# Patient Record
Sex: FEMALE | Race: WHITE | NOT HISPANIC OR LATINO | Employment: OTHER | URBAN - METROPOLITAN AREA
[De-identification: names, ages, dates, MRNs, and addresses within clinical notes are randomized per-mention and may not be internally consistent; named-entity substitution may affect disease eponyms.]

---

## 2017-01-01 ENCOUNTER — APPOINTMENT (EMERGENCY)
Dept: RADIOLOGY | Facility: HOSPITAL | Age: 37
End: 2017-01-01
Payer: MEDICARE

## 2017-01-01 ENCOUNTER — HOSPITAL ENCOUNTER (EMERGENCY)
Facility: HOSPITAL | Age: 37
Discharge: HOME | End: 2017-01-01
Attending: EMERGENCY MEDICINE | Admitting: EMERGENCY MEDICINE
Payer: MEDICARE

## 2017-01-01 ENCOUNTER — APPOINTMENT (EMERGENCY)
Dept: CT IMAGING | Facility: HOSPITAL | Age: 37
End: 2017-01-01
Payer: MEDICARE

## 2017-01-01 VITALS
DIASTOLIC BLOOD PRESSURE: 69 MMHG | TEMPERATURE: 97.6 F | HEIGHT: 55 IN | HEART RATE: 99 BPM | OXYGEN SATURATION: 100 % | BODY MASS INDEX: 38.23 KG/M2 | RESPIRATION RATE: 17 BRPM | WEIGHT: 165.2 LBS | SYSTOLIC BLOOD PRESSURE: 141 MMHG

## 2017-01-01 DIAGNOSIS — S30.0XXA CONTUSION OF LOWER BACK, INITIAL ENCOUNTER: Primary | ICD-10-CM

## 2017-01-01 PROCEDURE — 99284 EMERGENCY DEPT VISIT MOD MDM: CPT

## 2017-01-01 PROCEDURE — 71020 HB CHEST X-RAY 2VW FRONTAL&LATL: CPT

## 2017-01-01 PROCEDURE — 72131 CT LUMBAR SPINE W/O DYE: CPT

## 2017-01-01 RX ORDER — RABEPRAZOLE SODIUM 20 MG/1
TABLET, DELAYED RELEASE ORAL
COMMUNITY
End: 2017-02-01

## 2017-01-01 RX ORDER — CLOTRIMAZOLE 1 %
CREAM (GRAM) TOPICAL
COMMUNITY
End: 2017-01-01 | Stop reason: SDUPTHER

## 2017-01-01 RX ORDER — LORATADINE 10 MG/1
CAPSULE, LIQUID FILLED ORAL
COMMUNITY
End: 2017-01-01

## 2017-01-01 RX ORDER — MEDROXYPROGESTERONE ACETATE 150 MG/ML
INJECTION, SUSPENSION INTRAMUSCULAR
COMMUNITY
End: 2017-01-01 | Stop reason: SDUPTHER

## 2017-01-01 RX ORDER — FAMOTIDINE 20 MG/1
TABLET, FILM COATED ORAL
COMMUNITY
Start: 2016-09-22 | End: 2017-02-01

## 2017-01-01 RX ORDER — OMEGA-3 FATTY ACIDS/FISH OIL 300-1000MG
CAPSULE ORAL
COMMUNITY
End: 2017-01-01 | Stop reason: SDUPTHER

## 2017-01-01 RX ORDER — DIAPER,BRIEF,INFANT-TODD,DISP
EACH MISCELLANEOUS
COMMUNITY
End: 2017-01-01 | Stop reason: SDUPTHER

## 2017-01-01 RX ORDER — CHOLECALCIFEROL (VITAMIN D3) 25 MCG
CAPSULE ORAL
COMMUNITY
End: 2017-01-01 | Stop reason: SDUPTHER

## 2017-01-04 ENCOUNTER — ALLSCRIPTS OFFICE VISIT (OUTPATIENT)
Dept: OTHER | Facility: OTHER | Age: 37
End: 2017-01-04

## 2017-01-08 ENCOUNTER — APPOINTMENT (EMERGENCY)
Dept: RADIOLOGY | Facility: HOSPITAL | Age: 37
End: 2017-01-08
Payer: MEDICARE

## 2017-01-08 ENCOUNTER — HOSPITAL ENCOUNTER (EMERGENCY)
Facility: HOSPITAL | Age: 37
Discharge: HOME | End: 2017-01-08
Attending: EMERGENCY MEDICINE
Payer: MEDICARE

## 2017-01-08 VITALS
TEMPERATURE: 98.3 F | BODY MASS INDEX: 37.05 KG/M2 | SYSTOLIC BLOOD PRESSURE: 116 MMHG | WEIGHT: 159.39 LBS | HEART RATE: 82 BPM | DIASTOLIC BLOOD PRESSURE: 75 MMHG | RESPIRATION RATE: 18 BRPM | OXYGEN SATURATION: 98 %

## 2017-01-08 DIAGNOSIS — S20.219A CHEST WALL CONTUSION: Primary | ICD-10-CM

## 2017-01-08 PROCEDURE — 71020 HB CHEST X-RAY 2VW FRONTAL&LATL: CPT

## 2017-01-08 PROCEDURE — 99283 EMERGENCY DEPT VISIT LOW MDM: CPT

## 2017-01-11 ENCOUNTER — ALLSCRIPTS OFFICE VISIT (OUTPATIENT)
Dept: OTHER | Facility: OTHER | Age: 37
End: 2017-01-11

## 2017-01-13 ENCOUNTER — HOSPITAL ENCOUNTER (EMERGENCY)
Facility: HOSPITAL | Age: 37
Discharge: HOME | End: 2017-01-13
Attending: EMERGENCY MEDICINE | Admitting: EMERGENCY MEDICINE
Payer: MEDICARE

## 2017-01-13 ENCOUNTER — APPOINTMENT (EMERGENCY)
Dept: RADIOLOGY | Facility: HOSPITAL | Age: 37
End: 2017-01-13
Payer: MEDICARE

## 2017-01-13 VITALS
OXYGEN SATURATION: 98 % | SYSTOLIC BLOOD PRESSURE: 117 MMHG | HEIGHT: 60 IN | RESPIRATION RATE: 18 BRPM | HEART RATE: 92 BPM | BODY MASS INDEX: 31.45 KG/M2 | DIASTOLIC BLOOD PRESSURE: 76 MMHG | TEMPERATURE: 97.7 F | WEIGHT: 160.2 LBS

## 2017-01-13 DIAGNOSIS — J20.9 ACUTE BRONCHITIS: Primary | ICD-10-CM

## 2017-01-13 LAB
ATRIAL RATE: 86 BPM
P AXIS: 50 DEGREES
PR INTERVAL: 120 MS
QRS AXIS: 20 DEGREES
QRSD INTERVAL: 78 MS
QT INTERVAL: 364 MS
QTC INTERVAL: 435 MS
T WAVE AXIS: 63 DEGREES
VENTRICULAR RATE: 86 BPM

## 2017-01-13 PROCEDURE — 71020 HB CHEST X-RAY 2VW FRONTAL&LATL: CPT

## 2017-01-13 PROCEDURE — 93005 ELECTROCARDIOGRAM TRACING: CPT | Performed by: PHYSICIAN ASSISTANT

## 2017-01-13 PROCEDURE — 99285 EMERGENCY DEPT VISIT HI MDM: CPT

## 2017-01-13 RX ORDER — ACETAMINOPHEN 325 MG/1
650 TABLET ORAL ONCE
Status: COMPLETED | OUTPATIENT
Start: 2017-01-13 | End: 2017-01-13

## 2017-01-13 RX ORDER — AZITHROMYCIN 250 MG/1
TABLET, FILM COATED ORAL
Qty: 6 TABLET | Refills: 0 | Status: SHIPPED | OUTPATIENT
Start: 2017-01-13 | End: 2017-02-01

## 2017-01-13 RX ORDER — PROMETHAZINE HYDROCHLORIDE AND CODEINE PHOSPHATE 6.25; 1 MG/5ML; MG/5ML
5 SYRUP ORAL EVERY 4 HOURS PRN
Qty: 80 ML | Refills: 0 | Status: SHIPPED | OUTPATIENT
Start: 2017-01-13 | End: 2017-01-23

## 2017-01-13 RX ADMIN — ACETAMINOPHEN 650 MG: 325 TABLET ORAL at 13:43

## 2017-01-18 ENCOUNTER — ALLSCRIPTS OFFICE VISIT (OUTPATIENT)
Dept: OTHER | Facility: OTHER | Age: 37
End: 2017-01-18

## 2017-01-19 ENCOUNTER — ALLSCRIPTS OFFICE VISIT (OUTPATIENT)
Dept: OTHER | Facility: OTHER | Age: 37
End: 2017-01-19

## 2017-01-19 DIAGNOSIS — M54.50 LOW BACK PAIN: ICD-10-CM

## 2017-01-19 DIAGNOSIS — R07.89 OTHER CHEST PAIN: ICD-10-CM

## 2017-01-19 DIAGNOSIS — R13.10 DYSPHAGIA: ICD-10-CM

## 2017-01-19 DIAGNOSIS — G93.9 DISORDER OF BRAIN: ICD-10-CM

## 2017-01-24 ENCOUNTER — OFFICE VISIT (OUTPATIENT)
Dept: URGENT CARE | Age: 37
End: 2017-01-24
Payer: MEDICARE

## 2017-01-24 ENCOUNTER — TRANSCRIBE ORDERS (OUTPATIENT)
Dept: URGENT CARE | Age: 37
End: 2017-01-24

## 2017-01-24 ENCOUNTER — HOSPITAL ENCOUNTER (OUTPATIENT)
Dept: RADIOLOGY | Age: 37
Discharge: HOME | End: 2017-01-24
Attending: FAMILY MEDICINE | Admitting: FAMILY MEDICINE
Payer: MEDICARE

## 2017-01-24 DIAGNOSIS — R07.89 OTHER CHEST PAIN: ICD-10-CM

## 2017-01-24 PROCEDURE — 99203 OFFICE O/P NEW LOW 30 MIN: CPT | Performed by: FAMILY MEDICINE

## 2017-01-24 PROCEDURE — 71101 X-RAY EXAM UNILAT RIBS/CHEST: CPT

## 2017-01-24 PROCEDURE — G0463 HOSPITAL OUTPT CLINIC VISIT: HCPCS | Performed by: FAMILY MEDICINE

## 2017-01-25 ENCOUNTER — OFFICE VISIT (OUTPATIENT)
Dept: URGENT CARE | Age: 37
End: 2017-01-25
Payer: MEDICARE

## 2017-01-25 ENCOUNTER — TRANSCRIBE ORDERS (OUTPATIENT)
Dept: URGENT CARE | Age: 37
End: 2017-01-25

## 2017-01-25 ENCOUNTER — HOSPITAL ENCOUNTER (OUTPATIENT)
Dept: RADIOLOGY | Age: 37
Discharge: HOME | End: 2017-01-25
Admitting: FAMILY MEDICINE
Payer: MEDICARE

## 2017-01-25 DIAGNOSIS — M54.50 LOW BACK PAIN: ICD-10-CM

## 2017-01-25 PROCEDURE — 99213 OFFICE O/P EST LOW 20 MIN: CPT | Performed by: FAMILY MEDICINE

## 2017-01-25 PROCEDURE — 72100 X-RAY EXAM L-S SPINE 2/3 VWS: CPT

## 2017-01-25 PROCEDURE — G0463 HOSPITAL OUTPT CLINIC VISIT: HCPCS | Performed by: FAMILY MEDICINE

## 2017-01-25 PROCEDURE — 72220 X-RAY EXAM SACRUM TAILBONE: CPT

## 2017-01-27 ENCOUNTER — HOSPITAL ENCOUNTER (OUTPATIENT)
Dept: RADIOLOGY | Facility: HOSPITAL | Age: 37
Discharge: HOME | End: 2017-01-27
Payer: MEDICARE

## 2017-01-27 DIAGNOSIS — R13.10 DYSPHAGIA: ICD-10-CM

## 2017-01-27 PROCEDURE — 74220 X-RAY XM ESOPHAGUS 1CNTRST: CPT

## 2017-01-31 ENCOUNTER — GENERIC CONVERSION - ENCOUNTER (OUTPATIENT)
Dept: OTHER | Facility: OTHER | Age: 37
End: 2017-01-31

## 2017-02-01 ENCOUNTER — HOSPITAL ENCOUNTER (EMERGENCY)
Facility: HOSPITAL | Age: 37
Discharge: HOME | End: 2017-02-01
Attending: EMERGENCY MEDICINE | Admitting: EMERGENCY MEDICINE
Payer: MEDICARE

## 2017-02-01 ENCOUNTER — APPOINTMENT (EMERGENCY)
Dept: CT IMAGING | Facility: HOSPITAL | Age: 37
End: 2017-02-01
Payer: MEDICARE

## 2017-02-01 ENCOUNTER — APPOINTMENT (EMERGENCY)
Dept: RADIOLOGY | Facility: HOSPITAL | Age: 37
End: 2017-02-01
Payer: MEDICARE

## 2017-02-01 VITALS
SYSTOLIC BLOOD PRESSURE: 124 MMHG | WEIGHT: 154.76 LBS | OXYGEN SATURATION: 97 % | BODY MASS INDEX: 30.23 KG/M2 | TEMPERATURE: 97.7 F | RESPIRATION RATE: 18 BRPM | HEART RATE: 78 BPM | DIASTOLIC BLOOD PRESSURE: 76 MMHG

## 2017-02-01 DIAGNOSIS — S70.01XA CONTUSION OF RIGHT HIP: ICD-10-CM

## 2017-02-01 DIAGNOSIS — S09.90XA MINOR HEAD INJURY: Primary | ICD-10-CM

## 2017-02-01 PROCEDURE — 73502 X-RAY EXAM HIP UNI 2-3 VIEWS: CPT

## 2017-02-01 PROCEDURE — 70450 CT HEAD/BRAIN W/O DYE: CPT

## 2017-02-01 PROCEDURE — 99284 EMERGENCY DEPT VISIT MOD MDM: CPT

## 2017-02-01 RX ORDER — CLOTRIMAZOLE 1 %
1 CREAM (GRAM) TOPICAL 3 TIMES DAILY PRN
COMMUNITY
End: 2018-05-08 | Stop reason: SDUPTHER

## 2017-02-01 RX ORDER — IBUPROFEN 600 MG/1
600 TABLET ORAL EVERY 6 HOURS PRN
COMMUNITY
End: 2017-02-01 | Stop reason: ALTCHOICE

## 2017-02-01 RX ORDER — MEDROXYPROGESTERONE ACETATE 150 MG/ML
150 INJECTION, SUSPENSION INTRAMUSCULAR
COMMUNITY
End: 2018-01-10 | Stop reason: ALTCHOICE

## 2017-02-01 RX ORDER — NAPROXEN 500 MG/1
500 TABLET ORAL 2 TIMES DAILY PRN
Qty: 15 TABLET | Refills: 0 | Status: SHIPPED | OUTPATIENT
Start: 2017-02-01 | End: 2017-04-02

## 2017-02-01 RX ORDER — METOCLOPRAMIDE 10 MG/1
10 TABLET ORAL EVERY 8 HOURS PRN
COMMUNITY
End: 2018-01-29 | Stop reason: SDUPTHER

## 2017-02-01 RX ORDER — GUAIFENESIN 100 MG/5ML
200 SYRUP ORAL EVERY 4 HOURS PRN
COMMUNITY
End: 2018-01-10 | Stop reason: ALTCHOICE

## 2017-02-01 RX ORDER — LORATADINE 10 MG/1
10 TABLET ORAL DAILY
COMMUNITY
End: 2018-01-10 | Stop reason: ALTCHOICE

## 2017-02-01 RX ORDER — OFLOXACIN 3 MG/ML
5 SOLUTION AURICULAR (OTIC) 2 TIMES DAILY
COMMUNITY
End: 2018-01-10 | Stop reason: ALTCHOICE

## 2017-02-01 RX ORDER — PROMETHAZINE HYDROCHLORIDE AND CODEINE PHOSPHATE 6.25; 1 MG/5ML; MG/5ML
5 SYRUP ORAL EVERY 4 HOURS PRN
COMMUNITY
End: 2017-04-02

## 2017-02-01 RX ORDER — CEFDINIR 300 MG/1
300 CAPSULE ORAL 2 TIMES DAILY
COMMUNITY
End: 2018-01-10 | Stop reason: ALTCHOICE

## 2017-02-01 RX ORDER — FLUTICASONE PROPIONATE 50 MCG
2 SPRAY, SUSPENSION (ML) NASAL 2 TIMES DAILY
COMMUNITY
End: 2017-08-08

## 2017-02-01 RX ORDER — FAMOTIDINE 20 MG/1
20 TABLET, FILM COATED ORAL 2 TIMES DAILY
COMMUNITY
End: 2018-01-30 | Stop reason: SDUPTHER

## 2017-02-01 RX ORDER — RABEPRAZOLE SODIUM 20 MG/1
20 TABLET, DELAYED RELEASE ORAL DAILY
COMMUNITY
End: 2018-02-22 | Stop reason: RX

## 2017-02-01 RX ORDER — ACETAMINOPHEN 500 MG
500 TABLET ORAL EVERY 6 HOURS PRN
COMMUNITY
End: 2018-04-18 | Stop reason: SDUPTHER

## 2017-02-01 RX ORDER — EUCALYP/ME-SALICYLATE/MEN/THYM
MOUTHWASH MUCOUS MEMBRANE 2 TIMES DAILY
COMMUNITY
End: 2018-12-06 | Stop reason: SDUPTHER

## 2017-02-03 ENCOUNTER — TRANSCRIBE ORDERS (OUTPATIENT)
Dept: ADMINISTRATIVE | Facility: HOSPITAL | Age: 37
End: 2017-02-03

## 2017-02-03 ENCOUNTER — ALLSCRIPTS OFFICE VISIT (OUTPATIENT)
Dept: OTHER | Facility: OTHER | Age: 37
End: 2017-02-03

## 2017-02-03 DIAGNOSIS — G93.9 UNSPECIFIED CONDITION OF BRAIN: Primary | ICD-10-CM

## 2017-02-08 ENCOUNTER — HOSPITAL ENCOUNTER (EMERGENCY)
Facility: HOSPITAL | Age: 37
Discharge: HOME | End: 2017-02-08
Attending: EMERGENCY MEDICINE | Admitting: EMERGENCY MEDICINE
Payer: MEDICARE

## 2017-02-08 VITALS
SYSTOLIC BLOOD PRESSURE: 134 MMHG | RESPIRATION RATE: 16 BRPM | OXYGEN SATURATION: 97 % | TEMPERATURE: 98.5 F | DIASTOLIC BLOOD PRESSURE: 86 MMHG | HEART RATE: 69 BPM

## 2017-02-08 DIAGNOSIS — B37.3 VAGINAL YEAST INFECTION: Primary | ICD-10-CM

## 2017-02-08 DIAGNOSIS — N39.0 UTI (URINARY TRACT INFECTION): ICD-10-CM

## 2017-02-08 LAB
BACTERIA UR QL AUTO: ABNORMAL /HPF
BILIRUB UR QL STRIP: NEGATIVE
CLARITY UR: CLEAR
COLOR UR: YELLOW
GLUCOSE UR STRIP-MCNC: NEGATIVE MG/DL
HCG UR QL: NEGATIVE
HGB UR QL STRIP.AUTO: ABNORMAL
KETONES UR STRIP-MCNC: ABNORMAL MG/DL
LEUKOCYTE ESTERASE UR QL STRIP: NEGATIVE
NITRITE UR QL STRIP: NEGATIVE
NON-SQ EPI CELLS URNS QL MICRO: ABNORMAL /HPF
PH UR STRIP.AUTO: 5.5 [PH] (ref 4.5–8)
PROT UR STRIP-MCNC: NEGATIVE MG/DL
RBC #/AREA URNS AUTO: ABNORMAL /HPF
SP GR UR STRIP.AUTO: 1.02 (ref 1–1.03)
UROBILINOGEN UR QL STRIP.AUTO: 0.2 E.U./DL
WBC #/AREA URNS AUTO: ABNORMAL /HPF

## 2017-02-08 PROCEDURE — 81002 URINALYSIS NONAUTO W/O SCOPE: CPT | Performed by: EMERGENCY MEDICINE

## 2017-02-08 PROCEDURE — 99283 EMERGENCY DEPT VISIT LOW MDM: CPT

## 2017-02-08 PROCEDURE — 81001 URINALYSIS AUTO W/SCOPE: CPT

## 2017-02-08 PROCEDURE — 81025 URINE PREGNANCY TEST: CPT | Performed by: EMERGENCY MEDICINE

## 2017-02-08 PROCEDURE — 87086 URINE CULTURE/COLONY COUNT: CPT

## 2017-02-08 RX ORDER — FLUCONAZOLE 100 MG/1
200 TABLET ORAL ONCE
Status: COMPLETED | OUTPATIENT
Start: 2017-02-08 | End: 2017-02-08

## 2017-02-08 RX ORDER — CIPROFLOXACIN 500 MG/1
500 TABLET, FILM COATED ORAL ONCE
Status: COMPLETED | OUTPATIENT
Start: 2017-02-08 | End: 2017-02-08

## 2017-02-08 RX ORDER — CIPROFLOXACIN 500 MG/1
500 TABLET, FILM COATED ORAL 2 TIMES DAILY
Qty: 6 TABLET | Refills: 0 | Status: SHIPPED | OUTPATIENT
Start: 2017-02-08 | End: 2017-02-11

## 2017-02-08 RX ADMIN — FLUCONAZOLE 200 MG: 100 TABLET ORAL at 16:25

## 2017-02-08 RX ADMIN — CIPROFLOXACIN 500 MG: 500 TABLET, FILM COATED ORAL at 16:25

## 2017-02-09 LAB — BACTERIA UR CULT: NORMAL

## 2017-02-10 ENCOUNTER — GENERIC CONVERSION - ENCOUNTER (OUTPATIENT)
Dept: OTHER | Facility: OTHER | Age: 37
End: 2017-02-10

## 2017-02-14 ENCOUNTER — ALLSCRIPTS OFFICE VISIT (OUTPATIENT)
Dept: OTHER | Facility: OTHER | Age: 37
End: 2017-02-14

## 2017-02-16 ENCOUNTER — HOSPITAL ENCOUNTER (OUTPATIENT)
Dept: MRI IMAGING | Facility: HOSPITAL | Age: 37
Discharge: HOME | End: 2017-02-16
Payer: MEDICARE

## 2017-02-16 DIAGNOSIS — G93.9 UNSPECIFIED CONDITION OF BRAIN: ICD-10-CM

## 2017-02-16 PROCEDURE — 70551 MRI BRAIN STEM W/O DYE: CPT

## 2017-02-21 ENCOUNTER — ALLSCRIPTS OFFICE VISIT (OUTPATIENT)
Dept: OTHER | Facility: OTHER | Age: 37
End: 2017-02-21

## 2017-02-21 DIAGNOSIS — Z91.81 HISTORY OF FALLING: ICD-10-CM

## 2017-02-21 DIAGNOSIS — E23.7 DISORDER OF PITUITARY GLAND (HCC): ICD-10-CM

## 2017-02-24 ENCOUNTER — TRANSCRIBE ORDERS (OUTPATIENT)
Dept: LAB | Facility: HOSPITAL | Age: 37
End: 2017-02-24

## 2017-02-24 ENCOUNTER — APPOINTMENT (OUTPATIENT)
Dept: LAB | Facility: HOSPITAL | Age: 37
End: 2017-02-24
Payer: MEDICARE

## 2017-02-24 DIAGNOSIS — E23.7 DISORDER OF PITUITARY GLAND (HCC): ICD-10-CM

## 2017-02-24 DIAGNOSIS — Z91.81 HISTORY OF FALLING: ICD-10-CM

## 2017-02-24 LAB
FSH SERPL-ACNC: 9.3 MIU/ML
LH SERPL-ACNC: 10.3 MIU/ML
PROLACTIN SERPL-MCNC: 8.5 NG/ML
TSH SERPL DL<=0.05 MIU/L-ACNC: 1.79 UIU/ML (ref 0.36–3.74)

## 2017-02-24 PROCEDURE — 83002 ASSAY OF GONADOTROPIN (LH): CPT

## 2017-02-24 PROCEDURE — 84146 ASSAY OF PROLACTIN: CPT

## 2017-02-24 PROCEDURE — 83001 ASSAY OF GONADOTROPIN (FSH): CPT

## 2017-02-24 PROCEDURE — 84443 ASSAY THYROID STIM HORMONE: CPT

## 2017-02-24 PROCEDURE — 36415 COLL VENOUS BLD VENIPUNCTURE: CPT

## 2017-02-24 PROCEDURE — 84305 ASSAY OF SOMATOMEDIN: CPT

## 2017-02-25 LAB — IGF-I SERPL-MCNC: 138 NG/ML (ref 69–227)

## 2017-03-02 ENCOUNTER — ALLSCRIPTS OFFICE VISIT (OUTPATIENT)
Dept: OTHER | Facility: OTHER | Age: 37
End: 2017-03-02

## 2017-03-09 ENCOUNTER — GENERIC CONVERSION - ENCOUNTER (OUTPATIENT)
Dept: OTHER | Facility: OTHER | Age: 37
End: 2017-03-09

## 2017-03-09 ENCOUNTER — OFFICE VISIT (OUTPATIENT)
Dept: PHYSICAL THERAPY | Facility: REHABILITATION | Age: 37
End: 2017-03-09
Payer: MEDICARE

## 2017-03-09 PROCEDURE — 97163 PT EVAL HIGH COMPLEX 45 MIN: CPT

## 2017-03-09 PROCEDURE — G8979 MOBILITY GOAL STATUS: HCPCS

## 2017-03-09 PROCEDURE — G8978 MOBILITY CURRENT STATUS: HCPCS

## 2017-03-13 ENCOUNTER — ALLSCRIPTS OFFICE VISIT (OUTPATIENT)
Dept: OTHER | Facility: OTHER | Age: 37
End: 2017-03-13

## 2017-03-17 ENCOUNTER — TRANSCRIBE ORDERS (OUTPATIENT)
Dept: ADMINISTRATIVE | Facility: HOSPITAL | Age: 37
End: 2017-03-17

## 2017-03-17 DIAGNOSIS — D49.7 NEOPLASM OF ENDOCRINE GLANDS AND NERVOUS SYSTEM: Primary | ICD-10-CM

## 2017-03-17 DIAGNOSIS — G93.9 UNSPECIFIED CONDITION OF BRAIN: ICD-10-CM

## 2017-03-29 ENCOUNTER — ALLSCRIPTS OFFICE VISIT (OUTPATIENT)
Dept: OTHER | Facility: OTHER | Age: 37
End: 2017-03-29

## 2017-03-30 ENCOUNTER — APPOINTMENT (OUTPATIENT)
Dept: PHYSICAL THERAPY | Facility: REHABILITATION | Age: 37
End: 2017-03-30
Payer: MEDICARE

## 2017-03-30 DIAGNOSIS — H91.90 HEARING LOSS: ICD-10-CM
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
## 2017-03-30 PROCEDURE — 97112 NEUROMUSCULAR REEDUCATION: CPT

## 2017-04-02 ENCOUNTER — HOSPITAL ENCOUNTER (EMERGENCY)
Facility: HOSPITAL | Age: 37
Discharge: HOME | End: 2017-04-02
Attending: EMERGENCY MEDICINE | Admitting: EMERGENCY MEDICINE
Payer: MEDICARE

## 2017-04-02 ENCOUNTER — APPOINTMENT (EMERGENCY)
Dept: RADIOLOGY | Facility: HOSPITAL | Age: 37
End: 2017-04-02
Payer: MEDICARE

## 2017-04-02 VITALS
HEART RATE: 80 BPM | RESPIRATION RATE: 16 BRPM | TEMPERATURE: 97.2 F | OXYGEN SATURATION: 96 % | DIASTOLIC BLOOD PRESSURE: 66 MMHG | SYSTOLIC BLOOD PRESSURE: 110 MMHG | BODY MASS INDEX: 29.29 KG/M2 | WEIGHT: 150 LBS

## 2017-04-02 DIAGNOSIS — R07.9 CHEST PAIN, UNSPECIFIED: Primary | ICD-10-CM

## 2017-04-02 LAB
ALBUMIN SERPL BCP-MCNC: 3.7 G/DL (ref 3.5–5)
ALP SERPL-CCNC: 177 U/L (ref 46–116)
ALT SERPL W P-5'-P-CCNC: 25 U/L (ref 12–78)
ANION GAP SERPL CALCULATED.3IONS-SCNC: 8 MMOL/L (ref 4–13)
AST SERPL W P-5'-P-CCNC: 32 U/L (ref 5–45)
ATRIAL RATE: 76 BPM
BASOPHILS # BLD AUTO: 0.02 THOUSANDS/ΜL (ref 0–0.1)
BASOPHILS NFR BLD AUTO: 0 % (ref 0–1)
BILIRUB SERPL-MCNC: 0.45 MG/DL (ref 0.2–1)
BUN SERPL-MCNC: 8 MG/DL (ref 5–25)
CALCIUM SERPL-MCNC: 8.4 MG/DL (ref 8.3–10.1)
CHLORIDE SERPL-SCNC: 108 MMOL/L (ref 100–108)
CO2 SERPL-SCNC: 23 MMOL/L (ref 21–32)
CREAT SERPL-MCNC: 0.78 MG/DL (ref 0.6–1.3)
EOSINOPHIL # BLD AUTO: 0.04 THOUSAND/ΜL (ref 0–0.61)
EOSINOPHIL NFR BLD AUTO: 1 % (ref 0–6)
ERYTHROCYTE [DISTWIDTH] IN BLOOD BY AUTOMATED COUNT: 13.1 % (ref 11.6–15.1)
GFR SERPL CREATININE-BSD FRML MDRD: >60 ML/MIN/1.73SQ M
GLUCOSE SERPL-MCNC: 81 MG/DL (ref 65–140)
HCT VFR BLD AUTO: 40.5 % (ref 34.8–46.1)
HGB BLD-MCNC: 14 G/DL (ref 11.5–15.4)
LIPASE SERPL-CCNC: 149 U/L (ref 73–393)
LYMPHOCYTES # BLD AUTO: 2.73 THOUSANDS/ΜL (ref 0.6–4.47)
LYMPHOCYTES NFR BLD AUTO: 33 % (ref 14–44)
MCH RBC QN AUTO: 31.5 PG (ref 26.8–34.3)
MCHC RBC AUTO-ENTMCNC: 34.6 G/DL (ref 31.4–37.4)
MCV RBC AUTO: 91 FL (ref 82–98)
MONOCYTES # BLD AUTO: 0.52 THOUSAND/ΜL (ref 0.17–1.22)
MONOCYTES NFR BLD AUTO: 6 % (ref 4–12)
NEUTROPHILS # BLD AUTO: 5.02 THOUSANDS/ΜL (ref 1.85–7.62)
NEUTS SEG NFR BLD AUTO: 60 % (ref 43–75)
NRBC BLD AUTO-RTO: 0 /100 WBCS
P AXIS: 51 DEGREES
PLATELET # BLD AUTO: 243 THOUSANDS/UL (ref 149–390)
PMV BLD AUTO: 10.2 FL (ref 8.9–12.7)
POTASSIUM SERPL-SCNC: 4.1 MMOL/L (ref 3.5–5.3)
PR INTERVAL: 118 MS
PROT SERPL-MCNC: 7.6 G/DL (ref 6.4–8.2)
QRS AXIS: 1 DEGREES
QRSD INTERVAL: 78 MS
QT INTERVAL: 374 MS
QTC INTERVAL: 420 MS
RBC # BLD AUTO: 4.44 MILLION/UL (ref 3.81–5.12)
SODIUM SERPL-SCNC: 139 MMOL/L (ref 136–145)
SPECIMEN SOURCE: NORMAL
SPECIMEN SOURCE: NORMAL
T WAVE AXIS: 53 DEGREES
TROPONIN I BLD-MCNC: 0 NG/ML (ref 0–0.08)
TROPONIN I BLD-MCNC: 0 NG/ML (ref 0–0.08)
VENTRICULAR RATE: 76 BPM
WBC # BLD AUTO: 8.34 THOUSAND/UL (ref 4.31–10.16)

## 2017-04-02 PROCEDURE — 93005 ELECTROCARDIOGRAM TRACING: CPT | Performed by: EMERGENCY MEDICINE

## 2017-04-02 PROCEDURE — 80053 COMPREHEN METABOLIC PANEL: CPT | Performed by: EMERGENCY MEDICINE

## 2017-04-02 PROCEDURE — 83690 ASSAY OF LIPASE: CPT | Performed by: EMERGENCY MEDICINE

## 2017-04-02 PROCEDURE — 84484 ASSAY OF TROPONIN QUANT: CPT

## 2017-04-02 PROCEDURE — 71020 HB CHEST X-RAY 2VW FRONTAL&LATL: CPT

## 2017-04-02 PROCEDURE — 85025 COMPLETE CBC W/AUTO DIFF WBC: CPT | Performed by: EMERGENCY MEDICINE

## 2017-04-02 PROCEDURE — 99285 EMERGENCY DEPT VISIT HI MDM: CPT

## 2017-04-02 PROCEDURE — 36415 COLL VENOUS BLD VENIPUNCTURE: CPT | Performed by: EMERGENCY MEDICINE

## 2017-04-02 RX ORDER — MAGNESIUM HYDROXIDE/ALUMINUM HYDROXICE/SIMETHICONE 120; 1200; 1200 MG/30ML; MG/30ML; MG/30ML
30 SUSPENSION ORAL ONCE
Status: COMPLETED | OUTPATIENT
Start: 2017-04-02 | End: 2017-04-02

## 2017-04-02 RX ORDER — IBUPROFEN 600 MG/1
600 TABLET ORAL ONCE
Status: DISCONTINUED | OUTPATIENT
Start: 2017-04-02 | End: 2017-04-02 | Stop reason: HOSPADM

## 2017-04-02 RX ORDER — ESCITALOPRAM OXALATE 10 MG/1
10 TABLET ORAL DAILY
COMMUNITY
End: 2018-07-17 | Stop reason: HOSPADM

## 2017-04-02 RX ADMIN — LIDOCAINE HYDROCHLORIDE 15 ML: 20 SOLUTION ORAL; TOPICAL at 16:29

## 2017-04-02 RX ADMIN — ALUMINUM HYDROXIDE, MAGNESIUM HYDROXIDE, AND SIMETHICONE 30 ML: 200; 200; 20 SUSPENSION ORAL at 16:29

## 2017-04-05 ENCOUNTER — ALLSCRIPTS OFFICE VISIT (OUTPATIENT)
Dept: OTHER | Facility: OTHER | Age: 37
End: 2017-04-05

## 2017-04-06 ENCOUNTER — APPOINTMENT (OUTPATIENT)
Dept: PHYSICAL THERAPY | Facility: REHABILITATION | Age: 37
End: 2017-04-06
Payer: MEDICARE

## 2017-04-13 ENCOUNTER — OFFICE VISIT (OUTPATIENT)
Dept: PHYSICAL THERAPY | Facility: REHABILITATION | Age: 37
End: 2017-04-13
Payer: MEDICARE

## 2017-04-13 PROCEDURE — 97110 THERAPEUTIC EXERCISES: CPT

## 2017-04-13 PROCEDURE — 97112 NEUROMUSCULAR REEDUCATION: CPT

## 2017-04-19 ENCOUNTER — ALLSCRIPTS OFFICE VISIT (OUTPATIENT)
Dept: OTHER | Facility: OTHER | Age: 37
End: 2017-04-19

## 2017-04-24 ENCOUNTER — APPOINTMENT (OUTPATIENT)
Dept: OCCUPATIONAL THERAPY | Facility: REHABILITATION | Age: 37
End: 2017-04-24
Payer: MEDICARE

## 2017-04-24 ENCOUNTER — APPOINTMENT (OUTPATIENT)
Dept: PHYSICAL THERAPY | Facility: REHABILITATION | Age: 37
End: 2017-04-24
Payer: MEDICARE

## 2017-04-24 PROCEDURE — 97167 OT EVAL HIGH COMPLEX 60 MIN: CPT

## 2017-04-24 PROCEDURE — 97150 GROUP THERAPEUTIC PROCEDURES: CPT

## 2017-04-24 PROCEDURE — G8990 OTHER PT/OT CURRENT STATUS: HCPCS

## 2017-04-24 PROCEDURE — G8991 OTHER PT/OT GOAL STATUS: HCPCS

## 2017-04-24 PROCEDURE — 97112 NEUROMUSCULAR REEDUCATION: CPT

## 2017-04-27 ENCOUNTER — HOSPITAL ENCOUNTER (EMERGENCY)
Facility: HOSPITAL | Age: 37
Discharge: HOME | End: 2017-04-27
Attending: EMERGENCY MEDICINE
Payer: MEDICARE

## 2017-04-27 ENCOUNTER — APPOINTMENT (EMERGENCY)
Dept: RADIOLOGY | Facility: HOSPITAL | Age: 37
End: 2017-04-27
Payer: MEDICARE

## 2017-04-27 VITALS
WEIGHT: 145 LBS | SYSTOLIC BLOOD PRESSURE: 120 MMHG | HEART RATE: 78 BPM | OXYGEN SATURATION: 99 % | TEMPERATURE: 98.7 F | RESPIRATION RATE: 16 BRPM | DIASTOLIC BLOOD PRESSURE: 66 MMHG | BODY MASS INDEX: 28.32 KG/M2

## 2017-04-27 DIAGNOSIS — R07.9 CHEST PAIN: Primary | ICD-10-CM

## 2017-04-27 LAB
ANION GAP SERPL CALCULATED.3IONS-SCNC: 8 MMOL/L (ref 4–13)
BASOPHILS # BLD AUTO: 0.01 THOUSANDS/ΜL (ref 0–0.1)
BASOPHILS NFR BLD AUTO: 0 % (ref 0–1)
BUN SERPL-MCNC: 11 MG/DL (ref 5–25)
CALCIUM SERPL-MCNC: 8.6 MG/DL (ref 8.3–10.1)
CHLORIDE SERPL-SCNC: 107 MMOL/L (ref 100–108)
CO2 SERPL-SCNC: 26 MMOL/L (ref 21–32)
CREAT SERPL-MCNC: 0.72 MG/DL (ref 0.6–1.3)
EOSINOPHIL # BLD AUTO: 0.07 THOUSAND/ΜL (ref 0–0.61)
EOSINOPHIL NFR BLD AUTO: 1 % (ref 0–6)
ERYTHROCYTE [DISTWIDTH] IN BLOOD BY AUTOMATED COUNT: 13.4 % (ref 11.6–15.1)
GFR SERPL CREATININE-BSD FRML MDRD: >60 ML/MIN/1.73SQ M
GLUCOSE SERPL-MCNC: 88 MG/DL (ref 65–140)
HCT VFR BLD AUTO: 41.4 % (ref 34.8–46.1)
HGB BLD-MCNC: 14 G/DL (ref 11.5–15.4)
LYMPHOCYTES # BLD AUTO: 2.43 THOUSANDS/ΜL (ref 0.6–4.47)
LYMPHOCYTES NFR BLD AUTO: 29 % (ref 14–44)
MCH RBC QN AUTO: 31.2 PG (ref 26.8–34.3)
MCHC RBC AUTO-ENTMCNC: 33.8 G/DL (ref 31.4–37.4)
MCV RBC AUTO: 92 FL (ref 82–98)
MONOCYTES # BLD AUTO: 0.6 THOUSAND/ΜL (ref 0.17–1.22)
MONOCYTES NFR BLD AUTO: 7 % (ref 4–12)
NEUTROPHILS # BLD AUTO: 5.41 THOUSANDS/ΜL (ref 1.85–7.62)
NEUTS SEG NFR BLD AUTO: 63 % (ref 43–75)
NRBC BLD AUTO-RTO: 0 /100 WBCS
PLATELET # BLD AUTO: 276 THOUSANDS/UL (ref 149–390)
PMV BLD AUTO: 9.4 FL (ref 8.9–12.7)
POTASSIUM SERPL-SCNC: 4.1 MMOL/L (ref 3.5–5.3)
RBC # BLD AUTO: 4.49 MILLION/UL (ref 3.81–5.12)
SODIUM SERPL-SCNC: 141 MMOL/L (ref 136–145)
SPECIMEN SOURCE: NORMAL
TROPONIN I BLD-MCNC: 0 NG/ML (ref 0–0.08)
WBC # BLD AUTO: 8.54 THOUSAND/UL (ref 4.31–10.16)

## 2017-04-27 PROCEDURE — 93005 ELECTROCARDIOGRAM TRACING: CPT

## 2017-04-27 PROCEDURE — 36415 COLL VENOUS BLD VENIPUNCTURE: CPT

## 2017-04-27 PROCEDURE — 99285 EMERGENCY DEPT VISIT HI MDM: CPT

## 2017-04-27 PROCEDURE — 85025 COMPLETE CBC W/AUTO DIFF WBC: CPT

## 2017-04-27 PROCEDURE — 80048 BASIC METABOLIC PNL TOTAL CA: CPT

## 2017-04-27 PROCEDURE — 71020 HB CHEST X-RAY 2VW FRONTAL&LATL: CPT

## 2017-04-27 PROCEDURE — 84484 ASSAY OF TROPONIN QUANT: CPT

## 2017-04-27 PROCEDURE — 96374 THER/PROPH/DIAG INJ IV PUSH: CPT

## 2017-04-27 RX ORDER — KETOROLAC TROMETHAMINE 30 MG/ML
INJECTION, SOLUTION INTRAMUSCULAR; INTRAVENOUS
Status: COMPLETED
Start: 2017-04-27 | End: 2017-04-27

## 2017-04-27 RX ORDER — KETOROLAC TROMETHAMINE 30 MG/ML
15 INJECTION, SOLUTION INTRAMUSCULAR; INTRAVENOUS ONCE
Status: COMPLETED | OUTPATIENT
Start: 2017-04-27 | End: 2017-04-27

## 2017-04-27 RX ORDER — NAPROXEN 375 MG/1
375 TABLET ORAL EVERY 12 HOURS PRN
Qty: 14 TABLET | Refills: 0 | Status: SHIPPED | OUTPATIENT
Start: 2017-04-27 | End: 2018-01-10 | Stop reason: ALTCHOICE

## 2017-04-27 RX ORDER — NAPROXEN 375 MG/1
375 TABLET ORAL 2 TIMES DAILY PRN
Qty: 14 TABLET | Refills: 0 | Status: SHIPPED | OUTPATIENT
Start: 2017-04-27 | End: 2017-04-27

## 2017-04-27 RX ADMIN — KETOROLAC TROMETHAMINE 15 MG: 30 INJECTION, SOLUTION INTRAMUSCULAR; INTRAVENOUS at 21:54

## 2017-04-27 RX ADMIN — KETOROLAC TROMETHAMINE 15 MG: 30 INJECTION, SOLUTION INTRAMUSCULAR at 21:54

## 2017-04-28 LAB
ATRIAL RATE: 91 BPM
P AXIS: 40 DEGREES
PR INTERVAL: 116 MS
QRS AXIS: 10 DEGREES
QRSD INTERVAL: 76 MS
QT INTERVAL: 364 MS
QTC INTERVAL: 447 MS
T WAVE AXIS: 44 DEGREES
VENTRICULAR RATE: 91 BPM

## 2017-05-01 ENCOUNTER — APPOINTMENT (OUTPATIENT)
Dept: PHYSICAL THERAPY | Facility: REHABILITATION | Age: 37
End: 2017-05-01
Payer: MEDICARE

## 2017-05-01 ENCOUNTER — APPOINTMENT (OUTPATIENT)
Dept: OCCUPATIONAL THERAPY | Facility: REHABILITATION | Age: 37
End: 2017-05-01
Payer: MEDICARE

## 2017-05-01 PROCEDURE — 97112 NEUROMUSCULAR REEDUCATION: CPT

## 2017-05-01 PROCEDURE — 97110 THERAPEUTIC EXERCISES: CPT

## 2017-05-04 ENCOUNTER — ALLSCRIPTS OFFICE VISIT (OUTPATIENT)
Dept: OTHER | Facility: OTHER | Age: 37
End: 2017-05-04

## 2017-05-05 ENCOUNTER — HOSPITAL ENCOUNTER (EMERGENCY)
Facility: HOSPITAL | Age: 37
Discharge: HOME | End: 2017-05-05
Attending: EMERGENCY MEDICINE
Payer: MEDICARE

## 2017-05-05 VITALS
TEMPERATURE: 97.8 F | DIASTOLIC BLOOD PRESSURE: 65 MMHG | WEIGHT: 149 LBS | HEART RATE: 78 BPM | OXYGEN SATURATION: 99 % | RESPIRATION RATE: 18 BRPM | SYSTOLIC BLOOD PRESSURE: 131 MMHG | BODY MASS INDEX: 29.1 KG/M2

## 2017-05-05 DIAGNOSIS — K21.9 GERD (GASTROESOPHAGEAL REFLUX DISEASE): ICD-10-CM

## 2017-05-05 DIAGNOSIS — R10.13 EPIGASTRIC PAIN: Primary | ICD-10-CM

## 2017-05-05 PROCEDURE — 99283 EMERGENCY DEPT VISIT LOW MDM: CPT

## 2017-05-05 RX ORDER — AMOXICILLIN 250 MG
1 CAPSULE ORAL DAILY
Qty: 5 TABLET | Refills: 0 | Status: SHIPPED | OUTPATIENT
Start: 2017-05-05 | End: 2017-08-08

## 2017-05-05 RX ORDER — SUCRALFATE ORAL 1 G/10ML
1 SUSPENSION ORAL 4 TIMES DAILY
Qty: 420 ML | Refills: 0 | Status: SHIPPED | OUTPATIENT
Start: 2017-05-05 | End: 2017-09-20 | Stop reason: ALTCHOICE

## 2017-05-05 RX ORDER — MAGNESIUM HYDROXIDE/ALUMINUM HYDROXICE/SIMETHICONE 120; 1200; 1200 MG/30ML; MG/30ML; MG/30ML
30 SUSPENSION ORAL EVERY 4 HOURS PRN
Status: DISCONTINUED | OUTPATIENT
Start: 2017-05-05 | End: 2017-05-05 | Stop reason: HOSPADM

## 2017-05-05 RX ORDER — AMOXICILLIN 250 MG
1 CAPSULE ORAL ONCE
Status: COMPLETED | OUTPATIENT
Start: 2017-05-05 | End: 2017-05-05

## 2017-05-05 RX ADMIN — Medication 1 TABLET: at 04:26

## 2017-05-05 RX ADMIN — LIDOCAINE HYDROCHLORIDE 15 ML: 20 SOLUTION ORAL; TOPICAL at 03:34

## 2017-05-05 RX ADMIN — ALUMINUM HYDROXIDE, MAGNESIUM HYDROXIDE, AND SIMETHICONE 30 ML: 200; 200; 20 SUSPENSION ORAL at 03:34

## 2017-05-08 ENCOUNTER — APPOINTMENT (OUTPATIENT)
Dept: PHYSICAL THERAPY | Facility: REHABILITATION | Age: 37
End: 2017-05-08
Payer: MEDICARE

## 2017-05-08 ENCOUNTER — APPOINTMENT (OUTPATIENT)
Dept: OCCUPATIONAL THERAPY | Facility: REHABILITATION | Age: 37
End: 2017-05-08
Payer: MEDICARE

## 2017-05-08 PROCEDURE — 97535 SELF CARE MNGMENT TRAINING: CPT

## 2017-05-08 PROCEDURE — 97110 THERAPEUTIC EXERCISES: CPT

## 2017-05-08 PROCEDURE — 97112 NEUROMUSCULAR REEDUCATION: CPT

## 2017-05-12 ENCOUNTER — APPOINTMENT (OUTPATIENT)
Dept: OCCUPATIONAL THERAPY | Facility: REHABILITATION | Age: 37
End: 2017-05-12
Payer: MEDICARE

## 2017-05-12 ENCOUNTER — APPOINTMENT (OUTPATIENT)
Dept: PHYSICAL THERAPY | Facility: REHABILITATION | Age: 37
End: 2017-05-12
Payer: MEDICARE

## 2017-05-12 PROCEDURE — 97140 MANUAL THERAPY 1/> REGIONS: CPT

## 2017-05-12 PROCEDURE — 97112 NEUROMUSCULAR REEDUCATION: CPT

## 2017-05-12 PROCEDURE — 97110 THERAPEUTIC EXERCISES: CPT

## 2017-05-15 ENCOUNTER — OFFICE VISIT (OUTPATIENT)
Dept: OCCUPATIONAL THERAPY | Facility: REHABILITATION | Age: 37
End: 2017-05-15
Payer: MEDICARE

## 2017-05-15 ENCOUNTER — APPOINTMENT (OUTPATIENT)
Dept: PHYSICAL THERAPY | Facility: REHABILITATION | Age: 37
End: 2017-05-15
Payer: MEDICARE

## 2017-05-15 PROCEDURE — 97140 MANUAL THERAPY 1/> REGIONS: CPT

## 2017-05-15 PROCEDURE — 97112 NEUROMUSCULAR REEDUCATION: CPT

## 2017-05-19 ENCOUNTER — APPOINTMENT (OUTPATIENT)
Dept: PHYSICAL THERAPY | Facility: REHABILITATION | Age: 37
End: 2017-05-19
Payer: MEDICARE

## 2017-05-19 ENCOUNTER — APPOINTMENT (OUTPATIENT)
Dept: OCCUPATIONAL THERAPY | Facility: REHABILITATION | Age: 37
End: 2017-05-19
Payer: MEDICARE

## 2017-05-19 PROCEDURE — 97112 NEUROMUSCULAR REEDUCATION: CPT

## 2017-05-19 PROCEDURE — 97535 SELF CARE MNGMENT TRAINING: CPT

## 2017-05-22 ENCOUNTER — APPOINTMENT (OUTPATIENT)
Dept: OCCUPATIONAL THERAPY | Facility: REHABILITATION | Age: 37
End: 2017-05-22
Payer: MEDICARE

## 2017-05-22 ENCOUNTER — APPOINTMENT (OUTPATIENT)
Dept: PHYSICAL THERAPY | Facility: REHABILITATION | Age: 37
End: 2017-05-22
Payer: MEDICARE

## 2017-05-22 PROCEDURE — 97112 NEUROMUSCULAR REEDUCATION: CPT

## 2017-05-22 PROCEDURE — 97140 MANUAL THERAPY 1/> REGIONS: CPT

## 2017-05-22 PROCEDURE — 97110 THERAPEUTIC EXERCISES: CPT

## 2017-05-25 ENCOUNTER — APPOINTMENT (EMERGENCY)
Dept: RADIOLOGY | Facility: HOSPITAL | Age: 37
End: 2017-05-25
Payer: MEDICARE

## 2017-05-25 ENCOUNTER — GENERIC CONVERSION - ENCOUNTER (OUTPATIENT)
Dept: OTHER | Facility: OTHER | Age: 37
End: 2017-05-25

## 2017-05-25 ENCOUNTER — HOSPITAL ENCOUNTER (EMERGENCY)
Facility: HOSPITAL | Age: 37
Discharge: HOME | End: 2017-05-26
Attending: EMERGENCY MEDICINE | Admitting: EMERGENCY MEDICINE
Payer: MEDICARE

## 2017-05-25 VITALS
OXYGEN SATURATION: 99 % | RESPIRATION RATE: 20 BRPM | SYSTOLIC BLOOD PRESSURE: 134 MMHG | HEART RATE: 86 BPM | DIASTOLIC BLOOD PRESSURE: 83 MMHG | WEIGHT: 145 LBS | BODY MASS INDEX: 28.32 KG/M2

## 2017-05-25 DIAGNOSIS — R07.9 CHEST PAIN: Primary | ICD-10-CM

## 2017-05-25 DIAGNOSIS — K21.9 GERD (GASTROESOPHAGEAL REFLUX DISEASE): ICD-10-CM

## 2017-05-25 PROCEDURE — 93005 ELECTROCARDIOGRAM TRACING: CPT

## 2017-05-25 PROCEDURE — 71020 HB CHEST X-RAY 2VW FRONTAL&LATL: CPT

## 2017-05-25 RX ORDER — MAGNESIUM HYDROXIDE/ALUMINUM HYDROXICE/SIMETHICONE 120; 1200; 1200 MG/30ML; MG/30ML; MG/30ML
30 SUSPENSION ORAL ONCE
Status: COMPLETED | OUTPATIENT
Start: 2017-05-25 | End: 2017-05-25

## 2017-05-25 RX ORDER — GINSENG 100 MG
1 CAPSULE ORAL 2 TIMES DAILY
COMMUNITY
End: 2018-01-10 | Stop reason: ALTCHOICE

## 2017-05-25 RX ORDER — AMMONIUM LACTATE 12 G/100G
CREAM TOPICAL AS NEEDED
COMMUNITY
End: 2018-04-25

## 2017-05-25 RX ADMIN — ALUMINUM HYDROXIDE, MAGNESIUM HYDROXIDE, AND SIMETHICONE 30 ML: 200; 200; 20 SUSPENSION ORAL at 23:19

## 2017-05-25 RX ADMIN — LIDOCAINE HYDROCHLORIDE 10 ML: 20 SOLUTION ORAL; TOPICAL at 23:19

## 2017-05-26 ENCOUNTER — GENERIC CONVERSION - ENCOUNTER (OUTPATIENT)
Dept: OTHER | Facility: OTHER | Age: 37
End: 2017-05-26

## 2017-05-26 ENCOUNTER — APPOINTMENT (OUTPATIENT)
Dept: PHYSICAL THERAPY | Facility: REHABILITATION | Age: 37
End: 2017-05-26
Payer: MEDICARE

## 2017-05-26 ENCOUNTER — APPOINTMENT (OUTPATIENT)
Dept: OCCUPATIONAL THERAPY | Facility: REHABILITATION | Age: 37
End: 2017-05-26
Payer: MEDICARE

## 2017-05-26 LAB
ATRIAL RATE: 81 BPM
P AXIS: 48 DEGREES
PR INTERVAL: 122 MS
QRS AXIS: 21 DEGREES
QRSD INTERVAL: 72 MS
QT INTERVAL: 358 MS
QTC INTERVAL: 415 MS
T WAVE AXIS: 42 DEGREES
VENTRICULAR RATE: 81 BPM

## 2017-05-26 PROCEDURE — 97112 NEUROMUSCULAR REEDUCATION: CPT

## 2017-05-26 PROCEDURE — 97110 THERAPEUTIC EXERCISES: CPT

## 2017-05-26 PROCEDURE — G8991 OTHER PT/OT GOAL STATUS: HCPCS

## 2017-05-26 PROCEDURE — G8990 OTHER PT/OT CURRENT STATUS: HCPCS

## 2017-05-26 PROCEDURE — 99285 EMERGENCY DEPT VISIT HI MDM: CPT

## 2017-05-26 PROCEDURE — 97150 GROUP THERAPEUTIC PROCEDURES: CPT

## 2017-05-26 PROCEDURE — 97140 MANUAL THERAPY 1/> REGIONS: CPT

## 2017-06-02 ENCOUNTER — APPOINTMENT (OUTPATIENT)
Dept: OCCUPATIONAL THERAPY | Facility: REHABILITATION | Age: 37
End: 2017-06-02
Payer: MEDICARE

## 2017-06-02 ENCOUNTER — GENERIC CONVERSION - ENCOUNTER (OUTPATIENT)
Dept: OTHER | Facility: OTHER | Age: 37
End: 2017-06-02

## 2017-06-02 ENCOUNTER — APPOINTMENT (OUTPATIENT)
Dept: PHYSICAL THERAPY | Facility: REHABILITATION | Age: 37
End: 2017-06-02
Payer: MEDICARE

## 2017-06-05 ENCOUNTER — APPOINTMENT (OUTPATIENT)
Dept: PHYSICAL THERAPY | Facility: REHABILITATION | Age: 37
End: 2017-06-05
Payer: MEDICARE

## 2017-06-05 ENCOUNTER — APPOINTMENT (OUTPATIENT)
Dept: OCCUPATIONAL THERAPY | Facility: REHABILITATION | Age: 37
End: 2017-06-05
Payer: MEDICARE

## 2017-06-05 PROCEDURE — 97112 NEUROMUSCULAR REEDUCATION: CPT

## 2017-06-05 PROCEDURE — 97110 THERAPEUTIC EXERCISES: CPT

## 2017-06-05 PROCEDURE — 97140 MANUAL THERAPY 1/> REGIONS: CPT

## 2017-06-07 ENCOUNTER — ALLSCRIPTS OFFICE VISIT (OUTPATIENT)
Dept: OTHER | Facility: OTHER | Age: 37
End: 2017-06-07

## 2017-06-08 ENCOUNTER — GENERIC CONVERSION - ENCOUNTER (OUTPATIENT)
Dept: OTHER | Facility: OTHER | Age: 37
End: 2017-06-08

## 2017-06-09 ENCOUNTER — APPOINTMENT (OUTPATIENT)
Dept: PHYSICAL THERAPY | Facility: REHABILITATION | Age: 37
End: 2017-06-09
Payer: MEDICARE

## 2017-06-09 ENCOUNTER — APPOINTMENT (OUTPATIENT)
Dept: OCCUPATIONAL THERAPY | Facility: REHABILITATION | Age: 37
End: 2017-06-09
Payer: MEDICARE

## 2017-06-09 PROCEDURE — G8979 MOBILITY GOAL STATUS: HCPCS | Performed by: PHYSICAL THERAPIST

## 2017-06-09 PROCEDURE — G8978 MOBILITY CURRENT STATUS: HCPCS | Performed by: PHYSICAL THERAPIST

## 2017-06-09 PROCEDURE — 97140 MANUAL THERAPY 1/> REGIONS: CPT

## 2017-06-09 PROCEDURE — 97112 NEUROMUSCULAR REEDUCATION: CPT

## 2017-06-09 PROCEDURE — 97163 PT EVAL HIGH COMPLEX 45 MIN: CPT

## 2017-06-12 ENCOUNTER — APPOINTMENT (OUTPATIENT)
Dept: PHYSICAL THERAPY | Facility: REHABILITATION | Age: 37
End: 2017-06-12
Payer: MEDICARE

## 2017-06-12 ENCOUNTER — APPOINTMENT (OUTPATIENT)
Dept: OCCUPATIONAL THERAPY | Facility: REHABILITATION | Age: 37
End: 2017-06-12
Payer: MEDICARE

## 2017-06-12 PROCEDURE — 97140 MANUAL THERAPY 1/> REGIONS: CPT

## 2017-06-12 PROCEDURE — 97112 NEUROMUSCULAR REEDUCATION: CPT

## 2017-06-16 ENCOUNTER — APPOINTMENT (OUTPATIENT)
Dept: PHYSICAL THERAPY | Facility: REHABILITATION | Age: 37
End: 2017-06-16
Payer: MEDICARE

## 2017-06-16 ENCOUNTER — APPOINTMENT (OUTPATIENT)
Dept: OCCUPATIONAL THERAPY | Facility: REHABILITATION | Age: 37
End: 2017-06-16
Payer: MEDICARE

## 2017-06-16 PROCEDURE — 97150 GROUP THERAPEUTIC PROCEDURES: CPT

## 2017-06-16 PROCEDURE — 97112 NEUROMUSCULAR REEDUCATION: CPT

## 2017-06-16 PROCEDURE — 97140 MANUAL THERAPY 1/> REGIONS: CPT

## 2017-06-16 PROCEDURE — 97110 THERAPEUTIC EXERCISES: CPT

## 2017-06-19 ENCOUNTER — APPOINTMENT (OUTPATIENT)
Dept: OCCUPATIONAL THERAPY | Facility: REHABILITATION | Age: 37
End: 2017-06-19
Payer: MEDICARE

## 2017-06-19 ENCOUNTER — APPOINTMENT (OUTPATIENT)
Dept: PHYSICAL THERAPY | Facility: REHABILITATION | Age: 37
End: 2017-06-19
Payer: MEDICARE

## 2017-06-19 PROCEDURE — 97112 NEUROMUSCULAR REEDUCATION: CPT

## 2017-06-19 PROCEDURE — 97140 MANUAL THERAPY 1/> REGIONS: CPT

## 2017-06-19 PROCEDURE — 97110 THERAPEUTIC EXERCISES: CPT

## 2017-06-21 ENCOUNTER — ALLSCRIPTS OFFICE VISIT (OUTPATIENT)
Dept: OTHER | Facility: OTHER | Age: 37
End: 2017-06-21

## 2017-06-23 ENCOUNTER — APPOINTMENT (OUTPATIENT)
Dept: PHYSICAL THERAPY | Facility: REHABILITATION | Age: 37
End: 2017-06-23
Payer: MEDICARE

## 2017-06-23 ENCOUNTER — APPOINTMENT (OUTPATIENT)
Dept: OCCUPATIONAL THERAPY | Facility: REHABILITATION | Age: 37
End: 2017-06-23
Payer: MEDICARE

## 2017-06-23 PROCEDURE — 97112 NEUROMUSCULAR REEDUCATION: CPT

## 2017-06-23 PROCEDURE — 97110 THERAPEUTIC EXERCISES: CPT

## 2017-06-23 PROCEDURE — 97140 MANUAL THERAPY 1/> REGIONS: CPT

## 2017-06-26 ENCOUNTER — APPOINTMENT (OUTPATIENT)
Dept: PHYSICAL THERAPY | Facility: REHABILITATION | Age: 37
End: 2017-06-26
Payer: MEDICARE

## 2017-06-26 ENCOUNTER — APPOINTMENT (OUTPATIENT)
Dept: OCCUPATIONAL THERAPY | Facility: REHABILITATION | Age: 37
End: 2017-06-26
Payer: MEDICARE

## 2017-06-26 PROCEDURE — 97535 SELF CARE MNGMENT TRAINING: CPT

## 2017-06-26 PROCEDURE — 97112 NEUROMUSCULAR REEDUCATION: CPT

## 2017-06-26 PROCEDURE — 97110 THERAPEUTIC EXERCISES: CPT

## 2017-06-30 ENCOUNTER — APPOINTMENT (OUTPATIENT)
Dept: PHYSICAL THERAPY | Facility: REHABILITATION | Age: 37
End: 2017-06-30
Payer: MEDICARE

## 2017-06-30 ENCOUNTER — APPOINTMENT (OUTPATIENT)
Dept: OCCUPATIONAL THERAPY | Facility: REHABILITATION | Age: 37
End: 2017-06-30
Payer: MEDICARE

## 2017-06-30 PROCEDURE — G8978 MOBILITY CURRENT STATUS: HCPCS | Performed by: PHYSICAL THERAPIST

## 2017-06-30 PROCEDURE — G8979 MOBILITY GOAL STATUS: HCPCS | Performed by: PHYSICAL THERAPIST

## 2017-06-30 PROCEDURE — 97535 SELF CARE MNGMENT TRAINING: CPT

## 2017-06-30 PROCEDURE — 97112 NEUROMUSCULAR REEDUCATION: CPT

## 2017-06-30 PROCEDURE — 97150 GROUP THERAPEUTIC PROCEDURES: CPT

## 2017-07-07 ENCOUNTER — ALLSCRIPTS OFFICE VISIT (OUTPATIENT)
Dept: OTHER | Facility: OTHER | Age: 37
End: 2017-07-07

## 2017-07-10 ENCOUNTER — APPOINTMENT (OUTPATIENT)
Dept: PHYSICAL THERAPY | Facility: REHABILITATION | Age: 37
End: 2017-07-10
Payer: MEDICARE

## 2017-07-10 ENCOUNTER — APPOINTMENT (OUTPATIENT)
Dept: OCCUPATIONAL THERAPY | Facility: REHABILITATION | Age: 37
End: 2017-07-10
Payer: MEDICARE

## 2017-07-10 PROCEDURE — G8978 MOBILITY CURRENT STATUS: HCPCS | Performed by: PHYSICAL THERAPIST

## 2017-07-10 PROCEDURE — 97140 MANUAL THERAPY 1/> REGIONS: CPT

## 2017-07-10 PROCEDURE — 97110 THERAPEUTIC EXERCISES: CPT

## 2017-07-10 PROCEDURE — G8979 MOBILITY GOAL STATUS: HCPCS | Performed by: PHYSICAL THERAPIST

## 2017-07-10 PROCEDURE — 97112 NEUROMUSCULAR REEDUCATION: CPT

## 2017-07-13 ENCOUNTER — GENERIC CONVERSION - ENCOUNTER (OUTPATIENT)
Dept: OTHER | Facility: OTHER | Age: 37
End: 2017-07-13

## 2017-07-14 ENCOUNTER — APPOINTMENT (OUTPATIENT)
Dept: PHYSICAL THERAPY | Facility: REHABILITATION | Age: 37
End: 2017-07-14
Payer: MEDICARE

## 2017-07-14 ENCOUNTER — APPOINTMENT (OUTPATIENT)
Dept: OCCUPATIONAL THERAPY | Facility: REHABILITATION | Age: 37
End: 2017-07-14
Payer: MEDICARE

## 2017-07-14 PROCEDURE — 97112 NEUROMUSCULAR REEDUCATION: CPT

## 2017-07-14 PROCEDURE — 97150 GROUP THERAPEUTIC PROCEDURES: CPT

## 2017-07-17 ENCOUNTER — APPOINTMENT (OUTPATIENT)
Dept: PHYSICAL THERAPY | Facility: REHABILITATION | Age: 37
End: 2017-07-17
Payer: MEDICARE

## 2017-07-17 ENCOUNTER — APPOINTMENT (OUTPATIENT)
Dept: OCCUPATIONAL THERAPY | Facility: REHABILITATION | Age: 37
End: 2017-07-17
Payer: MEDICARE

## 2017-07-17 PROCEDURE — 97112 NEUROMUSCULAR REEDUCATION: CPT

## 2017-07-17 PROCEDURE — 97535 SELF CARE MNGMENT TRAINING: CPT

## 2017-07-21 ENCOUNTER — APPOINTMENT (OUTPATIENT)
Dept: PHYSICAL THERAPY | Facility: REHABILITATION | Age: 37
End: 2017-07-21
Payer: MEDICARE

## 2017-07-21 ENCOUNTER — APPOINTMENT (OUTPATIENT)
Dept: OCCUPATIONAL THERAPY | Facility: REHABILITATION | Age: 37
End: 2017-07-21
Payer: MEDICARE

## 2017-07-21 PROCEDURE — 97535 SELF CARE MNGMENT TRAINING: CPT

## 2017-07-21 PROCEDURE — 97110 THERAPEUTIC EXERCISES: CPT

## 2017-07-21 PROCEDURE — 97112 NEUROMUSCULAR REEDUCATION: CPT

## 2017-07-25 ENCOUNTER — ALLSCRIPTS OFFICE VISIT (OUTPATIENT)
Dept: OTHER | Facility: OTHER | Age: 37
End: 2017-07-25

## 2017-07-26 ENCOUNTER — ALLSCRIPTS OFFICE VISIT (OUTPATIENT)
Dept: OTHER | Facility: OTHER | Age: 37
End: 2017-07-26

## 2017-07-26 DIAGNOSIS — M79.89 OTHER DISORDERS OF SOFT TISSUE: ICD-10-CM

## 2017-07-26 DIAGNOSIS — M79.604 PAIN OF RIGHT LEG: ICD-10-CM

## 2017-07-28 ENCOUNTER — HOSPITAL ENCOUNTER (OUTPATIENT)
Dept: ULTRASOUND IMAGING | Facility: HOSPITAL | Age: 37
Discharge: HOME | End: 2017-07-28
Payer: MEDICARE

## 2017-07-28 ENCOUNTER — HOSPITAL ENCOUNTER (EMERGENCY)
Facility: HOSPITAL | Age: 37
Discharge: HOME | End: 2017-07-28
Attending: EMERGENCY MEDICINE
Payer: MEDICARE

## 2017-07-28 VITALS
SYSTOLIC BLOOD PRESSURE: 118 MMHG | DIASTOLIC BLOOD PRESSURE: 60 MMHG | BODY MASS INDEX: 29.29 KG/M2 | RESPIRATION RATE: 18 BRPM | HEART RATE: 68 BPM | WEIGHT: 150 LBS | OXYGEN SATURATION: 100 % | TEMPERATURE: 97.3 F

## 2017-07-28 DIAGNOSIS — G80.9 CEREBRAL PALSY (HCC): ICD-10-CM

## 2017-07-28 DIAGNOSIS — M79.604 RIGHT LEG PAIN: Primary | ICD-10-CM

## 2017-07-28 DIAGNOSIS — M79.604 PAIN OF RIGHT LEG: ICD-10-CM

## 2017-07-28 DIAGNOSIS — R62.50 DEVELOPMENTAL DELAY: ICD-10-CM

## 2017-07-28 LAB
ANION GAP SERPL CALCULATED.3IONS-SCNC: 9 MMOL/L (ref 4–13)
ATRIAL RATE: 76 BPM
BASOPHILS # BLD AUTO: 0.02 THOUSANDS/ΜL (ref 0–0.1)
BASOPHILS NFR BLD AUTO: 0 % (ref 0–1)
BUN SERPL-MCNC: 12 MG/DL (ref 5–25)
CALCIUM SERPL-MCNC: 8 MG/DL (ref 8.3–10.1)
CHLORIDE SERPL-SCNC: 104 MMOL/L (ref 100–108)
CO2 SERPL-SCNC: 26 MMOL/L (ref 21–32)
CREAT SERPL-MCNC: 0.76 MG/DL (ref 0.6–1.3)
DEPRECATED D DIMER PPP: 1138 NG/ML (FEU) (ref 0–424)
EOSINOPHIL # BLD AUTO: 0.08 THOUSAND/ΜL (ref 0–0.61)
EOSINOPHIL NFR BLD AUTO: 1 % (ref 0–6)
ERYTHROCYTE [DISTWIDTH] IN BLOOD BY AUTOMATED COUNT: 12.9 % (ref 11.6–15.1)
GFR SERPL CREATININE-BSD FRML MDRD: 100 ML/MIN/1.73SQ M
GLUCOSE SERPL-MCNC: 95 MG/DL (ref 65–140)
HCT VFR BLD AUTO: 40.2 % (ref 34.8–46.1)
HGB BLD-MCNC: 13.2 G/DL (ref 11.5–15.4)
LYMPHOCYTES # BLD AUTO: 2.64 THOUSANDS/ΜL (ref 0.6–4.47)
LYMPHOCYTES NFR BLD AUTO: 26 % (ref 14–44)
MCH RBC QN AUTO: 31.1 PG (ref 26.8–34.3)
MCHC RBC AUTO-ENTMCNC: 32.8 G/DL (ref 31.4–37.4)
MCV RBC AUTO: 95 FL (ref 82–98)
MONOCYTES # BLD AUTO: 0.76 THOUSAND/ΜL (ref 0.17–1.22)
MONOCYTES NFR BLD AUTO: 8 % (ref 4–12)
NEUTROPHILS # BLD AUTO: 6.47 THOUSANDS/ΜL (ref 1.85–7.62)
NEUTS SEG NFR BLD AUTO: 65 % (ref 43–75)
NRBC BLD AUTO-RTO: 0 /100 WBCS
P AXIS: 56 DEGREES
PLATELET # BLD AUTO: 244 THOUSANDS/UL (ref 149–390)
PMV BLD AUTO: 9.3 FL (ref 8.9–12.7)
POTASSIUM SERPL-SCNC: 4 MMOL/L (ref 3.5–5.3)
PR INTERVAL: 116 MS
QRS AXIS: 35 DEGREES
QRSD INTERVAL: 72 MS
QT INTERVAL: 380 MS
QTC INTERVAL: 427 MS
RBC # BLD AUTO: 4.25 MILLION/UL (ref 3.81–5.12)
SODIUM SERPL-SCNC: 139 MMOL/L (ref 136–145)
SPECIMEN SOURCE: NORMAL
T WAVE AXIS: 58 DEGREES
TROPONIN I BLD-MCNC: 0 NG/ML (ref 0–0.08)
VENTRICULAR RATE: 76 BPM
WBC # BLD AUTO: 9.99 THOUSAND/UL (ref 4.31–10.16)

## 2017-07-28 PROCEDURE — 96372 THER/PROPH/DIAG INJ SC/IM: CPT

## 2017-07-28 PROCEDURE — 99283 EMERGENCY DEPT VISIT LOW MDM: CPT

## 2017-07-28 PROCEDURE — 85025 COMPLETE CBC W/AUTO DIFF WBC: CPT | Performed by: EMERGENCY MEDICINE

## 2017-07-28 PROCEDURE — 84484 ASSAY OF TROPONIN QUANT: CPT

## 2017-07-28 PROCEDURE — 80048 BASIC METABOLIC PNL TOTAL CA: CPT | Performed by: EMERGENCY MEDICINE

## 2017-07-28 PROCEDURE — 93971 EXTREMITY STUDY: CPT

## 2017-07-28 PROCEDURE — 93005 ELECTROCARDIOGRAM TRACING: CPT | Performed by: EMERGENCY MEDICINE

## 2017-07-28 PROCEDURE — 36415 COLL VENOUS BLD VENIPUNCTURE: CPT | Performed by: EMERGENCY MEDICINE

## 2017-07-28 PROCEDURE — 85379 FIBRIN DEGRADATION QUANT: CPT | Performed by: EMERGENCY MEDICINE

## 2017-07-28 RX ADMIN — ENOXAPARIN SODIUM 70 MG: 80 INJECTION SUBCUTANEOUS at 02:13

## 2017-07-29 ENCOUNTER — HOSPITAL ENCOUNTER (EMERGENCY)
Facility: HOSPITAL | Age: 37
Discharge: HOME | End: 2017-07-30
Attending: EMERGENCY MEDICINE | Admitting: EMERGENCY MEDICINE
Payer: MEDICARE

## 2017-07-29 ENCOUNTER — APPOINTMENT (EMERGENCY)
Dept: RADIOLOGY | Facility: HOSPITAL | Age: 37
End: 2017-07-29
Payer: MEDICARE

## 2017-07-29 DIAGNOSIS — R10.9 ABDOMINAL PAIN: Primary | ICD-10-CM

## 2017-07-29 LAB
ALBUMIN SERPL BCP-MCNC: 2.9 G/DL (ref 3.5–5)
ALP SERPL-CCNC: 150 U/L (ref 46–116)
ALT SERPL W P-5'-P-CCNC: 18 U/L (ref 12–78)
ANION GAP SERPL CALCULATED.3IONS-SCNC: 7 MMOL/L (ref 4–13)
AST SERPL W P-5'-P-CCNC: 13 U/L (ref 5–45)
BACTERIA UR QL AUTO: NORMAL /HPF
BASOPHILS # BLD AUTO: 0.01 THOUSANDS/ΜL (ref 0–0.1)
BASOPHILS NFR BLD AUTO: 0 % (ref 0–1)
BILIRUB SERPL-MCNC: 0.13 MG/DL (ref 0.2–1)
BILIRUB UR QL STRIP: NEGATIVE
BUN SERPL-MCNC: 9 MG/DL (ref 5–25)
CALCIUM SERPL-MCNC: 7.6 MG/DL (ref 8.3–10.1)
CHLORIDE SERPL-SCNC: 112 MMOL/L (ref 100–108)
CLARITY UR: ABNORMAL
CO2 SERPL-SCNC: 22 MMOL/L (ref 21–32)
COLOR UR: YELLOW
CREAT SERPL-MCNC: 0.64 MG/DL (ref 0.6–1.3)
EOSINOPHIL # BLD AUTO: 0.09 THOUSAND/ΜL (ref 0–0.61)
EOSINOPHIL NFR BLD AUTO: 1 % (ref 0–6)
ERYTHROCYTE [DISTWIDTH] IN BLOOD BY AUTOMATED COUNT: 12.8 % (ref 11.6–15.1)
GFR SERPL CREATININE-BSD FRML MDRD: 114 ML/MIN/1.73SQ M
GLUCOSE SERPL-MCNC: 88 MG/DL (ref 65–140)
GLUCOSE UR STRIP-MCNC: NEGATIVE MG/DL
HCT VFR BLD AUTO: 37.2 % (ref 34.8–46.1)
HGB BLD-MCNC: 12.5 G/DL (ref 11.5–15.4)
HGB UR QL STRIP.AUTO: ABNORMAL
HOLD SPECIMEN: NORMAL
HYALINE CASTS #/AREA URNS LPF: NORMAL /LPF
KETONES UR STRIP-MCNC: NEGATIVE MG/DL
LEUKOCYTE ESTERASE UR QL STRIP: NEGATIVE
LIPASE SERPL-CCNC: 235 U/L (ref 73–393)
LYMPHOCYTES # BLD AUTO: 2.86 THOUSANDS/ΜL (ref 0.6–4.47)
LYMPHOCYTES NFR BLD AUTO: 28 % (ref 14–44)
MCH RBC QN AUTO: 31 PG (ref 26.8–34.3)
MCHC RBC AUTO-ENTMCNC: 33.6 G/DL (ref 31.4–37.4)
MCV RBC AUTO: 92 FL (ref 82–98)
MONOCYTES # BLD AUTO: 0.79 THOUSAND/ΜL (ref 0.17–1.22)
MONOCYTES NFR BLD AUTO: 8 % (ref 4–12)
NEUTROPHILS # BLD AUTO: 6.54 THOUSANDS/ΜL (ref 1.85–7.62)
NEUTS SEG NFR BLD AUTO: 63 % (ref 43–75)
NITRITE UR QL STRIP: NEGATIVE
NON-SQ EPI CELLS URNS QL MICRO: NORMAL /HPF
NRBC BLD AUTO-RTO: 0 /100 WBCS
PH UR STRIP.AUTO: 5.5 [PH] (ref 4.5–8)
PLATELET # BLD AUTO: 255 THOUSANDS/UL (ref 149–390)
PMV BLD AUTO: 9.7 FL (ref 8.9–12.7)
POTASSIUM SERPL-SCNC: 4 MMOL/L (ref 3.5–5.3)
PROT SERPL-MCNC: 7 G/DL (ref 6.4–8.2)
PROT UR STRIP-MCNC: NEGATIVE MG/DL
RBC # BLD AUTO: 4.03 MILLION/UL (ref 3.81–5.12)
RBC #/AREA URNS AUTO: NORMAL /HPF
SODIUM SERPL-SCNC: 141 MMOL/L (ref 136–145)
SP GR UR STRIP.AUTO: >=1.03 (ref 1–1.03)
UROBILINOGEN UR QL STRIP.AUTO: 0.2 E.U./DL
WBC # BLD AUTO: 10.3 THOUSAND/UL (ref 4.31–10.16)
WBC #/AREA URNS AUTO: NORMAL /HPF

## 2017-07-29 PROCEDURE — 80053 COMPREHEN METABOLIC PANEL: CPT | Performed by: EMERGENCY MEDICINE

## 2017-07-29 PROCEDURE — 36415 COLL VENOUS BLD VENIPUNCTURE: CPT | Performed by: EMERGENCY MEDICINE

## 2017-07-29 PROCEDURE — 81002 URINALYSIS NONAUTO W/O SCOPE: CPT | Performed by: EMERGENCY MEDICINE

## 2017-07-29 PROCEDURE — 83690 ASSAY OF LIPASE: CPT | Performed by: EMERGENCY MEDICINE

## 2017-07-29 PROCEDURE — 74177 CT ABD & PELVIS W/CONTRAST: CPT

## 2017-07-29 PROCEDURE — 81025 URINE PREGNANCY TEST: CPT | Performed by: EMERGENCY MEDICINE

## 2017-07-29 PROCEDURE — 85025 COMPLETE CBC W/AUTO DIFF WBC: CPT | Performed by: EMERGENCY MEDICINE

## 2017-07-29 PROCEDURE — 81001 URINALYSIS AUTO W/SCOPE: CPT

## 2017-07-29 RX ADMIN — IOHEXOL 100 ML: 350 INJECTION, SOLUTION INTRAVENOUS at 23:31

## 2017-07-30 ENCOUNTER — GENERIC CONVERSION - ENCOUNTER (OUTPATIENT)
Dept: OTHER | Facility: OTHER | Age: 37
End: 2017-07-30

## 2017-07-30 VITALS
WEIGHT: 154 LBS | OXYGEN SATURATION: 97 % | HEART RATE: 81 BPM | TEMPERATURE: 98.5 F | SYSTOLIC BLOOD PRESSURE: 127 MMHG | RESPIRATION RATE: 18 BRPM | DIASTOLIC BLOOD PRESSURE: 76 MMHG | BODY MASS INDEX: 30.08 KG/M2

## 2017-07-30 LAB
CLARITY, POC: CLEAR
COLOR, POC: YELLOW
HCG UR QL: NEGATIVE

## 2017-07-30 PROCEDURE — 99284 EMERGENCY DEPT VISIT MOD MDM: CPT

## 2017-07-30 RX ORDER — POLYETHYLENE GLYCOL 3350 17 G/17G
17 POWDER, FOR SOLUTION ORAL DAILY PRN
Qty: 7 EACH | Refills: 0 | Status: SHIPPED | OUTPATIENT
Start: 2017-07-30 | End: 2017-07-30

## 2017-07-30 RX ORDER — POLYETHYLENE GLYCOL 3350 17 G/17G
POWDER, FOR SOLUTION ORAL
Qty: 7 EACH | Refills: 0 | Status: SHIPPED | OUTPATIENT
Start: 2017-07-30 | End: 2018-05-08 | Stop reason: SDUPTHER

## 2017-07-30 RX ORDER — AMOXICILLIN 250 MG
1 CAPSULE ORAL EVERY MORNING
Qty: 7 TABLET | Refills: 0 | Status: SHIPPED | OUTPATIENT
Start: 2017-07-30 | End: 2018-03-28 | Stop reason: SDUPTHER

## 2017-07-31 ENCOUNTER — APPOINTMENT (OUTPATIENT)
Dept: PHYSICAL THERAPY | Facility: REHABILITATION | Age: 37
End: 2017-07-31
Payer: MEDICARE

## 2017-07-31 ENCOUNTER — APPOINTMENT (OUTPATIENT)
Dept: OCCUPATIONAL THERAPY | Facility: REHABILITATION | Age: 37
End: 2017-07-31
Payer: MEDICARE

## 2017-08-01 ENCOUNTER — HOSPITAL ENCOUNTER (EMERGENCY)
Facility: HOSPITAL | Age: 37
Discharge: HOME | End: 2017-08-01
Attending: EMERGENCY MEDICINE | Admitting: EMERGENCY MEDICINE
Payer: MEDICARE

## 2017-08-01 VITALS
DIASTOLIC BLOOD PRESSURE: 69 MMHG | SYSTOLIC BLOOD PRESSURE: 136 MMHG | BODY MASS INDEX: 30.08 KG/M2 | OXYGEN SATURATION: 96 % | TEMPERATURE: 99.5 F | WEIGHT: 154 LBS | RESPIRATION RATE: 20 BRPM | HEART RATE: 84 BPM

## 2017-08-01 DIAGNOSIS — R07.9 CENTRAL CHEST PAIN: Primary | ICD-10-CM

## 2017-08-01 DIAGNOSIS — K21.9 ACID REFLUX: ICD-10-CM

## 2017-08-01 PROCEDURE — 93005 ELECTROCARDIOGRAM TRACING: CPT

## 2017-08-01 PROCEDURE — 99285 EMERGENCY DEPT VISIT HI MDM: CPT

## 2017-08-01 RX ORDER — ACETAMINOPHEN 325 MG/1
650 TABLET ORAL ONCE
Status: COMPLETED | OUTPATIENT
Start: 2017-08-01 | End: 2017-08-01

## 2017-08-01 RX ORDER — MAGNESIUM HYDROXIDE/ALUMINUM HYDROXICE/SIMETHICONE 120; 1200; 1200 MG/30ML; MG/30ML; MG/30ML
30 SUSPENSION ORAL ONCE
Status: COMPLETED | OUTPATIENT
Start: 2017-08-01 | End: 2017-08-01

## 2017-08-01 RX ADMIN — ACETAMINOPHEN 650 MG: 325 TABLET, FILM COATED ORAL at 21:19

## 2017-08-01 RX ADMIN — ALUMINUM HYDROXIDE, MAGNESIUM HYDROXIDE, AND SIMETHICONE 30 ML: 200; 200; 20 SUSPENSION ORAL at 21:00

## 2017-08-01 RX ADMIN — LIDOCAINE HYDROCHLORIDE 15 ML: 20 SOLUTION ORAL; TOPICAL at 21:00

## 2017-08-02 LAB
ATRIAL RATE: 76 BPM
P AXIS: 44 DEGREES
PR INTERVAL: 112 MS
QRS AXIS: 19 DEGREES
QRSD INTERVAL: 68 MS
QT INTERVAL: 358 MS
QTC INTERVAL: 402 MS
T WAVE AXIS: 53 DEGREES
VENTRICULAR RATE: 76 BPM

## 2017-08-04 ENCOUNTER — ALLSCRIPTS OFFICE VISIT (OUTPATIENT)
Dept: OTHER | Facility: OTHER | Age: 37
End: 2017-08-04

## 2017-08-07 ENCOUNTER — GENERIC CONVERSION - ENCOUNTER (OUTPATIENT)
Dept: OTHER | Facility: OTHER | Age: 37
End: 2017-08-07

## 2017-08-08 ENCOUNTER — HOSPITAL ENCOUNTER (EMERGENCY)
Facility: HOSPITAL | Age: 37
Discharge: HOME | End: 2017-08-08
Attending: EMERGENCY MEDICINE | Admitting: EMERGENCY MEDICINE
Payer: MEDICARE

## 2017-08-08 ENCOUNTER — APPOINTMENT (EMERGENCY)
Dept: RADIOLOGY | Facility: HOSPITAL | Age: 37
End: 2017-08-08
Payer: MEDICARE

## 2017-08-08 VITALS
TEMPERATURE: 97.3 F | HEART RATE: 82 BPM | WEIGHT: 154 LBS | RESPIRATION RATE: 20 BRPM | DIASTOLIC BLOOD PRESSURE: 68 MMHG | HEIGHT: 58 IN | BODY MASS INDEX: 32.32 KG/M2 | OXYGEN SATURATION: 97 % | SYSTOLIC BLOOD PRESSURE: 120 MMHG

## 2017-08-08 DIAGNOSIS — R07.9 CHEST PAIN: Primary | ICD-10-CM

## 2017-08-08 LAB
ANION GAP SERPL CALCULATED.3IONS-SCNC: 8 MMOL/L (ref 4–13)
BASOPHILS # BLD AUTO: 0.02 THOUSANDS/ΜL (ref 0–0.1)
BASOPHILS NFR BLD AUTO: 0 % (ref 0–1)
BUN SERPL-MCNC: 14 MG/DL (ref 5–25)
CALCIUM SERPL-MCNC: 7.8 MG/DL (ref 8.3–10.1)
CHLORIDE SERPL-SCNC: 107 MMOL/L (ref 100–108)
CO2 SERPL-SCNC: 23 MMOL/L (ref 21–32)
CREAT SERPL-MCNC: 0.71 MG/DL (ref 0.6–1.3)
EOSINOPHIL # BLD AUTO: 0.07 THOUSAND/ΜL (ref 0–0.61)
EOSINOPHIL NFR BLD AUTO: 1 % (ref 0–6)
ERYTHROCYTE [DISTWIDTH] IN BLOOD BY AUTOMATED COUNT: 12.9 % (ref 11.6–15.1)
GFR SERPL CREATININE-BSD FRML MDRD: 108 ML/MIN/1.73SQ M
GLUCOSE SERPL-MCNC: 92 MG/DL (ref 65–140)
HCT VFR BLD AUTO: 37.6 % (ref 34.8–46.1)
HGB BLD-MCNC: 12.6 G/DL (ref 11.5–15.4)
LYMPHOCYTES # BLD AUTO: 2.31 THOUSANDS/ΜL (ref 0.6–4.47)
LYMPHOCYTES NFR BLD AUTO: 28 % (ref 14–44)
MCH RBC QN AUTO: 31 PG (ref 26.8–34.3)
MCHC RBC AUTO-ENTMCNC: 33.5 G/DL (ref 31.4–37.4)
MCV RBC AUTO: 93 FL (ref 82–98)
MONOCYTES # BLD AUTO: 0.71 THOUSAND/ΜL (ref 0.17–1.22)
MONOCYTES NFR BLD AUTO: 9 % (ref 4–12)
NEUTROPHILS # BLD AUTO: 5.01 THOUSANDS/ΜL (ref 1.85–7.62)
NEUTS SEG NFR BLD AUTO: 62 % (ref 43–75)
NRBC BLD AUTO-RTO: 0 /100 WBCS
PLATELET # BLD AUTO: 284 THOUSANDS/UL (ref 149–390)
PMV BLD AUTO: 9.3 FL (ref 8.9–12.7)
POTASSIUM SERPL-SCNC: 4.2 MMOL/L (ref 3.5–5.3)
RBC # BLD AUTO: 4.06 MILLION/UL (ref 3.81–5.12)
SODIUM SERPL-SCNC: 138 MMOL/L (ref 136–145)
SPECIMEN SOURCE: NORMAL
TROPONIN I BLD-MCNC: 0 NG/ML (ref 0–0.08)
WBC # BLD AUTO: 8.13 THOUSAND/UL (ref 4.31–10.16)

## 2017-08-08 PROCEDURE — 36415 COLL VENOUS BLD VENIPUNCTURE: CPT | Performed by: EMERGENCY MEDICINE

## 2017-08-08 PROCEDURE — 80048 BASIC METABOLIC PNL TOTAL CA: CPT | Performed by: EMERGENCY MEDICINE

## 2017-08-08 PROCEDURE — 71020 HB CHEST X-RAY 2VW FRONTAL&LATL: CPT

## 2017-08-08 PROCEDURE — 99285 EMERGENCY DEPT VISIT HI MDM: CPT

## 2017-08-08 PROCEDURE — 96374 THER/PROPH/DIAG INJ IV PUSH: CPT

## 2017-08-08 PROCEDURE — 85025 COMPLETE CBC W/AUTO DIFF WBC: CPT | Performed by: EMERGENCY MEDICINE

## 2017-08-08 PROCEDURE — 84484 ASSAY OF TROPONIN QUANT: CPT

## 2017-08-08 RX ORDER — KETOROLAC TROMETHAMINE 30 MG/ML
15 INJECTION, SOLUTION INTRAMUSCULAR; INTRAVENOUS ONCE
Status: COMPLETED | OUTPATIENT
Start: 2017-08-08 | End: 2017-08-08

## 2017-08-08 RX ORDER — MAGNESIUM HYDROXIDE/ALUMINUM HYDROXICE/SIMETHICONE 120; 1200; 1200 MG/30ML; MG/30ML; MG/30ML
30 SUSPENSION ORAL ONCE
Status: COMPLETED | OUTPATIENT
Start: 2017-08-08 | End: 2017-08-08

## 2017-08-08 RX ORDER — ACETAMINOPHEN 325 MG/1
975 TABLET ORAL ONCE
Status: COMPLETED | OUTPATIENT
Start: 2017-08-08 | End: 2017-08-08

## 2017-08-08 RX ORDER — IBUPROFEN 200 MG
TABLET ORAL EVERY 6 HOURS PRN
COMMUNITY
End: 2018-01-10 | Stop reason: ALTCHOICE

## 2017-08-08 RX ORDER — KETOROLAC TROMETHAMINE 30 MG/ML
15 INJECTION, SOLUTION INTRAMUSCULAR; INTRAVENOUS ONCE
Status: DISCONTINUED | OUTPATIENT
Start: 2017-08-08 | End: 2017-08-08

## 2017-08-08 RX ORDER — ARIPIPRAZOLE 5 MG/1
5 TABLET ORAL DAILY
COMMUNITY
End: 2018-07-17 | Stop reason: HOSPADM

## 2017-08-08 RX ADMIN — ALUMINUM HYDROXIDE, MAGNESIUM HYDROXIDE, AND SIMETHICONE 30 ML: 200; 200; 20 SUSPENSION ORAL at 03:34

## 2017-08-08 RX ADMIN — LIDOCAINE HYDROCHLORIDE 15 ML: 20 SOLUTION ORAL; TOPICAL at 03:34

## 2017-08-08 RX ADMIN — ACETAMINOPHEN 975 MG: 325 TABLET, FILM COATED ORAL at 04:56

## 2017-08-08 RX ADMIN — KETOROLAC TROMETHAMINE 15 MG: 30 INJECTION, SOLUTION INTRAMUSCULAR at 06:43

## 2017-08-09 ENCOUNTER — APPOINTMENT (OUTPATIENT)
Dept: OCCUPATIONAL THERAPY | Facility: REHABILITATION | Age: 37
End: 2017-08-09
Payer: MEDICARE

## 2017-08-09 ENCOUNTER — APPOINTMENT (OUTPATIENT)
Dept: PHYSICAL THERAPY | Facility: REHABILITATION | Age: 37
End: 2017-08-09
Payer: MEDICARE

## 2017-08-09 PROCEDURE — 97535 SELF CARE MNGMENT TRAINING: CPT

## 2017-08-09 PROCEDURE — 97112 NEUROMUSCULAR REEDUCATION: CPT

## 2017-08-09 PROCEDURE — G8990 OTHER PT/OT CURRENT STATUS: HCPCS

## 2017-08-09 PROCEDURE — 97110 THERAPEUTIC EXERCISES: CPT

## 2017-08-09 PROCEDURE — G8991 OTHER PT/OT GOAL STATUS: HCPCS

## 2017-08-11 ENCOUNTER — ALLSCRIPTS OFFICE VISIT (OUTPATIENT)
Dept: OTHER | Facility: OTHER | Age: 37
End: 2017-08-11

## 2017-08-18 ENCOUNTER — APPOINTMENT (OUTPATIENT)
Dept: PHYSICAL THERAPY | Facility: REHABILITATION | Age: 37
End: 2017-08-18
Payer: MEDICARE

## 2017-08-18 ENCOUNTER — APPOINTMENT (OUTPATIENT)
Dept: OCCUPATIONAL THERAPY | Facility: REHABILITATION | Age: 37
End: 2017-08-18
Payer: MEDICARE

## 2017-08-18 PROCEDURE — 97112 NEUROMUSCULAR REEDUCATION: CPT

## 2017-08-24 ENCOUNTER — APPOINTMENT (OUTPATIENT)
Dept: PHYSICAL THERAPY | Facility: REHABILITATION | Age: 37
End: 2017-08-24
Payer: MEDICARE

## 2017-08-24 PROCEDURE — 97110 THERAPEUTIC EXERCISES: CPT

## 2017-08-24 PROCEDURE — 97112 NEUROMUSCULAR REEDUCATION: CPT

## 2017-09-06 ENCOUNTER — HOSPITAL ENCOUNTER (OUTPATIENT)
Dept: RADIOLOGY | Facility: HOSPITAL | Age: 37
Discharge: HOME | End: 2017-09-06
Attending: NEUROLOGICAL SURGERY
Payer: MEDICARE

## 2017-09-06 DIAGNOSIS — G93.9 DISORDER OF BRAIN: ICD-10-CM

## 2017-09-09 ENCOUNTER — HOSPITAL ENCOUNTER (EMERGENCY)
Facility: HOSPITAL | Age: 37
Discharge: HOME | End: 2017-09-09
Attending: EMERGENCY MEDICINE | Admitting: EMERGENCY MEDICINE
Payer: MEDICARE

## 2017-09-09 ENCOUNTER — APPOINTMENT (EMERGENCY)
Dept: RADIOLOGY | Facility: HOSPITAL | Age: 37
End: 2017-09-09
Payer: MEDICARE

## 2017-09-09 VITALS
SYSTOLIC BLOOD PRESSURE: 142 MMHG | WEIGHT: 154 LBS | DIASTOLIC BLOOD PRESSURE: 85 MMHG | TEMPERATURE: 98.4 F | HEART RATE: 85 BPM | RESPIRATION RATE: 20 BRPM | BODY MASS INDEX: 31.64 KG/M2 | OXYGEN SATURATION: 99 %

## 2017-09-09 DIAGNOSIS — R07.1 CHEST PAIN ON BREATHING: Primary | ICD-10-CM

## 2017-09-09 PROCEDURE — 71020 HB CHEST X-RAY 2VW FRONTAL&LATL: CPT

## 2017-09-09 PROCEDURE — 99285 EMERGENCY DEPT VISIT HI MDM: CPT

## 2017-09-09 PROCEDURE — 93005 ELECTROCARDIOGRAM TRACING: CPT

## 2017-09-09 PROCEDURE — 96372 THER/PROPH/DIAG INJ SC/IM: CPT

## 2017-09-09 RX ORDER — KETOROLAC TROMETHAMINE 30 MG/ML
30 INJECTION, SOLUTION INTRAMUSCULAR; INTRAVENOUS ONCE
Status: COMPLETED | OUTPATIENT
Start: 2017-09-09 | End: 2017-09-09

## 2017-09-09 RX ORDER — MAGNESIUM HYDROXIDE/ALUMINUM HYDROXICE/SIMETHICONE 120; 1200; 1200 MG/30ML; MG/30ML; MG/30ML
30 SUSPENSION ORAL ONCE
Status: COMPLETED | OUTPATIENT
Start: 2017-09-09 | End: 2017-09-09

## 2017-09-09 RX ORDER — FAMOTIDINE 20 MG/1
20 TABLET, FILM COATED ORAL ONCE
Status: COMPLETED | OUTPATIENT
Start: 2017-09-09 | End: 2017-09-09

## 2017-09-09 RX ADMIN — LIDOCAINE HYDROCHLORIDE 10 ML: 20 SOLUTION ORAL; TOPICAL at 17:14

## 2017-09-09 RX ADMIN — FAMOTIDINE 20 MG: 20 TABLET ORAL at 17:13

## 2017-09-09 RX ADMIN — ALUMINUM HYDROXIDE, MAGNESIUM HYDROXIDE, AND SIMETHICONE 30 ML: 200; 200; 20 SUSPENSION ORAL at 17:13

## 2017-09-09 RX ADMIN — KETOROLAC TROMETHAMINE 30 MG: 30 INJECTION, SOLUTION INTRAMUSCULAR at 18:34

## 2017-09-10 LAB
ATRIAL RATE: 88 BPM
P AXIS: 61 DEGREES
PR INTERVAL: 132 MS
QRS AXIS: 25 DEGREES
QRSD INTERVAL: 74 MS
QT INTERVAL: 362 MS
QTC INTERVAL: 438 MS
T WAVE AXIS: 65 DEGREES
VENTRICULAR RATE: 88 BPM

## 2017-09-11 ENCOUNTER — APPOINTMENT (OUTPATIENT)
Dept: LAB | Facility: HOSPITAL | Age: 37
End: 2017-09-11
Payer: MEDICARE

## 2017-09-11 ENCOUNTER — GENERIC CONVERSION - ENCOUNTER (OUTPATIENT)
Dept: OTHER | Facility: OTHER | Age: 37
End: 2017-09-11

## 2017-09-11 ENCOUNTER — TRANSCRIBE ORDERS (OUTPATIENT)
Dept: ADMINISTRATIVE | Facility: HOSPITAL | Age: 37
End: 2017-09-11

## 2017-09-11 DIAGNOSIS — G93.9 DISEASE OF BRAIN: ICD-10-CM

## 2017-09-11 DIAGNOSIS — G93.9 DISEASE OF BRAIN: Primary | ICD-10-CM

## 2017-09-11 LAB
BUN SERPL-MCNC: 10 MG/DL (ref 5–25)
CREAT SERPL-MCNC: 0.74 MG/DL (ref 0.6–1.3)
GFR SERPL CREATININE-BSD FRML MDRD: 103 ML/MIN/1.73SQ M

## 2017-09-11 PROCEDURE — 84520 ASSAY OF UREA NITROGEN: CPT

## 2017-09-11 PROCEDURE — 82565 ASSAY OF CREATININE: CPT

## 2017-09-11 PROCEDURE — 36415 COLL VENOUS BLD VENIPUNCTURE: CPT

## 2017-09-14 ENCOUNTER — TELEPHONE (OUTPATIENT)
Dept: PREADMISSION TESTING | Facility: HOSPITAL | Age: 37
End: 2017-09-14

## 2017-09-15 ENCOUNTER — GENERIC CONVERSION - ENCOUNTER (OUTPATIENT)
Dept: OTHER | Facility: OTHER | Age: 37
End: 2017-09-15

## 2017-09-20 ENCOUNTER — ANESTHESIA EVENT (OUTPATIENT)
Dept: RADIOLOGY | Facility: HOSPITAL | Age: 37
End: 2017-09-20

## 2017-09-20 ENCOUNTER — HOSPITAL ENCOUNTER (OUTPATIENT)
Dept: RADIOLOGY | Facility: HOSPITAL | Age: 37
Discharge: HOME | End: 2017-09-20
Payer: MEDICARE

## 2017-09-20 ENCOUNTER — ANESTHESIA (OUTPATIENT)
Dept: RADIOLOGY | Facility: HOSPITAL | Age: 37
End: 2017-09-20

## 2017-09-20 VITALS
OXYGEN SATURATION: 99 % | BODY MASS INDEX: 30.44 KG/M2 | HEART RATE: 59 BPM | RESPIRATION RATE: 15 BRPM | SYSTOLIC BLOOD PRESSURE: 136 MMHG | HEIGHT: 58 IN | DIASTOLIC BLOOD PRESSURE: 72 MMHG | TEMPERATURE: 100.1 F | WEIGHT: 145 LBS

## 2017-09-20 DIAGNOSIS — G93.9 DISORDER OF BRAIN: ICD-10-CM

## 2017-09-20 LAB — EXT PREGNANCY TEST URINE: NEGATIVE

## 2017-09-20 PROCEDURE — 70553 MRI BRAIN STEM W/O & W/DYE: CPT

## 2017-09-20 PROCEDURE — A9585 GADOBUTROL INJECTION: HCPCS | Performed by: RADIOLOGY

## 2017-09-20 PROCEDURE — 81025 URINE PREGNANCY TEST: CPT | Performed by: ANESTHESIOLOGY

## 2017-09-20 RX ORDER — LIDOCAINE HYDROCHLORIDE 10 MG/ML
INJECTION, SOLUTION INFILTRATION; PERINEURAL AS NEEDED
Status: DISCONTINUED | OUTPATIENT
Start: 2017-09-20 | End: 2017-09-20 | Stop reason: SURG

## 2017-09-20 RX ORDER — PROPOFOL 10 MG/ML
INJECTION, EMULSION INTRAVENOUS AS NEEDED
Status: DISCONTINUED | OUTPATIENT
Start: 2017-09-20 | End: 2017-09-20 | Stop reason: SURG

## 2017-09-20 RX ORDER — ONDANSETRON 2 MG/ML
INJECTION INTRAMUSCULAR; INTRAVENOUS AS NEEDED
Status: DISCONTINUED | OUTPATIENT
Start: 2017-09-20 | End: 2017-09-20 | Stop reason: SURG

## 2017-09-20 RX ORDER — SODIUM CHLORIDE, SODIUM LACTATE, POTASSIUM CHLORIDE, CALCIUM CHLORIDE 600; 310; 30; 20 MG/100ML; MG/100ML; MG/100ML; MG/100ML
75 INJECTION, SOLUTION INTRAVENOUS CONTINUOUS
Status: DISCONTINUED | OUTPATIENT
Start: 2017-09-20 | End: 2017-09-21 | Stop reason: HOSPADM

## 2017-09-20 RX ADMIN — ONDANSETRON 4 MG: 2 INJECTION INTRAMUSCULAR; INTRAVENOUS at 08:42

## 2017-09-20 RX ADMIN — SODIUM CHLORIDE, SODIUM LACTATE, POTASSIUM CHLORIDE, AND CALCIUM CHLORIDE: .6; .31; .03; .02 INJECTION, SOLUTION INTRAVENOUS at 07:55

## 2017-09-20 RX ADMIN — LIDOCAINE HYDROCHLORIDE 40 MG: 10 INJECTION, SOLUTION INFILTRATION; PERINEURAL at 08:03

## 2017-09-20 RX ADMIN — GADOBUTROL 6.58 ML: 604.72 INJECTION INTRAVENOUS at 08:53

## 2017-09-20 RX ADMIN — SODIUM CHLORIDE, SODIUM LACTATE, POTASSIUM CHLORIDE, AND CALCIUM CHLORIDE 75 ML/HR: .6; .31; .03; .02 INJECTION, SOLUTION INTRAVENOUS at 07:46

## 2017-09-20 RX ADMIN — DEXAMETHASONE SODIUM PHOSPHATE 4 MG: 10 INJECTION INTRAMUSCULAR; INTRAVENOUS at 08:42

## 2017-09-20 RX ADMIN — PROPOFOL 100 MG: 10 INJECTION, EMULSION INTRAVENOUS at 08:03

## 2017-10-06 ENCOUNTER — TRANSCRIBE ORDERS (OUTPATIENT)
Dept: ADMINISTRATIVE | Facility: HOSPITAL | Age: 37
End: 2017-10-06

## 2017-10-06 ENCOUNTER — GENERIC CONVERSION - ENCOUNTER (OUTPATIENT)
Dept: OTHER | Facility: OTHER | Age: 37
End: 2017-10-06

## 2017-10-06 DIAGNOSIS — E23.7 DISEASE OF PITUITARY GLAND (HCC): Primary | ICD-10-CM

## 2017-10-12 ENCOUNTER — HOSPITAL ENCOUNTER (EMERGENCY)
Facility: HOSPITAL | Age: 37
Discharge: HOME | End: 2017-10-13
Attending: EMERGENCY MEDICINE | Admitting: EMERGENCY MEDICINE
Payer: MEDICARE

## 2017-10-12 ENCOUNTER — APPOINTMENT (EMERGENCY)
Dept: RADIOLOGY | Facility: HOSPITAL | Age: 37
End: 2017-10-12
Payer: MEDICARE

## 2017-10-12 VITALS
OXYGEN SATURATION: 98 % | HEART RATE: 70 BPM | RESPIRATION RATE: 18 BRPM | DIASTOLIC BLOOD PRESSURE: 74 MMHG | SYSTOLIC BLOOD PRESSURE: 167 MMHG | TEMPERATURE: 98 F

## 2017-10-12 DIAGNOSIS — R07.89 CHEST TIGHTNESS: ICD-10-CM

## 2017-10-12 DIAGNOSIS — R51.9 HEADACHE: Primary | ICD-10-CM

## 2017-10-12 DIAGNOSIS — R09.81 NASAL CONGESTION: ICD-10-CM

## 2017-10-12 DIAGNOSIS — R06.2 WHEEZING: ICD-10-CM

## 2017-10-12 LAB
ALBUMIN SERPL BCP-MCNC: 3.2 G/DL (ref 3.5–5)
ALP SERPL-CCNC: 140 U/L (ref 46–116)
ALT SERPL W P-5'-P-CCNC: 20 U/L (ref 12–78)
ANION GAP SERPL CALCULATED.3IONS-SCNC: 9 MMOL/L (ref 4–13)
AST SERPL W P-5'-P-CCNC: 13 U/L (ref 5–45)
ATRIAL RATE: 69 BPM
BASOPHILS # BLD AUTO: 0.02 THOUSANDS/ΜL (ref 0–0.1)
BASOPHILS NFR BLD AUTO: 0 % (ref 0–1)
BILIRUB SERPL-MCNC: 0.22 MG/DL (ref 0.2–1)
BUN SERPL-MCNC: 9 MG/DL (ref 5–25)
CALCIUM SERPL-MCNC: 8.1 MG/DL (ref 8.3–10.1)
CHLORIDE SERPL-SCNC: 107 MMOL/L (ref 100–108)
CO2 SERPL-SCNC: 23 MMOL/L (ref 21–32)
CREAT SERPL-MCNC: 0.79 MG/DL (ref 0.6–1.3)
EOSINOPHIL # BLD AUTO: 0.05 THOUSAND/ΜL (ref 0–0.61)
EOSINOPHIL NFR BLD AUTO: 1 % (ref 0–6)
ERYTHROCYTE [DISTWIDTH] IN BLOOD BY AUTOMATED COUNT: 13.1 % (ref 11.6–15.1)
GFR SERPL CREATININE-BSD FRML MDRD: 95 ML/MIN/1.73SQ M
GLUCOSE SERPL-MCNC: 105 MG/DL (ref 65–140)
HCT VFR BLD AUTO: 39.6 % (ref 34.8–46.1)
HGB BLD-MCNC: 13.7 G/DL (ref 11.5–15.4)
LIPASE SERPL-CCNC: 167 U/L (ref 73–393)
LYMPHOCYTES # BLD AUTO: 2.74 THOUSANDS/ΜL (ref 0.6–4.47)
LYMPHOCYTES NFR BLD AUTO: 29 % (ref 14–44)
MCH RBC QN AUTO: 31.5 PG (ref 26.8–34.3)
MCHC RBC AUTO-ENTMCNC: 34.6 G/DL (ref 31.4–37.4)
MCV RBC AUTO: 91 FL (ref 82–98)
MONOCYTES # BLD AUTO: 0.72 THOUSAND/ΜL (ref 0.17–1.22)
MONOCYTES NFR BLD AUTO: 8 % (ref 4–12)
NEUTROPHILS # BLD AUTO: 6.06 THOUSANDS/ΜL (ref 1.85–7.62)
NEUTS SEG NFR BLD AUTO: 62 % (ref 43–75)
NRBC BLD AUTO-RTO: 0 /100 WBCS
P AXIS: 44 DEGREES
PLATELET # BLD AUTO: 297 THOUSANDS/UL (ref 149–390)
PMV BLD AUTO: 10.1 FL (ref 8.9–12.7)
POTASSIUM SERPL-SCNC: 3.3 MMOL/L (ref 3.5–5.3)
PR INTERVAL: 112 MS
PROT SERPL-MCNC: 7.5 G/DL (ref 6.4–8.2)
QRS AXIS: 25 DEGREES
QRSD INTERVAL: 78 MS
QT INTERVAL: 388 MS
QTC INTERVAL: 415 MS
RBC # BLD AUTO: 4.35 MILLION/UL (ref 3.81–5.12)
SODIUM SERPL-SCNC: 139 MMOL/L (ref 136–145)
SPECIMEN SOURCE: NORMAL
T WAVE AXIS: 32 DEGREES
TROPONIN I BLD-MCNC: 0 NG/ML (ref 0–0.08)
VENTRICULAR RATE: 69 BPM
WBC # BLD AUTO: 9.61 THOUSAND/UL (ref 4.31–10.16)

## 2017-10-12 PROCEDURE — 96374 THER/PROPH/DIAG INJ IV PUSH: CPT

## 2017-10-12 PROCEDURE — 84484 ASSAY OF TROPONIN QUANT: CPT

## 2017-10-12 PROCEDURE — 71020 HB CHEST X-RAY 2VW FRONTAL&LATL: CPT

## 2017-10-12 PROCEDURE — 74000 HB X-RAY EXAM OF ABDOMEN (SINGLE ANTEROPOSTERIOR VIEW): CPT

## 2017-10-12 PROCEDURE — 85025 COMPLETE CBC W/AUTO DIFF WBC: CPT | Performed by: EMERGENCY MEDICINE

## 2017-10-12 PROCEDURE — 83690 ASSAY OF LIPASE: CPT | Performed by: EMERGENCY MEDICINE

## 2017-10-12 PROCEDURE — 80053 COMPREHEN METABOLIC PANEL: CPT | Performed by: EMERGENCY MEDICINE

## 2017-10-12 PROCEDURE — 93005 ELECTROCARDIOGRAM TRACING: CPT | Performed by: EMERGENCY MEDICINE

## 2017-10-12 PROCEDURE — 36415 COLL VENOUS BLD VENIPUNCTURE: CPT | Performed by: EMERGENCY MEDICINE

## 2017-10-12 PROCEDURE — 70250 X-RAY EXAM OF SKULL: CPT

## 2017-10-12 PROCEDURE — 70450 CT HEAD/BRAIN W/O DYE: CPT

## 2017-10-12 RX ORDER — ONDANSETRON 2 MG/ML
4 INJECTION INTRAMUSCULAR; INTRAVENOUS ONCE
Status: COMPLETED | OUTPATIENT
Start: 2017-10-12 | End: 2017-10-12

## 2017-10-12 RX ORDER — ALBUTEROL SULFATE 90 UG/1
2 AEROSOL, METERED RESPIRATORY (INHALATION) EVERY 6 HOURS PRN
Qty: 1 INHALER | Refills: 0 | Status: SHIPPED | OUTPATIENT
Start: 2017-10-12 | End: 2018-05-22 | Stop reason: SDUPTHER

## 2017-10-12 RX ORDER — FLUTICASONE PROPIONATE 50 MCG
2 SPRAY, SUSPENSION (ML) NASAL DAILY
Qty: 16 G | Refills: 0 | Status: SHIPPED | OUTPATIENT
Start: 2017-10-12 | End: 2018-01-10 | Stop reason: ALTCHOICE

## 2017-10-12 RX ORDER — ALBUTEROL SULFATE 90 UG/1
1-2 AEROSOL, METERED RESPIRATORY (INHALATION) EVERY 6 HOURS PRN
Qty: 1 INHALER | Refills: 0 | Status: SHIPPED | OUTPATIENT
Start: 2017-10-12 | End: 2017-10-12

## 2017-10-12 RX ORDER — IBUPROFEN 400 MG/1
400 TABLET ORAL EVERY 8 HOURS PRN
Qty: 30 TABLET | Refills: 0 | Status: SHIPPED | OUTPATIENT
Start: 2017-10-12 | End: 2018-05-08 | Stop reason: SDUPTHER

## 2017-10-12 RX ORDER — ALBUTEROL SULFATE 2.5 MG/3ML
2.5 SOLUTION RESPIRATORY (INHALATION) ONCE
Status: COMPLETED | OUTPATIENT
Start: 2017-10-12 | End: 2017-10-12

## 2017-10-12 RX ORDER — MECLIZINE HYDROCHLORIDE 25 MG/1
25 TABLET ORAL ONCE
Status: COMPLETED | OUTPATIENT
Start: 2017-10-12 | End: 2017-10-12

## 2017-10-12 RX ADMIN — IPRATROPIUM BROMIDE 0.5 MG: 0.5 SOLUTION RESPIRATORY (INHALATION) at 19:45

## 2017-10-12 RX ADMIN — ONDANSETRON 4 MG: 2 INJECTION INTRAMUSCULAR; INTRAVENOUS at 19:54

## 2017-10-12 RX ADMIN — MECLIZINE HYDROCHLORIDE 25 MG: 25 TABLET ORAL at 19:44

## 2017-10-12 RX ADMIN — ALBUTEROL SULFATE 2.5 MG: 2.5 SOLUTION RESPIRATORY (INHALATION) at 19:45

## 2017-10-12 NOTE — ED NOTES
ECG completed at 1941  Viewed and signed by Dr Kanchan Nguyen, copy on chart       Campos Patel  10/12/17 1958

## 2017-10-13 ENCOUNTER — GENERIC CONVERSION - ENCOUNTER (OUTPATIENT)
Dept: OTHER | Facility: OTHER | Age: 37
End: 2017-10-13

## 2017-10-13 PROCEDURE — 99284 EMERGENCY DEPT VISIT MOD MDM: CPT

## 2017-10-13 NOTE — ED PROVIDER NOTES
History  Chief Complaint   Patient presents with    Nasal Drainage     pt with runny nose X 1 day  pt reports her back is also hurting      45 yo F presents to ED from group home for evaluation of dizziness (described as vertigo), HA, nausea and chest pain that began a couple hours prior to arrival  Pt and caregivers deny any cough/cold symptoms, fever/chills, abdominal pain, vomiting/diarrhea, urinary symptoms, rash or trauma/injury  Pt has PMH of cerebral palsy, pituitary tumor, GERD, constipation, hiatal hernia, depression, anxiety and mood disorder, PSH of  shunt, ear and nose surgery  Pt is a nonsmoker, denies any ETOH or recreational drug use  No interventions prior to arrival, no other complaints at this time  History limited secondary to baseline intellectual disability                  MRI brain pituitary (9/20/17)  IMPRESSION:     Stable dysmorphic brain parenchyma and ventricular system with no acute intracranial pathology  Shunt catheter unchanged      7 mm poorly enhancing nodule immediately above and inseparable from the pituitary gland and pituitary stalk, possibly representing pituitary gland or pituitary stalk adenoma  Slight mass effect upon the optic chiasm which is minimally displaced   superiorly  Continued follow-up recommended            Prior to Admission Medications   Prescriptions Last Dose Informant Patient Reported? Taking?    ARIPiprazole (ABILIFY) 2 mg tablet   Yes No   Sig: Take 2 mg by mouth daily   Cholecalciferol (VITAMIN D-3 PO)   Yes No   Sig: Take 2,000 Units by mouth daily   DIMETHICONE-ZINC OXIDE-VIT A-D EX   Yes No   Sig: Apply topically as needed   Dextromethorphan-Guaifenesin (ROBITUSSIN COLD COUGH+ CHEST PO)   Yes No   Sig: Take 10 mL by mouth every 4 (four) hours as needed   Hypromellose (SYSTANE OVERNIGHT THERAPY) 0 3 % GEL   Yes No   Sig: Apply to eye   Mouthwashes (LISTERINE ANTISEPTIC) LIQD   Yes No   Sig: Apply to the mouth or throat 2 (two) times a day Multiple Vitamins-Minerals (CERTAVITE SENIOR/ANTIOXIDANT PO)   Yes No   Sig: Take by mouth   Norgestimate-Eth Estradiol (SPRINTEC 28 PO)   Yes No   Sig: Take by mouth   RABEprazole (ACIPHEX) 20 MG tablet   Yes No   Sig: Take 20 mg by mouth daily   acetaminophen (TYLENOL) 500 mg tablet   Yes No   Sig: Take 500 mg by mouth every 6 (six) hours as needed for mild pain   ammonium lactate (LAC-HYDRIN) 12 % cream   Yes No   Sig: Apply topically as needed for dry skin   bacitracin topical ointment 500 units/g topical ointment   Yes No   Sig: Apply 1 large application topically 2 (two) times a day   carbamide peroxide (DEBROX) 6 5 % otic solution   Yes No   Sig: Administer 4 drops into both ears see administration instructions   cefdinir (OMNICEF) 300 mg capsule   Yes No   Sig: Take 300 mg by mouth 2 (two) times a day   clotrimazole (LOTRIMIN) 1 % cream   Yes No   Sig: Apply 1 application topically 3 (three) times a day as needed   escitalopram (LEXAPRO) 10 mg tablet   Yes No   Sig: Take 10 mg by mouth daily   famotidine (PEPCID) 20 mg tablet   Yes No   Sig: Take 20 mg by mouth 2 (two) times a day   fluticasone (VERAMYST) 27 5 MCG/SPRAY nasal spray   Yes No   Si sprays into each nostril daily   guaiFENesin (ROBITUSSIN) 100 mg/5 mL syrup   Yes No   Sig: Take 200 mg by mouth every 4 (four) hours as needed for cough   ibuprofen (MOTRIN) 200 mg tablet   Yes No   Sig: Take by mouth every 6 (six) hours as needed for mild pain   loratadine (CLARITIN) 10 mg tablet   Yes No   Sig: Take 10 mg by mouth daily   lubiprostone (AMITIZA) 24 mcg capsule   Yes No   Sig: Take 24 mcg by mouth 2 (two) times a day with meals   magnesium hydroxide (MILK OF MAGNESIA) 400 mg/5 mL oral suspension   Yes No   Sig: Take 30 mL by mouth daily as needed for constipation   medroxyPROGESTERone (DEPO-PROVERA) 150 mg/mL injection   Yes No   Sig: Inject 150 mg into the shoulder, thigh, or buttocks every 3 (three) months   metoclopramide (REGLAN) 10 mg tablet   Yes No   Sig: Take 10 mg by mouth every 8 (eight) hours as needed   naproxen (NAPROSYN) 375 mg tablet   No No   Sig: Take 1 tablet by mouth every 12 (twelve) hours as needed for mild pain or moderate pain for up to 30 days   ofloxacin (FLOXIN) 0 3 % otic solution   Yes No   Sig: Administer 5 drops into both ears 2 (two) times a day   polyethylene glycol (MIRALAX) 17 g packet   No No   Sig: Take 17gm (1 packet) daily for first three days, then daily as needed for no bowel movement more than 24 hrs   polyethylene glycol-propylene glycol (SYSTANE) 0 4-0 3 %   Yes No   Si drop 3 (three) times a day   senna-docusate sodium (SENOKOT S) 8 6-50 mg per tablet   No No   Sig: Take 1 tablet by mouth every morning      Facility-Administered Medications: None       Past Medical History:   Diagnosis Date    Anxiety     Cerebral palsy (HCC)     Depression     Depressive disorder     GERD (gastroesophageal reflux disease)     Mood disorder (HCC)        Past Surgical History:   Procedure Laterality Date    CSF SHUNT      LEG SURGERY      due to CP        History reviewed  No pertinent family history  I have reviewed and agree with the history as documented  Social History   Substance Use Topics    Smoking status: Never Smoker    Smokeless tobacco: Never Used    Alcohol use No        Review of Systems   Constitutional: Negative for chills, fatigue and fever  HENT: Negative for congestion, rhinorrhea and sore throat  Eyes: Negative for pain, redness and visual disturbance  Respiratory: Negative for cough, chest tightness, shortness of breath, wheezing and stridor  Cardiovascular: Positive for chest pain  Negative for palpitations and leg swelling  Gastrointestinal: Positive for nausea  Negative for abdominal pain, blood in stool, constipation, diarrhea and vomiting  Genitourinary: Negative for dysuria, frequency, hematuria and urgency  Musculoskeletal: Negative for back pain and neck pain  Skin: Negative for pallor and rash  Neurological: Positive for dizziness (vertigo) and headaches  Negative for seizures, syncope, weakness and light-headedness  Psychiatric/Behavioral: Negative for agitation and confusion  Physical Exam  ED Triage Vitals [10/12/17 1824]   Temperature Pulse Respirations Blood Pressure SpO2   98 °F (36 7 °C) 70 18 167/74 98 %      Temp Source Heart Rate Source Patient Position - Orthostatic VS BP Location FiO2 (%)   Oral -- -- -- --      Pain Score       --           Physical Exam   Constitutional: She appears well-developed and well-nourished  No distress  HENT:   Head: Atraumatic  Tachy mucous membranes   Eyes: Conjunctivae and EOM are normal  Pupils are equal, round, and reactive to light  Right eye exhibits no discharge  Left eye exhibits no discharge  Neck: Normal range of motion  Neck supple  No JVD present  No tracheal deviation present  Cardiovascular: Normal rate, regular rhythm, normal heart sounds and intact distal pulses  Exam reveals no gallop and no friction rub  No murmur heard  Pulmonary/Chest: Effort normal and breath sounds normal  No stridor  No respiratory distress  She has no wheezes (expiratory wheezing bilaterally )  She has no rales  Abdominal: Soft  Bowel sounds are normal  She exhibits no distension  There is no tenderness (suprapubic abdominal tenderness)  There is no rebound and no guarding  Musculoskeletal: She exhibits deformity (bilateral LE and RUE contractures)  She exhibits no edema or tenderness  Neurological: She is alert  Pt able to move b/l UE L > R with intact sensation, able to answer basic questions related to HPI   Skin: Skin is warm and dry  No rash noted  She is not diaphoretic  Psychiatric: She has a normal mood and affect  Nursing note and vitals reviewed        ED Medications  Medications   ondansetron (ZOFRAN) injection 4 mg (4 mg Intravenous Given 10/12/17 1954)   meclizine (ANTIVERT) tablet 25 mg (25 mg Oral Given 10/12/17 1944)   albuterol inhalation solution 2 5 mg (2 5 mg Nebulization Given 10/12/17 1945)   ipratropium (ATROVENT) 0 02 % inhalation solution 0 5 mg (0 5 mg Nebulization Given 10/12/17 1945)       Diagnostic Studies  Labs Reviewed   COMPREHENSIVE METABOLIC PANEL - Abnormal        Result Value Ref Range Status    Potassium 3 3 (*) 3 5 - 5 3 mmol/L Final    Calcium 8 1 (*) 8 3 - 10 1 mg/dL Final    Alkaline Phosphatase 140 (*) 46 - 116 U/L Final    Albumin 3 2 (*) 3 5 - 5 0 g/dL Final    Sodium 139  136 - 145 mmol/L Final    Chloride 107  100 - 108 mmol/L Final    CO2 23  21 - 32 mmol/L Final    Anion Gap 9  4 - 13 mmol/L Final    BUN 9  5 - 25 mg/dL Final    Creatinine 0 79  0 60 - 1 30 mg/dL Final    Comment: Standardized to IDMS reference method    Glucose 105  65 - 140 mg/dL Final    Comment:   If the patient is fasting, the ADA then defines impaired fasting glucose as > 100 mg/dL and diabetes as > or equal to 123 mg/dL  Specimen collection should occur prior to Sulfasalazine administration due to the potential for falsely depressed results  Specimen collection should occur prior to Sulfapyridine administration due to the potential for falsely elevated results  AST 13  5 - 45 U/L Final    Comment:   Specimen collection should occur prior to Sulfasalazine administration due to the potential for falsely depressed results  ALT 20  12 - 78 U/L Final    Comment:   Specimen collection should occur prior to Sulfasalazine and/or Sulfapyridine administration due to the potential for falsely depressed results  Total Protein 7 5  6 4 - 8 2 g/dL Final    Total Bilirubin 0 22  0 20 - 1 00 mg/dL Final    eGFR 95  ml/min/1 73sq m Final    Narrative:     National Kidney Disease Education Program recommendations are as follows:  GFR calculation is accurate only with a steady state creatinine  Chronic Kidney disease less than 60 ml/min/1 73 sq  meters  Kidney failure less than 15 ml/min/1 73 sq  meters  CBC AND DIFFERENTIAL - Normal    WBC 9 61  4 31 - 10 16 Thousand/uL Final    RBC 4 35  3 81 - 5 12 Million/uL Final    Hemoglobin 13 7  11 5 - 15 4 g/dL Final    Hematocrit 39 6  34 8 - 46 1 % Final    MCV 91  82 - 98 fL Final    MCH 31 5  26 8 - 34 3 pg Final    MCHC 34 6  31 4 - 37 4 g/dL Final    RDW 13 1  11 6 - 15 1 % Final    MPV 10 1  8 9 - 12 7 fL Final    Platelets 634  431 - 390 Thousands/uL Final    nRBC 0  /100 WBCs Final    Neutrophils Relative 62  43 - 75 % Final    Lymphocytes Relative 29  14 - 44 % Final    Monocytes Relative 8  4 - 12 % Final    Eosinophils Relative 1  0 - 6 % Final    Basophils Relative 0  0 - 1 % Final    Neutrophils Absolute 6 06  1 85 - 7 62 Thousands/µL Final    Lymphocytes Absolute 2 74  0 60 - 4 47 Thousands/µL Final    Monocytes Absolute 0 72  0 17 - 1 22 Thousand/µL Final    Eosinophils Absolute 0 05  0 00 - 0 61 Thousand/µL Final    Basophils Absolute 0 02  0 00 - 0 10 Thousands/µL Final   LIPASE - Normal    Lipase 167  73 - 393 u/L Final   POCT TROPONIN - Normal    POC Troponin I 0 00  0 00 - 0 08 ng/ml Final    Specimen Type VENOUS   Final    Narrative:     [employer] i-Stat handheld analyzer 99% cutoff is > 0 08ng/mL in network Emergency Departments    o cTnI 99% cutoff is useful only when applied to patients in the clinical setting of myocardial ischemia  o cTnI 99% cutoff should be interpreted in the context of clinical history, ECG findings and possibly cardiac imaging to establish correct diagnosis  o cTnI 99% cutoff may be suggestive but clearly not indicative of a coronary event without the clinical setting of myocardial ischemia  POCT URINALYSIS DIPSTICK       CT head without contrast   Final Result      No significant interval change from the prior CT and MRIs as described above  No evidence of acute intracranial hemorrhage  Unchanged dysmorphic brain parenchyma and left frontoparietal atrophy           Workstation performed: [id number]         XR chest 2 views    (Results Pending)   XR shunt series    (Results Pending)       Procedures  ECG 12 Lead Documentation  Date/Time: 10/12/2017 7:41 PM  Performed by: Juan Miguel Deleon by: Lee Ann Salazar     ECG reviewed by me, the ED Provider: yes    Patient location:  ED  Previous ECG:     Previous ECG:  Compared to current    Similarity:  No change  Interpretation:     Interpretation: non-specific    Rate:     ECG rate assessment: normal    Rhythm:     Rhythm: sinus rhythm      Rhythm comment:  Sinus rhythm with sinus arrythmia  Ectopy:     Ectopy: none    QRS:     QRS axis:  Normal    QRS intervals:  Normal  ST segments:     ST segments:  Normal  T waves:     T waves: flattening and inverted      Flattening:  III and V3    Inverted:  AVR and V1          Phone Consults  ED Phone Contact    ED Course  ED Course                   Patient reevaluated with improvement in condition noted  Patient updated on results of tests  Discharge instructions given including medications, follow-up and return precautions with understanding verbalized  MDM  CritCare Time    Disposition  Final diagnoses:   Headache   Nasal congestion   Wheezing   Chest tightness     ED Disposition     ED Disposition Condition Comment    Discharge  [facility] discharge to home/self care      Condition at discharge: Stable        Follow-up Information     Follow up With Specialties Details [address]   Follow up with primary care physician as schedule tomorrow for reevaluation and further workup of presenting symptoms [address] [place] [address] [id number]          Discharge Medication List as of 10/12/2017 11:26 PM      START taking these medications    Details   fluticasone (FLONASE) 50 mcg/act nasal spray 2 sprays into each nostril daily, Starting Thu 10/12/2017, Print      !! ibuprofen (MOTRIN) 400 mg tablet Take 1 tablet by mouth every 8 (eight) hours as needed for mild pain, moderate pain or headaches, Starting Thu 10/12/2017, Print      albuterol (PROVENTIL HFA,VENTOLIN HFA) 90 mcg/act inhaler Inhale 1-2 puffs every 6 (six) hours as needed for wheezing or shortness of breath, Starting u 10/12/2017, Print      guaiFENesin (ROBITUSSIN) 100 MG/5ML oral liquid Take 5-10 mL by mouth every 4 (four) hours as needed for cough or congestion, Starting Thu 10/12/2017, Print       !! - Potential duplicate medications found  Please discuss with provider        CONTINUE these medications which have NOT CHANGED    Details   acetaminophen (TYLENOL) 500 mg tablet Take 500 mg by mouth every 6 (six) hours as needed for mild pain, Until Discontinued, Historical Med      ammonium lactate (LAC-HYDRIN) 12 % cream Apply topically as needed for dry skin, Until Discontinued, Historical Med      ARIPiprazole (ABILIFY) 2 mg tablet Take 2 mg by mouth daily, Historical Med      bacitracin topical ointment 500 units/g topical ointment Apply 1 large application topically 2 (two) times a day, Until Discontinued, Historical Med      carbamide peroxide (DEBROX) 6 5 % otic solution Administer 4 drops into both ears see administration instructions, Until Discontinued, Historical Med      cefdinir (OMNICEF) 300 mg capsule Take 300 mg by mouth 2 (two) times a day, Until Discontinued, Historical Med      Cholecalciferol (VITAMIN D-3 PO) Take 2,000 Units by mouth daily, Until Discontinued, Historical Med      clotrimazole (LOTRIMIN) 1 % cream Apply 1 application topically 3 (three) times a day as needed, Until Discontinued, Historical Med      Dextromethorphan-Guaifenesin (ROBITUSSIN COLD COUGH+ CHEST PO) Take 10 mL by mouth every 4 (four) hours as needed, Historical Med      DIMETHICONE-ZINC OXIDE-VIT A-D EX Apply topically as needed, Historical Med      escitalopram (LEXAPRO) 10 mg tablet Take 10 mg by mouth daily, Until Discontinued, Historical Med      famotidine (PEPCID) 20 mg tablet Take 20 mg by mouth 2 (two) times a day, Until Discontinued, Historical Med      fluticasone (VERAMYST) 27 5 MCG/SPRAY nasal spray 2 sprays into each nostril daily, Historical Med      guaiFENesin (ROBITUSSIN) 100 mg/5 mL syrup Take 200 mg by mouth every 4 (four) hours as needed for cough, Until Discontinued, Historical Med      Hypromellose (SYSTANE OVERNIGHT THERAPY) 0 3 % GEL Apply to eye, Until Discontinued, Historical Med      !! ibuprofen (MOTRIN) 200 mg tablet Take by mouth every 6 (six) hours as needed for mild pain, Historical Med      loratadine (CLARITIN) 10 mg tablet Take 10 mg by mouth daily, Until Discontinued, Historical Med      lubiprostone (AMITIZA) 24 mcg capsule Take 24 mcg by mouth 2 (two) times a day with meals, Until Discontinued, Historical Med      magnesium hydroxide (MILK OF MAGNESIA) 400 mg/5 mL oral suspension Take 30 mL by mouth daily as needed for constipation, Historical Med      medroxyPROGESTERone (DEPO-PROVERA) 150 mg/mL injection Inject 150 mg into the shoulder, thigh, or buttocks every 3 (three) months, Until Discontinued, Historical Med      metoclopramide (REGLAN) 10 mg tablet Take 10 mg by mouth every 8 (eight) hours as needed, Until Discontinued, Historical Med      Mouthwashes (LISTERINE ANTISEPTIC) LIQD Apply to the mouth or throat 2 (two) times a day, Until Discontinued, Historical Med      Multiple Vitamins-Minerals (CERTAVITE SENIOR/ANTIOXIDANT PO) Take by mouth, Until Discontinued, Historical Med      naproxen (NAPROSYN) 375 mg tablet Take 1 tablet by mouth every 12 (twelve) hours as needed for mild pain or moderate pain for up to 30 days, Starting Thu 4/27/2017, Until Fri 7/28/2017, Print      Norgestimate-Eth Estradiol (SPRINTEC 28 PO) Take by mouth, Until Discontinued, Historical Med      ofloxacin (FLOXIN) 0 3 % otic solution Administer 5 drops into both ears 2 (two) times a day, Until Discontinued, Historical Med      polyethylene glycol (MIRALAX) 17 g packet Take 17gm (1 packet) daily for first three days, then daily as needed for no bowel movement more than 24 hrs, Print      polyethylene glycol-propylene glycol (SYSTANE) 0 4-0 3 % 1 drop 3 (three) times a day, Until Discontinued, Historical Med      RABEprazole (ACIPHEX) 20 MG tablet Take 20 mg by mouth daily, Until Discontinued, Historical Med      senna-docusate sodium (SENOKOT S) 8 6-50 mg per tablet Take 1 tablet by mouth every morning, Starting Sun 7/30/2017, Print       !! - Potential duplicate medications found  Please discuss with provider  No discharge procedures on file  ED Provider  Attending physically available and evaluated Darcie Brown  LEIF managed the patient along with the ED Attending      Electronically Signed by       Cassia Etienne DO  Resident  10/13/17 [id number]

## 2017-10-13 NOTE — ED NOTES
Unable to obtain pt's blood work, 2 attempts made  Dr Corbin Mike aware       Jordi Martinez, RN  10/12/17 [id number]

## 2017-10-13 NOTE — DISCHARGE INSTRUCTIONS
Take medications as directed  Stay well hydrated  Follow up with primary care physician tomorrow as scheduled for reevaluation and further workup of presenting symptoms  Return to ED if new or worsening symptoms require immediate reevaluation  Cold Symptoms   WHAT YOU NEED TO KNOW:   A cold is an infection caused by a virus  The infection causes your upper respiratory system to become inflamed  Common symptoms of a cold include sneezing, dry throat, a stuffy nose, headache, watery eyes, and a cough  Your cough may be dry, or you may cough up mucus  You may also have muscle aches, joint pain, and tiredness  Rarely, you may have a fever  Most colds go away without treatment  DISCHARGE INSTRUCTIONS:   Return to the emergency department if:   · You have increased tiredness and weakness  · You are unable to eat  · Your heart is beating much faster than usual for you  · You see white spots in the back of your throat and your neck is swollen and sore to the touch  · You see pinpoint or larger reddish-purple dots on your skin  Contact your healthcare provider if:   · You have a fever higher than 102°F (38 9°C)  · You have new or worsening shortness of breath  · You have thick nasal drainage for more than 2 days  · Your symptoms do not improve or get worse within 5 days  · You have questions or concerns about your condition or care  Medicines: The following medicines may be suggested by your healthcare provider to decrease your cold symptoms  These medicines are available without a doctor's order  Ask which medicines to take and when to take them  Follow directions  · NSAIDs or acetaminophen  help to bring down a fever or decrease pain  · Decongestants  help decrease nasal stuffiness  · Antihistamines  help decrease sneezing and a runny nose  · Cough suppressants  help decrease how much you cough  · Expectorants  help loosen mucus so you can cough it up      · Take your medicine as directed  Contact your healthcare provider if you think your medicine is not helping or if you have side effects  Tell him of her if you are allergic to any medicine  Keep a list of the medicines, vitamins, and herbs you take  Include the amounts, and when and why you take them  Bring the list or the pill bottles to follow-up visits  Carry your medicine list with you in case of an emergency  Symptom relief: The following may help relieve cold symptoms, such as a dry throat and congestion:  · Gargle with mouthwash or warm salt water as directed  · Suck on throat lozenges or hard candy  · Use a cold or warm vaporizer or humidifier to ease your breathing  · Rest for at least 2 days and then as needed to decrease tiredness and weakness  · Use petroleum based jelly around your nostrils to decrease irritation from blowing your nose  Drink liquids:  Liquids will help thin and loosen thick mucus so you can cough it up  Liquids will also keep you hydrated  Ask your healthcare provider which liquids are best for you and how much to drink each day  Prevent the spread of germs: You can spread your cold germs to others for at least 3 days after your symptoms start  Wash your hands often  Do not share items, such as eating utensils  Cover your nose and mouth when you cough or sneeze using the crook of your elbow instead of your hands  Throw used tissues in the garbage  Do not smoke:  Smoking may worsen your symptoms and increase the length of time you feel sick  Talk with your healthcare provider if you need help to stop smoking  Follow up with your healthcare provider as directed:  Write down your questions so you remember to ask them during your visits  © 2017 [facility] Information is for End User's use only and may not be sold, redistributed or otherwise used for commercial purposes   All illustrations and images included in CareNotes® are the copyrighted property of [employer]  or [employer]  The above information is an  only  It is not intended as medical advice for individual conditions or treatments  Talk to your doctor, nurse or pharmacist before following any medical regimen to see if it is safe and effective for you  Acute Headache   WHAT YOU NEED TO KNOW:   An acute headache is pain or discomfort that starts suddenly and gets worse quickly  You may have an acute headache only when you feel stress or eat certain foods  Other acute headache pain can happen every day, and sometimes several times a day  DISCHARGE INSTRUCTIONS:   Return to the emergency department if:   · You have severe pain  · You have numbness or weakness on one side of your face or body  · You have a headache that occurs after a blow to the head, a fall, or other trauma  · You have a headache, are forgetful or confused, or have trouble speaking  · You have a headache, stiff neck, and a fever  Contact your healthcare provider if:   · You have a constant headache and are vomiting  · You have a headache each day that does not get better, even after treatment  · You have changes in your headaches, or new symptoms that occur when you have a headache  · You have questions or concerns about your condition or care  Medicines: You may need any of the following:  · Prescription pain medicine  may be given  The medicine your healthcare provider recommends will depend on the kind of headaches you have  You will need to take prescription headache medicines as directed to prevent a problem called rebound headache  These headaches happen with regular use of pain relievers for headache disorders  · NSAIDs , such as ibuprofen, help decrease swelling, pain, and fever  This medicine is available with or without a doctor's order  NSAIDs can cause stomach bleeding or kidney problems in certain people   If you take blood thinner medicine, always ask your healthcare provider if NSAIDs are safe for you  Always read the medicine label and follow directions  · Acetaminophen  decreases pain and fever  It is available without a doctor's order  Ask how much to take and how often to take it  Follow directions  Read the labels of all other medicines you are using to see if they also contain acetaminophen, or ask your doctor or pharmacist  Acetaminophen can cause liver damage if not taken correctly  Do not use more than 3 grams (3,000 milligrams) total of acetaminophen in one day  · Antidepressants  may be given for some kinds of headaches  · Take your medicine as directed  Contact your healthcare provider if you think your medicine is not helping or if you have side effects  Tell him or her if you are allergic to any medicine  Keep a list of the medicines, vitamins, and herbs you take  Include the amounts, and when and why you take them  Bring the list or the pill bottles to follow-up visits  Carry your medicine list with you in case of an emergency  Manage your symptoms:   · Apply heat or ice  on the headache area  Use a heat or ice pack  For an ice pack, you can also put crushed ice in a plastic bag  Cover the pack or bag with a towel before you apply it to your skin  Ice and heat both help decrease pain, and heat also helps decrease muscle spasms  Apply heat for 20 to 30 minutes every 2 hours  Apply ice for 15 to 20 minutes every hour  Apply heat or ice for as long and for as many days as directed  You may alternate heat and ice  · Relax your muscles  Lie down in a comfortable position and close your eyes  Relax your muscles slowly  Start at your toes and work your way up your body  · Keep a record of your headaches  Write down when your headaches start and stop  Include your symptoms and what you were doing when the headache began  Record what you ate or drank for 24 hours before the headache started  Describe the pain and where it hurts   Keep track of what you did to treat your headache and if it worked  Prevent an acute headache:   · Avoid anything that triggers an acute headache  Examples include exposure to chemicals, going to high altitude, or not getting enough sleep  Create a regular sleep routine  Go to sleep at the same time and wake up at the same time each day  Do not use electronic devices before bedtime  These may trigger a headache or prevent you from sleeping well  · Do not smoke  Nicotine and other chemicals in cigarettes and cigars can trigger an acute headache or make it worse  Ask your healthcare provider for information if you currently smoke and need help to quit  E-cigarettes or smokeless tobacco still contain nicotine  Talk to your healthcare provider before you use these products  · Limit alcohol as directed  Alcohol can trigger an acute headache or make it worse  If you have cluster headaches, do not drink alcohol during an episode  For other types of headaches, ask your healthcare provider if it is safe for you to drink alcohol  Ask how much is safe for you to drink, and how often  · Exercise as directed  Exercise can reduce tension and help with headache pain  Aim for 30 minutes of physical activity on most days of the week  Your healthcare provider can help you create an exercise plan  · Eat a variety of healthy foods  Healthy foods include fruits, vegetables, low-fat dairy products, lean meats, fish, whole grains, and cooked beans  Your healthcare provider or dietitian can help you create meals plans if you need to avoid foods that trigger headaches  Follow up with your healthcare provider as directed:  Bring your headache record with you when you see your healthcare provider  Write down your questions so you remember to ask them during your visits  © 2017 [address] Information is for End User's use only and may not be sold, redistributed or otherwise used for commercial purposes   All illustrations and images included in CareNotes® are the copyrighted property of [employer] , Inc  or Franco Epperson  The above information is an  only  It is not intended as medical advice for individual conditions or treatments  Talk to your doctor, nurse or pharmacist before following any medical regimen to see if it is safe and effective for you

## 2017-10-16 ENCOUNTER — GENERIC CONVERSION - ENCOUNTER (OUTPATIENT)
Dept: OTHER | Facility: OTHER | Age: 37
End: 2017-10-16

## 2017-10-17 ENCOUNTER — GENERIC CONVERSION - ENCOUNTER (OUTPATIENT)
Dept: OTHER | Facility: OTHER | Age: 37
End: 2017-10-17

## 2017-10-21 NOTE — ED ATTENDING ATTESTATION
Henny Estrada MD, saw and evaluated the patient  I have discussed the patient with the resident/non-physician practitioner and agree with the resident's/non-physician practitioner's findings, Plan of Care, and MDM as documented in the resident's/non-physician practitioner's note, except where noted  All available labs and Radiology studies were reviewed  At this point I agree with the current assessment done in the Emergency Department  I have conducted an independent evaluation of this patient a history and physical is as follows:    Patient presents with one day of rhinorrhea, back pain, vertigo, headache, nausea, chest pain  Caregivers deny any vomiting, fever, cold symptoms  Patient is a past medical history of cerebral palsy and  shunt  No additional complaints  Exam: awake, alert, NAD, RRR, wheezing, S/NT/ND, bilateral contractures  A/P: headache  Given poor history due to patient's underlying intellectual disability, will check labs, CT head, showed series, and will it symptomatically with Zofran and meclizine       Critical Care Time  CritCare Time

## 2017-10-27 ENCOUNTER — GENERIC CONVERSION - ENCOUNTER (OUTPATIENT)
Dept: OTHER | Facility: OTHER | Age: 37
End: 2017-10-27

## 2017-11-02 ENCOUNTER — ALLSCRIPTS OFFICE VISIT (OUTPATIENT)
Dept: OTHER | Facility: OTHER | Age: 37
End: 2017-11-02

## 2017-11-10 ENCOUNTER — ALLSCRIPTS OFFICE VISIT (OUTPATIENT)
Dept: OTHER | Facility: OTHER | Age: 37
End: 2017-11-10

## 2017-11-10 ENCOUNTER — HOSPITAL ENCOUNTER (OUTPATIENT)
Dept: RADIOLOGY | Facility: HOSPITAL | Age: 37
Discharge: HOME | End: 2017-11-10
Payer: MEDICARE

## 2017-11-10 DIAGNOSIS — N28.1 ACQUIRED CYST OF KIDNEY: ICD-10-CM

## 2017-11-10 PROCEDURE — 74178 CT ABD&PLV WO CNTR FLWD CNTR: CPT

## 2017-11-10 RX ADMIN — IOHEXOL 120 ML: 350 INJECTION, SOLUTION INTRAVENOUS at 15:01

## 2017-11-13 DIAGNOSIS — N28.1 ACQUIRED CYST OF KIDNEY: ICD-10-CM

## 2017-12-01 ENCOUNTER — GENERIC CONVERSION - ENCOUNTER (OUTPATIENT)
Dept: OTHER | Facility: OTHER | Age: 37
End: 2017-12-01

## 2017-12-18 ENCOUNTER — GENERIC CONVERSION - ENCOUNTER (OUTPATIENT)
Dept: OTHER | Facility: OTHER | Age: 37
End: 2017-12-18

## 2017-12-26 ENCOUNTER — GENERIC CONVERSION - ENCOUNTER (OUTPATIENT)
Dept: OTHER | Facility: OTHER | Age: 37
End: 2017-12-26

## 2018-01-10 ENCOUNTER — HOSPITAL ENCOUNTER (EMERGENCY)
Facility: HOSPITAL | Age: 38
Discharge: HOME | End: 2018-01-10
Attending: EMERGENCY MEDICINE
Payer: MEDICARE

## 2018-01-10 VITALS
WEIGHT: 168.8 LBS | HEART RATE: 110 BPM | TEMPERATURE: 99.5 F | SYSTOLIC BLOOD PRESSURE: 125 MMHG | DIASTOLIC BLOOD PRESSURE: 59 MMHG | BODY MASS INDEX: 35.28 KG/M2 | RESPIRATION RATE: 16 BRPM | OXYGEN SATURATION: 97 %

## 2018-01-10 DIAGNOSIS — F43.21 SITUATIONAL DEPRESSION: Primary | ICD-10-CM

## 2018-01-10 DIAGNOSIS — Z63.4 RECENT BEREAVEMENT: ICD-10-CM

## 2018-01-10 PROCEDURE — 99284 EMERGENCY DEPT VISIT MOD MDM: CPT

## 2018-01-10 RX ORDER — CALCIUM CARBONATE 200(500)MG
1 TABLET,CHEWABLE ORAL 3 TIMES DAILY PRN
COMMUNITY
End: 2019-10-14 | Stop reason: SDUPTHER

## 2018-01-10 RX ORDER — SUCRALFATE ORAL 1 G/10ML
1 SUSPENSION ORAL 4 TIMES DAILY PRN
COMMUNITY
End: 2018-09-21 | Stop reason: SDUPTHER

## 2018-01-10 SDOH — SOCIAL STABILITY - SOCIAL INSECURITY: DISSAPEARANCE AND DEATH OF FAMILY MEMBER: Z63.4

## 2018-01-10 NOTE — ED NOTES
Assumed care of patient at this time  Patient awake and alert, aide at bedside  Will continue to monitor        Keyana Thompson RN  01/10/18 [id number]

## 2018-01-10 NOTE — DISCHARGE INSTRUCTIONS
Follow up as discussed with Crisis Liaison and continue your medications as prescribed  Please return if you are feeling overwhelmed or if you feel unsafe toward yourself or others

## 2018-01-10 NOTE — ED PROVIDER NOTES
History  Chief Complaint   Patient presents with    Psychiatric Evaluation     pt from [facility] [place], visits [facility] every wednesday where she mentioned she wanted to hurt self  pt SI with no plan and AH telling her "to kill herself"   pt states she is really angry her grandfather   denies VH/HI     45 yo female with developmental and intellectual disability, lives in [address] [place] [address] where she is under staff care 24 hours daily and who has DPOA, coming to the ED from [facility] where she goes for regular counseling for evaluation  She is asking for help with auditory hallucination that she says is giving her two distressing thoughts, first that she has thoughts of suicide, and second thoughts of hurting herself  She denies any specific plan for either of these, and says this is distressing to her and seems to be directly related to ongoing sadness, and "something I can't get over", citing specifically the recent death of her grandfather  She says she wants help so that she keep feeling "sad and down" all the time, and "to get back where I'm supposed to be "  She was under the impression that by coming to the hospital she could talk with people and make medication adjustments, and was not aware she would have to be transferred to a separate facility or that her staff would not be able to stay with her  History provided by:  Patient and caregiver  History limited by:  Psychiatric disorder      Prior to Admission Medications   Prescriptions Last Dose Informant Patient Reported? Taking?    ARIPiprazole (ABILIFY) 2 mg tablet   Yes Yes   Sig: Take 5 mg by mouth daily     Acetaminophen-Caff-Pyrilamine (MIDOL COMPLETE PO)   Yes Yes   Sig: Take 1 tablet by mouth as needed   Cholecalciferol (VITAMIN D-3 PO)   Yes Yes   Sig: Take 2,000 Units by mouth daily   Mouthwashes (LISTERINE ANTISEPTIC) LIQD   Yes Yes   Sig: Apply to the mouth or throat 2 (two) times a day   Multiple Vitamins-Minerals (CERTAVITE SENIOR/ANTIOXIDANT PO)   Yes Yes   Sig: Take by mouth   Norgestimate-Eth Estradiol (SPRINTEC 28 PO)   Yes Yes   Sig: Take by mouth   RABEprazole (ACIPHEX) 20 MG tablet   Yes Yes   Sig: Take 20 mg by mouth daily   acetaminophen (TYLENOL) 500 mg tablet   Yes Yes   Sig: Take 500 mg by mouth every 6 (six) hours as needed for mild pain   albuterol (PROVENTIL HFA,VENTOLIN HFA) 90 mcg/act inhaler   No Yes   Sig: Inhale 2 puffs every 6 (six) hours as needed for wheezing or shortness of breath   ammonium lactate (LAC-HYDRIN) 12 % cream   Yes Yes   Sig: Apply topically as needed for dry skin   calcium carbonate (TUMS) 500 mg chewable tablet   Yes Yes   Sig: Chew 1 tablet daily   clotrimazole (LOTRIMIN) 1 % cream   Yes Yes   Sig: Apply 1 application topically 3 (three) times a day as needed   escitalopram (LEXAPRO) 10 mg tablet   Yes Yes   Sig: Take 10 mg by mouth daily   famotidine (PEPCID) 20 mg tablet   Yes Yes   Sig: Take 20 mg by mouth 2 (two) times a day   ibuprofen (MOTRIN) 400 mg tablet   No Yes   Sig: Take 1 tablet by mouth every 8 (eight) hours as needed for mild pain, moderate pain or headaches   liver oil-zinc oxide (DESITIN) 40 % ointment   Yes Yes   Sig: Apply 1 application topically as needed for irritation   lubiprostone (AMITIZA) 24 mcg capsule   Yes Yes   Sig: Take 24 mcg by mouth 2 (two) times a day with meals   magnesium hydroxide (MILK OF MAGNESIA) 400 mg/5 mL oral suspension   Yes Yes   Sig: Take 30 mL by mouth daily as needed for constipation   metoclopramide (REGLAN) 10 mg tablet   Yes Yes   Sig: Take 10 mg by mouth every 8 (eight) hours as needed   polyethylene glycol (MIRALAX) 17 g packet   No Yes   Sig: Take 17gm (1 packet) daily for first three days, then daily as needed for no bowel movement more than 24 hrs   senna-docusate sodium (SENOKOT S) 8 6-50 mg per tablet   No Yes   Sig: Take 1 tablet by mouth every morning   sucralfate (CARAFATE) 1 g/10 mL suspension   Yes Yes   Sig: Take 1 g by mouth 4 (four) times a day      Facility-Administered Medications: None       Past Medical History:   Diagnosis Date    Anxiety     Cerebral palsy ([address] )     Depression     Depressive disorder     GERD (gastroesophageal reflux disease)     Mood disorder (HCC)        Past Surgical History:   Procedure Laterality Date    CSF SHUNT      LEG SURGERY      due to CP        History reviewed  No pertinent family history  I have reviewed and agree with the history as documented  Social History   Substance Use Topics    Smoking status: Never Smoker    Smokeless tobacco: Never Used    Alcohol use No        Review of Systems   Constitutional: Negative for appetite change, chills and fever  HENT: Negative for sore throat  Respiratory: Negative for cough, shortness of breath and wheezing  Cardiovascular: Negative for chest pain and palpitations  Gastrointestinal: Negative for abdominal pain, diarrhea, nausea and vomiting  Genitourinary: Negative for dysuria and hematuria  Musculoskeletal: Negative for neck pain  Skin: Negative for rash  Neurological: Negative for dizziness, weakness and headaches  Psychiatric/Behavioral: Positive for suicidal ideas  The patient is nervous/anxious  All other systems reviewed and are negative  Physical Exam  ED Triage Vitals [01/10/18 1423]   Temperature Pulse Respirations Blood Pressure SpO2   99 5 °F (37 5 °C) (!) 110 16 125/59 97 %      Temp Source Heart Rate Source Patient Position - Orthostatic VS BP Location FiO2 (%)   Oral Monitor Sitting Left arm --      Pain Score       No Pain           Orthostatic Vital Signs  Vitals:    01/10/18 1423   BP: 125/59   Pulse: (!) 110   Patient Position - Orthostatic VS: Sitting       Physical Exam   Constitutional: She is oriented to person, place, and time  She appears well-developed and well-nourished  No distress  HENT:   Head: Normocephalic and atraumatic     Right Ear: External ear normal    Left Ear: External ear normal    Nose: Nose normal    Eyes: Conjunctivae and EOM are normal  Pupils are equal, round, and reactive to light  Neck: Normal range of motion  Neck supple  Cardiovascular: Normal rate and regular rhythm  Pulmonary/Chest: Effort normal    Abdominal: Soft  Musculoskeletal: Normal range of motion  Neurological: She is alert and oriented to person, place, and time  She has normal strength  Gait normal    Skin: Skin is warm and dry  No rash noted  She is not diaphoretic  Psychiatric: Thought content normal  Her affect is blunt  Her speech is delayed  She is slowed  Thought content is not paranoid  Cognition and memory are normal  She expresses no suicidal ideation  She expresses no suicidal plans  Nursing note and vitals reviewed  ED Medications  Medications - No data to display    Diagnostic Studies  Results Reviewed     None                 No orders to display              Procedures  Procedures       Phone Contacts  ED Phone Contact    ED Course  ED Course                                MDM  CritCare Time    Disposition  Final diagnoses:   Situational depression   Recent bereavement     Time reflects when diagnosis was documented in both MDM as applicable and the Disposition within this note     Time User Action Codes Description Comment    1/10/2018  4:41 PM Cisco Corona [W13 [id number]] Situational depression     1/10/2018  4:42 PM Cisco Corona [Z63 4] Recent bereavement       ED Disposition     ED Disposition Condition Comment    Discharge  Ace Aland discharge to home/self care  Condition at discharge: Good        Follow-up Information     Follow up With Specialties Details Why Contact Info    Cristóbal Presley MD Family Medicine, Obstetrics and Gynecology Go to For followup [place] [phone]          Patient's Medications   Discharge Prescriptions    No medications on file     No discharge procedures on file      ED Provider  Electronically Signed by           Reanna Alicia MD  01/10/18 5457

## 2018-01-10 NOTE — MISCELLANEOUS
To whom it may concern:    Please begin Debrox on 6/9/17 since it was not able to be started on 8/8/17        Thank you        [place]      Electronically signed by:Lilo Majano   Jun 8 2017  6:33PM EST

## 2018-01-10 NOTE — ED NOTES
Pt reports she has been hearing voices that started today at 130  She states she lost her grandfather on 12/5 and has been sad  Per staff pt has been doing very welll however she has been somewhat sad since the passing however she is easily redirected  Staff feel she is safe in the home since she has continuous staffing    Pt denies suicidal and homicidal ideation and is agreeable to return home and follow with her psychiatrist

## 2018-01-10 NOTE — MISCELLANEOUS
Provider Comments  Provider Comments:   pt was a no show today      Signatures   Electronically signed by : Tu García, ; Jul 25 2017  3:58PM EST                       (Author)

## 2018-01-11 NOTE — PROGRESS NOTES
Assessment    1  Medicare annual wellness visit, subsequent (V70 0) (Z00 00)    Discussion/Summary    AWV: need records of her current care team  Taks Triage to call and obtain current list of Elizabeth's providers  [facility]  seen today for vaginal itching  last pap unknown rec f/u with gyn  Impression: Subsequent Annual Wellness Visit  Cardiovascular screening and counseling: the risks and benefits of screening were discussed and counseling was given on maintaining a healthy diet  Diabetes screening and counseling: counseling was given on maintaining a healthy diet  Breast cancer screening and counseling: screening not indicated and sees GYN  Cervical cancer screening and counseling: sees gyn  Advance Directive Planning: paperwork and instructions were given to the patient  Patient Discussion: plan discussed with the patient, follow-up visit needed in one year, and care provider  History of Present Illness  The patient is being seen for the subsequent annual wellness visit  Medicare Screening and Risk Factors   Hospitalizations: no previous hospitalizations  Once per lifetime medicare screening tests: ECG has not been done  Medicare Screening Tests Risk Questions   Abdominal aortic aneurysm risk assessment: none indicated  Osteoporosis risk assessment: none indicated  HIV risk assessment: none indicated  Drug and Alcohol Use: The patient has never smoked cigarettes  The patient reports never drinking alcohol  She has never used illicit drugs  Diet and Physical Activity: Current diet includes well balanced meals  She exercises infrequently  Exercise: walking, with a walker  Mood Disorder and Cognitive Impairment Screening:   Cognitive impairment screening: difficulty learning/retaining new information, difficulty handling complex tasks, difficulty with reasoning, difficulty with language and difficulty with behavior     Functional Ability/Level of Safety: Hearing is seen by audiology, routinely has hearing aid  and a hearing aid is used  She reports hearing difficulties  The patient is currently able to do activities of daily living with limitations  Activities of daily living details: needs help using the phone, transportation help needed, needs help shopping, meal preparation help needed, needs help doing housework, needs help doing laundry, needs help managing medications and needs help managing money  Fall risk factors:  mobility impairment, antidepressant use, deconditioning, visual impairment and previous fall, but The patient fell several times in the past 12 months  Home safety risk factors:  lives in group home with care provider on site 24/7  House is handicap accessible  Further Evaluation: She had cerebral palsy, mood disorder , cognitive impairment and is in a group home  with one to one care  Advance Directives: Advance directives: no living will, no durable power of  for health care directives and no advance directives  Co-Managers and Medical Equipment/Suppliers: See Patient Care Team      Patient Care Team    Care Team Member Role Specialty Office Number   [address] [phone]   Meseret Machado   [phone]   [facility]  Ophthalmology [phone]   [facility] ENT  Otolaryngology    [facility]   [phone]   [facility]   [phone]     [address] [place] [address],[address] [place]  Psychiatry [phone]     Active Problems     1  Alkaline phosphatase elevation (790 5) (R74 8)   2  Brain lesion (348 89) (G93 9)   3  Cerebral palsy (343 9) (G80 9)   4  Chronic knee pain (912 80,584 24) (M25 569,G89 29)   5  Constipation (564 00) (K59 00)   6  Depression (311) (F32 9)   7  Dysmenorrhea (625 3) (N94 6)   8  Dysphagia (787 20) (R13 10)   9  Flu vaccine need (V04 81) (Z23)   10  GERD without esophagitis (530 81) (K21 9)   11  Head injury, closed (959 01) (S09 90XA)   12   Hearing impairment (389 9) (H91 90)   13  Impacted cerumen of both ears (380 4) (H61 23)   14  Left renal mass (593 9) (N28 89)   15  Low back pain (724 2) (M54 5)   16  Seasonal allergies (477 9) (J30 2)    At moderate risk for fall (V15 88) (Z91 81)          Past Medical History    1  History of Chronically dry eyes, left (375 15) (H04 129)   2  History of cerebral palsy (V12 49) (Z86 69)   3  History of constipation (V12 79) (Z87 19)   4  History of depression (V11 8) (Z86 59)   5  History of esophageal reflux (V12 79) (Z87 19)    The active problems and past medical history were reviewed and updated today  Surgical History    1  History of Creation Of Ventriculo-Peritoneal CSF Shunt   2  History of Ear Surgery   3  History of Nose Surgery    The surgical history was reviewed and updated today  Family History  Mother    1  Family history of diabetes mellitus (V18 0) (Z83 3)  Father    2  No pertinent family history    The family history was reviewed and updated today  Social History    · Always uses seat belt   · Lives in group home (V60 6) (Z59 3)   · Never a smoker   · No alcohol use   · No drug use  The social history was reviewed and updated today  Current Meds   1  Amitiza 24 MCG Oral Capsule; TAKE ONE TABLET AT 8 AM AND 8 PM  Requested for:   66EER1512; Last Rx:16Jan2017 Ordered   2  Centrum Oral Tablet; take 1 tablet by mouth AT 8 AM; Last Rx:54Cub1841 Ordered   3  Clotrimazole 1 % External Cream; APPLY TO STOMA WHEN INFECTED 3 TIMES DAILY   AS NEEDED; Last Rx:57Tak1255 Ordered   4  Depo-Provera 150 MG/ML Intramuscular Suspension; INJECT EVERY 12 WEEKS AS   DIRECTED; Therapy: (Recorded:31Bvw6056) to Recorded   5  Escitalopram Oxalate 10 MG Oral Tablet; TAKE ONE TABLET AT 8AM;   Therapy: (Recorded:08Fij8400) to Recorded   6  Famotidine 20 MG Oral Tablet; TAKE ONE TAB PO TWICE DAILY AT 7AM AND 4PM;   Therapy: 17ZQZ0250 to (Evaluate:27Apr2017)  Requested for: 37Mzt4366; Last   Rx:50Dzm7911 Ordered   7  Fluticasone Propionate 50 MCG/ACT Nasal Suspension; USE 2 SPRAYS IN EACH   NOSTRIL TWICE DAILY; Therapy: 53TAD8923 to (Evaluate:08Bnm9412)  Requested for: 27FCX3893; Last   Rx:80Hsl3411 Ordered   8  Listerine Mouth/Throat Liquid; use mouth wash 2x day as needed at 8 am and 8 pm;   Therapy: 19GHA7001 to (Last Rx:03Nov2016)  Requested for: 40PEK8260 Ordered   9  Metoclopramide HCl - 10 MG Oral Tablet; TAKE 1 TABLET Every 8 hours TAKE AS   NEEDED FOR ABDOMINAL PAIN  Requested for: 11PKB9690; Last Rx:12Clr2030   Ordered   10  Midol 200 MG Oral Capsule; TAKE 1 CAPSULE EVERY 4 TO 6 HOURS AS NEEDED; Last    Rx:92Nck0260 Ordered   11  Milk of Magnesia 1200 MG/15ML Oral Suspension; TAKE 2 TBSP DAILY AS NEEDED IF    NO BM IN 3 DAYS; Last Rx:65Khe9700 Ordered   12  RABEprazole Sodium 20 MG Oral Tablet Delayed Release; TAKE 1 20 mg tablet at 7 am;    Therapy: (Recorded:03Oct2016) to Recorded   13  Systane Ultra SOLN; INSTILL ONE DROP INTO THE LEFT EYE AS NEEDED; Therapy: (Recorded:69Ghj4715) to Recorded   14  Vitamin D3 1000 UNIT Oral Capsule; TAKE 2 TABLETS 1000MG DAILY AT 8 AM; Last    Rx:53Dwr4990 Ordered  Her records from the group [place] show some prn meds that are not in our EHR, The list was copied and given to triage for entering into EHR  The medication list was reviewed and updated today  Allergies    1  No Known Drug Allergies    2   No Known Environmental Allergies    Immunizations  Influenza --- Sushma Regan: 04-Oct-2016   Tdap --- Sushma Regan: Temporarily Deferred: Pt requests deferral, medicare does not cover     Vitals  Signs   Recorded: 45QGY2213 03:09PM   Temperature: 99 3 F  Heart Rate: 78  Respiration: 16  Systolic: 006  Diastolic: 66  Height: 4 ft 10 in  Weight: 153 lb 6 oz  BMI Calculated: 32 06  BSA Calculated: 1 63  Pain Scale: 0    Future Appointments    Date/Time Provider Specialty Site   03/02/2017 01:40 PM Kimberly Kauffman DO Family Medicine [address]   05/09/2017 12:50 PM Blanca Lund Marichuy Gee, [address]  FAMILY PRACTICE     Signatures   Electronically signed by : Carl Joseph; Feb 15 2017  7:11PM EST                       (Author)    Electronically signed by : Sean Treviño, [address]; Feb 17 2017  4:00PM EST                       (Author)    Electronically signed by : WILLIAM Daniels ; Feb 21 2017  3:22PM EST                       (Review)

## 2018-01-12 VITALS
SYSTOLIC BLOOD PRESSURE: 118 MMHG | HEART RATE: 80 BPM | BODY MASS INDEX: 30.26 KG/M2 | TEMPERATURE: 99.2 F | WEIGHT: 144.13 LBS | HEIGHT: 58 IN | RESPIRATION RATE: 16 BRPM | DIASTOLIC BLOOD PRESSURE: 72 MMHG

## 2018-01-12 VITALS
BODY MASS INDEX: 32.22 KG/M2 | DIASTOLIC BLOOD PRESSURE: 74 MMHG | TEMPERATURE: 97.6 F | HEART RATE: 84 BPM | HEIGHT: 58 IN | WEIGHT: 153.5 LBS | RESPIRATION RATE: 16 BRPM | SYSTOLIC BLOOD PRESSURE: 110 MMHG

## 2018-01-12 NOTE — RESULT NOTES
Verified Results  [facility] [id number] 01:27PM Jeremi Burgos    Order Number: [id number]     Test Name Result Flag Reference   FL BARIUM SWALLOW VIDEO W SPEECH (Report)     2 9 minutes of fluoroscopy time were provided for the Department of Speech Pathology in performing a video barium swallow  Please refer to their report for the official interpretation  A copy of the video tape is on file  Signed by:  Yohan Doll MD   [8/9/2016 8:51:13 AM - LIDA Orozco]   355

## 2018-01-13 VITALS
HEIGHT: 58 IN | RESPIRATION RATE: 16 BRPM | DIASTOLIC BLOOD PRESSURE: 82 MMHG | TEMPERATURE: 97.7 F | BODY MASS INDEX: 31.72 KG/M2 | HEART RATE: 76 BPM | SYSTOLIC BLOOD PRESSURE: 134 MMHG | WEIGHT: 151.13 LBS

## 2018-01-13 VITALS
WEIGHT: 146.38 LBS | DIASTOLIC BLOOD PRESSURE: 78 MMHG | HEIGHT: 58 IN | TEMPERATURE: 97.8 F | SYSTOLIC BLOOD PRESSURE: 130 MMHG | BODY MASS INDEX: 30.73 KG/M2 | RESPIRATION RATE: 16 BRPM | HEART RATE: 72 BPM

## 2018-01-13 VITALS
DIASTOLIC BLOOD PRESSURE: 88 MMHG | HEART RATE: 108 BPM | BODY MASS INDEX: 32.67 KG/M2 | WEIGHT: 155.65 LBS | HEIGHT: 58 IN | TEMPERATURE: 97.2 F | SYSTOLIC BLOOD PRESSURE: 130 MMHG

## 2018-01-13 VITALS
WEIGHT: 146 LBS | RESPIRATION RATE: 16 BRPM | TEMPERATURE: 98 F | HEIGHT: 58 IN | HEART RATE: 80 BPM | BODY MASS INDEX: 30.64 KG/M2 | DIASTOLIC BLOOD PRESSURE: 74 MMHG | SYSTOLIC BLOOD PRESSURE: 110 MMHG

## 2018-01-13 VITALS
HEART RATE: 88 BPM | HEIGHT: 58 IN | DIASTOLIC BLOOD PRESSURE: 70 MMHG | BODY MASS INDEX: 31.51 KG/M2 | WEIGHT: 150.13 LBS | RESPIRATION RATE: 16 BRPM | SYSTOLIC BLOOD PRESSURE: 138 MMHG | TEMPERATURE: 99.1 F

## 2018-01-13 VITALS
HEIGHT: 58 IN | BODY MASS INDEX: 30.64 KG/M2 | SYSTOLIC BLOOD PRESSURE: 132 MMHG | HEART RATE: 84 BPM | RESPIRATION RATE: 16 BRPM | TEMPERATURE: 98.7 F | DIASTOLIC BLOOD PRESSURE: 76 MMHG | WEIGHT: 146 LBS

## 2018-01-13 VITALS
TEMPERATURE: 98.4 F | SYSTOLIC BLOOD PRESSURE: 120 MMHG | BODY MASS INDEX: 31.82 KG/M2 | HEART RATE: 84 BPM | WEIGHT: 152.25 LBS | RESPIRATION RATE: 16 BRPM | DIASTOLIC BLOOD PRESSURE: 74 MMHG

## 2018-01-13 VITALS
TEMPERATURE: 98.7 F | HEART RATE: 78 BPM | DIASTOLIC BLOOD PRESSURE: 80 MMHG | RESPIRATION RATE: 16 BRPM | HEIGHT: 58 IN | WEIGHT: 154.13 LBS | BODY MASS INDEX: 32.35 KG/M2 | SYSTOLIC BLOOD PRESSURE: 130 MMHG

## 2018-01-13 VITALS
SYSTOLIC BLOOD PRESSURE: 124 MMHG | RESPIRATION RATE: 14 BRPM | HEIGHT: 58 IN | WEIGHT: 144.38 LBS | DIASTOLIC BLOOD PRESSURE: 80 MMHG | HEART RATE: 76 BPM | TEMPERATURE: 98.1 F | BODY MASS INDEX: 30.31 KG/M2

## 2018-01-13 NOTE — RESULT NOTES
Verified Results  * CT ABDOMEN PELVIS WO CONTRAST [id number] 04:51PM Allison Golden Order Number: [id number]    - Patient Instructions: To schedule this appointment, please contact Central Scheduling at [phone]  Test Name Result Flag Reference   CT ABDOMEN PELVIS WO CONTRAST (Report)     CT ABDOMEN AND PELVIS WITHOUT IV CONTRAST     INDICATION: Possible renal cyst  Patient refused intravenous contrast      COMPARISON: Ultrasound abdomen 9/20/2016     TECHNIQUE: CT examination of the abdomen and pelvis was performed without intravenous contrast  This examination, like all CT scans performed in the [facility], was performed utilizing techniques to minimize radiation dose exposure,    including the use of iterative reconstruction and automated exposure control  Axial, sagittal, and coronal reformatted images were submitted for interpretation  Intravenous contrast was not administered as part of this examination  Enteric contrast    was administered  FINDINGS:     ABDOMEN     LOWER CHEST: No significant abnormalities identified in the lower chest      LIVER/BILIARY TREE: Unremarkable  GALLBLADDER: No calcified gallstones  No pericholecystic inflammatory change  SPLEEN: Unremarkable  PANCREAS: Unremarkable  ADRENAL GLANDS: Unremarkable  KIDNEYS/URETERS: The right kidney is atrophic  There is a prominent rounded hypodense lesion arising from the superior pole the left kidney measuring 5 9 x 5 6 cm measuring fluid attenuation  There are a few thin septations  No evident solid    component on this noncontrast evaluation  STOMACH AND BOWEL: Moderate size hiatal hernia  No evidence of bowel obstruction  APPENDIX: No findings to suggest appendicitis  ABDOMINOPELVIC CAVITY: No ascites or free intraperitoneal air  No lymphadenopathy  VESSELS: Unremarkable for patient's age  PELVIS     REPRODUCTIVE ORGANS: Unremarkable for patient's age  URINARY BLADDER: Unremarkable  ABDOMINAL WALL/INGUINAL REGIONS: Unremarkable  OSSEOUS STRUCTURES: No acute fracture or destructive osseous lesion  IMPRESSION:   1  5 9 x 5 6 cm hypodense lesion arising from the superior pole left kidney with a few internal septations compatible with a complicated cyst    2  Atrophic right kidney  3  Moderate size hiatal hernia         Workstation performed: [id number]     Signed by:   Rosio Diaz MD   11/11/16

## 2018-01-13 NOTE — PROCEDURES
Chief Complaint  Pt here to ear wax removal      Current Meds   1  Acetaminophen 500 MG Oral Capsule; 500 MG tablet take 1 tab every 6 hrs as needed; Last Rx:95Bbh3886 Ordered   2  Amitiza 24 MCG Oral Capsule; TAKE ONE TABLET AT 8 AM AND 8 PM;   Therapy: (Recorded:21Fzt5532) to Recorded   3  Bacitracin Zinc 500 UNIT/GM External Ointment; 5000/GM ointment apply topically to   affected area of nose twice daily as needed; Last Rx:71Ymk3361 Ordered   4  Centrum Oral Tablet; take 1 tablet by mouth AT 8 AM; Last Rx:63Hzk5534 Ordered   5  Claritin 10 MG Oral Capsule; take one 10 mg tablet at 10 am as needed  Requested for:   44Lbl6174; Last Rx:11Lko9283 Ordered   6  Clotrimazole 1 % External Cream; APPLY TO STOMA WHEN INFECTED 3 TIMES DAILY   AS NEEDED; Last Rx:97Dmz7111 Ordered   7  Debrox 6 5 % Otic Solution; 4 drops 1x day for 5 days both ears, then 4 drops to both   ears 2x weeks; Therapy: 48PBP0010 to (Last JI:75VMO7380)  Requested for: 57NXI6350 Ordered   8  Depo-Provera 150 MG/ML Intramuscular Suspension; INJECT EVERY 12 WEEKS AS   DIRECTED; Therapy: (Recorded:76Feh0400) to Recorded   9  Famotidine 20 MG Oral Tablet; TAKE ONE TAB PO TWICE DAILY AT 7AM AND 4PM;   Therapy: 97UWG1789 to (Evaluate:27Apr2017)  Requested for: 29Pcz9644; Last   Rx:60Yoa3837 Ordered   10  Fluticasone Propionate 50 MCG/ACT Nasal Suspension; USE 2 SPRAYS IN EACH    NOSTRIL TWICE DAILY; Therapy: 32FEI1394 to (Evaluate:06Jan2017)  Requested for: 21DNF6649; Last    Rx:04Jan2017 Ordered   11  Listerine Mouth/Throat Liquid; use mouth wash 2x day as needed at 8 am and 8 pm;    Therapy: 44BFH4270 to (Last Rx:03Nov2016)  Requested for: 66JHJ4451 Ordered   12  Lubricant Eye Drops 0 4-0 3 % Ophthalmic Solution; INSTILL 1 DROP INTO Each eye at    8 am, 4pm and 8 pm  Requested for: 22AMN5590; Last Rx:14Nov2016 Ordered   13  Midol 200 MG Oral Capsule; TAKE 1 CAPSULE EVERY 4 TO 6 HOURS AS NEEDED; Last    Rx:87Zba8401 Ordered   14   Milk of Magnesia 1200 MG/15ML Oral Suspension; TAKE 2 TBSP DAILY AS NEEDED IF    NO BM IN 3 DAYS; Last Rx:02Toy2560 Ordered   15  RABEprazole Sodium 20 MG Oral Tablet Delayed Release; TAKE 1 20 mg tablet at 7 am;    Therapy: (Recorded:42Jfx6140) to Recorded   16  Sertraline HCl - 50 MG Oral Tablet; take one 50 MG tablet LTC at 8 am; Last    Rx:21Ncw2528 Ordered   17  Vitamin D3 1000 UNIT Oral Capsule; TAKE 2 TABLETS 1000MG DAILY AT 8 AM; Last    Rx:48Aks7066 Ordered    Allergies    1  No Known Drug Allergies    2  No Known Environmental Allergies    Vitals  Signs    Temperature: 97 2 F  Heart Rate: 88  Respiration: 16  Systolic: 562  Diastolic: 70  Height: 4 ft 10 in  Weight: 150 lb 4 oz  BMI Calculated: 31 4  BSA Calculated: 1 61  Pain Scale: 0    Procedure    Procedure: cerumen removal    Indication: tympanic membrane(s) could not be visualized in both ears  Prep: hydrogen peroxide was placed in the canal prior to the procedure  Procedure Note: The procedure was performed by the Provider  A otoscope was placed in the ear canal(s) to visualize the ear canal debris  The ear was cleaned by using warm water irrigation  The procedure was successful  Post-Procedure:   Patient Status: the patient tolerated the procedure well  Complications: there were no complications  Patient instructions: dry ear precautions and avoid using q-tips  Follow-up as needed  Plan  Impacted cerumen of both ears    · Debrox 6 5 % Otic Solution; 4 drops 1x day for 5 days both ears, then 4 drops to  both ears 2x weeks    Discussion/Summary    Ear wax removed  Debrox 2 x week for profilaxis  Possible side effects of new medications were reviewed with the patient/guardian today           Self Referrals: No      Future Appointments    Date/Time Provider Specialty Site   01/18/2017 02:50 PM Melissa Chavarria, [address]   01/19/2017 02:45 PM Specialty Clinic, Gastroenterology  [facility] SPECIALTY CLINI   02/03/2017 01:00 PM WILLIAM [address]   05/10/2017 01:00 PM WILLIAM [address]     Attending Note  Cerumen removal: procedure done independently by resident;  Patient tolerated well       Signatures   Electronically signed by : WILLIAM Hercules ; Jan 11 2017  3:03PM EST                       (Author)    Electronically signed by : WILLIAM Horn ; Jan 16 2017  3:35PM EST                       (Author)

## 2018-01-14 VITALS
HEIGHT: 58 IN | WEIGHT: 153.38 LBS | SYSTOLIC BLOOD PRESSURE: 120 MMHG | DIASTOLIC BLOOD PRESSURE: 66 MMHG | TEMPERATURE: 99.3 F | BODY MASS INDEX: 32.19 KG/M2 | HEART RATE: 78 BPM | RESPIRATION RATE: 16 BRPM

## 2018-01-14 VITALS
WEIGHT: 158.38 LBS | DIASTOLIC BLOOD PRESSURE: 64 MMHG | SYSTOLIC BLOOD PRESSURE: 122 MMHG | HEART RATE: 72 BPM | HEIGHT: 58 IN | TEMPERATURE: 97.4 F | RESPIRATION RATE: 16 BRPM | BODY MASS INDEX: 33.25 KG/M2

## 2018-01-14 VITALS
TEMPERATURE: 98.4 F | DIASTOLIC BLOOD PRESSURE: 76 MMHG | HEIGHT: 58 IN | BODY MASS INDEX: 32.32 KG/M2 | HEART RATE: 72 BPM | WEIGHT: 154 LBS | SYSTOLIC BLOOD PRESSURE: 118 MMHG | RESPIRATION RATE: 16 BRPM

## 2018-01-14 VITALS
HEART RATE: 88 BPM | WEIGHT: 156.5 LBS | BODY MASS INDEX: 32.85 KG/M2 | SYSTOLIC BLOOD PRESSURE: 116 MMHG | DIASTOLIC BLOOD PRESSURE: 70 MMHG | TEMPERATURE: 97.9 F | RESPIRATION RATE: 16 BRPM | HEIGHT: 58 IN

## 2018-01-14 NOTE — RESULT NOTES
Message    Patient is at group home  Barium swallow showed small hiatal hernia with acid reflux  No evidence of any esophageal lesions  Patient to continue AcipHex and Pepcid as she is on    Left message on caregivers voicemail to call back  CS    Spoke with caregiver Judy Morgan, discussed results  She requested that we fax results to her attention at [phone]  CS             Verified Results  [facility] BARIUM SWALLOW [id number] 10:20AM Mack De Anda Order Number: [id number]    - Patient Instructions: To schedule this appointment, please contact Central Scheduling at [phone]  Test Name Result Flag Reference   FL ESOPHAGRAM COMPLETE (Report)     BARIUM SWALLOW-ESOPHAGRAM     INDICATION: Complaints of difficulty swallowing  COMPARISON: None     IMAGES: 43     FLUOROSCOPY TIME:  2 35mft       TECHNIQUE:   The patient was given effervescent granules and barium by mouth and images of the esophagus were obtained  FINDINGS:     The esophagus is normal in caliber  Esophageal motility is normal and emptying of contrast from the esophagus is prompt  No mucosal lesion, ulceration or evidence of fold thickening is seen  Gastroesophageal reflux was multiple episodes of spontaneous reflux to the upper 3rd of the esophagus in the supine position  Small persistent hiatal hernia throughout  IMPRESSION:     Small hiatal hernia  Gastroesophageal reflux while in the supine position         Workstation performed: [id number]     Signed by:   Ramy De Leon MD   1/27/17

## 2018-01-15 VITALS
HEART RATE: 74 BPM | TEMPERATURE: 98.3 F | HEIGHT: 58 IN | RESPIRATION RATE: 18 BRPM | BODY MASS INDEX: 31.7 KG/M2 | SYSTOLIC BLOOD PRESSURE: 128 MMHG | DIASTOLIC BLOOD PRESSURE: 70 MMHG | WEIGHT: 151 LBS

## 2018-01-15 NOTE — RESULT NOTES
Verified Results  VAS LOWER LIMB VENOUS DUPLEX STUDY, UNILATERAL/LIMITED [id number] 02:51PM Antoinette Khan    Order Number: [id number]   Performing Comments: Acute onset right leg pain, elevated D-dimer, r/o lower ext DVT   - Patient Instructions: To schedule this appointment, please contact Central Scheduling at [phone]  Test Name Result Flag Reference   VAS LOWER LIMB VENOUS DUPLEX STUDY, UNILATERAL/LIMITED (Report)     [facility] REPORT   CLINICAL:   Indications: Right Limb Pain [M79 609]  Patient states the right leg is swollen   and painful for several days  She has an elevated D-dimer  Risk Factors: The patient has no history of DVT or limb trauma  Clinical:Right Lower Limb   There is complaint of pain and edema  FINDINGS:      Segment Right      Left          Impression    Impression       CFV   Normal (Patent) Normal (Patent)             CONCLUSION:      Impression:   RIGHT LOWER LIMB: NORMAL   No evidence of acute or chronic deep vein thrombosis   No evidence of superficial thrombophlebitis noted  Doppler evaluation shows a normal response to augmentation maneuvers  Popliteal, posterior tibial and anterior tibial arterial Doppler waveforms are   triphasic  LEFT LOWER LIMB LIMITED: NORMAL   Evaluation shows no evidence of thrombus in the common femoral vein  Doppler evaluation shows a normal response to augmentation maneuvers        SIGNATURE:   Electronically Signed by: Khadar Marcano on 2017-07-28 07:46:12 PM

## 2018-01-15 NOTE — MISCELLANEOUS
Message  Pt reports she gets dizzy whether she is standing, sitting, or lying down  Reviewed symptom with Bennie Wells who just saw the pt with studies on 10/6  Suggested the pt contact her PCP to review this symptom, and manage  she stated an understanding        Signatures   Electronically signed by : Jessica Polo, ; Oct 17 2017  2:06PM EST                       (Author)

## 2018-01-15 NOTE — PROGRESS NOTES
Assessment    1  Encounter for preventive health examination (V70 0) (Z00 00)   2  Impacted cerumen of both ears (380 4) (H61 23)   3  Screening for cervical cancer (V76 2) (Z12 4)    Plan   Impacted cerumen of both ears, Screening for cervical cancer    · Obstetrics/Gynecology Follow Up Evaluation and Treatment  Follow-up  Status: Hold For -  Scheduling  Requested for: 64YQF8726  Viral upper respiratory infection    · Fluticasone Propionate 50 MCG/ACT Nasal Suspension; USE 2 SPRAYS IN  EACH NOSTRIL TWICE DAILY    Debrox 6 5 % Otic Solution; 4 drops 1x day for 5 days both ears, then 4 drops to both ears 2x weeks; Therapy: 45GLA0734 to (Last EW:02DHB5496)  Requested for: 37FMZ1968; Status: ACTIVE Ordered  Rx By: Jeffry Franco; Dispense: 0 Days ; #:1 X 15 ML Bottle; Refill: 0;   For: Impacted cerumen of both ears; DONATO = N; [facility]; Msg to Pharmacy: at 8 am; Last Updated By: [email or web address]; 1/11/2017 3:00:25 PM     Discussion/Summary  health maintenance visit pap smear done in may, managed by ob/gyn     40 yo F w/congenital MCI w/gait dysfunction came to [place] [address] office for follow up of CT scan: 1  Pt has cerebral palsy  2  Ear wax : debrox ear drops, f/u in 3 weeks  3  Pt at moderate risk of falling: using walker  Possible side effects of new medications were reviewed with the patient/guardian today  The patient, patient's caretaker was counseled regarding instructions for management, risk factor reductions  Self Referrals: No      Chief Complaint  Pt here for annual visit      History of Present Illness  HM, Adult Female: The patient is being seen for a health maintenance evaluation  General Health: Immunizations status: up to date  Lifestyle:  She does not exercise regularly  She does not use tobacco  She denies alcohol use  She denies drug use     Screening:   HPI: 40 yo F w/congenital MCI w/gait dysfunction came to [place] [address] office for annual health maintaince  Using walker  No complains  Review of Systems    Constitutional: no fever, not feeling poorly and no chills  Cardiovascular: the heart rate was not slow and no palpitations  Respiratory: no shortness of breath, no cough and no shortness of breath during exertion  Gastrointestinal: no abdominal pain  Musculoskeletal: no arthralgias  Integumentary: no rashes  ROS reviewed  Active Problems    1  Alkaline phosphatase elevation (790 5) (R74 8)   2  At moderate risk for fall (V15 88) (Z91 81)   3  Behavior disturbance (312 9)   4  Cerebral palsy (343 9) (G80 9)   5  Chronic knee pain (254 57,794 47) (M25 569,G89 29)   6  Constipation (564 00) (K59 00)   7  Contusion of hand, left (923 20) (S60 222A)   8  Contusion of lower extremity, unspecified laterality, initial encounter (924 5) (S80 10XA)   9  Depression (311) (F32 9)   10  Dysmenorrhea (625 3) (N94 6)   11  Dysphagia (787 20) (R13 10)   12  Flu vaccine need (V04 81) (Z23)   13  GERD without esophagitis (530 81) (K21 9)   14  Head injury, closed (959 01) (S09 90XA)   15  Hearing impairment (389 9) (H91 90)   16  Hospital discharge follow-up (V67 59) (Z09)   17  Left renal mass (593 9) (N28 89)   18  Muscular chest pain (786 59) (R07 89)   19  Need for diphtheria-tetanus-pertussis (Tdap) vaccine, adult/adolescent (V06 1) (Z23)   20  Need for immunization against influenza (V04 81) (Z23)   21  Postprandial RUQ pain (789 01) (R10 11)   22  Seasonal allergies (477 9) (J30 2)   23  Skin irritation (709 9) (R23 8)   24  Viral upper respiratory infection (465 9) (J06 9,B97 89)    Past Medical History    · History of Chronically dry eyes, left (375 15) (H04 129)   · History of cerebral palsy (V12 49) (Z86 69)   · History of constipation (V12 79) (Z87 19)   · History of depression (V11 8) (Z86 59)   · History of esophageal reflux (V12 79) (Z87 19)    The active problems and past medical history were reviewed and updated today  Surgical History    · History of Creation Of Ventriculo-Peritoneal CSF Shunt   · History of Ear Surgery   · History of Nose Surgery    The surgical history was reviewed and updated today  Family History  Mother    · Family history of diabetes mellitus (V18 0) (Z83 3)  Father    · No pertinent family history    The family history was reviewed and updated today  Social History    · Always uses seat belt   · Never a smoker   · No alcohol use   · No drug use  The social history was reviewed and updated today  Current Meds   1  Acetaminophen 500 MG Oral Capsule; 500 MG tablet take 1 tab every 6 hrs as needed; Last Rx:37Ike2718 Ordered   2  Amitiza 24 MCG Oral Capsule; TAKE ONE TABLET AT 8 AM AND 8 PM;   Therapy: (Recorded:95Ugs1683) to Recorded   3  Bacitracin Zinc 500 UNIT/GM External Ointment; 5000/GM ointment apply topically to   affected area of nose twice daily as needed; Last Rx:44Krp3856 Ordered   4  Centrum Oral Tablet; take 1 tablet by mouth AT 8 AM; Last Rx:70Ocf7248 Ordered   5  Claritin 10 MG Oral Capsule; take one 10 mg tablet at 10 am as needed  Requested for:   14Agj8582; Last Rx:36Kui6163 Ordered   6  Clotrimazole 1 % External Cream; APPLY TO STOMA WHEN INFECTED 3 TIMES DAILY   AS NEEDED; Last Rx:67Shi7927 Ordered   7  Depo-Provera 150 MG/ML Intramuscular Suspension; INJECT EVERY 12 WEEKS AS   DIRECTED; Therapy: (Recorded:42Ssg4410) to Recorded   8  Famotidine 20 MG Oral Tablet; TAKE ONE TAB PO TWICE DAILY AT 7AM AND 4PM;   Therapy: 95UON0632 to (Evaluate:27Apr2017)  Requested for: 81Kdc3467; Last   Rx:16Hdx9183 Ordered   9  Fluticasone Propionate 50 MCG/ACT Nasal Suspension; USE 2 SPRAYS IN EACH   NOSTRIL ONCE DAILY; Therapy: 91JSP9097 to (Last Rx:04Rjs9554)  Requested for: 37KJC7592 Ordered   10  Listerine Mouth/Throat Liquid; use mouth wash 2x day as needed at 8 am and 8 pm;    Therapy: 72IAU8226 to (Last Rx:03Nov2016)  Requested for: 34HJV5789 Ordered   11   Lubricant Eye Drops 0 4-0 3 % Ophthalmic Solution; INSTILL 1 DROP INTO Each eye at    8 am, 4pm and 8 pm  Requested for: 93DTU7383; Last Rx:12Oxv0215 Ordered   12  Midol 200 MG Oral Capsule; TAKE 1 CAPSULE EVERY 4 TO 6 HOURS AS NEEDED;    Last Rx:21Tif9489 Ordered   13  Milk of Magnesia 1200 MG/15ML Oral Suspension; TAKE 2 TBSP DAILY AS NEEDED IF    NO BM IN 3 DAYS; Last Rx:78Xfs4478 Ordered   14  RABEprazole Sodium 20 MG Oral Tablet Delayed Release; TAKE 1 20 mg tablet at 7    am;    Therapy: (Recorded:03Oct2016) to Recorded   15  Sertraline HCl - 50 MG Oral Tablet; take one 50 MG tablet LTC at 8 am; Last    Rx:43Lpl9507 Ordered   16  Vitamin D3 1000 UNIT Oral Capsule; TAKE 2 TABLETS 1000MG DAILY AT 8 AM; Last    Rx:71Srt1648 Ordered    Allergies    1  No Known Drug Allergies    2  No Known Environmental Allergies    Vitals   Recorded: 23AOL7055 04:24PM   Temperature 98 5 F   Heart Rate 78   Respiration 16   Systolic 619   Diastolic 76   Height 4 ft 10 in   Weight 157 lb    BMI Calculated 32 81   BSA Calculated 1 64   Pain Scale 0     Physical Exam    Constitutional   General appearance: Abnormal     Eyes   Conjunctiva and lids: No swelling, erythema or discharge  Pulmonary   Respiratory effort: No increased work of breathing or signs of respiratory distress  Cardiovascular   Auscultation of heart: Normal rate and rhythm, normal S1 and S2, no murmurs  Abdomen   Abdomen: Non-tender, no masses  Skin   Skin and subcutaneous tissue: Normal without rashes or lesions  Ears, Nose, Mouth, and Throat   External inspection of ears and nose: Normal     Oropharynx: Normal with no erythema, edema, exudate or lesions  Pulmonary   Respiratory effort: No increased work of breathing or signs of respiratory distress  Abdomen   Abdomen: Non-tender, no masses  Musculoskeletal   Gait and station: Abnormal   using walker  Digits and nails: Normal without clubbing or cyanosis      Inspection/palpation of joints, bones, and muscles: Normal     Skin   Skin and subcutaneous tissue: Normal without rashes or lesions  Psychiatric   Mood and affect: Normal          Attending Note  Attending Note: Attending Note: I discussed the case with the Resident and reviewed the Resident's note, I supervised the Resident and I agree with the Resident management plan as it was presented to me  Level of Participation: I was present in clinic, but did not examine the patient  I agree with the Resident's note  Future Appointments    Date/Time Provider Specialty Site   01/19/2017 02:45 PM Specialty Clinic, Gastroenterology  [facility] SPECIALTY CLINI   02/03/2017 01:00 PM WILLIAM Short  [address]   05/10/2017 01:00 PM WILLIAM Short   [address]     Signatures   Electronically signed by : WILLIAM Frye ; Jan 4 2017  4:52PM EST                       (Author)    Electronically signed by : Bradley Sommer DO; Jan 12 2017  3:04PM EST                       (Author)

## 2018-01-17 NOTE — CONSULTS
Chief Complaint  This is a 43 yo F for pre-op clearance for endoscopy  History of Present Illness  Pre-Op Visit (Brief): The patient is being seen for a preoperative visit  Surgical Risk Assessment:   Prior Anesthesia: She had prior anesthesia and no prior adverse reaction to general anesthesia  Pertinent Past Medical History: no pertinent past medical history and Behavior issues  Exercise Capacity: able to walk four blocks without symptoms and able to walk two flights of stairs without symptoms  Lifestyle Factors: denies alcohol use and denies tobacco use  Symptoms: no symptoms, no easy bleeding, no easy bruising, no frequent nosebleeds, no chest pain, no cough, no dyspnea, no edema, no palpitations and no wheezing  Pertinent Family History: no pertinent family history  Living Situation: group home resident  HPI: This is a 43 yo F for pre-op clearance for endoscopy  The endoscopy is scheduled for 11/17  She previously had anesthesia this year without issue  Review of Systems    Constitutional: No fever, no chills, feels well, no tiredness, no recent weight gain or weight loss  Eyes: No complaints of eye pain, no red eyes, no eyesight problems, no discharge, no dry eyes, no itching of eyes  ENT: no complaints of earache, no loss of hearing, no nose bleeds, no nasal discharge, no sore throat, no hoarseness  Cardiovascular: No complaints of slow heart rate, no fast heart rate, no chest pain, no palpitations, no leg claudication, no lower extremity edema  Respiratory: No complaints of shortness of breath, no wheezing, no cough, no SOB on exertion, no orthopnea, no PND  Gastrointestinal: No complaints of abdominal pain, no constipation, no nausea or vomiting, no diarrhea, no bloody stools  Genitourinary: No complaints of dysuria, no incontinence, no pelvic pain, no dysmenorrhea, no vaginal discharge or bleeding     Musculoskeletal: No complaints of arthralgias, no myalgias, no joint swelling or stiffness, no limb pain or swelling  Integumentary: No complaints of skin rash or lesions, no itching, no skin wounds, no breast pain or lump  Neurological: No complaints of headache, no confusion, no convulsions, no numbness, no dizziness or fainting, no tingling, no limb weakness, no difficulty walking  Active Problems    1  Alkaline phosphatase elevation (790 5) (R74 8)   2  Anxiety (300 00) (F41 9)   3  At moderate risk for fall (V15 88) (Z91 81)   4  Brain lesion (348 89) (G93 9)   5  Cerebral palsy (343 9) (G80 9)   6  Chest pain (786 50) (R07 9)   7  Chronic knee pain (967 78,099 10) (M25 569,G89 29)   8  Constipation (564 00) (K59 00)   9  Costochondritis (733 6) (M94 0)   10  Depression (311) (F32 9)   11  Dizziness (780 4) (R42)   12  Dry eyes (375 15) (H04 123)   13  Dysmenorrhea (625 3) (N94 6)   14  Dysphagia (787 20) (R13 10)   15  Esophageal reflux (530 81) (K21 9)   16  Fall (E888 9) (W19 XXXA)   17  Flu vaccine need (V04 81) (Z23)   18  GERD without esophagitis (530 81) (K21 9)   19  Head injury, closed (959 01) (S09 90XA)   20  Headache (784 0) (R51)   21  Hearing impairment (389 9) (H91 90)   22  Hiatal hernia (553 3) (K44 9)   23  Impacted cerumen of both ears (380 4) (H61 23)   24  Influenza vaccine needed (V04 81) (Z23)   25  Left renal mass (593 9) (N28 89)   26  Low back pain (724 2) (M54 5)   27  Medicare annual wellness visit, subsequent (V70 0) (Z00 00)   28  Moderate intellectual disability (318 0) (F71)   29  Muscle ache (729 1) (M79 1)   30  Need for influenza vaccination (V04 81) (Z23)   31  Nose dryness (478 19) (J34 89)   32  Oppositional defiant disorder (313 81) (F91 3)   33  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253 9) (E23 7)   34  Pituitary neoplasm (239 7) (D49 7)   35  Preprocedural examination (V72 84) (Z01 818)   36  Renal cyst (753 10) (N28 1)   37  Right leg pain (729 5) (M79 604)   38  Right leg swelling (729 81) (M79 89)   39  Seasonal allergies (477 9) (J30 2)   40  Vaginal yeast infection (112 1) (B37 3)   41  Varicose veins with pain (454 8) (I83 819)    Past Medical History    · History of Acute left otitis media (382 9) (H66 92)   · History of Chronically dry eyes, left (375 15) (H04 122)   · History of Contusion of hand, left (923 20) (M67 717W)   · History of Contusion of lower extremity, unspecified laterality, initial encounter (924 5)  (S80 10XA)   · History of acute bronchitis (V12 69) (Z87 09)   · History of cerebral palsy (V12 49) (Z86 69)   · History of constipation (V12 79) (Z87 19)   · History of depression (V11 8) (Z86 59)   · History of esophageal reflux (V12 79) (Z87 19)   · History of Hospital discharge follow-up (V67 59) (Z09)   · History of Muscular chest pain (786 59) (R07 89)   · History of Need for diphtheria-tetanus-pertussis (Tdap) vaccine, adult/adolescent (V06 1)  (Z23)   · History of Need for immunization against influenza (V04 81) (Z23)   · History of Postprandial RUQ pain (789 01) (R10 11)   · History of Right-sided chest wall pain (786 52) (R07 89)   · History of Screening for cervical cancer (V76 2) (Z12 4)   · History of Skin irritation (709 9) (R23 8)   · History of Viral upper respiratory infection (465 9) (J06 9,B97 89)    The active problems and past medical history were reviewed and updated today  Surgical History    · History of Creation Of Ventriculo-Peritoneal CSF Shunt   · History of Ear Surgery   · History of Nose Surgery    The surgical history was reviewed and updated today  Family History    · Family history of diabetes mellitus (V18 0) (Z83 3)    · No pertinent family history    The family history was reviewed and updated today  Social History    · Always uses seat belt   · Lives in group home (V60 6) (Z59 3)   · Never a smoker   · No alcohol use   · No drug use  The social history was reviewed and is unchanged  Current Meds   1  Abilify 5 MG Oral Tablet; TAKE 1 TABLET DAILY;    Therapy: (Recorded:06Oct2017) to Recorded   2  Aciphex 20 MG Oral Tablet Delayed Release; TAKE 1 TABLET DAILY; Therapy: (Recorded:17Mar2017) to Recorded   3  Amitiza 24 MCG Oral Capsule; TAKE 1 CAPSULE BY MOUTH TWICE DAILY AT 8A-8P   (CONSTIPATION) *PADRON;   Therapy: 38GNZ8087 to (Last Rx:17Uug3647)  Requested for: 98OAU5121 Ordered   4  Bacitracin 500 UNIT/GM Ophthalmic Ointment; apply to affected area of nose prn; Therapy: (Recorded:82Kby9372) to Recorded   5  Carafate 1 GM/10ML Oral Suspension; TAKE 10 ML EVERY 6 HOURS AS NEEDED FOR   GASTRIC DISCOMFORT  Requested for: 54WGS1597; Last Rx:02Nov2017 Ordered   6  Centrum Oral Tablet; take 1 tablet by mouth AT 8 AM  Requested for: 23Pak3773; Last   Rx:07Apr2017 Ordered   7  Claritin 10 MG Oral Capsule; 10 mg @ 8AM;   Therapy: (Recorded:05Bbx7260) to Recorded   8  Clotrimazole 1 % External Cream; APPLY TO STOMA WHEN INFECTED 3 TIMES DAILY   AS NEEDED; Last Rx:71Fgs8099 Ordered   9  Escitalopram Oxalate 10 MG Oral Tablet; TAKE ONE TABLET AT 8AM;   Therapy: (Recorded:48Dww2756) to Recorded   10  Famotidine 20 MG Oral Tablet; TAKE ONE TAB PO TWICE DAILY AT 7AM AND 4PM;    Therapy: 03JZX7376 to (AKDFHQQZ:79HQL6911)  Requested for: 36FKI5166; Last    VH:02MAW2712 Ordered   11  Flulaval Quadrivalent 0 5 ML Intramuscular Suspension Prefilled Syringe; inject 0 5 mL    into deltiod muscle; Therapy: 83DCY0364 to (Last Rx:27Oct2017)  Requested for: 27Oct2017 Ordered   12  Fluticasone Propionate 50 MCG/ACT Nasal Suspension; Use 2 sprays in each nostril    once daily in the morning; Therapy: 55LHD3466 to (Last Rx:27Oct2017)  Requested for: 27Oct2017 Ordered   13  Listerine Mouth/Throat Liquid; use mouth wash 2x day as needed at 8 am and 8 pm;    Therapy: 12QKP9531 to (Last Rx:03Nov2016)  Requested for: 96BFF1571 Ordered   14  Maalox Multi Symptom Max St 400-400-40 MG/5ML Oral Suspension; TAKE 15 ML     BETWEEN MEALS AND AT BEDTIME;     Therapy: 59AXI9497 to (Last Rx:02Nov2017)  Requested for: 72ZYQ5410 Ordered   15  Metoclopramide HCl - 10 MG Oral Tablet; TAKE 1 TABLET Every 8 hours TAKE AS    NEEDED FOR ABDOMINAL PAIN  Requested for: 54TRJ0341; Last Rx:07Jun2017    Ordered   16  Midol 200 MG CAPS; TAKE 1 CAPSULE EVERY 4 TO 6 HOURS AS NEEDED; Last    Rx:08Lox5624 Ordered   17  Milk of Magnesia 1200 MG/15ML Oral Suspension; TAKE 2 TBSP DAILY AS NEEDED IF    NO BM IN 3 DAYS; Last Rx:81Rtd1233 Ordered   18  MiraLax Oral Packet; TAKE 30 PACKET Daily PRN please take i packet whrn constipated; Therapy: 20CSZ3246 to (Evaluate:65Tsk0528)  Requested for: 21Jun2017; Last    HH:91DXS9365 Ordered   19  QC Antacid Extra Strength 750 MG Oral Tablet Chewable; three times daily PRN for    stomach pain; Therapy: 21KXT5387 to (Last Rx:02Nov2017)  Requested for: 97TGA2228 Ordered   20  RABEprazole Sodium 20 MG Oral Tablet Delayed Release; TAKE 1 TABLET BY MOUTH    DAILY AT 7AM (GERD); Therapy: 03MEN2055 to (Last Rx:26Oct2017)  Requested for: 26Oct2017 Ordered   21  Senna S 8 6-50 MG Oral Tablet; TAKE 1 TABLET BY MOUTH ONCE DAILY AT 8AM;    Therapy: 23Rzf0832 to (Last Rx:04Oct2017)  Requested for: 04Oct2017 Ordered   22  Sport Sunscreen SPF50 External Lotion; apple 15 min before sun exposure and reapply    every 2 hours; Therapy: (Recorded:94Amb4713) to Recorded   23  Sprintec 28 TABS; TAKE 1 TABLET DAILY; Therapy: (Recorded:05Vls3955) to Recorded   24  Tylenol Extra Strength 500 MG Oral Tablet; TAKE 1 TABLET Every 6 hours PRN headache    backache muscle ache; Therapy: (Recorded:29Syw8144) to Recorded   25  Vitamin D3 1000 UNIT Oral Capsule; TAKE 2 TABLETS 1000MG DAILY AT 8 AM     Requested for: 80Efw7801; Last Rx:19Iiq7723 Ordered    The medication list was reviewed and updated today  Allergies    1  No Known Drug Allergies    2   Seasonal    Vitals  Signs    Temperature: 97 3 F  Heart Rate: 72  Respiration: 16  Systolic: 793  Diastolic: 64  Height: 4 ft 10 in  Weight: 164 lb 6 oz  BMI Calculated: 34 35  BSA Calculated: 1 68  Pain Scale: 0    Physical Exam    Constitutional   General appearance: No acute distress, well appearing and well nourished  Head and Face   Head and face: Normal     Eyes   Conjunctiva and lids: No swelling, erythema or discharge  Pupils and irises: Equal, round, reactive to light  inward turning of right eye  Ears, Nose, Mouth, and Throat   External inspection of ears and nose: Normal     Nasal mucosa, septum, and turbinates: Normal without edema or erythema  Oropharynx: Normal with no erythema, edema, exudate or lesions  Neck   Neck: Supple, symmetric, trachea midline, no masses  Thyroid: Normal, no thyromegaly  Pulmonary   Respiratory effort: No increased work of breathing or signs of respiratory distress  Auscultation of lungs: Clear to auscultation  Cardiovascular   Auscultation of heart: Normal rate and rhythm, normal S1 and S2, no murmurs  Peripheral vascular exam: Normal     Abdomen   Abdomen: Non-tender, no masses  Liver and spleen: No hepatomegaly or splenomegaly  Musculoskeletal   Gait and station: Normal     Muscle strength/tone: Normal     Skin   Skin and subcutaneous tissue: Normal without rashes or lesions  Psychiatric   Mood and affect: Normal        Assessment    1  GERD without esophagitis (530 81) (K21 9)   2  Preoperative examination (V72 84) (F34 379)    Discussion/Summary  Surgical Clearance: She is at a LOW risk from a cardiovascular standpoint at this time without any additional cardiac testing  Reevaluation needed, if she should present with symptoms prior to surgery/procedure  Comments:  Risk for temporary behavior issues post-anethesia, at risk for delerium post procedure  1  GERD  - Patient has GERD that is managed by GI who has recommended an EGD be preformed on 11/17   2   Pre-op exam   - patient is low risk for low risk procedure, can proceed with EGD without further testing   - patient MACE score is 0, no changes to medications pre-op  - follow up in 2 months  End of Encounter Meds    1  MiraLax Oral Packet (Polyethylene Glycol 3350); TAKE 30 PACKET Daily PRN please take   i packet whrn constipated; Therapy: 70GHN7078 to (Evaluate:10Stj6568)  Requested for: 21Jun2017; Last   AZ:00ODS5112 Ordered    2  Amitiza 24 MCG Oral Capsule; TAKE 1 CAPSULE BY MOUTH TWICE DAILY AT 8A-8P   (CONSTIPATION) *PADRON;   Therapy: 24OTF6287 to (Last Rx:26Sep2017)  Requested for: 55BOO6148 Ordered   3  Milk of Magnesia 1200 MG/15ML Oral Suspension; TAKE 2 TBSP DAILY AS NEEDED IF   NO BM IN 3 DAYS; Last Rx:74Wni8176 Ordered   4  Senna S 8 6-50 MG Oral Tablet; TAKE 1 TABLET BY MOUTH ONCE DAILY AT 8AM;   Therapy: 55Evz5907 to (Last Rx:04Oct2017)  Requested for: 65BLA3695 Ordered    5  Midol 200 MG CAPS; TAKE 1 CAPSULE EVERY 4 TO 6 HOURS AS NEEDED; Last   Rx:83Vyy4451 Ordered    6  Aciphex 20 MG Oral Tablet Delayed Release (RABEprazole Sodium); TAKE 1 TABLET   DAILY; Therapy: (Recorded:17Mar2017) to Recorded   7  Carafate 1 GM/10ML Oral Suspension; TAKE 10 ML EVERY 6 HOURS AS NEEDED FOR   GASTRIC DISCOMFORT  Requested for: 42LYP7607; Last Rx:02Nov2017 Ordered   8  Famotidine 20 MG Oral Tablet; TAKE ONE TAB PO TWICE DAILY AT 7AM AND 4PM;   Therapy: 21SWR5900 to (OORVNLRF:27FRL0176)  Requested for: 36KSK1880; Last   CQ:54LLN6353 Ordered   9  Maalox Multi Symptom Max St 400-400-40 MG/5ML Oral Suspension; TAKE 15 ML    BETWEEN MEALS AND AT BEDTIME; Therapy: 86DTE8622 to (Last Rx:02Nov2017)  Requested for: 00UYJ2641 Ordered   10  Metoclopramide HCl - 10 MG Oral Tablet; TAKE 1 TABLET Every 8 hours TAKE AS    NEEDED FOR ABDOMINAL PAIN  Requested for: 12VXZ1943; Last Rx:07Jun2017    Ordered   11  QC Antacid Extra Strength 750 MG Oral Tablet Chewable; three times daily PRN for    stomach pain; Therapy: 56JCF2540 to (Last Rx:02Nov2017)  Requested for: 29KPM4568 Ordered   12   RABEprazole Sodium 20 MG Oral Tablet Delayed Release; TAKE 1 TABLET BY MOUTH DAILY AT 7AM (GERD); Therapy: 79NVD6769 to (Last Rx:26Oct2017)  Requested for: 26Oct2017 Ordered    13  Tylenol Extra Strength 500 MG Oral Tablet; TAKE 1 TABLET Every 6 hours PRN headache    backache muscle ache; Therapy: (Recorded:76Nip7142) to Recorded    14  Centrum Oral Tablet; take 1 tablet by mouth AT 8 AM  Requested for: 14Lxd2496; Last    Rx:41Zra8963 Ordered   15  Listerine Mouth/Throat Liquid; use mouth wash 2x day as needed at 8 am and 8 pm;    Therapy: 00MGJ0449 to (Last Rx:94Fpv7481)  Requested for: 18XSE5132 Ordered   16  Vitamin D3 1000 UNIT Oral Capsule; TAKE 2 TABLETS 1000MG DAILY AT 8 AM     Requested for: 19Fkd2663; Last Rx:23Yww0416 Ordered    17  Flulaval Quadrivalent 0 5 ML Intramuscular Suspension Prefilled Syringe (Flulaval    Quadrivalent 0 5 ML Intramuscular Suspension Prefilled Syringe); inject 0 5 mL into    deltiod muscle; Therapy: 28BNG4563 to (Last Rx:27Oct2017)  Requested for: 27Oct2017 Ordered    18  Bacitracin 500 UNIT/GM Ophthalmic Ointment; apply to affected area of nose prn; Therapy: (Recorded:21Jfz9766) to Recorded    19  Clotrimazole 1 % External Cream; APPLY TO STOMA WHEN INFECTED 3 TIMES DAILY    AS NEEDED; Last Rx:06Jay2214 Ordered    20  Fluticasone Propionate 50 MCG/ACT Nasal Suspension; Use 2 sprays in each nostril    once daily in the morning; Therapy: 02SXN2050 to (Last Rx:27Oct2017)  Requested for: 27Oct2017 Ordered    21  Abilify 5 MG Oral Tablet (ARIPiprazole); TAKE 1 TABLET DAILY; Therapy: (Recorded:70Eso7212) to Recorded   22  Claritin 10 MG Oral Capsule; 10 mg @ 8AM;    Therapy: (Recorded:70Hgf0119) to Recorded   23  Escitalopram Oxalate 10 MG Oral Tablet; TAKE ONE TABLET AT 8AM;    Therapy: (Recorded:21Vvq5347) to Recorded   24  Sport Sunscreen SPF50 External Lotion; apple 15 min before sun exposure and reapply    every 2 hours; Therapy: (Recorded:37Exe4717) to Recorded   25  Sprintec 28 TABS; TAKE 1 TABLET DAILY;     Therapy: (Recorded:07Wcc1279) to Recorded    Signatures   Electronically signed by : Magen Cronin DO; Nov 10 2017 10:33AM EST                       (Author)    Electronically signed by : WILLIAM Ratliff ; Nov 10 2017 10:58AM EST                       (Author)

## 2018-01-17 NOTE — CONSULTS
Assessment    1  Brain lesion (348 89) (G93 9)   2  Pituitary neoplasm (239 7) (D49 7)    Plan   Brain lesion, Pituitary neoplasm    · * MRI BRAIN PITUITARY WO AND W CONTRAST; Status:Need Information - Financial  Authorization; Requested for:13Mar2017;    Perform:[facility]; Order Comments:Include Bravo sequence to assess for vascular abnormality; Due:13Mar2018; Ordered; For:Brain lesion, Pituitary neoplasm; Ordered By:Samir Starkey; Follow-up visit in 6 months Evaluation and Treatment  Follow-up, after MRI pituitary protocol, include Bravo sequence, With Jaky Penaloza  Status: Hold For - Scheduling  Requested for: [id number]  Ordered; For: Pituitary neoplasm;  Ordered By: Jonna Hill  Performed:   Due: 68HRS6846     Discussion/Summary    39-year-old female, presents with MRI brain 2/16/17, this study was carefully reviewed in detail by Dr Sandra Jones, a lesion overlying the pituitary gland is identified, it's unclear whether this is a vascular lesion or a solid lesion, in addition the optic chiasm is not clearly identified on this study, a posterior  shunt is also noted to be in place  Clinically the patient does not appear to be symptomatic of this lesion at this time, she has no complaint of vision changes or field changes and on examination her fields were grossly intact  MRI with pituitary protocol as well as bravo sequence is requested to further delineate this pituitary mass  This study is requested with a six-month interval to assess for any change in size this lesion as well as more clearly delineate the lesion  The patient and her attendant understand should there be any significant change in neurologic status in particularly change in her visual fields or change in her vision and return sooner for reassessment  The patient has the current Goals: Assessment pituitary lesion  Patient is able to Self-Care        Chief Complaint  Initial consultation, abnormal MRI, pituitary lesion  History of Present Illness  65-year-old female, accompanied by her caregiver, asa as noted above  She has history of fall and as a consequence underwent CAT scan and MRI which revealed incidental finding of lesion overlying the pituitary gland  She has history of CP, in addition she has significant weakness to the right side, and has history of  shunt with the valve on the left  She has has recent visit with her optometrist reports she received new glasses and reports her vision is very good with them  She denies any change in vision recently or in the past      Brain Evaluation:   Clinical Information Consult Request For Brain Evaluation   Referring Physician: NELSON VEGA MD   This is not a second opinion  This is not a workers compensation injury  The patient is currently working  The patient is not a previous patient of [facility]  She did not have prior surgery at [address]  Patient has complaints of: no headache, dizziness, no seizure was observed, nausea/ vomiting, change in hearing (uses hearing aids, she does not have then in today), no increased falls while walking, no pain, no weakness, no blurred vision, no double vision, no speech difficulties and no incontinence  Medications  Patient had the following Studies performed MRI was performed  When 02/16/2017  Where [facility]  Film/Report is available  [facility] Patients Only  Review of Systems    Constitutional: feeling tired  Eyes: No complaints of eye pain, no red eyes, no eyesight problems, no discharge, no dry eyes, no itching of eyes  ENT: no complaints of earache, no loss of hearing, no nose bleeds, no nasal discharge, no sore throat, no hoarseness  Cardiovascular: No complaints of slow heart rate, no fast heart rate, no chest pain, no palpitations, no leg claudication, no lower extremity edema  Respiratory: shortness of breath during exertion  Gastrointestinal: constipation  Genitourinary: No complaints of dysuria, no incontinence, no pelvic pain, no dysmenorrhea, no vaginal discharge or bleeding  Musculoskeletal: RIGHT FOOT DROP AND WEAKNESS ON THE RIGHT ARM  Integumentary: No complaints of skin rash or lesions, no itching, no skin wounds, no breast pain or lump  Neurological: confusion and PATIENT HAS CEREBRAL PALSY  Psychiatric: depression and PATIENT HAS CEREBRAL PALSY  Endocrine: PATIENT HAS CEREBRAL PALSY  Hematologic/Lymphatic: No complaints of swollen glands, no swollen glands in the neck, does not bleed easily, does not bruise easily  ROS reviewed  Active Problems    1  Alkaline phosphatase elevation (790 5) (R74 8)   2  Anxiety (300 00) (F41 9)   3  At moderate risk for fall (V15 88) (Z91 81)   4  Brain lesion (348 89) (G93 9)   5  Cerebral palsy (343 9) (G80 9)   6  Chronic knee pain (187 82,442 14) (M25 569,G89 29)   7  Constipation (564 00) (K59 00)   8  Depression (311) (F32 9)   9  Dry eyes (375 15) (H04 123)   10  Dysmenorrhea (625 3) (N94 6)   11  Dysphagia (787 20) (R13 10)   12  Fall (E888 9) (W19 XXXA)   13  Flu vaccine need (V04 81) (Z23)   14  GERD without esophagitis (530 81) (K21 9)   15  Head injury, closed (959 01) (S09 90XA)   16  Headache (784 0) (R51)   17  Hearing impairment (389 9) (H91 90)   18  Hiatal hernia (553 3) (K44 9)   19  Impacted cerumen of both ears (380 4) (H61 23)   20  Left renal mass (593 9) (N28 89)   21  Low back pain (724 2) (M54 5)   22  Medicare annual wellness visit, subsequent (V70 0) (Z00 00)   23  Moderate intellectual disability (318 0) (F71)   24  Muscle ache (729 1) (M79 1)   25  Nose dryness (478 19) (J34 89)   26  Oppositional defiant disorder (313 81) (F91 3)   27  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253 9) (E23 7)   28  Seasonal allergies (477 9) (J30 2)    Past Medical History    1  History of Acute left otitis media (382 9) (G14 39)   2   History of Chronically dry eyes, left (375 15) (H04 129)   3  History of Contusion of hand, left (923 20) (S60 222A)   4  History of Contusion of lower extremity, unspecified laterality, initial encounter (924 5)   (S80 10XA)   5  History of acute bronchitis (V12 69) (Z87 09)   6  History of cerebral palsy (V12 49) (Z86 69)   7  History of constipation (V12 79) (Z87 19)   8  History of depression (V11 8) (Z86 59)   9  History of esophageal reflux (V12 79) (Z87 19)   10  History of Hospital discharge follow-up (V67 59) (Z09)   11  History of Muscular chest pain (786 59) (R07 89)   12  History of Need for diphtheria-tetanus-pertussis (Tdap) vaccine, adult/adolescent (V06 1)    (Z23)   13  History of Need for immunization against influenza (V04 81) (Z23)   14  History of Postprandial RUQ pain (789 01) (R10 11)   15  History of Right-sided chest wall pain (786 52) (R07 89)   16  History of Screening for cervical cancer (V76 2) (Z12 4)   17  History of Skin irritation (709 9) (R23 8)   18  History of Viral upper respiratory infection (465 9) (J06 9,B97 89)    The active problems and past medical history were reviewed and updated today  Surgical History    1  History of Creation Of Ventriculo-Peritoneal CSF Shunt   2  History of Ear Surgery   3  History of Nose Surgery    The surgical history was reviewed and updated today  Family History  Mother    1  Family history of diabetes mellitus (V18 0) (Z83 3)  Father    2  No pertinent family history    The family history was reviewed and updated today  Social History    · Always uses seat belt   · Lives in group home (V60 6) (Z59 3)   · Never a smoker   · No alcohol use   · No drug use  The social history was reviewed and updated today  Current Meds   1  Amitiza 24 MCG Oral Capsule; TAKE ONE TABLET AT 8 AM AND 8 PM  Requested for:   38PCH6772; Last Rx:16Jan2017 Ordered   2  Bacitracin 500 UNIT/GM Ophthalmic Ointment; apply to affected area of nose prn;    Therapy: (Recorded:09Beh6391) to Recorded   3  Centrum Oral Tablet; take 1 tablet by mouth AT 8 AM; Last Rx:56Bdd5695 Ordered   4  Clotrimazole 1 % External Cream; APPLY TO STOMA WHEN INFECTED 3 TIMES DAILY   AS NEEDED; Last Rx:36Ayg1097 Ordered   5  Depo-Provera 150 MG/ML Intramuscular Suspension; INJECT EVERY 12 WEEKS AS   DIRECTED; Therapy: (Recorded:08Jku7238) to Recorded   6  Escitalopram Oxalate 10 MG Oral Tablet; TAKE ONE TABLET AT 8AM;   Therapy: (Recorded:63Djq5981) to Recorded   7  Famotidine 20 MG Oral Tablet; TAKE ONE TAB PO TWICE DAILY AT 7AM AND 4PM;   Therapy: 45XLK7078 to (Evaluate:27Apr2017)  Requested for: 74Bre4520; Last   Rx:28Zlg9594 Ordered   8  Fluticasone Propionate 50 MCG/ACT Nasal Suspension; USE 2 SPRAYS IN EACH   NOSTRIL TWICE DAILY; Therapy: 97KBM1043 to (Evaluate:52Vxq5732)  Requested for: 51SAG9234; Last   Rx:85Osy3337 Ordered   9  Listerine Mouth/Throat Liquid; use mouth wash 2x day as needed at 8 am and 8 pm;   Therapy: 95PNY6819 to (Last Rx:03Nov2016)  Requested for: 75GPR6551 Ordered   10  Metoclopramide HCl - 10 MG Oral Tablet; TAKE 1 TABLET Every 8 hours TAKE AS    NEEDED FOR ABDOMINAL PAIN  Requested for: 58KKL7315; Last Rx:71Kev4796    Ordered   11  Midol 200 MG Oral Capsule; TAKE 1 CAPSULE EVERY 4 TO 6 HOURS AS NEEDED; Last    Rx:97Mlk1859 Ordered   12  Milk of Magnesia 1200 MG/15ML Oral Suspension; TAKE 2 TBSP DAILY AS NEEDED IF    NO BM IN 3 DAYS; Last Rx:15Mwg8376 Ordered   13  Promethazine-Codeine 6 25-10 MG/5ML Oral Syrup; q 4 hours as needed for cough up to    10 days; Therapy: (Recorded:45Mfl4010) to Recorded   14  RABEprazole Sodium 20 MG Oral Tablet Delayed Release; TAKE 1 20 mg tablet at 7 am;    Therapy: (Recorded:03Oct2016) to Recorded   15  Sport Sunscreen SPF50 External Lotion; apple 15 min before sun exposure and reapply    every 2 hours; Therapy: (Recorded:81Pwd0712) to Recorded   16   Systane 0 4-0 3 % Ophthalmic Solution; 1 drop both eyes 8am 4pm 8pm;    Therapy: (Recorded:80Pth7886) to Recorded   17  Systane Ultra SOLN; INSTILL ONE DROP INTO THE LEFT EYE AS NEEDED; Therapy: (Recorded:19Zzf5820) to Recorded   18  Tylenol Extra Strength 500 MG Oral Tablet; TAKE 1 TABLET Every 6 hours PRN headache    backache muscle ache; Therapy: (Recorded:13Jwi0156) to Recorded   19  Vitamin D3 1000 UNIT Oral Capsule; TAKE 2 TABLETS 1000MG DAILY AT 8 AM     Requested for: 21AMK8679; Last Rx:09Mar2017 Ordered    The medication list was reviewed and updated today  Allergies    1  No Known Drug Allergies    2  No Known Environmental Allergies    Vitals  Vital Signs    Recorded: 67HJI2004 02:12PM   Temperature 98 7 F   Heart Rate 72   Respiration 16   Systolic 590   Diastolic 69   Height 4 ft 10 in   Weight 147 lb    BMI Calculated 30 72   BSA Calculated 1 6   Weight Unobtainable Yes     Physical Exam     Constitutional Patient appears healthy and well developed  No signs of acute distress present  comfortable, within normal limits of ideal weight and appearance reflects stated age  Respiratory Respiratory effort: Normal   Auscultation of lungs: Clear to auscultation bilaterally  Cardiovascular Auscultation of heart: Rate is regular  Rhythm is regular  No heart murmur appreciated  Neurologic - Mental Status: Alert and Oriented x3  Mood and affect: Affect is normal   Grossly nonfocal     Cranial Nerve Exam:  2nd cranial nerve: Visual field was full to confrontation  visual acuity and visual fields were normal  Visual acuity corrected: both eyes 20/  Visual Fields: peripheral vision was full to confrontation bilaterally  3rd, 4th, and 6th cranial nerve: Abnormal   (Disconjugate gaze, secondary to congenital amblyopia)   5th cranial nerve: Sensation was intact in all three divisions to light touch and temperature  Motor function was intact  7th cranial nerve: Face symmetrical at grimace and at rest  8th cranial nerve: Grossly intact to finger rub bilaterally    9th and 10th cranial nerves: Uvula is midline  11th cranial nerve: Shoulder shrug equal bilaterally  12th cranial nerve: Tongue mideline, no atrophy present  Motor System - Upper Extremities:   Muscle strength: Abnormal     Motor Strength:  the patient is left hand dominant  no pronator drift on the right  no pronator drift on the left  Strength examination: wrist strength was normal on the left side  Wrist flexion was 2/5 on the right side  Wrist extension was 2/5 on the right side  forearm strength was normal on the left side  Forearm pronation was 2/5 on the right side  Forearm supination was 2/5 on the right side  shoulder strength was normal on the left side  Shoulder flexion was 2/5 on the right side  Shoulder extension was 2/5 on the right side  Gait and Station:   Gait and station: Abnormal   Gait evaluation demonstrated spasticity on the right and limping on the right  Attending Note  Collaborating Note: I interviewed and examined the patient, I supervised the Advanced Practitioner and I agree with the Advanced Practitioner note  I discussed the case with the Advanced Practitioner and reviewed the AP note I agree with the Advanced Practitioner note except Imaging personally reviewed  Plan developed by me  FVF would not be feasible as patient could not reliably participate  Will order MRI with pituitary protocol to be done in 6 months, and will see the patient back at that time  Future Appointments    Date/Time Provider Specialty Site   09/15/2017 10:00 AM Dagmar Martinez, [facility]   09/15/2017 10:15 AM Jennifer Mathis MD PhD Neurosurgery [facility]   05/10/2017 01:00 PM WILLIAM Kidd   [address] [place] [address]     Signatures   Electronically signed by : Jennifer Miller, [facility]; Mar 13 2017  4:14PM EST                       (Author)    Electronically signed by : Lars Isaacs MD PhD; Mar 13 2017  4:20PM EST (Co-participant)

## 2018-01-17 NOTE — PROCEDURES
study  Summary:  Oral and pharyngeal stages were wnl this study  Mild reflux noted in upright position    Recommendations:  Diet:Regular  Liquids:thin  Meds:whole in applesauce, or as tolerated  Full Supervision  Results reviewed with: pt, caregiver                                    Received for:Provider  EPIC   Jul 21 2016  4:10PM [facility] Standard Time

## 2018-01-19 ENCOUNTER — ALLSCRIPTS OFFICE VISIT (OUTPATIENT)
Dept: OTHER | Facility: OTHER | Age: 38
End: 2018-01-19

## 2018-01-22 VITALS
TEMPERATURE: 98.6 F | RESPIRATION RATE: 12 BRPM | DIASTOLIC BLOOD PRESSURE: 62 MMHG | BODY MASS INDEX: 33.4 KG/M2 | SYSTOLIC BLOOD PRESSURE: 122 MMHG | HEART RATE: 68 BPM | WEIGHT: 159.13 LBS | HEIGHT: 58 IN

## 2018-01-22 VITALS
SYSTOLIC BLOOD PRESSURE: 120 MMHG | HEART RATE: 78 BPM | RESPIRATION RATE: 16 BRPM | DIASTOLIC BLOOD PRESSURE: 70 MMHG | WEIGHT: 157.5 LBS | BODY MASS INDEX: 33.06 KG/M2 | HEIGHT: 58 IN | TEMPERATURE: 97.7 F

## 2018-01-22 VITALS
TEMPERATURE: 97.3 F | WEIGHT: 164.38 LBS | RESPIRATION RATE: 16 BRPM | BODY MASS INDEX: 34.5 KG/M2 | HEIGHT: 58 IN | HEART RATE: 72 BPM | DIASTOLIC BLOOD PRESSURE: 64 MMHG | SYSTOLIC BLOOD PRESSURE: 112 MMHG

## 2018-01-22 VITALS
HEART RATE: 86 BPM | BODY MASS INDEX: 32.64 KG/M2 | RESPIRATION RATE: 16 BRPM | DIASTOLIC BLOOD PRESSURE: 57 MMHG | SYSTOLIC BLOOD PRESSURE: 103 MMHG | WEIGHT: 155.5 LBS | TEMPERATURE: 98.5 F | HEIGHT: 58 IN

## 2018-01-22 VITALS
RESPIRATION RATE: 16 BRPM | SYSTOLIC BLOOD PRESSURE: 140 MMHG | HEIGHT: 58 IN | BODY MASS INDEX: 33.58 KG/M2 | DIASTOLIC BLOOD PRESSURE: 80 MMHG | TEMPERATURE: 98.1 F | HEART RATE: 72 BPM | WEIGHT: 160 LBS

## 2018-01-22 VITALS
WEIGHT: 147 LBS | SYSTOLIC BLOOD PRESSURE: 119 MMHG | RESPIRATION RATE: 16 BRPM | BODY MASS INDEX: 30.86 KG/M2 | HEART RATE: 72 BPM | HEIGHT: 58 IN | TEMPERATURE: 98.7 F | DIASTOLIC BLOOD PRESSURE: 69 MMHG

## 2018-01-22 VITALS
HEIGHT: 58 IN | TEMPERATURE: 99 F | WEIGHT: 161.31 LBS | RESPIRATION RATE: 16 BRPM | HEART RATE: 88 BPM | SYSTOLIC BLOOD PRESSURE: 116 MMHG | DIASTOLIC BLOOD PRESSURE: 78 MMHG | BODY MASS INDEX: 33.86 KG/M2

## 2018-01-22 VITALS
HEIGHT: 58 IN | SYSTOLIC BLOOD PRESSURE: 136 MMHG | TEMPERATURE: 97.9 F | RESPIRATION RATE: 16 BRPM | HEART RATE: 80 BPM | WEIGHT: 151.13 LBS | DIASTOLIC BLOOD PRESSURE: 76 MMHG | BODY MASS INDEX: 31.72 KG/M2

## 2018-01-22 VITALS
BODY MASS INDEX: 31.54 KG/M2 | SYSTOLIC BLOOD PRESSURE: 112 MMHG | RESPIRATION RATE: 16 BRPM | TEMPERATURE: 97.2 F | HEART RATE: 88 BPM | WEIGHT: 150.25 LBS | DIASTOLIC BLOOD PRESSURE: 70 MMHG | HEIGHT: 58 IN

## 2018-01-22 VITALS
RESPIRATION RATE: 16 BRPM | HEART RATE: 76 BPM | HEIGHT: 58 IN | WEIGHT: 159 LBS | TEMPERATURE: 99.6 F | SYSTOLIC BLOOD PRESSURE: 120 MMHG | BODY MASS INDEX: 33.37 KG/M2 | DIASTOLIC BLOOD PRESSURE: 70 MMHG

## 2018-01-22 VITALS
TEMPERATURE: 98.5 F | DIASTOLIC BLOOD PRESSURE: 76 MMHG | SYSTOLIC BLOOD PRESSURE: 122 MMHG | RESPIRATION RATE: 16 BRPM | HEART RATE: 78 BPM | WEIGHT: 157 LBS | BODY MASS INDEX: 32.95 KG/M2 | HEIGHT: 58 IN

## 2018-01-23 NOTE — PROGRESS NOTES
Assessment   1  Pink eye disease of right eye (372 03) (H10 021)    Discussion/Summary      Pinkeye: resolved, Continue tobramycin for total of 10 days  no Follow-up needed  The patient, patient's caretaker was counseled regarding instructions for management,-- impressions,-- risks and benefits of treatment options  Self Referrals: No      Chief Complaint   Urgent care follow-up for pain,    Patient is here today for follow up of chronic conditions described in HPI  History of Present Illness   Pt is a 44year old with intellectual disability, CP here for ER follow-up for pinkeye  patient went to urgent care on the 14th of January with because of pink right eye with a drainage  patient was prescribed tobramycin  Her symptoms resolved  Review of Systems        Constitutional: no fever,-- not feeling poorly-- and-- no chills  Eyes: no eyesight problems,-- eyes not red,-- no purulent discharge from the eyes-- and-- no itching of the eyes  ROS reviewed  Active Problems   1  Alkaline phosphatase elevation (790 5) (R74 8)   2  Anxiety (300 00) (F41 9)   3  At moderate risk for fall (V15 88) (Z91 81)   4  Brain lesion (348 89) (G93 9)   5  Cerebral palsy (343 9) (G80 9)   6  Chest pain (786 50) (R07 9)   7  Chronic knee pain (225 47,920 43) (M25 569,G89 29)   8  Constipation (564 00) (K59 00)   9  Costochondritis (733 6) (M94 0)   10  Depression (311) (F32 9)   11  Dizziness (780 4) (R42)   12  Dry eyes (375 15) (H04 123)   13  Dysmenorrhea (625 3) (N94 6)   14  Dysphagia (787 20) (R13 10)   15  Esophageal reflux (530 81) (K21 9)   16  Fall (E888 9) (W19 XXXA)   17  Flu vaccine need (V04 81) (Z23)   18  GERD without esophagitis (530 81) (K21 9)   19  Head injury, closed (959 01) (S09 90XA)   20  Headache (784 0) (R51)   21  Hearing impairment (389 9) (H91 90)   22  Hiatal hernia (553 3) (K44 9)   23  Impacted cerumen of both ears (380 4) (M76 23)   24   Influenza vaccine needed (V04 81) (Z23)   25  Left renal mass (593 9) (N28 89)   26  Low back pain (724 2) (M54 5)   27  Medicare annual wellness visit, subsequent (V70 0) (Z00 00)   28  Moderate intellectual disability (318 0) (F71)   29  Muscle ache (729 1) (M79 1)   30  Need for influenza vaccination (V04 81) (Z23)   31  Nose dryness (478 19) (J34 89)   32  Onychomycosis (110 1) (B35 1)   33  Oppositional defiant disorder (313 81) (F91 3)   34  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253 9) (E23 7)   35  Pituitary neoplasm (239 7) (D49 7)   36  Preoperative examination (V72 84) (Z01 818)   37  Preprocedural examination (V72 84) (Z01 818)   38  Renal cyst (753 10) (N28 1)   39  Right leg pain (729 5) (M79 604)   40  Right leg swelling (729 81) (M79 89)   41  Seasonal allergies (477 9) (J30 2)   42  Skin lesion of left leg (709 9) (L98 9)   43  Vaginal yeast infection (112 1) (B37 3)   44  Varicose veins with pain (454 8) (I83 819)    Past Medical History   1  History of Acute left otitis media (382 9) (H66 92)   2  History of Chronically dry eyes, left (375 15) (H04 122)   3  History of Contusion of hand, left (923 20) (S60 222A)   4  History of Contusion of lower extremity, unspecified laterality, initial encounter (924 5)     (S80 10XA)   5  History of acute bronchitis (V12 69) (Z87 09)   6  History of cerebral palsy (V12 49) (Z86 69)   7  History of constipation (V12 79) (Z87 19)   8  History of depression (V11 8) (Z86 59)   9  History of esophageal reflux (V12 79) (Z87 19)   10  History of Hospital discharge follow-up (V67 59) (Z09)   11  History of Muscular chest pain (786 59) (R07 89)   12  History of Need for diphtheria-tetanus-pertussis (Tdap) vaccine, adult/adolescent (V06 1)      (Z23)   13  History of Need for immunization against influenza (V04 81) (Z23)   14  History of Postprandial RUQ pain (789 01) (R10 11)   15  History of Right-sided chest wall pain (786 52) (R07 89)   16  History of Screening for cervical cancer (V76 2) (Z12 4)   17  History of Skin irritation (709 9) (R23 8)   18  History of Viral upper respiratory infection (465 9) (J06 9,B97 89)     The active problems and past medical history were reviewed and updated today  Surgical History   1  History of Creation Of Ventriculo-Peritoneal CSF Shunt   2  History of Ear Surgery   3  History of Nose Surgery     The surgical history was reviewed and updated today  Family History   Mother    1  Family history of diabetes mellitus (V18 0) (Z83 3)  Father    2  No pertinent family history     The family history was reviewed and updated today  Social History    · Always uses seat belt   · Lives in group home (V60 6) (Z59 3)   · Never a smoker   · No alcohol use   · No drug use    Current Meds    1  Abilify 5 MG Oral Tablet; TAKE 1 TABLET DAILY; Therapy: (Recorded:12Qgu4721) to Recorded   2  Aciphex 20 MG Oral Tablet Delayed Release; TAKE 1 TABLET DAILY; Therapy: (Recorded:17Mar2017) to Recorded   3  Amitiza 24 MCG Oral Capsule; TAKE 1 CAPSULE BY MOUTH TWICE DAILY AT 8A-8P     (CONSTIPATION) *PADRON;     Therapy: 68VUW6281 to (Last Rx:34Goe9575)  Requested for: 20BMF4079 Ordered   4  Bacitracin 500 UNIT/GM Ophthalmic Ointment; apply to affected area of nose and leg prn      Requested for: 12Jej6033; Last Rx:58Ivb2149 Ordered   5  Carafate 1 GM/10ML Oral Suspension; TAKE 10 ML EVERY 6 HOURS AS NEEDED FOR     GASTRIC DISCOMFORT  Requested for: 64GOY5785; Last Rx:85Bwq2042 Ordered   6  Centrum Oral Tablet; take 1 tablet by mouth AT 8 AM  Requested for: 26Ptv1120; Last     Rx:29Ish2180 Ordered   7  Claritin 10 MG Oral Capsule; 10 mg @ 8AM;     Therapy: (Recorded:18Bqq8753) to Recorded   8  Clotrimazole 1 % External Cream; APPLY TO STOMA WHEN INFECTED 3 TIMES DAILY     AS NEEDED; Last Rx:14Qbb3394 Ordered   9  Escitalopram Oxalate 10 MG Oral Tablet; TAKE ONE TABLET AT 8AM;     Therapy: (Recorded:68Wfd5155) to Recorded   10   Famotidine 20 MG Oral Tablet; TAKE ONE TAB PO TWICE DAILY AT [facility] 32;      Therapy: 62DUR8826 to (FGAKWPEB:14PRP6456)  Requested for: 13TQF0794; Last      Rx:01Rgg6014 Ordered   11  Flulaval Quadrivalent 0 5 ML Intramuscular Suspension Prefilled Syringe; inject 0 5 mL      into deltiod muscle; Therapy: 47ZGQ4578 to (Last Rx:27Oct2017)  Requested for: 27Oct2017 Ordered   12  Fluticasone Propionate 50 MCG/ACT Nasal Suspension; Use one spray in each nostril      every morning; Therapy: 11EAG6767 to (Evaluate:92Jif3991)  Requested for: 96JKG2832; Last      Rx:79Nqo1726 Ordered   13  Listerine Mouth/Throat Liquid; use mouth wash 2x day as needed at 8 am and 8 pm;      Therapy: 73DVC2670 to (Last Rx:03Nov2016)  Requested for: 59JDL6299 Ordered   14  Maalox Multi Symptom Max St 400-400-40 MG/5ML Oral Suspension; TAKE 15 ML       BETWEEN MEALS AND AT BEDTIME; Therapy: 89MQT8763 to (Last Rx:02Nov2017)  Requested for: 33GNI4425 Ordered   15  Metoclopramide HCl - 10 MG Oral Tablet; TAKE 1 TABLET Every 8 hours TAKE AS      NEEDED FOR ABDOMINAL PAIN  Requested for: 33SNX8280; Last Rx:07Jun2017      Ordered   16  Midol 200 MG CAPS; TAKE 1 CAPSULE EVERY 4 TO 6 HOURS AS NEEDED; Last      Rx:81Aza6620 Ordered   17  Milk of Magnesia 1200 MG/15ML Oral Suspension; TAKE 2 TBSP DAILY AS NEEDED IF      NO BM IN 3 DAYS; Last Rx:77Dmj4413 Ordered   18  MiraLax Oral Packet; TAKE 30 PACKET Daily PRN please take i packet whrn constipated; Therapy: 69MWS6829 to (Evaluate:94Knt6129)  Requested for: 21Jun2017; Last      HA:18JPX7502 Ordered   19  QC Antacid Extra Strength 750 MG Oral Tablet Chewable; three times daily PRN for      stomach pain; Therapy: 76UFN7527 to (Last Rx:02Nov2017)  Requested for: 15GKU4418 Ordered   20  RABEprazole Sodium 20 MG Oral Tablet Delayed Release; TAKE 1 TABLET BY MOUTH      DAILY AT 7AM (GERD); Therapy: 29EKL7607 to (Last Rx:26Oct2017)  Requested for: 26Oct2017 Ordered   21   Senna S 8 6-50 MG Oral Tablet; TAKE 1 TABLET BY MOUTH ONCE DAILY AT 8AM;      Therapy: 23Gjh2721 to (Last Rx:04Oct2017)  Requested for: 04Oct2017 Ordered   22  Sport Sunscreen SPF50 External Lotion; apple 15 min before sun exposure and reapply      every 2 hours; Therapy: (Recorded:47Zfe2459) to Recorded   23  Sprintec 28 TABS; TAKE 1 TABLET DAILY; Therapy: (Recorded:89Xre7715) to Recorded   24  Tylenol Extra Strength 500 MG Oral Tablet; TAKE 1 TABLET Every 6 hours PRN headache      backache muscle ache  Requested for: 10Zat2073; Last Rx:30Fdz9067 Ordered   25  Vitamin D3 1000 UNIT Oral Capsule; TAKE 2 TABLETS 1000MG DAILY AT 8 AM       Requested for: 76Rqm8515; Last Rx:34Wcc9634 Ordered    Allergies   1  No Known Drug Allergies  2  Seasonal    Vitals   Vital Signs    Recorded: 07SWN6087 09:23AM   Temperature 97 F   Heart Rate 72   Respiration 16   Systolic 198   Diastolic 72   Height 4 ft 10 in   Weight 168 lb 4 oz   BMI Calculated 35 16   BSA Calculated 1 69   Pain Scale 0     Physical Exam        Constitutional      General appearance: No acute distress, well appearing and well nourished  Eyes      Conjunctiva and lids: No swelling, erythema or discharge  Pupils and irises: Equal, round and reactive to light  Ears, Nose, Mouth, and Throat      External inspection of ears and nose: Normal        Nasal mucosa, septum, and turbinates: Normal without edema or erythema  Oropharynx: Normal with no erythema, edema, exudate or lesions  Pulmonary      Respiratory effort: No increased work of breathing or signs of respiratory distress  -- repudicible chest pain with palpation  Auscultation of lungs: Clear to auscultation  Cardiovascular      Auscultation of heart: Normal rate and rhythm, normal S1 and S2, without murmurs  Abdomen      Abdomen: Non-tender, no masses  Musculoskeletal      Digits and nails: Normal without clubbing or cyanosis         Inspection/palpation of joints, bones, and muscles: Normal        Skin Skin and subcutaneous tissue: Normal without rashes or lesions  Attending Note   Attending Note: I discussed the case with the Resident and reviewed the Resident's note,-- I supervised the Resident-- and-- I agree with the Resident management plan as it was presented to me  Level of Participation: I was present in clinic, but did not examine the patient  I agree with the Resident's note        Future Appointments      Date/Time Provider Specialty Site   03/27/2018 10:00 AM Artie Ferris, [facility]   04/06/2018 10:00 AM Joaquim Starkey, [facility]   04/06/2018 10:15 AM Deanne Montoya MD PhD [facility] [place]   02/12/2018 01:00 PM Bull Griffin DO [address]     Signatures    Electronically signed by : WILLIAM Andersen ; Jan 19 2018  9:40AM EST                       (Author)     Electronically signed by : Fe Solano DO; Jan 22 2018  2:28PM EST                       (Author)

## 2018-01-24 ENCOUNTER — TELEPHONE (OUTPATIENT)
Dept: FAMILY MEDICINE CLINIC | Facility: CLINIC | Age: 38
End: 2018-01-24

## 2018-01-24 VITALS
SYSTOLIC BLOOD PRESSURE: 128 MMHG | WEIGHT: 166 LBS | BODY MASS INDEX: 34.85 KG/M2 | DIASTOLIC BLOOD PRESSURE: 80 MMHG | RESPIRATION RATE: 14 BRPM | HEIGHT: 58 IN | HEART RATE: 72 BPM | TEMPERATURE: 99.1 F

## 2018-01-24 VITALS
BODY MASS INDEX: 34.03 KG/M2 | DIASTOLIC BLOOD PRESSURE: 82 MMHG | HEIGHT: 58 IN | WEIGHT: 162.13 LBS | SYSTOLIC BLOOD PRESSURE: 124 MMHG | HEART RATE: 68 BPM | TEMPERATURE: 98.9 F | RESPIRATION RATE: 16 BRPM

## 2018-01-24 VITALS
RESPIRATION RATE: 16 BRPM | SYSTOLIC BLOOD PRESSURE: 122 MMHG | BODY MASS INDEX: 35.32 KG/M2 | TEMPERATURE: 97 F | HEIGHT: 58 IN | HEART RATE: 72 BPM | WEIGHT: 168.25 LBS | DIASTOLIC BLOOD PRESSURE: 72 MMHG

## 2018-01-24 VITALS
BODY MASS INDEX: 34.72 KG/M2 | SYSTOLIC BLOOD PRESSURE: 138 MMHG | WEIGHT: 165.38 LBS | DIASTOLIC BLOOD PRESSURE: 70 MMHG | RESPIRATION RATE: 16 BRPM | HEIGHT: 58 IN | HEART RATE: 68 BPM | TEMPERATURE: 97.9 F

## 2018-01-29 DIAGNOSIS — K59.00 CONSTIPATION, UNSPECIFIED CONSTIPATION TYPE: Primary | ICD-10-CM

## 2018-01-29 RX ORDER — METOCLOPRAMIDE 10 MG/1
10 TABLET ORAL EVERY 8 HOURS PRN
Qty: 30 TABLET | Refills: 0 | Status: SHIPPED | OUTPATIENT
Start: 2018-01-29 | End: 2018-04-03 | Stop reason: SDUPTHER

## 2018-01-30 ENCOUNTER — OFFICE VISIT (OUTPATIENT)
Dept: AUDIOLOGY | Age: 38
End: 2018-01-30

## 2018-01-30 ENCOUNTER — HOSPITAL ENCOUNTER (EMERGENCY)
Facility: HOSPITAL | Age: 38
Discharge: HOME | End: 2018-01-30
Attending: EMERGENCY MEDICINE | Admitting: EMERGENCY MEDICINE
Payer: MEDICARE

## 2018-01-30 ENCOUNTER — APPOINTMENT (EMERGENCY)
Dept: NON INVASIVE DIAGNOSTICS | Facility: HOSPITAL | Age: 38
End: 2018-01-30
Payer: MEDICARE

## 2018-01-30 VITALS
OXYGEN SATURATION: 97 % | HEART RATE: 86 BPM | SYSTOLIC BLOOD PRESSURE: 116 MMHG | TEMPERATURE: 97.7 F | BODY MASS INDEX: 32.4 KG/M2 | WEIGHT: 155 LBS | DIASTOLIC BLOOD PRESSURE: 62 MMHG | RESPIRATION RATE: 18 BRPM

## 2018-01-30 DIAGNOSIS — K21.9 GASTROESOPHAGEAL REFLUX DISEASE, ESOPHAGITIS PRESENCE NOT SPECIFIED: Primary | ICD-10-CM

## 2018-01-30 DIAGNOSIS — M79.605 LEFT LEG PAIN: ICD-10-CM

## 2018-01-30 DIAGNOSIS — M79.89 LEFT LEG SWELLING: Primary | ICD-10-CM

## 2018-01-30 PROCEDURE — 93971 EXTREMITY STUDY: CPT

## 2018-01-30 PROCEDURE — 99284 EMERGENCY DEPT VISIT MOD MDM: CPT

## 2018-01-30 RX ORDER — FAMOTIDINE 20 MG/1
TABLET, FILM COATED ORAL
Qty: 56 TABLET | Refills: 0 | Status: SHIPPED | OUTPATIENT
Start: 2018-01-30 | End: 2018-03-23 | Stop reason: SDUPTHER

## 2018-01-30 RX ORDER — IBUPROFEN 600 MG/1
600 TABLET ORAL ONCE
Status: COMPLETED | OUTPATIENT
Start: 2018-01-30 | End: 2018-01-30

## 2018-01-30 RX ADMIN — IBUPROFEN 600 MG: 600 TABLET ORAL at 22:15

## 2018-01-31 PROCEDURE — 93971 EXTREMITY STUDY: CPT | Performed by: SURGERY

## 2018-01-31 NOTE — DISCHARGE INSTRUCTIONS
Leg Pain   WHAT YOU NEED TO KNOW:   Leg pain may be caused by a variety of health conditions  Your tests did not show any broken bones or blood clots  DISCHARGE INSTRUCTIONS:   Return to the emergency department if:   · You have a fever  · Your leg starts to swell  · Your leg pain gets worse  · You have numbness or tingling in your leg or toes  · You cannot put any weight on or move your leg  Contact your healthcare provider if:   · Your pain does not decrease, even after treatment  · You have questions or concerns about your condition or care  Medicines:   · NSAIDs , such as ibuprofen, help decrease swelling, pain, and fever  This medicine is available with or without a doctor's order  NSAIDs can cause stomach bleeding or kidney problems in certain people  If you take blood thinner medicine, always ask your healthcare provider if NSAIDs are safe for you  Always read the medicine label and follow directions  · Take your medicine as directed  Contact your healthcare provider if you think your medicine is not helping or if you have side effects  Tell him of her if you are allergic to any medicine  Keep a list of the medicines, vitamins, and herbs you take  Include the amounts, and when and why you take them  Bring the list or the pill bottles to follow-up visits  Carry your medicine list with you in case of an emergency  Follow up with your healthcare provider as directed: You may need more tests to find the cause of your leg pain  You may need to see an orthopedic specialist or a physical therapist  Write down your questions so you remember to ask them during your visits  Manage your leg pain:   · Rest  your injured leg so that it can heal  You may need an immobilizer, brace, or splint to limit the movement of your leg  You may need to avoid putting any weight on your leg for at least 48 hours  Return to normal activities as directed      · Ice  the injury for 20 minutes every 4 hours for up to 24 hours, or as directed  Use an ice pack, or put crushed ice in a plastic bag  Cover it with a towel to protect your skin  Ice helps prevent tissue damage and decreases swelling and pain  · Elevate  your injured leg above the level of your heart as often as you can  This will help decrease swelling and pain  If possible, prop your leg on pillows or blankets to keep the area elevated comfortably  · Use assistive devices as directed  You may need to use a cane or crutches  Assistive devices help decrease pain and pressure on your leg when you walk  Ask your healthcare provider for more information about assistive devices and how to use them correctly  · Maintain a healthy weight  Extra body weight can cause pressure and pain in your hip, knee, and ankle joints  Ask your healthcare provider how much you should weigh  Ask him to help you create a weight loss plan if you are overweight  © 2017 [address] [employer] Information is for End User's use only and may not be sold, redistributed or otherwise used for commercial purposes  All illustrations and images included in CareNotes® are the copyrighted property of [employer]  or [employer]  The above information is an  only  It is not intended as medical advice for individual conditions or treatments  Talk to your doctor, nurse or pharmacist before following any medical regimen to see if it is safe and effective for you  Leg Edema   WHAT YOU NEED TO KNOW:   Leg edema is swelling caused by fluid buildup  Your legs may swell if you sit or stand for long periods of time, are pregnant, or are injured  Swelling may also occur if you have heart failure or circulation problems  This means that your heart does not pump blood through your body as it should  DISCHARGE INSTRUCTIONS:   Self-care:   · Elevate your legs:  Raise your legs above the level of your heart as often as you can   This will help decrease swelling and pain  Prop your legs on pillows or blankets to keep them elevated comfortably  · Wear pressure stockings: These tight stockings put pressure on your legs to promote blood flow and prevent blood clots  Wear the stockings during the day  Do not wear them while you sleep  · Apply heat:  Heat helps decrease pain and swelling  Apply heat on the area for 20 to 30 minutes every 2 hours for as many days as directed  · Stay active:  Do not stand or sit for long periods of time  Ask your healthcare provider about the best exercise plan for you  · Eat healthy foods:  Healthy foods include fruits, vegetables, whole-grain breads, low-fat dairy products, beans, lean meats, and fish  Ask if you need to be on a special diet  Limit salt  Salt will make your body hold even more fluid  Follow up with your healthcare provider as directed:  Write down your questions so you remember to ask them during your visits  Contact your healthcare provider if:   · You have a fever or feel more tired than usual     · The veins in your legs look larger than usual  They may look full or bulging  · Your legs itch or feel heavy  · You have red or white areas or sores on your legs  The skin may also appear dimpled or have indentations  · You are gaining weight  · You have trouble moving your ankles  · The swelling does not go away, or other parts of your body swell  · You have questions or concerns about your condition or care  Return to the emergency department if:   · You cannot walk  · You feel faint or confused  · Your skin turns blue or gray  · Your leg feels warm, tender, and painful  It may be swollen and red  · You have chest pain or trouble breathing that is worse when you lie down  · You suddenly feel lightheaded and have trouble breathing  · You have new and sudden chest pain  You may have more pain when you take deep breaths or cough  You may also cough up blood    © 2017 [facility] is for End User's use only and may not be sold, redistributed or otherwise used for commercial purposes  All illustrations and images included in CareNotes® are the copyrighted property of A [employer] M [employer]  or [employer]  The above information is an  only  It is not intended as medical advice for individual conditions or treatments  Talk to your doctor, nurse or pharmacist before following any medical regimen to see if it is safe and effective for you

## 2018-01-31 NOTE — ED PROVIDER NOTES
History  Chief Complaint   Patient presents with    Leg Pain     pt reports to EMS that the L leg hurts from her foot to her knee, pt reports to ED staff that her L knee and thigh are hurting  Patient is a 78-year-old female with a history of cerebral palsy and mental delay who presents for left leg swelling  Patient's aide said that patient noted that her left thigh was swollen  Aide says that she too thought the left thigh looked swollen so she brought her to the ED  Patient is complaining of pain in her thigh as well as medial to her left knee  According to the aide patient has been up and walking around without discomfort  The aide says that patient walked up and down the block the last few days without issue  Patient and aide deny history of blood clots  Patient denies any lightheadedness, dizziness, shortness of breath, chest pain, abdominal pain, nausea, vomiting, fever, chills, diarrhea  Prior to Admission Medications   Prescriptions Last Dose Informant Patient Reported? Taking?    ARIPiprazole (ABILIFY) 2 mg tablet   Yes Yes   Sig: Take 5 mg by mouth daily     Acetaminophen-Caff-Pyrilamine (MIDOL COMPLETE PO)   Yes Yes   Sig: Take 1 tablet by mouth as needed   Cholecalciferol (VITAMIN D-3 PO)   Yes Yes   Sig: Take 2,000 Units by mouth daily   Mouthwashes (LISTERINE ANTISEPTIC) LIQD   Yes Yes   Sig: Apply to the mouth or throat 2 (two) times a day   Multiple Vitamins-Minerals (CERTAVITE SENIOR/ANTIOXIDANT PO)   Yes Yes   Sig: Take by mouth   Norgestimate-Eth Estradiol (SPRINTEC 28 PO)   Yes Yes   Sig: Take by mouth   RABEprazole (ACIPHEX) 20 MG tablet   Yes Yes   Sig: Take 20 mg by mouth daily   acetaminophen (TYLENOL) 500 mg tablet   Yes Yes   Sig: Take 500 mg by mouth every 6 (six) hours as needed for mild pain   albuterol (PROVENTIL HFA,VENTOLIN HFA) 90 mcg/act inhaler   No Yes   Sig: Inhale 2 puffs every 6 (six) hours as needed for wheezing or shortness of breath   ammonium lactate (LAC-HYDRIN) 12 % cream   Yes Yes   Sig: Apply topically as needed for dry skin   calcium carbonate (TUMS) 500 mg chewable tablet   Yes Yes   Sig: Chew 1 tablet daily   clotrimazole (LOTRIMIN) 1 % cream   Yes Yes   Sig: Apply 1 application topically 3 (three) times a day as needed   escitalopram (LEXAPRO) 10 mg tablet   Yes Yes   Sig: Take 10 mg by mouth daily   famotidine (PEPCID) 20 mg tablet   No Yes   Sig: TAKE 1 TABLET BY MOUTH TWICE DAILY @ 7AM-4PM (GERD)   ibuprofen (MOTRIN) 400 mg tablet   No Yes   Sig: Take 1 tablet by mouth every 8 (eight) hours as needed for mild pain, moderate pain or headaches   liver oil-zinc oxide (DESITIN) 40 % ointment   Yes Yes   Sig: Apply 1 application topically as needed for irritation   lubiprostone (AMITIZA) 24 mcg capsule   Yes Yes   Sig: Take 24 mcg by mouth 2 (two) times a day with meals   magnesium hydroxide (MILK OF MAGNESIA) 400 mg/5 mL oral suspension   Yes Yes   Sig: Take 30 mL by mouth daily as needed for constipation   metoclopramide (REGLAN) 10 mg tablet   No Yes   Sig: Take 1 tablet (10 mg total) by mouth every 8 (eight) hours as needed (nausea)   polyethylene glycol (MIRALAX) 17 g packet   No Yes   Sig: Take 17gm (1 packet) daily for first three days, then daily as needed for no bowel movement more than 24 hrs   senna-docusate sodium (SENOKOT S) 8 6-50 mg per tablet   No Yes   Sig: Take 1 tablet by mouth every morning   sucralfate (CARAFATE) 1 g/10 mL suspension   Yes Yes   Sig: Take 1 g by mouth 4 (four) times a day      Facility-Administered Medications: None       Past Medical History:   Diagnosis Date    Anxiety     Cerebral palsy (HCC)     Depression     Depressive disorder     GERD (gastroesophageal reflux disease)     Mood disorder (HCC)        Past Surgical History:   Procedure Laterality Date    CSF SHUNT      LEG SURGERY      due to CP        History reviewed  No pertinent family history    I have reviewed and agree with the history as documented  Social History   Substance Use Topics    Smoking status: Never Smoker    Smokeless tobacco: Never Used    Alcohol use No        Review of Systems   Constitutional: Negative for chills, diaphoresis and fever  HENT: Negative for congestion, sinus pressure, sore throat and trouble swallowing  Eyes: Negative for pain, discharge and itching  Respiratory: Negative for cough, chest tightness, shortness of breath and wheezing  Cardiovascular: Negative for chest pain, palpitations and leg swelling  Gastrointestinal: Negative for abdominal distention, abdominal pain, blood in stool, diarrhea, nausea and vomiting  Endocrine: Negative for polyphagia and polyuria  Genitourinary: Negative for difficulty urinating, dysuria, flank pain, hematuria, pelvic pain and vaginal bleeding  Musculoskeletal: Negative for arthralgias and back pain  Left thigh swelling, medial leg pain   Skin: Negative for color change and rash  Neurological: Negative for dizziness, syncope, weakness, light-headedness and headaches  Physical Exam  ED Triage Vitals [01/30/18 2034]   Temperature Pulse Respirations Blood Pressure SpO2   97 7 °F (36 5 °C) 91 18 130/68 97 %      Temp Source Heart Rate Source Patient Position - Orthostatic VS BP Location FiO2 (%)   Oral Monitor Lying Right arm --      Pain Score       9           Orthostatic Vital Signs  Vitals:    01/30/18 2034 01/30/18 2115 01/30/18 2145   BP: 130/68 122/81 116/62   Pulse: 91 90 86   Patient Position - Orthostatic VS: Lying Lying Lying       Physical Exam   Constitutional: She is oriented to person, place, and time  She appears well-developed and well-nourished  No distress  HENT:   Head: Normocephalic and atraumatic  Eyes: Conjunctivae are normal  Pupils are equal, round, and reactive to light  Neck: Neck supple  Cardiovascular: Normal rate, regular rhythm and normal heart sounds  Exam reveals no gallop and no friction rub      No murmur heard   Pulmonary/Chest: Effort normal and breath sounds normal  No respiratory distress  Abdominal: Soft  She exhibits no distension  There is no tenderness  There is no guarding  Musculoskeletal: Normal range of motion  She exhibits no edema or deformity  No appreciable swelling noted in left thigh  Redness medial to left knee however looks as though it is irritated from patient rubbing skin   Lymphadenopathy:     She has no cervical adenopathy  Neurological: She is alert and oriented to person, place, and time  Skin: Skin is warm and dry  Psychiatric: She has a normal mood and affect  Nursing note and vitals reviewed  ED Medications  Medications   ibuprofen (MOTRIN) tablet 600 mg (600 mg Oral Given 1/30/18 2215)       Diagnostic Studies  Results Reviewed     None                 VAS lower limb venous duplex study, unilateral/limited    (Results Pending)         Procedures  Procedures      Phone Consults  ED Phone Contact    ED Course  ED Course as of Jan 30 2251   Tue Jan 30, 2018 2222 Duplex study of L leg negative for DVT                                MDM  Number of Diagnoses or Management Options  Left leg pain:   Left leg swelling:   Diagnosis management comments: Patient is a 26-year-old female with history cerebral palsy who presents for left thigh swelling and pain  The patient's aide thought that patient's leg looks swollen as well  Patient did some walking the last few days  She denies history of DVT  She denies chest pain or shortness of breath  Patient admits to pain in her medial leg  No significant swelling appreciated on exam   Plan:  Motrin for pain, duplex of left leg to rule out DVT  Duplex negative for DVT, patient told to take Motrin/Tylenol for pain  Patient to follow up with her PCP as needed      CritCare Time    Disposition  Final diagnoses:   Left leg swelling   Left leg pain     Time reflects when diagnosis was documented in both MDM as applicable and the Disposition within this note     Time User Action Codes Description Comment    1/30/2018 10:23 PM Italia Cisneros Add [L98 64] Left leg swelling     1/30/2018 10:23 PM Italia Cisneros Add [M79 605] Left leg pain       ED Disposition     ED Disposition Condition Comment    Discharge  Cher Dominguez discharge to home/self care      Condition at discharge: Stable        Follow-up Information     Follow up With Specialties Details Why Contact Info Additional Information    Claude Lindsay MD Family Medicine, Obstetrics and Gynecology  As needed [address] [facility] [address] [id number]       [address] [place] [address] Emergency Department Emergency Medicine  If symptoms worsen [address]  [phone]  ED, [address] [place] [address], [phone]        Discharge Medication List as of 1/30/2018 10:24 PM      CONTINUE these medications which have NOT CHANGED    Details   acetaminophen (TYLENOL) 500 mg tablet Take 500 mg by mouth every 6 (six) hours as needed for mild pain, Until Discontinued, Historical Med      Acetaminophen-Caff-Pyrilamine (MIDOL COMPLETE PO) Take 1 tablet by mouth as needed, Historical Med      albuterol (PROVENTIL HFA,VENTOLIN HFA) 90 mcg/act inhaler Inhale 2 puffs every 6 (six) hours as needed for wheezing or shortness of breath, Starting Thu 10/12/2017, Print      ammonium lactate (LAC-HYDRIN) 12 % cream Apply topically as needed for dry skin, Until Discontinued, Historical Med      ARIPiprazole (ABILIFY) 2 mg tablet Take 5 mg by mouth daily  , Historical Med      calcium carbonate (TUMS) 500 mg chewable tablet Chew 1 tablet daily, Historical Med      Cholecalciferol (VITAMIN D-3 PO) Take 2,000 Units by mouth daily, Until Discontinued, Historical Med      clotrimazole (LOTRIMIN) 1 % cream Apply 1 application topically 3 (three) times a day as needed, Until Discontinued, Historical Med      escitalopram (LEXAPRO) 10 mg tablet Take 10 mg by mouth daily, Until Discontinued, Historical Med      famotidine (PEPCID) 20 mg tablet TAKE 1 TABLET BY MOUTH TWICE DAILY @ 7AM-4PM (GERD), Normal      ibuprofen (MOTRIN) 400 mg tablet Take 1 tablet by mouth every 8 (eight) hours as needed for mild pain, moderate pain or headaches, Starting Thu 10/12/2017, Print      liver oil-zinc oxide (DESITIN) 40 % ointment Apply 1 application topically as needed for irritation, Historical Med      lubiprostone (AMITIZA) 24 mcg capsule Take 24 mcg by mouth 2 (two) times a day with meals, Until Discontinued, Historical Med      magnesium hydroxide (MILK OF MAGNESIA) 400 mg/5 mL oral suspension Take 30 mL by mouth daily as needed for constipation, Historical Med      metoclopramide (REGLAN) 10 mg tablet Take 1 tablet (10 mg total) by mouth every 8 (eight) hours as needed (nausea), Starting Mon 1/29/2018, Normal      Mouthwashes (LISTERINE ANTISEPTIC) LIQD Apply to the mouth or throat 2 (two) times a day, Until Discontinued, Historical Med      Multiple Vitamins-Minerals (CERTAVITE SENIOR/ANTIOXIDANT PO) Take by mouth, Until Discontinued, Historical Med      Norgestimate-Eth Estradiol (SPRINTEC 28 PO) Take by mouth, Until Discontinued, Historical Med      polyethylene glycol (MIRALAX) 17 g packet Take 17gm (1 packet) daily for first three days, then daily as needed for no bowel movement more than 24 hrs, Print      RABEprazole (ACIPHEX) 20 MG tablet Take 20 mg by mouth daily, Until Discontinued, Historical Med      senna-docusate sodium (SENOKOT S) 8 6-50 mg per tablet Take 1 tablet by mouth every morning, Starting Sun 7/30/2017, Print      sucralfate (CARAFATE) 1 g/10 mL suspension Take 1 g by mouth 4 (four) times a day, Historical Med           No discharge procedures on file  ED Provider  Attending physically available and evaluated Christoph Abad I managed the patient along with the ED Attending      Electronically Signed by         Colette Muniz DO  01/30/18 [id number]

## 2018-01-31 NOTE — ED ATTENDING ATTESTATION
I, Raffy Chester DO, saw and evaluated the patient  I have discussed the patient with the resident/non-physician practitioner and agree with the resident's/non-physician practitioner's findings, Plan of Care, and MDM as documented in the resident's/non-physician practitioner's note, except where noted  All available labs and Radiology studies were reviewed  At this point I agree with the current assessment done in the Emergency Department  I have conducted an independent evaluation of this patient a history and physical is as follows:      Critical Care Time  CritCare Time    Procedures     43 yr old fem to the ED with left leg pain since this am wo hx of injury or prior hx of the same  Neg clot hx  Exm: no swelling  Bilateral medial thigh redness wo cord palp  Tender at femoral triangle  Pln: eval for DVT and if neg tx as Mssk

## 2018-02-02 ENCOUNTER — OFFICE VISIT (OUTPATIENT)
Dept: FAMILY MEDICINE CLINIC | Facility: CLINIC | Age: 38
End: 2018-02-02
Payer: MEDICARE

## 2018-02-02 VITALS
TEMPERATURE: 98.4 F | DIASTOLIC BLOOD PRESSURE: 64 MMHG | SYSTOLIC BLOOD PRESSURE: 110 MMHG | BODY MASS INDEX: 36.31 KG/M2 | HEART RATE: 80 BPM | HEIGHT: 58 IN | RESPIRATION RATE: 18 BRPM | WEIGHT: 173 LBS

## 2018-02-02 DIAGNOSIS — M79.89 LEG SWELLING: Primary | ICD-10-CM

## 2018-02-02 PROCEDURE — 99213 OFFICE O/P EST LOW 20 MIN: CPT | Performed by: FAMILY MEDICINE

## 2018-02-02 RX ORDER — ALUMINA, MAGNESIA, AND SIMETHICONE 2400; 2400; 240 MG/30ML; MG/30ML; MG/30ML
15 SUSPENSION ORAL 4 TIMES DAILY
COMMUNITY
Start: 2017-11-02 | End: 2018-05-30

## 2018-02-02 RX ORDER — BACITRACIN 500 [USP'U]/G
OINTMENT OPHTHALMIC
COMMUNITY
End: 2018-05-30

## 2018-02-02 RX ORDER — CHOLECALCIFEROL (VITAMIN D3) 125 MCG
1 CAPSULE ORAL DAILY
Status: ON HOLD | COMMUNITY
End: 2018-07-10 | Stop reason: ALTCHOICE

## 2018-02-02 RX ORDER — FLUTICASONE PROPIONATE 50 MCG
SPRAY, SUSPENSION (ML) NASAL
COMMUNITY
Start: 2016-12-08 | End: 2018-02-21 | Stop reason: ALTCHOICE

## 2018-02-02 NOTE — ASSESSMENT & PLAN NOTE
Left Leg swelling ER follow-up  No swelling on physical examination  Doppler negative  Reassurance    Advised to watch for symptoms of swelling or pain

## 2018-02-02 NOTE — PROGRESS NOTES
Assessment/Plan:    Leg swelling    Left Leg swelling ER follow-up  No swelling on physical examination  Doppler negative  Reassurance  Advised to watch for symptoms of swelling or pain         Problem List Items Addressed This Visit        Other    Leg swelling - Primary (Chronic)       Left Leg swelling ER follow-up  No swelling on physical examination  Doppler negative  Reassurance  Advised to watch for symptoms of swelling or pain                 Subjective:      Patient ID: Rosa Paz is a 43 y o  female  79-year-old female with cerebral palsy here for ER follow-up  Patient presented to ER 2 days ago with left lower extremity swelling  In ER on physical examination dose no visible swelling  Doppler was negative  Patient says she has mild discomfort in bilateral legs  On physical exam there is no swelling  There is mild erythema bilateral and medial thigh,  Likely related to patient's scratching  No evidence of infection        The following portions of the patient's history were reviewed and updated as appropriate: allergies, current medications, past family history, past medical history, past social history, past surgical history and problem list     Review of Systems   Constitutional: Negative for activity change  Respiratory: Negative for shortness of breath  Cardiovascular: Negative for chest pain  Gastrointestinal: Negative for abdominal distention  Musculoskeletal: Negative for arthralgias, joint swelling and myalgias  Objective:     Physical Exam   Constitutional: She is oriented to person, place, and time  Vital signs are normal  She appears well-developed and well-nourished  No distress  Using walker   HENT:   Head: Normocephalic  Right Ear: External ear normal    Left Ear: External ear normal    Mouth/Throat: Oropharynx is clear and moist  No oropharyngeal exudate  Eyes: Conjunctivae are normal  Pupils are equal, round, and reactive to light     Neck: Normal range of motion  Cardiovascular: Normal rate, regular rhythm, normal heart sounds and intact distal pulses  Exam reveals no gallop and no friction rub  No murmur heard  Pulmonary/Chest: Effort normal and breath sounds normal  No respiratory distress  She has no wheezes  She has no rales  She exhibits no tenderness  Abdominal: Soft  She exhibits no distension and no mass  There is no tenderness  There is no rebound and no guarding  Musculoskeletal: Normal range of motion  She exhibits no edema, tenderness or deformity  Mild erythema bilaterally on the medial side of the thighs,  No signs of infection  Lymphadenopathy:     She has no cervical adenopathy  Neurological: She is alert and oriented to person, place, and time  Skin: Skin is warm and dry  No rash noted  She is not diaphoretic  No pallor  Psychiatric: She has a normal mood and affect  Vitals reviewed

## 2018-02-06 ENCOUNTER — OFFICE VISIT (OUTPATIENT)
Dept: MULTI SPECIALTY CLINIC | Facility: CLINIC | Age: 38
End: 2018-02-06
Payer: MEDICARE

## 2018-02-06 VITALS
HEART RATE: 84 BPM | HEIGHT: 58 IN | TEMPERATURE: 97.3 F | BODY MASS INDEX: 36.56 KG/M2 | SYSTOLIC BLOOD PRESSURE: 110 MMHG | WEIGHT: 174.16 LBS | DIASTOLIC BLOOD PRESSURE: 70 MMHG

## 2018-02-06 DIAGNOSIS — N28.1 RENAL CYST, ACQUIRED, LEFT: Primary | ICD-10-CM

## 2018-02-06 PROCEDURE — 99202 OFFICE O/P NEW SF 15 MIN: CPT | Performed by: PHYSICIAN ASSISTANT

## 2018-02-06 NOTE — PROGRESS NOTES
Assessment/Plan:         Diagnoses and all orders for this visit:    Renal cyst, acquired, left      PLAN:  Recommend ultrasound of the left kidney in 1 year to compare with the CT scan of the abdomen pelvis  If left kidney cyst is stable in 1 year urology clinic and signed off      Subjective:      Patient ID: Mauro Knox is a 43 y o  female  HPI   Asiya Mohamud is a 79-year-old female with a history of moderate intellectual disability who was found to have a 6 9 by 6 0 by 7 6 cm left kidney upper pole cystic lesion which was stable in size consistent with a Bosniak 2 cysts  At this time she denies all urologic complaints including frequency, nocturia, urgency, hematuria, dysuria, frequent UTIs and history of kidney stones  Patient resides in a group home and is here with her   The following portions of the patient's history were reviewed and updated as appropriate: allergies, current medications, past family history, past medical history, past social history, past surgical history and problem list     Review of Systems   Constitutional: Negative  Respiratory: Negative  Cardiovascular: Negative  Gastrointestinal: Negative  Genitourinary: Negative  Objective:     Physical Exam      Well developed well nourished 79-year-old female with intellectual disabilities presents in no acute distress  Chest:  Clear to auscultation throughout  Heart:  No murmurs noted  Regular rate and rhythm  Abdomen:  Soft nontender, no masses or organomegaly noted  Left upper quadrant scar noted      CT ABDOMEN AND PELVIS WITH AND WITHOUT IV CONTRAST  IMPRESSION:     The 6 9 x 6 0 x 7 6 cm left kidney upper pole cystic lesion is stable in size, and is only minimally complex, consistent with a Bosniak  2 cyst   This does not need further follow-up or workup      Again seen is severely atrophic right kidney      There is evidence of severe gastroesophageal reflux      There is an incidental 3 6 x 2 6 cm right ovarian benign-appearing cyst       According to current guidelines (J Am Karen Radiol 2013;10:671-681) in this premenopausal woman, this is benign and requires no followup  If this lesion is symptomatic, consider   further evaluation with pelvic ultrasound now         This text is generated with voice recognition software  There may be translation, syntax,  or grammatical errors   If you have any questions, please contact the dictating provider    Kathrene Prader, PA-C

## 2018-02-12 ENCOUNTER — OFFICE VISIT (OUTPATIENT)
Dept: FAMILY MEDICINE CLINIC | Facility: CLINIC | Age: 38
End: 2018-02-12
Payer: MEDICARE

## 2018-02-12 VITALS
HEIGHT: 57 IN | TEMPERATURE: 97.3 F | DIASTOLIC BLOOD PRESSURE: 70 MMHG | HEART RATE: 84 BPM | BODY MASS INDEX: 36.29 KG/M2 | RESPIRATION RATE: 16 BRPM | WEIGHT: 168.2 LBS | SYSTOLIC BLOOD PRESSURE: 126 MMHG

## 2018-02-12 DIAGNOSIS — Z00.00 MEDICARE ANNUAL WELLNESS VISIT, SUBSEQUENT: Primary | ICD-10-CM

## 2018-02-12 PROBLEM — D49.7 PITUITARY NEOPLASM: Status: ACTIVE | Noted: 2017-03-13

## 2018-02-12 PROBLEM — I83.819 VARICOSE VEINS WITH PAIN: Status: ACTIVE | Noted: 2017-10-27

## 2018-02-12 PROBLEM — F41.9 ANXIETY: Status: ACTIVE | Noted: 2017-02-14

## 2018-02-12 PROBLEM — F71 MODERATE INTELLECTUAL DISABILITY: Status: ACTIVE | Noted: 2017-02-14

## 2018-02-12 PROBLEM — K21.9 ESOPHAGEAL REFLUX: Status: ACTIVE | Noted: 2017-08-11

## 2018-02-12 PROBLEM — G93.9 BRAIN LESION: Status: ACTIVE | Noted: 2017-02-03

## 2018-02-12 PROBLEM — M54.50 LOW BACK PAIN: Status: ACTIVE | Noted: 2017-01-25

## 2018-02-12 PROBLEM — R42 DIZZINESS: Status: ACTIVE | Noted: 2017-10-16

## 2018-02-12 PROCEDURE — G0439 PPPS, SUBSEQ VISIT: HCPCS | Performed by: FAMILY MEDICINE

## 2018-02-12 NOTE — ASSESSMENT & PLAN NOTE
Medicare wellness questionaire reviewed, all screenings are current, patient is up-to-date on immunizations  - patient advised to return for follow up on urinary concerns

## 2018-02-12 NOTE — PROGRESS NOTES
HPI:  Hector Sosa is a 43 y o  female here for her Subsequent Wellness Visit  Patient Active Problem List   Diagnosis    Leg swelling    Anxiety    Brain lesion    Cerebral palsy (HCC)    Chronic knee pain    Constipation    Depression    Dizziness    Dysphagia    Esophageal reflux    Hearing impairment    Left renal mass    Low back pain    Moderate intellectual disability    Pituitary neoplasm    Seasonal allergies    Varicose veins with pain    Medicare annual wellness visit, subsequent     Past Medical History:   Diagnosis Date    Anxiety     Cerebral palsy ([address] )     Depression     Depressive disorder     GERD (gastroesophageal reflux disease)     Mood disorder (HCC)      Past Surgical History:   Procedure Laterality Date    CSF SHUNT      LEG SURGERY      due to CP      History reviewed  No pertinent family history    History   Smoking Status    Never Smoker   Smokeless Tobacco    Never Used     History   Alcohol Use No      History   Drug Use No     /70   Pulse 84   Temp (!) 97 3 °F (36 3 °C)   Resp 16   Ht 4' 9 1" (1 45 m)   Wt 76 3 kg (168 lb 3 2 oz)   BMI 36 27 kg/m²       Current Outpatient Prescriptions   Medication Sig Dispense Refill    acetaminophen (TYLENOL) 500 mg tablet Take 500 mg by mouth every 6 (six) hours as needed for mild pain      albuterol (PROVENTIL HFA,VENTOLIN HFA) 90 mcg/act inhaler Inhale 2 puffs every 6 (six) hours as needed for wheezing or shortness of breath 1 Inhaler 0    aluminum-magnesium hydroxide-simethicone (MAALOX MULTI SYMPTOM MAX ST) 400-400-40 MG/5ML suspension Take 15 mL by mouth 4 times daily      ammonium lactate (LAC-HYDRIN) 12 % cream Apply topically as needed for dry skin      ARIPiprazole (ABILIFY) 5 mg tablet Take 5 mg by mouth daily        bacitracin ophthalmic ointment Apply to eye      calcium carbonate (TUMS) 500 mg chewable tablet Chew 1 tablet daily      Cholecalciferol (VITAMIN D-3 PO) Take 2,000 Units by mouth daily      clotrimazole (LOTRIMIN) 1 % cream Apply 1 application topically 3 (three) times a day as needed      escitalopram (LEXAPRO) 10 mg tablet Take 10 mg by mouth daily      famotidine (PEPCID) 20 mg tablet TAKE 1 TABLET BY MOUTH TWICE DAILY @ 7AM-4PM (GERD) 56 tablet 0    fluticasone (FLONASE) 50 mcg/act nasal spray into each nostril      ibuprofen (MOTRIN) 400 mg tablet Take 1 tablet by mouth every 8 (eight) hours as needed for mild pain, moderate pain or headaches 30 tablet 0    lubiprostone (AMITIZA) 24 mcg capsule Take 24 mcg by mouth 2 (two) times a day with meals      magnesium hydroxide (MILK OF MAGNESIA) 400 mg/5 mL oral suspension Take 30 mL by mouth daily as needed for constipation      Melatonin 5 MG TABS Take 1 tablet by mouth daily 9pm      metoclopramide (REGLAN) 10 mg tablet Take 1 tablet (10 mg total) by mouth every 8 (eight) hours as needed (nausea) 30 tablet 0    Mouthwashes (LISTERINE ANTISEPTIC) LIQD Apply to the mouth or throat 2 (two) times a day      Multiple Vitamins-Minerals (CERTAVITE SENIOR/ANTIOXIDANT PO) Take by mouth      Norgestimate-Eth Estradiol (SPRINTEC 28 PO) Take by mouth      polyethylene glycol (MIRALAX) 17 g packet Take 17gm (1 packet) daily for first three days, then daily as needed for no bowel movement more than 24 hrs 7 each 0    RABEprazole (ACIPHEX) 20 MG tablet Take 20 mg by mouth daily      senna-docusate sodium (SENOKOT S) 8 6-50 mg per tablet Take 1 tablet by mouth every morning 7 tablet 0    sucralfate (CARAFATE) 1 g/10 mL suspension Take 1 g by mouth 4 (four) times a day      Sunscreens (AVEENO SUNBLOCK SPF50 EX) Apply topically apply 15 minutes before sun exsposure      zinc oxide (DESITIN) 13 % cream Apply 1 application topically as needed for irritation      Acetaminophen-Caff-Pyrilamine (MIDOL COMPLETE PO) Take 1 tablet by mouth as needed       No current facility-administered medications for this visit        Allergies Allergen Reactions    Seasonal Ic  [Cholestatin]      Immunization History   Administered Date(s) Administered     Influenza (IM) Preservative Free 10/04/2016       Patient Care Team:  Benny Garibay MD as PCP - General  DO Omari Hernandez MD Merilee Colas, PA-C    Medicare Screening Tests and Risk Assessments:  AWV Clinical     ISAR:   Previous hospitalizations?:  No       Once in a Lifetime Medicare Screening:   EKG performed?:  No    AAA screening performed? (if performed, please add date to Health Maintenance):  No       Medicare Screening Tests and Risk Assessment:   AAA Risk Assessment    Osteoporosis Risk Assessment    HIV Risk Assessment        Drug and Alcohol Use:   Tobacco use    Cigarettes:  never smoker    Tobacco use duration    Tobacco Cessation Readiness    Alcohol use    Alcohol use:  never    Alcohol Treatment Readiness   Illicit Drug Use    Drug use:  never        Diet & Exercise:   Diet   What is your diet?:  Frequent junk food   How many servings a day of the following:   Exercise    Do you currently exercise?:  currently not exercising       Cognitive Impairment Screening:   Depression screening preformed:  No    Cognitive Impairment Screening    Do you have difficulty learning or retaining new information?:  Yes    Do you have difficulty with reasoning?:  Yes    Do you have difficulty with language?:  Yes        Functional Ability/Level of Safety:   Hearing    Hearing difficulties:  Yes    Hearing aid:  Yes    Hearing Impairment Assessment    Current Activities    Help needed with the folllowing:    Using the phone:  No Transportation:  Yes   Shopping:  No Preparing Meals:  No   Doing Housework:  No Doing Laundry:  Yes   Managing Medications:  Yes Managing Money:  Yes   ADL    Fall Risk   Have you fallen in the last 12 months?:  No    Injury History       Home Safety:   Home Safety Risk Factors    Uneven floors:  No   Stairs or handrail saftey risk:  Yes Loose rugs:  No Household clutter:  No Poor household lighting:  No   No grab bars in bathroom:  Yes        Advanced Directives:   Advanced Directives    Patient's End of Life Decisions        Urinary Incontinence:       Glaucoma:            Provider Screening     Preventative Screening/Counseling:   Cardiovascular Screening/Counseling:   (Labs Q5 years, EKG optional one-time)   General:  Risks and Benefits Discussed, Screening Current           Diabetes Screening/Counseling:   (2 tests/year if Pre-Diabetes or 1 test/year if no Diabetes)   General:  Risks and Benefits Discussed, Screening Current           Colorectal Cancer Screening/Counseling:   (FOBT Q1 yr; Flex Sig Q4 yrs or Q10 yrs after Screening Colonoscopy; Screening Colonoscpy Q2 yrs High Risk or Q10 yrs Low Risk; Barium Enema Q2 yrs High Risk or Q4 yrs Low Risk)   General:  Screening Not Indicated           Prostate Cancer Screening/Counseling:   (Annual)          Breast Cancer Screening/Counseling:   (Baseline Age 28 - 43; Annual Age 36+)   General:  Risks and Benefits Discussed, Screening Not Indicated          Cervical Cancer Screening/Counseling:   (Annual for High Risk or Childbearing Age with Abnormal Pap in Last 3 yrs; Every 2 all others)   General:  Patient Declines           Osteoporosis Screening/Counseling:   (Every 2 Yrs if at risk or more if medically necessary)   General:  Screening Not Indicated           AAA Screening/Counseling:   (Once per Lifetime with risk factors)    General:  Screening Not Indicated           Glaucoma Screening/Counseling:   (Annual)   General:  Screening Not Indicated          HIV Screening/Counseling:   (Voluntary; Once annually for high risk OR 3 times for Pregnancy at diagnosis of IUP; 3rd trimester; and at Labor   General:  Screening Not Indicated           Hepatitis C Screening:             Immunizations:   Influenza (annual):   Influenza UTD This Year   Pneumococcal (Once in a Lifetime):  Patient Declines   Hepatitis B Series (low risk patients):  Series Not Indicated   Zostavax (Medicare D Coverage, Pt >72 yo):  Risks & Benefits Discussed   TD (Non-Medicare Wellness  Visit required):  Risks & Benefits Discussed   Tdap (Non-Medicare Wellness Visit required):  Risks & Benefits Discussed, Tdap Vaccine UTD       Other Preventative Couseling (Non-Medicare Wellness Visit Required):       Referrals (Non-Medicare Wellness Visit Required):       Medical Equipment/Suppliers:           No exam data present    Physical Exam :  Physical Exam   Constitutional: She is oriented to person, place, and time  She appears well-developed and well-nourished  No distress  HENT:   Head: Normocephalic and atraumatic  Nose: Nose normal    Mouth/Throat: Oropharynx is clear and moist    Eyes: Conjunctivae are normal    Neck: Normal range of motion  Neck supple  Cardiovascular: Normal rate, regular rhythm, normal heart sounds and intact distal pulses  Exam reveals no gallop and no friction rub  No murmur heard  Pulmonary/Chest: Effort normal and breath sounds normal  No respiratory distress  She has no wheezes  She has no rales  Abdominal: Soft  Bowel sounds are normal  She exhibits no distension  There is no tenderness  Musculoskeletal: Normal range of motion  She exhibits no edema  Steady gait with walker    Neurological: She is alert and oriented to person, place, and time  Skin: Skin is warm and dry  No rash noted  Psychiatric: She has a normal mood and affect  Reviewed Updated [facility] Prior Wellness Visits:   Last Medicare wellness visit information was reviewed, patient interviewed , no change since last AWV: no  Last Medicare wellness visit information was reviewed, patient interviewed and updates made to the record today:  yes    Assessment and Plan:  1   Medicare annual wellness visit, subsequent         Health Maintenance Due   Topic Date Due    HIV SCREENING  1980    PAP SMEAR  01/18/1997    INFLUENZA VACCINE 09/01/2017

## 2018-02-14 DIAGNOSIS — K59.00 CONSTIPATION, UNSPECIFIED CONSTIPATION TYPE: Primary | ICD-10-CM

## 2018-02-14 RX ORDER — MAGNESIUM HYDROXIDE 1200 MG/15ML
SUSPENSION ORAL
Qty: 355 ML | Refills: 0 | Status: SHIPPED | OUTPATIENT
Start: 2018-02-14 | End: 2018-12-06 | Stop reason: SDUPTHER

## 2018-02-21 ENCOUNTER — OFFICE VISIT (OUTPATIENT)
Dept: FAMILY MEDICINE CLINIC | Facility: CLINIC | Age: 38
End: 2018-02-21
Payer: MEDICARE

## 2018-02-21 ENCOUNTER — TELEPHONE (OUTPATIENT)
Dept: FAMILY MEDICINE CLINIC | Facility: CLINIC | Age: 38
End: 2018-02-21

## 2018-02-21 VITALS
WEIGHT: 168.36 LBS | BODY MASS INDEX: 36.32 KG/M2 | RESPIRATION RATE: 16 BRPM | SYSTOLIC BLOOD PRESSURE: 130 MMHG | TEMPERATURE: 98.1 F | HEIGHT: 57 IN | HEART RATE: 92 BPM | DIASTOLIC BLOOD PRESSURE: 78 MMHG

## 2018-02-21 DIAGNOSIS — R31.9 HEMATURIA, UNSPECIFIED TYPE: Primary | ICD-10-CM

## 2018-02-21 LAB
SL AMB  POCT GLUCOSE, UA: NEGATIVE
SL AMB LEUKOCYTE ESTERASE,UA: NEGATIVE
SL AMB POCT BILIRUBIN,UA: NEGATIVE
SL AMB POCT BLOOD,UA: ABNORMAL
SL AMB POCT CLARITY,UA: CLEAR
SL AMB POCT COLOR,UA: YELLOW
SL AMB POCT KETONES,UA: NEGATIVE
SL AMB POCT NITRITE,UA: NEGATIVE
SL AMB POCT PH,UA: 6
SL AMB POCT SPECIFIC GRAVITY,UA: 1.02
SL AMB POCT URINE PROTEIN: NEGATIVE
SL AMB POCT UROBILINOGEN: 0.2

## 2018-02-21 PROCEDURE — 99213 OFFICE O/P EST LOW 20 MIN: CPT | Performed by: FAMILY MEDICINE

## 2018-02-21 PROCEDURE — 81003 URINALYSIS AUTO W/O SCOPE: CPT | Performed by: FAMILY MEDICINE

## 2018-02-21 NOTE — ASSESSMENT & PLAN NOTE
- urine dip in office still shows moderate blood, likely secondary to urinary tract infection, however, given patient was imperially treated and we have no urine culture results will retest urine for blood in 4 weeks, less likely renal cyst contributing since it is not near the collecting system   - follow up in four weeks will check urine dip at that time and if still positive for blood will send for urinalysis with microscopic, advised patient to not return when actively on menses

## 2018-02-21 NOTE — PROGRESS NOTES
Assessment/Plan:    Hematuria  - urine dip in office still shows moderate blood, likely secondary to urinary tract infection, however, given patient was imperially treated and we have no urine culture results will retest urine for blood in 4 weeks, less likely renal cyst contributing since it is not near the collecting system   - follow up in four weeks will check urine dip at that time and if still positive for blood will send for urinalysis with microscopic, advised patient to not return when actively on menses  Diagnoses and all orders for this visit:    Hematuria, unspecified type  -     POCT urine dip auto non-scope       Subjective:      Patient ID: Maxi Farooq is a 43 y o  female  Jeanette Willett comes today for follow up for urgent care visit for UTI  She reports she had blood in her urine  She was placed on bactrim for 7 days started last week  She reports blood in her urine, however, it is uncertain whether she had her menses  She reports that it burned when she wiped  Her symptoms have improved since starting the antibiotic  The following portions of the patient's history were reviewed and updated as appropriate: allergies, current medications, past family history, past medical history, past social history, past surgical history and problem list     Review of Systems   Constitutional: Negative for activity change and fatigue  HENT: Negative for congestion and rhinorrhea  Respiratory: Negative for cough and shortness of breath  Cardiovascular: Negative for chest pain  Gastrointestinal: Negative for abdominal pain, constipation, diarrhea, nausea and vomiting  Genitourinary: Negative for hematuria  As noted in HPI  Musculoskeletal: Negative for back pain  Neurological: Negative for dizziness and headaches           Objective:      /78   Pulse 92   Temp 98 1 °F (36 7 °C)   Resp 16   Ht 4' 9" (1 448 m)   Wt 76 4 kg (168 lb 5 8 oz)   BMI 36 43 kg/m² Physical Exam   Constitutional: She appears well-developed and well-nourished  No distress  HENT:   Head: Normocephalic and atraumatic  Mouth/Throat: Oropharynx is clear and moist    Eyes: Conjunctivae are normal  Right eye exhibits no discharge  Left eye exhibits no discharge  Neck: Normal range of motion  Neck supple  Cardiovascular: Normal rate and regular rhythm  Pulmonary/Chest: Effort normal and breath sounds normal    Abdominal: Soft  Bowel sounds are normal  There is no tenderness  Skin: Skin is warm and dry

## 2018-02-21 NOTE — PATIENT INSTRUCTIONS
Hematuria   AMBULATORY CARE:   Hematuria  is blood in your urine  Your urine may be bright red to dark brown  Common symptoms you might have with hematuria include the following:   · Fever     · Nausea and vomiting     · Pain or bruising on your lower back or sides     · Pain when you urinate     · More urination than usual, or the need to urinate right away  Seek care immediately if:   · You have blood in your urine after a new injury, such as a fall  · You are urinating very small amounts or not at all  · You feel like you cannot empty your bladder  · You have severe back or side pain that does not go away with treatment  Contact your healthcare provider if:   · You have a fever that gets worse or does not go away with treatment  · You cannot keep liquids or medicines down  · Your urine gets darker, even after you drink extra liquids  · You have questions or concerns about your condition, treatment, or care  Treatment for hematuria  depends on the cause of your hematuria  Treatment may not be needed  You may need medicines to treat an infection  Ask your healthcare provider for more information about the treatment you may need  Drink liquids as directed: You may need to drink extra liquids to help flush the blood from your body through your urine  Water is the best liquid to drink  Ask how much liquid to drink each day and which liquids are best for you  Follow up with your healthcare provider as directed:  Write down your questions so you remember to ask them during your visits  © 2017 [address] [employer] Information is for End User's use only and may not be sold, redistributed or otherwise used for commercial purposes  All illustrations and images included in CareNotes® are the copyrighted property of [employer] M [employer]  or [employer]  The above information is an  only  It is not intended as medical advice for individual conditions or treatments   Talk to your doctor, nurse or pharmacist before following any medical regimen to see if it is safe and effective for you

## 2018-02-21 NOTE — TELEPHONE ENCOUNTER
CALLING FROM [facility] TO REPORT THAT THE MEDICATION  ACIPHEX DR 20MG TAB IS NO LONGER COVERED UNDER THE PATIENTS INSURANCE

## 2018-02-22 DIAGNOSIS — K21.9 GASTROESOPHAGEAL REFLUX DISEASE, ESOPHAGITIS PRESENCE NOT SPECIFIED: Primary | ICD-10-CM

## 2018-02-22 RX ORDER — PANTOPRAZOLE SODIUM 40 MG/1
40 TABLET, DELAYED RELEASE ORAL DAILY
Qty: 30 TABLET | Refills: 2 | Status: SHIPPED | OUTPATIENT
Start: 2018-02-22 | End: 2018-05-07 | Stop reason: SDUPTHER

## 2018-02-28 ENCOUNTER — DOCUMENTATION (OUTPATIENT)
Dept: AUDIOLOGY | Age: 38
End: 2018-02-28

## 2018-02-28 NOTE — PROGRESS NOTES
RFF- Two Brian half shell earmolds  Sn: [id number]        [id number]  Warranty: 5/7/2018  Invoice: [id number]    Earmolds are in the box with Elizabeth's aids    -schedule HA /fit

## 2018-03-16 ENCOUNTER — TRANSCRIBE ORDERS (OUTPATIENT)
Dept: ADMINISTRATIVE | Age: 38
End: 2018-03-16

## 2018-03-16 ENCOUNTER — APPOINTMENT (OUTPATIENT)
Dept: LAB | Age: 38
End: 2018-03-16
Payer: MEDICARE

## 2018-03-16 ENCOUNTER — OFFICE VISIT (OUTPATIENT)
Dept: AUDIOLOGY | Age: 38
End: 2018-03-16
Payer: MEDICARE

## 2018-03-16 DIAGNOSIS — H90.3 SENSORINEURAL HEARING LOSS, BILATERAL: Primary | ICD-10-CM

## 2018-03-16 DIAGNOSIS — D49.7 HYPERCORTISOLISM DUE TO ADRENAL NEOPLASM (HCC): ICD-10-CM

## 2018-03-16 DIAGNOSIS — E24.8 HYPERCORTISOLISM DUE TO ADRENAL NEOPLASM (HCC): ICD-10-CM

## 2018-03-16 DIAGNOSIS — E23.7 DISEASE OF PITUITARY GLAND (HCC): ICD-10-CM

## 2018-03-16 DIAGNOSIS — E23.7 DISEASE OF PITUITARY GLAND (HCC): Primary | ICD-10-CM

## 2018-03-16 LAB
BUN SERPL-MCNC: 11 MG/DL (ref 5–25)
CREAT SERPL-MCNC: 0.66 MG/DL (ref 0.6–1.3)
GFR SERPL CREATININE-BSD FRML MDRD: 112 ML/MIN/1.73SQ M

## 2018-03-16 PROCEDURE — 82565 ASSAY OF CREATININE: CPT

## 2018-03-16 PROCEDURE — 36415 COLL VENOUS BLD VENIPUNCTURE: CPT

## 2018-03-16 PROCEDURE — 84520 ASSAY OF UREA NITROGEN: CPT

## 2018-03-16 NOTE — PROGRESS NOTES
Hearing aid Fitting    Name:  Nancy Sherman  :  1980  Age:  43 y o  Date of Evaluation: 18       Nancy Sherman is being see today to be fit with new hearing aids  Patient is being fit with [facility] Z Series i110 BTE  Right serial number [id number] / left serial number [id number]  Warranty date 1/3/21 - No L/D  The hearing aid(s) were adjusted based on the patient's most recent audiogram and patient comfort  Patient reported good sound quality  Care and cleaning of the hearing aids was reviewed  Earmolds, filters, and batteries were reviewed with the patient  The patient's battery size is 312  Insertion and removal of the hearing aids was done  The patient practiced insertion and removal of the devices in the office, they demonstrated good ability to manipulate the hearing aids  Telephone use was reviewed with the patient  The patient expressed satisfaction with the hearing aids  The right hearing was intermittent during fitting, sending in right hearing aid  Recommendation:   Follow up when right hearing aid RFF         Indigo Davies , CCC-A  Clinical Audiologist

## 2018-03-23 DIAGNOSIS — K21.9 GASTROESOPHAGEAL REFLUX DISEASE, ESOPHAGITIS PRESENCE NOT SPECIFIED: ICD-10-CM

## 2018-03-23 RX ORDER — FAMOTIDINE 20 MG/1
20 TABLET, FILM COATED ORAL 2 TIMES DAILY
Qty: 60 TABLET | Refills: 0 | Status: SHIPPED | OUTPATIENT
Start: 2018-03-23 | End: 2018-04-09 | Stop reason: SDUPTHER

## 2018-03-27 ENCOUNTER — ANESTHESIA EVENT (OUTPATIENT)
Dept: RADIOLOGY | Facility: HOSPITAL | Age: 38
End: 2018-03-27

## 2018-03-27 ENCOUNTER — OFFICE VISIT (OUTPATIENT)
Dept: FAMILY MEDICINE CLINIC | Facility: CLINIC | Age: 38
End: 2018-03-27
Payer: MEDICARE

## 2018-03-27 ENCOUNTER — OFFICE VISIT (OUTPATIENT)
Dept: NEUROSURGERY | Facility: CLINIC | Age: 38
End: 2018-03-27

## 2018-03-27 ENCOUNTER — TELEPHONE (OUTPATIENT)
Dept: PREADMISSION TESTING | Facility: HOSPITAL | Age: 38
End: 2018-03-27

## 2018-03-27 VITALS
WEIGHT: 168.4 LBS | HEART RATE: 72 BPM | TEMPERATURE: 98 F | HEIGHT: 57 IN | BODY MASS INDEX: 36.33 KG/M2 | SYSTOLIC BLOOD PRESSURE: 126 MMHG | DIASTOLIC BLOOD PRESSURE: 70 MMHG | RESPIRATION RATE: 16 BRPM

## 2018-03-27 VITALS
TEMPERATURE: 98.2 F | BODY MASS INDEX: 36.35 KG/M2 | RESPIRATION RATE: 16 BRPM | DIASTOLIC BLOOD PRESSURE: 128 MMHG | HEART RATE: 72 BPM | SYSTOLIC BLOOD PRESSURE: 146 MMHG | WEIGHT: 168 LBS

## 2018-03-27 DIAGNOSIS — E23.7 PITUITARY ABNORMALITY (HCC): ICD-10-CM

## 2018-03-27 DIAGNOSIS — R31.9 HEMATURIA, UNSPECIFIED TYPE: Primary | ICD-10-CM

## 2018-03-27 DIAGNOSIS — Z01.818 PRE-PROCEDURAL EXAMINATION: Primary | ICD-10-CM

## 2018-03-27 LAB
BACTERIA UR QL AUTO: ABNORMAL /HPF
BILIRUB UR QL STRIP: NEGATIVE
CLARITY UR: ABNORMAL
COLOR UR: YELLOW
GLUCOSE UR STRIP-MCNC: NEGATIVE MG/DL
HGB UR QL STRIP.AUTO: ABNORMAL
KETONES UR STRIP-MCNC: NEGATIVE MG/DL
LEUKOCYTE ESTERASE UR QL STRIP: ABNORMAL
NITRITE UR QL STRIP: NEGATIVE
NON-SQ EPI CELLS URNS QL MICRO: ABNORMAL /HPF
PH UR STRIP.AUTO: 5.5 [PH] (ref 4.5–8)
PROT UR STRIP-MCNC: NEGATIVE MG/DL
RBC #/AREA URNS AUTO: ABNORMAL /HPF
SL AMB  POCT GLUCOSE, UA: NEGATIVE
SL AMB LEUKOCYTE ESTERASE,UA: NEGATIVE
SL AMB POCT BILIRUBIN,UA: NEGATIVE
SL AMB POCT BLOOD,UA: ABNORMAL
SL AMB POCT CLARITY,UA: ABNORMAL
SL AMB POCT COLOR,UA: YELLOW
SL AMB POCT KETONES,UA: NEGATIVE
SL AMB POCT NITRITE,UA: NEGATIVE
SL AMB POCT PH,UA: 5
SL AMB POCT SPECIFIC GRAVITY,UA: 1.02
SL AMB POCT URINE PROTEIN: NEGATIVE
SL AMB POCT UROBILINOGEN: 0.2
SP GR UR STRIP.AUTO: 1.02 (ref 1–1.03)
URATE CRY URNS QL MICRO: ABNORMAL /HPF
UROBILINOGEN UR QL STRIP.AUTO: 0.2 E.U./DL
WBC #/AREA URNS AUTO: ABNORMAL /HPF

## 2018-03-27 PROCEDURE — 81001 URINALYSIS AUTO W/SCOPE: CPT | Performed by: FAMILY MEDICINE

## 2018-03-27 PROCEDURE — 81003 URINALYSIS AUTO W/O SCOPE: CPT | Performed by: FAMILY MEDICINE

## 2018-03-27 PROCEDURE — 99213 OFFICE O/P EST LOW 20 MIN: CPT | Performed by: FAMILY MEDICINE

## 2018-03-27 PROCEDURE — 87086 URINE CULTURE/COLONY COUNT: CPT | Performed by: FAMILY MEDICINE

## 2018-03-27 PROCEDURE — PREOP: Performed by: PHYSICIAN ASSISTANT

## 2018-03-27 NOTE — LETTER
March 27, 2018     Yanira Dodge MD  [place] [id number]    Patient: Nii Angel   YOB: 1980   Date of Visit: 3/27/2018       Dear Dr Nanci Zavala:    Thank you for referring Roxy Berkowitz to me for evaluation  Below are my notes for this consultation  If you have questions, please do not hesitate to call me  I look forward to following your patient along with you  Sincerely,        Nerissa Ventura PA-C        CC: No Recipients  Nerissa Ventura PA-C  3/27/2018 10:53 AM  Sign at close encounter  Assessment/Plan:    Very pleasant 59-year-old female, with mild-to-moderate MR, presents for preprocedure evaluation, has planned MRI of the brain 3/28/18, under anesthesia to evaluate pituitary abnormality  She requires anesthesia for her imaging as a consequence of her MR   She is a nonsmoker  She has a prior history of procedures under general anesthesia which was tolerated without complication  She denies SOB or CP with activity  She denies bleeding disorder or history of same  There is no history of chronic pulmonary conditions  She is under the care of a primary care provider and is followed regularly for GERD, allergies, and anxiety  There are no known medication allergies reported  She had some mild blood pressure elevation at the onset of her visit however at discharge her blood pressure is 110/70  In addition she understands on the day of procedure should she have medication she takes and when she may take those with small sips of water  She does understand that remain NPO from midnight the night before  Diagnoses and all orders for this visit:    Pre-procedural examination    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HCC)          Subjective:      Patient ID: Nii Angel is a 43 y o  female      Very pleasant 59-year-old female, accompanied by a attendant from her residence, with mild to moderate mental retardation, presents for preprocedure H&P, she has planned "MRI brain under anesthesia"  As a consequence of her MR, imaging under anesthesia as necessary  She has history of a fall in the spring of 2017, this resulted in a CT scan to assess for injury, incidental note was made of pituitary abnormality at that time  She has history of CP, in addition she has significant weakness to the right side, and has history of  shunt with the valve on the left  She is a nonsmoker  She has a prior history of procedures under general anesthesia which was tolerated without complication  She denies SOB or CP with activity  She denies bleeding disorder or history of same  There is no history of chronic pulmonary conditions  She is under the care of a primary care provider and is followed regularly for GERD, allergies, and anxiety  There are no known medication allergies reported  The following portions of the patient's history were reviewed and updated as appropriate: allergies, current medications, past family history, past medical history, past social history and past surgical history  Review of Systems   Constitutional: Negative  HENT: Negative  Eyes: Negative  Respiratory: Negative  Cardiovascular: Negative  Gastrointestinal: Negative  Endocrine: Negative  Genitourinary: Negative  Musculoskeletal: Positive for gait problem  Negative for arthralgias, back pain, joint swelling, myalgias, neck pain and neck stiffness  Skin: Negative  Allergic/Immunologic: Negative  Neurological: Negative for dizziness, tremors, seizures, syncope, facial asymmetry, speech difficulty, weakness, light-headedness, numbness and headaches  Hematological: Negative  Psychiatric/Behavioral: Negative  Objective:    Physical Exam   Constitutional: She is oriented to person, place, and time  She appears well-nourished  HENT:   Head: Normocephalic     Cardiovascular: Normal rate, regular rhythm and normal heart sounds  Pulmonary/Chest: Effort normal and breath sounds normal    Abdominal: Soft  Bowel sounds are normal    Neurological: She is oriented to person, place, and time  Skin: Skin is warm and dry  Psychiatric: She has a normal mood and affect  Neurologic Exam     Mental Status   Oriented to person, place, and time     Level of consciousness: alert    Gait, Coordination, and Reflexes     Gait  Gait: wide-based (Utilizes a 4 point walker to assist with gait)

## 2018-03-27 NOTE — PATIENT INSTRUCTIONS
Continue all usual activities  As discussed NPO after midnight  May take any usual medications of small simple water in the a santa  Lanicki Cassette

## 2018-03-27 NOTE — PROGRESS NOTES
Assessment/Plan:    Very pleasant 50-year-old female, with mild-to-moderate MR, presents for preprocedure evaluation, has planned MRI of the brain 3/28/18, under anesthesia to evaluate pituitary abnormality  She requires anesthesia for her imaging as a consequence of her MR   She is a nonsmoker  She has a prior history of procedures under general anesthesia which was tolerated without complication  She denies SOB or CP with activity  She denies bleeding disorder or history of same  There is no history of chronic pulmonary conditions  She is under the care of a primary care provider and is followed regularly for GERD, allergies, and anxiety  There are no known medication allergies reported  She had some mild blood pressure elevation at the onset of her visit however at discharge her blood pressure is 110/70  In addition she understands on the day of procedure should she have medication she takes and when she may take those with small sips of water  She does understand that remain NPO from midnight the night before  Diagnoses and all orders for this visit:    Pre-procedural examination    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HCC)          Subjective:      Patient ID: Isela Mayfield is a 43 y o  female  Very pleasant 21-year-old female, accompanied by a attendant from her residence, with mild to moderate mental retardation, presents for preprocedure H&P, she has planned "MRI brain under anesthesia"  As a consequence of her MR, imaging under anesthesia as necessary  She has history of a fall in the spring of 2017, this resulted in a CT scan to assess for injury, incidental note was made of pituitary abnormality at that time  She has history of CP, in addition she has significant weakness to the right side, and has history of  shunt with the valve on the left  She is a nonsmoker      She has a prior history of procedures under general anesthesia which was tolerated without complication  She denies SOB or CP with activity  She denies bleeding disorder or history of same  There is no history of chronic pulmonary conditions  She is under the care of a primary care provider and is followed regularly for GERD, allergies, and anxiety  There are no known medication allergies reported  The following portions of the patient's history were reviewed and updated as appropriate: allergies, current medications, past family history, past medical history, past social history and past surgical history  Review of Systems   Constitutional: Negative  HENT: Negative  Eyes: Negative  Respiratory: Negative  Cardiovascular: Negative  Gastrointestinal: Negative  Endocrine: Negative  Genitourinary: Negative  Musculoskeletal: Positive for gait problem  Negative for arthralgias, back pain, joint swelling, myalgias, neck pain and neck stiffness  Skin: Negative  Allergic/Immunologic: Negative  Neurological: Negative for dizziness, tremors, seizures, syncope, facial asymmetry, speech difficulty, weakness, light-headedness, numbness and headaches  Hematological: Negative  Psychiatric/Behavioral: Negative  Objective:    Physical Exam   Constitutional: She is oriented to person, place, and time  She appears well-nourished  HENT:   Head: Normocephalic  Cardiovascular: Normal rate, regular rhythm and normal heart sounds  Pulmonary/Chest: Effort normal and breath sounds normal    Abdominal: Soft  Bowel sounds are normal    Neurological: She is oriented to person, place, and time  Skin: Skin is warm and dry  Psychiatric: She has a normal mood and affect  Neurologic Exam     Mental Status   Oriented to person, place, and time     Level of consciousness: alert    Gait, Coordination, and Reflexes     Gait  Gait: wide-based (Utilizes a 4 point walker to assist with gait)

## 2018-03-27 NOTE — PROGRESS NOTES
Assessment/Plan:    No problem-specific Assessment & Plan notes found for this encounter  Diagnoses and all orders for this visit:    Hematuria, unspecified type  -     UA (URINE) with reflex to Microscopic  -     POCT urine dip auto non-scope  -     Urine culture; Future    Subjective:      Patient ID: Nancy Sherman is a 43 y o  female  CC: Follow up for hematuria  This is a 44 yo F who presents for follow up for hematuria  She denies any urinary complaints including gross hematuria, dysuria, and frequency  She does reports sometimes she does not make it to the bathroom in time  She reports she urinates four times per day  LMP was 3/14/18 which ended  The following portions of the patient's history were reviewed and updated as appropriate: allergies, current medications, past family history, past medical history, past social history, past surgical history and problem list     Review of Systems   Constitutional: Negative for fever  HENT: Negative for congestion and sneezing  Eyes: Negative for visual disturbance  Respiratory: Negative for cough and shortness of breath  Cardiovascular: Negative for chest pain  Gastrointestinal: Negative for abdominal distention, constipation, diarrhea, nausea and vomiting  Genitourinary: Negative for difficulty urinating, hematuria and urgency  Musculoskeletal: Negative for back pain  Neurological: Negative for headaches  Objective:  /70   Pulse 72   Temp 98 °F (36 7 °C)   Resp 16   Ht 4' 9" (1 448 m)   Wt 76 4 kg (168 lb 6 4 oz)   BMI 36 44 kg/m²      Physical Exam   Constitutional: She appears well-developed and well-nourished  HENT:   Head: Normocephalic and atraumatic  Mouth/Throat: Oropharynx is clear and moist    Eyes: Conjunctivae are normal    Neck: Neck supple  Cardiovascular: Normal rate, regular rhythm and normal heart sounds      Pulmonary/Chest: Effort normal and breath sounds normal  No respiratory distress  She has no wheezes  She has no rales  Abdominal: Soft  Bowel sounds are normal  She exhibits no distension  There is no tenderness  Skin: Skin is warm and dry  No rash noted  Psychiatric: She has a normal mood and affect

## 2018-03-27 NOTE — ASSESSMENT & PLAN NOTE
- patient was empirically treated for UTI 4 weeks ago, no culture taken  - will send urine to evaluate for resolution of microscopic hematuria     - WIll obtain microscopic urinalysis and urine culture if negative for infection and positive for blood > 3 rbc per HPF will then send to urology for further evaluation

## 2018-03-28 ENCOUNTER — HOSPITAL ENCOUNTER (OUTPATIENT)
Dept: RADIOLOGY | Facility: HOSPITAL | Age: 38
Discharge: HOME | End: 2018-03-28
Payer: MEDICARE

## 2018-03-28 ENCOUNTER — ANESTHESIA (OUTPATIENT)
Dept: RADIOLOGY | Facility: HOSPITAL | Age: 38
End: 2018-03-28

## 2018-03-28 VITALS
RESPIRATION RATE: 16 BRPM | HEART RATE: 88 BPM | OXYGEN SATURATION: 99 % | HEIGHT: 58 IN | SYSTOLIC BLOOD PRESSURE: 118 MMHG | TEMPERATURE: 98.1 F | BODY MASS INDEX: 35.26 KG/M2 | DIASTOLIC BLOOD PRESSURE: 56 MMHG | WEIGHT: 168 LBS

## 2018-03-28 DIAGNOSIS — E23.7 DISEASE OF PITUITARY GLAND (HCC): ICD-10-CM

## 2018-03-28 DIAGNOSIS — K59.00 CONSTIPATION, UNSPECIFIED CONSTIPATION TYPE: Primary | ICD-10-CM

## 2018-03-28 LAB — EXT PREGNANCY TEST URINE: NEGATIVE

## 2018-03-28 PROCEDURE — 70553 MRI BRAIN STEM W/O & W/DYE: CPT

## 2018-03-28 PROCEDURE — A9585 GADOBUTROL INJECTION: HCPCS | Performed by: PHYSICIAN ASSISTANT

## 2018-03-28 PROCEDURE — 81025 URINE PREGNANCY TEST: CPT | Performed by: STUDENT IN AN ORGANIZED HEALTH CARE EDUCATION/TRAINING PROGRAM

## 2018-03-28 RX ORDER — SODIUM CHLORIDE, SODIUM LACTATE, POTASSIUM CHLORIDE, CALCIUM CHLORIDE 600; 310; 30; 20 MG/100ML; MG/100ML; MG/100ML; MG/100ML
20 INJECTION, SOLUTION INTRAVENOUS CONTINUOUS
Status: DISCONTINUED | OUTPATIENT
Start: 2018-03-28 | End: 2018-03-29 | Stop reason: HOSPADM

## 2018-03-28 RX ORDER — PROPOFOL 10 MG/ML
INJECTION, EMULSION INTRAVENOUS AS NEEDED
Status: DISCONTINUED | OUTPATIENT
Start: 2018-03-28 | End: 2018-03-28 | Stop reason: SURG

## 2018-03-28 RX ORDER — DEXAMETHASONE SODIUM PHOSPHATE 4 MG/ML
INJECTION, SOLUTION INTRA-ARTICULAR; INTRALESIONAL; INTRAMUSCULAR; INTRAVENOUS; SOFT TISSUE AS NEEDED
Status: DISCONTINUED | OUTPATIENT
Start: 2018-03-28 | End: 2018-03-28 | Stop reason: SURG

## 2018-03-28 RX ORDER — LIDOCAINE HYDROCHLORIDE 10 MG/ML
INJECTION, SOLUTION INFILTRATION; PERINEURAL AS NEEDED
Status: DISCONTINUED | OUTPATIENT
Start: 2018-03-28 | End: 2018-03-28 | Stop reason: SURG

## 2018-03-28 RX ORDER — ONDANSETRON 2 MG/ML
INJECTION INTRAMUSCULAR; INTRAVENOUS AS NEEDED
Status: DISCONTINUED | OUTPATIENT
Start: 2018-03-28 | End: 2018-03-28 | Stop reason: SURG

## 2018-03-28 RX ADMIN — LIDOCAINE HYDROCHLORIDE 50 MG: 10 INJECTION, SOLUTION INFILTRATION; PERINEURAL at 08:25

## 2018-03-28 RX ADMIN — PROPOFOL 100 MG: 10 INJECTION, EMULSION INTRAVENOUS at 08:30

## 2018-03-28 RX ADMIN — ONDANSETRON 4 MG: 2 INJECTION INTRAMUSCULAR; INTRAVENOUS at 08:26

## 2018-03-28 RX ADMIN — DEXAMETHASONE SODIUM PHOSPHATE 5 MG: 4 INJECTION, SOLUTION INTRAMUSCULAR; INTRAVENOUS at 08:26

## 2018-03-28 RX ADMIN — GADOBUTROL 7 ML: 604.72 INJECTION INTRAVENOUS at 09:18

## 2018-03-28 RX ADMIN — PROPOFOL 100 MG: 10 INJECTION, EMULSION INTRAVENOUS at 08:25

## 2018-03-28 RX ADMIN — SODIUM CHLORIDE, SODIUM LACTATE, POTASSIUM CHLORIDE, AND CALCIUM CHLORIDE: .6; .31; .03; .02 INJECTION, SOLUTION INTRAVENOUS at 08:00

## 2018-03-28 NOTE — PROGRESS NOTES
Pt awake and alert to self and place  Pt requested caregiver (Cedric Reed) to bedside  Pt and caregiver notified of transfer to [facility], pt and caregiver have no further questions at this time    Called [facility] spoke with Kassidy-PC was told "a nurse will call you back when available "  RN called back and spoke with Stefan Mortimer

## 2018-03-28 NOTE — ANESTHESIA POSTPROCEDURE EVALUATION
Post-Op Assessment Note      CV Status:  Stable    Mental Status:  Alert and awake    Hydration Status:  Euvolemic    PONV Controlled:  Controlled    Airway Patency:  Patent    Post Op Vitals Reviewed: Yes          Staff: Anesthesiologist           BP   119/70   Temp      Pulse 101   Resp   18   SpO2 98 RA

## 2018-03-28 NOTE — DISCHARGE INSTRUCTIONS
Magnetic Resonance Imaging   WHAT YOU NEED TO KNOW:   Magnetic resonance imaging (MRI) is a test that uses magnetic fields and radio waves to take pictures inside of your body  An MRI is used to see blood vessels, tissue, muscles, and bones  It can also show organs, such as your heart, lungs, or liver  An MRI can help your healthcare provider diagnose or treat a medical condition  It does not use radiation  DISCHARGE INSTRUCTIONS:   Drink liquids as directed:  Liquids will help flush the contrast dye out of your body  Ask how much liquid to drink after your MRI, and which liquids to drink  Follow up with your healthcare provider as directed:  Write down your questions so you remember to ask them during your visits  Contact your healthcare provider if:   · You have questions or concerns about your condition or care  Seek care immediately or call 911 if:  You have any signs of an allergic reaction to the contrast dye, such as:  · Trouble breathing    · Dizziness or fainting    · Swelling of your mouth or face    · Nausea or vomiting    · Sudden decrease in urination    · A rash, itching, or swollen skin  © 2017 [facility] Information is for End User's use only and may not be sold, redistributed or otherwise used for commercial purposes  All illustrations and images included in CareNotes® are the copyrighted property of A D A M [employer] Inc  or [employer]pp[employer]  The above information is an  only  It is not intended as medical advice for individual conditions or treatments  Talk to your doctor, nurse or pharmacist before following any medical regimen to see if it is safe and effective for you

## 2018-03-28 NOTE — ANESTHESIA PREPROCEDURE EVALUATION
Review of Systems/Medical History  Patient summary reviewed  Chart reviewed  No history of anesthetic complications     Cardiovascular  EKG reviewed, Negative cardio ROS    Pulmonary  Negative pulmonary ROS        GI/Hepatic    GERD well controlled,        Negative  ROS        Endo/Other    Obesity (BMI 39)    GYN  Not currently pregnant ,          Hematology  Negative hematology ROS      Musculoskeletal  Negative musculoskeletal ROS        Neurology    Neuromuscular disease: cerebral palsy with right sided weakness  , CNS neoplasm (pituitary adenoma) ,   Comment: Left  shunt Psychology   Anxiety, Depression , being treated for depression,     Comment: MR       Physical Exam    Airway    Mallampati score: III    Neck ROM: full     Dental   No notable dental hx     Cardiovascular  Comment: Negative ROS,     Pulmonary      Other Findings      Lab Results   Component Value Date    WBC 9 61 10/12/2017    HGB 13 7 10/12/2017     10/12/2017     Lab Results   Component Value Date     10/12/2017    K 3 3 (L) 10/12/2017    BUN 11 03/16/2018    CREATININE 0 66 03/16/2018    GLUCOSE 105 10/12/2017     Anesthesia Plan  ASA Score- 3     Anesthesia Type- general with ASA Monitors  Additional Monitors:   Airway Plan:         Plan Factors-    Induction- intravenous  Postoperative Plan-     Informed Consent- Anesthetic plan and risks discussed with patient

## 2018-03-29 LAB — BACTERIA UR CULT: NORMAL

## 2018-04-02 ENCOUNTER — TELEPHONE (OUTPATIENT)
Dept: FAMILY MEDICINE CLINIC | Facility: CLINIC | Age: 38
End: 2018-04-02

## 2018-04-02 RX ORDER — DOCUSATE SODIUM -SENNOSIDES 50; 8.6 MG/1; MG/1
TABLET, COATED ORAL
Qty: 30 TABLET | Refills: 0 | Status: SHIPPED | OUTPATIENT
Start: 2018-04-02 | End: 2018-05-04 | Stop reason: SDUPTHER

## 2018-04-03 DIAGNOSIS — K59.00 CONSTIPATION, UNSPECIFIED CONSTIPATION TYPE: ICD-10-CM

## 2018-04-03 DIAGNOSIS — K59.00 CONSTIPATION, UNSPECIFIED CONSTIPATION TYPE: Primary | ICD-10-CM

## 2018-04-03 RX ORDER — METOCLOPRAMIDE 10 MG/1
TABLET ORAL
Qty: 30 TABLET | Refills: 0 | Status: SHIPPED | OUTPATIENT
Start: 2018-04-03 | End: 2018-12-06 | Stop reason: SDUPTHER

## 2018-04-03 RX ORDER — LUBIPROSTONE 24 UG/1
CAPSULE, GELATIN COATED ORAL
Qty: 60 CAPSULE | Refills: 0 | Status: SHIPPED | OUTPATIENT
Start: 2018-04-03 | End: 2018-05-07 | Stop reason: SDUPTHER

## 2018-04-06 ENCOUNTER — OFFICE VISIT (OUTPATIENT)
Dept: NEUROSURGERY | Facility: CLINIC | Age: 38
End: 2018-04-06
Payer: MEDICARE

## 2018-04-06 VITALS
HEIGHT: 58 IN | TEMPERATURE: 98.4 F | BODY MASS INDEX: 35.26 KG/M2 | SYSTOLIC BLOOD PRESSURE: 134 MMHG | WEIGHT: 168 LBS | HEART RATE: 88 BPM | RESPIRATION RATE: 16 BRPM | DIASTOLIC BLOOD PRESSURE: 75 MMHG

## 2018-04-06 DIAGNOSIS — E23.6 PITUITARY CYST (HCC): ICD-10-CM

## 2018-04-06 DIAGNOSIS — D49.7 PITUITARY NEOPLASM: Primary | ICD-10-CM

## 2018-04-06 DIAGNOSIS — D35.2 PITUITARY MICROADENOMA (HCC): ICD-10-CM

## 2018-04-06 PROCEDURE — 99214 OFFICE O/P EST MOD 30 MIN: CPT | Performed by: PHYSICIAN ASSISTANT

## 2018-04-06 NOTE — PROGRESS NOTES
Assessment/Plan:  Very pleasant 76-year-old female, accompanied by the tendon from her group home, returns to review MRI of her brain (3/28/18), the study was carefully reviewed in detail by Dr Funmilayo Carpenter, and compared with prior study of 9/20/17, the 7 mm hypo enhancing suprasellar mass is again identified, and appears to be unchanged  There is some mild mass effect upon the optic chiasm, which also appears to be unchanged  In addition she does have a prior  shunt which appears to be appropriately placed and unchanged from prior study  Clinically the patient is asymptomatic of this lesion, without focal neurologic deficit on examination  She has been seen by her optometrist who reports normal vision, no direct visual field examination is available for review at this time, she does have fairly significant dysconjugate gaze which might make this study very difficult  Consultation has been made with ophthalmology Dr Emani Arroyo to further assess for visual fields  She also has had pituitary laboratory panel performed 2/24/17 all which demonstrated normal function  Further follow-up is planned in approximately 1 year with repeat MRI of the brain with pituitary protocol and clinical visit  These findings, impressions and recommendations are reviewed in great detail with the patient, she expressed understanding and agreement, her questions were answered completely and to her satisfaction  Follow-up has been scheduled, MRI has been requested  As well as ophthalmology consultation  Diagnoses and all orders for this visit:    Pituitary neoplasm  -     MRI brain pituitary wo and w contrast; Future  -     Ambulatory referral to Ophthalmology; Future    Pituitary microadenoma ([facility] [address] )  -     MRI brain pituitary wo and w contrast; Future  -     Ambulatory referral to Ophthalmology;  Future    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[facility] [address] )  -     MRI brain pituitary wo and w contrast; Future  -     Ambulatory referral to Ophthalmology; Future        Subjective:      Patient ID: Juan Pablo Camilo is a 43 y o  female  Very pleasant 39-year-old female, history of mild MR and CP, accompanied by her caregiver from her group home, returns for 1 year follow-up, pituitary microadenoma/pituitary cyst     She has had her updated MRI of the brain 3/28/18, under anesthesia  She reports no new symptoms or changes, she denies gait or balance disturbance other than her usual, motor sensory difficulties in the upper lower extremities, other than her usual, difficulty with memory or mentation, weight gain or weight loss, heat intolerance, visual changes or visual field difficulties  She has history of fall and as a consequence underwent CAT scan and MRI which revealed incidental finding of lesion overlying the pituitary gland  She has history of CP, in addition she has significant weakness to the right side, and has history of  shunt with the valve on the left  She has seen optometry in the past for spectacles, it is unclear as to whether she has had visual field examination is, which would be challenging as a result of her dysconjugate gaze  She has had laboratory pituitary studies performed through [facility] ordered by Dr Danii Ames, Sports Medicine  The studies revealed normal pituitary function relative to the 5 axis  She otherwise reports no interval changes in her medical or surgical history  The following portions of the patient's history were reviewed and updated as appropriate: allergies, current medications, past family history, past medical history, past social history and past surgical history  Review of Systems   Constitutional: Negative  HENT: Negative  Eyes: Negative  Respiratory: Positive for shortness of breath  Negative for apnea, cough, choking, chest tightness, wheezing and stridor  Cardiovascular: Negative  Gastrointestinal: Negative  Endocrine: Negative  Genitourinary: Negative  Musculoskeletal: Positive for gait problem  Negative for arthralgias, back pain, joint swelling, myalgias, neck pain and neck stiffness  Skin: Negative  Allergic/Immunologic: Negative  Neurological: Positive for dizziness  Negative for tremors, seizures, syncope, facial asymmetry, speech difficulty, weakness, light-headedness, numbness and headaches  Hematological: Negative  Psychiatric/Behavioral: Negative  Objective:    Physical Exam   Constitutional: She is oriented to person, place, and time  Cardiovascular: Normal rate, regular rhythm and normal heart sounds  Pulmonary/Chest: Effort normal and breath sounds normal    Neurological: She is alert and oriented to person, place, and time  Gait normal    Skin: Skin is warm and dry  Psychiatric: She has a normal mood and affect  Neurologic Exam     Mental Status   Oriented to person, place, and time  Level of consciousness: alert    Cranial Nerves     CN II   Visual fields full to confrontation  Visual acuity: (She has considerable dysconjugate gaze, as consequence visual field examination is grossly intact but incomplete)    CN V   Facial sensation intact  Gait, Coordination, and Reflexes     Gait  Gait: normal     MRI BRAIN AND SELLA  WITH AND WITHOUT CONTRAST  3/28/18     INDICATION: Pituitary abnormality, history of cerebral palsy     COMPARISON: MR 9/20/2017, CT 10/12/2017     TECHNIQUE:  Brain: Axial diffusion-weighted imaging  Axial FLAIR and axial T2  Axial gradient  Axial T1 postcontrast Axial bravo postcontrast with coronal reconstructions  Sella: Sagittal and coronal T1  Coronal T2  Sagittal and coronal T1 postcontrast with fat suppression    Coronal dynamic enhancement      Targeted images of the sella were performed requiring additional time at acquisition and interpretation of approximately 25%     IV Contrast:  7 mL of gadobutrol injection (MULTI-DOSE)      IMAGE QUALITY: Diagnostic, however programmable shunt valve in the left parietal region does locally distorted image integrity      FINDINGS:     BRAIN PARENCHYMA:  No acute disease      Left hemisphere is markedly dysmorphic, resulting in marked irregularity of the contour of the left lateral ventricle  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  Close Lip   schizencephaly is not excluded  Significant left hemispheric volume loss  Postcontrast imaging is normal      VENTRICLES:  Left lateral ventricle is markedly dysmorphic, right lateral ventricle is slitlike in appearance  4th ventricle is attenuated, the aqueduct is stenotic  3rd ventricle attenuated in deviated superiorly toward the dysmorphic left hemisphere  Position of shunt catheter is stable  It extends to the posterior aspect of the dysmorphic body of the lateral ventricle axes into the ambient cistern and extends along the right paramedian midbrain            SELLA AND PITUITARY GLAND:  Appearance of the pituitary gland is stable  Volume gland is normal for gender and age  There is a hypoenhancing 7 mm structure in the suprasellar cistern, indistinguishable from the infundibulum and elevating the optic   chiasm  This extends to the upper surface of the pituitary gland/diaphragma sella  This could represent a small adenoma or cyst      ORBITS:  Normal      PARANASAL SINUSES:  Retention cyst or polyp left maxillary sinus      VASCULATURE:  Evaluation of the major intracranial vasculature demonstrates appropriate flow voids      CALVARIUM AND SKULL BASE:  Scaphocephaly     EXTRACRANIAL SOFT TISSUES:  Normal      IMPRESSION:     Stable MR appearance of the brain, no acute disease  7 mm hypoenhancing suprasellar mass      Markedly dysmorphic left hemisphere with significant volume loss

## 2018-04-06 NOTE — PATIENT INSTRUCTIONS
Continue with all usual activities without restriction  Further follow-up with Neurosurgery is planned in approximately 1 year with MRI brain prior to visit  Consultation with ophthalmology for visual field examination is also requested

## 2018-04-06 NOTE — LETTER
April 6, 2018     Tonny Hadley MD  [place] [address]    Patient: Juan Pablo Camilo   YOB: 1980   Date of Visit: 4/6/2018       Dear Dr Alejandra Arango:    Thank you for referring Marilyn Rockwell to me for evaluation  Below are my notes for this consultation  If you have questions, please do not hesitate to call me  I look forward to following your patient along with you  Sincerely,        Loreta Marcano MD        CC: MD Sharif James PA-C  4/6/2018  2:47 PM  Sign at close encounter  Assessment/Plan:  Very pleasant 13-year-old female, accompanied by the tendon from her group home, returns to review MRI of her brain (3/28/18), the study was carefully reviewed in detail by Dr Sarthak Birmingham, and compared with prior study of 9/20/17, the 7 mm hypo enhancing suprasellar mass is again identified, and appears to be unchanged  There is some mild mass effect upon the optic chiasm, which also appears to be unchanged  In addition she does have a prior  shunt which appears to be appropriately placed and unchanged from prior study  Clinically the patient is asymptomatic of this lesion, without focal neurologic deficit on examination  She has been seen by her optometrist who reports normal vision, no direct visual field examination is available for review at this time, she does have fairly significant dysconjugate gaze which might make this study very difficult  Consultation has been made with ophthalmology Dr Brina Parekh to further assess for visual fields  She also has had pituitary laboratory panel performed 2/24/17 all which demonstrated normal function  Further follow-up is planned in approximately 1 year with repeat MRI of the brain with pituitary protocol and clinical visit      These findings, impressions and recommendations are reviewed in great detail with the patient, she expressed understanding and agreement, her questions were answered completely and to her satisfaction  Follow-up has been scheduled, MRI has been requested  As well as ophthalmology consultation  Diagnoses and all orders for this visit:    Pituitary neoplasm  -     MRI brain pituitary wo and w contrast; Future  -     Ambulatory referral to Ophthalmology; Future    Pituitary microadenoma ([facility] [address] )  -     MRI brain pituitary wo and w contrast; Future  -     Ambulatory referral to Ophthalmology; Future    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[facility] [address] )  -     MRI brain pituitary wo and w contrast; Future  -     Ambulatory referral to Ophthalmology; Future        Subjective:      Patient ID: Yumiko Fagan is a 43 y o  female  Very pleasant 62-year-old female, history of mild MR and CP, accompanied by her caregiver from her group home, returns for 1 year follow-up, pituitary microadenoma/pituitary cyst     She has had her updated MRI of the brain 3/28/18, under anesthesia  She reports no new symptoms or changes, she denies gait or balance disturbance other than her usual, motor sensory difficulties in the upper lower extremities, other than her usual, difficulty with memory or mentation, weight gain or weight loss, heat intolerance, visual changes or visual field difficulties  She has history of fall and as a consequence underwent CAT scan and MRI which revealed incidental finding of lesion overlying the pituitary gland  She has history of CP, in addition she has significant weakness to the right side, and has history of  shunt with the valve on the left  She has seen optometry in the past for spectacles, it is unclear as to whether she has had visual field examination is, which would be challenging as a result of her dysconjugate gaze  She has had laboratory pituitary studies performed through [facility] ordered by Dr Nery Boo, Sports Medicine  The studies revealed normal pituitary function relative to the 5 axis      She otherwise reports no interval changes in her medical or surgical history  The following portions of the patient's history were reviewed and updated as appropriate: allergies, current medications, past family history, past medical history, past social history and past surgical history  Review of Systems   Constitutional: Negative  HENT: Negative  Eyes: Negative  Respiratory: Positive for shortness of breath  Negative for apnea, cough, choking, chest tightness, wheezing and stridor  Cardiovascular: Negative  Gastrointestinal: Negative  Endocrine: Negative  Genitourinary: Negative  Musculoskeletal: Positive for gait problem  Negative for arthralgias, back pain, joint swelling, myalgias, neck pain and neck stiffness  Skin: Negative  Allergic/Immunologic: Negative  Neurological: Positive for dizziness  Negative for tremors, seizures, syncope, facial asymmetry, speech difficulty, weakness, light-headedness, numbness and headaches  Hematological: Negative  Psychiatric/Behavioral: Negative  Objective:    Physical Exam   Constitutional: She is oriented to person, place, and time  Cardiovascular: Normal rate, regular rhythm and normal heart sounds  Pulmonary/Chest: Effort normal and breath sounds normal    Neurological: She is alert and oriented to person, place, and time  Gait normal    Skin: Skin is warm and dry  Psychiatric: She has a normal mood and affect  Neurologic Exam     Mental Status   Oriented to person, place, and time  Level of consciousness: alert    Cranial Nerves     CN II   Visual fields full to confrontation  Visual acuity: (She has considerable dysconjugate gaze, as consequence visual field examination is grossly intact but incomplete)    CN V   Facial sensation intact       Gait, Coordination, and Reflexes     Gait  Gait: normal     MRI BRAIN AND SELLA  WITH AND WITHOUT CONTRAST  3/28/18     INDICATION: Pituitary abnormality, history of cerebral palsy     COMPARISON: MR 9/20/2017, CT 10/12/2017     TECHNIQUE:  Brain: Axial diffusion-weighted imaging  Axial FLAIR and axial T2  Axial gradient  Axial T1 postcontrast Axial bravo postcontrast with coronal reconstructions  Sella: Sagittal and coronal T1  Coronal T2  Sagittal and coronal T1 postcontrast with fat suppression  Coronal dynamic enhancement      Targeted images of the sella were performed requiring additional time at acquisition and interpretation of approximately 25%     IV Contrast:  7 mL of gadobutrol injection (MULTI-DOSE)      IMAGE QUALITY:  Diagnostic, however programmable shunt valve in the left parietal region does locally distorted image integrity      FINDINGS:     BRAIN PARENCHYMA:  No acute disease      Left hemisphere is markedly dysmorphic, resulting in marked irregularity of the contour of the left lateral ventricle  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  Close Lip   schizencephaly is not excluded  Significant left hemispheric volume loss  Postcontrast imaging is normal      VENTRICLES:  Left lateral ventricle is markedly dysmorphic, right lateral ventricle is slitlike in appearance  4th ventricle is attenuated, the aqueduct is stenotic  3rd ventricle attenuated in deviated superiorly toward the dysmorphic left hemisphere  Position of shunt catheter is stable  It extends to the posterior aspect of the dysmorphic body of the lateral ventricle axes into the ambient cistern and extends along the right paramedian midbrain            SELLA AND PITUITARY GLAND:  Appearance of the pituitary gland is stable  Volume gland is normal for gender and age  There is a hypoenhancing 7 mm structure in the suprasellar cistern, indistinguishable from the infundibulum and elevating the optic   chiasm  This extends to the upper surface of the pituitary gland/diaphragma sella    This could represent a small adenoma or cyst      ORBITS: Normal      PARANASAL SINUSES:  Retention cyst or polyp left maxillary sinus      VASCULATURE:  Evaluation of the major intracranial vasculature demonstrates appropriate flow voids      CALVARIUM AND SKULL BASE:  Scaphocephaly     EXTRACRANIAL SOFT TISSUES:  Normal      IMPRESSION:     Stable MR appearance of the brain, no acute disease  7 mm hypoenhancing suprasellar mass      Markedly dysmorphic left hemisphere with significant volume loss

## 2018-04-09 DIAGNOSIS — K21.9 GASTROESOPHAGEAL REFLUX DISEASE, ESOPHAGITIS PRESENCE NOT SPECIFIED: ICD-10-CM

## 2018-04-09 RX ORDER — FAMOTIDINE 20 MG/1
20 TABLET, FILM COATED ORAL 2 TIMES DAILY
Qty: 60 TABLET | Refills: 4 | Status: SHIPPED | OUTPATIENT
Start: 2018-04-09 | End: 2018-04-10 | Stop reason: SDUPTHER

## 2018-04-10 ENCOUNTER — CLINICAL SUPPORT (OUTPATIENT)
Dept: FAMILY MEDICINE CLINIC | Facility: CLINIC | Age: 38
End: 2018-04-10
Payer: MEDICARE

## 2018-04-10 ENCOUNTER — TELEPHONE (OUTPATIENT)
Dept: NEUROSURGERY | Facility: CLINIC | Age: 38
End: 2018-04-10

## 2018-04-10 DIAGNOSIS — Z11.1 TUBERCULOSIS SCREENING: Primary | ICD-10-CM

## 2018-04-10 DIAGNOSIS — K21.9 GASTROESOPHAGEAL REFLUX DISEASE, ESOPHAGITIS PRESENCE NOT SPECIFIED: ICD-10-CM

## 2018-04-10 PROCEDURE — 86580 TB INTRADERMAL TEST: CPT | Performed by: FAMILY MEDICINE

## 2018-04-10 NOTE — TELEPHONE ENCOUNTER
Person Directed Support was asking for UA results from 3/27  There is a slight change in the the blood from the last test  Can you please review and give direction to the caretakers

## 2018-04-10 NOTE — TELEPHONE ENCOUNTER
Received call previously from someone in regards to an opthalmology appt  Attempted calling various number on the patients account however none of them are listed as those authorized to discuss medical information  Contacted the patients emergency contact [address] who said she would look in to the matter and call the office back when she found something out

## 2018-04-11 DIAGNOSIS — D35.2 PITUITARY ADENOMA (HCC): Primary | ICD-10-CM

## 2018-04-12 ENCOUNTER — CLINICAL SUPPORT (OUTPATIENT)
Dept: FAMILY MEDICINE CLINIC | Facility: CLINIC | Age: 38
End: 2018-04-12

## 2018-04-12 DIAGNOSIS — Z11.1 TUBERCULOSIS SCREENING: Primary | ICD-10-CM

## 2018-04-12 LAB
INDURATION: 0 MM
TB SKIN TEST: NEGATIVE

## 2018-04-13 ENCOUNTER — TELEPHONE (OUTPATIENT)
Dept: FAMILY MEDICINE CLINIC | Facility: CLINIC | Age: 38
End: 2018-04-13

## 2018-04-13 RX ORDER — FAMOTIDINE 20 MG/1
20 TABLET, FILM COATED ORAL 2 TIMES DAILY
Qty: 60 TABLET | Refills: 5 | Status: SHIPPED | OUTPATIENT
Start: 2018-04-13 | End: 2018-05-17 | Stop reason: SDUPTHER

## 2018-04-13 NOTE — TELEPHONE ENCOUNTER
Please call caregivers to let them know that the blood in her urine completely resolved, on her UA showed bacteria but culture showed mixed contaminants due to non clean catch specimen, no further testing required or treatment, thank you!

## 2018-04-16 ENCOUNTER — OFFICE VISIT (OUTPATIENT)
Dept: FAMILY MEDICINE CLINIC | Facility: CLINIC | Age: 38
End: 2018-04-16
Payer: MEDICARE

## 2018-04-16 VITALS
BODY MASS INDEX: 35.68 KG/M2 | TEMPERATURE: 96.8 F | HEART RATE: 84 BPM | HEIGHT: 58 IN | DIASTOLIC BLOOD PRESSURE: 74 MMHG | RESPIRATION RATE: 16 BRPM | WEIGHT: 170 LBS | SYSTOLIC BLOOD PRESSURE: 124 MMHG

## 2018-04-16 DIAGNOSIS — R26.2 AMBULATORY DYSFUNCTION: ICD-10-CM

## 2018-04-16 DIAGNOSIS — R31.9 HEMATURIA, UNSPECIFIED TYPE: Primary | ICD-10-CM

## 2018-04-16 PROCEDURE — 99213 OFFICE O/P EST LOW 20 MIN: CPT | Performed by: FAMILY MEDICINE

## 2018-04-16 NOTE — PROGRESS NOTES
Assessment/Plan     Hematuria  - resolved, urinalysis showed no further hematuria, culture showed mixed contaminants from non-clean catch sample   - likely secondary to UTI that has since resolved, no further work up required  Ambulatory dysfunction  - script given for new walker and for handicap placecard form filled out today  Diagnoses and all orders for this visit:    Hematuria, unspecified type    Ambulatory dysfunction  -     Walker         Subjective     Chief Complaint: follow up for urine results for hematuria     HPI:   This is a 44 yo F who presents for follow up for lab results for microscopic hematuria  She denies further dysuria and frequency  She does reports some constipation in which caregiver states she is giving PRN constipation orders  She reports that her walker is broken  The following portions of the patient's history were reviewed and updated as appropriate: allergies, current medications, past family history, past medical history, past social history, past surgical history and problem list     Review of Systems  Review of Systems   Constitutional: Negative for fatigue and fever  HENT: Negative for congestion, postnasal drip and rhinorrhea  Respiratory: Negative for cough and shortness of breath  Cardiovascular: Negative for chest pain and palpitations  Gastrointestinal: Positive for constipation  Negative for abdominal pain, diarrhea, nausea and vomiting  Genitourinary: Negative for decreased urine volume, difficulty urinating, dysuria, frequency and urgency  Musculoskeletal: Negative for back pain  Neurological: Negative for dizziness, light-headedness and headaches  Objective   Vitals:    04/16/18 1535   BP: 124/74   Pulse: 84   Resp: 16   Temp: (!) 96 8 °F (36 °C)       Physical Exam   Constitutional: She appears well-developed and well-nourished  HENT:   Head: Normocephalic and atraumatic     Mouth/Throat: Oropharynx is clear and moist    Eyes: Conjunctivae are normal    Neck: Normal range of motion  Neck supple  Cardiovascular: Normal rate, regular rhythm and normal heart sounds  No murmur heard  Pulmonary/Chest: Effort normal and breath sounds normal  No respiratory distress  She has no wheezes  She has no rales  Abdominal: Soft  Bowel sounds are normal  She exhibits no distension  There is no tenderness  Musculoskeletal:   Abnormal gait    Neurological: She is alert  Skin: Skin is warm and dry  Lab Results: I have reviewed all the lab results

## 2018-04-16 NOTE — ASSESSMENT & PLAN NOTE
- resolved, urinalysis showed no further hematuria, culture showed mixed contaminants from non-clean catch sample   - likely secondary to UTI that has since resolved, no further work up required

## 2018-04-17 ENCOUNTER — OFFICE VISIT (OUTPATIENT)
Dept: AUDIOLOGY | Age: 38
End: 2018-04-17

## 2018-04-17 DIAGNOSIS — H90.3 SENSORINEURAL HEARING LOSS, BILATERAL: Primary | ICD-10-CM

## 2018-04-17 NOTE — PROGRESS NOTES
Hearing aid Fitting    Name:  Zoë Bauer  :  1980  Age:  43 y o  Date of Evaluation: 18       Zoë Bauer is being see today to be fit with new hearing aids  Patient is being fit with Reba Alicia Z Series i110 BTE  Right serial number [id number]  Warranty date 21  The hearing aid(s) was adjusted based on the patient's most recent audiogram and patient comfort  Patient reported good sound quality  Care and cleaning of the hearing aids was reviewed  Earmolds, filters, and batteries were reviewed with the patient  The patient's battery size is 312  Insertion and removal of the hearing aids was done  The patient practiced insertion and removal of the devices in the office, they demonstrated excellent ability to manipulate the hearing aids  Telephone use was reviewed with the patient  The patient expressed satisfaction with the hearing aids  Patient is fit with half shell earmold(s)  Care and cleaning was reviewed with the patient  Insertion and removal of the earmolds was reviewed  Patient/parent practiced getting the earmolds in their ears  Patient demonstrated excellent ability at manipulating the earmolds  Patient requested a clip to hold her hearing aids  Will order otoclip from [facility] in [place], quoted $20 00 will pay at   Recommendation:   Follow up in two weeks         Indigo Salas , [facility]  Clinical Audiologist

## 2018-04-18 DIAGNOSIS — M54.9 BACK PAIN, UNSPECIFIED BACK LOCATION, UNSPECIFIED BACK PAIN LATERALITY, UNSPECIFIED CHRONICITY: Primary | ICD-10-CM

## 2018-04-20 DIAGNOSIS — M54.9 BACK PAIN, UNSPECIFIED BACK LOCATION, UNSPECIFIED BACK PAIN LATERALITY, UNSPECIFIED CHRONICITY: ICD-10-CM

## 2018-04-20 RX ORDER — ACETAMINOPHEN 500 MG
TABLET ORAL
Qty: 30 TABLET | Refills: 4 | OUTPATIENT
Start: 2018-04-20

## 2018-04-20 RX ORDER — ACETAMINOPHEN 500 MG
TABLET ORAL
Qty: 30 TABLET | Refills: 4 | Status: ON HOLD | OUTPATIENT
Start: 2018-04-20 | End: 2018-07-10 | Stop reason: ALTCHOICE

## 2018-04-25 ENCOUNTER — HOSPITAL ENCOUNTER (EMERGENCY)
Facility: HOSPITAL | Age: 38
Discharge: HOME | End: 2018-04-25
Attending: EMERGENCY MEDICINE
Payer: MEDICARE

## 2018-04-25 VITALS
HEART RATE: 89 BPM | WEIGHT: 160 LBS | TEMPERATURE: 97.6 F | DIASTOLIC BLOOD PRESSURE: 85 MMHG | RESPIRATION RATE: 18 BRPM | SYSTOLIC BLOOD PRESSURE: 148 MMHG | BODY MASS INDEX: 33.44 KG/M2 | OXYGEN SATURATION: 97 %

## 2018-04-25 DIAGNOSIS — R45.851 SUICIDAL IDEATION: Primary | ICD-10-CM

## 2018-04-25 DIAGNOSIS — F32.A DEPRESSION: ICD-10-CM

## 2018-04-25 LAB
AMPHETAMINES SERPL QL SCN: NEGATIVE
BARBITURATES UR QL: NEGATIVE
BENZODIAZ UR QL: NEGATIVE
COCAINE UR QL: NEGATIVE
METHADONE UR QL: NEGATIVE
OPIATES UR QL SCN: NEGATIVE
PCP UR QL: NEGATIVE
THC UR QL: NEGATIVE

## 2018-04-25 PROCEDURE — 80307 DRUG TEST PRSMV CHEM ANLYZR: CPT | Performed by: EMERGENCY MEDICINE

## 2018-04-25 PROCEDURE — 99284 EMERGENCY DEPT VISIT MOD MDM: CPT

## 2018-04-25 NOTE — ED ATTENDING ATTESTATION
Ivone Dinero DO, saw and evaluated the patient  I have discussed the patient with the resident/non-physician practitioner and agree with the resident's/non-physician practitioner's findings, Plan of Care, and MDM as documented in the resident's/non-physician practitioner's note, except where noted  All available labs and Radiology studies were reviewed  At this point I agree with the current assessment done in the Emergency Department  I have conducted an independent evaluation of this patient a history and physical is as follows:        42 yo female from [facility] home presents for SI without specific plan, has been hospitalized previously for same  Hx of CP, MR  Karlie HI, AH/VH  No other c/o at this time  Imp: depression, SI without plan   Plan: BAT, UDS, crisis eval      Critical Care Time  CritCare Time    Procedures

## 2018-04-25 NOTE — ED PROVIDER NOTES
History  Chief Complaint   Patient presents with    Psychiatric Evaluation     pt has been having SI since this morning, pt reports hitting herself in the head  pt states "I just need to clear my mind"     This is a 67-year-old female with a history of cerebral palsy, mental retardation who presents with suicidal ideation  The patient states that she has been experiencing suicidal thoughts for a long time  She has no specific plan  Patient currently resides in a residential facility  The patient states that she has been self harming herself by hitting herself in the head  The patient states that she has been hospitalized in the past for suicidal thoughts  No homicidal ideation  No access to firearms  Denies drugs or alcohol use  Denies any other complaints today  Denies fever/chills, nausea/vomiting, lightheadedness/dizziness, numbness/weakness, headache, change in vision, URI symptoms, neck pain, chest pain, palpitations, shortness of breath, cough, back pain, flank pain, abdominal pain, diarrhea, hematochezia, melena, dysuria, hematuria, abnormal vaginal discharge/bleeding  Prior to Admission Medications   Prescriptions Last Dose Informant Patient Reported? Taking?    AMITIZA 24 MCG capsule 4/25/2018 at 0800  No Yes   Sig: TAKE 1 CAPSULE BY MOUTH TWICE DAILY AT 8A-8P (CONSTIPATION) *PADRON   ARIPiprazole (ABILIFY) 5 mg tablet 4/24/2018 at [address] [place] Yes Yes   Sig: Take 5 mg by mouth daily     Cholecalciferol (VITAMIN D-3 PO) 4/25/2018 at Unknown time Care Giver Yes Yes   Sig: Take 2,000 Units by mouth daily   GNP MILK OF MAGNESIA 1200 MG/15ML oral suspension Unknown at Unknown time Care Giver No No   Sig: TAKE 2 TBSP (30ML) BY MOUTH DAILY AS NEEDED IF NO BM IN 3 DAYS (CONSTIPATION)   MAPAP 500 MG tablet 4/25/2018 at 1400  No Yes   Sig: TAKE 1 TAB BY MOUTH EVERY 6 HOURS AS NEEDED FOR PAIN/HA/BACKACHE/MUSCLE ACHE   Melatonin 5 MG TABS 4/24/2018 at [address] Yes Yes   Sig: Take 1 tablet by mouth daily 9pm   Mouthwashes (LISTERINE ANTISEPTIC) LIQD 4/25/2018 at [facility] Yes Yes   Sig: Apply to the mouth or throat 2 (two) times a day   Multiple Vitamins-Minerals (CERTAVITE SENIOR/ANTIOXIDANT PO) 4/25/2018 at [facility] Yes Yes   Sig: Take by mouth   Norgestimate-Eth Estradiol (SPRINTEC 28 PO) 4/25/2018 at [facility] Yes Yes   Sig: Take by mouth   SENEXON-S 8 6-50 MG per tablet 4/25/2018 at 0800  No Yes   Sig: TAKE 1 TABLET BY MOUTH ONCE DAILY AT 8AM (CONSIPATION)   Sunscreens (AVEENO SUNBLOCK SPF50 EX) Unknown at Unknown time Care Giver Yes No   Sig: Apply topically apply 15 minutes before sun exsposure   albuterol (PROVENTIL HFA,VENTOLIN HFA) 90 mcg/act inhaler  Care Giver No No   Sig: Inhale 2 puffs every 6 (six) hours as needed for wheezing or shortness of breath   aluminum-magnesium hydroxide-simethicone (MAALOX MULTI SYMPTOM MAX ST) 400-400-40 MG/5ML suspension  Care Giver Yes No   Sig: Take 15 mL by mouth 4 times daily   bacitracin ophthalmic ointment Unknown at Unknown time Care Giver Yes No   Sig: Apply to eye   calcium carbonate (TUMS) 500 mg chewable tablet Unknown at Unknown time Care Giver Yes No   Sig: Chew 1 tablet daily   clotrimazole (LOTRIMIN) 1 % cream  Care Giver Yes No   Sig: Apply 1 application topically 3 (three) times a day as needed   escitalopram (LEXAPRO) 10 mg tablet 4/25/2018 at [facility] Yes Yes   Sig: Take 10 mg by mouth daily   famotidine (PEPCID) 20 mg tablet 4/25/2018 at 0800  No Yes   Sig: Take 1 tablet (20 mg total) by mouth 2 (two) times a day At 7AM and 4PM (GERD)   ibuprofen (MOTRIN) 400 mg tablet Unknown at Unknown time Care Giver No No   Sig: Take 1 tablet by mouth every 8 (eight) hours as needed for mild pain, moderate pain or headaches   metoclopramide (REGLAN) 10 mg tablet Unknown at Unknown time  No No   Sig: TAKE 1 TABLET BY MOUTH EVERY 8 HOURS AS NEEDED FOR NAUSEA *VIRI   pantoprazole (PROTONIX) 40 mg tablet 4/25/2018 at 0700 Care Giver No Yes   Sig: Take 1 tablet (40 mg total) by mouth daily   polyethylene glycol (MIRALAX) 17 g packet Unknown at Unknown time Care Giver No No   Sig: Take 17gm (1 packet) daily for first three days, then daily as needed for no bowel movement more than 24 hrs   sucralfate (CARAFATE) 1 g/10 mL suspension Unknown at Unknown time Care Giver Yes No   Sig: Take 1 g by mouth 4 (four) times a day   zinc oxide (DESITIN) 13 % cream Unknown at Unknown time Care Giver Yes No   Sig: Apply 1 application topically as needed for irritation      Facility-Administered Medications: None       Past Medical History:   Diagnosis Date    Anxiety     Cerebral palsy (HCC)     Cerebral palsy (HCC)     Last Assessed:7/6/2016    Chronically dry eyes, left     Last Assessed:7/6/2016    Depression     Last Assessed:7/6/2016    Depressive disorder     GERD (gastroesophageal reflux disease)     Mood disorder (HCC)        Past Surgical History:   Procedure Laterality Date    CSF SHUNT      Creation of Ventriculo-Peritoneal CSF shunt ; Last Assessed:7/6/2016    EAR SURGERY      Last Assessed:7/6/2016    LEG SURGERY      due to CP     NOSE SURGERY      Last Assessed:7/6/2016       Family History   Problem Relation Age of Onset    Diabetes Mother     No Known Problems Father      I have reviewed and agree with the history as documented      Social History   Substance Use Topics    Smoking status: Never Smoker    Smokeless tobacco: Never Used    Alcohol use No        Review of Systems    Physical Exam  ED Triage Vitals   Temperature Pulse Respirations Blood Pressure SpO2   04/25/18 1825 04/25/18 1821 04/25/18 1821 04/25/18 1821 04/25/18 1821   97 6 °F (36 4 °C) 89 18 148/85 97 %      Temp Source Heart Rate Source Patient Position - Orthostatic VS BP Location FiO2 (%)   04/25/18 1825 04/25/18 1821 04/25/18 1821 04/25/18 1821 --   Oral Monitor Lying Right arm       Pain Score       04/25/18 1821       No Pain Orthostatic Vital Signs  Vitals:    04/25/18 1821   BP: 148/85   Pulse: 89   Patient Position - Orthostatic VS: Lying       Physical Exam   Constitutional: Vital signs are normal  She appears well-developed  She is cooperative  No distress  HENT:   Mouth/Throat: Uvula is midline, oropharynx is clear and moist and mucous membranes are normal    Eyes: Conjunctivae and EOM are normal  Pupils are equal, round, and reactive to light  Neck: Trachea normal  No thyroid mass and no thyromegaly present  Cardiovascular: Normal rate, regular rhythm, normal heart sounds, intact distal pulses and normal pulses  No murmur heard  Pulmonary/Chest: Effort normal and breath sounds normal    Abdominal: Soft  Normal appearance and bowel sounds are normal  There is no tenderness  There is no rebound, no guarding and no CVA tenderness  Neurological: She is alert  Skin: Skin is warm, dry and intact  Psychiatric: She has a normal mood and affect  Her speech is normal and behavior is normal  She expresses suicidal ideation  She expresses no homicidal ideation  She expresses no suicidal plans and no homicidal plans  ED Medications  Medications - No data to display    Diagnostic Studies  Results Reviewed     Procedure Component Value Units Date/Time    Rapid drug screen, urine [[id number]]  (Normal) Collected:  04/25/18 1942    Lab Status:  Final result Specimen:  Urine from Urine, Other Updated:  04/25/18 2010     Amph/Meth UR Negative     Barbiturate Ur Negative     Benzodiazepine Urine Negative     Cocaine Urine Negative     Methadone Urine Negative     Opiate Urine Negative     PCP Ur Negative     THC Urine Negative    Narrative:         FOR MEDICAL PURPOSES ONLY  IF CONFIRMATION NEEDED PLEASE CONTACT THE LAB WITHIN 5 DAYS      Drug Screen Cutoff Levels:  AMPHETAMINE/METHAMPHETAMINES  1000 ng/mL  BARBITURATES     200 ng/mL  BENZODIAZEPINES     200 ng/mL  COCAINE      300 ng/mL  METHADONE      300 ng/mL  OPIATES      300 ng/mL  PHENCYCLIDINE     25 ng/mL  THC       50 ng/mL    POCT alcohol breath test [[id number]]     Lab Status:  No result                  No orders to display         Procedures  Procedures      Phone Consults  ED Phone Contact    ED Course  ED Course                                MDM  Number of Diagnoses or Management Options  Diagnosis management comments: Urine drug screen, Breathalyzer  Crisis consult  CritCare Time    Disposition  Final diagnoses:   Suicidal ideation   Depression     Time reflects when diagnosis was documented in both MDM as applicable and the Disposition within this note     Time User Action Codes Description Comment    4/25/2018  9:01 PM Ezequiel Robbins Add [R13 471] Suicidal ideation     4/25/2018  9:01 PM Ezequiel Robbins Add [F32 9] Depression       ED Disposition     ED Disposition Condition Comment    Discharge  Hannah Linette discharge to home/self care  Condition at discharge: Stable        Follow-up Information     Follow up With Specialties Details Why Contact Info Additional Information    Bakari Linares MD Family Medicine, Obstetrics and Gynecology Call in 1 day As needed [address] [facility] [address] [phone]       [facility] [address] Emergency Department Emergency Medicine Go to If symptoms worsen [address] [facility], [address] [place], [place], [address]        Patient's Medications   Discharge Prescriptions    No medications on file     No discharge procedures on file  ED Provider  Attending physically available and evaluated Hannah Linette  I managed the patient along with the ED Attending      Electronically Signed by         Denise Jefferson MD  04/25/18 1283

## 2018-04-26 NOTE — DISCHARGE INSTRUCTIONS
Depression   WHAT YOU NEED TO KNOW:   Depression is a medical condition that causes feelings of sadness or hopelessness that do not go away  Depression may cause you to lose interest in things you used to enjoy  These feelings may interfere with your daily life  DISCHARGE INSTRUCTIONS:   Call 911 for any of the following:   · You think about harming yourself or someone else  Contact your healthcare provider if:   · Your symptoms do not improve  · You cannot make it to your next appointment  · You have new symptoms  · You have questions or concerns about your condition or care  Medicines:   · Antidepressants  may be given to improve or balance your mood  You may need to take this medicine for several weeks before you begin to feel better  · Take your medicine as directed  Contact your healthcare provider if you think your medicine is not helping or if you have side effects  Tell him of her if you are allergic to any medicine  Keep a list of the medicines, vitamins, and herbs you take  Include the amounts, and when and why you take them  Bring the list or the pill bottles to follow-up visits  Carry your medicine list with you in case of an emergency  Therapy  may be used to treat your depression  A therapist will help you learn to cope with your thoughts and feelings  This can be done alone or in a group  It may also be done with family members or a significant other  Self-care:   · Get regular physical activity  Try to exercise for 30 minutes, 3 to 5 days a week  Work with your healthcare provider to develop an exercise plan that you enjoy  Physical activity may improve your symptoms  · Get enough sleep  Create a routine to help you relax before bed  You can listen to music, read, or do yoga  Try to go to bed and wake up at the same time every day  Sleep is important for emotional health  · Eat a variety of healthy foods from all of the food groups    A healthy meal plan is low in fat, salt, and added sugar  Ask your healthcare provider for more information about a meal plan that is right for you  · Do not drink alcohol or use drugs  Alcohol and drugs can make your symptoms worse  Follow up with your healthcare provider as directed: Your healthcare provider will monitor your progress at follow-up visits  He or she will also monitor your medicine if you take antidepressants  Your healthcare provider will ask if the medicine is helping  Tell him or her about any side effects or problems you may have with your medicine  The type or amount of medicine may need to be changed  Write down your questions so you remember to ask them during your visits  © 2017 [employer] Information is for End User's use only and may not be sold, redistributed or otherwise used for commercial purposes  All illustrations and images included in CareNotes® are the copyrighted property of [employer]  or Franco Epperson  The above information is an  only  It is not intended as medical advice for individual conditions or treatments  Talk to your doctor, nurse or pharmacist before following any medical regimen to see if it is safe and effective for you

## 2018-04-26 NOTE — ED NOTES
Pt came to the ED with her group home staff  The pt is from the group home [facility]  The pt has moderate ID  The pt states that she is having AH telling her to hurt herself and SI without a plan  The pt did slap herself today  The pt requested to come to the ED  The pt staff Josefina Solares stated that this is the pt baseline behaviors  The pt has [facility] staffing  They always monitor the pt safety and security  The staff stated she felt that the pt is safe to go home and their are willing to take her back  CW spoke to the pt  The pt stated that she is willing to go back to the group  The pt stated that she wouldn't hurt herself or others if DC  The pt will be DC with OP MH and resource list  The pt group home staff is in agreement to bring the pt back to the ED if they feel the pt is a danger to herself or others  Dr Miguelangel Dominguez in agreement with DC and DC plan

## 2018-05-01 ENCOUNTER — DOCUMENTATION (OUTPATIENT)
Dept: AUDIOLOGY | Age: 38
End: 2018-05-01

## 2018-05-01 ENCOUNTER — OFFICE VISIT (OUTPATIENT)
Dept: FAMILY MEDICINE CLINIC | Facility: CLINIC | Age: 38
End: 2018-05-01
Payer: MEDICARE

## 2018-05-01 VITALS
BODY MASS INDEX: 36.06 KG/M2 | HEIGHT: 58 IN | SYSTOLIC BLOOD PRESSURE: 118 MMHG | TEMPERATURE: 98.1 F | RESPIRATION RATE: 16 BRPM | HEART RATE: 92 BPM | WEIGHT: 171.8 LBS | DIASTOLIC BLOOD PRESSURE: 74 MMHG

## 2018-05-01 DIAGNOSIS — R26.2 AMBULATORY DYSFUNCTION: ICD-10-CM

## 2018-05-01 DIAGNOSIS — G89.29 CHRONIC PAIN OF BOTH KNEES: ICD-10-CM

## 2018-05-01 DIAGNOSIS — Z00.00 HEALTH CARE MAINTENANCE: Primary | ICD-10-CM

## 2018-05-01 DIAGNOSIS — M25.561 CHRONIC PAIN OF BOTH KNEES: ICD-10-CM
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
## 2018-05-01 DIAGNOSIS — G80.4 ATAXIC CEREBRAL PALSY (HCC): Primary | ICD-10-CM

## 2018-05-01 DIAGNOSIS — D35.2 PITUITARY MICROADENOMA (HCC): ICD-10-CM

## 2018-05-01 DIAGNOSIS — M25.562 CHRONIC PAIN OF BOTH KNEES: ICD-10-CM

## 2018-05-01 PROCEDURE — 99213 OFFICE O/P EST LOW 20 MIN: CPT | Performed by: FAMILY MEDICINE

## 2018-05-01 RX ORDER — FOLIC ACID/MV,IRON,MIN/LUTEIN 0.4-18-25
TABLET ORAL
Qty: 31 TABLET | Refills: 0 | Status: SHIPPED | OUTPATIENT
Start: 2018-05-01 | End: 2018-07-03 | Stop reason: SDUPTHER

## 2018-05-01 NOTE — PROGRESS NOTES
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, complicated by LE swelling, knee pain  Review of Systems   Constitutional: Negative for fever and unexpected weight change  Respiratory: Negative for apnea, cough and shortness of breath  Cardiovascular: Positive for leg swelling  Negative for palpitations  Genitourinary: Negative for difficulty urinating  Musculoskeletal: Positive for gait problem  Neurological: Positive for speech difficulty and weakness  Negative for dizziness, seizures, syncope and facial asymmetry  Historical Information   The patient history was reviewed as follows:  Past Medical History:   Diagnosis Date    Anxiety     [facility] ([facility] )     Cerebral palsy (HCC)     Last Assessed:7/6/2016    Chronically dry eyes, left     Last Assessed:7/6/2016    Depression     Last Assessed:7/6/2016    Depressive disorder     GERD (gastroesophageal reflux disease)     Mood disorder ([facility] )      Past Surgical History:   Procedure Laterality Date    CSF SHUNT      Creation of Ventriculo-Peritoneal CSF shunt ;  Last Assessed:7/6/2016    EAR SURGERY      Last Assessed:7/6/2016    LEG SURGERY      due to CP     NOSE SURGERY      Last Assessed:7/6/2016     Family History   Problem Relation Age of Onset    Diabetes Mother     No Known Problems Father       Social History   History   Alcohol Use No     History   Drug Use No     History   Smoking Status    Never Smoker   Smokeless Tobacco    Never Used       Medications:   Meds/Allergies   Current Outpatient Prescriptions   Medication Sig Dispense Refill    albuterol (PROVENTIL HFA,VENTOLIN HFA) 90 mcg/act inhaler Inhale 2 puffs every 6 (six) hours as needed for wheezing or shortness of breath 1 Inhaler 0    aluminum-magnesium hydroxide-simethicone (MAALOX MULTI SYMPTOM MAX ST) 400-400-40 MG/5ML suspension Take 15 mL by mouth 4 times daily      AMITIZA 24 MCG capsule TAKE 1 CAPSULE BY MOUTH TWICE DAILY AT 8A-8P (CONSTIPATION) *PADRON 60 capsule 0    ARIPiprazole (ABILIFY) 5 mg tablet Take 5 mg by mouth daily        bacitracin ophthalmic ointment Apply to eye      calcium carbonate (TUMS) 500 mg chewable tablet Chew 1 tablet daily      Cholecalciferol (VITAMIN D-3 PO) Take 2,000 Units by mouth daily      clotrimazole (LOTRIMIN) 1 % cream Apply 1 application topically 3 (three) times a day as needed      escitalopram (LEXAPRO) 10 mg tablet Take 10 mg by mouth daily      famotidine (PEPCID) 20 mg tablet Take 1 tablet (20 mg total) by mouth 2 (two) times a day At 7AM and 4PM (GERD) 60 tablet 5    GNP MILK OF MAGNESIA 1200 MG/15ML oral suspension TAKE 2 TBSP (30ML) BY MOUTH DAILY AS NEEDED IF NO BM IN 3 DAYS (CONSTIPATION) 355 mL 0    ibuprofen (MOTRIN) 400 mg tablet Take 1 tablet by mouth every 8 (eight) hours as needed for mild pain, moderate pain or headaches 30 tablet 0    MAPAP 500 MG tablet TAKE 1 TAB BY MOUTH EVERY 6 HOURS AS NEEDED FOR PAIN/HA/BACKACHE/MUSCLE ACHE 30 tablet 4    Melatonin 5 MG TABS Take 1 tablet by mouth daily 9pm      metoclopramide (REGLAN) 10 mg tablet TAKE 1 TABLET BY MOUTH EVERY 8 HOURS AS NEEDED FOR NAUSEA *ZEMBRZUSKI 30 tablet 0    Mouthwashes (LISTERINE ANTISEPTIC) LIQD Apply to the mouth or throat 2 (two) times a day      Multiple Vitamins-Minerals (CERTAVITE SENIOR/ANTIOXIDANT PO) Take by mouth      Multiple Vitamins-Minerals (CERTAVITE/ANTIOXIDANTS) TABS TAKE 1 TABLET BY MOUTH DAILY AT 8AM (SUPPLEMENT) ZEMBRUZUSKI 31 tablet 0    Norgestimate-Eth Estradiol (SPRINTEC 28 PO) Take by mouth      pantoprazole (PROTONIX) 40 mg tablet Take 1 tablet (40 mg total) by mouth daily 30 tablet 2    polyethylene glycol (MIRALAX) 17 g packet Take 17gm (1 packet) daily for first three days, then daily as needed for no bowel movement more than 24 hrs 7 each 0    SENEXON-S 8 6-50 MG per tablet TAKE 1 TABLET BY MOUTH ONCE DAILY AT 8AM (CONSIPATION) 30 tablet 0    sucralfate (CARAFATE) 1 g/10 mL suspension Take 1 g by mouth 4 (four) times a day      Sunscreens (AVEENO SUNBLOCK SPF50 EX) Apply topically apply 15 minutes before sun exsposure      zinc oxide (DESITIN) 13 % cream Apply 1 application topically as needed for irritation       No current facility-administered medications for this visit  Allergies   Allergen Reactions    Seasonal Ic  [Cholestatin]        OBJECTIVE  Vitals:   Vitals:    05/01/18 1525   BP: 118/74   Pulse: 92   Resp: 16   Temp: 98 1 °F (36 7 °C)   Weight: 77 9 kg (171 lb 12 8 oz)   Height: 4' 10" (1 473 m)       Invasive Devices          No matching active lines, drains, or airways          Physical Exam   Constitutional: She appears well-developed and well-nourished  No distress  Obese   Cardiovascular: Normal rate, regular rhythm and normal heart sounds  No murmur heard  Pulmonary/Chest: Effort normal and breath sounds normal  No respiratory distress  She has no wheezes  She has no rales  Musculoskeletal: She exhibits edema and deformity  b/l non-pitting edema  UE hand contractures b/l   Neurological: She is alert  Coordination abnormal    Using roller walker, mild intellectual disability, responds appropriately, caregiver at bedside  Skin: She is not diaphoretic  No pallor  Vitals reviewed  Lab:  I have personally reviewed all prior pertinent results     Admission on 04/25/2018, Discharged on 04/25/2018   Component Date Value Ref Range Status    Amph/Meth UR 04/25/2018 Negative  Negative Final    Barbiturate Ur 04/25/2018 Negative  Negative Final    Benzodiazepine Urine 04/25/2018 Negative  Negative Final    Cocaine Urine 04/25/2018 Negative  Negative Final    Methadone Urine 04/25/2018 Negative  Negative Final    Opiate Urine 04/25/2018 Negative  Negative Final    PCP Ur 04/25/2018 Negative  Negative Final    THC Urine 04/25/2018 Negative  Negative Final   Clinical Support on 04/10/2018   Component Date Value Ref Range Status    TB Skin Test 04/12/2018 Negative   Final    Induration 04/12/2018 0  mm Final   Hospital Outpatient Visit on 03/28/2018   Component Date Value Ref Range Status    EXT Preg Test, Ur 03/28/2018 Negative  Negative Final   Office Visit on 03/27/2018   Component Date Value Ref Range Status    Color, UA 03/27/2018 Yellow   Final    Clarity, UA 03/27/2018 Cloudy   Final    Specific Gravity, UA 03/27/2018 1 019  1 003 - 1 030 Final    pH, UA 03/27/2018 5 5  4 5 - 8 0 Final    Leukocytes, UA 03/27/2018 Small* Negative Final    Nitrite, UA 03/27/2018 Negative  Negative Final    Protein, UA 03/27/2018 Negative  Negative mg/dl Final    Glucose, UA 03/27/2018 Negative  Negative mg/dl Final    Ketones, UA 03/27/2018 Negative  Negative mg/dl Final    Urobilinogen, UA 03/27/2018 0 2  0 2, 1 0 E U /dl E U /dl Final    Bilirubin, UA 03/27/2018 Negative  Negative Final    Blood, UA 03/27/2018 Moderate* Negative Final     COLOR,UA 03/27/2018 YELLOW   Final     CLARITY,UA 03/27/2018 HAZY   Final     SPECIFIC GRAVITY,UA 03/27/2018 1 020   Final     PH,UA 03/27/2018 5 0   Final    LEUKOCYTE ESTERASE,UA 03/27/2018 NEGATIVE   Final     NITRITE,UA 03/27/2018 NEGATIVE   Final    GLUCOSE, UA 03/27/2018 NEGATIVE   Final     KETONES,UA 03/27/2018 NEGATIVE   Final     BILIRUBIN,UA 03/27/2018 NEGATIVE   Final     BLOOD,UA 03/27/2018 LARGE   Final    SL AMB POCT URINE PROTEIN 03/27/2018 NEGATIVE   Final    SL AMB POCT UROBILINOGEN 03/27/2018 0 2   Final    Urine Culture 03/27/2018 20,000-29,000 cfu/ml    Final    Mixed Contaminants X3    RBC, UA 03/27/2018 None Seen  None Seen, 0-5 /hpf Final    WBC, UA 03/27/2018 2-4* None Seen, 0-5, 5-55, 5-65 /hpf Final    Epithelial Cells 03/27/2018 Innumerable* None Seen, Occasional /hpf Final    Bacteria, UA 03/27/2018 Innumerable* None Seen, Occasional /hpf Final    Uric Acid Jayne, UA 03/27/2018 Occasional  /hpf Final   Appointment on 03/16/2018   Component Date Value Ref Range Status    BUN 03/16/2018 11  5 - 25 mg/dL Final    Creatinine 03/16/2018 0 66  0 60 - 1 30 mg/dL Final    Standardized to IDMS reference method    eGFR 03/16/2018 112  ml/min/1 73sq m Final   Office Visit on 02/21/2018   Component Date Value Ref Range Status     COLOR,UA 02/21/2018 yellow   Final     CLARITY,UA 02/21/2018 clear   Final     SPECIFIC GRAVITY,UA 02/21/2018 1 020   Final     PH,UA 02/21/2018 6   Final    LEUKOCYTE ESTERASE,UA 02/21/2018 negative   Final     NITRITE,UA 02/21/2018 negative   Final    GLUCOSE, UA 02/21/2018 negative   Final     KETONES,UA 02/21/2018 negative   Final     BILIRUBIN,UA 02/21/2018 negative   Final     BLOOD,UA 02/21/2018 moderate   Final    SL AMB POCT URINE PROTEIN 02/21/2018 negative   Final    SL AMB POCT UROBILINOGEN 02/21/2018 0 2   Final   ]    Assessment/Plan      Problem List Items Addressed This Visit     Cerebral palsy ([facility] ) - Primary    Relevant Orders    Walker    Chronic knee pain    Pituitary microadenoma ([facility] )    Ambulatory dysfunction      Other Visit Diagnoses     Fall, initial encounter          Multifactorial gait dysfunction, advised walker with seat to decrease fatigue and fall risk  Body mass index is 35 91 kg/m²   -> F/u staff counseling on diet, weight loss, aerobic and weight-bearing exercise  PMH, stable, chronic  -> Continuing all home medications or alternatives as reviewed and reconciled      Disposition: Anticipate  PCP: [facility]  Follow-up recommendations: annual     Future Appointments  Date Time Provider Mariana Reese   4/5/2019 10:00 AM Jazmín Gordon MD [facility] _____________________________________________________________________   The attending physician, Dr Ashley Herman, agreed with the plan      Iesha Reynoso DO, PGY-3  [facility]   5/1/2018

## 2018-05-01 NOTE — PROGRESS NOTES
[facility]  Dispense at two week followup   Pt   To be charged $20

## 2018-05-03 DIAGNOSIS — K59.00 CONSTIPATION, UNSPECIFIED CONSTIPATION TYPE: ICD-10-CM

## 2018-05-03 RX ORDER — DOCUSATE SODIUM -SENNOSIDES 50; 8.6 MG/1; MG/1
TABLET, COATED ORAL
Qty: 30 TABLET | Refills: 4 | Status: CANCELLED | OUTPATIENT
Start: 2018-05-03

## 2018-05-04 DIAGNOSIS — K59.00 CONSTIPATION, UNSPECIFIED CONSTIPATION TYPE: ICD-10-CM

## 2018-05-04 RX ORDER — AMOXICILLIN 250 MG
1 CAPSULE ORAL DAILY
Qty: 30 TABLET | Refills: 5 | Status: SHIPPED | OUTPATIENT
Start: 2018-05-04 | End: 2018-09-10 | Stop reason: SDUPTHER

## 2018-05-07 DIAGNOSIS — K21.9 GASTROESOPHAGEAL REFLUX DISEASE, ESOPHAGITIS PRESENCE NOT SPECIFIED: ICD-10-CM

## 2018-05-07 DIAGNOSIS — K59.00 CONSTIPATION, UNSPECIFIED CONSTIPATION TYPE: ICD-10-CM

## 2018-05-08 DIAGNOSIS — R52 PAIN: Primary | ICD-10-CM

## 2018-05-08 DIAGNOSIS — B49 FUNGAL INFECTION: ICD-10-CM

## 2018-05-08 DIAGNOSIS — K59.00 CONSTIPATION, UNSPECIFIED CONSTIPATION TYPE: ICD-10-CM

## 2018-05-08 DIAGNOSIS — L29.0 PERIANAL IRRITATION: ICD-10-CM

## 2018-05-08 RX ORDER — PANTOPRAZOLE SODIUM 40 MG/1
40 TABLET, DELAYED RELEASE ORAL DAILY
Qty: 30 TABLET | Refills: 5 | Status: SHIPPED | OUTPATIENT
Start: 2018-05-08 | End: 2018-05-17 | Stop reason: SDUPTHER

## 2018-05-09 RX ORDER — CLOTRIMAZOLE 1 %
1 CREAM (GRAM) TOPICAL 3 TIMES DAILY PRN
Qty: 30 G | Refills: 5 | Status: SHIPPED | OUTPATIENT
Start: 2018-05-09 | End: 2020-01-24 | Stop reason: SDUPTHER

## 2018-05-09 RX ORDER — IBUPROFEN 400 MG/1
400 TABLET ORAL EVERY 8 HOURS PRN
Qty: 30 TABLET | Refills: 5 | Status: SHIPPED | OUTPATIENT
Start: 2018-05-09 | End: 2018-05-17 | Stop reason: SDUPTHER

## 2018-05-09 RX ORDER — POLYETHYLENE GLYCOL 3350 17 G/17G
POWDER, FOR SOLUTION ORAL
Qty: 7 EACH | Refills: 5 | Status: SHIPPED | OUTPATIENT
Start: 2018-05-09 | End: 2018-05-17 | Stop reason: SDUPTHER

## 2018-05-14 ENCOUNTER — OFFICE VISIT (OUTPATIENT)
Dept: FAMILY MEDICINE CLINIC | Facility: CLINIC | Age: 38
End: 2018-05-14
Payer: MEDICARE

## 2018-05-14 VITALS
RESPIRATION RATE: 18 BRPM | BODY MASS INDEX: 35.82 KG/M2 | TEMPERATURE: 99.7 F | WEIGHT: 171.4 LBS | HEART RATE: 113 BPM | SYSTOLIC BLOOD PRESSURE: 118 MMHG | DIASTOLIC BLOOD PRESSURE: 78 MMHG

## 2018-05-14 DIAGNOSIS — T14.8XXA BRUISE: Primary | ICD-10-CM

## 2018-05-14 PROCEDURE — 99213 OFFICE O/P EST LOW 20 MIN: CPT | Performed by: FAMILY MEDICINE

## 2018-05-14 NOTE — PROGRESS NOTES
Yarely Alfaro 1980 female MRN: [id number]    Family Medicine Acute Visit    ASSESSMENT/PLAN   Problem List Items Addressed This Visit     Bruise - Primary      Two days hx of  Left lower extremity bruise:  2 cm above the left knee:  Medially ( 1x1mm):  Secondary to mild trauma (  Unknown trauma): No pain,   No laceration:  stable    1  Watchful waiting:   No imaging, labs necessary   2  Patient uses walker to stabilize gait,  Continue to be more aware of surroundings when walking around  3    Forms were completed for and scanned in                   Future Appointments  Date Time Provider Mariana Reese   5/18/2018 3:15 PM Candice   4/5/2019 10:00 AM Almaz Cho MD NEURO [facility]          SUBJECTIVE  CC: Bruise on her L leg (pt  stated she thinks she bumped into something small bruise)      HPI:  Yarely Alfaro is a 43 y o  female who presents for  Acute visit  1    Bruise on left lower extremity:  No pain, no laceration  Found yesterday  Patient lives at a group home, and noted a small bruise on the left  Lower extremity, proximal to the knee  No pain no bleeding  Patient does not remember when she hit her leg  pain: 0/10   for is color:  Purple   bruise size: around 1cm :  Linear   no other bruises noted  Review of Systems   Constitutional: Negative  HENT: Negative  Eyes: Negative  Respiratory: Negative  Cardiovascular: Negative  Gastrointestinal: Negative  Endocrine: Negative  Genitourinary: Negative  Musculoskeletal: Negative  Chronic gait instability uses walker   Skin:        Bruise    Neurological: Negative for tremors, seizures, syncope and weakness  Hematological: Does not bruise/bleed easily  Psychiatric/Behavioral: Negative for confusion, self-injury, sleep disturbance and suicidal ideas  The patient is not nervous/anxious          Historical Information   The patient history was reviewed as follows:  Past Medical History:   Diagnosis Date    Anxiety     Cerebral palsy (HCC)     Cerebral palsy (HCC)     Last Assessed:7/6/2016    Chronically dry eyes, left     Last Assessed:7/6/2016    Depression     Last Assessed:7/6/2016    Depressive disorder     GERD (gastroesophageal reflux disease)     Mood disorder (HCC)          Past Surgical History:   Procedure Laterality Date    CSF SHUNT      Creation of Ventriculo-Peritoneal CSF shunt ;  Last Assessed:7/6/2016    EAR SURGERY      Last Assessed:7/6/2016    LEG SURGERY      due to CP     NOSE SURGERY      Last Assessed:7/6/2016     Family History   Problem Relation Age of Onset    Diabetes Mother     No Known Problems Father       Social History   History   Alcohol Use No     History   Drug Use No     History   Smoking Status    Never Smoker   Smokeless Tobacco    Never Used       Medications:     Current Outpatient Prescriptions:     albuterol (PROVENTIL HFA,VENTOLIN HFA) 90 mcg/act inhaler, Inhale 2 puffs every 6 (six) hours as needed for wheezing or shortness of breath, Disp: 1 Inhaler, Rfl: 0    aluminum-magnesium hydroxide-simethicone (MAALOX MULTI SYMPTOM MAX ST) 400-400-40 MG/5ML suspension, Take 15 mL by mouth 4 times daily, Disp: , Rfl:     ARIPiprazole (ABILIFY) 5 mg tablet, Take 5 mg by mouth daily  , Disp: , Rfl:     bacitracin ophthalmic ointment, Apply to eye, Disp: , Rfl:     calcium carbonate (TUMS) 500 mg chewable tablet, Chew 1 tablet daily, Disp: , Rfl:     Cholecalciferol (VITAMIN D-3 PO), Take 2,000 Units by mouth daily, Disp: , Rfl:     clotrimazole (LOTRIMIN) 1 % cream, Apply 1 g (1 application total) topically 3 (three) times a day as needed (fungal irritation), Disp: 30 g, Rfl: 5    escitalopram (LEXAPRO) 10 mg tablet, Take 10 mg by mouth daily, Disp: , Rfl:     famotidine (PEPCID) 20 mg tablet, Take 1 tablet (20 mg total) by mouth 2 (two) times a day At 7AM and 4PM (GERD), Disp: 60 tablet, Rfl: 5    GNP MILK OF MAGNESIA 1200 MG/15ML oral suspension, TAKE 2 TBSP (30ML) BY MOUTH DAILY AS NEEDED IF NO BM IN 3 DAYS (CONSTIPATION), Disp: 355 mL, Rfl: 0    ibuprofen (MOTRIN) 400 mg tablet, Take 1 tablet (400 mg total) by mouth every 8 (eight) hours as needed for mild pain, moderate pain or headaches, Disp: 30 tablet, Rfl: 5    lubiprostone (AMITIZA) 24 mcg capsule, Take 1 capsule (24 mcg total) by mouth 2 (two) times a day with meals, Disp: 60 capsule, Rfl: 0    MAPAP 500 MG tablet, TAKE 1 TAB BY MOUTH EVERY 6 HOURS AS NEEDED FOR PAIN/HA/BACKACHE/MUSCLE ACHE, Disp: 30 tablet, Rfl: 4    Melatonin 5 MG TABS, Take 1 tablet by mouth daily 9pm, Disp: , Rfl:     metoclopramide (REGLAN) 10 mg tablet, TAKE 1 TABLET BY MOUTH EVERY 8 HOURS AS NEEDED FOR NAUSEA *VIRI, Disp: 30 tablet, Rfl: 0    Mouthwashes (LISTERINE ANTISEPTIC) LIQD, Apply to the mouth or throat 2 (two) times a day, Disp: , Rfl:     Multiple Vitamins-Minerals (CERTAVITE SENIOR/ANTIOXIDANT PO), Take by mouth, Disp: , Rfl:     Multiple Vitamins-Minerals (CERTAVITE/ANTIOXIDANTS) TABS, TAKE 1 TABLET BY MOUTH DAILY AT 8AM (SUPPLEMENT) JERRI, Disp: 31 tablet, Rfl: 0    Norgestimate-Eth Estradiol (SPRINTEC 28 PO), Take by mouth, Disp: , Rfl:     pantoprazole (PROTONIX) 40 mg tablet, Take 1 tablet (40 mg total) by mouth daily, Disp: 30 tablet, Rfl: 5    polyethylene glycol (MIRALAX) 17 g packet, Take 17gm (1 packet) daily for first three days, then daily as needed for no bowel movement more than 24 hrs, Disp: 7 each, Rfl: 5    senna-docusate sodium (SENEXON-S) 8 6-50 mg per tablet, Take 1 tablet by mouth daily, Disp: 30 tablet, Rfl: 5    sucralfate (CARAFATE) 1 g/10 mL suspension, Take 1 g by mouth 4 (four) times a day, Disp: , Rfl:     Sunscreens (AVEENO SUNBLOCK SPF50 EX), Apply topically apply 15 minutes before sun exsposure, Disp: , Rfl:     zinc oxide (DESITIN) 13 % cream, Apply 1 application topically as needed (for perianal irritation), Disp: 1 Tube, Rfl: 5    Allergies   Allergen Reactions    Seasonal Ic  [Cholestatin]        OBJECTIVE  Vitals:   Vitals:    05/14/18 1612   BP: 118/78   BP Location: Left arm   Patient Position: Sitting   Cuff Size: Large   Pulse: (!) 113   Resp: 18   Temp: 99 7 °F (37 6 °C)   TempSrc: Probe   Weight: 77 7 kg (171 lb 6 4 oz)         Physical Exam   Constitutional: She is oriented to person, place, and time  She appears well-developed and well-nourished  No distress  HENT:   Head: Normocephalic  Eyes: Pupils are equal, round, and reactive to light  Neck: Normal range of motion  Cardiovascular: Normal rate  Pulmonary/Chest: Effort normal    Abdominal: Soft  Bowel sounds are normal  She exhibits no distension and no mass  There is no tenderness  There is no rebound and no guarding  Neurological: She is alert and oriented to person, place, and time  Skin: Skin is warm  No rash noted  She is not diaphoretic  No erythema  No pallor  Psychiatric: She has a normal mood and affect   Her behavior is normal  Judgment normal               Jim Mon MD, PGY-2  [facility]   5/15/2018

## 2018-05-15 ENCOUNTER — HOSPITAL ENCOUNTER (EMERGENCY)
Facility: HOSPITAL | Age: 38
Discharge: HOME | End: 2018-05-15
Attending: EMERGENCY MEDICINE
Payer: MEDICARE

## 2018-05-15 VITALS
DIASTOLIC BLOOD PRESSURE: 65 MMHG | TEMPERATURE: 98 F | SYSTOLIC BLOOD PRESSURE: 136 MMHG | WEIGHT: 171.3 LBS | RESPIRATION RATE: 18 BRPM | BODY MASS INDEX: 35.8 KG/M2 | HEART RATE: 102 BPM

## 2018-05-15 DIAGNOSIS — R45.851 PASSIVE SUICIDAL IDEATIONS: Primary | ICD-10-CM

## 2018-05-15 LAB
BACTERIA UR QL AUTO: ABNORMAL /HPF
BILIRUB UR QL STRIP: NEGATIVE
CLARITY UR: CLEAR
COLOR UR: YELLOW
GLUCOSE UR STRIP-MCNC: NEGATIVE MG/DL
HGB UR QL STRIP.AUTO: ABNORMAL
HYALINE CASTS #/AREA URNS LPF: ABNORMAL /LPF
KETONES UR STRIP-MCNC: NEGATIVE MG/DL
LEUKOCYTE ESTERASE UR QL STRIP: NEGATIVE
NITRITE UR QL STRIP: NEGATIVE
NON-SQ EPI CELLS URNS QL MICRO: ABNORMAL /HPF
PH UR STRIP.AUTO: 5.5 [PH] (ref 4.5–8)
PROT UR STRIP-MCNC: NEGATIVE MG/DL
RBC #/AREA URNS AUTO: ABNORMAL /HPF
SP GR UR STRIP.AUTO: 1.02 (ref 1–1.03)
UROBILINOGEN UR QL STRIP.AUTO: 0.2 E.U./DL
WBC #/AREA URNS AUTO: ABNORMAL /HPF

## 2018-05-15 PROCEDURE — 99284 EMERGENCY DEPT VISIT MOD MDM: CPT

## 2018-05-15 PROCEDURE — 82075 ASSAY OF BREATH ETHANOL: CPT | Performed by: STUDENT IN AN ORGANIZED HEALTH CARE EDUCATION/TRAINING PROGRAM

## 2018-05-15 PROCEDURE — 81001 URINALYSIS AUTO W/SCOPE: CPT

## 2018-05-15 RX ORDER — AMMONIUM LACTATE 12 G/100G
LOTION TOPICAL 2 TIMES DAILY
COMMUNITY
End: 2018-05-30

## 2018-05-15 NOTE — DISCHARGE INSTRUCTIONS
Patient safe to return to her facility with her standard staff 1:1 supervision  Tomas Duran MD         [id number]     17:40     Suicide Prevention for Adults   WHAT YOU NEED TO KNOW:   A person may see suicide as the only way to escape emotional or physical pain and suffering  You can help provide emotional support for him or her and get the help he or she needs  Learn to recognize warning signs that the person may be considering suicide  Find resources to help prevent him or her from attempting to take his or her life  DISCHARGE INSTRUCTIONS:   Call 911 if:   · The person has done something on purpose to hurt himself or herself  · The person tries to commit suicide  Return to the emergency department if:   · The person tells you he or she made a plan to commit suicide  · The person acts out in anger, is reckless, or is abusing alcohol or drugs  · The person has serious thoughts of suicide, even with treatment  Contact the person's healthcare provider or therapist if:   · You begin to see warning signs that the person may be considering suicide  · The person has intense feelings of sadness, anger, revenge, or despair, or he cannot make decisions easily  · The person tells you he or she has more thoughts of suicide when they are alone  · The person withdraws from others  · The person stops eating, or begins to smoke or drink heavily  · The person feels he or she is a burden because of a disability or disease  · The person has trouble dealing with stress, such as a breakup or a job loss  · You have questions or concerns about the person's condition or care  What to do if the person is having thoughts of suicide:  Call 911 if you feel the person is at immediate risk of suicide, or if he or she talks about an active suicide plan  Assume that the person intends to carry out his or her plan  Resources are available to help you and the person    The following are some things you can do:  · Call the [facility] at 4-294-604-TALK ([phone])   · Call the Suicide Hotline at 5-770-ELPUMXB (4-164.574.4836)   · Contact the person's therapist  His or her healthcare provider can give you a list of therapists if he or she does not have one  · Keep medicines, weapons, and alcohol out of the person's reach  Do not leave the person alone if he or she says they want to commit suicide  Do not leave the person alone if you think he or she may try it  Make sure you do not put yourself at risk if the person has a weapon  · Do not be afraid to ask if the person is thinking of ending his or her life  Ask if the person has a plan for hurting or killing himself or herself  Warning signs to watch for:   · Talking about a plan for committing suicide, or suddenly deciding to make a will    · Cutting himself or herself, burning the skin with cigarettes, or driving recklessly    · Drug or alcohol use, not taking prescribed medicine, or taking too much prescribed medicine    · Sudden anger, lashing out at others, or seeming hopeless, anxious, or angry and then suddenly becoming happy or peaceful    · Not wanting to spend time with others or doing things he or she usually enjoys    · Trouble at work, or not showing up for work    · A change in the way he or she eats, sleeps, or dresses    · Weight gain or loss or having less energy than usual    · Trouble sleeping or spending a lot of time sleeping    · Giving away or throwing away his or her belongings  Follow up with the person's healthcare provider and therapist as directed:  Write down your questions so you remember to ask them during your visits  Treatments the person may need:   · Medicines  may be given to prevent mood swings, or to decrease anxiety or depression  The person will need to take all medicines as directed  A sudden stop can be harmful   It may take 4 to 6 weeks for the medicine to help him or her feel better  · A therapist  can help the person identify and change negative feelings or beliefs about himself or herself  This may also help change the way the he or she feels and acts  A therapist can also help the person find ways to cope with things that cannot be changed  What you can do to help the person:   · Encourage the person to seek help for drug or alcohol abuse  Drugs and alcohol can increase suicidal thoughts and make the person more likely to act on them  · Help the person connect with others  Encourage him or her to become involved in the community  Some examples include tutoring a young student, volunteering at a [employer], or joining a group exercise program  The person may need help setting up a computer or creating an e-mail account to help him or her remain connected to others  · Exercise with the person  Exercise can lift his or her mood, increase energy, and make it easier to sleep at night  · Encourage the person to try new things  Adults who are open to new experiences handle stress and change better than those who are not  · Call, visit, or send postcards to the person often  Check on him or her after the loss of a pet, longtime friend, or child  Holidays, birthdays, and anniversaries can be difficult for a person after a loss  The loss of a spouse can be especially painful and lonely  · Help the person schedule a visit with his or her Bahai or spiritual leader  A Bahai or spiritual leader may be able to offer additional support and resources to the person  · Help the person get equipment that will increase his or her comfort and mobility  Examples are hearing aids, glasses, large print books, and walkers  These can help him or her enjoy activities and feel more independent  · Encourage the person to continue taking medicine and going to therapy  Medicine and therapy can help improve his or her mental health    For support and more information:   · [facility] , [address] [place] [address]  Phone: 6- 604 - 872-DAJE (01 33 43 04 02)  Web Address: [employer]  · Suicide Awareness [employer]  [address] [place] [address] Joao Taylor [address] , [address]  Phone: 1- 725 - 772-0469  Web Address: [employer]  org  © 2017 [employer] [employer] Information is for End User's use only and may not be sold, redistributed or otherwise used for commercial purposes  All illustrations and images included in CareNotes® are the copyrighted property of [employer] , [employer]  or Franco Epperson  The above information is an  only  It is not intended as medical advice for individual conditions or treatments  Talk to your doctor, nurse or pharmacist before following any medical regimen to see if it is safe and effective for you
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
## 2018-05-15 NOTE — ED NOTES
Unable to BAT pt, she was unable to blow properly into device        [facility]  05/15/18 2298
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
## 2018-05-15 NOTE — ASSESSMENT & PLAN NOTE
Two days hx of  Left lower extremity bruise:  2 cm above the left knee:  Medially ( 1x1mm):  Secondary to mild trauma (  Unknown trauma): No pain,   No laceration:  stable    1  Watchful waiting:   No imaging, labs necessary   2  Patient uses walker to stabilize gait,  Continue to be more aware of surroundings when walking around    3    Forms were completed for and scanned in

## 2018-05-16 ENCOUNTER — TELEPHONE (OUTPATIENT)
Dept: FAMILY MEDICINE CLINIC | Facility: CLINIC | Age: 38
End: 2018-05-16

## 2018-05-16 NOTE — TELEPHONE ENCOUNTER
Group Home left voice message requesting refill of:  1  Famotidine 20mg  2  Protonix 40mg  3  Desitin  4  Motrin 400  5   Miralax

## 2018-05-17 DIAGNOSIS — R52 PAIN: ICD-10-CM

## 2018-05-17 DIAGNOSIS — L29.0 PERIANAL IRRITATION: ICD-10-CM

## 2018-05-17 DIAGNOSIS — K21.9 GASTROESOPHAGEAL REFLUX DISEASE, ESOPHAGITIS PRESENCE NOT SPECIFIED: ICD-10-CM

## 2018-05-17 DIAGNOSIS — K59.00 CONSTIPATION, UNSPECIFIED CONSTIPATION TYPE: ICD-10-CM

## 2018-05-17 RX ORDER — POLYETHYLENE GLYCOL 3350 17 G/17G
POWDER, FOR SOLUTION ORAL
Qty: 30 EACH | Refills: 0 | Status: SHIPPED | OUTPATIENT
Start: 2018-05-17 | End: 2019-03-20 | Stop reason: SDUPTHER

## 2018-05-17 RX ORDER — PANTOPRAZOLE SODIUM 40 MG/1
40 TABLET, DELAYED RELEASE ORAL DAILY
Qty: 90 TABLET | Refills: 0 | Status: SHIPPED | OUTPATIENT
Start: 2018-05-17 | End: 2018-09-10

## 2018-05-17 RX ORDER — IBUPROFEN 400 MG/1
400 TABLET ORAL EVERY 8 HOURS PRN
Qty: 90 TABLET | Refills: 0 | Status: SHIPPED | OUTPATIENT
Start: 2018-05-17 | End: 2018-09-10 | Stop reason: SDUPTHER

## 2018-05-17 RX ORDER — FAMOTIDINE 20 MG/1
20 TABLET, FILM COATED ORAL 2 TIMES DAILY
Qty: 60 TABLET | Refills: 0 | Status: SHIPPED | OUTPATIENT
Start: 2018-05-17 | End: 2018-09-10 | Stop reason: SDUPTHER

## 2018-05-17 NOTE — TELEPHONE ENCOUNTER
I only saw patient for acute visit  I am willing to refill meds if she doesn't have an established PCP  Please review

## 2018-05-18 ENCOUNTER — OFFICE VISIT (OUTPATIENT)
Dept: AUDIOLOGY | Age: 38
End: 2018-05-18

## 2018-05-18 DIAGNOSIS — Z87.2 H/O SUNBURN: Primary | ICD-10-CM

## 2018-05-18 DIAGNOSIS — H90.3 SENSORINEURAL HEARING LOSS, BILATERAL: Primary | ICD-10-CM

## 2018-05-18 RX ORDER — ALBUTEROL SULFATE 90 UG/1
2 AEROSOL, METERED RESPIRATORY (INHALATION) EVERY 6 HOURS PRN
Qty: 1 INHALER | Refills: 0 | Status: CANCELLED | OUTPATIENT
Start: 2018-05-18

## 2018-05-18 NOTE — PROGRESS NOTES
Hearing aid visit:    Name:  Serge Simmonds  :  1980  Age:  43 y o  Date of Evaluation: 18     Serge Simmonds is being seen for a hearing aid visit  Serge Simmonds   is fit with Chyrel Spice Z Series i110 BTE  Right serial number [id number]  Warranty date 21  Patient reports Left hearing aid sounds weak  Action:  Cleaned and checked hearing aids  Left hearing aid earmold and tubing clogged with wax  Counseled patient on cleaning  Re-tubed earmolds  Patient did not have $20 to pay for the otoclip  Told her to stop by whenever she gets the $20, and she can pick it up at the   Recommendations: Follow up in month          Indigo Davis , CCC-A  Clinical Audiologist

## 2018-05-18 NOTE — TELEPHONE ENCOUNTER
I called [facility] @ [phone]  Patient was prescribed ProAir in ER on 10/17/17 as a PRN order   Do you want this continued, please review

## 2018-05-21 ENCOUNTER — OFFICE VISIT (OUTPATIENT)
Dept: FAMILY MEDICINE CLINIC | Facility: CLINIC | Age: 38
End: 2018-05-21
Payer: MEDICARE

## 2018-05-21 ENCOUNTER — DOCUMENTATION (OUTPATIENT)
Dept: FAMILY MEDICINE CLINIC | Facility: CLINIC | Age: 38
End: 2018-05-21

## 2018-05-21 VITALS
TEMPERATURE: 99.2 F | DIASTOLIC BLOOD PRESSURE: 80 MMHG | RESPIRATION RATE: 16 BRPM | HEART RATE: 100 BPM | HEIGHT: 58 IN | WEIGHT: 171.2 LBS | SYSTOLIC BLOOD PRESSURE: 112 MMHG | BODY MASS INDEX: 35.93 KG/M2

## 2018-05-21 DIAGNOSIS — M26.609 TMJ (TEMPOROMANDIBULAR JOINT DISORDER): ICD-10-CM

## 2018-05-21 DIAGNOSIS — H61.23 BILATERAL IMPACTED CERUMEN: Primary | ICD-10-CM

## 2018-05-21 PROCEDURE — 99213 OFFICE O/P EST LOW 20 MIN: CPT | Performed by: FAMILY MEDICINE

## 2018-05-21 NOTE — TELEPHONE ENCOUNTER
I created a letter for you to sign to be forwarded to [address] [place] [address] and [employer]  Please check your bin

## 2018-05-21 NOTE — PATIENT INSTRUCTIONS
Temporomandibular Disorder   WHAT YOU NEED TO KNOW:   What is temporomandibular disorder? Temporomandibular disorder is a condition that causes pain in your jaw  The disorder affects the joint between your temporal bone and your mandible (jawbone)  The muscles and nerves around the joint are also affected  What causes temporomandibular disorder? · Dislocation of the cartilage disc in the joint    · Deformities of the jaw    · Inflammation, infection, arthritis, muscle problems, or tumors in the jaw area     · Injury to or fracture of the jawbone    · Muscle strain from chewing or teeth clenching or grinding  What are the signs and symptoms of temporomandibular disorder? · Popping or grating sound when you open or close your mouth    · Headache or pain in your jaw, ear, neck, or face    · Pain or swelling of the jaw muscles    · Tingling or numbness in the jaw or face    · Trouble opening or closing your mouth, or your jaw locks  How is temporomandibular disorder treated? · Medicines:      ¨ NSAIDs:  These medicines decrease swelling and pain  You can buy NSAIDs without a doctor's order  Ask your healthcare provider which medicine is right for you, and how much to take  Take as directed  NSAIDs can cause stomach bleeding or kidney problems if not taken correctly  ¨ Botulinum toxin:  This may be injected into the muscles of your jaw to decrease pain  Khoa Matt Steroid medicine: These may be injected into the joint to decrease pain and swelling  ¨ Muscle relaxers  help decrease pain and muscle spasms  · Surgery: You may need surgery to fix your teeth, jawbone, or the joint  What are the risks of temporomandibular disorder? You may bleed or get an infection if you have surgery  If left untreated, your condition may get worse  You may have trouble breathing, eating, drinking, talking, or opening your mouth   If not treated early, temporomandibular disorder may lead to permanent injury, such as nerve damage, deformity, or paralysis  How can I manage my symptoms? · Eat soft foods: Your healthcare provider may suggest that you eat only soft foods for several days  A dietitian may work with you to find foods that are easier to bite, chew, or swallow  Examples are soup, applesauce, cottage cheese, pudding, yogurt, and soft fruits  · Use jaw supporting devices:  Splints may be used to support your jaw or keep it from moving  You may need to wear a mouth guard to keep you from clenching or grinding your teeth while you are sleeping  · Use ice and heat:  Ice helps decrease swelling and pain  Ice may also help prevent tissue damage  Use an ice pack, or put crushed ice in a plastic bag  Cover it with a towel and place it on your jaw for 15 to 20 minutes every hour or as directed  After the first 24 to 48 hours, use heat to decrease pain, swelling, and muscle spasms  Apply heat on the area for 20 to 30 minutes every 2 hours for as many days as directed  · Go to physical therapy:  A physical therapist teaches you exercises to help improve movement and strength, and to decrease pain in your jaw  A speech therapist may help you with swallowing and speech exercises  When should I contact my healthcare provider? · You have a fever  · Your splint or mouth guard is loose  · You have questions or concerns about your condition or care  When should I seek immediate care or call 911? · You have nausea, are vomiting, or cannot keep liquids down  · You have pain that does not go away even after you take your pain medicine  · You have problems breathing, talking, drinking, eating, or swallowing  · Your splint or mouth guard gets damaged or broken  CARE AGREEMENT:   You have the right to help plan your care  Learn about your health condition and how it may be treated  Discuss treatment options with your caregivers to decide what care you want to receive   You always have the right to refuse treatment  The above information is an  only  It is not intended as medical advice for individual conditions or treatments  Talk to your doctor, nurse or pharmacist before following any medical regimen to see if it is safe and effective for you  © 2017 [employer] Information is for End User's use only and may not be sold, redistributed or otherwise used for commercial purposes  All illustrations and images included in CareNotes® are the copyrighted property of A D A M , Inc  or Franco Epperson

## 2018-05-21 NOTE — PROGRESS NOTES
Assessment/Plan     Bilateral impacted cerumen  - will prescribe debrox for 3 days every month    TMJ (temporomandibular joint disorder)  - advised to use NSAIDs, ice and can talk with dentist about mouth guard  Diagnoses and all orders for this visit:    Bilateral impacted cerumen  -     carbamide peroxide (DEBROX) 6 5 % otic solution; Apply 5 drops to each ear daily at 8 am for three days and then repeat each month    TMJ (temporomandibular joint disorder)         Subjective     Chief Complaint: Cerumen impaction    HPI:   This is a 44 yo F who presents for cerumen impaction removal of left ear  She also reports left cheek pain with eating cold and hot food  The following portions of the patient's history were reviewed and updated as appropriate: allergies, current medications, past family history, past medical history, past social history, past surgical history and problem list     Review of Systems  Review of Systems   Constitutional: Negative for fatigue and fever  HENT: Negative for congestion  As noted in HPI    Respiratory: Negative for cough and shortness of breath  Cardiovascular: Negative for chest pain and palpitations  Gastrointestinal: Negative for abdominal pain, constipation, diarrhea and nausea  Objective   Vitals:    05/21/18 1540   BP: 112/80   Pulse: 100   Resp: 16   Temp: 99 2 °F (37 3 °C)       Physical Exam   Constitutional: She appears well-developed and well-nourished  HENT:   Head: Normocephalic and atraumatic  Right Ear: External ear normal    Left Ear: External ear normal    Mouth/Throat: Oropharynx is clear and moist    Bilateral popping with mouth opening over TMJ, mild cerumen of bilateral ears without impaction    Cardiovascular: Normal rate, regular rhythm and normal heart sounds  No murmur heard  Pulmonary/Chest: Effort normal and breath sounds normal    Abdominal: Soft  Bowel sounds are normal  She exhibits no distension   There is no tenderness  Lab Results: I have reviewed all the lab results

## 2018-05-22 DIAGNOSIS — J30.2 SEASONAL ALLERGIC RHINITIS, UNSPECIFIED TRIGGER: Primary | ICD-10-CM

## 2018-05-23 ENCOUNTER — TELEPHONE (OUTPATIENT)
Dept: FAMILY MEDICINE CLINIC | Facility: CLINIC | Age: 38
End: 2018-05-23

## 2018-05-23 DIAGNOSIS — H61.23 BILATERAL IMPACTED CERUMEN: ICD-10-CM

## 2018-05-23 RX ORDER — ALBUTEROL SULFATE 90 UG/1
2 AEROSOL, METERED RESPIRATORY (INHALATION) EVERY 6 HOURS PRN
Qty: 1 INHALER | Refills: 2 | Status: SHIPPED | OUTPATIENT
Start: 2018-05-23 | End: 2018-05-30

## 2018-05-23 NOTE — TELEPHONE ENCOUNTER
CAREGIVER CALLED TO REQUEST ORDER FOR DEBROX DROPS BE MORE SPECIFIC BY GIVING DATES DROPS NEED TO BE ADMINISTERED  FAX [phone]

## 2018-05-23 NOTE — TELEPHONE ENCOUNTER
[facility],  Requesting a call back from Dr Cali Suarez about a form that was placed in her folder at the nurses station

## 2018-05-26 ENCOUNTER — OFFICE VISIT (OUTPATIENT)
Dept: URGENT CARE | Facility: MEDICAL CENTER | Age: 38
End: 2018-05-26
Payer: MEDICARE

## 2018-05-26 VITALS
SYSTOLIC BLOOD PRESSURE: 123 MMHG | OXYGEN SATURATION: 97 % | BODY MASS INDEX: 35.95 KG/M2 | WEIGHT: 172 LBS | DIASTOLIC BLOOD PRESSURE: 84 MMHG | RESPIRATION RATE: 18 BRPM | TEMPERATURE: 99.4 F | HEART RATE: 78 BPM

## 2018-05-26 DIAGNOSIS — L03.031 CELLULITIS AND ABSCESS OF TOE OF RIGHT FOOT: Primary | ICD-10-CM

## 2018-05-26 DIAGNOSIS — L02.611 CELLULITIS AND ABSCESS OF TOE OF RIGHT FOOT: Primary | ICD-10-CM

## 2018-05-26 PROCEDURE — 99213 OFFICE O/P EST LOW 20 MIN: CPT | Performed by: FAMILY MEDICINE

## 2018-05-26 PROCEDURE — G0463 HOSPITAL OUTPT CLINIC VISIT: HCPCS | Performed by: FAMILY MEDICINE

## 2018-05-26 RX ORDER — GINSENG 100 MG
1 CAPSULE ORAL 2 TIMES DAILY
COMMUNITY
End: 2018-09-21 | Stop reason: SDUPTHER

## 2018-05-26 RX ORDER — IBUPROFEN 600 MG/1
TABLET ORAL
Refills: 0 | COMMUNITY
Start: 2018-04-24 | End: 2018-05-30

## 2018-05-26 RX ORDER — AMOXICILLIN 875 MG/1
875 TABLET, COATED ORAL 2 TIMES DAILY
Qty: 20 TABLET | Refills: 0 | Status: SHIPPED | OUTPATIENT
Start: 2018-05-26 | End: 2018-06-05

## 2018-05-26 NOTE — PATIENT INSTRUCTIONS
I prescribed amoxicillin 875 mg twice a day for 10 days  Patient is to perform warm soaks for right foot for 10-15 minutes 2 to 3 times a day for at least a week  If redness or swelling persists beyond 2-3 days, patient is to follow-up per primary care provider  Cellulitis   WHAT YOU NEED TO KNOW:   Cellulitis is a skin infection caused by bacteria  Cellulitis may go away on its own or you may need treatment  Your healthcare provider may draw a [place] around the outside edges of your cellulitis  If your cellulitis spreads, your healthcare provider will see it outside of the [place]  DISCHARGE INSTRUCTIONS:   Call 911 if:   · You have sudden trouble breathing or chest pain  Return to the emergency department if:   · Your wound gets larger and more painful  · You feel a crackling under your skin when you touch it  · You have purple dots or bumps on your skin, or you see bleeding under your skin  · You have new swelling and pain in your legs  · The red, warm, swollen area gets larger  · You see red streaks coming from the infected area  Contact your healthcare provider if:   · You have a fever  · Your fever or pain does not go away or gets worse  · The area does not get smaller after 2 days of antibiotics  · Your skin is flaking or peeling off  · You have questions or concerns about your condition or care  Medicines:   · Antibiotics  help treat the bacterial infection  · NSAIDs , such as ibuprofen, help decrease swelling, pain, and fever  NSAIDs can cause stomach bleeding or kidney problems in certain people  If you take blood thinner medicine, always ask if NSAIDs are safe for you  Always read the medicine label and follow directions  Do not give these medicines to children under 10months of age without direction from your child's healthcare provider  · Acetaminophen  decreases pain and fever  It is available without a doctor's order   Ask how much to take and how often to take it  Follow directions  Read the labels of all other medicines you are using to see if they also contain acetaminophen, or ask your doctor or pharmacist  Acetaminophen can cause liver damage if not taken correctly  Do not use more than 4 grams (4,000 milligrams) total of acetaminophen in one day  · Take your medicine as directed  Contact your healthcare provider if you think your medicine is not helping or if you have side effects  Tell him or her if you are allergic to any medicine  Keep a list of the medicines, vitamins, and herbs you take  Include the amounts, and when and why you take them  Bring the list or the pill bottles to follow-up visits  Carry your medicine list with you in case of an emergency  Self-care:   · Elevate the area above the level of your heart  as often as you can  This will help decrease swelling and pain  Prop the area on pillows or blankets to keep it elevated comfortably  · Clean the area daily until the wound scabs over  Gently wash the area with soap and water  Pat dry  Use dressings as directed  · Place cool or warm, wet cloths on the area as directed  Use clean cloths and clean water  Leave it on the area until the cloth is room temperature  Pat the area dry with a clean, dry cloth  The cloths may help decrease pain  Prevent cellulitis:   · Do not scratch bug bites or areas of injury  You increase your risk for cellulitis by scratching these areas  · Do not share personal items, such as towels, clothing, and razors  · Clean exercise equipment  with germ-killing  before and after you use it  · Wash your hands often  Use soap and water  Wash your hands after you use the bathroom, change a child's diapers, or sneeze  Wash your hands before you prepare or eat food  Use lotion to prevent dry, cracked skin  · Wear pressure stockings as directed  You may be told to wear the stockings if you have peripheral edema   The stockings improve blood flow and decrease swelling  · Treat athlete's foot  This can help prevent the spread of a bacterial skin infection  Follow up with your healthcare provider within 3 days, or as directed: Your healthcare provider will check if your cellulitis is getting better  You may need different medicine  Write down your questions so you remember to ask them during your visits  © 2017 [employer]  Information is for End User's use only and may not be sold, redistributed or otherwise used for commercial purposes  All illustrations and images included in CareNotes® are the copyrighted property of [employer]  or [employer]  The above information is an  only  It is not intended as medical advice for individual conditions or treatments  Talk to your doctor, nurse or pharmacist before following any medical regimen to see if it is safe and effective for you

## 2018-05-26 NOTE — LETTER
May 26, 2018     Ez Wright MD  [place] [address]    Patient: Paulla Lundborg   YOB: 1980   Date of Visit: 5/26/2018        Dear Ez Wright MD:    Your patient, Corbin Erickson was seen in our urgent care department on 5/26/2018  Enclosed is a full report of that visit  She is to follow up with primary care provider on May 29th if redness and swelling of right toe is worse  If you have any questions or concerns, please don't hesitate to call             Sincerely,        Olivia Sahu MD      CC: [unfilled]    Enclosure

## 2018-05-26 NOTE — PROGRESS NOTES
[facility]        NAME: Blanca Ortiz is a 43 y o  female  : 1980    MRN: [id number]  DATE: May 26, 2018  TIME: 3:59 PM    Assessment and Plan   Cellulitis and abscess of toe of right foot [L03 [id number], L02 [id number]]  1  Cellulitis and abscess of toe of right foot  amoxicillin (AMOXIL) 875 mg tablet         Patient Instructions       Follow up with PCP in 3-5 days  Proceed to  ER if symptoms worsen  Chief Complaint     Chief Complaint   Patient presents with    Toe Pain     Pt states her right pinky toe hurts (at the tip) as of today  No injury  History of Present Illness       Patient complaining of will right little toe pain and swelling since today  Denies any trauma  Describes noticing some redness and swelling of the tip of her right little toe  Denies any purulent drainage from the toenail  Review of Systems   Review of Systems   Constitutional: Negative  Musculoskeletal: Negative  Skin: Positive for wound           Current Medications       Current Outpatient Prescriptions:     aluminum-magnesium hydroxide 200-200 MG/5ML suspension, Take by mouth every 6 (six) hours as needed for heartburn, Disp: , Rfl:     bacitracin topical ointment 500 units/g topical ointment, Apply 1 large application topically 2 (two) times a day, Disp: , Rfl:     albuterol (PROVENTIL HFA,VENTOLIN HFA) 90 mcg/act inhaler, Inhale 2 puffs every 6 (six) hours as needed for wheezing or shortness of breath, Disp: 1 Inhaler, Rfl: 2    aluminum-magnesium hydroxide-simethicone (MAALOX MULTI SYMPTOM MAX ST) 400-400-40 MG/5ML suspension, Take 15 mL by mouth 4 times daily, Disp: , Rfl:     ammonium lactate (LAC-HYDRIN) 12 % lotion, Apply topically 2 (two) times a day, Disp: , Rfl:     amoxicillin (AMOXIL) 875 mg tablet, Take 1 tablet (875 mg total) by mouth 2 (two) times a day for 10 days, Disp: 20 tablet, Rfl: 0    ARIPiprazole (ABILIFY) 5 mg tablet, Take 5 mg by mouth daily  , Disp: , Rfl:    bacitracin ophthalmic ointment, Apply to eye, Disp: , Rfl:     calcium carbonate (TUMS) 500 mg chewable tablet, Chew 1 tablet daily, Disp: , Rfl:     carbamide peroxide (DEBROX) 6 5 % otic solution, Administer 5 drops into both ears 2 (two) times a day For 3 days and then 5 drops in each ear once a month , Disp: 15 mL, Rfl: 0    Cholecalciferol (VITAMIN D-3 PO), Take 2,000 Units by mouth daily, Disp: , Rfl:     clotrimazole (LOTRIMIN) 1 % cream, Apply 1 g (1 application total) topically 3 (three) times a day as needed (fungal irritation), Disp: 30 g, Rfl: 5    escitalopram (LEXAPRO) 10 mg tablet, Take 10 mg by mouth daily, Disp: , Rfl:     famotidine (PEPCID) 20 mg tablet, Take 1 tablet (20 mg total) by mouth 2 (two) times a day for 30 days At 7AM and 4PM (GERD), Disp: 60 tablet, Rfl: 0    GNP MILK OF MAGNESIA 1200 MG/15ML oral suspension, TAKE 2 TBSP (30ML) BY MOUTH DAILY AS NEEDED IF NO BM IN 3 DAYS (CONSTIPATION), Disp: 355 mL, Rfl: 0    ibuprofen (MOTRIN) 400 mg tablet, Take 1 tablet (400 mg total) by mouth every 8 (eight) hours as needed for mild pain, moderate pain or headaches for up to 30 days, Disp: 90 tablet, Rfl: 0    ibuprofen (MOTRIN) 600 mg tablet, TAKE 1 TAB AT 8AM, 2PM, AND 8PM  START 4/24/18, END 5/1, Disp: , Rfl: 0    lubiprostone (AMITIZA) 24 mcg capsule, Take 1 capsule (24 mcg total) by mouth 2 (two) times a day with meals, Disp: 60 capsule, Rfl: 0    MAPAP 500 MG tablet, TAKE 1 TAB BY MOUTH EVERY 6 HOURS AS NEEDED FOR PAIN/HA/BACKACHE/MUSCLE ACHE, Disp: 30 tablet, Rfl: 4    Melatonin 5 MG TABS, Take 1 tablet by mouth daily 9pm, Disp: , Rfl:     metoclopramide (REGLAN) 10 mg tablet, TAKE 1 TABLET BY MOUTH EVERY 8 HOURS AS NEEDED FOR NAUSEA *VIRI, Disp: 30 tablet, Rfl: 0    Mouthwashes (LISTERINE ANTISEPTIC) LIQD, Apply to the mouth or throat 2 (two) times a day, Disp: , Rfl:     Multiple Vitamins-Minerals (CERTAVITE SENIOR/ANTIOXIDANT PO), Take by mouth, Disp: , Rfl:    Multiple Vitamins-Minerals (CERTAVITE/ANTIOXIDANTS) TABS, TAKE 1 TABLET BY MOUTH DAILY AT 8AM (SUPPLEMENT) KAITLYNMBNATALIE, Disp: 31 tablet, Rfl: 0    Norgestimate-Eth Estradiol (SPRINTEC 28 PO), Take by mouth, Disp: , Rfl:     pantoprazole (PROTONIX) 40 mg tablet, Take 1 tablet (40 mg total) by mouth daily for 90 days, Disp: 90 tablet, Rfl: 0    polyethylene glycol (MIRALAX) 17 g packet, Take 17gm (1 packet) daily for first three days, then daily as needed for no bowel movement more than 24 hrs, Disp: 30 each, Rfl: 0    RA SUNSCREEN SPF50 LOTN, Apply 15 minutes before sun exposure and every 2 hours, Disp: 1 Bottle, Rfl: 5    senna-docusate sodium (SENEXON-S) 8 6-50 mg per tablet, Take 1 tablet by mouth daily, Disp: 30 tablet, Rfl: 5    sucralfate (CARAFATE) 1 g/10 mL suspension, Take 1 g by mouth 4 (four) times a day, Disp: , Rfl:     zinc oxide (DESITIN) 13 % cream, Apply 1 application topically as needed (for perianal irritation), Disp: 1 Tube, Rfl: 0    Current Allergies     Allergies as of 05/26/2018    (No Known Allergies)            The following portions of the patient's history were reviewed and updated as appropriate: allergies, current medications, past family history, past medical history, past social history, past surgical history and problem list      Past Medical History:   Diagnosis Date    Anxiety     Cerebral palsy (HCC)     Cerebral palsy (HCC)     Last Assessed:7/6/2016    Chronically dry eyes, left     Last Assessed:7/6/2016    Depression     Last Assessed:7/6/2016    Depressive disorder     GERD (gastroesophageal reflux disease)     Mood disorder (HCC)        Past Surgical History:   Procedure Laterality Date    CSF SHUNT      Creation of Ventriculo-Peritoneal CSF shunt ;  Last Assessed:7/6/2016    EAR SURGERY      Last Assessed:7/6/2016    LEG SURGERY      due to CP     NOSE SURGERY      Last Assessed:7/6/2016       Family History   Problem Relation Age of Onset    Diabetes Mother     No Known Problems Father          Medications have been verified  Objective   /84   Pulse 78   Temp 99 4 °F (37 4 °C)   Resp 18   Wt 78 kg (172 lb)   LMP 05/07/2018 (Approximate)   SpO2 97%   BMI 35 95 kg/m²        Physical Exam     Physical Exam   Musculoskeletal: Normal range of motion  Skin:   Right lower extremity-reveals erythema and induration at the base of the right little toe tender to touch  Findings consistent with cellulitis  Nursing note and vitals reviewed

## 2018-05-29 NOTE — TELEPHONE ENCOUNTER
[facility],  Calling again to see when paperwork will be adressed  She stated,  if Dr Rachelle Stafford is not able to return call by today a call from nurse will suffice

## 2018-05-29 NOTE — TELEPHONE ENCOUNTER
I will not be in the office until Friday, not sure if someone can give a call to get a summary of what they will need from me     Thank you, Dr Radha Ayala

## 2018-05-30 ENCOUNTER — HOSPITAL ENCOUNTER (EMERGENCY)
Facility: HOSPITAL | Age: 38
Discharge: HOME | End: 2018-05-30
Attending: EMERGENCY MEDICINE
Payer: MEDICARE

## 2018-05-30 VITALS
OXYGEN SATURATION: 98 % | DIASTOLIC BLOOD PRESSURE: 88 MMHG | RESPIRATION RATE: 18 BRPM | TEMPERATURE: 98 F | HEART RATE: 86 BPM | SYSTOLIC BLOOD PRESSURE: 137 MMHG

## 2018-05-30 DIAGNOSIS — F32.A DEPRESSION: Primary | ICD-10-CM

## 2018-05-30 LAB
AMPHETAMINES SERPL QL SCN: NEGATIVE
BACTERIA UR QL AUTO: ABNORMAL /HPF
BARBITURATES UR QL: NEGATIVE
BENZODIAZ UR QL: NEGATIVE
BILIRUB UR QL STRIP: NEGATIVE
CLARITY UR: CLEAR
COCAINE UR QL: NEGATIVE
COLOR UR: YELLOW
COLOR, POC: NORMAL
ETHANOL EXG-MCNC: 0 MG/DL
EXT PREG TEST URINE: NORMAL
GLUCOSE UR STRIP-MCNC: NEGATIVE MG/DL
HGB UR QL STRIP.AUTO: ABNORMAL
HYALINE CASTS #/AREA URNS LPF: ABNORMAL /LPF
KETONES UR STRIP-MCNC: NEGATIVE MG/DL
LEUKOCYTE ESTERASE UR QL STRIP: NEGATIVE
METHADONE UR QL: NEGATIVE
NITRITE UR QL STRIP: NEGATIVE
NON-SQ EPI CELLS URNS QL MICRO: ABNORMAL /HPF
OPIATES UR QL SCN: NEGATIVE
PCP UR QL: NEGATIVE
PH UR STRIP.AUTO: 6.5 [PH] (ref 4.5–8)
PROT UR STRIP-MCNC: NEGATIVE MG/DL
RBC #/AREA URNS AUTO: ABNORMAL /HPF
SP GR UR STRIP.AUTO: 1.01 (ref 1–1.03)
THC UR QL: NEGATIVE
UROBILINOGEN UR QL STRIP.AUTO: 0.2 E.U./DL
WBC #/AREA URNS AUTO: ABNORMAL /HPF

## 2018-05-30 PROCEDURE — 82075 ASSAY OF BREATH ETHANOL: CPT | Performed by: EMERGENCY MEDICINE

## 2018-05-30 PROCEDURE — 99283 EMERGENCY DEPT VISIT LOW MDM: CPT

## 2018-05-30 PROCEDURE — 81025 URINE PREGNANCY TEST: CPT | Performed by: EMERGENCY MEDICINE

## 2018-05-30 PROCEDURE — 80307 DRUG TEST PRSMV CHEM ANLYZR: CPT | Performed by: EMERGENCY MEDICINE

## 2018-05-30 PROCEDURE — 81002 URINALYSIS NONAUTO W/O SCOPE: CPT | Performed by: EMERGENCY MEDICINE

## 2018-05-30 PROCEDURE — 81001 URINALYSIS AUTO W/SCOPE: CPT

## 2018-05-30 RX ORDER — FAMOTIDINE 20 MG/1
20 TABLET, FILM COATED ORAL ONCE
Status: COMPLETED | OUTPATIENT
Start: 2018-05-30 | End: 2018-05-30

## 2018-05-30 RX ADMIN — FAMOTIDINE 20 MG: 20 TABLET ORAL at 17:30

## 2018-05-30 NOTE — ED PROVIDER NOTES
History  Chief Complaint   Patient presents with    Psychiatric Evaluation     Per EMS pt is coming from a group home  Pt's aid is on their way at this time  Pt reportedly was speaking to her mother regarding her living situation on the phone and pt was upset regarding the conversation and states "I want to kill myself" Pt intentionally scratched the R side of her nose near her eye  When asked if pt has a plan  pt states "I want to hit myself really bad "      42 yo F presents to ED for suicidal ideation  Pt lives at group home with 24 hr supervision  She was on the phone with her mom today and says her mom said some "harsh things" to her that made her angry  She says as a result of that and not wanting to be at the group home, she started to say that she wants to kill herself  She says she will just hit herself  Pt has outpatient therapist and psychiatry  They have not been notified of this  Prior to Admission Medications   Prescriptions Last Dose Informant Patient Reported? Taking?    ARIPiprazole (ABILIFY) 5 mg tablet 5/29/2018 at Unknown time Care Giver Yes Yes   Sig: Take 5 mg by mouth daily     Cholecalciferol (VITAMIN D-3 PO) 5/30/2018 at Unknown time Care Giver Yes Yes   Sig: Take 2,000 Units by mouth daily   GNP MILK OF MAGNESIA 1200 MG/15ML oral suspension  Care Giver No Yes   Sig: TAKE 2 TBSP (30ML) BY MOUTH DAILY AS NEEDED IF NO BM IN 3 DAYS (CONSTIPATION)   MAPAP 500 MG tablet   No Yes   Sig: TAKE 1 TAB BY MOUTH EVERY 6 HOURS AS NEEDED FOR PAIN/HA/BACKACHE/MUSCLE ACHE   Melatonin 5 MG TABS 5/29/2018 at Unknown time Care Giver Yes Yes   Sig: Take 1 tablet by mouth daily 9pm   Mouthwashes (LISTERINE ANTISEPTIC) LIQD 5/29/2018 at Unknown time Care Giver Yes Yes   Sig: Apply to the mouth or throat 2 (two) times a day   Multiple Vitamins-Minerals (CERTAVITE/ANTIOXIDANTS) TABS 5/30/2018 at Unknown time  No Yes   Sig: TAKE 1 TABLET BY MOUTH DAILY AT 8AM (SUPPLEMENT) JERRI Hallgestimate-Eth Estradiol (SPRINTEC 28 PO) 5/30/2018 at Unknown time Care Giver Yes Yes   Sig: Take by mouth   RA SUNSCREEN SPF50 LOTN   No Yes   Sig: Apply 15 minutes before sun exposure and every 2 hours   aluminum-magnesium hydroxide 200-200 MG/5ML suspension   Yes Yes   Sig: Take by mouth every 6 (six) hours as needed for heartburn   amoxicillin (AMOXIL) 875 mg tablet 5/30/2018 at Unknown time  No Yes   Sig: Take 1 tablet (875 mg total) by mouth 2 (two) times a day for 10 days   bacitracin topical ointment 500 units/g topical ointment   Yes Yes   Sig: Apply 1 large application topically 2 (two) times a day   calcium carbonate (TUMS) 500 mg chewable tablet  Care Giver Yes Yes   Sig: Chew 1 tablet daily   clotrimazole (LOTRIMIN) 1 % cream   No Yes   Sig: Apply 1 g (1 application total) topically 3 (three) times a day as needed (fungal irritation)   escitalopram (LEXAPRO) 10 mg tablet 5/30/2018 at Unknown time Care Giver Yes Yes   Sig: Take 10 mg by mouth daily   famotidine (PEPCID) 20 mg tablet 5/30/2018 at Unknown time  No Yes   Sig: Take 1 tablet (20 mg total) by mouth 2 (two) times a day for 30 days At 7AM and 4PM (GERD)   ibuprofen (MOTRIN) 400 mg tablet Past Week at Unknown time  No Yes   Sig: Take 1 tablet (400 mg total) by mouth every 8 (eight) hours as needed for mild pain, moderate pain or headaches for up to 30 days   lubiprostone (AMITIZA) 24 mcg capsule 5/30/2018 at Unknown time  No Yes   Sig: Take 1 capsule (24 mcg total) by mouth 2 (two) times a day with meals   metoclopramide (REGLAN) 10 mg tablet   No Yes   Sig: TAKE 1 TABLET BY MOUTH EVERY 8 HOURS AS NEEDED FOR NAUSEA *VIRI   pantoprazole (PROTONIX) 40 mg tablet 5/30/2018 at Unknown time  No Yes   Sig: Take 1 tablet (40 mg total) by mouth daily for 90 days   polyethylene glycol (MIRALAX) 17 g packet   No Yes   Sig: Take 17gm (1 packet) daily for first three days, then daily as needed for no bowel movement more than 24 hrs   senna-docusate sodium (SENEXON-S) 8 6-50 mg per tablet 5/30/2018 at Unknown time  No Yes   Sig: Take 1 tablet by mouth daily   sucralfate (CARAFATE) 1 g/10 mL suspension  Care Giver Yes Yes   Sig: Take 1 g by mouth 4 (four) times a day as needed     zinc oxide (DESITIN) 13 % cream   No Yes   Sig: Apply 1 application topically as needed (for perianal irritation)      Facility-Administered Medications: None       Past Medical History:   Diagnosis Date    Anxiety     Cerebral palsy (HCC)     Cerebral palsy (HCC)     Last Assessed:7/6/2016    Chronically dry eyes, left     Last Assessed:7/6/2016    Depression     Last Assessed:7/6/2016    Depressive disorder     GERD (gastroesophageal reflux disease)     Mood disorder (HCC)        Past Surgical History:   Procedure Laterality Date    CSF SHUNT      Creation of Ventriculo-Peritoneal CSF shunt ; Last Assessed:7/6/2016    EAR SURGERY      Last Assessed:7/6/2016    LEG SURGERY      due to CP     NOSE SURGERY      Last Assessed:7/6/2016       Family History   Problem Relation Age of Onset    Diabetes Mother     No Known Problems Father      I have reviewed and agree with the history as documented  Social History   Substance Use Topics    Smoking status: Never Smoker    Smokeless tobacco: Never Used    Alcohol use No        Review of Systems   Unable to perform ROS: Psychiatric disorder       Physical Exam  ED Triage Vitals [05/30/18 1424]   Temperature Pulse Respirations Blood Pressure SpO2   98 °F (36 7 °C) 86 18 137/88 98 %      Temp Source Heart Rate Source Patient Position - Orthostatic VS BP Location FiO2 (%)   Oral Right Sitting Right arm --      Pain Score       No Pain           Orthostatic Vital Signs  Vitals:    05/30/18 1424   BP: 137/88   Pulse: 86   Patient Position - Orthostatic VS: Sitting       Physical Exam   Constitutional: She appears well-developed and well-nourished  No distress  HENT:   Head: Atraumatic     Nose: Nose normal    Mouth/Throat: Oropharynx is clear and moist    Eyes: Conjunctivae are normal  Right eye exhibits no discharge  Left eye exhibits no discharge  Neck: Normal range of motion  Neck supple  Cardiovascular: Normal rate, regular rhythm and intact distal pulses  Pulmonary/Chest: Effort normal  No stridor  No respiratory distress  She exhibits no tenderness  Abdominal: Soft  There is no tenderness  There is no rebound and no guarding  Musculoskeletal: She exhibits no edema, tenderness or deformity  Neurological: She is alert  No sensory deficit  She exhibits normal muscle tone  Skin: Skin is warm and dry  Capillary refill takes less than 2 seconds  Psychiatric: She is not aggressive and not combative  Cognition and memory are impaired  She expresses impulsivity and inappropriate judgment  Nursing note and vitals reviewed  ED Medications  Medications   famotidine (PEPCID) tablet 20 mg (20 mg Oral Given 5/30/18 1730)       Diagnostic Studies  Results Reviewed     Procedure Component Value Units Date/Time    Rapid drug screen, urine [[id number]]  (Normal) Collected:  05/30/18 1508    Lab Status:  Final result Specimen:  Urine from Urine, Clean Catch Updated:  05/30/18 1646     Amph/Meth UR Negative     Barbiturate Ur Negative     Benzodiazepine Urine Negative     Cocaine Urine Negative     Methadone Urine Negative     Opiate Urine Negative     PCP Ur Negative     THC Urine Negative    Narrative:         FOR MEDICAL PURPOSES ONLY  IF CONFIRMATION NEEDED PLEASE CONTACT THE LAB WITHIN 5 DAYS      Drug Screen Cutoff Levels:  AMPHETAMINE/METHAMPHETAMINES  1000 ng/mL  BARBITURATES     200 ng/mL  BENZODIAZEPINES     200 ng/mL  COCAINE      300 ng/mL  METHADONE      300 ng/mL  OPIATES      300 ng/mL  PHENCYCLIDINE     25 ng/mL  THC       50 ng/mL    Urine Microscopic [[id number]]  (Abnormal) Collected:  05/30/18 1625    Lab Status:  Final result Specimen:  Urine from Urine, Clean Catch Updated:  05/30/18 1629     RBC, UA 4-10 (A) /hpf      WBC, UA None Seen /hpf      Epithelial Cells None Seen /hpf      Bacteria, UA None Seen /hpf      Hyaline Casts, UA None Seen /lpf     POCT urinalysis dipstick [[id number]]  (Normal) Resulted:  05/30/18 1619    Lab Status:  Final result Updated:  05/30/18 1619     Color, UA see chart    ED Urine Macroscopic [[id number]]  (Abnormal) Collected:  05/30/18 1625    Lab Status:  Final result Specimen:  Urine Updated:  05/30/18 1619     Color, UA Yellow     Clarity, UA Clear     pH, UA 6 5     Leukocytes, UA Negative     Nitrite, UA Negative     Protein, UA Negative mg/dl      Glucose, UA Negative mg/dl      Ketones, UA Negative mg/dl      Urobilinogen, UA 0 2 E U /dl      Bilirubin, UA Negative     Blood, UA Small (A)     Specific Brodnax, UA 1 015    Narrative:       CLINITEK RESULT    POCT alcohol breath test [[id number]]  (Normal) Resulted:  05/30/18 1512    Lab Status:  Final result Updated:  05/30/18 1513     EXTBreath Alcohol 0 000    POCT pregnancy, urine [[id number]]  (Normal) Resulted:  05/30/18 1508    Lab Status:  Final result Specimen:  Urine Updated:  05/30/18 1508     EXT PREG TEST UR (Ref: Negative) NEG                 No orders to display         Procedures  Procedures      Phone Consults  ED Phone Contact    ED Course                               MDM  Number of Diagnoses or Management Options  Depression:   Diagnosis management comments: Pt only saying she is suicidal because she doesn't want to go back to the group home and is mad at her mom  Pt is already under 24 hr supervision at group home as well  She has an outpatient psychiatrist and therapist   Pt evaluated by me, my attending, and ED crisis worker Kaylynn Carvalho who are all in agreement that pt does not need to be admitted  Pt discharged, but then group home staff states that as long as pt is saying that she wants to harm herself, they refuse to take her back, regardless of our assessment  Agreed to observe pt in ED for an hour and a half and then re-evaluate    On re-evaluation, pt says she no longer is suicidal  She also states she is not homicidal  She says she wants to go to the [employer]  Will discharge pt back to group home  Instructed to call pt's outpatient psych/therapist tomorrow  CritCare Time    Disposition  Final diagnoses:   Depression     Time reflects when diagnosis was documented in both MDM as applicable and the Disposition within this note     Time User Action Codes Description Comment    5/30/2018  4:26 PM Moshe Warner, [address] [F32 9] Depression       ED Disposition     ED Disposition Condition Comment    Discharge  [facility] discharge to home/self care      Condition at discharge: Stable        Follow-up Information     Follow up With Specialties Details Why [facility] Emergency [facility]  If symptoms worsen, As needed [address] [facility], [address], [place], [address]    your psychiatrist and therapist  Call in 1 day follow up depression            Discharge Medication List as of 5/30/2018  6:36 PM      CONTINUE these medications which have NOT CHANGED    Details   aluminum-magnesium hydroxide 200-200 MG/5ML suspension Take by mouth every 6 (six) hours as needed for heartburn, Historical Med      amoxicillin (AMOXIL) 875 mg tablet Take 1 tablet (875 mg total) by mouth 2 (two) times a day for 10 days, Starting Sat 5/26/2018, Until Tue 6/5/2018, Print      ARIPiprazole (ABILIFY) 5 mg tablet Take 5 mg by mouth daily  , Historical Med      bacitracin topical ointment 500 units/g topical ointment Apply 1 large application topically 2 (two) times a day, Historical Med      calcium carbonate (TUMS) 500 mg chewable tablet Chew 1 tablet daily, Historical Med      Cholecalciferol (VITAMIN D-3 PO) Take 2,000 Units by mouth daily, Historical Med      clotrimazole (LOTRIMIN) 1 % cream Apply 1 g (1 application total) topically 3 (three) times a day as needed (fungal irritation), Starting Wed 5/9/2018, Normal      escitalopram (LEXAPRO) 10 mg tablet Take 10 mg by mouth daily, Historical Med      famotidine (PEPCID) 20 mg tablet Take 1 tablet (20 mg total) by mouth 2 (two) times a day for 30 days At 7AM and 4PM (GERD), Starting Thu 5/17/2018, Until Sat 6/16/2018, Normal      GNP MILK OF MAGNESIA 1200 MG/15ML oral suspension TAKE 2 TBSP (30ML) BY MOUTH DAILY AS NEEDED IF NO BM IN 3 DAYS (CONSTIPATION), Normal      ibuprofen (MOTRIN) 400 mg tablet Take 1 tablet (400 mg total) by mouth every 8 (eight) hours as needed for mild pain, moderate pain or headaches for up to 30 days, Starting Thu 5/17/2018, Until Sat 6/16/2018, Normal      lubiprostone (AMITIZA) 24 mcg capsule Take 1 capsule (24 mcg total) by mouth 2 (two) times a day with meals, Starting Tue 5/8/2018, Normal      MAPAP 500 MG tablet TAKE 1 TAB BY MOUTH EVERY 6 HOURS AS NEEDED FOR PAIN/HA/BACKACHE/MUSCLE ACHE, Normal      Melatonin 5 MG TABS Take 1 tablet by mouth daily 9pm, Historical Med      metoclopramide (REGLAN) 10 mg tablet TAKE 1 TABLET BY MOUTH EVERY 8 HOURS AS NEEDED FOR NAUSEA *VIRI, Normal      Mouthwashes (LISTERINE ANTISEPTIC) LIQD Apply to the mouth or throat 2 (two) times a day, Historical Med      Multiple Vitamins-Minerals (CERTAVITE/ANTIOXIDANTS) TABS TAKE 1 TABLET BY MOUTH DAILY AT 8AM (SUPPLEMENT) JERRI, Normal      Norgestimate-Eth Estradiol (SPRINTEC 28 PO) Take by mouth, Historical Med      pantoprazole (PROTONIX) 40 mg tablet Take 1 tablet (40 mg total) by mouth daily for 90 days, Starting Thu 5/17/2018, Until Wed 8/15/2018, Normal      polyethylene glycol (MIRALAX) 17 g packet Take 17gm (1 packet) daily for first three days, then daily as needed for no bowel movement more than 24 hrs, Normal      RA SUNSCREEN SPF50 LOTN Apply 15 minutes before sun exposure and every 2 hours, Normal      senna-docusate sodium (SENEXON-S) 8 6-50 mg per tablet Take 1 tablet by mouth daily, Starting Fri 5/4/2018, Normal      sucralfate (CARAFATE) 1 g/10 mL suspension Take 1 g by mouth 4 (four) times a day as needed  , Historical Med      zinc oxide (DESITIN) 13 % cream Apply 1 application topically as needed (for perianal irritation), Starting Thu 5/17/2018, Normal           No discharge procedures on file  ED Provider  Attending physically available and evaluated Blanca Ortiz I managed the patient along with the ED Attending      Electronically Signed by         Juli Dasilva MD  05/30/18 2005

## 2018-05-30 NOTE — ED NOTES
Pt agitated/tearful, hitting herself in the head stating she "had a bad conversation" with her mom  Pt did not provide details on the conversation  Pt comforted and redirected  Lunch provided  Pt now calm and cooperative       Zee Joyce RN  05/30/18 2021

## 2018-05-30 NOTE — ED ATTENDING ATTESTATION
Audrey Muir MD, saw and evaluated the patient  I have discussed the patient with the resident/non-physician practitioner and agree with the resident's/non-physician practitioner's findings, Plan of Care, and MDM as documented in the resident's/non-physician practitioner's note, except where noted  All available labs and Radiology studies were reviewed  At this point I agree with the current assessment done in the Emergency Department  I have conducted an independent evaluation of this patient a history and physical is as follows:      Critical Care Time  CritCare Time    Procedures     44 yo female with mr at a group home, had argument with mother today on phone and after states she is suicidal with plan to hit herself  No hi, no hallucinations  Compliant with meds, no alcohol, no drug use  vss, afebrile, lungs cta, rrr, abdomen soft nontender, no neuro deficits  Crisis
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
## 2018-05-30 NOTE — DISCHARGE INSTRUCTIONS
OK to return to group home with 24 hr supervision  At time of discharge, she states that she is not suicidal or homicidal   Recommend calling outpatient therapist/ outpatient psychiatrist tomorrow for follow up  Depression   WHAT YOU NEED TO KNOW:   Depression is a medical condition that causes feelings of sadness or hopelessness that do not go away  Depression may cause you to lose interest in things you used to enjoy  These feelings may interfere with your daily life  DISCHARGE INSTRUCTIONS:   Call 911 for any of the following:   · You think about harming yourself or someone else  Contact your healthcare provider if:   · Your symptoms do not improve  · You cannot make it to your next appointment  · You have new symptoms  · You have questions or concerns about your condition or care  Medicines:   · Antidepressants  may be given to improve or balance your mood  You may need to take this medicine for several weeks before you begin to feel better  · Take your medicine as directed  Contact your healthcare provider if you think your medicine is not helping or if you have side effects  Tell him of her if you are allergic to any medicine  Keep a list of the medicines, vitamins, and herbs you take  Include the amounts, and when and why you take them  Bring the list or the pill bottles to follow-up visits  Carry your medicine list with you in case of an emergency  Therapy  may be used to treat your depression  A therapist will help you learn to cope with your thoughts and feelings  This can be done alone or in a group  It may also be done with family members or a significant other  Self-care:   · Get regular physical activity  Try to exercise for 30 minutes, 3 to 5 days a week  Work with your healthcare provider to develop an exercise plan that you enjoy  Physical activity may improve your symptoms  · Get enough sleep  Create a routine to help you relax before bed   You can listen to music, read, or do yoga  Try to go to bed and wake up at the same time every day  Sleep is important for emotional health  · Eat a variety of healthy foods from all of the food groups  A healthy meal plan is low in fat, salt, and added sugar  Ask your healthcare provider for more information about a meal plan that is right for you  · Do not drink alcohol or use drugs  Alcohol and drugs can make your symptoms worse  Follow up with your healthcare provider as directed: Your healthcare provider will monitor your progress at follow-up visits  He or she will also monitor your medicine if you take antidepressants  Your healthcare provider will ask if the medicine is helping  Tell him or her about any side effects or problems you may have with your medicine  The type or amount of medicine may need to be changed  Write down your questions so you remember to ask them during your visits  © 2017 [employer] Information is for End User's use only and may not be sold, redistributed or otherwise used for commercial purposes  All illustrations and images included in CareNotes® are the copyrighted property of [employer]  or [employer]  The above information is an  only  It is not intended as medical advice for individual conditions or treatments  Talk to your doctor, nurse or pharmacist before following any medical regimen to see if it is safe and effective for you

## 2018-05-31 ENCOUNTER — HOSPITAL ENCOUNTER (EMERGENCY)
Facility: HOSPITAL | Age: 38
Discharge: HOME | End: 2018-06-01
Attending: EMERGENCY MEDICINE | Admitting: EMERGENCY MEDICINE
Payer: MEDICARE

## 2018-05-31 DIAGNOSIS — S16.1XXA CERVICAL STRAIN, ACUTE, INITIAL ENCOUNTER: Primary | ICD-10-CM

## 2018-06-01 ENCOUNTER — APPOINTMENT (EMERGENCY)
Dept: RADIOLOGY | Facility: HOSPITAL | Age: 38
End: 2018-06-01
Payer: MEDICARE

## 2018-06-01 VITALS
RESPIRATION RATE: 16 BRPM | HEART RATE: 92 BPM | OXYGEN SATURATION: 98 % | DIASTOLIC BLOOD PRESSURE: 77 MMHG | SYSTOLIC BLOOD PRESSURE: 143 MMHG

## 2018-06-01 PROCEDURE — 72040 X-RAY EXAM NECK SPINE 2-3 VW: CPT

## 2018-06-01 PROCEDURE — 99283 EMERGENCY DEPT VISIT LOW MDM: CPT

## 2018-06-01 NOTE — ED ATTENDING ATTESTATION
Piter Lux DO, saw and evaluated the patient  I have discussed the patient with the resident/non-physician practitioner and agree with the resident's/non-physician practitioner's findings, Plan of Care, and MDM as documented in the resident's/non-physician practitioner's note, except where noted  All available labs and Radiology studies were reviewed  At this point I agree with the current assessment done in the Emergency Department  I have conducted an independent evaluation of this patient including a focused history and a physical exam     ED Note - Yumiko Fagan 43 y o  female MRN: [id number]  Unit/Bed#: ED 14 Encounter: [id number]    History of Present Illness   HPI  Yumiko Fagan is a 43 y o  female who presents for evaluation of minor head injury  Pt  Was in bed when she turned her head and struck a bedside dresser  No LOC  No obvious injury  REVIEW OF SYSTEMS  See HPI for further details  12 systems reviewed and otherwise negative except as noted     Historical Information     PAST MEDICAL HISTORY  Past Medical History:   Diagnosis Date    Anxiety     Cerebral palsy (HCC)     Cerebral palsy (HCC)     Last Assessed:7/6/2016    Chronically dry eyes, left     Last Assessed:7/6/2016    Depression     Last Assessed:7/6/2016    Depressive disorder     GERD (gastroesophageal reflux disease)     Mood disorder ([facility] [address] )        FAMILY HISTORY  Family History   Problem Relation Age of Onset    Diabetes Mother     No Known Problems Father        SOCIAL HISTORY  Social History     Social History    Marital status: Single     Spouse name: N/A    Number of children: N/A    Years of education: N/A     Social History Main Topics    Smoking status: Never Smoker    Smokeless tobacco: Never Used    Alcohol use No    Drug use: No    Sexual activity: No     Other Topics Concern    None     Social History Narrative    Always uses seat belt    Lives in group home       SURGICAL HISTORY  Past Surgical History:   Procedure Laterality Date    CSF SHUNT      Creation of Ventriculo-Peritoneal CSF shunt ; Last Assessed:7/6/2016    EAR SURGERY      Last Assessed:7/6/2016    LEG SURGERY      due to CP     NOSE SURGERY      Last Assessed:7/6/2016     Meds/Allergies     CURRENT MEDICATIONS  No current facility-administered medications for this encounter       Current Outpatient Prescriptions:     ARIPiprazole (ABILIFY) 5 mg tablet, Take 5 mg by mouth daily  , Disp: , Rfl:     calcium carbonate (TUMS) 500 mg chewable tablet, Chew 1 tablet daily, Disp: , Rfl:     Cholecalciferol (VITAMIN D-3 PO), Take 2,000 Units by mouth daily, Disp: , Rfl:     escitalopram (LEXAPRO) 10 mg tablet, Take 10 mg by mouth daily, Disp: , Rfl:     GNP MILK OF MAGNESIA 1200 MG/15ML oral suspension, TAKE 2 TBSP (30ML) BY MOUTH DAILY AS NEEDED IF NO BM IN 3 DAYS (CONSTIPATION), Disp: 355 mL, Rfl: 0    lubiprostone (AMITIZA) 24 mcg capsule, Take 1 capsule (24 mcg total) by mouth 2 (two) times a day with meals, Disp: 60 capsule, Rfl: 0    Melatonin 5 MG TABS, Take 1 tablet by mouth daily 9pm, Disp: , Rfl:     Mouthwashes (LISTERINE ANTISEPTIC) LIQD, Apply to the mouth or throat 2 (two) times a day, Disp: , Rfl:     Multiple Vitamins-Minerals (CERTAVITE/ANTIOXIDANTS) TABS, TAKE 1 TABLET BY MOUTH DAILY AT 8AM (SUPPLEMENT) JERRI, Disp: 31 tablet, Rfl: 0    pantoprazole (PROTONIX) 40 mg tablet, Take 1 tablet (40 mg total) by mouth daily for 90 days, Disp: 90 tablet, Rfl: 0    senna-docusate sodium (SENEXON-S) 8 6-50 mg per tablet, Take 1 tablet by mouth daily, Disp: 30 tablet, Rfl: 5    aluminum-magnesium hydroxide 200-200 MG/5ML suspension, Take by mouth every 6 (six) hours as needed for heartburn, Disp: , Rfl:     amoxicillin (AMOXIL) 875 mg tablet, Take 1 tablet (875 mg total) by mouth 2 (two) times a day for 10 days, Disp: 20 tablet, Rfl: 0    bacitracin topical ointment 500 units/g topical ointment, Apply 1 large application topically 2 (two) times a day, Disp: , Rfl:     clotrimazole (LOTRIMIN) 1 % cream, Apply 1 g (1 application total) topically 3 (three) times a day as needed (fungal irritation), Disp: 30 g, Rfl: 5    famotidine (PEPCID) 20 mg tablet, Take 1 tablet (20 mg total) by mouth 2 (two) times a day for 30 days At 7AM and 4PM (GERD), Disp: 60 tablet, Rfl: 0    ibuprofen (MOTRIN) 400 mg tablet, Take 1 tablet (400 mg total) by mouth every 8 (eight) hours as needed for mild pain, moderate pain or headaches for up to 30 days, Disp: 90 tablet, Rfl: 0    MAPAP 500 MG tablet, TAKE 1 TAB BY MOUTH EVERY 6 HOURS AS NEEDED FOR PAIN/HA/BACKACHE/MUSCLE ACHE, Disp: 30 tablet, Rfl: 4    metoclopramide (REGLAN) 10 mg tablet, TAKE 1 TABLET BY MOUTH EVERY 8 HOURS AS NEEDED FOR NAUSEA *VIRI, Disp: 30 tablet, Rfl: 0    Norgestimate-Eth Estradiol (SPRINTEC 28 PO), Take by mouth, Disp: , Rfl:     polyethylene glycol (MIRALAX) 17 g packet, Take 17gm (1 packet) daily for first three days, then daily as needed for no bowel movement more than 24 hrs, Disp: 30 each, Rfl: 0    RA SUNSCREEN SPF50 LOTN, Apply 15 minutes before sun exposure and every 2 hours, Disp: 1 Bottle, Rfl: 5    sucralfate (CARAFATE) 1 g/10 mL suspension, Take 1 g by mouth 4 (four) times a day as needed  , Disp: , Rfl:     zinc oxide (DESITIN) 13 % cream, Apply 1 application topically as needed (for perianal irritation), Disp: 1 Tube, Rfl: 0    (Not in a hospital admission)    ALLERGIES  No Known Allergies  Objective     PHYSICAL EXAM    VITAL SIGNS: Blood pressure 143/77, pulse 92, resp  rate 16, last menstrual period 05/07/2018, SpO2 98 %      Constitutional:  Appears well developed and well nourished, no acute distress, non-toxic appearance   Eyes:  PERRL, EOMI, conjunctivae pink, sclerae non-icteric  HENT:  Normocephalic/Atraumatic, no rhinorrhea, mucous membranes moist   Neck: normal range of motion, no tenderness, supple   Respiratory:  No respiratory distress, normal breath sounds   Cardiovascular:  Normal rate, normal rhythm   GI:  Soft, non-tender, non-distended   :  No CVAT, no flank ecchymosis  Musculoskeletal:  No swelling or edema, no tenderness, no deformities  Integument:  Pink, warm, dry, Well hydrated, no rash, no erythema, no bullae   Lymphatic:  No cervical/ tonsillar/ submandibular lymphadenopathy noted   Neurologic:  Awake, Alert & oriented x 3, CN 2-12 intact, no focal neurological deficits  Psychiatric:  Speech and behavior appropriate       ED COURSE and MDM:    Assessment/Plan   Assessment:  Zoë Bauer is a 43 y o  female presents for evaluation of minor head injury  Plan:  XR c-spine, symptom management  CRITICAL CARE TIME: 0 minutes      Portions of the record may have been created with voice recognition software  Occasional wrong word or "sound a like" substitutions may have occurred due to the inherent limitations of voice recognition software       ED Provider  Electronically Signed by

## 2018-06-01 NOTE — ED PROVIDER NOTES
History  Chief Complaint   Patient presents with    Headache     was laying in bed playing candy crush and bumped her head on her night stand  No fall, No LOC, No Thinners  c/o pain to the left side, in EMS the side of pain changed from left to right and left to right again with c/o neck pain  HPI  42 yo female presenting to ER for evaluation of head injury  Patient with history of cerebral palsy  In his poor historian  Caretakers are present to help with history  Patient was lying in bed about to go to bed, she then states that she turned to the left and her head on the dresser on the left side of bed  With the caretakers rain inside, she found the patient lying in bed playing Candy crush  Because of group home policy, she was brought to the ER for evaluation  Patient says she  Has pain on the left side of her head  She denies neck pain, no back pain, chest pain or abdominal pain  She denies any numbness or tingling  The caretakers deny any recent illnesses, no coughing, no diarrhea, no vomiting, no fevers  They state that she is acting her normal self at this time  Additional history of depression, mood disorder   patient does not take any blood thinners    Prior to Admission Medications   Prescriptions Last Dose Informant Patient Reported? Taking?    ARIPiprazole (ABILIFY) 5 mg tablet 5/31/2018 at Unknown time Care Giver Yes Yes   Sig: Take 5 mg by mouth daily     Cholecalciferol (VITAMIN D-3 PO) 5/31/2018 at Unknown time Care Giver Yes Yes   Sig: Take 2,000 Units by mouth daily   GNP MILK OF MAGNESIA 1200 MG/15ML oral suspension 5/31/2018 at Unknown time Care Giver No Yes   Sig: TAKE 2 TBSP (30ML) BY MOUTH DAILY AS NEEDED IF NO BM IN 3 DAYS (CONSTIPATION)   MAPAP 500 MG tablet   No No   Sig: TAKE 1 TAB BY MOUTH EVERY 6 HOURS AS NEEDED FOR PAIN/HA/BACKACHE/MUSCLE ACHE   Melatonin 5 MG TABS 5/31/2018 at Unknown time Care Giver Yes Yes   Sig: Take 1 tablet by mouth daily 9pm Mouthwashes (LISTERINE ANTISEPTIC) LIQD 5/31/2018 at Unknown time Care Giver Yes Yes   Sig: Apply to the mouth or throat 2 (two) times a day   Multiple Vitamins-Minerals (CERTAVITE/ANTIOXIDANTS) TABS 5/31/2018 at Unknown time  No Yes   Sig: TAKE 1 TABLET BY MOUTH DAILY AT 8AM (SUPPLEMENT) JERRI   Norgestimate-Eth Estradiol (SPRINTEC 28 PO)  Care Giver Yes No   Sig: Take by mouth   RA SUNSCREEN SPF50 LOTN   No No   Sig: Apply 15 minutes before sun exposure and every 2 hours   aluminum-magnesium hydroxide 200-200 MG/5ML suspension   Yes No   Sig: Take by mouth every 6 (six) hours as needed for heartburn   amoxicillin (AMOXIL) 875 mg tablet   No No   Sig: Take 1 tablet (875 mg total) by mouth 2 (two) times a day for 10 days   bacitracin topical ointment 500 units/g topical ointment   Yes No   Sig: Apply 1 large application topically 2 (two) times a day   calcium carbonate (TUMS) 500 mg chewable tablet 5/31/2018 at Unknown time Care Giver Yes Yes   Sig: Chew 1 tablet daily   clotrimazole (LOTRIMIN) 1 % cream   No No   Sig: Apply 1 g (1 application total) topically 3 (three) times a day as needed (fungal irritation)   escitalopram (LEXAPRO) 10 mg tablet 5/31/2018 at Unknown time Care Giver Yes Yes   Sig: Take 10 mg by mouth daily   famotidine (PEPCID) 20 mg tablet   No No   Sig: Take 1 tablet (20 mg total) by mouth 2 (two) times a day for 30 days At 7AM and 4PM (GERD)   ibuprofen (MOTRIN) 400 mg tablet   No No   Sig: Take 1 tablet (400 mg total) by mouth every 8 (eight) hours as needed for mild pain, moderate pain or headaches for up to 30 days   lubiprostone (AMITIZA) 24 mcg capsule 5/31/2018 at Unknown time  No Yes   Sig: Take 1 capsule (24 mcg total) by mouth 2 (two) times a day with meals   metoclopramide (REGLAN) 10 mg tablet   No No   Sig: TAKE 1 TABLET BY MOUTH EVERY 8 HOURS AS NEEDED FOR NAUSEA *VIRI   pantoprazole (PROTONIX) 40 mg tablet 5/31/2018 at Unknown time  No Yes   Sig: Take 1 tablet (40 mg total) by mouth daily for 90 days   polyethylene glycol (MIRALAX) 17 g packet   No No   Sig: Take 17gm (1 packet) daily for first three days, then daily as needed for no bowel movement more than 24 hrs   senna-docusate sodium (SENEXON-S) 8 6-50 mg per tablet 5/31/2018 at Unknown time  No Yes   Sig: Take 1 tablet by mouth daily   sucralfate (CARAFATE) 1 g/10 mL suspension  Care Giver Yes No   Sig: Take 1 g by mouth 4 (four) times a day as needed     zinc oxide (DESITIN) 13 % cream   No No   Sig: Apply 1 application topically as needed (for perianal irritation)      Facility-Administered Medications: None       Past Medical History:   Diagnosis Date    Anxiety     Cerebral palsy (HCC)     Cerebral palsy (HCC)     Last Assessed:7/6/2016    Chronically dry eyes, left     Last Assessed:7/6/2016    Depression     Last Assessed:7/6/2016    Depressive disorder     GERD (gastroesophageal reflux disease)     Mood disorder (HCC)        Past Surgical History:   Procedure Laterality Date    CSF SHUNT      Creation of Ventriculo-Peritoneal CSF shunt ; Last Assessed:7/6/2016    EAR SURGERY      Last Assessed:7/6/2016    LEG SURGERY      due to CP     NOSE SURGERY      Last Assessed:7/6/2016       Family History   Problem Relation Age of Onset    Diabetes Mother     No Known Problems Father      I have reviewed and agree with the history as documented  Social History   Substance Use Topics    Smoking status: Never Smoker    Smokeless tobacco: Never Used    Alcohol use No        Review of Systems    Constitutional: Negative for appetite change, chills and fever  HENT: Negative for congestion, rhinorrhea and sore throat  Eyes: Negative for photophobia, pain and visual disturbance  Respiratory: Negative for cough, chest tightness and shortness of breath  Cardiovascular: Negative for chest pain, palpitations and leg swelling  Gastrointestinal: Negative for abdominal pain, diarrhea, nausea and vomiting  Genitourinary: Negative for dysuria, flank pain and hematuria  Musculoskeletal: Negative for back pain, neck pain and neck stiffness  Skin: Negative for color change, rash and wound  Neurological: Negative for dizziness, numbness  Positive for headaches  All other systems reviewed and are negative      Physical Exam  ED Triage Vitals [06/01/18 0006]   Temp Pulse Respirations Blood Pressure SpO2   -- 92 16 143/77 98 %      Temp src Heart Rate Source Patient Position - Orthostatic VS BP Location FiO2 (%)   -- Monitor Lying Right arm --      Pain Score       3           Orthostatic Vital Signs  Vitals:    06/01/18 0006 06/01/18 0015   BP: 143/77 143/77   Pulse: 92 92   Patient Position - Orthostatic VS: Lying        Physical Exam  /77   Pulse 92   Resp 16   LMP 05/07/2018 (Approximate)   SpO2 98%     General Appearance:  Alert, cooperative, no distress   Head:  Normocephalic, without obvious abnormality, atraumatic   Eyes:  PERRL, conjunctiva/corneas clear, EOM's intact       Nose: Nares normal, septum midline,mucosa normal, no drainage or sinus tenderness   Throat: Lips, mucosa, and tongue normal; teeth and gums normal   Neck: Supple, symmetrical, trachea midline, no adenopathy,  Mild mid C-spine tenderness,  C-collar is in place   Back:   Symmetric, no curvature, ROM normal, no CVA tenderness, no signs of trauma, no midline tenderness   Lungs:   Clear to auscultation bilaterally, respirations unlabored   Heart:  Regular rate and rhythm, S1 and S2 normal, no murmur, rub, or gallop   Abdomen:   Soft, non-tender, bowel sounds active all four quadrants   Pelvic: Deferred   Extremities: Extremities normal, atraumatic, no cyanosis or edema   Pulses: 2+ and symmetric   Skin: Skin color, texture, turgor normal, no rashes or lesions   Neurologic:   Moves all extremities, sensation and strength in tact in all extremities         ED Medications  Medications - No data to display    Diagnostic Studies  Results Reviewed     None                 XR cervical spine 2 or 3 views   ED Interpretation by Geoffrey Malcolm MD (06/01 5887)   No obvious acute osseous abnormalities      Final Result by China Enrique MD (06/01 1580)      No radiographic evidence of cervical spine fracture or traumatic malalignment  Workstation performed: [id number]               Procedures  Procedures      Phone Consults  ED Phone Contact    ED Course       MDM    patient with mild head trauma, very low mechanism  She has no signs of trauma to the head  Will not scan her head at this time  Patient is acting normally  Given that she does have some tenderness of the C-spine, will get C-spine films  If negative, discharge patient home  Reina Shock Time    Disposition  Final diagnoses:   Cervical strain, acute, initial encounter     Time reflects when diagnosis was documented in both MDM as applicable and the Disposition within this note     Time User Action Codes Description Comment    6/1/2018 12:46 AM Curtis Hernández Add [S16  1XXA] Cervical strain, acute, initial encounter       ED Disposition     ED Disposition Condition Comment    Discharge  Zoë Sleigh discharge to home/self care      Condition at discharge: Stable        Follow-up Information     Follow up With Specialties Details Why Contact Info    Leonardo Ceja MD Family Medicine, Obstetrics and Gynecology, Obstetrics, Gynecology   [place] [address] [place] [address]  [phone]      Leonardo Ceja MD Family Medicine, Obstetrics and Gynecology, Obstetrics, Gynecology Go in 3 days  [facility] [address] [address]  [phone]            Discharge Medication List as of 6/1/2018 12:46 AM      CONTINUE these medications which have NOT CHANGED    Details   aluminum-magnesium hydroxide 200-200 MG/5ML suspension Take by mouth every 6 (six) hours as needed for heartburn, Historical Med      amoxicillin (AMOXIL) 875 mg tablet Take 1 tablet (875 mg total) by mouth 2 (two) times a day for 10 days, Starting Sat 5/26/2018, Until Tue 6/5/2018, Print      ARIPiprazole (ABILIFY) 5 mg tablet Take 5 mg by mouth daily  , Historical Med      bacitracin topical ointment 500 units/g topical ointment Apply 1 large application topically 2 (two) times a day, Historical Med      calcium carbonate (TUMS) 500 mg chewable tablet Chew 1 tablet daily, Historical Med      Cholecalciferol (VITAMIN D-3 PO) Take 2,000 Units by mouth daily, Historical Med      clotrimazole (LOTRIMIN) 1 % cream Apply 1 g (1 application total) topically 3 (three) times a day as needed (fungal irritation), Starting Wed 5/9/2018, Normal      escitalopram (LEXAPRO) 10 mg tablet Take 10 mg by mouth daily, Historical Med      famotidine (PEPCID) 20 mg tablet Take 1 tablet (20 mg total) by mouth 2 (two) times a day for 30 days At 7AM and 4PM (GERD), Starting Thu 5/17/2018, Until Sat 6/16/2018, Normal      GNP MILK OF MAGNESIA 1200 MG/15ML oral suspension TAKE 2 TBSP (30ML) BY MOUTH DAILY AS NEEDED IF NO BM IN 3 DAYS (CONSTIPATION), Normal      ibuprofen (MOTRIN) 400 mg tablet Take 1 tablet (400 mg total) by mouth every 8 (eight) hours as needed for mild pain, moderate pain or headaches for up to 30 days, Starting Thu 5/17/2018, Until Sat 6/16/2018, Normal      lubiprostone (AMITIZA) 24 mcg capsule Take 1 capsule (24 mcg total) by mouth 2 (two) times a day with meals, Starting Tue 5/8/2018, Normal      MAPAP 500 MG tablet TAKE 1 TAB BY MOUTH EVERY 6 HOURS AS NEEDED FOR PAIN/HA/BACKACHE/MUSCLE ACHE, Normal      Melatonin 5 MG TABS Take 1 tablet by mouth daily 9pm, Historical Med      metoclopramide (REGLAN) 10 mg tablet TAKE 1 TABLET BY MOUTH EVERY 8 HOURS AS NEEDED FOR NAUSEA *[id number], Normal      Mouthwashes (LISTERINE ANTISEPTIC) LIQD Apply to the mouth or throat 2 (two) times a day, Historical Med      Multiple Vitamins-Minerals (CERTAVITE/ANTIOXIDANTS) TABS TAKE 1 TABLET BY MOUTH DAILY AT 8AM (SUPPLEMENT) ZEDYANAI, Normal Norgestimate-Eth Estradiol (SPRINTEC 28 PO) Take by mouth, Historical Med      pantoprazole (PROTONIX) 40 mg tablet Take 1 tablet (40 mg total) by mouth daily for 90 days, Starting Thu 5/17/2018, Until Wed 8/15/2018, Normal      polyethylene glycol (MIRALAX) 17 g packet Take 17gm (1 packet) daily for first three days, then daily as needed for no bowel movement more than 24 hrs, Normal      RA SUNSCREEN SPF50 LOTN Apply 15 minutes before sun exposure and every 2 hours, Normal      senna-docusate sodium (SENEXON-S) 8 6-50 mg per tablet Take 1 tablet by mouth daily, Starting Fri 5/4/2018, Normal      sucralfate (CARAFATE) 1 g/10 mL suspension Take 1 g by mouth 4 (four) times a day as needed  , Historical Med      zinc oxide (DESITIN) 13 % cream Apply 1 application topically as needed (for perianal irritation), Starting Thu 5/17/2018, Normal           No discharge procedures on file  ED Provider  Attending physically available and evaluated Radha Olson I managed the patient along with the ED Attending      Electronically Signed by         Gaviota Curran MD  06/01/18 5902

## 2018-06-01 NOTE — DISCHARGE INSTRUCTIONS
Cervical Strain   WHAT YOU NEED TO KNOW:   A cervical strain is a stretched or torn muscle or tendon in your neck  Tendons are strong tissues that connect muscles to bones  Common causes of cervical strains include a car accident, a fall, or a sports injury  DISCHARGE INSTRUCTIONS:   Seek care immediately if:   · You have pain or numbness from your shoulder down to your hand  · You have problems with your vision, hearing, or balance  · You feel confused or cannot concentrate  · You have problems with movement and strength  Contact your healthcare provider if:   · You have increased swelling or pain in your neck  · You have questions or concerns about your condition or care  Medicines: You may need any of the following:  · Acetaminophen  decreases pain and fever  It is available without a doctor's order  Ask how much to take and how often to take it  Follow directions  Read the labels of all other medicines you are using to see if they also contain acetaminophen, or ask your doctor or pharmacist  Acetaminophen can cause liver damage if not taken correctly  Do not use more than 4 grams (4,000 milligrams) total of acetaminophen in one day  · NSAIDs , such as ibuprofen, help decrease swelling, pain, and fever  This medicine is available with or without a doctor's order  NSAIDs can cause stomach bleeding or kidney problems in certain people  If you take blood thinner medicine, always ask your healthcare provider if NSAIDs are safe for you  Always read the medicine label and follow directions  · Muscle relaxers  help decrease pain and muscle spasms  · Prescription pain medicine  may be given  Ask your healthcare provider how to take this medicine safely  Some prescription pain medicines contain acetaminophen  Do not take other medicines that contain acetaminophen without talking to your healthcare provider  Too much acetaminophen may cause liver damage   Prescription pain medicine may cause constipation  Ask your healthcare provider how to prevent or treat constipation  · Take your medicine as directed  Contact your healthcare provider if you think your medicine is not helping or if you have side effects  Tell him or her if you are allergic to any medicine  Keep a list of the medicines, vitamins, and herbs you take  Include the amounts, and when and why you take them  Bring the list or the pill bottles to follow-up visits  Carry your medicine list with you in case of an emergency  Manage your symptoms:   · Apply heat  on your neck for 15 to 20 minutes, 4 to 6 times a day or as directed  Heat helps decrease pain, stiffness, and muscle spasms  · Begin gentle neck exercises  as soon as you can move your neck without pain  Exercises will help decrease stiffness and improve the strength and movement of your neck  Ask your healthcare provider what kind of exercises you should do  · Gradually return to your usual activities as directed  Stop if you have pain  Avoid activities that can cause more damage to your neck, such as heavy lifting or strenuous exercise  · Sleep without a pillow  to help decrease pain  Instead, roll a small towel tightly and place it under your neck  · Go to physical therapy as directed  A physical therapist teaches you exercises to help improve movement and strength, and to decrease pain  Prevent neck injury:   · Drive safely  Make sure everyone in your car wears a seatbelt  A seatbelt can save your life if you are in an accident  Do not use your cell phone when you are driving  This could distract you and cause an accident  Pull over if you need to make a call or send a text message  · Wear helmets, lifejackets, and protective gear  Always wear a helmet when you ride a bike or motorcycle, go skiing, or play sports that could cause a head injury  Wear protective equipment when you play sports   Wear a lifejacket when you are on a boat or doing water sports  Follow up with your healthcare provider as directed: You may be referred to an orthopedist or physical therapies  Write down your questions so you remember to ask them during your visits  © 2017 [address] [employer] Information is for End User's use only and may not be sold, redistributed or otherwise used for commercial purposes  All illustrations and images included in CareNotes® are the copyrighted property of [employer]  or [facility]  The above information is an  only  It is not intended as medical advice for individual conditions or treatments  Talk to your doctor, nurse or pharmacist before following any medical regimen to see if it is safe and effective for you

## 2018-06-04 ENCOUNTER — OFFICE VISIT (OUTPATIENT)
Dept: FAMILY MEDICINE CLINIC | Facility: CLINIC | Age: 38
End: 2018-06-04
Payer: MEDICARE

## 2018-06-04 VITALS
HEART RATE: 88 BPM | WEIGHT: 175.6 LBS | DIASTOLIC BLOOD PRESSURE: 60 MMHG | SYSTOLIC BLOOD PRESSURE: 112 MMHG | HEIGHT: 58 IN | TEMPERATURE: 98.4 F | RESPIRATION RATE: 14 BRPM | BODY MASS INDEX: 36.86 KG/M2

## 2018-06-04 DIAGNOSIS — H61.23 BILATERAL IMPACTED CERUMEN: ICD-10-CM

## 2018-06-04 DIAGNOSIS — L03.115 CELLULITIS OF FOOT, RIGHT: Primary | ICD-10-CM

## 2018-06-04 PROCEDURE — 99213 OFFICE O/P EST LOW 20 MIN: CPT | Performed by: FAMILY MEDICINE

## 2018-06-04 RX ORDER — TRAZODONE HYDROCHLORIDE 50 MG/1
50 TABLET ORAL
COMMUNITY
End: 2018-09-21 | Stop reason: SDUPTHER

## 2018-06-04 NOTE — PROGRESS NOTES
Assessment/Plan     Cellulitis of foot, right  - improving, finish antibiotic course of amoxicillin   - follow up if symptoms worsen  Diagnoses and all orders for this visit:    Cellulitis of foot, right    Bilateral impacted cerumen  -     carbamide peroxide (DEBROX) 6 5 % otic solution; Use 5 drops in each ear at 8 am on the 11, 12, 13th of every month    Other orders  -     traZODone (DESYREL) 50 mg tablet; Take 50 mg by mouth daily at bedtime         Subjective     Chief Complaint: Urgent Care follow up for cellulitis     HPI:   This is a 42 yo F who presents for ER and urgent follow up  She was seen in urgent care on 5/26 for cellulitis of right foot which she was treated with amoxicillin  She reports her 5th digit right foot pain  She was seen on ER on 5/30 after getting upset with conversation with mother and then stated she "wanted to hurt herself" as well as 5/31 for headache after bumping the right side of her head  ER records reviewed  She reports her headache has resolved  She reports she feels sad about the argument she got in with her mom  She denies thoughts of hurting herself  She has an appointment with therapist on 6/12  She was started on Trazadone 50 mg by psychiatry  She reports this medication have greatly improved her quality of sleep  The following portions of the patient's history were reviewed and updated as appropriate: allergies, current medications, past family history, past medical history, past social history, past surgical history and problem list     Review of Systems  Review of Systems   Constitutional: Negative for fatigue and fever  HENT: Negative for congestion and rhinorrhea  Respiratory: Negative for cough and shortness of breath  Cardiovascular: Negative for chest pain and palpitations  Genitourinary: Negative for difficulty urinating  Neurological: Negative for dizziness and headaches         Objective   Vitals:    06/04/18 1726   BP: 112/60 Pulse: 88   Resp: 14   Temp: 98 4 °F (36 9 °C)       Physical Exam   Constitutional: She appears well-developed and well-nourished  HENT:   Mouth/Throat: Oropharynx is clear and moist    Eyes: Conjunctivae are normal    Neck: Neck supple  Cardiovascular: Normal rate, regular rhythm and normal heart sounds  Abdominal: Soft  Bowel sounds are normal  She exhibits no distension  There is no tenderness  Skin: Skin is warm and dry  gerealized redness of soles of feet bilateral, no edema or warmth, no signs of cellulitis of right foot, fungal infection of all toe nails    Psychiatric: She has a normal mood and affect  Denies SI        Lab Results: I have reviewed all the lab results

## 2018-06-13 ENCOUNTER — TELEPHONE (OUTPATIENT)
Dept: FAMILY MEDICINE CLINIC | Facility: CLINIC | Age: 38
End: 2018-06-13

## 2018-06-13 DIAGNOSIS — H91.90 HEARING LOSS, UNSPECIFIED HEARING LOSS TYPE, UNSPECIFIED LATERALITY: Primary | ICD-10-CM

## 2018-06-14 ENCOUNTER — TELEPHONE (OUTPATIENT)
Dept: FAMILY MEDICINE CLINIC | Facility: CLINIC | Age: 38
End: 2018-06-14

## 2018-06-14 DIAGNOSIS — Z01.110 ENCOUNTER FOR HEARING EXAMINATION FOLLOWING FAILED HEARING SCREENING: Primary | ICD-10-CM

## 2018-06-14 NOTE — TELEPHONE ENCOUNTER
I know you signed for an audiogram but they ususally need to have the comprehensive hearing eval complete yearing for the group home pt  If you will sign off on this   Thank you

## 2018-06-22 ENCOUNTER — OFFICE VISIT (OUTPATIENT)
Dept: AUDIOLOGY | Age: 38
End: 2018-06-22

## 2018-06-22 DIAGNOSIS — H90.3 SENSORINEURAL HEARING LOSS, BILATERAL: Primary | ICD-10-CM

## 2018-06-22 NOTE — PROGRESS NOTES
Hearing aid visit:    Name:  Rahat Narayan  :  1980  Age:  43 y o  Date of Evaluation: 18     Rahat Narayan is being seen for a hearing aid visit  Rahat Narayan   is fit binaurally with Vladimir Reason Z Series i110 BTE hearing aid(s) serial number Brian Z Series i110 BTE  Right serial number [id number]  Left serial number Warranty 21  Patient reports that her hearing aids are in good working order  Tiara Thompson did not bring $20 to buy otoclip that was ordered for her previously  She stated today that she does not want to use it  Action:  Cleaned and checked hearing aids  Replaced tubing, bilaterally  Patient not longer wants Dereje Clip  Recommendations: Follow up PRN  Return otoclip to         Indigo Reyse , CCC-A  Clinical Audiologist

## 2018-06-26 DIAGNOSIS — K59.00 CONSTIPATION, UNSPECIFIED CONSTIPATION TYPE: ICD-10-CM

## 2018-07-03 DIAGNOSIS — Z00.00 HEALTH CARE MAINTENANCE: ICD-10-CM

## 2018-07-03 RX ORDER — FOLIC ACID/MV,IRON,MIN/LUTEIN 0.4-18-25
TABLET ORAL
Qty: 31 TABLET | Refills: 0 | Status: SHIPPED | OUTPATIENT
Start: 2018-07-03 | End: 2018-09-04 | Stop reason: SDUPTHER

## 2018-07-04 ENCOUNTER — HOSPITAL ENCOUNTER (EMERGENCY)
Facility: HOSPITAL | Age: 38
Discharge: HOME | End: 2018-07-04
Attending: EMERGENCY MEDICINE
Payer: MEDICARE

## 2018-07-04 VITALS
TEMPERATURE: 97.8 F | SYSTOLIC BLOOD PRESSURE: 141 MMHG | RESPIRATION RATE: 20 BRPM | OXYGEN SATURATION: 87 % | DIASTOLIC BLOOD PRESSURE: 75 MMHG | HEART RATE: 92 BPM

## 2018-07-04 DIAGNOSIS — M79.89 PAIN AND SWELLING OF RIGHT LOWER EXTREMITY: ICD-10-CM

## 2018-07-04 DIAGNOSIS — M79.604 PAIN AND SWELLING OF RIGHT LOWER EXTREMITY: ICD-10-CM

## 2018-07-04 PROCEDURE — 96372 THER/PROPH/DIAG INJ SC/IM: CPT

## 2018-07-04 PROCEDURE — 99283 EMERGENCY DEPT VISIT LOW MDM: CPT

## 2018-07-04 RX ADMIN — ENOXAPARIN SODIUM 80 MG: 80 INJECTION SUBCUTANEOUS at 22:03

## 2018-07-05 ENCOUNTER — HOSPITAL ENCOUNTER (OUTPATIENT)
Dept: NON INVASIVE DIAGNOSTICS | Facility: CLINIC | Age: 38
Discharge: HOME | End: 2018-07-05
Payer: MEDICARE

## 2018-07-05 DIAGNOSIS — M79.604 PAIN AND SWELLING OF RIGHT LOWER EXTREMITY: ICD-10-CM

## 2018-07-05 DIAGNOSIS — M79.89 PAIN AND SWELLING OF RIGHT LOWER EXTREMITY: ICD-10-CM

## 2018-07-05 PROCEDURE — 93971 EXTREMITY STUDY: CPT | Performed by: SURGERY

## 2018-07-05 PROCEDURE — 93971 EXTREMITY STUDY: CPT

## 2018-07-05 NOTE — ED PROVIDER NOTES
History  Chief Complaint   Patient presents with    Leg Swelling     pt with swelling in RLE since last night  pt was instructed to come here for vascular studies  HPI  43 y o  Female with presents to the ED with right leg pain, swelling, and redness  Caretaker reports that she noticed swelling last night, but there was no redness and pt was not complaining of pain at that time  This evening caretaker became more concerned and brought pt to urgent care, who then sent pt to the ED to rule out DVT  Pt has hx cellulitis of the right toe one month ago, treated with antibiotics  She denies fever, chills, or rigors  Pt also denies chest pain, though she notes some shortness of breath  No hx DVT or PE  Pt is not on blood thinners  Prior to Admission Medications   Prescriptions Last Dose Informant Patient Reported? Taking?    ARIPiprazole (ABILIFY) 5 mg tablet 7/4/2018 at Unknown time Care Giver Yes Yes   Sig: Take 5 mg by mouth daily     Cholecalciferol (VITAMIN D-3 PO) 7/4/2018 at Unknown time Care Giver Yes Yes   Sig: Take 2,000 Units by mouth daily   GNP MILK OF MAGNESIA 1200 MG/15ML oral suspension 7/4/2018 at Unknown time Care Giver No Yes   Sig: TAKE 2 TBSP (30ML) BY MOUTH DAILY AS NEEDED IF NO BM IN 3 DAYS (CONSTIPATION)   MAPAP 500 MG tablet 7/4/2018 at Unknown time Care Giver No Yes   Sig: TAKE 1 TAB BY MOUTH EVERY 6 HOURS AS NEEDED FOR PAIN/HA/BACKACHE/MUSCLE ACHE   Melatonin 5 MG TABS 7/4/2018 at Unknown time Care Giver Yes Yes   Sig: Take 1 tablet by mouth daily 9pm   Mouthwashes (LISTERINE ANTISEPTIC) LIQD 7/4/2018 at Unknown time Care Giver Yes Yes   Sig: Apply to the mouth or throat 2 (two) times a day   Multiple Vitamins-Minerals (CERTAVITE/ANTIOXIDANTS) TABS 7/4/2018 at Unknown time  No Yes   Sig: TAKE 1 TABLET BY MOUTH DAILY AT 8AM (SUPPLEMENT) ZEMBRUZUSKI   Norgestimate-Eth Estradiol (SPRINTEC 28 PO) 7/4/2018 at Unknown time Care Giver Yes Yes   Sig: Take by mouth   RA SUNSCREEN SPF50 LOTN 7/4/2018 at Unknown time Care Giver No Yes   Sig: Apply 15 minutes before sun exposure and every 2 hours   aluminum-magnesium hydroxide 200-200 MG/5ML suspension 7/4/2018 at Unknown time Care Giver Yes Yes   Sig: Take by mouth every 6 (six) hours as needed for heartburn   bacitracin topical ointment 500 units/g topical ointment 7/4/2018 at Unknown time Care Giver Yes Yes   Sig: Apply 1 large application topically 2 (two) times a day   calcium carbonate (TUMS) 500 mg chewable tablet 7/4/2018 at Unknown time Care Giver Yes Yes   Sig: Chew 1 tablet daily   carbamide peroxide (DEBROX) 6 5 % otic solution 7/4/2018 at Unknown time  No Yes   Sig: Use 5 drops in each ear at 8 am on the 11, 12, 13th of every month   clotrimazole (LOTRIMIN) 1 % cream 7/4/2018 at Unknown time Care Giver No Yes   Sig: Apply 1 g (1 application total) topically 3 (three) times a day as needed (fungal irritation)   escitalopram (LEXAPRO) 10 mg tablet 7/4/2018 at Unknown time Care Giver Yes Yes   Sig: Take 10 mg by mouth daily   famotidine (PEPCID) 20 mg tablet  Care Giver No No   Sig: Take 1 tablet (20 mg total) by mouth 2 (two) times a day for 30 days At 7AM and 4PM (GERD)   ibuprofen (MOTRIN) 400 mg tablet  Care Giver No No   Sig: Take 1 tablet (400 mg total) by mouth every 8 (eight) hours as needed for mild pain, moderate pain or headaches for up to 30 days   lubiprostone (AMITIZA) 24 mcg capsule 7/4/2018 at Unknown time  No Yes   Sig: Take 1 capsule (24 mcg total) by mouth 2 (two) times a day with meals   metoclopramide (REGLAN) 10 mg tablet 7/4/2018 at Unknown time Care Giver No Yes   Sig: TAKE 1 TABLET BY MOUTH EVERY 8 HOURS AS NEEDED FOR NAUSEA *ZEMBRKRISTY   pantoprazole (PROTONIX) 40 mg tablet 7/4/2018 at Unknown time Care Giver No Yes   Sig: Take 1 tablet (40 mg total) by mouth daily for 90 days   polyethylene glycol (MIRALAX) 17 g packet 7/4/2018 at Unknown time Care Giver No Yes   Sig: Take 17gm (1 packet) daily for first three days, then daily as needed for no bowel movement more than 24 hrs   senna-docusate sodium (SENEXON-S) 8 6-50 mg per tablet 7/4/2018 at Unknown time Care Giver No Yes   Sig: Take 1 tablet by mouth daily   sucralfate (CARAFATE) 1 g/10 mL suspension 7/4/2018 at Unknown time Care Giver Yes Yes   Sig: Take 1 g by mouth 4 (four) times a day as needed     traZODone (DESYREL) 50 mg tablet 7/4/2018 at Unknown time Care Giver Yes Yes   Sig: Take 50 mg by mouth daily at bedtime   zinc oxide (DESITIN) 13 % cream 7/4/2018 at Unknown time Care Giver No Yes   Sig: Apply 1 application topically as needed (for perianal irritation)      Facility-Administered Medications: None       Past Medical History:   Diagnosis Date    Anxiety     Cerebral palsy (HCC)     Cerebral palsy (HCC)     Last Assessed:7/6/2016    Chronically dry eyes, left     Last Assessed:7/6/2016    Depression     Last Assessed:7/6/2016    Depressive disorder     GERD (gastroesophageal reflux disease)     [facility]        Past Surgical History:   Procedure Laterality Date    CSF SHUNT      Creation of Ventriculo-Peritoneal CSF shunt ; Last Assessed:7/6/2016    EAR SURGERY      Last Assessed:7/6/2016    LEG SURGERY      due to CP     NOSE SURGERY      Last Assessed:7/6/2016       Family History   Problem Relation Age of Onset    Diabetes Mother     No Known Problems Father      I have reviewed and agree with the history as documented  Social History   Substance Use Topics    Smoking status: Never Smoker    Smokeless tobacco: Never Used    Alcohol use No        Review of Systems   Constitutional: Negative for fever  Respiratory: Positive for shortness of breath  Negative for cough, chest tightness and wheezing  Cardiovascular: Negative for chest pain  Gastrointestinal: Negative for abdominal pain, nausea and vomiting  Musculoskeletal: Positive for myalgias  Negative for arthralgias  Skin: Positive for color change   Negative for rash and wound  Neurological: Negative for dizziness and light-headedness  Hematological: Does not bruise/bleed easily  All other systems reviewed and are negative  Physical Exam  ED Triage Vitals [07/04/18 2025]   Temperature Pulse Respirations Blood Pressure SpO2   97 8 °F (36 6 °C) 89 18 140/81 96 %      Temp Source Heart Rate Source Patient Position - Orthostatic VS BP Location FiO2 (%)   Tympanic Monitor Lying Right arm --      Pain Score       2           Orthostatic Vital Signs  Vitals:    07/04/18 2025 07/04/18 2129   BP: 140/81 141/75   Pulse: 89 92   Patient Position - Orthostatic VS: Lying Sitting       Physical Exam   Constitutional: She is oriented to person, place, and time  She appears well-developed and well-nourished  HENT:   Head: Normocephalic and atraumatic  Eyes: Conjunctivae are normal    Cardiovascular: Normal rate and regular rhythm  Exam reveals no gallop and no friction rub  No murmur heard  Pulmonary/Chest: Effort normal and breath sounds normal  No respiratory distress  She has no wheezes  She has no rales  Abdominal: Soft  She exhibits no distension  There is no tenderness  There is no rebound and no guarding  Musculoskeletal: She exhibits edema and tenderness  Legs:  RLE tenderness to palpation and pitting edema   Neurological: She is alert and oriented to person, place, and time  Skin: Skin is warm and dry  There is erythema (diffuse redness RLE)  ED Medications  Medications   enoxaparin (LOVENOX) subcutaneous injection 80 mg (80 mg Subcutaneous Given 7/4/18 2203)       Diagnostic Studies  Results Reviewed     None                 VAS lower limb venous duplex study, unilateral/limited    (Results Pending)         Procedures  Procedures      Phone Consults  ED Phone Contact    ED Course                               MDM  Number of Diagnoses or Management Options  Pain and swelling of right lower extremity:   Diagnosis management comments: 51 y o  Female presenting with diffuse redness, pitting edema, and redness of the right lower extremity since yesterday evening  Low concern for cellulitis or infection at this time  There is concern for possible DVT, will treat preemptively with lovenox and send for US doppler study tomorrow  CritCare Time    Disposition  Final diagnoses:   Pain and swelling of right lower extremity     Time reflects when diagnosis was documented in both MDM as applicable and the Disposition within this note     Time User Action Codes Description Comment    7/4/2018  9:39 PM [place] Add [Z24 [id number],  M79 [id number]] Pain and swelling of right lower extremity       ED Disposition     ED Disposition Condition Comment    Discharge  Hellen Ariza discharge to home/self care  Condition at discharge: Stable        Follow-up Information     Follow up With Specialties Details Why Contact Info    Imaging at [facility]  Call in 1 day Call at 1 PM tomorrow if they do not contact you [place] [address], [address]   [phone]           Patient's Medications   Discharge Prescriptions    No medications on file       Outpatient Discharge Orders  VAS lower limb venous duplex study, unilateral/limited   Standing Status: Future  Standing Exp  Date: 07/04/22         ED Provider  Attending physically available and evaluated Hellen Ariza I managed the patient along with the ED Attending      Electronically Signed by         Viktoria Cr MD  07/04/18 3753       Viktoria Cr MD  07/04/18 2632

## 2018-07-05 NOTE — ED ATTENDING ATTESTATION
I, Denise Chester DO, saw and evaluated the patient  I have discussed the patient with the resident/non-physician practitioner and agree with the resident's/non-physician practitioner's findings, Plan of Care, and MDM as documented in the resident's/non-physician practitioner's note, except where noted  All available labs and Radiology studies were reviewed  At this point I agree with the current assessment done in the Emergency Department  I have conducted an independent evaluation of this patient a history and physical is as follows:      Critical Care Time  CritCare Time    Procedures     43 yr old fem to the ED with right leg swelling since last night and redness today  Neg clot hx,no injury, no fever  Pt with CP and mental retard  No chest pain or sob  Exm; ox good  Resp regular  Right leg: ? Swelling with generalized posterior med tenderness with med prox calf tender and pop tender wo cord formation  Pln: lovenox and get doppler set up for tomorrow through answering machine

## 2018-07-05 NOTE — DISCHARGE INSTRUCTIONS
You were evaluated in the emergency department today for your right leg pain, redness, and swelling  Please follow up at vascular imaging center tomorrow to have Ultrasound DVT (deep vein thrombosis) study  If results are positive, please return to the emergency room  Deep Venous Thrombosis   WHAT YOU NEED TO KNOW:   Deep venous thrombosis (DVT) is a blood clot that forms in a deep vein of the body  The deep veins in the legs, thighs, and hips are the most common sites for DVT  DVT can also occur in a deep vein within your arms  The clot prevents the normal flow of blood in the vein  The blood backs up and causes pain and swelling  The DVT can break into smaller pieces and travel to your lungs and cause a blockage called a pulmonary embolism  A pulmonary embolism can become life-threatening  DISCHARGE INSTRUCTIONS:   Call 911 for any of the following:   · You feel lightheaded, short of breath, and have chest pain  · You cough up blood  Return to the emergency department if:   · Your symptoms get worse  · You develop new DVT symptoms in another leg or arm  Contact your healthcare provider if:   · Your gums or nose bleed  · You see blood in your urine or bowel movements  · Your bowel movements are black or darker than normal     · You have questions or concerns about your conditions or care  Medicines:   · Blood thinners  help prevent the DVT from getting bigger and prevent new clots from forming  Examples of blood thinners include heparin, rivaroxaban, apixiban, and warfarin  The following are general safety guidelines to follow while you are taking a blood thinner:     ¨ Watch for bleeding and bruising  Watch for bleeding from your gums or nose  Watch for blood in your urine and bowel movements  Use a soft washcloth on your skin, and a soft toothbrush to brush your teeth  This can keep your skin and gums from bleeding  If you shave, use an electric shaver  Do not play contact sports  ¨ Tell your dentist and other healthcare providers that you take a blood thinner  Wear a bracelet or necklace that says you take this medicine  ¨ Do not start or stop any medicines unless your healthcare provider tells you to  Many medicines cannot be used with blood thinners  ¨ Tell your healthcare provider right away if you forget to take the blood thinner , or if you take too much  ¨ Warfarin  is a blood thinner that you may need to take  The following are additional things you should be aware of if you take warfarin:    § Foods and medicines can affect the amount of warfarin in your blood  Do not make major changes to your diet  Warfarin works best when you eat about the same amount of vitamin K every day  Vitamin K is found in green leafy vegetables and certain other foods  Ask for more information about what to eat or not to eat  § You will need to see your healthcare provider for follow-up visits  You will need regular blood tests to decide how much warfarin you need  · Take your medicine as directed  Contact your healthcare provider if you think your medicine is not helping or if you have side effects  Tell him or her if you are allergic to any medicine  Keep a list of the medicines, vitamins, and herbs you take  Include the amounts, and when and why you take them  Bring the list or the pill bottles to follow-up visits  Carry your medicine list with you in case of an emergency  Manage your DVT:   · Wear pressure stockings  The stockings are tight and put pressure on your legs  This improves blood flow and helps prevent clots  Wear the stockings during the day  Do not wear them when you sleep  · Elevate your legs  above the level of your heart as often as you can  This will help decrease swelling and pain  Prop your legs on pillows or blankets to keep them elevated comfortably  · Exercise regularly  to help increase your blood flow  Walking is a good low-impact exercise  Talk to your healthcare provider about the best exercise plan for you  · Change body positions often  If you travel by car or work at a desk, move and stretch in your seat several times each hour  In an airplane, get up and walk every hour  If you are bedridden, ask for help to change your position every 1 to 2 hours  · Maintain a healthy weight  Ask your healthcare provider how much you should weigh  Ask him to help you create a weight loss plan if you are overweight  · Do not smoke  Nicotine and other chemicals in cigarettes and cigars can damage blood vessels and make it more difficult to manage your DVT  Ask your healthcare provider for information if you currently smoke and need help to quit  E-cigarettes or smokeless tobacco still contain nicotine  Talk to your healthcare provider before you use these products  Follow up with your healthcare provider as directed:  Write down your questions so you remember to ask them during your visits  © 2017 [facility] Information is for End User's use only and may not be sold, redistributed or otherwise used for commercial purposes  All illustrations and images included in CareNotes® are the copyrighted property of [employer]  or [employer]  The above information is an  only  It is not intended as medical advice for individual conditions or treatments  Talk to your doctor, nurse or pharmacist before following any medical regimen to see if it is safe and effective for you

## 2018-07-06 ENCOUNTER — HOSPITAL ENCOUNTER (INPATIENT)
Facility: HOSPITAL | Age: 38
LOS: 8 days | Discharge: HOME | DRG: 885 | End: 2018-07-17
Attending: EMERGENCY MEDICINE | Admitting: PSYCHIATRY & NEUROLOGY
Payer: MEDICARE

## 2018-07-06 DIAGNOSIS — D49.7 PITUITARY NEOPLASM: ICD-10-CM

## 2018-07-06 DIAGNOSIS — L03.115 CELLULITIS OF FOOT, RIGHT: ICD-10-CM

## 2018-07-06 DIAGNOSIS — E23.6 PITUITARY CYST (HCC): ICD-10-CM

## 2018-07-06 DIAGNOSIS — G93.9 BRAIN LESION: ICD-10-CM

## 2018-07-06 DIAGNOSIS — M79.674 PAIN AROUND TOENAIL, RIGHT FOOT: ICD-10-CM

## 2018-07-06 DIAGNOSIS — Z86.59 HISTORY OF COMMAND HALLUCINATIONS: ICD-10-CM

## 2018-07-06 DIAGNOSIS — F33.3 SEVERE EPISODE OF RECURRENT MAJOR DEPRESSIVE DISORDER, WITH PSYCHOTIC FEATURES (HCC): Primary | ICD-10-CM

## 2018-07-06 DIAGNOSIS — F33.3 MDD (MAJOR DEPRESSIVE DISORDER), RECURRENT, SEVERE, WITH PSYCHOSIS (HCC): ICD-10-CM

## 2018-07-06 LAB
AMPHETAMINES SERPL QL SCN: NEGATIVE
BARBITURATES UR QL: NEGATIVE
BENZODIAZ UR QL: NEGATIVE
COCAINE UR QL: NEGATIVE
EXT PREG TEST URINE: NORMAL
METHADONE UR QL: NEGATIVE
OPIATES UR QL SCN: NEGATIVE
PCP UR QL: NEGATIVE
THC UR QL: NEGATIVE

## 2018-07-06 PROCEDURE — 82075 ASSAY OF BREATH ETHANOL: CPT | Performed by: EMERGENCY MEDICINE

## 2018-07-06 PROCEDURE — 81025 URINE PREGNANCY TEST: CPT | Performed by: EMERGENCY MEDICINE

## 2018-07-06 PROCEDURE — 80307 DRUG TEST PRSMV CHEM ANLYZR: CPT | Performed by: EMERGENCY MEDICINE

## 2018-07-06 RX ORDER — ARIPIPRAZOLE 5 MG/1
5 TABLET ORAL DAILY
Status: DISCONTINUED | OUTPATIENT
Start: 2018-07-07 | End: 2018-07-08

## 2018-07-06 RX ORDER — TRAZODONE HYDROCHLORIDE 50 MG/1
50 TABLET ORAL
Status: DISCONTINUED | OUTPATIENT
Start: 2018-07-06 | End: 2018-07-17 | Stop reason: HOSPADM

## 2018-07-06 RX ORDER — FAMOTIDINE 20 MG/1
20 TABLET, FILM COATED ORAL 2 TIMES DAILY
Status: ON HOLD | COMMUNITY
End: 2018-07-10 | Stop reason: ALTCHOICE

## 2018-07-06 RX ADMIN — TRAZODONE HYDROCHLORIDE 50 MG: 50 TABLET ORAL at 21:32

## 2018-07-06 RX ADMIN — ARIPIPRAZOLE 5 MG: 5 TABLET ORAL at 21:32

## 2018-07-06 NOTE — ED NOTES
Crisis speaking with the pt; pt is willing to sign a 201 and does not want to leave the area        Edward Cabrera RN  07/06/18 8239

## 2018-07-06 NOTE — ED NOTES
Care givers at the bedside are wanting to medicate the pt with their own medications  Cleared with resident; a note will be placed        Rossana Rey RN  07/06/18 7309

## 2018-07-06 NOTE — ED NOTES
Pt belongings were inventoried and locked up at zone 5 closet [facility]  07/06/18 1318

## 2018-07-06 NOTE — ED NOTES
CW called pt secondary insurance [facility] [phone] and spoke with Rosendo Still (customer serv rep) who informed me that a care manager will call back to complete the Pre-Cert      [address] [place] [address]

## 2018-07-06 NOTE — ED NOTES
Pt is a 43 y o  female who was brought to the ED with   Chief Complaint   Patient presents with   Andrei Cunningham Psychiatric Evaluation     Pt is from a group home and sent in for evaluation due to the pt "hearing voices telling her to kill herself"  Pt poor historian    Pt brought to the ED by her group home staff with complaints of increased depression, Pt reports S/I with no stated plan for the past 4 days, Pt reports that she is taking her meds as prescribed, but she doesnt think they are working  Pt reports A/H command type "the voices are telling me to kill myself", Pt reports that she doesnt know why she keeps hitting herself  Pt reports feeling helpless and hopeless,  Pt admits to S/I,A/H, Pt denies H/I,V/H  Intake Assessment completed, Safety risk Assessment completed  CW me with pt and discussed the process of a BHU admission, pt has agreed and signed a 201  CW discussed this case and pt plan with ED Physician who is in agreement with this plan   CW will start bed search, and complete Pre-Cert      Hi Shanks Crisis Worker

## 2018-07-06 NOTE — ED NOTES
CW started bed search, CW called  CW who informed that pt clinical can be presented at [facility] for possible admission, 201 faxed         [address] [facility]

## 2018-07-06 NOTE — ED NOTES
Insurance Authorization:   Phone call placed to [facility]   Phone number: [phone] to Lamar Oliveros (care manager)  COB Completed  Level of care: IP  Authorization # [facility] [place] 172 Worker

## 2018-07-06 NOTE — ED ATTENDING ATTESTATION
Alejandra Dinero DO, saw and evaluated the patient  I have discussed the patient with the resident/non-physician practitioner and agree with the resident's/non-physician practitioner's findings, Plan of Care, and MDM as documented in the resident's/non-physician practitioner's note, except where noted  All available labs and Radiology studies were reviewed  At this point I agree with the current assessment done in the Emergency Department  I have conducted an independent evaluation of this patient a history and physical is as follows:    42 yo female presents from retirement for evaluation of command hallucinations telling her to kill herself  Progressively worse over last few days  Denies HI, VH  Symptoms constant, no a/e factors, generalized in nature  Has been having swelling in her legs which was evaluated 2 days ago  Pt states that she doesn't feel safe at group home  Pt was here in April 2018 for same  At that time pt had 24hr 1:1 staff that monitor for safety and security, pt was discharged back home  Imp: AH, SI without plan  Plan: BAT, UDS, crisis  If group home still has 24hr 1:1 staffing, will probably d/c to group home        Critical Care Time  CritCare Time    Procedures

## 2018-07-07 RX ORDER — FAMOTIDINE 20 MG/1
20 TABLET, FILM COATED ORAL ONCE
Status: COMPLETED | OUTPATIENT
Start: 2018-07-07 | End: 2018-07-07

## 2018-07-07 RX ADMIN — FAMOTIDINE 20 MG: 20 TABLET, FILM COATED ORAL at 17:07

## 2018-07-07 RX ADMIN — TRAZODONE HYDROCHLORIDE 50 MG: 50 TABLET ORAL at 21:25

## 2018-07-07 RX ADMIN — ARIPIPRAZOLE 5 MG: 5 TABLET ORAL at 21:25

## 2018-07-07 NOTE — ED PROVIDER NOTES
History  Chief Complaint   Patient presents with    Psychiatric Evaluation     Pt is from a group home and sent in for evaluation due to the pt "hearing voices telling her to kill herself"  Pt poor historian      69-year-old female with history of cerebral palsy and behavioral disorder presenting to the emergency department for E ED psychiatric evaluation  She is presenting from her group home for evaluation of auditory hallucinations  She states that over the past several days she has had increasingly intrusive auditory hallucinations which are command hallucinations telling her to hurt herself and possibly kill herself  She has specific physically states that she is feeling compelled a hit herself in the head against the wall until she dies  She has had problems with this in the past of and has required inpatient Behavioral Health treatment before  She often has hallucinations but states that she particularly feels unsafe at home because they are very compelling to her today  She denies any alcohol or drug use  She denies any other plans of suicide  She denies any homicidal ideations  She has no physical complaints at this time  Prior to Admission Medications   Prescriptions Last Dose Informant Patient Reported? Taking?    ARIPiprazole (ABILIFY) 5 mg tablet 7/5/2018 at Unknown time Care Giver Yes Yes   Sig: Take 5 mg by mouth daily     Cholecalciferol (VITAMIN D-3 PO) 7/6/2018 at Unknown time Care Giver Yes Yes   Sig: Take 2,000 Units by mouth daily   GNP MILK OF MAGNESIA 1200 MG/15ML oral suspension Unknown at Unknown time Care Giver No No   Sig: TAKE 2 TBSP (30ML) BY MOUTH DAILY AS NEEDED IF NO BM IN 3 DAYS (CONSTIPATION)   MAPAP 500 MG tablet  Care Giver No No   Sig: TAKE 1 TAB BY MOUTH EVERY 6 HOURS AS NEEDED FOR PAIN/HA/BACKACHE/MUSCLE ACHE   Melatonin 5 MG TABS  Care Giver Yes No   Sig: Take 1 tablet by mouth daily 9pm   Mouthwashes (LISTERINE ANTISEPTIC) LIQD 7/5/2018 at Unknown time Care Giver Yes Yes   Sig: Apply to the mouth or throat 2 (two) times a day   Multiple Vitamins-Minerals (CERTAVITE/ANTIOXIDANTS) TABS 7/6/2018 at Unknown time  No Yes   Sig: TAKE 1 TABLET BY MOUTH DAILY AT 8AM (SUPPLEMENT) JERRI   Norgestimate-Eth Estradiol (SPRINTEC 28 PO) 7/6/2018 at Unknown time Care Giver Yes Yes   Sig: Take by mouth   RA SUNSCREEN SPF50 LOTN Unknown at Unknown time Care Giver No No   Sig: Apply 15 minutes before sun exposure and every 2 hours   aluminum-magnesium hydroxide 200-200 MG/5ML suspension  Care Giver Yes No   Sig: Take by mouth every 6 (six) hours as needed for heartburn   bacitracin topical ointment 500 units/g topical ointment Unknown at Unknown time Care Giver Yes No   Sig: Apply 1 large application topically 2 (two) times a day   calcium carbonate (TUMS) 500 mg chewable tablet  Care Giver Yes No   Sig: Chew 1 tablet daily   carbamide peroxide (DEBROX) 6 5 % otic solution   No No   Sig: Use 5 drops in each ear at 8 am on the 11, 12, 13th of every month   clotrimazole (LOTRIMIN) 1 % cream Unknown at Unknown time Care Giver No No   Sig: Apply 1 g (1 application total) topically 3 (three) times a day as needed (fungal irritation)   escitalopram (LEXAPRO) 10 mg tablet 7/6/2018 at Unknown time Care Giver Yes Yes   Sig: Take 10 mg by mouth daily   famotidine (PEPCID) 20 mg tablet  Care Giver No No   Sig: Take 1 tablet (20 mg total) by mouth 2 (two) times a day for 30 days At 7AM and 4PM (GERD)   famotidine (PEPCID) 20 mg tablet 7/6/2018 at Unknown time  Yes Yes   Sig: Take 20 mg by mouth 2 (two) times a day   ibuprofen (MOTRIN) 400 mg tablet  Care Giver No No   Sig: Take 1 tablet (400 mg total) by mouth every 8 (eight) hours as needed for mild pain, moderate pain or headaches for up to 30 days   lubiprostone (AMITIZA) 24 mcg capsule 7/6/2018 at Unknown time  No Yes   Sig: Take 1 capsule (24 mcg total) by mouth 2 (two) times a day with meals   metoclopramide (REGLAN) 10 mg tablet Care Giver No No   Sig: TAKE 1 TABLET BY MOUTH EVERY 8 HOURS AS NEEDED FOR NAUSEA *VIRI   pantoprazole (PROTONIX) 40 mg tablet 7/6/2018 at Unknown time Care Giver No Yes   Sig: Take 1 tablet (40 mg total) by mouth daily for 90 days   polyethylene glycol (MIRALAX) 17 g packet  Care Giver No No   Sig: Take 17gm (1 packet) daily for first three days, then daily as needed for no bowel movement more than 24 hrs   senna-docusate sodium (SENEXON-S) 8 6-50 mg per tablet 7/6/2018 at Unknown time Care Giver No Yes   Sig: Take 1 tablet by mouth daily   sucralfate (CARAFATE) 1 g/10 mL suspension Unknown at Unknown time Care Giver Yes No   Sig: Take 1 g by mouth 4 (four) times a day as needed     traZODone (DESYREL) 50 mg tablet 7/5/2018 at Unknown time Care Giver Yes Yes   Sig: Take 50 mg by mouth daily at bedtime   zinc oxide (DESITIN) 13 % cream Unknown at Unknown time Care Giver No No   Sig: Apply 1 application topically as needed (for perianal irritation)      Facility-Administered Medications: None       Past Medical History:   Diagnosis Date    Anxiety     Brain lesion     Bronchitis     Cerebral palsy (HCC)     Cerebral palsy (HCC)     Last Assessed:7/6/2016    Chronically dry eyes, left     Last Assessed:7/6/2016    Depression     Last Assessed:7/6/2016    Depressive disorder     Fall     GERD (gastroesophageal reflux disease)     Mood disorder (HCC)        Past Surgical History:   Procedure Laterality Date    CSF SHUNT      Creation of Ventriculo-Peritoneal CSF shunt ; Last Assessed:7/6/2016    EAR SURGERY      Last Assessed:7/6/2016    LEG SURGERY      due to CP     NOSE SURGERY      Last Assessed:7/6/2016       Family History   Problem Relation Age of Onset    Diabetes Mother     No Known Problems Father      I have reviewed and agree with the history as documented      Social History   Substance Use Topics    Smoking status: Never Smoker    Smokeless tobacco: Never Used    Alcohol use No Review of Systems   Constitutional: Negative for appetite change, chills, fatigue and fever  HENT: Negative for sneezing and sore throat  Eyes: Negative for visual disturbance  Respiratory: Negative for cough, choking, chest tightness, shortness of breath and wheezing  Cardiovascular: Negative for chest pain and palpitations  Gastrointestinal: Negative for abdominal pain, constipation, diarrhea, nausea and vomiting  Genitourinary: Negative for difficulty urinating and dysuria  Neurological: Negative for dizziness, weakness, light-headedness, numbness and headaches  Psychiatric/Behavioral: Positive for hallucinations and self-injury  The patient is nervous/anxious  All other systems reviewed and are negative  Physical Exam  ED Triage Vitals   Temperature Pulse Respirations Blood Pressure SpO2   07/06/18 1429 07/06/18 1429 07/06/18 1429 07/06/18 1721 07/06/18 1721   97 7 °F (36 5 °C) 88 22 132/73 96 %      Temp Source Heart Rate Source Patient Position - Orthostatic VS BP Location FiO2 (%)   07/06/18 1429 07/06/18 1429 07/06/18 1721 07/06/18 1721 --   Oral Monitor Lying Right arm       Pain Score       07/06/18 1721       No Pain           Orthostatic Vital Signs  Vitals:    07/07/18 1044 07/07/18 1614 07/07/18 1943 07/08/18 0640   BP: 143/65 144/63 133/57 119/73   Pulse: 84 87 (!) 107 91   Patient Position - Orthostatic VS: Sitting Sitting Lying Sitting       Physical Exam   Constitutional: She is oriented to person, place, and time  She appears well-developed and well-nourished  No distress  HENT:   Head: Normocephalic and atraumatic  Mouth/Throat: Oropharynx is clear and moist    Eyes: EOM are normal  Pupils are equal, round, and reactive to light  Neck: No JVD present  No tracheal deviation present  Cardiovascular: Normal rate, regular rhythm, normal heart sounds and intact distal pulses  Exam reveals no gallop and no friction rub  No murmur heard    Pulmonary/Chest: Effort normal and breath sounds normal  No respiratory distress  She has no wheezes  She has no rales  Abdominal: Soft  Bowel sounds are normal  She exhibits no distension  There is no tenderness  There is no rebound and no guarding  Neurological: She is alert and oriented to person, place, and time  No cranial nerve deficit  She exhibits normal muscle tone  Skin: Skin is warm and dry  She is not diaphoretic  No pallor  Psychiatric: Her behavior is normal  Her mood appears anxious  She expresses impulsivity  She exhibits a depressed mood  Nursing note and vitals reviewed  ED Medications  Medications   traZODone (DESYREL) tablet 50 mg (50 mg Oral Given 7/7/18 2125)   ARIPiprazole (ABILIFY) tablet 5 mg (5 mg Oral Not Given 7/8/18 0904)   cholecalciferol (VITAMIN D3) tablet 1,000 Units (1,000 Units Oral Not Given 7/8/18 0912)   escitalopram (LEXAPRO) tablet 10 mg (10 mg Oral Not Given 7/8/18 0913)   famotidine (PEPCID) tablet 20 mg (20 mg Oral Given 7/7/18 1707)   famotidine (PEPCID) tablet 20 mg (20 mg Oral Given 7/8/18 0653)   pantoprazole (PROTONIX) EC tablet 40 mg (40 mg Oral Given 7/8/18 0653)   senna (SENOKOT) tablet 8 6 mg (8 6 mg Oral Given 7/8/18 0653)       Diagnostic Studies  Results Reviewed     Procedure Component Value Units Date/Time    Rapid drug screen, urine [[id number]]  (Normal) Collected:  07/06/18 1529    Lab Status:  Final result Specimen:  Urine from Urine, Clean Catch Updated:  07/06/18 1559     Amph/Meth UR Negative     Barbiturate Ur Negative     Benzodiazepine Urine Negative     Cocaine Urine Negative     Methadone Urine Negative     Opiate Urine Negative     PCP Ur Negative     THC Urine Negative    Narrative:         FOR MEDICAL PURPOSES ONLY  IF CONFIRMATION NEEDED PLEASE CONTACT THE LAB WITHIN 5 DAYS      Drug Screen Cutoff Levels:  AMPHETAMINE/METHAMPHETAMINES  1000 ng/mL  BARBITURATES     200 ng/mL  BENZODIAZEPINES     200 ng/mL  COCAINE      300 ng/mL  METHADONE      300 ng/mL  OPIATES      300 ng/mL  PHENCYCLIDINE     25 ng/mL  THC       50 ng/mL    POCT pregnancy, urine [[id number]]  (Normal) Resulted:  07/06/18 1529    Lab Status:  Final result Updated:  07/06/18 1529     EXT PREG TEST UR (Ref: Negative) negative (-)    POCT alcohol breath test [[id number]]  (Normal) Resulted:  07/06/18 1450    Lab Status:  Final result Updated:  07/06/18 1450     EXTBreath Alcohol --                 No orders to display         Procedures  Procedures      Phone Consults  ED Phone Contact    ED Course  ED Course as of Jul 08 1614   Fri Jul 06, 2018   1650 201 signed pending placement                                 MDM  Number of Diagnoses or Management Options  Diagnosis management comments: 57-year-old female with increasingly intrusive command hallucinations to harm herself  The patient has had this problem in the past requiring inpatient Behavioral Health  Will obtain bat UDS crisis consult  Would recommend inpatientBehavioral Health this time  Patient is amenable and agreeable to the plan    CritCare Time    Disposition  Final diagnoses:   None     ED Disposition     None      MD Documentation      Most Recent Value   Sending MD DR Denise Hassan      Follow-up Information    None         Patient's Medications   Discharge Prescriptions    No medications on file     No discharge procedures on file  ED Provider  Attending physically available and evaluated Anjali Mike I managed the patient along with the ED Attending      Electronically Signed by         Huong Carroll MD  07/08/18 0782

## 2018-07-07 NOTE — ED NOTES
Phone call was received from Marion Csineros at [facility]  At this time they do not have any beds to meet pt's needs

## 2018-07-07 NOTE — ED NOTES
Pt was able to shower and change her clothes, her caregiver Adriana Shannon was at her side while she did so        Jed Alcocer RN  07/07/18 4670

## 2018-07-07 NOTE — ED NOTES
Pt now stating she would be willing to go to [address] [place] [address]   Call was placed to [address] [place] [address]   At this time they have no beds to meet pt's needs

## 2018-07-07 NOTE — ED NOTES
Phone call received from Chela Godoy at [facility] at this time they have no female beds  [facility] and [facility] have no beds

## 2018-07-07 NOTE — ED NOTES
PC placed to [facility] and [facility], both stated they have a bed and are willing to review clinical  Info was faxed to both facilities

## 2018-07-07 NOTE — ED NOTES
Pharmacy indicated that they are working on the medication now        Sameer Dinero RN  07/06/18 5437

## 2018-07-07 NOTE — ED NOTES
CW received a call from [address] [place] [address] who informed me that due to 3P U acuity, and staffing issues, pt clinical will need to be represented in the AM for admission      [address] [place] [address]

## 2018-07-07 NOTE — ED NOTES
Phone call received from Loren at [facility]  At this time due to the acuity of the unit they do not have a bed to meet pt's needs at this time  Placed call to [facility] at this time they have no beds  Paper work faxed to [facility] for review

## 2018-07-07 NOTE — ED NOTES
Pt informed that they will be staying in the ER tonight  Care giver at the bedside aware as well  Care giver will provide the "Listerin" to cleanse the pt's mouth with        Shabana Joyce, TIANA  07/06/18 3212

## 2018-07-08 RX ORDER — MELATONIN
1000 DAILY
Status: DISCONTINUED | OUTPATIENT
Start: 2018-07-08 | End: 2018-07-09 | Stop reason: SDUPTHER

## 2018-07-08 RX ORDER — SENNOSIDES 8.6 MG
1 TABLET ORAL ONCE
Status: COMPLETED | OUTPATIENT
Start: 2018-07-08 | End: 2018-07-08

## 2018-07-08 RX ORDER — ARIPIPRAZOLE 5 MG/1
5 TABLET ORAL
Status: DISCONTINUED | OUTPATIENT
Start: 2018-07-08 | End: 2018-07-10

## 2018-07-08 RX ORDER — ESCITALOPRAM OXALATE 10 MG/1
10 TABLET ORAL DAILY
Status: DISCONTINUED | OUTPATIENT
Start: 2018-07-08 | End: 2018-07-09

## 2018-07-08 RX ORDER — DOCUSATE SODIUM 100 MG/1
100 CAPSULE, LIQUID FILLED ORAL 2 TIMES DAILY
Status: DISCONTINUED | OUTPATIENT
Start: 2018-07-08 | End: 2018-07-17 | Stop reason: HOSPADM

## 2018-07-08 RX ORDER — FAMOTIDINE 20 MG/1
20 TABLET, FILM COATED ORAL ONCE
Status: COMPLETED | OUTPATIENT
Start: 2018-07-08 | End: 2018-07-08

## 2018-07-08 RX ORDER — PANTOPRAZOLE SODIUM 40 MG/1
40 TABLET, DELAYED RELEASE ORAL ONCE
Status: COMPLETED | OUTPATIENT
Start: 2018-07-08 | End: 2018-07-08

## 2018-07-08 RX ADMIN — DOCUSATE SODIUM 100 MG: 100 CAPSULE, LIQUID FILLED ORAL at 20:56

## 2018-07-08 RX ADMIN — ARIPIPRAZOLE 5 MG: 5 TABLET ORAL at 20:56

## 2018-07-08 RX ADMIN — SENNOSIDES 8.6 MG: 8.6 TABLET ORAL at 06:53

## 2018-07-08 RX ADMIN — FAMOTIDINE 20 MG: 20 TABLET ORAL at 16:57

## 2018-07-08 RX ADMIN — FAMOTIDINE 20 MG: 20 TABLET ORAL at 06:53

## 2018-07-08 RX ADMIN — TRAZODONE HYDROCHLORIDE 50 MG: 50 TABLET ORAL at 20:56

## 2018-07-08 RX ADMIN — PANTOPRAZOLE SODIUM 40 MG: 40 TABLET, DELAYED RELEASE ORAL at 06:53

## 2018-07-08 NOTE — ED NOTES
Patient approached nurse's station stating "Did they tell you I feel like I want to hurt myself?" Asked patient if she felt like hurting herself right now, patient said "Yes, it's not good " Walked patient back to her room and asked her to draw pictures while she waited for her morning medications      Chiki Palomo  07/08/18 2784

## 2018-07-08 NOTE — ED NOTES
Patient requesting to shower at this time  Provided with paper scrubs and toiletries  Patient's care taker to help with shower  Patient's bed linens changed as well       Abbie Gibson RN  07/08/18 4701

## 2018-07-08 NOTE — ED NOTES
at bedside requesting patients morning medications  Dr Con Arzola made aware of medications that patient takes in the morning        Anitra Power RN  07/08/18 8932

## 2018-07-08 NOTE — ED NOTES
Patient stated that she had "pooped herself" assisted patient to the bathroom and provided clean pants and underwear  Patient only wanted to wear the pants and not the underwear  Changed the patient's sheets and cleaned room up  Removed garbage and dirty sheets from floor        [facility]  07/08/18 6109

## 2018-07-09 PROCEDURE — 99285 EMERGENCY DEPT VISIT HI MDM: CPT

## 2018-07-09 PROCEDURE — 99283 EMERGENCY DEPT VISIT LOW MDM: CPT | Performed by: PSYCHIATRY & NEUROLOGY

## 2018-07-09 RX ORDER — LORAZEPAM 2 MG/ML
1 INJECTION INTRAMUSCULAR EVERY 6 HOURS PRN
Status: DISCONTINUED | OUTPATIENT
Start: 2018-07-09 | End: 2018-07-17 | Stop reason: HOSPADM

## 2018-07-09 RX ORDER — RISPERIDONE 1 MG/1
1 TABLET, ORALLY DISINTEGRATING ORAL
Status: DISCONTINUED | OUTPATIENT
Start: 2018-07-09 | End: 2018-07-17 | Stop reason: HOSPADM

## 2018-07-09 RX ORDER — IBUPROFEN 600 MG/1
600 TABLET ORAL EVERY 8 HOURS PRN
Status: DISCONTINUED | OUTPATIENT
Start: 2018-07-09 | End: 2018-07-17 | Stop reason: HOSPADM

## 2018-07-09 RX ORDER — HALOPERIDOL 5 MG/ML
5 INJECTION INTRAMUSCULAR EVERY 6 HOURS PRN
Status: DISCONTINUED | OUTPATIENT
Start: 2018-07-09 | End: 2018-07-17 | Stop reason: HOSPADM

## 2018-07-09 RX ORDER — OMEGA-3S/DHA/EPA/FISH OIL/D3 300MG-1000
400 CAPSULE ORAL DAILY
Status: DISCONTINUED | OUTPATIENT
Start: 2018-07-09 | End: 2018-07-09

## 2018-07-09 RX ORDER — BENZTROPINE MESYLATE 1 MG/ML
1 INJECTION INTRAMUSCULAR; INTRAVENOUS EVERY 6 HOURS PRN
Status: DISCONTINUED | OUTPATIENT
Start: 2018-07-09 | End: 2018-07-17 | Stop reason: HOSPADM

## 2018-07-09 RX ORDER — IBUPROFEN 400 MG/1
800 TABLET ORAL EVERY 8 HOURS PRN
Status: DISCONTINUED | OUTPATIENT
Start: 2018-07-09 | End: 2018-07-17 | Stop reason: HOSPADM

## 2018-07-09 RX ORDER — OLANZAPINE 10 MG/1
10 INJECTION, POWDER, LYOPHILIZED, FOR SOLUTION INTRAMUSCULAR
Status: DISCONTINUED | OUTPATIENT
Start: 2018-07-09 | End: 2018-07-17 | Stop reason: HOSPADM

## 2018-07-09 RX ORDER — ACETAMINOPHEN 325 MG/1
650 TABLET ORAL EVERY 6 HOURS PRN
Status: DISCONTINUED | OUTPATIENT
Start: 2018-07-09 | End: 2018-07-17 | Stop reason: HOSPADM

## 2018-07-09 RX ORDER — TRAZODONE HYDROCHLORIDE 50 MG/1
50 TABLET ORAL
Status: DISCONTINUED | OUTPATIENT
Start: 2018-07-09 | End: 2018-07-17 | Stop reason: HOSPADM

## 2018-07-09 RX ORDER — PANTOPRAZOLE SODIUM 40 MG/1
40 TABLET, DELAYED RELEASE ORAL
Status: DISCONTINUED | OUTPATIENT
Start: 2018-07-09 | End: 2018-07-10

## 2018-07-09 RX ORDER — FAMOTIDINE 20 MG/1
20 TABLET, FILM COATED ORAL ONCE
Status: COMPLETED | OUTPATIENT
Start: 2018-07-09 | End: 2018-07-09

## 2018-07-09 RX ORDER — HALOPERIDOL 5 MG
5 TABLET ORAL EVERY 8 HOURS PRN
Status: DISCONTINUED | OUTPATIENT
Start: 2018-07-09 | End: 2018-07-17 | Stop reason: HOSPADM

## 2018-07-09 RX ORDER — ESCITALOPRAM OXALATE 10 MG/1
10 TABLET ORAL DAILY
Status: DISCONTINUED | OUTPATIENT
Start: 2018-07-09 | End: 2018-07-11

## 2018-07-09 RX ORDER — HYDROXYZINE HYDROCHLORIDE 25 MG/1
25 TABLET, FILM COATED ORAL EVERY 6 HOURS PRN
Status: DISCONTINUED | OUTPATIENT
Start: 2018-07-09 | End: 2018-07-17 | Stop reason: HOSPADM

## 2018-07-09 RX ORDER — MAGNESIUM HYDROXIDE/ALUMINUM HYDROXICE/SIMETHICONE 120; 1200; 1200 MG/30ML; MG/30ML; MG/30ML
20 SUSPENSION ORAL EVERY 4 HOURS PRN
Status: DISCONTINUED | OUTPATIENT
Start: 2018-07-09 | End: 2018-07-17 | Stop reason: HOSPADM

## 2018-07-09 RX ORDER — LORAZEPAM 1 MG/1
1 TABLET ORAL EVERY 8 HOURS PRN
Status: DISCONTINUED | OUTPATIENT
Start: 2018-07-09 | End: 2018-07-17 | Stop reason: HOSPADM

## 2018-07-09 RX ORDER — OMEGA-3S/DHA/EPA/FISH OIL/D3 300MG-1000
400 CAPSULE ORAL DAILY
Status: DISCONTINUED | OUTPATIENT
Start: 2018-07-09 | End: 2018-07-10

## 2018-07-09 RX ORDER — OLANZAPINE 10 MG/1
10 TABLET ORAL
Status: DISCONTINUED | OUTPATIENT
Start: 2018-07-09 | End: 2018-07-17 | Stop reason: HOSPADM

## 2018-07-09 RX ORDER — NORGESTIMATE AND ETHINYL ESTRADIOL 0.25-0.035
1 KIT ORAL
Status: DISCONTINUED | OUTPATIENT
Start: 2018-07-10 | End: 2018-07-17 | Stop reason: HOSPADM

## 2018-07-09 RX ORDER — BENZTROPINE MESYLATE 1 MG/1
1 TABLET ORAL EVERY 6 HOURS PRN
Status: DISCONTINUED | OUTPATIENT
Start: 2018-07-09 | End: 2018-07-17 | Stop reason: HOSPADM

## 2018-07-09 RX ADMIN — DOCUSATE SODIUM 100 MG: 100 CAPSULE, LIQUID FILLED ORAL at 08:38

## 2018-07-09 RX ADMIN — FAMOTIDINE 20 MG: 20 TABLET, FILM COATED ORAL at 08:38

## 2018-07-09 RX ADMIN — PANTOPRAZOLE SODIUM 40 MG: 40 TABLET, DELAYED RELEASE ORAL at 08:38

## 2018-07-09 RX ADMIN — ESCITALOPRAM OXALATE 10 MG: 10 TABLET ORAL at 08:38

## 2018-07-09 RX ADMIN — HALOPERIDOL 5 MG: 5 TABLET ORAL at 15:17

## 2018-07-09 RX ADMIN — DOCUSATE SODIUM 100 MG: 100 CAPSULE, LIQUID FILLED ORAL at 18:06

## 2018-07-09 RX ADMIN — LORAZEPAM 1 MG: 1 TABLET ORAL at 16:41

## 2018-07-09 RX ADMIN — CHOLECALCIFEROL TAB 10 MCG (400 UNIT) 400 UNITS: 10 TAB at 08:38

## 2018-07-09 NOTE — PROGRESS NOTES
Patient wants to listen to [id number] [facility] and staff assisted her to find a channel and for a short period of time she was occupied with that pastime   Then Patient tapping(lightly) self in forehead and saying she wanted "someone to talk to " She wants "someone to sit with me "

## 2018-07-09 NOTE — PROGRESS NOTES
44 y/o female  [id number] admitted from [facility] ED for hearing voices telling her to kill herself,Pt states she has been hearing the voices since last Friday and she randomly hits her head because she is hearing the voices  Pt also stated " That this is her first time ever hearing the voices and she believes if she keeps hearing them that she will kill herself  Pt has a PCP  And was brought to the ED from group home with staff  Staff reported that Pt has mood swings and in the past has been very attention seeking  and the Pts behavior has increased since last week r/t hearing voices  Pt currently reports hearing voices to kill herself during interview  Meds were confirmed  Pt has medical Hx of MR  NKA AND HOHPt was educated on how to contract for safety and verbalized understanding

## 2018-07-09 NOTE — PROGRESS NOTES
Group home staff came to deliver batteries for her hearing aide and at the same time asked to have her toolbox and phone  As per day nurse, who was called on the phone to clarify the whereabouts of these items, another member of staff at her group home picked these things up earlier today
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
## 2018-07-09 NOTE — ED NOTES
No [facility] in-network beds available       Bed search to following facilities: [facility], [facility], [address], or      Bed Search to continue in the AM

## 2018-07-10 PROBLEM — F41.1 GENERALIZED ANXIETY DISORDER: Status: ACTIVE | Noted: 2017-02-14

## 2018-07-10 PROCEDURE — 99222 1ST HOSP IP/OBS MODERATE 55: CPT | Performed by: NURSE PRACTITIONER

## 2018-07-10 RX ORDER — MELATONIN
2000 DAILY
Status: DISCONTINUED | OUTPATIENT
Start: 2018-07-11 | End: 2018-07-17 | Stop reason: HOSPADM

## 2018-07-10 RX ORDER — AMOXICILLIN 250 MG
1 CAPSULE ORAL DAILY
Status: DISCONTINUED | OUTPATIENT
Start: 2018-07-11 | End: 2018-07-17 | Stop reason: HOSPADM

## 2018-07-10 RX ORDER — NORGESTIMATE AND ETHINYL ESTRADIOL 7DAYSX3 LO
1 KIT ORAL DAILY
COMMUNITY
End: 2018-09-21 | Stop reason: SDUPTHER

## 2018-07-10 RX ORDER — ARIPIPRAZOLE 10 MG/1
10 TABLET ORAL
Status: DISCONTINUED | OUTPATIENT
Start: 2018-07-11 | End: 2018-07-11

## 2018-07-10 RX ORDER — PANTOPRAZOLE SODIUM 40 MG/1
40 TABLET, DELAYED RELEASE ORAL
Status: DISCONTINUED | OUTPATIENT
Start: 2018-07-11 | End: 2018-07-17 | Stop reason: HOSPADM

## 2018-07-10 RX ORDER — CLOTRIMAZOLE 1 %
1 CREAM (GRAM) TOPICAL 2 TIMES DAILY
Status: DISCONTINUED | OUTPATIENT
Start: 2018-07-11 | End: 2018-07-17 | Stop reason: HOSPADM

## 2018-07-10 RX ORDER — SUCRALFATE ORAL 1 G/10ML
1000 SUSPENSION ORAL 4 TIMES DAILY PRN
Status: DISCONTINUED | OUTPATIENT
Start: 2018-07-10 | End: 2018-07-17 | Stop reason: HOSPADM

## 2018-07-10 RX ADMIN — IBUPROFEN 800 MG: 400 TABLET ORAL at 10:32

## 2018-07-10 RX ADMIN — TRAZODONE HYDROCHLORIDE 50 MG: 50 TABLET ORAL at 20:43

## 2018-07-10 RX ADMIN — CHOLECALCIFEROL TAB 10 MCG (400 UNIT) 400 UNITS: 10 TAB at 08:47

## 2018-07-10 RX ADMIN — OLANZAPINE 10 MG: 10 TABLET, FILM COATED ORAL at 08:51

## 2018-07-10 RX ADMIN — DOCUSATE SODIUM 100 MG: 100 CAPSULE, LIQUID FILLED ORAL at 08:47

## 2018-07-10 RX ADMIN — ARIPIPRAZOLE 5 MG: 5 TABLET ORAL at 20:43

## 2018-07-10 RX ADMIN — ESCITALOPRAM OXALATE 10 MG: 10 TABLET ORAL at 08:48

## 2018-07-10 RX ADMIN — DOCUSATE SODIUM 100 MG: 100 CAPSULE, LIQUID FILLED ORAL at 18:30

## 2018-07-10 RX ADMIN — BENZTROPINE MESYLATE 1 MG: 1 TABLET ORAL at 10:33

## 2018-07-10 RX ADMIN — HALOPERIDOL 5 MG: 5 TABLET ORAL at 10:33

## 2018-07-10 RX ADMIN — NORGESTIMATE AND ETHINYL ESTRADIOL 1 TABLET: KIT ORAL at 08:48

## 2018-07-10 RX ADMIN — OLANZAPINE 10 MG: 10 TABLET, FILM COATED ORAL at 16:37

## 2018-07-10 RX ADMIN — LORAZEPAM 1 MG: 1 TABLET ORAL at 20:43

## 2018-07-10 RX ADMIN — PANTOPRAZOLE SODIUM 40 MG: 40 TABLET, DELAYED RELEASE ORAL at 05:40

## 2018-07-10 NOTE — PROGRESS NOTES
Patient reports that the voices are "still there " Given 5 mg Haldol po, 1 mg Cogentin po, and 800 mg Motrin po for chest pain that she describes as "normal for me "

## 2018-07-10 NOTE — PROGRESS NOTES
Patient c/o hallucinations - Zyprexa 10mgs given at 1637  Patient if I could call 911 because she was hearing voices  Oriented her to present situation - reminded her she was on behavioral health to help her with hearing voices  She said she wanted "to go back downstairs" - to E R  Reiterated she needs to be on this floor to help with the voices  She was redirected to go talk to peers or read her book/watch T V  and to help get her mind off the voices
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
## 2018-07-10 NOTE — PROGRESS NOTES
Patient received Ativan prn earlier this evening when she was tapping on her forehead  No further behavior of this kind but she was in the milieu and in her room and at times seemed to be internally stimulated  She did fall asleep before bedtime and was not able to awaken for HS meds

## 2018-07-10 NOTE — PROGRESS NOTES
Patient was cooperative with medications this morning  Was loud in conversation but has hearing loss  Said the voices are "really bad" and that they are command in nature  Said her head is "so bad" that she cannot think or communicate with anyone else  Was moved to a less stimulating environment and given 10 mg Zyprexa po  When asked what medications worked for her in the past she said, "Ani " Patient verbally contracted for safety on the unit

## 2018-07-10 NOTE — CONSULTS
Chief Complaint: uncontrollable behavior    HPI: Meryle Gouge is a 43 y o  female who presents with hearing voices to harm herself and uncontrollable behavior in group home  Pt admitted for psychiatric evaluation and treatment  Review of Systems: Unable to obtain    Past Medical History:  Past Medical History:   Diagnosis Date    Anxiety     Brain lesion     Bronchitis     Cerebral palsy ([facility] [address] )     Cerebral palsy (HCC)     Last Assessed:7/6/2016    Chronically dry eyes, left     Last Assessed:7/6/2016    Depression     Last Assessed:7/6/2016    Depressive disorder     Fall     GERD (gastroesophageal reflux disease)     Mood disorder ([facility] [address] )        Past Surgical History:  Past Surgical History:   Procedure Laterality Date    CSF SHUNT      Creation of Ventriculo-Peritoneal CSF shunt ;  Last Assessed:7/6/2016    EAR SURGERY      Last Assessed:7/6/2016    LEG SURGERY      due to CP     NOSE SURGERY      Last Assessed:7/6/2016       Social History:  History   Alcohol Use No     History   Drug Use No     History   Smoking Status    Never Smoker   Smokeless Tobacco    Never Used       Family History:  Family History   Problem Relation Age of Onset    Diabetes Mother     No Known Problems Father        Allergies:  No Known Allergies    Medications:  current meds:   Current Facility-Administered Medications   Medication Dose Route Frequency    acetaminophen (TYLENOL) tablet 650 mg  650 mg Oral Q6H PRN    aluminum-magnesium hydroxide-simethicone (MYLANTA) 200-200-20 mg/5 mL oral suspension 20 mL  20 mL Oral Q4H PRN    ARIPiprazole (ABILIFY) tablet 5 mg  5 mg Oral HS    benztropine (COGENTIN) injection 1 mg  1 mg Intramuscular Q6H PRN    benztropine (COGENTIN) tablet 1 mg  1 mg Oral Q6H PRN    cholecalciferol (VITAMIN D3) tablet 400 Units  400 Units Oral Daily    docusate sodium (COLACE) capsule 100 mg  100 mg Oral BID    escitalopram (LEXAPRO) tablet 10 mg  10 mg Oral Daily    haloperidol (HALDOL) tablet 5 mg  5 mg Oral Q8H PRN    haloperidol lactate (HALDOL) injection 5 mg  5 mg Intramuscular Q6H PRN    hydrOXYzine HCL (ATARAX) tablet 25 mg  25 mg Oral Q6H PRN    ibuprofen (MOTRIN) tablet 600 mg  600 mg Oral Q8H PRN    ibuprofen (MOTRIN) tablet 800 mg  800 mg Oral Q8H PRN    LORazepam (ATIVAN) 2 mg/mL injection 1 mg  1 mg Intramuscular Q6H PRN    LORazepam (ATIVAN) tablet 1 mg  1 mg Oral Q8H PRN    magnesium hydroxide (MILK OF MAGNESIA) 400 mg/5 mL oral suspension 30 mL  30 mL Oral Daily PRN    [START ON 7/10/2018] norgestimate-ethinyl estradiol (ORTHO-CYCLEN) 0 25-35 MG-MCG per tablet 1 tablet  1 tablet Oral After Breakfast    OLANZapine (ZyPREXA) IM injection 10 mg  10 mg Intramuscular Q3H PRN    OLANZapine (ZyPREXA) tablet 10 mg  10 mg Oral Q3H PRN    pantoprazole (PROTONIX) EC tablet 40 mg  40 mg Oral Early Morning    risperiDONE (RisperDAL M-TABS) dispersible tablet 1 mg  1 mg Oral Q3H PRN    traZODone (DESYREL) tablet 50 mg  50 mg Oral HS    traZODone (DESYREL) tablet 50 mg  50 mg Oral HS PRN       Vitals:  /78 (BP Location: Left arm)   Pulse 84   Temp 97 5 °F (36 4 °C) (Oral)   Resp 18   Ht 4' 10" (1 473 m)   Wt 79 7 kg (175 lb 11 3 oz)   SpO2 100%   BMI 36 72 kg/m²     Lab Results and Cultures:   CBC with diff:   Lab Results   Component Value Date    WBC 9 61 10/12/2017    HGB 13 7 10/12/2017    HCT 39 6 10/12/2017    MCV 91 10/12/2017     10/12/2017    MCH 31 5 10/12/2017    MCHC 34 6 10/12/2017    RDW 13 1 10/12/2017    MPV 10 1 10/12/2017    NRBC 0 10/12/2017   ,   BMP/CMP:  Lab Results   Component Value Date     10/12/2017    K 3 3 (L) 10/12/2017     10/12/2017    CO2 23 10/12/2017    ANIONGAP 9 10/12/2017    BUN 11 03/16/2018    CREATININE 0 66 03/16/2018    GLUCOSE 105 10/12/2017    CALCIUM 8 1 (L) 10/12/2017    AST 13 10/12/2017    ALT 20 10/12/2017    ALKPHOS 140 (H) 10/12/2017    PROT 7 5 10/12/2017    BILITOT 0 22 10/12/2017    EGFR 112 03/16/2018   ,   Lipid Panel: No results found for: CHOL,   Coags: No results found for: PT, PTT, INR,     Blood Culture: No results found for: BLOODCX,   Urinalysis:   Lab Results   Component Value Date    COLORU Yellow 05/30/2018    COLORU Yellow 09/02/2016    CLARITYU Clear 05/30/2018    CLARITYU Transparent 09/02/2016    SPECGRAV 1 015 05/30/2018    SPECGRAV 1 015 09/02/2016    PHUR 6 5 05/30/2018    PHUR 5 0 09/02/2016    LEUKOCYTESUR Negative 05/30/2018    LEUKOCYTESUR MODERTAE 09/02/2016    NITRITE Negative 05/30/2018    NITRITE NEGATIVE 09/02/2016    PROTEINUA Negative 05/30/2018    PROTEINUA NEGATIVE 09/02/2016    GLUCOSEU Negative 05/30/2018    KETONESU Negative 05/30/2018    KETONESU NEGATIVE 09/02/2016    BILIRUBINUR Negative 05/30/2018    BILIRUBINUR NEGATIVE 09/02/2016    BLOODU Small (A) 05/30/2018    BLOODU NEGATIVE 09/02/2016   ,   Urine Culture:   Lab Results   Component Value Date    URINECX 20,000-29,000 cfu/ml  03/27/2018   ,   Wound Culure: No results found for: WOUNDCULT      Physical Exam: Limited, pt very sedated    General appearance: slowed mentation  Eyes: conjunctivae/corneas clear  PERRL, EOM's intact  Fundi benign  Throat: lips, mucosa, and tongue normal; teeth and gums normal  Neck: supple, symmetrical, trachea midline  Lungs: clear to auscultation bilaterally  Heart: regular rate and rhythm, S1, S2 normal, no murmur, click, rub or gallop  Abdomen: soft, non-tender; bowel sounds normal; no masses,  no organomegaly  Genitalia: not applicable  Rectal: not applicable  Extremities: extremities normal, warm and well-perfused; no cyanosis, clubbing, or edema  Skin: Skin color, texture, turgor normal  No rashes or lesions  Neurologic: Unable to obtain    Assessment:  42 yo female with uncontrollable behavior in group home    Plan:  Admitted for psychiatric evaluation and treatment

## 2018-07-10 NOTE — CASE MANAGEMENT
Pt admitted on 201 after hearing voices telling her to kill herself  Have gotten worse over past couple of days  Increase in depression  Pt did not feel safe at the group home  Pt presented to ER in April, 2018, for same complaint, but was D/C back to the group home  Pt is on 1:1 staffing at [facility]  Uses walker to ambulate  Needy  Demanding at times

## 2018-07-10 NOTE — PLAN OF CARE
BEHAVIOR     Pt/Family maintain appropriate behavior and adhere to behavioral management agreement, if implemented Progressing        DEPRESSION     Will be euthymic at discharge Progressing        Potential for Falls     Patient will remain free of falls Progressing        SELF HARM/SUICIDALITY     Will have no self-injury during hospital stay Progressing

## 2018-07-11 LAB
ALBUMIN SERPL BCP-MCNC: 3.1 G/DL (ref 3.5–5)
ALP SERPL-CCNC: 149 U/L (ref 46–116)
ALT SERPL W P-5'-P-CCNC: 24 U/L (ref 12–78)
ANION GAP SERPL CALCULATED.3IONS-SCNC: 8 MMOL/L (ref 4–13)
AST SERPL W P-5'-P-CCNC: 16 U/L (ref 5–45)
BASOPHILS # BLD AUTO: 0.03 THOUSANDS/ΜL (ref 0–0.1)
BASOPHILS NFR BLD AUTO: 0 % (ref 0–1)
BILIRUB SERPL-MCNC: 0.45 MG/DL (ref 0.2–1)
BUN SERPL-MCNC: 9 MG/DL (ref 5–25)
CALCIUM SERPL-MCNC: 8 MG/DL (ref 8.3–10.1)
CHLORIDE SERPL-SCNC: 106 MMOL/L (ref 100–108)
CHOLEST SERPL-MCNC: 152 MG/DL (ref 50–200)
CO2 SERPL-SCNC: 25 MMOL/L (ref 21–32)
CREAT SERPL-MCNC: 0.72 MG/DL (ref 0.6–1.3)
EOSINOPHIL # BLD AUTO: 0.11 THOUSAND/ΜL (ref 0–0.61)
EOSINOPHIL NFR BLD AUTO: 2 % (ref 0–6)
ERYTHROCYTE [DISTWIDTH] IN BLOOD BY AUTOMATED COUNT: 12.9 % (ref 11.6–15.1)
GFR SERPL CREATININE-BSD FRML MDRD: 106 ML/MIN/1.73SQ M
GLUCOSE P FAST SERPL-MCNC: 76 MG/DL (ref 65–99)
GLUCOSE SERPL-MCNC: 76 MG/DL (ref 65–140)
HCT VFR BLD AUTO: 43 % (ref 34.8–46.1)
HDLC SERPL-MCNC: 68 MG/DL (ref 40–60)
HGB BLD-MCNC: 14.2 G/DL (ref 11.5–15.4)
IMM GRANULOCYTES # BLD AUTO: 0.02 THOUSAND/UL (ref 0–0.2)
IMM GRANULOCYTES NFR BLD AUTO: 0 % (ref 0–2)
LDLC SERPL CALC-MCNC: 64 MG/DL (ref 0–100)
LYMPHOCYTES # BLD AUTO: 2.45 THOUSANDS/ΜL (ref 0.6–4.47)
LYMPHOCYTES NFR BLD AUTO: 33 % (ref 14–44)
MAGNESIUM SERPL-MCNC: 2.2 MG/DL (ref 1.6–2.6)
MCH RBC QN AUTO: 31.1 PG (ref 26.8–34.3)
MCHC RBC AUTO-ENTMCNC: 33 G/DL (ref 31.4–37.4)
MCV RBC AUTO: 94 FL (ref 82–98)
MONOCYTES # BLD AUTO: 0.66 THOUSAND/ΜL (ref 0.17–1.22)
MONOCYTES NFR BLD AUTO: 9 % (ref 4–12)
NEUTROPHILS # BLD AUTO: 4.12 THOUSANDS/ΜL (ref 1.85–7.62)
NEUTS SEG NFR BLD AUTO: 56 % (ref 43–75)
NONHDLC SERPL-MCNC: 84 MG/DL
NRBC BLD AUTO-RTO: 0 /100 WBCS
PLATELET # BLD AUTO: 285 THOUSANDS/UL (ref 149–390)
PMV BLD AUTO: 9.5 FL (ref 8.9–12.7)
POTASSIUM SERPL-SCNC: 3.6 MMOL/L (ref 3.5–5.3)
PROT SERPL-MCNC: 7 G/DL (ref 6.4–8.2)
RBC # BLD AUTO: 4.56 MILLION/UL (ref 3.81–5.12)
RPR SER QL: NORMAL
SODIUM SERPL-SCNC: 139 MMOL/L (ref 136–145)
TRIGL SERPL-MCNC: 100 MG/DL
TSH SERPL DL<=0.05 MIU/L-ACNC: 3.97 UIU/ML (ref 0.36–3.74)
WBC # BLD AUTO: 7.39 THOUSAND/UL (ref 4.31–10.16)

## 2018-07-11 PROCEDURE — 83735 ASSAY OF MAGNESIUM: CPT | Performed by: PSYCHIATRY & NEUROLOGY

## 2018-07-11 PROCEDURE — 84443 ASSAY THYROID STIM HORMONE: CPT | Performed by: PSYCHIATRY & NEUROLOGY

## 2018-07-11 PROCEDURE — 84481 FREE ASSAY (FT-3): CPT | Performed by: PSYCHIATRY & NEUROLOGY

## 2018-07-11 PROCEDURE — 80053 COMPREHEN METABOLIC PANEL: CPT | Performed by: PSYCHIATRY & NEUROLOGY

## 2018-07-11 PROCEDURE — 86592 SYPHILIS TEST NON-TREP QUAL: CPT | Performed by: PSYCHIATRY & NEUROLOGY

## 2018-07-11 PROCEDURE — 99232 SBSQ HOSP IP/OBS MODERATE 35: CPT | Performed by: NURSE PRACTITIONER

## 2018-07-11 PROCEDURE — 85025 COMPLETE CBC W/AUTO DIFF WBC: CPT | Performed by: PSYCHIATRY & NEUROLOGY

## 2018-07-11 PROCEDURE — 80061 LIPID PANEL: CPT | Performed by: PSYCHIATRY & NEUROLOGY

## 2018-07-11 PROCEDURE — 84439 ASSAY OF FREE THYROXINE: CPT | Performed by: PSYCHIATRY & NEUROLOGY

## 2018-07-11 RX ORDER — ARIPIPRAZOLE 10 MG/1
10 TABLET ORAL
Status: DISCONTINUED | OUTPATIENT
Start: 2018-07-11 | End: 2018-07-13

## 2018-07-11 RX ORDER — ARIPIPRAZOLE 15 MG/1
15 TABLET ORAL
Status: DISCONTINUED | OUTPATIENT
Start: 2018-07-11 | End: 2018-07-11

## 2018-07-11 RX ADMIN — ESCITALOPRAM OXALATE 10 MG: 10 TABLET ORAL at 08:37

## 2018-07-11 RX ADMIN — TRAZODONE HYDROCHLORIDE 50 MG: 50 TABLET ORAL at 21:52

## 2018-07-11 RX ADMIN — HYDROXYZINE HYDROCHLORIDE 25 MG: 25 TABLET, FILM COATED ORAL at 10:49

## 2018-07-11 RX ADMIN — CLOTRIMAZOLE 1 APPLICATION: 10 CREAM TOPICAL at 09:00

## 2018-07-11 RX ADMIN — SENNOSIDES AND DOCUSATE SODIUM 1 TABLET: 8.6; 5 TABLET ORAL at 08:37

## 2018-07-11 RX ADMIN — LORAZEPAM 1 MG: 1 TABLET ORAL at 18:47

## 2018-07-11 RX ADMIN — PANTOPRAZOLE SODIUM 40 MG: 40 TABLET, DELAYED RELEASE ORAL at 05:51

## 2018-07-11 RX ADMIN — ARIPIPRAZOLE 10 MG: 10 TABLET ORAL at 21:52

## 2018-07-11 RX ADMIN — LUBIPROSTONE 24 MCG: 24 CAPSULE, GELATIN COATED ORAL at 08:38

## 2018-07-11 RX ADMIN — VITAMIN D, TAB 1000IU (100/BT) 2000 UNITS: 25 TAB at 08:36

## 2018-07-11 RX ADMIN — Medication 1 TABLET: at 08:37

## 2018-07-11 RX ADMIN — DOCUSATE SODIUM 100 MG: 100 CAPSULE, LIQUID FILLED ORAL at 08:37

## 2018-07-11 RX ADMIN — HALOPERIDOL 5 MG: 5 TABLET ORAL at 08:40

## 2018-07-11 RX ADMIN — ALUMINUM HYDROXIDE, MAGNESIUM HYDROXIDE, AND SIMETHICONE 20 ML: 200; 200; 20 SUSPENSION ORAL at 15:37

## 2018-07-11 RX ADMIN — DOCUSATE SODIUM 100 MG: 100 CAPSULE, LIQUID FILLED ORAL at 17:56

## 2018-07-11 RX ADMIN — LUBIPROSTONE 24 MCG: 24 CAPSULE, GELATIN COATED ORAL at 16:38

## 2018-07-11 RX ADMIN — NORGESTIMATE AND ETHINYL ESTRADIOL 1 TABLET: KIT ORAL at 08:40

## 2018-07-11 RX ADMIN — IBUPROFEN 800 MG: 400 TABLET ORAL at 16:37

## 2018-07-11 NOTE — PROGRESS NOTES
Patient spoke with her father on the phone and the conversation upset the patient  Patient stated "My dad wanted to know why Im here and I told him I have problems "  Patient asked for medication to help calm her    Gave PRN ativan 1 mg PO

## 2018-07-11 NOTE — PROGRESS NOTES
Pt pleasant cooperative c/o hearing voices to kill herself  Prn Haldol given  C/o anxiety and PRN Atarax given  Pt requested to speak with a chaplan and wants to be changed to another hospital  Pt educated on Haldol and what it is  Prescribed for Pt states haldol not effective  Pt was educated on the process of her stay and all [facility] hospitals are the same  Pt verbalized understanding  Will monitor

## 2018-07-11 NOTE — PROGRESS NOTES
Progress Note - [email or web address] ISSAC Doss 43 y o  female MRN: [id number]   Unit/Bed#: NW7 758-01 Encounter: [id number]    Behavior over the last 24 hours: unchanged  Mando Kulkarni remains anxious, depressed and sad, has auditory hallucinations with commands to harm self and with commands to stab self though she states she will not do this (patient became tearful asking "why am I hearing this all of a sudden"), denies homicidal ideation, suicidal ideation and visual hallucinations today  Mando Kulkarni continues to have "double vision" and complains of a temporal headache which she states she has been having off and on for over one week  She also became tearful stating she is having significant pain in her R great toe, upon inspection the toenail is lifted and the toe is red and inflamed (will consult Podiatry)  Patient given Motrin for headache and toe pain  Has been taking medications  Attends some groups  Remains mostly in room  TSH abnormal will get free T3 and T4        Sleep: normal  Appetite: normal  Medication side effects: No   ROS: reports foot pain, headache and double vision, R great toenail lifted, denies any shortness of breath, chest pain, abdominal pain, back pain, diarrhea, dizziness or muscle twitching    Mental Status Evaluation:    Appearance:  casually dressed   Behavior:  cooperative, easily agitated, restless and fidgety   Speech:  slow, circumstantial   Mood:  depressed, anxious   Affect:  constricted   Thought Process:  concrete   Associations: concrete associations   Thought Content:  persecutory delusions   Perceptual Disturbances: auditory hallucinations with commands to stab self   Risk Potential: Suicidal ideation - None at present  Homicidal ideation - None  Potential for aggression - Not at present   Sensorium:  oriented to person and place   Memory:  recent memory intact, remote memory intact   Consciousness:  alert and awake   Attention: decreased concentration and decreased attention span   Insight:  limited   Judgment: limited   Gait/Station: uses walker   Motor Activity: no abnormal movements     Vital signs in last 24 hours:    Temp:  [97 2 °F (36 2 °C)-97 7 °F (36 5 °C)] 97 2 °F (36 2 °C)  HR:  [86-92] 86  Resp:  [16] 16  BP: (112-153)/(59-73) 112/59    Laboratory results:    I have personally reviewed all pertinent laboratory/tests results  Most Recent Labs:   Lab Results   Component Value Date    WBC 7 39 07/11/2018    RBC 4 56 07/11/2018    HGB 14 2 07/11/2018    HCT 43 0 07/11/2018     07/11/2018    RDW 12 9 07/11/2018    NEUTROABS 4 12 07/11/2018     07/11/2018    K 3 6 07/11/2018     07/11/2018    CO2 25 07/11/2018    BUN 9 07/11/2018    CREATININE 0 72 07/11/2018    GLUCOSE 76 07/11/2018    CALCIUM 8 0 (L) 07/11/2018    AST 16 07/11/2018    ALT 24 07/11/2018    ALKPHOS 149 (H) 07/11/2018    PROT 7 0 07/11/2018    BILITOT 0 45 07/11/2018    CHOL 152 07/11/2018    HDL 68 (H) 07/11/2018    TRIG 100 07/11/2018    LDLCALC 64 07/11/2018    PDO9UQNUCMEV 3 970 (H) 07/11/2018    PREGTESTUR negative 07/30/2017    RPR Non-Reactive 07/11/2018       Progress Toward Goals: no significant improvement , patient continues to have auditory hallucinations to stab self  She remains depressed and sad, will increase Lexapro by 5 mg and Abilify was just increased to 10 mg po q hs  Will monitor to see how patient does with these changes  Assessment/Plan   Principal Problem:    Severe episode of recurrent major depressive disorder, with psychotic features (HCC)  Active Problems:    Pituitary microadenoma (HCC)    Moderate intellectual disability    Leg swelling    Generalized anxiety disorder    Brain lesion    Cerebral palsy (HCC)    Hearing impairment    Recommended Treatment:     Planned medication and treatment changes: All current active medications have been reviewed    Encourage group therapy, milieu therapy and occupational therapy  Behavioral Health checks every 5 minutes  Neurosurgery will be evaluating patient on 7/12/18  Consult Podiatry for R great toe inflammation and lifted toenail   Free T3 and T4 as TSH is abnormal may need to consult Internal medicine pending results of this lab    Increase Lexapro to 15 mg po daily    Current Facility-Administered Medications:  acetaminophen 650 mg Oral Q6H PRN Sarina Carrillo MD   aluminum-magnesium hydroxide-simethicone 20 mL Oral Q4H PRN Sarina Carrillo MD   ARIPiprazole 10 mg Oral HS TA Ordonez   benztropine 1 mg Intramuscular Q6H PRN Sarina Carrillo MD   benztropine 1 mg Oral Q6H PRN Sarina Carrillo MD   cholecalciferol 2,000 Units Oral Daily Sarina Carrillo MD   clotrimazole 1 application Topical BID Sarina Carrillo MD   docusate sodium 100 mg Oral BID Christiane Greenfield DO   [START ON 7/12/2018] escitalopram 15 mg Oral Daily TA Ordonez   haloperidol 5 mg Oral Q8H PRN Sarina Carrillo MD   haloperidol lactate 5 mg Intramuscular Q6H PRN Sarina Carrillo MD   hydrOXYzine HCL 25 mg Oral Q6H PRN Sarina Carrillo MD   ibuprofen 600 mg Oral Q8H PRELVA Carrillo MD   ibuprofen 800 mg Oral Q8H PRN Sarina Carrillo MD   LORazepam 1 mg Intramuscular Q6H PRELVA Carrillo MD   LORazepam 1 mg Oral Q8H PRN Sarina Carrillo MD   lubiprostone 24 mcg Oral BID With Meals Sarina Carrillo MD   magnesium hydroxide 30 mL Oral Daily PRN Sarina Carrillo MD   multivitamin-minerals 1 tablet Oral Daily Sarina Carrillo MD   norgestimate-ethinyl estradiol 1 tablet Oral After Breakfast Avelino Smith MD   OLANZapine 10 mg Intramuscular Q3H PRN Sarina Carrillo MD   OLANZapine 10 mg Oral Q3H PRN Sarina Carrillo MD   pantoprazole 40 mg Oral Early Morning Sarina Carrillo MD   risperiDONE 1 mg Oral Q3H PRN Sarina Carrillo MD   senna-docusate sodium 1 tablet Oral Daily Sarina Carrillo MD   sucralfate 1,000 mg Oral 4x Daily PRN Sarina Carrillo MD   traZODone 50 mg Oral HS Chandrika Dixie WILLIAM Dinero DO   traZODone 50 mg Oral HS PRN Agnieszka Valiente MD       Risks / Benefits of Treatment:    Risks, benefits, and possible side effects of medications explained to patient and patient verbalizes understanding and agreement for treatment  Counseling / Coordination of Care:    Patient's progress discussed with staff in treatment team meeting  Medications, treatment progress and treatment plan reviewed with patient  Supportive counseling provided to the patient

## 2018-07-11 NOTE — PROGRESS NOTES
PRN Haldol given at 6772 c/o hearing voices to kill herself was not effective per Pt Prn Atarax given for anxiety at 1055 per Pt request Will monitor

## 2018-07-11 NOTE — PROGRESS NOTES
07/11/18 1500   Plan of Care   Comments Introduced self and began to establish a caring relationship through which to help pt  Provided a safe space for pt to express her emotions and feelings  Assessment Completed by: Other (Comment)  (dept   referral)

## 2018-07-11 NOTE — TREATMENT PLAN
TREATMENT PLAN REVIEW - [facility] Miguel A ISSAC Doss 43 y o  1980 female MRN: [id number]    [address] [place] [address] Room / Bed: [place] [id number] Encounter: [id number]          Admit Date/Time:  7/6/2018  2:16 PM    Treatment Team: Attending Provider: Aimee Aguilar MD; Consulting Physician: Everardo Calvillo DO; Patient Care Technician: Julián Phelps;  : Stephen Justin; Patient Care Assistant: Lolis Rose; Patient Care Technician: Silvia Arroyo; Patient Care Technician: Jimmie Meza; Registered Nurse: Kalie Sierra RN; Patient Care Technician: Phu Brooks; Registered Nurse: Cedric Dawn RN; Patient Care Technician: Argelia Barkley    Diagnosis: Principal Problem:    Severe episode of recurrent major depressive disorder, with psychotic features [facility])  Active Problems:    Leg swelling    Generalized anxiety disorder    Brain lesion    Cerebral palsy (HCC)    Hearing impairment    Moderate intellectual disability    Pituitary microadenoma [facility])    Patient Strengths: negotiates basic needs, patient is on a voluntary commitment     Patient Barriers: difficulty adapting, impaired cognition    Short Term Goals: decrease in psychotic symptoms, decrease in suicidal thoughts    Long Term Goals: stabilization of mood, free of suicidal thoughts, improved reality testing, improved reasoning ability    Progress Towards Goals: starting psychitric medications as prescribed    Recommended Treatment: medication management, patient medication education, group therapy, milieu therapy, continued Behavioral Health psychiatric evaluation/assessment process     Treatment Frequency: daily medication monitoring, group and milieu therapy daily, monitoring through interdisciplinary rounds, monitoring through weekly patient care conferences    Expected Discharge Date:  6-8 days    Discharge Plan: referral for outpatient medication management with a psychiatrist, referral for outpatient psychotherapy, return to previous living arrangement    Treatment Plan Created/Updated By: Qi Sandhu MD
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
## 2018-07-12 LAB
T3FREE SERPL-MCNC: 3 PG/ML (ref 2.3–4.2)
T4 FREE SERPL-MCNC: 1.13 NG/DL (ref 0.76–1.46)

## 2018-07-12 PROCEDURE — 99222 1ST HOSP IP/OBS MODERATE 55: CPT | Performed by: PHYSICIAN ASSISTANT

## 2018-07-12 PROCEDURE — 0HDRXZZ EXTRACTION OF TOE NAIL, EXTERNAL APPROACH: ICD-10-PCS | Performed by: PODIATRIST

## 2018-07-12 PROCEDURE — 99232 SBSQ HOSP IP/OBS MODERATE 35: CPT | Performed by: NURSE PRACTITIONER

## 2018-07-12 RX ADMIN — DOCUSATE SODIUM 100 MG: 100 CAPSULE, LIQUID FILLED ORAL at 08:29

## 2018-07-12 RX ADMIN — Medication 1 TABLET: at 08:27

## 2018-07-12 RX ADMIN — VITAMIN D, TAB 1000IU (100/BT) 2000 UNITS: 25 TAB at 08:26

## 2018-07-12 RX ADMIN — PANTOPRAZOLE SODIUM 40 MG: 40 TABLET, DELAYED RELEASE ORAL at 06:07

## 2018-07-12 RX ADMIN — SENNOSIDES AND DOCUSATE SODIUM 1 TABLET: 8.6; 5 TABLET ORAL at 08:27

## 2018-07-12 RX ADMIN — TRAZODONE HYDROCHLORIDE 50 MG: 50 TABLET ORAL at 21:21

## 2018-07-12 RX ADMIN — ESCITALOPRAM OXALATE 15 MG: 10 TABLET ORAL at 08:28

## 2018-07-12 RX ADMIN — ARIPIPRAZOLE 10 MG: 10 TABLET ORAL at 21:22

## 2018-07-12 RX ADMIN — LUBIPROSTONE 24 MCG: 24 CAPSULE, GELATIN COATED ORAL at 16:39

## 2018-07-12 RX ADMIN — DOCUSATE SODIUM 100 MG: 100 CAPSULE, LIQUID FILLED ORAL at 17:23

## 2018-07-12 RX ADMIN — CLOTRIMAZOLE 1 APPLICATION: 10 CREAM TOPICAL at 17:24

## 2018-07-12 RX ADMIN — HALOPERIDOL 5 MG: 5 TABLET ORAL at 17:59

## 2018-07-12 RX ADMIN — LUBIPROSTONE 24 MCG: 24 CAPSULE, GELATIN COATED ORAL at 08:28

## 2018-07-12 RX ADMIN — OLANZAPINE 10 MG: 10 TABLET, FILM COATED ORAL at 15:19

## 2018-07-12 RX ADMIN — NORGESTIMATE AND ETHINYL ESTRADIOL 1 TABLET: KIT ORAL at 08:29

## 2018-07-12 NOTE — PROGRESS NOTES
Patient has been intrusive and demanding of staff this morning and limits are being set  When told she had to wait until after group to get something out of her locker, patient began yelling and hitting herself in the head with her hand but soon stopped  She has been more redirectable since  Apologizes frequently  Patient reports continued [facility] but contracted for safety

## 2018-07-12 NOTE — PROGRESS NOTES
Consult - Peggy Doss 43 y o  female MRN: [id number]  Unit/Bed#: ZO1 758-01 Encounter: [id number]    Assessment/Plan     Assessment:  1  Avulsed right hallux toenail   3  Onychomycosis   4  Tinea pedis b/l feet   5  B/l lower extremity edema  6  Cerebral Palsy     Plan:  - Right hallux nail plate was removed with nail nippers without any complication   - Patient was offered to debride remaining toenails but patient refused  - Continue use of clotrimazole cream to b/l feet  - Patients podiatric needs have been met and podiatry is to sign off at this time  Please call with questions  History of Present Illness     HPI:  Zoë Bauer is a 43 y o  female who presents with painful right big toe  Limited history of present illness was obtained due patient having mental disability  Patient has history of hearing loss  She states tip of her right big toe hurts  Patient uses walker  She denies any other complaints of N/V/F/SOB and chest pain today  Inpatient consult to Podiatry     Date/Time 7/12/2018 11:33 AM     Performed by  Nikki Guerrero by Michael Mchugh       Consent: Verbal consent obtained  Review of Systems   Constitutional: Negative  HENT: Negative  Eyes: Negative  Respiratory: Negative  Cardiovascular: Negative  Gastrointestinal: Negative  Musculoskeletal: Gait abnormality    Skin: Negative  Neurological: Negative  Psych: negative         Historical Information   Past Medical History:   Diagnosis Date    Anxiety     Brain lesion     Bronchitis     Cerebral palsy (HCC)     Cerebral palsy (HCC)     Last Assessed:7/6/2016    Chronically dry eyes, left     Last Assessed:7/6/2016    Depression     Last Assessed:7/6/2016    Depressive disorder     Fall     GERD (gastroesophageal reflux disease)     [facility] )      Past Surgical History:   Procedure Laterality Date    CSF SHUNT      Creation of Ventriculo-Peritoneal CSF shunt ;  Last Assessed:7/6/2016    EAR SURGERY      Last Assessed:7/6/2016    LEG SURGERY      due to CP     NOSE SURGERY      Last Assessed:7/6/2016     Social History   History   Alcohol Use No     History   Drug Use No     History   Smoking Status    Never Smoker   Smokeless Tobacco    Never Used     Family History:   Family History   Problem Relation Age of Onset    Diabetes Mother     No Known Problems Father        Meds/Allergies   Prescriptions Prior to Admission   Medication    ARIPiprazole (ABILIFY) 5 mg tablet    Cholecalciferol (VITAMIN D-3 PO)    escitalopram (LEXAPRO) 10 mg tablet    lubiprostone (AMITIZA) 24 mcg capsule    Mouthwashes (LISTERINE ANTISEPTIC) LIQD    Multiple Vitamins-Minerals (CERTAVITE/ANTIOXIDANTS) TABS    norgestimate-ethinyl estradiol (ORTHO TRI-CYCLEN LO) 0 18/0 215/0 25 MG-25 MCG per tablet    pantoprazole (PROTONIX) 40 mg tablet    senna-docusate sodium (SENEXON-S) 8 6-50 mg per tablet    traZODone (DESYREL) 50 mg tablet    bacitracin topical ointment 500 units/g topical ointment    calcium carbonate (TUMS) 500 mg chewable tablet    clotrimazole (LOTRIMIN) 1 % cream    famotidine (PEPCID) 20 mg tablet    GNP MILK OF MAGNESIA 1200 MG/15ML oral suspension    ibuprofen (MOTRIN) 400 mg tablet    metoclopramide (REGLAN) 10 mg tablet    polyethylene glycol (MIRALAX) 17 g packet    RA SUNSCREEN SPF50 LOTN    sucralfate (CARAFATE) 1 g/10 mL suspension    zinc oxide (DESITIN) 13 % cream     No Known Allergies    Objective   First Vitals:   Blood Pressure: 132/73 (07/06/18 1721)  Pulse: 88 (07/06/18 1429)  Temperature: 97 7 °F (36 5 °C) (07/06/18 1429)  Temp Source: Oral (07/06/18 1429)  Respirations: 22 (07/06/18 1429)  Height: 4' 10" (147 3 cm) (07/06/18 1429)  Weight - Scale: 79 7 kg (175 lb 11 3 oz) (07/06/18 1429)  SpO2: 96 % (07/06/18 1721)    Current Vitals:   Blood Pressure: 104/51 (07/12/18 0740)  Pulse: 61 (07/12/18 0740)  Temperature: 97 6 °F (36 4 °C) (07/12/18 0740)  Temp Source: Oral (07/12/18 0740)  Respirations: 16 (07/12/18 0740)  Height: 4' 10" (147 3 cm) (07/09/18 1425)  Weight - Scale: 79 7 kg (175 lb 11 3 oz) (07/09/18 1425)  SpO2: 100 % (07/09/18 1425)        /51 (BP Location: Right arm)   Pulse 61   Temp 97 6 °F (36 4 °C) (Oral)   Resp 16   Ht 4' 10" (1 473 m)   Wt 79 7 kg (175 lb 11 3 oz)   SpO2 100%   BMI 36 72 kg/m²      General Appearance:    Alert, cooperative, no distress   Head:    Normocephalic, without obvious abnormality, atraumatic   Eyes:    PERRL, conjunctiva/corneas clear, EOM's intact        Nose:   Moist mucous membranes   Neck:   Supple, symmetrical, trachea midline   Back:     Symmetric   Lungs:     Respirations unlabored   Heart:    Regular rate and rhythm, S1 and S2 normal, no murmur, rub   or gallop   Abdomen:     Soft, non-tender   Extremities:   B/l lower extremity edema    Pulses:   B/l DP 2/4, b/l PT nonpalpable, Cap refill <3 seconds     Skin:  Right hallux was erythematous distally due to irritation from the nail  Right hallux nail plate was already deattched from the nail bed laterally and at the lunula was attached medially  No open wounds were noted on the right foot  B/l calluses on submetatarsal head one noted  Tinea pedis present b/l feet plantarly  Neurologic:   Protective sensation is intact           Lab Results:   Admission on 07/06/2018   Component Date Value    EXTBreath Alcohol 07/06/2018      Amph/Meth UR 07/06/2018 Negative     Barbiturate Ur 07/06/2018 Negative     Benzodiazepine Urine 07/06/2018 Negative     Cocaine Urine 07/06/2018 Negative     Methadone Urine 07/06/2018 Negative     Opiate Urine 07/06/2018 Negative     PCP Ur 07/06/2018 Negative     THC Urine 07/06/2018 Negative     EXT PREG TEST UR (Ref: N* 07/06/2018 negative (-)     WBC 07/11/2018 7 39     RBC 07/11/2018 4 56     Hemoglobin 07/11/2018 14 2     Hematocrit 07/11/2018 43 0     MCV 07/11/2018 94     MCH 07/11/2018 31 1     MCHC 07/11/2018 33 0     RDW 07/11/2018 12 9     MPV 07/11/2018 9 5     Platelets 41/08/2063 285     nRBC 07/11/2018 0     Neutrophils Relative 07/11/2018 56     Immat GRANS % 07/11/2018 0     Lymphocytes Relative 07/11/2018 33     Monocytes Relative 07/11/2018 9     Eosinophils Relative 07/11/2018 2     Basophils Relative 07/11/2018 0     Neutrophils Absolute 07/11/2018 4 12     Immature Grans Absolute 07/11/2018 0 02     Lymphocytes Absolute 07/11/2018 2 45     Monocytes Absolute 07/11/2018 0 66     Eosinophils Absolute 07/11/2018 0 11     Basophils Absolute 07/11/2018 0 03     Sodium 07/11/2018 139     Potassium 07/11/2018 3 6     Chloride 07/11/2018 106     CO2 07/11/2018 25     Anion Gap 07/11/2018 8     BUN 07/11/2018 9     Creatinine 07/11/2018 0 72     Glucose 07/11/2018 76     Glucose, Fasting 07/11/2018 76     Calcium 07/11/2018 8 0*    AST 07/11/2018 16     ALT 07/11/2018 24     Alkaline Phosphatase 07/11/2018 149*    Total Protein 07/11/2018 7 0     Albumin 07/11/2018 3 1*    Total Bilirubin 07/11/2018 0 45     eGFR 07/11/2018 106     Cholesterol 07/11/2018 152     Triglycerides 07/11/2018 100     HDL, Direct 07/11/2018 68*    LDL Calculated 07/11/2018 64     Non-HDL-Chol (CHOL-HDL) 07/11/2018 84     Magnesium 07/11/2018 2 2     RPR 07/11/2018 Non-Reactive     TSH 3RD GENERATON 07/11/2018 3 970*    T3, Free 07/11/2018 3 00     Free T4 07/11/2018 1 13                    Invalid input(s): LABAEARO            Imaging: I have personally reviewed pertinent films in PACS  EKG, Pathology, and Other Studies: I have personally reviewed pertinent reports        Code Status: Level 1 - Full Code

## 2018-07-12 NOTE — PROGRESS NOTES
Patient states "Im hearing voices "  Patient requested medication and a ginger ale  Patient was given Zyprexa and diet ginger ale

## 2018-07-12 NOTE — PROGRESS NOTES
Progress Note - [address] [employer] [facility] Doss 43 y o  female MRN: [id number]   Unit/Bed#: NW7 758-01 Encounter: [id number]    Behavior over the last 24 hours: unchanged  Brian Orantes less agitated, anxious and depressed, appears calm and cooperative, does not have auditory hallucinations with commands to harm self though does not seem to be responding to internal stimuli today  Brian Orantes denies visual hallucinations, SI/HI  Patient denies headaches today, denies ringing in ears, dizziness  Patient states the voices are very upsetting as she has never had them before  Has been taking medications  Attends some groups  Sleep: normal  Appetite: normal  Medication side effects: No   ROS: denies any headache, chest pain, abdominal pain, diarrhea, dizziness, muscle twitching, tremor or vomiting  R great toe pain, redness and lifted toenail      Mental Status Evaluation:    Appearance:  casually dressed   Behavior:  cooperative, calm   Speech:  normal volume, normal pitch, slow   Mood:  less anxious, less depressed   Affect:  blunted   Thought Process:  slightly logical   Associations: concrete associations   Thought Content:  negative thinking, preoccupied with discharge and hallucinations   Perceptual Disturbances: auditory hallucinations with commands to harm self, denies visual hallucinations when asked   Risk Potential: Suicidal ideation - None  Homicidal ideation - None  Potential for aggression - No   Sensorium:  oriented to person and place   Memory:  recent memory intact, remote memory intact   Consciousness:  alert and awake   Attention: attention span and concentration appear shorter than expected for age   Insight:  limited   Judgment: limited   Gait/Station: uses walker   Motor Activity: no abnormal movements     Vital signs in last 24 hours:    Temp:  [97 2 °F (36 2 °C)-97 6 °F (36 4 °C)] 97 6 °F (36 4 °C)  HR:  [61-86] 61  Resp:  [16] 16  BP: (104-112)/(51-59) 104/51    Laboratory results:    I have personally reviewed all pertinent laboratory/tests results  Most Recent Labs:   Lab Results   Component Value Date    WBC 7 39 07/11/2018    RBC 4 56 07/11/2018    HGB 14 2 07/11/2018    HCT 43 0 07/11/2018     07/11/2018    RDW 12 9 07/11/2018    NEUTROABS 4 12 07/11/2018     07/11/2018    K 3 6 07/11/2018     07/11/2018    CO2 25 07/11/2018    BUN 9 07/11/2018    CREATININE 0 72 07/11/2018    GLUCOSE 76 07/11/2018    CALCIUM 8 0 (L) 07/11/2018    AST 16 07/11/2018    ALT 24 07/11/2018    ALKPHOS 149 (H) 07/11/2018    PROT 7 0 07/11/2018    BILITOT 0 45 07/11/2018    CHOL 152 07/11/2018    HDL 68 (H) 07/11/2018    TRIG 100 07/11/2018    LDLCALC 64 07/11/2018    IEG3EIYTFJGV 3 970 (H) 07/11/2018    FREET4 1 13 07/11/2018    T3FREE 3 00 07/11/2018    PREGTESTUR negative 07/30/2017    RPR Non-Reactive 07/11/2018       Progress Toward Goals: limited improvement  Patient continues to have auditory hallucinations to harm self, does not have headaches today, ringing in ears or dizziness  Attending some groups  Patient continues to have pain in R great toe  Assessment/Plan   Principal Problem:    Severe episode of recurrent major depressive disorder, with psychotic features (HCC)  Active Problems:    Pituitary microadenoma (HCC)    Moderate intellectual disability    Leg swelling    Generalized anxiety disorder    Brain lesion    Cerebral palsy (HCC)    Hearing impairment    Recommended Treatment:     Planned medication and treatment changes: All current active medications have been reviewed    Encourage group therapy, milieu therapy and occupational therapy  Neurosurgery consult pending  Pediatry consult pending  Behavioral Health checks every 5 minutes  Continue current medications:    Current Facility-Administered Medications:  acetaminophen 650 mg Oral Q6H PRN Madhavi Bruce MD   aluminum-magnesium hydroxide-simethicone 20 mL Oral Q4H PRN Mdahavi Bruce MD   ARIPiprazole 10 mg Oral HS TA Kwan   benztropine 1 mg Intramuscular Q6H PRN Shannon Nelson MD   benztropine 1 mg Oral Q6H PRN Shannon Nelson MD   cholecalciferol 2,000 Units Oral Daily Shannon Nelson MD   clotrimazole 1 application Topical BID Shannon Nelson MD   docusate sodium 100 mg Oral BID Christiane Greenfield DO   escitalopram 15 mg Oral Daily TA Kwan   haloperidol 5 mg Oral Q8H PRN Shannon Nelson MD   haloperidol lactate 5 mg Intramuscular Q6H PRN Shannon Nelson MD   hydrOXYzine HCL 25 mg Oral Q6H PRN Shannon Nelson MD   ibuprofen 600 mg Oral Q8H PRN Shannon Nelson MD   ibuprofen 800 mg Oral Q8H PRN Shannon Nelson MD   LORazepam 1 mg Intramuscular Q6H PRN Shannon Nelson MD   LORazepam 1 mg Oral Q8H PRN Shannon Nelson MD   lubiprostone 24 mcg Oral BID With Meals Shannon Nelson MD   magnesium hydroxide 30 mL Oral Daily PRN Shannon Nelson MD   multivitamin-minerals 1 tablet Oral Daily Shannon Nelson MD   norgestimate-ethinyl estradiol 1 tablet Oral After Breakfast Lilli Rodrigues MD   OLANZapine 10 mg Intramuscular Q3H PRN Shannon Nelson MD   OLANZapine 10 mg Oral Q3H PRN Shannon Nelson MD   pantoprazole 40 mg Oral Early Morning Shannon Nelson MD   risperiDONE 1 mg Oral Q3H PRN Shannon Nelson MD   senna-docusate sodium 1 tablet Oral Daily Shannon Nelson MD   sucralfate 1,000 mg Oral 4x Daily PRN Shannon Nelson MD   traZODone 50 mg Oral HS Hiram Dinero DO   traZODone 50 mg Oral HS PRN Shannon Nelson MD       Risks / Benefits of Treatment:    Risks, benefits, and possible side effects of medications explained to patient  Patient has limited understanding of risks and benefits of treatment at this time, but agrees to take medications as prescribed  Risks of medications in pregnancy explained if female patient  Patient verbalizes understanding and agrees to notify her doctor if she becomes pregnant      Counseling / Coordination of Care:    Patient's progress discussed with staff in treatment team meeting  Medications, treatment progress and treatment plan reviewed with patient  Supportive counseling provided to the patient

## 2018-07-12 NOTE — CONSULTS
Consultation - Neurosurgery   Qamar Alcocer 43 y o  female MRN: [id number]  Unit/Bed#: BH9 758-01 Encounter: [id number]  Consult completed on 7/12/18       Consults    Assessment/Plan     Assessment:  1  Auditory hallucinations  2  Severe major depressive disorder with psychotic features  3  Pituitary macroadenoma  4  Moderate intellectual disability  5  Cerebral palsy    Plan:  · Exam: GCS 13-24 (-1 eyes, -1 confusion) baseline right esotropia  Decreased ROM of left eye looking laterally  Visual fields intact bilaterally  No PD, landry or clonus  Increased tone in BLE and RUE  Right hand contracted  No landry, +few beat clonus in right ankle  · Imaging: personally reviewed  · 3/28/18 - MRI brain pituitary w/wo: 1) stable MR appearance of the brain, no acute disease  7mm hypoenhancing suprasella mass  Markedly dysmorphic left hemisphere with significant volume loss  · Auditory hallucinations would be unlikely to be caused from pituitary macroadenoma  Consider r/o temporal lobe seizures  · Outpatient ophthalmology appointment with Dr Jessica Gudino   · Inpatient behavior health for medical management  · No neurosurgical intervention, signing off  Patient will follow up outpatient as scheduled  History of Present Illness   HPI: Qamar Alcocer is a 43y o  year old female with PMH including cerebral palsy s/p  shunt, depression, pituitary macroadenoma who presents with complaint of auditory hallucinations started on Friday  She states the voices are telling her to kill and cut herself  She also admits to possible intermittent visual hallucinations  Neurosurgery was consulted due to known pituitary macroadenoma  She follows with TAL Parker in outpatient neurosurgery setting  She had a recently MRI in 3/2018 which was observed to be stable compared to prior  She has scheduled follow up in 4/2019  Review of Systems   Constitutional: Negative for appetite change     HENT: Negative for trouble swallowing  Eyes: Negative for photophobia and visual disturbance  Respiratory: Negative for shortness of breath  Cardiovascular: Negative for chest pain  Gastrointestinal: Negative for nausea and vomiting  Neurological: Negative for dizziness and headaches  Psychiatric/Behavioral: Positive for hallucinations  Historical Information   Past Medical History:   Diagnosis Date    Anxiety     Brain lesion     Bronchitis     Cerebral palsy (HCC)     Cerebral palsy (HCC)     Last Assessed:7/6/2016    Chronically dry eyes, left     Last Assessed:7/6/2016    Depression     Last Assessed:7/6/2016    Depressive disorder     Fall     GERD (gastroesophageal reflux disease)     Mood disorder ([facility] [address] )      Past Surgical History:   Procedure Laterality Date    CSF SHUNT      Creation of Ventriculo-Peritoneal CSF shunt ;  Last Assessed:7/6/2016    EAR SURGERY      Last Assessed:7/6/2016    LEG SURGERY      due to CP     NOSE SURGERY      Last Assessed:7/6/2016     History   Alcohol Use No     History   Drug Use No     History   Smoking Status    Never Smoker   Smokeless Tobacco    Never Used     Family History   Problem Relation Age of Onset    Diabetes Mother     No Known Problems Father        Meds/Allergies   all current active meds have been reviewed and current meds:   Current Facility-Administered Medications   Medication Dose Route Frequency    acetaminophen (TYLENOL) tablet 650 mg  650 mg Oral Q6H PRN    aluminum-magnesium hydroxide-simethicone (MYLANTA) 200-200-20 mg/5 mL oral suspension 20 mL  20 mL Oral Q4H PRN    ARIPiprazole (ABILIFY) tablet 10 mg  10 mg Oral HS    benztropine (COGENTIN) injection 1 mg  1 mg Intramuscular Q6H PRN    benztropine (COGENTIN) tablet 1 mg  1 mg Oral Q6H PRN    cholecalciferol (VITAMIN D3) tablet 2,000 Units  2,000 Units Oral Daily    clotrimazole (LOTRIMIN) 1 % cream 1 application  1 application Topical BID    docusate sodium (COLACE) capsule 100 mg 100 mg Oral BID    escitalopram (LEXAPRO) tablet 15 mg  15 mg Oral Daily    haloperidol (HALDOL) tablet 5 mg  5 mg Oral Q8H PRN    haloperidol lactate (HALDOL) injection 5 mg  5 mg Intramuscular Q6H PRN    hydrOXYzine HCL (ATARAX) tablet 25 mg  25 mg Oral Q6H PRN    ibuprofen (MOTRIN) tablet 600 mg  600 mg Oral Q8H PRN    ibuprofen (MOTRIN) tablet 800 mg  800 mg Oral Q8H PRN    LORazepam (ATIVAN) 2 mg/mL injection 1 mg  1 mg Intramuscular Q6H PRN    LORazepam (ATIVAN) tablet 1 mg  1 mg Oral Q8H PRN    lubiprostone (AMITIZA) capsule 24 mcg  24 mcg Oral BID With Meals    magnesium hydroxide (MILK OF MAGNESIA) 400 mg/5 mL oral suspension 30 mL  30 mL Oral Daily PRN    multivitamin-minerals (CENTRUM) tablet 1 tablet  1 tablet Oral Daily    norgestimate-ethinyl estradiol (ORTHO-CYCLEN) 0 25-35 MG-MCG per tablet 1 tablet  1 tablet Oral After Breakfast    OLANZapine (ZyPREXA) IM injection 10 mg  10 mg Intramuscular Q3H PRN    OLANZapine (ZyPREXA) tablet 10 mg  10 mg Oral Q3H PRN    pantoprazole (PROTONIX) EC tablet 40 mg  40 mg Oral Early Morning    risperiDONE (RisperDAL M-TABS) dispersible tablet 1 mg  1 mg Oral Q3H PRN    senna-docusate sodium (SENOKOT S) 8 6-50 mg per tablet 1 tablet  1 tablet Oral Daily    sucralfate (CARAFATE) oral suspension 1,000 mg  1,000 mg Oral 4x Daily PRN    traZODone (DESYREL) tablet 50 mg  50 mg Oral HS    traZODone (DESYREL) tablet 50 mg  50 mg Oral HS PRN     No Known Allergies    Objective   I/O       07/10 0701 - 07/11 0700 07/11 0701 - 07/12 0700 07/12 0701 - 07/13 0700           Unmeasured Stool Occurrence 0 x 1 x           Physical Exam   Constitutional: She appears well-developed and well-nourished  She is sleeping  HENT:   Head: Normocephalic and atraumatic  Eyes: Pupils are equal, round, and reactive to light  Pulmonary/Chest: Effort normal  No respiratory distress  Abdominal: Soft  There is no tenderness     Neurological: She has a normal Finger-Nose-Finger Test    Reflex Scores:       Bicep reflexes are 1+ on the right side and 1+ on the left side  Brachioradialis reflexes are 1+ on the right side and 1+ on the left side  Patellar reflexes are 1+ on the right side and 1+ on the left side  Achilles reflexes are 1+ on the right side and 1+ on the left side  Skin: Skin is warm  Psychiatric: Her speech is normal      Neurologic Exam     Mental Status   Oriented to person  Disoriented to place  Disoriented to year, month and date  Registration: recalls 3 of 3 objects  Follows 1 step commands  Attention: decreased  Concentration: decreased  Speech: speech is normal   Level of consciousness: arousable by verbal stimuli  Knowledge: poor  Abnormal comprehension  Cranial Nerves     CN II   Visual fields full to confrontation  CN III, IV, VI   Pupils are equal, round, and reactive to light  Right pupil: Size: 3 mm  Shape: regular  Reactivity: brisk  Left pupil: Size: 3 mm  Shape: regular  Reactivity: brisk  CN III: no CN III palsy  CN VI: no CN VI palsy  Nystagmus: none   Diplopia: none  Ophthalmoparesis: possible left abduction  Upgaze: normal  Downgaze: normal  Conjugate gaze: absent (baseline right esotropia)    CN V   Facial sensation intact  CN VII   Facial expression full, symmetric       CN VIII   Hearing: impaired    CN XII   Tongue deviation: none    Motor Exam   Muscle bulk: normal  Overall muscle tone: increased  Right arm pronator drift: absent  Left arm pronator drift: absent  Baseline contractions of right hand  Right  3/5  Left  4+-5-/5  KF/KE 4/5  Right DF: 2/5  Right PF: 4/5   Left DF/PF: 4/5     Sensory Exam   Light touch normal    Proprioception normal      Gait, Coordination, and Reflexes     Coordination   Finger to nose coordination: normal    Reflexes   Right brachioradialis: 1+  Left brachioradialis: 1+  Right biceps: 1+  Left biceps: 1+  Right patellar: 1+  Left patellar: 1+  Right achilles: 1+  Left achilles: 1+  Right Conway: absent  Left Conway: absent  Right ankle clonus: present (few beat clonus)  Left ankle clonus: absent      Vitals:Blood pressure 104/51, pulse 61, temperature 97 6 °F (36 4 °C), temperature source Oral, resp  rate 16, height 4' 10" (1 473 m), weight 79 7 kg (175 lb 11 3 oz), SpO2 100 %  ,Body mass index is 36 72 kg/m²  Lab Results:     Results from last 7 days  Lab Units 07/11/18  0626   WBC Thousand/uL 7 39   HEMOGLOBIN g/dL 14 2   HEMATOCRIT % 43 0   PLATELETS Thousands/uL 285   NEUTROS PCT % 56   MONOS PCT % 9       Results from last 7 days  Lab Units 07/11/18  0626   SODIUM mmol/L 139   POTASSIUM mmol/L 3 6   CHLORIDE mmol/L 106   CO2 mmol/L 25   BUN mg/dL 9   CREATININE mg/dL 0 72   CALCIUM mg/dL 8 0*   TOTAL PROTEIN g/dL 7 0   BILIRUBIN TOTAL mg/dL 0 45   ALK PHOS U/L 149*   ALT U/L 24   AST U/L 16   GLUCOSE RANDOM mg/dL 76       Results from last 7 days  Lab Units 07/11/18  0626   MAGNESIUM mg/dL 2 2             No results found for: TROPONINT  ABG:No results found for: PHART, NIY2TWI, PO2ART, ZNB0ZXU, U4NIYCJH, BEART, SOURCE    Imaging Studies: I have personally reviewed pertinent reports  and I have personally reviewed pertinent films in PACS    EKG, Pathology, and Other Studies: I have personally reviewed pertinent reports     and I have personally reviewed pertinent films in PACS    VTE Prophylaxis: Sequential compression device Nina Adams     Code Status: Level 1 - Full Code  Advance Directive and Living Will:      Power of :    POLST:

## 2018-07-12 NOTE — PROGRESS NOTES
Patient states she still is hearing voices and its worse  Patient states voices saying "I want to hurt myself "  Patient states she will come to staff if she feels the need to act out and "hurt" herself

## 2018-07-13 PROCEDURE — 99223 1ST HOSP IP/OBS HIGH 75: CPT | Performed by: PHYSICIAN ASSISTANT

## 2018-07-13 PROCEDURE — 99232 SBSQ HOSP IP/OBS MODERATE 35: CPT | Performed by: PSYCHIATRY & NEUROLOGY

## 2018-07-13 RX ORDER — ARIPIPRAZOLE 15 MG/1
15 TABLET ORAL
Status: DISCONTINUED | OUTPATIENT
Start: 2018-07-13 | End: 2018-07-14

## 2018-07-13 RX ADMIN — LUBIPROSTONE 24 MCG: 24 CAPSULE, GELATIN COATED ORAL at 08:28

## 2018-07-13 RX ADMIN — VITAMIN D, TAB 1000IU (100/BT) 2000 UNITS: 25 TAB at 08:27

## 2018-07-13 RX ADMIN — NORGESTIMATE AND ETHINYL ESTRADIOL 1 TABLET: KIT ORAL at 08:30

## 2018-07-13 RX ADMIN — DOCUSATE SODIUM 100 MG: 100 CAPSULE, LIQUID FILLED ORAL at 08:27

## 2018-07-13 RX ADMIN — ARIPIPRAZOLE 15 MG: 15 TABLET ORAL at 20:29

## 2018-07-13 RX ADMIN — TRAZODONE HYDROCHLORIDE 50 MG: 50 TABLET ORAL at 20:29

## 2018-07-13 RX ADMIN — IBUPROFEN 600 MG: 600 TABLET ORAL at 15:29

## 2018-07-13 RX ADMIN — CLOTRIMAZOLE 1 APPLICATION: 10 CREAM TOPICAL at 17:06

## 2018-07-13 RX ADMIN — ESCITALOPRAM OXALATE 15 MG: 10 TABLET ORAL at 08:28

## 2018-07-13 RX ADMIN — SENNOSIDES AND DOCUSATE SODIUM 1 TABLET: 8.6; 5 TABLET ORAL at 08:27

## 2018-07-13 RX ADMIN — PANTOPRAZOLE SODIUM 40 MG: 40 TABLET, DELAYED RELEASE ORAL at 05:56

## 2018-07-13 RX ADMIN — LUBIPROSTONE 24 MCG: 24 CAPSULE, GELATIN COATED ORAL at 17:05

## 2018-07-13 RX ADMIN — DOCUSATE SODIUM 100 MG: 100 CAPSULE, LIQUID FILLED ORAL at 17:05

## 2018-07-13 RX ADMIN — Medication 1 TABLET: at 08:27

## 2018-07-13 NOTE — PROGRESS NOTES
Pt c/o headache and back pain 8/10 and was given prn Motrin at 1531  Pt seen by Neurology to r/o temporal seizures  Will continue to monitor

## 2018-07-13 NOTE — PROGRESS NOTES
Pt calm and pleasant on approach; compliant with medications and denies SI/HI and reports hearing muffled voices  Pt heard shouting in the hallway that she has diarrhea - but when nurse approach her soon after- pt denies diarrhea  Pt is requesting a room change, but has no valid reason for this request   Will continue to monitor

## 2018-07-13 NOTE — CONSULTS
Consultation - Neurology   Sade Askew 43 y o  female MRN: [id number]  Unit/Bed#: NW7 758-01 Encounter: [id number]      Assessment/Plan   1  Auditory hallucination  -Patient reports hearing both male and female voices telling her to hurt herself and occasionally gospel music  Etiology likely secondary to primary psychiatric illness with possible component of attention seeking behavior  Unlikely temporal lobe/complex partial seizures as hallucinations are very well formed and are of both voices and music  Agree with Neurosurgery that pituitary macroadenoma is also not contributing   -No need for neuroimaging or EEG  -No further inpatient recommendations    History of Present Illness     Reason for Consult / Principal Problem: Auditory hallicinations    HPI: Sade Askew is a 43 y o  female with past medical history of cerebral palsy s/  shunt, pituitary macroadenoma, depression who presented on 7/6 with reports of new auditory hallucinations stating "the voices are telling me to kill myself"  She follows with Neurosurgery as an outpatient for pituitary macroadenoma with recent MRI in 3/2018, which was observed to be stable compared to prior  Neurosurgery was consulted to evaluate possibility of pituitary macroadenoma causing new auditory hallucinations, which they stated would be unlikely; no neurosurgical intervention needed  Neurology consulted for consideration of temporal lobe seizures  Patient reports hearing both unknown male and female voices telling her to harm herself; she states this scares her  She also reports occasionally hearing gospel music  She denies this every happening before  Denies history of seizures  Denies other hallucinations  Patient is examined on psych unit exam room  Patient is cooperative throughout exam, but becomes visibly upset at the end of the exam for no clear reason        Inpatient consult to Neurology  Consult performed by: Fortunato Fees  Consult ordered by: Yonas Pass          Review of Systems  12 point ROS performed, as stated above, all others negative  Historical Information   Past Medical History:   Diagnosis Date    Anxiety     Brain lesion     Bronchitis     Cerebral palsy (HCC)     Cerebral palsy (HCC)     Last Assessed:7/6/2016    Chronically dry eyes, left     Last Assessed:7/6/2016    Depression     Last Assessed:7/6/2016    Depressive disorder     Fall     GERD (gastroesophageal reflux disease)     Mood disorder ([address] )      Past Surgical History:   Procedure Laterality Date    CSF SHUNT      Creation of Ventriculo-Peritoneal CSF shunt ; Last Assessed:7/6/2016    EAR SURGERY      Last Assessed:7/6/2016    LEG SURGERY      due to CP     NOSE SURGERY      Last Assessed:7/6/2016     Social History   History   Alcohol Use No     History   Drug Use No     History   Smoking Status    Never Smoker   Smokeless Tobacco    Never Used     Family History:   Family History   Problem Relation Age of Onset    Diabetes Mother     No Known Problems Father        Review of previous medical records was completed      Meds/Allergies   all current active meds have been reviewed and current meds:   Current Facility-Administered Medications   Medication Dose Route Frequency    acetaminophen (TYLENOL) tablet 650 mg  650 mg Oral Q6H PRN    aluminum-magnesium hydroxide-simethicone (MYLANTA) 200-200-20 mg/5 mL oral suspension 20 mL  20 mL Oral Q4H PRN    ARIPiprazole (ABILIFY) tablet 15 mg  15 mg Oral HS    benztropine (COGENTIN) injection 1 mg  1 mg Intramuscular Q6H PRN    benztropine (COGENTIN) tablet 1 mg  1 mg Oral Q6H PRN    cholecalciferol (VITAMIN D3) tablet 2,000 Units  2,000 Units Oral Daily    clotrimazole (LOTRIMIN) 1 % cream 1 application  1 application Topical BID    docusate sodium (COLACE) capsule 100 mg  100 mg Oral BID    escitalopram (LEXAPRO) tablet 15 mg  15 mg Oral Daily    haloperidol (HALDOL) tablet 5 mg  5 mg Oral Q8H PRN  haloperidol lactate (HALDOL) injection 5 mg  5 mg Intramuscular Q6H PRN    hydrOXYzine HCL (ATARAX) tablet 25 mg  25 mg Oral Q6H PRN    ibuprofen (MOTRIN) tablet 600 mg  600 mg Oral Q8H PRN    ibuprofen (MOTRIN) tablet 800 mg  800 mg Oral Q8H PRN    LORazepam (ATIVAN) 2 mg/mL injection 1 mg  1 mg Intramuscular Q6H PRN    LORazepam (ATIVAN) tablet 1 mg  1 mg Oral Q8H PRN    lubiprostone (AMITIZA) capsule 24 mcg  24 mcg Oral BID With Meals    magnesium hydroxide (MILK OF MAGNESIA) 400 mg/5 mL oral suspension 30 mL  30 mL Oral Daily PRN    multivitamin-minerals (CENTRUM) tablet 1 tablet  1 tablet Oral Daily    norgestimate-ethinyl estradiol (ORTHO-CYCLEN) 0 25-35 MG-MCG per tablet 1 tablet  1 tablet Oral After Breakfast    OLANZapine (ZyPREXA) IM injection 10 mg  10 mg Intramuscular Q3H PRN    OLANZapine (ZyPREXA) tablet 10 mg  10 mg Oral Q3H PRN    pantoprazole (PROTONIX) EC tablet 40 mg  40 mg Oral Early Morning    risperiDONE (RisperDAL M-TABS) dispersible tablet 1 mg  1 mg Oral Q3H PRN    senna-docusate sodium (SENOKOT S) 8 6-50 mg per tablet 1 tablet  1 tablet Oral Daily    sucralfate (CARAFATE) oral suspension 1,000 mg  1,000 mg Oral 4x Daily PRN    traZODone (DESYREL) tablet 50 mg  50 mg Oral HS    traZODone (DESYREL) tablet 50 mg  50 mg Oral HS PRN       No Known Allergies    Objective   Vitals:Blood pressure 111/59, pulse 82, temperature 98 2 °F (36 8 °C), temperature source Oral, resp  rate 18, height 4' 10" (1 473 m), weight 79 7 kg (175 lb 11 3 oz), SpO2 100 %  ,Body mass index is 36 72 kg/m²  No intake or output data in the 24 hours ending 07/13/18 1523    Invasive Devices: Invasive Devices          No matching active lines, drains, or airways          Physical Exam   Constitutional: No distress  HENT:   Head: Normocephalic and atraumatic  Neck: Normal range of motion  Neck supple  Cardiovascular: Normal rate and regular rhythm      Pulmonary/Chest: Effort normal and breath sounds normal    Abdominal: Soft  Bowel sounds are normal    Skin: Skin is warm and dry  She is not diaphoretic  Psychiatric: Her behavior is normal  Thought content normal      Neurologic Exam     Mental Status   Patient alert to self only  Unable to answer further orienting questions  When asked why she is in the hospital she states for my head  She is cooperative throughout exam and becomes visibly upset at the end of the exam for no apparent reason  Cranial Nerves   Baseline dysconjugate gaze with limited lateral movement right eye  Otherwise cranial nerves grossly intact       Motor Exam   Moving bilateral upper extremities against gravity, left more than right secondary to right upper extremity contracture  Moving bilateral lower extremities symmetrically against gravity, limited range of motion plantar/dorsiflexion     Sensory Exam   Light touch normal        Lab Results: I have personally reviewed pertinent reports  No results found for this or any previous visit (from the past 24 hour(s))  Imaging Studies: I have personally reviewed pertinent reports and I have personally reviewed pertinent films in PACS  EKG, Pathology, and Other Studies: I have personally reviewed pertinent reports      VTE Prophylaxis: Ambulate

## 2018-07-13 NOTE — CASE MANAGEMENT
JESSICA called [facility], [address] [facility], [phone]  Left message @ 12:45 PM, asking to schedule mtg for Mon, 7/16, as pt was doing better, Said pt would possibly be D/C on that day  JESSICA met with pt  Signed tx plan & ROIs for PDS; [facility], & PCP  Pt calm  Said she still heard voices  Then pt asked when she'd be D/C  JESSICA said that Nelsy from group home wanted to have a mtg before she returned home  Pt agreed  Pt pleasant  When pt got up to stand, she C/O being dizzy  JESSICA told pt to sit down, which pt did  Said she needed help  [facility]HT came to see pt  Pt got up slowly  Then began to yell that she was mad  Began stomping her feet tht she wanted to make a phone call to her dad  MHT assisted pt with getting a phone

## 2018-07-14 PROCEDURE — 99231 SBSQ HOSP IP/OBS SF/LOW 25: CPT | Performed by: PSYCHIATRY & NEUROLOGY

## 2018-07-14 RX ORDER — ESCITALOPRAM OXALATE 20 MG/1
20 TABLET ORAL DAILY
Status: DISCONTINUED | OUTPATIENT
Start: 2018-07-14 | End: 2018-07-17 | Stop reason: HOSPADM

## 2018-07-14 RX ORDER — ARIPIPRAZOLE 10 MG/1
20 TABLET ORAL
Status: DISCONTINUED | OUTPATIENT
Start: 2018-07-14 | End: 2018-07-17 | Stop reason: HOSPADM

## 2018-07-14 RX ADMIN — Medication 1 TABLET: at 08:53

## 2018-07-14 RX ADMIN — ARIPIPRAZOLE 20 MG: 10 TABLET ORAL at 21:50

## 2018-07-14 RX ADMIN — DOCUSATE SODIUM 100 MG: 100 CAPSULE, LIQUID FILLED ORAL at 08:52

## 2018-07-14 RX ADMIN — NORGESTIMATE AND ETHINYL ESTRADIOL 1 TABLET: KIT ORAL at 08:54

## 2018-07-14 RX ADMIN — LUBIPROSTONE 24 MCG: 24 CAPSULE, GELATIN COATED ORAL at 16:39

## 2018-07-14 RX ADMIN — SENNOSIDES AND DOCUSATE SODIUM 1 TABLET: 8.6; 5 TABLET ORAL at 08:54

## 2018-07-14 RX ADMIN — CLOTRIMAZOLE 1 APPLICATION: 10 CREAM TOPICAL at 17:47

## 2018-07-14 RX ADMIN — HYDROXYZINE HYDROCHLORIDE 25 MG: 25 TABLET, FILM COATED ORAL at 11:27

## 2018-07-14 RX ADMIN — LUBIPROSTONE 24 MCG: 24 CAPSULE, GELATIN COATED ORAL at 08:52

## 2018-07-14 RX ADMIN — TRAZODONE HYDROCHLORIDE 50 MG: 50 TABLET ORAL at 21:50

## 2018-07-14 RX ADMIN — ESCITALOPRAM OXALATE 20 MG: 20 TABLET ORAL at 08:53

## 2018-07-14 RX ADMIN — PANTOPRAZOLE SODIUM 40 MG: 40 TABLET, DELAYED RELEASE ORAL at 06:18

## 2018-07-14 RX ADMIN — ALUMINUM HYDROXIDE, MAGNESIUM HYDROXIDE, AND SIMETHICONE 20 ML: 200; 200; 20 SUSPENSION ORAL at 18:31

## 2018-07-14 RX ADMIN — VITAMIN D, TAB 1000IU (100/BT) 2000 UNITS: 25 TAB at 08:53

## 2018-07-14 RX ADMIN — RISPERIDONE 1 MG: 1 TABLET, ORALLY DISINTEGRATING ORAL at 11:24

## 2018-07-14 RX ADMIN — DOCUSATE SODIUM 100 MG: 100 CAPSULE, LIQUID FILLED ORAL at 17:47

## 2018-07-14 NOTE — PLAN OF CARE
Problem: DEPRESSION  Goal: Will be euthymic at discharge  INTERVENTIONS:  - Administer medication as ordered  - Provide emotional support via 1:1 interaction with staff  - Encourage involvement in milieu/groups/activities  - Monitor for social isolation   Outcome: Adequate for Discharge

## 2018-07-14 NOTE — PROGRESS NOTES
C/O" I want to go home, "    Report from staff regarding this patient received and record reviewed  prior to seeing this patient   Behavior over the last 24 hours:  , reports she wants to go home, hear voices but  [id number] [address]  Sleep:Ok  Appetite:ok   side effects: none  ROS:  Mental Status Evaluation:  Appearance:  Dressed appropraitely   Behavior:  cooperative   Speech:  loud   Mood:  euthymic   Affect:  blunted   Thought Process:  paranoid   Thought Content:  normal   Perceptual Disturbances: AV hallucination plus 1   Risk Potential: denied   Sensorium:  normal   Cognition:  intact   Consciousness:  Alert, OX3   Attention: Fair   Insight:  fair   Judgment: fair   Gait/Station: Walk with wlaker    Motor Activity: With in normal range     Progress Toward Goals: working on current treatment goals, no changes  Made in treatment plan   Recommended Treatment: Continue with group therapy, milieu therapy and occupational therapy  Risks, benefits and possible side effects of Medications:   Risks, benefits, and possible side effects of medications explained to patient and patient verbalizes understanding        Medications:   current meds:   Current Facility-Administered Medications   Medication Dose Route Frequency    acetaminophen (TYLENOL) tablet 650 mg  650 mg Oral Q6H PRN    aluminum-magnesium hydroxide-simethicone (MYLANTA) 200-200-20 mg/5 mL oral suspension 20 mL  20 mL Oral Q4H PRN    ARIPiprazole (ABILIFY) tablet 20 mg  20 mg Oral HS    benztropine (COGENTIN) injection 1 mg  1 mg Intramuscular Q6H PRN    benztropine (COGENTIN) tablet 1 mg  1 mg Oral Q6H PRN    cholecalciferol (VITAMIN D3) tablet 2,000 Units  2,000 Units Oral Daily    clotrimazole (LOTRIMIN) 1 % cream 1 application  1 application Topical BID    docusate sodium (COLACE) capsule 100 mg  100 mg Oral BID    escitalopram (LEXAPRO) tablet 20 mg  20 mg Oral Daily    haloperidol (HALDOL) tablet 5 mg  5 mg Oral Q8H PRN    haloperidol lactate (HALDOL) injection 5 mg  5 mg Intramuscular Q6H PRN    hydrOXYzine HCL (ATARAX) tablet 25 mg  25 mg Oral Q6H PRN    ibuprofen (MOTRIN) tablet 600 mg  600 mg Oral Q8H PRN    ibuprofen (MOTRIN) tablet 800 mg  800 mg Oral Q8H PRN    LORazepam (ATIVAN) 2 mg/mL injection 1 mg  1 mg Intramuscular Q6H PRN    LORazepam (ATIVAN) tablet 1 mg  1 mg Oral Q8H PRN    lubiprostone (AMITIZA) capsule 24 mcg  24 mcg Oral BID With Meals    magnesium hydroxide (MILK OF MAGNESIA) 400 mg/5 mL oral suspension 30 mL  30 mL Oral Daily PRN    multivitamin-minerals (CENTRUM) tablet 1 tablet  1 tablet Oral Daily    norgestimate-ethinyl estradiol (ORTHO-CYCLEN) 0 25-35 MG-MCG per tablet 1 tablet  1 tablet Oral After Breakfast    OLANZapine (ZyPREXA) IM injection 10 mg  10 mg Intramuscular Q3H PRN    OLANZapine (ZyPREXA) tablet 10 mg  10 mg Oral Q3H PRN    pantoprazole (PROTONIX) EC tablet 40 mg  40 mg Oral Early Morning    risperiDONE (RisperDAL M-TABS) dispersible tablet 1 mg  1 mg Oral Q3H PRN    senna-docusate sodium (SENOKOT S) 8 6-50 mg per tablet 1 tablet  1 tablet Oral Daily    sucralfate (CARAFATE) oral suspension 1,000 mg  1,000 mg Oral 4x Daily PRN    traZODone (DESYREL) tablet 50 mg  50 mg Oral HS    traZODone (DESYREL) tablet 50 mg  50 mg Oral HS PRN     Labs: NA    Assessment, Diagnosis  and Plan: continue with current meds and goals, F/U tomorrow    Counseling / Coordination of Care  Total floor / unit time spent today20 minutes  minutes  Greater than 50% of total time was spent with the patient and / or family counseling and / or coordination of care  A description of the counseling / coordination of care:      Georges Michaels MD

## 2018-07-14 NOTE — PROGRESS NOTES
Pt appears appropriate in her concrete behaviors  Denies any AH at this particular time but states they "come sometime" and are quiet  Denies any ideations of self harm  Appears to want to avoid her group home and states she likes being in the hospital instead  Needy at times, does better in general when she is kept busy

## 2018-07-14 NOTE — PLAN OF CARE
Problem: Potential for Falls  Goal: Patient will remain free of falls  INTERVENTIONS:  - Assess patient frequently for physical needs  -  Identify cognitive and physical deficits and behaviors that affect risk of falls    -  [place] fall precautions as indicated by assessment   - Educate patient/family on patient safety including physical limitations  - Instruct patient to call for assistance with activity based on assessment  - Modify environment to reduce risk of injury  - Consider OT/PT consult to assist with strengthening/mobility   Outcome: Progressing

## 2018-07-14 NOTE — PROGRESS NOTES
Pt denies all s/s except for several request of snacks  She is compliant with meds and tx  Uses the walker, calm, pleasant  Denies issues or concerns

## 2018-07-14 NOTE — PLAN OF CARE
Problem: SELF HARM/SUICIDALITY  Goal: Will have no self-injury during hospital stay  INTERVENTIONS:  - Q 15 MINUTES: Routine safety checks  - Q WAKING SHIFT & PRN: Assess risk to determine if routine checks are adequate to maintain patient safety  - Encourage patient to participate actively in care by formulating a plan to combat response to suicidal ideation, identify supports and resources   Outcome: Adequate for Discharge

## 2018-07-14 NOTE — PLAN OF CARE
Problem: BEHAVIOR  Goal: Pt/Family maintain appropriate behavior and adhere to behavioral management agreement, if implemented  INTERVENTIONS:  - Assess the family dynamic   - Encourage verbalization of thoughts and concerns in a socially appropriate manner  - Assess patient/family's coping skills and non-compliant behavior (including use of illegal substances)  - Utilize positive, consistent limit setting strategies supporting safety of patient, staff and others  - Initiate consult with Case Management, Spiritual Care or other ancillary services as appropriate  - If a patient's/visitor's behavior jeopardizes the safety of the patient, staff, or others, refer to organization procedure     - Notify Security of behavior or suspected illegal substances which indicate the need for search of the patient and/or belongings  - Encourage participation in the decision making process about a behavioral management agreement; implement if patient meets criteria   Outcome: Adequate for Discharge

## 2018-07-14 NOTE — PROGRESS NOTES
Progress Note - [address] 43 y o  female MRN: @MRN   Unit/Bed#: HY9 503-21 Encounter: [id number]        The patient was seen for continuing care and reviewed with staff  Report from staff regarding this patient received and discussed, and records reviewed prior to seeing this patient   The patient is mobile and verbal despite the the spasticity related to her neurological condition  Appreciate Neurology consult  The rule out the the patient auditory hallucinations she started to experience recently is related to seizure and they do not recommend imaging or EC G  Will continue to treat the patient depression with psychotic features was increase of Lexapro to 20 mg and Abilify 20 mg this weekend  The patient still complains of voices and telling that they are leading to discomfort  She denied suicidal ideations  Sleep is fragmented  Appetite normal    Patient remains anxious, bizarre, distracted and distressed today       Medication side effects:no complaints  ROS: Yes - Muscle spasticity     Mental Status Evaluation:    Appearance:  improved grooming   Behavior:  cooperative, agitated   Mood:  depressed, dysphoric, anxious   Affect: constricted    Speech:  decreased rate, slow, increased volume   Language: appropriate   Thought Process:  concrete   Associations: concrete associations   Thought Content:  intrusive thoughts, negative thinking, poverty of thought   Perceptual Disturbances: auditory hallucinations with commands to harm self   Risk Potential: Suicidal ideation - None, contracts for safety on the unit  Homicidal ideation - None  Potential for aggression - No   Sensorium:  oriented to person, place and time   Memory:  long term memory grossly intact   Consciousness:  alert and awake   Attention: decreased concentration   Intellect: not examined   Fund of Knowledge: past history: limited   Insight:  impaired   Judgment: impaired   Muscle Tone: normal   Gait/Station: normal gait/station and normal balance   Motor Activity: no abnormal movements         Laboratory results:  I have personally reviewed all pertinent laboratory results  Recent Labs      07/11/18   0626   NA  139   K  3 6   CL  106   CO2  25   GLUCOSE  76   CREATININE  0 72   BUN  9   MG  2 2     Recent Labs      07/11/18 0626   WBC  7 39   RBC  4 56   HGB  14 2   HCT  43 0   MCV  94   MCH  31 1   RDW  12 9   PLT  285     Recent Labs      07/11/18 0626   CREATININE  0 72   BUN  9   NA  139   K  3 6   CL  106   CO2  25   GLUCOSE  76   PROT  7 0   ALT  24   AST  16     Recent Labs      07/11/18   0626   ALKPHOS  149*   AST  16   ALT  24   BILITOT  0 45     No results for input(s): TROPONINI, CKMB, CKTOTAL in the last 72 hours  Invalid input(s): PBNP  Recent Labs      07/11/18 0626   CHOL  152   HDL  68*   TRIG  100     No results for input(s): CRP, SEDRATE, LIDA, HAV, HEPAIGM, HEPBIGM, HEPBCAB, HEPCAB in the last 72 hours  Invalid input(s): HEAG    CBC:   Recent Labs      07/11/18 0626   WBC  7 39   RBC  4 56   HGB  14 2   HCT  43 0   PLT  285     BMP:   Recent Labs      07/11/18   0626   NA  139   K  3 6   CL  106   CO2  25   BUN  9           Progress Toward Goals: no significant improvement    Assessment/Plan   Principal Problem:    Severe episode of recurrent major depressive disorder, with psychotic features (HCC)  Active Problems:    Leg swelling    Generalized anxiety disorder    Brain lesion    Cerebral palsy (HCC)    Hearing impairment    Moderate intellectual disability    Pituitary microadenoma (HCC)      Recommended Treatment:  Will increase the Lexapro and Abilify tomorrow to further treat patient with history of depressive disorder now with psychotic features  [address] Neurology and Neurosurgery consult  Brief supportive therapy was provided  Planned medication and treatment changes: All current active medications have been reviewed    Continue treatment with group therapy, milieu therapy, occupational therapy and medication management  Risks / Benefits of Treatment:    Risks, benefits, and possible side effects of medications explained to patient  Patient has limited understanding of risks and benefits of treatment at this time, but agrees to take medications as prescribed  Counseling / Coordination of Care:    Patient's progress discussed with staff in treatment team meeting  ** Please Note: This note has been constructed using a voice recognition system   **

## 2018-07-15 PROCEDURE — 99232 SBSQ HOSP IP/OBS MODERATE 35: CPT | Performed by: PSYCHIATRY & NEUROLOGY

## 2018-07-15 RX ADMIN — ARIPIPRAZOLE 20 MG: 10 TABLET ORAL at 21:42

## 2018-07-15 RX ADMIN — DOCUSATE SODIUM 100 MG: 100 CAPSULE, LIQUID FILLED ORAL at 17:12

## 2018-07-15 RX ADMIN — LUBIPROSTONE 24 MCG: 24 CAPSULE, GELATIN COATED ORAL at 08:20

## 2018-07-15 RX ADMIN — NORGESTIMATE AND ETHINYL ESTRADIOL 1 TABLET: KIT ORAL at 12:29

## 2018-07-15 RX ADMIN — ESCITALOPRAM OXALATE 20 MG: 20 TABLET ORAL at 08:19

## 2018-07-15 RX ADMIN — CLOTRIMAZOLE 1 APPLICATION: 10 CREAM TOPICAL at 08:20

## 2018-07-15 RX ADMIN — SENNOSIDES AND DOCUSATE SODIUM 1 TABLET: 8.6; 5 TABLET ORAL at 08:19

## 2018-07-15 RX ADMIN — LORAZEPAM 1 MG: 1 TABLET ORAL at 16:55

## 2018-07-15 RX ADMIN — TRAZODONE HYDROCHLORIDE 50 MG: 50 TABLET ORAL at 21:42

## 2018-07-15 RX ADMIN — PANTOPRAZOLE SODIUM 40 MG: 40 TABLET, DELAYED RELEASE ORAL at 06:45

## 2018-07-15 RX ADMIN — VITAMIN D, TAB 1000IU (100/BT) 2000 UNITS: 25 TAB at 08:20

## 2018-07-15 RX ADMIN — IBUPROFEN 800 MG: 400 TABLET ORAL at 19:11

## 2018-07-15 RX ADMIN — LUBIPROSTONE 24 MCG: 24 CAPSULE, GELATIN COATED ORAL at 16:56

## 2018-07-15 RX ADMIN — DOCUSATE SODIUM 100 MG: 100 CAPSULE, LIQUID FILLED ORAL at 08:19

## 2018-07-15 RX ADMIN — Medication 1 TABLET: at 08:20

## 2018-07-15 NOTE — PROGRESS NOTES
Pt denies SI, HI and denies hearing any voices presently  Pt is still asking when she will be going back to her home  Pt has been visible and social in the milieu

## 2018-07-15 NOTE — PLAN OF CARE
Problem: Potential for Falls  Goal: Patient will remain free of falls  INTERVENTIONS:  - Assess patient frequently for physical needs  -  Identify cognitive and physical deficits and behaviors that affect risk of falls  -  [place] fall precautions as indicated by assessment   - Educate patient/family on patient safety including physical limitations  - Instruct patient to call for assistance with activity based on assessment  - Modify environment to reduce risk of injury  - Consider OT/PT consult to assist with strengthening/mobility   Outcome: Adequate for Discharge      Problem: SELF HARM/SUICIDALITY  Goal: Will have no self-injury during hospital stay  INTERVENTIONS:  - Q 15 MINUTES: Routine safety checks  - Q WAKING SHIFT & PRN: Assess risk to determine if routine checks are adequate to maintain patient safety  - Encourage patient to participate actively in care by formulating a plan to combat response to suicidal ideation, identify supports and resources   Outcome: Adequate for Discharge      Problem: DEPRESSION  Goal: Will be euthymic at discharge  INTERVENTIONS:  - Administer medication as ordered  - Provide emotional support via 1:1 interaction with staff  - Encourage involvement in milieu/groups/activities  - Monitor for social isolation   Outcome: Adequate for Discharge      Problem: BEHAVIOR  Goal: Pt/Family maintain appropriate behavior and adhere to behavioral management agreement, if implemented  INTERVENTIONS:  - Assess the family dynamic   - Encourage verbalization of thoughts and concerns in a socially appropriate manner  - Assess patient/family's coping skills and non-compliant behavior (including use of illegal substances)    - Utilize positive, consistent limit setting strategies supporting safety of patient, staff and others  - Initiate consult with Case Management, Spiritual Care or other ancillary services as appropriate  - If a patient's/visitor's behavior jeopardizes the safety of the patient, staff, or others, refer to organization procedure     - Notify Security of behavior or suspected illegal substances which indicate the need for search of the patient and/or belongings  - Encourage participation in the decision making process about a behavioral management agreement; implement if patient meets criteria   Outcome: Adequate for Discharge      Problem: DISCHARGE PLANNING  Goal: Discharge to home or other facility with appropriate resources  INTERVENTIONS:  - Identify barriers to discharge w/patient and caregiver  - Arrange for needed discharge resources and transportation as appropriate  - Identify discharge learning needs (meds, wound care, etc )  - Refer to Case Management Department for coordinating discharge planning if the patient needs post-hospital services based on physician/advanced practitioner order or complex needs related to functional status, cognitive ability, or social support system    Outcome: Progressing

## 2018-07-15 NOTE — PROGRESS NOTES
Pt has been visible and pleasant in milieu  Pt wants d/c because she stated she misses her room mate  Pt denies hearing any voices presently

## 2018-07-15 NOTE — PROGRESS NOTES
C/O" I want to go hiome, have meeting at home withmy house room mate "    Report from staff regarding this patient received and record reviewed  prior to seeing this patient   Behavior over the last 24 hours:  , wants to go home, took meds,   Sleep: Ok  Appetite:ok  Medication side effects:denied  ROS:improving   Mental Status Evaluation:  Appearance:  Dressed appropraitely   Behavior:  cooperative   Speech:  normal   Mood:  euthymic   Affect:  appropriate     Thought Process:  Goal directed   Thought Content:  normal   Perceptual Disturbances: Denied AV hallucination   Risk Potential: NO EMMA    Sensorium:  normal   Cognition:  intact   Consciousness:  Alert, OX3   Attention: Fair   Insight:  limited   Judgment: limited   Gait/Station: Walks with walker    Motor Activity: With in normal range     Progress Toward Goals: working on current treatment goals, no changes  Made in treatment plan   Recommended Treatment: Continue with group therapy, milieu therapy and occupational therapy  Risks, benefits and possible side effects of Medications:   Risks, benefits, and possible side effects of medications explained to patient and patient verbalizes understanding        Medications:   current meds:   Current Facility-Administered Medications   Medication Dose Route Frequency    acetaminophen (TYLENOL) tablet 650 mg  650 mg Oral Q6H PRN    aluminum-magnesium hydroxide-simethicone (MYLANTA) 200-200-20 mg/5 mL oral suspension 20 mL  20 mL Oral Q4H PRN    ARIPiprazole (ABILIFY) tablet 20 mg  20 mg Oral HS    benztropine (COGENTIN) injection 1 mg  1 mg Intramuscular Q6H PRN    benztropine (COGENTIN) tablet 1 mg  1 mg Oral Q6H PRN    cholecalciferol (VITAMIN D3) tablet 2,000 Units  2,000 Units Oral Daily    clotrimazole (LOTRIMIN) 1 % cream 1 application  1 application Topical BID    docusate sodium (COLACE) capsule 100 mg  100 mg Oral BID    escitalopram (LEXAPRO) tablet 20 mg  20 mg Oral Daily    haloperidol (HALDOL) tablet 5 mg  5 mg Oral Q8H PRN    haloperidol lactate (HALDOL) injection 5 mg  5 mg Intramuscular Q6H PRN    hydrOXYzine HCL (ATARAX) tablet 25 mg  25 mg Oral Q6H PRN    ibuprofen (MOTRIN) tablet 600 mg  600 mg Oral Q8H PRN    ibuprofen (MOTRIN) tablet 800 mg  800 mg Oral Q8H PRN    LORazepam (ATIVAN) 2 mg/mL injection 1 mg  1 mg Intramuscular Q6H PRN    LORazepam (ATIVAN) tablet 1 mg  1 mg Oral Q8H PRN    lubiprostone (AMITIZA) capsule 24 mcg  24 mcg Oral BID With Meals    magnesium hydroxide (MILK OF MAGNESIA) 400 mg/5 mL oral suspension 30 mL  30 mL Oral Daily PRN    multivitamin-minerals (CENTRUM) tablet 1 tablet  1 tablet Oral Daily    norgestimate-ethinyl estradiol (ORTHO-CYCLEN) 0 25-35 MG-MCG per tablet 1 tablet  1 tablet Oral After Breakfast    OLANZapine (ZyPREXA) IM injection 10 mg  10 mg Intramuscular Q3H PRN    OLANZapine (ZyPREXA) tablet 10 mg  10 mg Oral Q3H PRN    pantoprazole (PROTONIX) EC tablet 40 mg  40 mg Oral Early Morning    risperiDONE (RisperDAL M-TABS) dispersible tablet 1 mg  1 mg Oral Q3H PRN    senna-docusate sodium (SENOKOT S) 8 6-50 mg per tablet 1 tablet  1 tablet Oral Daily    sucralfate (CARAFATE) oral suspension 1,000 mg  1,000 mg Oral 4x Daily PRN    traZODone (DESYREL) tablet 50 mg  50 mg Oral HS    traZODone (DESYREL) tablet 50 mg  50 mg Oral HS PRN     Labs: NA    Assessment, Diagnosis  and Plan: continue with current meds and goals, F/U tomorrow    Counseling / Coordination of Care  Total floor / unit time spent today20 minutes  minutes  Greater than 50% of total time was spent with the patient and / or family counseling and / or coordination of care   A description of the counseling / coordination of care: , f/u with treatment team on Monday     Heriberto Farrar MD

## 2018-07-16 PROCEDURE — 99231 SBSQ HOSP IP/OBS SF/LOW 25: CPT | Performed by: PSYCHIATRY & NEUROLOGY

## 2018-07-16 RX ADMIN — VITAMIN D, TAB 1000IU (100/BT) 2000 UNITS: 25 TAB at 08:20

## 2018-07-16 RX ADMIN — ESCITALOPRAM OXALATE 20 MG: 20 TABLET ORAL at 08:21

## 2018-07-16 RX ADMIN — CLOTRIMAZOLE 1 APPLICATION: 10 CREAM TOPICAL at 10:46

## 2018-07-16 RX ADMIN — SENNOSIDES AND DOCUSATE SODIUM 1 TABLET: 8.6; 5 TABLET ORAL at 08:19

## 2018-07-16 RX ADMIN — ARIPIPRAZOLE 20 MG: 10 TABLET ORAL at 21:11

## 2018-07-16 RX ADMIN — DOCUSATE SODIUM 100 MG: 100 CAPSULE, LIQUID FILLED ORAL at 18:09

## 2018-07-16 RX ADMIN — NORGESTIMATE AND ETHINYL ESTRADIOL 1 TABLET: KIT ORAL at 08:23

## 2018-07-16 RX ADMIN — LUBIPROSTONE 24 MCG: 24 CAPSULE, GELATIN COATED ORAL at 18:09

## 2018-07-16 RX ADMIN — LORAZEPAM 1 MG: 1 TABLET ORAL at 11:54

## 2018-07-16 RX ADMIN — CLOTRIMAZOLE 1 APPLICATION: 10 CREAM TOPICAL at 21:11

## 2018-07-16 RX ADMIN — LUBIPROSTONE 24 MCG: 24 CAPSULE, GELATIN COATED ORAL at 08:22

## 2018-07-16 RX ADMIN — DOCUSATE SODIUM 100 MG: 100 CAPSULE, LIQUID FILLED ORAL at 08:20

## 2018-07-16 RX ADMIN — PANTOPRAZOLE SODIUM 40 MG: 40 TABLET, DELAYED RELEASE ORAL at 06:04

## 2018-07-16 RX ADMIN — Medication 1 TABLET: at 08:20

## 2018-07-16 RX ADMIN — TRAZODONE HYDROCHLORIDE 50 MG: 50 TABLET ORAL at 21:11

## 2018-07-16 NOTE — CMS CERTIFICATION NOTE
Recertification: Based upon physical, mental and social evaluations, I certify that inpatient psychiatric services continue to be medically necessary for this patient for a duration of 20 midnights for the treatment of Bipolar Disorder and Persistent Neurological condition  Available alternative community resources still do not meet the patient's mental health care needs  I further attest that an established written individualized plan of care has been updated and is outlined in the patient's medical records

## 2018-07-16 NOTE — PROGRESS NOTES
Patient was cooperative with medications this morning  Had been yelling in the hallway and hitting herself in the head with her hand saying she wanted to go home but was redirectable  Denied symptoms and needs; said her AH are "completely gone " Patient is preoccupied with going home

## 2018-07-16 NOTE — CASE MANAGEMENT
JESSICA called Hectormiguel Tee, [address] [facility] supervisor, [phone], & said SW called her on Fri, 07/13, re: having a mtg today, so pt could be D/C today  Crystal said she did not receive SW's message  Said she was in [place] today  She asked if mtg could be tomorrow morning  Said staff could return in afternoon to  pt  Mtg scheduled for 11:30 AM tomorrow  He told JESSICA that pt's scripts had to be faxed to [facility]  SW called [facility]  Spoke to Jerrica, sec'y  JESSICA asked if pt had an appt scheduled with Dr Jesu Wade  She said pt had an appt on 8/20  SW asked for a sooner appt  Appt scheduled on 7/23 @ 9:45 AM  She said Dr Jesu Wade would decide at that time if he wanted to keep 8/20 appt

## 2018-07-16 NOTE — PROGRESS NOTES
Patient reportedly became agitated during group, hitting herself in the head and yelling, and was asked to leave  Said she "felt anxiety coming on" after she found out she has to stay here another day  Patient given 1mg Ativan PO and is now quietly walking around the unit with nursing students

## 2018-07-16 NOTE — PROGRESS NOTES
Progress Note - [address] 43 y o  female MRN: @MRN   Unit/Bed#: JP8 886-00 Encounter: [id number]        The patient was seen for continuing care and reviewed with staff  Report from staff regarding this patient received and discussed, and records reviewed prior to seeing this patient   Patient was more polite, and more energetic at the same time she denied any suicidal thoughts  She denied auditory hallucinations  Sleep is normal  Appetite improved    Patient remains anxious, appropriate and better today  Medication side effects:no complaints  ROS: No     Mental Status Evaluation:    Appearance:  dressed appropriately, casually dressed   Behavior:  demanding   Mood:  improved, anxious   Affect: reactive, brighter    Speech:  normal rate and volume, normal pitch   Language: appropriate   Thought Process:  concrete   Associations: concrete associations   Thought Content:  normal   Perceptual Disturbances: no auditory hallucinations, no visual hallucinations, denies auditory hallucinations when asked, does not appear responding to internal stimuli   Risk Potential: Suicidal ideation - None, contracts for safety on the unit  Homicidal ideation - None  Potential for aggression - No   Sensorium:  oriented to person and place only   Memory:  long term memory mildly impaired   Consciousness:  alert and awake   Attention: attention span and concentration are normal   Intellect: normal   Fund of Knowledge: awareness of current events appropriate   Insight:  limited   Judgment: improving and limited   Muscle Tone: normal   Gait/Station: normal gait/station and normal balance   Motor Activity: no abnormal movements         Laboratory results:  I have personally reviewed all pertinent laboratory results      Progress Toward Goals: improving gradually    Assessment/Plan   Principal Problem:    Severe episode of recurrent major depressive disorder, with psychotic features ([facility] [address] )  Active Problems:    Leg swelling    Generalized anxiety disorder    Brain lesion    Cerebral palsy (HCC)    Hearing impairment    Moderate intellectual disability    Pituitary microadenoma (HCC)      Recommended Treatment:  The current combination of medications lead to significant improvement of the patient mental state over weekend  Will consider to discharge her back to group home to more if such improvement is stable  Planned medication and treatment changes: All current active medications have been reviewed  Continue treatment with group therapy, milieu therapy, occupational therapy and medication management  Risks / Benefits of Treatment:    Risks, benefits, and possible side effects of medications explained to patient and patient verbalizes understanding and agreement for treatment  Counseling / Coordination of Care:    Patient's progress discussed with staff in treatment team meeting  Medication changes reviewed with staff in treatment team meeting  ** Please Note: This note has been constructed using a voice recognition system   **

## 2018-07-17 VITALS
SYSTOLIC BLOOD PRESSURE: 130 MMHG | BODY MASS INDEX: 36.88 KG/M2 | RESPIRATION RATE: 16 BRPM | OXYGEN SATURATION: 100 % | DIASTOLIC BLOOD PRESSURE: 79 MMHG | HEART RATE: 97 BPM | WEIGHT: 175.71 LBS | TEMPERATURE: 98 F | HEIGHT: 58 IN

## 2018-07-17 PROCEDURE — 99239 HOSP IP/OBS DSCHRG MGMT >30: CPT | Performed by: NURSE PRACTITIONER

## 2018-07-17 RX ORDER — ARIPIPRAZOLE 20 MG/1
20 TABLET ORAL
Qty: 30 TABLET | Refills: 1 | Status: SHIPPED | OUTPATIENT
Start: 2018-07-17 | End: 2018-09-21 | Stop reason: SDUPTHER

## 2018-07-17 RX ORDER — ESCITALOPRAM OXALATE 20 MG/1
20 TABLET ORAL DAILY
Qty: 30 TABLET | Refills: 1 | Status: SHIPPED | OUTPATIENT
Start: 2018-07-18 | End: 2018-09-21 | Stop reason: SDUPTHER

## 2018-07-17 RX ADMIN — LUBIPROSTONE 24 MCG: 24 CAPSULE, GELATIN COATED ORAL at 08:40

## 2018-07-17 RX ADMIN — SENNOSIDES AND DOCUSATE SODIUM 1 TABLET: 8.6; 5 TABLET ORAL at 08:37

## 2018-07-17 RX ADMIN — CLOTRIMAZOLE 1 APPLICATION: 10 CREAM TOPICAL at 10:35

## 2018-07-17 RX ADMIN — ESCITALOPRAM OXALATE 20 MG: 20 TABLET ORAL at 08:38

## 2018-07-17 RX ADMIN — DOCUSATE SODIUM 100 MG: 100 CAPSULE, LIQUID FILLED ORAL at 08:38

## 2018-07-17 RX ADMIN — PANTOPRAZOLE SODIUM 40 MG: 40 TABLET, DELAYED RELEASE ORAL at 05:59

## 2018-07-17 RX ADMIN — NORGESTIMATE AND ETHINYL ESTRADIOL 1 TABLET: KIT ORAL at 08:39

## 2018-07-17 RX ADMIN — Medication 1 TABLET: at 08:37

## 2018-07-17 RX ADMIN — VITAMIN D, TAB 1000IU (100/BT) 2000 UNITS: 25 TAB at 08:37
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
## 2018-07-17 NOTE — NURSING NOTE
Patient discharged accompanied by group home staff  AVS, follow up, prescriptions discussed with group home staff and patient earlier today

## 2018-07-17 NOTE — DISCHARGE INSTRUCTIONS
Comments: Resume all previous daily medications   The only two medications that were changed are as follows:  Discontinue Abilify 5 mg po and day to Abilify 20 mg PO Q HS AND discontinue lexapro 10 mg po daily and change to Lexapro 20 MG PO DAILY   ALL OTHER MEDICATIONS WILL REMAIN THE SAME       Follow up labs will be with outpatient psychiatrist    Expected Discharge Date: 07/17/18    Discharge Disposition: Home/Self Care

## 2018-07-17 NOTE — PROGRESS NOTES
Patient was cooperative with medications and pleasant in conversation this morning  Denied voices or other symptoms  Looking forward to going home; plans to take a shower before her meeting with her group home
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
## 2018-07-17 NOTE — DISCHARGE INSTR - LAB
Contact Information: If you have any questions, concerns, pended studies, tests and/or procedures, or emergencies regarding your inpatient behavioral health visit  Please contact [facility] [phone] and ask to speak to a , nurse or physician  A contact is available 24 hours/ 7 days a week at this number  Summary of Procedures Performed During your Stay:  Below is a list of major procedures performed during your hospital stay and a summary of results:  - No major procedures performed  Pending Studies     Start     Ordered    07/09/18 1444  Urinalysis with reflex to microscopic  Once     Question:  Specimen Source(s)  Answer:  Urine, Straight Cath    07/09/18 1443        If studies are pending at discharge, follow up with your PCP and/or referring provider

## 2018-07-17 NOTE — PLAN OF CARE
BEHAVIOR     Pt/Family maintain appropriate behavior and adhere to behavioral management agreement, if implemented Completed        DEPRESSION     Will be euthymic at discharge Completed        [address] Discharge to home or other facility with appropriate resources Completed        Potential for Falls     Patient will remain free of falls Completed        SELF HARM/SUICIDALITY     Will have no self-injury during hospital stay Completed

## 2018-07-23 ENCOUNTER — OFFICE VISIT (OUTPATIENT)
Dept: AUDIOLOGY | Age: 38
End: 2018-07-23

## 2018-07-23 DIAGNOSIS — H90.3 SENSORINEURAL HEARING LOSS, BILATERAL: Primary | ICD-10-CM

## 2018-07-23 NOTE — PROGRESS NOTES
Hearing Aid Visit:    Name:  Mahesh Zapata  :  1980  Age:  43 y o  Date of Evaluation: 18     Mahesh Zapata is being seen for a hearing aid visit  Mahesh Zapata   is fit binaurally with Paulreyna Enrique BTE hearing aid(s)  Patient reports the left hearing aid is not working  Action:  Cleaned and checked hearing ais  Left tubbing was clogged  Re-tubbed both earmolds  Patient quoted $25 for otoclip  Will  at next appointment  Recommendations:    Follow up in two weeks for hearing test         Indigo Spear , CCC-A  Clinical Audiologist

## 2018-07-30 ENCOUNTER — TELEPHONE (OUTPATIENT)
Dept: OBGYN CLINIC | Facility: CLINIC | Age: 38
End: 2018-07-30

## 2018-07-31 ENCOUNTER — ANNUAL EXAM (OUTPATIENT)
Dept: OBGYN CLINIC | Facility: CLINIC | Age: 38
End: 2018-07-31
Payer: MEDICARE

## 2018-07-31 VITALS
BODY MASS INDEX: 36.31 KG/M2 | DIASTOLIC BLOOD PRESSURE: 80 MMHG | WEIGHT: 173 LBS | SYSTOLIC BLOOD PRESSURE: 120 MMHG | HEIGHT: 58 IN

## 2018-07-31 DIAGNOSIS — Z01.419 ENCOUNTER FOR GYNECOLOGICAL EXAMINATION (GENERAL) (ROUTINE) WITHOUT ABNORMAL FINDINGS: Primary | ICD-10-CM

## 2018-07-31 PROCEDURE — 99212 OFFICE O/P EST SF 10 MIN: CPT | Performed by: OBSTETRICS & GYNECOLOGY

## 2018-07-31 NOTE — PROGRESS NOTES
Assessment/Plan:           Diagnoses and all orders for this visit:    Encounter for gynecological examination (general) (routine) without abnormal findings              Subjective:      Patient ID: Gomez Jeffries is a 43 y o  female who presents for pelvic exam, routine, which was unable to be performed at her annual visit  Pt is from a group home, unable to provide history  HPI    The following portions of the patient's history were reviewed and updated as appropriate: allergies, current medications, past family history, past medical history, past social history, past surgical history and problem list     Review of Systems      Objective:      /80   Ht 4' 10 3" (1 481 m)   Wt 78 5 kg (173 lb)   BMI 35 79 kg/m²          Physical Exam   Constitutional: She appears well-developed and well-nourished  No distress  Pulmonary/Chest: Effort normal    Genitourinary: Vagina normal and uterus normal  Rectal exam shows no external hemorrhoid  There is no rash, tenderness or lesion on the right labia  There is no rash, tenderness or lesion on the left labia  Cervix exhibits no motion tenderness and no discharge  Right adnexum displays no mass and no tenderness  Left adnexum displays no mass and no tenderness  No erythema in the vagina  No foreign body in the vagina  No signs of injury around the vagina  No vaginal discharge found  Nursing note and vitals reviewed

## 2018-08-01 ENCOUNTER — OFFICE VISIT (OUTPATIENT)
Dept: FAMILY MEDICINE CLINIC | Facility: CLINIC | Age: 38
End: 2018-08-01
Payer: MEDICARE

## 2018-08-01 ENCOUNTER — TELEPHONE (OUTPATIENT)
Dept: FAMILY MEDICINE CLINIC | Facility: CLINIC | Age: 38
End: 2018-08-01

## 2018-08-01 VITALS
SYSTOLIC BLOOD PRESSURE: 122 MMHG | WEIGHT: 179.8 LBS | RESPIRATION RATE: 16 BRPM | HEIGHT: 58 IN | TEMPERATURE: 100 F | HEART RATE: 100 BPM | BODY MASS INDEX: 37.74 KG/M2 | DIASTOLIC BLOOD PRESSURE: 80 MMHG

## 2018-08-01 DIAGNOSIS — M54.6 BILATERAL THORACIC BACK PAIN, UNSPECIFIED CHRONICITY: ICD-10-CM

## 2018-08-01 DIAGNOSIS — R26.9 GAIT DISTURBANCE: Primary | ICD-10-CM

## 2018-08-01 PROCEDURE — 99214 OFFICE O/P EST MOD 30 MIN: CPT | Performed by: NURSE PRACTITIONER

## 2018-08-01 NOTE — PROGRESS NOTES
Assessment/Plan:    No problem-specific Assessment & Plan notes found for this encounter  Problem List Items Addressed This Visit     Gait disturbance - Primary    Relevant Orders    Walker    Ambulatory referral to Physical Therapy    Bilateral thoracic back pain     Muscle spasms noted in upper dorsal spine; will refer to PT                 Subjective:      Patient ID: Radha Olson is a 43 y o  female  43year old female, presents with her  for hospital follow up, medication management and update  Requesting a new walker to ambulate  C/O upper back spasm that began today  She denies any recent falls but has suffered falls in the past year, but was not injured  She also wants me to check her ears for cerumen, wears B/L hearing aids        The following portions of the patient's history were reviewed and updated as appropriate: allergies, current medications, past family history, past medical history, past social history, past surgical history and problem list     Review of Systems   Constitutional: Negative  HENT: Positive for ear discharge  Respiratory: Negative  Cardiovascular: Negative  Musculoskeletal: Positive for myalgias  Neurological: Negative  Psychiatric/Behavioral: Negative  Objective:      /80   Pulse 100   Temp 100 °F (37 8 °C)   Resp 16   Ht 4' 10 3" (1 481 m)   Wt 81 6 kg (179 lb 12 8 oz)   BMI 37 19 kg/m²          Physical Exam   Constitutional: She is oriented to person, place, and time  She appears well-developed and well-nourished  HENT:   Head: Normocephalic and atraumatic  Right Ear: External ear normal    Mouth/Throat: Oropharynx is clear and moist    Hearing aids in both ears; removed to assess; right negative for cerum; left had moderate amount of cerumen in external canal and was able to remove using otoscope with specula  Cardiovascular: Normal rate, regular rhythm and normal heart sounds      Pulmonary/Chest: Effort normal and breath sounds normal    Musculoskeletal: She exhibits tenderness  Generalized muscle spasms upper back, mild scoliosis noted, bends over to use walker   Neurological: She is alert and oriented to person, place, and time  Skin: Skin is warm and dry  Psychiatric: She has a normal mood and affect   Her behavior is normal

## 2018-08-01 NOTE — PATIENT INSTRUCTIONS
Continue on present medications  I gave you referrals for a new walker and for PT, call to make the appointments  F/U with psychiatry next week

## 2018-08-01 NOTE — TELEPHONE ENCOUNTER
Care giver at checkout states they would like order to d/c eardrops, will call back about other stomach meds they also need discontinued

## 2018-08-07 ENCOUNTER — HOSPITAL ENCOUNTER (EMERGENCY)
Facility: HOSPITAL | Age: 38
Discharge: HOME | End: 2018-08-07
Attending: EMERGENCY MEDICINE | Admitting: EMERGENCY MEDICINE
Payer: MEDICARE

## 2018-08-07 VITALS
BODY MASS INDEX: 37.44 KG/M2 | SYSTOLIC BLOOD PRESSURE: 127 MMHG | TEMPERATURE: 98.1 F | RESPIRATION RATE: 16 BRPM | OXYGEN SATURATION: 96 % | HEART RATE: 96 BPM | DIASTOLIC BLOOD PRESSURE: 64 MMHG | WEIGHT: 181 LBS

## 2018-08-07 DIAGNOSIS — R41.89 BEHAVIOR RELATED TO COGNITIVE IMPAIRMENT: ICD-10-CM

## 2018-08-07 DIAGNOSIS — S09.90XA HEAD INJURY: Primary | ICD-10-CM

## 2018-08-07 PROCEDURE — 99285 EMERGENCY DEPT VISIT HI MDM: CPT

## 2018-08-07 RX ORDER — FAMOTIDINE 20 MG/1
20 TABLET, FILM COATED ORAL ONCE
Status: COMPLETED | OUTPATIENT
Start: 2018-08-07 | End: 2018-08-07

## 2018-08-07 RX ORDER — IBUPROFEN 600 MG/1
600 TABLET ORAL ONCE
Status: COMPLETED | OUTPATIENT
Start: 2018-08-07 | End: 2018-08-07

## 2018-08-07 RX ADMIN — FAMOTIDINE 20 MG: 20 TABLET ORAL at 16:44

## 2018-08-07 RX ADMIN — IBUPROFEN 600 MG: 600 TABLET ORAL at 16:44

## 2018-08-07 NOTE — ED ATTENDING ATTESTATION
Jono Oliveros MD, saw and evaluated the patient  I have discussed the patient with the resident/non-physician practitioner and agree with the resident's/non-physician practitioner's findings, Plan of Care, and MDM as documented in the resident's/non-physician practitioner's note, except where noted  All available labs and Radiology studies were reviewed  At this point I agree with the current assessment done in the Emergency Department  I have conducted an independent evaluation of this patient a history and physical is as follows:   Pt was with aid and was talking to  and got upset pt started to hit herself in head with hand  No loc Pt was acting normal after event And co headache  P christina resolution of symptoms after event ended  PE: alert nad head atraumatic neuro A and O X3 r facial assymetry unchanged moving all ext abd soft nontender heart reg lungs clear   MDM: will give ibuprofen     Critical Care Time  CritCare Time    Procedures

## 2018-08-07 NOTE — DISCHARGE INSTRUCTIONS

## 2018-08-07 NOTE — ED PROVIDER NOTES
History  Chief Complaint   Patient presents with    Behavior Problem     pt had a disagreement with her personal aid  Aide stated she was going to leave for a few minutes, the patient was not happy about this so she began hitting herself in the head with her hand  The aide states their is a new policy where they have to send all patients to the ER for concussion eval after they hit themselves in the head  Patient reports slight pain at ths pot she hit herself in the head  Patient has a long standing history of hitting herself when she is upset  Aide reports this is baseline  HPI    59-year-old female with a past medical history of cerebral palsy, anxiety, depression, suicidal ideation presents for evaluation of head injury  Patient is unable to provide a history  Per aide, patient became upset earlier at [facility] and began hitting herself in the head with her hand and per policy there any patient who hits themselves in the head with her hand and must be evaluated by the ER for concussion  Patient hit herself about 10x on the left temporal side of her head  No LOC  Patient has been her normal self  Patient says she has pain on the left side of her head  Denies fever, chills, nausea, vomiting, chest pain, SOB, abdominal pain, diarrhea, or dysuria  Prior to Admission Medications   Prescriptions Last Dose Informant Patient Reported? Taking?    ARIPiprazole (ABILIFY) 20 MG tablet   No No   Sig: Take 1 tablet (20 mg total) by mouth daily at bedtime for 30 days   Cholecalciferol (VITAMIN D-3 PO)  Outside Facility (Specify) Yes No   Sig: Take 2,000 Units by mouth daily   GNP MILK OF MAGNESIA 1200 MG/15ML oral suspension  Outside Facility (Specify) No No   Sig: TAKE 2 TBSP (30ML) BY MOUTH DAILY AS NEEDED IF NO BM IN 3 DAYS (CONSTIPATION)   Mouthwashes (LISTERINE ANTISEPTIC) LIQD  Care Giver Yes No   Sig: Apply to the mouth or throat 2 (two) times a day   Multiple Vitamins-Minerals (CERTAVITE/ANTIOXIDANTS) TABS No No   Sig: TAKE 1 TABLET BY MOUTH DAILY AT 8AM (SUPPLEMENT) JERRI DODD SUNSCREEN SPF50 LOTN  Care Giver No No   Sig: Apply 15 minutes before sun exposure and every 2 hours   bacitracin topical ointment 500 units/g topical ointment  Care Giver Yes No   Sig: Apply 1 large application topically 2 (two) times a day   calcium carbonate (TUMS) 500 mg chewable tablet  Outside Facility (Specify) Yes No   Sig: Chew 1 tablet 3 (three) times a day as needed     carbamide peroxide (DEBROX) 6 5 % otic solution   Yes Yes   Si drops 2 (two) times a day 8am on the ,12, and  of the month   clotrimazole (LOTRIMIN) 1 % cream  Outside Facility (Specify) No No   Sig: Apply 1 g (1 application total) topically 3 (three) times a day as needed (fungal irritation)   escitalopram (LEXAPRO) 20 mg tablet   No No   Sig: Take 1 tablet (20 mg total) by mouth daily for 30 days   famotidine (PEPCID) 20 mg tablet  Outside Facility (Specify) No No   Sig: Take 1 tablet (20 mg total) by mouth 2 (two) times a day for 30 days At 7AM and 4PM (GERD)   ibuprofen (MOTRIN) 400 mg tablet  Care Giver No No   Sig: Take 1 tablet (400 mg total) by mouth every 8 (eight) hours as needed for mild pain, moderate pain or headaches for up to 30 days   lubiprostone (AMITIZA) 24 mcg capsule   No No   Sig: Take 1 capsule (24 mcg total) by mouth 2 (two) times a day with meals   metoclopramide (REGLAN) 10 mg tablet  Care Giver No No   Sig: TAKE 1 TABLET BY MOUTH EVERY 8 HOURS AS NEEDED FOR NAUSEA *VIRI   norgestimate-ethinyl estradiol (ORTHO TRI-CYCLEN LO) 0 18/0 215/0 25 MG-25 MCG per tablet   Yes No   Sig: Take 1 tablet by mouth daily   pantoprazole (PROTONIX) 40 mg tablet  Care Giver No No   Sig: Take 1 tablet (40 mg total) by mouth daily for 90 days   polyethylene glycol (MIRALAX) 17 g packet  Care Giver No No   Sig: Take 17gm (1 packet) daily for first three days, then daily as needed for no bowel movement more than 24 hrs   Patient taking differently: Take 1 g by mouth daily as needed Take 17gm (1 packet) daily for first three days, then daily as needed for no bowel movement more than 24 hrs    senna-docusate sodium (SENEXON-S) 8 6-50 mg per tablet  Outside Facility (Specify) No No   Sig: Take 1 tablet by mouth daily   sucralfate (CARAFATE) 1 g/10 mL suspension  Care Giver Yes No   Sig: Take 1 g by mouth 4 (four) times a day as needed     traZODone (DESYREL) 50 mg tablet  Care Giver Yes No   Sig: Take 50 mg by mouth daily at bedtime   zinc oxide (DESITIN) 13 % cream  Care Giver No No   Sig: Apply 1 application topically as needed (for perianal irritation)      Facility-Administered Medications: None       Past Medical History:   Diagnosis Date    Anxiety     Brain lesion     Bronchitis     Cerebral palsy (HCC)     Cerebral palsy (HCC)     Last Assessed:7/6/2016    Chronically dry eyes, left     Last Assessed:7/6/2016    Depression     Last Assessed:7/6/2016    Depressive disorder     Fall     GERD (gastroesophageal reflux disease)     Mood disorder (HCC)        Past Surgical History:   Procedure Laterality Date    CSF SHUNT      Creation of Ventriculo-Peritoneal CSF shunt ; Last Assessed:7/6/2016    EAR SURGERY      Last Assessed:7/6/2016    LEG SURGERY      due to CP     NOSE SURGERY      Last Assessed:7/6/2016       Family History   Problem Relation Age of Onset    Diabetes Mother     No Known Problems Father      I have reviewed and agree with the history as documented  Social History   Substance Use Topics    Smoking status: Never Smoker    Smokeless tobacco: Never Used    Alcohol use No        Review of Systems   Constitutional: Negative for chills, diaphoresis, fatigue and fever  HENT: Negative for congestion, rhinorrhea and sore throat  Eyes: Negative for photophobia and visual disturbance  Respiratory: Negative for cough, chest tightness and shortness of breath      Cardiovascular: Negative for chest pain and palpitations  Gastrointestinal: Negative for abdominal pain, blood in stool, constipation, diarrhea, nausea and vomiting  Genitourinary: Negative for dysuria, frequency and hematuria  Musculoskeletal: Negative for back pain, gait problem, myalgias, neck pain and neck stiffness  Skin: Negative for pallor and rash  Neurological: Positive for headaches  Negative for dizziness, tremors, seizures, syncope, facial asymmetry, speech difficulty, weakness, light-headedness and numbness  Hematological: Negative for adenopathy  Does not bruise/bleed easily  All other systems reviewed and are negative  Physical Exam  ED Triage Vitals [08/07/18 1604]   Temperature Pulse Respirations Blood Pressure SpO2   98 1 °F (36 7 °C) 102 16 111/54 97 %      Temp Source Heart Rate Source Patient Position - Orthostatic VS BP Location FiO2 (%)   Oral -- -- -- --      Pain Score       Worst Possible Pain           Orthostatic Vital Signs  Vitals:    08/07/18 1604 08/07/18 1632 08/07/18 1634   BP: 111/54  127/64   Pulse: 102 96        Physical Exam   Constitutional: She is oriented to person, place, and time  No distress  Patient is alert and oriented, appears well and nontoxic, in no acute distress   HENT:   Head: Normocephalic and atraumatic  Mouth/Throat: Oropharynx is clear and moist  No oropharyngeal exudate  No hematomas or edema noted on head  Mild tenderness to palpation on left parietotemporal region    Eyes: Conjunctivae and EOM are normal  Pupils are equal, round, and reactive to light  Neck: Normal range of motion  Neck supple  Cardiovascular: Normal rate, regular rhythm, normal heart sounds and intact distal pulses  Pulmonary/Chest: Effort normal and breath sounds normal  No respiratory distress  Abdominal: Soft  Bowel sounds are normal  She exhibits no distension  There is no tenderness  There is no guarding  Musculoskeletal: Normal range of motion  She exhibits no edema     Lymphadenopathy: She has no cervical adenopathy  Neurological: She is alert and oriented to person, place, and time  Facial asymmetry noted- right sided facial muscles weaker which is patient's baseline, CN 2-12 intact, full ROM of LEFT upper and lower extremities, limited on right which is patient's baseline, muscle strength 5/5 throughout, DTRs normal, sensation intact throughout   Skin: Skin is warm and dry  Capillary refill takes less than 2 seconds  No rash noted  She is not diaphoretic  No erythema  No pallor  Psychiatric: She has a normal mood and affect  Her behavior is normal  Judgment and thought content normal    Nursing note and vitals reviewed  ED Medications  Medications   ibuprofen (MOTRIN) tablet 600 mg (600 mg Oral Given 8/7/18 1644)   famotidine (PEPCID) tablet 20 mg (20 mg Oral Given 8/7/18 1644)       Diagnostic Studies  Results Reviewed     None                 No orders to display         Procedures  Procedures      Phone Consults  ED Phone Contact    ED Course                               MDM  Number of Diagnoses or Management Options  Behavior related to cognitive impairment:   Head injury:   Diagnosis management comments: Impression: 43yo female presents for evaluation of head injury  Ddx: closed head injury  Plan: ibuprofen, pepcid, reassurance, pcp follow up     CritCare Time    Disposition  Final diagnoses:   Head injury   Behavior related to cognitive impairment     Time reflects when diagnosis was documented in both MDM as applicable and the Disposition within this note     Time User Action Codes Description Comment    8/7/2018  4:32 PM Marc Carreon Add [S09 [id number]] Head injury     8/7/2018  4:33 PM Marc Corona [R41 [id number]] Behavior related to cognitive impairment       ED Disposition     ED Disposition Condition Comment    Discharge  Quang Rm discharge to home/self care      Condition at discharge: Good        Follow-up Information     Follow up With Specialties Details [facility] [facility] Go in 2 days As needed, If symptoms worsen [address] [facility] [id number] [phone]          Patient's Medications   Discharge Prescriptions    No medications on file     No discharge procedures on file  ED Provider  Attending physically available and evaluated Thanh Shell I managed the patient along with the ED Attending      Electronically Signed by         Tiera Schreiber MD  08/07/18 8927

## 2018-08-08 ENCOUNTER — EVALUATION (OUTPATIENT)
Dept: PHYSICAL THERAPY | Facility: REHABILITATION | Age: 38
End: 2018-08-08
Payer: MEDICARE

## 2018-08-08 DIAGNOSIS — K59.00 CONSTIPATION, UNSPECIFIED CONSTIPATION TYPE: ICD-10-CM

## 2018-08-08 DIAGNOSIS — M54.50 ACUTE BILATERAL LOW BACK PAIN WITHOUT SCIATICA: Primary | ICD-10-CM

## 2018-08-08 DIAGNOSIS — R26.2 AMBULATORY DYSFUNCTION: ICD-10-CM

## 2018-08-08 PROCEDURE — G8978 MOBILITY CURRENT STATUS: HCPCS | Performed by: PHYSICAL THERAPIST

## 2018-08-08 PROCEDURE — 97110 THERAPEUTIC EXERCISES: CPT | Performed by: PHYSICAL THERAPIST

## 2018-08-08 PROCEDURE — G8979 MOBILITY GOAL STATUS: HCPCS | Performed by: PHYSICAL THERAPIST

## 2018-08-08 PROCEDURE — 97163 PT EVAL HIGH COMPLEX 45 MIN: CPT | Performed by: PHYSICAL THERAPIST

## 2018-08-08 NOTE — LETTER
August 14, 2018    Jailene Justin [address]    Patient: Juan Pablo Camilo   YOB: 1980   Date of Visit: 8/8/2018     Encounter Diagnosis     ICD-10-CM    1  Acute bilateral low back pain without sciatica M54 5    2  Ambulatory dysfunction R26 2        Dear Dr Danii Ames:    Please review the attached Plan of Care from [facility] recent visit  Please verify that you agree therapy should continue by signing the attached document and sending it back to our office  If you have any questions or concerns, please don't hesitate to call  Sincerely,    Dottie Bueno, PT      Referring Provider:      I certify that I have read the below Plan of Care and certify the need for these services furnished under this plan of treatment while under my care  Jailene Justin [address]  VIA Facsimile: [phone]          SUBJECTIVE:  HPI: Juan Pablo Camilo is a 43 y o  female referred to outpatient physical therapy for the following diagnosis   Encounter Diagnoses   Name Primary?  Acute bilateral low back pain without sciatica Yes    Ambulatory dysfunction        Alrapreeti Hernández, DO     Patient notes back pain and back muscle spasms  She doesn't know what to do about it  Unsure about onset, likely 8/01/18  Patient denies fall or injury to her back  Patient reports she went to her doctor and they reported her back was tight and that she was having muscle spasms  Back pain occurs with sitting down, when standing, when laying down  Pain occurs in the upper thoracic region to the lumbar region  10/10 pain  Denies lower extremity paresthesias or pain        Falls Hx: none  Home Environment: lives in group home  Patient goal: decrease back pain    Past Medical History:   Diagnosis Date    Anxiety     Brain lesion     Bronchitis     Cerebral palsy (HCC)     Cerebral palsy ([facility] [address] )     Last Assessed:7/6/2016    Chronically dry eyes, left     Last Assessed:7/6/2016    Depression     Last Assessed:7/6/2016    Depressive disorder     Fall     GERD (gastroesophageal reflux disease)     Mood disorder ([facility])        Current Outpatient Prescriptions:     AMITIZA 24 MCG capsule, TAKE 1 CAPSULE BY MOUTH TWICE DAILY AT 8A-8P (CONSTIPATION) *VIRI, Disp: 60 capsule, Rfl: 0    ARIPiprazole (ABILIFY) 20 MG tablet, Take 1 tablet (20 mg total) by mouth daily at bedtime for 30 days, Disp: 30 tablet, Rfl: 1    bacitracin topical ointment 500 units/g topical ointment, Apply 1 large application topically 2 (two) times a day, Disp: , Rfl:     calcium carbonate (TUMS) 500 mg chewable tablet, Chew 1 tablet 3 (three) times a day as needed  , Disp: , Rfl:     carbamide peroxide (DEBROX) 6 5 % otic solution, 5 drops 2 (two) times a day 8am on the 11,12, and 13th of the month, Disp: , Rfl:     Cholecalciferol (VITAMIN D-3 PO), Take 2,000 Units by mouth daily, Disp: , Rfl:     clotrimazole (LOTRIMIN) 1 % cream, Apply 1 g (1 application total) topically 3 (three) times a day as needed (fungal irritation), Disp: 30 g, Rfl: 5    escitalopram (LEXAPRO) 20 mg tablet, Take 1 tablet (20 mg total) by mouth daily for 30 days, Disp: 30 tablet, Rfl: 1    famotidine (PEPCID) 20 mg tablet, Take 1 tablet (20 mg total) by mouth 2 (two) times a day for 30 days At 7AM and 4PM (GERD), Disp: 60 tablet, Rfl: 0    GNP MILK OF MAGNESIA 1200 MG/15ML oral suspension, TAKE 2 TBSP (30ML) BY MOUTH DAILY AS NEEDED IF NO BM IN 3 DAYS (CONSTIPATION), Disp: 355 mL, Rfl: 0    ibuprofen (MOTRIN) 400 mg tablet, Take 1 tablet (400 mg total) by mouth every 8 (eight) hours as needed for mild pain, moderate pain or headaches for up to 30 days, Disp: 90 tablet, Rfl: 0    metoclopramide (REGLAN) 10 mg tablet, TAKE 1 TABLET BY MOUTH EVERY 8 HOURS AS NEEDED FOR NAUSEA *VIRI, Disp: 30 tablet, Rfl: 0    Mouthwashes (LISTERINE ANTISEPTIC) LIQD, Apply to the mouth or throat 2 (two) times a day, Disp: , Rfl:     Multiple Vitamins-Minerals (CERTAVITE/ANTIOXIDANTS) TABS, TAKE 1 TABLET BY MOUTH DAILY AT 8AM (SUPPLEMENT) JERRI, Disp: 31 tablet, Rfl: 0    norgestimate-ethinyl estradiol (ORTHO TRI-CYCLEN LO) 0 18/0 215/0 25 MG-25 MCG per tablet, Take 1 tablet by mouth daily, Disp: , Rfl:     pantoprazole (PROTONIX) 40 mg tablet, Take 1 tablet (40 mg total) by mouth daily for 90 days, Disp: 90 tablet, Rfl: 0    polyethylene glycol (MIRALAX) 17 g packet, Take 17gm (1 packet) daily for first three days, then daily as needed for no bowel movement more than 24 hrs (Patient taking differently: Take 1 g by mouth daily as needed Take 17gm (1 packet) daily for first three days, then daily as needed for no bowel movement more than 24 hrs ), Disp: 30 each, Rfl: 0    RA SUNSCREEN SPF50 LOTN, Apply 15 minutes before sun exposure and every 2 hours, Disp: 1 Bottle, Rfl: 5    senna-docusate sodium (SENEXON-S) 8 6-50 mg per tablet, Take 1 tablet by mouth daily, Disp: 30 tablet, Rfl: 5    sucralfate (CARAFATE) 1 g/10 mL suspension, Take 1 g by mouth 4 (four) times a day as needed  , Disp: , Rfl:     traZODone (DESYREL) 50 mg tablet, Take 50 mg by mouth daily at bedtime, Disp: , Rfl:     zinc oxide (DESITIN) 13 % cream, Apply 1 application topically as needed (for perianal irritation), Disp: 1 Tube, Rfl: 0    OBJECTIVE:  Neck range of motion within functional limits, limited in end-range neck rotation  5-/5 hip flexion B/L  4/5 knee extension B/L  No active R dorsiflexion, 4/5 L  Unsure of MMT knee flexion, variable resistance    Repeated trunk flexion, seated, x 10, decreased back pain  Reports decreased back pain with repeated seated trunk extension  Denies pain Well Leg Raise B/L  Denies pain with bridging    Moderate tightness hip external rotators    Tender to palpation about the sacrum, denies pain with palpation about lumbar and thoracic paraspinals and erector spinae  ASSESSMENT:  Patient with acute onset low back pain  No radicular pain is present  Patient unable to relate back pain to any specific incident and denies recent history of falling, but continue to monitor  We will assess mobility as back pain resolves and patient tolerates more mobility  Further referral needed No      SHORT-TERM GOALS:  1  Patient reports at worst 5/10 resting back pain in 2 weeks  2  Patient denies activity restrictions related to low back pain in 1 month  LONG-TERM GOALS:  1  Patient evaluated and improves in dynamic balance within 2 months  2  Patient reports at worst 3/10 back pain in 2 months        PLAN OF CARE:  Patient will benefit from physical therapy 2 times per week for 2 months  Therapeutic exercise to include umbar stabilization in multiple positions, standing and walking tolerance  Neuromuscular re-education: evaluation in static and dynamic balance    Precautions - none    Specialty Daily Treatment Diary     Exercise Diary  8/08/18     Lumbar stabilization SLR, 10 each       Lumbar and hip stretching Hip ER stretch, 30 sec x 2 each  Trunk flexion stretch 10 sec x 5     Standing and walking endurance                                                                  Wojciech Lucia, PT  8/9/2018

## 2018-08-08 NOTE — PROGRESS NOTES
SUBJECTIVE:  HPI: Rmeigio Cox is a 43 y o  female referred to outpatient physical therapy for the following diagnosis   Encounter Diagnoses   Name Primary?  Acute bilateral low back pain without sciatica Yes    Ambulatory dysfunction        Tammi Licona,      Patient notes back pain and back muscle spasms  She doesn't know what to do about it  Unsure about onset, likely 8/01/18  Patient denies fall or injury to her back  Patient reports she went to her doctor and they reported her back was tight and that she was having muscle spasms  Back pain occurs with sitting down, when standing, when laying down  Pain occurs in the upper thoracic region to the lumbar region  10/10 pain  Denies lower extremity paresthesias or pain        Falls Hx: none  Home Environment: lives in group home  Patient goal: decrease back pain    Past Medical History:   Diagnosis Date    Anxiety     Brain lesion     Bronchitis     Cerebral palsy (HCC)     Cerebral palsy (HCC)     Last Assessed:7/6/2016    Chronically dry eyes, left     Last Assessed:7/6/2016    Depression     Last Assessed:7/6/2016    Depressive disorder     Fall     GERD (gastroesophageal reflux disease)     Mood disorder (HCC)        Current Outpatient Prescriptions:     AMITIZA 24 MCG capsule, TAKE 1 CAPSULE BY MOUTH TWICE DAILY AT 8A-8P (CONSTIPATION) *VIRI, Disp: 60 capsule, Rfl: 0    ARIPiprazole (ABILIFY) 20 MG tablet, Take 1 tablet (20 mg total) by mouth daily at bedtime for 30 days, Disp: 30 tablet, Rfl: 1    bacitracin topical ointment 500 units/g topical ointment, Apply 1 large application topically 2 (two) times a day, Disp: , Rfl:     calcium carbonate (TUMS) 500 mg chewable tablet, Chew 1 tablet 3 (three) times a day as needed  , Disp: , Rfl:     carbamide peroxide (DEBROX) 6 5 % otic solution, 5 drops 2 (two) times a day 8am on the 11,12, and 13th of the month, Disp: , Rfl:     Cholecalciferol (VITAMIN D-3 PO), Take 2,000 Units by mouth daily, Disp: , Rfl:     clotrimazole (LOTRIMIN) 1 % cream, Apply 1 g (1 application total) topically 3 (three) times a day as needed (fungal irritation), Disp: 30 g, Rfl: 5    escitalopram (LEXAPRO) 20 mg tablet, Take 1 tablet (20 mg total) by mouth daily for 30 days, Disp: 30 tablet, Rfl: 1    famotidine (PEPCID) 20 mg tablet, Take 1 tablet (20 mg total) by mouth 2 (two) times a day for 30 days At 7AM and 4PM (GERD), Disp: 60 tablet, Rfl: 0    GNP MILK OF MAGNESIA 1200 MG/15ML oral suspension, TAKE 2 TBSP (30ML) BY MOUTH DAILY AS NEEDED IF NO BM IN 3 DAYS (CONSTIPATION), Disp: 355 mL, Rfl: 0    ibuprofen (MOTRIN) 400 mg tablet, Take 1 tablet (400 mg total) by mouth every 8 (eight) hours as needed for mild pain, moderate pain or headaches for up to 30 days, Disp: 90 tablet, Rfl: 0    metoclopramide (REGLAN) 10 mg tablet, TAKE 1 TABLET BY MOUTH EVERY 8 HOURS AS NEEDED FOR NAUSEA *VIRI, Disp: 30 tablet, Rfl: 0    Mouthwashes (LISTERINE ANTISEPTIC) LIQD, Apply to the mouth or throat 2 (two) times a day, Disp: , Rfl:     Multiple Vitamins-Minerals (CERTAVITE/ANTIOXIDANTS) TABS, TAKE 1 TABLET BY MOUTH DAILY AT 8AM (SUPPLEMENT) JERRI, Disp: 31 tablet, Rfl: 0    norgestimate-ethinyl estradiol (ORTHO TRI-CYCLEN LO) 0 18/0 215/0 25 MG-25 MCG per tablet, Take 1 tablet by mouth daily, Disp: , Rfl:     pantoprazole (PROTONIX) 40 mg tablet, Take 1 tablet (40 mg total) by mouth daily for 90 days, Disp: 90 tablet, Rfl: 0    polyethylene glycol (MIRALAX) 17 g packet, Take 17gm (1 packet) daily for first three days, then daily as needed for no bowel movement more than 24 hrs (Patient taking differently: Take 1 g by mouth daily as needed Take 17gm (1 packet) daily for first three days, then daily as needed for no bowel movement more than 24 hrs ), Disp: 30 each, Rfl: 0    RA SUNSCREEN SPF50 LOTN, Apply 15 minutes before sun exposure and every 2 hours, Disp: 1 Bottle, Rfl: 5    senna-docusate sodium (SENEXON-S) 8 6-50 mg per tablet, Take 1 tablet by mouth daily, Disp: 30 tablet, Rfl: 5    sucralfate (CARAFATE) 1 g/10 mL suspension, Take 1 g by mouth 4 (four) times a day as needed  , Disp: , Rfl:     traZODone (DESYREL) 50 mg tablet, Take 50 mg by mouth daily at bedtime, Disp: , Rfl:     zinc oxide (DESITIN) 13 % cream, Apply 1 application topically as needed (for perianal irritation), Disp: 1 Tube, Rfl: 0    OBJECTIVE:  Neck range of motion within functional limits, limited in end-range neck rotation  5-/5 hip flexion B/L  4/5 knee extension B/L  No active R dorsiflexion, 4/5 L  Unsure of MMT knee flexion, variable resistance    Repeated trunk flexion, seated, x 10, decreased back pain  Reports decreased back pain with repeated seated trunk extension  Denies pain Well Leg Raise B/L  Denies pain with bridging    Moderate tightness hip external rotators    Tender to palpation about the sacrum, denies pain with palpation about lumbar and thoracic paraspinals and erector spinae  ASSESSMENT:  Patient with acute onset low back pain  No radicular pain is present  Patient unable to relate back pain to any specific incident and denies recent history of falling, but continue to monitor  We will assess mobility as back pain resolves and patient tolerates more mobility  Further referral needed No      SHORT-TERM GOALS:  1  Patient reports at worst 5/10 resting back pain in 2 weeks  2  Patient denies activity restrictions related to low back pain in 1 month  LONG-TERM GOALS:  1  Patient evaluated and improves in dynamic balance within 2 months  2  Patient reports at worst 3/10 back pain in 2 months        PLAN OF CARE:  Patient will benefit from physical therapy 2 times per week for 2 months  Therapeutic exercise to include umbar stabilization in multiple positions, standing and walking tolerance  Neuromuscular re-education: evaluation in static and dynamic balance    Precautions - none    Specialty Daily Treatment Diary     Exercise Diary  8/08/18     Lumbar stabilization SLR, 10 each       Lumbar and hip stretching Hip ER stretch, 30 sec x 2 each  Trunk flexion stretch 10 sec x 5     Standing and walking endurance                                                                  Kwadwo Pace, PT  8/9/2018

## 2018-08-09 ENCOUNTER — OFFICE VISIT (OUTPATIENT)
Dept: FAMILY MEDICINE CLINIC | Facility: CLINIC | Age: 38
End: 2018-08-09
Payer: MEDICARE

## 2018-08-09 VITALS
HEART RATE: 96 BPM | BODY MASS INDEX: 37.16 KG/M2 | HEIGHT: 58 IN | SYSTOLIC BLOOD PRESSURE: 118 MMHG | TEMPERATURE: 97.7 F | WEIGHT: 177 LBS | DIASTOLIC BLOOD PRESSURE: 76 MMHG | RESPIRATION RATE: 16 BRPM

## 2018-08-09 DIAGNOSIS — H61.23 BILATERAL IMPACTED CERUMEN: Primary | ICD-10-CM

## 2018-08-09 DIAGNOSIS — H61.22 IMPACTED CERUMEN OF LEFT EAR: ICD-10-CM

## 2018-08-09 PROBLEM — H61.20 CERUMEN IMPACTION: Status: ACTIVE | Noted: 2018-08-09

## 2018-08-09 PROCEDURE — 99213 OFFICE O/P EST LOW 20 MIN: CPT | Performed by: FAMILY MEDICINE

## 2018-08-09 RX ORDER — LUBIPROSTONE 24 UG/1
CAPSULE, GELATIN COATED ORAL
Qty: 60 CAPSULE | Refills: 0 | Status: SHIPPED | OUTPATIENT
Start: 2018-08-09 | End: 2018-09-10 | Stop reason: SDUPTHER

## 2018-08-09 NOTE — PROGRESS NOTES
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 here for cerumen removal   No other complaints today  The following portions of the patient's history were reviewed and updated as appropriate: allergies, current medications, past family history, past medical history, past social history, past surgical history and problem list     Review of Systems   Constitutional: Negative for fever  HENT: Negative for sinus pain, trouble swallowing and voice change  Cardiovascular: Negative for chest pain  Gastrointestinal: Negative for abdominal pain  Musculoskeletal: Negative for arthralgias  Neurological: Negative for dizziness  Hematological: Negative for adenopathy  Psychiatric/Behavioral: Negative for agitation  Objective:    Vitals:    08/09/18 1251   BP: 118/76   Pulse: 96   Resp: 16   Temp: 97 7 °F (36 5 °C)        Physical Exam   Constitutional: She appears well-developed and well-nourished  No distress  HENT:   Head: Normocephalic  Right Ear: Hearing, tympanic membrane, external ear and ear canal normal    Left Ear: External ear normal    Mouth/Throat: Oropharynx is clear and moist  No oropharyngeal exudate  Left EAR: ear canal occluded by wax   Eyes: Conjunctivae are normal    Cardiovascular: Normal rate, regular rhythm, normal heart sounds and intact distal pulses  Exam reveals no gallop and no friction rub  No murmur heard  Pulmonary/Chest: Effort normal and breath sounds normal  No respiratory distress  She has no wheezes  Abdominal: Soft  She exhibits no distension  There is no tenderness  Neurological: She is alert  Skin: Skin is warm and dry  No rash noted  She is not diaphoretic  No pallor  Psychiatric: She has a normal mood and affect  Vitals reviewed

## 2018-08-10 ENCOUNTER — OFFICE VISIT (OUTPATIENT)
Dept: AUDIOLOGY | Age: 38
End: 2018-08-10
Payer: MEDICARE

## 2018-08-10 DIAGNOSIS — H90.3 SENSORINEURAL HEARING LOSS, BILATERAL: Primary | ICD-10-CM

## 2018-08-10 DIAGNOSIS — E55.9 VITAMIN D DEFICIENCY: Primary | ICD-10-CM

## 2018-08-10 PROCEDURE — 92567 TYMPANOMETRY: CPT | Performed by: AUDIOLOGIST

## 2018-08-10 PROCEDURE — 92557 COMPREHENSIVE HEARING TEST: CPT | Performed by: AUDIOLOGIST

## 2018-08-10 NOTE — PROGRESS NOTES
HEARING EVALUATION    Name:  Thanh Shell  :  1980  Age:  43 y o  Date of Evaluation: 08/10/18     History: Annual  Reason for visit: Thanh Shell is being seen today at the request of Dr Bri Hazel for an evaluation of hearing  Patient reports no issues or concerns   EVALUATION:    Otoscopic Evaluation:   Right Ear: Clear and healthy ear canal and tympanic membrane   Left Ear: Clear and healthy ear canal and tympanic membrane    Tympanometry:   Right: Type A - normal middle ear pressure and compliance   Left: Type B - middle ear disorder    Audiogram Results:  Pure tone testing revealed a mild flat to moderate sensorineural hearing loss bilaterally  SRT and PTA are in agreement indicating good test reliability  Word recognition scores were good bilaterally  *see attached audiogram      RECOMMENDATIONS:  Annual hearing eval, Return to [facility]  for F/U and Copy to Patient/Caregiver    PATIENT EDUCATION:   Discussed results and recommendations with patient  Questions were addressed and the patient was encouraged to contact our department should concerns arise        Indigo Bonner , CCC-A  Clinical Audiologist

## 2018-08-13 RX ORDER — CHOLECALCIFEROL (VITAMIN D3) 25 MCG
2000 CAPSULE ORAL DAILY
Qty: 180 CAPSULE | Refills: 0 | Status: SHIPPED | OUTPATIENT
Start: 2018-08-13 | End: 2018-09-10 | Stop reason: SDUPTHER

## 2018-08-15 ENCOUNTER — APPOINTMENT (OUTPATIENT)
Dept: PHYSICAL THERAPY | Facility: REHABILITATION | Age: 38
End: 2018-08-15
Payer: MEDICARE

## 2018-08-22 ENCOUNTER — APPOINTMENT (OUTPATIENT)
Dept: PHYSICAL THERAPY | Facility: REHABILITATION | Age: 38
End: 2018-08-22
Payer: MEDICARE

## 2018-08-24 ENCOUNTER — APPOINTMENT (OUTPATIENT)
Dept: PHYSICAL THERAPY | Facility: REHABILITATION | Age: 38
End: 2018-08-24
Payer: MEDICARE

## 2018-08-27 ENCOUNTER — OFFICE VISIT (OUTPATIENT)
Dept: PHYSICAL THERAPY | Facility: REHABILITATION | Age: 38
End: 2018-08-27
Payer: MEDICARE

## 2018-08-27 DIAGNOSIS — R26.2 AMBULATORY DYSFUNCTION: ICD-10-CM

## 2018-08-27 DIAGNOSIS — M54.50 ACUTE BILATERAL LOW BACK PAIN WITHOUT SCIATICA: Primary | ICD-10-CM

## 2018-08-27 PROCEDURE — 97110 THERAPEUTIC EXERCISES: CPT | Performed by: PHYSICAL THERAPIST

## 2018-08-27 NOTE — PROGRESS NOTES
SUBJECTIVE:  HPI: Paulla Lundborg is a 43 y o  female referred to outpatient physical therapy for the following diagnosis   Encounter Diagnoses   Name Primary?  Acute bilateral low back pain without sciatica Yes    Ambulatory dysfunction        Obed Chavis, DO      Patient reports tightness about the left sacral/buttock area  OBJECTIVE:    Precautions - none    Specialty Daily Treatment Diary     Exercise Diary  8/08/18 8/27/18    Lumbar stabilization SLR, 10 each   hooklying unilateral bent knee raise, 15 each    hooklying SLR, 10 x 2 sets each    hooklying bridge, 15 x 2 sets        Lumbar and hip stretching Hip ER stretch, 30 sec x 2 each  Trunk flexion stretch 10 sec x 5 Seated trunk flexion stretch 20 sec x 3    hooklying hip flexor stretch 30 sec x 3 each x 2 sets          Standing and walking endurance      LE strengthening  Step ups 6" step 15 x 2 sets each  Standing hip abduction, light theraband, 10 each            Seated hot pack to lumbar spin, 10 min                                          ASSESSMENT:  Patient present for first follow-up, reviewed and progressed written home exercise program, assess for return demonstration next visit  Further referral needed No      SHORT-TERM GOALS:  1  Patient reports at worst 5/10 resting back pain in 2 weeks  2  Patient denies activity restrictions related to low back pain in 1 month  LONG-TERM GOALS:  1  Patient evaluated and improves in dynamic balance within 2 months  2  Patient reports at worst 3/10 back pain in 2 months        PLAN OF CARE:  Patient will benefit from physical therapy 2 times per week for 2 months  Therapeutic exercise to include umbar stabilization in multiple positions, standing and walking tolerance  Neuromuscular re-education: evaluation in static and dynamic balance    Leny Monterroso, PT  8/27/2018

## 2018-08-29 ENCOUNTER — OFFICE VISIT (OUTPATIENT)
Dept: PHYSICAL THERAPY | Facility: REHABILITATION | Age: 38
End: 2018-08-29
Payer: MEDICARE

## 2018-08-29 ENCOUNTER — APPOINTMENT (OUTPATIENT)
Dept: PHYSICAL THERAPY | Facility: REHABILITATION | Age: 38
End: 2018-08-29
Payer: MEDICARE

## 2018-08-29 DIAGNOSIS — R26.2 AMBULATORY DYSFUNCTION: ICD-10-CM

## 2018-08-29 DIAGNOSIS — M54.50 ACUTE BILATERAL LOW BACK PAIN WITHOUT SCIATICA: Primary | ICD-10-CM

## 2018-08-29 PROCEDURE — 97110 THERAPEUTIC EXERCISES: CPT

## 2018-08-29 NOTE — PROGRESS NOTES
Daily Note     Today's date: 2018  Patient name: Bogdan Aguiar  : 1980  MRN: [id number]  Referring provider: Jamilah Young DO  Dx:   Encounter Diagnosis     ICD-10-CM    1  Acute bilateral low back pain without sciatica M54 5    2  Ambulatory dysfunction R26 2        Start Time: 1500  Stop Time: 1538  Total time in clinic (min): 38 minutes    Subjective: Pt reports she forgot her HEP and wishes to review exercises      Objective: See treatment diary below  Provided pt w/ printout of HEP    Exercise Diary  18   Lumbar stabilization SLR, 10 each   hooklying unilateral bent knee raise, 15 each    hooklying SLR, 10 x 2 sets each    hooklying bridge, 15 x 2 sets     hooklying unilateral bent knee raise, 15x2 each    hooklying SLR, 15 x 2 sets each    hooklying bridge, 15 x 2 sets   Lumbar and hip stretching Hip ER stretch, 30 sec x 2 each  Trunk flexion stretch 10 sec x 5 Seated trunk flexion stretch 20 sec x 3    hooklying hip flexor stretch 30 sec x 3 each x 2 sets       Seated trunk flexion stretch 20 sec x 3    hooklying hip flexor stretch 30 sec x 3sets     Standing and walking endurance      LE strengthening  Step ups 6" step 15 x 2 sets each  Standing hip abduction, light theraband, 10 each Step ups 6" step 15 x 2 sets each  STS w/ arms crossed x10           Seated hot pack to lumbar spin, 10 min        Assessment: Tolerated treatment well  Pt stays on task but requires continuous verbal cues to complete exercises with correct form  Patient would benefit from continued PT      Plan: Continue per plan of care  Progress lumbar stabilization exercises to pt tolerance

## 2018-08-31 ENCOUNTER — HOSPITAL ENCOUNTER (EMERGENCY)
Facility: HOSPITAL | Age: 38
Discharge: HOME | End: 2018-08-31
Attending: EMERGENCY MEDICINE | Admitting: EMERGENCY MEDICINE
Payer: MEDICARE

## 2018-08-31 VITALS
RESPIRATION RATE: 18 BRPM | DIASTOLIC BLOOD PRESSURE: 65 MMHG | OXYGEN SATURATION: 97 % | HEIGHT: 64 IN | HEART RATE: 96 BPM | WEIGHT: 160 LBS | SYSTOLIC BLOOD PRESSURE: 130 MMHG | BODY MASS INDEX: 27.31 KG/M2 | TEMPERATURE: 97.7 F

## 2018-08-31 DIAGNOSIS — R45.4 OUTBURSTS OF ANGER: ICD-10-CM

## 2018-08-31 DIAGNOSIS — R45.4 ANGER REACTION: Primary | ICD-10-CM

## 2018-08-31 PROCEDURE — 99284 EMERGENCY DEPT VISIT MOD MDM: CPT

## 2018-08-31 NOTE — ED ATTENDING ATTESTATION
Pinky Garibay DO, saw and evaluated the patient  I have discussed the patient with the resident/non-physician practitioner and agree with the resident's/non-physician practitioner's findings, Plan of Care, and MDM as documented in the resident's/non-physician practitioner's note, except where noted  All available labs and Radiology studies were reviewed  At this point I agree with the current assessment done in the Emergency Department  I have conducted an independent evaluation of this patient including a focused history and a physical exam     ED Note - Esperanza Falcon 43 y o  female MRN: [id number]  Unit/Bed#: ED 07 Encounter: [id number]    History of Present Illness   HPI  Esperanza Falcon is a 43 y o  female who presents for evaluation After striking herself in the head when she became frustrated  Patient does have developmental delay and does live in a group home  Patient did not strike any hard objects  No complaints at this time  No other concerns  Patient is well-appearing  REVIEW OF SYSTEMS  See HPI for further details  12 systems reviewed and otherwise negative except as noted     Historical Information     PAST MEDICAL HISTORY  Past Medical History:   Diagnosis Date    Anxiety     Brain lesion     Bronchitis     Cerebral palsy (HCC)     Cerebral palsy (HCC)     Last Assessed:7/6/2016    Chronically dry eyes, left     Last Assessed:7/6/2016    Depression     Last Assessed:7/6/2016    Depressive disorder     Fall     GERD (gastroesophageal reflux disease)     Mood disorder ([facility] [address] )        FAMILY HISTORY  Family History   Problem Relation Age of Onset    Diabetes Mother     No Known Problems Father        SOCIAL HISTORY  Social History     Social History    Marital status: Single     Spouse name: N/A    Number of children: N/A    Years of education: N/A     Social History Main Topics    Smoking status: Never Smoker    Smokeless tobacco: Never Used    Alcohol use No    Drug use: No    Sexual activity: No     Other Topics Concern    None     Social History Narrative    Always uses seat belt    Lives in group home       SURGICAL HISTORY  Past Surgical History:   Procedure Laterality Date    CSF SHUNT      Creation of Ventriculo-Peritoneal CSF shunt ; Last Assessed:7/6/2016    EAR SURGERY      Last Assessed:7/6/2016    LEG SURGERY      due to CP     NOSE SURGERY      Last Assessed:7/6/2016     Meds/Allergies     CURRENT MEDICATIONS  No current facility-administered medications for this encounter       Current Outpatient Prescriptions:     AMITIZA 24 MCG capsule, TAKE 1 CAPSULE BY MOUTH TWICE DAILY AT 8A-8P (CONSTIPATION) *VIRI, Disp: 60 capsule, Rfl: 0    ARIPiprazole (ABILIFY) 20 MG tablet, Take 1 tablet (20 mg total) by mouth daily at bedtime for 30 days, Disp: 30 tablet, Rfl: 1    Cholecalciferol (VITAMIN D-3) 1000 units CAPS, Take 2 capsules (2,000 Units total) by mouth daily for 90 days, Disp: 180 capsule, Rfl: 0    escitalopram (LEXAPRO) 20 mg tablet, Take 1 tablet (20 mg total) by mouth daily for 30 days, Disp: 30 tablet, Rfl: 1    famotidine (PEPCID) 20 mg tablet, Take 1 tablet (20 mg total) by mouth 2 (two) times a day for 30 days At 7AM and 4PM (GERD), Disp: 60 tablet, Rfl: 0    metoclopramide (REGLAN) 10 mg tablet, TAKE 1 TABLET BY MOUTH EVERY 8 HOURS AS NEEDED FOR NAUSEA *VIRI Disp: 30 tablet, Rfl: 0    Multiple Vitamins-Minerals (CERTAVITE/ANTIOXIDANTS) TABS, TAKE 1 TABLET BY MOUTH DAILY AT 8AM (SUPPLEMENT) JERRI, Disp: 31 tablet, Rfl: 0    norgestimate-ethinyl estradiol (ORTHO TRI-CYCLEN LO) 0 18/0 215/0 25 MG-25 MCG per tablet, Take 1 tablet by mouth daily, Disp: , Rfl:     pantoprazole (PROTONIX) 40 mg tablet, Take 1 tablet (40 mg total) by mouth daily for 90 days, Disp: 90 tablet, Rfl: 0    senna-docusate sodium (SENEXON-S) 8 6-50 mg per tablet, Take 1 tablet by mouth daily, Disp: 30 tablet, Rfl: 5    traZODone (DESYREL) 50 mg tablet, Take 50 mg by mouth daily at bedtime, Disp: , Rfl:     bacitracin topical ointment 500 units/g topical ointment, Apply 1 large application topically 2 (two) times a day, Disp: , Rfl:     calcium carbonate (TUMS) 500 mg chewable tablet, Chew 1 tablet 3 (three) times a day as needed  , Disp: , Rfl:     carbamide peroxide (DEBROX) 6 5 % otic solution, 5 drops 2 (two) times a day 8am on the 11,12, and 13th of the month, Disp: , Rfl:     clotrimazole (LOTRIMIN) 1 % cream, Apply 1 g (1 application total) topically 3 (three) times a day as needed (fungal irritation), Disp: 30 g, Rfl: 5    GNP MILK OF MAGNESIA 1200 MG/15ML oral suspension, TAKE 2 TBSP (30ML) BY MOUTH DAILY AS NEEDED IF NO BM IN 3 DAYS (CONSTIPATION), Disp: 355 mL, Rfl: 0    ibuprofen (MOTRIN) 400 mg tablet, Take 1 tablet (400 mg total) by mouth every 8 (eight) hours as needed for mild pain, moderate pain or headaches for up to 30 days, Disp: 90 tablet, Rfl: 0    Mouthwashes (LISTERINE ANTISEPTIC) LIQD, Apply to the mouth or throat 2 (two) times a day, Disp: , Rfl:     polyethylene glycol (MIRALAX) 17 g packet, Take 17gm (1 packet) daily for first three days, then daily as needed for no bowel movement more than 24 hrs (Patient taking differently: Take 1 g by mouth daily as needed Take 17gm (1 packet) daily for first three days, then daily as needed for no bowel movement more than 24 hrs ), Disp: 30 each, Rfl: 0    RA SUNSCREEN SPF50 LOTN, Apply 15 minutes before sun exposure and every 2 hours, Disp: 1 Bottle, Rfl: 5    sucralfate (CARAFATE) 1 g/10 mL suspension, Take 1 g by mouth 4 (four) times a day as needed  , Disp: , Rfl:     zinc oxide (DESITIN) 13 % cream, Apply 1 application topically as needed (for perianal irritation), Disp: 1 Tube, Rfl: 0    (Not in a hospital admission)    ALLERGIES  No Known Allergies  Objective     PHYSICAL EXAM    VITAL SIGNS: Blood pressure 130/65, pulse 96, temperature 97 7 °F (36 5 °C), temperature source Oral, resp  rate 18, height 5' 4" (1 626 m), weight 72 6 kg (160 lb), SpO2 97 %  Constitutional:  Appears well developed and well nourished, no acute distress, non-toxic appearance   Eyes:  PERRL, EOMI, conjunctivae pink, sclerae non-icteric, no nystagmus    HENT:  Normocephalic/Atraumatic, no rhinorrhea, mucous membranes moist  Neck: normal range of motion, no tenderness, supple   Respiratory:  No respiratory distress, normal breath sounds   Cardiovascular:  Normal rate, normal rhythm   GI:  Soft, non-tender, non-distended   :  No CVAT, no flank ecchymosis  Musculoskeletal:  No swelling or edema, no tenderness, no deformities  Integument:  Pink, warm, dry, Well hydrated, no rash, no erythema, no bullae   Lymphatic:  No cervical/ tonsillar/ submandibular lymphadenopathy noted   Neurologic:  Awake, Alert & oriented x 2, CN 2-12 intact, no focal neurological deficits  Psychiatric:  Speech and behavior appropriate       ED COURSE and MDM:    Assessment/Plan   Assessment:  Hannah Sue is a 43 y o  female presents for evaluation after striking herself when becoming frustrated  Plan:  Symptom management      CRITICAL CARE TIME: 0 minutes      Portions of the record may have been created with voice recognition software  Occasional wrong word or "sound a like" substitutions may have occurred due to the inherent limitations of voice recognition software       ED Provider  Electronically Signed by

## 2018-08-31 NOTE — ED PROVIDER NOTES
History  Chief Complaint   Patient presents with    Agitation     Pt reported hitting herself in the head several times due to being frustrated     58-year-old female with past medical history of cerebral palsy and intellectual disability who is presenting from her group home with an angry outburst   History is provided by both the patient and her caretaker, Smita Mccarthy reports that the patient resides in a group home with 2 other patients who are on 24 hour one-to-one observation  At shift change in the morning, the night time caretaker was discussing the patient's behavior  The patient overheard the discussion and became frustrated  She hit herself in the head multiple times out of frustration  Patient admits to being frustrated but denies any suicidal ideation  Patient has no known history of suicide attempts or depression  Patient states that when she gets frustrated she "hears voices" that tell her to hit herself  However, she denies any consistent command hallucinations  No visual hallucinations  Patient reports feeling mildly anxious  Review of systems is otherwise negative  Assessment and plan:  Behavioral outburst in a 58-year-old female with known history of intellectual disability  Patient admitted to feeling frustrated but denied any suicidal ideation, homicidal ideation, or any other acute psychiatric issues  Patient may be discharged home  Prior to Admission Medications   Prescriptions Last Dose Informant Patient Reported? Taking?    AMITIZA 24 MCG capsule 8/31/2018 at Unknown time  No Yes   Sig: TAKE 1 CAPSULE BY MOUTH TWICE DAILY AT 8A-8P (CONSTIPATION) *VIRI   ARIPiprazole (ABILIFY) 20 MG tablet 8/30/2018 at Unknown time  No Yes   Sig: Take 1 tablet (20 mg total) by mouth daily at bedtime for 30 days   Cholecalciferol (VITAMIN D-3) 1000 units CAPS 8/31/2018 at Unknown time  No Yes   Sig: Take 2 capsules (2,000 Units total) by mouth daily for 90 days   GNP MILK OF MAGNESIA 1200 MG/15ML oral suspension Unknown at Unknown time Outside Facility (Specify) No No   Sig: TAKE 2 TBSP (30ML) BY MOUTH DAILY AS NEEDED IF NO BM IN 3 DAYS (CONSTIPATION)   Mouthwashes (LISTERINE ANTISEPTIC) LIQD Unknown at Unknown time Care Giver Yes No   Sig: Apply to the mouth or throat 2 (two) times a day   Multiple Vitamins-Minerals (CERTAVITE/ANTIOXIDANTS) TABS 2018 at Unknown time  No Yes   Sig: TAKE 1 TABLET BY MOUTH DAILY AT 8AM (SUPPLEMENT) JERRI   RA SUNSCREEN SPF50 LOTN Unknown at Unknown time Care Giver No No   Sig: Apply 15 minutes before sun exposure and every 2 hours   bacitracin topical ointment 500 units/g topical ointment Unknown at Unknown time Care Giver Yes No   Sig: Apply 1 large application topically 2 (two) times a day   calcium carbonate (TUMS) 500 mg chewable tablet Unknown at Unknown time Outside Facility (Specify) Yes No   Sig: Chew 1 tablet 3 (three) times a day as needed     carbamide peroxide (DEBROX) 6 5 % otic solution Unknown at Unknown time  Yes No   Si drops 2 (two) times a day 8am on the ,12, and 13 of the month   clotrimazole (LOTRIMIN) 1 % cream Unknown at Unknown time Outside Facility (Specify) No No   Sig: Apply 1 g (1 application total) topically 3 (three) times a day as needed (fungal irritation)   escitalopram (LEXAPRO) 20 mg tablet 2018 at Unknown time  No Yes   Sig: Take 1 tablet (20 mg total) by mouth daily for 30 days   famotidine (PEPCID) 20 mg tablet 2018 at Unknown time Outside Facility (Specify) No Yes   Sig: Take 1 tablet (20 mg total) by mouth 2 (two) times a day for 30 days At 7AM and 4PM (GERD)   ibuprofen (MOTRIN) 400 mg tablet  Care Giver No No   Sig: Take 1 tablet (400 mg total) by mouth every 8 (eight) hours as needed for mild pain, moderate pain or headaches for up to 30 days   metoclopramide (REGLAN) 10 mg tablet 2018 at Unknown time Care Giver No Yes   Sig: TAKE 1 TABLET BY MOUTH EVERY 8 HOURS AS NEEDED FOR NAUSEA *VIRI   norgestimate-ethinyl estradiol (ORTHO TRI-CYCLEN LO) 0 18/0 215/0 25 MG-25 MCG per tablet 8/31/2018 at Unknown time  Yes Yes   Sig: Take 1 tablet by mouth daily   pantoprazole (PROTONIX) 40 mg tablet 8/30/2018 at Unknown time Care Giver No Yes   Sig: Take 1 tablet (40 mg total) by mouth daily for 90 days   polyethylene glycol (MIRALAX) 17 g packet Unknown at Unknown time Care Giver No No   Sig: Take 17gm (1 packet) daily for first three days, then daily as needed for no bowel movement more than 24 hrs   Patient taking differently: Take 1 g by mouth daily as needed Take 17gm (1 packet) daily for first three days, then daily as needed for no bowel movement more than 24 hrs    senna-docusate sodium (SENEXON-S) 8 6-50 mg per tablet 8/31/2018 at Unknown time Outside Facility (Specify) No Yes   Sig: Take 1 tablet by mouth daily   sucralfate (CARAFATE) 1 g/10 mL suspension Unknown at Unknown time Care Giver Yes No   Sig: Take 1 g by mouth 4 (four) times a day as needed     traZODone (DESYREL) 50 mg tablet 8/30/2018 at Unknown time Care Giver Yes Yes   Sig: Take 50 mg by mouth daily at bedtime   zinc oxide (DESITIN) 13 % cream Unknown at Unknown time Care Giver No No   Sig: Apply 1 application topically as needed (for perianal irritation)      Facility-Administered Medications: None       Past Medical History:   Diagnosis Date    Anxiety     Brain lesion     Bronchitis     Cerebral palsy (HCC)     Cerebral palsy (HCC)     Last Assessed:7/6/2016    Chronically dry eyes, left     Last Assessed:7/6/2016    Depression     Last Assessed:7/6/2016    Depressive disorder     Fall     GERD (gastroesophageal reflux disease)     Mood disorder (HCC)        Past Surgical History:   Procedure Laterality Date    CSF SHUNT      Creation of Ventriculo-Peritoneal CSF shunt ;  Last Assessed:7/6/2016    EAR SURGERY      Last Assessed:7/6/2016    LEG SURGERY      due to CP     NOSE SURGERY      Last Assessed:7/6/2016       Family History   Problem Relation Age of Onset    Diabetes Mother     No Known Problems Father      I have reviewed and agree with the history as documented  Social History   Substance Use Topics    Smoking status: Never Smoker    Smokeless tobacco: Never Used    Alcohol use No        Review of Systems   Constitutional: Negative for diaphoresis, fever and unexpected weight change  HENT: Negative for congestion, rhinorrhea and sore throat  Eyes: Negative for pain, discharge and visual disturbance  Respiratory: Negative for cough, shortness of breath and wheezing  Cardiovascular: Negative for chest pain, palpitations and leg swelling  Gastrointestinal: Negative for abdominal pain, blood in stool, constipation, diarrhea, nausea and vomiting  Genitourinary: Negative for dysuria, flank pain and hematuria  Musculoskeletal: Negative for arthralgias and joint swelling  Skin: Negative for rash and wound  Allergic/Immunologic: Negative for environmental allergies and food allergies  Neurological: Negative for dizziness, seizures, weakness and numbness  Hematological: Negative for adenopathy  Psychiatric/Behavioral: Positive for agitation and behavioral problems  Negative for confusion, hallucinations, self-injury and suicidal ideas  Physical Exam  ED Triage Vitals [08/31/18 0839]   Temperature Pulse Respirations Blood Pressure SpO2   97 7 °F (36 5 °C) 96 18 130/65 97 %      Temp Source Heart Rate Source Patient Position - Orthostatic VS BP Location FiO2 (%)   Oral Monitor Lying Left arm --      Pain Score       2           Orthostatic Vital Signs  Vitals:    08/31/18 0839   BP: 130/65   Pulse: 96   Patient Position - Orthostatic VS: Lying       Physical Exam   Constitutional: She is oriented to person, place, and time  She appears well-developed and well-nourished  No distress  HENT:   Head: Normocephalic and atraumatic     Right Ear: External ear normal    Left Ear: External ear normal    Small abrasion to the right nasal bridge without any palpable bony crepitus  Eyes: Conjunctivae and EOM are normal  Pupils are equal, round, and reactive to light  Neck: Normal range of motion  Neck supple  Cardiovascular: Normal rate, regular rhythm and normal heart sounds  No murmur heard  Pulmonary/Chest: Effort normal and breath sounds normal  No respiratory distress  She has no wheezes  She has no rales  Abdominal: Soft  Bowel sounds are normal  She exhibits no distension  There is no tenderness  There is no guarding  Musculoskeletal: Normal range of motion  She exhibits no deformity  Neurological: She is alert and oriented to person, place, and time  Skin: Skin is warm and dry  Capillary refill takes less than 2 seconds  Psychiatric: She has a normal mood and affect  Her behavior is normal  Thought content normal    Patient answers questions appropriately  She is calm and cooperative  Denies any suicidal ideation or suicide attempts  Nursing note and vitals reviewed  ED Medications  Medications - No data to display    Diagnostic Studies  Results Reviewed     None                 No orders to display         Procedures  Procedures      Phone Consults  ED Phone Contact    ED Course  ED Course as of Aug 31 1929   Fri Aug 31, 2018   0920 Blood Pressure: 130/65   0920 Temperature: 97 7 °F (36 5 °C)   0920 Pulse: 96   0920 Respirations: 18   0920 SpO2: 97 %                               MDM  Number of Diagnoses or Management Options  Anger reaction: established and worsening  Outbursts of anger: established and worsening  Diagnosis management comments:     Patient presented from her group home with an angry outburst   Patient hit herself in the head multiple times out of frustration  History was provided by the patient's caretaker as well as the patient  Patient denied any suicidal ideation  Patient has no history of suicide attempt    Overall, this episode likely represents an episode of frustration and anger which the patient was not able to control  Patient does not represent an imminent danger to herself or to others  We will discharge the patient  Amount and/or Complexity of Data Reviewed  Decide to obtain previous medical records or to obtain history from someone other than the patient: yes  Obtain history from someone other than the patient: yes  Review and summarize past medical records: yes    Risk of Complications, Morbidity, and/or Mortality  Presenting problems: minimal  Diagnostic procedures: minimal  Management options: minimal    Patient Progress  Patient progress: improved    CritCare Time    Disposition  Final diagnoses:   Anger reaction   Outbursts of anger     Time reflects when diagnosis was documented in both MDM as applicable and the Disposition within this note     Time User Action Codes Description Comment    8/31/2018 10:49 AM Tamara Feeling Add [R45 4] Anger reaction     8/31/2018 10:49 AM Tamara Feeling Add [R45 4] Outbursts of anger       ED Disposition     ED Disposition Condition Comment    Discharge  Sanya Figueroa discharge to home/self care  Condition at discharge: Good        Follow-up Information     Follow up With Specialties Details Why Contact Info    [facility]  Call As needed   [phone]            Discharge Medication List as of 8/31/2018 10:50 AM      CONTINUE these medications which have NOT CHANGED    Details   AMITIZA 24 MCG capsule TAKE 1 CAPSULE BY MOUTH TWICE DAILY AT 8A-8P (CONSTIPATION) *VIRI, Normal      ARIPiprazole (ABILIFY) 20 MG tablet Take 1 tablet (20 mg total) by mouth daily at bedtime for 30 days, Starting Tue 7/17/2018, Until Fri 8/31/2018, Print      Cholecalciferol (VITAMIN D-3) 1000 units CAPS Take 2 capsules (2,000 Units total) by mouth daily for 90 days, Starting Mon 8/13/2018, Until Sun 11/11/2018, Normal      escitalopram (LEXAPRO) 20 mg tablet Take 1 tablet (20 mg total) by mouth daily for 30 days, Starting Wed 7/18/2018, Until Fri 8/31/2018, Print      famotidine (PEPCID) 20 mg tablet Take 1 tablet (20 mg total) by mouth 2 (two) times a day for 30 days At 7AM and 4PM (GERD), Starting Thu 5/17/2018, Until Fri 8/31/2018, Normal      metoclopramide (REGLAN) 10 mg tablet TAKE 1 TABLET BY MOUTH EVERY 8 HOURS AS NEEDED FOR NAUSEA *ZETATIANAZUSKI, Normal      Multiple Vitamins-Minerals (CERTAVITE/ANTIOXIDANTS) TABS TAKE 1 TABLET BY MOUTH DAILY AT 8AM (SUPPLEMENT) ZEMBRUZUSKI, Normal      norgestimate-ethinyl estradiol (ORTHO TRI-CYCLEN LO) 0 18/0 215/0 25 MG-25 MCG per tablet Take 1 tablet by mouth daily, Historical Med      pantoprazole (PROTONIX) 40 mg tablet Take 1 tablet (40 mg total) by mouth daily for 90 days, Starting Thu 5/17/2018, Until Fri 8/31/2018, Normal      senna-docusate sodium (SENEXON-S) 8 6-50 mg per tablet Take 1 tablet by mouth daily, Starting Fri 5/4/2018, Normal      traZODone (DESYREL) 50 mg tablet Take 50 mg by mouth daily at bedtime, Historical Med      bacitracin topical ointment 500 units/g topical ointment Apply 1 large application topically 2 (two) times a day, Historical Med      calcium carbonate (TUMS) 500 mg chewable tablet Chew 1 tablet 3 (three) times a day as needed  , Historical Med      carbamide peroxide (DEBROX) 6 5 % otic solution 5 drops 2 (two) times a day 8am on the 11,12, and 13th of the month, Historical Med      clotrimazole (LOTRIMIN) 1 % cream Apply 1 g (1 application total) topically 3 (three) times a day as needed (fungal irritation), Starting Wed 5/9/2018, Normal      GNP MILK OF MAGNESIA 1200 MG/15ML oral suspension TAKE 2 TBSP (30ML) BY MOUTH DAILY AS NEEDED IF NO BM IN 3 DAYS (CONSTIPATION), Normal      ibuprofen (MOTRIN) 400 mg tablet Take 1 tablet (400 mg total) by mouth every 8 (eight) hours as needed for mild pain, moderate pain or headaches for up to 30 days, Starting Thu 5/17/2018, Until Sat 6/16/2018, Normal      Mouthwashes (LISTERINE ANTISEPTIC) LIQD Apply to the mouth or throat 2 (two) times a day, Historical Med      polyethylene glycol (MIRALAX) 17 g packet Take 17gm (1 packet) daily for first three days, then daily as needed for no bowel movement more than 24 hrs, Normal      RA SUNSCREEN SPF50 LOTN Apply 15 minutes before sun exposure and every 2 hours, Normal      sucralfate (CARAFATE) 1 g/10 mL suspension Take 1 g by mouth 4 (four) times a day as needed  , Historical Med      zinc oxide (DESITIN) 13 % cream Apply 1 application topically as needed (for perianal irritation), Starting Thu 5/17/2018, Normal           No discharge procedures on file  ED Provider  Attending physically available and evaluated Rahat Narayan I managed the patient along with the ED Attending      Electronically Signed by         Sheree Swartz MD  08/31/18 4683

## 2018-08-31 NOTE — DISCHARGE INSTRUCTIONS
Colletta Pipes was evaluated for an angry outburst today  Please return to the ED if she experiences any suicidal thoughts or actions

## 2018-09-04 ENCOUNTER — OFFICE VISIT (OUTPATIENT)
Dept: URGENT CARE | Facility: MEDICAL CENTER | Age: 38
End: 2018-09-04
Payer: MEDICARE

## 2018-09-04 VITALS
RESPIRATION RATE: 18 BRPM | HEART RATE: 114 BPM | DIASTOLIC BLOOD PRESSURE: 66 MMHG | SYSTOLIC BLOOD PRESSURE: 128 MMHG | WEIGHT: 176 LBS | TEMPERATURE: 98 F | HEIGHT: 59 IN | OXYGEN SATURATION: 95 % | BODY MASS INDEX: 35.48 KG/M2

## 2018-09-04 DIAGNOSIS — Z00.00 HEALTH CARE MAINTENANCE: ICD-10-CM

## 2018-09-04 DIAGNOSIS — A08.4 VIRAL GASTROENTERITIS: Primary | ICD-10-CM

## 2018-09-04 PROCEDURE — 99213 OFFICE O/P EST LOW 20 MIN: CPT | Performed by: FAMILY MEDICINE

## 2018-09-04 PROCEDURE — G0463 HOSPITAL OUTPT CLINIC VISIT: HCPCS | Performed by: FAMILY MEDICINE

## 2018-09-04 RX ORDER — FOLIC ACID/MV,IRON,MIN/LUTEIN 0.4-18-25
TABLET ORAL
Qty: 30 TABLET | Refills: 0 | Status: SHIPPED | OUTPATIENT
Start: 2018-09-04 | End: 2018-09-10 | Stop reason: SDUPTHER

## 2018-09-04 NOTE — LETTER
September 4, 2018     Patient: Mady Villalobos   YOB: 1980   Date of Visit: 9/4/2018       To Whom it May Concern:    Cher Brady was seen in my clinic on 9/4/2018  Please practice good hygiene   May alternate Tylenol and Ibuprofen as needed for discomfort  Encourage fluids and rest    Maintain a healthy diet with fruits and vegetables   Avoid fast food and fried food   Advance diet as tolerated  Gatorade or Pedialyte as supplementation  Avoid dairy products until symptoms resolve  F/U with PCP if symptoms persist/worsen or go to nearest emergency department if any signs of dehydration             Sincerely,          Fabi Eugene MD        CC: No Recipients

## 2018-09-05 ENCOUNTER — OFFICE VISIT (OUTPATIENT)
Dept: PHYSICAL THERAPY | Facility: REHABILITATION | Age: 38
End: 2018-09-05
Payer: MEDICARE

## 2018-09-05 DIAGNOSIS — M54.50 ACUTE BILATERAL LOW BACK PAIN WITHOUT SCIATICA: Primary | ICD-10-CM

## 2018-09-05 DIAGNOSIS — R26.2 AMBULATORY DYSFUNCTION: ICD-10-CM

## 2018-09-05 PROCEDURE — 97110 THERAPEUTIC EXERCISES: CPT | Performed by: PHYSICAL THERAPIST

## 2018-09-05 NOTE — PATIENT INSTRUCTIONS
Acute Nausea and Vomiting   AMBULATORY CARE:     Please do not give her constipation medication for the next 24 hours, Senokot  Can resume medication on Thursday   If symptoms persist and do not resolve by tomorrow, please follow up with PCP    Acute nausea and vomiting  starts suddenly, gets worse quickly, and lasts a short time  Common causes include pregnancy, alcohol, infection, and medicines  A head injury, heart attack, or inner ear imbalance can also cause acute nausea and vomiting  Seek care immediately if:   · You see blood in your vomit or your bowel movements  · You have sudden, severe pain in your chest and upper abdomen after hard vomiting or retching  · You have swelling in your neck and chest      · You are dizzy, cold, and thirsty and your eyes and mouth are dry  · You are urinating very little or not at all  · You have muscle weakness, leg cramps, and trouble breathing  · Your heart is beating much faster than normal      · You continue to vomit for more than 48 hours  Contact your healthcare provider if:   · You have frequent dry heaves (vomiting but nothing comes out)  · Your nausea and vomiting does not get better or go away after you use medicine  · You have questions or concerns about your condition or treatment  Treatment for acute nausea and vomiting  may include medicines to calm your stomach and stop the vomiting  You may need IV fluids if you are dehydrated  Prevent or manage acute nausea and vomiting:   · Do not drink alcohol  Alcohol may upset or irritate your stomach  Too much alcohol can also cause acute nausea and vomiting  · Control stress  Headaches due to stress may cause nausea and vomiting  Find ways to relax and manage your stress  Get more rest and sleep  · Drink more liquids as directed  Vomiting can lead to dehydration  It is important to drink more liquids to help replace lost body fluids   Ask your healthcare provider how much liquid to drink each day and which liquids are best for you  Your provider may recommend that you drink an oral rehydration solution (ORS)  ORS contains water, salts, and sugar that are needed to replace the lost body fluids  Ask what kind of ORS to use, how much to drink, and where to get it  · Eat smaller meals, more often  Eat small amounts of food every 2 to 3 hours, even if you are not hungry  Food in your stomach may decrease your nausea  · Talk to your healthcare provider before you take over-the-counter (OTC) medicines  These medicines can cause serious problems if you use certain other medicines, or you have a medical condition  You may have problems if you use too much or use them for longer than the label says  Follow directions on the label carefully  Follow up with your healthcare provider as directed:  Write down your questions so you remember to ask them during your visits  © 2017 [employer] Information is for End User's use only and may not be sold, redistributed or otherwise used for commercial purposes  All illustrations and images included in CareNotes® are the copyrighted property of [employer]  or [employer]  The above information is an  only  It is not intended as medical advice for individual conditions or treatments  Talk to your doctor, nurse or pharmacist before following any medical regimen to see if it is safe and effective for you

## 2018-09-05 NOTE — PROGRESS NOTES
[facility]        NAME: Gomez Jeffries is a 43 y o  female  : 1980    MRN: [id number]  DATE: 2018  TIME: 9:47 PM    Assessment and Plan   Viral gastroenteritis [A08 4]  1  Viral gastroenteritis         Chief Complaint     Chief Complaint   Patient presents with    Diarrhea     x 1 episode @ 8241 today; some mild abdominal cramping present     Patient with acute diarrhea and vomiting secondary to viral gastroenteritis  Patient currently taking Senokot for constipation  Caregiver was apprised to hold off Senokot for next 24 hours  May resume medication after tomorrow  If symptoms resolve  May alternate Tylenol and Ibuprofen as needed for discomfort  Encourage fluids and rest    Maintain BRAT diet  Advance diet as tolerated  Gatorade or Pedialyte as supplementation  Avoid dairy products until symptoms resolve  F/U with PCP if symptoms persist/worsen or go to nearest emergency department if any signs of dehydration  History of Present Illness       Patient is accompanied by caregiver  She is from a group home  She had 1 episode of nausea and vomiting yesterday which was nonbilious and nonbloody  She had 1 episode of loose stool today which was nonbloody and without any mucus  She does complain of mild stomach discomfort  Denies sore throat, chest pain, SOB, cough, fevers, chills and other constitutional symptoms         Diarrhea    Pertinent negatives include no vomiting  Review of Systems   Review of Systems   Constitutional: Negative for fatigue  HENT: Negative for facial swelling and voice change  Eyes: Negative for visual disturbance  Respiratory: Negative for shortness of breath  Cardiovascular: Negative for chest pain and palpitations  Gastrointestinal: Positive for diarrhea and nausea  Negative for abdominal distention, blood in stool and vomiting  Musculoskeletal: Negative for gait problem     Skin: Negative for color change and rash    Neurological: Negative for facial asymmetry and speech difficulty  Psychiatric/Behavioral: Negative for confusion and dysphoric mood  The patient is not nervous/anxious            Current Medications       Current Outpatient Prescriptions:     AMITIZA 24 MCG capsule, TAKE 1 CAPSULE BY MOUTH TWICE DAILY AT 8A-8P (CONSTIPATION) *VIRI, Disp: 60 capsule, Rfl: 0    ARIPiprazole (ABILIFY) 20 MG tablet, Take 1 tablet (20 mg total) by mouth daily at bedtime for 30 days, Disp: 30 tablet, Rfl: 1    bacitracin topical ointment 500 units/g topical ointment, Apply 1 large application topically 2 (two) times a day, Disp: , Rfl:     calcium carbonate (TUMS) 500 mg chewable tablet, Chew 1 tablet 3 (three) times a day as needed  , Disp: , Rfl:     carbamide peroxide (DEBROX) 6 5 % otic solution, 5 drops 2 (two) times a day 8am on the 11,12, and 13th of the month, Disp: , Rfl:     Cholecalciferol (VITAMIN D-3) 1000 units CAPS, Take 2 capsules (2,000 Units total) by mouth daily for 90 days, Disp: 180 capsule, Rfl: 0    clotrimazole (LOTRIMIN) 1 % cream, Apply 1 g (1 application total) topically 3 (three) times a day as needed (fungal irritation), Disp: 30 g, Rfl: 5    escitalopram (LEXAPRO) 20 mg tablet, Take 1 tablet (20 mg total) by mouth daily for 30 days, Disp: 30 tablet, Rfl: 1    famotidine (PEPCID) 20 mg tablet, Take 1 tablet (20 mg total) by mouth 2 (two) times a day for 30 days At 7AM and 4PM (GERD), Disp: 60 tablet, Rfl: 0    GNP MILK OF MAGNESIA 1200 MG/15ML oral suspension, TAKE 2 TBSP (30ML) BY MOUTH DAILY AS NEEDED IF NO BM IN 3 DAYS (CONSTIPATION), Disp: 355 mL, Rfl: 0    ibuprofen (MOTRIN) 400 mg tablet, Take 1 tablet (400 mg total) by mouth every 8 (eight) hours as needed for mild pain, moderate pain or headaches for up to 30 days, Disp: 90 tablet, Rfl: 0    metoclopramide (REGLAN) 10 mg tablet, TAKE 1 TABLET BY MOUTH EVERY 8 HOURS AS NEEDED FOR NAUSEA *VIRI, Disp: 30 tablet, Rfl: 0   Mouthwashes (LISTERINE ANTISEPTIC) LIQD, Apply to the mouth or throat 2 (two) times a day, Disp: , Rfl:     Multiple Vitamins-Minerals (CERTAVITE/ANTIOXIDANTS) TABS, TAKE 1 TABLET BY MOUTH DAILY AT 8AM (SUPPLEMENT) JERRI, Disp: 30 tablet, Rfl: 0    norgestimate-ethinyl estradiol (ORTHO TRI-CYCLEN LO) 0 18/0 215/0 25 MG-25 MCG per tablet, Take 1 tablet by mouth daily, Disp: , Rfl:     pantoprazole (PROTONIX) 40 mg tablet, Take 1 tablet (40 mg total) by mouth daily for 90 days, Disp: 90 tablet, Rfl: 0    polyethylene glycol (MIRALAX) 17 g packet, Take 17gm (1 packet) daily for first three days, then daily as needed for no bowel movement more than 24 hrs (Patient taking differently: Take 1 g by mouth daily as needed Take 17gm (1 packet) daily for first three days, then daily as needed for no bowel movement more than 24 hrs ), Disp: 30 each, Rfl: 0    RA SUNSCREEN SPF50 LOTN, Apply 15 minutes before sun exposure and every 2 hours, Disp: 1 Bottle, Rfl: 5    senna-docusate sodium (SENEXON-S) 8 6-50 mg per tablet, Take 1 tablet by mouth daily, Disp: 30 tablet, Rfl: 5    sucralfate (CARAFATE) 1 g/10 mL suspension, Take 1 g by mouth 4 (four) times a day as needed  , Disp: , Rfl:     traZODone (DESYREL) 50 mg tablet, Take 50 mg by mouth daily at bedtime, Disp: , Rfl:     zinc oxide (DESITIN) 13 % cream, Apply 1 application topically as needed (for perianal irritation), Disp: 1 Tube, Rfl: 0    Current Allergies     Allergies as of 09/04/2018    (No Known Allergies)            The following portions of the patient's history were reviewed and updated as appropriate: allergies, current medications, past family history, past medical history, past social history, past surgical history and problem list      Past Medical History:   Diagnosis Date    Anxiety     Brain lesion     Bronchitis     Cerebral palsy (HCC)     Cerebral palsy (HCC)     Last Assessed:7/6/2016    Chronically dry eyes, left     Last Assessed:7/6/2016    Depression     Last Assessed:7/6/2016    Depressive disorder     Fall     GERD (gastroesophageal reflux disease)     Mood disorder [facility])        Past Surgical History:   Procedure Laterality Date    CSF SHUNT      Creation of Ventriculo-Peritoneal CSF shunt ; Last Assessed:7/6/2016    EAR SURGERY      Last Assessed:7/6/2016    LEG SURGERY      due to CP     NOSE SURGERY      Last Assessed:7/6/2016       Family History   Problem Relation Age of Onset    Diabetes Mother     No Known Problems Father          Medications have been verified  Objective   /66   Pulse (!) 114   Temp 98 °F (36 7 °C)   Resp 18   Ht 4' 10 5" (1 486 m)   Wt 79 8 kg (176 lb)   SpO2 95%   BMI 36 16 kg/m²        Physical Exam     Physical Exam   Constitutional: She is oriented to person, place, and time  She appears well-developed and well-nourished  HENT:   Head: Normocephalic and atraumatic  Eyes: Conjunctivae are normal    Neck: Neck supple  Cardiovascular: Normal rate and regular rhythm  Pulmonary/Chest: Effort normal and breath sounds normal  No respiratory distress  Abdominal: Soft  Bowel sounds are normal  She exhibits no distension and no mass  There is tenderness  There is no rebound and no guarding  Musculoskeletal: Normal range of motion  Neurological: She is alert and oriented to person, place, and time  Skin: Skin is warm and dry  Psychiatric: She has a normal mood and affect  Her behavior is normal    Nursing note and vitals reviewed

## 2018-09-05 NOTE — PROGRESS NOTES
Daily Note     Today's date: 2018  Patient name: Willis Patton  : 1980  MRN: [id number]  Referring provider: Alcides Goins DO  Dx:   Encounter Diagnosis     ICD-10-CM    1  Acute bilateral low back pain without sciatica M54 5    2  Ambulatory dysfunction R26 2        Subjective: Patient reports her back is getting a bit better  Objective: See treatment diary below  Provided pt w/ printout of HEP    Exercise Diary  18   Lumbar stabilization hooklying unilateral bent knee raise, 15 each    hooklying SLR, 10 x 2 sets each    hooklying bridge, 15 x 2 sets     hooklying unilateral bent knee raise, 15x2 each    hooklying SLR, 15 x 2 sets each    hooklying bridge, 15 x 2 sets hooklying SLR, 20 x 2 sets each    hooklying bridge, 20 x 2 sets    hooklying bilateral bent knee raise, 15 x 2 sets   Lumbar and hip stretching Seated trunk flexion stretch 20 sec x 3    hooklying hip flexor stretch 30 sec x 3 each x 2 sets       Seated trunk flexion stretch 20 sec x 3    hooklying hip flexor stretch 30 sec x 3sets   hooklying hip ER stretch, 30 sec x  3 each x 2 sets   Standing and walking endurance   Walking with rolling walker, 3 5 min   LE strengthening Step ups 6" step 15 x 2 sets each  Standing hip abduction, light theraband, 10 each Step ups 6" step 15 x 2 sets each  STS w/ arms crossed x10 Sit to stand, 17", 10 x 2 sets    Step ups 6" step 15 x 2 sets          Seated hot pack to lumbar spin, 10 min  Seated hot pack to lumbar spine, 10 min       Assessment: Slightly progressed lumbar stabilization exercises  Continue to progress as tolerated  Plan: Continue per plan of care  Progress lumbar stabilization exercises to pt tolerance

## 2018-09-06 ENCOUNTER — HOSPITAL ENCOUNTER (EMERGENCY)
Facility: HOSPITAL | Age: 38
Discharge: HOME | End: 2018-09-06
Attending: EMERGENCY MEDICINE
Payer: MEDICARE

## 2018-09-06 VITALS
HEIGHT: 64 IN | HEART RATE: 79 BPM | RESPIRATION RATE: 20 BRPM | OXYGEN SATURATION: 96 % | BODY MASS INDEX: 30.94 KG/M2 | TEMPERATURE: 97.8 F | WEIGHT: 181.22 LBS | DIASTOLIC BLOOD PRESSURE: 66 MMHG | SYSTOLIC BLOOD PRESSURE: 127 MMHG

## 2018-09-06 DIAGNOSIS — IMO0002 SELF-INFLICTED INJURY: Primary | ICD-10-CM

## 2018-09-06 PROCEDURE — 99283 EMERGENCY DEPT VISIT LOW MDM: CPT

## 2018-09-07 NOTE — DISCHARGE INSTRUCTIONS
Jame Eason did not have a concussion but below are the warning signs  She can take her medications as prescribed  Concussion   WHAT YOU NEED TO KNOW:   A concussion is a mild brain injury  It is usually caused by a bump or blow to the head from a fall, a motor vehicle crash, or a sports injury  Sometimes being shaken forcefully may cause a concussion  DISCHARGE INSTRUCTIONS:   Have someone else call 911 for the following:   · Someone tries to wake you and cannot do so  · You have a seizure, increasing confusion, or a change in personality  · Your speech becomes slurred, or you have new vision problems  Return to the emergency department if:   · You have a severe headache that does not go away  · You have arm or leg weakness, numbness, or new problems with coordination  · You have blood or clear fluid coming out of the ears or nose  Contact your healthcare provider if:   · You have nausea or are vomiting  · You feel more sleepy than usual     · Your symptoms get worse  · Your symptoms last longer than 6 weeks after the injury  · You have questions or concerns about your condition or care  Medicines:   · Acetaminophen  helps to decrease pain  It is available without a doctor's order  Ask how much to take and how often to take it  Follow directions  Acetaminophen can cause liver damage if not taken correctly  · NSAIDs , such as ibuprofen, help decrease swelling and pain  NSAIDs can cause stomach bleeding or kidney problems in certain people  If you take blood thinner medicine, always ask your healthcare provider if NSAIDs are safe for you  Always read the medicine label and follow directions  · Take your medicine as directed  Contact your healthcare provider if you think your medicine is not helping or if you have side effects  Tell him or her if you are allergic to any medicine  Keep a list of the medicines, vitamins, and herbs you take   Include the amounts, and when and why you take them  Bring the list or the pill bottles to follow-up visits  Carry your medicine list with you in case of an emergency  Follow up with your healthcare provider as directed:  Write down your questions so you remember to ask them during your visits  Self-care:   · Rest  from physical and mental activities as directed  Mental activities are those that require thinking, concentration, and attention  You will need to rest until your symptoms are gone  Rest will allow you to recover from your concussion  Ask your healthcare provider when you can return to work and other daily activities  · Have someone stay with you for the first 24 hours after your injury  Your healthcare provider should be contacted if your symptoms get worse, or you develop new symptoms  · Do not participate in sports and physical activities until your healthcare provider says it is okay  They could make your symptoms worse or lead to another concussion  Your healthcare provider will tell you when it is okay for you to return to sports or physical activities  Prevent another concussion:   · Wear protective sports equipment that fit properly  Helmets help decrease your risk of a serious brain injury  Talk to your healthcare provider about ways you can decrease your risk for a concussion if you play sports  · Wear your seat belt  every time you travel  This helps to decrease your risk of a head injury if you are in a car accident  © 2017 [facility] Information is for End User's use only and may not be sold, redistributed or otherwise used for commercial purposes  All illustrations and images included in CareNotes® are the copyrighted property of [employer] , [employer]  or [employer]pp[employer]  The above information is an  only  It is not intended as medical advice for individual conditions or treatments   Talk to your doctor, nurse or pharmacist before following any medical regimen to see if it is safe and effective for you

## 2018-09-07 NOTE — ED PROVIDER NOTES
History  Chief Complaint   Patient presents with    Head Injury     pt was having a behavior and hit herself in the head, sent to check for concussion  60-year-old female with history of cerebral palsy and  shunt presents to the emergency department after striking her head repeatedly while at her group home  Per , patient saw the calories in her potatoes and began striking her head with the palm of her hand on her right forehead  She has been acting appropriately since then has been eating and drinking normally has not had any illnesses recently  Patient has no complaints and facility stated that she needed evaluation whenever an incident like this occurs with concern for concussion  Discussed with staff that this was not a concussion and that the patient can be discharged home safely  History provided by:  Nursing home   used: No        Prior to Admission Medications   Prescriptions Last Dose Informant Patient Reported? Taking?    AMITIZA 24 MCG capsule 9/6/2018 at Unknown time  No Yes   Sig: TAKE 1 CAPSULE BY MOUTH TWICE DAILY AT 8A-8P (CONSTIPATION) *VIRI   ARIPiprazole (ABILIFY) 20 MG tablet   No No   Sig: Take 1 tablet (20 mg total) by mouth daily at bedtime for 30 days   Cholecalciferol (VITAMIN D-3) 1000 units CAPS 9/6/2018 at Unknown time  No Yes   Sig: Take 2 capsules (2,000 Units total) by mouth daily for 90 days   GNP MILK OF MAGNESIA 1200 MG/15ML oral suspension 9/6/2018 at Unknown time Outside Facility (Specify) No Yes   Sig: TAKE 2 TBSP (30ML) BY MOUTH DAILY AS NEEDED IF NO BM IN 3 DAYS (CONSTIPATION)   Mouthwashes (LISTERINE ANTISEPTIC) LIQD 9/6/2018 at Unknown time Care Giver Yes Yes   Sig: Apply to the mouth or throat 2 (two) times a day   Multiple Vitamins-Minerals (CERTAVITE/ANTIOXIDANTS) TABS 9/6/2018 at Unknown time  No Yes   Sig: TAKE 1 TABLET BY MOUTH DAILY AT 8AM (SUPPLEMENT) JERRI DODD SUNSCREEN SPF50 LOTN 9/6/2018 at Unknown time Care Giver No Yes   Sig: Apply 15 minutes before sun exposure and every 2 hours   bacitracin topical ointment 500 units/g topical ointment 2018 at Unknown time Care Giver Yes Yes   Sig: Apply 1 large application topically 2 (two) times a day   calcium carbonate (TUMS) 500 mg chewable tablet 2018 at Unknown time Outside Facility ([address] [place] [address]) Yes Yes   Sig: Chew 1 tablet 3 (three) times a day as needed     carbamide peroxide (DEBROX) 6 5 % otic solution 2018 at Unknown time  Yes Yes   Si drops 2 (two) times a day 8am on the ,12, and 13 of the month   clotrimazole (LOTRIMIN) 1 % cream 2018 at Unknown time Outside Facility (Specify) No Yes   Sig: Apply 1 g (1 application total) topically 3 (three) times a day as needed (fungal irritation)   escitalopram (LEXAPRO) 20 mg tablet   No No   Sig: Take 1 tablet (20 mg total) by mouth daily for 30 days   famotidine (PEPCID) 20 mg tablet  Outside Facility (Specify) No No   Sig: Take 1 tablet (20 mg total) by mouth 2 (two) times a day for 30 days At 7AM and 4PM (GERD)   ibuprofen (MOTRIN) 400 mg tablet  Care Giver No No   Sig: Take 1 tablet (400 mg total) by mouth every 8 (eight) hours as needed for mild pain, moderate pain or headaches for up to 30 days   metoclopramide (REGLAN) 10 mg tablet 2018 at Unknown time Care Giver No Yes   Sig: TAKE 1 TABLET BY MOUTH EVERY 8 HOURS AS NEEDED FOR NAUSEA *VIRI   norgestimate-ethinyl estradiol (ORTHO TRI-CYCLEN LO) 0 18/0 215/0 25 MG-25 MCG per tablet 2018 at Unknown time  Yes Yes   Sig: Take 1 tablet by mouth daily   pantoprazole (PROTONIX) 40 mg tablet  Care Giver No No   Sig: Take 1 tablet (40 mg total) by mouth daily for 90 days   polyethylene glycol (MIRALAX) 17 g packet 2018 at Unknown time Care Giver No Yes   Sig: Take 17gm (1 packet) daily for first three days, then daily as needed for no bowel movement more than 24 hrs   Patient taking differently: Take 1 g by mouth daily as needed Take 17gm (1 packet) daily for first three days, then daily as needed for no bowel movement more than 24 hrs    senna-docusate sodium (SENEXON-S) 8 6-50 mg per tablet 9/6/2018 at Unknown time Outside Facility (Specify) No Yes   Sig: Take 1 tablet by mouth daily   sucralfate (CARAFATE) 1 g/10 mL suspension 9/6/2018 at Unknown time Care Giver Yes Yes   Sig: Take 1 g by mouth 4 (four) times a day as needed     traZODone (DESYREL) 50 mg tablet 9/6/2018 at Unknown time Care Giver Yes Yes   Sig: Take 50 mg by mouth daily at bedtime   zinc oxide (DESITIN) 13 % cream 9/6/2018 at Unknown time Care Giver No Yes   Sig: Apply 1 application topically as needed (for perianal irritation)      Facility-Administered Medications: None       Past Medical History:   Diagnosis Date    Anxiety     Brain lesion     Bronchitis     Cerebral palsy (HCC)     Cerebral palsy (HCC)     Last Assessed:7/6/2016    Chronically dry eyes, left     Last Assessed:7/6/2016    Depression     Last Assessed:7/6/2016    Depressive disorder     Fall     GERD (gastroesophageal reflux disease)     Mood disorder ([facility] [address] )        Past Surgical History:   Procedure Laterality Date    CSF SHUNT      Creation of Ventriculo-Peritoneal CSF shunt ; Last Assessed:7/6/2016    EAR SURGERY      Last Assessed:7/6/2016    LEG SURGERY      due to CP     NOSE SURGERY      Last Assessed:7/6/2016       Family History   Problem Relation Age of Onset    Diabetes Mother     No Known Problems Father      I have reviewed and agree with the history as documented  Social History   Substance Use Topics    Smoking status: Never Smoker    Smokeless tobacco: Never Used    Alcohol use No        Review of Systems   Constitutional: Negative for chills and fever  HENT: Negative for sore throat  Eyes: Negative for visual disturbance  Respiratory: Negative for chest tightness, shortness of breath and wheezing  Cardiovascular: Negative for chest pain  Gastrointestinal: Negative for abdominal pain, constipation, diarrhea, nausea and vomiting  Genitourinary: Negative for dysuria and hematuria  Musculoskeletal: Negative for arthralgias and myalgias  Skin: Negative for color change  Neurological: Negative for light-headedness  Hematological: Negative for adenopathy  Psychiatric/Behavioral: Negative for agitation and behavioral problems  All other systems reviewed and are negative  Physical Exam  ED Triage Vitals [09/06/18 2024]   Temperature Pulse Respirations Blood Pressure SpO2   97 8 °F (36 6 °C) 84 20 124/66 98 %      Temp Source Heart Rate Source Patient Position - Orthostatic VS BP Location FiO2 (%)   Tympanic Monitor Sitting Left arm --      Pain Score       No Pain           Orthostatic Vital Signs  Vitals:    09/06/18 2024 09/06/18 2030   BP: 124/66 127/66   Pulse: 84 79   Patient Position - Orthostatic VS: Sitting Lying       Physical Exam   Constitutional: She appears well-developed and well-nourished  No distress  HENT:   Head: Normocephalic and atraumatic  Eyes: Conjunctivae and EOM are normal  No scleral icterus  Neck: Normal range of motion  Neck supple  Cardiovascular: Normal rate, regular rhythm and normal heart sounds  No murmur heard  Pulmonary/Chest: Effort normal and breath sounds normal  No respiratory distress  Abdominal: Soft  Bowel sounds are normal  There is no tenderness  Musculoskeletal: Normal range of motion  Neurological: She is alert  Skin: Skin is warm and dry  Psychiatric: She has a normal mood and affect  Her behavior is normal    Nursing note and vitals reviewed        ED Medications  Medications - No data to display    Diagnostic Studies  Results Reviewed     None                 No orders to display         Procedures  Procedures      Phone Consults  ED Phone Contact    ED Course                               MDM  Number of Diagnoses or Management Options  Self-inflicted injury: new and does not require workup  Diagnosis management comments: 78-year-old female presenting after striking her head multiple times with her palm, no workup necessary at this time will discharge back to group home  Amount and/or Complexity of Data Reviewed  Decide to obtain previous medical records or to obtain history from someone other than the patient: yes  Obtain history from someone other than the patient: yes  Review and summarize past medical records: yes      CritCare Time    Disposition  Final diagnoses:   Self-inflicted injury     Time reflects when diagnosis was documented in both MDM as applicable and the Disposition within this note     Time User Action Codes Description Comment    9/6/2018 11:24 PM Caryn Eros Add [L00 53] Self-inflicted injury       ED Disposition     ED Disposition Condition Comment    Discharge  Mady Villalobos discharge to home/self care      Condition at discharge: Stable        Follow-up Information     Follow up With Specialties Details Why Contact Info Additional Information    [employer]  Call  [phone]       [facility] Emergency Department Emergency Medicine Go to If symptoms worsen [address] [place] [address], [address] [place] [address], [id number]          Discharge Medication List as of 9/6/2018 11:26 PM      CONTINUE these medications which have NOT CHANGED    Details   AMITIZA 24 MCG capsule TAKE 1 CAPSULE BY MOUTH TWICE DAILY AT 8A-8P (CONSTIPATION) *ZEMBRZUSKI, Normal      bacitracin topical ointment 500 units/g topical ointment Apply 1 large application topically 2 (two) times a day, Historical Med      calcium carbonate (TUMS) 500 mg chewable tablet Chew 1 tablet 3 (three) times a day as needed  , Historical Med      carbamide peroxide (DEBROX) 6 5 % otic solution 5 drops 2 (two) times a day 8am on the 11,12, and 13th of the month, Historical Med      Cholecalciferol (VITAMIN D-3) 1000 units CAPS Take 2 capsules (2,000 Units total) by mouth daily for 90 days, Starting Mon 8/13/2018, Until Sun 11/11/2018, Normal      clotrimazole (LOTRIMIN) 1 % cream Apply 1 g (1 application total) topically 3 (three) times a day as needed (fungal irritation), Starting Wed 5/9/2018, Normal      GNP MILK OF MAGNESIA 1200 MG/15ML oral suspension TAKE 2 TBSP (30ML) BY MOUTH DAILY AS NEEDED IF NO BM IN 3 DAYS (CONSTIPATION), Normal      metoclopramide (REGLAN) 10 mg tablet TAKE 1 TABLET BY MOUTH EVERY 8 HOURS AS NEEDED FOR NAUSEA *VIRI, Normal      Mouthwashes (LISTERINE ANTISEPTIC) LIQD Apply to the mouth or throat 2 (two) times a day, Historical Med      Multiple Vitamins-Minerals (CERTAVITE/ANTIOXIDANTS) TABS TAKE 1 TABLET BY MOUTH DAILY AT 8AM (SUPPLEMENT) JERRI, Normal      norgestimate-ethinyl estradiol (ORTHO TRI-CYCLEN LO) 0 18/0 215/0 25 MG-25 MCG per tablet Take 1 tablet by mouth daily, Historical Med      polyethylene glycol (MIRALAX) 17 g packet Take 17gm (1 packet) daily for first three days, then daily as needed for no bowel movement more than 24 hrs, Normal      RA SUNSCREEN SPF50 LOTN Apply 15 minutes before sun exposure and every 2 hours, Normal      senna-docusate sodium (SENEXON-S) 8 6-50 mg per tablet Take 1 tablet by mouth daily, Starting Fri 5/4/2018, Normal      sucralfate (CARAFATE) 1 g/10 mL suspension Take 1 g by mouth 4 (four) times a day as needed  , Historical Med      traZODone (DESYREL) 50 mg tablet Take 50 mg by mouth daily at bedtime, Historical Med      zinc oxide (DESITIN) 13 % cream Apply 1 application topically as needed (for perianal irritation), Starting Thu 5/17/2018, Normal      ARIPiprazole (ABILIFY) 20 MG tablet Take 1 tablet (20 mg total) by mouth daily at bedtime for 30 days, Starting Tue 7/17/2018, Until Fri 8/31/2018, Print      escitalopram (LEXAPRO) 20 mg tablet Take 1 tablet (20 mg total) by mouth daily for 30 days, Starting Wed 7/18/2018, Until Fri 8/31/2018, Print famotidine (PEPCID) 20 mg tablet Take 1 tablet (20 mg total) by mouth 2 (two) times a day for 30 days At 7AM and 4PM (GERD), Starting u 5/17/2018, Until Fri 8/31/2018, Normal      ibuprofen (MOTRIN) 400 mg tablet Take 1 tablet (400 mg total) by mouth every 8 (eight) hours as needed for mild pain, moderate pain or headaches for up to 30 days, Starting Thu 5/17/2018, Until Sat 6/16/2018, Normal      pantoprazole (PROTONIX) 40 mg tablet Take 1 tablet (40 mg total) by mouth daily for 90 days, Starting u 5/17/2018, Until Fri 8/31/2018, Normal           No discharge procedures on file  ED Provider  Attending physically available and evaluated Estelita Chen I managed the patient along with the ED Attending      Electronically Signed by         Jonnie Gordon MD  09/07/18 [id number]

## 2018-09-07 NOTE — ED ATTENDING ATTESTATION
Geovani Dill MD, saw and evaluated the patient  I have discussed the patient with the resident/non-physician practitioner and agree with the resident's/non-physician practitioner's findings, Plan of Care, and MDM as documented in the resident's/non-physician practitioner's note, except where noted  All available labs and Radiology studies were reviewed  At this point I agree with the current assessment done in the Emergency Department  I have conducted an independent evaluation of this patient a history and physical is as follows:    OA: 42 y/o f with h/o CP with  shunt who hit herself in her head with her own hand after being upset when she learned of the number of calories in mashed potatoes  No LOC  No n/v  No complaints of visual changes  Per caretaker at bedside, pt routinely hits herself in the head, no associated injuries and at baseline  Tolerating PO  Protocol to bring to ED for evaluation  PE, well appearing f in NAD, VSs, Nc/AT, MMM, L sided  shunt, pupils reactive, neck supple with visible shunt, RR, lungs CTAB, abd soft, NT/ND, +Bs, moving extremities at baseline with RUE contracture, intact distal pulses and capillary refill < 2 sec  A/p pt in NAD, at baseline, no acute interventions, f/u and return precautions           Critical Care Time  CritCare Time    Procedures

## 2018-09-09 NOTE — PROGRESS NOTES
Estelita Chen 1980 female MRN: [id number]    Family Medicine Follow-up Visit    SUBJECTIVE    CC: Follow-up (care now )    HPI:  Estelita Chen is a 43 y o  female with Hx CP &  shunt who presented for a follow-up of HPI Urgent Care Visit  Patient was initially scheduled to f/u after at [address] on 9/4 for viral gastroenteritis, but prior to the visit was also seen in the ED on 9/6 for head banging behavior  She became upset and struck herself in the head several times with the palm of her hand  Was discharged from the ED following appropriate physical exam findings (no labs/imaging)  At the visit today, patient has no symptoms  She feels well and has no complaints  Review of Systems   Constitutional: Negative for activity change, chills and fever  HENT: Negative for congestion, rhinorrhea and sore throat  Eyes: Negative for visual disturbance  Respiratory: Negative for cough, shortness of breath and wheezing  Cardiovascular: Negative for chest pain and palpitations  Gastrointestinal: Negative for abdominal pain, blood in stool, constipation, diarrhea, nausea and vomiting  Genitourinary: Negative for dysuria  Musculoskeletal: Negative for arthralgias and myalgias  Skin: Negative for rash  Neurological: Negative for dizziness, weakness and headaches  All other systems reviewed and are negative  Historical Information     The patient history was reviewed as follows:    Past Medical History:   Diagnosis Date    Anxiety     Brain lesion     Bronchitis     Cerebral palsy (HCC)     Cerebral palsy (HCC)     Last Assessed:7/6/2016    Chronically dry eyes, left     Last Assessed:7/6/2016    Depression     Last Assessed:7/6/2016    Depressive disorder     Fall     GERD (gastroesophageal reflux disease)     [facility]      Past Surgical History:   Procedure Laterality Date    CSF SHUNT      Creation of Ventriculo-Peritoneal CSF shunt ;  Last Assessed:7/6/2016    EAR SURGERY      Last Assessed:7/6/2016    LEG SURGERY      due to CP     NOSE SURGERY      Last Assessed:7/6/2016     Family History   Problem Relation Age of Onset    Diabetes Mother     No Known Problems Father       Social History   History   Alcohol Use No     History   Drug Use No     History   Smoking Status    Never Smoker   Smokeless Tobacco    Never Used       Medications:   Meds/Allergies     Current Outpatient Prescriptions:     ARIPiprazole (ABILIFY) 20 MG tablet, Take 1 tablet (20 mg total) by mouth daily at bedtime for 30 days, Disp: 30 tablet, Rfl: 1    bacitracin topical ointment 500 units/g topical ointment, Apply 1 large application topically 2 (two) times a day, Disp: , Rfl:     calcium carbonate (TUMS) 500 mg chewable tablet, Chew 1 tablet 3 (three) times a day as needed  , Disp: , Rfl:     carbamide peroxide (DEBROX) 6 5 % otic solution, 5 drops 2 (two) times a day 8am on the 11,12, and 13th of the month, Disp: , Rfl:     Cholecalciferol (VITAMIN D-3) 1000 units CAPS, Take 2 capsules (2,000 Units total) by mouth daily, Disp: 180 capsule, Rfl: 3    clotrimazole (LOTRIMIN) 1 % cream, Apply 1 g (1 application total) topically 3 (three) times a day as needed (fungal irritation), Disp: 30 g, Rfl: 5    escitalopram (LEXAPRO) 20 mg tablet, Take 1 tablet (20 mg total) by mouth daily for 30 days, Disp: 30 tablet, Rfl: 1    famotidine (PEPCID) 20 mg tablet, Take 1 tablet (20 mg total) by mouth 2 (two) times a day for 30 days At 7AM and 4PM (GERD), Disp: 60 tablet, Rfl: 0    GNP MILK OF MAGNESIA 1200 MG/15ML oral suspension, TAKE 2 TBSP (30ML) BY MOUTH DAILY AS NEEDED IF NO BM IN 3 DAYS (CONSTIPATION), Disp: 355 mL, Rfl: 0    ibuprofen (MOTRIN) 400 mg tablet, Take 1 tablet (400 mg total) by mouth every 8 (eight) hours as needed for mild pain, moderate pain or headaches for up to 30 days, Disp: 90 tablet, Rfl: 0    lubiprostone (AMITIZA) 24 mcg capsule, Take 1 capsule (24 mcg total) by mouth 2 (two) times a day with meals, Disp: 180 capsule, Rfl: 3    metoclopramide (REGLAN) 10 mg tablet, TAKE 1 TABLET BY MOUTH EVERY 8 HOURS AS NEEDED FOR NAUSEA *VIRI, Disp: 30 tablet, Rfl: 0    Mouthwashes (LISTERINE ANTISEPTIC) LIQD, Apply to the mouth or throat 2 (two) times a day, Disp: , Rfl:     Multiple Vitamins-Minerals (CERTAVITE/ANTIOXIDANTS) TABS, Take 1 tablet by mouth daily, Disp: 90 tablet, Rfl: 3    norgestimate-ethinyl estradiol (ORTHO TRI-CYCLEN LO) 0 18/0 215/0 25 MG-25 MCG per tablet, Take 1 tablet by mouth daily, Disp: , Rfl:     polyethylene glycol (MIRALAX) 17 g packet, Take 17gm (1 packet) daily for first three days, then daily as needed for no bowel movement more than 24 hrs (Patient taking differently: Take 1 g by mouth daily as needed Take 17gm (1 packet) daily for first three days, then daily as needed for no bowel movement more than 24 hrs ), Disp: 30 each, Rfl: 0    RA SUNSCREEN SPF50 LOTN, Apply 15 minutes before sun exposure and every 2 hours, Disp: 1 Bottle, Rfl: 5    senna-docusate sodium (SENEXON-S) 8 6-50 mg per tablet, Take 1 tablet by mouth daily, Disp: 90 tablet, Rfl: 3    sucralfate (CARAFATE) 1 g/10 mL suspension, Take 1 g by mouth 4 (four) times a day as needed  , Disp: , Rfl:     traZODone (DESYREL) 50 mg tablet, Take 50 mg by mouth daily at bedtime, Disp: , Rfl:     zinc oxide (DESITIN) 13 % cream, Apply 1 application topically as needed (for perianal irritation), Disp: 1 Tube, Rfl: 0  No Known Allergies    OBJECTIVE    Vitals:   Vitals:    09/10/18 1316   BP: 110/70   Pulse: 86   Resp: 20   Temp: 98 2 °F (36 8 °C)   Weight: 81 7 kg (180 lb 3 2 oz)   Height: 5' 4" (1 626 m)     Wt Readings from Last 3 Encounters:   09/10/18 81 7 kg (180 lb 3 2 oz)   09/06/18 82 2 kg (181 lb 3 5 oz)   09/04/18 79 8 kg (176 lb)     Body mass index is 30 93 kg/m²    Temp Readings from Last 3 Encounters:   09/10/18 98 2 °F (36 8 °C)   09/06/18 97 8 °F (36 6 °C) (Tympanic)   09/04/18 98 °F (36 7 °C)     BP Readings from Last 3 Encounters:   09/10/18 110/70   09/06/18 127/66   09/04/18 128/66     Pulse Readings from Last 3 Encounters:   09/10/18 86   09/06/18 79   09/04/18 (!) 114     No LMP recorded  Patient is not currently having periods (Reason: Birth Control)  Physical Exam:    Physical Exam   Constitutional: Vital signs are normal  She appears well-developed and well-nourished  She does not appear ill  No distress  HENT:   Head: Normocephalic and atraumatic  Right Ear: External ear normal    Left Ear: External ear normal    Nose: Nose normal    Eyes: Conjunctivae, EOM and lids are normal    Neck: No JVD present  No tracheal deviation present  Cardiovascular: Intact distal pulses  Pulmonary/Chest: No accessory muscle usage  No respiratory distress  Abdominal: Normal appearance  Neurological: She is alert  Skin: No rash noted  She is not diaphoretic  Psychiatric: She has a normal mood and affect  Her speech is normal and behavior is normal  She expresses no suicidal ideation  Nursing note and vitals reviewed  Labs: I have personally reviewed all pertinent results  Imaging:  I have personally reviewed all pertinent results  Assessment/Plan   Esophageal reflux  Did discontinue Protonix as patient is currently without symptoms and has been on this therapy for a long time  Continue Pepcid 20mg BID  RTC if symptoms recur  Joon Adolfo was seen today for follow-up  Diagnoses and all orders for this visit:    Health care maintenance  -     Multiple Vitamins-Minerals (CERTAVITE/ANTIOXIDANTS) TABS; Take 1 tablet by mouth daily    Gastroesophageal reflux disease, esophagitis presence not specified  -     famotidine (PEPCID) 20 mg tablet;  Take 1 tablet (20 mg total) by mouth 2 (two) times a day for 30 days At 7AM and 4PM (GERD)    Constipation, unspecified constipation type  -     senna-docusate sodium (SENEXON-S) 8 6-50 mg per tablet; Take 1 tablet by mouth daily  -     lubiprostone (AMITIZA) 24 mcg capsule; Take 1 capsule (24 mcg total) by mouth 2 (two) times a day with meals    Vitamin D deficiency  -     Cholecalciferol (VITAMIN D-3) 1000 units CAPS; Take 2 capsules (2,000 Units total) by mouth daily    Pain  -     ibuprofen (MOTRIN) 400 mg tablet; Take 1 tablet (400 mg total) by mouth every 8 (eight) hours as needed for mild pain, moderate pain or headaches for up to 30 days    Renewed requested medications  Completed paperwork as requested by  for today's visit  - PCP: Cassandra Tellez DO  - Follow-up appointments:     Future Appointments  Date Time Provider Mariana Reese   9/12/2018 10:00 AM Remington Bazan, PT [place] [facility] [place] [facility] PT/OT W T   9/19/2018 11:30 AM Remington Bazan, PT [place] [facility] [place] [facility] PT/OT W T   9/26/2018 10:00 AM Remington Bazan, PT [place] [facility] [place] PT/OT W T   3/13/2019 1:00 PM Steve Johnson PA-C [facility]   4/5/2019 10:00 AM Ana Yates MD [facility]   7/31/2019 1:00 PM Torito Kilpatrick MD [facility] OB[facility]      _____________________________________________________________________   The attending physician, Dr Lotus Ferguson, agreed with the plan      Annalee Pham MD, PGY-3  [facility] Medicine   9/10/2018

## 2018-09-10 ENCOUNTER — OFFICE VISIT (OUTPATIENT)
Dept: FAMILY MEDICINE CLINIC | Facility: CLINIC | Age: 38
End: 2018-09-10
Payer: MEDICARE

## 2018-09-10 VITALS
SYSTOLIC BLOOD PRESSURE: 110 MMHG | DIASTOLIC BLOOD PRESSURE: 70 MMHG | WEIGHT: 180.2 LBS | RESPIRATION RATE: 20 BRPM | TEMPERATURE: 98.2 F | HEIGHT: 64 IN | BODY MASS INDEX: 30.77 KG/M2 | HEART RATE: 86 BPM

## 2018-09-10 DIAGNOSIS — E55.9 VITAMIN D DEFICIENCY: ICD-10-CM

## 2018-09-10 DIAGNOSIS — R52 PAIN: ICD-10-CM

## 2018-09-10 DIAGNOSIS — K59.00 CONSTIPATION, UNSPECIFIED CONSTIPATION TYPE: ICD-10-CM

## 2018-09-10 DIAGNOSIS — K21.9 GASTROESOPHAGEAL REFLUX DISEASE, ESOPHAGITIS PRESENCE NOT SPECIFIED: ICD-10-CM

## 2018-09-10 DIAGNOSIS — Z00.00 HEALTH CARE MAINTENANCE: ICD-10-CM

## 2018-09-10 PROBLEM — F42.4 SKIN PICKING HABIT: Status: ACTIVE | Noted: 2018-09-10

## 2018-09-10 PROCEDURE — 99213 OFFICE O/P EST LOW 20 MIN: CPT | Performed by: FAMILY MEDICINE

## 2018-09-10 RX ORDER — CHOLECALCIFEROL (VITAMIN D3) 25 MCG
2000 CAPSULE ORAL DAILY
Qty: 180 CAPSULE | Refills: 3 | Status: SHIPPED | OUTPATIENT
Start: 2018-09-10 | End: 2018-09-21 | Stop reason: SDUPTHER

## 2018-09-10 RX ORDER — AMOXICILLIN 250 MG
1 CAPSULE ORAL DAILY
Qty: 90 TABLET | Refills: 3 | Status: SHIPPED | OUTPATIENT
Start: 2018-09-10 | End: 2018-09-21 | Stop reason: SDUPTHER

## 2018-09-10 RX ORDER — IBUPROFEN 400 MG/1
400 TABLET ORAL EVERY 8 HOURS PRN
Qty: 90 TABLET | Refills: 0 | Status: SHIPPED | OUTPATIENT
Start: 2018-09-10 | End: 2018-12-06 | Stop reason: SDUPTHER

## 2018-09-10 RX ORDER — FAMOTIDINE 20 MG/1
20 TABLET, FILM COATED ORAL 2 TIMES DAILY
Qty: 60 TABLET | Refills: 0 | Status: SHIPPED | OUTPATIENT
Start: 2018-09-10 | End: 2018-10-08 | Stop reason: SDUPTHER

## 2018-09-10 RX ORDER — FOLIC ACID/MV,IRON,MIN/LUTEIN 0.4-18-25
1 TABLET ORAL DAILY
Qty: 90 TABLET | Refills: 3 | Status: SHIPPED | OUTPATIENT
Start: 2018-09-10 | End: 2019-04-10 | Stop reason: SDUPTHER

## 2018-09-10 NOTE — PATIENT INSTRUCTIONS

## 2018-09-10 NOTE — ASSESSMENT & PLAN NOTE
Did discontinue Protonix as patient is currently without symptoms and has been on this therapy for a long time  Continue Pepcid 20mg BID  RTC if symptoms recur

## 2018-09-11 ENCOUNTER — OFFICE VISIT (OUTPATIENT)
Dept: URGENT CARE | Facility: MEDICAL CENTER | Age: 38
End: 2018-09-11
Payer: MEDICARE

## 2018-09-11 VITALS
HEART RATE: 81 BPM | DIASTOLIC BLOOD PRESSURE: 72 MMHG | OXYGEN SATURATION: 98 % | HEIGHT: 59 IN | TEMPERATURE: 97.9 F | BODY MASS INDEX: 35.48 KG/M2 | SYSTOLIC BLOOD PRESSURE: 128 MMHG | RESPIRATION RATE: 18 BRPM | WEIGHT: 176 LBS

## 2018-09-11 DIAGNOSIS — R19.7 DIARRHEA, UNSPECIFIED TYPE: Primary | ICD-10-CM

## 2018-09-11 PROCEDURE — 99213 OFFICE O/P EST LOW 20 MIN: CPT | Performed by: FAMILY MEDICINE

## 2018-09-11 PROCEDURE — G0463 HOSPITAL OUTPT CLINIC VISIT: HCPCS | Performed by: FAMILY MEDICINE

## 2018-09-11 NOTE — LETTER
September 11, 2018     Patient: Bogdan Aguiar   YOB: 1980   Date of Visit: 9/11/2018       To Whom It May Concern: It is my medical opinion that Ana Hong may return to work on 9/12/2018  Sebas Brown should maintain a brat diet for 24 hours before advancing to normal diet  Brat diet should consist bread, rice, apples sauce, tea  It is my clinical opinion that she does not have viral gastroenteritis  If you have any questions or concerns, please don't hesitate to call           Sincerely,        Renee Mcmillan MD    CC: No Recipients

## 2018-09-11 NOTE — LETTER
September 11, 2018       Patient: Qamar Alcocer   YOB: 1980   Date of Visit: 9/11/2018       To Whom It May Concern: It is my medical opinion that Tony Nair may return to work on 9/12/2018  Jame Eason should maintain a brat diet for 24 hours before advancing to normal diet  If diarrhea persists beyond 24, should consult primary care physician  It is my clinical opinion that she does not have viral gastroenteritis  If you have any questions or concerns, please don't hesitate to call           Sincerely,        Yariel Conteh MD    CC: No Recipients

## 2018-09-11 NOTE — PATIENT INSTRUCTIONS
Patient asymptomatic at the present time  I doubt very much she has gastroenteritis  However patient placed on brat he diet for 24 hours and advance diet if resolved  If still diarrhea persist beyond 24 hours, follow-up per primary care provider  Acute Diarrhea   WHAT YOU NEED TO KNOW:   Acute diarrhea starts quickly and lasts a short time, usually 1 to 3 days  It can last up to 2 weeks  You may not be able to control your diarrhea  Acute diarrhea usually stops on its own  DISCHARGE INSTRUCTIONS:   Return to the emergency department if:   · You feel confused  · Your heartbeat is faster than normal      · Your eyes look deeply sunken, or you have no tears when you cry  · You urinate less than usual, or your urine is dark yellow  · You have blood or mucus in your stools  · You have severe abdominal pain  · You are unable to drink any liquids  Contact your healthcare provider if:   · Your symptoms do not get better with treatment  · You have a fever higher than 101 3°F (38 5°C)  · You have trouble eating and drinking because you are vomiting  · You are thirsty or have a dry mouth  · Your diarrhea does not get better in 7 days  · You have questions or concerns about your condition or care  Follow up with your healthcare provider as directed:  Write down your questions so you remember to ask them during your visits  Medicines:  · Diarrhea medicine  is an over-the-counter medicine that helps slow or stop your diarrhea  If you take other medicines, talk to your healthcare provider before you take diarrhea medicine  · Antibiotics  may be given to help treat an infection caused by bacteria  · Antiparasitics  may be given to treat an infection caused by parasites  · Take your medicine as directed  Contact your healthcare provider if you think your medicine is not helping or if you have side effects  Tell him of her if you are allergic to any medicine   Keep a list of the medicines, vitamins, and herbs you take  Include the amounts, and when and why you take them  Bring the list or the pill bottles to follow-up visits  Carry your medicine list with you in case of an emergency  Self-care:   · Drink liquids as directed  Liquids will help prevent dehydration caused by diarrhea  Ask your healthcare provider how much liquid to drink each day and which liquids are best for you  You may need to drink an oral rehydration solution (ORS)  An ORS has the right amounts of water, salts, and sugar you need to replace body fluids  You can buy an ORS at most grocery stores and pharmacies  · Eat foods that are easy to digest   Examples include rice, lentils, cereal, bananas, potatoes, and bread  It also includes some fruits (bananas, melon), well-cooked vegetables, and lean meats  Avoid foods high in fiber, fat, and sugar  Also avoid caffeine, alcohol, dairy, and red meat until your diarrhea is gone  Prevent acute diarrhea:   · Wash your hands often  Use soap and water  Wash your hands before you eat or prepare food  Also wash your hands after you use the bathroom  Use an alcohol-based hand gel when soap and water are not available  · Keep bathroom surfaces clean  This helps prevent the spread of germs that cause acute diarrhea  · Wash fruits and vegetables well before you eat them  This can help remove germs that cause diarrhea  If possible, remove the skin from fruits and vegetables, or cook them well before you eat them  · Cook meat as directed  ¨ Cook ground meat  to 160°F      ¨ Cook ground poultry, whole poultry, or cuts of poultry  to at least 165°F  Remove the meat from heat  Let it stand for 3 minutes before you eat it  ¨ Cook whole cuts of meat other than poultry  to at least 145°F  Remove the meat from heat  Let it stand for 3 minutes before you eat it  · Wash dishes that have touched raw meat with hot water and soap    This includes cutting boards, utensils, dishes, and serving containers  · Place raw or cooked meat in the refrigerator as soon as possible  Bacteria can grow in meat that is left at room temperature too long  · Do not eat raw or undercooked oysters, clams, or mussels  These foods may be contaminated and cause infection  · Drink filtered or treated water only when you travel  Do not put ice in your drinks  Drink bottled water whenever possible  © 2017 [facility] Information is for End User's use only and may not be sold, redistributed or otherwise used for commercial purposes  All illustrations and images included in CareNotes® are the copyrighted property of [employer]  or Franco [employer]pp[employer]  The above information is an  only  It is not intended as medical advice for individual conditions or treatments  Talk to your doctor, nurse or pharmacist before following any medical regimen to see if it is safe and effective for you

## 2018-09-12 ENCOUNTER — OFFICE VISIT (OUTPATIENT)
Dept: PHYSICAL THERAPY | Facility: REHABILITATION | Age: 38
End: 2018-09-12
Payer: MEDICARE

## 2018-09-12 DIAGNOSIS — M54.50 ACUTE BILATERAL LOW BACK PAIN WITHOUT SCIATICA: Primary | ICD-10-CM

## 2018-09-12 DIAGNOSIS — R26.2 AMBULATORY DYSFUNCTION: ICD-10-CM

## 2018-09-12 PROCEDURE — 97110 THERAPEUTIC EXERCISES: CPT

## 2018-09-12 NOTE — PROGRESS NOTES
Daily Note     Today's date: 2018  Patient name: Rahat Narayan  : 1980  MRN: [id number]  Referring provider: Alma Alfred DO  Dx:   Encounter Diagnosis     ICD-10-CM    1  Acute bilateral low back pain without sciatica M54 5    2  Ambulatory dysfunction R26 2      Subjective: Patient reports she is doing well today  Objective: See treatment diary below      Exercise Diary  18   Lumbar stabilization hooklying unilateral bent knee raise, 15x2 each    hooklying SLR, 15 x 2 sets each    hooklying bridge, 15 x 2 sets hooklying SLR, 20 x 2 sets each    hooklying bridge, 20 x 2 sets    hooklying bilateral bent knee raise, 15 x 2 sets hooklying SLR, 20 x 2 sets each    hooklying bridge, 20 x 2 sets    hooklying bilateral bent knee raise, 15 x 2 sets    Supine single limb hip flexion w/ ball under calf x20 each   Lumbar and hip stretching Seated trunk flexion stretch 20 sec x 3    hooklying hip flexor stretch 30 sec x 3sets   hooklying hip ER stretch, 30 sec x  3 each  hooklying hip ER stretch, 30 sec x  3 each    Standing and walking endurance  Walking with rolling walker, 3 5 min Walking with rolling walker, 4 min   LE strengthening Step ups 6" step 15 x 2 sets each  STS w/ arms crossed x10 Sit to stand, 17", 10 x 2 sets    Step ups 6" step 15 x 2 sets Sit to stand, 17", 10 x 2 sets    Step ups 6" step 15 x 2 sets fwd and lateral           Seated hot pack to lumbar spine, 10 min        Assessment: Pt tolerated session well, continuously provided small goals throughout session, which better facilitated pt participation  Pt reported increased LBP with hip ER stretch  Plan: Continue per plan of care  Progress lumbar stabilization exercises to pt tolerance

## 2018-09-12 NOTE — PROGRESS NOTES
[facility]        NAME: Nadia Uriarte is a 43 y o  female  : 1980    MRN: [id number]  DATE: 2018  TIME: 10:34 PM    Assessment and Plan   Diarrhea, unspecified type [R19 7]  1  Diarrhea, unspecified type           Patient Instructions       Follow up with PCP in 3-5 days  Proceed to  ER if symptoms worsen  Chief Complaint     Chief Complaint   Patient presents with    Diarrhea     1:30 pm    Abdominal Pain     4:00 pm         History of Present Illness       26-year-old female with cerebral palsy accompanied by caretaker  here today with acute onset of diarrhea which began around 1:30 p m  [place] Prior to diarrhea, she had mass potato and salad  She is not complaining of mild abdominal pain  No nausea or vomiting  Her caretaker informs me that she was treated for viral gastroenteritis approximately a week and a half ago  Caretaker informs me that patient lives in the group home but no recent exposure to other residents  Review of Systems   Review of Systems   Constitutional: Negative  Gastrointestinal: Positive for abdominal pain and diarrhea           Current Medications       Current Outpatient Prescriptions:     bacitracin topical ointment 500 units/g topical ointment, Apply 1 large application topically 2 (two) times a day, Disp: , Rfl:     ARIPiprazole (ABILIFY) 20 MG tablet, Take 1 tablet (20 mg total) by mouth daily at bedtime for 30 days, Disp: 30 tablet, Rfl: 1    calcium carbonate (TUMS) 500 mg chewable tablet, Chew 1 tablet 3 (three) times a day as needed  , Disp: , Rfl:     carbamide peroxide (DEBROX) 6 5 % otic solution, 5 drops 2 (two) times a day 8am on the ,, and  of the month, Disp: , Rfl:     Cholecalciferol (VITAMIN D-3) 1000 units CAPS, Take 2 capsules (2,000 Units total) by mouth daily, Disp: 180 capsule, Rfl: 3    clotrimazole (LOTRIMIN) 1 % cream, Apply 1 g (1 application total) topically 3 (three) times a day as needed (fungal irritation), Disp: 30 g, Rfl: 5    escitalopram (LEXAPRO) 20 mg tablet, Take 1 tablet (20 mg total) by mouth daily for 30 days, Disp: 30 tablet, Rfl: 1    famotidine (PEPCID) 20 mg tablet, Take 1 tablet (20 mg total) by mouth 2 (two) times a day for 30 days At 7AM and 4PM (GERD), Disp: 60 tablet, Rfl: 0    GNP MILK OF MAGNESIA 1200 MG/15ML oral suspension, TAKE 2 TBSP (30ML) BY MOUTH DAILY AS NEEDED IF NO BM IN 3 DAYS (CONSTIPATION), Disp: 355 mL, Rfl: 0    ibuprofen (MOTRIN) 400 mg tablet, Take 1 tablet (400 mg total) by mouth every 8 (eight) hours as needed for mild pain, moderate pain or headaches for up to 30 days, Disp: 90 tablet, Rfl: 0    lubiprostone (AMITIZA) 24 mcg capsule, Take 1 capsule (24 mcg total) by mouth 2 (two) times a day with meals, Disp: 180 capsule, Rfl: 3    metoclopramide (REGLAN) 10 mg tablet, TAKE 1 TABLET BY MOUTH EVERY 8 HOURS AS NEEDED FOR NAUSEA *VIRI, Disp: 30 tablet, Rfl: 0    Mouthwashes (LISTERINE ANTISEPTIC) LIQD, Apply to the mouth or throat 2 (two) times a day, Disp: , Rfl:     Multiple Vitamins-Minerals (CERTAVITE/ANTIOXIDANTS) TABS, Take 1 tablet by mouth daily, Disp: 90 tablet, Rfl: 3    norgestimate-ethinyl estradiol (ORTHO TRI-CYCLEN LO) 0 18/0 215/0 25 MG-25 MCG per tablet, Take 1 tablet by mouth daily, Disp: , Rfl:     polyethylene glycol (MIRALAX) 17 g packet, Take 17gm (1 packet) daily for first three days, then daily as needed for no bowel movement more than 24 hrs (Patient taking differently: Take 1 g by mouth daily as needed Take 17gm (1 packet) daily for first three days, then daily as needed for no bowel movement more than 24 hrs ), Disp: 30 each, Rfl: 0    RA SUNSCREEN SPF50 LOTN, Apply 15 minutes before sun exposure and every 2 hours, Disp: 1 Bottle, Rfl: 5    senna-docusate sodium (SENEXON-S) 8 6-50 mg per tablet, Take 1 tablet by mouth daily, Disp: 90 tablet, Rfl: 3    sucralfate (CARAFATE) 1 g/10 mL suspension, Take 1 g by mouth 4 (four) times a day as needed  , Disp: , Rfl:     traZODone (DESYREL) 50 mg tablet, Take 50 mg by mouth daily at bedtime, Disp: , Rfl:     zinc oxide (DESITIN) 13 % cream, Apply 1 application topically as needed (for perianal irritation), Disp: 1 Tube, Rfl: 0    Current Allergies     Allergies as of 09/11/2018    (No Known Allergies)            The following portions of the patient's history were reviewed and updated as appropriate: allergies, current medications, past family history, past medical history, past social history, past surgical history and problem list      Past Medical History:   Diagnosis Date    Anxiety     Brain lesion     Bronchitis     [facility] [address]     C[facility]y [address] [address]     Last Assessed:7/6/2016    Chronically dry eyes, left     Last Assessed:7/6/2016    Depression     Last Assessed:7/6/2016    Depressive disorder     Fall     GERD (gastroesophageal reflux disease)     Mood disorder ([facility] [address] [facility]        Past Surgical History:   Procedure Laterality Date    CSF SHUNT      Creation of Ventriculo-Peritoneal CSF shunt ; Last Assessed:7/6/2016    EAR SURGERY      Last Assessed:7/6/2016    LEG SURGERY      due to CP     NOSE SURGERY      Last Assessed:7/6/2016       Family History   Problem Relation Age of Onset    Diabetes Mother     No Known Problems Father          Medications have been verified  Objective   /72   Pulse 81   Temp 97 9 °F (36 6 °C) (Tympanic)   Resp 18   Ht 4' 10 5" (1 486 m)   Wt 79 8 kg (176 lb)   SpO2 98%   BMI 36 16 kg/m²        Physical Exam     Physical Exam   Pulmonary/Chest: Effort normal and breath sounds normal    Abdominal: Soft  Bowel sounds are normal    Nursing note and vitals reviewed

## 2018-09-19 ENCOUNTER — OFFICE VISIT (OUTPATIENT)
Dept: PHYSICAL THERAPY | Facility: REHABILITATION | Age: 38
End: 2018-09-19
Payer: MEDICARE

## 2018-09-19 DIAGNOSIS — R26.2 AMBULATORY DYSFUNCTION: ICD-10-CM

## 2018-09-19 DIAGNOSIS — M54.50 ACUTE BILATERAL LOW BACK PAIN WITHOUT SCIATICA: Primary | ICD-10-CM

## 2018-09-19 PROCEDURE — 97110 THERAPEUTIC EXERCISES: CPT

## 2018-09-19 PROCEDURE — 97010 HOT OR COLD PACKS THERAPY: CPT

## 2018-09-19 NOTE — PROGRESS NOTES
Daily Note     Today's date: 2018  Patient name: Talisha Tolentino  : 1980  MRN: [id number]  Referring provider: Haylee Martell DO  Dx:   Encounter Diagnosis     ICD-10-CM    1  Acute bilateral low back pain without sciatica M54 5    2  Ambulatory dysfunction R26 2           Subjective: Patient reports her legs feel tired today and she would prefer to not use the steps today  Additionally she reports that she has a new RW which she enjoys using  Objective: See treatment diary below    CP applied to bilateral hip abductors x10 min    Exercise Diary  18   Lumbar stabilization hooklying SLR, 20 x 2 sets each    hooklying bridge, 20 x 2 sets    hooklying bilateral bent knee raise, 15 x 2 sets hooklying SLR, 20 x 2 sets each    Supine hip flexion w/ LEs on therapy ball x20    hooklying bridge, 20 x 2 sets    hooklying bilateral bent knee raise, 15 x 2 sets    LTR w/ LEs on therapy ball x20 Supine lumbar stabilization exercises including:  SLRs  Bridges  And w/ LEs on therapy ball   Lumbar and hip stretching hooklying hip ER stretch, 30 sec x  3 each      Standing and walking endurance Walking with rolling walker, 3 5 min Walking with rolling walker, 5 min Walking w/ RW   LE strengthening Sit to stand, 17", 10 x 2 sets    Step ups 6" step 15 x 2 sets Sit to stand, 17", 10 x 2 sets    Toe taps 6" step 10 x 2 sets fwd and lateral STS and toe taps          Seated hot pack to lumbar spine, 10 min         Addendum 18: Added exercises that were completed with pt during session  Assessment: Pt tolerated session well, w/ LTR she reports pain in B hip abductors, CP resolves reported pain  With frequent verbal cues and setting small defined goals, pt is able to stay on task  Pt requested new lumbar stabilization exercises, continued w/ lumbar stabilization utilizing therapy ball in order to offer diverse set of exercises and to encourage pt participation        Plan: Continue per plan of care  Per pt request to try new exercises, provide alternative lumbar stabilization exercises for pt as needed

## 2018-09-21 DIAGNOSIS — R21 RASH: ICD-10-CM

## 2018-09-21 DIAGNOSIS — F33.3 SEVERE EPISODE OF RECURRENT MAJOR DEPRESSIVE DISORDER, WITH PSYCHOTIC FEATURES (HCC): ICD-10-CM

## 2018-09-21 DIAGNOSIS — Z30.9 ENCOUNTER FOR CONTRACEPTIVE MANAGEMENT, UNSPECIFIED TYPE: Primary | ICD-10-CM

## 2018-09-21 DIAGNOSIS — E55.9 VITAMIN D DEFICIENCY: ICD-10-CM

## 2018-09-21 DIAGNOSIS — K21.9 GASTROESOPHAGEAL REFLUX DISEASE WITHOUT ESOPHAGITIS: ICD-10-CM

## 2018-09-21 DIAGNOSIS — K59.00 CONSTIPATION, UNSPECIFIED CONSTIPATION TYPE: ICD-10-CM

## 2018-09-25 RX ORDER — GINSENG 100 MG
CAPSULE ORAL
Qty: 15 G | Refills: 0
Start: 2018-09-25 | End: 2018-12-29 | Stop reason: ALTCHOICE

## 2018-09-25 RX ORDER — SUCRALFATE ORAL 1 G/10ML
SUSPENSION ORAL
Qty: 420 ML | Refills: 0
Start: 2018-09-25 | End: 2018-10-04 | Stop reason: CLARIF

## 2018-09-25 RX ORDER — TRAZODONE HYDROCHLORIDE 50 MG/1
50 TABLET ORAL
Refills: 0
Start: 2018-09-25 | End: 2020-10-27 | Stop reason: ALTCHOICE

## 2018-09-25 RX ORDER — ESCITALOPRAM OXALATE 20 MG/1
20 TABLET ORAL DAILY
Qty: 30 TABLET | Refills: 0
Start: 2018-09-25 | End: 2018-12-06 | Stop reason: SDUPTHER

## 2018-09-25 RX ORDER — CHOLECALCIFEROL (VITAMIN D3) 25 MCG
2000 CAPSULE ORAL DAILY
Qty: 180 CAPSULE | Refills: 0
Start: 2018-09-25 | End: 2019-09-09 | Stop reason: SDUPTHER

## 2018-09-25 RX ORDER — ARIPIPRAZOLE 20 MG/1
20 TABLET ORAL
Qty: 30 TABLET | Refills: 0
Start: 2018-09-25 | End: 2020-05-20

## 2018-09-25 RX ORDER — NORGESTIMATE AND ETHINYL ESTRADIOL 7DAYSX3 LO
1 KIT ORAL DAILY
Qty: 28 TABLET | Refills: 0
Start: 2018-09-25 | End: 2019-03-13 | Stop reason: ALTCHOICE

## 2018-09-25 RX ORDER — AMOXICILLIN 250 MG
1 CAPSULE ORAL DAILY
Qty: 90 TABLET | Refills: 0
Start: 2018-09-25 | End: 2019-09-06 | Stop reason: SDUPTHER

## 2018-09-26 ENCOUNTER — EVALUATION (OUTPATIENT)
Dept: PHYSICAL THERAPY | Facility: REHABILITATION | Age: 38
End: 2018-09-26
Payer: MEDICARE

## 2018-09-26 DIAGNOSIS — R26.2 AMBULATORY DYSFUNCTION: ICD-10-CM

## 2018-09-26 DIAGNOSIS — M54.50 ACUTE BILATERAL LOW BACK PAIN WITHOUT SCIATICA: Primary | ICD-10-CM

## 2018-09-26 PROCEDURE — 97110 THERAPEUTIC EXERCISES: CPT | Performed by: PHYSICAL THERAPIST

## 2018-09-26 PROCEDURE — G8978 MOBILITY CURRENT STATUS: HCPCS | Performed by: PHYSICAL THERAPIST

## 2018-09-26 PROCEDURE — 97112 NEUROMUSCULAR REEDUCATION: CPT | Performed by: PHYSICAL THERAPIST

## 2018-09-26 PROCEDURE — G8979 MOBILITY GOAL STATUS: HCPCS | Performed by: PHYSICAL THERAPIST

## 2018-09-26 NOTE — LETTER
2018    Jailene Cardenas [address]    Patient: Talisha Tolentino   YOB: 1980   Date of Visit: 2018     Encounter Diagnosis     ICD-10-CM    1  Acute bilateral low back pain without sciatica M54 5    2  Ambulatory dysfunction R26 2        Dear Dr Jacobs Buford:    Please review the attached Plan of Care from [facility] recent visit  Please verify that you agree therapy should continue by signing the attached document and sending it back to our office  If you have any questions or concerns, please don't hesitate to call  Sincerely,    Stanislaw Huang, PT      Referring Provider:      I certify that I have read the below Plan of Care and certify the need for these services furnished under this plan of treatment while under my care  Curtis Auguste DO  [address] [place] [address]  VIA Facsimile: [phone]          RE-EVALUATION / Daily Note     Today's date: 2018  Patient name: Talisha Tolentino  : 1980  MRN: [id number]  Referring provider: Haylee Martell DO  Dx:   Encounter Diagnosis     ICD-10-CM    1  Acute bilateral low back pain without sciatica M54 5       Subjective: Patient reports decreased back pain, reports moderate back pain  She denies pain into the legs right now  Patient walked a block today, about 15 minutes        Objective: See treatment diary below    CP applied to bilateral hip abductors x10 min    Exercise Diary  18   Lumbar stabilization hooklying SLR, 20 x 2 sets each    hooklying bridge, 20 x 2 sets    hooklying bilateral bent knee raise, 15 x 2 sets hooklying SLR, 20 x 2 sets each    Supine hip flexion w/ LEs on therapy ball x20    hooklying bridge, 20 x 2 sets    hooklying bilateral bent knee raise, 15 x 2 sets    LTR w/ LEs on therapy ball x20 Standing arms 90 degrees, 2 lbs, 5, then 0 lbs, 5, 5 sec holds, x 2 sets      SLR, 15 each    Towards single leg bridge, 10   hooklying B/L bent knee raise, 3 sec hold, 10 x 2 sets    Bridge, 15    Lumbar and hip stretching hooklying hip ER stretch, 30 sec x  3 each   Seated trunk flexion stretch 2 min with theraball   Standing and walking endurance Walking with rolling walker, 3 5 min Walking with rolling walker, 5 min Walking with rolling walker, 1 min   LE strengthening Sit to stand, 17", 10 x 2 sets    Step ups 6" step 15 x 2 sets Sit to stand, 17", 10 x 2 sets    Toe taps 6" step 10 x 2 sets fwd and lateral Sit to stand, 18", 12 x 2 sets       Static and dynamic balance   Toe taps to targets, HHA, 10 each    Seated hot pack to lumbar spine, 10 min     Denies pain with straight leg raise  Denies pain into the legs  16 0 seconds 5 Times Sit to Stand (17 inch chair, arms out)   24 5 seconds 3 Meter Timed Up and Go, 17 2 sec 2nd trial (rollator walker)  8 5 seconds 5 Meter Walk Test (uses rollator walker)      ASSESSMENT:  Patient with continued but decreased lumbar pain  No radicular pain is present  She is less limited in her back pain by her mobility, today reporting knee pain with walking that limited walking duration  Continue lumbar stabilization and incorporate more standing mobility exercises  Further referral needed No    SHORT-TERM GOALS:  1  Patient reports at worst 5/10 resting back pain in 2 weeks  Partially met  2  Patient denies activity restrictions related to low back pain in 1 month  Met      3  Patient walks 10 minutes consecutively with at worst 2/10 increase in back pain in 1 month  LONG-TERM GOALS:  1  Patient evaluated and improves in dynamic balance within 2 months  2  Patient reports at worst 3/10 back pain in 2 months        PLAN OF CARE:  Patient will benefit from physical therapy 2 times per week for 1 month  Therapeutic exercise to include umbar stabilization in multiple positions, standing and walking tolerance, LE strengthening  Neuromuscular re-education: static and dynamic balance

## 2018-09-26 NOTE — PROGRESS NOTES
RE-EVALUATION / Daily Note     Today's date: 2018  Patient name: Remigio Cox  : 1980  MRN: [id number]  Referring provider: Tammi Licona DO  Dx:   Encounter Diagnosis     ICD-10-CM    1  Acute bilateral low back pain without sciatica M54 5       Subjective: Patient reports decreased back pain, reports moderate back pain  She denies pain into the legs right now  Patient walked a block today, about 15 minutes  Objective: See treatment diary below    CP applied to bilateral hip abductors x10 min    Exercise Diary  18   Lumbar stabilization hooklying SLR, 20 x 2 sets each    hooklying bridge, 20 x 2 sets    hooklying bilateral bent knee raise, 15 x 2 sets hooklying SLR, 20 x 2 sets each    Supine hip flexion w/ LEs on therapy ball x20    hooklying bridge, 20 x 2 sets    hooklying bilateral bent knee raise, 15 x 2 sets    LTR w/ LEs on therapy ball x20 Standing arms 90 degrees, 2 lbs, 5, then 0 lbs, 5, 5 sec holds, x 2 sets      SLR, 15 each    Towards single leg bridge, 10   hooklying B/L bent knee raise, 3 sec hold, 10 x 2 sets    Bridge, 15    Lumbar and hip stretching hooklying hip ER stretch, 30 sec x  3 each   Seated trunk flexion stretch 2 min with theraball   Standing and walking endurance Walking with rolling walker, 3 5 min Walking with rolling walker, 5 min Walking with rolling walker, 1 min   LE strengthening Sit to stand, 17", 10 x 2 sets    Step ups 6" step 15 x 2 sets Sit to stand, 17", 10 x 2 sets    Toe taps 6" step 10 x 2 sets fwd and lateral Sit to stand, 18", 12 x 2 sets       Static and dynamic balance   Toe taps to targets, HHA, 10 each    Seated hot pack to lumbar spine, 10 min     Denies pain with straight leg raise  Denies pain into the legs        16 0 seconds 5 Times Sit to Stand (17 inch chair, arms out)   24 5 seconds 3 Meter Timed Up and Go, 17 2 sec 2nd trial (rollator walker)  8 5 seconds 5 Meter Walk Test (uses rollator walker)      ASSESSMENT:  Patient with continued but decreased lumbar pain  No radicular pain is present  She is less limited in her back pain by her mobility, today reporting knee pain with walking that limited walking duration  Continue lumbar stabilization and incorporate more standing mobility exercises  Further referral needed No    SHORT-TERM GOALS:  1  Patient reports at worst 5/10 resting back pain in 2 weeks  Partially met  2  Patient denies activity restrictions related to low back pain in 1 month  Met      3  Patient walks 10 minutes consecutively with at worst 2/10 increase in back pain in 1 month  LONG-TERM GOALS:  1  Patient evaluated and improves in dynamic balance within 2 months  2  Patient reports at worst 3/10 back pain in 2 months        PLAN OF CARE:  Patient will benefit from physical therapy 2 times per week for 1 month  Therapeutic exercise to include umbar stabilization in multiple positions, standing and walking tolerance, LE strengthening  Neuromuscular re-education: static and dynamic balance

## 2018-10-03 ENCOUNTER — OFFICE VISIT (OUTPATIENT)
Dept: PHYSICAL THERAPY | Facility: REHABILITATION | Age: 38
End: 2018-10-03
Payer: MEDICARE

## 2018-10-03 DIAGNOSIS — M54.50 ACUTE BILATERAL LOW BACK PAIN WITHOUT SCIATICA: Primary | ICD-10-CM

## 2018-10-03 DIAGNOSIS — R26.2 AMBULATORY DYSFUNCTION: ICD-10-CM

## 2018-10-03 PROCEDURE — 97110 THERAPEUTIC EXERCISES: CPT

## 2018-10-03 NOTE — PROGRESS NOTES
Daily Note     Today's date: 10/3/2018  Patient name: Storm Lamas  : 1980  MRN: [id number]  Referring provider: Jacques Bains DO  Dx:   Encounter Diagnosis     ICD-10-CM    1  Acute bilateral low back pain without sciatica M54 5    2  Ambulatory dysfunction R26 2        Start Time: 1148  Stop Time: 1226  Total time in clinic (min): 38 minutes    Subjective: Pt requested to avoid walking exercises today due to leg pain  Objective: See treatment diary below  Exercise Diary  9/12/18 9/26/18 10/3/18   Lumbar stabilization hooklying SLR, 20 x 2 sets each    Supine hip flexion w/ LEs on therapy ball x20    hooklying bridge, 20 x 2 sets    hooklying bilateral bent knee raise, 15 x 2 sets    LTR w/ LEs on therapy ball x20 Standing arms 90 degrees, 2 lbs, 5, then 0 lbs, 5, 5 sec holds, x 2 sets      SLR, 15 each    Towards single leg bridge, 10   hooklying B/L bent knee raise, 3 sec hold, 10 x 2 sets    Bridge, 15  hooklying SLR, 20 x 2 sets each    Supine hip flexion w/ LEs on therapy ball x20    hooklying bridge, 20 x 2 sets    hooklying bilateral bent knee raise, 15 x 2 sets    LTR w/ LEs on therapy ball x20    Seated on blue foam, LEs on 2" block:    Basketball toss x4 min  Seated [id number]  Soccer kicks 10x3 BLEs   Lumbar and hip stretching  Seated trunk flexion stretch 2 min with theraball    Standing and walking endurance Walking with rolling walker, 5 min Walking with rolling walker, 1 min Walking w/ RW x2 min   LE strengthening Sit to stand, 17", 10 x 2 sets    Toe taps 6" step 10 x 2 sets fwd and lateral Sit to stand, 18", 12 x 2 sets     Sit to stand, 18", 12 x 2 sets  Step ups x20 leading w/ LLE   Static and dynamic balance  Toe taps to targets, HHA, 10 each                Assessment: Tolerated treatment well  Pt will request to end exercises early, but with encouragement to continue and setting small goals, pt participates throughout session   Focused primarily on core stabilization as pt did not tolerate standing exercises for extended periods of time  At end of session pt agreed to complete a short walk in the clinic  Patient would benefit from continued PT      Plan: Continue per plan of care

## 2018-10-04 ENCOUNTER — TELEPHONE (OUTPATIENT)
Dept: FAMILY MEDICINE CLINIC | Facility: CLINIC | Age: 38
End: 2018-10-04

## 2018-10-04 ENCOUNTER — IMMUNIZATION (OUTPATIENT)
Dept: FAMILY MEDICINE CLINIC | Facility: CLINIC | Age: 38
End: 2018-10-04
Payer: MEDICARE

## 2018-10-04 DIAGNOSIS — Z23 NEED FOR INFLUENZA VACCINATION: Primary | ICD-10-CM

## 2018-10-04 DIAGNOSIS — K30 INDIGESTION: Primary | ICD-10-CM

## 2018-10-04 PROCEDURE — 90686 IIV4 VACC NO PRSV 0.5 ML IM: CPT

## 2018-10-04 PROCEDURE — G0008 ADMIN INFLUENZA VIRUS VAC: HCPCS

## 2018-10-04 NOTE — TELEPHONE ENCOUNTER
Patient's insurance: [employer] Id: [id number] ([phone])   Patient's insurance does not cover brand Carafate, they cover Sucralfarate  When I called pharmacy, was told the Sucralfarate is only available in tablet, does not come in liquid  Is there something else that can be considered?

## 2018-10-04 NOTE — TELEPHONE ENCOUNTER
Pharmacy requesting refill for Carafate for pt, they state it is no longer covered by her insurance and wondering if we can send something similar

## 2018-10-04 NOTE — TELEPHONE ENCOUNTER
Pt needs refill on Famotidine(pepcid), PT is in for a flu shot and asked for a refill on medications

## 2018-10-08 DIAGNOSIS — K21.9 GASTROESOPHAGEAL REFLUX DISEASE, ESOPHAGITIS PRESENCE NOT SPECIFIED: ICD-10-CM

## 2018-10-08 RX ORDER — FAMOTIDINE 20 MG/1
20 TABLET, FILM COATED ORAL 2 TIMES DAILY
Qty: 60 TABLET | Refills: 0 | Status: SHIPPED | OUTPATIENT
Start: 2018-10-08 | End: 2018-12-06 | Stop reason: SDUPTHER

## 2018-10-09 ENCOUNTER — TELEPHONE (OUTPATIENT)
Dept: FAMILY MEDICINE CLINIC | Facility: CLINIC | Age: 38
End: 2018-10-09

## 2018-10-09 NOTE — TELEPHONE ENCOUNTER
Person directed supports this pts community services has a form in your bin to verifiy diagnisis allergies and to write specific diet Thank you

## 2018-10-10 ENCOUNTER — OFFICE VISIT (OUTPATIENT)
Dept: PHYSICAL THERAPY | Facility: REHABILITATION | Age: 38
End: 2018-10-10
Payer: MEDICARE

## 2018-10-10 ENCOUNTER — OFFICE VISIT (OUTPATIENT)
Dept: URGENT CARE | Facility: MEDICAL CENTER | Age: 38
End: 2018-10-10
Payer: MEDICARE

## 2018-10-10 VITALS
DIASTOLIC BLOOD PRESSURE: 58 MMHG | TEMPERATURE: 97.8 F | SYSTOLIC BLOOD PRESSURE: 118 MMHG | WEIGHT: 179 LBS | OXYGEN SATURATION: 97 % | BODY MASS INDEX: 36.77 KG/M2 | HEART RATE: 90 BPM

## 2018-10-10 DIAGNOSIS — R26.2 AMBULATORY DYSFUNCTION: ICD-10-CM

## 2018-10-10 DIAGNOSIS — R11.2 NAUSEA AND VOMITING, INTRACTABILITY OF VOMITING NOT SPECIFIED, UNSPECIFIED VOMITING TYPE: ICD-10-CM

## 2018-10-10 DIAGNOSIS — R11.0 NAUSEA: Primary | ICD-10-CM

## 2018-10-10 DIAGNOSIS — M54.50 ACUTE BILATERAL LOW BACK PAIN WITHOUT SCIATICA: Primary | ICD-10-CM

## 2018-10-10 PROCEDURE — 99213 OFFICE O/P EST LOW 20 MIN: CPT | Performed by: PHYSICIAN ASSISTANT

## 2018-10-10 PROCEDURE — G0463 HOSPITAL OUTPT CLINIC VISIT: HCPCS | Performed by: PHYSICIAN ASSISTANT

## 2018-10-10 PROCEDURE — 97110 THERAPEUTIC EXERCISES: CPT

## 2018-10-10 NOTE — PROGRESS NOTES
[facility]      NAME: Jennifer Jasmine is a 43 y o  female  : 1980    MRN: [id number]  DATE: October 10, 2018  TIME: 1:55 PM    Assessment and Plan   Nausea [R11 0]  1  Nausea     2  Nausea and vomiting, intractability of vomiting not specified, unspecified vomiting type         Patient Instructions     Follow up with PCP in 3-5 days  Proceed to  ER if symptoms worsen  Patient Instructions     Patient can return to a normal diet without restrictions  She may use Maalox as needed for stomach pain and gas  Gastritis   AMBULATORY CARE:   Gastritis  is inflammation or irritation of the lining of your stomach  Common symptoms include the following:   · Stomach pain, burning, or tenderness when you press on it    · Stomach fullness or tightness    · Nausea or vomiting    · Loss of appetite, or feeling full quickly when you eat    · Bad breath    · Fatigue or feeling more tired than usual    · Heartburn  Call 911 for any of the following:   · You develop chest pain or shortness of breath  Seek care immediately if:   · You vomit blood  · You have black or bloody bowel movements  · You have severe stomach or back pain  Contact your healthcare provider if:   · You have a fever  · You have new or worsening symptoms, even after treatment  · You have questions or concerns about your condition or care  Treatment for gastritis:  Your symptoms may go away without treatment  Treatment will depend on what is causing your gastritis  Your healthcare provider may recommend changes to the medicines you take  Medicines may be given to help treat a bacterial infection or decrease stomach acid  Manage or prevent gastritis:   · Do not smoke  Nicotine and other chemicals in cigarettes and cigars can make your symptoms worse and cause lung damage  Ask your healthcare provider for information if you currently smoke and need help to quit   E-cigarettes or smokeless tobacco still contain nicotine  Talk to your healthcare provider before you use these products  · Do not drink alcohol  Alcohol can prevent healing and make your gastritis worse  Talk to your healthcare provider if you need help to stop drinking  · Do not take NSAIDs or aspirin unless directed  These and similar medicines can cause irritation  If your healthcare provider says it is okay to take NSAIDs, take them with food  · Do not eat foods that cause irritation  Foods such as oranges and salsa can cause burning or pain  Eat a variety of healthy foods  Examples include fruits (not citrus), vegetables, low-fat dairy products, beans, whole-grain breads, and lean meats and fish  Try to eat small meals, and drink water with your meals  Do not eat for at least 3 hours before you go to bed  · Find ways to relax and decrease stress  Stress can increase stomach acid and make gastritis worse  Activities such as yoga, meditation, or listening to music can help you relax  Spend time with friends, or do things you enjoy  Follow up with your healthcare provider as directed: You may need ongoing tests or treatment, or referral to a gastroenterologist  Write down your questions so you remember to ask them during your visits  © 2017 [facility] Information is for End User's use only and may not be sold, redistributed or otherwise used for commercial purposes  All illustrations and images included in CareNotes® are the copyrighted property of [employer]  or [employer]  The above information is an  only  It is not intended as medical advice for individual conditions or treatments  Talk to your doctor, nurse or pharmacist before following any medical regimen to see if it is safe and effective for you  Chief Complaint     Chief Complaint   Patient presents with    Vomiting     Pt had one episode of nausea and vomiting at PT this morning       Nausea         History of Present Illness   Isabel Coronado presents to the clinic c/o    25-year-old female, with history of cerebral palsy, presents for evaluation of 1 episode of vomiting associated with nausea, as well as dizziness that happened today while at physical therapy  She states the dizziness lasted about 10-15 minutes  She states the nausea has resolved, the vomiting was nonbloody nonbilious  She denies any diarrhea, diaphoretic episodes, chest pain, shortness of breath, difficulty breathing, headache  She denies any facial numbness or paresthesias  She does live at a group home  She does have a history of irritable bowel, and does have p r n  Medication for upset stomach, nausea, irritbale bowels        Review of Systems   Review of Systems   Constitutional: Negative for fever  Respiratory: Negative for cough  Cardiovascular: Negative for chest pain  Gastrointestinal: Positive for abdominal pain, nausea and vomiting  Negative for diarrhea  Current Medications     Long-Term Prescriptions   Medication Sig Dispense Refill    ARIPiprazole (ABILIFY) 20 MG tablet Take 1 tablet (20 mg total) by mouth daily at bedtime for 30 days at 9pm  30 tablet 0    bacitracin topical ointment 500 units/g topical ointment Apply topically to affected area of nose and legs twice daily as needed for redness   15 g 0    Cholecalciferol (VITAMIN D-3) 1000 units CAPS Take 2 capsules (2,000 Units total) by mouth daily at 8am  180 capsule 0    clotrimazole (LOTRIMIN) 1 % cream Apply 1 g (1 application total) topically 3 (three) times a day as needed (fungal irritation) 30 g 5    escitalopram (LEXAPRO) 20 mg tablet Take 1 tablet (20 mg total) by mouth daily for 30 days at 8am  30 tablet 0    famotidine (PEPCID) 20 mg tablet Take 1 tablet (20 mg total) by mouth 2 (two) times a day for 30 days At 7AM and 4PM (GERD) 60 tablet 0    GNP MILK OF MAGNESIA 1200 MG/15ML oral suspension TAKE 2 TBSP (30ML) BY MOUTH DAILY AS NEEDED IF NO BM IN 3 DAYS (CONSTIPATION) 355 mL 0    ibuprofen (MOTRIN) 400 mg tablet Take 1 tablet (400 mg total) by mouth every 8 (eight) hours as needed for mild pain, moderate pain or headaches for up to 30 days 90 tablet 0    lubiprostone (AMITIZA) 24 mcg capsule Take 1 capsule (24 mcg total) by mouth 2 (two) times a day with meals 180 capsule 3    Multiple Vitamins-Minerals (CERTAVITE/ANTIOXIDANTS) TABS Take 1 tablet by mouth daily 90 tablet 3    norgestimate-ethinyl estradiol (ORTHO TRI-CYCLEN LO) 0 18/0 215/0 25 MG-25 MCG per tablet Take 1 tablet by mouth daily at 8am  28 tablet 0    polyethylene glycol (MIRALAX) 17 g packet Take 17gm (1 packet) daily for first three days, then daily as needed for no bowel movement more than 24 hrs (Patient taking differently: Take 1 g by mouth daily as needed Take 17gm (1 packet) daily for first three days, then daily as needed for no bowel movement more than 24 hrs ) 30 each 0    RA SUNSCREEN SPF50 LOTN Apply 15 minutes before sun exposure and every 2 hours 1 Bottle 5    senna-docusate sodium (SENEXON-S) 8 6-50 mg per tablet Take 1 tablet by mouth daily at 8am  90 tablet 0    traZODone (DESYREL) 50 mg tablet Take 1 tablet (50 mg total) by mouth daily at bedtime at 9pm  0    zinc oxide (DESITIN) 13 % cream Apply 1 application topically as needed (for perianal irritation) 1 Tube 0       Current Allergies     Allergies as of 10/10/2018    (No Known Allergies)            The following portions of the patient's history were reviewed and updated as appropriate: allergies, current medications, past family history, past medical history, past social history, past surgical history and problem list     HISTORICAL INFO:  Past Medical History:   Diagnosis Date    Anxiety     Brain lesion     Bronchitis     Cerebral palsy (HCC)     Cerebral palsy (HCC)     Last Assessed:7/6/2016    Chronically dry eyes, left     Last Assessed:7/6/2016    Depression     Last Assessed:7/6/2016    Depressive disorder     Fall     GERD (gastroesophageal reflux disease)     Mood disorder [facility])      Past Surgical History:   Procedure Laterality Date    CSF SHUNT      Creation of Ventriculo-Peritoneal CSF shunt ; Last Assessed:7/6/2016    EAR SURGERY      Last Assessed:7/6/2016    LEG SURGERY      due to CP     NOSE SURGERY      Last Assessed:7/6/2016       Objective   /58   Pulse 90   Temp 97 8 °F (36 6 °C)   Wt 81 2 kg (179 lb)   SpO2 97%   BMI 36 77 kg/m²        Physical Exam     Physical Exam   Constitutional: She is oriented to person, place, and time  She appears well-developed and well-nourished  No distress  HENT:   Head: Normocephalic and atraumatic  Mouth/Throat: No oropharyngeal exudate  Cardiovascular: Normal rate, regular rhythm and normal heart sounds  Pulmonary/Chest: Effort normal and breath sounds normal  No respiratory distress  She has no wheezes  She has no rales  Abdominal: Soft  Bowel sounds are normal  There is no tenderness  There is no rebound  Neurological: She is alert and oriented to person, place, and time  Skin: Skin is warm and dry  She is not diaphoretic  Nursing note and vitals reviewed  M*Modal software was used to dictate this note  It may contain errors with dictating incorrect words/spelling  Please contact provider directly for any questions

## 2018-10-10 NOTE — PROGRESS NOTES
Daily Note     Today's date: 10/10/2018  Patient name: Yancy Borges  : 1980  MRN: [id number]  Referring provider: Ivory Guzman DO  Dx:   Encounter Diagnosis     ICD-10-CM    1  Acute bilateral low back pain without sciatica M54 5    2  Ambulatory dysfunction R26 2        Start Time: 1146  Stop Time: 1215  Total time in clinic (min): 29 minutes    Subjective: Start of session pt reports she has a HA  Objective: See treatment diary below    Vitals at end of session: 141/90 BP; 97bpm  Exercise Diary  9/26/18 10/3/18 10/10/18   Lumbar stabilization Standing arms 90 degrees, 2 lbs, 5, then 0 lbs, 5, 5 sec holds, x 2 sets      SLR, 15 each    Towards single leg bridge, 10   hooklying B/L bent knee raise, 3 sec hold, 10 x 2 sets    Bridge, 15  hooklying SLR, 20 x 2 sets each    Supine hip flexion w/ LEs on therapy ball x20    hooklying bridge, 20 x 2 sets    hooklying bilateral bent knee raise, 15 x 2 sets    LTR w/ LEs on therapy ball x20    Seated on blue foam, LEs on 2" block:    Basketball toss x4 min  Seated [id number]  Soccer kicks 10x3 BLEs Seated on blue foam, LEs on 2" block and foam:  Ball toss x5 min   Lumbar and hip stretching Seated trunk flexion stretch 2 min with theraball     Standing and walking endurance Walking with rolling walker, 1 min Walking w/ RW x2 min Walking w/ RW x5 min   LE strengthening Sit to stand, 18", 12 x 2 sets     Sit to stand, 18", 12 x 2 sets  Step ups x20 leading w/ LLE Sit to stand, 18", 12 x 2 sets   Static and dynamic balance Toe taps to targets, HHA, 10 each                 Assessment: Tolerated treatment well  At the start of the session pt reported a HA but denied further complaints  No increase of symptoms during walking activity but during seated ball toss pt reported that she began to feel nausea  Pt dry coughed and gagged into trashcan  BP and HR monitored and noted above, pt provided seated rest and accompanying aide informed of pt status   PTA ended session early due to pt nausea, accompanying aide reported she would contact nursing at pt residence to determine further steps for pt care today  Patient would benefit from continued PT      Plan: Continue per plan of care  Pt has 1 more visit scheduled, evaluating PT to determine next visit if pt to continue after NV or if pt to be discharged

## 2018-10-10 NOTE — PATIENT INSTRUCTIONS
Patient can return to a normal diet without restrictions  She may use Maalox as needed for stomach pain and gas  Gastritis   AMBULATORY CARE:   Gastritis  is inflammation or irritation of the lining of your stomach  Common symptoms include the following:   · Stomach pain, burning, or tenderness when you press on it    · Stomach fullness or tightness    · Nausea or vomiting    · Loss of appetite, or feeling full quickly when you eat    · Bad breath    · Fatigue or feeling more tired than usual    · Heartburn  Call 911 for any of the following:   · You develop chest pain or shortness of breath  Seek care immediately if:   · You vomit blood  · You have black or bloody bowel movements  · You have severe stomach or back pain  Contact your healthcare provider if:   · You have a fever  · You have new or worsening symptoms, even after treatment  · You have questions or concerns about your condition or care  Treatment for gastritis:  Your symptoms may go away without treatment  Treatment will depend on what is causing your gastritis  Your healthcare provider may recommend changes to the medicines you take  Medicines may be given to help treat a bacterial infection or decrease stomach acid  Manage or prevent gastritis:   · Do not smoke  Nicotine and other chemicals in cigarettes and cigars can make your symptoms worse and cause lung damage  Ask your healthcare provider for information if you currently smoke and need help to quit  E-cigarettes or smokeless tobacco still contain nicotine  Talk to your healthcare provider before you use these products  · Do not drink alcohol  Alcohol can prevent healing and make your gastritis worse  Talk to your healthcare provider if you need help to stop drinking  · Do not take NSAIDs or aspirin unless directed  These and similar medicines can cause irritation  If your healthcare provider says it is okay to take NSAIDs, take them with food  · Do not eat foods that cause irritation  Foods such as oranges and salsa can cause burning or pain  Eat a variety of healthy foods  Examples include fruits (not citrus), vegetables, low-fat dairy products, beans, whole-grain breads, and lean meats and fish  Try to eat small meals, and drink water with your meals  Do not eat for at least 3 hours before you go to bed  · Find ways to relax and decrease stress  Stress can increase stomach acid and make gastritis worse  Activities such as yoga, meditation, or listening to music can help you relax  Spend time with friends, or do things you enjoy  Follow up with your healthcare provider as directed: You may need ongoing tests or treatment, or referral to a gastroenterologist  Write down your questions so you remember to ask them during your visits  © 2017 [employer] [employer] Information is for End User's use only and may not be sold, redistributed or otherwise used for commercial purposes  All illustrations and images included in CareNotes® are the copyrighted property of [employer]  or [employer]  The above information is an  only  It is not intended as medical advice for individual conditions or treatments  Talk to your doctor, nurse or pharmacist before following any medical regimen to see if it is safe and effective for you

## 2018-10-12 ENCOUNTER — TELEPHONE (OUTPATIENT)
Dept: FAMILY MEDICINE CLINIC | Facility: CLINIC | Age: 38
End: 2018-10-12

## 2018-10-12 NOTE — TELEPHONE ENCOUNTER
Would you agree this is to replace carafate  And pt is to also take pepcid   Please review Thank you

## 2018-10-12 NOTE — TELEPHONE ENCOUNTER
Kane Tong called from the pharmacy asking if the pepto bismo is the permanent replacement for Carafate that is no longer covered under [employer] [phone]   I did tell them yes, but also informed the pharmacist that if any change we will contact

## 2018-10-12 NOTE — TELEPHONE ENCOUNTER
Elva Ladd from [address] called to and would like to know how often should the patient use her ear dropped per pharmacy script was sent by Sravani Elizabeth

## 2018-10-17 ENCOUNTER — OFFICE VISIT (OUTPATIENT)
Dept: PHYSICAL THERAPY | Facility: REHABILITATION | Age: 38
End: 2018-10-17
Payer: MEDICARE

## 2018-10-17 ENCOUNTER — OFFICE VISIT (OUTPATIENT)
Dept: URGENT CARE | Facility: MEDICAL CENTER | Age: 38
End: 2018-10-17
Payer: MEDICARE

## 2018-10-17 VITALS
HEIGHT: 58 IN | SYSTOLIC BLOOD PRESSURE: 120 MMHG | RESPIRATION RATE: 16 BRPM | OXYGEN SATURATION: 97 % | HEART RATE: 97 BPM | DIASTOLIC BLOOD PRESSURE: 68 MMHG | TEMPERATURE: 98.5 F | WEIGHT: 181 LBS | BODY MASS INDEX: 37.99 KG/M2

## 2018-10-17 DIAGNOSIS — M54.50 ACUTE BILATERAL LOW BACK PAIN WITHOUT SCIATICA: Primary | ICD-10-CM

## 2018-10-17 DIAGNOSIS — R26.2 AMBULATORY DYSFUNCTION: ICD-10-CM

## 2018-10-17 DIAGNOSIS — B00.1 COLD SORE: Primary | ICD-10-CM

## 2018-10-17 PROCEDURE — G0463 HOSPITAL OUTPT CLINIC VISIT: HCPCS | Performed by: PHYSICIAN ASSISTANT

## 2018-10-17 PROCEDURE — 99212 OFFICE O/P EST SF 10 MIN: CPT | Performed by: PHYSICIAN ASSISTANT

## 2018-10-17 PROCEDURE — 97110 THERAPEUTIC EXERCISES: CPT

## 2018-10-17 NOTE — PROGRESS NOTES
[facility]        NAME: Tara Oconnor is a 43 y o  female  : 1980    MRN: [id number]  DATE: 2018  TIME: 2:03 PM    Assessment and Plan   Cold sore [B00 1]  1  Cold sore           Patient Instructions     Follow up with PCP in 3-5 days  Proceed to  ER if symptoms worsen  Chief Complaint     Chief Complaint   Patient presents with    Mouth Lesions      (healing) cold blister to right lower lip         History of Present Illness       65-year-old female with a past medical history of cerebral palsy who resides at a nursing home presents with a caregiver  She is complaining of a sore to her right lower lip  They stated it started out as a red area that then became blister  Blister has since went away  However, patient continues to pick at it and it is red and sore  Review of Systems   Review of Systems   Constitutional: Negative  HENT: Negative  Eyes: Negative  Respiratory: Negative  Gastrointestinal: Negative  Current Medications       Current Outpatient Prescriptions:     ARIPiprazole (ABILIFY) 20 MG tablet, Take 1 tablet (20 mg total) by mouth daily at bedtime for 30 days at 9pm , Disp: 30 tablet, Rfl: 0    bacitracin topical ointment 500 units/g topical ointment, Apply topically to affected area of nose and legs twice daily as needed for redness  , Disp: 15 g, Rfl: 0    bismuth subsalicylate (PEPTO BISMOL) 524 mg/30 mL oral suspension, Take 15 mL (262 mg total) by mouth every 6 (six) hours as needed for indigestion, Disp: 360 mL, Rfl: 0    calcium carbonate (TUMS) 500 mg chewable tablet, Chew 1 tablet 3 (three) times a day as needed  , Disp: , Rfl:     carbamide peroxide (DEBROX) 6 5 % otic solution, 5 drops 2 (two) times a day 8am on the ,, and th of the month, Disp: , Rfl:     Cholecalciferol (VITAMIN D-3) 1000 units CAPS, Take 2 capsules (2,000 Units total) by mouth daily at 8am , Disp: 180 capsule, Rfl: 0    clotrimazole (LOTRIMIN) 1 % cream, Apply 1 g (1 application total) topically 3 (three) times a day as needed (fungal irritation), Disp: 30 g, Rfl: 5    escitalopram (LEXAPRO) 20 mg tablet, Take 1 tablet (20 mg total) by mouth daily for 30 days at 8am , Disp: 30 tablet, Rfl: 0    famotidine (PEPCID) 20 mg tablet, Take 1 tablet (20 mg total) by mouth 2 (two) times a day for 30 days At 7AM and 4PM (GERD), Disp: 60 tablet, Rfl: 0    GNP MILK OF MAGNESIA 1200 MG/15ML oral suspension, TAKE 2 TBSP (30ML) BY MOUTH DAILY AS NEEDED IF NO BM IN 3 DAYS (CONSTIPATION), Disp: 355 mL, Rfl: 0    ibuprofen (MOTRIN) 400 mg tablet, Take 1 tablet (400 mg total) by mouth every 8 (eight) hours as needed for mild pain, moderate pain or headaches for up to 30 days, Disp: 90 tablet, Rfl: 0    lubiprostone (AMITIZA) 24 mcg capsule, Take 1 capsule (24 mcg total) by mouth 2 (two) times a day with meals, Disp: 180 capsule, Rfl: 3    metoclopramide (REGLAN) 10 mg tablet, TAKE 1 TABLET BY MOUTH EVERY 8 HOURS AS NEEDED FOR NAUSEA *VIRI, Disp: 30 tablet, Rfl: 0    Mouthwashes (LISTERINE ANTISEPTIC) LIQD, Apply to the mouth or throat 2 (two) times a day, Disp: , Rfl:     Multiple Vitamins-Minerals (CERTAVITE/ANTIOXIDANTS) TABS, Take 1 tablet by mouth daily, Disp: 90 tablet, Rfl: 3    norgestimate-ethinyl estradiol (ORTHO TRI-CYCLEN LO) 0 18/0 215/0 25 MG-25 MCG per tablet, Take 1 tablet by mouth daily at 8am , Disp: 28 tablet, Rfl: 0    polyethylene glycol (MIRALAX) 17 g packet, Take 17gm (1 packet) daily for first three days, then daily as needed for no bowel movement more than 24 hrs (Patient taking differently: Take 1 g by mouth daily as needed Take 17gm (1 packet) daily for first three days, then daily as needed for no bowel movement more than 24 hrs ), Disp: 30 each, Rfl: 0    RA SUNSCREEN SPF50 LOTN, Apply 15 minutes before sun exposure and every 2 hours, Disp: 1 Bottle, Rfl: 5    senna-docusate sodium (SENEXON-S) 8 6-50 mg per tablet, Take 1 tablet by mouth daily at 8am , Disp: 90 tablet, Rfl: 0    traZODone (DESYREL) 50 mg tablet, Take 1 tablet (50 mg total) by mouth daily at bedtime at 9pm, Disp: , Rfl: 0    zinc oxide (DESITIN) 13 % cream, Apply 1 application topically as needed (for perianal irritation), Disp: 1 Tube, Rfl: 0    Current Allergies     Allergies as of 10/17/2018    (No Known Allergies)            The following portions of the patient's history were reviewed and updated as appropriate: allergies, current medications, past family history, past medical history, past social history, past surgical history and problem list     Objective   /68   Pulse 97   Temp 98 5 °F (36 9 °C)   Resp 16   Ht 4' 10" (1 473 m)   Wt 82 1 kg (181 lb)   SpO2 97%   BMI 37 83 kg/m²        Physical Exam     Physical Exam   Constitutional: She appears well-developed and well-nourished  No distress  HENT:   Mouth/Throat: Oropharynx is clear and moist        Nursing note and vitals reviewed

## 2018-10-17 NOTE — PROGRESS NOTES
Daily Note     Today's date: 10/17/2018  Patient name: Jo Ann Huffman  : 1980  MRN: [id number]  Referring provider: Guadalupe Gama DO  Dx:   Encounter Diagnosis     ICD-10-CM    1  Acute bilateral low back pain without sciatica M54 5    2  Ambulatory dysfunction R26 2        Start Time: 1147  Stop Time: 1226  Total time in clinic (min): 39 minutes    Subjective: Pt reports she does not want to do walking today as she attributes this to making her feel sick at the last visit  Objective: See treatment diary below      Exercise Diary  10/3/18 10/10/18 10/17/18   Lumbar stabilization hooklying SLR, 20 x 2 sets each    Supine hip flexion w/ LEs on therapy ball x20    hooklying bridge, 20 x 2 sets    hooklying bilateral bent knee raise, 15 x 2 sets    LTR w/ LEs on therapy ball x20    Seated on blue foam, LEs on 2" block:    Basketball toss x4 min  Seated [id number]  Soccer kicks 10x3 BLEs Seated on blue foam, LEs on 2" block and foam:  Ball toss x5 min Seated on chair w/ LEs on 6" block:  Underhand toss w/ 2# ball x20  Overhead toss x20  Seated perturbations 30 sec x3  Seated upper truck rotations holding 2# ball 30 sec x3   Lumbar and hip stretching      Standing and walking endurance Walking w/ RW x2 min Walking w/ RW x5 min    LE strengthening Sit to stand, 18", 12 x 2 sets  Step ups x20 leading w/ LLE Sit to stand, 18", 12 x 2 sets Sit to stand 19" 15x2  Step ups x20 leading w/ LLE    Seated trunk perturbations 30 sec x3  [id number] BLEs 2 5# cuff weights  Seated Marches 2 5# cuff weights 10x3  Seated hip abd w/ orange band 10x3  Seated hip add w/ ball x15     Static and dynamic balance                  Assessment: Tolerated treatment well  Due to pt request to avoid walking today, completed lumbar stabilization and LE strengthening seated  No reports of nausea this visit  Pt would benefit from increased challenge of being seated on theraband air cushion or other unstable surface   Patient would benefit from continued PT      Plan: Pt will schedule 1 more visit and PT will determine next visit if pt to be discharged

## 2018-10-17 NOTE — PATIENT INSTRUCTIONS
Cold sore of the right lower lip:  At this point, the cold sore as resolved  Patient has skin irritation from picking at the cold sore  Advised her to stop taking it and apply the Vaseline 3 times daily for 7 days  Any signs of infection such as redness, warmth, drainage, fever go to the ER

## 2018-10-18 ENCOUNTER — TELEPHONE (OUTPATIENT)
Dept: FAMILY MEDICINE CLINIC | Facility: CLINIC | Age: 38
End: 2018-10-18

## 2018-10-18 DIAGNOSIS — R31.9 HEMATURIA, UNSPECIFIED TYPE: ICD-10-CM

## 2018-10-18 DIAGNOSIS — N28.1 KIDNEY CYST, ACQUIRED: Primary | ICD-10-CM

## 2018-10-18 NOTE — TELEPHONE ENCOUNTER
CAN PROVIDER PLACE ORDER? PATIENT PREVIOUSLY IS SEEN BY [facility] AND IN ORDER FOR THEM TO SCHEDULE A 1 YR FOLLOW UP FOR THIS PATIENT, THEY NEED A NEW ORDER  IF ORDER CAN BE PLACED, PLEASE CALL RENATA TO INFORM PATIENT CAN THEN SCHEDULE  PLEASE AND THANK YOU!

## 2018-10-19 DIAGNOSIS — R31.9 HEMATURIA, UNSPECIFIED TYPE: Primary | ICD-10-CM

## 2018-10-24 ENCOUNTER — EVALUATION (OUTPATIENT)
Dept: PHYSICAL THERAPY | Facility: REHABILITATION | Age: 38
End: 2018-10-24
Payer: MEDICARE

## 2018-10-24 ENCOUNTER — HOSPITAL ENCOUNTER (EMERGENCY)
Facility: HOSPITAL | Age: 38
Discharge: HOME | End: 2018-10-24
Attending: EMERGENCY MEDICINE
Payer: MEDICARE

## 2018-10-24 VITALS
BODY MASS INDEX: 37.99 KG/M2 | HEART RATE: 68 BPM | OXYGEN SATURATION: 100 % | TEMPERATURE: 98.6 F | SYSTOLIC BLOOD PRESSURE: 132 MMHG | HEIGHT: 58 IN | RESPIRATION RATE: 16 BRPM | WEIGHT: 181 LBS | DIASTOLIC BLOOD PRESSURE: 78 MMHG

## 2018-10-24 DIAGNOSIS — R46.89 AGGRESSIVE BEHAVIOR: Primary | ICD-10-CM

## 2018-10-24 DIAGNOSIS — R26.2 AMBULATORY DYSFUNCTION: Primary | ICD-10-CM

## 2018-10-24 DIAGNOSIS — M54.50 ACUTE BILATERAL LOW BACK PAIN WITHOUT SCIATICA: ICD-10-CM

## 2018-10-24 PROCEDURE — 97110 THERAPEUTIC EXERCISES: CPT | Performed by: PHYSICAL THERAPIST

## 2018-10-24 PROCEDURE — G8978 MOBILITY CURRENT STATUS: HCPCS | Performed by: PHYSICAL THERAPIST

## 2018-10-24 PROCEDURE — 97010 HOT OR COLD PACKS THERAPY: CPT | Performed by: PHYSICAL THERAPIST

## 2018-10-24 PROCEDURE — 99284 EMERGENCY DEPT VISIT MOD MDM: CPT

## 2018-10-24 PROCEDURE — G8979 MOBILITY GOAL STATUS: HCPCS | Performed by: PHYSICAL THERAPIST

## 2018-10-24 RX ORDER — FAMOTIDINE 20 MG/1
20 TABLET, FILM COATED ORAL ONCE
Status: COMPLETED | OUTPATIENT
Start: 2018-10-24 | End: 2018-10-24

## 2018-10-24 RX ADMIN — FAMOTIDINE 20 MG: 20 TABLET, FILM COATED ORAL at 18:03

## 2018-10-24 NOTE — ED PROVIDER NOTES
History  Chief Complaint   Patient presents with    Psychiatric Evaluation     pt from a group home banging head was sent in for eval      65-year-old woman presents for psychiatric evaluation  She has a history of intellectual disability and lives in a group home  She receives a phone call from a support group 5 times per week which was recently decreased to 4 times per week  The patient found out about this today and was angry  She slapped her head with an open hand twice  No LOC or blood thinner use  The patient was brought in for evaluation per protocol from the group home  On arrival, she has no external signs of trauma and no acute findings on exam   She denies suicidal ideation  No indication for further workup or management  Will discharge back to group home  Prior to Admission Medications   Prescriptions Last Dose Informant Patient Reported? Taking?    ARIPiprazole (ABILIFY) 20 MG tablet 10/23/2018 at Unknown time  No Yes   Sig: Take 1 tablet (20 mg total) by mouth daily at bedtime for 30 days at 9pm    Cholecalciferol (VITAMIN D-3) 1000 units CAPS 10/24/2018 at Unknown time  No Yes   Sig: Take 2 capsules (2,000 Units total) by mouth daily at 8am    GNP MILK OF MAGNESIA 1200 MG/15ML oral suspension Past Month at Unknown time Outside Facility (Specify) No Yes   Sig: TAKE 2 TBSP (30ML) BY MOUTH DAILY AS NEEDED IF NO BM IN 3 DAYS (CONSTIPATION)   Mouthwashes (LISTERINE ANTISEPTIC) LIQD 10/24/2018 at Unknown time Care Giver Yes Yes   Sig: Apply to the mouth or throat 2 (two) times a day   Multiple Vitamins-Minerals (CERTAVITE/ANTIOXIDANTS) TABS 10/24/2018 at Unknown time  No Yes   Sig: Take 1 tablet by mouth daily   RA SUNSCREEN SPF50 LOTN More than a month at Unknown time Care Giver No No   Sig: Apply 15 minutes before sun exposure and every 2 hours   bacitracin topical ointment 500 units/g topical ointment   No No   Sig: Apply topically to affected area of nose and legs twice daily as needed for redness     bismuth subsalicylate (PEPTO BISMOL) 524 mg/30 mL oral suspension   No No   Sig: Take 15 mL (262 mg total) by mouth every 6 (six) hours as needed for indigestion   calcium carbonate (TUMS) 500 mg chewable tablet  Outside Facility (Specify) Yes No   Sig: Chew 1 tablet 3 (three) times a day as needed     carbamide peroxide (DEBROX) 6 5 % otic solution   Yes No   Si drops 2 (two) times a day 8am on the ,12, and  of the month   clotrimazole (LOTRIMIN) 1 % cream 10/24/2018 at Unknown time Outside Facility (Specify) No Yes   Sig: Apply 1 g (1 application total) topically 3 (three) times a day as needed (fungal irritation)   escitalopram (LEXAPRO) 20 mg tablet 10/24/2018 at Unknown time  No Yes   Sig: Take 1 tablet (20 mg total) by mouth daily for 30 days at 8am    famotidine (PEPCID) 20 mg tablet 10/24/2018 at Unknown time  No Yes   Sig: Take 1 tablet (20 mg total) by mouth 2 (two) times a day for 30 days At 7AM and 4PM (GERD)   ibuprofen (MOTRIN) 400 mg tablet Past Month at Unknown time  No Yes   Sig: Take 1 tablet (400 mg total) by mouth every 8 (eight) hours as needed for mild pain, moderate pain or headaches for up to 30 days   lubiprostone (AMITIZA) 24 mcg capsule 10/24/2018 at Unknown time  No Yes   Sig: Take 1 capsule (24 mcg total) by mouth 2 (two) times a day with meals   metoclopramide (REGLAN) 10 mg tablet Past Month at Unknown time Care Giver No Yes   Sig: TAKE 1 TABLET BY MOUTH EVERY 8 HOURS AS NEEDED FOR NAUSEA *VIRI   norgestimate-ethinyl estradiol (ORTHO TRI-CYCLEN LO) 0 18/0 215/0 25 MG-25 MCG per tablet 10/24/2018 at Unknown time  No Yes   Sig: Take 1 tablet by mouth daily at 8am    polyethylene glycol (MIRALAX) 17 g packet Past Month at Unknown time Care Giver No Yes   Sig: Take 17gm (1 packet) daily for first three days, then daily as needed for no bowel movement more than 24 hrs   Patient taking differently: Take 1 g by mouth daily as needed Take 17gm (1 packet) daily for first three days, then daily as needed for no bowel movement more than 24 hrs    senna-docusate sodium (SENEXON-S) 8 6-50 mg per tablet 10/24/2018 at Unknown time  No Yes   Sig: Take 1 tablet by mouth daily at 8am    traZODone (DESYREL) 50 mg tablet 10/23/2018 at Unknown time  No Yes   Sig: Take 1 tablet (50 mg total) by mouth daily at bedtime at 9pm   zinc oxide (DESITIN) 13 % cream Past Month at Unknown time Care Giver No Yes   Sig: Apply 1 application topically as needed (for perianal irritation)      Facility-Administered Medications: None       Past Medical History:   Diagnosis Date    Anxiety     Brain lesion     Bronchitis     Cerebral palsy (HCC)     Cerebral palsy (HCC)     Last Assessed:7/6/2016    Chronically dry eyes, left     Last Assessed:7/6/2016    Depression     Last Assessed:7/6/2016    Depressive disorder     Fall     GERD (gastroesophageal reflux disease)     Mood disorder ([facility] [address] )        Past Surgical History:   Procedure Laterality Date    CSF SHUNT      Creation of Ventriculo-Peritoneal CSF shunt ; Last Assessed:7/6/2016    EAR SURGERY      Last Assessed:7/6/2016    LEG SURGERY      due to CP     NOSE SURGERY      Last Assessed:7/6/2016       Family History   Problem Relation Age of Onset    Diabetes Mother     No Known Problems Father      I have reviewed and agree with the history as documented  Social History   Substance Use Topics    Smoking status: Never Smoker    Smokeless tobacco: Never Used    Alcohol use No        Review of Systems   Reason unable to perform ROS: Baseline intellectual disability         Physical Exam  ED Triage Vitals [10/24/18 1633]   Temperature Pulse Respirations Blood Pressure SpO2   98 6 °F (37 °C) 68 16 132/78 100 %      Temp Source Heart Rate Source Patient Position - Orthostatic VS BP Location FiO2 (%)   Tympanic -- -- -- --      Pain Score       No Pain           Orthostatic Vital Signs  Vitals:    10/24/18 1633   BP: 132/78 Pulse: 68       Physical Exam   Constitutional: She appears well-developed and well-nourished  No distress  HENT:   Head: Normocephalic and atraumatic  No signs of basilar skull fracture   Eyes: Pupils are equal, round, and reactive to light  Conjunctivae and EOM are normal  No scleral icterus  Neck: Neck supple  No JVD present  No midline tenderness   Cardiovascular: Normal rate, regular rhythm and normal heart sounds  Exam reveals no gallop and no friction rub  No murmur heard  Pulmonary/Chest: Effort normal and breath sounds normal  No respiratory distress  She has no wheezes  She has no rales  Abdominal: Soft  She exhibits no distension  There is no tenderness  There is no rebound and no guarding  Musculoskeletal: She exhibits no edema or tenderness  Neurological: She is alert  No cranial nerve deficit  No gross motor or sensory deficits, ambulating at baseline   Skin: Skin is warm and dry  She is not diaphoretic  Vitals reviewed  ED Medications  Medications   famotidine (PEPCID) tablet 20 mg (20 mg Oral Given 10/24/18 1803)       Diagnostic Studies  Results Reviewed     None                 No orders to display         Procedures  Procedures      Phone Consults  ED Phone Contact    ED Course                               MDM  Number of Diagnoses or Management Options  Aggressive behavior:   Diagnosis management comments: Patient without SI, HI, or AVH while in the ED  No evidence of head trauma and benign mechanism  Patient was discharged back to her group home with return precautions for development of SI      CritCare Time    Disposition  Final diagnoses:   Aggressive behavior     Time reflects when diagnosis was documented in both MDM as applicable and the Disposition within this note     Time User Action Codes Description Comment    10/24/2018  5:20 PM Chela Contreras Add [R48 89] Aggressive behavior       ED Disposition     ED Disposition Condition Comment    Discharge Aixa Stanley discharge to home/self care      Condition at discharge: Good        Follow-up Information     Follow up With Specialties Details Why Contact Info Additional Information    [address]   As needed [address] [facility] [id number] [address] [facility] Emergency Department Emergency Medicine  As needed [address] [facility], [address] [place] [address], [address]          Discharge Medication List as of 10/24/2018  5:23 PM      CONTINUE these medications which have NOT CHANGED    Details   ARIPiprazole (ABILIFY) 20 MG tablet Take 1 tablet (20 mg total) by mouth daily at bedtime for 30 days at 9pm , Starting Tue 9/25/2018, Until Thu 10/25/2018, No Print      Cholecalciferol (VITAMIN D-3) 1000 units CAPS Take 2 capsules (2,000 Units total) by mouth daily at 8am , Starting Tue 9/25/2018, No Print      clotrimazole (LOTRIMIN) 1 % cream Apply 1 g (1 application total) topically 3 (three) times a day as needed (fungal irritation), Starting Wed 5/9/2018, Normal      escitalopram (LEXAPRO) 20 mg tablet Take 1 tablet (20 mg total) by mouth daily for 30 days at 8am , Starting Tue 9/25/2018, Until Thu 10/25/2018, No Print      famotidine (PEPCID) 20 mg tablet Take 1 tablet (20 mg total) by mouth 2 (two) times a day for 30 days At 7AM and 4PM (GERD), Starting Mon 10/8/2018, Until Wed 11/7/2018, Normal      GNP MILK OF MAGNESIA 1200 MG/15ML oral suspension TAKE 2 TBSP (30ML) BY MOUTH DAILY AS NEEDED IF NO BM IN 3 DAYS (CONSTIPATION), Normal      ibuprofen (MOTRIN) 400 mg tablet Take 1 tablet (400 mg total) by mouth every 8 (eight) hours as needed for mild pain, moderate pain or headaches for up to 30 days, Starting Mon 9/10/2018, Until Wed 10/24/2018, Normal      lubiprostone (AMITIZA) 24 mcg capsule Take 1 capsule (24 mcg total) by mouth 2 (two) times a day with meals, Starting Mon 9/10/2018, Normal      metoclopramide (REGLAN) 10 mg tablet TAKE 1 TABLET BY MOUTH EVERY 8 HOURS AS NEEDED FOR NAUSEA *ZEJHONNY, Normal      Mouthwashes (LISTERINE ANTISEPTIC) LIQD Apply to the mouth or throat 2 (two) times a day, Historical Med      Multiple Vitamins-Minerals (CERTAVITE/ANTIOXIDANTS) TABS Take 1 tablet by mouth daily, Starting Mon 9/10/2018, Normal      norgestimate-ethinyl estradiol (ORTHO TRI-CYCLEN LO) 0 18/0 215/0 25 MG-25 MCG per tablet Take 1 tablet by mouth daily at 8am , Starting Tue 9/25/2018, No Print      polyethylene glycol (MIRALAX) 17 g packet Take 17gm (1 packet) daily for first three days, then daily as needed for no bowel movement more than 24 hrs, Normal      senna-docusate sodium (SENEXON-S) 8 6-50 mg per tablet Take 1 tablet by mouth daily at 8am , Starting Tue 9/25/2018, No Print      traZODone (DESYREL) 50 mg tablet Take 1 tablet (50 mg total) by mouth daily at bedtime at 9pm, Starting Tue 9/25/2018, No Print      zinc oxide (DESITIN) 13 % cream Apply 1 application topically as needed (for perianal irritation), Starting Thu 5/17/2018, Normal      bacitracin topical ointment 500 units/g topical ointment Apply topically to affected area of nose and legs twice daily as needed for redness , No Print      bismuth subsalicylate (PEPTO BISMOL) 524 mg/30 mL oral suspension Take 15 mL (262 mg total) by mouth every 6 (six) hours as needed for indigestion, Starting Thu 10/4/2018, Normal      calcium carbonate (TUMS) 500 mg chewable tablet Chew 1 tablet 3 (three) times a day as needed  , Historical Med      carbamide peroxide (DEBROX) 6 5 % otic solution 5 drops 2 (two) times a day 8am on the 11,12, and 13th of the month, Historical Med      RA SUNSCREEN SPF50 LOTN Apply 15 minutes before sun exposure and every 2 hours, Normal           No discharge procedures on file  ED Provider  Attending physically available and evaluated Esvin Brendan YADAV managed the patient along with the ED Attending      Electronically Signed by         Ravi Sung MD  10/26/18 5247

## 2018-10-24 NOTE — LETTER
2018    Jailene Alonzo [address]    Patient: Agustín Alonso   YOB: 1980   Date of Visit: 10/24/2018     Encounter Diagnosis     ICD-10-CM    1  Ambulatory dysfunction R26 2    2  Acute bilateral low back pain without sciatica M54 5        Dear Dr Toya Kowalski:    Please review the attached Plan of Care from [facility] recent visit  Please verify that you agree therapy should continue by signing the attached document and sending it back to our office  If you have any questions or concerns, please don't hesitate to call  Sincerely,    Jay Alarcon PT      Referring Provider:      I certify that I have read the below Plan of Care and certify the need for these services furnished under this plan of treatment while under my care  Toñito HannahDO  [address] [address]  VIA In Basket          RE-EVALUATION / Daily Note     Today's date: 10/24/2018  Patient name: Agustín Alonso  : 1980  MRN: [id number]  Referring provider: Martha Gill DO  Dx:   Encounter Diagnosis     ICD-10-CM    1  Ambulatory dysfunction R26 2    2  Acute bilateral low back pain without sciatica M54 5      Subjective: Patient reports back pain today, denies pain in her legs  Patient reports 9/10 back pain currently  Patient walks outside at home, walks up and down her block, denies pain with this        Objective: See treatment diary below    Exercise Diary  10/10/18 10/17/18 10/24/18   Lumbar stabilization Seated on blue foam, LEs on 2" block and foam:  Ball toss x5 min Seated on chair w/ LEs on 6" block:  Underhand toss w/ 2# ball x20  Overhead toss x20  Seated perturbations 30 sec x3  Seated upper truck rotations holding 2# ball 30 sec x3    Lumbar and hip stretching      Standing and walking endurance Walking w/ RW x5 min     LE strengthening Sit to stand, 18", 12 x 2 sets Sit to stand 19" 15x2  Step ups x20 leading w/ LLE    Seated trunk perturbations 30 sec x3  [id number] BLEs 2 5# cuff weights  Seated Marches 2 5# cuff weights 10x3  Seated hip abd w/ orange band 10x3  Seated hip add w/ ball x15   Sit to stand 17" 10  Sit to stand 19" 10    Seated shoulder flexion static hold 2 lbs 10    hooklying bridge, 15  hooklying B/L bent knee raise, 10    Static and dynamic balance              Discontinuous sit to stand, stands from 17" chair without hands  Discontinuous 3 meter timed up and go  The above was discontinuous due to patient asking questions, intermittently stopping, requiring therapist redirection  19 seconds 10 Meter Walk Test (uses rollator walker), paced by therapist    Walks 10 minutes with rolling walker  Denies any pain at 2 minutes and 5 minutes into walking  At 10 minutes reports 9/10 back pain upon questioning  ASSESSMENT:  Patient again able to walk 10 minutes consecutively with the rollator walker  Back pain is not limiting mobility  No radicular pain is present, back pain is occurring intermittently, but not consistently  Discharge to home exercise program   Encourage regular walking for exercise with staff  Further referral needed No    SHORT-TERM GOALS:  1  Patient reports at worst 5/10 resting back pain in 2 weeks  Met intermittently  2  Patient denies activity restrictions related to low back pain in 1 month  Met      3  Patient walks 10 minutes consecutively with at worst 2/10 increase in back pain in 1 month  Progressed, not met  LONG-TERM GOALS:  1  Patient evaluated and improves in dynamic balance within 2 months  Not met  2  Patient reports at worst 3/10 back pain in 2 months  Not met         PLAN OF CARE:  Discharge to home exercise program

## 2018-10-24 NOTE — ED ATTENDING ATTESTATION
Danica Mosher MD, saw and evaluated the patient  All available labs and X-rays were ordered by me or the resident and have been reviewed by myself  I discussed the patient with the resident / non-physician and agree with the resident's / non-physician practitioner's findings and plan as documented in the resident's / non-physician practicitioner's note, except where noted  At this point, I agree with the current assessment done in the ED  Chief Complaint   Patient presents with    Psychiatric Evaluation     pt from a group home banging head was sent in for eval      This is a 25-year-old female coming from a group home for evaluation of head injury  The patient was very angry and use her hand to hit her head  It was witnessed by the staff member who is at bedside  It occurred once or twice  Patient has no other complaints  No fevers chills nausea vomiting  She is due for her Pepcid which is supposed be given at 4:00 p m  Because of the protocol at the facility that if she has any head injury whether self-injurious or due to other causes, she is to immediately be brought into the emergency room for evaluation, she has been brought in for evaluation  Patient offers no complaints at this time  PE:  Vitals:    10/24/18 1633   BP: 132/78   Pulse: 68   Resp: 16   Temp: 98 6 °F (37 °C)   TempSrc: Tympanic   SpO2: 100%   Weight: 82 1 kg (181 lb)   Height: 4' 10" (1 473 m)   General: VSS, NAD, awake, alert  Well-nourished, well-developed  Appears stated age  Speaking normally in full sentences  Head: Normocephalic, atraumatic, nontender  Eyes: EOM-I  There is deviation of both eyes medially  No diplopia  No hyphema  No subconjunctival hemorrhages  Symmetrical lids  ENT: Atraumatic external nose and ears  MMM  No malocclusion  No stridor  Normal phonation  No drooling  Normal swallowing  Neck: Symmetric, trachea midline  No JVD    CV: RRR  +S1/S2  No murmurs or gallops  Peripheral pulses +2 throughout  No chest wall tenderness  Lungs:   Unlabored No retractions  CTAB, lungs sounds equal bilateral    No tachypnea  Abd: +BS, soft, NT/ND    MSK:   FROM   Back:   No rashes  Skin: Dry, intact  Neuro: AAOx3, GCS 15, CN II-XII grossly intact  Motor grossly intact  Psychiatric/Behavioral: Appropriate mood and affect   Exam: deferred  A:  - Well check adult  P:  - DC   - no indication for a CTH  - will give pepcid as requested by staff member  - 13 point ROS was performed and all are normal unless stated in the history above  - Nursing note reviewed  Vitals reviewed  - Orders placed by myself and/or advanced practitioner / resident     - Previous chart was reviewed  - No language barrier    - History obtained from staff member patient  - There are no limitations to the history obtained  - Critical care time: Not applicable for this patient  Final Diagnosis:  1  Aggressive behavior         Medications   famotidine (PEPCID) tablet 20 mg (not administered)     No orders to display     No orders of the defined types were placed in this encounter  Labs Reviewed - No data to display  Time reflects when diagnosis was documented in both MDM as applicable and the Disposition within this note     Time User Action Codes Description Comment    10/24/2018  5:20 PM Daniel Contreras Add [R46 89] Aggressive behavior       ED Disposition     ED Disposition Condition Comment    Discharge  [facility] discharge to home/self care      Condition at discharge: Good        Follow-up Information     Follow up With Specialties Details Why Contact Info Additional Information    [address] [facility]   As needed [address] [facility] [address] [facility] [phone] [address] [facility] Emergency Department Emergency Medicine  As needed [address] [place] [address]  [phone]  ED, [address], [address]        Patient's Medications   Discharge Prescriptions    No medications on file     No discharge procedures on file  Prior to Admission Medications   Prescriptions Last Dose Informant Patient Reported? Taking? ARIPiprazole (ABILIFY) 20 MG tablet 10/23/2018 at Unknown time  No Yes   Sig: Take 1 tablet (20 mg total) by mouth daily at bedtime for 30 days at 9pm    Cholecalciferol (VITAMIN D-3) 1000 units CAPS 10/24/2018 at Unknown time  No Yes   Sig: Take 2 capsules (2,000 Units total) by mouth daily at 8am    GNP MILK OF MAGNESIA 1200 MG/15ML oral suspension Past Month at Unknown time Outside Facility (Specify) No Yes   Sig: TAKE 2 TBSP (30ML) BY MOUTH DAILY AS NEEDED IF NO BM IN 3 DAYS (CONSTIPATION)   Mouthwashes (LISTERINE ANTISEPTIC) LIQD 10/24/2018 at Unknown time Care Giver Yes Yes   Sig: Apply to the mouth or throat 2 (two) times a day   Multiple Vitamins-Minerals (CERTAVITE/ANTIOXIDANTS) TABS 10/24/2018 at Unknown time  No Yes   Sig: Take 1 tablet by mouth daily   RA SUNSCREEN SPF50 LOTN More than a month at Unknown time Care Giver No No   Sig: Apply 15 minutes before sun exposure and every 2 hours   bacitracin topical ointment 500 units/g topical ointment   No No   Sig: Apply topically to affected area of nose and legs twice daily as needed for redness     bismuth subsalicylate (PEPTO BISMOL) 524 mg/30 mL oral suspension   No No   Sig: Take 15 mL (262 mg total) by mouth every 6 (six) hours as needed for indigestion   calcium carbonate (TUMS) 500 mg chewable tablet  Outside Facility (Specify) Yes No   Sig: Chew 1 tablet 3 (three) times a day as needed     carbamide peroxide (DEBROX) 6 5 % otic solution   Yes No   Si drops 2 (two) times a day 8am on the ,12, and 13th of the month   clotrimazole (LOTRIMIN) 1 % cream 10/24/2018 at Unknown time Outside Facility (Specify) No Yes   Sig: Apply 1 g (1 application total) topically 3 (three) times a day as needed (fungal irritation)   escitalopram (LEXAPRO) 20 mg tablet 10/24/2018 at Unknown time  No Yes   Sig: Take 1 tablet (20 mg total) by mouth daily for 30 days at 8am    famotidine (PEPCID) 20 mg tablet 10/24/2018 at Unknown time  No Yes   Sig: Take 1 tablet (20 mg total) by mouth 2 (two) times a day for 30 days At 7AM and 4PM (GERD)   ibuprofen (MOTRIN) 400 mg tablet Past Month at Unknown time  No Yes   Sig: Take 1 tablet (400 mg total) by mouth every 8 (eight) hours as needed for mild pain, moderate pain or headaches for up to 30 days   lubiprostone (AMITIZA) 24 mcg capsule 10/24/2018 at Unknown time  No Yes   Sig: Take 1 capsule (24 mcg total) by mouth 2 (two) times a day with meals   metoclopramide (REGLAN) 10 mg tablet Past Month at Unknown time Care Giver No Yes   Sig: TAKE 1 TABLET BY MOUTH EVERY 8 HOURS AS NEEDED FOR NAUSEA *VIRI   norgestimate-ethinyl estradiol (ORTHO TRI-CYCLEN LO) 0 18/0 215/0 25 MG-25 MCG per tablet 10/24/2018 at Unknown time  No Yes   Sig: Take 1 tablet by mouth daily at 8am    polyethylene glycol (MIRALAX) 17 g packet Past Month at Unknown time Care Giver No Yes   Sig: Take 17gm (1 packet) daily for first three days, then daily as needed for no bowel movement more than 24 hrs   Patient taking differently: Take 1 g by mouth daily as needed Take 17gm (1 packet) daily for first three days, then daily as needed for no bowel movement more than 24 hrs    senna-docusate sodium (SENEXON-S) 8 6-50 mg per tablet 10/24/2018 at Unknown time  No Yes   Sig: Take 1 tablet by mouth daily at 8am    traZODone (DESYREL) 50 mg tablet 10/23/2018 at Unknown time  No Yes   Sig: Take 1 tablet (50 mg total) by mouth daily at bedtime at 9pm   zinc oxide (DESITIN) 13 % cream Past Month at Unknown time Care Giver No Yes   Sig: Apply 1 application topically as needed (for perianal irritation)      Facility-Administered Medications: None       Portions of the record may have been created with voice recognition software   Occasional wrong word or "sound a like" substitutions may have occurred due to the inherent limitations of voice recognition software  Read the chart carefully and recognize, using context, where substitutions have occurred      Electronically signed by:  Brayan Shankar

## 2018-10-24 NOTE — DISCHARGE INSTRUCTIONS
Suicide Prevention for Older Adults   WHAT YOU NEED TO KNOW:   An older person may see suicide as the only way to escape emotional or physical pain and suffering  You can help provide emotional support for him or her and get the help he or she needs  Learn to recognize warning signs that the person may be considering suicide  Find resources to help prevent the person from attempting to take his or her life  DISCHARGE INSTRUCTIONS:   Call 911 if:   · The person has done something on purpose to hurt himself or tries to commit suicide  Return to the emergency department if:   · The person tells you he made a plan to commit suicide  · The person acts out in anger, is reckless, or is abusing alcohol or drugs  · The person has serious thoughts of suicide, even with treatment  Contact the person's healthcare provider or therapist if:   · You begin to see warning signs that the person may be considering suicide  · The person has intense feelings of sadness, anger, revenge, or despair, or he cannot make decisions easily  · The person tells you he has more thoughts of suicide when he is alone  · The person withdraws from others  · The person stops eating, or begins to smoke or drink heavily  · The person feels he is a burden because of a disability or disease  · The person has trouble dealing with stress, such as a breakup or a job loss  · You have questions or concerns about the person's condition or care  What to do if you think the person is considering suicide:  Call 911 if you feel the person is at immediate risk of suicide, or if he or she talks about an active suicide plan  Assume that the person intends to carry out his or her plan  Resources are available to help you and the person  The following are some things you can do:  · Call the [facility] at 2-588-280-TALK ([phone])   · Call the Suicide Hotline at 0-606-PLHSCDY (2-544.337.6215)       · Contact the person's therapist  His or her healthcare provider can give you a list of therapists if he or she does not have one  · Keep medicines, weapons, and alcohol out of the person's reach  Do not leave the person alone if he or she says they want to commit suicide  Do not leave the person alone if you think he or she may try it  Make sure you do not put yourself at risk if the person has a weapon  · Do not be afraid to ask if the person is thinking of ending his or her life  Ask if the person has a plan for hurting or killing himself or herself  Warning signs to watch for: It is common for an older person to talk about death and dying, especially if he or she has a worsening medical condition  This makes it difficult to recognize when an older person is planning suicide   The following are warning signs to watch for:  · Talking about his or her plan to commit suicide, or suddenly deciding to make a will    · A change in how he or she talks about death, such as suddenly talking about it when he or she never did before, or suddenly not talking about it    · Saying he or she sees no reason to live     · Saying he or she should end their life to prevent pain and suffering    · A belief that his or her family will have an easier time when they no longer have to care for him or her     · Cutting himself or herself, burning the skin with cigarettes, or driving recklessly    · Drug or alcohol use, not taking prescribed medicine, or taking too much    · Sudden anger, lashing out at others, or seeming hopeless, anxious, or angry and then suddenly becoming happy or peaceful    · Not wanting to spend time with others or doing things he or she usually enjoys    · A change in the way he or she eats, sleeps, or dresses    · Weight gain or loss, or less energy than usual    · Trouble sleeping or spending a lot of time sleeping    · Giving away or throwing away his or her belongings    · The person has been taking medicine for a mental illness and suddenly stops taking it without talking to a healthcare provider    · The person has been going to therapy and suddenly stops going  Treatments the person may need:   · Medicines  may be given to prevent mood swings, or to decrease anxiety or depression  The person will need to take all medicines as directed  A sudden stop can be harmful  It may take 4 to 6 weeks for the medicine to help him or her feel better  · A therapist  can help the person identify and change negative feelings or beliefs about himself or herself  This may also help change the way the he or she feels and acts  A therapist can also help the person find ways to cope with things that cannot be changed  What you can do to help the person:   · Encourage the person to seek help for drug or alcohol abuse  Drugs and alcohol can increase suicidal thoughts and make the person more likely to act on them  · Help the person connect with others  Encourage him or her to become involved in the community  Some examples include tutoring a young student, volunteering at a [employer], or joining a group exercise program  The person may need help setting up a computer or creating an e-mail account to help him or her remain connected to others  · Exercise with the person  Exercise can lift his or her mood, increase energy, and make it easier to sleep at night  · Encourage the person to try new things  Older adults who are open to new experiences handle stress and change better than those who are not  · Call, visit, or send postcards to the person often  Check on him or her after the loss of a pet, longtime friend, or child  Holidays, birthdays, and anniversaries can be difficult for a person after a loss  The loss of a spouse can be especially painful and lonely  · Help the person schedule a visit with his or her [employer] or spiritual leader    A [employer] or spiritual leader may be able to offer additional support and resources to the person  · Help the person get equipment that will increase his or her comfort and mobility  Examples are hearing aids, glasses, large print books, and walkers  These can help him or her enjoy activities and feel more independent  · Encourage the person to continue taking medicine and going to therapy  Medicine and therapy can help improve his or her mental health  Follow up with the person's healthcare provider as directed:  Write down your questions so you remember to ask them during your visits  For support and more information:   · [facility] [facility] , [address] [place] [address]  Phone: 9- 608 [phone] 651-UYQU (01 [phone])  Web Address: [employer]  · [employer]  [address] [place] [address] [address] , [address]  Phone: 6- 211 - 663-5321  Web Address: [employer]  org  © 2017 [employer] Information is for End User's use only and may not be sold, redistributed or otherwise used for commercial purposes  All illustrations and images included in CareNotes® are the copyrighted property of [employer] D A M [employer]  or Franco Epperson  The above information is an  only  It is not intended as medical advice for individual conditions or treatments  Talk to your doctor, nurse or pharmacist before following any medical regimen to see if it is safe and effective for you

## 2018-10-24 NOTE — PROGRESS NOTES
RE-EVALUATION / Daily Note     Today's date: 10/24/2018  Patient name: Vega Barber  : 1980  MRN: [id number]  Referring provider: Albin Thompson DO  Dx:   Encounter Diagnosis     ICD-10-CM    1  Ambulatory dysfunction R26 2    2  Acute bilateral low back pain without sciatica M54 5      Subjective: Patient reports back pain today, denies pain in her legs  Patient reports 9/10 back pain currently  Patient walks outside at home, walks up and down her block, denies pain with this  Objective: See treatment diary below    Exercise Diary  10/10/18 10/17/18 10/24/18   Lumbar stabilization Seated on blue foam, LEs on 2" block and foam:  Ball toss x5 min Seated on chair w/ LEs on 6" block:  Underhand toss w/ 2# ball x20  Overhead toss x20  Seated perturbations 30 sec x3  Seated upper truck rotations holding 2# ball 30 sec x3    Lumbar and hip stretching      Standing and walking endurance Walking w/ RW x5 min     LE strengthening Sit to stand, 18", 12 x 2 sets Sit to stand 19" 15x2  Step ups x20 leading w/ LLE    Seated trunk perturbations 30 sec x3  [id number] BLEs 2 5# cuff weights  Seated Marches 2 5# cuff weights 10x3  Seated hip abd w/ orange band 10x3  Seated hip add w/ ball x15   Sit to stand 17" 10  Sit to stand 19" 10    Seated shoulder flexion static hold 2 lbs 10    hooklying bridge, 15  hooklying B/L bent knee raise, 10    Static and dynamic balance              Discontinuous sit to stand, stands from 17" chair without hands  Discontinuous 3 meter timed up and go  The above was discontinuous due to patient asking questions, intermittently stopping, requiring therapist redirection  19 seconds 10 Meter Walk Test (uses rollator walker), paced by therapist    Walks 10 minutes with rolling walker  Denies any pain at 2 minutes and 5 minutes into walking  At 10 minutes reports 9/10 back pain upon questioning        ASSESSMENT:  Patient again able to walk 10 minutes consecutively with the rollator walker  Back pain is not limiting mobility  No radicular pain is present, back pain is occurring intermittently, but not consistently  Discharge to home exercise program   Encourage regular walking for exercise with staff  Further referral needed No    SHORT-TERM GOALS:  1  Patient reports at worst 5/10 resting back pain in 2 weeks  Met intermittently  2  Patient denies activity restrictions related to low back pain in 1 month  Met      3  Patient walks 10 minutes consecutively with at worst 2/10 increase in back pain in 1 month  Progressed, not met  LONG-TERM GOALS:  1  Patient evaluated and improves in dynamic balance within 2 months  Not met  2  Patient reports at worst 3/10 back pain in 2 months  Not met         PLAN OF CARE:  Discharge to home exercise program

## 2018-10-26 ENCOUNTER — APPOINTMENT (OUTPATIENT)
Dept: AUDIOLOGY | Age: 38
End: 2018-10-26
Payer: COMMERCIAL

## 2018-10-26 PROCEDURE — V5274 ALD UNSPECIFIED: HCPCS | Performed by: AUDIOLOGIST

## 2018-10-27 ENCOUNTER — APPOINTMENT (EMERGENCY)
Dept: RADIOLOGY | Facility: HOSPITAL | Age: 38
End: 2018-10-27
Payer: MEDICARE

## 2018-10-27 ENCOUNTER — HOSPITAL ENCOUNTER (EMERGENCY)
Facility: HOSPITAL | Age: 38
Discharge: HOME | End: 2018-10-28
Attending: EMERGENCY MEDICINE
Payer: MEDICARE

## 2018-10-27 DIAGNOSIS — R06.00 DYSPNEA: Primary | ICD-10-CM

## 2018-10-27 DIAGNOSIS — R07.9 CHEST PAIN: ICD-10-CM

## 2018-10-27 DIAGNOSIS — K44.9 HIATAL HERNIA: ICD-10-CM

## 2018-10-27 LAB
ALBUMIN SERPL BCP-MCNC: 3.5 G/DL (ref 3.5–5)
ALP SERPL-CCNC: 177 U/L (ref 46–116)
ALT SERPL W P-5'-P-CCNC: 27 U/L (ref 12–78)
ANION GAP SERPL CALCULATED.3IONS-SCNC: 10 MMOL/L (ref 4–13)
AST SERPL W P-5'-P-CCNC: 19 U/L (ref 5–45)
BASOPHILS # BLD AUTO: 0.03 THOUSANDS/ΜL (ref 0–0.1)
BASOPHILS NFR BLD AUTO: 0 % (ref 0–1)
BILIRUB SERPL-MCNC: 0.18 MG/DL (ref 0.2–1)
BUN SERPL-MCNC: 10 MG/DL (ref 5–25)
CALCIUM SERPL-MCNC: 9 MG/DL (ref 8.3–10.1)
CHLORIDE SERPL-SCNC: 106 MMOL/L (ref 100–108)
CO2 SERPL-SCNC: 22 MMOL/L (ref 21–32)
CREAT SERPL-MCNC: 0.83 MG/DL (ref 0.6–1.3)
DEPRECATED D DIMER PPP: 830 NG/ML (FEU) (ref 0–424)
EOSINOPHIL # BLD AUTO: 0.03 THOUSAND/ΜL (ref 0–0.61)
EOSINOPHIL NFR BLD AUTO: 0 % (ref 0–6)
ERYTHROCYTE [DISTWIDTH] IN BLOOD BY AUTOMATED COUNT: 13.4 % (ref 11.6–15.1)
EXT PREG TEST URINE: NEGATIVE
GFR SERPL CREATININE-BSD FRML MDRD: 89 ML/MIN/1.73SQ M
GLUCOSE SERPL-MCNC: 92 MG/DL (ref 65–140)
HCT VFR BLD AUTO: 43.1 % (ref 34.8–46.1)
HGB BLD-MCNC: 14.3 G/DL (ref 11.5–15.4)
IMM GRANULOCYTES # BLD AUTO: 0.03 THOUSAND/UL (ref 0–0.2)
IMM GRANULOCYTES NFR BLD AUTO: 0 % (ref 0–2)
LYMPHOCYTES # BLD AUTO: 3 THOUSANDS/ΜL (ref 0.6–4.47)
LYMPHOCYTES NFR BLD AUTO: 26 % (ref 14–44)
MCH RBC QN AUTO: 31.2 PG (ref 26.8–34.3)
MCHC RBC AUTO-ENTMCNC: 33.2 G/DL (ref 31.4–37.4)
MCV RBC AUTO: 94 FL (ref 82–98)
MONOCYTES # BLD AUTO: 0.84 THOUSAND/ΜL (ref 0.17–1.22)
MONOCYTES NFR BLD AUTO: 7 % (ref 4–12)
NEUTROPHILS # BLD AUTO: 7.58 THOUSANDS/ΜL (ref 1.85–7.62)
NEUTS SEG NFR BLD AUTO: 67 % (ref 43–75)
NRBC BLD AUTO-RTO: 0 /100 WBCS
PLATELET # BLD AUTO: 302 THOUSANDS/UL (ref 149–390)
PMV BLD AUTO: 9.5 FL (ref 8.9–12.7)
POTASSIUM SERPL-SCNC: 3.7 MMOL/L (ref 3.5–5.3)
PROT SERPL-MCNC: 7.5 G/DL (ref 6.4–8.2)
RBC # BLD AUTO: 4.59 MILLION/UL (ref 3.81–5.12)
SODIUM SERPL-SCNC: 138 MMOL/L (ref 136–145)
TROPONIN I SERPL-MCNC: <0.02 NG/ML
WBC # BLD AUTO: 11.51 THOUSAND/UL (ref 4.31–10.16)

## 2018-10-27 PROCEDURE — 85025 COMPLETE CBC W/AUTO DIFF WBC: CPT | Performed by: EMERGENCY MEDICINE

## 2018-10-27 PROCEDURE — 96374 THER/PROPH/DIAG INJ IV PUSH: CPT

## 2018-10-27 PROCEDURE — 80053 COMPREHEN METABOLIC PANEL: CPT | Performed by: EMERGENCY MEDICINE

## 2018-10-27 PROCEDURE — 71046 X-RAY EXAM CHEST 2 VIEWS: CPT

## 2018-10-27 PROCEDURE — 85379 FIBRIN DEGRADATION QUANT: CPT | Performed by: EMERGENCY MEDICINE

## 2018-10-27 PROCEDURE — 93005 ELECTROCARDIOGRAM TRACING: CPT

## 2018-10-27 PROCEDURE — 36415 COLL VENOUS BLD VENIPUNCTURE: CPT

## 2018-10-27 PROCEDURE — 81025 URINE PREGNANCY TEST: CPT | Performed by: EMERGENCY MEDICINE

## 2018-10-27 PROCEDURE — 84484 ASSAY OF TROPONIN QUANT: CPT | Performed by: EMERGENCY MEDICINE

## 2018-10-27 PROCEDURE — 71275 CT ANGIOGRAPHY CHEST: CPT

## 2018-10-27 PROCEDURE — 99285 EMERGENCY DEPT VISIT HI MDM: CPT

## 2018-10-27 PROCEDURE — 96361 HYDRATE IV INFUSION ADD-ON: CPT

## 2018-10-27 PROCEDURE — 96375 TX/PRO/DX INJ NEW DRUG ADDON: CPT

## 2018-10-27 RX ORDER — KETOROLAC TROMETHAMINE 30 MG/ML
15 INJECTION, SOLUTION INTRAMUSCULAR; INTRAVENOUS ONCE
Status: COMPLETED | OUTPATIENT
Start: 2018-10-27 | End: 2018-10-27

## 2018-10-27 RX ORDER — MORPHINE SULFATE 4 MG/ML
4 INJECTION, SOLUTION INTRAMUSCULAR; INTRAVENOUS ONCE
Status: COMPLETED | OUTPATIENT
Start: 2018-10-27 | End: 2018-10-27

## 2018-10-27 RX ADMIN — IOHEXOL 70 ML: 350 INJECTION, SOLUTION INTRAVENOUS at 23:19

## 2018-10-27 RX ADMIN — KETOROLAC TROMETHAMINE 15 MG: 30 INJECTION, SOLUTION INTRAMUSCULAR at 20:52

## 2018-10-27 RX ADMIN — SODIUM CHLORIDE 1000 ML: 0.9 INJECTION, SOLUTION INTRAVENOUS at 21:18

## 2018-10-27 RX ADMIN — MORPHINE SULFATE 4 MG: 4 INJECTION INTRAVENOUS at 23:13

## 2018-10-28 VITALS
BODY MASS INDEX: 37.62 KG/M2 | TEMPERATURE: 97.4 F | OXYGEN SATURATION: 96 % | RESPIRATION RATE: 16 BRPM | HEART RATE: 96 BPM | WEIGHT: 180 LBS | DIASTOLIC BLOOD PRESSURE: 65 MMHG | SYSTOLIC BLOOD PRESSURE: 126 MMHG

## 2018-10-28 NOTE — DISCHARGE INSTRUCTIONS
Chest Pain   WHAT YOU NEED TO KNOW:   Chest pain can be caused by a range of conditions, from not serious to life-threatening  Chest pain can be a symptom of a digestive problem, such as acid reflux or a stomach ulcer  An anxiety attack or a strong emotion, such as anger, can also cause chest pain  Infection, inflammation, or a fracture in the bones or cartilage in your chest can cause pain or discomfort  Sometimes chest pain or pressure is caused by poor blood flow to your heart (angina)  Chest pain may also be caused by life-threatening conditions such as a heart attack or blood clot in your lungs  DISCHARGE INSTRUCTIONS:   Call 911 if:   · You have any of the following signs of a heart attack:      ¨ Squeezing, pressure, or pain in your chest that lasts longer than 5 minutes or returns    ¨ Discomfort or pain in your back, neck, jaw, stomach, or arm     ¨ Trouble breathing    ¨ Nausea or vomiting    ¨ Lightheadedness or a sudden cold sweat, especially with chest pain or trouble breathing    Return to the emergency department if:   · You have chest discomfort that gets worse, even with medicine  · You cough or vomit blood  · Your bowel movements are black or bloody  · You cannot stop vomiting, or it hurts to swallow  Contact your healthcare provider if:   · You have questions or concerns about your condition or care  Medicines:   · Medicines  may be given to treat the cause of your chest pain  Examples include pain medicine, anxiety medicine, or medicines to increase blood flow to your heart  · Do not take certain medicines without asking your healthcare provider first   These include NSAIDs, herbal or vitamin supplements, or hormones (estrogen or progestin)  · Take your medicine as directed  Contact your healthcare provider if you think your medicine is not helping or if you have side effects  Tell him or her if you are allergic to any medicine   Keep a list of the medicines, vitamins, and herbs you take  Include the amounts, and when and why you take them  Bring the list or the pill bottles to follow-up visits  Carry your medicine list with you in case of an emergency  Follow up with your healthcare provider within 72 hours, or as directed: You may need to return for more tests to find the cause of your chest pain  You may be referred to a specialist, such as a cardiologist or gastroenterologist  Write down your questions so you remember to ask them during your visits  Healthy living tips: The following are general healthy guidelines  If your chest pain is caused by a heart problem, your healthcare provider will give you specific guidelines to follow  · Do not smoke  Nicotine and other chemicals in cigarettes and cigars can cause lung and heart damage  Ask your healthcare provider for information if you currently smoke and need help to quit  E-cigarettes or smokeless tobacco still contain nicotine  Talk to your healthcare provider before you use these products  · Eat a variety of healthy, low-fat foods  Healthy foods include fruits, vegetables, whole-grain breads, low-fat dairy products, beans, lean meats, and fish  Ask for more information about a heart healthy diet  · Ask about activity  Your healthcare provider will tell you which activities to limit or avoid  Ask when you can drive, return to work, and have sex  Ask about the best exercise plan for you  · Maintain a healthy weight  Ask your healthcare provider how much you should weigh  Ask him or her to help you create a weight loss plan if you are overweight  · Get the flu and pneumonia vaccines  All adults should get the influenza (flu) vaccine  Get it every year as soon as it becomes available  The pneumococcal vaccine is given to adults aged 72 years or older  The vaccine is given every 5 years to prevent pneumococcal disease, such as pneumonia    © 2017 [employer] Information is for End User's use only and may not be sold, redistributed or otherwise used for commercial purposes  All illustrations and images included in CareNotes® are the copyrighted property of [employer] M [employer] Inc  or [employer]pperson  The above information is an  only  It is not intended as medical advice for individual conditions or treatments  Talk to your doctor, nurse or pharmacist before following any medical regimen to see if it is safe and effective for you

## 2018-10-28 NOTE — ED PROVIDER NOTES
History  Chief Complaint   Patient presents with    Shortness of Breath     as per ems, pt reports right sided chest pain and shortness of breath around 7pm tonight  pt is from group home, hx of cerebral palsy     69-year-old female presents for evaluation of shortness of breath and chest pain  Patient has history of cerebral palsy and lives at a group home  At approximately 7:30 a m  Tonight she was sitting on her bed where she had a sudden onset of superior, sternal pain and associated difficulty breathing  At that time she then notified the house staff that she felt she could not breathe and 911 was called, she was brought in for evaluation  Denies any radiations, describes the pain as constant and located directly in this superior, center of her chest   She describes no relieving or exacerbating factors  Denies fever/chills, headaches, cough, congestion, abdominal pain, dysuria, nausea/vomiting, diaphoresis, or change in bowel habits  Additional history provided by the group home member escorting the patient  She states that the patient often comes into the emergency room for similar type symptoms  Today she was talking to her father on the phone            Prior to Admission Medications   Prescriptions Last Dose Informant Patient Reported? Taking?    ARIPiprazole (ABILIFY) 20 MG tablet   No No   Sig: Take 1 tablet (20 mg total) by mouth daily at bedtime for 30 days at 9pm    Cholecalciferol (VITAMIN D-3) 1000 units CAPS   No No   Sig: Take 2 capsules (2,000 Units total) by mouth daily at 8am    GNP MILK OF MAGNESIA 1200 MG/15ML oral suspension  Outside Facility (Specify) No No   Sig: TAKE 2 TBSP (30ML) BY MOUTH DAILY AS NEEDED IF NO BM IN 3 DAYS (CONSTIPATION)   Mouthwashes (LISTERINE ANTISEPTIC) LIQD  Care Giver Yes No   Sig: Apply to the mouth or throat 2 (two) times a day   Multiple Vitamins-Minerals (CERTAVITE/ANTIOXIDANTS) TABS   No No   Sig: Take 1 tablet by mouth daily   RA SUNSCREEN SPF50 LOTN  Care Giver No No   Sig: Apply 15 minutes before sun exposure and every 2 hours   bacitracin topical ointment 500 units/g topical ointment   No No   Sig: Apply topically to affected area of nose and legs twice daily as needed for redness     bismuth subsalicylate (PEPTO BISMOL) 524 mg/30 mL oral suspension   No No   Sig: Take 15 mL (262 mg total) by mouth every 6 (six) hours as needed for indigestion   calcium carbonate (TUMS) 500 mg chewable tablet  Outside Facility (Specify) Yes No   Sig: Chew 1 tablet 3 (three) times a day as needed     carbamide peroxide (DEBROX) 6 5 % otic solution   Yes No   Si drops 2 (two) times a day 8am on the ,, and  of the month   clotrimazole (LOTRIMIN) 1 % cream  Outside Facility (Specify) No No   Sig: Apply 1 g (1 application total) topically 3 (three) times a day as needed (fungal irritation)   escitalopram (LEXAPRO) 20 mg tablet   No No   Sig: Take 1 tablet (20 mg total) by mouth daily for 30 days at 8am    famotidine (PEPCID) 20 mg tablet   No No   Sig: Take 1 tablet (20 mg total) by mouth 2 (two) times a day for 30 days At 7AM and 4PM (GERD)   ibuprofen (MOTRIN) 400 mg tablet   No No   Sig: Take 1 tablet (400 mg total) by mouth every 8 (eight) hours as needed for mild pain, moderate pain or headaches for up to 30 days   lubiprostone (AMITIZA) 24 mcg capsule   No No   Sig: Take 1 capsule (24 mcg total) by mouth 2 (two) times a day with meals   metoclopramide (REGLAN) 10 mg tablet  Care Giver No No   Sig: TAKE 1 TABLET BY MOUTH EVERY 8 HOURS AS NEEDED FOR NAUSEA *ZEMBRKRISTY   norgestimate-ethinyl estradiol (ORTHO TRI-CYCLEN LO) 0 18/0 215/0 25 MG-25 MCG per tablet   No No   Sig: Take 1 tablet by mouth daily at 8am    polyethylene glycol (MIRALAX) 17 g packet  Care Giver No No   Sig: Take 17gm (1 packet) daily for first three days, then daily as needed for no bowel movement more than 24 hrs   Patient taking differently: Take 1 g by mouth daily as needed Take 17gm (1 packet) daily for first three days, then daily as needed for no bowel movement more than 24 hrs    senna-docusate sodium (SENEXON-S) 8 6-50 mg per tablet   No No   Sig: Take 1 tablet by mouth daily at 8am    traZODone (DESYREL) 50 mg tablet   No No   Sig: Take 1 tablet (50 mg total) by mouth daily at bedtime at 9pm   zinc oxide (DESITIN) 13 % cream  Care Giver No No   Sig: Apply 1 application topically as needed (for perianal irritation)      Facility-Administered Medications: None       Past Medical History:   Diagnosis Date    Anxiety     Brain lesion     Bronchitis     Cerebral palsy (HCC)     Cerebral palsy (HCC)     Last Assessed:7/6/2016    Chronically dry eyes, left     Last Assessed:7/6/2016    Depression     Last Assessed:7/6/2016    Depressive disorder     Fall     GERD (gastroesophageal reflux disease)     Mood disorder (HCC)        Past Surgical History:   Procedure Laterality Date    CSF SHUNT      Creation of Ventriculo-Peritoneal CSF shunt ; Last Assessed:7/6/2016    EAR SURGERY      Last Assessed:7/6/2016    LEG SURGERY      due to CP     NOSE SURGERY      Last Assessed:7/6/2016       Family History   Problem Relation Age of Onset    Diabetes Mother     No Known Problems Father      I have reviewed and agree with the history as documented      Social History   Substance Use Topics    Smoking status: Never Smoker    Smokeless tobacco: Never Used    Alcohol use No        Review of Systems    Physical Exam  ED Triage Vitals   Temperature Pulse Respirations Blood Pressure SpO2   10/27/18 2010 10/27/18 2010 10/27/18 2010 10/27/18 2010 10/27/18 2010   (!) 97 4 °F (36 3 °C) 94 18 147/85 96 %      Temp Source Heart Rate Source Patient Position - Orthostatic VS BP Location FiO2 (%)   10/27/18 2010 10/27/18 2010 10/27/18 2113 10/27/18 2010 --   Oral Monitor Sitting Right arm       Pain Score       10/27/18 2010       Worst Possible Pain           Orthostatic Vital Signs  Vitals:    10/27/18 2010 10/27/18 2113 10/27/18 2230 10/27/18 2330   BP: 147/85 125/81 141/74 121/79   Pulse: 94 (!) 117 94 104   Patient Position - Orthostatic VS:  Sitting         Physical Exam    ED Medications  Medications   ketorolac (TORADOL) injection 15 mg (15 mg Intravenous Given 10/27/18 2052)   sodium chloride 0 9 % bolus 1,000 mL (1,000 mL Intravenous New Bag 10/27/18 2118)   morphine (PF) 4 mg/mL injection 4 mg (4 mg Intravenous Given 10/27/18 2313)   iohexol (OMNIPAQUE) 350 MG/ML injection (MULTI-DOSE) 85 mL (70 mL Intravenous Given 10/27/18 2319)       Diagnostic Studies  Results Reviewed     Procedure Component Value Units Date/Time    POCT pregnancy, urine [[id number]]  (Normal) Resulted:  10/27/18 2306    Lab Status:  Final result Updated:  10/27/18 2306     EXT PREG TEST UR (Ref: Negative) negative    D-dimer, quantitative [[id number]]  (Abnormal) Collected:  10/27/18 2149    Lab Status:  Final result Specimen:  Blood from Arm, Left Updated:  10/27/18 2232     D-Dimer, Quant 830 (H) ng/ml (FEU)     Troponin I [[id number]]  (Normal) Collected:  10/27/18 2023    Lab Status:  Final result Specimen:  Blood from Arm, Left Updated:  10/27/18 2054     Troponin I <0 02 ng/mL     Comprehensive metabolic panel [[id number]]  (Abnormal) Collected:  10/27/18 2023    Lab Status:  Final result Specimen:  Blood from Arm, Left Updated:  10/27/18 2053     Sodium 138 mmol/L      Potassium 3 7 mmol/L      Chloride 106 mmol/L      CO2 22 mmol/L      ANION GAP 10 mmol/L      BUN 10 mg/dL      Creatinine 0 83 mg/dL      Glucose 92 mg/dL      Calcium 9 0 mg/dL      AST 19 U/L      ALT 27 U/L      Alkaline Phosphatase 177 (H) U/L      Total Protein 7 5 g/dL      Albumin 3 5 g/dL      Total Bilirubin 0 18 (L) mg/dL      eGFR 89 ml/min/1 73sq m     Narrative:         National Kidney Disease Education Program recommendations are as follows:  GFR calculation is accurate only with a steady state creatinine  Chronic Kidney disease less than 60 ml/min/1 73 sq  meters  Kidney failure less than 15 ml/min/1 73 sq  meters  CBC and differential [[id number]]  (Abnormal) Collected:  10/27/18 2023    Lab Status:  Final result Specimen:  Blood from Arm, Left Updated:  10/27/18 2031     WBC 11 51 (H) Thousand/uL      RBC 4 59 Million/uL      Hemoglobin 14 3 g/dL      Hematocrit 43 1 %      MCV 94 fL      MCH 31 2 pg      MCHC 33 2 g/dL      RDW 13 4 %      MPV 9 5 fL      Platelets 358 Thousands/uL      nRBC 0 /100 WBCs      Neutrophils Relative 67 %      Immat GRANS % 0 %      Lymphocytes Relative 26 %      Monocytes Relative 7 %      Eosinophils Relative 0 %      Basophils Relative 0 %      Neutrophils Absolute 7 58 Thousands/µL      Immature Grans Absolute 0 03 Thousand/uL      Lymphocytes Absolute 3 00 Thousands/µL      Monocytes Absolute 0 84 Thousand/µL      Eosinophils Absolute 0 03 Thousand/µL      Basophils Absolute 0 03 Thousands/µL                  CTA ED chest PE study   Final Result by Gerardo Kaye MD (10/27 2342)      No evidence of pulmonary embolus  Large hiatal hernia  Workstation performed: [id number]         X-ray chest 2 views    (Results Pending)         Procedures  Procedures      Phone Consults  ED Phone Contact    ED Course                               MDM  Number of Diagnoses or Management Options  Chest pain:   Dyspnea:   Hiatal hernia:   Diagnosis management comments: The cardiac pathology evaluated with troponin, CBC, EKG and chest x-ray  Given patient's relatively immobile status and he hormone use a D-dimer was performed which was elevated at 831  As a result a CT PE study was performed and showed no pulmonary embolus present, revealed a large hiatal hernia  Patient was instructed that given her cardiac and pulmonary workup that there was no critical pathology a lying to her shortness of breath    Believes that part of it may have been attributed to a family member suggesting that it seemed that she was having difficulty breathing  Throughout her time in the emergency department she has been non tachypneic and saturating well  Instructed to return if she started to have severe chest pain, radiations to jaw or arm, and nausea/vomiting, her diaphoretic during these episodes  Also instructed to follow up with primary care provider if she had persistent feelings of discomfort as investigation of hiatal hernia could be warranted in the future  CritCare Time    Disposition  Final diagnoses:   Dyspnea   Hiatal hernia   Chest pain     Time reflects when diagnosis was documented in both MDM as applicable and the Disposition within this note     Time User Action Codes Description Comment    10/27/2018 11:47 PM Juan C Rodarte Add [R06 00] Dyspnea     10/27/2018 11:47 PM Juan C Rodarte Add [K44 9] Hiatal hernia     10/27/2018 11:47 PM Donnie Siu Add [R07 9] Chest pain       ED Disposition     ED Disposition Condition Comment    Discharge  Jason Silva discharge to home/self care  Condition at discharge: Good        Follow-up Information     Follow up With Specialties Details Why Contact Info Additional [address] [facility] Emergency Medicine  If symptoms worsen [address]  [phone]  ED, [address] [place] [address], [facility]          Patient's Medications   Discharge Prescriptions    No medications on file     No discharge procedures on file  ED Provider  Attending physically available and evaluated Jason Silva I managed the patient along with the ED Attending      Electronically Signed by         Jae South MD  10/27/18 1770

## 2018-10-28 NOTE — ED ATTENDING ATTESTATION
Otf Grant MD, saw and evaluated the patient  I have discussed the patient with the resident/non-physician practitioner and agree with the resident's/non-physician practitioner's findings, Plan of Care, and MDM as documented in the resident's/non-physician practitioner's note, except where noted  All available labs and Radiology studies were reviewed  At this point I agree with the current assessment done in the Emergency Department    I have conducted an independent evaluation of this patient a history and physical is as follows:  Lives at group home  Has cerebral palsy    States had sob and cp    Upper area of chest  No radiation    No cough congestion no fever   vss afebrile   Neck no jvd  Lungs good air movement  Clear   Heart rrr  No m g r  Tender cw   abd soft nt nd  Pos bs   Ext no edema         Critical Care Time  CritCare Time    Procedures

## 2018-10-29 LAB
ATRIAL RATE: 89 BPM
P AXIS: 54 DEGREES
PR INTERVAL: 122 MS
QRS AXIS: 0 DEGREES
QRSD INTERVAL: 68 MS
QT INTERVAL: 334 MS
QTC INTERVAL: 406 MS
T WAVE AXIS: 39 DEGREES
VENTRICULAR RATE: 89 BPM

## 2018-10-29 PROCEDURE — 93010 ELECTROCARDIOGRAM REPORT: CPT | Performed by: INTERNAL MEDICINE

## 2018-10-30 ENCOUNTER — OFFICE VISIT (OUTPATIENT)
Dept: FAMILY MEDICINE CLINIC | Facility: CLINIC | Age: 38
End: 2018-10-30
Payer: MEDICARE

## 2018-10-30 VITALS
HEART RATE: 78 BPM | TEMPERATURE: 98.3 F | WEIGHT: 177.2 LBS | HEIGHT: 58 IN | DIASTOLIC BLOOD PRESSURE: 78 MMHG | SYSTOLIC BLOOD PRESSURE: 114 MMHG | BODY MASS INDEX: 37.2 KG/M2 | RESPIRATION RATE: 12 BRPM

## 2018-10-30 DIAGNOSIS — K44.9 HIATAL HERNIA: Primary | ICD-10-CM

## 2018-10-30 PROCEDURE — 99213 OFFICE O/P EST LOW 20 MIN: CPT | Performed by: FAMILY MEDICINE

## 2018-10-30 NOTE — ASSESSMENT & PLAN NOTE
- discussed dietary changes including small frequent meals and decreasing fast food consumption, acidic foods, fatty foods, caffeine   - follow up as needed in 3 months

## 2018-10-30 NOTE — PROGRESS NOTES
Assessment/Plan     Hiatal hernia  - discussed dietary changes including small frequent meals and decreasing fast food consumption, acidic foods, fatty foods, caffeine   - follow up as needed in 3 months      Diagnoses and all orders for this visit:    Hiatal hernia         Subjective     Chief Complaint: ER follow up    HPI:   This is a 44 yo F who presents for ER follow up for chest pain and dyspnea  She was found to have negative cardiac workup  She had a D-dimer that was she had a D-dimer that was elevated and got a CTA which elevated and got a CTA found a large hiatal hernia  Her caregiver reports her caregiver reports that that she eats at [employer] 4 to 5 times a week 4 to 5 times a week and get the spicy chicken  [place], a large ring and large soda  When counseled on eating less burger farheen she struck her head with her hand, her caregiver called her supervisor to file an incident report and to take the the ER, deferred ER visit as I examined the patient's head  The following portions of the patient's history were reviewed and updated as appropriate: allergies, current medications, past family history, past medical history, past social history, past surgical history and problem list     Review of Systems  Review of Systems   Constitutional: Negative for fatigue and fever  HENT: Negative for congestion  Respiratory: Negative for cough and shortness of breath  Cardiovascular: Negative for chest pain and palpitations  Gastrointestinal: Positive for abdominal pain  Negative for constipation, diarrhea, nausea and vomiting  Musculoskeletal: Negative for back pain  Neurological: Negative for dizziness and headaches  Objective   Vitals:    10/30/18 1358   BP: 114/78   Pulse: 78   Resp: 12   Temp: 98 3 °F (36 8 °C)       Physical Exam   Constitutional: She is oriented to person, place, and time  She appears well-developed and well-nourished     HENT:   Head: Normocephalic and atraumatic  Mouth/Throat: Oropharynx is clear and moist    Eyes: Conjunctivae are normal    Neck: Neck supple  Cardiovascular: Normal rate, regular rhythm and normal heart sounds  Exam reveals no friction rub  No murmur heard  Pulmonary/Chest: Effort normal and breath sounds normal  No respiratory distress  She has no wheezes  She has no rales  Abdominal: Soft  Bowel sounds are normal  She exhibits no distension  There is no tenderness  Neurological: She is alert and oriented to person, place, and time  Skin: Skin is warm and dry  Psychiatric: She has a normal mood and affect  Lab Results: I have reviewed all the lab results

## 2018-11-26 ENCOUNTER — HOSPITAL ENCOUNTER (EMERGENCY)
Facility: HOSPITAL | Age: 38
Discharge: HOME | End: 2018-11-26
Attending: EMERGENCY MEDICINE | Admitting: EMERGENCY MEDICINE
Payer: MEDICARE

## 2018-11-26 ENCOUNTER — APPOINTMENT (EMERGENCY)
Dept: RADIOLOGY | Facility: HOSPITAL | Age: 38
End: 2018-11-26
Payer: MEDICARE

## 2018-11-26 VITALS
OXYGEN SATURATION: 96 % | HEART RATE: 86 BPM | DIASTOLIC BLOOD PRESSURE: 66 MMHG | TEMPERATURE: 98.1 F | SYSTOLIC BLOOD PRESSURE: 125 MMHG | RESPIRATION RATE: 18 BRPM

## 2018-11-26 DIAGNOSIS — R07.9 NONSPECIFIC CHEST PAIN: Primary | ICD-10-CM

## 2018-11-26 LAB
ANION GAP SERPL CALCULATED.3IONS-SCNC: 3 MMOL/L (ref 4–13)
ATRIAL RATE: 92 BPM
BASOPHILS # BLD AUTO: 0.05 THOUSANDS/ΜL (ref 0–0.1)
BASOPHILS NFR BLD AUTO: 0 % (ref 0–1)
BUN SERPL-MCNC: 11 MG/DL (ref 5–25)
CALCIUM SERPL-MCNC: 8.9 MG/DL (ref 8.3–10.1)
CHLORIDE SERPL-SCNC: 104 MMOL/L (ref 100–108)
CO2 SERPL-SCNC: 24 MMOL/L (ref 21–32)
CREAT SERPL-MCNC: 0.72 MG/DL (ref 0.6–1.3)
DEPRECATED D DIMER PPP: 527 NG/ML (FEU)
EOSINOPHIL # BLD AUTO: 0.1 THOUSAND/ΜL (ref 0–0.61)
EOSINOPHIL NFR BLD AUTO: 1 % (ref 0–6)
ERYTHROCYTE [DISTWIDTH] IN BLOOD BY AUTOMATED COUNT: 13 % (ref 11.6–15.1)
EXT PREG TEST URINE: NEGATIVE
GFR SERPL CREATININE-BSD FRML MDRD: 106 ML/MIN/1.73SQ M
GLUCOSE SERPL-MCNC: 86 MG/DL (ref 65–140)
HCT VFR BLD AUTO: 39.7 % (ref 34.8–46.1)
HGB BLD-MCNC: 13.2 G/DL (ref 11.5–15.4)
IMM GRANULOCYTES # BLD AUTO: 0.05 THOUSAND/UL (ref 0–0.2)
IMM GRANULOCYTES NFR BLD AUTO: 0 % (ref 0–2)
LYMPHOCYTES # BLD AUTO: 2.5 THOUSANDS/ΜL (ref 0.6–4.47)
LYMPHOCYTES NFR BLD AUTO: 23 % (ref 14–44)
MCH RBC QN AUTO: 31.8 PG (ref 26.8–34.3)
MCHC RBC AUTO-ENTMCNC: 33.2 G/DL (ref 31.4–37.4)
MCV RBC AUTO: 96 FL (ref 82–98)
MONOCYTES # BLD AUTO: 0.98 THOUSAND/ΜL (ref 0.17–1.22)
MONOCYTES NFR BLD AUTO: 9 % (ref 4–12)
NEUTROPHILS # BLD AUTO: 7.45 THOUSANDS/ΜL (ref 1.85–7.62)
NEUTS SEG NFR BLD AUTO: 67 % (ref 43–75)
NRBC BLD AUTO-RTO: 0 /100 WBCS
P AXIS: 44 DEGREES
PLATELET # BLD AUTO: 291 THOUSANDS/UL (ref 149–390)
PMV BLD AUTO: 9.1 FL (ref 8.9–12.7)
POTASSIUM SERPL-SCNC: 5.2 MMOL/L (ref 3.5–5.3)
PR INTERVAL: 128 MS
QRS AXIS: 5 DEGREES
QRSD INTERVAL: 72 MS
QT INTERVAL: 324 MS
QTC INTERVAL: 400 MS
RBC # BLD AUTO: 4.15 MILLION/UL (ref 3.81–5.12)
SODIUM SERPL-SCNC: 131 MMOL/L (ref 136–145)
T WAVE AXIS: 37 DEGREES
TROPONIN I SERPL-MCNC: <0.02 NG/ML
VENTRICULAR RATE: 92 BPM
WBC # BLD AUTO: 11.13 THOUSAND/UL (ref 4.31–10.16)

## 2018-11-26 PROCEDURE — 93010 ELECTROCARDIOGRAM REPORT: CPT | Performed by: INTERNAL MEDICINE

## 2018-11-26 PROCEDURE — 93005 ELECTROCARDIOGRAM TRACING: CPT

## 2018-11-26 PROCEDURE — 85379 FIBRIN DEGRADATION QUANT: CPT | Performed by: EMERGENCY MEDICINE

## 2018-11-26 PROCEDURE — 99285 EMERGENCY DEPT VISIT HI MDM: CPT

## 2018-11-26 PROCEDURE — 96374 THER/PROPH/DIAG INJ IV PUSH: CPT

## 2018-11-26 PROCEDURE — 71046 X-RAY EXAM CHEST 2 VIEWS: CPT

## 2018-11-26 PROCEDURE — 85025 COMPLETE CBC W/AUTO DIFF WBC: CPT | Performed by: EMERGENCY MEDICINE

## 2018-11-26 PROCEDURE — 81025 URINE PREGNANCY TEST: CPT | Performed by: EMERGENCY MEDICINE

## 2018-11-26 PROCEDURE — 80048 BASIC METABOLIC PNL TOTAL CA: CPT | Performed by: EMERGENCY MEDICINE

## 2018-11-26 PROCEDURE — 84484 ASSAY OF TROPONIN QUANT: CPT | Performed by: EMERGENCY MEDICINE

## 2018-11-26 PROCEDURE — 71275 CT ANGIOGRAPHY CHEST: CPT

## 2018-11-26 PROCEDURE — 36415 COLL VENOUS BLD VENIPUNCTURE: CPT | Performed by: EMERGENCY MEDICINE

## 2018-11-26 PROCEDURE — 96361 HYDRATE IV INFUSION ADD-ON: CPT

## 2018-11-26 RX ORDER — KETOROLAC TROMETHAMINE 30 MG/ML
15 INJECTION, SOLUTION INTRAMUSCULAR; INTRAVENOUS ONCE
Status: COMPLETED | OUTPATIENT
Start: 2018-11-26 | End: 2018-11-26

## 2018-11-26 RX ORDER — MAGNESIUM HYDROXIDE/ALUMINUM HYDROXICE/SIMETHICONE 120; 1200; 1200 MG/30ML; MG/30ML; MG/30ML
15 SUSPENSION ORAL ONCE
Status: COMPLETED | OUTPATIENT
Start: 2018-11-26 | End: 2018-11-26

## 2018-11-26 RX ADMIN — KETOROLAC TROMETHAMINE 15 MG: 30 INJECTION, SOLUTION INTRAMUSCULAR at 20:05

## 2018-11-26 RX ADMIN — SODIUM CHLORIDE 1000 ML: 0.9 INJECTION, SOLUTION INTRAVENOUS at 20:03

## 2018-11-26 RX ADMIN — IOHEXOL 85 ML: 350 INJECTION, SOLUTION INTRAVENOUS at 21:29

## 2018-11-26 RX ADMIN — ALUMINUM HYDROXIDE, MAGNESIUM HYDROXIDE, AND SIMETHICONE 15 ML: 200; 200; 20 SUSPENSION ORAL at 17:38

## 2018-11-26 RX ADMIN — LIDOCAINE HYDROCHLORIDE 15 ML: 20 SOLUTION ORAL; TOPICAL at 17:38

## 2018-11-26 NOTE — ED PROVIDER NOTES
History  Chief Complaint   Patient presents with    Chest Pain     pt from group home, staff called 9/11 after pt  complained of chest pain  Currently c/o 2/10 sub-sternal chest pressure  EMS states pt  has had previous episodes of chest pain that were related to GERD  78-year-old female with underlying cerebral palsy comes from a group home for chest discomfort  The symptoms began 2 hours prior to arrival   After dinner the patient was complaining of chest discomfort 2/10 in severity nonradiating aching and burning in sensation  History of similar episodes in the past   Patient has a history of GERD and group home reports that today's symptoms are quite similar however they sent to the ED for further evaluation  She denies any shortness of breath  No recent fevers chills nausea vomiting decrease p o  Intake reported by the group home  No recent about XR steroids  No history of DVT or PE upon chart review  On exam patient well appearing no acute distress however mildly anxious  No murmur lungs clear abdomen soft nontender, no calf swelling or tenderness  Prior to Admission Medications   Prescriptions Last Dose Informant Patient Reported? Taking?    ARIPiprazole (ABILIFY) 20 MG tablet   No No   Sig: Take 1 tablet (20 mg total) by mouth daily at bedtime for 30 days at 9pm    Cholecalciferol (VITAMIN D-3) 1000 units CAPS  Care Giver No No   Sig: Take 2 capsules (2,000 Units total) by mouth daily at 8am    GNP MILK OF MAGNESIA 1200 MG/15ML oral suspension  Care Giver No No   Sig: TAKE 2 TBSP (30ML) BY MOUTH DAILY AS NEEDED IF NO BM IN 3 DAYS (CONSTIPATION)   Mouthwashes (LISTERINE ANTISEPTIC) LIQD  Care Giver Yes No   Sig: Apply to the mouth or throat 2 (two) times a day   Multiple Vitamins-Minerals (CERTAVITE/ANTIOXIDANTS) TABS  Care Giver No No   Sig: Take 1 tablet by mouth daily   RA SUNSCREEN SPF50 LOTN  Care Giver No No   Sig: Apply 15 minutes before sun exposure and every 2 hours bacitracin topical ointment 500 units/g topical ointment  Care Giver No No   Sig: Apply topically to affected area of nose and legs twice daily as needed for redness     bismuth subsalicylate (PEPTO BISMOL) 524 mg/30 mL oral suspension  Care Giver No No   Sig: Take 15 mL (262 mg total) by mouth every 6 (six) hours as needed for indigestion   calcium carbonate (TUMS) 500 mg chewable tablet  Care Giver Yes No   Sig: Chew 1 tablet 3 (three) times a day as needed     carbamide peroxide (DEBROX) 6 5 % otic solution  Care Giver Yes No   Si drops 2 (two) times a day 8am on the ,, and  of the month   clotrimazole (LOTRIMIN) 1 % cream  Care Giver No No   Sig: Apply 1 g (1 application total) topically 3 (three) times a day as needed (fungal irritation)   escitalopram (LEXAPRO) 20 mg tablet   No No   Sig: Take 1 tablet (20 mg total) by mouth daily for 30 days at 8am    famotidine (PEPCID) 20 mg tablet  Care Giver No No   Sig: Take 1 tablet (20 mg total) by mouth 2 (two) times a day for 30 days At 7AM and 4PM (GERD)   ibuprofen (MOTRIN) 400 mg tablet   No No   Sig: Take 1 tablet (400 mg total) by mouth every 8 (eight) hours as needed for mild pain, moderate pain or headaches for up to 30 days   lubiprostone (AMITIZA) 24 mcg capsule  Care Giver No No   Sig: Take 1 capsule (24 mcg total) by mouth 2 (two) times a day with meals   metoclopramide (REGLAN) 10 mg tablet  Care Giver No No   Sig: TAKE 1 TABLET BY MOUTH EVERY 8 HOURS AS NEEDED FOR NAUSEA *ZELAURAI   norgestimate-ethinyl estradiol (ORTHO TRI-CYCLEN LO) 0 18/0 215/0 25 MG-25 MCG per tablet  Care Giver No No   Sig: Take 1 tablet by mouth daily at 8am    polyethylene glycol (MIRALAX) 17 g packet  Care Giver No No   Sig: Take 17gm (1 packet) daily for first three days, then daily as needed for no bowel movement more than 24 hrs   Patient taking differently: Take 1 g by mouth daily as needed Take 17gm (1 packet) daily for first three days, then daily as needed for no bowel movement more than 24 hrs    senna-docusate sodium (SENEXON-S) 8 6-50 mg per tablet  Care Giver No No   Sig: Take 1 tablet by mouth daily at 8am    traZODone (DESYREL) 50 mg tablet  Care Giver No No   Sig: Take 1 tablet (50 mg total) by mouth daily at bedtime at 9pm   zinc oxide (DESITIN) 13 % cream  Care Giver No No   Sig: Apply 1 application topically as needed (for perianal irritation)      Facility-Administered Medications: None       Past Medical History:   Diagnosis Date    Anxiety     Brain lesion     Bronchitis     Cerebral palsy (HCC)     Cerebral palsy (HCC)     Last Assessed:7/6/2016    Chronically dry eyes, left     Last Assessed:7/6/2016    Depression     Last Assessed:7/6/2016    Depressive disorder     Fall     GERD (gastroesophageal reflux disease)     Mood disorder (HCC)        Past Surgical History:   Procedure Laterality Date    CSF SHUNT      Creation of Ventriculo-Peritoneal CSF shunt ; Last Assessed:7/6/2016    EAR SURGERY      Last Assessed:7/6/2016    LEG SURGERY      due to CP     NOSE SURGERY      Last Assessed:7/6/2016       Family History   Problem Relation Age of Onset    Diabetes Mother     No Known Problems Father      I have reviewed and agree with the history as documented  Social History   Substance Use Topics    Smoking status: Never Smoker    Smokeless tobacco: Never Used    Alcohol use No        Review of Systems   Constitutional: Negative for chills, fatigue and fever  HENT: Negative for congestion and sinus pressure  Eyes: Negative for visual disturbance  Respiratory: Negative for cough, chest tightness and shortness of breath  Cardiovascular: Positive for chest pain  Negative for palpitations and leg swelling  Gastrointestinal: Negative for abdominal pain, diarrhea, nausea and vomiting  Genitourinary: Negative for dysuria and frequency  Musculoskeletal: Negative for back pain and neck pain  Skin: Negative for rash  Allergic/Immunologic: Negative for immunocompromised state  Neurological: Negative for dizziness, weakness, light-headedness, numbness and headaches  Hematological: Negative for adenopathy  Physical Exam  ED Triage Vitals   Temperature Pulse Respirations Blood Pressure SpO2   11/26/18 1716 11/26/18 1716 11/26/18 1716 11/26/18 1716 11/26/18 1716   98 1 °F (36 7 °C) 90 18 152/95 96 %      Temp Source Heart Rate Source Patient Position - Orthostatic VS BP Location FiO2 (%)   11/26/18 1716 11/26/18 1716 11/26/18 1716 11/26/18 1716 --   Oral Monitor Lying Right arm       Pain Score       11/26/18 1729       2           Orthostatic Vital Signs  Vitals:    11/26/18 1930 11/26/18 2035 11/26/18 2114 11/26/18 2226   BP: 136/71 136/70 131/68 125/66   Pulse: (!) 110 97 96 86   Patient Position - Orthostatic VS: Lying Lying Lying Lying       Physical Exam   Constitutional: She is oriented to person, place, and time  Vital signs are normal  She appears well-developed and well-nourished  She does not appear ill  No distress  HENT:   Head: Normocephalic and atraumatic  Head is without abrasion and without contusion  Right Ear: Tympanic membrane normal    Left Ear: Tympanic membrane normal    Nose: Nose normal    Mouth/Throat: Uvula is midline, oropharynx is clear and moist and mucous membranes are normal    Eyes: Pupils are equal, round, and reactive to light  Conjunctivae and EOM are normal    Neck: Trachea normal, normal range of motion, full passive range of motion without pain and phonation normal  Neck supple  No JVD present  No spinous process tenderness and no muscular tenderness present  Carotid bruit is not present  Normal range of motion present  No thyromegaly present  Cardiovascular: Normal rate, regular rhythm and intact distal pulses  Exam reveals no friction rub  No murmur heard  Pulmonary/Chest: Effort normal and breath sounds normal  No accessory muscle usage or stridor  No tachypnea   No respiratory distress  She has no decreased breath sounds  She has no wheezes  She has no rhonchi  She has no rales  She exhibits no tenderness, no crepitus, no edema and no retraction  Abdominal: Soft  Normal appearance and bowel sounds are normal  She exhibits no distension  There is no tenderness  There is no rigidity, no rebound, no guarding and no CVA tenderness  Musculoskeletal: Normal range of motion  No calf swelling or tenderness  Lymphadenopathy:     She has no cervical adenopathy  Neurological: She is alert and oriented to person, place, and time  She has normal strength  No sensory deficit  GCS eye subscore is 4  GCS verbal subscore is 5  GCS motor subscore is 6  Skin: Skin is warm, dry and intact  No rash noted  She is not diaphoretic  Psychiatric: She has a normal mood and affect  Nursing note and vitals reviewed        ED Medications  Medications   aluminum-magnesium hydroxide-simethicone (MYLANTA) 200-200-20 mg/5 mL oral suspension 15 mL (15 mL Oral Given 11/26/18 1738)   lidocaine viscous (XYLOCAINE) 2 % mucosal solution 15 mL (15 mL Swish & Spit Given 11/26/18 1738)   ketorolac (TORADOL) injection 15 mg (15 mg Intravenous Given 11/26/18 2005)   sodium chloride 0 9 % bolus 1,000 mL (0 mL Intravenous Stopped 11/26/18 2227)   iohexol (OMNIPAQUE) 350 MG/ML injection (MULTI-DOSE) 85 mL (85 mL Intravenous Given 11/26/18 2129)       Diagnostic Studies  Results Reviewed     Procedure Component Value Units Date/Time    POCT pregnancy, urine [[id number]]  (Normal) Resulted:  11/26/18 2114    Lab Status:  Final result Updated:  11/26/18 2114     EXT PREG TEST UR (Ref: Negative) NEGATIVE    Troponin I [[id number]]  (Normal) Collected:  11/26/18 2003    Lab Status:  Final result Specimen:  Blood from Arm, Right Updated:  11/26/18 2105     Troponin I <0 02 ng/mL     Basic metabolic panel [[id number]]  (Abnormal) Collected:  11/26/18 1806    Lab Status:  Final result Specimen:  Blood from Arm, Right Updated:  11/26/18 1844     Sodium 131 (L) mmol/L      Potassium 5 2 mmol/L      Chloride 104 mmol/L      CO2 24 mmol/L      ANION GAP 3 (L) mmol/L      BUN 11 mg/dL      Creatinine 0 72 mg/dL      Glucose 86 mg/dL      Calcium 8 9 mg/dL      eGFR 106 ml/min/1 73sq m     Narrative:         National Kidney Disease Education Program recommendations are as follows:  GFR calculation is accurate only with a steady state creatinine  Chronic Kidney disease less than 60 ml/min/1 73 sq  meters  Kidney failure less than 15 ml/min/1 73 sq  meters  D-dimer, quantitative [[id number]]  (Abnormal) Collected:  11/26/18 1806    Lab Status:  Final result Specimen:  Blood from Arm, Right Updated:  11/26/18 1830     D-Dimer, Quant 527 (H) ng/ml (FEU)     CBC and differential [[id number]]  (Abnormal) Collected:  11/26/18 1806    Lab Status:  Final result Specimen:  Blood from Arm, Right Updated:  11/26/18 1818     WBC 11 13 (H) Thousand/uL      RBC 4 15 Million/uL      Hemoglobin 13 2 g/dL      Hematocrit 39 7 %      MCV 96 fL      MCH 31 8 pg      MCHC 33 2 g/dL      RDW 13 0 %      MPV 9 1 fL      Platelets 683 Thousands/uL      nRBC 0 /100 WBCs      Neutrophils Relative 67 %      Immat GRANS % 0 %      Lymphocytes Relative 23 %      Monocytes Relative 9 %      Eosinophils Relative 1 %      Basophils Relative 0 %      Neutrophils Absolute 7 45 Thousands/µL      Immature Grans Absolute 0 05 Thousand/uL      Lymphocytes Absolute 2 50 Thousands/µL      Monocytes Absolute 0 98 Thousand/µL      Eosinophils Absolute 0 10 Thousand/µL      Basophils Absolute 0 05 Thousands/µL                  CTA ED chest PE study   Final Result by Lauren Mendez MD (11/26 2151)      No evidence of pulmonary embolism  Small hiatal hernia  Distal esophageal wall thickening could relate to esophagitis                 Workstation performed: [id number]         XR chest 2 views    (Results Pending)         Procedures  Procedures      Phone Consults  ED Phone Contact    ED Course  ED Course as of Nov 27 0158   Mon Nov 26, 2018   1742 EKG:  Sinus rhythm rate 92  No ST changes  Nonspecific T-wave changes V3  Normal intervals   as interpreted by me        1836 D-DIMER QUANTITATIVE: (!) 527               PERC Rule for PE      Most Recent Value   PERC Rule for PE   Age >=50  0 Filed at: 11/26/2018 1741   HR >=100  1 Filed at: 11/26/2018 1741   O2 Sat on room air < 95%  1 Filed at: 11/26/2018 1741   History of PE or DVT  0 Filed at: 11/26/2018 1741   Recent trauma or surgery  0 Filed at: 11/26/2018 1741   Hemoptysis  0 Filed at: 11/26/2018 1741   Exogenous estrogen  0 Filed at: 11/26/2018 1741   Unilateral leg swelling  0 Filed at: 11/26/2018 1741   PERC Rule for PE Results  2 Filed at: 11/26/2018 1741                Wells' Criteria for PE      Most Recent Value   Wells' Criteria for PE   Clinical signs and symptoms of DVT  0 Filed at: 11/26/2018 1740   PE is primary diagnosis or equally likely  0 Filed at: 11/26/2018 1740   HR >100  1 5 Filed at: 11/26/2018 1740   Immobilization at least 3 days or Surgery in the previous 4 weeks  0 Filed at: 11/26/2018 1740   Previous, objectively diagnosed PE or DVT  0 Filed at: 11/26/2018 1740   Hemoptysis  0 Filed at: 11/26/2018 1740   Malignancy with treatment within 6 months or palliative  0 Filed at: 11/26/2018 1740   Wells' Criteria Total  1 5 Filed at: 11/26/2018 1740            MDM  CritCare Time    Disposition  Final diagnoses:   Nonspecific chest pain     Time reflects when diagnosis was documented in both MDM as applicable and the Disposition within this note     Time User Action Codes Description Comment    11/26/2018 10:00 PM Shane Garcia Add [R07 9] Chest pain     11/26/2018 10:00 PM Shane Garcia Remove [R07 9] Chest pain     11/26/2018 10:00 PM Lisa Velasquez Add [R07 9] Nonspecific chest pain       ED Disposition     ED Disposition Condition Comment    Discharge  Janet Crater discharge to home/self care     Condition at discharge: Good        Follow-up Information     Follow up With Specialties Details Why Contact Info Additional [address] Emergency Department Emergency Medicine Go to If symptoms worsen Brett 10 [id number]  [facility], [address] [place] [address], [phone]    [employer]  Schedule an appointment as soon as possible for a visit in 2 days  [phone]             Discharge Medication List as of 11/26/2018 10:04 PM      CONTINUE these medications which have NOT CHANGED    Details   ARIPiprazole (ABILIFY) 20 MG tablet Take 1 tablet (20 mg total) by mouth daily at bedtime for 30 days at 9pm , Starting Tue 9/25/2018, Until Thu 10/25/2018, No Print      bacitracin topical ointment 500 units/g topical ointment Apply topically to affected area of nose and legs twice daily as needed for redness , No Print      bismuth subsalicylate (PEPTO BISMOL) 524 mg/30 mL oral suspension Take 15 mL (262 mg total) by mouth every 6 (six) hours as needed for indigestion, Starting Thu 10/4/2018, Normal      calcium carbonate (TUMS) 500 mg chewable tablet Chew 1 tablet 3 (three) times a day as needed  , Historical Med      carbamide peroxide (DEBROX) 6 5 % otic solution 5 drops 2 (two) times a day 8am on the 11,12, and 13th of the month, Historical Med      Cholecalciferol (VITAMIN D-3) 1000 units CAPS Take 2 capsules (2,000 Units total) by mouth daily at 8am , Starting Tue 9/25/2018, No Print      clotrimazole (LOTRIMIN) 1 % cream Apply 1 g (1 application total) topically 3 (three) times a day as needed (fungal irritation), Starting Wed 5/9/2018, Normal      escitalopram (LEXAPRO) 20 mg tablet Take 1 tablet (20 mg total) by mouth daily for 30 days at 8am , Starting Tue 9/25/2018, Until Thu 10/25/2018, No Print      famotidine (PEPCID) 20 mg tablet Take 1 tablet (20 mg total) by mouth 2 (two) times a day for 30 days At 7AM and 4PM (GERD), Starting Mon 10/8/2018, Until Wed 11/7/2018, Normal      GNP MILK OF MAGNESIA 1200 MG/15ML oral suspension TAKE 2 TBSP (30ML) BY MOUTH DAILY AS NEEDED IF NO BM IN 3 DAYS (CONSTIPATION), Normal      ibuprofen (MOTRIN) 400 mg tablet Take 1 tablet (400 mg total) by mouth every 8 (eight) hours as needed for mild pain, moderate pain or headaches for up to 30 days, Starting Mon 9/10/2018, Until Wed 10/24/2018, Normal      lubiprostone (AMITIZA) 24 mcg capsule Take 1 capsule (24 mcg total) by mouth 2 (two) times a day with meals, Starting Mon 9/10/2018, Normal      metoclopramide (REGLAN) 10 mg tablet TAKE 1 TABLET BY MOUTH EVERY 8 HOURS AS NEEDED FOR NAUSEA *ZEJHONNY, Normal      Mouthwashes (LISTERINE ANTISEPTIC) LIQD Apply to the mouth or throat 2 (two) times a day, Historical Med      Multiple Vitamins-Minerals (CERTAVITE/ANTIOXIDANTS) TABS Take 1 tablet by mouth daily, Starting Mon 9/10/2018, Normal      norgestimate-ethinyl estradiol (ORTHO TRI-CYCLEN LO) 0 18/0 215/0 25 MG-25 MCG per tablet Take 1 tablet by mouth daily at 8am , Starting Tue 9/25/2018, No Print      polyethylene glycol (MIRALAX) 17 g packet Take 17gm (1 packet) daily for first three days, then daily as needed for no bowel movement more than 24 hrs, Normal      RA SUNSCREEN SPF50 LOTN Apply 15 minutes before sun exposure and every 2 hours, Normal      senna-docusate sodium (SENEXON-S) 8 6-50 mg per tablet Take 1 tablet by mouth daily at 8am , Starting Tue 9/25/2018, No Print      traZODone (DESYREL) 50 mg tablet Take 1 tablet (50 mg total) by mouth daily at bedtime at 9pm, Starting Tue 9/25/2018, No Print      zinc oxide (DESITIN) 13 % cream Apply 1 application topically as needed (for perianal irritation), Starting Thu 5/17/2018, Normal           No discharge procedures on file  ED Provider  Attending physically available and evaluated Isabel Coronado I managed the patient along with the ED Attending      Electronically Signed by         Mana Ovalle DO  11/27/18 0159

## 2018-11-26 NOTE — ED ATTENDING ATTESTATION
Seth Kevin MD, saw and evaluated the patient  I have discussed the patient with the resident/non-physician practitioner and agree with the resident's/non-physician practitioner's findings, Plan of Care, and MDM as documented in the resident's/non-physician practitioner's note, except where noted  All available labs and Radiology studies were reviewed  At this point I agree with the current assessment done in the Emergency Department  I have conducted an independent evaluation of this patient a history and physical is as follows:    23-year-old woman with history of cerebral palsy presents complaining of chest pain  Started this afternoon after eating several hamburgers [employer]  Central chest pain, nonradiating  No shortness of breath  No other complaints  On exam awake and alert, no acute distress  Heart rate borderline tachycardic, regular, no murmurs rubs or gallops  Lungs clear to auscultation bilaterally  Abdomen soft, nontender, nondistended  No lower extremity edema, swelling, tenderness  Plan EKG, labs, imaging        Critical Care Time  CritCare Time    Procedures

## 2018-11-27 NOTE — DISCHARGE INSTRUCTIONS
It is okay to take nighttime medications between 10 30 and 11 30  Chest Pain, Ambulatory Care   GENERAL INFORMATION:   Chest pain  can be caused by a range of conditions, from not serious to life-threatening  It may be caused by a heart attack or a blood clot in your lungs  Sometimes chest pain or pressure is caused by poor blood flow to your heart (angina)  Infection, inflammation, or a fracture in the bones or cartilage in your chest can cause pain or discomfort  Chest pain can also be a symptom of a digestive problem, such as acid reflux or a stomach ulcer  Common symptoms include the following:   · Fever or sweating     · Nausea or vomiting     · Shortness of breath     · Discomfort or pressure that spreads from your chest to your back, jaw, or arm     · A racing or slow heartbeat     · Feeling weak, tired, or faint  Seek immediate care for the following symptoms:   · Any of the following signs of a heart attack:      ¨ Squeezing, pressure, or pain in your chest that lasts longer than 5 minutes or returns    ¨ Discomfort or pain in your back, neck, jaw, stomach, or arm     ¨ Trouble breathing     ¨ Nausea or vomiting    ¨ Lightheadedness or a sudden cold sweat, especially with trouble breathing         · Chest discomfort that gets worse, even with medicine    · Coughing or vomiting blood    · Black or bloody bowel movements     · Vomiting that does not stop, or pain when you swallow  Treatment for chest pain  may include medicine to treat your symptoms while he determines the cause of your chest pain  You may also need any of the following:  · Antiplatelets , such as aspirin, help prevent blood clots  Take your antiplatelet medicine exactly as directed  These medicines make it more likely for you to bleed or bruise  If you are told to take aspirin, do not take acetaminophen or ibuprofen instead  · Prescription pain medicine  may be given  Ask how to take this medicine safely  Do not smoke:   If you smoke, it is never too late to quit  Smoking increases your risk for a heart attack and other heart and lung conditions  Ask your healthcare provider for information about how to stop smoking if you need help  Follow up with your healthcare provider as directed: You may need more tests  You may be referred to a specialist, such as a cardiologist or gastroenterologist  Write down your questions so you remember to ask them during your visits  CARE AGREEMENT:   You have the right to help plan your care  Learn about your health condition and how it may be treated  Discuss treatment options with your caregivers to decide what care you want to receive  You always have the right to refuse treatment  The above information is an  only  It is not intended as medical advice for individual conditions or treatments  Talk to your doctor, nurse or pharmacist before following any medical regimen to see if it is safe and effective for you  © 2014 [address] is for End User's use only and may not be sold, redistributed or otherwise used for commercial purposes  All illustrations and images included in CareNotes® are the copyrighted property of A D A M , Inc  or Franco Epperson

## 2018-12-06 ENCOUNTER — OFFICE VISIT (OUTPATIENT)
Dept: FAMILY MEDICINE CLINIC | Facility: CLINIC | Age: 38
End: 2018-12-06
Payer: MEDICARE

## 2018-12-06 VITALS
TEMPERATURE: 99.1 F | BODY MASS INDEX: 37.99 KG/M2 | DIASTOLIC BLOOD PRESSURE: 60 MMHG | SYSTOLIC BLOOD PRESSURE: 114 MMHG | WEIGHT: 181 LBS | HEIGHT: 58 IN | RESPIRATION RATE: 14 BRPM | HEART RATE: 80 BPM

## 2018-12-06 DIAGNOSIS — R52 PAIN: ICD-10-CM

## 2018-12-06 DIAGNOSIS — K21.9 GASTROESOPHAGEAL REFLUX DISEASE WITHOUT ESOPHAGITIS: Primary | ICD-10-CM

## 2018-12-06 DIAGNOSIS — K08.9 TOOTH DISORDER: Primary | ICD-10-CM

## 2018-12-06 DIAGNOSIS — K59.00 CONSTIPATION, UNSPECIFIED CONSTIPATION TYPE: ICD-10-CM

## 2018-12-06 DIAGNOSIS — F33.3 SEVERE EPISODE OF RECURRENT MAJOR DEPRESSIVE DISORDER, WITH PSYCHOTIC FEATURES (HCC): ICD-10-CM

## 2018-12-06 DIAGNOSIS — K21.9 GASTROESOPHAGEAL REFLUX DISEASE, ESOPHAGITIS PRESENCE NOT SPECIFIED: ICD-10-CM

## 2018-12-06 PROCEDURE — 99213 OFFICE O/P EST LOW 20 MIN: CPT | Performed by: FAMILY MEDICINE

## 2018-12-06 RX ORDER — METOCLOPRAMIDE 10 MG/1
TABLET ORAL
Qty: 30 TABLET | Refills: 0 | Status: SHIPPED | OUTPATIENT
Start: 2018-12-06 | End: 2019-03-20 | Stop reason: SDUPTHER

## 2018-12-06 RX ORDER — EUCALYP/ME-SALICYLATE/MEN/THYM
1 MOUTHWASH MUCOUS MEMBRANE 2 TIMES DAILY
Qty: 500 ML | Refills: 3 | Status: SHIPPED | OUTPATIENT
Start: 2018-12-06 | End: 2019-04-23 | Stop reason: SDUPTHER

## 2018-12-06 RX ORDER — ESCITALOPRAM OXALATE 20 MG/1
20 TABLET ORAL DAILY
Qty: 90 TABLET | Refills: 0
Start: 2018-12-06 | End: 2020-05-20

## 2018-12-06 RX ORDER — FAMOTIDINE 20 MG/1
20 TABLET, FILM COATED ORAL 2 TIMES DAILY
Qty: 180 TABLET | Refills: 0 | Status: SHIPPED | OUTPATIENT
Start: 2018-12-06 | End: 2019-02-01 | Stop reason: ALTCHOICE

## 2018-12-06 RX ORDER — IBUPROFEN 400 MG/1
400 TABLET ORAL EVERY 8 HOURS PRN
Qty: 90 TABLET | Refills: 0 | Status: SHIPPED | OUTPATIENT
Start: 2018-12-06 | End: 2018-12-29 | Stop reason: ALTCHOICE

## 2018-12-06 NOTE — PROGRESS NOTES
Assessment/Plan     Esophageal reflux  - likely secondary to hiatal hernia, continue pepcid BID   - follow up as needed        Subjective     Chief Complaint: ED follow up     HPI:   This is a 44 yo F who presents for ED follow up for chest pain  She reports burning pain that started after she ate dinner, the caregivers reported at that time this was similar to other episodes of heartburn she has had in the past and she was diagnosed with GERD  She reports she is feeling well since her ED visit  Per chart review she had negative troponin, normal EKG and negative CTA PE study as part of her work up  The following portions of the patient's history were reviewed and updated as appropriate: allergies, current medications, past family history, past medical history, past social history, past surgical history and problem list     Review of Systems  Review of Systems   Constitutional: Negative for fatigue and fever  HENT: Negative for congestion  Respiratory: Negative for cough and shortness of breath  Cardiovascular: Negative for chest pain and palpitations  Gastrointestinal: Negative for abdominal pain, constipation, diarrhea and vomiting  Genitourinary: Negative for difficulty urinating  Musculoskeletal: Negative for back pain  Skin: Negative for rash  Neurological: Negative for headaches  Objective   Vitals:    12/06/18 1416   BP: 114/60   Pulse: 80   Resp: 14   Temp: 99 1 °F (37 3 °C)       Physical Exam   Constitutional: She appears well-developed and well-nourished  HENT:   Mouth/Throat: Oropharynx is clear and moist    Eyes: Conjunctivae are normal    Neck: Neck supple  Cardiovascular: Normal rate, regular rhythm and normal heart sounds  No murmur heard  Pulmonary/Chest: Effort normal and breath sounds normal  She has no wheezes  Abdominal: Soft  Bowel sounds are normal  There is no tenderness  Musculoskeletal: She exhibits no edema  Neurological: She is alert     Skin: Skin is warm  Psychiatric: She has a normal mood and affect  Lab Results: I have reviewed all the lab results

## 2018-12-12 ENCOUNTER — OFFICE VISIT (OUTPATIENT)
Dept: URGENT CARE | Age: 38
End: 2018-12-12
Payer: MEDICARE

## 2018-12-12 VITALS
HEART RATE: 96 BPM | OXYGEN SATURATION: 97 % | TEMPERATURE: 99.5 F | BODY MASS INDEX: 36.69 KG/M2 | RESPIRATION RATE: 16 BRPM | HEIGHT: 59 IN | DIASTOLIC BLOOD PRESSURE: 78 MMHG | WEIGHT: 182 LBS | SYSTOLIC BLOOD PRESSURE: 127 MMHG

## 2018-12-12 DIAGNOSIS — R35.0 URINARY FREQUENCY: Primary | ICD-10-CM

## 2018-12-12 LAB
SL AMB  POCT GLUCOSE, UA: NEGATIVE
SL AMB LEUKOCYTE ESTERASE,UA: NEGATIVE
SL AMB POCT BILIRUBIN,UA: NEGATIVE
SL AMB POCT BLOOD,UA: ABNORMAL
SL AMB POCT CLARITY,UA: ABNORMAL
SL AMB POCT COLOR,UA: YELLOW
SL AMB POCT KETONES,UA: NEGATIVE
SL AMB POCT NITRITE,UA: NEGATIVE
SL AMB POCT PH,UA: 6
SL AMB POCT SPECIFIC GRAVITY,UA: 1.01
SL AMB POCT URINE PROTEIN: ABNORMAL
SL AMB POCT UROBILINOGEN: 0.2

## 2018-12-12 PROCEDURE — 87086 URINE CULTURE/COLONY COUNT: CPT | Performed by: NURSE PRACTITIONER

## 2018-12-12 PROCEDURE — 81002 URINALYSIS NONAUTO W/O SCOPE: CPT | Performed by: NURSE PRACTITIONER

## 2018-12-12 PROCEDURE — G0463 HOSPITAL OUTPT CLINIC VISIT: HCPCS | Performed by: NURSE PRACTITIONER

## 2018-12-12 PROCEDURE — 99213 OFFICE O/P EST LOW 20 MIN: CPT | Performed by: NURSE PRACTITIONER

## 2018-12-13 NOTE — PATIENT INSTRUCTIONS
Will send urine out for culture and proceed with further recommendations once results received  Increase fluids  Continue to monitor  Urinary Tract Infection in Women   AMBULATORY CARE:   A urinary tract infection (UTI)  is caused by bacteria that get inside your urinary tract  Most bacteria that enter your urinary tract come out when you urinate  If the bacteria stay in your urinary tract, you may get an infection  Your urinary tract includes your kidneys, ureters, bladder, and urethra  Urine is made in your kidneys, and it flows from the ureters to the bladder  Urine leaves the bladder through the urethra  A UTI is more common in your lower urinary tract, which includes your bladder and urethra  Common symptoms include the following:   · Urinating more often or waking from sleep to urinate    · Pain or burning when you urinate    · Pain or pressure in your lower abdomen     · Urine that smells bad    · Blood in your urine    · Leaking urine  Seek care immediately if:   · You are urinating very little or not at all  · You have a high fever with shaking chills  · You have side or back pain that gets worse  Contact your healthcare provider if:   · You have a fever  · You do not feel better after 2 days of taking antibiotics  · You are vomiting  · You have questions or concerns about your condition or care  Treatment for a UTI  may include medicines to treat a bacterial infection  You may also need medicines to decrease pain and burning, or decrease the urge to urinate often  Prevent a UTI:   · Empty your bladder often  Urinate and empty your bladder as soon as you feel the need  Do not hold your urine for long periods of time  · Wipe from front to back after you urinate or have a bowel movement  This will help prevent germs from getting into your urinary tract through your urethra  · Drink liquids as directed    Ask how much liquid to drink each day and which liquids are best for you  You may need to drink more liquids than usual to help flush out the bacteria  Do not drink alcohol, caffeine, or citrus juices  These can irritate your bladder and increase your symptoms  Your healthcare provider may recommend cranberry juice to help prevent a UTI  · Urinate after you have sex  This can help flush out bacteria passed during sex  · Do not douche or use feminine deodorants  These can change the chemical balance in your vagina  · Change sanitary pads or tampons often  This will help prevent germs from getting into your urinary tract  · Do pelvic muscle exercises often  Pelvic muscle exercises may help you start and stop urinating  Strong pelvic muscles may help you empty your bladder easier  Squeeze these muscles tightly for 5 seconds like you are trying to hold back urine  Then relax for 5 seconds  Gradually work up to squeezing for 10 seconds  Do 3 sets of 15 repetitions a day, or as directed  Follow up with your healthcare provider as directed:  Write down your questions so you remember to ask them during your visits  © 2017 [facility] Information is for End User's use only and may not be sold, redistributed or otherwise used for commercial purposes  All illustrations and images included in CareNotes® are the copyrighted property of A D A M [employer] Inc  or [employer]pperson  The above information is an  only  It is not intended as medical advice for individual conditions or treatments  Talk to your doctor, nurse or pharmacist before following any medical regimen to see if it is safe and effective for you

## 2018-12-13 NOTE — PROGRESS NOTES
[facility]        NAME: Janet Felix is a 43 y o  female  : 1980    MRN: [id number]  DATE: 2018  TIME: 8:42 PM    Assessment and Plan   Urinary frequency [R35 0]  1  Urinary frequency  POCT urine dip    Urine culture         Patient Instructions     Will send urine out for culture and proceed with further recommendations once results received  Increase fluids  Continue to monitor  Follow up with PCP in 3-5 days  Proceed to  ER if symptoms worsen  Chief Complaint     Chief Complaint   Patient presents with    Possible UTI     Pt c/o urinary frequency and urgency since this morning  History of Present Illness       78-year-old female presenting today with caretaker with reports of increased urinary frequency starting a couple hours ago  Patient lives in group home and caretaker was advised by previous shift that she had the increased urination and one bout of diarrhea  No nausea or vomiting  Patient denies flank pain, suprapubic pain, dysuria, or increased urinary urgency  No f/c  No other complaints  Review of Systems   Review of Systems   Constitutional: Negative  Respiratory: Negative  Cardiovascular: Negative  Genitourinary: Positive for frequency  Negative for dysuria, flank pain and urgency  Current Medications       Current Outpatient Prescriptions:     ARIPiprazole (ABILIFY) 20 MG tablet, Take 1 tablet (20 mg total) by mouth daily at bedtime for 30 days at 9pm , Disp: 30 tablet, Rfl: 0    bacitracin topical ointment 500 units/g topical ointment, Apply topically to affected area of nose and legs twice daily as needed for redness  , Disp: 15 g, Rfl: 0    bismuth subsalicylate (PEPTO BISMOL) 524 mg/30 mL oral suspension, Take 15 mL (262 mg total) by mouth every 6 (six) hours as needed for indigestion, Disp: 360 mL, Rfl: 0    calcium carbonate (TUMS) 500 mg chewable tablet, Chew 1 tablet 3 (three) times a day as needed  , Disp: , Rfl:   carbamide peroxide (DEBROX) 6 5 % otic solution, 5 drops 2 (two) times a day 8am on the 11,12, and 13th of the month, Disp: , Rfl:     Cholecalciferol (VITAMIN D-3) 1000 units CAPS, Take 2 capsules (2,000 Units total) by mouth daily at 8am , Disp: 180 capsule, Rfl: 0    clotrimazole (LOTRIMIN) 1 % cream, Apply 1 g (1 application total) topically 3 (three) times a day as needed (fungal irritation), Disp: 30 g, Rfl: 5    escitalopram (LEXAPRO) 20 mg tablet, Take 1 tablet (20 mg total) by mouth daily for 30 days at 8am , Disp: 90 tablet, Rfl: 0    famotidine (PEPCID) 20 mg tablet, Take 1 tablet (20 mg total) by mouth 2 (two) times a day for 90 days At 7AM and 4PM (GERD), Disp: 180 tablet, Rfl: 0    ibuprofen (MOTRIN) 400 mg tablet, Take 1 tablet (400 mg total) by mouth every 8 (eight) hours as needed for mild pain, moderate pain or headaches for up to 30 days, Disp: 90 tablet, Rfl: 0    lubiprostone (AMITIZA) 24 mcg capsule, Take 1 capsule (24 mcg total) by mouth 2 (two) times a day with meals, Disp: 180 capsule, Rfl: 3    magnesium hydroxide (GNP MILK OF MAGNESIA) 400 mg/5 mL oral suspension, Take 30 mL by mouth daily as needed for constipation If no BM in 3 days, Disp: 355 mL, Rfl: 0    metoclopramide (REGLAN) 10 mg tablet, Take one tablet by mouth every 8 hours as needed for nausea, Disp: 30 tablet, Rfl: 0    Mouthwashes (LISTERINE ANTISEPTIC) LIQD, Apply 1 application to the mouth or throat 2 (two) times a day for 30 days, Disp: 500 mL, Rfl: 3    Multiple Vitamins-Minerals (CERTAVITE/ANTIOXIDANTS) TABS, Take 1 tablet by mouth daily, Disp: 90 tablet, Rfl: 3    norgestimate-ethinyl estradiol (ORTHO TRI-CYCLEN LO) 0 18/0 215/0 25 MG-25 MCG per tablet, Take 1 tablet by mouth daily at 8am , Disp: 28 tablet, Rfl: 0    polyethylene glycol (MIRALAX) 17 g packet, Take 17gm (1 packet) daily for first three days, then daily as needed for no bowel movement more than 24 hrs (Patient taking differently: Take 1 g by mouth daily as needed Take 17gm (1 packet) daily for first three days, then daily as needed for no bowel movement more than 24 hrs ), Disp: 30 each, Rfl: 0    RA SUNSCREEN SPF50 LOTN, Apply 15 minutes before sun exposure and every 2 hours, Disp: 1 Bottle, Rfl: 5    senna-docusate sodium (SENEXON-S) 8 6-50 mg per tablet, Take 1 tablet by mouth daily at 8am , Disp: 90 tablet, Rfl: 0    traZODone (DESYREL) 50 mg tablet, Take 1 tablet (50 mg total) by mouth daily at bedtime at 9pm, Disp: , Rfl: 0    zinc oxide (DESITIN) 13 % cream, Apply 1 application topically as needed (for perianal irritation), Disp: 1 Tube, Rfl: 0    Current Allergies     Allergies as of 12/12/2018    (No Known Allergies)            The following portions of the patient's history were reviewed and updated as appropriate: allergies, current medications, past family history, past medical history, past social history, past surgical history and problem list      Past Medical History:   Diagnosis Date    Anxiety     Brain lesion     Bronchitis     [facility] [address]     C[facility]bral [facility]y [address] [address] [facility]     Last Assessed:7/6/2016    Chronically dry eyes, left     Last Assessed:7/6/2016    Depression     Last Assessed:7/6/2016    Depressive disorder     Fall     GERD (gastroesophageal reflux disease)     Mood disorder ([facility] [address] [facility]        Past Surgical History:   Procedure Laterality Date    CSF SHUNT      Creation of Ventriculo-Peritoneal CSF shunt ; Last Assessed:7/6/2016    EAR SURGERY      Last Assessed:7/6/2016    LEG SURGERY      due to CP     NOSE SURGERY      Last Assessed:7/6/2016       Family History   Problem Relation Age of Onset    Diabetes Mother     No Known Problems Father          Medications have been verified          Objective   /78   Pulse 96   Temp 99 5 °F (37 5 °C) (Tympanic)   Resp 16   Ht 4' 11" (1 499 m)   Wt 82 6 kg (182 lb)   SpO2 97%   BMI 36 76 kg/m²        Physical Exam     Physical Exam   Constitutional: She is oriented to person, place, and time  She appears well-developed and well-nourished  Cardiovascular: Normal rate, regular rhythm and normal heart sounds  Pulmonary/Chest: Effort normal and breath sounds normal    Abdominal: Soft  Bowel sounds are normal  There is no tenderness (no suprapubic ttp, no cva tenderness)  Neurological: She is alert and oriented to person, place, and time  Skin: Skin is warm and dry  Nursing note and vitals reviewed

## 2018-12-14 LAB — BACTERIA UR CULT: NORMAL

## 2018-12-28 ENCOUNTER — OFFICE VISIT (OUTPATIENT)
Dept: FAMILY MEDICINE CLINIC | Facility: CLINIC | Age: 38
End: 2018-12-28
Payer: MEDICARE

## 2018-12-28 VITALS
WEIGHT: 183.4 LBS | RESPIRATION RATE: 16 BRPM | HEIGHT: 59 IN | DIASTOLIC BLOOD PRESSURE: 60 MMHG | SYSTOLIC BLOOD PRESSURE: 120 MMHG | TEMPERATURE: 101.3 F | BODY MASS INDEX: 36.97 KG/M2 | HEART RATE: 78 BPM

## 2018-12-28 DIAGNOSIS — J11.1 FLU: Primary | ICD-10-CM

## 2018-12-28 PROCEDURE — 99213 OFFICE O/P EST LOW 20 MIN: CPT | Performed by: EMERGENCY MEDICINE

## 2018-12-28 RX ORDER — OSELTAMIVIR PHOSPHATE 75 MG/1
75 CAPSULE ORAL 2 TIMES DAILY
Qty: 10 CAPSULE | Refills: 0 | Status: SHIPPED | OUTPATIENT
Start: 2018-12-28 | End: 2019-01-02

## 2018-12-28 RX ORDER — OSELTAMIVIR PHOSPHATE 75 MG/1
75 CAPSULE ORAL 2 TIMES DAILY
Qty: 10 CAPSULE | Refills: 0 | Status: SHIPPED | OUTPATIENT
Start: 2018-12-28 | End: 2018-12-28 | Stop reason: SDUPTHER

## 2018-12-28 NOTE — LETTER
December 28, 2018     [address] [id number]    Patient: Nii Angel   YOB: 1980   Date of Visit: 12/28/2018       Dear Dr Estrella Laureano: Thank you for referring Roxy Berkowizt to me for evaluation  Below are the relevant portions of my assessment and plan of care  Diagnosed with the Flu, patient will be given Tamiflu as she had symptoms which started yesterday and therefore is within the treatment window  She will be given masks and should wear them at the home she is living at  If you have questions, please do not hesitate to call me  I look forward to following Abdi along with you           Sincerely,        Resident [facility]        CC: Nii Angel

## 2018-12-28 NOTE — ASSESSMENT & PLAN NOTE
Presenting with symptoms which started yesterday, will prescribe tamiflu and discussed with Aides that patient should be wearing a mask at the home that she is living in

## 2018-12-28 NOTE — PATIENT INSTRUCTIONS
Please make sure to pick-up tamiflu and take it as directed twice per day for 5 days starting today, as she is still within the treatment window  You can also take tylenol or motrin as directed for fever and pain control  If your symptoms worsen please call to be re-evaluated

## 2018-12-28 NOTE — PROGRESS NOTES
Assessment/Plan:    Flu  Presenting with symptoms which started yesterday, will prescribe tamiflu and discussed with Aides that patient should be wearing a mask at the home that she is living in  Diagnoses and all orders for this visit:    Flu  -     Discontinue: oseltamivir (TAMIFLU) 75 mg capsule; Take 1 capsule (75 mg total) by mouth 2 (two) times a day for 5 days  -     oseltamivir (TAMIFLU) 75 mg capsule; Take 1 capsule (75 mg total) by mouth 2 (two) times a day for 5 days          Subjective:      Patient ID: Isabel Coronado is a 43 y o  female  Patient lives in a group home and the aides state that multiple staff members have been out sick with illness similar to what she has  She started feeling ill yesterday morning with whole body aches, fevers, headache, rhinorrhea, congestion, sore throat, cough  She was given tylenol last night at around 6pm for her fever and has not received any of this today  She denies dizziness, light headedness, difficulty swallowing, chest pain, nausea, vomiting, diarrhea, dysuria  States she is still hungry and is eating and drinking well  Cough   This is a new problem  The current episode started yesterday  The problem has been unchanged  The problem occurs every few minutes  Associated symptoms include chills, a fever, headaches, myalgias, nasal congestion, postnasal drip, rhinorrhea and a sore throat  Pertinent negatives include no chest pain, ear pain, hemoptysis, rash, shortness of breath or wheezing  Generalized Body Aches   The current episode started yesterday  The problem occurs constantly  The problem is unchanged  The pain is moderate  Associated symptoms include congestion, headaches, rhinorrhea, a sore throat, fatigue, a fever, coughing and muscle aches  Pertinent negatives include no ear discharge, ear pain, stridor, chest pain, shortness of breath, wheezing, diarrhea, nausea, vomiting, neck pain or rash   The fever has been present for 1 to 2 days  The maximum temperature noted was 101 0 to 102 1 F  The temperature was taken using an oral thermometer  The cough has no precipitants  The cough is non-productive  There is chest and nasal congestion  The rhinorrhea has been occurring continuously  She has been experiencing a moderate sore throat  The following portions of the patient's history were reviewed and updated as appropriate: allergies, current medications, past family history, past medical history, past social history, past surgical history and problem list     Review of Systems   Constitutional: Positive for chills, fatigue and fever  HENT: Positive for congestion, postnasal drip, rhinorrhea, sinus pressure and sore throat  Negative for ear discharge, ear pain, sinus pain, sneezing and trouble swallowing  Respiratory: Positive for cough and chest tightness  Negative for apnea, hemoptysis, choking, shortness of breath, wheezing and stridor  Cardiovascular: Negative for chest pain, palpitations and leg swelling  Gastrointestinal: Negative for abdominal distention, diarrhea, nausea and vomiting  Genitourinary: Negative for difficulty urinating, dysuria, frequency and urgency  Musculoskeletal: Positive for myalgias  Negative for arthralgias, gait problem, neck pain and neck stiffness  Skin: Negative for color change, pallor, rash and wound  Neurological: Positive for headaches  Negative for dizziness, facial asymmetry, weakness, light-headedness and numbness  Psychiatric/Behavioral: Negative for confusion and decreased concentration  Objective:      /60   Pulse 78   Temp (!) 101 3 °F (38 5 °C)   Resp 16   Ht 4' 11" (1 499 m)   Wt 83 2 kg (183 lb 6 4 oz)   BMI 37 04 kg/m²          Physical Exam   Constitutional: She is oriented to person, place, and time  She appears well-developed and well-nourished  She is cooperative  Non-toxic appearance  No distress  HENT:   Head: Normocephalic and atraumatic  Right Ear: External ear normal    Left Ear: External ear normal    Nose: Mucosal edema and rhinorrhea present  No nose lacerations, sinus tenderness or nasal deformity  Right sinus exhibits no maxillary sinus tenderness and no frontal sinus tenderness  Left sinus exhibits no maxillary sinus tenderness and no frontal sinus tenderness  Mouth/Throat: Oropharynx is clear and moist  No oropharyngeal exudate  Eyes: Conjunctivae are normal  Right eye exhibits no discharge  Left eye exhibits no discharge  No scleral icterus  Neck: Normal range of motion  Neck supple  No JVD present  No tracheal deviation present  Cardiovascular: Normal rate, regular rhythm and normal heart sounds  Exam reveals no gallop and no friction rub  No murmur heard  Pulmonary/Chest: Effort normal and breath sounds normal  No stridor  No respiratory distress  She has no wheezes  She has no rales  Abdominal: Soft  Bowel sounds are normal  She exhibits no distension  Musculoskeletal: Normal range of motion  She exhibits no edema, tenderness or deformity  Neurological: She is alert and oriented to person, place, and time  No cranial nerve deficit  Skin: Skin is warm and dry  No rash noted  She is not diaphoretic  No erythema  No pallor  Psychiatric: She has a normal mood and affect  Nursing note and vitals reviewed

## 2018-12-29 ENCOUNTER — HOSPITAL ENCOUNTER (EMERGENCY)
Facility: HOSPITAL | Age: 38
Discharge: HOME | End: 2018-12-29
Attending: EMERGENCY MEDICINE | Admitting: EMERGENCY MEDICINE
Payer: MEDICARE

## 2018-12-29 VITALS
SYSTOLIC BLOOD PRESSURE: 135 MMHG | BODY MASS INDEX: 34.06 KG/M2 | TEMPERATURE: 98.3 F | WEIGHT: 168.65 LBS | DIASTOLIC BLOOD PRESSURE: 65 MMHG | RESPIRATION RATE: 18 BRPM | HEART RATE: 89 BPM | OXYGEN SATURATION: 95 %

## 2018-12-29 DIAGNOSIS — S09.90XA HEAD INJURY: ICD-10-CM
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
## 2018-12-29 PROCEDURE — 99283 EMERGENCY DEPT VISIT LOW MDM: CPT

## 2018-12-29 NOTE — DISCHARGE INSTRUCTIONS

## 2019-01-04 ENCOUNTER — TRANSCRIBE ORDERS (OUTPATIENT)
Dept: INTERNAL MEDICINE CLINIC | Facility: CLINIC | Age: 39
End: 2019-01-04

## 2019-01-06 NOTE — ED PROVIDER NOTES
History  Chief Complaint   Patient presents with    Fall     Pt was in the bathroom and her group home and fell and hit her head  Per pt there was no LOC  Pt is positive for the flu  HPI     A 41-year-old female with history of cerebral palsy presents for fall  The patient his at a group home she presents with her case workers  Reportedly the patient states that she fell in room although this case workers and staff at the bedside state that they do not think she actually fell  She is to stay in her room because she has recently been diagnosed with the flu and they are trying to keep her away from other members of the group home  She has no complaints he has no external signs trauma she is not on blood thinners  She has no other modifying factors or associated symptoms  On exam no external signs of trauma she has and nonlateralizing neurologic exam   She is stable in terms of vital signs  Assessment plan:  Fall  No external signs of trauma  Reassurance discharge    Prior to Admission Medications   Prescriptions Last Dose Informant Patient Reported? Taking?    ARIPiprazole (ABILIFY) 20 MG tablet   No Yes   Sig: Take 1 tablet (20 mg total) by mouth daily at bedtime for 30 days at 9pm    Cholecalciferol (VITAMIN D-3) 1000 units CAPS  Care Giver No Yes   Sig: Take 2 capsules (2,000 Units total) by mouth daily at 8am    Mouthwashes (LISTERINE ANTISEPTIC) LIQD   No Yes   Sig: Apply 1 application to the mouth or throat 2 (two) times a day for 30 days   Multiple Vitamins-Minerals (CERTAVITE/ANTIOXIDANTS) TABS  Care Giver No No   Sig: Take 1 tablet by mouth daily   RA SUNSCREEN SPF50 LOTN  Care Giver No Yes   Sig: Apply 15 minutes before sun exposure and every 2 hours   calcium carbonate (TUMS) 500 mg chewable tablet  Care Giver Yes Yes   Sig: Chew 1 tablet 3 (three) times a day as needed     clotrimazole (LOTRIMIN) 1 % cream  Care Giver No Yes   Sig: Apply 1 g (1 application total) topically 3 (three) times a day as needed (fungal irritation)   escitalopram (LEXAPRO) 20 mg tablet   No Yes   Sig: Take 1 tablet (20 mg total) by mouth daily for 30 days at 8am    famotidine (PEPCID) 20 mg tablet   No Yes   Sig: Take 1 tablet (20 mg total) by mouth 2 (two) times a day for 90 days At 7AM and 4PM (GERD)   magnesium hydroxide (GNP MILK OF MAGNESIA) 400 mg/5 mL oral suspension   No Yes   Sig: Take 30 mL by mouth daily as needed for constipation If no BM in 3 days   metoclopramide (REGLAN) 10 mg tablet   No Yes   Sig: Take one tablet by mouth every 8 hours as needed for nausea   norgestimate-ethinyl estradiol (ORTHO TRI-CYCLEN LO) 0 18/0 215/0 25 MG-25 MCG per tablet  Care Giver No No   Sig: Take 1 tablet by mouth daily at 8am    oseltamivir (TAMIFLU) 75 mg capsule   No No   Sig: Take 1 capsule (75 mg total) by mouth 2 (two) times a day for 5 days   polyethylene glycol (MIRALAX) 17 g packet  Care Giver No Yes   Sig: Take 17gm (1 packet) daily for first three days, then daily as needed for no bowel movement more than 24 hrs   Patient taking differently: Take 1 g by mouth daily as needed Take 17gm (1 packet) daily for first three days, then daily as needed for no bowel movement more than 24 hrs    senna-docusate sodium (SENEXON-S) 8 6-50 mg per tablet  Care Giver No Yes   Sig: Take 1 tablet by mouth daily at 8am    traZODone (DESYREL) 50 mg tablet  Care Giver No Yes   Sig: Take 1 tablet (50 mg total) by mouth daily at bedtime at 9pm   zinc oxide (DESITIN) 13 % cream  Care Giver No Yes   Sig: Apply 1 application topically as needed (for perianal irritation)      Facility-Administered Medications: None       Past Medical History:   Diagnosis Date    Anxiety     Brain lesion     Bronchitis     Cerebral palsy (HCC)     Cerebral palsy (HCC)     Last Assessed:7/6/2016    Chronically dry eyes, left     Last Assessed:7/6/2016    Depression     Last Assessed:7/6/2016    Depressive disorder     Fall     GERD (gastroesophageal reflux disease)     Mood disorder [facility])        Past Surgical History:   Procedure Laterality Date    CSF SHUNT      Creation of Ventriculo-Peritoneal CSF shunt ; Last Assessed:7/6/2016    EAR SURGERY      Last Assessed:7/6/2016    LEG SURGERY      due to CP     NOSE SURGERY      Last Assessed:7/6/2016       Family History   Problem Relation Age of Onset    Diabetes Mother     No Known Problems Father      I have reviewed and agree with the history as documented  Social History   Substance Use Topics    Smoking status: Never Smoker    Smokeless tobacco: Never Used    Alcohol use No        Review of Systems   Constitutional: Negative for chills, fatigue and fever  Eyes: Negative for photophobia and visual disturbance  Respiratory: Negative for cough and shortness of breath  Cardiovascular: Negative for chest pain, palpitations and leg swelling  Gastrointestinal: Negative for diarrhea, nausea and vomiting  Endocrine: Negative for polydipsia and polyuria  Genitourinary: Negative for decreased urine volume, difficulty urinating, dysuria and frequency  Musculoskeletal: Negative for back pain, neck pain and neck stiffness  Skin: Negative for color change and rash  Allergic/Immunologic: Negative for environmental allergies and immunocompromised state  Neurological: Negative for dizziness and headaches  Hematological: Negative for adenopathy  Does not bruise/bleed easily  Psychiatric/Behavioral: Negative for dysphoric mood  The patient is not nervous/anxious  Physical Exam  Physical Exam   Constitutional: She is oriented to person, place, and time  She appears well-developed and well-nourished  No distress  HENT:   Head: Normocephalic and atraumatic  Nose: Nose normal    Eyes: Pupils are equal, round, and reactive to light  Conjunctivae and EOM are normal  No scleral icterus  Neck: Normal range of motion  Neck supple  No JVD present   No tracheal deviation present  No thyromegaly present  Cardiovascular: Normal rate, regular rhythm, normal heart sounds and intact distal pulses  Exam reveals no gallop and no friction rub  Pulmonary/Chest: Effort normal and breath sounds normal  No respiratory distress  She has no wheezes  She has no rales  She exhibits no tenderness  Abdominal: Soft  Bowel sounds are normal  She exhibits no distension and no mass  There is no tenderness  There is no rebound and no guarding  No hernia  Musculoskeletal: Normal range of motion  She exhibits no edema, tenderness or deformity  Neurological: She is alert and oriented to person, place, and time  She has normal reflexes  No cranial nerve deficit  Coordination normal    Skin: Skin is warm and dry  She is not diaphoretic  No erythema  Psychiatric: She has a normal mood and affect  Her behavior is normal    Nursing note and vitals reviewed        Vital Signs  ED Triage Vitals   Temperature Pulse Respirations Blood Pressure SpO2   12/29/18 1639 12/29/18 1639 12/29/18 1639 12/29/18 1639 12/29/18 1639   98 3 °F (36 8 °C) (!) 107 19 144/77 97 %      Temp Source Heart Rate Source Patient Position - Orthostatic VS BP Location FiO2 (%)   12/29/18 1639 12/29/18 1639 12/29/18 1639 12/29/18 1639 --   Oral Monitor Lying Right arm       Pain Score       12/29/18 1650       8           Vitals:    12/29/18 1639 12/29/18 1745   BP: 144/77 135/65   Pulse: (!) 107 89   Patient Position - Orthostatic VS: Lying Lying       Visual Acuity      ED Medications  Medications - No data to display    Diagnostic Studies  Results Reviewed     None                 No orders to display              Procedures  Procedures       Phone Contacts  ED Phone Contact    ED Course                               MDM  Number of Diagnoses or Management Options  Fall: new and requires workup  Head injury: new and requires workup    CritCare Time    Disposition  Final diagnoses:   Fall   Head injury     Time reflects when diagnosis was documented in both MDM as applicable and the Disposition within this note     Time User Action Codes Description Comment    12/29/2018  6:09 PM Storm Newcastle Add [N05  XXXA] Fall     12/29/2018  6:09 PM Storm Newcastle Add [S09 90XA] Head injury       ED Disposition     ED Disposition Condition Comment    Discharge  Karen Pfeiffer discharge to home/self care      Condition at discharge: Good        Follow-up Information     Follow up With Specialties Details Why [address] [facility] Emergency Department Emergency Medicine  If symptoms worsen [address] [place] [address] ED, [address] [place], [place], [address]          Discharge Medication List as of 12/29/2018  6:09 PM      CONTINUE these medications which have NOT CHANGED    Details   ARIPiprazole (ABILIFY) 20 MG tablet Take 1 tablet (20 mg total) by mouth daily at bedtime for 30 days at 9pm , Starting Tue 9/25/2018, Until Sat 12/29/2018, No Print      calcium carbonate (TUMS) 500 mg chewable tablet Chew 1 tablet 3 (three) times a day as needed  , Historical Med      Cholecalciferol (VITAMIN D-3) 1000 units CAPS Take 2 capsules (2,000 Units total) by mouth daily at 8am , Starting Tue 9/25/2018, No Print      clotrimazole (LOTRIMIN) 1 % cream Apply 1 g (1 application total) topically 3 (three) times a day as needed (fungal irritation), Starting Wed 5/9/2018, Normal      escitalopram (LEXAPRO) 20 mg tablet Take 1 tablet (20 mg total) by mouth daily for 30 days at 8am , Starting Thu 12/6/2018, Until Sat 1/5/2019, No Print      famotidine (PEPCID) 20 mg tablet Take 1 tablet (20 mg total) by mouth 2 (two) times a day for 90 days At 7AM and 4PM (GERD), Starting Thu 12/6/2018, Until Wed 3/6/2019, Normal      magnesium hydroxide (GNP MILK OF MAGNESIA) 400 mg/5 mL oral suspension Take 30 mL by mouth daily as needed for constipation If no BM in 3 days, Starting Thu 12/6/2018, Normal      metoclopramide (REGLAN) 10 mg tablet Take one tablet by mouth every 8 hours as needed for nausea, Normal      Mouthwashes (LISTERINE ANTISEPTIC) LIQD Apply 1 application to the mouth or throat 2 (two) times a day for 30 days, Starting Thu 12/6/2018, Until Sat 1/5/2019, Normal      polyethylene glycol (MIRALAX) 17 g packet Take 17gm (1 packet) daily for first three days, then daily as needed for no bowel movement more than 24 hrs, Normal      RA SUNSCREEN SPF50 LOTN Apply 15 minutes before sun exposure and every 2 hours, Normal      senna-docusate sodium (SENEXON-S) 8 6-50 mg per tablet Take 1 tablet by mouth daily at 8am , Starting Tue 9/25/2018, No Print      traZODone (DESYREL) 50 mg tablet Take 1 tablet (50 mg total) by mouth daily at bedtime at 9pm, Starting Tue 9/25/2018, No Print      zinc oxide (DESITIN) 13 % cream Apply 1 application topically as needed (for perianal irritation), Starting Thu 5/17/2018, Normal      Multiple Vitamins-Minerals (CERTAVITE/ANTIOXIDANTS) TABS Take 1 tablet by mouth daily, Starting Mon 9/10/2018, Normal      norgestimate-ethinyl estradiol (ORTHO TRI-CYCLEN LO) 0 18/0 215/0 25 MG-25 MCG per tablet Take 1 tablet by mouth daily at 8am , Starting Tue 9/25/2018, No Print      oseltamivir (TAMIFLU) 75 mg capsule Take 1 capsule (75 mg total) by mouth 2 (two) times a day for 5 days, Starting Fri 12/28/2018, Until Wed 1/2/2019, Print           No discharge procedures on file      ED Provider  Electronically Signed by           Sangeetha Olson,   01/06/19 5665

## 2019-01-08 ENCOUNTER — OFFICE VISIT (OUTPATIENT)
Dept: MULTI SPECIALTY CLINIC | Facility: CLINIC | Age: 39
End: 2019-01-08

## 2019-01-08 VITALS
DIASTOLIC BLOOD PRESSURE: 72 MMHG | HEART RATE: 80 BPM | TEMPERATURE: 98.1 F | BODY MASS INDEX: 36.53 KG/M2 | SYSTOLIC BLOOD PRESSURE: 112 MMHG | HEIGHT: 59 IN | WEIGHT: 181.22 LBS

## 2019-01-08 DIAGNOSIS — R31.9 HEMATURIA, UNSPECIFIED TYPE: ICD-10-CM

## 2019-01-08 DIAGNOSIS — N28.1 ACQUIRED RENAL CYST OF LEFT KIDNEY: Primary | ICD-10-CM

## 2019-01-08 PROCEDURE — 99213 OFFICE O/P EST LOW 20 MIN: CPT | Performed by: UROLOGY

## 2019-01-08 NOTE — ASSESSMENT & PLAN NOTE
No gross hematuria or current UTI symptoms  -negative renal scan 11/17  -recommend follow up if symptoms recur  -no further follow up from urology needed at this time

## 2019-01-08 NOTE — ASSESSMENT & PLAN NOTE
Left renal cyst stable as of renal protocol CT scan from 11/17  No further follow up or work up needed  Graded as a Bosniak 2 with negligible malignacy risk

## 2019-01-08 NOTE — PATIENT INSTRUCTIONS
Kidney Cyst   AMBULATORY CARE:   A kidney cyst  is a fluid-filled sac that grows in your kidney  A simple cyst usually does not contain cancer  A complex cyst contains calcium deposits and needs to be checked over time for cancer  You may have one or more cysts  Both kidneys may form cysts at the same time  Common signs and symptoms include the following:  Kidney cysts usually do not cause symptoms  A cyst that grows large may cause pain or discomfort in your side or abdomen  You may have blood in your urine or dark urine, or develop high blood pressure  Tenderness along with pain and a fever may be a sign of an infected cyst   Seek care immediately if:   · You have blood in your urine or your urine is dark  · You have trouble urinating, or you are urinating more often than usual     · You have a fever along with pain or tenderness below your ribcage and above your hip bones  Contact your healthcare provider if:   · You have questions or concerns about your condition or care  Treatment:  Your cyst may need no treatment  Your healthcare provider may want you to have tests to check the size of the cyst over time  You may also need tests if you have hard deposits in the cyst  The deposits will be checked for cancer or other material that can cause health problems  Fluid may need to be drained from a cyst that becomes infected, bursts, or blocks blood or urine flow in the kidney  Surgery may be needed if the cyst is large  Manage kidney cysts:   · Drink liquids as directed  Liquids help your kidneys work correctly  They can also help prevent a urinary tract infection  Ask your healthcare provider how much liquid to have each day and which liquids are best for you  Ask if you need to limit or not drink alcohol  Alcohol may damage your kidneys  · Manage health conditions  Over time, conditions such as diabetes and high blood pressure that are not controlled may damage your kidneys  · Do not smoke  Smoking may narrow blood vessels in your kidneys and raise your blood pressure  Smoking can also damage your kidneys  Ask your healthcare provider for information if you currently smoke and need help quitting  E-cigarettes or smokeless tobacco still contain nicotine  Talk to your healthcare provider before you use these products  Follow up with your healthcare provider as directed: You may need to have ultrasounds to check the size, shape, and contents of the cyst over time  Write down your questions so you remember to ask them during your visits  © 2017 [employer]  Information is for End User's use only and may not be sold, redistributed or otherwise used for commercial purposes  All illustrations and images included in CareNotes® are the copyrighted property of A [employer] M [employer] Inc  or [employer]  The above information is an  only  It is not intended as medical advice for individual conditions or treatments  Talk to your doctor, nurse or pharmacist before following any medical regimen to see if it is safe and effective for you

## 2019-01-08 NOTE — PROGRESS NOTES
Assessment/Plan:    Acquired renal cyst of left kidney  Left renal cyst stable as of renal protocol CT scan from 11/17  No further follow up or work up needed  Graded as a Bosniak 2 with negligible malignacy risk  Hematuria  No gross hematuria or current UTI symptoms  -negative renal scan 11/17  -recommend follow up if symptoms recur  -no further follow up from urology needed at this time           Subjective:      Patient ID: George Ashley is a 43 y o  female  44 y/o female resident of a group home presents with history of a left sided renal cyst  Patient had a CT renal protocol done in November of 2017 which showed grade 2 bosniak cyst and no further follow up or work up recommended  Patient was seen in early December for UTI symptoms  UA at that time showed blood in her urine and culture results showed mixed contaminants  She currently denies fever, chills, dysuria, and gross hematuria  The following portions of the patient's history were reviewed and updated as appropriate: allergies, current medications, past family history, past medical history, past social history, past surgical history and problem list     Review of Systems   Constitutional: Negative for activity change, appetite change, chills and fever  HENT: Negative for congestion, ear pain, hearing loss, postnasal drip, sore throat, trouble swallowing and voice change  Eyes: Negative for photophobia, pain, redness and visual disturbance  Respiratory: Negative for apnea, cough, chest tightness, shortness of breath and wheezing  Cardiovascular: Negative for chest pain, palpitations and leg swelling  Gastrointestinal: Positive for diarrhea  Negative for abdominal distention, abdominal pain, constipation, nausea and vomiting  Endocrine: Negative for cold intolerance and heat intolerance  Genitourinary: Negative for dysuria, hematuria and urgency     Musculoskeletal: Negative for arthralgias, back pain, joint swelling, myalgias and neck pain  Skin: Negative for rash and wound  Allergic/Immunologic: Negative for immunocompromised state  Neurological: Negative for dizziness, facial asymmetry, weakness, light-headedness, numbness and headaches  Hematological: Negative for adenopathy  Psychiatric/Behavioral: Negative for behavioral problems and dysphoric mood  The patient is not nervous/anxious  Objective:      /72 (BP Location: Left arm, Patient Position: Sitting, Cuff Size: Adult)   Pulse 80   Temp 98 1 °F (36 7 °C) (Oral)   Ht 4' 11" (1 499 m)   Wt 82 2 kg (181 lb 3 5 oz)   BMI 36 60 kg/m²          Physical Exam   Constitutional: She appears well-developed and well-nourished  Abdominal: Soft  Bowel sounds are normal  She exhibits no distension  There is no tenderness     Psychiatric:   cooperative

## 2019-01-30 ENCOUNTER — HOSPITAL ENCOUNTER (EMERGENCY)
Facility: HOSPITAL | Age: 39
Discharge: HOME | End: 2019-01-30
Attending: EMERGENCY MEDICINE | Admitting: EMERGENCY MEDICINE
Payer: MEDICARE

## 2019-01-30 ENCOUNTER — APPOINTMENT (EMERGENCY)
Dept: RADIOLOGY | Facility: HOSPITAL | Age: 39
End: 2019-01-30
Payer: MEDICARE

## 2019-01-30 VITALS
WEIGHT: 175 LBS | TEMPERATURE: 97.9 F | HEART RATE: 95 BPM | BODY MASS INDEX: 35.28 KG/M2 | HEIGHT: 59 IN | SYSTOLIC BLOOD PRESSURE: 132 MMHG | DIASTOLIC BLOOD PRESSURE: 79 MMHG | OXYGEN SATURATION: 98 % | RESPIRATION RATE: 19 BRPM

## 2019-01-30 DIAGNOSIS — R07.9 CHEST PAIN: Primary | ICD-10-CM

## 2019-01-30 DIAGNOSIS — J06.9 URI (UPPER RESPIRATORY INFECTION): ICD-10-CM

## 2019-01-30 LAB
ATRIAL RATE: 94 BPM
P AXIS: 50 DEGREES
PR INTERVAL: 128 MS
QRS AXIS: 6 DEGREES
QRSD INTERVAL: 68 MS
QT INTERVAL: 334 MS
QTC INTERVAL: 417 MS
T WAVE AXIS: 56 DEGREES
TROPONIN I SERPL-MCNC: <0.02 NG/ML
VENTRICULAR RATE: 94 BPM

## 2019-01-30 PROCEDURE — 84484 ASSAY OF TROPONIN QUANT: CPT | Performed by: EMERGENCY MEDICINE

## 2019-01-30 PROCEDURE — 93005 ELECTROCARDIOGRAM TRACING: CPT

## 2019-01-30 PROCEDURE — 99285 EMERGENCY DEPT VISIT HI MDM: CPT

## 2019-01-30 PROCEDURE — 71046 X-RAY EXAM CHEST 2 VIEWS: CPT

## 2019-01-30 PROCEDURE — 93010 ELECTROCARDIOGRAM REPORT: CPT | Performed by: INTERNAL MEDICINE

## 2019-01-30 PROCEDURE — 36415 COLL VENOUS BLD VENIPUNCTURE: CPT | Performed by: EMERGENCY MEDICINE

## 2019-01-30 RX ORDER — FAMOTIDINE 20 MG/1
20 TABLET, FILM COATED ORAL ONCE
Status: COMPLETED | OUTPATIENT
Start: 2019-01-30 | End: 2019-01-30

## 2019-01-30 RX ORDER — MAGNESIUM HYDROXIDE/ALUMINUM HYDROXICE/SIMETHICONE 120; 1200; 1200 MG/30ML; MG/30ML; MG/30ML
30 SUSPENSION ORAL ONCE
Status: COMPLETED | OUTPATIENT
Start: 2019-01-30 | End: 2019-01-30

## 2019-01-30 RX ORDER — IBUPROFEN 600 MG/1
600 TABLET ORAL ONCE
Status: COMPLETED | OUTPATIENT
Start: 2019-01-30 | End: 2019-01-30

## 2019-01-30 RX ADMIN — ALUMINUM HYDROXIDE, MAGNESIUM HYDROXIDE, AND SIMETHICONE 30 ML: 200; 200; 20 SUSPENSION ORAL at 18:03

## 2019-01-30 RX ADMIN — IBUPROFEN 600 MG: 600 TABLET ORAL at 17:53

## 2019-01-30 RX ADMIN — FAMOTIDINE 20 MG: 20 TABLET ORAL at 17:00

## 2019-01-30 NOTE — ED NOTES
IV attempts unsuccessful  Straight stick for blood work attempted       Tracy Smith RN  01/30/19 9699

## 2019-01-30 NOTE — ED ATTENDING ATTESTATION
Jerilyn Chawla MD, saw and evaluated the patient  All available labs and X-rays were ordered by me or the resident and have been reviewed by myself  I discussed the patient with the resident / non-physician and agree with the resident's / non-physician practitioner's findings and plan as documented in the resident's / non-physician practicitioner's note, except where noted  At this point, I agree with the current assessment done in the ED  Chief Complaint   Patient presents with    Chest Pain     pt complains of chest pain 10/10, starting at 1500  Pt coming from group home  This is a 36 y/o F from group home with moderate cerebral palsy presenting for CP + L arm pain x1 hour with cough congestion rhinorrhea  Per aid from group home, she was feeling fine until this started  No reported fevers, chills, nausea, vomiting  Hx limited 2/2 cerebral palsy  PMH:  - Cerebral palsy  - GERD  - Anxiety  - Depression  - Brain lesion  PSH:  - leg surger  -  shunt  - nose surgery  No smoking drinking drugs  PE:  Vitals:    01/30/19 1547 01/30/19 1609 01/30/19 1611 01/30/19 1716   BP:  148/81  132/79   BP Location:  Left arm  Right arm   Pulse:  (!) 110  95   Resp:  15  19   Temp:  97 9 °F (36 6 °C)     TempSrc:  Oral     SpO2: 98% 98%  98%   Weight:   79 4 kg (175 lb)    Height:   4' 11" (1 499 m)    General: VSS, NAD, awake, alert  Well-nourished, well-developed  +coughing frequently  Appears stated age  Head: Normocephalic, atraumatic, nontender  Eyes: PERRL, EOM-I  No diplopia  No hyphema  No subconjunctival hemorrhages  Symmetrical lids  ENT: Atraumatic external nose and ears  Mildly dry MM  No malocclusion  No stridor  Normal phonation  No drooling  Normal swallowing  Neck: Symmetric, trachea midline  No JVD  CV: RRR  +S1/S2  No murmurs or gallops  Peripheral pulses +2 throughout  No chest wall tenderness     Lungs:   Unlabored No retractions  CTAB, lungs sounds equal bilateral    No tachypnea  Abd: +BS, soft, NT/ND    MSK:   FROM   Back:   No rashes  Skin: Dry, intact  Neuro: Awake alert   CN II-XII grossly intact  Motor grossly intact  Psychiatric/Behavioral: Appropriate mood and affect   Exam: deferred  A:  - Viral syndrome  P:  - CXR for pna  - cardiac workup given intellectual disability  - liekly DC home after delta  - This EKG was interpreted by me  The EKG demonstrates Normal sinus rhythm, normal intervals and axis, normal QRS, non specific acute ST-T changes  Appears same as Nov 26 2017    - 13 point ROS was performed and all are normal unless stated in the history above  - Nursing note reviewed  Vitals reviewed  - Orders placed by myself and/or advanced practitioner / resident     - Previous chart was reviewed  - No language barrier    - History obtained from patient  - There are no limitations to the history obtained  - Critical care time: Not applicable for this patient  Final Diagnosis:  1  Chest pain    2  URI (upper respiratory infection)        ED Course as of Jan 30 1821   Wed Jan 30, 2019   1625 Pulse: (!) 110     Medications   famotidine (PEPCID) tablet 20 mg (20 mg Oral Given 1/30/19 1700)   ibuprofen (MOTRIN) tablet 600 mg (600 mg Oral Given 1/30/19 1753)   aluminum-magnesium hydroxide-simethicone (MYLANTA) 200-200-20 mg/5 mL oral suspension 30 mL (30 mL Oral Given 1/30/19 1803)     XR chest 2 views   ED Interpretation   No pna      Final Result      No acute cardiopulmonary disease                 Workstation performed: [id number]           Orders Placed This Encounter   Procedures    ED ECG Documentation Only    XR chest 2 views    CBC and differential    Comprehensive metabolic panel    Troponin I    ECG 12 lead    ECG 12 lead     Labs Reviewed   TROPONIN I - Normal       Result Value Ref Range Status    Troponin I <0 02  <=0 04 ng/mL Final    Comment:   [employer] Chemistry analyzer 99% cutoff is > 0 04 ng/mL in network labs     o cTnI 99% cutoff is useful only when applied to patients in the clinical setting of myocardial ischemia   o cTnI 99% cutoff should be interpreted in the context of clinical history, ECG findings and possibly cardiac imaging to establish correct diagnosis  o cTnI 99% cutoff may be suggestive but clearly not indicative of a coronary event without the clinical setting of myocardial ischemia  CBC AND DIFFERENTIAL   COMPREHENSIVE METABOLIC PANEL     Time reflects when diagnosis was documented in both MDM as applicable and the Disposition within this note     Time User Action Codes Description Comment    1/30/2019  5:47 PM Leena Connell Add [R07 9] Chest pain     1/30/2019  5:47 PM Leena Connell Add [J06 9] URI (upper respiratory infection)       ED Disposition     ED Disposition Condition Date/Time Comment    Discharge  Wed Jan 30, 2019  6:07 PM Jo Ann Huffman discharge to home/self care  Condition at discharge: Stable        Follow-up Information     Follow up With Specialties Details Why Contact Info Additional [id number] [address] Family Medicine Schedule an appointment as soon as possible for a visit Seen in emergency department for chest pain, has had similar symptoms in past [address] [phone]  [address] [place] [address], [phone]        Patient's Medications   Discharge Prescriptions    No medications on file     No discharge procedures on file  Prior to Admission Medications   Prescriptions Last Dose Informant Patient Reported? Taking?    ARIPiprazole (ABILIFY) 20 MG tablet   No No   Sig: Take 1 tablet (20 mg total) by mouth daily at bedtime for 30 days at 9pm    Cholecalciferol (VITAMIN D-3) 1000 units CAPS  Care Giver No No   Sig: Take 2 capsules (2,000 Units total) by mouth daily at 8am    Mouthwashes (LISTERINE ANTISEPTIC) LIQD   No No   Sig: Apply 1 application to the mouth or throat 2 (two) times a day for 30 days   Multiple Vitamins-Minerals (CERTAVITE/ANTIOXIDANTS) TABS  Care Giver No No   Sig: Take 1 tablet by mouth daily   RA SUNSCREEN SPF50 LOTN  Care Giver No No   Sig: Apply 15 minutes before sun exposure and every 2 hours   calcium carbonate (TUMS) 500 mg chewable tablet  Care Giver Yes No   Sig: Chew 1 tablet 3 (three) times a day as needed     clotrimazole (LOTRIMIN) 1 % cream  Care Giver No No   Sig: Apply 1 g (1 application total) topically 3 (three) times a day as needed (fungal irritation)   escitalopram (LEXAPRO) 20 mg tablet   No No   Sig: Take 1 tablet (20 mg total) by mouth daily for 30 days at 8am    famotidine (PEPCID) 20 mg tablet   No No   Sig: Take 1 tablet (20 mg total) by mouth 2 (two) times a day for 90 days At 7AM and 4PM (GERD)   magnesium hydroxide (GNP MILK OF MAGNESIA) 400 mg/5 mL oral suspension   No No   Sig: Take 30 mL by mouth daily as needed for constipation If no BM in 3 days   metoclopramide (REGLAN) 10 mg tablet   No No   Sig: Take one tablet by mouth every 8 hours as needed for nausea   norgestimate-ethinyl estradiol (ORTHO TRI-CYCLEN LO) 0 18/0 215/0 25 MG-25 MCG per tablet  Care Giver No No   Sig: Take 1 tablet by mouth daily at 8am    polyethylene glycol (MIRALAX) 17 g packet  Care Giver No No   Sig: Take 17gm (1 packet) daily for first three days, then daily as needed for no bowel movement more than 24 hrs   Patient taking differently: Take 1 g by mouth daily as needed Take 17gm (1 packet) daily for first three days, then daily as needed for no bowel movement more than 24 hrs    senna-docusate sodium (SENEXON-S) 8 6-50 mg per tablet  Care Giver No No   Sig: Take 1 tablet by mouth daily at 8am    traZODone (DESYREL) 50 mg tablet  Care Giver No No   Sig: Take 1 tablet (50 mg total) by mouth daily at bedtime at 9pm   zinc oxide (DESITIN) 13 % cream  Care Giver No No   Sig: Apply 1 application topically as needed (for perianal irritation) Facility-Administered Medications: None       Portions of the record may have been created with voice recognition software  Occasional wrong word or "sound a like" substitutions may have occurred due to the inherent limitations of voice recognition software  Read the chart carefully and recognize, using context, where substitutions have occurred      Electronically signed by:  Solo Sierra

## 2019-01-30 NOTE — ED PROVIDER NOTES
History  Chief Complaint   Patient presents with    Chest Pain     pt complains of chest pain 10/10, starting at 1500  Pt coming from group home  36 yo female with pmhx of cerebral palsy, intellectual delay, presents from group home with CC of chest pain  Per caregiver, complaining of chest pain since 3pm, substernal, sharp/burning  Also complaining of some mild L arm pain but she is unsure if this is the same pain  No falls or trauma, no specific trigger but pain started very soon after pt ate lunch  Also has cough, congestion, feels achy  No nausea, vomiting  Had flu like illness approx 2 weeks ago  Also has history of GERD and has been seen in ED in past for similar symptoms  Prior to Admission Medications   Prescriptions Last Dose Informant Patient Reported? Taking?    ARIPiprazole (ABILIFY) 20 MG tablet   No No   Sig: Take 1 tablet (20 mg total) by mouth daily at bedtime for 30 days at 9pm    Cholecalciferol (VITAMIN D-3) 1000 units CAPS  Care Giver No No   Sig: Take 2 capsules (2,000 Units total) by mouth daily at 8am    Mouthwashes (LISTERINE ANTISEPTIC) LIQD   No No   Sig: Apply 1 application to the mouth or throat 2 (two) times a day for 30 days   Multiple Vitamins-Minerals (CERTAVITE/ANTIOXIDANTS) TABS  Care Giver No No   Sig: Take 1 tablet by mouth daily   RA SUNSCREEN SPF50 LOTN  Care Giver No No   Sig: Apply 15 minutes before sun exposure and every 2 hours   calcium carbonate (TUMS) 500 mg chewable tablet  Care Giver Yes No   Sig: Chew 1 tablet 3 (three) times a day as needed     clotrimazole (LOTRIMIN) 1 % cream  Care Giver No No   Sig: Apply 1 g (1 application total) topically 3 (three) times a day as needed (fungal irritation)   escitalopram (LEXAPRO) 20 mg tablet   No No   Sig: Take 1 tablet (20 mg total) by mouth daily for 30 days at 8am    famotidine (PEPCID) 20 mg tablet   No No   Sig: Take 1 tablet (20 mg total) by mouth 2 (two) times a day for 90 days At 7AM and 4PM (GERD) magnesium hydroxide (GNP MILK OF MAGNESIA) 400 mg/5 mL oral suspension   No No   Sig: Take 30 mL by mouth daily as needed for constipation If no BM in 3 days   metoclopramide (REGLAN) 10 mg tablet   No No   Sig: Take one tablet by mouth every 8 hours as needed for nausea   norgestimate-ethinyl estradiol (ORTHO TRI-CYCLEN LO) 0 18/0 215/0 25 MG-25 MCG per tablet  Care Giver No No   Sig: Take 1 tablet by mouth daily at 8am    polyethylene glycol (MIRALAX) 17 g packet  Care Giver No No   Sig: Take 17gm (1 packet) daily for first three days, then daily as needed for no bowel movement more than 24 hrs   Patient taking differently: Take 1 g by mouth daily as needed Take 17gm (1 packet) daily for first three days, then daily as needed for no bowel movement more than 24 hrs    senna-docusate sodium (SENEXON-S) 8 6-50 mg per tablet  Care Giver No No   Sig: Take 1 tablet by mouth daily at 8am    traZODone (DESYREL) 50 mg tablet  Care Giver No No   Sig: Take 1 tablet (50 mg total) by mouth daily at bedtime at 9pm   zinc oxide (DESITIN) 13 % cream  Care Giver No No   Sig: Apply 1 application topically as needed (for perianal irritation)      Facility-Administered Medications: None       Past Medical History:   Diagnosis Date    Anxiety     Brain lesion     Bronchitis     Cerebral palsy (HCC)     Cerebral palsy (HCC)     Last Assessed:7/6/2016    Chronically dry eyes, left     Last Assessed:7/6/2016    Depression     Last Assessed:7/6/2016    Depressive disorder     Fall     GERD (gastroesophageal reflux disease)     Mood disorder (HCC)        Past Surgical History:   Procedure Laterality Date    CSF SHUNT      Creation of Ventriculo-Peritoneal CSF shunt ;  Last Assessed:7/6/2016    EAR SURGERY      Last Assessed:7/6/2016    LEG SURGERY      due to CP     NOSE SURGERY      Last Assessed:7/6/2016       Family History   Problem Relation Age of Onset    Diabetes Mother     No Known Problems Father      I have reviewed and agree with the history as documented  Social History   Substance Use Topics    Smoking status: Never Smoker    Smokeless tobacco: Never Used    Alcohol use No        Review of Systems   Constitutional: Negative for chills, diaphoresis and fever  HENT: Positive for congestion  Negative for sore throat  Respiratory: Positive for cough  Negative for shortness of breath  Cardiovascular: Positive for chest pain  Negative for leg swelling  Gastrointestinal: Negative for abdominal pain, blood in stool, diarrhea, nausea and vomiting  Genitourinary: Negative for dysuria and hematuria  Musculoskeletal: Negative for neck pain and neck stiffness  Skin: Negative for color change and rash  Neurological: Negative for weakness and numbness  Psychiatric/Behavioral: Positive for behavioral problems  All other systems reviewed and are negative  Physical Exam  ED Triage Vitals   Temperature Pulse Respirations Blood Pressure SpO2   01/30/19 1609 01/30/19 1609 01/30/19 1609 01/30/19 1609 01/30/19 1547   97 9 °F (36 6 °C) (!) 110 15 148/81 98 %      Temp Source Heart Rate Source Patient Position - Orthostatic VS BP Location FiO2 (%)   01/30/19 1609 01/30/19 1716 01/30/19 1609 01/30/19 1609 --   Oral Monitor Sitting Left arm       Pain Score       01/30/19 1554       Worst Possible Pain           Orthostatic Vital Signs  Vitals:    01/30/19 1609 01/30/19 1716   BP: 148/81 132/79   Pulse: (!) 110 95   Patient Position - Orthostatic VS: Sitting Sitting       Physical Exam   Constitutional: She appears well-nourished  No distress  HENT:   Head: Normocephalic and atraumatic  Right Ear: External ear normal    Left Ear: External ear normal    Nose: Nose normal    Eyes: Pupils are equal, round, and reactive to light  Conjunctivae and EOM are normal    Cardiovascular: Normal rate, normal heart sounds and intact distal pulses  Pulmonary/Chest: Effort normal and breath sounds normal  No stridor  No respiratory distress  She has no wheezes  She has no rales  She exhibits no tenderness  Abdominal: Soft  She exhibits no distension  There is no tenderness  There is no rebound and no guarding  Musculoskeletal: She exhibits no tenderness  Neurological: She is alert  No cranial nerve deficit or sensory deficit  She exhibits normal muscle tone  GCS eye subscore is 4  GCS verbal subscore is 5  GCS motor subscore is 6  Skin: Skin is warm and dry  Capillary refill takes less than 2 seconds  She is not diaphoretic  Psychiatric: Her behavior is normal    Nursing note and vitals reviewed  ED Medications  Medications   famotidine (PEPCID) tablet 20 mg (20 mg Oral Given 1/30/19 1700)   ibuprofen (MOTRIN) tablet 600 mg (600 mg Oral Given 1/30/19 1753)   aluminum-magnesium hydroxide-simethicone (MYLANTA) 200-200-20 mg/5 mL oral suspension 30 mL (30 mL Oral Given 1/30/19 1803)       Diagnostic Studies  Results Reviewed     Procedure Component Value Units Date/Time    Troponin I [[id number]]  (Normal) Collected:  01/30/19 1634    Lab Status:  Final result Specimen:  Blood from Arm, Left Updated:  01/30/19 1703     Troponin I <0 02 ng/mL     CBC and differential [[id number]] Updated:  01/30/19 1649    Lab Status:  No result Specimen:  Blood from Arm, Left     Comprehensive metabolic panel [[id number]]     Lab Status:  No result Specimen:  Blood                  XR chest 2 views   ED Interpretation by Xochitl Short MD (01/30 1756)   No pna      Final Result by Leonard Infante MD (01/30 1700)      No acute cardiopulmonary disease                 Workstation performed: [id number]               Procedures  ECG 12 Lead Documentation  Date/Time: 1/30/2019 4:52 PM  Performed by: Emmy Lyons  Authorized by: Emmy Lyons     Indications / Diagnosis:  Chest pain  ECG reviewed by me, the ED Provider: yes    Patient location:  ED  Previous ECG:     Previous ECG:  Compared to current    Similarity:  No change  Interpretation:     Interpretation: abnormal    Rate:     ECG rate assessment: normal    Rhythm:     Rhythm: sinus rhythm    Ectopy:     Ectopy: none    QRS:     QRS axis:  Normal    QRS intervals:  Normal  Conduction:     Conduction: normal    ST segments:     ST segments:  Non-specific    Depression:  V4  T waves:     T waves: inverted      Inverted:  V1  Comments:      Nonspecific ST and T wave changes noted on old EKG            Phone Consults  ED Phone Contact    ED Course  ED Course as of Jan 30 1842 Wed Jan 30, 2019   1645 Pt difficult stick  Butterfly obtained for troponin    1741 Troponin I: <0 02   1741 Pulse: 95   1815 Pt in no acute distress  No new ischemic changes appreciated on EKG, negative troponin  Will discharge pt to home with pcp follow up  HEART Risk Score      Most Recent Value   History  1 Filed at: 01/30/2019 1759   ECG     Age     Risk Factors     Troponin     Heart Score Risk Calculator   History  1 Filed at: 01/30/2019 1759   ECG     Age     Risk Factors     Troponin     HEART Score     HEART Score                              MDM  Number of Diagnoses or Management Options  Chest pain:   URI (upper respiratory infection):   Diagnosis management comments: 34 yo female with cerebral palsy/intellectual delay who presents with chest with assoc cough, congestion, generalized aches  Suspect infectious etiology vs GERD, low suspicion for ACS  No new ischemic changes noted on EKG  Will send troponin and check CXR        Disposition  Final diagnoses:   Chest pain   URI (upper respiratory infection)     Time reflects when diagnosis was documented in both MDM as applicable and the Disposition within this note     Time User Action Codes Description Comment    1/30/2019  5:47 PM Paul Connell Add [R07 9] Chest pain     1/30/2019  5:47 PM Paul Connell Add [J06 9] URI (upper respiratory infection)       ED Disposition     ED Disposition Condition Date/Time Comment Discharge  Wed Jan 30, 2019  6:07 PM Jabari Castañeda discharge to home/self care      Condition at discharge: Stable        Follow-up Information     Follow up With Specialties Details Why Contact Info Additional [address] Family Medicine Schedule an appointment as soon as possible for a visit Seen in emergency department for chest pain, has had similar symptoms in past [address] [address] [facility] [address]  [address] [place] [address], [address] [facility], [place], [address] [facility], [phone]          Discharge Medication List as of 1/30/2019  6:09 PM      CONTINUE these medications which have NOT CHANGED    Details   ARIPiprazole (ABILIFY) 20 MG tablet Take 1 tablet (20 mg total) by mouth daily at bedtime for 30 days at 9pm , Starting Tue 9/25/2018, Until Tue 1/8/2019, No Print      calcium carbonate (TUMS) 500 mg chewable tablet Chew 1 tablet 3 (three) times a day as needed  , Historical Med      Cholecalciferol (VITAMIN D-3) 1000 units CAPS Take 2 capsules (2,000 Units total) by mouth daily at 8am , Starting Tue 9/25/2018, No Print      clotrimazole (LOTRIMIN) 1 % cream Apply 1 g (1 application total) topically 3 (three) times a day as needed (fungal irritation), Starting Wed 5/9/2018, Normal      escitalopram (LEXAPRO) 20 mg tablet Take 1 tablet (20 mg total) by mouth daily for 30 days at 8am , Starting Thu 12/6/2018, Until Tue 1/8/2019, No Print      famotidine (PEPCID) 20 mg tablet Take 1 tablet (20 mg total) by mouth 2 (two) times a day for 90 days At 7AM and 4PM (GERD), Starting Thu 12/6/2018, Until Wed 3/6/2019, Normal      magnesium hydroxide (GNP MILK OF MAGNESIA) 400 mg/5 mL oral suspension Take 30 mL by mouth daily as needed for constipation If no BM in 3 days, Starting Thu 12/6/2018, Normal      metoclopramide (REGLAN) 10 mg tablet Take one tablet by mouth every 8 hours as needed for nausea, Normal      Mouthwashes (LISTERINE ANTISEPTIC) LIQD Apply 1 application to the mouth or throat 2 (two) times a day for 30 days, Starting Thu 12/6/2018, Until Sat 1/5/2019, Normal      Multiple Vitamins-Minerals (CERTAVITE/ANTIOXIDANTS) TABS Take 1 tablet by mouth daily, Starting Mon 9/10/2018, Normal      norgestimate-ethinyl estradiol (ORTHO TRI-CYCLEN LO) 0 18/0 215/0 25 MG-25 MCG per tablet Take 1 tablet by mouth daily at 8am , Starting Tue 9/25/2018, No Print      polyethylene glycol (MIRALAX) 17 g packet Take 17gm (1 packet) daily for first three days, then daily as needed for no bowel movement more than 24 hrs, Normal      RA SUNSCREEN SPF50 LOTN Apply 15 minutes before sun exposure and every 2 hours, Normal      senna-docusate sodium (SENEXON-S) 8 6-50 mg per tablet Take 1 tablet by mouth daily at 8am , Starting Tue 9/25/2018, No Print      traZODone (DESYREL) 50 mg tablet Take 1 tablet (50 mg total) by mouth daily at bedtime at 9pm, Starting Tue 9/25/2018, No Print      zinc oxide (DESITIN) 13 % cream Apply 1 application topically as needed (for perianal irritation), Starting Thu 5/17/2018, Normal           No discharge procedures on file  ED Provider  Attending physically available and evaluated Christoph Abad I managed the patient along with the ED Attending      Electronically Signed by         Libby Sherman MD  01/30/19 7877

## 2019-02-01 ENCOUNTER — OFFICE VISIT (OUTPATIENT)
Dept: FAMILY MEDICINE CLINIC | Facility: CLINIC | Age: 39
End: 2019-02-01

## 2019-02-01 VITALS
DIASTOLIC BLOOD PRESSURE: 90 MMHG | RESPIRATION RATE: 16 BRPM | WEIGHT: 183.6 LBS | HEIGHT: 59 IN | BODY MASS INDEX: 37.01 KG/M2 | TEMPERATURE: 97 F | HEART RATE: 96 BPM | SYSTOLIC BLOOD PRESSURE: 152 MMHG

## 2019-02-01 DIAGNOSIS — K21.9 GASTROESOPHAGEAL REFLUX DISEASE WITHOUT ESOPHAGITIS: Primary | ICD-10-CM

## 2019-02-01 PROCEDURE — 99213 OFFICE O/P EST LOW 20 MIN: CPT | Performed by: FAMILY MEDICINE

## 2019-02-01 RX ORDER — OMEPRAZOLE 20 MG/1
20 CAPSULE, DELAYED RELEASE ORAL DAILY
Qty: 28 CAPSULE | Refills: 0 | Status: SHIPPED | OUTPATIENT
Start: 2019-02-01 | End: 2019-02-14 | Stop reason: SDUPTHER

## 2019-02-01 NOTE — PATIENT INSTRUCTIONS
-Discussed Anti-reflux measures:    -Raise the head of the bed 4 to 6 inches     -Avoid: smoking, excess coffee, tea or other caffeinated beverages, spicy or acidic foods, eating before bed, garments that fit tightly through the abdomen      Gastroesophageal Reflux Disease   AMBULATORY CARE:   Gastroesophageal reflux  reflux occurs when acid and food in the stomach back up into the esophagus  Gastroesophageal reflux disease (GERD) is reflux that occurs more than twice a week for a few weeks  It usually causes heartburn and other symptoms  GERD can cause other health problems over time if it is not treated  Common symptoms include:  Heartburn is the most common symptom of GERD  You may feel burning pain in your chest or below the breast bone  This usually occurs after meals and spreads to your neck, jaw, or shoulder  The pain gets better when you change positions  You may also have any of the following:  · Bitter or acid taste in your mouth    · Dry cough    · Trouble swallowing or pain with swallowing    · Hoarseness or sore throat    · Frequent burping or hiccups    · Feeling of fullness soon after you start eating  Seek care immediately if:  · You feel full and cannot burp or vomit  · You have severe chest pain and sudden trouble breathing  · Your bowel movements are black, bloody, or tarry-looking  · Your vomit looks like coffee grounds or has blood in it  Contact your healthcare provider if:   · You vomit large amounts, or you vomit often  · You have trouble breathing after you vomit  · You have trouble swallowing, or pain with swallowing  · You are losing weight without trying  · Your symptoms get worse or do not improve with treatment  · You have questions or concerns about your condition or care  Treatment for GERD:  Your healthcare provider may prescribe medicine to decrease stomach acid   He may also prescribe medicine that help your esophagus and stomach move food and liquid to your intestines  Surgery may be done if other treatments do not work  You may need surgery to wrap the upper part of the stomach around the esophageal sphincter  This will strengthen the sphincter and prevent reflux  Manage GERD:   · Do not have foods or drinks that may increase heartburn  These include chocolate, peppermint, fried or fatty foods, drinks that contain caffeine, or carbonated drinks (soda)  Other foods include spicy foods, onions, tomatoes, and tomato-based foods  Do not have foods or drinks that can irritate your esophagus, such as citrus fruits, juices, and alcohol  · Do not eat large meals  When you eat a lot of food at one time, your stomach needs more acid to digest it  Eat 6 small meals each day instead of 3 large ones, and eat slowly  Do not eat meals 2 to 3 hours before bedtime  · Elevate the head of your bed  Place 6-inch blocks under the head of your bed frame  You may also use more than one pillow under your head and shoulders while you sleep  · Maintain a healthy weight  If you are overweight, weight loss may help relieve symptoms of GERD  · Do not smoke  Smoking weakens the lower esophageal sphincter and increases the risk of GERD  Ask your healthcare provider for information if you currently smoke and need help to quit  E-cigarettes or smokeless tobacco still contain nicotine  Talk to your healthcare provider before you use these products  · Do not wear clothing that is tight around your waist   Tight clothing can put pressure on your stomach and cause or worsen GERD symptoms  Follow up with your healthcare provider as directed:  Write down your questions so you remember to ask them during your visits  © 2017 [facility] Information is for End User's use only and may not be sold, redistributed or otherwise used for commercial purposes   All illustrations and images included in CareNotes® are the copyrighted property of [employer]  or [facility] [employer]  The above information is an  only  It is not intended as medical advice for individual conditions or treatments  Talk to your doctor, nurse or pharmacist before following any medical regimen to see if it is safe and effective for you

## 2019-02-01 NOTE — PROGRESS NOTES
Vega Barber 1980 female MRN: [id number]    Family Medicine Acute Visit    ASSESSMENT/PLAN  Problem List Items Addressed This Visit     Esophageal reflux - Primary     -Substernal chest pain likely due esophageal reflux  -Patient has a history of NERY, takes TUMS and pepcid  -Increased symptoms likely due to intake of acidic foods recently  -However, adherence to low-acid diet appears to be difficult per group home aide, as patient eats acidic foods anyway despite knowing she is not supposed to eat them  -Will trial 28 day course of prilosec 20 mg QD at this time, continue TUMS PRN  -Discontinue pepcid unless needed for breakthrough symptoms  -Discussed Anti-reflux measures:    -Raise the head of the bed 4 to 6 inches   -Avoid excess coffee, tea or other caffeinated beverages   -Avoid spicy or acidic foods   -Avoid eating before bed   -Avoid garments that fit tightly through the abdomen  -If no improvement in 2-4 weeks, consider referral to GI for EGD  -Consider barium swallow/esophageal manometry for further workup of dysphagia if symptoms worsen         Relevant Medications    omeprazole (PriLOSEC) 20 mg delayed release capsule          Plan of care paperwork completed today and given to group home aide  Follow up with PCP at scheduled follow up    Future Appointments  Date Time Provider Mariana Reese   2/14/2019 1:20 PM DO JESSICA Bishop  [facility]   3/13/2019 1:00 PM Ganesh Byers PA-C [facility]   4/5/2019 10:00 AM Leonardo Romeo MD NEUR[facility]   7/31/2019 1:00 PM Elvin Franks MD  [address] [facility]  CC: Follow-up (er visit, still has chest pain)      HPI:  Vega Barber is a 35 y o  female, accompanied by her group home aide, who presents for:    · Chest pain: Patient went to ED on 1/30/19 for substernal, sharp/burning chest pain   Troponin was negative, CXR negative, and EKG showed no new ischemic changes from previous EKG 11/26/18  EKGs personally reviewed by me at today's visit  Hx of GERD, taking TUMS and pepcid currently  Admits to eating a lot of tomato-based & citrus foods recently despite the group home's efforts to limit her intake of acidic foods  Reports chest pain is still on and off, substernal burning pain with some burning in her throat  Has some dysphagia at baseline per records but today patient and aide say it has not been worse  Pain is non-positional, not worse when laying down  Does have some bad taste in her mouth in the mornings  No fevers  Reports frequent nausea, which is not new for her; points to epigastrum as location  Had 3 episodes of "vomiting" last night, low-volume, which occurred while she was eating and "had to rush to bathroom", reports she had relief following the vomiting  Denies feeling unwell otherwise  No diarrhea, bms are stable with constipation regimen  Review of Systems   Constitutional: Negative for activity change, appetite change, chills, fever and unexpected weight change  Eyes: Negative for visual disturbance  Respiratory: Negative for chest tightness and shortness of breath  Cardiovascular: Positive for chest pain  Negative for palpitations and leg swelling  Gastrointestinal: Positive for nausea and vomiting  Negative for abdominal pain and diarrhea  Genitourinary: Negative for dysuria  Neurological: Negative for syncope, weakness and light-headedness  Psychiatric/Behavioral: Negative for agitation  All other systems reviewed and are negative        Historical Information   The patient history was reviewed as follows:    Past Medical History:   Diagnosis Date    Anxiety     Brain lesion     Bronchitis     Cerebral palsy (HCC)     Cerebral palsy (HCC)     Last Assessed:7/6/2016    Chronically dry eyes, left     Last Assessed:7/6/2016    Depression     Last Assessed:7/6/2016    Depressive disorder     Fall     GERD (gastroesophageal reflux disease)     Mood disorder [facility])      Past Surgical History:   Procedure Laterality Date    CSF SHUNT      Creation of Ventriculo-Peritoneal CSF shunt ;  Last Assessed:7/6/2016    EAR SURGERY      Last Assessed:7/6/2016    LEG SURGERY      due to CP     NOSE SURGERY      Last Assessed:7/6/2016     Family History   Problem Relation Age of Onset    Diabetes Mother     No Known Problems Father       Social History   History   Alcohol Use No     History   Drug Use No     History   Smoking Status    Never Smoker   Smokeless Tobacco    Never Used       Medications:     Current Outpatient Prescriptions:     ARIPiprazole (ABILIFY) 20 MG tablet, Take 1 tablet (20 mg total) by mouth daily at bedtime for 30 days at 9pm , Disp: 30 tablet, Rfl: 0    calcium carbonate (TUMS) 500 mg chewable tablet, Chew 1 tablet 3 (three) times a day as needed  , Disp: , Rfl:     Cholecalciferol (VITAMIN D-3) 1000 units CAPS, Take 2 capsules (2,000 Units total) by mouth daily at 8am , Disp: 180 capsule, Rfl: 0    clotrimazole (LOTRIMIN) 1 % cream, Apply 1 g (1 application total) topically 3 (three) times a day as needed (fungal irritation), Disp: 30 g, Rfl: 5    escitalopram (LEXAPRO) 20 mg tablet, Take 1 tablet (20 mg total) by mouth daily for 30 days at 8am , Disp: 90 tablet, Rfl: 0    famotidine (PEPCID) 20 mg tablet, Take 1 tablet (20 mg total) by mouth 2 (two) times a day for 90 days At 7AM and 4PM (GERD), Disp: 180 tablet, Rfl: 0    magnesium hydroxide (GNP MILK OF MAGNESIA) 400 mg/5 mL oral suspension, Take 30 mL by mouth daily as needed for constipation If no BM in 3 days, Disp: 355 mL, Rfl: 0    metoclopramide (REGLAN) 10 mg tablet, Take one tablet by mouth every 8 hours as needed for nausea, Disp: 30 tablet, Rfl: 0    Mouthwashes (LISTERINE ANTISEPTIC) LIQD, Apply 1 application to the mouth or throat 2 (two) times a day for 30 days, Disp: 500 mL, Rfl: 3    Multiple Vitamins-Minerals (CERTAVITE/ANTIOXIDANTS) TABS, Take 1 tablet by mouth daily, Disp: 90 tablet, Rfl: 3    norgestimate-ethinyl estradiol (ORTHO TRI-CYCLEN LO) 0 18/0 215/0 25 MG-25 MCG per tablet, Take 1 tablet by mouth daily at 8am , Disp: 28 tablet, Rfl: 0    polyethylene glycol (MIRALAX) 17 g packet, Take 17gm (1 packet) daily for first three days, then daily as needed for no bowel movement more than 24 hrs (Patient taking differently: Take 1 g by mouth daily as needed Take 17gm (1 packet) daily for first three days, then daily as needed for no bowel movement more than 24 hrs ), Disp: 30 each, Rfl: 0    RA SUNSCREEN SPF50 LOTN, Apply 15 minutes before sun exposure and every 2 hours, Disp: 1 Bottle, Rfl: 5    senna-docusate sodium (SENEXON-S) 8 6-50 mg per tablet, Take 1 tablet by mouth daily at 8am , Disp: 90 tablet, Rfl: 0    traZODone (DESYREL) 50 mg tablet, Take 1 tablet (50 mg total) by mouth daily at bedtime at 9pm, Disp: , Rfl: 0    zinc oxide (DESITIN) 13 % cream, Apply 1 application topically as needed (for perianal irritation), Disp: 1 Tube, Rfl: 0  No Known Allergies    OBJECTIVE    Vitals:   Vitals:    02/01/19 0858   BP: 152/90   BP Location: Left arm   Patient Position: Sitting   Cuff Size: Standard   Pulse: 96   Resp: 16   Temp: (!) 97 °F (36 1 °C)   TempSrc: Tympanic   Weight: 83 3 kg (183 lb 9 6 oz)   Height: 4' 11" (1 499 m)       Physical Exam   Constitutional: No distress  HENT:   Head: Normocephalic and atraumatic  Eyes: Conjunctivae are normal  Right eye exhibits no discharge  Left eye exhibits no discharge  Neck: Normal range of motion  Cardiovascular: Normal rate, regular rhythm and intact distal pulses  Pulmonary/Chest: Effort normal and breath sounds normal    Abdominal: Soft  She exhibits no distension  There is no rebound and no guarding  Non-tender over entire abdomen   Musculoskeletal: She exhibits no edema  Skin: Skin is warm and dry  Psychiatric: She has a normal mood and affect  Her behavior is normal    Vitals reviewed       [facility] Tej Rodriguez MD, PGY-2  [facility]   2/1/2019

## 2019-02-01 NOTE — ASSESSMENT & PLAN NOTE
-Substernal chest pain likely due esophageal reflux  -Patient has a history of NERY, takes TUMS and pepcid  -Increased symptoms likely due to intake of acidic foods recently  -However, adherence to low-acid diet appears to be difficult per group home aide, as patient eats acidic foods anyway despite knowing she is not supposed to eat them  -Will trial 28 day course of prilosec 20 mg QD at this time, continue TUMS PRN  -Discontinue pepcid unless needed for breakthrough symptoms  -Discussed Anti-reflux measures:    -Raise the head of the bed 4 to 6 inches   -Avoid excess coffee, tea or other caffeinated beverages   -Avoid spicy or acidic foods   -Avoid eating before bed   -Avoid garments that fit tightly through the abdomen  -If no improvement in 2-4 weeks, consider referral to GI for EGD  -Consider barium swallow/esophageal manometry for further workup of dysphagia if symptoms worsen

## 2019-02-04 ENCOUNTER — HOSPITAL ENCOUNTER (EMERGENCY)
Facility: HOSPITAL | Age: 39
Discharge: HOME | End: 2019-02-04
Attending: EMERGENCY MEDICINE
Payer: MEDICARE

## 2019-02-04 VITALS
OXYGEN SATURATION: 97 % | BODY MASS INDEX: 37.02 KG/M2 | SYSTOLIC BLOOD PRESSURE: 146 MMHG | HEIGHT: 59 IN | DIASTOLIC BLOOD PRESSURE: 77 MMHG | WEIGHT: 183.64 LBS | RESPIRATION RATE: 18 BRPM | HEART RATE: 94 BPM

## 2019-02-04 DIAGNOSIS — S09.90XA CLOSED HEAD INJURY: Primary | ICD-10-CM

## 2019-02-04 PROCEDURE — 99283 EMERGENCY DEPT VISIT LOW MDM: CPT

## 2019-02-05 NOTE — ED PROVIDER NOTES
History  Chief Complaint   Patient presents with    Head Injury     Pt from group home, apparently as at the pharmacy today when she got a flat tire  Became upset and began hitting head with an open hand  Per group home need eval to rule out concussion     HPI  Patient comes in for evaluation after closed head injury  The patient has history of intellectual disability, lives at a group home  She got frustrated today and she hit her head with an open hand  This was witnessed by staff, it lasted less than a minute UA  She is here for evaluation per their protocol to rule out head injury  Patient has no headache neck pain  She is acting appropriately per the the staff that are with her the  Patient has not had any fevers chills sweats  Patient denies any pain in her body at this time  Prior to Admission Medications   Prescriptions Last Dose Informant Patient Reported? Taking?    ARIPiprazole (ABILIFY) 20 MG tablet   No Yes   Sig: Take 1 tablet (20 mg total) by mouth daily at bedtime for 30 days at 9pm    Cholecalciferol (VITAMIN D-3) 1000 units CAPS  Care Giver No Yes   Sig: Take 2 capsules (2,000 Units total) by mouth daily at 8am    Mouthwashes (LISTERINE ANTISEPTIC) LIQD   No Yes   Sig: Apply 1 application to the mouth or throat 2 (two) times a day for 30 days   Multiple Vitamins-Minerals (CERTAVITE/ANTIOXIDANTS) TABS  Care Giver No Yes   Sig: Take 1 tablet by mouth daily   RA SUNSCREEN SPF50 LOTN  Care Giver No Yes   Sig: Apply 15 minutes before sun exposure and every 2 hours   calcium carbonate (TUMS) 500 mg chewable tablet  Care Giver Yes Yes   Sig: Chew 1 tablet 3 (three) times a day as needed     clotrimazole (LOTRIMIN) 1 % cream  Care Giver No Yes   Sig: Apply 1 g (1 application total) topically 3 (three) times a day as needed (fungal irritation)   escitalopram (LEXAPRO) 20 mg tablet   No Yes   Sig: Take 1 tablet (20 mg total) by mouth daily for 30 days at 8am    magnesium hydroxide (GNP MILK OF MAGNESIA) 400 mg/5 mL oral suspension   No Yes   Sig: Take 30 mL by mouth daily as needed for constipation If no BM in 3 days   metoclopramide (REGLAN) 10 mg tablet   No Yes   Sig: Take one tablet by mouth every 8 hours as needed for nausea   norgestimate-ethinyl estradiol (ORTHO TRI-CYCLEN LO) 0 18/0 215/0 25 MG-25 MCG per tablet  Care Giver No Yes   Sig: Take 1 tablet by mouth daily at 8am    omeprazole (PriLOSEC) 20 mg delayed release capsule   No Yes   Sig: Take 1 capsule (20 mg total) by mouth daily for 28 days   polyethylene glycol (MIRALAX) 17 g packet  Care Giver No Yes   Sig: Take 17gm (1 packet) daily for first three days, then daily as needed for no bowel movement more than 24 hrs   Patient taking differently: Take 1 g by mouth daily as needed Take 17gm (1 packet) daily for first three days, then daily as needed for no bowel movement more than 24 hrs    senna-docusate sodium (SENEXON-S) 8 6-50 mg per tablet  Care Giver No Yes   Sig: Take 1 tablet by mouth daily at 8am    traZODone (DESYREL) 50 mg tablet  Care Giver No Yes   Sig: Take 1 tablet (50 mg total) by mouth daily at bedtime at 9pm   zinc oxide (DESITIN) 13 % cream  Care Giver No Yes   Sig: Apply 1 application topically as needed (for perianal irritation)      Facility-Administered Medications: None       Past Medical History:   Diagnosis Date    Anxiety     Brain lesion     Bronchitis     Cerebral palsy (HCC)     Cerebral palsy (HCC)     Last Assessed:7/6/2016    Chronically dry eyes, left     Last Assessed:7/6/2016    Depression     Last Assessed:7/6/2016    Depressive disorder     Fall     GERD (gastroesophageal reflux disease)     Mood disorder (HCC)        Past Surgical History:   Procedure Laterality Date    CSF SHUNT      Creation of Ventriculo-Peritoneal CSF shunt ;  Last Assessed:7/6/2016    EAR SURGERY      Last Assessed:7/6/2016    LEG SURGERY      due to CP     NOSE SURGERY      Last Assessed:7/6/2016       Family History Problem Relation Age of Onset    Diabetes Mother     No Known Problems Father      I have reviewed and agree with the history as documented  Social History   Substance Use Topics    Smoking status: Never Smoker    Smokeless tobacco: Never Used    Alcohol use No        Review of Systems   Constitutional: Negative  Negative for chills and fever  HENT: Negative for congestion and sore throat  Head injury   Eyes: Negative  Negative for discharge and redness  Respiratory: Negative  Negative for chest tightness and shortness of breath  Cardiovascular: Negative  Negative for chest pain and palpitations  Gastrointestinal: Negative  Negative for abdominal pain, nausea and vomiting  Endocrine: Negative  Negative for cold intolerance and polyphagia  Genitourinary: Negative  Negative for difficulty urinating and dysuria  Musculoskeletal: Negative  Negative for arthralgias and back pain  Skin: Negative  Negative for color change and wound  Allergic/Immunologic: Negative  Negative for environmental allergies  Neurological: Negative  Negative for dizziness, weakness and headaches  Hematological: Negative  Psychiatric/Behavioral: Negative  Negative for behavioral problems  The patient is not nervous/anxious  All other systems reviewed and are negative  Physical Exam  ED Triage Vitals [02/04/19 2106]   Temp Pulse Respirations Blood Pressure SpO2   -- 94 18 146/77 97 %      Temp src Heart Rate Source Patient Position - Orthostatic VS BP Location FiO2 (%)   -- Monitor Lying Right arm --      Pain Score       2           Orthostatic Vital Signs  Vitals:    02/04/19 2106   BP: 146/77   Pulse: 94   Patient Position - Orthostatic VS: Lying       Physical Exam   Constitutional: She appears well-developed and well-nourished  No distress  HENT:   Head: Normocephalic and atraumatic     Right Ear: External ear normal    Left Ear: External ear normal    Mouth/Throat: Oropharynx is clear and moist    Eyes: Conjunctivae are normal  Right eye exhibits no discharge  Left eye exhibits no discharge  No scleral icterus  Neck: Normal range of motion  Neck supple  No tracheal deviation present  No thyromegaly present  Cardiovascular: Normal rate, regular rhythm and intact distal pulses  Exam reveals no gallop and no friction rub  No murmur heard  Pulmonary/Chest: Effort normal and breath sounds normal  No stridor  No respiratory distress  She has no wheezes  She has no rales  Abdominal: Soft  Bowel sounds are normal  She exhibits no distension  There is no tenderness  There is no rebound and no guarding  Musculoskeletal: She exhibits no edema or deformity  Neurological: She is alert  No cranial nerve deficit  Skin: Skin is warm and dry  No rash noted  She is not diaphoretic  No erythema  Psychiatric: She has a normal mood and affect  Her behavior is normal  Thought content normal    Nursing note and vitals reviewed  ED Medications  Medications - No data to display    Diagnostic Studies  Results Reviewed     None                 No orders to display         Procedures  Procedures      Phone Consults  ED Phone Contact    ED Course  ED Course as of Feb 05 0446   Mon Feb 04, 2019 2139 Patient comes in for evaluation after closed head injury  The patient has history of intellectual disability, lives at a group home  She got frustrated today and she hit her head with an open hand  This was witnessed by staff, it lasted less than a minute UA  She is here for evaluation per their protocol to rule out head injury  Patient has no headache neck pain  She is acting appropriately per the the staff that are with her the  Patient has not had any fevers chills sweats  Patient denies any pain in her body at this time                                  MDM  Number of Diagnoses or Management Options  Closed head injury:   Diagnosis management comments: 2year-old woman comes in after hitting her head  Patient demonstrated how she hit her head  It was with an open hand multiple times   at bedside states it was only less than a minute  Low mechanism, no loss consciousness, no object, no head strike on floor  Patient is well-appearing benign exam   Patient be discharged  Disposition  Final diagnoses:   Closed head injury - from hand     Time reflects when diagnosis was documented in both MDM as applicable and the Disposition within this note     Time User Action Codes Description Comment    2/4/2019  9:36 PM Temo Rodriguez Add [S09 90XA] Closed head injury     2/4/2019  9:36 PM Temo Rodriguez Modify [S09 90XA] Closed head injury from hand      ED Disposition     ED Disposition Condition Date/Time Comment    Discharge  Mon Feb 4, 2019  9:36 PM [facility] discharge to home/self care      Condition at discharge: Stable        Follow-up Information     Follow up With Specialties Details Why [address] [facility] Emergency Department Emergency Medicine Go to As needed, If symptoms worsen [facility] [facility] [address] [facility] ED, [address] [place] [address]  Call To get established primary care [phone]             Discharge Medication List as of 2/4/2019  9:39 PM      CONTINUE these medications which have NOT CHANGED    Details   ARIPiprazole (ABILIFY) 20 MG tablet Take 1 tablet (20 mg total) by mouth daily at bedtime for 30 days at 9pm , Starting Tue 9/25/2018, Until Mon 2/4/2019, No Print      calcium carbonate (TUMS) 500 mg chewable tablet Chew 1 tablet 3 (three) times a day as needed  , Historical Med      Cholecalciferol (VITAMIN D-3) 1000 units CAPS Take 2 capsules (2,000 Units total) by mouth daily at 8am , Starting Tue 9/25/2018, No Print      clotrimazole (LOTRIMIN) 1 % cream Apply 1 g (1 application total) topically 3 (three) times a day as needed (fungal irritation), Starting Wed 5/9/2018, Normal      escitalopram (LEXAPRO) 20 mg tablet Take 1 tablet (20 mg total) by mouth daily for 30 days at 8am , Starting Thu 12/6/2018, Until Mon 2/4/2019, No Print      magnesium hydroxide (GNP MILK OF MAGNESIA) 400 mg/5 mL oral suspension Take 30 mL by mouth daily as needed for constipation If no BM in 3 days, Starting Thu 12/6/2018, Normal      metoclopramide (REGLAN) 10 mg tablet Take one tablet by mouth every 8 hours as needed for nausea, Normal      Mouthwashes (LISTERINE ANTISEPTIC) LIQD Apply 1 application to the mouth or throat 2 (two) times a day for 30 days, Starting Thu 12/6/2018, Until Mon 2/4/2019, Normal      Multiple Vitamins-Minerals (CERTAVITE/ANTIOXIDANTS) TABS Take 1 tablet by mouth daily, Starting Mon 9/10/2018, Normal      norgestimate-ethinyl estradiol (ORTHO TRI-CYCLEN LO) 0 18/0 215/0 25 MG-25 MCG per tablet Take 1 tablet by mouth daily at 8am , Starting Tue 9/25/2018, No Print      omeprazole (PriLOSEC) 20 mg delayed release capsule Take 1 capsule (20 mg total) by mouth daily for 28 days, Starting Fri 2/1/2019, Until Fri 3/1/2019, Print      polyethylene glycol (MIRALAX) 17 g packet Take 17gm (1 packet) daily for first three days, then daily as needed for no bowel movement more than 24 hrs, Normal      RA SUNSCREEN SPF50 LOTN Apply 15 minutes before sun exposure and every 2 hours, Normal      senna-docusate sodium (SENEXON-S) 8 6-50 mg per tablet Take 1 tablet by mouth daily at 8am , Starting Tue 9/25/2018, No Print      traZODone (DESYREL) 50 mg tablet Take 1 tablet (50 mg total) by mouth daily at bedtime at 9pm, Starting Tue 9/25/2018, No Print      zinc oxide (DESITIN) 13 % cream Apply 1 application topically as needed (for perianal irritation), Starting Thu 5/17/2018, Normal           No discharge procedures on file  ED Provider  Attending physically available and evaluated Riajeffery Jessica YADAV managed the patient along with the ED Attending      Electronically Signed by         Erasto Contreras MD  02/05/19 3956

## 2019-02-05 NOTE — DISCHARGE INSTRUCTIONS
After evaluation, you arr cleared for discharge  You can take your night time medications tonight  If anything changes or worsens, please come back to the emergency department  Head Injury   WHAT YOU NEED TO KNOW:   A head injury is most often caused by a blow to the head  This may occur from a fall, bicycle injury, sports injury, being struck in the head, or a motor vehicle accident  DISCHARGE INSTRUCTIONS:   Call 911 or have someone else call for any of the following:   · You cannot be woken  · You have a seizure  · You stop responding to others or you faint  · You have blurry or double vision  · Your speech becomes slurred or confused  · You have arm or leg weakness, loss of feeling, or new problems with coordination  · Your pupils are larger than usual or one pupil is a different size than the other  · You have blood or clear fluid coming out of your ears or nose  Return to the emergency department if:   · You have repeated or forceful vomiting  · You feel confused  · Your headache gets worse or becomes severe  · You or someone caring for you notices that you are harder to wake than usual   Contact your healthcare provider if:   · Your symptoms last longer than 6 weeks after the injury  · You have questions or concerns about your condition or care  Medicines:   · Acetaminophen  decreases pain  Acetaminophen is available without a doctor's order  Ask how much to take and how often to take it  Follow directions  Acetaminophen can cause liver damage if not taken correctly  · Take your medicine as directed  Contact your healthcare provider if you think your medicine is not helping or if you have side effects  Tell him or her if you are allergic to any medicine  Keep a list of the medicines, vitamins, and herbs you take  Include the amounts, and when and why you take them  Bring the list or the pill bottles to follow-up visits   Carry your medicine list with you in case of an emergency  Self-care:   · Rest  or do quiet activities for 24 to 48 hours  Limit your time watching TV, using the computer, or doing tasks that require a lot of thinking  Slowly return to your normal activities as directed  Do not play sports or do activities that may cause you to get hit in the head  Ask your healthcare provider when you can return to sports  · Apply ice  on your head for 15 to 20 minutes every hour or as directed  Use an ice pack, or put crushed ice in a plastic bag  Cover it with a towel before you apply it to your skin  Ice helps prevent tissue damage and decreases swelling and pain  · Have someone stay with you for 24 hours  or as directed  This person can monitor you for complications and call [phone]  When you are awake the person should ask you a few questions to see if you are thinking clearly  An example would be to ask your name or your address  Prevent another head injury:   · Wear a helmet that fits properly  Do this when you play sports, or ride a bike, scooter, or skateboard  Helmets help decrease your risk of a serious head injury  Talk to your healthcare provider about other ways you can protect yourself if you play sports  · Wear your seat belt every time you are in a car  This helps to decrease your risk for a head injury if you are in a car accident  Follow up with your healthcare provider as directed:  Write down your questions so you remember to ask them during your visits  © 2017 [employer] Information is for End User's use only and may not be sold, redistributed or otherwise used for commercial purposes  All illustrations and images included in CareNotes® are the copyrighted property of [employer]  or [employer]  The above information is an  only  It is not intended as medical advice for individual conditions or treatments   Talk to your doctor, nurse or pharmacist before following any medical regimen to see if it is safe and effective for you

## 2019-02-05 NOTE — ED ATTENDING ATTESTATION
Bela Lockwood DO,evaluated the patient  I have discussed the patient with the resident/non-physician practitioner and agree with the resident's/non-physician practitioner's findings, Plan of Care, and MDM as documented in the resident's/non-physician practitioner's note, except where noted  All available labs and Radiology studies were reviewed  At this point I agree with the current assessment done in the Emergency Department  I have conducted an independent evaluation of this patient a history and physical is as follows:      Critical Care Time  Procedures     35 yr old in group home brought to the ED for eval for concussion  Pt hit her head with open hand several times and less than a min  No sx or complaints  Did not hit a hard surface  Exm: no evidence of injury    Pln: dc to home

## 2019-02-08 ENCOUNTER — HOSPITAL ENCOUNTER (EMERGENCY)
Facility: HOSPITAL | Age: 39
Discharge: HOME | End: 2019-02-08
Attending: EMERGENCY MEDICINE | Admitting: EMERGENCY MEDICINE
Payer: MEDICARE

## 2019-02-08 VITALS
BODY MASS INDEX: 38.12 KG/M2 | WEIGHT: 188.71 LBS | TEMPERATURE: 97.7 F | SYSTOLIC BLOOD PRESSURE: 139 MMHG | RESPIRATION RATE: 18 BRPM | HEART RATE: 97 BPM | DIASTOLIC BLOOD PRESSURE: 72 MMHG | OXYGEN SATURATION: 96 %

## 2019-02-08 DIAGNOSIS — F69 BEHAVIOR PROBLEM, ADULT: Primary | ICD-10-CM

## 2019-02-08 PROCEDURE — 99284 EMERGENCY DEPT VISIT MOD MDM: CPT

## 2019-02-08 NOTE — ED NOTES
Patient reports she is mad because she is getting a medical bed and does not want one  Pt was at her group home today saying she wanted to hut herself by punching herself in the face,  Per history this is patients baseline  Pt now denies suicidal and homicidal ideations and is requesting to go home

## 2019-02-08 NOTE — ED NOTES
Group home staff member at pt's bedside  Pt is in [place] with close proximity of nurses station and ED staff  Will continue to observe patient        Daniel Hernandez RN  02/08/19 [id number]

## 2019-02-08 NOTE — ED PROVIDER NOTES
History  Chief Complaint   Patient presents with    Psychiatric Evaluation     pt comes in via EMS  per report of group home staff pt got upset because they are switching her bed to a medical bed  pt started hitting her head  per staff member if pt hits her head or falls and hit her head she needs to be evaluated  pt during triage started to hit herself with her open palm on her head  pt admitted to [facility] and a plan to continue to hit her head for self harm and requested crisis  denies HI/AH/VH  A 41-year-old female with past medical history of anxiety, cerebral palsy, depression and GERD; presents from her group home for evaluation of suicidal ideations  Patient became mad earlier this morning when she was told her bed would be changed to a medical bed  Patient stated she did not want a change in her bed, and then began to hit herself in the head stating she was suicidal   Group home caregivers then contacted 911  Currently patient denies SI and HI  Patient states she is no longer angry, and does not want to hit herself  Patient states she is ready to go home  Group home caregiver are at bedside, stating the patient has continuous supervision at the home  They are willing to take the patient home today  Patient has otherwise been in her usual state of health; denying fever, chills, chest pain, SOB, abd pain, N/V/D, peripheral edema and rashes  A/P:  Behavior problem, patient initially angry secondary to a change in her bed at the group home  Patient now states she is no longer angry; denies SI and HI  Crisis has already evaluated the patient, and agrees patient is safe for discharge  Will proceed with discharge back to group home  History provided by:  Patient and caregiver  Psychiatric Evaluation       Prior to Admission Medications   Prescriptions Last Dose Informant Patient Reported? Taking?    ARIPiprazole (ABILIFY) 20 MG tablet   No Yes   Sig: Take 1 tablet (20 mg total) by mouth daily at bedtime for 30 days at 9pm    Cholecalciferol (VITAMIN D-3) 1000 units CAPS  Care Giver No Yes   Sig: Take 2 capsules (2,000 Units total) by mouth daily at 8am    Mouthwashes (LISTERINE ANTISEPTIC) LIQD   No Yes   Sig: Apply 1 application to the mouth or throat 2 (two) times a day for 30 days   Multiple Vitamins-Minerals (CERTAVITE/ANTIOXIDANTS) TABS  Care Giver No Yes   Sig: Take 1 tablet by mouth daily   RA SUNSCREEN SPF50 LOTN  Care Giver No Yes   Sig: Apply 15 minutes before sun exposure and every 2 hours   calcium carbonate (TUMS) 500 mg chewable tablet  Care Giver Yes Yes   Sig: Chew 1 tablet 3 (three) times a day as needed     clotrimazole (LOTRIMIN) 1 % cream  Care Giver No Yes   Sig: Apply 1 g (1 application total) topically 3 (three) times a day as needed (fungal irritation)   escitalopram (LEXAPRO) 20 mg tablet   No Yes   Sig: Take 1 tablet (20 mg total) by mouth daily for 30 days at 8am    lubiprostone (AMITIZA) 24 mcg capsule   Yes Yes   Sig: Take 24 mcg by mouth 2 (two) times a day with meals   magnesium hydroxide (GNP MILK OF MAGNESIA) 400 mg/5 mL oral suspension   No Yes   Sig: Take 30 mL by mouth daily as needed for constipation If no BM in 3 days   metoclopramide (REGLAN) 10 mg tablet   No Yes   Sig: Take one tablet by mouth every 8 hours as needed for nausea   norgestimate-ethinyl estradiol (ORTHO TRI-CYCLEN LO) 0 18/0 215/0 25 MG-25 MCG per tablet  Care Giver No Yes   Sig: Take 1 tablet by mouth daily at 8am    omeprazole (PriLOSEC) 20 mg delayed release capsule   No Yes   Sig: Take 1 capsule (20 mg total) by mouth daily for 28 days   polyethylene glycol (MIRALAX) 17 g packet  Care Giver No Yes   Sig: Take 17gm (1 packet) daily for first three days, then daily as needed for no bowel movement more than 24 hrs   Patient taking differently: Take 1 g by mouth daily as needed Take 17gm (1 packet) daily for first three days, then daily as needed for no bowel movement more than 24 hrs    senna-docusate sodium (SENEXON-S) 8 6-50 mg per tablet  Care Giver No Yes   Sig: Take 1 tablet by mouth daily at 8am    traZODone (DESYREL) 50 mg tablet  Care Giver No Yes   Sig: Take 1 tablet (50 mg total) by mouth daily at bedtime at 9pm   zinc oxide (DESITIN) 13 % cream  Care Giver No Yes   Sig: Apply 1 application topically as needed (for perianal irritation)      Facility-Administered Medications: None       Past Medical History:   Diagnosis Date    Anxiety     Brain lesion     Bronchitis     Cerebral palsy (HCC)     Cerebral palsy (HCC)     Last Assessed:7/6/2016    Chronically dry eyes, left     Last Assessed:7/6/2016    Depression     Last Assessed:7/6/2016    Depressive disorder     Fall     GERD (gastroesophageal reflux disease)     Mood disorder (HCC)        Past Surgical History:   Procedure Laterality Date    CSF SHUNT      Creation of Ventriculo-Peritoneal CSF shunt ; Last Assessed:7/6/2016    EAR SURGERY      Last Assessed:7/6/2016    LEG SURGERY      due to CP     NOSE SURGERY      Last Assessed:7/6/2016       Family History   Problem Relation Age of Onset    Diabetes Mother     No Known Problems Father      I have reviewed and agree with the history as documented  Social History   Substance Use Topics    Smoking status: Never Smoker    Smokeless tobacco: Never Used    Alcohol use No        Review of Systems   Psychiatric/Behavioral: Positive for behavioral problems  All other systems reviewed and are negative        Physical Exam  Physical Exam   General Appearance: alert and oriented, nad, non toxic appearing  Skin:  Warm, dry, intact  HEENT: atraumatic, normocephalic  Neck: Supple, trachea midline  Cardiac: RRR; no murmurs, rub, gallops  Pulmonary: lungs CTAB; no wheezes, rales, rhonchi  Gastrointestinal: abdomen soft, nontender, nondistended; no guarding or rebound tenderness; good bowel sounds, no mass or bruits  Extremities:  no pedal edema, 2+ pulses; no calf tenderness, no clubbing, no cyanosis  Neuro:  no focal motor or sensory deficits, CN 2-12 grossly intact  Psych:  Normal mood and affect, normal judgement and insight      Vital Signs  ED Triage Vitals [02/08/19 1204]   Temperature Pulse Respirations Blood Pressure SpO2   97 7 °F (36 5 °C) 97 18 139/72 96 %      Temp Source Heart Rate Source Patient Position - Orthostatic VS BP Location FiO2 (%)   Oral Monitor Sitting Right arm --      Pain Score       No Pain           Vitals:    02/08/19 1204   BP: 139/72   Pulse: 97   Patient Position - Orthostatic VS: Sitting       Visual Acuity      ED Medications  Medications - No data to display    Diagnostic Studies  Results Reviewed     None                 No orders to display              Procedures  Procedures       Phone Contacts  ED Phone Contact    ED Course                               MDM    Disposition  Final diagnoses:   Behavior problem, adult     Time reflects when diagnosis was documented in both MDM as applicable and the Disposition within this note     Time User Action Codes Description Comment    2/8/2019 12:41 PM Ledy Corona [F69] Behavior problem, adult       ED Disposition     ED Disposition Condition Date/Time Comment    Discharge  Fri Feb 8, 2019 12:41 PM Maria Elena Keen discharge to home/self care      Condition at discharge: Good        MD Documentation      Most Recent Value   Sending MD Dr Josefina Truong up With Specialties Details Why [address] [facility] Emergency Department Emergency Medicine Go to If symptoms worsen [facility] [id number] [phone] [place] ED, [address] [facility] [address]  , [facility] [address] [facility], [address]          Discharge Medication List as of 2/8/2019 12:41 PM      CONTINUE these medications which have NOT CHANGED    Details   ARIPiprazole (ABILIFY) 20 MG tablet Take 1 tablet (20 mg total) by mouth daily at bedtime for 30 days at 9pm , Starting Tue 9/25/2018, Until Fri 2/8/2019, No Print      calcium carbonate (TUMS) 500 mg chewable tablet Chew 1 tablet 3 (three) times a day as needed  , Historical Med      Cholecalciferol (VITAMIN D-3) 1000 units CAPS Take 2 capsules (2,000 Units total) by mouth daily at 8am , Starting Tue 9/25/2018, No Print      clotrimazole (LOTRIMIN) 1 % cream Apply 1 g (1 application total) topically 3 (three) times a day as needed (fungal irritation), Starting Wed 5/9/2018, Normal      escitalopram (LEXAPRO) 20 mg tablet Take 1 tablet (20 mg total) by mouth daily for 30 days at 8am , Starting Thu 12/6/2018, Until Fri 2/8/2019, No Print      lubiprostone (AMITIZA) 24 mcg capsule Take 24 mcg by mouth 2 (two) times a day with meals, Historical Med      magnesium hydroxide (GNP MILK OF MAGNESIA) 400 mg/5 mL oral suspension Take 30 mL by mouth daily as needed for constipation If no BM in 3 days, Starting Thu 12/6/2018, Normal      metoclopramide (REGLAN) 10 mg tablet Take one tablet by mouth every 8 hours as needed for nausea, Normal      Mouthwashes (LISTERINE ANTISEPTIC) LIQD Apply 1 application to the mouth or throat 2 (two) times a day for 30 days, Starting Thu 12/6/2018, Until Fri 2/8/2019, Normal      Multiple Vitamins-Minerals (CERTAVITE/ANTIOXIDANTS) TABS Take 1 tablet by mouth daily, Starting Mon 9/10/2018, Normal      norgestimate-ethinyl estradiol (ORTHO TRI-CYCLEN LO) 0 18/0 215/0 25 MG-25 MCG per tablet Take 1 tablet by mouth daily at 8am , Starting Tue 9/25/2018, No Print      omeprazole (PriLOSEC) 20 mg delayed release capsule Take 1 capsule (20 mg total) by mouth daily for 28 days, Starting Fri 2/1/2019, Until Fri 3/1/2019, Print      polyethylene glycol (MIRALAX) 17 g packet Take 17gm (1 packet) daily for first three days, then daily as needed for no bowel movement more than 24 hrs, Normal      RA SUNSCREEN SPF50 LOTN Apply 15 minutes before sun exposure and every 2 hours, Normal      senna-docusate sodium (SENEXON-S) 8 650 mg per tablet Take 1 tablet by mouth daily at 8am , Starting Tue 9/25/2018, No Print      traZODone (DESYREL) 50 mg tablet Take 1 tablet (50 mg total) by mouth daily at bedtime at 9pm, Starting Tue 9/25/2018, No Print      zinc oxide (DESITIN) 13 % cream Apply 1 application topically as needed (for perianal irritation), Starting Thu 5/17/2018, Normal           No discharge procedures on file      ED Provider  Electronically Signed by           [id number] Billy DO  02/08/19 3462

## 2019-02-13 ENCOUNTER — HOSPITAL ENCOUNTER (EMERGENCY)
Facility: HOSPITAL | Age: 39
Discharge: HOME | End: 2019-02-13
Attending: EMERGENCY MEDICINE
Payer: MEDICARE

## 2019-02-13 VITALS
TEMPERATURE: 97.6 F | HEART RATE: 98 BPM | WEIGHT: 188.71 LBS | OXYGEN SATURATION: 100 % | RESPIRATION RATE: 18 BRPM | BODY MASS INDEX: 38.12 KG/M2 | DIASTOLIC BLOOD PRESSURE: 68 MMHG | SYSTOLIC BLOOD PRESSURE: 140 MMHG

## 2019-02-13 DIAGNOSIS — S09.90XA MINOR HEAD INJURY, INITIAL ENCOUNTER: Primary | ICD-10-CM

## 2019-02-13 PROCEDURE — 99283 EMERGENCY DEPT VISIT LOW MDM: CPT

## 2019-02-13 NOTE — ED PROVIDER NOTES
History  Chief Complaint   Patient presents with    Head Injury     Pt  brought in via EMS  Pt  reports hitting her head with her hand  Pt  reports headache and denies LOC  36 yo female brought by ambulance from the group home where she resides for permanent intellectual disability brought by ambulance because she "slapped" herself in the head which she is prone to do as part of her baseline behaviors  I asked for confirmation--if there was an injury, or hematoma, or LOC, or change in mental status--and there were none of these concerns, rather "we have a policy that if she is hit in the head she has to be brought to the hospital because she has a shunt; we've tried to change it but the answer is no "  The patient is at her baseline mental status--she is able to provide her own history, affirms the behavior, shows me her shunt site which is soft, nontender  She has no injury  No concern for occult ICH or elevated intracranial pressure requiring additional imaging  In the event that perhaps her facility administrators can feel more comfortable avoiding frequent ED visits that must strain their staffing and resources, I gave written clarification that even the most conservative head injury policy needs to have some level of discretion to not include the force applied by one's own strength, particularly in the absence of any perceivable injury  History provided by:  Caregiver and patient  History limited by:  Psychiatric disorder      Prior to Admission Medications   Prescriptions Last Dose Informant Patient Reported? Taking?    ARIPiprazole (ABILIFY) 20 MG tablet   No Yes   Sig: Take 1 tablet (20 mg total) by mouth daily at bedtime for 30 days at 9pm    Cholecalciferol (VITAMIN D-3) 1000 units CAPS  Care Giver No Yes   Sig: Take 2 capsules (2,000 Units total) by mouth daily at 8am    Mouthwashes (LISTERINE ANTISEPTIC) LIQD   No No   Sig: Apply 1 application to the mouth or throat 2 (two) times a day for 30 days   Multiple Vitamins-Minerals (CERTAVITE/ANTIOXIDANTS) TABS  Care Giver No No   Sig: Take 1 tablet by mouth daily   RA SUNSCREEN SPF50 LOTN  Care Giver No No   Sig: Apply 15 minutes before sun exposure and every 2 hours   calcium carbonate (TUMS) 500 mg chewable tablet  Care Giver Yes No   Sig: Chew 1 tablet 3 (three) times a day as needed     clotrimazole (LOTRIMIN) 1 % cream  Care Giver No No   Sig: Apply 1 g (1 application total) topically 3 (three) times a day as needed (fungal irritation)   escitalopram (LEXAPRO) 20 mg tablet   No Yes   Sig: Take 1 tablet (20 mg total) by mouth daily for 30 days at 8am    lubiprostone (AMITIZA) 24 mcg capsule   Yes Yes   Sig: Take 24 mcg by mouth 2 (two) times a day with meals   magnesium hydroxide (GNP MILK OF MAGNESIA) 400 mg/5 mL oral suspension   No No   Sig: Take 30 mL by mouth daily as needed for constipation If no BM in 3 days   metoclopramide (REGLAN) 10 mg tablet   No No   Sig: Take one tablet by mouth every 8 hours as needed for nausea   norgestimate-ethinyl estradiol (ORTHO TRI-CYCLEN LO) 0 18/0 215/0 25 MG-25 MCG per tablet  Care Giver No No   Sig: Take 1 tablet by mouth daily at 8am    omeprazole (PriLOSEC) 20 mg delayed release capsule   No No   Sig: Take 1 capsule (20 mg total) by mouth daily for 28 days   polyethylene glycol (MIRALAX) 17 g packet  Care Giver No No   Sig: Take 17gm (1 packet) daily for first three days, then daily as needed for no bowel movement more than 24 hrs   Patient taking differently: Take 1 g by mouth daily as needed Take 17gm (1 packet) daily for first three days, then daily as needed for no bowel movement more than 24 hrs    senna-docusate sodium (SENEXON-S) 8 6-50 mg per tablet  Care Giver No Yes   Sig: Take 1 tablet by mouth daily at 8am    traZODone (DESYREL) 50 mg tablet  Care Giver No Yes   Sig: Take 1 tablet (50 mg total) by mouth daily at bedtime at 9pm   zinc oxide (DESITIN) 13 % cream  Care Giver No No   Sig: Apply 1 application topically as needed (for perianal irritation)      Facility-Administered Medications: None       Past Medical History:   Diagnosis Date    Anxiety     Brain lesion     Bronchitis     Cerebral palsy (HCC)     Cerebral palsy (HCC)     Last Assessed:7/6/2016    Chronically dry eyes, left     Last Assessed:7/6/2016    Depression     Last Assessed:7/6/2016    Depressive disorder     Fall     GERD (gastroesophageal reflux disease)     Mood disorder ([address] )        Past Surgical History:   Procedure Laterality Date    CSF SHUNT      Creation of Ventriculo-Peritoneal CSF shunt ; Last Assessed:7/6/2016    EAR SURGERY      Last Assessed:7/6/2016    LEG SURGERY      due to CP     NOSE SURGERY      Last Assessed:7/6/2016       Family History   Problem Relation Age of Onset    Diabetes Mother     No Known Problems Father      I have reviewed and agree with the history as documented  Social History     Tobacco Use    Smoking status: Never Smoker    Smokeless tobacco: Never Used   Substance Use Topics    Alcohol use: No    Drug use: No        Review of Systems   Constitutional: Negative for appetite change, chills and fever  HENT: Negative for sore throat  Respiratory: Negative for cough, shortness of breath and wheezing  Cardiovascular: Negative for chest pain and palpitations  Gastrointestinal: Negative for abdominal pain, diarrhea, nausea and vomiting  Genitourinary: Negative for dysuria and hematuria  Musculoskeletal: Negative for neck pain  Skin: Negative for rash  Neurological: Negative for dizziness, weakness and headaches  Psychiatric/Behavioral: Negative for suicidal ideas  All other systems reviewed and are negative  Physical Exam  Physical Exam   Constitutional: She is oriented to person, place, and time  Vital signs are normal  She appears well-developed and well-nourished  Non-toxic appearance  HENT:   Head: Normocephalic and atraumatic     Right Ear: Tympanic membrane and external ear normal    Left Ear: Tympanic membrane and external ear normal    Nose: Nose normal    Mouth/Throat: Oropharynx is clear and moist    Eyes: Pupils are equal, round, and reactive to light  Conjunctivae and EOM are normal    Neck: Normal range of motion and full passive range of motion without pain  Neck supple  No Brudzinski's sign and no Kernig's sign noted  Cardiovascular: Normal rate, regular rhythm, normal heart sounds, intact distal pulses and normal pulses  No murmur heard  Pulmonary/Chest: Effort normal and breath sounds normal  No tachypnea  No respiratory distress  She has no wheezes  Abdominal: Soft  Bowel sounds are normal  She exhibits no distension  There is no tenderness  There is no rigidity, no rebound and no guarding  Musculoskeletal: Normal range of motion  Right lower leg: She exhibits no swelling  Left lower leg: She exhibits no swelling  Lymphadenopathy:     She has no cervical adenopathy  Neurological: She is alert and oriented to person, place, and time  She has normal strength and normal reflexes  No cranial nerve deficit or sensory deficit  Coordination and gait normal  GCS eye subscore is 4  GCS verbal subscore is 5  GCS motor subscore is 6  Skin: Skin is warm and dry  No rash noted  She is not diaphoretic  No pallor  Psychiatric: She has a normal mood and affect  Her speech is normal and behavior is normal  Judgment and thought content normal  Cognition and memory are normal    Nursing note and vitals reviewed        Vital Signs  ED Triage Vitals [02/13/19 1108]   Temperature Pulse Respirations Blood Pressure SpO2   97 6 °F (36 4 °C) 98 18 140/68 100 %      Temp Source Heart Rate Source Patient Position - Orthostatic VS BP Location FiO2 (%)   Oral Monitor Lying Left arm --      Pain Score       2           Vitals:    02/13/19 1108   BP: 140/68   Pulse: 98   Patient Position - Orthostatic VS: Lying       Visual Acuity      ED Medications  Medications - No data to display    Diagnostic Studies  Results Reviewed     None                 No orders to display              Procedures  Procedures       Phone Contacts  ED Phone Contact    ED Course                               MDM    Disposition  Final diagnoses:   Minor head injury, initial encounter     Time reflects when diagnosis was documented in both MDM as applicable and the Disposition within this note     Time User Action Codes Description Comment    2/13/2019 11:34 AM Amelie Brasher Add [F91 8] Sexually acting out behavior of childhood     2/13/2019 11:34 AM Amelie Brasher Remove [F91 8] Sexually acting out behavior of childhood     2/13/2019 11:36 AM Amelie Brasher Add [S09 90XA] Minor head injury, initial encounter       ED Disposition     ED Disposition Condition Date/Time Comment    Discharge Stable Wed Feb 13, 2019 11:34 AM Ciaran Russell discharge to home/self care              Follow-up Information    None         Discharge Medication List as of 2/13/2019 11:38 AM      CONTINUE these medications which have NOT CHANGED    Details   ARIPiprazole (ABILIFY) 20 MG tablet Take 1 tablet (20 mg total) by mouth daily at bedtime for 30 days at 9pm , Starting Tue 9/25/2018, Until Wed 2/13/2019, No Print      Cholecalciferol (VITAMIN D-3) 1000 units CAPS Take 2 capsules (2,000 Units total) by mouth daily at 8am , Starting Tue 9/25/2018, No Print      escitalopram (LEXAPRO) 20 mg tablet Take 1 tablet (20 mg total) by mouth daily for 30 days at 8am , Starting Thu 12/6/2018, Until Wed 2/13/2019, No Print      lubiprostone (AMITIZA) 24 mcg capsule Take 24 mcg by mouth 2 (two) times a day with meals, Historical Med      senna-docusate sodium (SENEXON-S) 8 6-50 mg per tablet Take 1 tablet by mouth daily at 8am , Starting Tue 9/25/2018, No Print      traZODone (DESYREL) 50 mg tablet Take 1 tablet (50 mg total) by mouth daily at bedtime at 9pm, Starting Tue 9/25/2018, No Print calcium carbonate (TUMS) 500 mg chewable tablet Chew 1 tablet 3 (three) times a day as needed  , Historical Med      clotrimazole (LOTRIMIN) 1 % cream Apply 1 g (1 application total) topically 3 (three) times a day as needed (fungal irritation), Starting Wed 5/9/2018, Normal      magnesium hydroxide (GNP MILK OF MAGNESIA) 400 mg/5 mL oral suspension Take 30 mL by mouth daily as needed for constipation If no BM in 3 days, Starting Thu 12/6/2018, Normal      metoclopramide (REGLAN) 10 mg tablet Take one tablet by mouth every 8 hours as needed for nausea, Normal      Mouthwashes (LISTERINE ANTISEPTIC) LIQD Apply 1 application to the mouth or throat 2 (two) times a day for 30 days, Starting Thu 12/6/2018, Until Fri 2/8/2019, Normal      Multiple Vitamins-Minerals (CERTAVITE/ANTIOXIDANTS) TABS Take 1 tablet by mouth daily, Starting Mon 9/10/2018, Normal      norgestimate-ethinyl estradiol (ORTHO TRI-CYCLEN LO) 0 18/0 215/0 25 MG-25 MCG per tablet Take 1 tablet by mouth daily at 8am , Starting Tue 9/25/2018, No Print      omeprazole (PriLOSEC) 20 mg delayed release capsule Take 1 capsule (20 mg total) by mouth daily for 28 days, Starting Fri 2/1/2019, Until Fri 3/1/2019, Print      polyethylene glycol (MIRALAX) 17 g packet Take 17gm (1 packet) daily for first three days, then daily as needed for no bowel movement more than 24 hrs, Normal      RA SUNSCREEN SPF50 LOTN Apply 15 minutes before sun exposure and every 2 hours, Normal      zinc oxide (DESITIN) 13 % cream Apply 1 application topically as needed (for perianal irritation), Starting Thu 5/17/2018, Normal           No discharge procedures on file      ED Provider  Electronically Signed by           Felicia Rocha MD  02/13/19 5246

## 2019-02-13 NOTE — DISCHARGE INSTRUCTIONS
Incidental or intentional strikes to the head by the patient's own hand as part of her baseline behaviors that do not result in any visible injury or loss of consciousness do not require any additional evaluation  ED evaluation is warranted for head injuries that result in lacerations, or swelling, or loss of consciousness

## 2019-02-14 ENCOUNTER — OFFICE VISIT (OUTPATIENT)
Dept: FAMILY MEDICINE CLINIC | Facility: CLINIC | Age: 39
End: 2019-02-14

## 2019-02-14 VITALS
RESPIRATION RATE: 18 BRPM | HEART RATE: 84 BPM | WEIGHT: 183.6 LBS | SYSTOLIC BLOOD PRESSURE: 112 MMHG | BODY MASS INDEX: 37.01 KG/M2 | TEMPERATURE: 98.7 F | DIASTOLIC BLOOD PRESSURE: 86 MMHG | HEIGHT: 59 IN

## 2019-02-14 DIAGNOSIS — K21.9 GASTROESOPHAGEAL REFLUX DISEASE WITHOUT ESOPHAGITIS: ICD-10-CM

## 2019-02-14 DIAGNOSIS — D72.829 LEUKOCYTOSIS, UNSPECIFIED TYPE: ICD-10-CM

## 2019-02-14 DIAGNOSIS — Z12.39 SCREENING FOR BREAST CANCER: ICD-10-CM

## 2019-02-14 DIAGNOSIS — K21.9 GASTROESOPHAGEAL REFLUX DISEASE WITHOUT ESOPHAGITIS: Primary | ICD-10-CM

## 2019-02-14 DIAGNOSIS — Z00.00 MEDICARE ANNUAL WELLNESS VISIT, SUBSEQUENT: Primary | ICD-10-CM

## 2019-02-14 PROCEDURE — G0439 PPPS, SUBSEQ VISIT: HCPCS | Performed by: FAMILY MEDICINE

## 2019-02-14 RX ORDER — OMEPRAZOLE 20 MG/1
20 CAPSULE, DELAYED RELEASE ORAL DAILY
Qty: 30 CAPSULE | Refills: 0 | Status: SHIPPED | OUTPATIENT
Start: 2019-02-14 | End: 2019-02-26

## 2019-02-18 ENCOUNTER — TELEPHONE (OUTPATIENT)
Dept: FAMILY MEDICINE CLINIC | Facility: CLINIC | Age: 39
End: 2019-02-18

## 2019-02-18 NOTE — TELEPHONE ENCOUNTER
PATIENT WAS SEEN BY DR ISAAC ON 2/14/2019 WITH PAPERWORK FROM PERSONAL DIRECTED SUPPORT TO BE FILLED OUT BUT DR ISAAC NEEDS TO CORRECT AND ADD MORE INFORMATION   PAPERWORK IS IN DR ISAAC BIN  PLEASE CONTACT [facility] -[phone] WHEN COMPLETED TO BE PICKED UP

## 2019-02-21 ENCOUNTER — APPOINTMENT (EMERGENCY)
Dept: RADIOLOGY | Facility: HOSPITAL | Age: 39
End: 2019-02-21
Payer: MEDICARE

## 2019-02-21 ENCOUNTER — HOSPITAL ENCOUNTER (EMERGENCY)
Facility: HOSPITAL | Age: 39
Discharge: HOME | End: 2019-02-21
Attending: EMERGENCY MEDICINE | Admitting: EMERGENCY MEDICINE
Payer: MEDICARE

## 2019-02-21 VITALS
TEMPERATURE: 98.2 F | BODY MASS INDEX: 36.36 KG/M2 | RESPIRATION RATE: 18 BRPM | WEIGHT: 180 LBS | SYSTOLIC BLOOD PRESSURE: 162 MMHG | DIASTOLIC BLOOD PRESSURE: 72 MMHG | OXYGEN SATURATION: 97 % | HEART RATE: 100 BPM

## 2019-02-21 DIAGNOSIS — R07.9 CHEST PAIN: Primary | ICD-10-CM

## 2019-02-21 LAB
ALBUMIN SERPL BCP-MCNC: 3.4 G/DL (ref 3.5–5)
ALP SERPL-CCNC: 144 U/L (ref 46–116)
ALT SERPL W P-5'-P-CCNC: 20 U/L (ref 12–78)
ANION GAP SERPL CALCULATED.3IONS-SCNC: 11 MMOL/L (ref 4–13)
AST SERPL W P-5'-P-CCNC: 15 U/L (ref 5–45)
ATRIAL RATE: 105 BPM
ATRIAL RATE: 107 BPM
BASOPHILS # BLD AUTO: 0.03 THOUSANDS/ΜL (ref 0–0.1)
BASOPHILS NFR BLD AUTO: 0 % (ref 0–1)
BILIRUB SERPL-MCNC: 0.21 MG/DL (ref 0.2–1)
BUN SERPL-MCNC: 10 MG/DL (ref 5–25)
CALCIUM SERPL-MCNC: 8.2 MG/DL (ref 8.3–10.1)
CHLORIDE SERPL-SCNC: 105 MMOL/L (ref 100–108)
CO2 SERPL-SCNC: 22 MMOL/L (ref 21–32)
CREAT SERPL-MCNC: 0.81 MG/DL (ref 0.6–1.3)
EOSINOPHIL # BLD AUTO: 0.05 THOUSAND/ΜL (ref 0–0.61)
EOSINOPHIL NFR BLD AUTO: 1 % (ref 0–6)
ERYTHROCYTE [DISTWIDTH] IN BLOOD BY AUTOMATED COUNT: 13.3 % (ref 11.6–15.1)
GFR SERPL CREATININE-BSD FRML MDRD: 91 ML/MIN/1.73SQ M
GLUCOSE SERPL-MCNC: 134 MG/DL (ref 65–140)
HCT VFR BLD AUTO: 41 % (ref 34.8–46.1)
HGB BLD-MCNC: 13.1 G/DL (ref 11.5–15.4)
IMM GRANULOCYTES # BLD AUTO: 0.03 THOUSAND/UL (ref 0–0.2)
IMM GRANULOCYTES NFR BLD AUTO: 0 % (ref 0–2)
LYMPHOCYTES # BLD AUTO: 2.56 THOUSANDS/ΜL (ref 0.6–4.47)
LYMPHOCYTES NFR BLD AUTO: 29 % (ref 14–44)
MCH RBC QN AUTO: 30.7 PG (ref 26.8–34.3)
MCHC RBC AUTO-ENTMCNC: 32 G/DL (ref 31.4–37.4)
MCV RBC AUTO: 96 FL (ref 82–98)
MONOCYTES # BLD AUTO: 0.74 THOUSAND/ΜL (ref 0.17–1.22)
MONOCYTES NFR BLD AUTO: 8 % (ref 4–12)
NEUTROPHILS # BLD AUTO: 5.35 THOUSANDS/ΜL (ref 1.85–7.62)
NEUTS SEG NFR BLD AUTO: 62 % (ref 43–75)
NRBC BLD AUTO-RTO: 0 /100 WBCS
P AXIS: 57 DEGREES
P AXIS: 60 DEGREES
PLATELET # BLD AUTO: 295 THOUSANDS/UL (ref 149–390)
PMV BLD AUTO: 9.3 FL (ref 8.9–12.7)
POTASSIUM SERPL-SCNC: 3.6 MMOL/L (ref 3.5–5.3)
PR INTERVAL: 126 MS
PR INTERVAL: 128 MS
PROT SERPL-MCNC: 7.3 G/DL (ref 6.4–8.2)
QRS AXIS: 27 DEGREES
QRS AXIS: 33 DEGREES
QRSD INTERVAL: 68 MS
QRSD INTERVAL: 70 MS
QT INTERVAL: 312 MS
QT INTERVAL: 314 MS
QTC INTERVAL: 412 MS
QTC INTERVAL: 419 MS
RBC # BLD AUTO: 4.27 MILLION/UL (ref 3.81–5.12)
SODIUM SERPL-SCNC: 138 MMOL/L (ref 136–145)
T WAVE AXIS: 68 DEGREES
T WAVE AXIS: 70 DEGREES
TROPONIN I SERPL-MCNC: <0.02 NG/ML
TROPONIN I SERPL-MCNC: <0.02 NG/ML
VENTRICULAR RATE: 105 BPM
VENTRICULAR RATE: 107 BPM
WBC # BLD AUTO: 8.76 THOUSAND/UL (ref 4.31–10.16)

## 2019-02-21 PROCEDURE — 84484 ASSAY OF TROPONIN QUANT: CPT | Performed by: EMERGENCY MEDICINE

## 2019-02-21 PROCEDURE — 36415 COLL VENOUS BLD VENIPUNCTURE: CPT | Performed by: EMERGENCY MEDICINE

## 2019-02-21 PROCEDURE — 71046 X-RAY EXAM CHEST 2 VIEWS: CPT

## 2019-02-21 PROCEDURE — 85025 COMPLETE CBC W/AUTO DIFF WBC: CPT | Performed by: EMERGENCY MEDICINE

## 2019-02-21 PROCEDURE — 93005 ELECTROCARDIOGRAM TRACING: CPT

## 2019-02-21 PROCEDURE — 80053 COMPREHEN METABOLIC PANEL: CPT | Performed by: EMERGENCY MEDICINE

## 2019-02-21 PROCEDURE — 96361 HYDRATE IV INFUSION ADD-ON: CPT

## 2019-02-21 PROCEDURE — 93010 ELECTROCARDIOGRAM REPORT: CPT | Performed by: INTERNAL MEDICINE

## 2019-02-21 PROCEDURE — 99285 EMERGENCY DEPT VISIT HI MDM: CPT

## 2019-02-21 PROCEDURE — 96374 THER/PROPH/DIAG INJ IV PUSH: CPT

## 2019-02-21 RX ORDER — ONDANSETRON 2 MG/ML
4 INJECTION INTRAMUSCULAR; INTRAVENOUS ONCE
Status: COMPLETED | OUTPATIENT
Start: 2019-02-21 | End: 2019-02-21

## 2019-02-21 RX ADMIN — SODIUM CHLORIDE 1000 ML: 0.9 INJECTION, SOLUTION INTRAVENOUS at 16:28

## 2019-02-21 RX ADMIN — ONDANSETRON 4 MG: 2 INJECTION INTRAMUSCULAR; INTRAVENOUS at 16:27

## 2019-02-21 NOTE — ED PROVIDER NOTES
History  Chief Complaint   Patient presents with    Shortness of Breath     pt notes she is SOB and feels like "she cannot breathe at all"    Anxiety     pt notes her father has been ill as of lately and it has her upset    Chest Pain     HPI  A 50-year-old female past medical history of DM, cerebral palsy, intellectual delay presents to the ED from her group home with sudden onset chest pain and shortness of breath about 1 hour prior to arrival (approximately 1430)  Patient describes chest pain as midsternal, burning, intermittent, and nonradiating  Patient's  notes that she did complain of left arm pain earlier today, but patient denies this currently  States chest pain is worse with standing up out of bed, and with taking a deep breath  Denies nausea, vomiting, dizziness, lightheadedness  Denies change in chronic leg pain or swelling  States this feels like a panic attack she has had previously  She has hx GERD, but states sxs today are different  No hx DVT or PE, no recent travel or surgery  Prior to Admission Medications   Prescriptions Last Dose Informant Patient Reported? Taking?    ARIPiprazole (ABILIFY) 20 MG tablet   No No   Sig: Take 1 tablet (20 mg total) by mouth daily at bedtime for 30 days at 9pm    Cholecalciferol (VITAMIN D-3) 1000 units CAPS  Care Giver No No   Sig: Take 2 capsules (2,000 Units total) by mouth daily at 8am    Mouthwashes (LISTERINE ANTISEPTIC) LIQD   No No   Sig: Apply 1 application to the mouth or throat 2 (two) times a day for 30 days   Multiple Vitamins-Minerals (CERTAVITE/ANTIOXIDANTS) TABS  Care Giver No No   Sig: Take 1 tablet by mouth daily   RA SUNSCREEN SPF50 LOTN  Care Giver No No   Sig: Apply 15 minutes before sun exposure and every 2 hours   calcium carbonate (TUMS) 500 mg chewable tablet  Care Giver Yes No   Sig: Chew 1 tablet 3 (three) times a day as needed     clotrimazole (LOTRIMIN) 1 % cream  Care Giver No No   Sig: Apply 1 g (1 application total) topically 3 (three) times a day as needed (fungal irritation)   escitalopram (LEXAPRO) 20 mg tablet   No No   Sig: Take 1 tablet (20 mg total) by mouth daily for 30 days at 8am    lubiprostone (AMITIZA) 24 mcg capsule   Yes No   Sig: Take 24 mcg by mouth 2 (two) times a day with meals   magnesium hydroxide (GNP MILK OF MAGNESIA) 400 mg/5 mL oral suspension   No No   Sig: Take 30 mL by mouth daily as needed for constipation If no BM in 3 days   metoclopramide (REGLAN) 10 mg tablet   No No   Sig: Take one tablet by mouth every 8 hours as needed for nausea   norgestimate-ethinyl estradiol (ORTHO TRI-CYCLEN LO) 0 18/0 215/0 25 MG-25 MCG per tablet  Care Giver No No   Sig: Take 1 tablet by mouth daily at 8am    omeprazole (PriLOSEC) 20 mg delayed release capsule   No No   Sig: Take 1 capsule (20 mg total) by mouth daily for 30 days   polyethylene glycol (MIRALAX) 17 g packet  Care Giver No No   Sig: Take 17gm (1 packet) daily for first three days, then daily as needed for no bowel movement more than 24 hrs   Patient taking differently: Take 1 g by mouth daily as needed Take 17gm (1 packet) daily for first three days, then daily as needed for no bowel movement more than 24 hrs    senna-docusate sodium (SENEXON-S) 8 6-50 mg per tablet  Care Giver No No   Sig: Take 1 tablet by mouth daily at 8am    traZODone (DESYREL) 50 mg tablet  Care Giver No No   Sig: Take 1 tablet (50 mg total) by mouth daily at bedtime at 9pm   zinc oxide (DESITIN) 13 % cream  Care Giver No No   Sig: Apply 1 application topically as needed (for perianal irritation)      Facility-Administered Medications: None       Past Medical History:   Diagnosis Date    Acid reflux     Adjustment disorder     Anxiety     Astigmatism     Brain lesion     Brain lesion     Bronchitis     Calcium deficiency     Cerebral palsy (HCC)     Cerebral palsy (HCC)     Last Assessed:7/6/2016    Chronic otitis media     Chronically dry eyes, left     Last Assessed:7/6/2016    Constipation     Depression     Last Assessed:7/6/2016    Depressive disorder     Dry eyes     Dysphagia     Esophagitis     Esotropia     Fall     GERD (gastroesophageal reflux disease)     GERD (gastroesophageal reflux disease)     Hiatal hernia     Hydrocephalus     Hyperopia with astigmatism     Impaired fasting glucose     Mood disorder (HCC)     Myopia     Oppositional defiant disorder     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HCC)     Psychiatric disorder     Seizures (HCC)     Sensorineural hearing loss     Status post ventriculoatrial shunt placement     Visual impairment        Past Surgical History:   Procedure Laterality Date    CSF SHUNT      Creation of Ventriculo-Peritoneal CSF shunt ; Last Assessed:7/6/2016    EAR SURGERY      Last Assessed:7/6/2016    LEG SURGERY      due to CP     NOSE SURGERY      Last Assessed:7/6/2016       Family History   Problem Relation Age of Onset    Diabetes Mother     No Known Problems Father      I have reviewed and agree with the history as documented  Social History     Tobacco Use    Smoking status: Never Smoker    Smokeless tobacco: Never Used   Substance Use Topics    Alcohol use: No    Drug use: No        Review of Systems   Constitutional: Negative for chills and fever  HENT: Negative for congestion, rhinorrhea and sore throat  Respiratory: Positive for shortness of breath  Negative for chest tightness  Cardiovascular: Positive for chest pain  Gastrointestinal: Negative for abdominal pain, diarrhea, nausea and vomiting  Genitourinary: Negative for dysuria and hematuria  Musculoskeletal: Negative for arthralgias and myalgias  Skin: Negative for rash  Neurological: Negative for dizziness, syncope, weakness, light-headedness, numbness and headaches  Psychiatric/Behavioral: The patient is nervous/anxious  All other systems reviewed and are negative        Physical Exam  ED Triage Vitals   Temperature Pulse Respirations Blood Pressure SpO2   02/21/19 1512 02/21/19 1512 02/21/19 1512 02/21/19 1512 02/21/19 1512   98 2 °F (36 8 °C) 105 18 136/74 97 %      Temp Source Heart Rate Source Patient Position - Orthostatic VS BP Location FiO2 (%)   02/21/19 1512 02/21/19 1512 02/21/19 1512 02/21/19 1512 --   Oral Monitor Sitting Left arm       Pain Score       02/21/19 1516       Worst Possible Pain           Orthostatic Vital Signs  Vitals:    02/21/19 1512 02/21/19 1631 02/21/19 1819   BP: 136/74 142/78 162/72   Pulse: 105 (!) 115 100   Patient Position - Orthostatic VS: Sitting Sitting Sitting       Physical Exam   Constitutional: She is oriented to person, place, and time  She appears well-developed and well-nourished  No distress  HENT:   Head: Normocephalic and atraumatic  Right Ear: External ear normal    Left Ear: External ear normal    Nose: Nose normal    Eyes: Pupils are equal, round, and reactive to light  Conjunctivae and EOM are normal    Neck: Normal range of motion  Neck supple  Cardiovascular: Normal rate, regular rhythm, normal heart sounds and intact distal pulses  Exam reveals no gallop and no friction rub  No murmur heard  Pulmonary/Chest: Effort normal and breath sounds normal  No respiratory distress  She has no wheezes  She has no rhonchi  She has no rales  Abdominal: Soft  Bowel sounds are normal  She exhibits no distension and no mass  There is no tenderness  There is no rebound and no guarding  Musculoskeletal: Normal range of motion  Right lower leg: She exhibits edema (baseline)  Left lower leg: She exhibits edema (baseline)  Lymphadenopathy:     She has no cervical adenopathy  Neurological: She is alert and oriented to person, place, and time  She has normal strength  No cranial nerve deficit or sensory deficit  Skin: Skin is warm and dry  She is not diaphoretic  Psychiatric: She has a normal mood and affect  Nursing note and vitals reviewed        ED Medications  Medications   ondansetron (ZOFRAN) injection 4 mg (4 mg Intravenous Given 2/21/19 1627)   sodium chloride 0 9 % bolus 1,000 mL (0 mL Intravenous Stopped 2/21/19 1923)       Diagnostic Studies  Results Reviewed     Procedure Component Value Units Date/Time    Troponin I [[id number]]  (Normal) Collected:  02/21/19 1923    Lab Status:  Final result Specimen:  Blood from Arm, Right Updated:  02/21/19 1950     Troponin I <0 02 ng/mL     Troponin I [[id number]]  (Normal) Collected:  02/21/19 1625    Lab Status:  Final result Specimen:  Blood from Arm, Right Updated:  02/21/19 1657     Troponin I <0 02 ng/mL     Comprehensive metabolic panel [[id number]]  (Abnormal) Collected:  02/21/19 1625    Lab Status:  Final result Specimen:  Blood from Arm, Right Updated:  02/21/19 1656     Sodium 138 mmol/L      Potassium 3 6 mmol/L      Chloride 105 mmol/L      CO2 22 mmol/L      ANION GAP 11 mmol/L      BUN 10 mg/dL      Creatinine 0 81 mg/dL      Glucose 134 mg/dL      Calcium 8 2 mg/dL      AST 15 U/L      ALT 20 U/L      Alkaline Phosphatase 144 U/L      Total Protein 7 3 g/dL      Albumin 3 4 g/dL      Total Bilirubin 0 21 mg/dL      eGFR 91 ml/min/1 73sq m     Narrative:       National Kidney Disease Education Program recommendations are as follows:  GFR calculation is accurate only with a steady state creatinine  Chronic Kidney disease less than 60 ml/min/1 73 sq  meters  Kidney failure less than 15 ml/min/1 73 sq  meters      CBC and differential [[id number]] Collected:  02/21/19 1625    Lab Status:  Final result Specimen:  Blood from Arm, Right Updated:  02/21/19 1634     WBC 8 76 Thousand/uL      RBC 4 27 Million/uL      Hemoglobin 13 1 g/dL      Hematocrit 41 0 %      MCV 96 fL      MCH 30 7 pg      MCHC 32 0 g/dL      RDW 13 3 %      MPV 9 3 fL      Platelets 972 Thousands/uL      nRBC 0 /100 WBCs      Neutrophils Relative 62 %      Immat GRANS % 0 %      Lymphocytes Relative 29 %      Monocytes Relative 8 % Eosinophils Relative 1 %      Basophils Relative 0 %      Neutrophils Absolute 5 35 Thousands/µL      Immature Grans Absolute 0 03 Thousand/uL      Lymphocytes Absolute 2 56 Thousands/µL      Monocytes Absolute 0 74 Thousand/µL      Eosinophils Absolute 0 05 Thousand/µL      Basophils Absolute 0 03 Thousands/µL                  XR chest 2 views   ED Interpretation by Kiki Mayfield MD (02/21 1819)   No consolidation, effusion, or ptx      Final Result by Alon Carey MD (02/21 1950)      No acute cardiopulmonary disease  Workstation performed: [id number]               Procedures  ECG 12 Lead Documentation  Date/Time: 2/21/2019 7:59 PM  Performed by: Kiki Mayfield MD  Authorized by: Kiki Mayfield MD     ECG reviewed by me, the ED Provider: yes    Patient location:  ED  Previous ECG:     Previous ECG:  Compared to current    Similarity:  No change  Interpretation:     Interpretation: non-specific    Rate:     ECG rate:  107    ECG rate assessment: tachycardic    Rhythm:     Rhythm: sinus rhythm    Ectopy:     Ectopy: none    QRS:     QRS axis:  Normal    QRS intervals:  Normal  Conduction:     Conduction: normal    ST segments:     ST segments:  Normal  T waves:     T waves: inverted      Inverted:  V1 and V2          Phone Consults  ED Phone Contact    ED Course  ED Course as of Feb 21 2000   Thu Feb 21, 2019   1620 Ultrasound guided IV obtained without complication      7602 Notified that pt had an episode of vomiting in the ED, will order zofran      1709 Will check delta troponin at 1900, if negative will discharge   Troponin I: <0 02   Ådalen 30 trop negative   Will discharge   Troponin I: <0 02         HEART Risk Score      Most Recent Value   History  1 Filed at: 02/21/2019 1730   ECG  0 Filed at: 02/21/2019 1730   Age  0 Filed at: 02/21/2019 1730   Risk Factors  1 Filed at: 02/21/2019 1730   Troponin  0 Filed at: 02/21/2019 1730   Heart Score Risk Calculator   History  1 Filed at: 02/21/2019 1730   ECG  0 Filed at: 02/21/2019 1730   Age  0 Filed at: 02/21/2019 1730   Risk Factors  1 Filed at: 02/21/2019 1730   Troponin  0 Filed at: 02/21/2019 1730   HEART Score  2 Filed at: 02/21/2019 1730   HEART Score  2 Filed at: 02/21/2019 1730                            [facility]  Number of Diagnoses or Management Options  Diagnosis management comments:  41-year-old female past medical history of DM, cerebral palsy, intellectual delay presenting with CP and shortness of breath  Low concern for PE at this time  Pt has a heart score of 2  Will get CBC, CMP, Troponin, EKG, CXR  If negative, with get delta troponin and pt can be discharged with PCP follow up  Disposition  Final diagnoses:   Chest pain     Time reflects when diagnosis was documented in both MDM as applicable and the Disposition within this note     Time User Action Codes Description Comment    2/21/2019  7:53 PM Boyd Morgan Add [R07 9] Chest pain       ED Disposition     ED Disposition Condition Date/Time Comment    Discharge Stable u Feb 21, 2019  7:53 PM Ciaran Russell discharge to home/self care  Follow-up Information     Follow up With Specialties Details Why Contact Info    Mayela Saeed MD Family Medicine Schedule an appointment as soon as possible for a visit in 3 days  [facility] 3  [phone]            Patient's Medications   Discharge Prescriptions    No medications on file     No discharge procedures on file  ED Provider  Attending physically available and evaluated Ciaran Russell I managed the patient along with the ED Attending      Electronically Signed by         Freddie Benson MD  02/21/19 2000

## 2019-02-21 NOTE — ED NOTES
Chika Prajapati made aware of unsuccessful IV attempt   Pt is a hard stick and has had US guided IV insertion previously      Sharee Gomez, TIANA  02/21/19 0951

## 2019-02-21 NOTE — ED ATTENDING ATTESTATION
Matt Rodriguez DO, saw and evaluated the patient  I have discussed the patient with the resident/non-physician practitioner and agree with the resident's/non-physician practitioner's findings, Plan of Care, and MDM as documented in the resident's/non-physician practitioner's note, except where noted  All available labs and Radiology studies were reviewed  At this point I agree with the current assessment done in the Emergency Department  I have conducted an independent evaluation of this patient a history and physical is as follows:    Patient is a 69-year-old female presenting with shortness of breath and anxiety  Patient does live in a group home secondary to developmental delay  Patient states she began to experience chest discomfort or shortness of breath began approximately 0 5 hours ago  No inciting event noted  Patient has no risk factors for pulmonary embolism or DVT  No history of DVT or PE  Slightly tachycardic 105 beats per minute  Afebrile  Denied any recent change in medications, trauma, illness or injury  Otherwise negative review of systems  Denies any nausea vomiting, diaphoresis, leg swelling, rash, headaches, otherwise negative review of systems  Physical exam is unremarkable  Afebrile, heart is slightly tachycardic, lungs are clear, abdomen soft nontender nondistended, there is no lower extremity edema or calf tenderness bilaterally  There is no rash  No meningismus  Will continue the cardiac evaluation including troponin, EKG,  - will perform delta troponin  Delta troponin remains negative  D/c back to group home  F/u PCP, return if worsens  Previous ED records reviewed         Critical Care Time  Procedures

## 2019-02-21 NOTE — ED NOTES
Caregivers noted she has not been following her diet and when she eats "inappropriate" foods, she gets sick           Manuel TIANA Ochoa  02/21/19 4032

## 2019-02-22 NOTE — DISCHARGE INSTRUCTIONS
You were evaluated in the emergency department today for your chest pain  Your blood work, ECG, and Chest X-ray did not show any evidence of acute cardiac abnormality at this time

## 2019-02-25 ENCOUNTER — OFFICE VISIT (OUTPATIENT)
Dept: FAMILY MEDICINE CLINIC | Facility: CLINIC | Age: 39
End: 2019-02-25

## 2019-02-25 VITALS
BODY MASS INDEX: 37.5 KG/M2 | HEIGHT: 59 IN | TEMPERATURE: 97.8 F | SYSTOLIC BLOOD PRESSURE: 120 MMHG | DIASTOLIC BLOOD PRESSURE: 80 MMHG | WEIGHT: 186 LBS | RESPIRATION RATE: 18 BRPM | HEART RATE: 86 BPM

## 2019-02-25 DIAGNOSIS — R13.10 DYSPHAGIA, UNSPECIFIED TYPE: ICD-10-CM

## 2019-02-25 DIAGNOSIS — R06.02 SOB (SHORTNESS OF BREATH): Primary | ICD-10-CM

## 2019-02-25 PROCEDURE — 99213 OFFICE O/P EST LOW 20 MIN: CPT | Performed by: FAMILY MEDICINE

## 2019-02-25 RX ORDER — ALUMINUM HYDROXIDE, MAGNESIUM HYDROXIDE, SIMETHICONE 400; 400; 40 MG/10ML; MG/10ML; MG/10ML
15 SUSPENSION ORAL
Qty: 355 ML | Refills: 1 | Status: SHIPPED | OUTPATIENT
Start: 2019-02-25 | End: 2019-03-29 | Stop reason: SDUPTHER

## 2019-02-25 NOTE — ASSESSMENT & PLAN NOTE
Seen in ED for dysphagia and SOB: resolved    Patient was seen in the ED February 21st for shortness of breath  Alk-phos 144, calcium 8 2, albumin 3 4  Chest x-ray:  No acute cardiopulmonary disease  EKG:  No abnormalities  Troponin:  Negative x2  Heart score of two    CT on 11/26/2018:  Resulted in a small hiatal hernia, distal esophageal wall thickening; likely esophagitis  And left upper pole renal cyst      1  Encourage medication compliance:  Omeprazole every morning before meals  - May use Maalox 15 mL p r n  And/or tums p r n   2  Encourage small portions decrease spicy foods, decrease caffeinated, chocolate  3  Referral to Gastroenterology:  Given persistent reflux and a seven hiatal hernia  4   Consider ultrasound of the gallbladder if symptoms worsen, or clinically warranted:  Given elevated alk-phos

## 2019-02-25 NOTE — PROGRESS NOTES
Yancy Borges 1980 female MRN: [id number]    Family Medicine Acute Visit    ASSESSMENT/PLAN   Problem List Items Addressed This Visit        Digestive    Dysphagia     Seen in ED for dysphagia and SOB: resolved    Patient was seen in the ED February 21st for shortness of breath  Alk-phos 144, calcium 8 2, albumin 3 4  Chest x-ray:  No acute cardiopulmonary disease  EKG:  No abnormalities  Troponin:  Negative x2  Heart score of two    CT on 11/26/2018:  Resulted in a small hiatal hernia, distal esophageal wall thickening; likely esophagitis  And left upper pole renal cyst      1  Encourage medication compliance:  Omeprazole every morning before meals  - May use Maalox 15 mL p r n  And/or tums p r n   2  Encourage small portions decrease spicy foods, decrease caffeinated, chocolate  3  Referral to Gastroenterology:  Given persistent reflux and a seven hiatal hernia  4   Consider ultrasound of the gallbladder if symptoms worsen, or clinically warranted:  Given elevated alk-phos         Relevant Medications    aluminum-magnesium hydroxide-simethicone (MAALOX) 618-983-85 MG/5ML SUSP    Other Relevant Orders    Ambulatory referral to Gastroenterology       Other    SOB (shortness of breath) - Primary    Relevant Medications    aluminum-magnesium hydroxide-simethicone (MAALOX) 350-971-09 MG/5ML SUSP    Other Relevant Orders    Ambulatory referral to Gastroenterology              Future Appointments   Date Time Provider Mariana Reese   2/26/2019 10:30 AM [facility] [address] [facility]   3/13/2019  1:00 PM Cristino Joyce PA-C [facility]   4/5/2019 10:00 AM Deonna Ibrahim MD [facility]   6/17/2019 10:30 AM Raffaele Nolen MD [facility]          SUBJECTIVE  CC: Follow-up (ER SOB)      HPI:  Yancy Borges is a 35 y o  female who presents for acute visit: follow up from recent ED visit for SOB:    Past medical history of cerebral palsy, DM, intellectual delay,    1  Patient was seen in the ED February 21st for shortness of breath  Alk-phos 144, calcium 8 2, albumin 3 4  Chest x-ray:  No acute cardiopulmonary disease  EKG:  No abnormalities  Troponin:  Negative x2  Heart score of two    CT on 11/26/2018:  Resulted in a small hiatal hernia, distal esophageal wall thickening; likely esophagitis  And left upper pole renal cyst    Patient reports resolution of symptoms  Denies shortness of breath, chest pain, nausea, vomiting, diarrhea, abdominal pain  Reports slight reflux after eating spicy food    -care taker reports:  Patient needs too many spicy foods, the amount she is has increased  Review of Systems   Constitutional: Negative for activity change, appetite change, fatigue and fever  HENT: Negative for congestion  Respiratory: Negative for apnea, cough, choking, chest tightness, shortness of breath, wheezing and stridor  Cardiovascular: Negative for chest pain, palpitations and leg swelling  Gastrointestinal: Negative for abdominal distention, abdominal pain, anal bleeding, blood in stool, constipation, diarrhea, nausea, rectal pain and vomiting  Endocrine: Negative for polyuria  Genitourinary: Negative for difficulty urinating  Musculoskeletal: Negative for back pain  Skin: Negative for rash  Neurological: Negative for headaches         Historical Information   The patient history was reviewed and updated as follows:  Past Medical History:   Diagnosis Date    Acid reflux     Adjustment disorder     Anxiety     Astigmatism     Brain lesion     Brain lesion     Bronchitis     Calcium deficiency     Cerebral palsy (HCC)     Cerebral palsy (HCC)     Last Assessed:7/6/2016    Chronic otitis media     Chronically dry eyes, left     Last Assessed:7/6/2016    Constipation     Depression     Last Assessed:7/6/2016    Depressive disorder     Dry eyes     Dysphagia     Esophagitis     Esotropia     Fall     GERD (gastroesophageal reflux disease)     GERD (gastroesophageal reflux disease)     Hiatal hernia     Hydrocephalus     Hyperopia with astigmatism     Impaired fasting glucose     Mood disorder (HCC)     Myopia     Oppositional defiant disorder     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HCC)     Psychiatric disorder     Seizures (HCC)     Sensorineural hearing loss     Status post ventriculoatrial shunt placement     Visual impairment          Past Surgical History:   Procedure Laterality Date    CSF SHUNT      Creation of Ventriculo-Peritoneal CSF shunt ;  Last Assessed:7/6/2016    EAR SURGERY      Last Assessed:7/6/2016    LEG SURGERY      due to CP     NOSE SURGERY      Last Assessed:7/6/2016     Family History   Problem Relation Age of Onset    Diabetes Mother     No Known Problems Father       Social History   Social History     Substance and Sexual Activity   Alcohol Use No     Social History     Substance and Sexual Activity   Drug Use No     Social History     Tobacco Use   Smoking Status Never Smoker   Smokeless Tobacco Never Used       Medications:     Current Outpatient Medications:     ARIPiprazole (ABILIFY) 20 MG tablet, Take 1 tablet (20 mg total) by mouth daily at bedtime for 30 days at 9pm , Disp: 30 tablet, Rfl: 0    calcium carbonate (TUMS) 500 mg chewable tablet, Chew 1 tablet 3 (three) times a day as needed  , Disp: , Rfl:     Cholecalciferol (VITAMIN D-3) 1000 units CAPS, Take 2 capsules (2,000 Units total) by mouth daily at 8am , Disp: 180 capsule, Rfl: 0    clotrimazole (LOTRIMIN) 1 % cream, Apply 1 g (1 application total) topically 3 (three) times a day as needed (fungal irritation), Disp: 30 g, Rfl: 5    escitalopram (LEXAPRO) 20 mg tablet, Take 1 tablet (20 mg total) by mouth daily for 30 days at 8am , Disp: 90 tablet, Rfl: 0    lubiprostone (AMITIZA) 24 mcg capsule, Take 24 mcg by mouth 2 (two) times a day with meals, Disp: , Rfl:     magnesium hydroxide (GNP MILK OF MAGNESIA) 400 mg/5 mL oral suspension, Take 30 mL by mouth daily as needed for constipation If no BM in 3 days, Disp: 355 mL, Rfl: 0    metoclopramide (REGLAN) 10 mg tablet, Take one tablet by mouth every 8 hours as needed for nausea, Disp: 30 tablet, Rfl: 0    Mouthwashes (LISTERINE ANTISEPTIC) LIQD, Apply 1 application to the mouth or throat 2 (two) times a day for 30 days, Disp: 500 mL, Rfl: 3    Multiple Vitamins-Minerals (CERTAVITE/ANTIOXIDANTS) TABS, Take 1 tablet by mouth daily, Disp: 90 tablet, Rfl: 3    norgestimate-ethinyl estradiol (ORTHO TRI-CYCLEN LO) 0 18/0 215/0 25 MG-25 MCG per tablet, Take 1 tablet by mouth daily at 8am , Disp: 28 tablet, Rfl: 0    omeprazole (PriLOSEC) 20 mg delayed release capsule, Take 1 capsule (20 mg total) by mouth daily for 30 days, Disp: 30 capsule, Rfl: 0    polyethylene glycol (MIRALAX) 17 g packet, Take 17gm (1 packet) daily for first three days, then daily as needed for no bowel movement more than 24 hrs (Patient taking differently: Take 1 g by mouth daily as needed Take 17gm (1 packet) daily for first three days, then daily as needed for no bowel movement more than 24 hrs ), Disp: 30 each, Rfl: 0    RA SUNSCREEN SPF50 LOTN, Apply 15 minutes before sun exposure and every 2 hours, Disp: 1 Bottle, Rfl: 5    senna-docusate sodium (SENEXON-S) 8 6-50 mg per tablet, Take 1 tablet by mouth daily at 8am , Disp: 90 tablet, Rfl: 0    traZODone (DESYREL) 50 mg tablet, Take 1 tablet (50 mg total) by mouth daily at bedtime at 9pm, Disp: , Rfl: 0    zinc oxide (DESITIN) 13 % cream, Apply 1 application topically as needed (for perianal irritation), Disp: 1 Tube, Rfl: 0    aluminum-magnesium hydroxide-simethicone (MAALOX) 200-200-20 MG/5ML SUSP, Take 15 mL by mouth 4 (four) times a day (before meals and at bedtime), Disp: 355 mL, Rfl: 1    No Known Allergies    OBJECTIVE  Vitals:   Vitals:    02/25/19 1416   BP: 120/80   Pulse: 86   Resp: 18   Temp: 97 8 °F (36 6 °C)   Weight: 84 4 kg (186 lb)   Height: 4' 11" (1 499 m)         Physical Exam   Constitutional: She appears well-developed and well-nourished  No distress  HENT:   Head: Normocephalic  Eyes: Pupils are equal, round, and reactive to light  Neck: Normal range of motion  Cardiovascular: Normal rate and regular rhythm  Pulmonary/Chest: Effort normal and breath sounds normal  No respiratory distress  She has no wheezes  She has no rales  She exhibits no tenderness  Abdominal: Soft  Bowel sounds are normal  She exhibits no distension and no mass  There is no tenderness  There is no rebound and no guarding  No hernia  Musculoskeletal:   Uses walker   Neurological: She is alert  Skin: Skin is dry  Capillary refill takes less than 2 seconds  She is not diaphoretic  Psychiatric: She has a normal mood and affect   Her behavior is normal  Judgment and thought content normal             Yesica Oliveira MD, PGY-3  [facility]   2/25/2019

## 2019-02-25 NOTE — PATIENT INSTRUCTIONS
Gastroesophageal Reflux Disease   AMBULATORY CARE:   Gastroesophageal reflux  reflux occurs when acid and food in the stomach back up into the esophagus  Gastroesophageal reflux disease (GERD) is reflux that occurs more than twice a week for a few weeks  It usually causes heartburn and other symptoms  GERD can cause other health problems over time if it is not treated  Common symptoms include:  Heartburn is the most common symptom of GERD  You may feel burning pain in your chest or below the breast bone  This usually occurs after meals and spreads to your neck, jaw, or shoulder  The pain gets better when you change positions  You may also have any of the following:  · Bitter or acid taste in your mouth    · Dry cough    · Trouble swallowing or pain with swallowing    · Hoarseness or sore throat    · Frequent burping or hiccups    · Feeling of fullness soon after you start eating  Seek care immediately if:  · You feel full and cannot burp or vomit  · You have severe chest pain and sudden trouble breathing  · Your bowel movements are black, bloody, or tarry-looking  · Your vomit looks like coffee grounds or has blood in it  Contact your healthcare provider if:   · You vomit large amounts, or you vomit often  · You have trouble breathing after you vomit  · You have trouble swallowing, or pain with swallowing  · You are losing weight without trying  · Your symptoms get worse or do not improve with treatment  · You have questions or concerns about your condition or care  Treatment for GERD:  Your healthcare provider may prescribe medicine to decrease stomach acid  He may also prescribe medicine that help your esophagus and stomach move food and liquid to your intestines  Surgery may be done if other treatments do not work  You may need surgery to wrap the upper part of the stomach around the esophageal sphincter  This will strengthen the sphincter and prevent reflux     Manage GERD: · Do not have foods or drinks that may increase heartburn  These include chocolate, peppermint, fried or fatty foods, drinks that contain caffeine, or carbonated drinks (soda)  Other foods include spicy foods, onions, tomatoes, and tomato-based foods  Do not have foods or drinks that can irritate your esophagus, such as citrus fruits, juices, and alcohol  · Do not eat large meals  When you eat a lot of food at one time, your stomach needs more acid to digest it  Eat 6 small meals each day instead of 3 large ones, and eat slowly  Do not eat meals 2 to 3 hours before bedtime  · Elevate the head of your bed  Place 6-inch blocks under the head of your bed frame  You may also use more than one pillow under your head and shoulders while you sleep  · Maintain a healthy weight  If you are overweight, weight loss may help relieve symptoms of GERD  · Do not smoke  Smoking weakens the lower esophageal sphincter and increases the risk of GERD  Ask your healthcare provider for information if you currently smoke and need help to quit  E-cigarettes or smokeless tobacco still contain nicotine  Talk to your healthcare provider before you use these products  · Do not wear clothing that is tight around your waist   Tight clothing can put pressure on your stomach and cause or worsen GERD symptoms  Follow up with your healthcare provider as directed:  Write down your questions so you remember to ask them during your visits  © 2017 [employer] Information is for End User's use only and may not be sold, redistributed or otherwise used for commercial purposes  All illustrations and images included in CareNotes® are the copyrighted property of [employer]  or [employer]  The above information is an  only  It is not intended as medical advice for individual conditions or treatments   Talk to your doctor, nurse or pharmacist before following any medical regimen to see if it is safe and effective for you

## 2019-02-26 ENCOUNTER — HOSPITAL ENCOUNTER (EMERGENCY)
Facility: HOSPITAL | Age: 39
Discharge: HOME | End: 2019-02-26
Attending: EMERGENCY MEDICINE
Payer: MEDICARE

## 2019-02-26 VITALS
DIASTOLIC BLOOD PRESSURE: 73 MMHG | HEART RATE: 104 BPM | RESPIRATION RATE: 20 BRPM | TEMPERATURE: 97.5 F | SYSTOLIC BLOOD PRESSURE: 145 MMHG | OXYGEN SATURATION: 98 %

## 2019-02-26 DIAGNOSIS — Z76.89 ENCOUNTER FOR PSYCHIATRIC ASSESSMENT: Primary | ICD-10-CM

## 2019-02-26 LAB
AMPHETAMINES SERPL QL SCN: NEGATIVE
BARBITURATES UR QL: NEGATIVE
BENZODIAZ UR QL: NEGATIVE
COCAINE UR QL: NEGATIVE
ETHANOL EXG-MCNC: 0 MG/DL
EXT PREG TEST URINE: NEGATIVE
METHADONE UR QL: NEGATIVE
OPIATES UR QL SCN: NEGATIVE
PCP UR QL: NEGATIVE
THC UR QL: NEGATIVE

## 2019-02-26 PROCEDURE — 99283 EMERGENCY DEPT VISIT LOW MDM: CPT

## 2019-02-26 PROCEDURE — 81025 URINE PREGNANCY TEST: CPT | Performed by: EMERGENCY MEDICINE

## 2019-02-26 PROCEDURE — 80307 DRUG TEST PRSMV CHEM ANLYZR: CPT | Performed by: EMERGENCY MEDICINE

## 2019-02-26 PROCEDURE — 82075 ASSAY OF BREATH ETHANOL: CPT | Performed by: EMERGENCY MEDICINE

## 2019-02-26 RX ORDER — FAMOTIDINE 20 MG/1
20 TABLET, FILM COATED ORAL 2 TIMES DAILY
COMMUNITY
End: 2019-03-20 | Stop reason: ALTCHOICE

## 2019-02-26 RX ORDER — IBUPROFEN 400 MG/1
TABLET ORAL EVERY 8 HOURS PRN
COMMUNITY
End: 2019-06-06 | Stop reason: SDUPTHER

## 2019-02-26 RX ORDER — LORAZEPAM 0.5 MG/1
0.5 TABLET ORAL ONCE
Status: DISCONTINUED | OUTPATIENT
Start: 2019-02-26 | End: 2019-02-26

## 2019-02-26 RX ORDER — ACETAMINOPHEN 500 MG
500 TABLET ORAL EVERY 6 HOURS PRN
COMMUNITY
End: 2020-04-15 | Stop reason: SDUPTHER

## 2019-02-26 NOTE — ED ATTENDING ATTESTATION
Stevo Muhammad DO, saw and evaluated the patient  I have discussed the patient with the resident/non-physician practitioner and agree with the resident's/non-physician practitioner's findings, Plan of Care, and MDM as documented in the resident's/non-physician practitioner's note, except where noted  All available labs and Radiology studies were reviewed  I was present for key portions of any procedure(s) performed by the resident/non-physician practitioner and I was immediately available to provide assistance  At this point I agree with the current assessment done in the Emergency Department  I have conducted an independent evaluation of this patient a history and physical is as follows:    35 yof with SI, wants to stab herself  She does not have access to knives  Her group home feels she needs to be evaluated  H/o cognitive impairment    /73 (BP Location: Left arm)   Pulse 104   Temp 97 5 °F (36 4 °C) (Oral)   Resp 20   SpO2 98%   Anxious, no resp distress, RRR on exam, NROM  I discussed the patient's care with her  who states that she has 24/7 supervision, no access to any sharps or other weaponry, and is welcome back to the group home facility  In addition, the patient was upset because she missed her counseling appointment as her psychologist was sick today  Supervisor stated that is their protocol to have the patient evaluated if they make self harm threats but that they do not feel she needs to be hospitalized  I agree with this and will discharge her back to the group home  Counseled patient regarding methods of asking for help when she is frustrated instead of making threats  Diagnosis  Behavioral change, acute, improved               Critical Care Time  Procedures

## 2019-02-26 NOTE — ED PROVIDER NOTES
History  Chief Complaint   Patient presents with    Psychiatric Evaluation     As per EMS, patient's parents are going through a divoice  Patient lives in group home and upset about her parents  States that she is SI with a plan  HPI     48-year-old female, long history of cognitive delay, multiple psychiatric diagnosis presenting with chief complaint of suicidal ideation  The patient has a history of this  Patient stated to group home she wants to stab herself in the chest   Patient is upset over the divorce of her parents  Patient missed counseling appointment today  Patient does not endorse suicidal ideation at this time with plan  Patient does not have access firearms or sharps  Patient has constant supervision  Patient denies physical symptoms at this time  Patient is a poor historian  Patient denies physical symptoms at this time  Patient denies alcohol or recreational drug use  Patient denies auditory visual hallucinations  Patient denies physical symptoms at this time  Prior to Admission Medications   Prescriptions Last Dose Informant Patient Reported? Taking?    ARIPiprazole (ABILIFY) 20 MG tablet   No Yes   Sig: Take 1 tablet (20 mg total) by mouth daily at bedtime for 30 days at 9pm    Cholecalciferol (VITAMIN D-3) 1000 units CAPS  Care Giver No Yes   Sig: Take 2 capsules (2,000 Units total) by mouth daily at 8am    Mouthwashes (LISTERINE ANTISEPTIC) LIQD   No Yes   Sig: Apply 1 application to the mouth or throat 2 (two) times a day for 30 days   Multiple Vitamins-Minerals (CERTAVITE/ANTIOXIDANTS) TABS  Care Giver No Yes   Sig: Take 1 tablet by mouth daily   RA SUNSCREEN SPF50 LOTN  Care Giver No Yes   Sig: Apply 15 minutes before sun exposure and every 2 hours   acetaminophen (TYLENOL) 500 mg tablet   Yes Yes   Sig: Take 500 mg by mouth every 6 (six) hours as needed for mild pain   aluminum-magnesium hydroxide-simethicone (MAALOX) 200-200-20 MG/5ML SUSP   No Yes   Sig: Take 15 mL by mouth 4 (four) times a day (before meals and at bedtime)   calcium carbonate (TUMS) 500 mg chewable tablet  Care Giver Yes Yes   Sig: Chew 1 tablet 3 (three) times a day as needed     clotrimazole (LOTRIMIN) 1 % cream  Care Giver No Yes   Sig: Apply 1 g (1 application total) topically 3 (three) times a day as needed (fungal irritation)   escitalopram (LEXAPRO) 20 mg tablet   No Yes   Sig: Take 1 tablet (20 mg total) by mouth daily for 30 days at 8am    famotidine (PEPCID) 20 mg tablet   Yes Yes   Sig: Take 20 mg by mouth 2 (two) times a day   ibuprofen (MOTRIN) 400 mg tablet   Yes Yes   Sig: Take by mouth every 8 (eight) hours as needed for mild pain   lubiprostone (AMITIZA) 24 mcg capsule   Yes Yes   Sig: Take 24 mcg by mouth 2 (two) times a day with meals   magnesium hydroxide (GNP MILK OF MAGNESIA) 400 mg/5 mL oral suspension   No Yes   Sig: Take 30 mL by mouth daily as needed for constipation If no BM in 3 days   metoclopramide (REGLAN) 10 mg tablet   No Yes   Sig: Take one tablet by mouth every 8 hours as needed for nausea   norgestimate-ethinyl estradiol (ORTHO TRI-CYCLEN LO) 0 18/0 215/0 25 MG-25 MCG per tablet  Care Giver No Yes   Sig: Take 1 tablet by mouth daily at 8am    polyethylene glycol (MIRALAX) 17 g packet  Care Giver No Yes   Sig: Take 17gm (1 packet) daily for first three days, then daily as needed for no bowel movement more than 24 hrs   Patient taking differently: Take 1 g by mouth daily as needed Take 17gm (1 packet) daily for first three days, then daily as needed for no bowel movement more than 24 hrs    senna-docusate sodium (SENEXON-S) 8 6-50 mg per tablet  Care Giver No Yes   Sig: Take 1 tablet by mouth daily at 8am    traZODone (DESYREL) 50 mg tablet  Care Giver No Yes   Sig: Take 1 tablet (50 mg total) by mouth daily at bedtime at 9pm   zinc oxide (DESITIN) 13 % cream  Care Giver No Yes   Sig: Apply 1 application topically as needed (for perianal irritation)      Facility-Administered Medications: None       Past Medical History:   Diagnosis Date    Acid reflux     Adjustment disorder     Anxiety     Astigmatism     Brain lesion     Brain lesion     Bronchitis     Calcium deficiency     Cerebral palsy (HCC)     Cerebral palsy (HCC)     Last Assessed:7/6/2016    Chronic otitis media     Chronically dry eyes, left     Last Assessed:7/6/2016    Constipation     Depression     Last Assessed:7/6/2016    Depressive disorder     Dry eyes     Dysphagia     Esophagitis     Esotropia     Fall     GERD (gastroesophageal reflux disease)     GERD (gastroesophageal reflux disease)     Hiatal hernia     Hydrocephalus     Hyperopia with astigmatism     Impaired fasting glucose     Mood [facility] (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)     Psychiatric disorder     Seizures ([facility])     Sensorineural hearing loss     Status post ventriculoatrial shunt placement     Visual impairment        Past Surgical History:   Procedure Laterality Date    CSF SHUNT      Creation of Ventriculo-Peritoneal CSF shunt ; Last Assessed:7/6/2016    EAR SURGERY      Last Assessed:7/6/2016    LEG SURGERY      due to CP     NOSE SURGERY      Last Assessed:7/6/2016       Family History   Problem Relation Age of Onset    Diabetes Mother     No Known Problems Father      I have reviewed and agree with the history as documented  Social History     Tobacco Use    Smoking status: Never Smoker    Smokeless tobacco: Never Used   Substance Use Topics    Alcohol use: No    Drug use: No        Review of Systems   Psychiatric/Behavioral: Negative for agitation, behavioral problems, confusion, decreased concentration, dysphoric mood, hallucinations, self-injury, sleep disturbance and suicidal ideas  The patient is nervous/anxious  The patient is not hyperactive  All other systems reviewed and are negative        Physical Exam  ED Triage Vitals [02/26/19 59049 75 83 35 Temperature Pulse Respirations Blood Pressure SpO2   97 5 °F (36 4 °C) 104 20 145/73 98 %      Temp Source Heart Rate Source Patient Position - Orthostatic VS BP Location FiO2 (%)   Oral Monitor Lying Left arm --      Pain Score       No Pain           Orthostatic Vital Signs  Vitals:    02/26/19 1738   BP: 145/73   Pulse: 104   Patient Position - Orthostatic VS: Lying       Physical Exam   Constitutional: She is oriented to person, place, and time  She appears well-developed and well-nourished  No distress  HENT:   Head: Normocephalic and atraumatic  Right Ear: External ear normal    Left Ear: External ear normal    Nose: Nose normal    Mouth/Throat: Oropharynx is clear and moist  No oropharyngeal exudate  Eyes: Pupils are equal, round, and reactive to light  Conjunctivae and EOM are normal  Right eye exhibits no discharge  Left eye exhibits no discharge  No scleral icterus  Neck: Normal range of motion  Neck supple  No JVD present  No tracheal deviation present  No thyromegaly present  Cardiovascular: Normal rate, regular rhythm, normal heart sounds and intact distal pulses  No murmur heard  Pulmonary/Chest: Effort normal and breath sounds normal  No stridor  No respiratory distress  She has no wheezes  Abdominal: Soft  Bowel sounds are normal  She exhibits no distension and no mass  There is no tenderness  There is no rebound and no guarding  No hernia  Musculoskeletal: Normal range of motion  She exhibits no edema, tenderness or deformity  Lymphadenopathy:     She has no cervical adenopathy  Neurological: She is alert and oriented to person, place, and time  She displays normal reflexes  No cranial nerve deficit  She exhibits normal muscle tone  Skin: Skin is warm and dry  No rash noted  She is not diaphoretic  No erythema  Psychiatric: Her behavior is normal  Judgment and thought content normal  Her mood appears anxious   Her affect is not angry, not blunt, not labile and not inappropriate  Her speech is tangential  Her speech is not rapid and/or pressured, not delayed and not slurred  She is not agitated, not aggressive, not hyperactive, not slowed, not withdrawn, not actively hallucinating and not combative  Thought content is not paranoid and not delusional  Cognition and memory are not impaired  She does not express impulsivity or inappropriate judgment  She exhibits a depressed mood  She expresses no homicidal and no suicidal ideation  She expresses no suicidal plans and no homicidal plans  She is communicative  She exhibits normal recent memory and normal remote memory  She is attentive  Nursing note and vitals reviewed  ED Medications  Medications - No data to display    Diagnostic Studies  Results Reviewed     Procedure Component Value Units Date/Time    Rapid drug screen, urine [[id number]]  (Normal) Collected:  02/26/19 1752    Lab Status:  Final result Specimen:  Urine, Clean Catch Updated:  02/26/19 1834     Amph/Meth UR Negative     Barbiturate Ur Negative     Benzodiazepine Urine Negative     Cocaine Urine Negative     Methadone Urine Negative     Opiate Urine Negative     PCP Ur Negative     THC Urine Negative    Narrative:       FOR MEDICAL PURPOSES ONLY  IF CONFIRMATION NEEDED PLEASE CONTACT THE LAB WITHIN 5 DAYS    Drug Screen Cutoff Levels:  AMPHETAMINE/METHAMPHETAMINES  1000 ng/mL  BARBITURATES     200 ng/mL  BENZODIAZEPINES     200 ng/mL  COCAINE      300 ng/mL  METHADONE      300 ng/mL  OPIATES      300 ng/mL  PHENCYCLIDINE     25 ng/mL  THC       50 ng/mL    POCT pregnancy, urine [[id number]]  (Normal) Resulted:  02/26/19 1754    Lab Status:  Final result Updated:  02/26/19 1754     EXT PREG TEST UR (Ref: Negative) negative    POCT alcohol breath test [[id number]]  (Normal) Resulted:  02/26/19 1752    Lab Status:  Final result Updated:  02/26/19 1752     EXTBreath Alcohol 0 000                 No orders to display         Procedures  Procedures      Phone Consults  ED Phone Contact    ED Course                               MDM  Number of Diagnoses or Management Options  Encounter for psychiatric assessment:   Diagnosis management comments: 48-year-old female presenting with suicidal ideation  On exam vital signs are adequate  Patient has a long history of this  Discussed case with group home  Patient has no access to sharp  No access to other weapons  Patient is under constant supervision  Discussed care with   Agree to follow up with Psychiatry Psychology  Patient does not appear to be acutely decompensated  Patient is in agreement to this care plan  Patient denied SI with plan, has tidal ideation auditory visual hallucinations  Patient will follow up with outpatient resources and liver group home with constant supervision  Verified group home no access to sharps or firearms  Patient is will continue home medication regimen  ED return precautions discussed  Patient and group home staff demonstrate understanding  Disposition  Final diagnoses:   Encounter for psychiatric assessment     Time reflects when diagnosis was documented in both MDM as applicable and the Disposition within this note     Time User Action Codes Description Comment    2/26/2019  6:10 PM Bella Herbert Add [[id number]] Encounter for psychiatric assessment       ED Disposition     ED Disposition Condition Date/Time Comment    Discharge Stable Tue Feb 26, 2019  6:09 PM [place] discharge to home/self care  Return precautions were discussed with patient  Patient understands when to return to  Emergency department  Patient agrees to discharge plan and follow up care                 Follow-up Information     Follow up With Specialties Details Why Contact Info Additional [address] in 2 days  [address] [phone]  [address] [facility] Emergency Medicine Go to  As needed, If symptoms worsen [address]  [phone] [facility] ED, [address] [facility], [place], [place], [address]          Discharge Medication List as of 2/26/2019  6:25 PM      CONTINUE these medications which have NOT CHANGED    Details   acetaminophen (TYLENOL) 500 mg tablet Take 500 mg by mouth every 6 (six) hours as needed for mild pain, Historical Med      aluminum-magnesium hydroxide-simethicone (MAALOX) 200-200-20 MG/5ML SUSP Take 15 mL by mouth 4 (four) times a day (before meals and at bedtime), Starting Mon 2/25/2019, Normal      ARIPiprazole (ABILIFY) 20 MG tablet Take 1 tablet (20 mg total) by mouth daily at bedtime for 30 days at 9pm , Starting Tue 9/25/2018, Until Tue 2/26/2019, No Print      calcium carbonate (TUMS) 500 mg chewable tablet Chew 1 tablet 3 (three) times a day as needed  , Historical Med      Cholecalciferol (VITAMIN D-3) 1000 units CAPS Take 2 capsules (2,000 Units total) by mouth daily at 8am , Starting Tue 9/25/2018, No Print      clotrimazole (LOTRIMIN) 1 % cream Apply 1 g (1 application total) topically 3 (three) times a day as needed (fungal irritation), Starting Wed 5/9/2018, Normal      escitalopram (LEXAPRO) 20 mg tablet Take 1 tablet (20 mg total) by mouth daily for 30 days at 8am , Starting Thu 12/6/2018, Until Tue 2/26/2019, No Print      famotidine (PEPCID) 20 mg tablet Take 20 mg by mouth 2 (two) times a day, Historical Med      ibuprofen (MOTRIN) 400 mg tablet Take by mouth every 8 (eight) hours as needed for mild pain, Historical Med      lubiprostone (AMITIZA) 24 mcg capsule Take 24 mcg by mouth 2 (two) times a day with meals, Historical Med      magnesium hydroxide (GNP MILK OF MAGNESIA) 400 mg/5 mL oral suspension Take 30 mL by mouth daily as needed for constipation If no BM in 3 days, Starting Thu 12/6/2018, Normal      metoclopramide (REGLAN) 10 mg tablet Take one tablet by mouth every 8 hours as needed for nausea, Normal      Mouthwashes (LISTERINE ANTISEPTIC) LIQD Apply 1 application to the mouth or throat 2 (two) times a day for 30 days, Starting Thu 12/6/2018, Until Tue 2/26/2019, Normal      Multiple Vitamins-Minerals (CERTAVITE/ANTIOXIDANTS) TABS Take 1 tablet by mouth daily, Starting Mon 9/10/2018, Normal      norgestimate-ethinyl estradiol (ORTHO TRI-CYCLEN LO) 0 18/0 215/0 25 MG-25 MCG per tablet Take 1 tablet by mouth daily at 8am , Starting Tue 9/25/2018, No Print      polyethylene glycol (MIRALAX) 17 g packet Take 17gm (1 packet) daily for first three days, then daily as needed for no bowel movement more than 24 hrs, Normal      RA SUNSCREEN SPF50 LOTN Apply 15 minutes before sun exposure and every 2 hours, Normal      senna-docusate sodium (SENEXON-S) 8 6-50 mg per tablet Take 1 tablet by mouth daily at 8am , Starting Tue 9/25/2018, No Print      traZODone (DESYREL) 50 mg tablet Take 1 tablet (50 mg total) by mouth daily at bedtime at 9pm, Starting Tue 9/25/2018, No Print      zinc oxide (DESITIN) 13 % cream Apply 1 application topically as needed (for perianal irritation), Starting Thu 5/17/2018, Normal           No discharge procedures on file  ED Provider  Attending physically available and evaluated Rosa Paz I managed the patient along with the ED Attending      Electronically Signed by         Shan Ferris [address] [place], [place] [address]DO  02/26/19 2006

## 2019-02-26 NOTE — DISCHARGE INSTRUCTIONS
Currently, the patient does not appear to be a threat to herself or others  Please continue current care described which includes 24/7 supervision and limits access to sharps  Return precautions were discussed with patient  Patient understands when to return to  Emergency department  Patient agrees to discharge plan and follow up care  Wellness Visit for Adults   AMBULATORY CARE:   A wellness visit  is when you see your healthcare provider to get screened for health problems  You can also get advice on how to stay healthy  Write down your questions so you remember to ask them  Ask your healthcare provider how often you should have a wellness visit  What happens at a wellness visit:  Your healthcare provider will ask about your health, and your family history of health problems  This includes high blood pressure, heart disease, and cancer  He or she will ask if you have symptoms that concern you, if you smoke, and about your mood  You may also be asked about your intake of medicines, supplements, food, and alcohol  Any of the following may be done: Your weight  will be checked  Your height may also be checked so your body mass index (BMI) can be calculated  Your BMI shows if you are at a healthy weight  Your blood pressure  and heart rate will be checked  Your temperature may also be checked  Blood and urine tests  may be done  Blood tests may be done to check your cholesterol levels  Abnormal cholesterol levels increase your risk for heart disease and stroke  You may also need a blood or urine test to check for diabetes if you are at increased risk  Urine tests may be done to look for signs of an infection or kidney disease  A physical exam  includes checking your heartbeat and lungs with a stethoscope  Your healthcare provider may also check your skin to look for sun damage  Screening tests  may be recommended  A screening test is done to check for diseases that may not cause symptoms  The screening tests you may need depend on your age, gender, family history, and lifestyle habits  For example, colorectal screening may be recommended if you are 48years old or older  Screening tests you need if you are a woman:   A Pap smear  is used to screen for cervical cancer  Pap smears are usually done every 3 to 5 years depending on your age  You may need them more often if you have had abnormal Pap smear test results in the past  Ask your healthcare provider how often you should have a Pap smear  A mammogram  is an x-ray of your breasts to screen for breast cancer  Experts recommend mammograms every 2 years starting at age 48 years  You may need a mammogram at age 52 years or younger if you have an increased risk for breast cancer  Talk to your healthcare provider about when you should start having mammograms and how often you need them  Vaccines you may need:   Get an influenza vaccine  every year  The influenza vaccine protects you from the flu  Several types of viruses cause the flu  The viruses change over time, so new vaccines are made each year  Get a tetanus-diphtheria (Td) booster vaccine  every 10 years  This vaccine protects you against tetanus and diphtheria  Tetanus is a severe infection that may cause painful muscle spasms and lockjaw  Diphtheria is a severe bacterial infection that causes a thick covering in the back of your mouth and throat  Get a human papillomavirus (HPV) vaccine  if you are female and aged 23 to 32 or male 23 to 24 and never received it  This vaccine protects you from HPV infection  HPV is the most common infection spread by sexual contact  HPV may also cause vaginal, penile, and anal cancers  Get a pneumococcal vaccine  if you are aged 72 years or older  The pneumococcal vaccine is an injection given to protect you from pneumococcal disease  Pneumococcal disease is an infection caused by pneumococcal bacteria   The infection may cause pneumonia, meningitis, or an ear infection  Get a shingles vaccine  if you are aged 61 or older, even if you have had shingles before  The shingles vaccine is an injection to protect you from the varicella-zoster virus  This is the same virus that causes chickenpox  Shingles is a painful rash that develops in people who had chickenpox or have been exposed to the virus  How to eat healthy:  My Plate is a model for planning healthy meals  It shows the types and amounts of foods that should go on your plate  Fruits and vegetables make up about half of your plate, and grains and protein make up the other half  A serving of dairy is included on the side of your plate  The amount of calories and serving sizes you need depends on your age, gender, weight, and height  Examples of healthy foods are listed below:  Eat a variety of vegetables  such as dark green, red, and orange vegetables  You can also include canned vegetables low in sodium (salt) and frozen vegetables without added butter or sauces  Eat a variety of fresh fruits , canned fruit in 100% juice, frozen fruit, and dried fruit  Include whole grains  At least half of the grains you eat should be whole grains  Examples include whole-wheat bread, wheat pasta, brown rice, and whole-grain cereals such as oatmeal     Eat a variety of protein foods such as seafood (fish and shellfish), lean meat, and poultry without skin (turkey and chicken)  Examples of lean meats include pork leg, shoulder, or tenderloin, and beef round, sirloin, tenderloin, and extra lean ground beef  Other protein foods include eggs and egg substitutes, beans, peas, soy products, nuts, and seeds  Choose low-fat dairy products such as skim or 1% milk or low-fat yogurt, cheese, and cottage cheese  Limit unhealthy fats  such as butter, hard margarine, and shortening  Exercise:  Exercise at least 30 minutes per day on most days of the week   Some examples of exercise include walking, biking, dancing, and swimming  You can also fit in more physical activity by taking the stairs instead of the elevator or parking farther away from stores  Include muscle strengthening activities 2 days each week  Regular exercise provides many health benefits  It helps you manage your weight, and decreases your risk for type 2 diabetes, heart disease, stroke, and high blood pressure  Exercise can also help improve your mood  Ask your healthcare provider about the best exercise plan for you  General health and safety guidelines:   Do not smoke  Nicotine and other chemicals in cigarettes and cigars can cause lung damage  Ask your healthcare provider for information if you currently smoke and need help to quit  E-cigarettes or smokeless tobacco still contain nicotine  Talk to your healthcare provider before you use these products  Limit alcohol  A drink of alcohol is 12 ounces of beer, 5 ounces of wine, or 1½ ounces of liquor  Lose weight, if needed  Being overweight increases your risk of certain health conditions  These include heart disease, high blood pressure, type 2 diabetes, and certain types of cancer  Protect your skin  Do not sunbathe or use tanning beds  Use sunscreen with a SPF 15 or higher  Apply sunscreen at least 15 minutes before you go outside  Reapply sunscreen every 2 hours  Wear protective clothing, hats, and sunglasses when you are outside  Drive safely  Always wear your seatbelt  Make sure everyone in your car wears a seatbelt  A seatbelt can save your life if you are in an accident  Do not use your cell phone when you are driving  This could distract you and cause an accident  Pull over if you need to make a call or send a text message  Practice safe sex  Use latex condoms if are sexually active and have more than one partner  Your healthcare provider may recommend screening tests for sexually transmitted infections (STIs)      Wear helmets, lifejackets, and protective gear   Always wear a helmet when you ride a bike or motorcycle, go skiing, or play sports that could cause a head injury  Wear protective equipment when you play sports  Wear a lifejacket when you are on a boat or doing water sports  © 2017 [employer] Information is for End User's use only and may not be sold, redistributed or otherwise used for commercial purposes  All illustrations and images included in CareNotes® are the copyrighted property of A D [employer] M [employer] Inc  or [employer]  The above information is an  only  It is not intended as medical advice for individual conditions or treatments  Talk to your doctor, nurse or pharmacist before following any medical regimen to see if it is safe and effective for you

## 2019-03-04 ENCOUNTER — TELEPHONE (OUTPATIENT)
Dept: FAMILY MEDICINE CLINIC | Facility: CLINIC | Age: 39
End: 2019-03-04

## 2019-03-04 ENCOUNTER — OFFICE VISIT (OUTPATIENT)
Dept: FAMILY MEDICINE CLINIC | Facility: CLINIC | Age: 39
End: 2019-03-04

## 2019-03-04 VITALS
DIASTOLIC BLOOD PRESSURE: 80 MMHG | BODY MASS INDEX: 37.94 KG/M2 | RESPIRATION RATE: 16 BRPM | TEMPERATURE: 98.5 F | HEIGHT: 59 IN | WEIGHT: 188.2 LBS | SYSTOLIC BLOOD PRESSURE: 120 MMHG

## 2019-03-04 DIAGNOSIS — K21.9 GASTROESOPHAGEAL REFLUX DISEASE, ESOPHAGITIS PRESENCE NOT SPECIFIED: ICD-10-CM

## 2019-03-04 DIAGNOSIS — R30.0 DYSURIA: Primary | ICD-10-CM

## 2019-03-04 DIAGNOSIS — K21.9 GASTROESOPHAGEAL REFLUX DISEASE WITHOUT ESOPHAGITIS: Primary | ICD-10-CM

## 2019-03-04 DIAGNOSIS — N20.0 LEFT NEPHROLITHIASIS: ICD-10-CM

## 2019-03-04 PROBLEM — L03.115 CELLULITIS OF FOOT, RIGHT: Status: RESOLVED | Noted: 2018-06-04 | Resolved: 2019-03-04

## 2019-03-04 PROBLEM — J11.1 FLU: Status: RESOLVED | Noted: 2018-12-28 | Resolved: 2019-03-04

## 2019-03-04 LAB
BACTERIA UR QL AUTO: ABNORMAL /HPF
BILIRUB UR QL STRIP: NEGATIVE
CLARITY UR: CLEAR
COLOR UR: YELLOW
GLUCOSE UR STRIP-MCNC: NEGATIVE MG/DL
HGB UR QL STRIP.AUTO: ABNORMAL
HYALINE CASTS #/AREA URNS LPF: ABNORMAL /LPF
KETONES UR STRIP-MCNC: NEGATIVE MG/DL
LEUKOCYTE ESTERASE UR QL STRIP: ABNORMAL
NITRITE UR QL STRIP: NEGATIVE
NON-SQ EPI CELLS URNS QL MICRO: ABNORMAL /HPF
PH UR STRIP.AUTO: 6.5 [PH] (ref 4.5–8)
PROT UR STRIP-MCNC: NEGATIVE MG/DL
RBC #/AREA URNS AUTO: ABNORMAL /HPF
SL AMB  POCT GLUCOSE, UA: NEGATIVE
SL AMB LEUKOCYTE ESTERASE,UA: NEGATIVE
SL AMB POCT BILIRUBIN,UA: NEGATIVE
SL AMB POCT BLOOD,UA: ABNORMAL
SL AMB POCT CLARITY,UA: CLEAR
SL AMB POCT COLOR,UA: YELLOW
SL AMB POCT KETONES,UA: NEGATIVE
SL AMB POCT NITRITE,UA: NEGATIVE
SL AMB POCT PH,UA: 6
SL AMB POCT SPECIFIC GRAVITY,UA: 1.02
SL AMB POCT URINE PROTEIN: NEGATIVE
SL AMB POCT UROBILINOGEN: NEGATIVE
SP GR UR STRIP.AUTO: 1.02 (ref 1–1.03)
UROBILINOGEN UR QL STRIP.AUTO: 0.2 E.U./DL
WBC #/AREA URNS AUTO: ABNORMAL /HPF

## 2019-03-04 PROCEDURE — 99213 OFFICE O/P EST LOW 20 MIN: CPT | Performed by: FAMILY MEDICINE

## 2019-03-04 PROCEDURE — 81001 URINALYSIS AUTO W/SCOPE: CPT | Performed by: FAMILY MEDICINE

## 2019-03-04 PROCEDURE — 81002 URINALYSIS NONAUTO W/O SCOPE: CPT | Performed by: FAMILY MEDICINE

## 2019-03-04 RX ORDER — OMEPRAZOLE 20 MG/1
20 CAPSULE, DELAYED RELEASE ORAL DAILY
COMMUNITY
End: 2019-03-04 | Stop reason: SDUPTHER

## 2019-03-04 RX ORDER — OMEPRAZOLE 20 MG/1
20 CAPSULE, DELAYED RELEASE ORAL DAILY
Qty: 30 CAPSULE | Refills: 1 | Status: SHIPPED | OUTPATIENT
Start: 2019-03-04 | End: 2019-04-10 | Stop reason: SDUPTHER

## 2019-03-04 RX ORDER — OMEPRAZOLE 20 MG/1
20 CAPSULE, DELAYED RELEASE ORAL DAILY
Qty: 30 CAPSULE | Refills: 5 | Status: SHIPPED | OUTPATIENT
Start: 2019-03-04 | End: 2019-03-17

## 2019-03-04 NOTE — TELEPHONE ENCOUNTER
Caregiver called to report refill on Prilosec 20mg once daily was supposed to be sent to [facility] at 2/14 appt

## 2019-03-04 NOTE — PROGRESS NOTES
Lucina Paul 1980 female MRN: [id number]    Acute Visit    SUBJECTIVE    CC: Vaginal Pain and Abdominal Pain    HPI:  Lucina Paul is a 35 y o  female who presented for an acute visit complaining of HPI     Starting this morning:   She complaints of burning with urination, denies frequency, urgency  Doesn't hydrate much    She has abdominal pain in bilateral lower quadrants, does radiate to left flank  Denies fevers, chills, nausea, vomiting, diarrhea/constipation  Last BM today  She hasn't taken any medicine for the pain  Nothing makes the pain better  Pain exacerbated when she eats and laying down  Review of Systems   Constitutional: Negative for activity change, chills and fever  HENT: Negative for congestion, rhinorrhea and sore throat  Eyes: Negative for visual disturbance  Respiratory: Negative for cough, shortness of breath and wheezing  Cardiovascular: Negative for chest pain and palpitations  Gastrointestinal: Positive for abdominal pain  Negative for blood in stool, constipation, diarrhea, nausea and vomiting  Genitourinary: Positive for dysuria and flank pain  Negative for decreased urine volume, frequency and urgency  Musculoskeletal: Negative for arthralgias and myalgias  Skin: Negative for rash  Neurological: Negative for dizziness, weakness and headaches  All other systems reviewed and are negative        Historical Information   The patient history was reviewed as follows:  Past Medical History:   Diagnosis Date    Acid reflux     Adjustment disorder     Anxiety     Astigmatism     Brain lesion     Brain lesion     Bronchitis     Calcium deficiency     Cellulitis of foot, right 6/4/2018    Cerebral palsy (HCC)     Cerebral palsy (HCC)     Last Assessed:7/6/2016    Chronic otitis media     Chronically dry eyes, left     Last Assessed:7/6/2016    Constipation     Depression     Last Assessed:7/6/2016    Depressive disorder     Dry eyes     Dysphagia     Esophagitis     Esotropia     Fall     GERD (gastroesophageal reflux disease)     GERD (gastroesophageal reflux disease)     Hiatal hernia     Hydrocephalus     Hyperopia with astigmatism     Impaired fasting glucose     Left nephrolithiasis 3/4/2019    Mood disorder (HCC)     Myopia     Oppositional defiant disorder     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HCC)     Psychiatric disorder     Seizures (HCC)     Sensorineural hearing loss     Status post ventriculoatrial shunt placement     Visual impairment      Past Surgical History:   Procedure Laterality Date    CSF SHUNT      Creation of Ventriculo-Peritoneal CSF shunt ;  Last Assessed:7/6/2016    EAR SURGERY      Last Assessed:7/6/2016    LEG SURGERY      due to CP     NOSE SURGERY      Last Assessed:7/6/2016     Family History   Problem Relation Age of Onset    Diabetes Mother     No Known Problems Father       Social History   Social History     Substance and Sexual Activity   Alcohol Use No     Social History     Substance and Sexual Activity   Drug Use No     Social History     Tobacco Use   Smoking Status Never Smoker   Smokeless Tobacco Never Used       Medications:   Meds/Allergies   Current Outpatient Medications   Medication Sig Dispense Refill    acetaminophen (TYLENOL) 500 mg tablet Take 500 mg by mouth every 6 (six) hours as needed for mild pain      aluminum-magnesium hydroxide-simethicone (MAALOX) 200-200-20 MG/5ML SUSP Take 15 mL by mouth 4 (four) times a day (before meals and at bedtime) 355 mL 1    ARIPiprazole (ABILIFY) 20 MG tablet Take 1 tablet (20 mg total) by mouth daily at bedtime for 30 days at 9pm  30 tablet 0    calcium carbonate (TUMS) 500 mg chewable tablet Chew 1 tablet 3 (three) times a day as needed        Cholecalciferol (VITAMIN D-3) 1000 units CAPS Take 2 capsules (2,000 Units total) by mouth daily at 8am  180 capsule 0    clotrimazole (LOTRIMIN) 1 % cream Apply 1 g (1 application total) topically 3 (three) times a day as needed (fungal irritation) 30 g 5    ibuprofen (MOTRIN) 400 mg tablet Take by mouth every 8 (eight) hours as needed for mild pain      lubiprostone (AMITIZA) 24 mcg capsule Take 24 mcg by mouth 2 (two) times a day with meals      magnesium hydroxide (GNP MILK OF MAGNESIA) 400 mg/5 mL oral suspension Take 30 mL by mouth daily as needed for constipation If no BM in 3 days 355 mL 0    metoclopramide (REGLAN) 10 mg tablet Take one tablet by mouth every 8 hours as needed for nausea 30 tablet 0    Multiple Vitamins-Minerals (CERTAVITE/ANTIOXIDANTS) TABS Take 1 tablet by mouth daily 90 tablet 3    norgestimate-ethinyl estradiol (ORTHO TRI-CYCLEN LO) 0 18/0 215/0 25 MG-25 MCG per tablet Take 1 tablet by mouth daily at 8am  28 tablet 0    omeprazole (PriLOSEC) 20 mg delayed release capsule Take 1 capsule (20 mg total) by mouth daily 30 capsule 5    polyethylene glycol (MIRALAX) 17 g packet Take 17gm (1 packet) daily for first three days, then daily as needed for no bowel movement more than 24 hrs (Patient taking differently: Take 1 g by mouth daily as needed Take 17gm (1 packet) daily for first three days, then daily as needed for no bowel movement more than 24 hrs ) 30 each 0    RA SUNSCREEN SPF50 LOTN Apply 15 minutes before sun exposure and every 2 hours 1 Bottle 5    senna-docusate sodium (SENEXON-S) 8 6-50 mg per tablet Take 1 tablet by mouth daily at 8am  90 tablet 0    traZODone (DESYREL) 50 mg tablet Take 1 tablet (50 mg total) by mouth daily at bedtime at 9pm  0    zinc oxide (DESITIN) 13 % cream Apply 1 application topically as needed (for perianal irritation) 1 Tube 0    escitalopram (LEXAPRO) 20 mg tablet Take 1 tablet (20 mg total) by mouth daily for 30 days at 8am  90 tablet 0    famotidine (PEPCID) 20 mg tablet Take 20 mg by mouth 2 (two) times a day      Mouthwashes (LISTERINE ANTISEPTIC) LIQD Apply 1 application to the mouth or throat 2 (two) times a day for 30 days 500 mL 3    omeprazole (PriLOSEC) 20 mg delayed release capsule Take 1 capsule (20 mg total) by mouth daily 30 capsule 1     No current facility-administered medications for this visit  No Known Allergies    OBJECTIVE    Vitals:   Vitals:    03/04/19 1723   BP: 120/80   BP Location: Right arm   Patient Position: Sitting   Cuff Size: Standard   Resp: 16   Temp: 98 5 °F (36 9 °C)   TempSrc: Tympanic   Weight: 85 4 kg (188 lb 3 2 oz)   Height: 4' 11" (1 499 m)       Physical Exam   Constitutional: She appears well-developed and well-nourished  HENT:   Head: Normocephalic and atraumatic  Eyes: Conjunctivae and EOM are normal    Cardiovascular: Normal rate, regular rhythm, normal heart sounds and intact distal pulses  No murmur heard  Pulmonary/Chest: Effort normal and breath sounds normal  No respiratory distress  She has no wheezes  Abdominal: Soft  Bowel sounds are normal  She exhibits no distension  There is no hepatosplenomegaly  There is generalized tenderness  There is CVA tenderness (left)  There is no rigidity, no rebound, no guarding, no tenderness at McBurney's point and negative Montesinos's sign  Neurological: She is alert  Skin: Skin is warm and dry  No abd rash   Nursing note and vitals reviewed  Lab:  I have personally reviewed all pertinent results  Imaging:  I have personally reviewed all pertinent results  EKG, Pathology, and Other Studies:   I have personally reviewed all pertinent results  Assessment/Plan   Left nephrolithiasis  Suspicious based on exam  UA + blood only  Supportive care, expectant to pass  If pain maintains current level without improvement, schedule CT renal stone study to determine location and size  RTC VLADISLAV Patton was seen today for vaginal pain and abdominal pain      Diagnoses and all orders for this visit:    Dysuria  -     POCT urine dip    Left nephrolithiasis  -     POCT urine dip  -     CT renal stone study abdomen pelvis wo contrast; Future  -     UA w Reflex to Microscopic w Reflex to Culture    Gastroesophageal reflux disease, esophagitis presence not specified  -     omeprazole (PriLOSEC) 20 mg delayed release capsule; Take 1 capsule (20 mg total) by mouth daily      - PCP: Osmany Cough, DO  - Follow-up appointments: [facility]    Future Appointments   Date Time Provider aMriana Reese   3/6/2019  1:30 PM BE SHELIA [facility] [address]   3/13/2019  1:00 PM Annie Soto PA-C [facility]   4/5/2019 10:00 AM Tori Chapa MD [facility]O [facility]   6/17/2019 10:30 AM Terri Mccartney MD [place] [facility]      _____________________________________________________________________   The attending physician, Dr Milan Staff, agreed with the plan  Jarred Aranda MD, PGY-3  [facility]   3/4/2019      Please be aware that this note contains text that was dictated and there may be errors pertaining to "sound-alike "words during the dictation process

## 2019-03-04 NOTE — ASSESSMENT & PLAN NOTE
Suspicious based on exam  UA + blood only  Supportive care, expectant to pass  If pain maintains current level without improvement, schedule CT renal stone study to determine location and size  RTC PRN

## 2019-03-05 ENCOUNTER — HOSPITAL ENCOUNTER (EMERGENCY)
Facility: HOSPITAL | Age: 39
Discharge: HOME | End: 2019-03-05
Attending: EMERGENCY MEDICINE | Admitting: EMERGENCY MEDICINE
Payer: MEDICARE

## 2019-03-05 VITALS
OXYGEN SATURATION: 99 % | DIASTOLIC BLOOD PRESSURE: 77 MMHG | TEMPERATURE: 99 F | RESPIRATION RATE: 16 BRPM | BODY MASS INDEX: 37.49 KG/M2 | HEART RATE: 74 BPM | SYSTOLIC BLOOD PRESSURE: 137 MMHG | WEIGHT: 185.6 LBS

## 2019-03-05 DIAGNOSIS — Z76.89 ENCOUNTER FOR PSYCHIATRIC ASSESSMENT: Primary | ICD-10-CM

## 2019-03-05 LAB — ETHANOL EXG-MCNC: 0 MG/DL

## 2019-03-05 PROCEDURE — 99285 EMERGENCY DEPT VISIT HI MDM: CPT

## 2019-03-05 PROCEDURE — 82075 ASSAY OF BREATH ETHANOL: CPT

## 2019-03-05 NOTE — ED PROVIDER NOTES
History  Chief Complaint   Patient presents with    Suicidal     Pt  reports feeling SI starting today, reports trigger is her parents splitting up   of group home present with staff states the trigger is new group home staff  Denies HI/AH/VH  A 43-year-old female with past medical history of adjustment disorder, anxiety, cerebral palsy, depression, GERD, hiatal hernia, hydrocephalus s/p shunt placement, mood disorder, oppositional defiant disorder and seizures; presents from her group home for evaluation of depression and suicidal ideations  Patient states she became upset today regarding the divorce of her parents, developing suicidal ideations and a plan to stab herself  Patient has had similar thoughts in the past   Patient otherwise denies HI  Patient denies auditory/visual/command hallucinations  Group home  is currently at bedside, reporting the patient has similar behaviors especially when new staff members are caring for the patient  Group home  reports the patient has continuous supervision at the home  Patient also reports to feeling safe at her home  Patient does follow with a psychiatrist and a therapist, last seen by the psychiatrist yesterday and the therapist today  Assessment & plan:  Depression, patient currently reports suicidal ideations however is safe at home with continual supervision  Pt does follow with psychiatry and a therapist already  Patient will likely benefit from continued outpatient treatment, will have patient evaluated by crisis  History provided by:  Patient, medical records and caregiver  Suicidal   Presenting symptoms: suicidal thoughts        Prior to Admission Medications   Prescriptions Last Dose Informant Patient Reported? Taking?    ARIPiprazole (ABILIFY) 20 MG tablet   No No   Sig: Take 1 tablet (20 mg total) by mouth daily at bedtime for 30 days at 9pm    Cholecalciferol (VITAMIN D-3) 1000 units CAPS  Care Giver No No   Sig: Take 2 capsules (2,000 Units total) by mouth daily at 8am    Mouthwashes (LISTERINE ANTISEPTIC) LIQD   No No   Sig: Apply 1 application to the mouth or throat 2 (two) times a day for 30 days   Multiple Vitamins-Minerals (CERTAVITE/ANTIOXIDANTS) TABS  Care Giver No No   Sig: Take 1 tablet by mouth daily   RA SUNSCREEN SPF50 LOTN  Care Giver No No   Sig: Apply 15 minutes before sun exposure and every 2 hours   acetaminophen (TYLENOL) 500 mg tablet   Yes No   Sig: Take 500 mg by mouth every 6 (six) hours as needed for mild pain   aluminum-magnesium hydroxide-simethicone (MAALOX) 200-200-20 MG/5ML SUSP   No No   Sig: Take 15 mL by mouth 4 (four) times a day (before meals and at bedtime)   calcium carbonate (TUMS) 500 mg chewable tablet  Care Giver Yes No   Sig: Chew 1 tablet 3 (three) times a day as needed     clotrimazole (LOTRIMIN) 1 % cream  Care Giver No No   Sig: Apply 1 g (1 application total) topically 3 (three) times a day as needed (fungal irritation)   escitalopram (LEXAPRO) 20 mg tablet   No No   Sig: Take 1 tablet (20 mg total) by mouth daily for 30 days at 8am    famotidine (PEPCID) 20 mg tablet   Yes No   Sig: Take 20 mg by mouth 2 (two) times a day   ibuprofen (MOTRIN) 400 mg tablet   Yes No   Sig: Take by mouth every 8 (eight) hours as needed for mild pain   lubiprostone (AMITIZA) 24 mcg capsule   Yes No   Sig: Take 24 mcg by mouth 2 (two) times a day with meals   magnesium hydroxide (GNP MILK OF MAGNESIA) 400 mg/5 mL oral suspension   No No   Sig: Take 30 mL by mouth daily as needed for constipation If no BM in 3 days   metoclopramide (REGLAN) 10 mg tablet   No No   Sig: Take one tablet by mouth every 8 hours as needed for nausea   norgestimate-ethinyl estradiol (ORTHO TRI-CYCLEN LO) 0 18/0 215/0 25 MG-25 MCG per tablet  Care Giver No No   Sig: Take 1 tablet by mouth daily at 8am    omeprazole (PriLOSEC) 20 mg delayed release capsule   No No   Sig: Take 1 capsule (20 mg total) by mouth daily   omeprazole (PriLOSEC) 20 mg delayed release capsule   No No   Sig: Take 1 capsule (20 mg total) by mouth daily   polyethylene glycol (MIRALAX) 17 g packet  Care Giver No No   Sig: Take 17gm (1 packet) daily for first three days, then daily as needed for no bowel movement more than 24 hrs   Patient taking differently: Take 1 g by mouth daily as needed Take 17gm (1 packet) daily for first three days, then daily as needed for no bowel movement more than 24 hrs    senna-docusate sodium (SENEXON-S) 8 6-50 mg per tablet  Care Giver No No   Sig: Take 1 tablet by mouth daily at 8am    traZODone (DESYREL) 50 mg tablet  Care Giver No No   Sig: Take 1 tablet (50 mg total) by mouth daily at bedtime at 9pm   zinc oxide (DESITIN) 13 % cream  Care Giver No No   Sig: Apply 1 application topically as needed (for perianal irritation)      Facility-Administered Medications: None       Past Medical History:   Diagnosis Date    Acid reflux     Adjustment disorder     Anxiety     Astigmatism     Brain lesion     Brain lesion     Bronchitis     Calcium deficiency     Cellulitis of foot, right 6/4/2018    Cerebral palsy (HCC)     Cerebral palsy (HCC)     Last Assessed:7/6/2016    Chronic otitis media     Chronically dry eyes, left     Last Assessed:7/6/2016    Constipation     Depression     Last Assessed:7/6/2016    Depressive disorder     Dry eyes     Dysphagia     Esophagitis     Esotropia     Fall     GERD (gastroesophageal reflux disease)     GERD (gastroesophageal reflux disease)     Hiatal hernia     Hydrocephalus     Hyperopia with astigmatism     Impaired fasting glucose     Left nephrolithiasis 3/4/2019    Mood disorder ([facility])     Myopia     Oppositional defiant disorder     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[facility])     Psychiatric disorder     Seizures ([facility])     Sensorineural hearing loss     Status post ventriculoatrial shunt placement     Visual impairment         Past Surgical History:   Procedure Laterality Date    CSF SHUNT      Creation of Ventriculo-Peritoneal CSF shunt ; Last Assessed:7/6/2016    EAR SURGERY      Last Assessed:7/6/2016    LEG SURGERY      due to CP     NOSE SURGERY      Last Assessed:7/6/2016       Family History   Problem Relation Age of Onset    Diabetes Mother     No Known Problems Father      I have reviewed and agree with the history as documented  Social History     Tobacco Use    Smoking status: Never Smoker    Smokeless tobacco: Never Used   Substance Use Topics    Alcohol use: No    Drug use: No        Review of Systems   Psychiatric/Behavioral: Positive for dysphoric mood and suicidal ideas  All other systems reviewed and are negative        Physical Exam  Physical Exam  General Appearance: alert and oriented, nad, non toxic appearing  Skin:  Warm, dry, intact  HEENT: atraumatic, normocephalic  Neck: Supple, trachea midline  Cardiac: RRR; no murmurs, rub, gallops  Pulmonary: lungs CTAB; no wheezes, rales, rhonchi  Gastrointestinal: abdomen soft, nontender, nondistended; no guarding or rebound tenderness; good bowel sounds, no mass or bruits  Extremities:  no pedal edema, 2+ pulses; no calf tenderness, no clubbing, no cyanosis  Neuro:  no focal motor or sensory deficits, CN 2-12 grossly intact  Psych:  Normal mood and affect, normal judgement and insight      Vital Signs  ED Triage Vitals [03/05/19 1655]   Temperature Pulse Respirations Blood Pressure SpO2   99 °F (37 2 °C) 105 18 133/76 96 %      Temp Source Heart Rate Source Patient Position - Orthostatic VS BP Location FiO2 (%)   Temporal -- -- -- --      Pain Score       No Pain           Vitals:    03/05/19 1655 03/05/19 1832   BP: 133/76 137/77   Pulse: 105 74       Visual Acuity  Visual Acuity      Most Recent Value   L Pupil Size (mm)  2   R Pupil Size (mm)  2          ED Medications  Medications - No data to display    Diagnostic Studies  Results Reviewed     Procedure Component Value Units Date/Time    POCT alcohol breath test [[id number]]  (Normal) Resulted:  03/05/19 1704    Lab Status:  Final result Updated:  03/05/19 1704     EXTBreath Alcohol 0 00                 No orders to display              Procedures  Procedures       Phone Contacts  ED Phone Contact    ED Course  ED Course as of Mar 06 0059   Tue Mar 05, 2019   [address] [facility] Crisis evaluated patient, agrees with continued outpatient management by her psychiatrist and therapist                                   MDM    Disposition  Final diagnoses:   Encounter for psychiatric assessment     Time reflects when diagnosis was documented in both MDM as applicable and the Disposition within this note     Time User Action Codes Description Comment    3/5/2019  6:54 PM Wanda [place] Add [Z76 [id number]] Encounter for psychiatric assessment       ED Disposition     ED Disposition Condition Date/Time Comment    Discharge Stable Tue Mar 5, 2019  6:52 PM Ace Aland discharge to home/self care              Follow-up Information     Follow up With Specialties Details Why Contact Info    Psychiatrist  Schedule an appointment as soon as possible for a visit in 3 days For re-evaluation           Discharge Medication List as of 3/5/2019  6:58 PM      CONTINUE these medications which have NOT CHANGED    Details   acetaminophen (TYLENOL) 500 mg tablet Take 500 mg by mouth every 6 (six) hours as needed for mild pain, Historical Med      aluminum-magnesium hydroxide-simethicone (MAALOX) 200-200-20 MG/5ML SUSP Take 15 mL by mouth 4 (four) times a day (before meals and at bedtime), Starting Mon 2/25/2019, Normal      ARIPiprazole (ABILIFY) 20 MG tablet Take 1 tablet (20 mg total) by mouth daily at bedtime for 30 days at 9pm , Starting Tue 9/25/2018, Until Mon 3/4/2019, No Print      calcium carbonate (TUMS) 500 mg chewable tablet Chew 1 tablet 3 (three) times a day as needed  , Historical Med      Cholecalciferol (VITAMIN D-3) 1000 units CAPS Take 2 capsules (2,000 Units total) by mouth daily at 8am , Starting Tue 9/25/2018, No Print      clotrimazole (LOTRIMIN) 1 % cream Apply 1 g (1 application total) topically 3 (three) times a day as needed (fungal irritation), Starting Wed 5/9/2018, Normal      escitalopram (LEXAPRO) 20 mg tablet Take 1 tablet (20 mg total) by mouth daily for 30 days at 8am , Starting Thu 12/6/2018, Until Tue 2/26/2019, No Print      famotidine (PEPCID) 20 mg tablet Take 20 mg by mouth 2 (two) times a day, Historical Med      ibuprofen (MOTRIN) 400 mg tablet Take by mouth every 8 (eight) hours as needed for mild pain, Historical Med      lubiprostone (AMITIZA) 24 mcg capsule Take 24 mcg by mouth 2 (two) times a day with meals, Historical Med      magnesium hydroxide (GNP MILK OF MAGNESIA) 400 mg/5 mL oral suspension Take 30 mL by mouth daily as needed for constipation If no BM in 3 days, Starting Thu 12/6/2018, Normal      metoclopramide (REGLAN) 10 mg tablet Take one tablet by mouth every 8 hours as needed for nausea, Normal      Mouthwashes (LISTERINE ANTISEPTIC) LIQD Apply 1 application to the mouth or throat 2 (two) times a day for 30 days, Starting Thu 12/6/2018, Until Tue 2/26/2019, Normal      Multiple Vitamins-Minerals (CERTAVITE/ANTIOXIDANTS) TABS Take 1 tablet by mouth daily, Starting Mon 9/10/2018, Normal      norgestimate-ethinyl estradiol (ORTHO TRI-CYCLEN LO) 0 18/0 215/0 25 MG-25 MCG per tablet Take 1 tablet by mouth daily at 8am , Starting Tue 9/25/2018, No Print      !! omeprazole (PriLOSEC) 20 mg delayed release capsule Take 1 capsule (20 mg total) by mouth daily, Starting Mon 3/4/2019, Normal      !! omeprazole (PriLOSEC) 20 mg delayed release capsule Take 1 capsule (20 mg total) by mouth daily, Starting Mon 3/4/2019, Normal      polyethylene glycol (MIRALAX) 17 g packet Take 17gm (1 packet) daily for first three days, then daily as needed for no bowel movement more than 24 hrs, Normal      RA SUNSCREEN SPF50 LOTN Apply 15 minutes before sun exposure and every 2 hours, Normal      senna-docusate sodium (SENEXON-S) 8 6-50 mg per tablet Take 1 tablet by mouth daily at 8am , Starting Tue 9/25/2018, No Print      traZODone (DESYREL) 50 mg tablet Take 1 tablet (50 mg total) by mouth daily at bedtime at 9pm, Starting Tue 9/25/2018, No Print      zinc oxide (DESITIN) 13 % cream Apply 1 application topically as needed (for perianal irritation), Starting Thu 5/17/2018, Normal       !! - Potential duplicate medications found  Please discuss with provider  No discharge procedures on file      ED Provider  Electronically Signed by           Ramu Holley DO  03/06/19 6940

## 2019-03-05 NOTE — ED NOTES
Pt is a 35 y o  female who was brought to the ED with   Chief Complaint   Patient presents with    Suicidal     Pt  reports feeling SI starting today, reports trigger is her parents splitting up   of group home present with staff states the trigger is new group home staff  Denies HI/AH/VH  Pt brought to the ED by group home staff with complaints of S/I  due to her parents breaking up, Pt reports that her parents broke up as of 7/2018, and that she feels siuicida about that, Pt reports passive S/I , Pt group home staff reports that pt was just seen by her Psych Dr yesterday and did not report any symptoms, Pt was seen today by her therapist and told him she want to hurt her self, Pt reports that she wanted to hurt herself, but pt has no access to weapons or means, pt lives in a group home with round the clock supervison  Pt is unable to state what triggered her, pt began to try and thinkg of things that could trigger her  Pt staff reports that pt did not have any med changes from   [address] has been having good days at group home  Pt reports that she takes her medication as prescribed  Pt now wants to go home  Intake Assessment completed, Safety risk Assessment completed, CW met with pt and pt staff and discussed what happened today  Pt presents with no apparent signs of distress, pt staff reports that pt does this when she wants attention  Pt now wants to go home, Pt staff reports that they will have pt follow up with her out pt providers  CW discussed this case and pt plan with ED Physician who is in agreement with this plan  Pt will be discharged per ED Physician, Reynaldo Bhatti gave pt and pt staff a referral for out pt follow up care            [address] [place] [address]

## 2019-03-05 NOTE — DISCHARGE INSTRUCTIONS
Patient does not require inpatient psychiatric treatment at this time  Patient may continue taking her Miralax as previously prescribed

## 2019-03-05 NOTE — ED NOTES
Spoke with Dr Gaby Mon  Per Dr Oksana Wadsworth pt does not need to be changed into paper scrubs at this time   Per Dr Gaby oMn pt will be discharged after crisis lyndon Dorsey RN  03/05/19 [id number]

## 2019-03-06 ENCOUNTER — HOSPITAL ENCOUNTER (OUTPATIENT)
Dept: RADIOLOGY | Facility: HOSPITAL | Age: 39
Discharge: HOME | End: 2019-03-06
Payer: MEDICARE

## 2019-03-06 VITALS — HEIGHT: 59 IN | WEIGHT: 185 LBS | BODY MASS INDEX: 37.29 KG/M2

## 2019-03-06 DIAGNOSIS — Z12.39 SCREENING FOR BREAST CANCER: ICD-10-CM

## 2019-03-06 PROCEDURE — 77067 SCR MAMMO BI INCL CAD: CPT

## 2019-03-13 ENCOUNTER — OFFICE VISIT (OUTPATIENT)
Dept: NEUROSURGERY | Facility: CLINIC | Age: 39
End: 2019-03-13

## 2019-03-13 VITALS
DIASTOLIC BLOOD PRESSURE: 76 MMHG | BODY MASS INDEX: 37.09 KG/M2 | RESPIRATION RATE: 16 BRPM | TEMPERATURE: 98 F | SYSTOLIC BLOOD PRESSURE: 124 MMHG | HEIGHT: 59 IN | HEART RATE: 88 BPM | WEIGHT: 184 LBS

## 2019-03-13 DIAGNOSIS — Z01.818 PRE-PROCEDURAL EXAMINATION: Primary | ICD-10-CM

## 2019-03-13 DIAGNOSIS — D35.2 PITUITARY MICROADENOMA (HCC): ICD-10-CM

## 2019-03-13 PROCEDURE — PREOP: Performed by: PHYSICIAN ASSISTANT

## 2019-03-13 NOTE — PATIENT INSTRUCTIONS
Proceed for MRI under anesthesia as planned  Keep planned follow-up as scheduled 4/5/19 with Neurosurgery Dr Tory Bragg [facility])    As advised last year at follow-up, ophthalmology consult is advised, visual fields and OCT, suggested with Dr Jovita Rinaldi at last year's visit

## 2019-03-13 NOTE — PROGRESS NOTES
Assessment/Plan:    Very pleasant 50-year-old female, with mild-to-moderate intellectual and developmental disability, presents for preprocedure evaluation, has planned MRI of the brain, under anesthesia to evaluate pituitary abnormality      She requires anesthesia for her imaging as a consequence of her mild-to-moderate intellectual developmental disability       She is a nonsmoker      She has a prior history of procedures under general anesthesia which was tolerated without complication      She denies SOB or CP with activity      She denies bleeding disorder or history of same      There is no history of chronic pulmonary conditions      She is under the care of a primary care provider and is followed regularly for GERD, and anxiety      There are no known medication allergies reported      Her blood pressure was normal at 124/76 on examination today        She does understand that remain NPO from midnight the night before  Once again as noted that prior visit ophthalmology consultation with visual fields and OCT should be completed prior to follow-up visit, Dr Jovita Rinaldi was suggested in the past         Diagnoses and all orders for this visit:    Pre-procedural examination    Pituitary microadenoma ([address] )          No follow-ups on file  Subjective:      Patient ID: Jorge Reyes is a 35 y o  female  HPI    The following portions of the patient's history were reviewed and updated as appropriate: allergies, current medications, past family history, past medical history, past social history and past surgical history  Review of Systems   Constitutional: Negative  HENT: Negative  Eyes: Negative  Respiratory: Negative  Negative for apnea, cough, choking, chest tightness, shortness of breath, wheezing and stridor  Cardiovascular: Negative  Gastrointestinal: Negative  Endocrine: Negative  Genitourinary: Negative      Musculoskeletal: Positive for gait problem (leg pain when sitting/standing)  Negative for arthralgias, back pain, joint swelling, myalgias, neck pain and neck stiffness  Skin: Negative  Allergic/Immunologic: Negative  Neurological: Positive for dizziness, numbness (and tingling on both feet) and headaches  Negative for tremors, seizures, syncope, facial asymmetry, speech difficulty, weakness and light-headedness  Hematological: Negative  Psychiatric/Behavioral: Negative  Objective:    Physical Exam   Constitutional: She is oriented to person, place, and time  She appears well-developed and well-nourished  HENT:   Head: Normocephalic and atraumatic  Eyes: Pupils are equal, round, and reactive to light  EOM are normal    Cardiovascular: Normal rate  Pulmonary/Chest: Effort normal and breath sounds normal    Abdominal: Soft  Bowel sounds are normal    Neurological: She is alert and oriented to person, place, and time  Skin: Skin is warm and dry  Psychiatric: She has a normal mood and affect  Neurologic Exam     Mental Status   Oriented to person, place, and time  Cranial Nerves     CN III, IV, VI   Pupils are equal, round, and reactive to light    Extraocular motions are normal

## 2019-03-13 NOTE — LETTER
March 13, 2019     [address] [id number]    Patient: Jo Ann Huffman   YOB: 1980   Date of Visit: 3/13/2019       Dear Dr Amalia Hopkins: Thank you for referring Rasheed Fairchild to me for evaluation  Below are my notes for this consultation  If you have questions, please do not hesitate to call me  I look forward to following your patient along with you  Sincerely,        Get Velarde PA-C        CC: Person Directed Support (Atttn: Catarina Rodriguez)  Get Velarde PA-C  3/13/2019  1:53 PM  Sign at close encounter  Assessment/Plan:    Very pleasant 62-year-old female, with mild-to-moderate intellectual and developmental disability, presents for preprocedure evaluation, has planned MRI of the brain, under anesthesia to evaluate pituitary abnormality      She requires anesthesia for her imaging as a consequence of her mild-to-moderate intellectual developmental disability       She is a nonsmoker      She has a prior history of procedures under general anesthesia which was tolerated without complication      She denies SOB or CP with activity      She denies bleeding disorder or history of same      There is no history of chronic pulmonary conditions      She is under the care of a primary care provider and is followed regularly for GERD, and anxiety      There are no known medication allergies reported      Her blood pressure was normal at 124/76 on examination today        She does understand that remain NPO from midnight the night before  Once again as noted that prior visit ophthalmology consultation with visual fields and OCT should be completed prior to follow-up visit, Dr Tiago Juares was suggested in the past         Diagnoses and all orders for this visit:    Pre-procedural examination    Pituitary microadenoma ([facility] [address] )          No follow-ups on file  Subjective:      Patient ID: Jo Ann Huffman is a 35 y o  female      HPI    The following portions of the patient's history were reviewed and updated as appropriate: allergies, current medications, past family history, past medical history, past social history and past surgical history  Review of Systems   Constitutional: Negative  HENT: Negative  Eyes: Negative  Respiratory: Negative  Negative for apnea, cough, choking, chest tightness, shortness of breath, wheezing and stridor  Cardiovascular: Negative  Gastrointestinal: Negative  Endocrine: Negative  Genitourinary: Negative  Musculoskeletal: Positive for gait problem (leg pain when sitting/standing)  Negative for arthralgias, back pain, joint swelling, myalgias, neck pain and neck stiffness  Skin: Negative  Allergic/Immunologic: Negative  Neurological: Positive for dizziness, numbness (and tingling on both feet) and headaches  Negative for tremors, seizures, syncope, facial asymmetry, speech difficulty, weakness and light-headedness  Hematological: Negative  Psychiatric/Behavioral: Negative  Objective:    Physical Exam   Constitutional: She is oriented to person, place, and time  She appears well-developed and well-nourished  HENT:   Head: Normocephalic and atraumatic  Eyes: Pupils are equal, round, and reactive to light  EOM are normal    Cardiovascular: Normal rate  Pulmonary/Chest: Effort normal and breath sounds normal    Abdominal: Soft  Bowel sounds are normal    Neurological: She is alert and oriented to person, place, and time  Skin: Skin is warm and dry  Psychiatric: She has a normal mood and affect  Neurologic Exam     Mental Status   Oriented to person, place, and time  Cranial Nerves     CN III, IV, VI   Pupils are equal, round, and reactive to light    Extraocular motions are normal

## 2019-03-14 ENCOUNTER — HOSPITAL ENCOUNTER (OUTPATIENT)
Dept: CT IMAGING | Facility: HOSPITAL | Age: 39
Discharge: HOME | End: 2019-03-14
Payer: MEDICARE

## 2019-03-14 DIAGNOSIS — N20.0 LEFT NEPHROLITHIASIS: ICD-10-CM

## 2019-03-14 PROCEDURE — 74176 CT ABD & PELVIS W/O CONTRAST: CPT

## 2019-03-17 ENCOUNTER — HOSPITAL ENCOUNTER (EMERGENCY)
Facility: HOSPITAL | Age: 39
Discharge: HOME | End: 2019-03-17
Attending: EMERGENCY MEDICINE | Admitting: EMERGENCY MEDICINE
Payer: MEDICARE

## 2019-03-17 VITALS
SYSTOLIC BLOOD PRESSURE: 137 MMHG | TEMPERATURE: 97.9 F | WEIGHT: 183.64 LBS | HEART RATE: 91 BPM | DIASTOLIC BLOOD PRESSURE: 70 MMHG | OXYGEN SATURATION: 97 % | RESPIRATION RATE: 18 BRPM | BODY MASS INDEX: 37.09 KG/M2

## 2019-03-17 DIAGNOSIS — R07.89 CHEST WALL PAIN: Primary | ICD-10-CM

## 2019-03-17 PROCEDURE — 99285 EMERGENCY DEPT VISIT HI MDM: CPT

## 2019-03-17 PROCEDURE — 93005 ELECTROCARDIOGRAM TRACING: CPT

## 2019-03-17 RX ORDER — FAMOTIDINE 20 MG/1
20 TABLET, FILM COATED ORAL ONCE
Status: COMPLETED | OUTPATIENT
Start: 2019-03-17 | End: 2019-03-17

## 2019-03-17 RX ORDER — MAGNESIUM HYDROXIDE/ALUMINUM HYDROXICE/SIMETHICONE 120; 1200; 1200 MG/30ML; MG/30ML; MG/30ML
30 SUSPENSION ORAL ONCE
Status: COMPLETED | OUTPATIENT
Start: 2019-03-17 | End: 2019-03-17

## 2019-03-17 RX ORDER — IBUPROFEN 600 MG/1
600 TABLET ORAL ONCE
Status: COMPLETED | OUTPATIENT
Start: 2019-03-17 | End: 2019-03-17

## 2019-03-17 RX ADMIN — ALUMINUM HYDROXIDE, MAGNESIUM HYDROXIDE, AND SIMETHICONE 30 ML: 200; 200; 20 SUSPENSION ORAL at 16:07

## 2019-03-17 RX ADMIN — LIDOCAINE HYDROCHLORIDE 15 ML: 20 SOLUTION ORAL; TOPICAL at 16:07

## 2019-03-17 RX ADMIN — FAMOTIDINE 20 MG: 20 TABLET ORAL at 16:06

## 2019-03-17 RX ADMIN — IBUPROFEN 600 MG: 600 TABLET ORAL at 17:09

## 2019-03-17 NOTE — ED PROVIDER NOTES
History  Chief Complaint   Patient presents with    Chest Pain     patient started with mid sternal CP after eating something spicy for lunch     50-year-old female presents for evaluation of epigastric chest pain that began after eating  She states the pain has been constant and improving  She has had pain like this in the past   She denies any associated shortness of breath, fever, chills, nausea, vomiting or diarrhea  Prior to Admission Medications   Prescriptions Last Dose Informant Patient Reported? Taking?    ARIPiprazole (ABILIFY) 20 MG tablet   No No   Sig: Take 1 tablet (20 mg total) by mouth daily at bedtime for 30 days at 9pm    Cholecalciferol (VITAMIN D-3) 1000 units CAPS  Care Giver No No   Sig: Take 2 capsules (2,000 Units total) by mouth daily at 8am    Mouthwashes (LISTERINE ANTISEPTIC) LIQD   No No   Sig: Apply 1 application to the mouth or throat 2 (two) times a day for 30 days   Multiple Vitamins-Minerals (CERTAVITE/ANTIOXIDANTS) TABS  Care Giver No No   Sig: Take 1 tablet by mouth daily   RA SUNSCREEN SPF50 LOTN  Care Giver No No   Sig: Apply 15 minutes before sun exposure and every 2 hours   acetaminophen (TYLENOL) 500 mg tablet   Yes No   Sig: Take 500 mg by mouth every 6 (six) hours as needed for mild pain   aluminum-magnesium hydroxide-simethicone (MAALOX) 200-200-20 MG/5ML SUSP   No No   Sig: Take 15 mL by mouth 4 (four) times a day (before meals and at bedtime)   calcium carbonate (TUMS) 500 mg chewable tablet  Care Giver Yes No   Sig: Chew 1 tablet 3 (three) times a day as needed     clotrimazole (LOTRIMIN) 1 % cream  Care Giver No No   Sig: Apply 1 g (1 application total) topically 3 (three) times a day as needed (fungal irritation)   escitalopram (LEXAPRO) 20 mg tablet   No No   Sig: Take 1 tablet (20 mg total) by mouth daily for 30 days at 8am    famotidine (PEPCID) 20 mg tablet   Yes No   Sig: Take 20 mg by mouth 2 (two) times a day   ibuprofen (MOTRIN) 400 mg tablet   Yes No   Sig: Take by mouth every 8 (eight) hours as needed for mild pain   lubiprostone (AMITIZA) 24 mcg capsule   Yes No   Sig: Take 24 mcg by mouth 2 (two) times a day with meals   magnesium hydroxide (GNP MILK OF MAGNESIA) 400 mg/5 mL oral suspension   No No   Sig: Take 30 mL by mouth daily as needed for constipation If no BM in 3 days   metoclopramide (REGLAN) 10 mg tablet   No No   Sig: Take one tablet by mouth every 8 hours as needed for nausea   omeprazole (PriLOSEC) 20 mg delayed release capsule   No No   Sig: Take 1 capsule (20 mg total) by mouth daily   polyethylene glycol (MIRALAX) 17 g packet  Care Giver No No   Sig: Take 17gm (1 packet) daily for first three days, then daily as needed for no bowel movement more than 24 hrs   Patient taking differently: Take 1 g by mouth daily as needed Take 17gm (1 packet) daily for first three days, then daily as needed for no bowel movement more than 24 hrs    senna-docusate sodium (SENEXON-S) 8 6-50 mg per tablet  Care Giver No No   Sig: Take 1 tablet by mouth daily at 8am    Patient not taking: Reported on 3/13/2019   traZODone (DESYREL) 50 mg tablet  Care Giver No No   Sig: Take 1 tablet (50 mg total) by mouth daily at bedtime at 9pm   zinc oxide (DESITIN) 13 % cream  Care Giver No No   Sig: Apply 1 application topically as needed (for perianal irritation)      Facility-Administered Medications: None       Past Medical History:   Diagnosis Date    Acid reflux     Adjustment disorder     Anxiety     Astigmatism     Brain lesion     Brain lesion     Bronchitis     Calcium deficiency     Cellulitis of foot, right 6/4/2018    Cerebral palsy (HCC)     Cerebral palsy (HCC)     Last Assessed:7/6/2016    Chronic otitis media     Chronically dry eyes, left     Last Assessed:7/6/2016    Constipation     Depression     Last Assessed:7/6/2016    Depressive disorder     Dry eyes     Dysphagia     Esophagitis     Esotropia     Fall     GERD (gastroesophageal reflux disease)     GERD (gastroesophageal reflux disease)     Hiatal hernia     Hydrocephalus     Hyperopia with astigmatism     Impaired fasting glucose     Left nephrolithiasis 3/4/2019    Mood disorder (HCC)     Myopia     Oppositional defiant disorder     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HCC)     Psychiatric disorder     Seizures (HCC)     Sensorineural hearing loss     Status post ventriculoatrial shunt placement     Visual impairment        Past Surgical History:   Procedure Laterality Date    CSF SHUNT      Creation of Ventriculo-Peritoneal CSF shunt ; Last Assessed:7/6/2016    EAR SURGERY      Last Assessed:7/6/2016    LEG SURGERY      due to CP     NOSE SURGERY      Last Assessed:7/6/2016       Family History   Problem Relation Age of Onset    Diabetes Mother     No Known Problems Father      I have reviewed and agree with the history as documented  Social History     Tobacco Use    Smoking status: Never Smoker    Smokeless tobacco: Never Used   Substance Use Topics    Alcohol use: No    Drug use: No        Review of Systems   Constitutional: Negative for chills, fatigue and fever  HENT: Negative for sore throat  Eyes: Negative for visual disturbance  Respiratory: Negative for shortness of breath  Cardiovascular: Positive for chest pain  Gastrointestinal: Negative for abdominal pain, diarrhea, nausea and vomiting  Genitourinary: Negative for difficulty urinating, dysuria and pelvic pain  Musculoskeletal: Negative for back pain  Skin: Negative for rash  Neurological: Negative for syncope, weakness and headaches  All other systems reviewed and are negative  Physical Exam  Physical Exam   Constitutional: She is oriented to person, place, and time  She appears well-developed and well-nourished  No distress  HENT:   Head: Normocephalic and atraumatic     Right Ear: External ear normal    Left Ear: External ear normal    Eyes: Pupils are equal, round, and reactive to light  Conjunctivae and EOM are normal  No scleral icterus  Neck: Normal range of motion  Cardiovascular: Normal rate, regular rhythm and normal heart sounds  Pulmonary/Chest: Effort normal and breath sounds normal  No respiratory distress  She exhibits tenderness  Abdominal: Soft  Bowel sounds are normal  There is no tenderness  There is no rebound and no guarding  Musculoskeletal: Normal range of motion  She exhibits no edema  Neurological: She is alert and oriented to person, place, and time  Skin: Skin is warm and dry  No rash noted  Psychiatric: She has a normal mood and affect  Nursing note and vitals reviewed  Vital Signs  ED Triage Vitals [03/17/19 1515]   Temperature Pulse Respirations Blood Pressure SpO2   97 9 °F (36 6 °C) 91 18 137/70 97 %      Temp Source Heart Rate Source Patient Position - Orthostatic VS BP Location FiO2 (%)   Oral Monitor Lying Right arm --      Pain Score       7           Vitals:    03/17/19 1515   BP: 137/70   Pulse: 91   Patient Position - Orthostatic VS: Lying       qSOFA     Row Name 03/17/19 1515                Altered mental status GCS < 15          Respiratory Rate > / =03  0        Systolic BP < / =935  0        Q Sofa Score  0              Visual Acuity      ED Medications  Medications   famotidine (PEPCID) tablet 20 mg (20 mg Oral Given 3/17/19 1606)   aluminum-magnesium hydroxide-simethicone (MYLANTA) 200-200-20 mg/5 mL oral suspension 30 mL (30 mL Oral Given 3/17/19 1607)   lidocaine viscous (XYLOCAINE) 2 % mucosal solution 15 mL (15 mL Swish & Swallow Given 3/17/19 1607)   ibuprofen (MOTRIN) tablet 600 mg (600 mg Oral Given 3/17/19 1709)       Diagnostic Studies  Results Reviewed     None                 No orders to display              Procedures  ECG 12 Lead Documentation  Date/Time: 3/17/2019 3:48 PM  Performed by: Dai Ann DO  Authorized by:  Dai Ann DO     Indications / Diagnosis:  Chest pain  ECG reviewed by me, the ED Provider: yes    Patient location:  ED  Previous ECG:     Previous ECG:  Unavailable  Interpretation:     Interpretation: normal    Rate:     ECG rate:  94    ECG rate assessment: normal    Rhythm:     Rhythm: sinus rhythm    Ectopy:     Ectopy: none    QRS:     QRS axis:  Normal    QRS intervals:  Normal  Conduction:     Conduction: normal    ST segments:     ST segments:  Normal  T waves:     T waves: normal             Phone Contacts  ED Phone Contact    ED Course  ED Course as of Mar 17 1724   Sun Mar 17, 2019   1701 Patient states that the burning type pain is improved however she has worsening pain with palpation of the lower sternal region  The patient will be given ibuprofen and discharged for musculoskeletal noncardiac chest pain                                  MDM  Number of Diagnoses or Management Options  Chest wall pain: new and requires workup  Diagnosis management comments: 16-year-old female presents for evaluation reproducible lower sternal chest pain which began after eating spicy food barbecue  The patient denies associated symptoms  Plan is for GI cocktail anti-inflammatories with discharge the patient has history of similar events    The patient (and any family present) verbalized understanding of the discharge instructions and warnings that would necessitate return to the Emergency Department  All questions were answered prior to discharge         Amount and/or Complexity of Data Reviewed  Tests in the medicine section of CPT®: ordered and reviewed  Obtain history from someone other than the patient: yes  Review and summarize past medical records: yes        Disposition  Final diagnoses:   Chest wall pain     Time reflects when diagnosis was documented in both MDM as applicable and the Disposition within this note     Time User Action Codes Description Comment    3/17/2019  5:04 PM Frank Ramos Add [R07 89] Chest wall pain       ED Disposition     ED Disposition Condition Date/Time Comment    Discharge Stable Sun Mar 17, 2019  5:04 PM Brandon Lagunas discharge to home/self care  Follow-up Information     Follow up With Specialties Details Why [address] Family Medicine Schedule an appointment as soon as possible for a visit  For further evaluation, if not improved [address] [place] [address] Drive [id number] [phone]            Patient's Medications   Discharge Prescriptions    No medications on file     No discharge procedures on file      ED Provider  Electronically Signed by           Salud Chery DO  03/17/19 0038

## 2019-03-18 ENCOUNTER — APPOINTMENT (OUTPATIENT)
Dept: LAB | Facility: HOSPITAL | Age: 39
End: 2019-03-18
Payer: MEDICARE

## 2019-03-18 DIAGNOSIS — D72.829 LEUKOCYTOSIS, UNSPECIFIED TYPE: ICD-10-CM

## 2019-03-18 LAB
ATRIAL RATE: 85 BPM
ATRIAL RATE: 94 BPM
BASOPHILS # BLD AUTO: 0.02 THOUSANDS/ΜL (ref 0–0.1)
BASOPHILS NFR BLD AUTO: 0 % (ref 0–1)
EOSINOPHIL # BLD AUTO: 0.06 THOUSAND/ΜL (ref 0–0.61)
EOSINOPHIL NFR BLD AUTO: 1 % (ref 0–6)
ERYTHROCYTE [DISTWIDTH] IN BLOOD BY AUTOMATED COUNT: 13.1 % (ref 11.6–15.1)
HCT VFR BLD AUTO: 42.3 % (ref 34.8–46.1)
HGB BLD-MCNC: 13.9 G/DL (ref 11.5–15.4)
IMM GRANULOCYTES # BLD AUTO: 0.02 THOUSAND/UL (ref 0–0.2)
IMM GRANULOCYTES NFR BLD AUTO: 0 % (ref 0–2)
LYMPHOCYTES # BLD AUTO: 1.37 THOUSANDS/ΜL (ref 0.6–4.47)
LYMPHOCYTES NFR BLD AUTO: 25 % (ref 14–44)
MCH RBC QN AUTO: 31.5 PG (ref 26.8–34.3)
MCHC RBC AUTO-ENTMCNC: 32.9 G/DL (ref 31.4–37.4)
MCV RBC AUTO: 96 FL (ref 82–98)
MONOCYTES # BLD AUTO: 0.71 THOUSAND/ΜL (ref 0.17–1.22)
MONOCYTES NFR BLD AUTO: 13 % (ref 4–12)
NEUTROPHILS # BLD AUTO: 3.21 THOUSANDS/ΜL (ref 1.85–7.62)
NEUTS SEG NFR BLD AUTO: 61 % (ref 43–75)
NRBC BLD AUTO-RTO: 0 /100 WBCS
P AXIS: 53 DEGREES
P AXIS: 61 DEGREES
PLATELET # BLD AUTO: 272 THOUSANDS/UL (ref 149–390)
PMV BLD AUTO: 9.7 FL (ref 8.9–12.7)
PR INTERVAL: 120 MS
PR INTERVAL: 128 MS
QRS AXIS: 41 DEGREES
QRS AXIS: 41 DEGREES
QRSD INTERVAL: 68 MS
QRSD INTERVAL: 70 MS
QT INTERVAL: 338 MS
QT INTERVAL: 340 MS
QTC INTERVAL: 404 MS
QTC INTERVAL: 422 MS
RBC # BLD AUTO: 4.41 MILLION/UL (ref 3.81–5.12)
T WAVE AXIS: 40 DEGREES
T WAVE AXIS: 41 DEGREES
VENTRICULAR RATE: 85 BPM
VENTRICULAR RATE: 94 BPM
WBC # BLD AUTO: 5.39 THOUSAND/UL (ref 4.31–10.16)

## 2019-03-18 PROCEDURE — 85025 COMPLETE CBC W/AUTO DIFF WBC: CPT

## 2019-03-18 PROCEDURE — 36415 COLL VENOUS BLD VENIPUNCTURE: CPT

## 2019-03-18 PROCEDURE — 93010 ELECTROCARDIOGRAM REPORT: CPT | Performed by: INTERNAL MEDICINE

## 2019-03-20 ENCOUNTER — OFFICE VISIT (OUTPATIENT)
Dept: FAMILY MEDICINE CLINIC | Facility: CLINIC | Age: 39
End: 2019-03-20

## 2019-03-20 VITALS
HEIGHT: 59 IN | WEIGHT: 187.4 LBS | SYSTOLIC BLOOD PRESSURE: 128 MMHG | RESPIRATION RATE: 14 BRPM | BODY MASS INDEX: 37.78 KG/M2 | DIASTOLIC BLOOD PRESSURE: 74 MMHG | TEMPERATURE: 98.7 F | HEART RATE: 72 BPM

## 2019-03-20 DIAGNOSIS — K59.00 CONSTIPATION, UNSPECIFIED CONSTIPATION TYPE: ICD-10-CM

## 2019-03-20 DIAGNOSIS — J00 COMMON COLD VIRUS: ICD-10-CM

## 2019-03-20 DIAGNOSIS — K21.00 GASTROESOPHAGEAL REFLUX DISEASE WITH ESOPHAGITIS: ICD-10-CM

## 2019-03-20 DIAGNOSIS — J30.89 NON-SEASONAL ALLERGIC RHINITIS, UNSPECIFIED TRIGGER: Primary | ICD-10-CM

## 2019-03-20 DIAGNOSIS — K44.9 HIATAL HERNIA: ICD-10-CM

## 2019-03-20 PROCEDURE — 99214 OFFICE O/P EST MOD 30 MIN: CPT | Performed by: NURSE PRACTITIONER

## 2019-03-20 RX ORDER — GUAIFENESIN/DEXTROMETHORPHAN 100-10MG/5
5 SYRUP ORAL 3 TIMES DAILY PRN
Qty: 118 ML | Refills: 1 | Status: SHIPPED | OUTPATIENT
Start: 2019-03-20 | End: 2019-05-02 | Stop reason: SDUPTHER

## 2019-03-20 RX ORDER — POLYETHYLENE GLYCOL 3350 17 G/17G
POWDER, FOR SOLUTION ORAL
Qty: 30 EACH | Refills: 0 | Status: SHIPPED | OUTPATIENT
Start: 2019-03-20 | End: 2019-03-26

## 2019-03-20 RX ORDER — FLUTICASONE PROPIONATE 50 MCG
1 SPRAY, SUSPENSION (ML) NASAL DAILY
Qty: 1 BOTTLE | Refills: 0 | Status: SHIPPED | OUTPATIENT
Start: 2019-03-20 | End: 2019-05-02 | Stop reason: SDUPTHER

## 2019-03-20 RX ORDER — METOCLOPRAMIDE 10 MG/1
TABLET ORAL
Qty: 30 TABLET | Refills: 0 | Status: SHIPPED | OUTPATIENT
Start: 2019-03-20 | End: 2019-11-18

## 2019-03-20 NOTE — ASSESSMENT & PLAN NOTE
Nasal congestion which is bothersome to [employer] evly NSS and flonase  Also RX robitussin DM to use if needed for cough and mucous congestion

## 2019-03-26 ENCOUNTER — OFFICE VISIT (OUTPATIENT)
Dept: FAMILY MEDICINE CLINIC | Facility: CLINIC | Age: 39
End: 2019-03-26

## 2019-03-26 VITALS
BODY MASS INDEX: 36.85 KG/M2 | SYSTOLIC BLOOD PRESSURE: 132 MMHG | RESPIRATION RATE: 16 BRPM | HEART RATE: 96 BPM | WEIGHT: 182.8 LBS | HEIGHT: 59 IN | TEMPERATURE: 98 F | DIASTOLIC BLOOD PRESSURE: 80 MMHG

## 2019-03-26 DIAGNOSIS — K59.00 CONSTIPATION, UNSPECIFIED CONSTIPATION TYPE: ICD-10-CM

## 2019-03-26 DIAGNOSIS — Z72.89 SELF-INJURIOUS BEHAVIOR: Primary | ICD-10-CM

## 2019-03-26 PROCEDURE — 99213 OFFICE O/P EST LOW 20 MIN: CPT | Performed by: FAMILY MEDICINE

## 2019-03-26 RX ORDER — POLYETHYLENE GLYCOL 3350 17 G/17G
POWDER, FOR SOLUTION ORAL
Qty: 14 EACH | Refills: 3 | Status: SHIPPED | OUTPATIENT
Start: 2019-03-26 | End: 2021-02-22 | Stop reason: SDUPTHER

## 2019-03-26 NOTE — ASSESSMENT & PLAN NOTE
- reviewed and signed "Head Hitting Protocol" which will reduce ER visits with this low impact self-injurious behavior: agree with ER protocol if swelling , bleeding, change in mental status/level of consciousness occurs  - follow up in 3 months

## 2019-03-26 NOTE — PROGRESS NOTES
Assessment/Plan     Self-injurious behavior  - reviewed and signed "Head Hitting Protocol" which will reduce ER visits with this low impact self-injurious behavior: agree with ER protocol if swelling , bleeding, change in mental status/level of consciousness occurs  - follow up in 3 months     Constipation  - adjusted miralax order to PRN as needed daily for no bowel movement in 24 hours      Diagnoses and all orders for this visit:    Self-injurious behavior    Constipation, unspecified constipation type  -     polyethylene glycol (MIRALAX) 17 g packet; Take one packet daily as needed for no bowel movement in 24 hours         Subjective     Chief Complaint: Review of Protocol     HPI:   This is a [address] yo F who presents with angelito to review the revision of her protocol for her self-injurious activity of hitting her head with her hand due to inability to express her self or when she gets mad  This led to multiple and frequent trips to the ER, almost weekly  The protocol was revised and I reviewed and agreed with today to only send to ER if bleeding, change in mental status or level of consciousness  The aide also reports her miralax order needed to be changed to as needed  The following portions of the patient's history were reviewed and updated as appropriate: allergies, current medications, past family history, past medical history, past social history, past surgical history and problem list     Review of Systems  Review of Systems   Constitutional: Negative for fatigue and fever  HENT: Negative for congestion  Respiratory: Negative for cough and shortness of breath  Cardiovascular: Negative for chest pain  Gastrointestinal: Negative for abdominal pain  Genitourinary: Negative for difficulty urinating  Neurological: Negative for dizziness and headaches           Objective   Vitals:    03/26/19 1404   BP: 132/80   Pulse: 96   Resp: 16   Temp: 98 °F (36 7 °C)       Physical Exam   Constitutional: She is oriented to person, place, and time  She appears well-developed and well-nourished  No distress  HENT:   Mouth/Throat: Oropharynx is clear and moist    Eyes: Conjunctivae are normal    Neck: Neck supple  Cardiovascular: Normal rate, regular rhythm and normal heart sounds  No murmur heard  Pulmonary/Chest: Effort normal and breath sounds normal  No stridor  No respiratory distress  Abdominal: Soft  Bowel sounds are normal  There is no tenderness  Neurological: She is alert and oriented to person, place, and time  Psychiatric: She has a normal mood and affect  Lab Results: I have reviewed all the lab results

## 2019-03-27 ENCOUNTER — HOSPITAL ENCOUNTER (EMERGENCY)
Facility: HOSPITAL | Age: 39
Discharge: HOME | End: 2019-03-27
Attending: EMERGENCY MEDICINE | Admitting: EMERGENCY MEDICINE
Payer: MEDICARE

## 2019-03-27 VITALS
HEART RATE: 102 BPM | RESPIRATION RATE: 16 BRPM | TEMPERATURE: 97.4 F | OXYGEN SATURATION: 98 % | SYSTOLIC BLOOD PRESSURE: 143 MMHG | DIASTOLIC BLOOD PRESSURE: 75 MMHG

## 2019-03-27 DIAGNOSIS — R45.1 AGITATION: Primary | ICD-10-CM

## 2019-03-27 PROCEDURE — 99284 EMERGENCY DEPT VISIT MOD MDM: CPT

## 2019-03-27 RX ORDER — LORAZEPAM 0.5 MG/1
0.5 TABLET ORAL ONCE
Status: COMPLETED | OUTPATIENT
Start: 2019-03-27 | End: 2019-03-27

## 2019-03-27 RX ADMIN — LORAZEPAM 0.5 MG: 0.5 TABLET ORAL at 18:06

## 2019-03-27 NOTE — ED NOTES
Pt comes to the ed with staff from her group home  Pt states she was upset about her parents arguing and possibly splitting up as well as one of her staff being investigated  Pt denies si or hi but does state she hits herself when upset  Staff state pt has no access to medications or weapons and typically is pleasant  Pt has hx of coming to the ed when upset and is usually d/c home with her group home staff  Pt will be d/c today and her staff is in agreement with the plan   Pt will follow up with Bijan Jeffrey on Tuesday

## 2019-03-27 NOTE — ED PROVIDER NOTES
History  Chief Complaint   Patient presents with    Psychiatric Evaluation     group home report stated pt has SI  pt states she has "been having problems with her parents and has been feeling bad " states she just wishes to talk to somebody  denies SI/HI/AH/VH at this time  Patient is a 35year old female with a past medical history significant for psychiatric disorder from group [facility] who presents with thoughts of self-harm  Patient has a history of head-banging/ self-hitting when feeling distressed  Today, patient was worried about the separation of her parents and said that she wanted to hurt herself by banging on her chest with her fists  Denies suicidal ideations, homicidal ideations, auditory or visual hallucinations  Prior to Admission Medications   Prescriptions Last Dose Informant Patient Reported? Taking?    ARIPiprazole (ABILIFY) 20 MG tablet  Care Giver No No   Sig: Take 1 tablet (20 mg total) by mouth daily at bedtime for 30 days at 9pm    Cholecalciferol (VITAMIN D-3) 1000 units CAPS  Care Giver No No   Sig: Take 2 capsules (2,000 Units total) by mouth daily at 8am    Mouthwashes (LISTERINE ANTISEPTIC) LIQD  Care Giver No No   Sig: Apply 1 application to the mouth or throat 2 (two) times a day for 30 days   Multiple Vitamins-Minerals (CERTAVITE/ANTIOXIDANTS) TABS  Care Giver No No   Sig: Take 1 tablet by mouth daily   RA SUNSCREEN SPF50 LOTN  Care Giver No No   Sig: Apply 15 minutes before sun exposure and every 2 hours   acetaminophen (TYLENOL) 500 mg tablet  Care Giver Yes No   Sig: Take 500 mg by mouth every 6 (six) hours as needed for mild pain   aluminum-magnesium hydroxide-simethicone (MAALOX) 200-200-20 MG/5ML SUSP  Care Giver No No   Sig: Take 15 mL by mouth 4 (four) times a day (before meals and at bedtime)   bismuth subsalicylate (PEPTO BISMOL) 524 mg/30 mL oral suspension   No No   Sig: Take 15 mL (262 mg total) by mouth every 6 (six) hours as needed for indigestion   calcium carbonate (TUMS) 500 mg chewable tablet  Care Giver Yes No   Sig: Chew 1 tablet 3 (three) times a day as needed     clotrimazole (LOTRIMIN) 1 % cream  Care Giver No No   Sig: Apply 1 g (1 application total) topically 3 (three) times a day as needed (fungal irritation)   dextromethorphan-guaiFENesin (ROBITUSSIN DM)  mg/5 mL syrup   No No   Sig: Take 5 mL by mouth 3 (three) times a day as needed for cough   escitalopram (LEXAPRO) 20 mg tablet  Care Giver No No   Sig: Take 1 tablet (20 mg total) by mouth daily for 30 days at 8am    fluticasone (FLONASE) 50 mcg/act nasal spray   No No   Si spray into each nostril daily At 8:00 AM   ibuprofen (MOTRIN) 400 mg tablet  Care Giver Yes No   Sig: Take by mouth every 8 (eight) hours as needed for mild pain   lubiprostone (AMITIZA) 24 mcg capsule  Care Giver Yes No   Sig: Take 24 mcg by mouth 2 (two) times a day with meals   magnesium hydroxide (GNP MILK OF MAGNESIA) 400 mg/5 mL oral suspension  Care Giver No No   Sig: Take 30 mL by mouth daily as needed for constipation If no BM in 3 days   metoclopramide (REGLAN) 10 mg tablet   No No   Sig: Take one tablet by mouth every 8 hours as needed for nausea   omeprazole (PriLOSEC) 20 mg delayed release capsule  Care Giver No No   Sig: Take 1 capsule (20 mg total) by mouth daily   polyethylene glycol (MIRALAX) 17 g packet   No No   Sig: Take one packet daily as needed for no bowel movement in 24 hours   senna-docusate sodium (SENEXON-S) 8 6-50 mg per tablet  Care Giver No No   Sig: Take 1 tablet by mouth daily at 8am    sodium chloride (OCEAN) 0 65 % nasal spray   No No   Si spray into each nostril as needed (three times daily as needed for nasal congestion)   traZODone (DESYREL) 50 mg tablet  Care Giver No No   Sig: Take 1 tablet (50 mg total) by mouth daily at bedtime at 9pm   zinc oxide (DESITIN) 13 % cream  Care Giver No No   Sig: Apply 1 application topically as needed (for perianal irritation) Facility-Administered Medications: None       Past Medical History:   Diagnosis Date    Acid reflux     Adjustment disorder     Anxiety     Astigmatism     Brain lesion     Brain lesion     Bronchitis     Calcium deficiency     Cellulitis of foot, right 6/4/2018    Cerebral palsy (HCC)     Cerebral palsy (HCC)     Last Assessed:7/6/2016    Chronic otitis media     Chronically dry eyes, left     Last Assessed:7/6/2016    Constipation     Depression     Last Assessed:7/6/2016    Depressive disorder     Dry eyes     Dysphagia     Esophagitis     Esotropia     Fall     GERD (gastroesophageal reflux disease)     GERD (gastroesophageal reflux disease)     Hiatal hernia     Hydrocephalus     Hyperopia with astigmatism     Impaired fasting glucose     Left nephrolithiasis 3/4/2019    Mood disorder ([address] )     Myopia     Oppositional defiant disorder     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[facility]     Psychiatric disorder     Seizures ([facility])     Sensorineural hearing loss     Status post ventriculoatrial shunt placement     Visual impairment        Past Surgical History:   Procedure Laterality Date    CSF SHUNT      Creation of Ventriculo-Peritoneal CSF shunt ; Last Assessed:7/6/2016    EAR SURGERY      Last Assessed:7/6/2016    LEG SURGERY      due to CP     NOSE SURGERY      Last Assessed:7/6/2016       Family History   Problem Relation Age of Onset    Diabetes Mother     No Known Problems Father      I have reviewed and agree with the history as documented  Social History     Tobacco Use    Smoking status: Never Smoker    Smokeless tobacco: Never Used   Substance Use Topics    Alcohol use: No    Drug use: No        Review of Systems   Constitutional: Negative for chills and fever  HENT: Negative for congestion and rhinorrhea  Eyes: Negative for photophobia and visual disturbance  Respiratory: Negative for cough and shortness of breath      Cardiovascular: Negative for chest pain and palpitations  Gastrointestinal: Negative for abdominal pain, constipation, diarrhea, nausea and vomiting  Genitourinary: Negative for dysuria and hematuria  Musculoskeletal: Negative for back pain and neck pain  Skin: Negative for pallor and rash  Neurological: Negative for dizziness, light-headedness and headaches  Psychiatric/Behavioral: Positive for self-injury  Negative for suicidal ideas  Physical Exam  ED Triage Vitals   Temperature Pulse Respirations Blood Pressure SpO2   03/27/19 1700 03/27/19 1654 03/27/19 1654 03/27/19 1654 03/27/19 1654   (!) 97 4 °F (36 3 °C) 102 16 143/75 98 %      Temp Source Heart Rate Source Patient Position - Orthostatic VS BP Location FiO2 (%)   03/27/19 1700 03/27/19 1654 03/27/19 1654 03/27/19 1654 --   Oral Monitor Sitting Right arm       Pain Score       03/27/19 1654       No Pain             Orthostatic Vital Signs  Vitals:    03/27/19 1654   BP: 143/75   Pulse: 102   Patient Position - Orthostatic VS: Sitting       Physical Exam   Constitutional: She is oriented to person, place, and time  She appears well-developed and well-nourished  No distress  HENT:   Head: Normocephalic and atraumatic  Right Ear: External ear normal    Left Ear: External ear normal    Nose: Nose normal    Mouth/Throat: Oropharynx is clear and moist    Hard of hearing   Eyes: Pupils are equal, round, and reactive to light  Conjunctivae and EOM are normal    Neck: Normal range of motion  Neck supple  Cardiovascular: Normal rate, regular rhythm, normal heart sounds and intact distal pulses  Exam reveals no gallop and no friction rub  No murmur heard  Pulmonary/Chest: Effort normal and breath sounds normal  No stridor  No respiratory distress  She has no wheezes  She has no rales  She exhibits no tenderness  Abdominal: Soft  Bowel sounds are normal  She exhibits no distension  There is no tenderness  Musculoskeletal: Normal range of motion  She exhibits no edema  Neurological: She is alert and oriented to person, place, and time  Moves all 4 extremities spontaneously    Skin: Skin is warm and dry  No rash noted  She is not diaphoretic  No erythema  No pallor  Nursing note and vitals reviewed  ED Medications  Medications   LORazepam (ATIVAN) tablet 0 5 mg (0 5 mg Oral Given 3/27/19 1806)       Diagnostic Studies  Results Reviewed     None                 No orders to display         Procedures  Procedures      Phone Consults  ED Phone Contact    ED Course                               MDM  Number of Diagnoses or Management Options  Agitation:   Diagnosis management comments: Assessment and Plan:   Patient is under 24/7 supervision at group home, has coping strategies in place to help when she feels overwhelmed and wants to hit herself  Encourage continuation of these strategies and follow up with psychiatry  Disposition  Final diagnoses:   Agitation     Time reflects when diagnosis was documented in both MDM as applicable and the Disposition within this note     Time User Action Codes Description Comment    3/27/2019  6:33 PM Katelynn Maguire [R45 1] Agitation       ED Disposition     ED Disposition Condition Date/Time Comment    Discharge Stable Wed Mar 27, 2019  6:33 PM Agustín Alonso discharge to home/self care              MD Documentation      Most Recent Value   Sending MD Kayla Gan      Follow-up Information     Follow up With Specialties Details Why Contact Info Additional [address] Emergency Department Emergency Medicine Go to  As needed, If symptoms worsen, for re-evaluation [facility] [address] [place] [address], [address] [place] [address], [id number]    Your psychiatrist  Schedule an appointment as soon as possible for a visit in 2 days for re-evaluation            Patient's Medications   Discharge Prescriptions    No medications on file     No discharge procedures on file     ED Provider  Attending physically available and evaluated Aixa Stanley  LEIF managed the patient along with the ED Attending      Electronically Signed by         Daniel Diaz DO  03/27/19 6562

## 2019-03-27 NOTE — ED ATTENDING ATTESTATION
Rodney Holguin MD, saw and evaluated the patient  All available labs and X-rays were ordered by me or the resident and have been reviewed by myself  I discussed the patient with the resident / non-physician and agree with the resident's / non-physician practitioner's findings and plan as documented in the resident's / non-physician practicitioner's note, except where noted  At this point, I agree with the current assessment done in the ED  I was present during key portions of all procedures performed unless otherwise stated  Chief Complaint   Patient presents with    Psychiatric Evaluation     group home report stated pt has SI  pt states she has "been having problems with her parents and has been feeling bad " states she just wishes to talk to somebody  denies SI/HI/AH/VH at this time  This is a 79-year-old female presenting for evaluation of self-harm  The patient states that she is under lot of stress because her parents are spitting up and that the trigger as well as his staff member currently under investigation  She normally hit herself in the head however today she was hitting her chest so was sent in  She is under 24 hour staff observation as well as prevention of self-harm, prevention of sharp objects  Hx limited 2/2 cerebral palsy  PMH:  - Cerebral palsy  - GERD  - Anxiety  - Depression  - Brain lesion  PSH:  - leg surger  -  shunt  - nose surgery  No smoking drinking drugs  PE:  Vitals:    03/27/19 1654 03/27/19 1700   BP: 143/75    BP Location: Right arm    Pulse: 102    Resp: 16    Temp:  (!) 97 4 °F (36 3 °C)   TempSrc:  Oral   SpO2: 98%    General: VSS, NAD, awake, alert  Well-nourished, well-developed  Appears stated age  Head: Normocephalic, atraumatic, nontender  Eyes: PERRL, EOM-I  No diplopia  No hyphema  No subconjunctival hemorrhages  Symmetrical lids  ENT: Atraumatic external nose and ears  Mildly dry MM  No malocclusion  No stridor  Normal phonation  No drooling  Normal swallowing  Neck: Symmetric, trachea midline  No JVD  CV: RRR  +S1/S2  No murmurs or gallops  Peripheral pulses +2 throughout  No chest wall tenderness  Lungs:   Unlabored No retractions  CTAB, lungs sounds equal bilateral    No tachypnea  Abd: +BS, soft, NT/ND    MSK:   FROM   Back:   No rashes  Skin: Dry, intact  Neuro: Awake alert   CN II-XII grossly intact  Motor grossly intact  Psychiatric/Behavioral: Appropriate mood and affect   Exam: deferred  A:  - Self harm? P:  - Doesn't meet inpatient criteria  Has provisions in place to prevent self harm  - Rec close f/u by outpatient psychiatrist  - 13 point ROS was performed and all are normal unless stated in the history above  - Nursing note reviewed  Vitals reviewed  - Orders placed by myself and/or advanced practitioner / resident     - Previous chart was reviewed  - No language barrier    - History obtained from patient  - There are limitations to the history obtained  Reasons ROS could not be obtained: MR  - Critical care time: Not applicable for this patient  Final Diagnosis:  1  Agitation         Medications   LORazepam (ATIVAN) tablet 0 5 mg (0 5 mg Oral Given 3/27/19 1806)     No orders to display     No orders of the defined types were placed in this encounter  Labs Reviewed - No data to display  Time reflects when diagnosis was documented in both MDM as applicable and the Disposition within this note     Time User Action Codes Description Comment    3/27/2019  6:33 PM Freddy Taylor [R45 1] Agitation       ED Disposition     ED Disposition Condition Date/Time Comment    Discharge Stable Wed Mar 27, 2019  6:33 PM Storm Lamas discharge to home/self care              MD Documentation      Most Recent Value   Sending MD Kimber Aguirre      Follow-up Information     Follow up With Specialties Details Why Contact Info Additional [address] Emergency Department Emergency Medicine Go to  As needed, If symptoms worsen, for re-evaluation [address] [place]  [phone] [address] [facility], [address] [place] [address], [phone]    Your psychiatrist  Schedule an appointment as soon as possible for a visit in 2 days for re-evaluation          Patient's Medications   Discharge Prescriptions    No medications on file     No discharge procedures on file  Prior to Admission Medications   Prescriptions Last Dose Informant Patient Reported? Taking?    ARIPiprazole (ABILIFY) 20 MG tablet  Care Giver No No   Sig: Take 1 tablet (20 mg total) by mouth daily at bedtime for 30 days at 9pm    Cholecalciferol (VITAMIN D-3) 1000 units CAPS  Care Giver No No   Sig: Take 2 capsules (2,000 Units total) by mouth daily at 8am    Mouthwashes (LISTERINE ANTISEPTIC) LIQD  Care Giver No No   Sig: Apply 1 application to the mouth or throat 2 (two) times a day for 30 days   Multiple Vitamins-Minerals (CERTAVITE/ANTIOXIDANTS) TABS  Care Giver No No   Sig: Take 1 tablet by mouth daily   RA SUNSCREEN SPF50 LOTN  Care Giver No No   Sig: Apply 15 minutes before sun exposure and every 2 hours   acetaminophen (TYLENOL) 500 mg tablet  Care Giver Yes No   Sig: Take 500 mg by mouth every 6 (six) hours as needed for mild pain   aluminum-magnesium hydroxide-simethicone (MAALOX) 200-200-20 MG/5ML SUSP  Care Giver No No   Sig: Take 15 mL by mouth 4 (four) times a day (before meals and at bedtime)   bismuth subsalicylate (PEPTO BISMOL) 524 mg/30 mL oral suspension   No No   Sig: Take 15 mL (262 mg total) by mouth every 6 (six) hours as needed for indigestion   calcium carbonate (TUMS) 500 mg chewable tablet  Care Giver Yes No   Sig: Chew 1 tablet 3 (three) times a day as needed     clotrimazole (LOTRIMIN) 1 % cream  Care Giver No No   Sig: Apply 1 g (1 application total) topically 3 (three) times a day as needed (fungal irritation)   dextromethorphan-guaiFENesin (ROBITUSSIN DM)  mg/5 mL syrup   No No Sig: Take 5 mL by mouth 3 (three) times a day as needed for cough   escitalopram (LEXAPRO) 20 mg tablet  Care Giver No No   Sig: Take 1 tablet (20 mg total) by mouth daily for 30 days at 8am    fluticasone (FLONASE) 50 mcg/act nasal spray   No No   Si spray into each nostril daily At 8:00 AM   ibuprofen (MOTRIN) 400 mg tablet  Care Giver Yes No   Sig: Take by mouth every 8 (eight) hours as needed for mild pain   lubiprostone (AMITIZA) 24 mcg capsule  Care Giver Yes No   Sig: Take 24 mcg by mouth 2 (two) times a day with meals   magnesium hydroxide (GNP MILK OF MAGNESIA) 400 mg/5 mL oral suspension  Care Giver No No   Sig: Take 30 mL by mouth daily as needed for constipation If no BM in 3 days   metoclopramide (REGLAN) 10 mg tablet   No No   Sig: Take one tablet by mouth every 8 hours as needed for nausea   omeprazole (PriLOSEC) 20 mg delayed release capsule  Care Giver No No   Sig: Take 1 capsule (20 mg total) by mouth daily   polyethylene glycol (MIRALAX) 17 g packet   No No   Sig: Take one packet daily as needed for no bowel movement in 24 hours   senna-docusate sodium (SENEXON-S) 8 6-50 mg per tablet  Care Giver No No   Sig: Take 1 tablet by mouth daily at 8am    sodium chloride (OCEAN) 0 65 % nasal spray   No No   Si spray into each nostril as needed (three times daily as needed for nasal congestion)   traZODone (DESYREL) 50 mg tablet  Care Giver No No   Sig: Take 1 tablet (50 mg total) by mouth daily at bedtime at 9pm   zinc oxide (DESITIN) 13 % cream  Care Giver No No   Sig: Apply 1 application topically as needed (for perianal irritation)      Facility-Administered Medications: None       Portions of the record may have been created with voice recognition software  Occasional wrong word or "sound a like" substitutions may have occurred due to the inherent limitations of voice recognition software   Read the chart carefully and recognize, using context, where substitutions have occurred      Electronically signed by:  Andre Lara

## 2019-03-27 NOTE — DISCHARGE INSTRUCTIONS
At this time the patient has not demonstrated that she is a danger to herself or to others and currently is safe and stable to return to the group home  Encourage stress relieving strategies   Follow up with psychiatrist

## 2019-03-28 ENCOUNTER — TELEPHONE (OUTPATIENT)
Dept: FAMILY MEDICINE CLINIC | Facility: CLINIC | Age: 39
End: 2019-03-28

## 2019-03-28 DIAGNOSIS — R13.10 DYSPHAGIA, UNSPECIFIED TYPE: ICD-10-CM

## 2019-03-28 DIAGNOSIS — R06.02 SOB (SHORTNESS OF BREATH): ICD-10-CM

## 2019-03-28 NOTE — TELEPHONE ENCOUNTER
[facility] pharmacy requesting refill    Antacid liquid LDR Take 1 tablespoon (15ml) by mouth 4x daily @ 8a-n-4p before meals and 8pm    Please advise  Thank you

## 2019-03-29 ENCOUNTER — HOSPITAL ENCOUNTER (EMERGENCY)
Facility: HOSPITAL | Age: 39
Discharge: HOME | End: 2019-03-30
Attending: EMERGENCY MEDICINE
Payer: MEDICARE

## 2019-03-29 VITALS
SYSTOLIC BLOOD PRESSURE: 112 MMHG | OXYGEN SATURATION: 98 % | DIASTOLIC BLOOD PRESSURE: 59 MMHG | TEMPERATURE: 98.6 F | RESPIRATION RATE: 18 BRPM | HEART RATE: 85 BPM

## 2019-03-29 DIAGNOSIS — F32.A DEPRESSION: ICD-10-CM

## 2019-03-29 DIAGNOSIS — S09.90XA CHI (CLOSED HEAD INJURY): Primary | ICD-10-CM

## 2019-03-29 PROCEDURE — 99284 EMERGENCY DEPT VISIT MOD MDM: CPT

## 2019-03-29 RX ORDER — NORGESTIMATE AND ETHINYL ESTRADIOL 0.25-0.035
1 KIT ORAL DAILY
COMMUNITY
End: 2019-06-06 | Stop reason: SDUPTHER

## 2019-03-29 RX ORDER — ALUMINUM HYDROXIDE, MAGNESIUM HYDROXIDE, SIMETHICONE 400; 400; 40 MG/10ML; MG/10ML; MG/10ML
15 SUSPENSION ORAL
Qty: 710 ML | Refills: 5 | Status: SHIPPED | OUTPATIENT
Start: 2019-03-29 | End: 2019-05-16 | Stop reason: SDUPTHER

## 2019-03-29 NOTE — TELEPHONE ENCOUNTER
Patients caregiver Lilliam Cue called stating patient has zero medication left and would also like to know if we can dispense 4 to 5 bottle so she doesn't have to call every week for refill  I have also made jeff aware when requesting medication refill we need at least 24 to 48 notice       Jeff's call back #  [phone]

## 2019-03-29 NOTE — TELEPHONE ENCOUNTER
Please call pharmacy to clarify which medication as the medication listed in this note does not match her med list  Her med list has Maalox 15ml 4x/day and Pepto bismol 15ml 4x prn  Thank you  Sent to preceptor bin with response

## 2019-04-02 RX ORDER — DIAPER,BRIEF,INFANT-TODD,DISP
1 EACH MISCELLANEOUS 2 TIMES DAILY
COMMUNITY
End: 2019-11-18

## 2019-04-02 RX ORDER — DIPHENOXYLATE HYDROCHLORIDE AND ATROPINE SULFATE 2.5; .025 MG/1; MG/1
1 TABLET ORAL DAILY
COMMUNITY
End: 2019-04-10 | Stop reason: SDUPTHER

## 2019-04-03 ENCOUNTER — HOSPITAL ENCOUNTER (OUTPATIENT)
Dept: RADIOLOGY | Facility: HOSPITAL | Age: 39
Discharge: HOME | End: 2019-04-03
Payer: MEDICARE

## 2019-04-03 ENCOUNTER — ANESTHESIA EVENT (OUTPATIENT)
Dept: RADIOLOGY | Facility: HOSPITAL | Age: 39
End: 2019-04-03

## 2019-04-03 ENCOUNTER — ANESTHESIA (OUTPATIENT)
Dept: RADIOLOGY | Facility: HOSPITAL | Age: 39
End: 2019-04-03

## 2019-04-03 VITALS
HEIGHT: 57 IN | BODY MASS INDEX: 36.24 KG/M2 | TEMPERATURE: 99 F | DIASTOLIC BLOOD PRESSURE: 75 MMHG | OXYGEN SATURATION: 100 % | HEART RATE: 78 BPM | RESPIRATION RATE: 20 BRPM | WEIGHT: 168 LBS | SYSTOLIC BLOOD PRESSURE: 130 MMHG

## 2019-04-03 DIAGNOSIS — D49.7 PITUITARY NEOPLASM: ICD-10-CM

## 2019-04-03 DIAGNOSIS — E23.6 PITUITARY CYST (HCC): ICD-10-CM

## 2019-04-03 DIAGNOSIS — D35.2 PITUITARY MICROADENOMA (HCC): ICD-10-CM

## 2019-04-03 LAB — EXT PREGNANCY TEST URINE: NEGATIVE

## 2019-04-03 PROCEDURE — A9585 GADOBUTROL INJECTION: HCPCS | Performed by: OBSTETRICS & GYNECOLOGY

## 2019-04-03 PROCEDURE — 81025 URINE PREGNANCY TEST: CPT | Performed by: ANESTHESIOLOGY

## 2019-04-03 PROCEDURE — 70553 MRI BRAIN STEM W/O & W/DYE: CPT

## 2019-04-03 RX ORDER — SODIUM CHLORIDE, SODIUM LACTATE, POTASSIUM CHLORIDE, CALCIUM CHLORIDE 600; 310; 30; 20 MG/100ML; MG/100ML; MG/100ML; MG/100ML
125 INJECTION, SOLUTION INTRAVENOUS CONTINUOUS
Status: DISCONTINUED | OUTPATIENT
Start: 2019-04-03 | End: 2019-04-04 | Stop reason: HOSPADM

## 2019-04-03 RX ORDER — LIDOCAINE HYDROCHLORIDE 10 MG/ML
INJECTION, SOLUTION INFILTRATION; PERINEURAL AS NEEDED
Status: DISCONTINUED | OUTPATIENT
Start: 2019-04-03 | End: 2019-04-03 | Stop reason: SURG

## 2019-04-03 RX ORDER — ONDANSETRON 2 MG/ML
INJECTION INTRAMUSCULAR; INTRAVENOUS AS NEEDED
Status: DISCONTINUED | OUTPATIENT
Start: 2019-04-03 | End: 2019-04-03 | Stop reason: SURG

## 2019-04-03 RX ORDER — LIDOCAINE HYDROCHLORIDE 10 MG/ML
0.5 INJECTION, SOLUTION INFILTRATION; PERINEURAL ONCE AS NEEDED
Status: COMPLETED | OUTPATIENT
Start: 2019-04-03 | End: 2019-04-03

## 2019-04-03 RX ORDER — PROPOFOL 10 MG/ML
INJECTION, EMULSION INTRAVENOUS AS NEEDED
Status: DISCONTINUED | OUTPATIENT
Start: 2019-04-03 | End: 2019-04-03 | Stop reason: SURG

## 2019-04-03 RX ORDER — DEXAMETHASONE SODIUM PHOSPHATE 10 MG/ML
INJECTION, SOLUTION INTRAMUSCULAR; INTRAVENOUS AS NEEDED
Status: DISCONTINUED | OUTPATIENT
Start: 2019-04-03 | End: 2019-04-03 | Stop reason: SURG

## 2019-04-03 RX ORDER — METOCLOPRAMIDE HYDROCHLORIDE 5 MG/ML
10 INJECTION INTRAMUSCULAR; INTRAVENOUS ONCE
Status: COMPLETED | OUTPATIENT
Start: 2019-04-03 | End: 2019-04-03

## 2019-04-03 RX ADMIN — LIDOCAINE HYDROCHLORIDE 40 MG: 10 INJECTION, SOLUTION INFILTRATION; PERINEURAL at 11:00

## 2019-04-03 RX ADMIN — LIDOCAINE HYDROCHLORIDE 0.5 ML: 10 INJECTION, SOLUTION INFILTRATION; PERINEURAL at 10:04

## 2019-04-03 RX ADMIN — METOCLOPRAMIDE 10 MG: 5 INJECTION, SOLUTION INTRAMUSCULAR; INTRAVENOUS at 10:10

## 2019-04-03 RX ADMIN — GADOBUTROL 8 ML: 604.72 INJECTION INTRAVENOUS at 12:10

## 2019-04-03 RX ADMIN — ONDANSETRON 4 MG: 2 INJECTION INTRAMUSCULAR; INTRAVENOUS at 11:46

## 2019-04-03 RX ADMIN — SODIUM CHLORIDE, SODIUM LACTATE, POTASSIUM CHLORIDE, AND CALCIUM CHLORIDE: .6; .31; .03; .02 INJECTION, SOLUTION INTRAVENOUS at 10:55

## 2019-04-03 RX ADMIN — PROPOFOL 200 MG: 10 INJECTION, EMULSION INTRAVENOUS at 11:00

## 2019-04-03 RX ADMIN — DEXAMETHASONE SODIUM PHOSPHATE 10 MG: 10 INJECTION, SOLUTION INTRAMUSCULAR; INTRAVENOUS at 11:46

## 2019-04-03 RX ADMIN — SODIUM CHLORIDE, SODIUM LACTATE, POTASSIUM CHLORIDE, AND CALCIUM CHLORIDE 125 ML/HR: .6; .31; .03; .02 INJECTION, SOLUTION INTRAVENOUS at 10:06

## 2019-04-05 ENCOUNTER — OFFICE VISIT (OUTPATIENT)
Dept: NEUROSURGERY | Facility: CLINIC | Age: 39
End: 2019-04-05
Payer: MEDICARE

## 2019-04-05 VITALS
RESPIRATION RATE: 16 BRPM | SYSTOLIC BLOOD PRESSURE: 104 MMHG | WEIGHT: 168 LBS | TEMPERATURE: 98.6 F | HEART RATE: 80 BPM | BODY MASS INDEX: 36.24 KG/M2 | HEIGHT: 57 IN | DIASTOLIC BLOOD PRESSURE: 70 MMHG

## 2019-04-05 DIAGNOSIS — D35.2 PITUITARY MICROADENOMA (HCC): Primary | ICD-10-CM

## 2019-04-05 DIAGNOSIS — D35.2: ICD-10-CM

## 2019-04-05 PROCEDURE — 99213 OFFICE O/P EST LOW 20 MIN: CPT | Performed by: NEUROLOGICAL SURGERY

## 2019-04-05 RX ORDER — CLOTRIMAZOLE 1 %
CREAM (GRAM) TOPICAL
COMMUNITY
End: 2020-02-07 | Stop reason: SDUPTHER

## 2019-04-05 RX ORDER — NORGESTIMATE AND ETHINYL ESTRADIOL 0.25-0.035
KIT ORAL
COMMUNITY
End: 2019-04-10 | Stop reason: SDUPTHER

## 2019-04-05 RX ORDER — ARIPIPRAZOLE 20 MG/1
TABLET ORAL
COMMUNITY
End: 2019-11-18

## 2019-04-05 RX ORDER — METOCLOPRAMIDE 10 MG/1
TABLET ORAL
COMMUNITY
End: 2020-01-20

## 2019-04-10 ENCOUNTER — OFFICE VISIT (OUTPATIENT)
Dept: GASTROENTEROLOGY | Facility: MEDICAL CENTER | Age: 39
End: 2019-04-10
Payer: MEDICARE

## 2019-04-10 VITALS
TEMPERATURE: 97.7 F | DIASTOLIC BLOOD PRESSURE: 70 MMHG | HEIGHT: 57 IN | BODY MASS INDEX: 35.6 KG/M2 | SYSTOLIC BLOOD PRESSURE: 112 MMHG | HEART RATE: 102 BPM | WEIGHT: 165 LBS

## 2019-04-10 DIAGNOSIS — R13.10 DYSPHAGIA, UNSPECIFIED TYPE: ICD-10-CM

## 2019-04-10 DIAGNOSIS — K59.04 CHRONIC IDIOPATHIC CONSTIPATION: Primary | ICD-10-CM

## 2019-04-10 DIAGNOSIS — K21.9 GASTROESOPHAGEAL REFLUX DISEASE WITHOUT ESOPHAGITIS: ICD-10-CM

## 2019-04-10 PROCEDURE — 99214 OFFICE O/P EST MOD 30 MIN: CPT | Performed by: INTERNAL MEDICINE

## 2019-04-10 RX ORDER — OMEPRAZOLE 20 MG/1
40 CAPSULE, DELAYED RELEASE ORAL DAILY
Qty: 60 CAPSULE | Refills: 1 | Status: SHIPPED | OUTPATIENT
Start: 2019-04-10 | End: 2019-05-02 | Stop reason: SDUPTHER

## 2019-04-10 RX ORDER — OMEPRAZOLE 20 MG/1
40 CAPSULE, DELAYED RELEASE ORAL DAILY
Qty: 60 CAPSULE | Refills: 1 | Status: SHIPPED | OUTPATIENT
Start: 2019-04-10 | End: 2019-04-10 | Stop reason: SDUPTHER

## 2019-04-15 ENCOUNTER — HOSPITAL ENCOUNTER (OUTPATIENT)
Dept: RADIOLOGY | Facility: HOSPITAL | Age: 39
Discharge: HOME | End: 2019-04-15
Attending: INTERNAL MEDICINE
Payer: MEDICARE

## 2019-04-15 DIAGNOSIS — R13.10 DYSPHAGIA, UNSPECIFIED TYPE: ICD-10-CM

## 2019-04-15 PROCEDURE — 74220 X-RAY XM ESOPHAGUS 1CNTRST: CPT

## 2019-04-18 ENCOUNTER — HOSPITAL ENCOUNTER (OUTPATIENT)
Dept: RADIOLOGY | Facility: HOSPITAL | Age: 39
Discharge: HOME | End: 2019-04-18
Attending: INTERNAL MEDICINE
Payer: MEDICARE

## 2019-04-18 DIAGNOSIS — R13.10 DYSPHAGIA, UNSPECIFIED TYPE: ICD-10-CM

## 2019-04-18 PROCEDURE — 74230 X-RAY XM SWLNG FUNCJ C+: CPT

## 2019-04-18 PROCEDURE — G8998 SWALLOW D/C STATUS: HCPCS

## 2019-04-18 PROCEDURE — G8997 SWALLOW GOAL STATUS: HCPCS

## 2019-04-18 PROCEDURE — 92611 MOTION FLUOROSCOPY/SWALLOW: CPT

## 2019-04-18 PROCEDURE — G8996 SWALLOW CURRENT STATUS: HCPCS

## 2019-04-19 ENCOUNTER — TELEPHONE (OUTPATIENT)
Dept: FAMILY MEDICINE CLINIC | Facility: CLINIC | Age: 39
End: 2019-04-19

## 2019-04-23 DIAGNOSIS — K08.9 TOOTH DISORDER: ICD-10-CM

## 2019-04-23 RX ORDER — EUCALYP/ME-SALICYLATE/MEN/THYM
1 MOUTHWASH MUCOUS MEMBRANE 2 TIMES DAILY
Qty: 500 ML | Refills: 3 | Status: SHIPPED | OUTPATIENT
Start: 2019-04-23 | End: 2019-10-01 | Stop reason: SDUPTHER

## 2019-04-24 ENCOUNTER — TELEPHONE (OUTPATIENT)
Dept: FAMILY MEDICINE CLINIC | Facility: CLINIC | Age: 39
End: 2019-04-24

## 2019-05-02 DIAGNOSIS — J30.89 NON-SEASONAL ALLERGIC RHINITIS, UNSPECIFIED TRIGGER: ICD-10-CM

## 2019-05-02 DIAGNOSIS — J00 COMMON COLD VIRUS: ICD-10-CM

## 2019-05-02 DIAGNOSIS — K21.9 GASTROESOPHAGEAL REFLUX DISEASE WITHOUT ESOPHAGITIS: ICD-10-CM

## 2019-05-02 DIAGNOSIS — L29.0 PERIANAL IRRITATION: ICD-10-CM

## 2019-05-02 DIAGNOSIS — K59.00 CONSTIPATION, UNSPECIFIED CONSTIPATION TYPE: ICD-10-CM

## 2019-05-02 RX ORDER — OMEPRAZOLE 20 MG/1
40 CAPSULE, DELAYED RELEASE ORAL DAILY
Qty: 60 CAPSULE | Refills: 5 | Status: SHIPPED | OUTPATIENT
Start: 2019-05-02 | End: 2019-05-29

## 2019-05-02 RX ORDER — GUAIFENESIN/DEXTROMETHORPHAN 100-10MG/5
5 SYRUP ORAL 3 TIMES DAILY PRN
Qty: 118 ML | Refills: 1 | Status: SHIPPED | OUTPATIENT
Start: 2019-05-02 | End: 2019-06-06 | Stop reason: SDUPTHER

## 2019-05-02 RX ORDER — FLUTICASONE PROPIONATE 50 MCG
1 SPRAY, SUSPENSION (ML) NASAL DAILY
Qty: 1 BOTTLE | Refills: 5 | Status: SHIPPED | OUTPATIENT
Start: 2019-05-02 | End: 2019-05-02 | Stop reason: SDUPTHER

## 2019-05-03 ENCOUNTER — TELEPHONE (OUTPATIENT)
Dept: FAMILY MEDICINE CLINIC | Facility: CLINIC | Age: 39
End: 2019-05-03

## 2019-05-03 RX ORDER — FLUTICASONE PROPIONATE 50 MCG
1 SPRAY, SUSPENSION (ML) NASAL DAILY
Qty: 1 BOTTLE | Refills: 5 | Status: SHIPPED | OUTPATIENT
Start: 2019-05-03 | End: 2019-06-06 | Stop reason: SDUPTHER

## 2019-05-08 ENCOUNTER — TELEPHONE (OUTPATIENT)
Dept: FAMILY MEDICINE CLINIC | Facility: CLINIC | Age: 39
End: 2019-05-08

## 2019-05-08 ENCOUNTER — ANESTHESIA EVENT (OUTPATIENT)
Dept: GASTROENTEROLOGY | Facility: HOSPITAL | Age: 39
End: 2019-05-08
Payer: MEDICARE

## 2019-05-09 ENCOUNTER — HOSPITAL ENCOUNTER (OUTPATIENT)
Facility: HOSPITAL | Age: 39
Setting detail: OUTPATIENT SURGERY
Discharge: HOME | End: 2019-05-09
Attending: INTERNAL MEDICINE | Admitting: INTERNAL MEDICINE
Payer: MEDICARE

## 2019-05-09 ENCOUNTER — ANESTHESIA (OUTPATIENT)
Dept: GASTROENTEROLOGY | Facility: HOSPITAL | Age: 39
End: 2019-05-09
Payer: MEDICARE

## 2019-05-09 VITALS
HEART RATE: 71 BPM | DIASTOLIC BLOOD PRESSURE: 69 MMHG | RESPIRATION RATE: 18 BRPM | SYSTOLIC BLOOD PRESSURE: 122 MMHG | OXYGEN SATURATION: 99 % | BODY MASS INDEX: 35.6 KG/M2 | WEIGHT: 165 LBS | TEMPERATURE: 98.6 F | HEIGHT: 57 IN

## 2019-05-09 DIAGNOSIS — K21.9 GASTROESOPHAGEAL REFLUX DISEASE WITHOUT ESOPHAGITIS: ICD-10-CM

## 2019-05-09 DIAGNOSIS — R13.10 DYSPHAGIA, UNSPECIFIED TYPE: ICD-10-CM

## 2019-05-09 PROCEDURE — 43239 EGD BIOPSY SINGLE/MULTIPLE: CPT | Performed by: INTERNAL MEDICINE

## 2019-05-09 PROCEDURE — 88305 TISSUE EXAM BY PATHOLOGIST: CPT | Performed by: PATHOLOGY

## 2019-05-09 RX ORDER — SODIUM CHLORIDE 9 MG/ML
125 INJECTION, SOLUTION INTRAVENOUS CONTINUOUS
Status: DISCONTINUED | OUTPATIENT
Start: 2019-05-09 | End: 2019-05-10 | Stop reason: HOSPADM

## 2019-05-09 RX ORDER — LIDOCAINE HYDROCHLORIDE 20 MG/ML
INJECTION, SOLUTION EPIDURAL; INFILTRATION; INTRACAUDAL; PERINEURAL AS NEEDED
Status: DISCONTINUED | OUTPATIENT
Start: 2019-05-09 | End: 2019-05-09 | Stop reason: SURG

## 2019-05-09 RX ORDER — MIDAZOLAM HYDROCHLORIDE 1 MG/ML
INJECTION INTRAMUSCULAR; INTRAVENOUS AS NEEDED
Status: DISCONTINUED | OUTPATIENT
Start: 2019-05-09 | End: 2019-05-09 | Stop reason: SURG

## 2019-05-09 RX ORDER — PROPOFOL 10 MG/ML
INJECTION, EMULSION INTRAVENOUS AS NEEDED
Status: DISCONTINUED | OUTPATIENT
Start: 2019-05-09 | End: 2019-05-09 | Stop reason: SURG

## 2019-05-09 RX ORDER — KETAMINE HYDROCHLORIDE 50 MG/ML
INJECTION, SOLUTION, CONCENTRATE INTRAMUSCULAR; INTRAVENOUS AS NEEDED
Status: DISCONTINUED | OUTPATIENT
Start: 2019-05-09 | End: 2019-05-09 | Stop reason: SURG

## 2019-05-09 RX ADMIN — KETAMINE HYDROCHLORIDE 20 MG: 50 INJECTION INTRAMUSCULAR; INTRAVENOUS at 11:39

## 2019-05-09 RX ADMIN — MIDAZOLAM HYDROCHLORIDE 2 MG: 1 INJECTION, SOLUTION INTRAMUSCULAR; INTRAVENOUS at 11:39

## 2019-05-09 RX ADMIN — SODIUM CHLORIDE 125 ML/HR: 0.9 INJECTION, SOLUTION INTRAVENOUS at 11:25

## 2019-05-09 RX ADMIN — PROPOFOL 100 MG: 10 INJECTION, EMULSION INTRAVENOUS at 11:39

## 2019-05-09 RX ADMIN — LIDOCAINE HYDROCHLORIDE 5 ML: 20 INJECTION, SOLUTION EPIDURAL; INFILTRATION; INTRACAUDAL; PERINEURAL at 11:39

## 2019-05-09 RX ADMIN — PROPOFOL 20 MG: 10 INJECTION, EMULSION INTRAVENOUS at 11:42

## 2019-05-14 ENCOUNTER — TELEPHONE (OUTPATIENT)
Dept: FAMILY MEDICINE CLINIC | Facility: CLINIC | Age: 39
End: 2019-05-14

## 2019-05-15 ENCOUNTER — HOSPITAL ENCOUNTER (EMERGENCY)
Facility: HOSPITAL | Age: 39
Discharge: HOME | End: 2019-05-15
Attending: EMERGENCY MEDICINE | Admitting: EMERGENCY MEDICINE
Payer: MEDICARE

## 2019-05-15 ENCOUNTER — APPOINTMENT (EMERGENCY)
Dept: RADIOLOGY | Facility: HOSPITAL | Age: 39
End: 2019-05-15
Payer: MEDICARE

## 2019-05-15 VITALS
SYSTOLIC BLOOD PRESSURE: 122 MMHG | TEMPERATURE: 98.1 F | OXYGEN SATURATION: 100 % | DIASTOLIC BLOOD PRESSURE: 69 MMHG | RESPIRATION RATE: 18 BRPM | HEART RATE: 77 BPM

## 2019-05-15 DIAGNOSIS — R07.9 CHEST PAIN, UNSPECIFIED: Primary | ICD-10-CM

## 2019-05-15 LAB
ANION GAP SERPL CALCULATED.3IONS-SCNC: 8 MMOL/L (ref 4–13)
ATRIAL RATE: 98 BPM
BASOPHILS # BLD AUTO: 0.03 THOUSANDS/ΜL (ref 0–0.1)
BASOPHILS NFR BLD AUTO: 0 % (ref 0–1)
BUN SERPL-MCNC: 13 MG/DL (ref 5–25)
CALCIUM SERPL-MCNC: 7.5 MG/DL (ref 8.3–10.1)
CHLORIDE SERPL-SCNC: 111 MMOL/L (ref 100–108)
CO2 SERPL-SCNC: 21 MMOL/L (ref 21–32)
CREAT SERPL-MCNC: 0.72 MG/DL (ref 0.6–1.3)
EOSINOPHIL # BLD AUTO: 0.07 THOUSAND/ΜL (ref 0–0.61)
EOSINOPHIL NFR BLD AUTO: 1 % (ref 0–6)
ERYTHROCYTE [DISTWIDTH] IN BLOOD BY AUTOMATED COUNT: 13.5 % (ref 11.6–15.1)
EXT PREG TEST URINE: NEGATIVE
GFR SERPL CREATININE-BSD FRML MDRD: 105 ML/MIN/1.73SQ M
GLUCOSE SERPL-MCNC: 100 MG/DL (ref 65–140)
HCT VFR BLD AUTO: 38.9 % (ref 34.8–46.1)
HGB BLD-MCNC: 12.5 G/DL (ref 11.5–15.4)
IMM GRANULOCYTES # BLD AUTO: 0.02 THOUSAND/UL (ref 0–0.2)
IMM GRANULOCYTES NFR BLD AUTO: 0 % (ref 0–2)
LYMPHOCYTES # BLD AUTO: 2.63 THOUSANDS/ΜL (ref 0.6–4.47)
LYMPHOCYTES NFR BLD AUTO: 32 % (ref 14–44)
MCH RBC QN AUTO: 30.7 PG (ref 26.8–34.3)
MCHC RBC AUTO-ENTMCNC: 32.1 G/DL (ref 31.4–37.4)
MCV RBC AUTO: 96 FL (ref 82–98)
MONOCYTES # BLD AUTO: 0.8 THOUSAND/ΜL (ref 0.17–1.22)
MONOCYTES NFR BLD AUTO: 10 % (ref 4–12)
NEUTROPHILS # BLD AUTO: 4.56 THOUSANDS/ΜL (ref 1.85–7.62)
NEUTS SEG NFR BLD AUTO: 57 % (ref 43–75)
NRBC BLD AUTO-RTO: 0 /100 WBCS
P AXIS: 44 DEGREES
PLATELET # BLD AUTO: 270 THOUSANDS/UL (ref 149–390)
PMV BLD AUTO: 9.5 FL (ref 8.9–12.7)
POTASSIUM SERPL-SCNC: 4.1 MMOL/L (ref 3.5–5.3)
PR INTERVAL: 124 MS
QRS AXIS: 16 DEGREES
QRSD INTERVAL: 76 MS
QT INTERVAL: 364 MS
QTC INTERVAL: 464 MS
RBC # BLD AUTO: 4.07 MILLION/UL (ref 3.81–5.12)
SODIUM SERPL-SCNC: 140 MMOL/L (ref 136–145)
T WAVE AXIS: 49 DEGREES
TROPONIN I SERPL-MCNC: <0.02 NG/ML
VENTRICULAR RATE: 98 BPM
WBC # BLD AUTO: 8.11 THOUSAND/UL (ref 4.31–10.16)

## 2019-05-15 PROCEDURE — 80048 BASIC METABOLIC PNL TOTAL CA: CPT | Performed by: EMERGENCY MEDICINE

## 2019-05-15 PROCEDURE — 93010 ELECTROCARDIOGRAM REPORT: CPT | Performed by: INTERNAL MEDICINE

## 2019-05-15 PROCEDURE — 71046 X-RAY EXAM CHEST 2 VIEWS: CPT

## 2019-05-15 PROCEDURE — 93005 ELECTROCARDIOGRAM TRACING: CPT

## 2019-05-15 PROCEDURE — 81025 URINE PREGNANCY TEST: CPT | Performed by: EMERGENCY MEDICINE

## 2019-05-15 PROCEDURE — 85025 COMPLETE CBC W/AUTO DIFF WBC: CPT | Performed by: EMERGENCY MEDICINE

## 2019-05-15 PROCEDURE — 84484 ASSAY OF TROPONIN QUANT: CPT | Performed by: EMERGENCY MEDICINE

## 2019-05-15 PROCEDURE — 99285 EMERGENCY DEPT VISIT HI MDM: CPT

## 2019-05-15 PROCEDURE — 99284 EMERGENCY DEPT VISIT MOD MDM: CPT | Performed by: EMERGENCY MEDICINE

## 2019-05-15 PROCEDURE — 36415 COLL VENOUS BLD VENIPUNCTURE: CPT | Performed by: EMERGENCY MEDICINE

## 2019-05-15 RX ORDER — DIAZEPAM 5 MG/1
5 TABLET ORAL ONCE
Status: COMPLETED | OUTPATIENT
Start: 2019-05-15 | End: 2019-05-15

## 2019-05-15 RX ORDER — MAGNESIUM HYDROXIDE/ALUMINUM HYDROXICE/SIMETHICONE 120; 1200; 1200 MG/30ML; MG/30ML; MG/30ML
15 SUSPENSION ORAL ONCE
Status: COMPLETED | OUTPATIENT
Start: 2019-05-15 | End: 2019-05-15

## 2019-05-15 RX ORDER — IBUPROFEN 600 MG/1
600 TABLET ORAL ONCE
Status: COMPLETED | OUTPATIENT
Start: 2019-05-15 | End: 2019-05-15

## 2019-05-15 RX ORDER — ACETAMINOPHEN 325 MG/1
975 TABLET ORAL ONCE
Status: COMPLETED | OUTPATIENT
Start: 2019-05-15 | End: 2019-05-15

## 2019-05-15 RX ADMIN — DIAZEPAM 5 MG: 5 TABLET ORAL at 15:51

## 2019-05-15 RX ADMIN — IBUPROFEN 600 MG: 600 TABLET ORAL at 14:38

## 2019-05-15 RX ADMIN — ACETAMINOPHEN 975 MG: 325 TABLET ORAL at 14:38

## 2019-05-15 RX ADMIN — ALUMINUM HYDROXIDE, MAGNESIUM HYDROXIDE, AND SIMETHICONE 15 ML: 200; 200; 20 SUSPENSION ORAL at 14:39

## 2019-05-15 RX ADMIN — LIDOCAINE HYDROCHLORIDE 15 ML: 20 SOLUTION ORAL; TOPICAL at 14:40

## 2019-05-16 ENCOUNTER — OFFICE VISIT (OUTPATIENT)
Dept: FAMILY MEDICINE CLINIC | Facility: CLINIC | Age: 39
End: 2019-05-16

## 2019-05-16 VITALS
SYSTOLIC BLOOD PRESSURE: 130 MMHG | HEIGHT: 57 IN | HEART RATE: 80 BPM | WEIGHT: 188.8 LBS | RESPIRATION RATE: 20 BRPM | TEMPERATURE: 98.7 F | DIASTOLIC BLOOD PRESSURE: 80 MMHG | BODY MASS INDEX: 40.73 KG/M2

## 2019-05-16 DIAGNOSIS — R06.02 SOB (SHORTNESS OF BREATH): ICD-10-CM

## 2019-05-16 DIAGNOSIS — R13.10 DYSPHAGIA, UNSPECIFIED TYPE: ICD-10-CM

## 2019-05-16 DIAGNOSIS — R07.9 CHEST PAIN, UNSPECIFIED TYPE: Primary | ICD-10-CM

## 2019-05-16 DIAGNOSIS — Z09 HOSPITAL DISCHARGE FOLLOW-UP: ICD-10-CM

## 2019-05-16 PROCEDURE — 99213 OFFICE O/P EST LOW 20 MIN: CPT | Performed by: FAMILY MEDICINE

## 2019-05-20 ENCOUNTER — TELEPHONE (OUTPATIENT)
Dept: FAMILY MEDICINE CLINIC | Facility: CLINIC | Age: 39
End: 2019-05-20

## 2019-05-20 RX ORDER — ALUMINUM HYDROXIDE, MAGNESIUM HYDROXIDE, SIMETHICONE 400; 400; 40 MG/10ML; MG/10ML; MG/10ML
SUSPENSION ORAL
Qty: 355 ML | Refills: 0 | Status: SHIPPED | OUTPATIENT
Start: 2019-05-20 | End: 2019-07-01 | Stop reason: SDUPTHER

## 2019-05-23 ENCOUNTER — OFFICE VISIT (OUTPATIENT)
Dept: URGENT CARE | Facility: MEDICAL CENTER | Age: 39
End: 2019-05-23
Payer: MEDICARE

## 2019-05-23 VITALS
OXYGEN SATURATION: 99 % | BODY MASS INDEX: 40.56 KG/M2 | HEIGHT: 57 IN | SYSTOLIC BLOOD PRESSURE: 128 MMHG | HEART RATE: 80 BPM | RESPIRATION RATE: 16 BRPM | TEMPERATURE: 98.1 F | DIASTOLIC BLOOD PRESSURE: 73 MMHG | WEIGHT: 188 LBS

## 2019-05-23 DIAGNOSIS — B37.2 CANDIDIASIS, INTERTRIGO: Primary | ICD-10-CM

## 2019-05-23 PROCEDURE — 99213 OFFICE O/P EST LOW 20 MIN: CPT | Performed by: FAMILY MEDICINE

## 2019-05-23 PROCEDURE — G0463 HOSPITAL OUTPT CLINIC VISIT: HCPCS | Performed by: FAMILY MEDICINE

## 2019-05-23 RX ORDER — KETOCONAZOLE 20 MG/G
CREAM TOPICAL 2 TIMES DAILY
Qty: 15 G | Refills: 0 | Status: SHIPPED | OUTPATIENT
Start: 2019-05-23 | End: 2019-05-23 | Stop reason: SDUPTHER

## 2019-05-23 RX ORDER — KETOCONAZOLE 20 MG/G
CREAM TOPICAL
Qty: 15 G | Refills: 0 | Status: SHIPPED | OUTPATIENT
Start: 2019-05-23 | End: 2019-06-06 | Stop reason: SDUPTHER

## 2019-05-29 ENCOUNTER — OFFICE VISIT (OUTPATIENT)
Dept: GASTROENTEROLOGY | Facility: MEDICAL CENTER | Age: 39
End: 2019-05-29
Payer: MEDICARE

## 2019-05-29 VITALS
TEMPERATURE: 98.4 F | WEIGHT: 188 LBS | HEART RATE: 86 BPM | DIASTOLIC BLOOD PRESSURE: 74 MMHG | SYSTOLIC BLOOD PRESSURE: 122 MMHG | HEIGHT: 57 IN | BODY MASS INDEX: 40.56 KG/M2

## 2019-05-29 DIAGNOSIS — K59.04 CHRONIC IDIOPATHIC CONSTIPATION: ICD-10-CM

## 2019-05-29 DIAGNOSIS — F45.8 FUNCTIONAL DYSPHAGIA: Primary | ICD-10-CM

## 2019-05-29 PROBLEM — K21.9 ESOPHAGEAL REFLUX: Status: RESOLVED | Noted: 2017-08-11 | Resolved: 2019-05-29

## 2019-05-29 PROCEDURE — 99214 OFFICE O/P EST MOD 30 MIN: CPT | Performed by: INTERNAL MEDICINE

## 2019-05-30 ENCOUNTER — TELEPHONE (OUTPATIENT)
Dept: GASTROENTEROLOGY | Facility: AMBULARY SURGERY CENTER | Age: 39
End: 2019-05-30

## 2019-06-04 ENCOUNTER — TELEPHONE (OUTPATIENT)
Dept: FAMILY MEDICINE CLINIC | Facility: CLINIC | Age: 39
End: 2019-06-04

## 2019-06-06 ENCOUNTER — TELEPHONE (OUTPATIENT)
Dept: OBGYN CLINIC | Facility: CLINIC | Age: 39
End: 2019-06-06

## 2019-06-06 ENCOUNTER — OFFICE VISIT (OUTPATIENT)
Dept: FAMILY MEDICINE CLINIC | Facility: CLINIC | Age: 39
End: 2019-06-06

## 2019-06-06 VITALS
HEIGHT: 57 IN | SYSTOLIC BLOOD PRESSURE: 140 MMHG | TEMPERATURE: 99.2 F | RESPIRATION RATE: 16 BRPM | BODY MASS INDEX: 39.48 KG/M2 | WEIGHT: 183 LBS | HEART RATE: 78 BPM | DIASTOLIC BLOOD PRESSURE: 80 MMHG

## 2019-06-06 DIAGNOSIS — J30.89 NON-SEASONAL ALLERGIC RHINITIS, UNSPECIFIED TRIGGER: ICD-10-CM

## 2019-06-06 DIAGNOSIS — K21.00 GASTROESOPHAGEAL REFLUX DISEASE WITH ESOPHAGITIS: ICD-10-CM

## 2019-06-06 DIAGNOSIS — L29.0 PERIANAL IRRITATION: ICD-10-CM

## 2019-06-06 DIAGNOSIS — R52 PAIN: Primary | ICD-10-CM

## 2019-06-06 DIAGNOSIS — B37.2 CANDIDIASIS, INTERTRIGO: ICD-10-CM

## 2019-06-06 DIAGNOSIS — K59.00 CONSTIPATION, UNSPECIFIED CONSTIPATION TYPE: ICD-10-CM

## 2019-06-06 DIAGNOSIS — Z30.09 UNWANTED FERTILITY: Primary | ICD-10-CM

## 2019-06-06 DIAGNOSIS — L30.4 INTERTRIGO: Primary | ICD-10-CM

## 2019-06-06 DIAGNOSIS — J00 COMMON COLD VIRUS: ICD-10-CM

## 2019-06-06 PROCEDURE — 99213 OFFICE O/P EST LOW 20 MIN: CPT | Performed by: FAMILY MEDICINE

## 2019-06-06 RX ORDER — FLUTICASONE PROPIONATE 50 MCG
1 SPRAY, SUSPENSION (ML) NASAL DAILY PRN
Qty: 1 BOTTLE | Refills: 5 | Status: SHIPPED | OUTPATIENT
Start: 2019-06-06 | End: 2020-06-12 | Stop reason: SDUPTHER

## 2019-06-06 RX ORDER — NORGESTIMATE AND ETHINYL ESTRADIOL 0.25-0.035
1 KIT ORAL DAILY
Qty: 28 TABLET | Refills: 1 | Status: SHIPPED | OUTPATIENT
Start: 2019-06-06 | End: 2019-06-17 | Stop reason: SDUPTHER

## 2019-06-06 RX ORDER — KETOCONAZOLE 20 MG/G
CREAM TOPICAL
Qty: 15 G | Refills: 0 | Status: SHIPPED | OUTPATIENT
Start: 2019-06-06 | End: 2019-06-19 | Stop reason: SDUPTHER

## 2019-06-06 RX ORDER — IBUPROFEN 400 MG/1
400 TABLET ORAL EVERY 8 HOURS PRN
Qty: 30 TABLET | Refills: 1 | Status: SHIPPED | OUTPATIENT
Start: 2019-06-06 | End: 2020-06-12 | Stop reason: SDUPTHER

## 2019-06-06 RX ORDER — GUAIFENESIN/DEXTROMETHORPHAN 100-10MG/5
5 SYRUP ORAL 3 TIMES DAILY PRN
Qty: 118 ML | Refills: 1 | Status: SHIPPED | OUTPATIENT
Start: 2019-06-06 | End: 2019-10-01 | Stop reason: SDUPTHER

## 2019-06-17 ENCOUNTER — ANNUAL EXAM (OUTPATIENT)
Dept: OBGYN CLINIC | Facility: CLINIC | Age: 39
End: 2019-06-17

## 2019-06-17 VITALS — DIASTOLIC BLOOD PRESSURE: 70 MMHG | SYSTOLIC BLOOD PRESSURE: 130 MMHG | BODY MASS INDEX: 39.6 KG/M2 | HEIGHT: 57 IN

## 2019-06-17 DIAGNOSIS — N94.6 DYSMENORRHEA: ICD-10-CM

## 2019-06-17 DIAGNOSIS — Z01.419 ENCOUNTER FOR GYNECOLOGICAL EXAMINATION WITHOUT ABNORMAL FINDING: Primary | ICD-10-CM

## 2019-06-17 DIAGNOSIS — Z12.39 SCREENING FOR BREAST CANCER: ICD-10-CM

## 2019-06-17 DIAGNOSIS — Z30.09 UNWANTED FERTILITY: ICD-10-CM

## 2019-06-17 DIAGNOSIS — Z12.4 SCREENING FOR CERVICAL CANCER: ICD-10-CM

## 2019-06-17 PROCEDURE — G0101 CA SCREEN;PELVIC/BREAST EXAM: HCPCS | Performed by: NURSE PRACTITIONER

## 2019-06-17 PROCEDURE — 87624 HPV HI-RISK TYP POOLED RSLT: CPT | Performed by: NURSE PRACTITIONER

## 2019-06-17 PROCEDURE — G0145 SCR C/V CYTO,THINLAYER,RESCR: HCPCS | Performed by: NURSE PRACTITIONER

## 2019-06-17 RX ORDER — NORGESTIMATE AND ETHINYL ESTRADIOL 0.25-0.035
1 KIT ORAL DAILY
Qty: 28 TABLET | Refills: 12 | Status: SHIPPED | OUTPATIENT
Start: 2019-06-17 | End: 2019-11-18

## 2019-06-18 ENCOUNTER — TELEPHONE (OUTPATIENT)
Dept: FAMILY MEDICINE CLINIC | Facility: CLINIC | Age: 39
End: 2019-06-18

## 2019-06-18 NOTE — TELEPHONE ENCOUNTER
Person Directed [employer]orts sent form for review and to be signed        Placed in folder in triage    Thank you

## 2019-06-19 ENCOUNTER — OFFICE VISIT (OUTPATIENT)
Dept: FAMILY MEDICINE CLINIC | Facility: CLINIC | Age: 39
End: 2019-06-19

## 2019-06-19 VITALS
BODY MASS INDEX: 39.31 KG/M2 | SYSTOLIC BLOOD PRESSURE: 110 MMHG | HEIGHT: 57 IN | DIASTOLIC BLOOD PRESSURE: 70 MMHG | HEART RATE: 80 BPM | RESPIRATION RATE: 18 BRPM | WEIGHT: 182.2 LBS | TEMPERATURE: 99.8 F

## 2019-06-19 DIAGNOSIS — R11.11 VOMITING WITHOUT NAUSEA, INTRACTABILITY OF VOMITING NOT SPECIFIED, UNSPECIFIED VOMITING TYPE: ICD-10-CM

## 2019-06-19 DIAGNOSIS — K21.9 GASTROESOPHAGEAL REFLUX DISEASE WITHOUT ESOPHAGITIS: Primary | ICD-10-CM

## 2019-06-19 DIAGNOSIS — B37.2 CANDIDIASIS, INTERTRIGO: ICD-10-CM

## 2019-06-19 PROBLEM — R11.10 EMESIS: Status: ACTIVE | Noted: 2019-06-19

## 2019-06-19 PROCEDURE — 99213 OFFICE O/P EST LOW 20 MIN: CPT | Performed by: FAMILY MEDICINE

## 2019-06-19 RX ORDER — KETOCONAZOLE 20 MG/G
CREAM TOPICAL
Qty: 15 G | Refills: 0 | Status: SHIPPED | OUTPATIENT
Start: 2019-06-19 | End: 2019-11-18

## 2019-06-20 LAB
HPV HR 12 DNA CVX QL NAA+PROBE: NEGATIVE
HPV16 DNA CVX QL NAA+PROBE: NEGATIVE
HPV18 DNA CVX QL NAA+PROBE: NEGATIVE

## 2019-06-21 LAB
LAB AP GYN PRIMARY INTERPRETATION: NORMAL
Lab: NORMAL
PATH INTERP SPEC-IMP: NORMAL

## 2019-07-01 ENCOUNTER — TELEPHONE (OUTPATIENT)
Dept: FAMILY MEDICINE CLINIC | Facility: CLINIC | Age: 39
End: 2019-07-01

## 2019-07-01 DIAGNOSIS — R06.02 SOB (SHORTNESS OF BREATH): ICD-10-CM

## 2019-07-01 DIAGNOSIS — R13.10 DYSPHAGIA, UNSPECIFIED TYPE: ICD-10-CM

## 2019-07-01 RX ORDER — MAGNESIUM HYDROXIDE/ALUMINUM HYDROXICE/SIMETHICONE 120; 1200; 1200 MG/30ML; MG/30ML; MG/30ML
15 SUSPENSION ORAL EVERY 4 HOURS PRN
Qty: 355 ML | Refills: 0 | Status: SHIPPED | OUTPATIENT
Start: 2019-07-01 | End: 2019-07-17 | Stop reason: SDUPTHER

## 2019-07-08 DIAGNOSIS — R06.02 SOB (SHORTNESS OF BREATH): ICD-10-CM

## 2019-07-08 DIAGNOSIS — R13.10 DYSPHAGIA, UNSPECIFIED TYPE: ICD-10-CM

## 2019-07-08 RX ORDER — MAGNESIUM HYDROXIDE/ALUMINUM HYDROXICE/SIMETHICONE 120; 1200; 1200 MG/30ML; MG/30ML; MG/30ML
15 SUSPENSION ORAL EVERY 4 HOURS PRN
Qty: 355 ML | Refills: 2 | OUTPATIENT
Start: 2019-07-08

## 2019-07-09 NOTE — TELEPHONE ENCOUNTER
Please clarify refill request as 2 refills available on this Rx and so medication is accessible to patient without a physician creating a new order  Please have Ms Eugena Claude come in for appointment to clarify how she is doing from her acute on chronic concerns of 6/2019 appointment and plan created on that date    Thanks,

## 2019-07-17 DIAGNOSIS — R13.10 DYSPHAGIA, UNSPECIFIED TYPE: ICD-10-CM

## 2019-07-17 DIAGNOSIS — R06.02 SOB (SHORTNESS OF BREATH): ICD-10-CM

## 2019-07-17 RX ORDER — MAGNESIUM HYDROXIDE/ALUMINUM HYDROXICE/SIMETHICONE 120; 1200; 1200 MG/30ML; MG/30ML; MG/30ML
15 SUSPENSION ORAL EVERY 4 HOURS PRN
Qty: 355 ML | Refills: 5 | Status: SHIPPED | OUTPATIENT
Start: 2019-07-17 | End: 2020-02-07 | Stop reason: SDUPTHER

## 2019-07-31 ENCOUNTER — HOSPITAL ENCOUNTER (EMERGENCY)
Facility: HOSPITAL | Age: 39
Discharge: HOME | End: 2019-07-31
Attending: EMERGENCY MEDICINE | Admitting: EMERGENCY MEDICINE
Payer: MEDICARE

## 2019-07-31 ENCOUNTER — TELEPHONE (OUTPATIENT)
Dept: FAMILY MEDICINE CLINIC | Facility: CLINIC | Age: 39
End: 2019-07-31

## 2019-07-31 VITALS
TEMPERATURE: 97.4 F | RESPIRATION RATE: 16 BRPM | OXYGEN SATURATION: 98 % | HEART RATE: 95 BPM | WEIGHT: 168 LBS | DIASTOLIC BLOOD PRESSURE: 82 MMHG | BODY MASS INDEX: 36.24 KG/M2 | SYSTOLIC BLOOD PRESSURE: 153 MMHG | HEIGHT: 57 IN

## 2019-07-31 DIAGNOSIS — G80.1 SPASTIC DIPLEGIC CEREBRAL PALSY (HCC): ICD-10-CM

## 2019-07-31 DIAGNOSIS — F32.A DEPRESSION: Primary | ICD-10-CM

## 2019-07-31 DIAGNOSIS — H91.8X1 OTHER SPECIFIED HEARING LOSS OF RIGHT EAR, UNSPECIFIED HEARING STATUS ON CONTRALATERAL SIDE: Primary | Chronic | ICD-10-CM

## 2019-07-31 PROCEDURE — 99283 EMERGENCY DEPT VISIT LOW MDM: CPT | Performed by: EMERGENCY MEDICINE

## 2019-07-31 PROCEDURE — 99283 EMERGENCY DEPT VISIT LOW MDM: CPT

## 2019-07-31 NOTE — ED PROVIDER NOTES
History  Chief Complaint   Patient presents with    Psychiatric Evaluation     Pt was talking on the phone with her mother this morning and her mother told her that she had stopped taking her medications, this made pt sad and pt started to hit her head and her leg because she was angry  Pt lives at facility with caregivers  Denies SI/HI  36 yo F with PMH of intellectual disability presenting to the ED with staff from nursing home (Person directed Support) stating that she spoke to her mother today who she found it was living with her grandmother and was stating that she was going to stop taking her medicines which made the patient angry  Patient stating she would like to hurt herself but does not have a plan, does not want to hurt anybody else, does states that she is hearing voices describes whispers which are mean to her but she will not say what they are saying  Denies visual hallucinations alcohol or drug use  Staff member states that she has with 3 staff members at all times  That her phone calls with her mother's are supposed to be monitored as her mother frequently will do this and get patient angry and upset  For some reason the phone call was not monitor today and that upset the patient  Staff member states that she will be safe there and that she is never alone  Prior to Admission Medications   Prescriptions Last Dose Informant Patient Reported? Taking?    ARIPiprazole (ABILIFY) 20 MG tablet  Care Giver No No   Sig: Take 1 tablet (20 mg total) by mouth daily at bedtime for 30 days at 9pm    ARIPiprazole (ABILIFY) 20 MG tablet  Care Giver Yes No   Sig: Abilify   Cholecalciferol (VITAMIN D-3) 1000 units CAPS  Care Giver No No   Sig: Take 2 capsules (2,000 Units total) by mouth daily at 8am    Mouthwashes (LISTERINE ANTISEPTIC) LIQD  Care Giver No No   Sig: Apply 1 application to the mouth or throat 2 (two) times a day for 30 days   Multiple Vitamins-Minerals (CERTAVITE SENIOR/ANTIOXIDANT PO)  Care Giver Yes No   Sig: CertaVite Senior-Antioxidant   RA SUNSCREEN SPF50 LOTN  Care Giver No No   Sig: Apply 15 minutes before sun exposure and every 2 hours   acetaminophen (TYLENOL) 500 mg tablet  Care Giver Yes No   Sig: Take 500 mg by mouth every 6 (six) hours as needed for mild pain   aluminum-magnesium hydroxide-simethicone (ANTACID) 200-200-20 mg/5 mL suspension   No No   Sig: Take 15 mL by mouth every 4 (four) hours as needed for indigestion or heartburn   bacitracin ointment  Care Giver Yes No   Sig: Apply 1 large application topically 2 (two) times a day   bismuth subsalicylate (PEPTO BISMOL) 524 mg/30 mL oral suspension   No No   Sig: Take 15 mL (262 mg total) by mouth every 6 (six) hours as needed for indigestion   calcium carbonate (TUMS) 500 mg chewable tablet  Care Giver Yes No   Sig: Chew 1 tablet 3 (three) times a day as needed     clotrimazole (LOTRIMIN AF) 1 % cream  Care Giver Yes No   Sig: Lotrimin AF   clotrimazole (LOTRIMIN) 1 % cream  Care Giver No No   Sig: Apply 1 g (1 application total) topically 3 (three) times a day as needed (fungal irritation)   dextromethorphan-guaiFENesin (ROBITUSSIN DM)  mg/5 mL syrup   No No   Sig: Take 5 mL by mouth 3 (three) times a day as needed for cough   escitalopram (LEXAPRO) 20 mg tablet  Care Giver No No   Sig: Take 1 tablet (20 mg total) by mouth daily for 30 days at 8am    fluticasone (FLONASE) 50 mcg/act nasal spray   No No   Si spray into each nostril daily as needed for rhinitis (nasal congestion) At 8:00 AM   ibuprofen (MOTRIN) 400 mg tablet   No No   Sig: Take 1 tablet (400 mg total) by mouth every 8 (eight) hours as needed for mild pain   ketoconazole (NIZORAL) 2 % cream   No No   Sig: To be applied at 8:00 a m  And 8:00 p m   Until rash resolves plus additional two days   lubiprostone (AMITIZA) 24 mcg capsule  Care Giver Yes No   Sig: Take 24 mcg by mouth 2 (two) times a day with meals   magnesium hydroxide (GNP MILK OF MAGNESIA) 400 mg/5 mL oral suspension   No No   Sig: Take 30 mL by mouth daily as needed for constipation If no BM in 3 days   metoclopramide (REGLAN) 10 mg tablet  Care Giver No No   Sig: Take one tablet by mouth every 8 hours as needed for nausea   metoclopramide (REGLAN) 10 mg tablet  Care Giver Yes No   Sig: Reglan   norgestimate-ethinyl estradiol (ORTHO-CYCLEN) 0 25-35 MG-MCG per tablet   No No   Sig: Take 1 tablet by mouth daily   polyethylene glycol (MIRALAX) 17 g packet  Care Giver No No   Sig: Take one packet daily as needed for no bowel movement in 24 hours   psyllium (METAMUCIL) 58 6 % packet  Care Giver No No   Sig: Take 1 packet by mouth daily   senna-docusate sodium (SENEXON-S) 8 6-50 mg per tablet  Care Giver No No   Sig: Take 1 tablet by mouth daily at 8am    sodium chloride (OCEAN) 0 65 % nasal spray  Care Giver No No   Si spray into each nostril as needed (three times daily as needed for nasal congestion)   traZODone (DESYREL) 50 mg tablet  Care Giver No No   Sig: Take 1 tablet (50 mg total) by mouth daily at bedtime at 9pm   zinc oxide (DESITIN) 13 % cream   No No   Sig: Apply 1 application topically as needed (for perianal irritation)      Facility-Administered Medications: None       Past Medical History:   Diagnosis Date    Acid reflux     ADD (attention deficit disorder)     Adjustment disorder     Anxiety     Astigmatism     Brain lesion     Brain lesion     Bronchitis     Calcium deficiency     Cellulitis of foot, right 2018    Cerebral palsy (HCC)     Cerebral palsy (HCC)     Last Assessed:2016    Chronic otitis media     Chronically dry eyes, left     Last Assessed:2016    Constipation     Depression     Last Assessed:2016    Depressive disorder     Dry eyes     Dysphagia     Esophagitis     Esotropia     Fall     GERD (gastroesophageal reflux disease)     GERD (gastroesophageal reflux disease)     Hiatal hernia     Hydrocephalus     Hyperopia with astigmatism     Impaired fasting glucose     Left nephrolithiasis 3/4/2019    Mood disorder (HCC)     Myopia     Oppositional defiant disorder     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HCC)     Psychiatric disorder     Seizures (HCC)     Sensorineural hearing loss     Status post ventriculoatrial shunt placement     Visual impairment        Past Surgical History:   Procedure Laterality Date    CSF SHUNT      Creation of Ventriculo-Peritoneal CSF shunt ; Last Assessed:7/6/2016    EAR SURGERY      Last Assessed:7/6/2016    LEG SURGERY      due to CP     NOSE SURGERY      Last Assessed:7/6/2016    ND ESOPHAGOGASTRODUODENOSCOPY TRANSORAL DIAGNOSTIC N/A 5/9/2019    Procedure: ESOPHAGOGASTRODUODENOSCOPY (EGD) with biopsy;  Surgeon: Gary Alvarado MD;  Location: [facility]; Service: Gastroenterology       Family History   Problem Relation Age of Onset    Diabetes Mother     No Known Problems Father      I have reviewed and agree with the history as documented  Social History     Tobacco Use    Smoking status: Never Smoker    Smokeless tobacco: Never Used   Substance Use Topics    Alcohol use: No    Drug use: No        Review of Systems   Constitutional: Negative for activity change, appetite change, chills, diaphoresis, fatigue and fever  HENT: Negative for congestion, nosebleeds, postnasal drip, rhinorrhea, sinus pressure, sinus pain, sneezing and sore throat  Eyes: Negative for redness and visual disturbance  Respiratory: Negative for apnea, cough, chest tightness, shortness of breath, wheezing and stridor  Cardiovascular: Negative for chest pain, palpitations and leg swelling  Gastrointestinal: Negative for abdominal distention, abdominal pain, blood in stool, constipation, diarrhea, nausea and vomiting  Genitourinary: Negative for difficulty urinating, dysuria, flank pain, frequency, hematuria and urgency     Musculoskeletal: Negative for arthralgias, back pain, gait problem, joint swelling, myalgias, neck pain and neck stiffness  Skin: Negative for color change, pallor, rash and wound  Neurological: Negative for dizziness, syncope, facial asymmetry, weakness, light-headedness, numbness and headaches  Psychiatric/Behavioral: Positive for agitation and dysphoric mood  Negative for confusion, decreased concentration, self-injury and suicidal ideas  The patient is nervous/anxious  Physical Exam  ED Triage Vitals [07/31/19 1525]   Temperature Pulse Respirations Blood Pressure SpO2   (!) 97 4 °F (36 3 °C) 95 16 153/82 98 %      Temp Source Heart Rate Source Patient Position - Orthostatic VS BP Location FiO2 (%)   Oral Monitor Sitting Left arm --      Pain Score       No Pain             Orthostatic Vital Signs  Vitals:    07/31/19 1525   BP: 153/82   Pulse: 95   Patient Position - Orthostatic VS: Sitting       Physical Exam   Constitutional: She appears well-developed and well-nourished  No distress  HENT:   Head: Normocephalic and atraumatic  Right Ear: External ear normal    Left Ear: External ear normal    Nose: Nose normal    Eyes: Conjunctivae are normal  Right eye exhibits no discharge  Left eye exhibits no discharge  No scleral icterus  Neck: Normal range of motion  Neck supple  No JVD present  No tracheal deviation present  Cardiovascular: Normal rate, regular rhythm, normal heart sounds and intact distal pulses  Exam reveals no gallop and no friction rub  No murmur heard  Pulmonary/Chest: Effort normal and breath sounds normal  No stridor  No respiratory distress  She has no wheezes  She has no rales  Abdominal: Soft  Bowel sounds are normal  She exhibits no distension and no mass  There is no tenderness  There is no guarding  Musculoskeletal: She exhibits no edema or tenderness  Neurological: She is alert  Skin: Skin is warm and dry  No rash noted  She is not diaphoretic  No erythema  No pallor     Psychiatric: Her speech is normal and behavior is normal  Judgment and thought content normal  Her mood appears anxious  Her affect is not angry, not blunt, not labile and not inappropriate  She exhibits a depressed mood  She expresses no homicidal and no suicidal ideation  She expresses no suicidal plans and no homicidal plans  Nursing note and vitals reviewed  ED Medications  Medications - No data to display    Diagnostic Studies  Results Reviewed     Procedure Component Value Units Date/Time    POCT alcohol breath test [[id number]]     Lab Status:  No result     Rapid drug screen, urine [[id number]]     Lab Status:  No result Specimen:  Urine     POCT pregnancy, urine [[id number]]     Lab Status:  No result     POCT urinalysis dipstick [[id number]]     Lab Status:  No result                  No orders to display         Procedures  Procedures        ED Course                               MDM    Disposition  Final diagnoses:   Depression     Time reflects when diagnosis was documented in both MDM as applicable and the Disposition within this note     Time User Action Codes Description Comment    7/31/2019  4:27 PM Brett Byrne Add [F32 9] Depression       ED Disposition     ED Disposition Condition Date/Time Comment    Discharge Stable Wed Jul 31, 2019  4:27 PM Ayanna Minor discharge to home/self care  Follow-up Information     Follow up With Specialties Details Why Contact Info Additional [address] Emergency Department Emergency Medicine  As needed, If symptoms worsen [address] [facility] ED, [address] [place] [address], [id number]          Patient's Medications   Discharge Prescriptions    No medications on file     No discharge procedures on file  ED Provider  Attending physically available and evaluated Ayanna Rodrigez  I managed the patient along with the ED Attending      Electronically Signed by         Rasta Kelley DO  07/31/19 0178

## 2019-07-31 NOTE — ED ATTENDING ATTESTATION
Dina Atwood DO, saw and evaluated the patient  I have discussed the patient with the resident/non-physician practitioner and agree with the resident's/non-physician practitioner's findings, Plan of Care, and MDM as documented in the resident's/non-physician practitioner's note, except where noted  All available labs and Radiology studies were reviewed  I was present for key portions of any procedure(s) performed by the resident/non-physician practitioner and I was immediately available to provide assistance  At this point I agree with the current assessment done in the Emergency Department  I have conducted an independent evaluation of this patient a history and physical is as follows:    41-year-old female presents for psychiatric evaluation  Patient is mentally disabled  Reportedly had passing threats of self-harm  Denies these to me  No acute distress    Will discharge      Critical Care Time  Procedures

## 2019-08-12 DIAGNOSIS — K59.04 CHRONIC IDIOPATHIC CONSTIPATION: ICD-10-CM

## 2019-08-12 NOTE — TELEPHONE ENCOUNTER
You had seen patient in past, entered order for sign off if you approve as Dr Amol Smalls has not seen patient yet

## 2019-08-12 NOTE — TELEPHONE ENCOUNTER
Homero Ramires patient care taker stated she put a medication request on the prescription line last week Thursday patient is now out of psyllium (METAMUCIL) 58 6 % packet are we able to refill today ? [facility] # [phone]      Thank you in advance

## 2019-08-13 ENCOUNTER — OFFICE VISIT (OUTPATIENT)
Dept: AUDIOLOGY | Age: 39
End: 2019-08-13
Payer: MEDICARE

## 2019-08-13 DIAGNOSIS — H90.3 SENSORY HEARING LOSS, BILATERAL: Primary | ICD-10-CM

## 2019-08-13 PROCEDURE — 92567 TYMPANOMETRY: CPT | Performed by: AUDIOLOGIST

## 2019-08-13 PROCEDURE — 92557 COMPREHENSIVE HEARING TEST: CPT | Performed by: AUDIOLOGIST

## 2019-08-13 NOTE — PROGRESS NOTES
HEARING EVALUATION/HEARING AID VISIT    Name:  Rahat Narayan  :  1980  Age:  35 y o  Date of Evaluation: 19     History: Known Hearing Loss binaurally  Reason for visit: Rahat Narayan is being seen today at the request of Dr Cleve Garcia for an evaluation of hearing  Caregiver reports no issues or concerns regarding hearing sensitivity  Otoscopic Evaluation:   Right Ear: Occluded, Could not visualize tympanic membrane   Left Ear: Moderate cerumen, Could visualize tympanic membrane    Tympanometry:   Right: Type A - normal middle ear pressure and compliance   Left: Type A - normal middle ear pressure and compliance    Audiogram Results:  Pure tone testing revealed a mild sloping to moderate sensorineural hearing loss bilaterally  SRT and PTA are in agreement indicating good test reliability  Word recognition scores were good bilaterally  *see attached audiogram      Rahat Narayan is fit with [employer] Series i110 hearing aid(s)  Right serial number [id number]  Left serial number (Lost)  Warranty date: 21 (Loss/Damage and repair)  Caregiver reports patient does not wear the hearing aids consistently  Caregiver brought in old set of [address]   Left serial number [id number]  Right serial number [id number]  Warranty  2014  Action:  Cleaned and checked three hearing aids  Old set were completely blocked with wax  The left  wire is distorted and needs to be replaced, quoted $100 00  The right hearing aid is functioning normally  The current right BTE hearing aid was completely clogged with wax  Tubing was hard and was replaced  The battery door is broken on the right BTE hearing aid  Sending out for repair under warranty  Wavied the $30 00 office visit charged and reviewed with caregiver that in the future for the old set of hearing aids to be looked at it will be a $30 00  Caregiver gave the okay for the $100 00 new  wire  RECOMMENDATIONS:  Annual hearing eval, Return to [facility]  for F/U and Copy to Patient/Caregiver      Indigo Souza , CCC-A  Clinical Audiologist

## 2019-08-21 NOTE — PROGRESS NOTES
Progress Note    Name:  Hellen Ariza  :  1980  Age:  35 y o  Date of Evaluation: 19     RFF  Lazarus Dutta: [id number]  Replacement device issued     Hearing aid SN: [id number]      Indigo Dixon   Clinical Audiologist

## 2019-08-22 ENCOUNTER — APPOINTMENT (OUTPATIENT)
Dept: AUDIOLOGY | Age: 39
End: 2019-08-22
Payer: COMMERCIAL

## 2019-08-28 ENCOUNTER — DOCUMENTATION (OUTPATIENT)
Dept: AUDIOLOGY | Age: 39
End: 2019-08-28

## 2019-08-28 NOTE — PROGRESS NOTES
Progress Note    Name:  Quang Rm  :  1980  Age:  35 y o  Date of Evaluation: 19     Scanned documents         Indigo Myers   Clinical Audiologist

## 2019-09-05 DIAGNOSIS — K59.00 CONSTIPATION, UNSPECIFIED CONSTIPATION TYPE: Primary | ICD-10-CM

## 2019-09-06 DIAGNOSIS — K59.00 CONSTIPATION, UNSPECIFIED CONSTIPATION TYPE: ICD-10-CM

## 2019-09-06 DIAGNOSIS — Z00.00 ROUTINE HEALTH MAINTENANCE: Primary | ICD-10-CM

## 2019-09-06 RX ORDER — AMOXICILLIN 250 MG
1 CAPSULE ORAL DAILY
Qty: 90 TABLET | Refills: 3 | Status: SHIPPED | OUTPATIENT
Start: 2019-09-06 | End: 2019-10-01 | Stop reason: SDUPTHER

## 2019-09-06 RX ORDER — MULTIVIT-MIN/FA/LYCOPEN/LUTEIN .4-300-25
1 TABLET ORAL DAILY
Qty: 90 TABLET | Refills: 2 | Status: SHIPPED | OUTPATIENT
Start: 2019-09-06 | End: 2019-10-01 | Stop reason: SDUPTHER

## 2019-09-06 NOTE — TELEPHONE ENCOUNTER
Will refill patient medications but please schedule an appointment with me in the office since I'm listed as PCP but never actually seen her

## 2019-09-09 ENCOUNTER — OFFICE VISIT (OUTPATIENT)
Dept: GASTROENTEROLOGY | Facility: MEDICAL CENTER | Age: 39
End: 2019-09-09
Payer: MEDICARE

## 2019-09-09 VITALS
HEART RATE: 97 BPM | WEIGHT: 184 LBS | BODY MASS INDEX: 39.7 KG/M2 | TEMPERATURE: 98.1 F | DIASTOLIC BLOOD PRESSURE: 70 MMHG | HEIGHT: 57 IN | SYSTOLIC BLOOD PRESSURE: 140 MMHG

## 2019-09-09 DIAGNOSIS — E55.9 VITAMIN D DEFICIENCY: ICD-10-CM

## 2019-09-09 DIAGNOSIS — R13.10 DYSPHAGIA, UNSPECIFIED TYPE: ICD-10-CM

## 2019-09-09 DIAGNOSIS — R10.9 ABDOMINAL PAIN, UNSPECIFIED ABDOMINAL LOCATION: Primary | ICD-10-CM

## 2019-09-09 DIAGNOSIS — K59.04 CHRONIC IDIOPATHIC CONSTIPATION: ICD-10-CM

## 2019-09-09 PROCEDURE — 99214 OFFICE O/P EST MOD 30 MIN: CPT | Performed by: PHYSICIAN ASSISTANT

## 2019-09-09 RX ORDER — PANTOPRAZOLE SODIUM 20 MG/1
TABLET, DELAYED RELEASE ORAL
Qty: 30 TABLET | Refills: 3 | Status: SHIPPED | OUTPATIENT
Start: 2019-09-09 | End: 2019-09-09 | Stop reason: SDUPTHER

## 2019-09-09 RX ORDER — PANTOPRAZOLE SODIUM 20 MG/1
TABLET, DELAYED RELEASE ORAL
Qty: 30 TABLET | Refills: 3 | Status: SHIPPED | OUTPATIENT
Start: 2019-09-09 | End: 2019-12-02 | Stop reason: SDUPTHER

## 2019-09-09 RX ORDER — CHOLECALCIFEROL (VITAMIN D3) 25 MCG
2000 CAPSULE ORAL DAILY
Qty: 180 CAPSULE | Refills: 0
Start: 2019-09-09 | End: 2019-09-27 | Stop reason: SDUPTHER

## 2019-09-09 RX ORDER — PANTOPRAZOLE SODIUM 20 MG/1
20 TABLET, DELAYED RELEASE ORAL DAILY
Qty: 30 TABLET | Refills: 3 | Status: SHIPPED | OUTPATIENT
Start: 2019-09-09 | End: 2019-09-09 | Stop reason: SDUPTHER

## 2019-09-09 NOTE — TELEPHONE ENCOUNTER
[address] [place] [address] requesting refill    Vitamin D-3 1000 unit tablet    Thank you no

## 2019-09-09 NOTE — PROGRESS NOTES
[facility] - Outpatient Follow-up Note  Antoni Lovelace 35 y o  female MRN: [id number]  Encounter: [id number]          ASSESSMENT AND PLAN:      1  Abdominal pain, unspecified abdominal location:   Epigastric abdominal pain status post meals, she describes this as a burning pain  She does have liquid antacid as a p r n  medication however, she does not ask for this frequently  We will try low-dose PPI therapy once daily  She underwent an EGD 05/2019 that was normal except for hiatal hernia  - pantoprazole (PROTONIX) 20 mg tablet; Take 1 tablet 30 minutes before breakfast daily  Dispense: 30 tablet; Refill: 3    2  Dysphagia:  She has undergone speech and swallow evaluation, barium esophagram and an EGD  All of these were without oral pharyngeal, esophageal dysfunction or luminal abnormalities  This is thought to be secondary to functional dysphagia  Could consider manometry study however, do not think she would tolerate this study well and TCA has been recommended despite not having procedure  Starting of tricyclic antidepressant was deferred to psychiatrist given multiple antipsychotic medications  -discussed with psychiatrist regarding starting tricyclic antidepressant  -continue cutting up patient's food  -remain upright during in 30 minutes after meals    3 Constipation:   Patient is on 24 mcg of hematochezia twice daily  She is also on Metamucil daily  And MiraLax p r n  as needed  This regimen is working well for her   -continue medications as described above    ______________________________________________________________________    SUBJECTIVE:  48-year-old female here for follow-up of dysphagia, epigastric abdominal pain, constipation  She has a mental disability and presents with a caretaker from a group home  She has undergone speech and swallow eval and esophagram with no or pharyngeal or esophageal dysfunction    She also wanted EGD with no luminal irregularities in May of 2019  The group home caretaker states that they cut her food up into small pieces and she does well with this  She was recommended to discuss specific granted presents with her psychiatrist given her multiple other antipsychotic medications  She complains of epigastric abdominal pain after meals today and describes the pain as burning as well as reflux  She is not on any antacid medication daily, she is on liquid into reflux medication as a p r n     Caretaker states that she does not frequently ask for this medication  She has constipation but is on amitiza 24, Metamucil and MiraLax as needed, which works well for her  REVIEW OF SYSTEMS IS OTHERWISE NEGATIVE  Historical Information   Past Medical History:   Diagnosis Date    Acid reflux     ADD (attention deficit disorder)     Adjustment disorder     Anxiety     Astigmatism     Brain lesion     Brain lesion     Bronchitis     Calcium deficiency     Cellulitis of foot, right 6/4/2018    Cerebral palsy (HCC)     Cerebral palsy (HCC)     Last Assessed:7/6/2016    Chronic otitis media     Chronically dry eyes, left     Last Assessed:7/6/2016    Constipation     Depression     Last Assessed:7/6/2016    Depressive disorder     Dry eyes     Dysphagia     Esophagitis     Esotropia     Fall     GERD (gastroesophageal reflux disease)     GERD (gastroesophageal reflux disease)     Hiatal hernia     Hydrocephalus     Hyperopia with astigmatism     Impaired fasting glucose     Left nephrolithiasis 3/4/2019    Mood disorder ([address] )     Myopia     Oppositional defiant disorder     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HCC)     Psychiatric disorder     Seizures ([facility])     Sensorineural hearing loss     Status post ventriculoatrial shunt placement     Visual impairment      Past Surgical History:   Procedure Laterality Date    CSF SHUNT      Creation of Ventriculo-Peritoneal CSF shunt ;  Last Assessed:7/6/2016    EAR SURGERY      Last Assessed:7/6/2016    LEG SURGERY      due to CP     NOSE SURGERY      Last Assessed:7/6/2016    MS ESOPHAGOGASTRODUODENOSCOPY TRANSORAL DIAGNOSTIC N/A 5/9/2019    Procedure: ESOPHAGOGASTRODUODENOSCOPY (EGD) with biopsy;  Surgeon: Mendel Pillow, MD;  Location: [facility];   Service: Gastroenterology     Social History   Social History     Substance and Sexual Activity   Alcohol Use No     Social History     Substance and Sexual Activity   Drug Use No     Social History     Tobacco Use   Smoking Status Never Smoker   Smokeless Tobacco Never Used     Family History   Problem Relation Age of Onset    Diabetes Mother     No Known Problems Father        Meds/Allergies       Current Outpatient Medications:     acetaminophen (TYLENOL) 500 mg tablet    aluminum-magnesium hydroxide-simethicone (ANTACID) 200-200-20 mg/5 mL suspension    ARIPiprazole (ABILIFY) 20 MG tablet    bacitracin ointment    bismuth subsalicylate (PEPTO BISMOL) 524 mg/30 mL oral suspension    calcium carbonate (TUMS) 500 mg chewable tablet    Cholecalciferol (VITAMIN D-3) 1000 units CAPS    clotrimazole (LOTRIMIN AF) 1 % cream    clotrimazole (LOTRIMIN) 1 % cream    dextromethorphan-guaiFENesin (ROBITUSSIN DM)  mg/5 mL syrup    fluticasone (FLONASE) 50 mcg/act nasal spray    ibuprofen (MOTRIN) 400 mg tablet    ketoconazole (NIZORAL) 2 % cream    lubiprostone (AMITIZA) 24 mcg capsule    magnesium hydroxide (GNP MILK OF MAGNESIA) 400 mg/5 mL oral suspension    metoclopramide (REGLAN) 10 mg tablet    metoclopramide (REGLAN) 10 mg tablet    Multiple Vitamins-Minerals (CERTAVITE SENIOR/ANTIOXIDANT) TABS    norgestimate-ethinyl estradiol (ORTHO-CYCLEN) 0 25-35 MG-MCG per tablet    polyethylene glycol (MIRALAX) 17 g packet    psyllium (METAMUCIL) 58 6 % packet    RA SUNSCREEN SPF50 LOTN    senna-docusate sodium (SENEXON-S) 8 6-50 mg per tablet    sodium chloride (OCEAN) 0 65 % nasal spray    traZODone (DESYREL) 50 mg tablet   zinc oxide (DESITIN) 13 % cream    ARIPiprazole (ABILIFY) 20 MG tablet    escitalopram (LEXAPRO) 20 mg tablet    Mouthwashes (LISTERINE ANTISEPTIC) LIQD    pantoprazole (PROTONIX) 20 mg tablet    No Known Allergies        Objective     Blood pressure 140/70, pulse 97, temperature 98 1 °F (36 7 °C), temperature source Tympanic, height 4' 9" (1 448 m), weight 83 5 kg (184 lb)  Body mass index is 39 82 kg/m²  PHYSICAL EXAM:      General Appearance:   Alert, cooperative, no distress, obese, uses walker   HEENT:   Normocephalic, atraumatic, anicteric      Neck:  Supple, symmetrical, trachea midline   Lungs:   Clear to auscultation bilaterally; no rales, rhonchi or wheezing; respirations unlabored    Heart[de-identified]   Regular rate and rhythm; no murmur, rub, or gallop  Abdomen:   Soft, non-tender, non-distended; normal bowel sounds; no masses, no organomegaly    Genitalia:   Deferred    Rectal:   Deferred    Extremities:  No cyanosis, clubbing or edema        Skin:  No jaundice, rashes, or lesions          Lab Results:   No visits with results within 1 Day(s) from this visit     Latest known visit with results is:   Annual Exam on 06/17/2019   Component Date Value    Case Report 06/17/2019                      Value:Gynecologic Cytology Report                       Case: [id number]                                  Authorizing Provider:  TA Vital        Collected:           06/17/2019 1432              Ordering Location:     [facility]       Received:            06/17/2019 [address] [place] [address] Bobbi                                                                 First Screen:          GLENN Alba                                                       Specimen:    LIQUID-BASED PAP, SCREENING, Cervix                                                        Primary Interpretation 06/17/2019 Negative for intraepithelial lesion or malignancy     Interpretation 06/17/2019 Fungal organisms morphologically consistent with Candida spp     Specimen Adequacy 06/17/2019 Satisfactory for evaluation  Endocervical/transformation zone component present   Additional Information 06/17/2019                      Value: This result contains rich text formatting which cannot be displayed here   HPV Other HR 06/17/2019 Negative     HPV16 06/17/2019 Negative     HPV18 06/17/2019 Negative          Radiology Results:   No results found

## 2019-09-23 ENCOUNTER — OFFICE VISIT (OUTPATIENT)
Dept: FAMILY MEDICINE CLINIC | Facility: CLINIC | Age: 39
End: 2019-09-23

## 2019-09-23 VITALS
DIASTOLIC BLOOD PRESSURE: 80 MMHG | BODY MASS INDEX: 38.58 KG/M2 | SYSTOLIC BLOOD PRESSURE: 124 MMHG | HEART RATE: 78 BPM | HEIGHT: 58 IN | TEMPERATURE: 98.9 F | WEIGHT: 183.8 LBS | RESPIRATION RATE: 16 BRPM

## 2019-09-23 DIAGNOSIS — S00.81XA EXCORIATION OF FACE, INITIAL ENCOUNTER: Primary | ICD-10-CM

## 2019-09-23 PROCEDURE — 99213 OFFICE O/P EST LOW 20 MIN: CPT | Performed by: FAMILY MEDICINE

## 2019-09-23 RX ORDER — GINSENG 100 MG
1 CAPSULE ORAL 3 TIMES DAILY
Qty: 15 G | Refills: 0 | Status: SHIPPED | OUTPATIENT
Start: 2019-09-23 | End: 2020-03-25 | Stop reason: ALTCHOICE

## 2019-09-23 NOTE — PROGRESS NOTES
Assessment/Plan:    Excoriation of face  New onset of pruritic excoriations on face  · No oral lesions  · No honey-crusting  · Possibly due to contact dermatitis vs  Bug bite vs  Allergic dermatitis  · Counseled on avoidance of new lotions, creams, or face wash  · Start bacitracin cream TID for 7 days  · Follow up in 1 week is no improvement of worsening lesions  Problem List Items Addressed This Visit        Other    Excoriation of face - Primary     New onset of pruritic excoriations on face  · No oral lesions  · No honey-crusting  · Possibly due to contact dermatitis vs  Bug bite vs  Allergic dermatitis  · Counseled on avoidance of new lotions, creams, or face wash  · Start bacitracin cream TID for 7 days  · Follow up in 1 week is no improvement of worsening lesions  Relevant Medications    bacitracin topical ointment 500 units/g topical ointment            Subjective:      Patient ID: Mady Villalobos is a 35 y o  female  35year old female presents today for new onset oral sores  Started yesterday  Patient has difficulty providing adequate history due to intellectual disability  As per caregivers, facial excoriations were noticed yesterday  Denies new lotion, chapstick, facial wash, detergent, or allergen exposures  Denies exposure to outdoor plants/insects  Lives in group home with 1 housemate who is female  As per caregivers, her roommate does not have similar rash  She is not sexually active  Rash is no other location  The following portions of the patient's history were reviewed and updated as appropriate: allergies, current medications, past family history, past medical history, past social history, past surgical history and problem list     Review of Systems   Constitutional: Negative for activity change, chills and fever  HENT: Negative for sinus pressure and sore throat  Respiratory: Negative for cough, chest tightness, shortness of breath and wheezing  Cardiovascular: Negative for chest pain, palpitations and leg swelling  Gastrointestinal: Negative for abdominal pain, blood in stool, constipation, diarrhea, nausea and vomiting  Genitourinary: Negative for dysuria, frequency and hematuria  Skin: Positive for rash  Neurological: Negative for dizziness, syncope, weakness, light-headedness, numbness and headaches  Objective:      /80   Pulse 78   Temp 98 9 °F (37 2 °C)   Resp 16   Ht 4' 10" (1 473 m)   Wt 83 4 kg (183 lb 12 8 oz)   BMI 38 41 kg/m²          Physical Exam   Constitutional: She appears well-developed  No distress  HENT:   Head: Normocephalic and atraumatic  Nose: Nose normal    Mouth/Throat: Oropharynx is clear and moist and mucous membranes are normal  No oral lesions  Abnormal dentition  No dental abscesses or lacerations  No oropharyngeal exudate, posterior oropharyngeal edema, posterior oropharyngeal erythema or tonsillar abscesses  Eyes: Pupils are equal, round, and reactive to light  EOM are normal  Right eye exhibits no discharge  Left eye exhibits no discharge  No scleral icterus  Neck: Normal range of motion  Neck supple  No JVD present  No tracheal deviation present  No thyromegaly present  Cardiovascular: Normal rate, regular rhythm, normal heart sounds and intact distal pulses  Exam reveals no gallop and no friction rub  No murmur heard  Pulmonary/Chest: Effort normal and breath sounds normal  No stridor  No respiratory distress  She has no wheezes  She has no rales  She exhibits no tenderness  Abdominal: Soft  Bowel sounds are normal  She exhibits no distension and no mass  There is no tenderness  There is no rebound and no guarding  No hernia  Musculoskeletal: She exhibits no edema, tenderness or deformity  Neurological: She is alert  Skin: Skin is warm and dry  Capillary refill takes less than 2 seconds  She is not diaphoretic     Three excoriations on right submental, axillary, and left buccal   No honey crusting or drainage  No vesicles   Psychiatric: Thought content is delusional  Thought content is not paranoid  Cognition and memory are impaired  She expresses no homicidal and no suicidal ideation  intellecual disability She is inattentive  Vitals reviewed

## 2019-09-23 NOTE — ASSESSMENT & PLAN NOTE
New onset of pruritic excoriations on face  · No oral lesions  · No honey-crusting  · Possibly due to contact dermatitis vs  Bug bite vs  Allergic dermatitis  · Counseled on avoidance of new lotions, creams, or face wash  · Start bacitracin cream TID for 7 days  · Follow up in 1 week is no improvement of worsening lesions

## 2019-09-27 ENCOUNTER — TELEPHONE (OUTPATIENT)
Dept: FAMILY MEDICINE CLINIC | Facility: CLINIC | Age: 39
End: 2019-09-27

## 2019-09-27 DIAGNOSIS — E55.9 VITAMIN D DEFICIENCY: ICD-10-CM

## 2019-09-27 RX ORDER — CHOLECALCIFEROL (VITAMIN D3) 25 MCG
2000 CAPSULE ORAL DAILY
Qty: 180 CAPSULE | Refills: 1
Start: 2019-09-27 | End: 2019-10-01 | Stop reason: SDUPTHER

## 2019-10-01 ENCOUNTER — OFFICE VISIT (OUTPATIENT)
Dept: FAMILY MEDICINE CLINIC | Facility: CLINIC | Age: 39
End: 2019-10-01

## 2019-10-01 VITALS
DIASTOLIC BLOOD PRESSURE: 82 MMHG | WEIGHT: 185.4 LBS | SYSTOLIC BLOOD PRESSURE: 122 MMHG | TEMPERATURE: 80 F | RESPIRATION RATE: 16 BRPM | HEART RATE: 92 BPM | BODY MASS INDEX: 38.75 KG/M2

## 2019-10-01 DIAGNOSIS — E55.9 VITAMIN D DEFICIENCY: ICD-10-CM

## 2019-10-01 DIAGNOSIS — Z00.00 ROUTINE HEALTH MAINTENANCE: ICD-10-CM

## 2019-10-01 DIAGNOSIS — K08.9 TOOTH DISORDER: ICD-10-CM

## 2019-10-01 DIAGNOSIS — J00 COMMON COLD VIRUS: ICD-10-CM

## 2019-10-01 DIAGNOSIS — K59.00 CONSTIPATION, UNSPECIFIED CONSTIPATION TYPE: ICD-10-CM

## 2019-10-01 DIAGNOSIS — J02.9 SORE THROAT: Primary | ICD-10-CM

## 2019-10-01 PROCEDURE — 99213 OFFICE O/P EST LOW 20 MIN: CPT | Performed by: FAMILY MEDICINE

## 2019-10-01 RX ORDER — CHOLECALCIFEROL (VITAMIN D3) 25 MCG
2000 CAPSULE ORAL DAILY
Qty: 180 CAPSULE | Refills: 1 | Status: SHIPPED | OUTPATIENT
Start: 2019-10-01 | End: 2020-04-03 | Stop reason: SDUPTHER

## 2019-10-01 RX ORDER — GUAIFENESIN/DEXTROMETHORPHAN 100-10MG/5
5 SYRUP ORAL 3 TIMES DAILY PRN
Qty: 118 ML | Refills: 1 | Status: SHIPPED | OUTPATIENT
Start: 2019-10-01 | End: 2020-03-25 | Stop reason: ALTCHOICE

## 2019-10-01 RX ORDER — CHOLECALCIFEROL (VITAMIN D3) 25 MCG
2000 CAPSULE ORAL DAILY
Qty: 180 CAPSULE | Refills: 1
Start: 2019-10-01 | End: 2019-10-01 | Stop reason: SDUPTHER

## 2019-10-01 RX ORDER — EUCALYP/ME-SALICYLATE/MEN/THYM
1 MOUTHWASH MUCOUS MEMBRANE 2 TIMES DAILY
Qty: 500 ML | Refills: 3 | Status: SHIPPED | OUTPATIENT
Start: 2019-10-01 | End: 2020-04-07 | Stop reason: SDUPTHER

## 2019-10-01 RX ORDER — AMOXICILLIN 250 MG
1 CAPSULE ORAL DAILY
Qty: 90 TABLET | Refills: 3 | Status: SHIPPED | OUTPATIENT
Start: 2019-10-01 | End: 2020-01-02 | Stop reason: SDUPTHER

## 2019-10-01 RX ORDER — MULTIVIT-MIN/FA/LYCOPEN/LUTEIN .4-300-25
1 TABLET ORAL DAILY
Qty: 90 TABLET | Refills: 2 | Status: SHIPPED | OUTPATIENT
Start: 2019-10-01 | End: 2020-04-03 | Stop reason: SDUPTHER

## 2019-10-01 NOTE — PROGRESS NOTES
Assessment/Plan:       Problem List Items Addressed This Visit        Respiratory    Common cold virus    Relevant Medications    dextromethorphan-guaiFENesin (ROBITUSSIN DM)  mg/5 mL syrup       Other    Constipation    Relevant Medications    senna-docusate sodium (SENEXON-S) 8 6-50 mg per tablet    lubiprostone (AMITIZA) 24 mcg capsule    Sore throat - Primary     - Resolved, likely irritation from swallowed food   - No concerns per patient or caregiver about choking or swallowing ability   - Advised patient can consume warm tea with honey for throat pain relief  - Advised to call if symptoms return/worsen  - Of note, SCM on left hypertonic and tender, advised stretching techniques and to call if not improving            Other Visit Diagnoses     Tooth disorder        Relevant Medications    Mouthwashes (LISTERINE ANTISEPTIC) LIQD    Vitamin D deficiency        Relevant Medications    Cholecalciferol (VITAMIN D-3) 1000 units CAPS    Routine health maintenance        Relevant Medications    Multiple Vitamins-Minerals (CERTAVITE SENIOR/ANTIOXIDANT) TABS            Subjective:      Patient ID: Vivian Huffman is a 35 y o  female presenting for evaluation of throat pain  HPI  The patient presents due to recent scratching throat pain which occurred yesterday  The patient states she was eating when she felt scratching in her throat but does not recall what she was eating at that time  The patient has not taken any medications/PRNs for the symptoms  Per staff member, afterwards the patient did not complain about the pain any more  She ate dinner last night and breakfast this morning without difficulty  The patient denies any recent fevers, rhinorrhea, congestion, coughs, chest pains, or shortness of breath  She is asymptomatic now  She and caregiver have no concerns about swallowing ability or choking while eating  The group home caretaker is also requesting medication refills as above       The following portions of the patient's history were reviewed and updated as appropriate: allergies, current medications, past family history, past medical history, past social history, past surgical history and problem list     Review of Systems   Constitutional: Negative for fever  HENT: Positive for sore throat  Respiratory: Negative for shortness of breath  Cardiovascular: Negative for chest pain  Gastrointestinal: Negative for abdominal pain, constipation, diarrhea, nausea and vomiting  Genitourinary: Negative for difficulty urinating  Neurological: Negative for headaches  Objective:    /82 (BP Location: Right arm, Patient Position: Sitting, Cuff Size: Large)   Pulse 92   Temp (!) 80 °F (26 7 °C)   Resp 16   Wt 84 1 kg (185 lb 6 4 oz)   BMI 38 75 kg/m²        Physical Exam   Constitutional: She appears well-developed and well-nourished  No distress  HENT:   Head: Normocephalic and atraumatic  Mouth/Throat: Oropharynx is clear and moist  No oropharyngeal exudate  Eyes: Conjunctivae are normal    Neck: Normal range of motion  Neck supple  Left SCM hypertonic and tender   Cardiovascular: Normal rate, regular rhythm, normal heart sounds and intact distal pulses  Pulmonary/Chest: Effort normal and breath sounds normal    Abdominal: Soft  Bowel sounds are normal    Lymphadenopathy:     She has no cervical adenopathy  Neurological: She is alert  Skin: She is not diaphoretic  Vitals reviewed        Margie Manning DO PGY-3  [address]

## 2019-10-02 PROBLEM — J02.9 SORE THROAT: Status: ACTIVE | Noted: 2019-10-02

## 2019-10-02 NOTE — ASSESSMENT & PLAN NOTE
- Resolved, likely irritation from swallowed food   - No concerns per patient or caregiver about choking or swallowing ability   - Advised patient can consume warm tea with honey for throat pain relief  - Advised to call if symptoms return/worsen  - Of note, SCM on left hypertonic and tender, advised stretching techniques and to call if not improving

## 2019-10-09 ENCOUNTER — TELEPHONE (OUTPATIENT)
Dept: FAMILY MEDICINE CLINIC | Facility: CLINIC | Age: 39
End: 2019-10-09

## 2019-10-09 DIAGNOSIS — R30.0 DYSURIA: Primary | ICD-10-CM

## 2019-10-09 DIAGNOSIS — R31.9 HEMATURIA, UNSPECIFIED TYPE: ICD-10-CM

## 2019-10-14 ENCOUNTER — TELEPHONE (OUTPATIENT)
Dept: FAMILY MEDICINE CLINIC | Facility: CLINIC | Age: 39
End: 2019-10-14

## 2019-10-14 DIAGNOSIS — K21.9 GASTROESOPHAGEAL REFLUX DISEASE WITHOUT ESOPHAGITIS: Primary | ICD-10-CM

## 2019-10-14 RX ORDER — CALCIUM CARBONATE 200(500)MG
1 TABLET,CHEWABLE ORAL 3 TIMES DAILY PRN
Qty: 30 TABLET | Refills: 1 | Status: SHIPPED | OUTPATIENT
Start: 2019-10-14 | End: 2020-09-16 | Stop reason: SDUPTHER

## 2019-10-14 NOTE — TELEPHONE ENCOUNTER
I will refill medication  I'm the PCP for this patient but never actually seen her  Could you please schedule and annual visit with me please?

## 2019-10-15 NOTE — TELEPHONE ENCOUNTER
Pt not due for annual wellness visit until after 2/14/20 she already had one for this year, scheduled with Dr Catrachito Nava 2/17/20

## 2019-10-21 ENCOUNTER — IMMUNIZATIONS (OUTPATIENT)
Dept: FAMILY MEDICINE CLINIC | Facility: CLINIC | Age: 39
End: 2019-10-21

## 2019-10-21 DIAGNOSIS — Z23 ENCOUNTER FOR IMMUNIZATION: ICD-10-CM

## 2019-10-21 PROCEDURE — G0008 ADMIN INFLUENZA VIRUS VAC: HCPCS

## 2019-10-21 PROCEDURE — 90686 IIV4 VACC NO PRSV 0.5 ML IM: CPT

## 2019-11-06 ENCOUNTER — OFFICE VISIT (OUTPATIENT)
Dept: FAMILY MEDICINE CLINIC | Facility: CLINIC | Age: 39
End: 2019-11-06

## 2019-11-06 VITALS
HEART RATE: 88 BPM | SYSTOLIC BLOOD PRESSURE: 140 MMHG | TEMPERATURE: 98.6 F | DIASTOLIC BLOOD PRESSURE: 82 MMHG | HEIGHT: 58 IN | WEIGHT: 182.4 LBS | BODY MASS INDEX: 38.29 KG/M2 | RESPIRATION RATE: 22 BRPM

## 2019-11-06 DIAGNOSIS — J02.9 SORETHROAT: Primary | ICD-10-CM

## 2019-11-06 DIAGNOSIS — J06.9 VIRAL URI: Primary | ICD-10-CM

## 2019-11-06 PROCEDURE — 99213 OFFICE O/P EST LOW 20 MIN: CPT | Performed by: FAMILY MEDICINE

## 2019-11-06 NOTE — ASSESSMENT & PLAN NOTE
- Symptoms for three days, no f/c, no cough  - Saline nasal spray script given and [place] pot recommended  - Also given script for Chloraseptic throat spray for sore throat  - Advised pt to follow up if worsening symptoms or associated fevers or chills

## 2019-11-06 NOTE — PROGRESS NOTES
Assessment/Plan:    Viral URI  - Symptoms for three days, no f/c, no cough  - Saline nasal spray script given and [place] pot recommended  - Also given script for Chloraseptic throat spray for sore throat  - Advised pt to follow up if worsening symptoms or associated fevers or chills       Diagnoses and all orders for this visit:    Viral URI  -     sodium chloride (OCEAN) 0 65 % nasal spray; 2 sprays into each nostril as needed for congestion or rhinitis  -     phenol (CHLORASEPTIC) 1 4 % mucosal liquid; Apply 1 spray to the mouth or throat every 2 (two) hours as needed (for sore throat as needed)          Subjective:      Patient ID: Genevieve Winters is a 35 y o  female  41yo female who lives in a group home p/w 3 days of sore throat, nasal congestion, and rhinorrhea  She says she has been spending more time inside because it has gotten cold  She doesn't report that anyone else in the home has been sick but that everyone has allergies  She denies any fever, chills, cough, or sinus tenderness  The following portions of the patient's history were reviewed and updated as appropriate: allergies, current medications, past family history, past medical history, past social history, past surgical history and problem list     Review of Systems   Constitutional: Negative for activity change, appetite change, chills and fever  HENT: Positive for congestion, postnasal drip, rhinorrhea, sneezing and sore throat  Negative for ear discharge, ear pain, nosebleeds, sinus pressure, sinus pain and trouble swallowing  Eyes: Negative for pain, discharge, redness and itching  Respiratory: Negative for cough, shortness of breath and wheezing  Cardiovascular: Negative for chest pain, palpitations and leg swelling  Gastrointestinal: Negative for abdominal pain, diarrhea, nausea and vomiting  Genitourinary: Negative for difficulty urinating, dysuria and flank pain     Musculoskeletal: Negative for arthralgias, back pain, neck pain and neck stiffness  Skin: Negative for pallor, rash and wound  Allergic/Immunologic: Positive for environmental allergies  Negative for food allergies  Neurological: Negative for dizziness, seizures, syncope, light-headedness and headaches  Psychiatric/Behavioral: Negative for confusion and decreased concentration  The patient is not nervous/anxious  Objective:      /82 (BP Location: Left arm, Patient Position: Sitting, Cuff Size: Standard)   Pulse 88   Temp 98 6 °F (37 °C) (Tympanic)   Resp 22   Ht 4' 10" (1 473 m)   Wt 82 7 kg (182 lb 6 4 oz)   BMI 38 12 kg/m²          Physical Exam   Constitutional: She is oriented to person, place, and time  She appears well-developed and well-nourished  No distress  HENT:   Head: Normocephalic and atraumatic  Right Ear: External ear normal    Left Ear: External ear normal    Soft palate redness, no swollen or erythematous tonsils or tonsillar exudates; nose showed swollen turbinates bilaterally, no discharge   Eyes: Pupils are equal, round, and reactive to light  Conjunctivae and EOM are normal  Right eye exhibits no discharge  Left eye exhibits no discharge  Cardiovascular: Normal rate, regular rhythm, normal heart sounds and intact distal pulses  No murmur heard  Pulmonary/Chest: Effort normal and breath sounds normal  No respiratory distress  She has no wheezes  She exhibits no tenderness  Abdominal: Soft  Bowel sounds are normal  She exhibits no distension and no mass  There is no tenderness  Musculoskeletal: Normal range of motion  She exhibits no edema  Neurological: She is alert and oriented to person, place, and time  Skin: Skin is warm and dry  Capillary refill takes less than 2 seconds  She is not diaphoretic  Psychiatric: She has a normal mood and affect   Her behavior is normal  Judgment and thought content normal

## 2019-11-14 ENCOUNTER — OFFICE VISIT (OUTPATIENT)
Dept: URGENT CARE | Age: 39
End: 2019-11-14
Payer: MEDICARE

## 2019-11-14 VITALS
RESPIRATION RATE: 17 BRPM | WEIGHT: 180 LBS | OXYGEN SATURATION: 97 % | BODY MASS INDEX: 38.83 KG/M2 | TEMPERATURE: 98 F | HEART RATE: 87 BPM | HEIGHT: 57 IN | DIASTOLIC BLOOD PRESSURE: 62 MMHG | SYSTOLIC BLOOD PRESSURE: 140 MMHG

## 2019-11-14 DIAGNOSIS — L01.00 IMPETIGO: Primary | ICD-10-CM

## 2019-11-14 PROCEDURE — 99214 OFFICE O/P EST MOD 30 MIN: CPT | Performed by: PHYSICIAN ASSISTANT

## 2019-11-14 PROCEDURE — G0463 HOSPITAL OUTPT CLINIC VISIT: HCPCS | Performed by: PHYSICIAN ASSISTANT

## 2019-11-14 RX ORDER — NORGESTIMATE AND ETHINYL ESTRADIOL 0.25-0.035
KIT ORAL
COMMUNITY
End: 2020-10-27 | Stop reason: SDUPTHER

## 2019-11-15 NOTE — PATIENT INSTRUCTIONS
Patient should begin medication when medication is filled  It is not urgent to begin this medication  She may begin this medication tomorrow

## 2019-11-15 NOTE — PROGRESS NOTES
[facility]        NAME: King Elvia is a 35 y o  female  : 1980    MRN: [id number]  DATE: 2019  TIME: 7:36 PM    Assessment and Plan   Impetigo [L01 00]  1  Impetigo  mupirocin (BACTROBAN) 2 % ointment     Patient Instructions     Take antibiotic ointment as prescribed  Follow up with PCP in 3-5 days  Proceed to  ER if symptoms worsen  Chief Complaint     Chief Complaint   Patient presents with    Mouth Lesions     History of Present Illness       Rash   This is a new problem  The current episode started yesterday  The problem has been gradually worsening since onset  Location: lateral to right sided oral commisure  The rash is characterized by redness  She was exposed to nothing  Pertinent negatives include no anorexia, congestion, cough, diarrhea, eye pain, facial edema, fatigue, fever, joint pain, nail changes, rhinorrhea, shortness of breath, sore throat or vomiting  Past treatments include nothing  The treatment provided no relief  There is no history of allergies, asthma, eczema or varicella  Review of Systems   Review of Systems   Constitutional: Negative for activity change, appetite change, chills, diaphoresis, fatigue, fever and unexpected weight change  HENT: Negative for congestion, rhinorrhea and sore throat  Eyes: Negative for pain  Respiratory: Negative for apnea, cough, choking, chest tightness, shortness of breath, wheezing and stridor  Cardiovascular: Negative for chest pain, palpitations and leg swelling  Gastrointestinal: Negative for anorexia, diarrhea and vomiting  Musculoskeletal: Negative for joint pain  Skin: Positive for rash  Negative for color change, nail changes, pallor and wound           Current Medications       Current Outpatient Medications:     acetaminophen (TYLENOL) 500 mg tablet, Take 500 mg by mouth every 6 (six) hours as needed for mild pain, Disp: , Rfl:     aluminum-magnesium hydroxide-simethicone (ANTACID) 200-200-20 mg/5 mL suspension, Take 15 mL by mouth every 4 (four) hours as needed for indigestion or heartburn, Disp: 355 mL, Rfl: 5    ARIPiprazole (ABILIFY) 20 MG tablet, Abilify, Disp: , Rfl:     bismuth subsalicylate (PEPTO BISMOL) 524 mg/30 mL oral suspension, Take 15 mL (262 mg total) by mouth every 6 (six) hours as needed for indigestion, Disp: 360 mL, Rfl: 3    calcium carbonate (TUMS) 500 mg chewable tablet, Chew 1 tablet (500 mg total) 3 (three) times a day as needed for heartburn, Disp: 30 tablet, Rfl: 1    Cholecalciferol (VITAMIN D-3) 1000 units CAPS, Take 2 capsules (2,000 Units total) by mouth daily at 8am , Disp: 180 capsule, Rfl: 1    clotrimazole (LOTRIMIN AF) 1 % cream, Lotrimin AF, Disp: , Rfl:     clotrimazole (LOTRIMIN) 1 % cream, Apply 1 g (1 application total) topically 3 (three) times a day as needed (fungal irritation), Disp: 30 g, Rfl: 5    dextromethorphan-guaiFENesin (ROBITUSSIN DM)  mg/5 mL syrup, Take 5 mL by mouth 3 (three) times a day as needed for cough, Disp: 118 mL, Rfl: 1    fluticasone (FLONASE) 50 mcg/act nasal spray, 1 spray into each nostril daily as needed for rhinitis (nasal congestion) At 8:00 AM, Disp: 1 Bottle, Rfl: 5    ibuprofen (MOTRIN) 400 mg tablet, Take 1 tablet (400 mg total) by mouth every 8 (eight) hours as needed for mild pain, Disp: 30 tablet, Rfl: 1    lubiprostone (AMITIZA) 24 mcg capsule, Take 1 capsule (24 mcg total) by mouth 2 (two) times a day with meals, Disp: 60 capsule, Rfl: 5    magnesium hydroxide (GNP MILK OF MAGNESIA) 400 mg/5 mL oral suspension, Take 30 mL by mouth daily as needed for constipation If no BM in 3 days, Disp: 355 mL, Rfl: 5    Multiple Vitamins-Minerals (CERTAVITE SENIOR/ANTIOXIDANT) TABS, Take 1 tablet by mouth daily, Disp: 90 tablet, Rfl: 2    pantoprazole (PROTONIX) 20 mg tablet, Take 1 tablet 30 minutes before breakfast daily, Disp: 30 tablet, Rfl: 3    phenol (CHLORASEPTIC) 1 4 % mucosal liquid, Apply 1 spray to the mouth or throat every 2 (two) hours as needed (for sore throat as needed), Disp: 236 mL, Rfl: 1    polyethylene glycol (MIRALAX) 17 g packet, Take one packet daily as needed for no bowel movement in 24 hours, Disp: 14 each, Rfl: 3    psyllium (METAMUCIL) 58 6 % packet, Take 1 packet by mouth daily, Disp: 30 packet, Rfl: 5    RA SUNSCREEN SPF50 LOTN, Apply 15 minutes before sun exposure and every 2 hours, Disp: 1 Bottle, Rfl: 5    senna-docusate sodium (SENEXON-S) 8 6-50 mg per tablet, Take 1 tablet by mouth daily at 8am , Disp: 90 tablet, Rfl: 3    sodium chloride (OCEAN) 0 65 % nasal spray, 1 spray into each nostril as needed (three times daily as needed for nasal congestion), Disp: 60 mL, Rfl: 1    sodium chloride (OCEAN) 0 65 % nasal spray, 2 sprays into each nostril as needed for congestion or rhinitis, Disp: 50 mL, Rfl: 1    traZODone (DESYREL) 50 mg tablet, Take 1 tablet (50 mg total) by mouth daily at bedtime at 9pm, Disp: , Rfl: 0    zinc oxide (DESITIN) 13 % cream, Apply 1 application topically as needed (for perianal irritation), Disp: 1 Tube, Rfl: 5    ARIPiprazole (ABILIFY) 20 MG tablet, Take 1 tablet (20 mg total) by mouth daily at bedtime for 30 days at 9pm , Disp: 30 tablet, Rfl: 0    bacitracin ointment, Apply 1 large application topically 2 (two) times a day, Disp: , Rfl:     bacitracin topical ointment 500 units/g topical ointment, Apply 1 large application topically 3 (three) times a day for 7 days, Disp: 15 g, Rfl: 0    escitalopram (LEXAPRO) 20 mg tablet, Take 1 tablet (20 mg total) by mouth daily for 30 days at 8am , Disp: 90 tablet, Rfl: 0    ketoconazole (NIZORAL) 2 % cream, To be applied at 8:00 a m  And 8:00 p m   Until rash resolves plus additional two days (Patient not taking: Reported on 11/14/2019), Disp: 15 g, Rfl: 0    metoclopramide (REGLAN) 10 mg tablet, Take one tablet by mouth every 8 hours as needed for nausea (Patient not taking: Reported on 11/14/2019), Disp: 30 tablet, Rfl: 0    metoclopramide (REGLAN) 10 mg tablet, Reglan, Disp: , Rfl:     Mouthwashes (LISTERINE ANTISEPTIC) LIQD, Apply 1 application to the mouth or throat 2 (two) times a day, Disp: 500 mL, Rfl: 3    mupirocin (BACTROBAN) 2 % ointment, Apply topically 2 (two) times a day, Disp: 22 g, Rfl: 0    norgestimate-ethinyl estradiol (ESTARYLLA) 0 25-35 MG-MCG per tablet, Estarylla 0 25 mg-35 mcg tablet, Disp: , Rfl:     norgestimate-ethinyl estradiol (ORTHO-CYCLEN) 0 25-35 MG-MCG per tablet, Take 1 tablet by mouth daily (Patient not taking: Reported on 11/14/2019), Disp: 28 tablet, Rfl: 12    Current Allergies     Allergies as of 11/14/2019    (No Known Allergies)            The following portions of the patient's history were reviewed and updated as appropriate: allergies, current medications, past family history, past medical history, past social history, past surgical history and problem list      Past Medical History:   Diagnosis Date    Acid reflux     ADD (attention deficit disorder)     Adjustment disorder     Anxiety     Astigmatism     Brain lesion     Brain lesion     Bronchitis     Calcium deficiency     Cellulitis of foot, right 6/4/2018    Cerebral palsy (HCC)     Cerebral palsy (HCC)     Last Assessed:7/6/2016    Chronic otitis media     Chronically dry eyes, left     Last Assessed:7/6/2016    Constipation     Depression     Last Assessed:7/6/2016    Depressive disorder     Dry eyes     Dysphagia     Esophagitis     Esotropia     Fall     GERD (gastroesophageal reflux disease)     GERD (gastroesophageal reflux disease)     Hiatal hernia     Hydrocephalus ([address]     Hyperopia with astigmatism     Impaired fasting glucose     Left nephrolithiasis 3/4/2019    Mood disorder ([address]     Myopia     Oppositional defiant disorder     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[address] )     Psychiatric disorder     Seizures ([address] )     Sensorineural hearing loss     Status post ventriculoatrial shunt placement     Visual impairment        Past Surgical History:   Procedure Laterality Date    CSF SHUNT      Creation of Ventriculo-Peritoneal CSF shunt ; Last Assessed:7/6/2016    EAR SURGERY      Last Assessed:7/6/2016    LEG SURGERY      due to CP     NOSE SURGERY      Last Assessed:7/6/2016    AK ESOPHAGOGASTRODUODENOSCOPY TRANSORAL DIAGNOSTIC N/A 5/9/2019    Procedure: ESOPHAGOGASTRODUODENOSCOPY (EGD) with biopsy;  Surgeon: Kashif Olivares MD;  Location: [facility]; Service: Gastroenterology       Family History   Problem Relation Age of Onset    Diabetes Mother     No Known Problems Father          Medications have been verified  Objective   /62   Pulse 87   Temp 98 °F (36 7 °C)   Resp 17   Ht 4' 9" (1 448 m)   Wt 81 6 kg (180 lb)   SpO2 97%   BMI 38 95 kg/m²        Physical Exam     Physical Exam   Constitutional: She appears well-developed and well-nourished  Cardiovascular: Normal rate, regular rhythm and normal heart sounds  Exam reveals no gallop and no friction rub  No murmur heard  Pulmonary/Chest: Effort normal and breath sounds normal  No stridor  No respiratory distress  She has no wheezes  She has no rales  Abdominal: Soft  Bowel sounds are normal  She exhibits no distension and no mass  There is no tenderness  There is no guarding     Skin:

## 2019-11-18 ENCOUNTER — OFFICE VISIT (OUTPATIENT)
Dept: FAMILY MEDICINE CLINIC | Facility: CLINIC | Age: 39
End: 2019-11-18

## 2019-11-18 VITALS
SYSTOLIC BLOOD PRESSURE: 130 MMHG | DIASTOLIC BLOOD PRESSURE: 70 MMHG | BODY MASS INDEX: 41.12 KG/M2 | RESPIRATION RATE: 18 BRPM | HEART RATE: 82 BPM | HEIGHT: 57 IN | WEIGHT: 190.6 LBS | TEMPERATURE: 98.1 F

## 2019-11-18 DIAGNOSIS — L01.00 IMPETIGO: ICD-10-CM

## 2019-11-18 PROCEDURE — 99213 OFFICE O/P EST LOW 20 MIN: CPT | Performed by: FAMILY MEDICINE

## 2019-11-18 NOTE — PROGRESS NOTES
Assessment/Plan:    Impetigo  - continue Bactroban TID x 5 days  - RTC precautions discussed    Paperwork completed     Diagnoses and all orders for this visit:    Impetigo  -     Discontinue: mupirocin (BACTROBAN) 2 % ointment; Apply topically 3 (three) times a day for 5 days  -     mupirocin (BACTROBAN) 2 % ointment; Apply topically 3 (three) times a day for 5 days          Subjective:      Patient ID: Ramirez Dawson is a 35 y o  female  HPI  Patient presents for follow-up regarding rash  Seen in urgent care on 11/14/2019  Here with ,  reports that rash initially was improving however patient continues to pick at it  Patient was in facility 2/2 intellectual disability  Caretaker denies any fevers chills nausea vomiting diarrhea constipation  The following portions of the patient's history were reviewed and updated as appropriate: allergies and current medications  Review of Systems   Constitutional: Negative for appetite change, chills and fever  HENT: Negative for congestion and rhinorrhea  Eyes: Negative for discharge  Respiratory: Negative for cough and shortness of breath  Cardiovascular: Negative for chest pain and palpitations  Gastrointestinal: Negative for abdominal pain, nausea and vomiting  Skin: Positive for rash  Negative for wound  Allergic/Immunologic: Negative for environmental allergies and food allergies  Neurological: Negative for dizziness, light-headedness and headaches  Psychiatric/Behavioral: Negative for behavioral problems  Objective:      /70 (BP Location: Left arm, Patient Position: Sitting, Cuff Size: Large)   Pulse 82   Temp 98 1 °F (36 7 °C) (Tympanic)   Resp 18   Ht 4' 9" (1 448 m)   Wt 86 5 kg (190 lb 9 6 oz)   BMI 41 25 kg/m²          Physical Exam   Constitutional: She appears well-developed and well-nourished  No distress  HENT:   Head: Normocephalic and atraumatic     Mouth/Throat: Oropharynx is clear and moist  No oropharyngeal exudate  Eyes: Conjunctivae are normal  Right eye exhibits no discharge  Left eye exhibits no discharge  Neck: Normal range of motion  Cardiovascular: Normal rate, regular rhythm and normal heart sounds  Pulmonary/Chest: Effort normal and breath sounds normal  No respiratory distress  She has no wheezes  Abdominal: Soft  There is no tenderness  Neurological: She is alert  Skin: Skin is warm and dry  Rash noted  She is not diaphoretic  Rash over chin; honey crusted lesion   Psychiatric: She has a normal mood and affect  Vitals reviewed

## 2019-12-02 DIAGNOSIS — R10.9 ABDOMINAL PAIN, UNSPECIFIED ABDOMINAL LOCATION: ICD-10-CM

## 2019-12-03 RX ORDER — PANTOPRAZOLE SODIUM 20 MG/1
TABLET, DELAYED RELEASE ORAL
Qty: 31 TABLET | Refills: 5 | Status: SHIPPED | OUTPATIENT
Start: 2019-12-03 | End: 2020-06-09 | Stop reason: SDUPTHER

## 2019-12-05 ENCOUNTER — TELEPHONE (OUTPATIENT)
Dept: FAMILY MEDICINE CLINIC | Facility: CLINIC | Age: 39
End: 2019-12-05

## 2019-12-09 ENCOUNTER — APPOINTMENT (EMERGENCY)
Dept: RADIOLOGY | Facility: HOSPITAL | Age: 39
End: 2019-12-09
Payer: MEDICARE

## 2019-12-09 ENCOUNTER — HOSPITAL ENCOUNTER (EMERGENCY)
Facility: HOSPITAL | Age: 39
Discharge: HOME | End: 2019-12-09
Attending: EMERGENCY MEDICINE | Admitting: EMERGENCY MEDICINE
Payer: MEDICARE

## 2019-12-09 VITALS
OXYGEN SATURATION: 96 % | DIASTOLIC BLOOD PRESSURE: 58 MMHG | TEMPERATURE: 97.4 F | RESPIRATION RATE: 18 BRPM | SYSTOLIC BLOOD PRESSURE: 125 MMHG | HEART RATE: 93 BPM

## 2019-12-09 DIAGNOSIS — R07.9 CHEST PAIN: ICD-10-CM

## 2019-12-09 DIAGNOSIS — K21.9 GERD (GASTROESOPHAGEAL REFLUX DISEASE): ICD-10-CM

## 2019-12-09 DIAGNOSIS — R10.13 DYSPEPSIA: Primary | ICD-10-CM

## 2019-12-09 LAB
ALBUMIN SERPL BCP-MCNC: 3 G/DL (ref 3.5–5)
ALP SERPL-CCNC: 150 U/L (ref 46–116)
ALT SERPL W P-5'-P-CCNC: 24 U/L (ref 12–78)
ANION GAP SERPL CALCULATED.3IONS-SCNC: 7 MMOL/L (ref 4–13)
ANION GAP SERPL CALCULATED.3IONS-SCNC: 8 MMOL/L (ref 4–13)
AST SERPL W P-5'-P-CCNC: 47 U/L (ref 5–45)
BILIRUB SERPL-MCNC: 0.26 MG/DL (ref 0.2–1)
BUN SERPL-MCNC: 11 MG/DL (ref 5–25)
BUN SERPL-MCNC: 11 MG/DL (ref 5–25)
CALCIUM SERPL-MCNC: 8 MG/DL (ref 8.3–10.1)
CALCIUM SERPL-MCNC: 8.2 MG/DL (ref 8.3–10.1)
CHLORIDE SERPL-SCNC: 109 MMOL/L (ref 100–108)
CHLORIDE SERPL-SCNC: 109 MMOL/L (ref 100–108)
CO2 SERPL-SCNC: 21 MMOL/L (ref 21–32)
CO2 SERPL-SCNC: 24 MMOL/L (ref 21–32)
CREAT SERPL-MCNC: 0.8 MG/DL (ref 0.6–1.3)
CREAT SERPL-MCNC: 0.8 MG/DL (ref 0.6–1.3)
GFR SERPL CREATININE-BSD FRML MDRD: 93 ML/MIN/1.73SQ M
GFR SERPL CREATININE-BSD FRML MDRD: 93 ML/MIN/1.73SQ M
GLUCOSE SERPL-MCNC: 105 MG/DL (ref 65–140)
GLUCOSE SERPL-MCNC: 91 MG/DL (ref 65–140)
LIPASE SERPL-CCNC: 118 U/L (ref 73–393)
POTASSIUM SERPL-SCNC: 4.4 MMOL/L (ref 3.5–5.3)
POTASSIUM SERPL-SCNC: 5.6 MMOL/L (ref 3.5–5.3)
PROT SERPL-MCNC: 7.5 G/DL (ref 6.4–8.2)
SODIUM SERPL-SCNC: 138 MMOL/L (ref 136–145)
SODIUM SERPL-SCNC: 140 MMOL/L (ref 136–145)
TROPONIN I SERPL-MCNC: <0.02 NG/ML
TROPONIN I SERPL-MCNC: <0.02 NG/ML

## 2019-12-09 PROCEDURE — 36415 COLL VENOUS BLD VENIPUNCTURE: CPT | Performed by: EMERGENCY MEDICINE

## 2019-12-09 PROCEDURE — 80048 BASIC METABOLIC PNL TOTAL CA: CPT | Performed by: EMERGENCY MEDICINE

## 2019-12-09 PROCEDURE — 80053 COMPREHEN METABOLIC PANEL: CPT | Performed by: EMERGENCY MEDICINE

## 2019-12-09 PROCEDURE — 99284 EMERGENCY DEPT VISIT MOD MDM: CPT

## 2019-12-09 PROCEDURE — 93005 ELECTROCARDIOGRAM TRACING: CPT

## 2019-12-09 PROCEDURE — 83690 ASSAY OF LIPASE: CPT | Performed by: EMERGENCY MEDICINE

## 2019-12-09 PROCEDURE — 84484 ASSAY OF TROPONIN QUANT: CPT | Performed by: EMERGENCY MEDICINE

## 2019-12-09 PROCEDURE — 71046 X-RAY EXAM CHEST 2 VIEWS: CPT

## 2019-12-09 PROCEDURE — 99284 EMERGENCY DEPT VISIT MOD MDM: CPT | Performed by: EMERGENCY MEDICINE

## 2019-12-09 RX ORDER — SUCRALFATE ORAL 1 G/10ML
1000 SUSPENSION ORAL ONCE
Status: COMPLETED | OUTPATIENT
Start: 2019-12-09 | End: 2019-12-09

## 2019-12-09 RX ORDER — MAGNESIUM HYDROXIDE/ALUMINUM HYDROXICE/SIMETHICONE 120; 1200; 1200 MG/30ML; MG/30ML; MG/30ML
30 SUSPENSION ORAL ONCE
Status: COMPLETED | OUTPATIENT
Start: 2019-12-09 | End: 2019-12-09

## 2019-12-09 RX ORDER — FAMOTIDINE 20 MG/1
20 TABLET, FILM COATED ORAL ONCE
Status: COMPLETED | OUTPATIENT
Start: 2019-12-09 | End: 2019-12-09

## 2019-12-09 RX ADMIN — ALUMINUM HYDROXIDE, MAGNESIUM HYDROXIDE, AND SIMETHICONE 30 ML: 200; 200; 20 SUSPENSION ORAL at 19:28

## 2019-12-09 RX ADMIN — SUCRALFATE 1000 MG: 1 SUSPENSION ORAL at 19:28

## 2019-12-09 RX ADMIN — FAMOTIDINE 20 MG: 20 TABLET ORAL at 19:28

## 2019-12-09 NOTE — ED ATTENDING ATTESTATION
12/9/2019  I, Nicolasa Castro MD, saw and evaluated the patient  I have discussed the patient with the resident/non-physician practitioner and agree with the resident's/non-physician practitioner's findings, Plan of Care, and MDM as documented in the resident's/non-physician practitioner's note, except where noted  All available labs and Radiology studies were reviewed  I was present for key portions of any procedure(s) performed by the resident/non-physician practitioner and I was immediately available to provide assistance  At this point I agree with the current assessment done in the Emergency Department  I have conducted an independent evaluation of this patient a history and physical is as follows:    OA: 34 y/o f with h/o multiple medical problems including cerebral palsy, s/p  shunt and GERD who presents with a burning CP that began after eating a spicy bag of chips  Associated SOB and nausea without vomiting  No abd pain  No lightheadedness/dizziness  When asked, pt states this feels different from her typical heart burn  Staff at bedside that the pt has a protocol to be asked if she has any pain/discomfort within 30 minutes of eating food and if she does she is to receive maalox  No known early cardiac disease  Pt states she has had similar pain previously but not for a long time  Staff at bedside agree  PE, well developed f in NAD, VSS, NC/AT, MMM, neck supple/FROM, RR, lungs CTAB, -w/r, abd soft, +BS, + ttp over epigastric region as well as RUQ, no r/g, - mass, - CVA ttp, - edema, - calf ttp, + 2 distal pulses and capillary refill < 2 sec, AAO/at baseline  A/p CP/epigastric pain  Will do cardiac eval with delta troponin as pt's sxms began approximately 2 hours ago as well as abdominal labs, CXR, EKG, RUQ u/s, treat and dispo accordingly    ED Course     Pt with blood work, CXR and bedside u/s pending at end of shift  No complaints       Critical Care Time  Procedures

## 2019-12-09 NOTE — ED PROVIDER NOTES
History  Chief Complaint   Patient presents with    Heartburn     pt from group home was taken to eat [place] for dinner tonight, pt had tacos, c/o heartburn immediately after eating  pt c/o burning in her upper chest since eating dinner  pt has hx of same  25-year-old female with past medical history of cerebral palsy, intellectual disability, psychiatric disorder, and GERD who is presenting with chest discomfort  Patient states that her symptoms began about 1 hour prior to presentation  She had eaten spicy chips an hour prior to the symptom onset  Patient does have a history of GERD with dyspepsia  She states that her symptoms today are somewhat different  She did not receive any treatment with PRN Maalox  Patient reports that she feels that her breathing is heavy    Patient otherwise denies any diaphoresis, nausea, or vomiting  No abdominal pain  No history of abdominal surgeries  Patient follows with GI  She has a history of dysphagia which has been worked up with EGD, barium esophagram, and swallow evaluation  Patient last saw a GI on September 9th  She was started on Protonix 20 mg daily due to persistent postprandial burning pain  Patient does not have any risk factors for CAD such as diabetes mellitus, hypertension, hyperlipidemia, CKD, history of tobacco abuse, history of cocaine abuse, history of SLE or other autoimmune condition, or family history of early CAD  Prior to Admission Medications   Prescriptions Last Dose Informant Patient Reported? Taking?    ARIPiprazole (ABILIFY) 20 MG tablet  Care Giver No No   Sig: Take 1 tablet (20 mg total) by mouth daily at bedtime for 30 days at 9pm    Cholecalciferol (VITAMIN D-3) 1000 units CAPS   No No   Sig: Take 2 capsules (2,000 Units total) by mouth daily at 8am    Mouthwashes (LISTERINE ANTISEPTIC) LIQD   No No   Sig: Apply 1 application to the mouth or throat 2 (two) times a day   Multiple Vitamins-Minerals (CERTAVITE SENIOR/ANTIOXIDANT) TABS   No No   Sig: Take 1 tablet by mouth daily   RA SUNSCREEN SPF50 LOTN  Self No No   Sig: Apply 15 minutes before sun exposure and every 2 hours   acetaminophen (TYLENOL) 500 mg tablet  Self Yes No   Sig: Take 500 mg by mouth every 6 (six) hours as needed for mild pain   aluminum-magnesium hydroxide-simethicone (ANTACID) 200-200-20 mg/5 mL suspension  Self No No   Sig: Take 15 mL by mouth every 4 (four) hours as needed for indigestion or heartburn   bacitracin topical ointment 500 units/g topical ointment   No No   Sig: Apply 1 large application topically 3 (three) times a day for 7 days   bismuth subsalicylate (PEPTO BISMOL) 524 mg/30 mL oral suspension  Self No No   Sig: Take 15 mL (262 mg total) by mouth every 6 (six) hours as needed for indigestion   calcium carbonate (TUMS) 500 mg chewable tablet   No No   Sig: Chew 1 tablet (500 mg total) 3 (three) times a day as needed for heartburn   clotrimazole (LOTRIMIN AF) 1 % cream  Self Yes No   Sig: Lotrimin AF   clotrimazole (LOTRIMIN) 1 % cream  Self No No   Sig: Apply 1 g (1 application total) topically 3 (three) times a day as needed (fungal irritation)   dextromethorphan-guaiFENesin (ROBITUSSIN DM)  mg/5 mL syrup   No No   Sig: Take 5 mL by mouth 3 (three) times a day as needed for cough   escitalopram (LEXAPRO) 20 mg tablet  Care Giver No No   Sig: Take 1 tablet (20 mg total) by mouth daily for 30 days at 8am    fluticasone (FLONASE) 50 mcg/act nasal spray  Self No No   Si spray into each nostril daily as needed for rhinitis (nasal congestion) At 8:00 AM   ibuprofen (MOTRIN) 400 mg tablet  Self No No   Sig: Take 1 tablet (400 mg total) by mouth every 8 (eight) hours as needed for mild pain   lubiprostone (AMITIZA) 24 mcg capsule   No No   Sig: Take 1 capsule (24 mcg total) by mouth 2 (two) times a day with meals   magnesium hydroxide (GNP MILK OF MAGNESIA) 400 mg/5 mL oral suspension  Self No No   Sig: Take 30 mL by mouth daily as needed for constipation If no BM in 3 days   metoclopramide (REGLAN) 10 mg tablet  Self Yes No   Sig: Reglan   mupirocin (BACTROBAN) 2 % ointment   No No   Sig: Apply topically 3 (three) times a day for 5 days   norgestimate-ethinyl estradiol (ESTARYLLA) 0 25-35 MG-MCG per tablet   Yes No   Sig: Estarylla 0 25 mg-35 mcg tablet   pantoprazole (PROTONIX) 20 mg tablet   No No   Sig: Take 1 tablet 30 minutes before breakfast daily   phenol (CHLORASEPTIC) 1 4 % mucosal liquid   No No   Sig: Apply 1 spray to the mouth or throat every 2 (two) hours as needed (for sore throat as needed)   polyethylene glycol (MIRALAX) 17 g packet  Self No No   Sig: Take one packet daily as needed for no bowel movement in 24 hours   psyllium (METAMUCIL) 58 6 % packet  Self No No   Sig: Take 1 packet by mouth daily   senna-docusate sodium (SENEXON-S) 8 6-50 mg per tablet   No No   Sig: Take 1 tablet by mouth daily at 8am    sodium chloride (OCEAN) 0 65 % nasal spray  Self No No   Si spray into each nostril as needed (three times daily as needed for nasal congestion)   sodium chloride (OCEAN) 0 65 % nasal spray   No No   Si sprays into each nostril as needed for congestion or rhinitis   traZODone (DESYREL) 50 mg tablet  Self No No   Sig: Take 1 tablet (50 mg total) by mouth daily at bedtime at 9pm   zinc oxide (DESITIN) 13 % cream  Self No No   Sig: Apply 1 application topically as needed (for perianal irritation)      Facility-Administered Medications: None       Past Medical History:   Diagnosis Date    Acid reflux     ADD (attention deficit disorder)     Adjustment disorder     Anxiety     Astigmatism     Brain lesion     Brain lesion     Bronchitis     Calcium deficiency     Cellulitis of foot, right 2018    Cerebral palsy (HCC)     Cerebral palsy (HCC)     Last Assessed:2016    Chronic otitis media     Chronically dry eyes, left     Last Assessed:2016    Constipation     Depression     Last Assessed:7/6/2016    Depressive disorder     Dry eyes     Dysphagia     Esophagitis     Esotropia     Fall     GERD (gastroesophageal reflux disease)     GERD (gastroesophageal reflux disease)     Hiatal hernia     Hydrocephalus (HCC)     Hyperopia with astigmatism     Impaired fasting glucose     Left nephrolithiasis 3/4/2019    Mood disorder ([address] )     Myopia     Oppositional defiant disorder     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HCC)     Psychiatric disorder     Seizures (HCC)     Sensorineural hearing loss     Status post ventriculoatrial shunt placement     Visual impairment        Past Surgical History:   Procedure Laterality Date    CSF SHUNT      Creation of Ventriculo-Peritoneal CSF shunt ; Last Assessed:7/6/2016    EAR SURGERY      Last Assessed:7/6/2016    LEG SURGERY      due to CP     NOSE SURGERY      Last Assessed:7/6/2016    VT ESOPHAGOGASTRODUODENOSCOPY TRANSORAL DIAGNOSTIC N/A 5/9/2019    Procedure: ESOPHAGOGASTRODUODENOSCOPY (EGD) with biopsy;  Surgeon: Danitza Meraz MD;  Location: [facility]; Service: Gastroenterology       Family History   Problem Relation Age of Onset    Diabetes Mother     No Known Problems Father      I have reviewed and agree with the history as documented  Social History     Tobacco Use    Smoking status: Never Smoker    Smokeless tobacco: Never Used   Substance Use Topics    Alcohol use: No    Drug use: No        Review of Systems   Constitutional: Negative for diaphoresis, fever and unexpected weight change  HENT: Negative for congestion, rhinorrhea and sore throat  Eyes: Negative for pain, discharge and visual disturbance  Respiratory: Negative for cough, shortness of breath and wheezing  Cardiovascular: Positive for chest pain (burning)  Negative for palpitations and leg swelling  Gastrointestinal: Negative for abdominal pain, blood in stool, constipation, diarrhea, nausea and vomiting     Genitourinary: Negative for dysuria, flank pain and hematuria  Musculoskeletal: Negative for arthralgias and joint swelling  Skin: Negative for rash and wound  Allergic/Immunologic: Negative for environmental allergies and food allergies  Neurological: Negative for dizziness, seizures, weakness and numbness  Hematological: Negative for adenopathy  Psychiatric/Behavioral: Negative for confusion and hallucinations  Physical Exam  ED Triage Vitals [12/09/19 1825]   Temperature Pulse Respirations Blood Pressure SpO2   (!) 97 4 °F (36 3 °C) 67 18 146/69 97 %      Temp Source Heart Rate Source Patient Position - Orthostatic VS BP Location FiO2 (%)   Oral Monitor Sitting Right arm --      Pain Score       --             Orthostatic Vital Signs  Vitals:    12/09/19 1825 12/09/19 2134   BP: 146/69 125/58   Pulse: 67 93   Patient Position - Orthostatic VS: Sitting Sitting       Physical Exam   Constitutional: She is oriented to person, place, and time  She appears well-developed and well-nourished  No distress  HENT:   Head: Normocephalic and atraumatic  Right Ear: External ear normal    Left Ear: External ear normal    Eyes: Pupils are equal, round, and reactive to light  Conjunctivae and EOM are normal    Neck: Normal range of motion  Neck supple  Cardiovascular: Normal rate, regular rhythm and normal heart sounds  No murmur heard  Pulmonary/Chest: Effort normal and breath sounds normal  No respiratory distress  She has no wheezes  She has no rales  Abdominal: Soft  Bowel sounds are normal  She exhibits no distension  There is tenderness  There is no guarding  Patient with mild epigastric tenderness  No guarding or peritoneal signs  Musculoskeletal: Normal range of motion  She exhibits no deformity  Neurological: She is alert and oriented to person, place, and time  No gross motor deficits noted  Cranial nerves II-XII are intact  Speech is fluent without dysarthria or aphasia  Skin: Skin is warm and dry     Psychiatric: She has a normal mood and affect  Her behavior is normal    Nursing note and vitals reviewed        ED Medications  Medications   aluminum-magnesium hydroxide-simethicone (MYLANTA) 200-200-20 mg/5 mL oral suspension 30 mL (30 mL Oral Given 12/9/19 1928)   famotidine (PEPCID) tablet 20 mg (20 mg Oral Given 12/9/19 1928)   sucralfate (CARAFATE) oral suspension 1,000 mg (1,000 mg Oral Given 12/9/19 1928)       Diagnostic Studies  Results Reviewed     Procedure Component Value Units Date/Time    Troponin I [[id number]]  (Normal) Collected:  12/09/19 2226    Lab Status:  Final result Specimen:  Blood from Arm, Left Updated:  12/09/19 2256     Troponin I <0 02 ng/mL     Basic metabolic panel [[id number]]  (Abnormal) Collected:  12/09/19 2226    Lab Status:  Final result Specimen:  Blood from Arm, Left Updated:  12/09/19 2249     Sodium 140 mmol/L      Potassium 4 4 mmol/L      Chloride 109 mmol/L      CO2 24 mmol/L      ANION GAP 7 mmol/L      BUN 11 mg/dL      Creatinine 0 80 mg/dL      Glucose 91 mg/dL      Calcium 8 0 mg/dL      eGFR 93 ml/min/1 73sq m     Narrative:       [place] guidelines for Chronic Kidney Disease (CKD):     Stage 1 with normal or high GFR (GFR > 90 mL/min/1 73 square meters)    Stage 2 Mild CKD (GFR = 60-89 mL/min/1 73 square meters)    Stage 3A Moderate CKD (GFR = 45-59 mL/min/1 73 square meters)    Stage 3B Moderate CKD (GFR = 30-44 mL/min/1 73 square meters)    Stage 4 Severe CKD (GFR = 15-29 mL/min/1 73 square meters)    Stage 5 End Stage CKD (GFR <15 mL/min/1 73 square meters)  Note: GFR calculation is accurate only with a steady state creatinine    Troponin I [[id number]]  (Normal) Collected:  12/09/19 1928    Lab Status:  Final result Specimen:  Blood from Arm, Left Updated:  12/09/19 2002     Troponin I <0 02 ng/mL     Comprehensive metabolic panel [[id number]]  (Abnormal) Collected:  12/09/19 1928    Lab Status:  Final result Specimen:  Blood from Arm, Left Updated: 12/09/19 2001     Sodium 138 mmol/L      Potassium 5 6 mmol/L      Chloride 109 mmol/L      CO2 21 mmol/L      ANION GAP 8 mmol/L      BUN 11 mg/dL      Creatinine 0 80 mg/dL      Glucose 105 mg/dL      Calcium 8 2 mg/dL      AST 47 U/L      ALT 24 U/L      Alkaline Phosphatase 150 U/L      Total Protein 7 5 g/dL      Albumin 3 0 g/dL      Total Bilirubin 0 26 mg/dL      eGFR 93 ml/min/1 73sq m     Narrative:       [place] guidelines for Chronic Kidney Disease (CKD):     Stage 1 with normal or high GFR (GFR > 90 mL/min/1 73 square meters)    Stage 2 Mild CKD (GFR = 60-89 mL/min/1 73 square meters)    Stage 3A Moderate CKD (GFR = 45-59 mL/min/1 73 square meters)    Stage 3B Moderate CKD (GFR = 30-44 mL/min/1 73 square meters)    Stage 4 Severe CKD (GFR = 15-29 mL/min/1 73 square meters)    Stage 5 End Stage CKD (GFR <15 mL/min/1 73 square meters)  Note: GFR calculation is accurate only with a steady state creatinine    Lipase [[id number]]  (Normal) Collected:  12/09/19 1928    Lab Status:  Final result Specimen:  Blood from Arm, Left Updated:  12/09/19 2001     Lipase 118 u/L                  XR chest 2 views   ED Interpretation by Savita Steele MD (12/09 2205)   Poor technique x-ray with a semi-erect film and poor inspiratory effort  No acute infiltrates appreciated  Procedures  ECG 12 Lead Documentation Only  Date/Time: 12/9/2019 7:51 PM  Performed by: Parveen Dillon MD  Authorized by: Parveen Dillon MD     ECG reviewed by me, the ED Provider: yes    Patient location:  ED  Previous ECG:     Previous ECG:  Compared to current    Comparison ECG info:   May 15, 2019    Similarity:  No change    Comparison to cardiac monitor: Yes    Interpretation:     Interpretation: non-specific    Rate:     ECG rate:  102    ECG rate assessment: tachycardic    Rhythm:     Rhythm: sinus tachycardia    Ectopy:     Ectopy: none    QRS:     QRS axis:  Normal    QRS intervals: Normal  Conduction:     Conduction: normal    ST segments:     ST segments:  Normal  T waves:     T waves: normal            ED Course  ED Course as of Dec 10 0127   Mon Dec 09, 2019   2003 Hemolyzed  Potassium(!): 5 6   2004 Alkaline Phosphatase(!): 150   2004 Lipase: 118   2004 Troponin I: <0 02   2037 Patient states the pain is worse but she is asking to eat  2250 Potassium: 4 4   2305 Troponin I: <0 02   2317 Repeat 3 hour delta EKG reviewed  Demonstrated sinus tachycardia at 103 beats per minute  No ST segment elevation or depression  No T-wave inversions  HEART Risk Score      Most Recent Value   History  1 Filed at: 12/10/2019 0127   ECG  0 Filed at: 12/10/2019 0127   Age  0 Filed at: 12/10/2019 0127   Risk Factors  0 Filed at: 12/10/2019 0127   Troponin  0 Filed at: 12/10/2019 0127   Heart Score Risk Calculator   History  1 Filed at: 12/10/2019 0127   ECG  0 Filed at: 12/10/2019 0127   Age  0 Filed at: 12/10/2019 0127   Risk Factors  0 Filed at: 12/10/2019 0127   Troponin  0 Filed at: 12/10/2019 0127   HEART Score  1 Filed at: 12/10/2019 0127   HEART Score  1 Filed at: 12/10/2019 0127                            MDM  Number of Diagnoses or Management Options  Chest pain: new and requires workup  Dyspepsia: established and worsening  GERD (gastroesophageal reflux disease): established and worsening  Diagnosis management comments:     As above, patient presented with burning central chest discomfort similar to previous episodes of dyspepsia and GERD  Patient had been following with GI for this issue  She was on a PPI  She had eaten spicy food shortly prior to onset of symptoms but had not been given her PRN Maalox as it was not available  The patient was brought to the ER for evaluation  On arrival, patient reported persistent chest discomfort as described above  She also stated her breathing was "heavy "  She was unable to further characterize this    The patient had no other complaints  She had no cardiac risk factors  On physical examination, the patient had normal vital signs  She had minimal epigastric tenderness to palpation without guarding or peritoneal signs  Remainder of physical examination was unremarkable  Differential diagnosis included but was not limited to GERD, gastritis, peptic ulcer disease, ACS, pancreatitis, biliary colic, and cholecystitis  Labs were ordered as above  Initial CMP demonstrated slight elevation in alkaline phosphatase  Potassium was elevated but this was likely due to hemolysis  EKG did not demonstrate any ischemic changes  Initial troponin was within normal limits  3 hour delta EKG and delta troponin remained unchanged  Repeat BMP demonstrated that the hyperkalemia was due to hemolysis and not a true value  Patient improved significantly with symptomatic treatment in the ER  She received Maalox, Carafate, and Pepcid  Although the patient stated that her symptoms were not improved, she started asking for food and was able to tolerate a sandwich and other food without difficulty  The patient was discharged back to her group home in good condition  Return precautions discussed in discharge paperwork and with the patient  Disposition  Final diagnoses:   Dyspepsia   GERD (gastroesophageal reflux disease)   Chest pain     Time reflects when diagnosis was documented in both MDM as applicable and the Disposition within this note     Time User Action Codes Description Comment    12/9/2019 11:19 PM [place] Frieze Add [R10 13] Dyspepsia     12/9/2019 11:19 PM Stephen Frieze Add [K21 9] GERD (gastroesophageal reflux disease)     12/9/2019 11:19 PM [place] Frieze Add [R07 9] Chest pain       ED Disposition     ED Disposition Condition Date/Time Comment    Discharge Good Mon Dec 9, 2019 11:19 PM Ramirez Dawson discharge to home/self care              Follow-up Information     Follow up With Specialties Details Why Contact Info Additional Information    Asif Stone MD Family Medicine, Obstetrics and Gynecology, Obstetrics, Gynecology Call in 1 day Please follow up with your PCP within 1 week  [facility] [address] [place] [address]       [facility] Call in 1 day Please follow up with GI within 1 week  [address] [facility] [address] [facility] [id number]  Raúl Huff [address] [facility] [place], [address] [facility] [place] , [place], [place], [address]    [address] [address] [facility] Emergency Department Emergency Medicine Go in 1 day If symptoms worsen   [address]  [phone] [facility] ED, [address] [place], [place], [address]   [phone]          Discharge Medication List as of 12/9/2019 11:21 PM      CONTINUE these medications which have NOT CHANGED    Details   acetaminophen (TYLENOL) 500 mg tablet Take 500 mg by mouth every 6 (six) hours as needed for mild pain, Historical Med      aluminum-magnesium hydroxide-simethicone (ANTACID) 200-200-20 mg/5 mL suspension Take 15 mL by mouth every 4 (four) hours as needed for indigestion or heartburn, Starting Wed 7/17/2019, Normal      ARIPiprazole (ABILIFY) 20 MG tablet Take 1 tablet (20 mg total) by mouth daily at bedtime for 30 days at 9pm , Starting Tue 9/25/2018, Until Mon 11/18/2019, No Print      bacitracin topical ointment 500 units/g topical ointment Apply 1 large application topically 3 (three) times a day for 7 days, Starting Mon 9/23/2019, Until Mon 11/18/2019, Normal      bismuth subsalicylate (PEPTO BISMOL) 524 mg/30 mL oral suspension Take 15 mL (262 mg total) by mouth every 6 (six) hours as needed for indigestion, Starting Thu 6/6/2019, Normal      calcium carbonate (TUMS) 500 mg chewable tablet Chew 1 tablet (500 mg total) 3 (three) times a day as needed for heartburn, Starting Mon 10/14/2019, Normal      Cholecalciferol (VITAMIN D-3) 1000 units CAPS Take 2 capsules (2,000 Units total) by mouth daily at 8am , Starting Tue 10/1/2019, Normal      !! clotrimazole (LOTRIMIN AF) 1 % cream Lotrimin AF, Historical Med      !! clotrimazole (LOTRIMIN) 1 % cream Apply 1 g (1 application total) topically 3 (three) times a day as needed (fungal irritation), Starting Wed 5/9/2018, Normal      dextromethorphan-guaiFENesin (ROBITUSSIN DM)  mg/5 mL syrup Take 5 mL by mouth 3 (three) times a day as needed for cough, Starting Tue 10/1/2019, Normal      escitalopram (LEXAPRO) 20 mg tablet Take 1 tablet (20 mg total) by mouth daily for 30 days at 8am , Starting Thu 12/6/2018, Until Thu 6/6/2019, No Print      fluticasone (FLONASE) 50 mcg/act nasal spray 1 spray into each nostril daily as needed for rhinitis (nasal congestion) At 8:00 AM, Starting Thu 6/6/2019, Normal      ibuprofen (MOTRIN) 400 mg tablet Take 1 tablet (400 mg total) by mouth every 8 (eight) hours as needed for mild pain, Starting Thu 6/6/2019, Normal      lubiprostone (AMITIZA) 24 mcg capsule Take 1 capsule (24 mcg total) by mouth 2 (two) times a day with meals, Starting Tue 10/1/2019, Normal      magnesium hydroxide (GNP MILK OF MAGNESIA) 400 mg/5 mL oral suspension Take 30 mL by mouth daily as needed for constipation If no BM in 3 days, Starting Thu 6/6/2019, Normal      metoclopramide (REGLAN) 10 mg tablet Reglan, Historical Med      Mouthwashes (LISTERINE ANTISEPTIC) LIQD Apply 1 application to the mouth or throat 2 (two) times a day, Starting Tue 10/1/2019, Until Mon 11/18/2019, Normal      Multiple Vitamins-Minerals (CERTAVITE SENIOR/ANTIOXIDANT) TABS Take 1 tablet by mouth daily, Starting Tue 10/1/2019, Normal      mupirocin (BACTROBAN) 2 % ointment Apply topically 3 (three) times a day for 5 days, Starting Mon 11/18/2019, Until Sat 11/23/2019, Print      norgestimate-ethinyl estradiol (ESTARYLLA) 0 25-35 MG-MCG per tablet Estarylla 0 25 mg-35 mcg tablet, Historical Med pantoprazole (PROTONIX) 20 mg tablet Take 1 tablet 30 minutes before breakfast daily, Normal      phenol (CHLORASEPTIC) 1 4 % mucosal liquid Apply 1 spray to the mouth or throat every 2 (two) hours as needed (for sore throat as needed), Starting Wed 11/6/2019, Print      polyethylene glycol (MIRALAX) 17 g packet Take one packet daily as needed for no bowel movement in 24 hours, Normal      psyllium (METAMUCIL) 58 6 % packet Take 1 packet by mouth daily, Starting Mon 8/12/2019, Normal      RA SUNSCREEN SPF50 LOTN Apply 15 minutes before sun exposure and every 2 hours, Normal      senna-docusate sodium (SENEXON-S) 8 6-50 mg per tablet Take 1 tablet by mouth daily at 8am , Starting Tue 10/1/2019, Normal      !! sodium chloride (OCEAN) 0 65 % nasal spray 1 spray into each nostril as needed (three times daily as needed for nasal congestion), Starting Wed 3/20/2019, Normal      !! sodium chloride (OCEAN) 0 65 % nasal spray 2 sprays into each nostril as needed for congestion or rhinitis, Starting Wed 11/6/2019, Print      traZODone (DESYREL) 50 mg tablet Take 1 tablet (50 mg total) by mouth daily at bedtime at 9pm, Starting Tue 9/25/2018, No Print      zinc oxide (DESITIN) 13 % cream Apply 1 application topically as needed (for perianal irritation), Starting Thu 6/6/2019, Normal       !! - Potential duplicate medications found  Please discuss with provider  No discharge procedures on file  ED Provider  Attending physically available and evaluated Pan Burn  I managed the patient along with the ED Attending      Electronically Signed by         Jase Evans MD  12/10/19 5075

## 2019-12-10 LAB
ATRIAL RATE: 102 BPM
ATRIAL RATE: 103 BPM
P AXIS: 64 DEGREES
P AXIS: 65 DEGREES
PR INTERVAL: 126 MS
PR INTERVAL: 126 MS
QRS AXIS: 46 DEGREES
QRS AXIS: 59 DEGREES
QRSD INTERVAL: 62 MS
QRSD INTERVAL: 68 MS
QT INTERVAL: 310 MS
QT INTERVAL: 332 MS
QTC INTERVAL: 404 MS
QTC INTERVAL: 434 MS
T WAVE AXIS: 16 DEGREES
T WAVE AXIS: 40 DEGREES
VENTRICULAR RATE: 102 BPM
VENTRICULAR RATE: 103 BPM

## 2019-12-10 PROCEDURE — 93010 ELECTROCARDIOGRAM REPORT: CPT | Performed by: INTERNAL MEDICINE

## 2019-12-13 ENCOUNTER — OFFICE VISIT (OUTPATIENT)
Dept: GASTROENTEROLOGY | Facility: MEDICAL CENTER | Age: 39
End: 2019-12-13
Payer: MEDICARE

## 2019-12-13 VITALS
HEIGHT: 57 IN | BODY MASS INDEX: 40.99 KG/M2 | SYSTOLIC BLOOD PRESSURE: 126 MMHG | HEART RATE: 112 BPM | WEIGHT: 190 LBS | DIASTOLIC BLOOD PRESSURE: 72 MMHG | TEMPERATURE: 97.6 F

## 2019-12-13 DIAGNOSIS — R10.9 ABDOMINAL PAIN, UNSPECIFIED ABDOMINAL LOCATION: Primary | ICD-10-CM

## 2019-12-13 DIAGNOSIS — F45.8 FUNCTIONAL DYSPHAGIA: ICD-10-CM

## 2019-12-13 DIAGNOSIS — K59.04 CHRONIC IDIOPATHIC CONSTIPATION: ICD-10-CM

## 2019-12-13 PROCEDURE — 99214 OFFICE O/P EST MOD 30 MIN: CPT | Performed by: INTERNAL MEDICINE

## 2019-12-14 ENCOUNTER — APPOINTMENT (EMERGENCY)
Dept: RADIOLOGY | Facility: HOSPITAL | Age: 39
End: 2019-12-14
Payer: MEDICARE

## 2019-12-14 ENCOUNTER — HOSPITAL ENCOUNTER (EMERGENCY)
Facility: HOSPITAL | Age: 39
Discharge: HOME | End: 2019-12-14
Attending: EMERGENCY MEDICINE | Admitting: EMERGENCY MEDICINE
Payer: MEDICARE

## 2019-12-14 VITALS
SYSTOLIC BLOOD PRESSURE: 134 MMHG | RESPIRATION RATE: 16 BRPM | OXYGEN SATURATION: 95 % | HEART RATE: 96 BPM | TEMPERATURE: 97.4 F | DIASTOLIC BLOOD PRESSURE: 88 MMHG

## 2019-12-14 DIAGNOSIS — R07.9 CHEST PAIN: Primary | ICD-10-CM

## 2019-12-14 DIAGNOSIS — R10.13 EPIGASTRIC ABDOMINAL PAIN: ICD-10-CM

## 2019-12-14 LAB
ALBUMIN SERPL BCP-MCNC: 3.2 G/DL (ref 3.5–5)
ALP SERPL-CCNC: 158 U/L (ref 46–116)
ALT SERPL W P-5'-P-CCNC: 19 U/L (ref 12–78)
ANION GAP SERPL CALCULATED.3IONS-SCNC: 5 MMOL/L (ref 4–13)
AST SERPL W P-5'-P-CCNC: 10 U/L (ref 5–45)
BASOPHILS # BLD AUTO: 0.04 THOUSANDS/ΜL (ref 0–0.1)
BASOPHILS NFR BLD AUTO: 0 % (ref 0–1)
BILIRUB SERPL-MCNC: 0.12 MG/DL (ref 0.2–1)
BUN SERPL-MCNC: 11 MG/DL (ref 5–25)
CALCIUM SERPL-MCNC: 8.6 MG/DL (ref 8.3–10.1)
CHLORIDE SERPL-SCNC: 111 MMOL/L (ref 100–108)
CO2 SERPL-SCNC: 25 MMOL/L (ref 21–32)
CREAT SERPL-MCNC: 0.78 MG/DL (ref 0.6–1.3)
EOSINOPHIL # BLD AUTO: 0.09 THOUSAND/ΜL (ref 0–0.61)
EOSINOPHIL NFR BLD AUTO: 1 % (ref 0–6)
ERYTHROCYTE [DISTWIDTH] IN BLOOD BY AUTOMATED COUNT: 13.6 % (ref 11.6–15.1)
GFR SERPL CREATININE-BSD FRML MDRD: 95 ML/MIN/1.73SQ M
GLUCOSE SERPL-MCNC: 102 MG/DL (ref 65–140)
HCT VFR BLD AUTO: 38.6 % (ref 34.8–46.1)
HGB BLD-MCNC: 12.5 G/DL (ref 11.5–15.4)
IMM GRANULOCYTES # BLD AUTO: 0.03 THOUSAND/UL (ref 0–0.2)
IMM GRANULOCYTES NFR BLD AUTO: 0 % (ref 0–2)
LIPASE SERPL-CCNC: 139 U/L (ref 73–393)
LYMPHOCYTES # BLD AUTO: 2.95 THOUSANDS/ΜL (ref 0.6–4.47)
LYMPHOCYTES NFR BLD AUTO: 32 % (ref 14–44)
MCH RBC QN AUTO: 30.6 PG (ref 26.8–34.3)
MCHC RBC AUTO-ENTMCNC: 32.4 G/DL (ref 31.4–37.4)
MCV RBC AUTO: 94 FL (ref 82–98)
MONOCYTES # BLD AUTO: 0.75 THOUSAND/ΜL (ref 0.17–1.22)
MONOCYTES NFR BLD AUTO: 8 % (ref 4–12)
NEUTROPHILS # BLD AUTO: 5.41 THOUSANDS/ΜL (ref 1.85–7.62)
NEUTS SEG NFR BLD AUTO: 59 % (ref 43–75)
NRBC BLD AUTO-RTO: 0 /100 WBCS
PLATELET # BLD AUTO: 273 THOUSANDS/UL (ref 149–390)
PMV BLD AUTO: 9.2 FL (ref 8.9–12.7)
POTASSIUM SERPL-SCNC: 3.8 MMOL/L (ref 3.5–5.3)
PROT SERPL-MCNC: 7.1 G/DL (ref 6.4–8.2)
RBC # BLD AUTO: 4.09 MILLION/UL (ref 3.81–5.12)
SODIUM SERPL-SCNC: 141 MMOL/L (ref 136–145)
TROPONIN I SERPL-MCNC: <0.02 NG/ML
WBC # BLD AUTO: 9.27 THOUSAND/UL (ref 4.31–10.16)

## 2019-12-14 PROCEDURE — 99284 EMERGENCY DEPT VISIT MOD MDM: CPT

## 2019-12-14 PROCEDURE — 85025 COMPLETE CBC W/AUTO DIFF WBC: CPT | Performed by: EMERGENCY MEDICINE

## 2019-12-14 PROCEDURE — 83690 ASSAY OF LIPASE: CPT | Performed by: EMERGENCY MEDICINE

## 2019-12-14 PROCEDURE — 99284 EMERGENCY DEPT VISIT MOD MDM: CPT | Performed by: EMERGENCY MEDICINE

## 2019-12-14 PROCEDURE — 84484 ASSAY OF TROPONIN QUANT: CPT | Performed by: EMERGENCY MEDICINE

## 2019-12-14 PROCEDURE — 80053 COMPREHEN METABOLIC PANEL: CPT | Performed by: EMERGENCY MEDICINE

## 2019-12-14 PROCEDURE — 93005 ELECTROCARDIOGRAM TRACING: CPT

## 2019-12-14 PROCEDURE — 36415 COLL VENOUS BLD VENIPUNCTURE: CPT | Performed by: EMERGENCY MEDICINE

## 2019-12-14 RX ORDER — LIDOCAINE HYDROCHLORIDE 20 MG/ML
10 SOLUTION OROPHARYNGEAL ONCE
Status: COMPLETED | OUTPATIENT
Start: 2019-12-14 | End: 2019-12-14

## 2019-12-14 RX ORDER — MAGNESIUM HYDROXIDE/ALUMINUM HYDROXICE/SIMETHICONE 120; 1200; 1200 MG/30ML; MG/30ML; MG/30ML
30 SUSPENSION ORAL ONCE
Status: COMPLETED | OUTPATIENT
Start: 2019-12-14 | End: 2019-12-14

## 2019-12-14 RX ADMIN — ALUMINUM HYDROXIDE, MAGNESIUM HYDROXIDE, AND SIMETHICONE 30 ML: 200; 200; 20 SUSPENSION ORAL at 19:11

## 2019-12-14 RX ADMIN — LIDOCAINE HYDROCHLORIDE 10 ML: 20 SOLUTION ORAL; TOPICAL at 19:11

## 2019-12-14 NOTE — ED ATTENDING ATTESTATION
12/14/2019  I, Drew Baez MD, saw and evaluated the patient  I have discussed the patient with the resident/non-physician practitioner and agree with the resident's/non-physician practitioner's findings, Plan of Care, and MDM as documented in the resident's/non-physician practitioner's note, except where noted  All available labs and Radiology studies were reviewed  I was present for key portions of any procedure(s) performed by the resident/non-physician practitioner and I was immediately available to provide assistance  At this point I agree with the current assessment done in the Emergency Department  I have conducted an independent evaluation of this patient a history and physical is as follows:  Pt has history of  shunt pt started with epigastric discomfort at 5:30 pm today after eating Pt seen for same thing on Monday after eating spicy chips Pt had negative cardiac hinojosa on Monday that was negative Pt sates it hurts when takes a deep breath feels like acid reflux   PE: alert nad heart reg lungs clear abd soft tender midepigastric area ekg trop and gi cocktail  ED Course         Critical Care Time  Procedures

## 2019-12-15 NOTE — ED NOTES
Caregivers requesting if possible pt be discharged by 8 so she can get her evening medications at home or to let them know so they can have another staff member bring them in  Dr Kell Fernandes aware       Yoni Bowling RN  12/14/19 2957

## 2019-12-15 NOTE — DISCHARGE INSTRUCTIONS
Ms Prosper Lundberg is okay to take her 8 PM medications  Please follow-up with primary care doctor regarding recurring epigastric pain

## 2019-12-15 NOTE — ED PROVIDER NOTES
History  Chief Complaint   Patient presents with    Heartburn     Pt reports she ate something that said "red hot" and c/o heart burn  Pt reports food was not spicy but contained tomato sauce     35 y o  F with a PMHx of  shunt, intellectual disability, cerebral palsy, who presents with epigastric pain  She had a similar pain on Monday and went to the ED and received a full cardiac workup  Cardiac workup was entirely negative  Chest x-ray was also negative at that time  This pain occurred on Monday after eating spicy chips  She describes this pain also happened after dinner, and feels like acid reflux  Pain starts in the epigastric area and radiates to her chest   She does report shortness of breath, but says that she has shortness of breath at baseline  Does not report any nausea, vomiting, diarrhea, constipation  No throat pain  No jaw pain no back pain  ROS otherwise completely negative          Prior to Admission Medications   Prescriptions Last Dose Informant Patient Reported? Taking?    ARIPiprazole (ABILIFY) 20 MG tablet 12/13/2019 at 2100 Care Giver No No   Sig: Take 1 tablet (20 mg total) by mouth daily at bedtime for 30 days at 9pm    Cholecalciferol (VITAMIN D-3) 1000 units CAPS 12/14/2019 at 0800  No Yes   Sig: Take 2 capsules (2,000 Units total) by mouth daily at 8am    Mouthwashes (LISTERINE ANTISEPTIC) LIQD   No No   Sig: Apply 1 application to the mouth or throat 2 (two) times a day   Multiple Vitamins-Minerals (CERTAVITE SENIOR/ANTIOXIDANT) TABS Unknown at Unknown time  No No   Sig: Take 1 tablet by mouth daily   RA SUNSCREEN SPF50 LOTN Unknown at Unknown time Self No No   Sig: Apply 15 minutes before sun exposure and every 2 hours   acetaminophen (TYLENOL) 500 mg tablet Unknown at Unknown time Self Yes No   Sig: Take 500 mg by mouth every 6 (six) hours as needed for mild pain   aluminum-magnesium hydroxide-simethicone (ANTACID) 200-200-20 mg/5 mL suspension Unknown at Unknown time Self No No   Sig: Take 15 mL by mouth every 4 (four) hours as needed for indigestion or heartburn   bacitracin topical ointment 500 units/g topical ointment   No No   Sig: Apply 1 large application topically 3 (three) times a day for 7 days   bismuth subsalicylate (PEPTO BISMOL) 524 mg/30 mL oral suspension Unknown at Unknown time Self No No   Sig: Take 15 mL (262 mg total) by mouth every 6 (six) hours as needed for indigestion   Patient not taking: Reported on 2019   calcium carbonate (TUMS) 500 mg chewable tablet Unknown at Unknown time  No No   Sig: Chew 1 tablet (500 mg total) 3 (three) times a day as needed for heartburn   clotrimazole (LOTRIMIN AF) 1 % cream Unknown at Unknown time Self Yes No   Sig: Lotrimin AF   clotrimazole (LOTRIMIN) 1 % cream Unknown at Unknown time Self No No   Sig: Apply 1 g (1 application total) topically 3 (three) times a day as needed (fungal irritation)   dextromethorphan-guaiFENesin (ROBITUSSIN DM)  mg/5 mL syrup Unknown at Unknown time  No No   Sig: Take 5 mL by mouth 3 (three) times a day as needed for cough   escitalopram (LEXAPRO) 20 mg tablet 2019 at 0800 Care Giver No Yes   Sig: Take 1 tablet (20 mg total) by mouth daily for 30 days at 8am    fluticasone (FLONASE) 50 mcg/act nasal spray Unknown at Unknown time Self No No   Si spray into each nostril daily as needed for rhinitis (nasal congestion) At 8:00 AM   ibuprofen (MOTRIN) 400 mg tablet Unknown at Unknown time Self No No   Sig: Take 1 tablet (400 mg total) by mouth every 8 (eight) hours as needed for mild pain   lubiprostone (AMITIZA) 24 mcg capsule 2019 at 0800  No Yes   Sig: Take 1 capsule (24 mcg total) by mouth 2 (two) times a day with meals   magnesium hydroxide (GNP MILK OF MAGNESIA) 400 mg/5 mL oral suspension Unknown at Unknown time Self No No   Sig: Take 30 mL by mouth daily as needed for constipation If no BM in 3 days   metoclopramide (REGLAN) 10 mg tablet Unknown at Unknown time Self Yes No   Sig: Reglan   mupirocin (BACTROBAN) 2 % ointment   No No   Sig: Apply topically 3 (three) times a day for 5 days   norgestimate-ethinyl estradiol (ESTARYLLA) 0 25-35 MG-MCG per tablet Unknown at Unknown time  Yes No   Sig: Estarylla 0 25 mg-35 mcg tablet   pantoprazole (PROTONIX) 20 mg tablet 2019 at 0700  No Yes   Sig: Take 1 tablet 30 minutes before breakfast daily   phenol (CHLORASEPTIC) 1 4 % mucosal liquid   No No   Sig: Apply 1 spray to the mouth or throat every 2 (two) hours as needed (for sore throat as needed)   polyethylene glycol (MIRALAX) 17 g packet Unknown at Unknown time Self No No   Sig: Take one packet daily as needed for no bowel movement in 24 hours   psyllium (METAMUCIL) 58 6 % packet Unknown at Unknown time Self No No   Sig: Take 1 packet by mouth daily   senna-docusate sodium (SENEXON-S) 8 6-50 mg per tablet 2019 at 0800  No Yes   Sig: Take 1 tablet by mouth daily at 8am    sodium chloride (OCEAN) 0 65 % nasal spray  Self No No   Si spray into each nostril as needed (three times daily as needed for nasal congestion)   sodium chloride (OCEAN) 0 65 % nasal spray   No No   Si sprays into each nostril as needed for congestion or rhinitis   traZODone (DESYREL) 50 mg tablet 2019 at 2100 Self No Yes   Sig: Take 1 tablet (50 mg total) by mouth daily at bedtime at 9pm   zinc oxide (DESITIN) 13 % cream  Self No No   Sig: Apply 1 application topically as needed (for perianal irritation)      Facility-Administered Medications: None       Past Medical History:   Diagnosis Date    Acid reflux     ADD (attention deficit disorder)     Adjustment disorder     Anxiety     Astigmatism     Brain lesion     Brain lesion     Bronchitis     Calcium deficiency     Cellulitis of foot, right 2018    Cerebral palsy (HCC)     Cerebral palsy (HCC)     Last Assessed:2016    Chronic otitis media     Chronically dry eyes, left     Last Assessed:2016    Constipation  Depression     Last Assessed:7/6/2016    Depressive disorder     Dry eyes     Dysphagia     Esophagitis     Esotropia     Fall     GERD (gastroesophageal reflux disease)     GERD (gastroesophageal reflux disease)     Hiatal hernia     Hydrocephalus (HCC)     Hyperopia with astigmatism     Impaired fasting glucose     Left nephrolithiasis 3/4/2019    Mood disorder ([address] )     Myopia     Oppositional defiant disorder     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HCC)     Psychiatric disorder     Seizures (HCC)     Sensorineural hearing loss     Status post ventriculoatrial shunt placement     Visual impairment        Past Surgical History:   Procedure Laterality Date    CSF SHUNT      Creation of Ventriculo-Peritoneal CSF shunt ; Last Assessed:7/6/2016    EAR SURGERY      Last Assessed:7/6/2016    LEG SURGERY      due to CP     NOSE SURGERY      Last Assessed:7/6/2016    [place] ESOPHAGOGASTRODUODENOSCOPY TRANSORAL DIAGNOSTIC N/A 5/9/2019    Procedure: ESOPHAGOGASTRODUODENOSCOPY (EGD) with biopsy;  Surgeon: Duran Rosario MD;  Location: [facility]; Service: Gastroenterology       Family History   Problem Relation Age of Onset    Diabetes Mother     No Known Problems Father      I have reviewed and agree with the history as documented  Social History     Tobacco Use    Smoking status: Never Smoker    Smokeless tobacco: Never Used   Substance Use Topics    Alcohol use: No    Drug use: No        Review of Systems   Constitutional: Negative for chills and fever  HENT: Negative for congestion, rhinorrhea and sore throat  Respiratory: Positive for shortness of breath  Negative for cough  Mild SOB at baseline   Cardiovascular: Positive for chest pain  Negative for palpitations  Gastrointestinal: Positive for abdominal pain  Negative for constipation, diarrhea, nausea and vomiting  Genitourinary: Negative for difficulty urinating and flank pain  Musculoskeletal: Negative for arthralgias  Neurological: Negative for dizziness, weakness, light-headedness and headaches  Psychiatric/Behavioral: Negative for agitation, behavioral problems and confusion  All other systems reviewed and are negative  Physical Exam  ED Triage Vitals   Temperature Pulse Respirations Blood Pressure SpO2   12/14/19 1825 12/14/19 1825 12/14/19 1825 12/14/19 1825 12/14/19 1838   (!) 97 4 °F (36 3 °C) 103 18 143/64 97 %      Temp src Heart Rate Source Patient Position - Orthostatic VS BP Location FiO2 (%)   -- 12/14/19 1825 12/14/19 1825 12/14/19 1825 --    Monitor Lying Right arm       Pain Score       12/14/19 1825       5             Orthostatic Vital Signs  Vitals:    12/14/19 1825 12/14/19 1838 12/14/19 1930   BP: 143/64  134/88   Pulse: 103 89 96   Patient Position - Orthostatic VS: Lying         Physical Exam   Constitutional: She is oriented to person, place, and time  She appears well-developed and well-nourished  HENT:   Head: Normocephalic and atraumatic  Eyes: EOM are normal    Neck: Normal range of motion  Neck supple  Cardiovascular: Normal rate, regular rhythm and normal heart sounds  Exam reveals no friction rub  No murmur heard  Pulmonary/Chest: Effort normal  No respiratory distress  She has no wheezes  She has no rales  Scattered rhonchi   Abdominal: Soft  Bowel sounds are normal  She exhibits no distension  There is tenderness  Epigastric region   Musculoskeletal: Normal range of motion  She exhibits no edema  Neurological: She is alert and oriented to person, place, and time  Coordination normal    Skin: Skin is warm  Psychiatric: She has a normal mood and affect   Her behavior is normal  Judgment and thought content normal        ED Medications  Medications   aluminum-magnesium hydroxide-simethicone (MYLANTA) 200-200-20 mg/5 mL oral suspension 30 mL (30 mL Oral Given 12/14/19 1911)   Lidocaine Viscous HCl (XYLOCAINE) 2 % mucosal solution 10 mL (10 mL Swish & Swallow Given 12/14/19 1911)       Diagnostic Studies  Results Reviewed     Procedure Component Value Units Date/Time    Comprehensive metabolic panel [[id number]]  (Abnormal) Collected:  12/14/19 2030    Lab Status:  Final result Specimen:  Blood from Arm, Left Updated:  12/14/19 2101     Sodium 141 mmol/L      Potassium 3 8 mmol/L      Chloride 111 mmol/L      CO2 25 mmol/L      ANION GAP 5 mmol/L      BUN 11 mg/dL      Creatinine 0 78 mg/dL      Glucose 102 mg/dL      Calcium 8 6 mg/dL      AST 10 U/L      ALT 19 U/L      Alkaline Phosphatase 158 U/L      Total Protein 7 1 g/dL      Albumin 3 2 g/dL      Total Bilirubin 0 12 mg/dL      eGFR 95 ml/min/1 73sq m     Narrative:       [place] guidelines for Chronic Kidney Disease (CKD):     Stage 1 with normal or high GFR (GFR > 90 mL/min/1 73 square meters)    Stage 2 Mild CKD (GFR = 60-89 mL/min/1 73 square meters)    Stage 3A Moderate CKD (GFR = 45-59 mL/min/1 73 square meters)    Stage 3B Moderate CKD (GFR = 30-44 mL/min/1 73 square meters)    Stage 4 Severe CKD (GFR = 15-29 mL/min/1 73 square meters)    Stage 5 End Stage CKD (GFR <15 mL/min/1 73 square meters)  Note: GFR calculation is accurate only with a steady state creatinine    Troponin I [[id number]]  (Normal) Collected:  12/14/19 2030    Lab Status:  Final result Specimen:  Blood from Arm, Left Updated:  12/14/19 2059     Troponin I <0 02 ng/mL     Lipase [[id number]]  (Normal) Collected:  12/14/19 2030    Lab Status:  Final result Specimen:  Blood from Arm, Left Updated:  12/14/19 2057     Lipase 139 u/L     CBC and differential [[id number]] Collected:  12/14/19 2030    Lab Status:  Final result Specimen:  Blood from Arm, Left Updated:  12/14/19 2039     WBC 9 27 Thousand/uL      RBC 4 09 Million/uL      Hemoglobin 12 5 g/dL      Hematocrit 38 6 %      MCV 94 fL      MCH 30 6 pg      MCHC 32 4 g/dL      RDW 13 6 %      MPV 9 2 fL      Platelets 646 Thousands/uL      nRBC 0 /100 WBCs      Neutrophils Relative 59 %      Immat GRANS % 0 %      Lymphocytes Relative 32 %      Monocytes Relative 8 %      Eosinophils Relative 1 %      Basophils Relative 0 %      Neutrophils Absolute 5 41 Thousands/µL      Immature Grans Absolute 0 03 Thousand/uL      Lymphocytes Absolute 2 95 Thousands/µL      Monocytes Absolute 0 75 Thousand/µL      Eosinophils Absolute 0 09 Thousand/µL      Basophils Absolute 0 04 Thousands/µL                  No orders to display         Procedures  ECG 12 Lead Documentation Only  Date/Time: 12/14/2019 7:06 PM  Performed by: Josseline Schmidt MD  Authorized by: Josseline Schmidt MD     Indications / Diagnosis:  Epigastric pain  ECG reviewed by me, the ED Provider: yes    Patient location:  ED  Previous ECG:     Previous ECG:  Compared to current    Similarity:  No change    Comparison to cardiac monitor: No    Interpretation:     Interpretation: normal    Rate:     ECG rate:  85    ECG rate assessment: normal    Rhythm:     Rhythm: sinus rhythm    Ectopy:     Ectopy: none    QRS:     QRS axis:  Normal  Conduction:     Conduction: normal    ST segments:     ST segments:  Normal  T waves:     T waves: normal            ED Course                               MDM  Number of Diagnoses or Management Options  Chest pain:   Epigastric abdominal pain:   Diagnosis management comments: Patient's presentation was concerning for peptic ulcer disease, GERD, MI  Patient was initially given viscous lidocaine, Maalox, with minimal improvement  Labs and EKG were ordered  They were unremarkable  Patient was discharged home to follow up with primary care provider regarding epigastric pain, likely secondary to reflux  Patient agreeable with plan given strict return precautions  Patient and caregiver agreeable to plan  Discharged            Disposition  Final diagnoses:   Chest pain   Epigastric abdominal pain     Time reflects when diagnosis was documented in both MDM as applicable and the Disposition within this note     Time User Action Codes Description Comment    12/14/2019  9:17 PM Alfred Luque Add [R07 9] Chest pain     12/14/2019  9:17 PM Alfred Luque Add [R10 13] Epigastric abdominal pain       ED Disposition     ED Disposition Condition Date/Time Comment    Discharge Stable Sat Dec 14, 2019  9:18 PM [employer] discharge to home/self care              Follow-up Information     Follow up With Specialties Details Why Contact Info Additional [address] [facility] Emergency Medicine  If symptoms worsen, you develop nausea/vomiting, difficulty breathing [address]  [phone]  ED, [address]    Dr General Sheikh, PCP  Schedule an appointment as soon as possible for a visit  For re-evaluation, As needed for epigastric pain            Discharge Medication List as of 12/14/2019  9:41 PM      CONTINUE these medications which have NOT CHANGED    Details   Cholecalciferol (VITAMIN D-3) 1000 units CAPS Take 2 capsules (2,000 Units total) by mouth daily at 8am , Starting Tue 10/1/2019, Normal      escitalopram (LEXAPRO) 20 mg tablet Take 1 tablet (20 mg total) by mouth daily for 30 days at 8am , Starting Thu 12/6/2018, Until Sat 12/14/2019, No Print      lubiprostone (AMITIZA) 24 mcg capsule Take 1 capsule (24 mcg total) by mouth 2 (two) times a day with meals, Starting Tue 10/1/2019, Normal      pantoprazole (PROTONIX) 20 mg tablet Take 1 tablet 30 minutes before breakfast daily, Normal      senna-docusate sodium (SENEXON-S) 8 6-50 mg per tablet Take 1 tablet by mouth daily at 8am , Starting Tue 10/1/2019, Normal      traZODone (DESYREL) 50 mg tablet Take 1 tablet (50 mg total) by mouth daily at bedtime at 9pm, Starting Tue 9/25/2018, No Print      acetaminophen (TYLENOL) 500 mg tablet Take 500 mg by mouth every 6 (six) hours as needed for mild pain, Historical Med aluminum-magnesium hydroxide-simethicone (ANTACID) 200-200-20 mg/5 mL suspension Take 15 mL by mouth every 4 (four) hours as needed for indigestion or heartburn, Starting Wed 7/17/2019, Normal      ARIPiprazole (ABILIFY) 20 MG tablet Take 1 tablet (20 mg total) by mouth daily at bedtime for 30 days at 9pm , Starting Tue 9/25/2018, Until Fri 12/13/2019, No Print      bacitracin topical ointment 500 units/g topical ointment Apply 1 large application topically 3 (three) times a day for 7 days, Starting Mon 9/23/2019, Until Mon 11/18/2019, Normal      bismuth subsalicylate (PEPTO BISMOL) 524 mg/30 mL oral suspension Take 15 mL (262 mg total) by mouth every 6 (six) hours as needed for indigestion, Starting Thu 6/6/2019, Normal      calcium carbonate (TUMS) 500 mg chewable tablet Chew 1 tablet (500 mg total) 3 (three) times a day as needed for heartburn, Starting Mon 10/14/2019, Normal      !! clotrimazole (LOTRIMIN AF) 1 % cream Lotrimin AF, Historical Med      !! clotrimazole (LOTRIMIN) 1 % cream Apply 1 g (1 application total) topically 3 (three) times a day as needed (fungal irritation), Starting Wed 5/9/2018, Normal      dextromethorphan-guaiFENesin (ROBITUSSIN DM)  mg/5 mL syrup Take 5 mL by mouth 3 (three) times a day as needed for cough, Starting Tue 10/1/2019, Normal      fluticasone (FLONASE) 50 mcg/act nasal spray 1 spray into each nostril daily as needed for rhinitis (nasal congestion) At 8:00 AM, Starting Thu 6/6/2019, Normal      ibuprofen (MOTRIN) 400 mg tablet Take 1 tablet (400 mg total) by mouth every 8 (eight) hours as needed for mild pain, Starting Thu 6/6/2019, Normal      magnesium hydroxide (GNP MILK OF MAGNESIA) 400 mg/5 mL oral suspension Take 30 mL by mouth daily as needed for constipation If no BM in 3 days, Starting Thu 6/6/2019, Normal      metoclopramide (REGLAN) 10 mg tablet Reglan, Historical Med      Mouthwashes (LISTERINE ANTISEPTIC) LIQD Apply 1 application to the mouth or throat 2 (two) times a day, Starting Tue 10/1/2019, Until Mon 11/18/2019, Normal      Multiple Vitamins-Minerals (CERTAVITE SENIOR/ANTIOXIDANT) TABS Take 1 tablet by mouth daily, Starting Tue 10/1/2019, Normal      mupirocin (BACTROBAN) 2 % ointment Apply topically 3 (three) times a day for 5 days, Starting Mon 11/18/2019, Until Sat 11/23/2019, Print      norgestimate-ethinyl estradiol (ESTARYLLA) 0 25-35 MG-MCG per tablet Estarylla 0 25 mg-35 mcg tablet, Historical Med      phenol (CHLORASEPTIC) 1 4 % mucosal liquid Apply 1 spray to the mouth or throat every 2 (two) hours as needed (for sore throat as needed), Starting Wed 11/6/2019, Print      polyethylene glycol (MIRALAX) 17 g packet Take one packet daily as needed for no bowel movement in 24 hours, Normal      psyllium (METAMUCIL) 58 6 % packet Take 1 packet by mouth daily, Starting Mon 8/12/2019, Normal      RA SUNSCREEN SPF50 LOTN Apply 15 minutes before sun exposure and every 2 hours, Normal      !! sodium chloride (OCEAN) 0 65 % nasal spray 1 spray into each nostril as needed (three times daily as needed for nasal congestion), Starting Wed 3/20/2019, Normal      !! sodium chloride (OCEAN) 0 65 % nasal spray 2 sprays into each nostril as needed for congestion or rhinitis, Starting Wed 11/6/2019, Print      zinc oxide (DESITIN) 13 % cream Apply 1 application topically as needed (for perianal irritation), Starting Thu 6/6/2019, Normal       !! - Potential duplicate medications found  Please discuss with provider  No discharge procedures on file  ED Provider  Attending physically available and evaluated King Elvia YADAV managed the patient along with the ED Attending      Electronically Signed by         Ritika Medel MD  12/14/19 0604

## 2019-12-16 LAB
ATRIAL RATE: 85 BPM
P AXIS: 53 DEGREES
PR INTERVAL: 124 MS
QRS AXIS: 37 DEGREES
QRSD INTERVAL: 70 MS
QT INTERVAL: 340 MS
QTC INTERVAL: 404 MS
T WAVE AXIS: 46 DEGREES
VENTRICULAR RATE: 85 BPM

## 2019-12-16 PROCEDURE — 93010 ELECTROCARDIOGRAM REPORT: CPT | Performed by: INTERNAL MEDICINE

## 2019-12-17 ENCOUNTER — APPOINTMENT (EMERGENCY)
Dept: RADIOLOGY | Facility: HOSPITAL | Age: 39
End: 2019-12-17
Payer: MEDICARE

## 2019-12-17 ENCOUNTER — HOSPITAL ENCOUNTER (EMERGENCY)
Facility: HOSPITAL | Age: 39
Discharge: HOME | End: 2019-12-17
Attending: EMERGENCY MEDICINE | Admitting: EMERGENCY MEDICINE
Payer: MEDICARE

## 2019-12-17 VITALS
HEART RATE: 80 BPM | RESPIRATION RATE: 18 BRPM | OXYGEN SATURATION: 97 % | SYSTOLIC BLOOD PRESSURE: 128 MMHG | TEMPERATURE: 98.5 F | DIASTOLIC BLOOD PRESSURE: 71 MMHG

## 2019-12-17 DIAGNOSIS — R07.9 CHEST PAIN, UNSPECIFIED TYPE: Primary | ICD-10-CM

## 2019-12-17 LAB
ANION GAP SERPL CALCULATED.3IONS-SCNC: 8 MMOL/L (ref 4–13)
BACTERIA UR QL AUTO: ABNORMAL /HPF
BASOPHILS # BLD AUTO: 0.03 THOUSANDS/ΜL (ref 0–0.1)
BASOPHILS NFR BLD AUTO: 0 % (ref 0–1)
BILIRUB UR QL STRIP: NEGATIVE
BUN SERPL-MCNC: 9 MG/DL (ref 5–25)
CALCIUM SERPL-MCNC: 8.5 MG/DL (ref 8.3–10.1)
CHLORIDE SERPL-SCNC: 110 MMOL/L (ref 100–108)
CLARITY UR: CLEAR
CLARITY, POC: CLEAR
CO2 SERPL-SCNC: 25 MMOL/L (ref 21–32)
COLOR UR: YELLOW
COLOR, POC: YELLOW
CREAT SERPL-MCNC: 0.76 MG/DL (ref 0.6–1.3)
D DIMER PPP FEU-MCNC: 0.65 UG/ML FEU
EOSINOPHIL # BLD AUTO: 0.05 THOUSAND/ΜL (ref 0–0.61)
EOSINOPHIL NFR BLD AUTO: 1 % (ref 0–6)
ERYTHROCYTE [DISTWIDTH] IN BLOOD BY AUTOMATED COUNT: 13.5 % (ref 11.6–15.1)
GFR SERPL CREATININE-BSD FRML MDRD: 98 ML/MIN/1.73SQ M
GLUCOSE SERPL-MCNC: 87 MG/DL (ref 65–140)
GLUCOSE UR STRIP-MCNC: NEGATIVE MG/DL
HCT VFR BLD AUTO: 41.4 % (ref 34.8–46.1)
HGB BLD-MCNC: 13.6 G/DL (ref 11.5–15.4)
HGB UR QL STRIP.AUTO: ABNORMAL
HYALINE CASTS #/AREA URNS LPF: ABNORMAL /LPF
IMM GRANULOCYTES # BLD AUTO: 0.03 THOUSAND/UL (ref 0–0.2)
IMM GRANULOCYTES NFR BLD AUTO: 0 % (ref 0–2)
KETONES UR STRIP-MCNC: NEGATIVE MG/DL
LEUKOCYTE ESTERASE UR QL STRIP: NEGATIVE
LYMPHOCYTES # BLD AUTO: 2.07 THOUSANDS/ΜL (ref 0.6–4.47)
LYMPHOCYTES NFR BLD AUTO: 24 % (ref 14–44)
MCH RBC QN AUTO: 30.8 PG (ref 26.8–34.3)
MCHC RBC AUTO-ENTMCNC: 32.9 G/DL (ref 31.4–37.4)
MCV RBC AUTO: 94 FL (ref 82–98)
MONOCYTES # BLD AUTO: 0.57 THOUSAND/ΜL (ref 0.17–1.22)
MONOCYTES NFR BLD AUTO: 7 % (ref 4–12)
NEUTROPHILS # BLD AUTO: 5.84 THOUSANDS/ΜL (ref 1.85–7.62)
NEUTS SEG NFR BLD AUTO: 68 % (ref 43–75)
NITRITE UR QL STRIP: NEGATIVE
NON-SQ EPI CELLS URNS QL MICRO: ABNORMAL /HPF
NRBC BLD AUTO-RTO: 0 /100 WBCS
PH UR STRIP.AUTO: 6.5 [PH] (ref 4.5–8)
PLATELET # BLD AUTO: 306 THOUSANDS/UL (ref 149–390)
PMV BLD AUTO: 9.3 FL (ref 8.9–12.7)
POTASSIUM SERPL-SCNC: 3.6 MMOL/L (ref 3.5–5.3)
PROT UR STRIP-MCNC: NEGATIVE MG/DL
RBC # BLD AUTO: 4.41 MILLION/UL (ref 3.81–5.12)
RBC #/AREA URNS AUTO: ABNORMAL /HPF
SODIUM SERPL-SCNC: 143 MMOL/L (ref 136–145)
SP GR UR STRIP.AUTO: <=1.005 (ref 1–1.03)
UROBILINOGEN UR QL STRIP.AUTO: 0.2 E.U./DL
WBC # BLD AUTO: 8.59 THOUSAND/UL (ref 4.31–10.16)
WBC #/AREA URNS AUTO: ABNORMAL /HPF

## 2019-12-17 PROCEDURE — 96372 THER/PROPH/DIAG INJ SC/IM: CPT

## 2019-12-17 PROCEDURE — 99285 EMERGENCY DEPT VISIT HI MDM: CPT

## 2019-12-17 PROCEDURE — 81001 URINALYSIS AUTO W/SCOPE: CPT

## 2019-12-17 PROCEDURE — 85379 FIBRIN DEGRADATION QUANT: CPT | Performed by: EMERGENCY MEDICINE

## 2019-12-17 PROCEDURE — 85025 COMPLETE CBC W/AUTO DIFF WBC: CPT | Performed by: EMERGENCY MEDICINE

## 2019-12-17 PROCEDURE — 36415 COLL VENOUS BLD VENIPUNCTURE: CPT | Performed by: EMERGENCY MEDICINE

## 2019-12-17 PROCEDURE — 71275 CT ANGIOGRAPHY CHEST: CPT

## 2019-12-17 PROCEDURE — 93005 ELECTROCARDIOGRAM TRACING: CPT

## 2019-12-17 PROCEDURE — 99285 EMERGENCY DEPT VISIT HI MDM: CPT | Performed by: EMERGENCY MEDICINE

## 2019-12-17 PROCEDURE — 80048 BASIC METABOLIC PNL TOTAL CA: CPT | Performed by: EMERGENCY MEDICINE

## 2019-12-17 RX ORDER — KETOROLAC TROMETHAMINE 30 MG/ML
15 INJECTION, SOLUTION INTRAMUSCULAR; INTRAVENOUS ONCE
Status: COMPLETED | OUTPATIENT
Start: 2019-12-17 | End: 2019-12-17

## 2019-12-17 RX ORDER — MAGNESIUM HYDROXIDE/ALUMINUM HYDROXICE/SIMETHICONE 120; 1200; 1200 MG/30ML; MG/30ML; MG/30ML
30 SUSPENSION ORAL ONCE
Status: COMPLETED | OUTPATIENT
Start: 2019-12-17 | End: 2019-12-17

## 2019-12-17 RX ORDER — KETOROLAC TROMETHAMINE 30 MG/ML
15 INJECTION, SOLUTION INTRAMUSCULAR; INTRAVENOUS ONCE
Status: DISCONTINUED | OUTPATIENT
Start: 2019-12-17 | End: 2019-12-17

## 2019-12-17 RX ORDER — LIDOCAINE HYDROCHLORIDE 20 MG/ML
15 SOLUTION OROPHARYNGEAL ONCE
Status: COMPLETED | OUTPATIENT
Start: 2019-12-17 | End: 2019-12-17

## 2019-12-17 RX ADMIN — LIDOCAINE HYDROCHLORIDE 15 ML: 20 SOLUTION ORAL; TOPICAL at 10:44

## 2019-12-17 RX ADMIN — IOHEXOL 85 ML: 350 INJECTION, SOLUTION INTRAVENOUS at 16:42

## 2019-12-17 RX ADMIN — KETOROLAC TROMETHAMINE 15 MG: 30 INJECTION, SOLUTION INTRAMUSCULAR at 10:44

## 2019-12-17 RX ADMIN — ALUMINUM HYDROXIDE, MAGNESIUM HYDROXIDE, AND SIMETHICONE 30 ML: 200; 200; 20 SUSPENSION ORAL at 10:44

## 2019-12-17 NOTE — PROGRESS NOTES
[facility] - Outpatient Consultation  Balta Mcnamara 35 y o  female MRN: [id number]  Encounter: [id number]      PCP: Ankur Levin MD  Referring: No referring provider defined for this encounter  ASSESSMENT AND PLAN:      1  Abdominal pain, unspecified abdominal location  2  Chronic idiopathic constipation  - improved with fiber supplementation, miralax, senna, amitiza  - can change to orange flavored Benefiber per patient's request    3  Functional dysphagia  - concern for contribution to atypical chest pain presentations in the ER  - recommend cardiology evaluation to rule out structural heart disease  - no evidence of abnormalities seen on EGD or barium swallow  - she is on protonix, pepcid, antacids without benefit  - previously discussed manometry for further diagnosis, but patient refuses and concern for inability to tolerate the procedure  - trazodone, venlafaxine, SSRI, TCA have been considered for functional chest pain, but I would defer this decision to her psychiatrist as she is on multiple psychiatric medications and would require monitoring for serotonin syndrome; she is recommended to discuss with her psychiatrist  ______________________________________________________________________    HPI:      Patient is a 35year old female who presents for follow up of functional chest pain/dysphagia, abdominal pain and constipation  She has severe mental disability and presents with a caretaker from group home, but who is unable to provide history  She was last seen by me in May 2019, s/p EGD without luminal disease, Barium esophagram showed small hiatal hernia, and speech and swallow eval without pharyngeal dysfunction  Her food is cut up into small pieces and she does well with this  She has had multiple ER visits for chest pain that occurs after meals associated with trouble swallowing   Acute coronary syndrome workup has been negative, there is no documentation of cardiology visit  She has constipation but is on amitiza 24, Metamucil and MiraLax as needed, which works well for her  REVIEW OF SYSTEMS:    CONSTITUTIONAL: Denies any fever, chills, rigors, and weight loss  HEENT: No earache or tinnitus  Denies hearing loss or visual disturbances  CARDIOVASCULAR: No chest pain or palpitations  RESPIRATORY: Denies any cough, hemoptysis, shortness of breath or dyspnea on exertion  GASTROINTESTINAL: As noted in the History of Present Illness  GENITOURINARY: No problems with urination  Denies any hematuria or dysuria  NEUROLOGIC: No dizziness or vertigo, denies headaches  MUSCULOSKELETAL: Denies any muscle or joint pain  SKIN: Denies skin rashes or itching  ENDOCRINE: Denies excessive thirst  Denies intolerance to heat or cold  PSYCHOSOCIAL: Denies depression or anxiety  Denies any recent memory loss         Historical Information   Past Medical History:   Diagnosis Date    Acid reflux     ADD (attention deficit disorder)     Adjustment disorder     Anxiety     Astigmatism     Brain lesion     Brain lesion     Bronchitis     Calcium deficiency     Cellulitis of foot, right 6/4/2018    [facility] pals[facility])     Cerebral palsy ([facility])     Last Assessed:7/6/2016    Chronic otitis media     Chronically dry eyes, left     Last Assessed:7/6/2016    Constipation     Depression     Last Assessed:7/6/2016    Depressive disorder     Dry eyes     Dysphagia     Esophagitis     Esotropia     Fall     GERD (gastroesophageal reflux disease)     GERD (gastroesophageal reflux disease)     Hiatal hernia     Hydrocephalus ([facility])     Hyperopia with astigmatism     Impaired fasting glucose     Left nephrolithiasis 3/4/2019    Mood disorder ([address] )     Myopia     Oppositional defiant disorder     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[facility])     Psychiatric disorder     Seizures ([facility])     Sensorineural hearing loss     Status post ventriculoatrial shunt placement     Visual impairment      Past Surgical History:   Procedure Laterality Date    CSF SHUNT      Creation of Ventriculo-Peritoneal CSF shunt ; Last Assessed:7/6/2016    EAR SURGERY      Last Assessed:7/6/2016    LEG SURGERY      due to CP     NOSE SURGERY      Last Assessed:7/6/2016    ND ESOPHAGOGASTRODUODENOSCOPY TRANSORAL DIAGNOSTIC N/A 5/9/2019    Procedure: ESOPHAGOGASTRODUODENOSCOPY (EGD) with biopsy;  Surgeon: Carline Paredes MD;  Location: [facility]; Service: Gastroenterology     Social History   Social History     Substance and Sexual Activity   Alcohol Use No     Social History     Substance and Sexual Activity   Drug Use No     Social History     Tobacco Use   Smoking Status Never Smoker   Smokeless Tobacco Never Used     Family History   Problem Relation Age of Onset    Diabetes Mother     No Known Problems Father        Meds/Allergies     No current facility-administered medications for this visit       Current Outpatient Medications:     acetaminophen (TYLENOL) 500 mg tablet    aluminum-magnesium hydroxide-simethicone (ANTACID) 200-200-20 mg/5 mL suspension    ARIPiprazole (ABILIFY) 20 MG tablet    calcium carbonate (TUMS) 500 mg chewable tablet    Cholecalciferol (VITAMIN D-3) 1000 units CAPS    clotrimazole (LOTRIMIN AF) 1 % cream    clotrimazole (LOTRIMIN) 1 % cream    dextromethorphan-guaiFENesin (ROBITUSSIN DM)  mg/5 mL syrup    escitalopram (LEXAPRO) 20 mg tablet    fluticasone (FLONASE) 50 mcg/act nasal spray    ibuprofen (MOTRIN) 400 mg tablet    lubiprostone (AMITIZA) 24 mcg capsule    magnesium hydroxide (GNP MILK OF MAGNESIA) 400 mg/5 mL oral suspension    metoclopramide (REGLAN) 10 mg tablet    Multiple Vitamins-Minerals (CERTAVITE SENIOR/ANTIOXIDANT) TABS    norgestimate-ethinyl estradiol (ESTARYLLA) 0 25-35 MG-MCG per tablet    pantoprazole (PROTONIX) 20 mg tablet    phenol (CHLORASEPTIC) 1 4 % mucosal liquid    polyethylene glycol (MIRALAX) 17 g packet    psyllium (METAMUCIL) 58 6 % packet    RA SUNSCREEN SPF50 LOTN    senna-docusate sodium (SENEXON-S) 8 6-50 mg per tablet    sodium chloride (OCEAN) 0 65 % nasal spray    sodium chloride (OCEAN) 0 65 % nasal spray    traZODone (DESYREL) 50 mg tablet    zinc oxide (DESITIN) 13 % cream    bacitracin topical ointment 500 units/g topical ointment    bismuth subsalicylate (PEPTO BISMOL) 524 mg/30 mL oral suspension    Mouthwashes (LISTERINE ANTISEPTIC) LIQD    mupirocin (BACTROBAN) 2 % ointment    No Known Allergies        Objective     Blood pressure 126/72, pulse (!) 112, temperature 97 6 °F (36 4 °C), temperature source Tympanic, height 4' 9" (1 448 m), weight 86 2 kg (190 lb)  Body mass index is 41 12 kg/m²  PHYSICAL EXAM:      General Appearance:   Alert, cooperative, no distress   HEENT:   Normocephalic, atraumatic, anicteric      Neck:  Supple, symmetrical, trachea midline   Lungs:   Clear to auscultation bilaterally; no rales, rhonchi or wheezing; respirations unlabored    Heart[de-identified]   Regular rate and rhythm; no murmur, rub, or gallop     Abdomen:   Soft, non-tender, non-distended; normal bowel sounds; no masses, no organomegaly    Genitalia:   Deferred    Rectal:   Deferred    Extremities:  No cyanosis, clubbing or edema    Pulses:  2+ and symmetric    Skin:  No jaundice, rashes, or lesions    Lymph nodes:  No palpable cervical lymphadenopathy        Lab Results:     Lab Results   Component Value Date    WBC 9 27 12/14/2019    HGB 12 5 12/14/2019    HCT 38 6 12/14/2019    MCV 94 12/14/2019     12/14/2019       Lab Results   Component Value Date    K 3 8 12/14/2019     (H) 12/14/2019    CO2 25 12/14/2019    BUN 11 12/14/2019    CREATININE 0 78 12/14/2019    GLUF 76 07/11/2018    CALCIUM 8 6 12/14/2019    AST 10 12/14/2019    ALT 19 12/14/2019    ALKPHOS 158 (H) 12/14/2019    EGFR 95 12/14/2019       No results found for: INR, PROTIME      Radiology Results:   None relevant

## 2019-12-17 NOTE — ED PROVIDER NOTES
History  Chief Complaint   Patient presents with    Chest Pain     pt with chest pain     39yoF w developmental delay, presenting with chest pain that started at 9am  Pt has been seen here twice in the last week for similar complaint and had negative ACS workup  She states the pain started shortly after eating breakfast, is sharp and nonradiating and feels similar to the pain she felt last week  Denies fever, chills, nausea, vomiting, hemoptysis, SOB, wheezing or dizziness  Prior to Admission Medications   Prescriptions Last Dose Informant Patient Reported? Taking?    ARIPiprazole (ABILIFY) 20 MG tablet  Care Giver No No   Sig: Take 1 tablet (20 mg total) by mouth daily at bedtime for 30 days at 9pm    Cholecalciferol (VITAMIN D-3) 1000 units CAPS   No No   Sig: Take 2 capsules (2,000 Units total) by mouth daily at 8am    Mouthwashes (LISTERINE ANTISEPTIC) LIQD   No No   Sig: Apply 1 application to the mouth or throat 2 (two) times a day   Multiple Vitamins-Minerals (CERTAVITE SENIOR/ANTIOXIDANT) TABS   No No   Sig: Take 1 tablet by mouth daily   RA SUNSCREEN SPF50 LOTN  Self No No   Sig: Apply 15 minutes before sun exposure and every 2 hours   acetaminophen (TYLENOL) 500 mg tablet  Self Yes No   Sig: Take 500 mg by mouth every 6 (six) hours as needed for mild pain   aluminum-magnesium hydroxide-simethicone (ANTACID) 200-200-20 mg/5 mL suspension  Self No No   Sig: Take 15 mL by mouth every 4 (four) hours as needed for indigestion or heartburn   bacitracin topical ointment 500 units/g topical ointment   No No   Sig: Apply 1 large application topically 3 (three) times a day for 7 days   bismuth subsalicylate (PEPTO BISMOL) 524 mg/30 mL oral suspension  Self No No   Sig: Take 15 mL (262 mg total) by mouth every 6 (six) hours as needed for indigestion   Patient not taking: Reported on 12/13/2019   calcium carbonate (TUMS) 500 mg chewable tablet   No No   Sig: Chew 1 tablet (500 mg total) 3 (three) times a day as needed for heartburn   clotrimazole (LOTRIMIN AF) 1 % cream  Self Yes No   Sig: Lotrimin AF   clotrimazole (LOTRIMIN) 1 % cream  Self No No   Sig: Apply 1 g (1 application total) topically 3 (three) times a day as needed (fungal irritation)   dextromethorphan-guaiFENesin (ROBITUSSIN DM)  mg/5 mL syrup   No No   Sig: Take 5 mL by mouth 3 (three) times a day as needed for cough   escitalopram (LEXAPRO) 20 mg tablet  Care Giver No No   Sig: Take 1 tablet (20 mg total) by mouth daily for 30 days at 8am    fluticasone (FLONASE) 50 mcg/act nasal spray  Self No No   Si spray into each nostril daily as needed for rhinitis (nasal congestion) At 8:00 AM   ibuprofen (MOTRIN) 400 mg tablet  Self No No   Sig: Take 1 tablet (400 mg total) by mouth every 8 (eight) hours as needed for mild pain   lubiprostone (AMITIZA) 24 mcg capsule   No No   Sig: Take 1 capsule (24 mcg total) by mouth 2 (two) times a day with meals   magnesium hydroxide (GNP MILK OF MAGNESIA) 400 mg/5 mL oral suspension  Self No No   Sig: Take 30 mL by mouth daily as needed for constipation If no BM in 3 days   metoclopramide (REGLAN) 10 mg tablet  Self Yes No   Sig: Reglan   mupirocin (BACTROBAN) 2 % ointment   No No   Sig: Apply topically 3 (three) times a day for 5 days   norgestimate-ethinyl estradiol (ESTARYLLA) 0 25-35 MG-MCG per tablet   Yes No   Sig: Estarylla 0 25 mg-35 mcg tablet   pantoprazole (PROTONIX) 20 mg tablet   No No   Sig: Take 1 tablet 30 minutes before breakfast daily   phenol (CHLORASEPTIC) 1 4 % mucosal liquid   No No   Sig: Apply 1 spray to the mouth or throat every 2 (two) hours as needed (for sore throat as needed)   polyethylene glycol (MIRALAX) 17 g packet  Self No No   Sig: Take one packet daily as needed for no bowel movement in 24 hours   psyllium (METAMUCIL) 58 6 % packet  Self No No   Sig: Take 1 packet by mouth daily   senna-docusate sodium (SENEXON-S) 8 6-50 mg per tablet   No No   Sig: Take 1 tablet by mouth daily at 8am    sodium chloride (OCEAN) 0 65 % nasal spray  Self No No   Si spray into each nostril as needed (three times daily as needed for nasal congestion)   sodium chloride (OCEAN) 0 65 % nasal spray   No No   Si sprays into each nostril as needed for congestion or rhinitis   traZODone (DESYREL) 50 mg tablet  Self No No   Sig: Take 1 tablet (50 mg total) by mouth daily at bedtime at 9pm   zinc oxide (DESITIN) 13 % cream  Self No No   Sig: Apply 1 application topically as needed (for perianal irritation)      Facility-Administered Medications: None       Past Medical History:   Diagnosis Date    Acid reflux     ADD (attention deficit disorder)     Adjustment disorder     Anxiety     Astigmatism     Brain lesion     Brain lesion     Bronchitis     Calcium deficiency     Cellulitis of foot, right 2018    Cerebral palsy (HCC)     Cerebral palsy (HCC)     Last Assessed:2016    Chronic otitis media     Chronically dry eyes, left     Last Assessed:2016    Constipation     Depression     Last Assessed:2016    Depressive disorder     Dry eyes     Dysphagia     Esophagitis     Esotropia     Fall     GERD (gastroesophageal reflux disease)     GERD (gastroesophageal reflux disease)     Hiatal hernia     Hydrocephalus ([facility])     Hyperopia with astigmatism     Impaired fasting glucose     Left nephrolithiasis 3/4/2019    Mood disorder ([facility])     Myopia     Oppositional defiant disorder     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[facility])     Psychiatric disorder     Seizures ([facility])     Sensorineural hearing loss     Status post ventriculoatrial shunt placement     Visual impairment        Past Surgical History:   Procedure Laterality Date    CSF SHUNT      Creation of Ventriculo-Peritoneal CSF shunt ;  Last Assessed:2016    EAR SURGERY      Last Assessed:2016    LEG SURGERY      due to CP     NOSE SURGERY      Last Assessed:2016    VA ESOPHAGOGASTRODUODENOSCOPY TRANSORAL DIAGNOSTIC N/A 5/9/2019    Procedure: ESOPHAGOGASTRODUODENOSCOPY (EGD) with biopsy;  Surgeon: Cruz Gabriel MD;  Location: [facility]; Service: Gastroenterology       Family History   Problem Relation Age of Onset    Diabetes Mother     No Known Problems Father      I have reviewed and agree with the history as documented  Social History     Tobacco Use    Smoking status: Never Smoker    Smokeless tobacco: Never Used   Substance Use Topics    Alcohol use: No    Drug use: No        Review of Systems   Constitutional: Negative for activity change, appetite change, chills, fatigue and fever  HENT: Negative for ear pain, rhinorrhea and tinnitus  Eyes: Negative for pain and visual disturbance  Respiratory: Positive for chest tightness  Negative for apnea, cough, shortness of breath and wheezing  Cardiovascular: Positive for chest pain  Negative for palpitations and leg swelling  Gastrointestinal: Negative for abdominal pain, diarrhea, nausea and vomiting  Genitourinary: Negative for dysuria, flank pain and pelvic pain  Musculoskeletal: Negative for arthralgias and myalgias  Skin: Negative for rash and wound  Neurological: Negative for dizziness, syncope, weakness, numbness and headaches  All other systems reviewed and are negative        Physical Exam  ED Triage Vitals   Temperature Pulse Respirations Blood Pressure SpO2   12/17/19 1041 12/17/19 1016 12/17/19 1016 12/17/19 1016 12/17/19 1041   98 5 °F (36 9 °C) 94 20 148/89 98 %      Temp Source Heart Rate Source Patient Position - Orthostatic VS BP Location FiO2 (%)   12/17/19 1041 12/17/19 1016 12/17/19 1016 12/17/19 1016 --   Oral Monitor Sitting Left arm       Pain Score       12/17/19 1016       5             Orthostatic Vital Signs  Vitals:    12/17/19 1016 12/17/19 1325 12/17/19 1521 12/17/19 1759   BP: 148/89 151/67 137/87 128/71   Pulse: 94 102 102 80   Patient Position - Orthostatic VS: Sitting Sitting Sitting Sitting Physical Exam   Constitutional: She is oriented to person, place, and time  She appears well-developed and well-nourished  No distress  HENT:   Head: Normocephalic and atraumatic  Eyes: Conjunctivae and EOM are normal  Right eye exhibits no discharge  Left eye exhibits no discharge  No scleral icterus  Neck: Normal range of motion  Neck supple  No JVD present  No tracheal deviation present  Cardiovascular: Normal rate, regular rhythm, normal heart sounds and intact distal pulses  Exam reveals no gallop and no friction rub  No murmur heard  Pulmonary/Chest: Effort normal and breath sounds normal  No stridor  No respiratory distress  She has no wheezes  She has no rales  She exhibits no tenderness  Abdominal: Soft  There is no tenderness  Musculoskeletal: Normal range of motion  She exhibits no edema or deformity  Right lower leg: She exhibits tenderness (chest tenderness)  Neurological: She is alert and oriented to person, place, and time  No sensory deficit  She exhibits normal muscle tone  Skin: Skin is warm  Capillary refill takes less than 2 seconds  No rash noted  She is not diaphoretic  No erythema  Psychiatric: Her behavior is normal  Judgment and thought content normal    Nursing note and vitals reviewed        ED Medications  Medications   aluminum-magnesium hydroxide-simethicone (MYLANTA) 200-200-20 mg/5 mL oral suspension 30 mL (30 mL Oral Given 12/17/19 1044)   Lidocaine Viscous HCl (XYLOCAINE) 2 % mucosal solution 15 mL (15 mL Swish & Swallow Given 12/17/19 1044)   ketorolac (TORADOL) injection 15 mg (15 mg Intramuscular Given 12/17/19 1044)   iohexol (OMNIPAQUE) 350 MG/ML injection (MULTI-DOSE) 85 mL (85 mL Intravenous Given 12/17/19 1642)       Diagnostic Studies  Results Reviewed     Procedure Component Value Units Date/Time    Urine Microscopic [[id number]]  (Abnormal) Collected:  12/17/19 6969    Lab Status:  Final result Specimen:  Urine, Clean Catch Updated: 12/17/19 1826     RBC, UA None Seen /hpf      WBC, UA 2-4 /hpf      Epithelial Cells None Seen /hpf      Bacteria, UA None Seen /hpf      Hyaline Casts, UA None Seen /lpf     POCT urinalysis dipstick [[id number]]  (Abnormal) Resulted:  12/17/19 1759    Lab Status:  Final result Updated:  12/17/19 1759     Color, UA Yellow     Clarity, UA Clear    Urine Macroscopic, POC [[id number]]  (Abnormal) Collected:  12/17/19 1759    Lab Status:  Final result Specimen:  Urine Updated:  12/17/19 1758     Color, UA Yellow     Clarity, UA Clear     pH, UA 6 5     Leukocytes, UA Negative     Nitrite, UA Negative     Protein, UA Negative mg/dl      Glucose, UA Negative mg/dl      Ketones, UA Negative mg/dl      Urobilinogen, UA 0 2 E U /dl      Bilirubin, UA Negative     Blood, UA Trace     Specific Gravity, UA <=1 005    Narrative:       CLINITEK RESULT    D-dimer, quantitative [[id number]]  (Abnormal) Collected:  12/17/19 1306    Lab Status:  Final result Specimen:  Blood from Arm, Left Updated:  12/17/19 1355     D-Dimer, Quant 0 65 ug/ml FEU     Basic metabolic panel [[id number]]  (Abnormal) Collected:  12/17/19 1306    Lab Status:  Final result Specimen:  Blood from Arm, Left Updated:  12/17/19 1329     Sodium 143 mmol/L      Potassium 3 6 mmol/L      Chloride 110 mmol/L      CO2 25 mmol/L      ANION GAP 8 mmol/L      BUN 9 mg/dL      Creatinine 0 76 mg/dL      Glucose 87 mg/dL      Calcium 8 5 mg/dL      eGFR 98 ml/min/1 73sq m     Narrative:       Raquel guidelines for Chronic Kidney Disease (CKD):     Stage 1 with normal or high GFR (GFR > 90 mL/min/1 73 square meters)    Stage 2 Mild CKD (GFR = 60-89 mL/min/1 73 square meters)    Stage 3A Moderate CKD (GFR = 45-59 mL/min/1 73 square meters)    Stage 3B Moderate CKD (GFR = 30-44 mL/min/1 73 square meters)    Stage 4 Severe CKD (GFR = 15-29 mL/min/1 73 square meters)    Stage 5 End Stage CKD (GFR <15 mL/min/1 73 square meters)  Note: GFR calculation is accurate only with a steady state creatinine    CBC and differential [[id number]] Collected:  12/17/19 1306    Lab Status:  Final result Specimen:  Blood from Arm, Left Updated:  12/17/19 1319     WBC 8 59 Thousand/uL      RBC 4 41 Million/uL      Hemoglobin 13 6 g/dL      Hematocrit 41 4 %      MCV 94 fL      MCH 30 8 pg      MCHC 32 9 g/dL      RDW 13 5 %      MPV 9 3 fL      Platelets 771 Thousands/uL      nRBC 0 /100 WBCs      Neutrophils Relative 68 %      Immat GRANS % 0 %      Lymphocytes Relative 24 %      Monocytes Relative 7 %      Eosinophils Relative 1 %      Basophils Relative 0 %      Neutrophils Absolute 5 84 Thousands/µL      Immature Grans Absolute 0 03 Thousand/uL      Lymphocytes Absolute 2 07 Thousands/µL      Monocytes Absolute 0 57 Thousand/µL      Eosinophils Absolute 0 05 Thousand/µL      Basophils Absolute 0 03 Thousands/µL                  CTA ED chest PE study   Final Result by Abraham Spear MD (12/17 1657)      No pulmonary embolus  No acute pulmonary disease  Redemonstration of moderate paraesophageal hernia                    Workstation performed: [id number]               Procedures  Procedures      ED Course                         Wells' Criteria for PE      Most Recent Value   Wells' Criteria for PE   Clinical signs and symptoms of DVT  0 Filed at: 12/17/2019 1425   PE is primary diagnosis or equally likely  0 Filed at: 12/17/2019 1425   HR >100  1 5 Filed at: 12/17/2019 1425   Immobilization at least 3 days or Surgery in the previous 4 weeks  0 Filed at: 12/17/2019 1425   Previous, objectively diagnosed PE or DVT  0 Filed at: 12/17/2019 1425   Hemoptysis  0 Filed at: 12/17/2019 1425   Malignancy with treatment within 6 months or palliative  0 Filed at: 12/17/2019 1425   Wells' Criteria Total  1 5 Filed at: 12/17/2019 1425            MDM  Number of Diagnoses or Management Options  Chest pain, unspecified type:   Diagnosis management comments: Normal EKG, chest Xray   D-dimer was ordered to rule out PE due to pleuritic chest pain and low pretest probability of PE  D-dimer was noted to be elevated so CTA was ordered to rule out PE; CTA was negative  Outpatient stress test referral was provided   Patient will follow up with PCP  Discharged with return precautions        Disposition  Final diagnoses:   Chest pain, unspecified type     Time reflects when diagnosis was documented in both MDM as applicable and the Disposition within this note     Time User Action Codes Description Comment    12/17/2019 10:49 AM Gege Pereira Add [R07 9] Chest pain, unspecified type     12/17/2019 10:49 AM Gege Pereira Modify [R07 9] Chest pain, unspecified type       ED Disposition     ED Disposition Condition Date/Time Comment    Discharge Stable Tue Dec 17, 2019  5:45 PM Noreen Prudent discharge to home/self care              Follow-up Information     Follow up With Specialties Details Why [facility] Emergency Department Emergency Medicine Go to  If symptoms worsen [address]  [phone]  ED, [address] [place] [address], [address]   [phone]          Discharge Medication List as of 12/17/2019  5:45 PM      CONTINUE these medications which have NOT CHANGED    Details   acetaminophen (TYLENOL) 500 mg tablet Take 500 mg by mouth every 6 (six) hours as needed for mild pain, Historical Med      aluminum-magnesium hydroxide-simethicone (ANTACID) 200-200-20 mg/5 mL suspension Take 15 mL by mouth every 4 (four) hours as needed for indigestion or heartburn, Starting Wed 7/17/2019, Normal      ARIPiprazole (ABILIFY) 20 MG tablet Take 1 tablet (20 mg total) by mouth daily at bedtime for 30 days at 9pm , Starting Tue 9/25/2018, Until Fri 12/13/2019, No Print      bacitracin topical ointment 500 units/g topical ointment Apply 1 large application topically 3 (three) times a day for 7 days, Starting Mon 9/23/2019, Until Mon 11/18/2019, Normal      bismuth subsalicylate (PEPTO BISMOL) 524 mg/30 mL oral suspension Take 15 mL (262 mg total) by mouth every 6 (six) hours as needed for indigestion, Starting Thu 6/6/2019, Normal      calcium carbonate (TUMS) 500 mg chewable tablet Chew 1 tablet (500 mg total) 3 (three) times a day as needed for heartburn, Starting Mon 10/14/2019, Normal      Cholecalciferol (VITAMIN D-3) 1000 units CAPS Take 2 capsules (2,000 Units total) by mouth daily at 8am , Starting Tue 10/1/2019, Normal      !! clotrimazole (LOTRIMIN AF) 1 % cream Lotrimin AF, Historical Med      !! clotrimazole (LOTRIMIN) 1 % cream Apply 1 g (1 application total) topically 3 (three) times a day as needed (fungal irritation), Starting Wed 5/9/2018, Normal      dextromethorphan-guaiFENesin (ROBITUSSIN DM)  mg/5 mL syrup Take 5 mL by mouth 3 (three) times a day as needed for cough, Starting Tue 10/1/2019, Normal      escitalopram (LEXAPRO) 20 mg tablet Take 1 tablet (20 mg total) by mouth daily for 30 days at 8am , Starting Thu 12/6/2018, Until Sat 12/14/2019, No Print      fluticasone (FLONASE) 50 mcg/act nasal spray 1 spray into each nostril daily as needed for rhinitis (nasal congestion) At 8:00 AM, Starting Thu 6/6/2019, Normal      ibuprofen (MOTRIN) 400 mg tablet Take 1 tablet (400 mg total) by mouth every 8 (eight) hours as needed for mild pain, Starting Thu 6/6/2019, Normal      lubiprostone (AMITIZA) 24 mcg capsule Take 1 capsule (24 mcg total) by mouth 2 (two) times a day with meals, Starting Tue 10/1/2019, Normal      magnesium hydroxide (GNP MILK OF MAGNESIA) 400 mg/5 mL oral suspension Take 30 mL by mouth daily as needed for constipation If no BM in 3 days, Starting Thu 6/6/2019, Normal      metoclopramide (REGLAN) 10 mg tablet Reglan, Historical Med      Mouthwashes (LISTERINE ANTISEPTIC) LIQD Apply 1 application to the mouth or throat 2 (two) times a day, Starting Tue 10/1/2019, Until Mon 11/18/2019, Normal      Multiple Vitamins-Minerals (CERTAVITE SENIOR/ANTIOXIDANT) TABS Take 1 tablet by mouth daily, Starting Tue 10/1/2019, Normal      mupirocin (BACTROBAN) 2 % ointment Apply topically 3 (three) times a day for 5 days, Starting Mon 11/18/2019, Until Sat 11/23/2019, Print      norgestimate-ethinyl estradiol (ESTARYLLA) 0 25-35 MG-MCG per tablet Estarylla 0 25 mg-35 mcg tablet, Historical Med      pantoprazole (PROTONIX) 20 mg tablet Take 1 tablet 30 minutes before breakfast daily, Normal      phenol (CHLORASEPTIC) 1 4 % mucosal liquid Apply 1 spray to the mouth or throat every 2 (two) hours as needed (for sore throat as needed), Starting Wed 11/6/2019, Print      polyethylene glycol (MIRALAX) 17 g packet Take one packet daily as needed for no bowel movement in 24 hours, Normal      psyllium (METAMUCIL) 58 6 % packet Take 1 packet by mouth daily, Starting Mon 8/12/2019, Normal      RA SUNSCREEN SPF50 LOTN Apply 15 minutes before sun exposure and every 2 hours, Normal      senna-docusate sodium (SENEXON-S) 8 6-50 mg per tablet Take 1 tablet by mouth daily at 8am , Starting Tue 10/1/2019, Normal      !! sodium chloride (OCEAN) 0 65 % nasal spray 1 spray into each nostril as needed (three times daily as needed for nasal congestion), Starting Wed 3/20/2019, Normal      !! sodium chloride (OCEAN) 0 65 % nasal spray 2 sprays into each nostril as needed for congestion or rhinitis, Starting Wed 11/6/2019, Print      traZODone (DESYREL) 50 mg tablet Take 1 tablet (50 mg total) by mouth daily at bedtime at 9pm, Starting Tue 9/25/2018, No Print      zinc oxide (DESITIN) 13 % cream Apply 1 application topically as needed (for perianal irritation), Starting Thu 6/6/2019, Normal       !! - Potential duplicate medications found  Please discuss with provider  Outpatient Discharge Orders   Stress test only, exercise   Standing Status: Future Standing Exp   Date: 01/17/20       ED Provider  Attending physically available and evaluated Arley Messina I managed the patient along with the ED Attending      Electronically Signed by         Derek Parra DO  12/18/19 0613

## 2019-12-17 NOTE — PROGRESS NOTES
12/17/19 1300   Clinical Encounter Type   Visited With Family; Patient not available;Health care provider   Routine Visit Introduction   Crisis Visit ED   Referral From Nurse   Family Spiritual Encounters   Family Coping Open/discussion

## 2019-12-17 NOTE — ED NOTES
Multiple unsuccessful bloodwork attempts  Dr Mack John at bedside with [address] [facility] [address] for IV insertion       Kari Malloy, RN  12/17/19 0796

## 2019-12-17 NOTE — PROGRESS NOTES
12/17/19 1300   Spiritual Beliefs/Perceptions   Support Systems Friends/neighbors   Stress Factors   Family Stress Factors None identified   Coping Responses   Family Coping Open/discussion   Plan of Care   Comments pt's boss was present  Talked with pt's boss about pt     Assessment Completed by: Unit visit

## 2019-12-17 NOTE — ED ATTENDING ATTESTATION
12/17/2019  I, Eb Sharp MD, saw and evaluated the patient  I have discussed the patient with the resident/non-physician practitioner and agree with the resident's/non-physician practitioner's findings, Plan of Care, and MDM as documented in the resident's/non-physician practitioner's note, except where noted  All available labs and Radiology studies were reviewed  I was present for key portions of any procedure(s) performed by the resident/non-physician practitioner and I was immediately available to provide assistance  At this point I agree with the current assessment done in the Emergency Department  I have conducted an independent evaluation of this patient a history and physical is as follows:  79-year-old female presenting with chest pain  Patient states that she has burning chest pain that is made worse when she breathes, eats, or weeks  Patient has had similar pain in the past, and has been evaluated on numerous occasions in the emergency department  She has never had a stress test   She has had many negative troponins and EKGs  Patient has not had fevers or cough  She states that the pain has not changed in any regard  The patient does not have lateralizing limb swelling  She denies PE risk factors, does not have cardiac risk factors  Review of systems negative in 12 systems reviewed  On exam the patient is tachycardic  Vital signs were reviewed  Patient is awake, alert, interactive  The patient's pupils are equally round reactive to light  Oropharynx is clear with moist mucous membranes  Neck is supple and nontender with no adenopathy or JVD  Heart is regular with no murmurs, rubs, or gallops  Lungs are clear and equal with no wheezes, rales, or rhonchi  Abdomen is soft and nontender with no masses, rebound, or guarding  There is no CVA tenderness  The patient was completely exposed  There is no skin breakdown  There are no rashes or skin changes    Extremities are warm and well perfused with good pulses  The patient has normal strength, sensation, and cranial nerves  Impression:  Atypical chest pain  Will plan to check D-dimer given tachycardia, I cannot perc patient out    Will plan to have follow-up for outpatient stress test  ED Course         Critical Care Time  Procedures

## 2019-12-19 LAB
ATRIAL RATE: 116 BPM
ATRIAL RATE: 98 BPM
P AXIS: 148 DEGREES
P AXIS: 42 DEGREES
PR INTERVAL: 128 MS
PR INTERVAL: 132 MS
QRS AXIS: -18 DEGREES
QRS AXIS: 201 DEGREES
QRSD INTERVAL: 68 MS
QRSD INTERVAL: 72 MS
QT INTERVAL: 292 MS
QT INTERVAL: 340 MS
QTC INTERVAL: 405 MS
QTC INTERVAL: 434 MS
T WAVE AXIS: 136 DEGREES
T WAVE AXIS: 38 DEGREES
VENTRICULAR RATE: 116 BPM
VENTRICULAR RATE: 98 BPM

## 2019-12-19 PROCEDURE — 93010 ELECTROCARDIOGRAM REPORT: CPT | Performed by: INTERNAL MEDICINE

## 2019-12-20 ENCOUNTER — OFFICE VISIT (OUTPATIENT)
Dept: FAMILY MEDICINE CLINIC | Facility: CLINIC | Age: 39
End: 2019-12-20

## 2019-12-20 VITALS
TEMPERATURE: 97.2 F | BODY MASS INDEX: 41.55 KG/M2 | HEART RATE: 76 BPM | RESPIRATION RATE: 16 BRPM | DIASTOLIC BLOOD PRESSURE: 72 MMHG | SYSTOLIC BLOOD PRESSURE: 110 MMHG | HEIGHT: 57 IN | WEIGHT: 192.6 LBS

## 2019-12-20 DIAGNOSIS — R79.9 ABNORMAL FINDING OF BLOOD CHEMISTRY, UNSPECIFIED: ICD-10-CM

## 2019-12-20 DIAGNOSIS — Z13.1 SCREENING FOR DIABETES MELLITUS: ICD-10-CM

## 2019-12-20 DIAGNOSIS — E66.01 CLASS 3 SEVERE OBESITY WITH BODY MASS INDEX (BMI) OF 40.0 TO 44.9 IN ADULT, UNSPECIFIED OBESITY TYPE, UNSPECIFIED WHETHER SERIOUS COMORBIDITY PRESENT (HCC): ICD-10-CM

## 2019-12-20 DIAGNOSIS — R07.9 CHEST PAIN, UNSPECIFIED TYPE: Primary | ICD-10-CM

## 2019-12-20 PROCEDURE — 99213 OFFICE O/P EST LOW 20 MIN: CPT | Performed by: FAMILY MEDICINE

## 2019-12-20 NOTE — ASSESSMENT & PLAN NOTE
Multiple ED visits in the last month   -So far cardiac work up has been unremarkable  -GERD between differentials evaluated by GI  EGD and barium swallow normal  -Will give ambulatory referral to cardiology   Lipid panel and A1c ordered at this visit  -Return to office and ED precautions reviewed with caregiver and patient  -Follow up in 1 month for annual visit

## 2019-12-30 ENCOUNTER — TELEPHONE (OUTPATIENT)
Dept: FAMILY MEDICINE CLINIC | Facility: CLINIC | Age: 39
End: 2019-12-30

## 2019-12-30 ENCOUNTER — HOSPITAL ENCOUNTER (OUTPATIENT)
Dept: NON INVASIVE DIAGNOSTICS | Facility: HOSPITAL | Age: 39
Discharge: HOME | End: 2019-12-30

## 2019-12-30 DIAGNOSIS — R07.9 CHEST PAIN, UNSPECIFIED TYPE: ICD-10-CM

## 2019-12-30 NOTE — TELEPHONE ENCOUNTER
[facility] calling pt was scheduled for an exercise stress test today but because of her gait and cerebral palsy they do not feel she can do this  They are wondering if there is any way we could get pt in to see Cardiology sooner (she has appt 1/30) so that "chest pain" that she has been seen for here and the ER can be addressed sooner

## 2019-12-30 NOTE — TELEPHONE ENCOUNTER
Called cardiology @ [phone], moved appointment to [address] 1/23/20 @ 4:00pm ([address]  [facility])  Tried calling [employer] ([phone]) from [employer] with information   Left message to call back

## 2019-12-31 ENCOUNTER — TELEPHONE (OUTPATIENT)
Dept: FAMILY MEDICINE CLINIC | Facility: CLINIC | Age: 39
End: 2019-12-31

## 2019-12-31 NOTE — TELEPHONE ENCOUNTER
Patient is going for blood work on Friday am   Can patient take her meds with water before she goes?

## 2020-01-02 DIAGNOSIS — K59.00 CONSTIPATION, UNSPECIFIED CONSTIPATION TYPE: ICD-10-CM

## 2020-01-02 RX ORDER — AMOXICILLIN 250 MG
1 CAPSULE ORAL DAILY
Qty: 90 TABLET | Refills: 3 | Status: SHIPPED | OUTPATIENT
Start: 2020-01-02 | End: 2020-12-29 | Stop reason: SDUPTHER

## 2020-01-03 ENCOUNTER — APPOINTMENT (OUTPATIENT)
Dept: LAB | Facility: HOSPITAL | Age: 40
End: 2020-01-03
Payer: MEDICARE

## 2020-01-03 DIAGNOSIS — R07.9 CHEST PAIN, UNSPECIFIED TYPE: ICD-10-CM

## 2020-01-03 DIAGNOSIS — R79.9 ABNORMAL FINDING OF BLOOD CHEMISTRY, UNSPECIFIED: ICD-10-CM

## 2020-01-03 DIAGNOSIS — E66.01 CLASS 3 SEVERE OBESITY WITH BODY MASS INDEX (BMI) OF 40.0 TO 44.9 IN ADULT, UNSPECIFIED OBESITY TYPE, UNSPECIFIED WHETHER SERIOUS COMORBIDITY PRESENT (HCC): ICD-10-CM

## 2020-01-03 LAB
CHOLEST SERPL-MCNC: 163 MG/DL (ref 50–200)
EST. AVERAGE GLUCOSE BLD GHB EST-MCNC: 103 MG/DL
HBA1C MFR BLD: 5.2 % (ref 4.2–6.3)
HDLC SERPL-MCNC: 68 MG/DL
LDLC SERPL CALC-MCNC: 65 MG/DL (ref 0–100)
NONHDLC SERPL-MCNC: 95 MG/DL
TRIGL SERPL-MCNC: 151 MG/DL

## 2020-01-03 PROCEDURE — 80061 LIPID PANEL: CPT

## 2020-01-03 PROCEDURE — 83036 HEMOGLOBIN GLYCOSYLATED A1C: CPT

## 2020-01-03 PROCEDURE — 36415 COLL VENOUS BLD VENIPUNCTURE: CPT

## 2020-01-03 RX ORDER — LAMOTRIGINE 25 MG/1
TABLET ORAL
COMMUNITY
Start: 2019-12-23 | End: 2020-03-25 | Stop reason: ALTCHOICE

## 2020-01-07 ENCOUNTER — TELEPHONE (OUTPATIENT)
Dept: INTERNAL MEDICINE CLINIC | Facility: CLINIC | Age: 40
End: 2020-01-07

## 2020-01-07 ENCOUNTER — TELEPHONE (OUTPATIENT)
Dept: FAMILY MEDICINE CLINIC | Facility: CLINIC | Age: 40
End: 2020-01-07

## 2020-01-07 ENCOUNTER — CONSULT (OUTPATIENT)
Dept: MULTI SPECIALTY CLINIC | Facility: CLINIC | Age: 40
End: 2020-01-07

## 2020-01-07 VITALS
HEART RATE: 97 BPM | BODY MASS INDEX: 40.24 KG/M2 | HEIGHT: 57 IN | WEIGHT: 186.51 LBS | DIASTOLIC BLOOD PRESSURE: 62 MMHG | OXYGEN SATURATION: 96 % | SYSTOLIC BLOOD PRESSURE: 108 MMHG

## 2020-01-07 DIAGNOSIS — R30.0 DYSURIA: ICD-10-CM

## 2020-01-07 DIAGNOSIS — K59.04 CHRONIC IDIOPATHIC CONSTIPATION: ICD-10-CM

## 2020-01-07 DIAGNOSIS — R31.9 HEMATURIA, UNSPECIFIED TYPE: ICD-10-CM

## 2020-01-07 PROCEDURE — 99214 OFFICE O/P EST MOD 30 MIN: CPT | Performed by: UROLOGY

## 2020-01-07 NOTE — TELEPHONE ENCOUNTER
[facility] request Refill on Medication [address] [phone] Thanks Dr Tirso Varela

## 2020-01-07 NOTE — TELEPHONE ENCOUNTER
Medical Examination [place] Form to be completed by Urology Specialist, Dr Alessandra Ruiz  Form given to [facility])

## 2020-01-07 NOTE — PROGRESS NOTES
Assessment:  36 yo female with microscopic hematuria    Plan:  No further follow up required/prn     Chief Complaint: None    HPI: Hussain Arreaga is a 35 y o  female resident of a group home presents for yearly follow up for urology, but based on note from urology clinic on 1/08/2019, pt no longer needed to be seen in urology clinic  Patient denies any dysuria, frequency, incontinence, super pubic pain or pressure, flank pain or hematuria  Microscopic urine from December 2019 was negative for RBC and macroscopic urine showed trace amounts of blood  Pt still menstruates and is possible that was starting her menstrual cycle at time of urine test     Review of Systems:    Past Medical History:  Past Medical History:   Diagnosis Date    Acid reflux     ADD (attention deficit disorder)     Adjustment disorder     Anxiety     Astigmatism     Brain lesion     Brain lesion     Bronchitis     Calcium deficiency     Cellulitis of foot, right 6/4/2018    Cerebral palsy (HCC)     Cerebral palsy (HCC)     Last Assessed:7/6/2016    Chronic otitis media     Chronically dry eyes, left     Last Assessed:7/6/2016    Constipation     Depression     Last Assessed:7/6/2016    Depressive disorder     Dry eyes     Dysphagia     Esophagitis     Esotropia     Fall     GERD (gastroesophageal reflux disease)     GERD (gastroesophageal reflux disease)     Hiatal hernia     Hydrocephalus (HCC)     Hyperopia with astigmatism     Impaired fasting glucose     Left nephrolithiasis 3/4/2019    Mood disorder ([address]     Myopia     Oppositional defiant disorder     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[address] )     Psychiatric disorder     Seizures ([address] )     Sensorineural hearing loss     Status post ventriculoatrial shunt placement     Visual impairment        Past Surgical History:  Past Surgical History:   Procedure Laterality Date    CSF SHUNT      Creation of Ventriculo-Peritoneal CSF shunt ;  Last Assessed:7/6/2016    EAR SURGERY      Last Assessed:7/6/2016    LEG SURGERY      due to CP     NOSE SURGERY      Last Assessed:7/6/2016    WI ESOPHAGOGASTRODUODENOSCOPY TRANSORAL DIAGNOSTIC N/A 5/9/2019    Procedure: ESOPHAGOGASTRODUODENOSCOPY (EGD) with biopsy;  Surgeon: Stella Jauregui MD;  Location: [facility]; Service: Gastroenterology       Social History:  Social History     Substance and Sexual Activity   Alcohol Use No     Social History     Substance and Sexual Activity   Drug Use No     Social History     Tobacco Use   Smoking Status Never Smoker   Smokeless Tobacco Never Used       Family History:  Family History   Problem Relation Age of Onset    Diabetes Mother     No Known Problems Father        Allergies:  No Known Allergies    Medications:  current meds:   No current facility-administered medications for this visit          Vitals:  /62 (BP Location: Right arm, Patient Position: Sitting, Cuff Size: Adult)   Pulse 97   Ht 4' 9" (1 448 m)   Wt 84 6 kg (186 lb 8 2 oz)   SpO2 96%   BMI 40 36 kg/m²     Lab Results and Cultures:   CBC with diff:   Lab Results   Component Value Date    WBC 8 59 12/17/2019    HGB 13 6 12/17/2019    HCT 41 4 12/17/2019    MCV 94 12/17/2019     12/17/2019    MCH 30 8 12/17/2019    MCHC 32 9 12/17/2019    RDW 13 5 12/17/2019    MPV 9 3 12/17/2019    NRBC 0 12/17/2019   ,   BMP/CMP:  Lab Results   Component Value Date    K 3 6 12/17/2019     (H) 12/17/2019    CO2 25 12/17/2019    BUN 9 12/17/2019    CREATININE 0 76 12/17/2019    CALCIUM 8 5 12/17/2019    AST 10 12/14/2019    ALT 19 12/14/2019    ALKPHOS 158 (H) 12/14/2019    EGFR 98 12/17/2019   ,   Lipid Panel: No results found for: CHOL,   Coags: No results found for: PT, PTT, INR,     Blood Culture: No results found for: BLOODCX,   Urinalysis:   Lab Results   Component Value Date    COLORU Yellow 12/17/2019    COLORU Yellow 12/17/2019    COLORU Yellow 09/02/2016    CLARITYU Clear 12/17/2019    CLARITYU Clear 12/17/2019 CLARITYU Transparent 09/02/2016    SPECGRAV <=1 005 12/17/2019    SPECGRAV 1 015 09/02/2016    PHUR 6 5 12/17/2019    PHUR 5 0 09/02/2016    LEUKOCYTESUR Negative 12/17/2019    LEUKOCYTESUR MODERTAE 09/02/2016    NITRITE Negative 12/17/2019    NITRITE NEGATIVE 09/02/2016    PROTEINUA NEGATIVE 09/02/2016    GLUCOSEU Negative 12/17/2019    KETONESU Negative 12/17/2019    KETONESU NEGATIVE 09/02/2016    BILIRUBINUR Negative 12/17/2019    BILIRUBINUR NEGATIVE 09/02/2016    BLOODU Trace (A) 12/17/2019    BLOODU NEGATIVE 09/02/2016   ,   Urine Culture:   Lab Results   Component Value Date    URINECX 80,000-89,000 cfu/ml  12/12/2018   ,   Wound Culure: No results found for: WOUNDCULT    CT scan 3/14/19-stable renal cyst

## 2020-01-09 NOTE — TELEPHONE ENCOUNTER
Form given to urologist  Form was completed and given back to patient  Urology never gave to me to make a copy

## 2020-01-13 ENCOUNTER — OFFICE VISIT (OUTPATIENT)
Dept: FAMILY MEDICINE CLINIC | Facility: CLINIC | Age: 40
End: 2020-01-13

## 2020-01-13 VITALS
HEIGHT: 57 IN | WEIGHT: 189.4 LBS | DIASTOLIC BLOOD PRESSURE: 80 MMHG | HEART RATE: 84 BPM | SYSTOLIC BLOOD PRESSURE: 130 MMHG | TEMPERATURE: 98 F | BODY MASS INDEX: 40.86 KG/M2 | RESPIRATION RATE: 14 BRPM

## 2020-01-13 DIAGNOSIS — R11.2 NAUSEA AND VOMITING, INTRACTABILITY OF VOMITING NOT SPECIFIED, UNSPECIFIED VOMITING TYPE: Primary | ICD-10-CM

## 2020-01-13 PROCEDURE — 99213 OFFICE O/P EST LOW 20 MIN: CPT | Performed by: FAMILY MEDICINE

## 2020-01-13 NOTE — PROGRESS NOTES
Assessment/Plan:    Nausea and vomiting  One episode today  -Patient afebrile, hemodynamically stable  No signs of dehydration on physical exam  -Supportive management for now  -Patient and caregiver instructed on ED precautions and return to office if symptoms worsen           Problem List Items Addressed This Visit        Digestive    Nausea and vomiting - Primary     One episode today  -Patient afebrile, hemodynamically stable  No signs of dehydration on physical exam  -Supportive management for now  -Patient and caregiver instructed on ED precautions and return to office if symptoms worsen                   Subjective:      Patient ID: Montie Collet is a 35 y o  female  Patient 36 yo group home patient with PMH of cerebral palsy, GERD, pituitary microadenoma, who presents for non-bloody emesis for 1 episode today  Caregiver wasn't aware of her medical problems when she brought patient in  Agency was contacted regarding problem and they reported patient had 1 episode of vomiting but that she has been very stable since then  Patient reported she eat her regular cereal and milk and around noon she had 1 episode of NB emesis  She denies fever, URI symptoms, abdominal pain, diarrhea, constipation, dysuria  Again caregiver very unaware of patient's symptoms  Agency contacted to let them know caregiver needs to be aware of patient needs and symptoms to better assess patient           The following portions of the patient's history were reviewed and updated as appropriate: She  has a past medical history of Acid reflux, ADD (attention deficit disorder), Adjustment disorder, Anxiety, Astigmatism, Brain lesion, Brain lesion, Bronchitis, Calcium deficiency, Cellulitis of foot, right (6/4/2018), Cerebral palsy ([facility]), Cerebral palsy ([address] ), Chronic otitis media, Chronically dry eyes, left, Constipation, Depression, Depressive disorder, Dry eyes, Dysphagia, Esophagitis, Esotropia, Fall, GERD (gastroesophageal reflux disease), GERD (gastroesophageal reflux disease), Hiatal hernia, Hydrocephalus ([address] ), Hyperopia with astigmatism, Impaired fasting glucose, Left nephrolithiasis (3/4/2019), Mood disorder ([address] ), Myopia, Oppositional defiant disorder, 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[address] ), Psychiatric disorder, Seizures ([address] ), Sensorineural hearing loss, Status post ventriculoatrial shunt placement, and Visual impairment  She  has a past surgical history that includes CSF shunt; Leg Surgery; EAR SURGERY; Nose surgery; and pr esophagogastroduodenoscopy transoral diagnostic (N/A, 5/9/2019)  She  reports that she has never smoked  She has never used smokeless tobacco  She reports that she does not drink alcohol or use drugs    Current Outpatient Medications   Medication Sig Dispense Refill    acetaminophen (TYLENOL) 500 mg tablet Take 500 mg by mouth every 6 (six) hours as needed for mild pain      aluminum-magnesium hydroxide-simethicone (ANTACID) 200-200-20 mg/5 mL suspension Take 15 mL by mouth every 4 (four) hours as needed for indigestion or heartburn 355 mL 5    bismuth subsalicylate (PEPTO BISMOL) 524 mg/30 mL oral suspension Take 15 mL (262 mg total) by mouth every 6 (six) hours as needed for indigestion 360 mL 3    calcium carbonate (TUMS) 500 mg chewable tablet Chew 1 tablet (500 mg total) 3 (three) times a day as needed for heartburn 30 tablet 1    Cholecalciferol (VITAMIN D-3) 1000 units CAPS Take 2 capsules (2,000 Units total) by mouth daily at 8am  180 capsule 1    clotrimazole (LOTRIMIN AF) 1 % cream Lotrimin AF      clotrimazole (LOTRIMIN) 1 % cream Apply 1 g (1 application total) topically 3 (three) times a day as needed (fungal irritation) 30 g 5    dextromethorphan-guaiFENesin (ROBITUSSIN DM)  mg/5 mL syrup Take 5 mL by mouth 3 (three) times a day as needed for cough 118 mL 1    fluticasone (FLONASE) 50 mcg/act nasal spray 1 spray into each nostril daily as needed for rhinitis (nasal congestion) At 8:00 AM 1 Bottle 5    ibuprofen (MOTRIN) 400 mg tablet Take 1 tablet (400 mg total) by mouth every 8 (eight) hours as needed for mild pain 30 tablet 1    lamoTRIgine (LaMICtal) 25 mg tablet       lubiprostone (AMITIZA) 24 mcg capsule Take 1 capsule (24 mcg total) by mouth 2 (two) times a day with meals 60 capsule 5    magnesium hydroxide (GNP MILK OF MAGNESIA) 400 mg/5 mL oral suspension Take 30 mL by mouth daily as needed for constipation If no BM in 3 days 355 mL 5    metoclopramide (REGLAN) 10 mg tablet Reglan      Multiple Vitamins-Minerals (CERTAVITE SENIOR/ANTIOXIDANT) TABS Take 1 tablet by mouth daily 90 tablet 2    norgestimate-ethinyl estradiol (ESTARYLLA) 0 25-35 MG-MCG per tablet Estarylla 0 25 mg-35 mcg tablet      pantoprazole (PROTONIX) 20 mg tablet Take 1 tablet 30 minutes before breakfast daily 31 tablet 5    phenol (CHLORASEPTIC) 1 4 % mucosal liquid Apply 1 spray to the mouth or throat every 2 (two) hours as needed (for sore throat as needed) 236 mL 1    polyethylene glycol (MIRALAX) 17 g packet Take one packet daily as needed for no bowel movement in 24 hours 14 each 3    psyllium (METAMUCIL) 58 6 % packet Take 1 packet by mouth daily 30 packet 5    RA SUNSCREEN SPF50 LOTN Apply 15 minutes before sun exposure and every 2 hours 1 Bottle 5    senna-docusate sodium (SENEXON-S) 8 6-50 mg per tablet Take 1 tablet by mouth daily at 8am  90 tablet 3    sodium chloride (OCEAN) 0 65 % nasal spray 1 spray into each nostril as needed (three times daily as needed for nasal congestion) 60 mL 1    sodium chloride (OCEAN) 0 65 % nasal spray 2 sprays into each nostril as needed for congestion or rhinitis 50 mL 1    traZODone (DESYREL) 50 mg tablet Take 1 tablet (50 mg total) by mouth daily at bedtime at 9pm  0    zinc oxide (DESITIN) 13 % cream Apply 1 application topically as needed (for perianal irritation) 1 Tube 5    ARIPiprazole (ABILIFY) 20 MG tablet Take 1 tablet (20 mg total) by mouth daily at bedtime for 30 days at 9pm  30 tablet 0    bacitracin topical ointment 500 units/g topical ointment Apply 1 large application topically 3 (three) times a day for 7 days 15 g 0    escitalopram (LEXAPRO) 20 mg tablet Take 1 tablet (20 mg total) by mouth daily for 30 days at 8am  90 tablet 0    Mouthwashes (LISTERINE ANTISEPTIC) LIQD Apply 1 application to the mouth or throat 2 (two) times a day 500 mL 3    mupirocin (BACTROBAN) 2 % ointment Apply topically 3 (three) times a day for 5 days 22 g 0     No current facility-administered medications for this visit  She has No Known Allergies       Review of Systems   Constitutional: Negative for appetite change, diaphoresis, fatigue and fever  HENT: Negative for congestion, rhinorrhea and sinus pain  Respiratory: Negative for cough, chest tightness and shortness of breath  Cardiovascular: Negative for chest pain, palpitations and leg swelling  Gastrointestinal: Negative for abdominal distention, abdominal pain, constipation, diarrhea, nausea and vomiting  Genitourinary: Negative for difficulty urinating, frequency and urgency  Musculoskeletal: Negative for back pain and neck pain  Neurological: Negative for weakness, light-headedness, numbness and headaches  Psychiatric/Behavioral: Negative for agitation and behavioral problems  The patient is not nervous/anxious  Objective:      /80 (BP Location: Left arm, Patient Position: Sitting, Cuff Size: Large)   Pulse 84   Temp 98 °F (36 7 °C) (Tympanic)   Resp 14   Ht 4' 9" (1 448 m)   Wt 85 9 kg (189 lb 6 4 oz)   BMI 40 99 kg/m²          Physical Exam   Constitutional: She appears well-developed and well-nourished  No distress  Ambulatory dysfunction on a walker    HENT:   Head: Normocephalic and atraumatic  Mouth/Throat: Oropharynx is clear and moist  No oropharyngeal exudate  Eyes: Pupils are equal, round, and reactive to light  Neck: Normal range of motion  Neck supple  Cardiovascular: Normal rate, regular rhythm, normal heart sounds and intact distal pulses  Exam reveals no gallop and no friction rub  No murmur heard  Pulmonary/Chest: Effort normal and breath sounds normal  No respiratory distress  She has no wheezes  She has no rales  She exhibits no tenderness  Abdominal: Soft  Bowel sounds are normal  She exhibits no distension and no mass  There is no tenderness  There is no rebound and no guarding  Musculoskeletal: Normal range of motion  She exhibits no edema, tenderness or deformity  Neurological: She is alert  Skin: Skin is warm and dry  No rash noted  She is not diaphoretic  No erythema  Psychiatric: She has a normal mood and affect  Her behavior is normal  Judgment and thought content normal    Vitals reviewed

## 2020-01-13 NOTE — ASSESSMENT & PLAN NOTE
One episode today  -Patient afebrile, hemodynamically stable  No signs of dehydration on physical exam  -Supportive management for now    -Patient and caregiver instructed on ED precautions and return to office if symptoms worsen

## 2020-01-20 ENCOUNTER — OFFICE VISIT (OUTPATIENT)
Dept: GASTROENTEROLOGY | Facility: MEDICAL CENTER | Age: 40
End: 2020-01-20
Payer: MEDICARE

## 2020-01-20 VITALS
SYSTOLIC BLOOD PRESSURE: 130 MMHG | BODY MASS INDEX: 42.07 KG/M2 | HEIGHT: 57 IN | DIASTOLIC BLOOD PRESSURE: 80 MMHG | HEART RATE: 100 BPM | WEIGHT: 195 LBS | TEMPERATURE: 98 F

## 2020-01-20 DIAGNOSIS — K59.04 CHRONIC IDIOPATHIC CONSTIPATION: ICD-10-CM

## 2020-01-20 DIAGNOSIS — R07.89 ATYPICAL CHEST PAIN: Primary | ICD-10-CM

## 2020-01-20 PROCEDURE — 99214 OFFICE O/P EST MOD 30 MIN: CPT | Performed by: PHYSICIAN ASSISTANT

## 2020-01-20 NOTE — PROGRESS NOTES
Assessment/Plan:     Diagnoses and all orders for this visit:    Atypical chest pain  Patient continues with atypical chest pain  So far no etiology has been found as her endoscopy and barium swallow were within normal limits  She does have a hernia which according to her most recent CT it was described as moderate  This likely this is contributing to her symptoms  It was previously recommended that she undergo manometry testing to rule out motility problems however she previously refused  She is agreeable to trying proceed at this time without sedation  If no other source is found can consider hiatal hernia repair   -     Esophageal manometry; Future    Chronic idiopathic constipation  She does have chronic constipation for which she is on Amitiza b i d  with good control of her symptoms  Would recommend continuing at this time  Will see her back after procedure discuss all results  Subjective:      Patient ID: Francy Russ is a 38 y o  female  HPI     Patient is here for follow-up for atypical chest pain and constipation  She has a history of mental disability and is here with a caretaker however she has very limited a knowledge of the patient  Patient has been seen multiple times in the past for the same symptoms  She complains of chest pain, typically after eating  She cannot give me any further details  She is on a PPI as well as antacid as needed  She did undergo endoscopy in May 2019 because of these symptoms which showed a hiatal hernia  Biopsies were negative for H pylori  There was intestinal metaplasia in the stomach  She did have a barium swallow in April of last year which showed a small sliding hiatal hernia  She then underwent a speech and video swallow which was within normal limits  It was felt her symptoms were secondary to functional dysphagia, due to eating large portions and not chewing her food well     It was also suggested that she follow-up with her psychiatrist for initiation of TCA versus SSRIs however does not appear this was done  She does go to the emergency room frequently for chest pain and was actually seen 4 days after her last visit in the ER  It was also recommended that she follow up with Cardiology for further workup for chest pain which also has not been done  She did have a CT a during her last ER visit on 12/17 which was negative for PE  It did show a moderate paraesophageal hernia  She does have a history of constipation as well for which she is on Amitiza b i d  with good relief of her symptoms      Patient Active Problem List   Diagnosis    Leg swelling    Generalized anxiety disorder    Brain lesion    Cerebral palsy (HCC)    Chronic knee pain    Constipation    Severe episode of recurrent major depressive disorder, with psychotic features ([facility] [address] )    Dizziness    Functional dysphagia    Hearing impairment    Acquired renal cyst of left kidney    Low back pain    Moderate intellectual disability    Pituitary neoplasm    Seasonal allergies    Varicose veins with pain    Medicare annual wellness visit, subsequent    Hematuria    Pituitary microadenoma ([facility] [address] )    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[facility] [address] )    Ambulatory dysfunction    Bruise    Bilateral impacted cerumen    TMJ (temporomandibular joint disorder)    Gait disturbance    Bilateral thoracic back pain    Cerumen impaction    Skin picking habit    Hiatal hernia    SOB (shortness of breath)    Dysuria    Left nephrolithiasis    Non-seasonal allergic rhinitis    Viral URI    Self-injurious behavior    Suprasellar extension of pituitary adenoma (HCC)    Gastroesophageal reflux disease without esophagitis    Atypical chest pain    Intertrigo    Nausea and vomiting    Excoriation of face    Sore throat    Impetigo     No Known Allergies  Current Outpatient Medications on File Prior to Visit   Medication Sig    acetaminophen (TYLENOL) 500 mg tablet Take 500 mg by mouth every 6 (six) hours as needed for mild pain    aluminum-magnesium hydroxide-simethicone (ANTACID) 200-200-20 mg/5 mL suspension Take 15 mL by mouth every 4 (four) hours as needed for indigestion or heartburn    bismuth subsalicylate (PEPTO BISMOL) 524 mg/30 mL oral suspension Take 15 mL (262 mg total) by mouth every 6 (six) hours as needed for indigestion    calcium carbonate (TUMS) 500 mg chewable tablet Chew 1 tablet (500 mg total) 3 (three) times a day as needed for heartburn    Cholecalciferol (VITAMIN D-3) 1000 units CAPS Take 2 capsules (2,000 Units total) by mouth daily at 8am     clotrimazole (LOTRIMIN AF) 1 % cream Lotrimin AF    clotrimazole (LOTRIMIN) 1 % cream Apply 1 g (1 application total) topically 3 (three) times a day as needed (fungal irritation)    dextromethorphan-guaiFENesin (ROBITUSSIN DM)  mg/5 mL syrup Take 5 mL by mouth 3 (three) times a day as needed for cough    fluticasone (FLONASE) 50 mcg/act nasal spray 1 spray into each nostril daily as needed for rhinitis (nasal congestion) At 8:00 AM    ibuprofen (MOTRIN) 400 mg tablet Take 1 tablet (400 mg total) by mouth every 8 (eight) hours as needed for mild pain    lamoTRIgine (LaMICtal) 25 mg tablet     lubiprostone (AMITIZA) 24 mcg capsule Take 1 capsule (24 mcg total) by mouth 2 (two) times a day with meals    magnesium hydroxide (GNP MILK OF MAGNESIA) 400 mg/5 mL oral suspension Take 30 mL by mouth daily as needed for constipation If no BM in 3 days    Multiple Vitamins-Minerals (CERTAVITE SENIOR/ANTIOXIDANT) TABS Take 1 tablet by mouth daily    norgestimate-ethinyl estradiol (ESTARYLLA) 0 25-35 MG-MCG per tablet Estarylla 0 25 mg-35 mcg tablet    pantoprazole (PROTONIX) 20 mg tablet Take 1 tablet 30 minutes before breakfast daily    phenol (CHLORASEPTIC) 1 4 % mucosal liquid Apply 1 spray to the mouth or throat every 2 (two) hours as needed (for sore throat as needed)    polyethylene glycol (MIRALAX) 17 g packet Take one packet daily as needed for no bowel movement in 24 hours    psyllium (METAMUCIL) 58 6 % packet Take 1 packet by mouth daily    RA SUNSCREEN SPF50 LOTN Apply 15 minutes before sun exposure and every 2 hours    senna-docusate sodium (SENEXON-S) 8 6-50 mg per tablet Take 1 tablet by mouth daily at 8am     sodium chloride (OCEAN) 0 65 % nasal spray 1 spray into each nostril as needed (three times daily as needed for nasal congestion)    sodium chloride (OCEAN) 0 65 % nasal spray 2 sprays into each nostril as needed for congestion or rhinitis    traZODone (DESYREL) 50 mg tablet Take 1 tablet (50 mg total) by mouth daily at bedtime at 9pm    zinc oxide (DESITIN) 13 % cream Apply 1 application topically as needed (for perianal irritation)    [DISCONTINUED] metoclopramide (REGLAN) 10 mg tablet Reglan    ARIPiprazole (ABILIFY) 20 MG tablet Take 1 tablet (20 mg total) by mouth daily at bedtime for 30 days at 9pm     bacitracin topical ointment 500 units/g topical ointment Apply 1 large application topically 3 (three) times a day for 7 days    escitalopram (LEXAPRO) 20 mg tablet Take 1 tablet (20 mg total) by mouth daily for 30 days at 8am     Mouthwashes (LISTERINE ANTISEPTIC) LIQD Apply 1 application to the mouth or throat 2 (two) times a day    mupirocin (BACTROBAN) 2 % ointment Apply topically 3 (three) times a day for 5 days     No current facility-administered medications on file prior to visit        Family History   Problem Relation Age of Onset    Diabetes Mother     No Known Problems Father      Past Medical History:   Diagnosis Date    Acid reflux     ADD (attention deficit disorder)     Adjustment disorder     Anxiety     Astigmatism     Brain lesion     Brain lesion     Bronchitis     Calcium deficiency     Cellulitis of foot, right 6/4/2018    Cerebral palsy (HCC)     Cerebral palsy (HCC)     Last Assessed:7/6/2016    Chronic otitis media     Chronically dry eyes, left Last Assessed:7/6/2016    Constipation     Depression     Last Assessed:7/6/2016    Depressive disorder     Dry eyes     Dysphagia     Esophagitis     Esotropia     Fall     GERD (gastroesophageal reflux disease)     GERD (gastroesophageal reflux disease)     Hiatal hernia     [facility]     Hyperopia with astigmatism     Impaired fasting glucose     Left nephrolithiasis 3/4/2019    Mood disorder ([facility]     Myopia     Oppositional defiant disorder     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[facility]     Psychiatric disorder     Seizures ([address] )     Sensorineural hearing loss     Status post ventriculoatrial shunt placement     Visual impairment      Social History     Socioeconomic History    Marital status: Single     Spouse name: None    Number of children: None    Years of education: None    Highest education level: None   Occupational History    None   Social Needs    Financial resource strain: None    Food insecurity:     Worry: None     Inability: None    Transportation needs:     Medical: None     Non-medical: None   Tobacco Use    Smoking status: Never Smoker    Smokeless tobacco: Never Used   Substance and Sexual Activity    Alcohol use: No    Drug use: No    Sexual activity: Never   Lifestyle    Physical activity:     Days per week: None     Minutes per session: None    Stress: None   Relationships    Social connections:     Talks on phone: None     Gets together: None     Attends [employer] service: None     Active member of club or organization: None     Attends meetings of clubs or organizations: None     Relationship status: None    Intimate partner violence:     Fear of current or ex partner: None     Emotionally abused: None     Physically abused: None     Forced sexual activity: None   Other Topics Concern    None   Social History Narrative    Always uses seat belt    Lives in group home     Past Surgical History:   Procedure Laterality Date    CSF SHUNT      Creation of Ventriculo-Peritoneal CSF shunt ; Last Assessed:7/6/2016    EAR SURGERY      Last Assessed:7/6/2016    LEG SURGERY      due to CP     NOSE SURGERY      Last Assessed:7/6/2016    UT ESOPHAGOGASTRODUODENOSCOPY TRANSORAL DIAGNOSTIC N/A 5/9/2019    Procedure: ESOPHAGOGASTRODUODENOSCOPY (EGD) with biopsy;  Surgeon: Ngozi Morin MD;  Location: [facility]; Service: Gastroenterology    UPPER GASTROINTESTINAL ENDOSCOPY  05/2019         Review of Systems   All other systems reviewed and are negative  Objective:      /80 (BP Location: Left arm, Patient Position: Sitting, Cuff Size: Large)   Pulse 100   Temp 98 °F (36 7 °C) (Tympanic)   Ht 4' 9" (1 448 m)   Wt 88 5 kg (195 lb)   LMP  (Exact Date)   BMI 42 20 kg/m²          Physical Exam   Constitutional: She is oriented to person, place, and time  She appears well-developed and well-nourished  HENT:   Head: Normocephalic and atraumatic  Eyes: Conjunctivae and EOM are normal    Neck: Normal range of motion  Cardiovascular: Normal rate and regular rhythm  Pulmonary/Chest: Effort normal and breath sounds normal    Mild tenderness to palpation over sternum   Abdominal: Soft  Bowel sounds are normal  She exhibits no distension  There is no tenderness  Musculoskeletal: Normal range of motion  Neurological: She is alert and oriented to person, place, and time  Skin: Skin is warm and dry  Psychiatric: She has a normal mood and affect   Her behavior is normal

## 2020-01-22 ENCOUNTER — OFFICE VISIT (OUTPATIENT)
Dept: FAMILY MEDICINE CLINIC | Facility: CLINIC | Age: 40
End: 2020-01-22

## 2020-01-22 ENCOUNTER — TELEPHONE (OUTPATIENT)
Dept: GASTROENTEROLOGY | Facility: CLINIC | Age: 40
End: 2020-01-22

## 2020-01-22 ENCOUNTER — HOSPITAL ENCOUNTER (EMERGENCY)
Facility: HOSPITAL | Age: 40
Discharge: HOME | End: 2020-01-22
Attending: EMERGENCY MEDICINE
Payer: MEDICARE

## 2020-01-22 ENCOUNTER — APPOINTMENT (EMERGENCY)
Dept: NON INVASIVE DIAGNOSTICS | Facility: HOSPITAL | Age: 40
End: 2020-01-22
Payer: MEDICARE

## 2020-01-22 VITALS
DIASTOLIC BLOOD PRESSURE: 67 MMHG | WEIGHT: 193.12 LBS | OXYGEN SATURATION: 97 % | TEMPERATURE: 97.7 F | HEIGHT: 57 IN | BODY MASS INDEX: 41.66 KG/M2 | HEART RATE: 100 BPM | SYSTOLIC BLOOD PRESSURE: 133 MMHG | RESPIRATION RATE: 18 BRPM

## 2020-01-22 VITALS
HEIGHT: 57 IN | HEART RATE: 88 BPM | RESPIRATION RATE: 18 BRPM | TEMPERATURE: 98.3 F | WEIGHT: 193 LBS | DIASTOLIC BLOOD PRESSURE: 80 MMHG | BODY MASS INDEX: 41.64 KG/M2 | SYSTOLIC BLOOD PRESSURE: 130 MMHG

## 2020-01-22 DIAGNOSIS — M79.661 PAIN OF RIGHT CALF: Primary | ICD-10-CM

## 2020-01-22 DIAGNOSIS — M79.661 PAIN OF RIGHT LOWER LEG: Primary | ICD-10-CM

## 2020-01-22 PROCEDURE — 99213 OFFICE O/P EST LOW 20 MIN: CPT | Performed by: NURSE PRACTITIONER

## 2020-01-22 PROCEDURE — 93970 EXTREMITY STUDY: CPT

## 2020-01-22 PROCEDURE — 93970 EXTREMITY STUDY: CPT | Performed by: SURGERY

## 2020-01-22 PROCEDURE — 99283 EMERGENCY DEPT VISIT LOW MDM: CPT

## 2020-01-22 PROCEDURE — 99284 EMERGENCY DEPT VISIT MOD MDM: CPT | Performed by: EMERGENCY MEDICINE

## 2020-01-22 RX ORDER — LAMOTRIGINE 25 MG/1
25 TABLET ORAL ONCE
Status: DISCONTINUED | OUTPATIENT
Start: 2020-01-22 | End: 2020-01-22 | Stop reason: HOSPADM

## 2020-01-22 RX ORDER — ARIPIPRAZOLE 10 MG/1
20 TABLET ORAL DAILY
Status: DISCONTINUED | OUTPATIENT
Start: 2020-01-23 | End: 2020-01-22 | Stop reason: HOSPADM

## 2020-01-22 RX ORDER — TRAZODONE HYDROCHLORIDE 50 MG/1
50 TABLET ORAL ONCE
Status: DISCONTINUED | OUTPATIENT
Start: 2020-01-22 | End: 2020-01-22 | Stop reason: HOSPADM

## 2020-01-22 NOTE — ASSESSMENT & PLAN NOTE
Sent to ED to assess for DVT; her care taker will drive her after leaving the office  Pain and swelling in right calf  +Elizabeth's  No redness or contusions notes  Using walker to ambulate yes

## 2020-01-22 NOTE — TELEPHONE ENCOUNTER
Cammie, patient's care manager called regarding medication hold for patient's manometry procedure  Called Cathy in the Manometry department regarding medication hold for patient's Protonix, tums, & Maalox  She suggested I reach out to Dr Geovani Griffiths as to whether patient should hold those medication and for how long  Called Cammie back and informed her that I will reach out to Dr Geovani Griffiths regarding medications prescribed by our office and whether Carloz Suzan is to hold them or not and for how long  I will call her and Libby Salgado back with an update  Cammie's number is [phone]

## 2020-01-22 NOTE — ED ATTENDING ATTESTATION
1/22/2020  IWendy MD, saw and evaluated the patient  I have discussed the patient with the resident/non-physician practitioner and agree with the resident's/non-physician practitioner's findings, Plan of Care, and MDM as documented in the resident's/non-physician practitioner's note, except where noted  All available labs and Radiology studies were reviewed  I was present for key portions of any procedure(s) performed by the resident/non-physician practitioner and I was immediately available to provide assistance  At this point I agree with the current assessment done in the Emergency Department  I have conducted an independent evaluation of this patient a history and physical is as follows:    ED Course     Patient is a poor historian secondary to underlying intellectual disability  Patient is complaining of pain in her right lower extremity diffusely  On my examination the patient has bilateral lower extremity swelling, and does not appear to be very symmetric  Patient has diffuse tenderness to palpation of the right lower extremity below the knee  Distal pulses and distal sensation are intact  Plan is evaluation for DVT  VAS lower limb venous duplex study, complete bilateral    (Results Pending)     Duplex reported as negative by vascular tech      Critical Care Time  Procedures

## 2020-01-22 NOTE — TELEPHONE ENCOUNTER
I called Cammie and told her antacid medications do not need to be held prior to manometry testing  She expressed understanding and all questions were answered  Katia Garrido - Can you call Cammie to schedule a follow-up visit? Thanks!

## 2020-01-22 NOTE — PROGRESS NOTES
Assessment/Plan:    Pain of right calf  Sent to ED to assess for DVT; her care taker will drive her after leaving the office  Pain and swelling in right calf  +Elizabeth's  No redness or contusions notes  Using walker to ambulate             Problem List Items Addressed This Visit        Other    Pain of right calf - Primary     Sent to ED to assess for DVT; her care taker will drive her after leaving the office  Pain and swelling in right calf  +Elizabeth's  No redness or contusions notes  Using walker to ambulate                     Subjective:      Patient ID: Arely Wright is a 38 y o  female  38year old mentally challenges female presents with  from [facility]  She states that last evening she began with right calf pain and swelling  Using walker to ambulate  Denies trauma  "It really hurts"      The following portions of the patient's history were reviewed and updated as appropriate: allergies, current medications, past family history, past medical history, past social history, past surgical history and problem list     Review of Systems   Constitutional: Negative  Musculoskeletal:        Right calf pain and swelling         Objective:      /80 (BP Location: Left arm, Patient Position: Sitting, Cuff Size: Large)   Pulse 88   Temp 98 3 °F (36 8 °C) (Tympanic)   Resp 18   Ht 4' 9" (1 448 m)   Wt 87 5 kg (193 lb)   BMI 41 76 kg/m²          Physical Exam   Constitutional: She appears well-developed and well-nourished  Musculoskeletal:        Right lower leg: She exhibits tenderness, swelling and edema          Legs:

## 2020-01-23 NOTE — ED NOTES
Group home member requesting pt's night time medications, Dr Tomer Gonzalez made aware       Antoinette Coleman, RN  01/22/20 [id number]

## 2020-01-23 NOTE — ED PROVIDER NOTES
ASSESSMENT AND PLAN    Alyson Lauren is a 38 y o  female with a history of cerebral palsy, cognitive disability, who presents for evaluation of right lower leg pain and swelling, primarily over the anterior aspect of the right shin  On arrival, the patient is hemodynamically stable and well-appearing without acute distress, with a nontoxic appearance  On exam, the patient has diffuse tenderness to the right lower extremity, but without significant skin change  The right lower extremity appears mildly more edematous than the left, however there is bilateral edema which is nonpitting  There are no signs of cellulitis, and no signs of trauma   The physical exam is otherwise unremarkable   -lower extremity duplex negative for DVT  -offered pain medication after this study was completed, however the patient and the caretaker declined, stating that they can't take pain medication went sent home  -unclear etiology of the patient's symptoms  Most likely musculoskeletal, however no external signs of trauma  No external signs of infection  Will discharge home  Strict return precautions provided  Recommended Tylenol, Motrin as needed for pain  Prior to discharge, the patient was given a single dose of her 8 p m  Medications  History  Chief Complaint   Patient presents with    Leg Swelling     pt reports R leg swelling that began last night with assoc pain  pt denies falls/trauma  pt seen at [facility] today and sent over for possible DVT     HPI this is a 29-year-old female with history of cerebral palsy, cognitive disability, who presents for evaluation of right lower leg pain and swelling  The patient has a history of multiple workups for DVT in the past, all of which have been negative  She currently takes no anticoagulation  Her patient as well as her caretaker, she began having right leg pain last night, associated with swelling  The swelling is diffuse    The pain is mostly over the anterior aspect of the leg, as sharp, and nonradiating  She has never had pain like this before  The patient, as well as the caretaker deny any recent trauma  She denies any symptoms of the contralateral lower extremity  The patient denies any chest pain, shortness of breath, pleurisy, and has no recent travel, surgeries, however hospitalizations  She has no fevers or chills  She is ambulatory with assistance at baseline    Prior to Admission Medications   Prescriptions Last Dose Informant Patient Reported? Taking?    ARIPiprazole (ABILIFY) 20 MG tablet  Care Giver No No   Sig: Take 1 tablet (20 mg total) by mouth daily at bedtime for 30 days at 9pm    Cholecalciferol (VITAMIN D-3) 1000 units CAPS  Care Giver No No   Sig: Take 2 capsules (2,000 Units total) by mouth daily at 8am    Mouthwashes (LISTERINE ANTISEPTIC) LIQD  Care Giver No No   Sig: Apply 1 application to the mouth or throat 2 (two) times a day   Multiple Vitamins-Minerals (CERTAVITE SENIOR/ANTIOXIDANT) TABS  Care Giver No No   Sig: Take 1 tablet by mouth daily   RA SUNSCREEN SPF50 LOTN  Care Giver No No   Sig: Apply 15 minutes before sun exposure and every 2 hours   acetaminophen (TYLENOL) 500 mg tablet  Care Giver Yes No   Sig: Take 500 mg by mouth every 6 (six) hours as needed for mild pain   aluminum-magnesium hydroxide-simethicone (ANTACID) 200-200-20 mg/5 mL suspension  Care Giver No No   Sig: Take 15 mL by mouth every 4 (four) hours as needed for indigestion or heartburn   bacitracin topical ointment 500 units/g topical ointment  Care Giver No No   Sig: Apply 1 large application topically 3 (three) times a day for 7 days   bismuth subsalicylate (PEPTO BISMOL) 524 mg/30 mL oral suspension  Care Giver No No   Sig: Take 15 mL (262 mg total) by mouth every 6 (six) hours as needed for indigestion   calcium carbonate (TUMS) 500 mg chewable tablet  Care Giver No No   Sig: Chew 1 tablet (500 mg total) 3 (three) times a day as needed for heartburn   clotrimazole (LOTRIMIN AF) 1 % cream  Care Giver Yes No   Sig: Lotrimin AF   clotrimazole (LOTRIMIN) 1 % cream  Care Giver No No   Sig: Apply 1 g (1 application total) topically 3 (three) times a day as needed (fungal irritation)   dextromethorphan-guaiFENesin (ROBITUSSIN DM)  mg/5 mL syrup  Care Giver No No   Sig: Take 5 mL by mouth 3 (three) times a day as needed for cough   escitalopram (LEXAPRO) 20 mg tablet  Care Giver No No   Sig: Take 1 tablet (20 mg total) by mouth daily for 30 days at 8am    fluticasone (FLONASE) 50 mcg/act nasal spray  Care Giver No No   Si spray into each nostril daily as needed for rhinitis (nasal congestion) At 8:00 AM   ibuprofen (MOTRIN) 400 mg tablet  Care Giver No No   Sig: Take 1 tablet (400 mg total) by mouth every 8 (eight) hours as needed for mild pain   lamoTRIgine (LaMICtal) 25 mg tablet  Care Giver Yes No   lubiprostone (AMITIZA) 24 mcg capsule  Care Giver No No   Sig: Take 1 capsule (24 mcg total) by mouth 2 (two) times a day with meals   magnesium hydroxide (GNP MILK OF MAGNESIA) 400 mg/5 mL oral suspension  Care Giver No No   Sig: Take 30 mL by mouth daily as needed for constipation If no BM in 3 days   mupirocin (BACTROBAN) 2 % ointment   No No   Sig: Apply topically 3 (three) times a day for 5 days   norgestimate-ethinyl estradiol (ESTARYLLA) 0 25-35 MG-MCG per tablet  Care Giver Yes No   Sig: Estarylla 0 25 mg-35 mcg tablet   pantoprazole (PROTONIX) 20 mg tablet  Care Giver No No   Sig: Take 1 tablet 30 minutes before breakfast daily   phenol (CHLORASEPTIC) 1 4 % mucosal liquid  Care Giver No No   Sig: Apply 1 spray to the mouth or throat every 2 (two) hours as needed (for sore throat as needed)   polyethylene glycol (MIRALAX) 17 g packet  Care Giver No No   Sig: Take one packet daily as needed for no bowel movement in 24 hours   psyllium (METAMUCIL) 58 6 % packet  Care Giver No No   Sig: Take 1 packet by mouth daily   senna-docusate sodium (SENEXON-S) 8 6-50 mg per tablet  Care Giver No No   Sig: Take 1 tablet by mouth daily at 8am    sodium chloride (OCEAN) 0 65 % nasal spray  Care Giver No No   Si spray into each nostril as needed (three times daily as needed for nasal congestion)   sodium chloride (OCEAN) 0 65 % nasal spray  Care Giver No No   Si sprays into each nostril as needed for congestion or rhinitis   traZODone (DESYREL) 50 mg tablet  Care Giver No No   Sig: Take 1 tablet (50 mg total) by mouth daily at bedtime at 9pm   zinc oxide (DESITIN) 13 % cream  Care Giver No No   Sig: Apply 1 application topically as needed (for perianal irritation)      Facility-Administered Medications: None       Past Medical History:   Diagnosis Date    Acid reflux     ADD (attention deficit disorder)     Adjustment disorder     Anxiety     Astigmatism     Brain lesion     Brain lesion     Bronchitis     Calcium deficiency     Cellulitis of foot, right 2018    Cerebral palsy (HCC)     Cerebral palsy (HCC)     Last Assessed:2016    Chronic otitis media     Chronically dry eyes, left     Last Assessed:2016    Constipation     Depression     Last Assessed:2016    Depressive disorder     Dry eyes     Dysphagia     Esophagitis     Esotropia     Fall     GERD (gastroesophageal reflux disease)     GERD (gastroesophageal reflux disease)     Hiatal hernia     Hydrocephalus ([facility])     Hyperopia with astigmatism     Impaired fasting glucose     Left nephrolithiasis 3/4/2019    Mood disorder ([facility])     Myopia     Oppositional defiant disorder     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[facility])     Psychiatric disorder     Seizures ([facility])     Sensorineural hearing loss     Status post ventriculoatrial shunt placement     Visual impairment        Past Surgical History:   Procedure Laterality Date    CSF SHUNT      Creation of Ventriculo-Peritoneal CSF shunt ;  Last Assessed:2016    EAR SURGERY      Last Assessed:2016    LEG SURGERY      due to CP     NOSE SURGERY      Last Assessed:7/6/2016    ND ESOPHAGOGASTRODUODENOSCOPY TRANSORAL DIAGNOSTIC N/A 5/9/2019    Procedure: ESOPHAGOGASTRODUODENOSCOPY (EGD) with biopsy;  Surgeon: Davon Carr MD;  Location: [facility]; Service: Gastroenterology    UPPER GASTROINTESTINAL ENDOSCOPY  05/2019       Family History   Problem Relation Age of Onset    Diabetes Mother     No Known Problems Father      I have reviewed and agree with the history as documented  Social History     Tobacco Use    Smoking status: Never Smoker    Smokeless tobacco: Never Used   Substance Use Topics    Alcohol use: No    Drug use: No        Review of Systems   Constitutional: Negative for chills and fever  HENT: Negative for congestion and rhinorrhea  Eyes: Negative for photophobia and visual disturbance  Respiratory: Negative for cough and shortness of breath  Cardiovascular: Positive for leg swelling  Negative for chest pain and palpitations  Gastrointestinal: Negative for abdominal pain, diarrhea, nausea and vomiting  Genitourinary: Negative for dysuria and frequency  Musculoskeletal: Negative for neck pain and neck stiffness  Skin: Negative for pallor and rash  Neurological: Negative for light-headedness and headaches  All other systems reviewed and are negative  Physical Exam  ED Triage Vitals [01/22/20 1601]   Temperature Pulse Respirations Blood Pressure SpO2   97 7 °F (36 5 °C) (!) 107 18 147/89 96 %      Temp Source Heart Rate Source Patient Position - Orthostatic VS BP Location FiO2 (%)   Oral Monitor Sitting Left arm --      Pain Score       Worst Possible Pain             Orthostatic Vital Signs  Vitals:    01/22/20 1601 01/22/20 1720 01/22/20 1911   BP: 147/89 137/74 133/67   Pulse: (!) 107 101 100   Patient Position - Orthostatic VS: Sitting         Physical Exam   Constitutional: She is oriented to person, place, and time  Awake and alert, nontoxic in appearance   HENT:   Head: Normocephalic and atraumatic  Mouth/Throat: Oropharynx is clear and moist  No oropharyngeal exudate  Eyes: Pupils are equal, round, and reactive to light  EOM are normal  Right eye exhibits no discharge  Left eye exhibits no discharge  No scleral icterus  Neck: Normal range of motion  Neck supple  No JVD present  No tracheal deviation present  Cardiovascular: Normal rate, regular rhythm and normal heart sounds  No murmur heard  Pulmonary/Chest: Effort normal  No stridor  No respiratory distress  She has no wheezes  She has no rales  Abdominal: Soft  She exhibits no distension and no mass  There is no tenderness  Musculoskeletal:   Right lower extremity is mildly swollen diffusely, mostly below the knee, without any skin changes, warmth, crepitus, or deformity  Extremities only mildly more swollen the left side, and there is left lower extremity swelling as well  There is diffuse tenderness over the anterior aspect of the shin, without deformity  Neurological: She is alert and oriented to person, place, and time  No cranial nerve deficit  She exhibits normal muscle tone  Skin: Skin is warm and dry  No rash noted  No pallor  ED Medications  Medications - No data to display    Diagnostic Studies  Results Reviewed     None                 VAS lower limb venous duplex study, complete bilateral   Final Result by Gorge Biggs MD (01/22 5684)            Procedures  Procedures      ED Course                               MDM      Disposition  Final diagnoses:   Pain of right lower leg     Time reflects when diagnosis was documented in both MDM as applicable and the Disposition within this note     Time User Action Codes Description Comment    1/22/2020  8:22 PM Albertina Corona [[id number]] Pain of right lower leg       ED Disposition     ED Disposition Condition Date/Time Comment    Discharge Stable Wed Jan 22, 2020  8:22 PM Hussain Arreaga discharge to home/self care              Follow-up Information    None         Discharge Medication List as of 1/22/2020  8:23 PM      CONTINUE these medications which have NOT CHANGED    Details   acetaminophen (TYLENOL) 500 mg tablet Take 500 mg by mouth every 6 (six) hours as needed for mild pain, Historical Med      aluminum-magnesium hydroxide-simethicone (ANTACID) 200-200-20 mg/5 mL suspension Take 15 mL by mouth every 4 (four) hours as needed for indigestion or heartburn, Starting Wed 7/17/2019, Normal      ARIPiprazole (ABILIFY) 20 MG tablet Take 1 tablet (20 mg total) by mouth daily at bedtime for 30 days at 9pm , Starting Tue 9/25/2018, Until Wed 1/22/2020, No Print      bacitracin topical ointment 500 units/g topical ointment Apply 1 large application topically 3 (three) times a day for 7 days, Starting Mon 9/23/2019, Until Wed 1/22/2020, Normal      bismuth subsalicylate (PEPTO BISMOL) 524 mg/30 mL oral suspension Take 15 mL (262 mg total) by mouth every 6 (six) hours as needed for indigestion, Starting Thu 6/6/2019, Normal      calcium carbonate (TUMS) 500 mg chewable tablet Chew 1 tablet (500 mg total) 3 (three) times a day as needed for heartburn, Starting Mon 10/14/2019, Normal      Cholecalciferol (VITAMIN D-3) 1000 units CAPS Take 2 capsules (2,000 Units total) by mouth daily at 8am , Starting Tue 10/1/2019, Normal      !! clotrimazole (LOTRIMIN AF) 1 % cream Lotrimin AF, Historical Med      !! clotrimazole (LOTRIMIN) 1 % cream Apply 1 g (1 application total) topically 3 (three) times a day as needed (fungal irritation), Starting Wed 5/9/2018, Normal      dextromethorphan-guaiFENesin (ROBITUSSIN DM)  mg/5 mL syrup Take 5 mL by mouth 3 (three) times a day as needed for cough, Starting Tue 10/1/2019, Normal      escitalopram (LEXAPRO) 20 mg tablet Take 1 tablet (20 mg total) by mouth daily for 30 days at 8am , Starting Thu 12/6/2018, Until Wed 1/22/2020, No Print      fluticasone (FLONASE) 50 mcg/act nasal spray 1 spray into each nostril daily as needed for rhinitis (nasal congestion) At 8:00 AM, Starting Thu 6/6/2019, Normal      ibuprofen (MOTRIN) 400 mg tablet Take 1 tablet (400 mg total) by mouth every 8 (eight) hours as needed for mild pain, Starting Thu 6/6/2019, Normal      lamoTRIgine (LaMICtal) 25 mg tablet Starting Mon 12/23/2019, Historical Med      lubiprostone (AMITIZA) 24 mcg capsule Take 1 capsule (24 mcg total) by mouth 2 (two) times a day with meals, Starting Tue 10/1/2019, Normal      magnesium hydroxide (GNP MILK OF MAGNESIA) 400 mg/5 mL oral suspension Take 30 mL by mouth daily as needed for constipation If no BM in 3 days, Starting Thu 6/6/2019, Normal      Mouthwashes (LISTERINE ANTISEPTIC) LIQD Apply 1 application to the mouth or throat 2 (two) times a day, Starting Tue 10/1/2019, Until Wed 1/22/2020, Normal      Multiple Vitamins-Minerals (CERTAVITE SENIOR/ANTIOXIDANT) TABS Take 1 tablet by mouth daily, Starting Tue 10/1/2019, Normal      mupirocin (BACTROBAN) 2 % ointment Apply topically 3 (three) times a day for 5 days, Starting Mon 11/18/2019, Until Wed 1/22/2020, Print      norgestimate-ethinyl estradiol (ESTARYLLA) 0 25-35 MG-MCG per tablet Estarylla 0 25 mg-35 mcg tablet, Historical Med      pantoprazole (PROTONIX) 20 mg tablet Take 1 tablet 30 minutes before breakfast daily, Normal      phenol (CHLORASEPTIC) 1 4 % mucosal liquid Apply 1 spray to the mouth or throat every 2 (two) hours as needed (for sore throat as needed), Starting Wed 11/6/2019, Print      polyethylene glycol (MIRALAX) 17 g packet Take one packet daily as needed for no bowel movement in 24 hours, Normal      psyllium (METAMUCIL) 58 6 % packet Take 1 packet by mouth daily, Starting Tue 1/7/2020, Normal      RA SUNSCREEN SPF50 LOTN Apply 15 minutes before sun exposure and every 2 hours, Normal      senna-docusate sodium (SENEXON-S) 8 6-50 mg per tablet Take 1 tablet by mouth daily at 8am , Starting Thu 1/2/2020, Normal      !! sodium chloride (OCEAN) 0 65 % nasal spray 1 spray into each nostril as needed (three times daily as needed for nasal congestion), Starting Wed 3/20/2019, Normal      !! sodium chloride (OCEAN) 0 65 % nasal spray 2 sprays into each nostril as needed for congestion or rhinitis, Starting Wed 11/6/2019, Print      traZODone (DESYREL) 50 mg tablet Take 1 tablet (50 mg total) by mouth daily at bedtime at 9pm, Starting Tue 9/25/2018, No Print      zinc oxide (DESITIN) 13 % cream Apply 1 application topically as needed (for perianal irritation), Starting Thu 6/6/2019, Normal       !! - Potential duplicate medications found  Please discuss with provider  No discharge procedures on file  ED Provider  Attending physically available and evaluated Jeniffer Damondennise YADAV managed the patient along with the ED Attending      Electronically Signed by         Alice Hope MD  01/23/20 [id number]

## 2020-01-24 ENCOUNTER — OFFICE VISIT (OUTPATIENT)
Dept: CARDIOLOGY CLINIC | Facility: CLINIC | Age: 40
End: 2020-01-24
Payer: MEDICARE

## 2020-01-24 ENCOUNTER — OFFICE VISIT (OUTPATIENT)
Dept: FAMILY MEDICINE CLINIC | Facility: CLINIC | Age: 40
End: 2020-01-24

## 2020-01-24 VITALS
WEIGHT: 195 LBS | DIASTOLIC BLOOD PRESSURE: 74 MMHG | OXYGEN SATURATION: 97 % | BODY MASS INDEX: 42.07 KG/M2 | HEART RATE: 95 BPM | HEIGHT: 57 IN | SYSTOLIC BLOOD PRESSURE: 130 MMHG

## 2020-01-24 VITALS
HEIGHT: 57 IN | TEMPERATURE: 98.4 F | RESPIRATION RATE: 18 BRPM | SYSTOLIC BLOOD PRESSURE: 114 MMHG | DIASTOLIC BLOOD PRESSURE: 70 MMHG | HEART RATE: 84 BPM | BODY MASS INDEX: 42.2 KG/M2 | WEIGHT: 195.6 LBS

## 2020-01-24 DIAGNOSIS — R07.9 CHEST PAIN, UNSPECIFIED TYPE: ICD-10-CM

## 2020-01-24 DIAGNOSIS — R03.0 ELEVATED BLOOD PRESSURE READING IN OFFICE WITHOUT DIAGNOSIS OF HYPERTENSION: ICD-10-CM

## 2020-01-24 DIAGNOSIS — M79.89 LEG SWELLING: Primary | ICD-10-CM

## 2020-01-24 DIAGNOSIS — R06.02 SOB (SHORTNESS OF BREATH): Primary | ICD-10-CM

## 2020-01-24 DIAGNOSIS — R07.89 ATYPICAL CHEST PAIN: ICD-10-CM

## 2020-01-24 PROCEDURE — 99214 OFFICE O/P EST MOD 30 MIN: CPT | Performed by: INTERNAL MEDICINE

## 2020-01-24 PROCEDURE — 99213 OFFICE O/P EST LOW 20 MIN: CPT | Performed by: FAMILY MEDICINE

## 2020-01-24 NOTE — ASSESSMENT & PLAN NOTE
Pleasant 41-year-old female with multiple comorbidities having atypical chest pain  Description of symptom does not suggest angina  She has a known history of paraesophageal hiatal hernia  She did find relief with use of Pepto-Bismol and PPI  Does have nonspecific ECG abnormalities  Physical examination is significant for obesity, features related to her diagnosis of cerebral palsy, mild cognitive impairment and eye anamolies  Cardiac exam shows no signs and symptoms suggestive of heart failure and or significant valvular heart disease     - will request transthoracic echocardiogram to assess cardiac structure and function  If these are normal would hold off on doing a stress test   - meanwhile patient will continue to follow-up with her gastroenterologist specialists and undergo the planned tests of esophageal manometry and management as per their direction  - will arrange a 3 month follow-up to reassess symptoms  - she is advised to continue all activities as much as she can tolerate  - it is noted at times that her blood pressure is elevated  It does not look like she has systemic hypertension but periodic blood pressure measured monitoring is recommended

## 2020-01-24 NOTE — PATIENT INSTRUCTIONS
CARDIOLOGY ASSESSMENT & PLAN:  1  SOB (shortness of breath)  Echo complete with contrast if indicated   2  Chest pain, unspecified type  Ambulatory referral to Cardiology    Echo complete with contrast if indicated   3  Atypical chest pain     4  Elevated blood pressure reading in office without diagnosis of hypertension         Atypical chest pain  Pleasant 27-year-old female with multiple comorbidities having atypical chest pain  Description of symptom does not suggest angina  She has a known history of paraesophageal hiatal hernia  She did find relief with use of Pepto-Bismol and PPI  Does have nonspecific ECG abnormalities  Physical examination is significant for obesity, features related to her diagnosis of cerebral palsy, mild cognitive impairment and eye anamolies  Cardiac exam shows no signs and symptoms suggestive of heart failure and or significant valvular heart disease     - will request transthoracic echocardiogram to assess cardiac structure and function  If these are normal would hold off on doing a stress test   - meanwhile patient will continue to follow-up with her gastroenterologist specialists and undergo the planned tests of esophageal manometry and management as per their direction  - will arrange a 3 month follow-up to reassess symptoms  - she is advised to continue all activities as much as she can tolerate  - it is noted at times that her blood pressure is elevated  It does not look like she has systemic hypertension but periodic blood pressure measured monitoring is recommended

## 2020-01-24 NOTE — PROGRESS NOTES
[facility]  [facility] [address] [address]  Phone#  [phone]  Fax#  [phone]  *-*-*-*-*-*-*-*-*-*-*-*-*-*-*-*-*-*-*-*-*-*-*-*-*-*-*-*-*-*-*-*-*-*-*-*-*-*-*-*-*-*-*-*-*-*-*-*-*-*-*-*-*-*  Lianna Mcdonald DATE: 01/24/20 3:40 PM  PATIENT NAME: Arely Wright   1980    [id number]  Age: 38 y o  Sex: female  AUTHOR: Janett Nieto MD  [facility] PHYSICIAN: Natalia Joseph MD  REFERRING PHYSICIAN: MD [facility]  Myra Ceja [address], *   *-*-*-*-*-*-*-*-*-*-*-*-*-*-*-*-*-*-*-*-*-*-*-*-*-*-*-*-*-*-*-*-*-*-*-*-*-*-*-*-*-*-*-*-*-*-*-*-*-*-*-*-*-*-  REASON FOR REFERRAL:  Evaluation of chest pain    *-*-*-*-*-*-*-*-*-*-*-*-*-*-*-*-*-*-*-*-*-*-*-*-*-*-*-*-*-*-*-*-*-*-*-*-*-*-*-*-*-*-*-*-*-*-*-*-*-*-*-*-*-*-  CARDIOLOGY ASSESSMENT & PLAN:  1  SOB (shortness of breath)  Echo complete with contrast if indicated   2  Chest pain, unspecified type  Ambulatory referral to Cardiology    Echo complete with contrast if indicated   3  Atypical chest pain     4  Elevated blood pressure reading in office without diagnosis of hypertension         Atypical chest pain  Pleasant 39-year-old female with multiple comorbidities having atypical chest pain  Description of symptom does not suggest angina  She has a known history of paraesophageal hiatal hernia  She did find relief with use of Pepto-Bismol and PPI  Does have nonspecific ECG abnormalities  Physical examination is significant for obesity, features related to her diagnosis of cerebral palsy, mild cognitive impairment and eye anamolies  Cardiac exam shows no signs and symptoms suggestive of heart failure and or significant valvular heart disease     - will request transthoracic echocardiogram to assess cardiac structure and function    If these are normal would hold off on doing a stress test   - meanwhile patient will continue to follow-up with her gastroenterologist specialists and undergo the planned tests of esophageal manometry and management as per their direction  - will arrange a 3 month follow-up to reassess symptoms  - she is advised to continue all activities as much as she can tolerate  - it is noted at times that her blood pressure is elevated  It does not look like she has systemic hypertension but periodic blood pressure measured monitoring is recommended  *-*-*-*-*-*-*-*-*-*-*-*-*-*-*-*-*-*-*-*-*-*-*-*-*-*-*-*-*-*-*-*-*-*-*-*-*-*-*-*-*-*-*-*-*-*-*-*-*-*-*-*-*-*-  CURRENT ECG:  No results found for this visit on 01/24/20  Reviewed multiple ECGs in the system  Most recent ECG was from December 17, 2019  All ECGs suggest sinus rhythm with possible left ventricular hypertrophy via voltage criteria, possible prior inferior infarction  *-*-*-*-*-*-*-*-*-*-*-*-*-*-*-*-*-*-*-*-*-*-*-*-*-*-*-*-*-*-*-*-*-*-*-*-*-*-*-*-*-*-*-*-*-*-*-*-*-*-*-*-*-*-  HISTORY OF PRESENT ILLNESS:  Patient is a 38 year  female with medical history significant for:  1  History cerebral palsy with mild cognitive impairment and ambulatory dysfunction  2  Sensorineural hearing loss  3  History of hydrocephalus status post ventriculoatrial shunt placement   4  Hiatal hernia  5  Myopia and Hyperopia with astigmatism  6  History of seizures  7  History of pituitary macroadenoma  8  GERD  9  Mood disorder and oppositional defiant disorder    She is a resident of [facility] and has been referred for evaluation of chest pain  She is accompanied with support staff from her [facility]  History is obtained through speaking to the patient and from staff from the [facility]  Patient states that she has been having on an of chest pain since last 1-1 and half months  She describes the pain as burning and sharp pain in the substernal region  Pain occurs randomly but more so after she eats spicy food    Pain is not accompanied with shortness of breath or palpitation or nausea or vomiting  She has had recent emergency room visits for same problem  Based on the review of record she ruled out for acute coronary syndrome by cardiac enzymes  Her ECG had nonspecific findings  Blood pressure was elevated at times  Symptoms seem to improve after she was given Pepto-Bismol  She has been evaluated by gastroenterologist and has undergone endoscopy most recently in May 2019 which was significant for small sliding hiatal hernia  Most recently she was seen by gastroenterologist 3 days back and they have recommended esophageal manometry to evaluate for mostly disorder and also cardiac consultation  Patient states that her last chest pain episode was on December 17  At that time she was evaluated with the ER and up chest CT for PE was significant for moderate paraesophageal hernia      *-*-*-*-*-*-*-*-*-*-*-*-*-*-*-*-*-*-*-*-*-*-*-*-*-*-*-*-*-*-*-*-*-*-*-*-*-*-*-*-*-*-*-*-*-*-*-*-*-*-*-*-*-*  PAST MEDICAL HISTORY:   Past Medical History:   Diagnosis Date    Acid reflux     ADD (attention deficit disorder)     Adjustment disorder     Anxiety     Astigmatism     Brain lesion     Brain lesion     Bronchitis     Calcium deficiency     Cellulitis of foot, right 6/4/2018    C[facility]bral palsy ([facility])     Cerebral palsy ([facility])     Last Assessed:7/6/2016    Chronic otitis media     Chronically dry eyes, left     Last Assessed:7/6/2016    Constipation     Depression     Last Assessed:7/6/2016    Depressive disorder     Dry eyes     Dysphagia     Esophagitis     Esotropia     Fall     GERD (gastroesophageal reflux disease)     GERD (gastroesophageal reflux disease)     Hiatal hernia     Hydrocephalus ([facility])     Hyperopia with astigmatism     Impaired fasting glucose     Left nephrolithiasis 3/4/2019    Mood disorder ([address]     Myopia     Oppositional defiant disorder     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[facility])     Psychiatric disorder     Seizures ([facility])     Sensorineural hearing loss     Status post ventriculoatrial shunt placement     Visual impairment        PAST SURGICAL HISTORY:   Past Surgical History:   Procedure Laterality Date    CSF SHUNT      Creation of Ventriculo-Peritoneal CSF shunt ; Last Assessed:7/6/2016    EAR SURGERY      Last Assessed:7/6/2016    LEG SURGERY      due to CP     NOSE SURGERY      Last Assessed:7/6/2016    MO ESOPHAGOGASTRODUODENOSCOPY TRANSORAL DIAGNOSTIC N/A 5/9/2019    Procedure: ESOPHAGOGASTRODUODENOSCOPY (EGD) with biopsy;  Surgeon: Gabbie Caruso MD;  Location: [facility];   Service: Gastroenterology    UPPER GASTROINTESTINAL ENDOSCOPY  05/2019       FAMILY HISTORY:  Family History   Problem Relation Age of Onset    Diabetes Mother     No Known Problems Father      She says that her dad had heart problem and he is in hospital     SOCIAL HISTORY:  [unfilled]  Social History     Tobacco Use   Smoking Status Never Smoker   Smokeless Tobacco Never Used     Social History     Substance and Sexual Activity   Alcohol Use No     Social History     Substance and Sexual Activity   Drug Use No     [id number]    *-*-*-*-*-*-*-*-*-*-*-*-*-*-*-*-*-*-*-*-*-*-*-*-*-*-*-*-*-*-*-*-*-*-*-*-*-*-*-*-*-*-*-*-*-*-*-*-*-*-*-*-*-*  ALLERGIES:  No Known Allergies  *-*-*-*-*-*-*-*-*-*-*-*-*-*-*-*-*-*-*-*-*-*-*-*-*-*-*-*-*-*-*-*-*-*-*-*-*-*-*-*-*-*-*-*-*-*-*-*-*-*-*-*-*-*  CURRENT OUTPATIENT MEDICATIONS:     Current Outpatient Medications:     ARIPiprazole (ABILIFY) 20 MG tablet, Take 1 tablet (20 mg total) by mouth daily at bedtime for 30 days at 9pm , Disp: 30 tablet, Rfl: 0    bismuth subsalicylate (PEPTO BISMOL) 524 mg/30 mL oral suspension, Take 15 mL (262 mg total) by mouth every 6 (six) hours as needed for indigestion, Disp: 360 mL, Rfl: 3    calcium carbonate (TUMS) 500 mg chewable tablet, Chew 1 tablet (500 mg total) 3 (three) times a day as needed for heartburn, Disp: 30 tablet, Rfl: 1    Cholecalciferol (VITAMIN D-3) 1000 units CAPS, Take 2 capsules (2,000 Units total) by mouth daily at 8am , Disp: 180 capsule, Rfl: 1    clotrimazole (LOTRIMIN AF) 1 % cream, Lotrimin AF, Disp: , Rfl:     dextromethorphan-guaiFENesin (ROBITUSSIN DM)  mg/5 mL syrup, Take 5 mL by mouth 3 (three) times a day as needed for cough, Disp: 118 mL, Rfl: 1    escitalopram (LEXAPRO) 20 mg tablet, Take 1 tablet (20 mg total) by mouth daily for 30 days at 8am , Disp: 90 tablet, Rfl: 0    fluticasone (FLONASE) 50 mcg/act nasal spray, 1 spray into each nostril daily as needed for rhinitis (nasal congestion) At 8:00 AM, Disp: 1 Bottle, Rfl: 5    ibuprofen (MOTRIN) 400 mg tablet, Take 1 tablet (400 mg total) by mouth every 8 (eight) hours as needed for mild pain, Disp: 30 tablet, Rfl: 1    lamoTRIgine (LaMICtal) 25 mg tablet, , Disp: , Rfl:     lubiprostone (AMITIZA) 24 mcg capsule, Take 1 capsule (24 mcg total) by mouth 2 (two) times a day with meals, Disp: 60 capsule, Rfl: 5    magnesium hydroxide (GNP MILK OF MAGNESIA) 400 mg/5 mL oral suspension, Take 30 mL by mouth daily as needed for constipation If no BM in 3 days, Disp: 355 mL, Rfl: 5    Mouthwashes (LISTERINE ANTISEPTIC) LIQD, Apply 1 application to the mouth or throat 2 (two) times a day, Disp: 500 mL, Rfl: 3    Multiple Vitamins-Minerals (CERTAVITE SENIOR/ANTIOXIDANT) TABS, Take 1 tablet by mouth daily, Disp: 90 tablet, Rfl: 2    norgestimate-ethinyl estradiol (ESTARYLLA) 0 25-35 MG-MCG per tablet, Estarylla 0 25 mg-35 mcg tablet, Disp: , Rfl:     pantoprazole (PROTONIX) 20 mg tablet, Take 1 tablet 30 minutes before breakfast daily, Disp: 31 tablet, Rfl: 5    phenol (CHLORASEPTIC) 1 4 % mucosal liquid, Apply 1 spray to the mouth or throat every 2 (two) hours as needed (for sore throat as needed), Disp: 236 mL, Rfl: 1    RA SUNSCREEN SPF50 LOTN, Apply 15 minutes before sun exposure and every 2 hours, Disp: 1 Bottle, Rfl: 5    senna-docusate sodium (SENEXON-S) 8 6-50 mg per tablet, Take 1 tablet by mouth daily at 8am , Disp: 90 tablet, Rfl: 3    sodium chloride (OCEAN) 0 65 % nasal spray, 1 spray into each nostril as needed (three times daily as needed for nasal congestion), Disp: 60 mL, Rfl: 1    traZODone (DESYREL) 50 mg tablet, Take 1 tablet (50 mg total) by mouth daily at bedtime at 9pm, Disp: , Rfl: 0    zinc oxide (DESITIN) 13 % cream, Apply 1 application topically as needed (for perianal irritation), Disp: 1 Tube, Rfl: 5    acetaminophen (TYLENOL) 500 mg tablet, Take 500 mg by mouth every 6 (six) hours as needed for mild pain, Disp: , Rfl:     aluminum-magnesium hydroxide-simethicone (ANTACID) 200-200-20 mg/5 mL suspension, Take 15 mL by mouth every 4 (four) hours as needed for indigestion or heartburn (Patient not taking: Reported on 1/24/2020), Disp: 355 mL, Rfl: 5    bacitracin topical ointment 500 units/g topical ointment, Apply 1 large application topically 3 (three) times a day for 7 days (Patient not taking: Reported on 1/24/2020), Disp: 15 g, Rfl: 0    mupirocin (BACTROBAN) 2 % ointment, Apply topically 3 (three) times a day for 5 days (Patient not taking: Reported on 1/24/2020), Disp: 22 g, Rfl: 0    polyethylene glycol (MIRALAX) 17 g packet, Take one packet daily as needed for no bowel movement in 24 hours (Patient not taking: Reported on 1/24/2020), Disp: 14 each, Rfl: 3    psyllium (METAMUCIL) 58 6 % packet, Take 1 packet by mouth daily (Patient not taking: Reported on 1/24/2020), Disp: 30 packet, Rfl: 5    *-*-*-*-*-*-*-*-*-*-*-*-*-*-*-*-*-*-*-*-*-*-*-*-*-*-*-*-*-*-*-*-*-*-*-*-*-*-*-*-*-*-*-*-*-*-*-*-*-*-*-*-*-*  REVIEW OF SYMPTOMS:    Positive for:  Episodic chest pain  Negative for: All remaining as reviewed below and in HPI  SYSTEM SYMPTOMS REVIEWED:  General--weight change, fever, night sweats  Respirato      Cardiovascular--chest pain, syncope, dyspnea on exertion, edema, decline in exercise tolerance, claudication   Gastrointestinal--persistent vomiting, diarrhea, abdominal distention, blood in stool Muscular or skeletal--joint pain or swelling   Neurologic--headaches, syncope, abnormal movement  Hematologic--history of easy bruising and bleeding   Endocrine--thyroid enlargement, heat or cold intolerance, polyuria   Psychiatric--anxiety, depression     *-*-*-*-*-*-*-*-*-*-*-*-*-*-*-*-*-*-*-*-*-*-*-*-*-*-*-*-*-*-*-*-*-*-*-*-*-*-*-*-*-*-*-*-*-*-*-*-*-*-*-*-*-*-  VITAL SIGNS:  Vitals:    01/24/20 1457   BP: 130/74   BP Location: Left arm   Patient Position: Sitting   Cuff Size: Adult   Pulse: 95   SpO2: 97%   Weight: 88 5 kg (195 lb)   Height: 4' 9" (1 448 m)     Weight (last 2 days)     Date/Time   Weight    01/24/20 1457   88 5 (195)           ,   Wt Readings from Last 3 Encounters:   01/24/20 88 5 kg (195 lb)   01/24/20 88 7 kg (195 lb 9 6 oz)   01/22/20 87 6 kg (193 lb 2 oz)    , Body mass index is 42 2 kg/m²  *-*-*-*-*-*-*-*-*-*-*-*-*-*-*-*-*-*-*-*-*-*-*-*-*-*-*-*-*-*-*-*-*-*-*-*-*-*-*-*-*-*-*-*-*-*-*-*-*-*-*-*-*-*-  PHYSICAL EXAM:  General Appearance:    Alert, cooperative, no distress, appears stated age, obese, poor hygeine   Head, Eyes, ENT:     evidence of astigmatism and features of cognitive impairment   Neck:   Supple, no carotid bruit or JVD   Back:     Symmetric, no curvature  Lungs:     Respirations unlabored  Clear to auscultation bilaterally,    Chest wall:    No tenderness or deformity   Heart:    Regular rate and rhythm, S1 and S2 normal, no murmur, rub  or gallop  Abdomen:     Soft, non-tender, No obvious masses, or organomegaly   Extremities:   Extremities normal, no cyanosis or edema    Skin:   Skin color, texture, turgor normal, no rashes or lesions     *-*-*-*-*-*-*-*-*-*-*-*-*-*-*-*-*-*-*-*-*-*-*-*-*-*-*-*-*-*-*-*-*-*-*-*-*-*-*-*-*-*-*-*-*-*-*-*-*-*-*-*-*-*-  LABORATORY DATA:  I have personally reviewed pertinent labs      Potassium   Date Value Ref Range Status   12/17/2019 3 6 3 5 - 5 3 mmol/L Final   12/14/2019 3 8 3 5 - 5 3 mmol/L Final   12/09/2019 4 4 3 5 - 5 3 mmol/L Final Comment:     Slightly Hemolyzed; Results May be Affected     Chloride   Date Value Ref Range Status   12/17/2019 110 (H) 100 - 108 mmol/L Final   12/14/2019 111 (H) 100 - 108 mmol/L Final   12/09/2019 109 (H) 100 - 108 mmol/L Final     CO2   Date Value Ref Range Status   12/17/2019 25 21 - 32 mmol/L Final   12/14/2019 25 21 - 32 mmol/L Final   12/09/2019 24 21 - 32 mmol/L Final     BUN   Date Value Ref Range Status   12/17/2019 9 5 - 25 mg/dL Final   12/14/2019 11 5 - 25 mg/dL Final   12/09/2019 11 5 - 25 mg/dL Final     Creatinine   Date Value Ref Range Status   12/17/2019 0 76 0 60 - 1 30 mg/dL Final     Comment:     Standardized to IDMS reference method   12/14/2019 0 78 0 60 - 1 30 mg/dL Final     Comment:     Standardized to IDMS reference method   12/09/2019 0 80 0 60 - 1 30 mg/dL Final     Comment:     Standardized to IDMS reference method     eGFR   Date Value Ref Range Status   12/17/2019 98 ml/min/1 73sq m Final   12/14/2019 95 ml/min/1 73sq m Final   12/09/2019 93 ml/min/1 73sq m Final     Calcium   Date Value Ref Range Status   12/17/2019 8 5 8 3 - 10 1 mg/dL Final   12/14/2019 8 6 8 3 - 10 1 mg/dL Final   12/09/2019 8 0 (L) 8 3 - 10 1 mg/dL Final     AST   Date Value Ref Range Status   12/14/2019 10 5 - 45 U/L Final     Comment:       Specimen collection should occur prior to Sulfasalazine administration due to the potential for falsely depressed results  12/09/2019 47 (H) 5 - 45 U/L Final     Comment:     Moderately Hemolyzed; Results May be Affected  Specimen collection should occur prior to Sulfasalazine administration due to the potential for falsely depressed results  02/21/2019 15 5 - 45 U/L Final     Comment:       Specimen collection should occur prior to Sulfasalazine administration due to the potential for falsely depressed results        ALT   Date Value Ref Range Status   12/14/2019 19 12 - 78 U/L Final     Comment:       Specimen collection should occur prior to Sulfasalazine and/or Sulfapyridine administration due to the potential for falsely depressed results  12/09/2019 24 12 - 78 U/L Final     Comment:       Specimen collection should occur prior to Sulfasalazine and/or Sulfapyridine administration due to the potential for falsely depressed results  02/21/2019 20 12 - 78 U/L Final     Comment:       Specimen collection should occur prior to Sulfasalazine and/or Sulfapyridine administration due to the potential for falsely depressed results  Alkaline Phosphatase   Date Value Ref Range Status   12/14/2019 158 (H) 46 - 116 U/L Final   12/09/2019 150 (H) 46 - 116 U/L Final   02/21/2019 144 (H) 46 - 116 U/L Final     Magnesium   Date Value Ref Range Status   07/11/2018 2 2 1 6 - 2 6 mg/dL Final     WBC   Date Value Ref Range Status   12/17/2019 8 59 4 31 - 10 16 Thousand/uL Final   12/14/2019 9 27 4 31 - 10 16 Thousand/uL Final   05/15/2019 8 11 4 31 - 10 16 Thousand/uL Final     Hemoglobin   Date Value Ref Range Status   12/17/2019 13 6 11 5 - 15 4 g/dL Final   12/14/2019 12 5 11 5 - 15 4 g/dL Final   05/15/2019 12 5 11 5 - 15 4 g/dL Final     Platelets   Date Value Ref Range Status   12/17/2019 306 149 - 390 Thousands/uL Final   12/14/2019 273 149 - 390 Thousands/uL Final   05/15/2019 270 149 - 390 Thousands/uL Final     No results found for: PT, PTT, INR  No results found for: CKMB, BNP, DIGOXIN  No results found for: TSH  HDL, Direct   Date Value Ref Range Status   01/03/2020 68 >=40 mg/dL Final     Comment:       HDL Cholesterol:       Low     <41 mg/dL  Specimen collection should occur prior to Metamizole administration due to the potential for falsley depressed results       Triglycerides   Date Value Ref Range Status   01/03/2020 151 (H) <=150 mg/dL Final     Comment:       Triglyceride:     Normal          <150 mg/dl     Borderline High 150-199 mg/dl     High            200-499 mg/dl        Very High       >499 mg/dl    Specimen collection should occur prior to N-Acetylcysteine or Metamizole administration due to the potential for falsely depressed results  Hemoglobin A1C   Date Value Ref Range Status   01/03/2020 5 2 4 2 - 6 3 % Final     Urine Culture   Date Value Ref Range Status   12/12/2018 80,000-89,000 cfu/ml   Final     Comment:     Mixed Contaminants X5   03/27/2018 20,000-29,000 cfu/ml   Final     Comment:     Mixed Contaminants X3   02/08/2017 50,000-59,000 cfu/ml Mixed Contaminants X4  Final       *-*-*-*-*-*-*-*-*-*-*-*-*-*-*-*-*-*-*-*-*-*-*-*-*-*-*-*-*-*-*-*-*-*-*-*-*-*-*-*-*-*-*-*-*-*-*-*-*-*-*-*-*-*-  RADIOLOGY RESULTS:  No results found  *-*-*-*-*-*-*-*-*-*-*-*-*-*-*-*-*-*-*-*-*-*-*-*-*-*-*-*-*-*-*-*-*-*-*-*-*-*-*-*-*-*-*-*-*-*-*-*-*-*-*-*-*-*-  ECHOCARDIOGRAM AND OTHER CARDIOLOGY RESULTS:  No results found for this or any previous visit  No results found for this or any previous visit  No results found for this or any previous visit  No results found for this or any previous visit       *-*-*-*-*-*-*-*-*-*-*-*-*-*-*-*-*-*-*-*-*-*-*-*-*-*-*-*-*-*-*-*-*-*-*-*-*-*-*-*-*-*-*-*-*-*-*-*-*-*-*-*-*-*-  SIGNATURES:   @[facility]@   Markus Flynn MD     CC:   Natalia Joseph MD   [facility], *

## 2020-01-24 NOTE — ASSESSMENT & PLAN NOTE
Improving  · Likely venous insufficiency  · Seen by ED: negative LE doppler for DVT  · Legs appear symmetric with nonpitting edema  · Tender only to palpation, not at rest  · No pain with ambulating  · Swelling improves with elevation  · Start using compression stockings b/l daily with removal before bed    · Follow up PRN

## 2020-01-24 NOTE — PROGRESS NOTES
Assessment/Plan:    Leg swelling  Improving  · Likely venous insufficiency  · Seen by ED: negative LE doppler for DVT  · Legs appear symmetric with nonpitting edema  · Tender only to palpation, not at rest  · No pain with ambulating  · Swelling improves with elevation  · Start using compression stockings b/l daily with removal before bed  · Follow up PRN         Problem List Items Addressed This Visit        Other    Leg swelling - Primary     Improving  · Likely venous insufficiency  · Seen by ED: negative LE doppler for DVT  · Legs appear symmetric with nonpitting edema  · Tender only to palpation, not at rest  · No pain with ambulating  · Swelling improves with elevation  · Start using compression stockings b/l daily with removal before bed  · Follow up PRN         Relevant Orders    Compression Stocking            Subjective:      Patient ID: Dheeraj Subramanian is a 38 y o  female  HPI   [de-identified] old female with history of moderate intellectual disability presents for ER follow-up  Patient was seen 2 days ago by PCP for leg swelling was sent to ED to rule out DVT  Patient initially had right greater than left swelling positive Elizabeth sign of  Lower extremity Dopplers were negative in the ED  Patient was sent with precautions  Patient was elevating her legs prior to bed which improved swelling  Swelling resolved in the morning  Patient denies history of heart failure  Denies chest pain, shortness of breath, pain with ambulation history of DVT/PE  Denies pain, paresthesias, paralysis  Only has pain with deep palpation of lower extremities  Denies pain at rest     The following portions of the patient's history were reviewed and updated as appropriate: allergies, current medications, past family history, past medical history, past social history, past surgical history and problem list     Review of Systems   Constitutional: Negative for chills, diaphoresis, fever and unexpected weight change     HENT: Negative for ear discharge, ear pain, facial swelling, sinus pressure, sinus pain, sneezing and sore throat  Eyes: Negative for photophobia and visual disturbance  Respiratory: Negative for apnea, cough, choking, chest tightness, shortness of breath, wheezing and stridor  Cardiovascular: Negative for chest pain, palpitations and leg swelling  Gastrointestinal: Negative for abdominal distention, abdominal pain, blood in stool, constipation, diarrhea, nausea and vomiting  Endocrine: Negative for polyphagia and polyuria  Genitourinary: Negative for decreased urine volume, difficulty urinating, frequency and urgency  Musculoskeletal: Negative for arthralgias, back pain, gait problem, joint swelling, myalgias, neck pain and neck stiffness  Skin: Negative for color change, pallor, rash and wound  Neurological: Negative for seizures and syncope  Objective:      /70 (BP Location: Left arm, Patient Position: Sitting, Cuff Size: Large)   Pulse 84   Temp 98 4 °F (36 9 °C) (Tympanic)   Resp 18   Ht 4' 9" (1 448 m)   Wt 88 7 kg (195 lb 9 6 oz)   BMI 42 33 kg/m²          Physical Exam   Constitutional: She is oriented to person, place, and time  She appears well-developed and well-nourished  No distress  HENT:   Head: Normocephalic and atraumatic  Right Ear: External ear normal    Left Ear: External ear normal    Nose: Nose normal    Mouth/Throat: Oropharynx is clear and moist  No oropharyngeal exudate  Eyes: Pupils are equal, round, and reactive to light  Conjunctivae are normal  Right eye exhibits no discharge  Left eye exhibits no discharge  No scleral icterus  Neck: Normal range of motion  Neck supple  No JVD present  No tracheal deviation present  No thyromegaly present  Cardiovascular: Normal rate, regular rhythm, normal heart sounds and intact distal pulses  Exam reveals no gallop and no friction rub  No murmur heard    Pulmonary/Chest: Effort normal and breath sounds normal  No respiratory distress  She has no wheezes  She has no rales  She exhibits no tenderness  Abdominal: Soft  Bowel sounds are normal  She exhibits no distension  There is no tenderness  There is no rebound and no guarding  Musculoskeletal: Normal range of motion  She exhibits edema (Non-pitting Bilateral lower extremity) and tenderness  She exhibits no deformity  Neurological: She is alert and oriented to person, place, and time  No cranial nerve deficit  Coordination normal    Skin: Skin is warm and dry  Capillary refill takes less than 2 seconds  No rash noted  She is not diaphoretic  No cyanosis or erythema  Nails show no clubbing  Vitals reviewed

## 2020-01-24 NOTE — LETTER
January 24, 2020     June Rojsa, [address] [facility] [address] [place] [address]    Patient: Pan Ralph   YOB: 1980   Date of Visit: 1/24/2020       Dear Dr Farrah Jauregui: Thank you for referring Diya Rangel to me for evaluation  Below are my notes for this consultation  If you have questions, please do not hesitate to call me  I look forward to following your patient along with you  Sincerely,        Janett Nieto MD        CC: Annie Nieto MD  1/24/2020  3:40 PM  [facility]   [facility]  [address] [place] [address], [place] [address]  Phone#  [phone]  Fax#  [phone]  *-*-*-*-*-*-*-*-*-*-*-*-*-*-*-*-*-*-*-*-*-*-*-*-*-*-*-*-*-*-*-*-*-*-*-*-*-*-*-*-*-*-*-*-*-*-*-*-*-*-*-*-*-*  Jaye Maryland DATE: 01/24/20 3:40 PM  PATIENT NAME: Pan Ralph   1980    [id number]  Age: 38 y o  Sex: female  AUTHOR: Janett Nieto MD  [facility] PHYSICIAN: June Rojas MD  REFERRING PHYSICIAN: June Rojas MD  [place] [address] Eloise Trae, *   *-*-*-*-*-*-*-*-*-*-*-*-*-*-*-*-*-*-*-*-*-*-*-*-*-*-*-*-*-*-*-*-*-*-*-*-*-*-*-*-*-*-*-*-*-*-*-*-*-*-*-*-*-*-  REASON FOR REFERRAL:  Evaluation of chest pain    *-*-*-*-*-*-*-*-*-*-*-*-*-*-*-*-*-*-*-*-*-*-*-*-*-*-*-*-*-*-*-*-*-*-*-*-*-*-*-*-*-*-*-*-*-*-*-*-*-*-*-*-*-*-  CARDIOLOGY ASSESSMENT & PLAN:  1  SOB (shortness of breath)  Echo complete with contrast if indicated   2  Chest pain, unspecified type  Ambulatory referral to Cardiology    Echo complete with contrast if indicated   3  Atypical chest pain     4  Elevated blood pressure reading in office without diagnosis of hypertension         Atypical chest pain  Pleasant 69-year-old female with multiple comorbidities having atypical chest pain  Description of symptom does not suggest angina  She has a known history of paraesophageal hiatal hernia    She did find relief with use of Pepto-Bismol and PPI   Does have nonspecific ECG abnormalities  Physical examination is significant for obesity, features related to her diagnosis of cerebral palsy, mild cognitive impairment and eye anamolies  Cardiac exam shows no signs and symptoms suggestive of heart failure and or significant valvular heart disease     - will request transthoracic echocardiogram to assess cardiac structure and function  If these are normal would hold off on doing a stress test   - meanwhile patient will continue to follow-up with her gastroenterologist specialists and undergo the planned tests of esophageal manometry and management as per their direction  - will arrange a 3 month follow-up to reassess symptoms  - she is advised to continue all activities as much as she can tolerate  - it is noted at times that her blood pressure is elevated  It does not look like she has systemic hypertension but periodic blood pressure measured monitoring is recommended  *-*-*-*-*-*-*-*-*-*-*-*-*-*-*-*-*-*-*-*-*-*-*-*-*-*-*-*-*-*-*-*-*-*-*-*-*-*-*-*-*-*-*-*-*-*-*-*-*-*-*-*-*-*-  CURRENT ECG:  No results found for this visit on 01/24/20  Reviewed multiple ECGs in the system  Most recent ECG was from December 17, 2019  All ECGs suggest sinus rhythm with possible left ventricular hypertrophy via voltage criteria, possible prior inferior infarction  *-*-*-*-*-*-*-*-*-*-*-*-*-*-*-*-*-*-*-*-*-*-*-*-*-*-*-*-*-*-*-*-*-*-*-*-*-*-*-*-*-*-*-*-*-*-*-*-*-*-*-*-*-*-  HISTORY OF PRESENT ILLNESS:  Patient is a 38 year  female with medical history significant for:  1  History cerebral palsy with mild cognitive impairment and ambulatory dysfunction  2  Sensorineural hearing loss  3  History of hydrocephalus status post ventriculoatrial shunt placement   4  Hiatal hernia  5  Myopia and Hyperopia with astigmatism  6  History of seizures  7  History of pituitary macroadenoma  8  GERD  9   Mood disorder and oppositional defiant disorder    She is a resident of [facility] and has been referred for evaluation of chest pain  She is accompanied with support staff from her [facility]  History is obtained through speaking to the patient and from staff from the [facility]  Patient states that she has been having on an of chest pain since last 1-1 and half months  She describes the pain as burning and sharp pain in the substernal region  Pain occurs randomly but more so after she eats spicy food  Pain is not accompanied with shortness of breath or palpitation or nausea or vomiting  She has had recent emergency room visits for same problem  Based on the review of record she ruled out for acute coronary syndrome by cardiac enzymes  Her ECG had nonspecific findings  Blood pressure was elevated at times  Symptoms seem to improve after she was given Pepto-Bismol  She has been evaluated by gastroenterologist and has undergone endoscopy most recently in May 2019 which was significant for small sliding hiatal hernia  Most recently she was seen by gastroenterologist 3 days back and they have recommended esophageal manometry to evaluate for mostly disorder and also cardiac consultation  Patient states that her last chest pain episode was on December 17  At that time she was evaluated with the ER and up chest CT for PE was significant for moderate paraesophageal hernia      *-*-*-*-*-*-*-*-*-*-*-*-*-*-*-*-*-*-*-*-*-*-*-*-*-*-*-*-*-*-*-*-*-*-*-*-*-*-*-*-*-*-*-*-*-*-*-*-*-*-*-*-*-*  PAST MEDICAL HISTORY:   Past Medical History:   Diagnosis Date    Acid reflux     ADD (attention deficit disorder)     Adjustment disorder     Anxiety     Astigmatism     Brain lesion     Brain lesion     Bronchitis     Calcium deficiency     Cellulitis of foot, right 6/4/2018    Cerebral palsy (HCC)     Cerebral palsy (HCC)     Last Assessed:7/6/2016    Chronic otitis media     Chronically dry eyes, left     Last Assessed:7/6/2016    Constipation     Depression     Last Assessed:7/6/2016    Depressive disorder     Dry eyes     Dysphagia     Esophagitis     Esotropia     Fall     GERD (gastroesophageal reflux disease)     GERD (gastroesophageal reflux disease)     Hiatal hernia     Hydrocephalus (HCC)     Hyperopia with astigmatism     Impaired fasting glucose     Left nephrolithiasis 3/4/2019    Mood disorder ([address] )     Myopia     Oppositional defiant disorder     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HCC)     Psychiatric disorder     Seizures (HCC)     Sensorineural hearing loss     Status post ventriculoatrial shunt placement     Visual impairment        PAST SURGICAL HISTORY:   Past Surgical History:   Procedure Laterality Date    CSF SHUNT      Creation of Ventriculo-Peritoneal CSF shunt ; Last Assessed:7/6/2016    EAR SURGERY      Last Assessed:7/6/2016    LEG SURGERY      due to CP     NOSE SURGERY      Last Assessed:7/6/2016    CT ESOPHAGOGASTRODUODENOSCOPY TRANSORAL DIAGNOSTIC N/A 5/9/2019    Procedure: ESOPHAGOGASTRODUODENOSCOPY (EGD) with biopsy;  Surgeon: En Griffiths MD;  Location: [facility];   Service: Gastroenterology    UPPER GASTROINTESTINAL ENDOSCOPY  05/2019       FAMILY HISTORY:  Family History   Problem Relation Age of Onset    Diabetes Mother     No Known Problems Father      She says that her dad had heart problem and he is in hospital     SOCIAL HISTORY:  [unfilled]  Social History     Tobacco Use   Smoking Status Never Smoker   Smokeless Tobacco Never Used     Social History     Substance and Sexual Activity   Alcohol Use No     Social History     Substance and Sexual Activity   Drug Use No     [id number]    *-*-*-*-*-*-*-*-*-*-*-*-*-*-*-*-*-*-*-*-*-*-*-*-*-*-*-*-*-*-*-*-*-*-*-*-*-*-*-*-*-*-*-*-*-*-*-*-*-*-*-*-*-*  ALLERGIES:  No Known Allergies  *-*-*-*-*-*-*-*-*-*-*-*-*-*-*-*-*-*-*-*-*-*-*-*-*-*-*-*-*-*-*-*-*-*-*-*-*-*-*-*-*-*-*-*-*-*-*-*-*-*-*-*-*-*  CURRENT OUTPATIENT MEDICATIONS:     Current Outpatient Medications:    ARIPiprazole (ABILIFY) 20 MG tablet, Take 1 tablet (20 mg total) by mouth daily at bedtime for 30 days at 9pm , Disp: 30 tablet, Rfl: 0    bismuth subsalicylate (PEPTO BISMOL) 524 mg/30 mL oral suspension, Take 15 mL (262 mg total) by mouth every 6 (six) hours as needed for indigestion, Disp: 360 mL, Rfl: 3    calcium carbonate (TUMS) 500 mg chewable tablet, Chew 1 tablet (500 mg total) 3 (three) times a day as needed for heartburn, Disp: 30 tablet, Rfl: 1    Cholecalciferol (VITAMIN D-3) 1000 units CAPS, Take 2 capsules (2,000 Units total) by mouth daily at 8am , Disp: 180 capsule, Rfl: 1    clotrimazole (LOTRIMIN AF) 1 % cream, Lotrimin AF, Disp: , Rfl:     dextromethorphan-guaiFENesin (ROBITUSSIN DM)  mg/5 mL syrup, Take 5 mL by mouth 3 (three) times a day as needed for cough, Disp: 118 mL, Rfl: 1    escitalopram (LEXAPRO) 20 mg tablet, Take 1 tablet (20 mg total) by mouth daily for 30 days at 8am , Disp: 90 tablet, Rfl: 0    fluticasone (FLONASE) 50 mcg/act nasal spray, 1 spray into each nostril daily as needed for rhinitis (nasal congestion) At 8:00 AM, Disp: 1 Bottle, Rfl: 5    ibuprofen (MOTRIN) 400 mg tablet, Take 1 tablet (400 mg total) by mouth every 8 (eight) hours as needed for mild pain, Disp: 30 tablet, Rfl: 1    lamoTRIgine (LaMICtal) 25 mg tablet, , Disp: , Rfl:     lubiprostone (AMITIZA) 24 mcg capsule, Take 1 capsule (24 mcg total) by mouth 2 (two) times a day with meals, Disp: 60 capsule, Rfl: 5    magnesium hydroxide (GNP MILK OF MAGNESIA) 400 mg/5 mL oral suspension, Take 30 mL by mouth daily as needed for constipation If no BM in 3 days, Disp: 355 mL, Rfl: 5    Mouthwashes (LISTERINE ANTISEPTIC) LIQD, Apply 1 application to the mouth or throat 2 (two) times a day, Disp: 500 mL, Rfl: 3    Multiple Vitamins-Minerals (CERTAVITE SENIOR/ANTIOXIDANT) TABS, Take 1 tablet by mouth daily, Disp: 90 tablet, Rfl: 2    norgestimate-ethinyl estradiol (ESTARYLLA) 0 25-35 MG-MCG per tablet, Estarylla 0 25 mg-35 mcg tablet, Disp: , Rfl:     pantoprazole (PROTONIX) 20 mg tablet, Take 1 tablet 30 minutes before breakfast daily, Disp: 31 tablet, Rfl: 5    phenol (CHLORASEPTIC) 1 4 % mucosal liquid, Apply 1 spray to the mouth or throat every 2 (two) hours as needed (for sore throat as needed), Disp: 236 mL, Rfl: 1    RA SUNSCREEN SPF50 LOTN, Apply 15 minutes before sun exposure and every 2 hours, Disp: 1 Bottle, Rfl: 5    senna-docusate sodium (SENEXON-S) 8 6-50 mg per tablet, Take 1 tablet by mouth daily at 8am , Disp: 90 tablet, Rfl: 3    sodium chloride (OCEAN) 0 65 % nasal spray, 1 spray into each nostril as needed (three times daily as needed for nasal congestion), Disp: 60 mL, Rfl: 1    traZODone (DESYREL) 50 mg tablet, Take 1 tablet (50 mg total) by mouth daily at bedtime at 9pm, Disp: , Rfl: 0    zinc oxide (DESITIN) 13 % cream, Apply 1 application topically as needed (for perianal irritation), Disp: 1 Tube, Rfl: 5    acetaminophen (TYLENOL) 500 mg tablet, Take 500 mg by mouth every 6 (six) hours as needed for mild pain, Disp: , Rfl:     aluminum-magnesium hydroxide-simethicone (ANTACID) 200-200-20 mg/5 mL suspension, Take 15 mL by mouth every 4 (four) hours as needed for indigestion or heartburn (Patient not taking: Reported on 1/24/2020), Disp: 355 mL, Rfl: 5    bacitracin topical ointment 500 units/g topical ointment, Apply 1 large application topically 3 (three) times a day for 7 days (Patient not taking: Reported on 1/24/2020), Disp: 15 g, Rfl: 0    mupirocin (BACTROBAN) 2 % ointment, Apply topically 3 (three) times a day for 5 days (Patient not taking: Reported on 1/24/2020), Disp: 22 g, Rfl: 0    polyethylene glycol (MIRALAX) 17 g packet, Take one packet daily as needed for no bowel movement in 24 hours (Patient not taking: Reported on 1/24/2020), Disp: 14 each, Rfl: 3    psyllium (METAMUCIL) 58 6 % packet, Take 1 packet by mouth daily (Patient not taking: Reported on 1/24/2020), Disp: 30 packet, Rfl: 5    *-*-*-*-*-*-*-*-*-*-*-*-*-*-*-*-*-*-*-*-*-*-*-*-*-*-*-*-*-*-*-*-*-*-*-*-*-*-*-*-*-*-*-*-*-*-*-*-*-*-*-*-*-*  REVIEW OF SYMPTOMS:    Positive for:  Episodic chest pain  Negative for: All remaining as reviewed below and in HPI  SYSTEM SYMPTOMS REVIEWED:  Generalweight change, fever, night sweats  Respirato  Cardiovascularchest pain, syncope, dyspnea on exertion, edema, decline in exercise tolerance, claudication   Gastrointestinalpersistent vomiting, diarrhea, abdominal distention, blood in stool   Muscular or skeletaljoint pain or swelling   Neurologicheadaches, syncope, abnormal movement  Hematologichistory of easy bruising and bleeding   Endocrinethyroid enlargement, heat or cold intolerance, polyuria   Psychiatricanxiety, depression     *-*-*-*-*-*-*-*-*-*-*-*-*-*-*-*-*-*-*-*-*-*-*-*-*-*-*-*-*-*-*-*-*-*-*-*-*-*-*-*-*-*-*-*-*-*-*-*-*-*-*-*-*-*-  VITAL SIGNS:  Vitals:    01/24/20 1457   BP: 130/74   BP Location: Left arm   Patient Position: Sitting   Cuff Size: Adult   Pulse: 95   SpO2: 97%   Weight: 88 5 kg (195 lb)   Height: 4' 9" (1 448 m)     Weight (last 2 days)     Date/Time   Weight    01/24/20 1457   88 5 (195)           ,   Wt Readings from Last 3 Encounters:   01/24/20 88 5 kg (195 lb)   01/24/20 88 7 kg (195 lb 9 6 oz)   01/22/20 87 6 kg (193 lb 2 oz)    , Body mass index is 42 2 kg/m²  *-*-*-*-*-*-*-*-*-*-*-*-*-*-*-*-*-*-*-*-*-*-*-*-*-*-*-*-*-*-*-*-*-*-*-*-*-*-*-*-*-*-*-*-*-*-*-*-*-*-*-*-*-*-  PHYSICAL EXAM:  General Appearance:    Alert, cooperative, no distress, appears stated age, obese, poor hygeine   Head, Eyes, ENT:      evidence of astigmatism and features of cognitive impairment   Neck:   Supple, no carotid bruit or JVD   Back:     Symmetric, no curvature  Lungs:     Respirations unlabored  Clear to auscultation bilaterally,    Chest wall:    No tenderness or deformity   Heart:    Regular rate and rhythm, S1 and S2 normal, no murmur, rub  or gallop  Abdomen:     Soft, non-tender, No obvious masses, or organomegaly   Extremities:   Extremities normal, no cyanosis or edema    Skin:   Skin color, texture, turgor normal, no rashes or lesions     *-*-*-*-*-*-*-*-*-*-*-*-*-*-*-*-*-*-*-*-*-*-*-*-*-*-*-*-*-*-*-*-*-*-*-*-*-*-*-*-*-*-*-*-*-*-*-*-*-*-*-*-*-*-  LABORATORY DATA:  I have personally reviewed pertinent labs  Potassium   Date Value Ref Range Status   12/17/2019 3 6 3 5 - 5 3 mmol/L Final   12/14/2019 3 8 3 5 - 5 3 mmol/L Final   12/09/2019 4 4 3 5 - 5 3 mmol/L Final     Comment:     Slightly Hemolyzed; Results May be Affected     Chloride   Date Value Ref Range Status   12/17/2019 110 (H) 100 - 108 mmol/L Final   12/14/2019 111 (H) 100 - 108 mmol/L Final   12/09/2019 109 (H) 100 - 108 mmol/L Final     CO2   Date Value Ref Range Status   12/17/2019 25 21 - 32 mmol/L Final   12/14/2019 25 21 - 32 mmol/L Final   12/09/2019 24 21 - 32 mmol/L Final     BUN   Date Value Ref Range Status   12/17/2019 9 5 - 25 mg/dL Final   12/14/2019 11 5 - 25 mg/dL Final   12/09/2019 11 5 - 25 mg/dL Final     Creatinine   Date Value Ref Range Status   12/17/2019 0 76 0 60 - 1 30 mg/dL Final     Comment:     Standardized to IDMS reference method   12/14/2019 0 78 0 60 - 1 30 mg/dL Final     Comment:     Standardized to IDMS reference method   12/09/2019 0 80 0 60 - 1 30 mg/dL Final     Comment:     Standardized to IDMS reference method     eGFR   Date Value Ref Range Status   12/17/2019 98 ml/min/1 73sq m Final   12/14/2019 95 ml/min/1 73sq m Final   12/09/2019 93 ml/min/1 73sq m Final     Calcium   Date Value Ref Range Status   12/17/2019 8 5 8 3 - 10 1 mg/dL Final   12/14/2019 8 6 8 3 - 10 1 mg/dL Final   12/09/2019 8 0 (L) 8 3 - 10 1 mg/dL Final     AST   Date Value Ref Range Status   12/14/2019 10 5 - 45 U/L Final     Comment:       Specimen collection should occur prior to Sulfasalazine administration due to the potential for falsely depressed results      12/09/2019 47 (H) 5 - 45 U/L Final     Comment:     Moderately Hemolyzed; Results May be Affected  Specimen collection should occur prior to Sulfasalazine administration due to the potential for falsely depressed results  02/21/2019 15 5 - 45 U/L Final     Comment:       Specimen collection should occur prior to Sulfasalazine administration due to the potential for falsely depressed results  ALT   Date Value Ref Range Status   12/14/2019 19 12 - 78 U/L Final     Comment:       Specimen collection should occur prior to Sulfasalazine and/or Sulfapyridine administration due to the potential for falsely depressed results  12/09/2019 24 12 - 78 U/L Final     Comment:       Specimen collection should occur prior to Sulfasalazine and/or Sulfapyridine administration due to the potential for falsely depressed results  02/21/2019 20 12 - 78 U/L Final     Comment:       Specimen collection should occur prior to Sulfasalazine and/or Sulfapyridine administration due to the potential for falsely depressed results        Alkaline Phosphatase   Date Value Ref Range Status   12/14/2019 158 (H) 46 - 116 U/L Final   12/09/2019 150 (H) 46 - 116 U/L Final   02/21/2019 144 (H) 46 - 116 U/L Final     Magnesium   Date Value Ref Range Status   07/11/2018 2 2 1 6 - 2 6 mg/dL Final     WBC   Date Value Ref Range Status   12/17/2019 8 59 4 31 - 10 16 Thousand/uL Final   12/14/2019 9 27 4 31 - 10 16 Thousand/uL Final   05/15/2019 8 11 4 31 - 10 16 Thousand/uL Final     Hemoglobin   Date Value Ref Range Status   12/17/2019 13 6 11 5 - 15 4 g/dL Final   12/14/2019 12 5 11 5 - 15 4 g/dL Final   05/15/2019 12 5 11 5 - 15 4 g/dL Final     Platelets   Date Value Ref Range Status   12/17/2019 306 149 - 390 Thousands/uL Final   12/14/2019 273 149 - 390 Thousands/uL Final   05/15/2019 270 149 - 390 Thousands/uL Final     No results found for: PT, PTT, INR  No results found for: CKMB, BNP, DIGOXIN  No results found for: TSH  HDL, Direct   Date Value Ref Range Status   01/03/2020 68 >=40 mg/dL Final     Comment:       HDL Cholesterol:       Low     <41 mg/dL  Specimen collection should occur prior to Metamizole administration due to the potential for falsley depressed results  Triglycerides   Date Value Ref Range Status   01/03/2020 151 (H) <=150 mg/dL Final     Comment:       Triglyceride:     Normal          <150 mg/dl     Borderline High 150-199 mg/dl     High            200-499 mg/dl        Very High       >499 mg/dl    Specimen collection should occur prior to N-Acetylcysteine or Metamizole administration due to the potential for falsely depressed results  Hemoglobin A1C   Date Value Ref Range Status   01/03/2020 5 2 4 2 - 6 3 % Final     Urine Culture   Date Value Ref Range Status   12/12/2018 80,000-89,000 cfu/ml   Final     Comment:     Mixed Contaminants X5   03/27/2018 20,000-29,000 cfu/ml   Final     Comment:     Mixed Contaminants X3   02/08/2017 50,000-59,000 cfu/ml Mixed Contaminants X4  Final       *-*-*-*-*-*-*-*-*-*-*-*-*-*-*-*-*-*-*-*-*-*-*-*-*-*-*-*-*-*-*-*-*-*-*-*-*-*-*-*-*-*-*-*-*-*-*-*-*-*-*-*-*-*-  RADIOLOGY RESULTS:  No results found  *-*-*-*-*-*-*-*-*-*-*-*-*-*-*-*-*-*-*-*-*-*-*-*-*-*-*-*-*-*-*-*-*-*-*-*-*-*-*-*-*-*-*-*-*-*-*-*-*-*-*-*-*-*-  ECHOCARDIOGRAM AND OTHER CARDIOLOGY RESULTS:  No results found for this or any previous visit  No results found for this or any previous visit  No results found for this or any previous visit  No results found for this or any previous visit  *-*-*-*-*-*-*-*-*-*-*-*-*-*-*-*-*-*-*-*-*-*-*-*-*-*-*-*-*-*-*-*-*-*-*-*-*-*-*-*-*-*-*-*-*-*-*-*-*-*-*-*-*-*-  SIGNATURES:   @YPF@   Axel MD Andre     CC:   MD Rasheed Fuller MD  1/24/2020  3:39 PM  Signed   [facility]  [address], [address] [facility] [address]  Phone#  [phone]   Fax# [phone]  *-*-*-*-*-*-*-*-*-*-*-*-*-*-*-*-*-*-*-*-*-*-*-*-*-*-*-*-*-*-*-*-*-*-*-*-*-*-*-*-*-*-*-*-*-*-*-*-*-*-*-*-*-*  ENCOUNTER DATE: 01/24/20 3:39 PM  PATIENT NAME: Hussain Arreaga   1980    [id number]  Age: 38 y o  Sex: female  AUTHOR: Janett Nieto MD  [facility] PHYSICIAN: Socorro Wilson MD  REFERRING PHYSICIAN: MD Danyel Walters [facility] [address], *   *-*-*-*-*-*-*-*-*-*-*-*-*-*-*-*-*-*-*-*-*-*-*-*-*-*-*-*-*-*-*-*-*-*-*-*-*-*-*-*-*-*-*-*-*-*-*-*-*-*-*-*-*-*-  REASON FOR REFERRAL:  Evaluation of chest pain    *-*-*-*-*-*-*-*-*-*-*-*-*-*-*-*-*-*-*-*-*-*-*-*-*-*-*-*-*-*-*-*-*-*-*-*-*-*-*-*-*-*-*-*-*-*-*-*-*-*-*-*-*-*-  CARDIOLOGY ASSESSMENT & PLAN:  1  SOB (shortness of breath)     2  Chest pain, unspecified type  Ambulatory referral to Cardiology   3  Atypical chest pain     4  Elevated blood pressure reading in office without diagnosis of hypertension         Atypical chest pain  Pleasant 59-year-old female with multiple comorbidities having atypical chest pain  Description of symptom does not suggest angina  She has a known history of paraesophageal hiatal hernia  She did find relief with use of Pepto-Bismol and PPI  Does have nonspecific ECG abnormalities  Physical examination is significant for obesity, features related to her diagnosis of cerebral palsy, mild cognitive impairment and eye anamolies  Cardiac exam shows no signs and symptoms suggestive of heart failure and or significant valvular heart disease     - will request transthoracic echocardiogram to assess cardiac structure and function  If these are normal would hold off on doing a stress test   - meanwhile patient will continue to follow-up with her gastroenterologist specialists and undergo the planned tests of esophageal manometry and management as per their direction  - will arrange a 3 month follow-up to reassess symptoms    - she is advised to continue all activities as much as she can tolerate  - it is noted at times that her blood pressure is elevated  It does not look like she has systemic hypertension but periodic blood pressure measured monitoring is recommended  *-*-*-*-*-*-*-*-*-*-*-*-*-*-*-*-*-*-*-*-*-*-*-*-*-*-*-*-*-*-*-*-*-*-*-*-*-*-*-*-*-*-*-*-*-*-*-*-*-*-*-*-*-*-  CURRENT ECG:  No results found for this visit on 01/24/20  Reviewed multiple ECGs in the system  Most recent ECG was from December 17, 2019  All ECGs suggest sinus rhythm with possible left ventricular hypertrophy via voltage criteria, possible prior inferior infarction  *-*-*-*-*-*-*-*-*-*-*-*-*-*-*-*-*-*-*-*-*-*-*-*-*-*-*-*-*-*-*-*-*-*-*-*-*-*-*-*-*-*-*-*-*-*-*-*-*-*-*-*-*-*-  HISTORY OF PRESENT ILLNESS:  Patient is a 38 year  female with medical history significant for:  1  History cerebral palsy with mild cognitive impairment and ambulatory dysfunction  2  Sensorineural hearing loss  3  History of hydrocephalus status post ventriculoatrial shunt placement   4  Hiatal hernia  5  Myopia and Hyperopia with astigmatism  6  History of seizures  7  History of pituitary macroadenoma  8  GERD  9  Mood disorder and oppositional defiant disorder    She is a resident of [facility] and has been referred for evaluation of chest pain  She is accompanied with support staff from her [facility]  History is obtained through speaking to the patient and from staff from the [facility]  Patient states that she has been having on an of chest pain since last 1-1 and half months  She describes the pain as burning and sharp pain in the substernal region  Pain occurs randomly but more so after she eats spicy food  Pain is not accompanied with shortness of breath or palpitation or nausea or vomiting  She has had recent emergency room visits for same problem  Based on the review of record she ruled out for acute coronary syndrome by cardiac enzymes    Her ECG had nonspecific findings  Blood pressure was elevated at times  Symptoms seem to improve after she was given Pepto-Bismol  She has been evaluated by gastroenterologist and has undergone endoscopy most recently in May 2019 which was significant for small sliding hiatal hernia  Most recently she was seen by gastroenterologist 3 days back and they have recommended esophageal manometry to evaluate for mostly disorder and also cardiac consultation  Patient states that her last chest pain episode was on December 17  At that time she was evaluated with the ER and up chest CT for PE was significant for moderate paraesophageal hernia      *-*-*-*-*-*-*-*-*-*-*-*-*-*-*-*-*-*-*-*-*-*-*-*-*-*-*-*-*-*-*-*-*-*-*-*-*-*-*-*-*-*-*-*-*-*-*-*-*-*-*-*-*-*  PAST MEDICAL HISTORY:   Past Medical History:   Diagnosis Date    Acid reflux     ADD (attention deficit disorder)     Adjustment disorder     Anxiety     Astigmatism     Brain lesion     Brain lesion     Bronchitis     Calcium deficiency     Cellulitis of foot, right 6/4/2018    Cerebral palsy (HCC)     Cerebral palsy (HCC)     Last Assessed:7/6/2016    Chronic otitis media     Chronically dry eyes, left     Last Assessed:7/6/2016    Constipation     Depression     Last Assessed:7/6/2016    Depressive disorder     Dry eyes     Dysphagia     Esophagitis     Esotropia     Fall     GERD (gastroesophageal reflux disease)     GERD (gastroesophageal reflux disease)     Hiatal hernia     Hydrocephalus ([facility])     Hyperopia with astigmatism     Impaired fasting glucose     Left nephrolithiasis 3/4/2019    Mood disorder ([address] )     Myopia     Oppositional defiant disorder     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[facility])     Psychiatric disorder     Seizures ([facility])     Sensorineural hearing loss     Status post ventriculoatrial shunt placement     Visual impairment        PAST SURGICAL HISTORY:   Past Surgical History:   Procedure Laterality Date    CSF SHUNT      Creation of Ventriculo-Peritoneal CSF shunt ; Last Assessed:7/6/2016    EAR SURGERY      Last Assessed:7/6/2016    LEG SURGERY      due to CP     NOSE SURGERY      Last Assessed:7/6/2016    AL ESOPHAGOGASTRODUODENOSCOPY TRANSORAL DIAGNOSTIC N/A 5/9/2019    Procedure: ESOPHAGOGASTRODUODENOSCOPY (EGD) with biopsy;  Surgeon: Kashif Olivares MD;  Location[facility];   Service: Gastroenterology    UPPER GASTROINTESTINAL ENDOSCOPY  05/2019       FAMILY HISTORY:  Family History   Problem Relation Age of Onset    Diabetes Mother     No Known Problems Father      She says that her dad had heart problem and he is in hospital     SOCIAL HISTORY:  [unfilled]  Social History     Tobacco Use   Smoking Status Never Smoker   Smokeless Tobacco Never Used     Social History     Substance and Sexual Activity   Alcohol Use No     Social History     Substance and Sexual Activity   Drug Use No     [id number]    *-*-*-*-*-*-*-*-*-*-*-*-*-*-*-*-*-*-*-*-*-*-*-*-*-*-*-*-*-*-*-*-*-*-*-*-*-*-*-*-*-*-*-*-*-*-*-*-*-*-*-*-*-*  ALLERGIES:  No Known Allergies  *-*-*-*-*-*-*-*-*-*-*-*-*-*-*-*-*-*-*-*-*-*-*-*-*-*-*-*-*-*-*-*-*-*-*-*-*-*-*-*-*-*-*-*-*-*-*-*-*-*-*-*-*-*  CURRENT OUTPATIENT MEDICATIONS:     Current Outpatient Medications:     ARIPiprazole (ABILIFY) 20 MG tablet, Take 1 tablet (20 mg total) by mouth daily at bedtime for 30 days at 9pm , Disp: 30 tablet, Rfl: 0    bismuth subsalicylate (PEPTO BISMOL) 524 mg/30 mL oral suspension, Take 15 mL (262 mg total) by mouth every 6 (six) hours as needed for indigestion, Disp: 360 mL, Rfl: 3    calcium carbonate (TUMS) 500 mg chewable tablet, Chew 1 tablet (500 mg total) 3 (three) times a day as needed for heartburn, Disp: 30 tablet, Rfl: 1    Cholecalciferol (VITAMIN D-3) 1000 units CAPS, Take 2 capsules (2,000 Units total) by mouth daily at 8am , Disp: 180 capsule, Rfl: 1    clotrimazole (LOTRIMIN AF) 1 % cream, Lotrimin AF, Disp: , Rfl:     dextromethorphan-guaiFENesin (ROBITUSSIN DM)  mg/5 mL syrup, Take 5 mL by mouth 3 (three) times a day as needed for cough, Disp: 118 mL, Rfl: 1    escitalopram (LEXAPRO) 20 mg tablet, Take 1 tablet (20 mg total) by mouth daily for 30 days at 8am , Disp: 90 tablet, Rfl: 0    fluticasone (FLONASE) 50 mcg/act nasal spray, 1 spray into each nostril daily as needed for rhinitis (nasal congestion) At 8:00 AM, Disp: 1 Bottle, Rfl: 5    ibuprofen (MOTRIN) 400 mg tablet, Take 1 tablet (400 mg total) by mouth every 8 (eight) hours as needed for mild pain, Disp: 30 tablet, Rfl: 1    lamoTRIgine (LaMICtal) 25 mg tablet, , Disp: , Rfl:     lubiprostone (AMITIZA) 24 mcg capsule, Take 1 capsule (24 mcg total) by mouth 2 (two) times a day with meals, Disp: 60 capsule, Rfl: 5    magnesium hydroxide (GNP MILK OF MAGNESIA) 400 mg/5 mL oral suspension, Take 30 mL by mouth daily as needed for constipation If no BM in 3 days, Disp: 355 mL, Rfl: 5    Mouthwashes (LISTERINE ANTISEPTIC) LIQD, Apply 1 application to the mouth or throat 2 (two) times a day, Disp: 500 mL, Rfl: 3    Multiple Vitamins-Minerals (CERTAVITE SENIOR/ANTIOXIDANT) TABS, Take 1 tablet by mouth daily, Disp: 90 tablet, Rfl: 2    norgestimate-ethinyl estradiol (ESTARYLLA) 0 25-35 MG-MCG per tablet, Estarylla 0 25 mg-35 mcg tablet, Disp: , Rfl:     pantoprazole (PROTONIX) 20 mg tablet, Take 1 tablet 30 minutes before breakfast daily, Disp: 31 tablet, Rfl: 5    phenol (CHLORASEPTIC) 1 4 % mucosal liquid, Apply 1 spray to the mouth or throat every 2 (two) hours as needed (for sore throat as needed), Disp: 236 mL, Rfl: 1    RA SUNSCREEN SPF50 LOTN, Apply 15 minutes before sun exposure and every 2 hours, Disp: 1 Bottle, Rfl: 5    senna-docusate sodium (SENEXON-S) 8 6-50 mg per tablet, Take 1 tablet by mouth daily at 8am , Disp: 90 tablet, Rfl: 3    sodium chloride (OCEAN) 0 65 % nasal spray, 1 spray into each nostril as needed (three times daily as needed for nasal congestion), Disp: 60 mL, Rfl: 1    traZODone (DESYREL) 50 mg tablet, Take 1 tablet (50 mg total) by mouth daily at bedtime at 9pm, Disp: , Rfl: 0    zinc oxide (DESITIN) 13 % cream, Apply 1 application topically as needed (for perianal irritation), Disp: 1 Tube, Rfl: 5    acetaminophen (TYLENOL) 500 mg tablet, Take 500 mg by mouth every 6 (six) hours as needed for mild pain, Disp: , Rfl:     aluminum-magnesium hydroxide-simethicone (ANTACID) 200-200-20 mg/5 mL suspension, Take 15 mL by mouth every 4 (four) hours as needed for indigestion or heartburn (Patient not taking: Reported on 1/24/2020), Disp: 355 mL, Rfl: 5    bacitracin topical ointment 500 units/g topical ointment, Apply 1 large application topically 3 (three) times a day for 7 days (Patient not taking: Reported on 1/24/2020), Disp: 15 g, Rfl: 0    mupirocin (BACTROBAN) 2 % ointment, Apply topically 3 (three) times a day for 5 days (Patient not taking: Reported on 1/24/2020), Disp: 22 g, Rfl: 0    polyethylene glycol (MIRALAX) 17 g packet, Take one packet daily as needed for no bowel movement in 24 hours (Patient not taking: Reported on 1/24/2020), Disp: 14 each, Rfl: 3    psyllium (METAMUCIL) 58 6 % packet, Take 1 packet by mouth daily (Patient not taking: Reported on 1/24/2020), Disp: 30 packet, Rfl: 5    *-*-*-*-*-*-*-*-*-*-*-*-*-*-*-*-*-*-*-*-*-*-*-*-*-*-*-*-*-*-*-*-*-*-*-*-*-*-*-*-*-*-*-*-*-*-*-*-*-*-*-*-*-*  REVIEW OF SYMPTOMS:    Positive for:  Episodic chest pain  Negative for: All remaining as reviewed below and in HPI  SYSTEM SYMPTOMS REVIEWED:  Generalweight change, fever, night sweats  Respirato      Cardiovascularchest pain, syncope, dyspnea on exertion, edema, decline in exercise tolerance, claudication   Gastrointestinalpersistent vomiting, diarrhea, abdominal distention, blood in stool   Muscular or skeletaljoint pain or swelling   Neurologicheadaches, syncope, abnormal movement  Hematologichistory of easy bruising and bleeding   Endocrinethyroid enlargement, heat or cold intolerance, polyuria   Psychiatricanxiety, depression     *-*-*-*-*-*-*-*-*-*-*-*-*-*-*-*-*-*-*-*-*-*-*-*-*-*-*-*-*-*-*-*-*-*-*-*-*-*-*-*-*-*-*-*-*-*-*-*-*-*-*-*-*-*-  VITAL SIGNS:  Vitals:    01/24/20 1457   BP: 130/74   BP Location: Left arm   Patient Position: Sitting   Cuff Size: Adult   Pulse: 95   SpO2: 97%   Weight: 88 5 kg (195 lb)   Height: 4' 9" (1 448 m)     Weight (last 2 days)     Date/Time   Weight    01/24/20 1457   88 5 (195)           ,   Wt Readings from Last 3 Encounters:   01/24/20 88 5 kg (195 lb)   01/24/20 88 7 kg (195 lb 9 6 oz)   01/22/20 87 6 kg (193 lb 2 oz)    , Body mass index is 42 2 kg/m²  *-*-*-*-*-*-*-*-*-*-*-*-*-*-*-*-*-*-*-*-*-*-*-*-*-*-*-*-*-*-*-*-*-*-*-*-*-*-*-*-*-*-*-*-*-*-*-*-*-*-*-*-*-*-  PHYSICAL EXAM:  General Appearance:    Alert, cooperative, no distress, appears stated age, obese, poor hygeine   Head, Eyes, ENT:      evidence of astigmatism and features of cognitive impairment   Neck:   Supple, no carotid bruit or JVD   Back:     Symmetric, no curvature  Lungs:     Respirations unlabored  Clear to auscultation bilaterally,    Chest wall:    No tenderness or deformity   Heart:    Regular rate and rhythm, S1 and S2 normal, no murmur, rub  or gallop  Abdomen:     Soft, non-tender, No obvious masses, or organomegaly   Extremities:   Extremities normal, no cyanosis or edema    Skin:   Skin color, texture, turgor normal, no rashes or lesions     *-*-*-*-*-*-*-*-*-*-*-*-*-*-*-*-*-*-*-*-*-*-*-*-*-*-*-*-*-*-*-*-*-*-*-*-*-*-*-*-*-*-*-*-*-*-*-*-*-*-*-*-*-*-  LABORATORY DATA:  I have personally reviewed pertinent labs  Potassium   Date Value Ref Range Status   12/17/2019 3 6 3 5 - 5 3 mmol/L Final   12/14/2019 3 8 3 5 - 5 3 mmol/L Final   12/09/2019 4 4 3 5 - 5 3 mmol/L Final     Comment:     Slightly Hemolyzed;  Results May be Affected     Chloride   Date Value Ref Range Status   12/17/2019 110 (H) 100 - 108 mmol/L Final   12/14/2019 111 (H) 100 - 108 mmol/L Final 12/09/2019 109 (H) 100 - 108 mmol/L Final     CO2   Date Value Ref Range Status   12/17/2019 25 21 - 32 mmol/L Final   12/14/2019 25 21 - 32 mmol/L Final   12/09/2019 24 21 - 32 mmol/L Final     BUN   Date Value Ref Range Status   12/17/2019 9 5 - 25 mg/dL Final   12/14/2019 11 5 - 25 mg/dL Final   12/09/2019 11 5 - 25 mg/dL Final     Creatinine   Date Value Ref Range Status   12/17/2019 0 76 0 60 - 1 30 mg/dL Final     Comment:     Standardized to IDMS reference method   12/14/2019 0 78 0 60 - 1 30 mg/dL Final     Comment:     Standardized to IDMS reference method   12/09/2019 0 80 0 60 - 1 30 mg/dL Final     Comment:     Standardized to IDMS reference method     eGFR   Date Value Ref Range Status   12/17/2019 98 ml/min/1 73sq m Final   12/14/2019 95 ml/min/1 73sq m Final   12/09/2019 93 ml/min/1 73sq m Final     Calcium   Date Value Ref Range Status   12/17/2019 8 5 8 3 - 10 1 mg/dL Final   12/14/2019 8 6 8 3 - 10 1 mg/dL Final   12/09/2019 8 0 (L) 8 3 - 10 1 mg/dL Final     AST   Date Value Ref Range Status   12/14/2019 10 5 - 45 U/L Final     Comment:       Specimen collection should occur prior to Sulfasalazine administration due to the potential for falsely depressed results  12/09/2019 47 (H) 5 - 45 U/L Final     Comment:     Moderately Hemolyzed; Results May be Affected  Specimen collection should occur prior to Sulfasalazine administration due to the potential for falsely depressed results  02/21/2019 15 5 - 45 U/L Final     Comment:       Specimen collection should occur prior to Sulfasalazine administration due to the potential for falsely depressed results  ALT   Date Value Ref Range Status   12/14/2019 19 12 - 78 U/L Final     Comment:       Specimen collection should occur prior to Sulfasalazine and/or Sulfapyridine administration due to the potential for falsely depressed results      12/09/2019 24 12 - 78 U/L Final     Comment:       Specimen collection should occur prior to Sulfasalazine and/or Sulfapyridine administration due to the potential for falsely depressed results  02/21/2019 20 12 - 78 U/L Final     Comment:       Specimen collection should occur prior to Sulfasalazine and/or Sulfapyridine administration due to the potential for falsely depressed results  Alkaline Phosphatase   Date Value Ref Range Status   12/14/2019 158 (H) 46 - 116 U/L Final   12/09/2019 150 (H) 46 - 116 U/L Final   02/21/2019 144 (H) 46 - 116 U/L Final     Magnesium   Date Value Ref Range Status   07/11/2018 2 2 1 6 - 2 6 mg/dL Final     WBC   Date Value Ref Range Status   12/17/2019 8 59 4 31 - 10 16 Thousand/uL Final   12/14/2019 9 27 4 31 - 10 16 Thousand/uL Final   05/15/2019 8 11 4 31 - 10 16 Thousand/uL Final     Hemoglobin   Date Value Ref Range Status   12/17/2019 13 6 11 5 - 15 4 g/dL Final   12/14/2019 12 5 11 5 - 15 4 g/dL Final   05/15/2019 12 5 11 5 - 15 4 g/dL Final     Platelets   Date Value Ref Range Status   12/17/2019 306 149 - 390 Thousands/uL Final   12/14/2019 273 149 - 390 Thousands/uL Final   05/15/2019 270 149 - 390 Thousands/uL Final     No results found for: PT, PTT, INR  No results found for: CKMB, BNP, DIGOXIN  No results found for: TSH  HDL, Direct   Date Value Ref Range Status   01/03/2020 68 >=40 mg/dL Final     Comment:       HDL Cholesterol:       Low     <41 mg/dL  Specimen collection should occur prior to Metamizole administration due to the potential for falsley depressed results  Triglycerides   Date Value Ref Range Status   01/03/2020 151 (H) <=150 mg/dL Final     Comment:       Triglyceride:     Normal          <150 mg/dl     Borderline High 150-199 mg/dl     High            200-499 mg/dl        Very High       >499 mg/dl    Specimen collection should occur prior to N-Acetylcysteine or Metamizole administration due to the potential for falsely depressed results        Hemoglobin A1C   Date Value Ref Range Status   01/03/2020 5 2 4 2 - 6 3 % Final     Urine Culture Date Value Ref Range Status   12/12/2018 80,000-89,000 cfu/ml   Final     Comment:     Mixed Contaminants X5   03/27/2018 20,000-29,000 cfu/ml   Final     Comment:     Mixed Contaminants X3   02/08/2017 50,000-59,000 cfu/ml Mixed Contaminants X4  Final       *-*-*-*-*-*-*-*-*-*-*-*-*-*-*-*-*-*-*-*-*-*-*-*-*-*-*-*-*-*-*-*-*-*-*-*-*-*-*-*-*-*-*-*-*-*-*-*-*-*-*-*-*-*-  RADIOLOGY RESULTS:  No results found  *-*-*-*-*-*-*-*-*-*-*-*-*-*-*-*-*-*-*-*-*-*-*-*-*-*-*-*-*-*-*-*-*-*-*-*-*-*-*-*-*-*-*-*-*-*-*-*-*-*-*-*-*-*-  ECHOCARDIOGRAM AND OTHER CARDIOLOGY RESULTS:  No results found for this or any previous visit  No results found for this or any previous visit  No results found for this or any previous visit  No results found for this or any previous visit       *-*-*-*-*-*-*-*-*-*-*-*-*-*-*-*-*-*-*-*-*-*-*-*-*-*-*-*-*-*-*-*-*-*-*-*-*-*-*-*-*-*-*-*-*-*-*-*-*-*-*-*-*-*-  SIGNATURES:   [unfilled]   Elisa Romero MD     CC:   Darren Serrano, MD Ab Garza, *

## 2020-01-24 NOTE — PATIENT INSTRUCTIONS
Venous Insufficiency   AMBULATORY CARE:   Venous insufficiency  is a condition that prevents blood from flowing out of your legs and back to your heart  Veins contain valves that help blood flow in one direction  Venous insufficiency means the valves do not close correctly or fully  Blood flows back and pools in your leg  This can cause problems such as varicose veins  Venous insufficiency may also be called chronic venous insufficiency or venous stasis  Common signs and symptoms:   · Visible veins on your legs that may be small and red or large, thick, and blue    · Swelling in your ankles or calves    · Changes in skin color, such as dark or purple skin    · An ulcer (open sore) on your leg    · Leg pain that is worse when you are menstruating (women) or when you stand, and better when you elevate your legs    · Burning or itching    · Cramps that happen at night    · Thick, hard skin on your legs and ankles    · Feeling of heaviness in your legs  Seek care immediately if:   · You have a wound that does not heal or is infected  · You have an injury that has broken your skin and caused your varicose veins to bleed  · Your leg is swollen and hard  · You have pain in your leg that does not go away or gets worse  · Your legs or feet are turning blue or black  · Your leg feels warm, tender, and painful  It may look swollen and red  Contact your healthcare provider if:   · You have a fever  · You have varicose veins and they are painful  · You have new or worsening leg pain, swelling, or redness  · You have new or worsening ulcers or other sores on your leg  · You have questions or concerns about your condition or care  Treatment  may include any of the following:  · Medicine  may be given to improve blood flow  The medicines may thin your blood or reduce swelling to help blood flow  You may also need medicine to treat a bacterial infection      · Ablation  is a procedure used to close varicose veins  A catheter is guided until it is near the vein  A device will then be guided to the area  The device may produce energy through radiofrequency or a laser  The energy creates heat that will close the blood vessel  · Sclerotherapy  is a procedure used to fade visible veins  Your healthcare provider will inject a liquid into a spider vein or varicose vein  The liquid causes irritation in the vein  The vein swells and sticks together  Your body will then absorb the vein  · Surgery  may be needed if other treatments do not work  Surgery may be used to repair a leg vein valve or to clip or tie off a vein so blood cannot flow through it  You may need to have a veins removed during surgery called stripping  Surgery may be used to bypass (go around) the damaged vein  Blood will flow through a vein transplanted from another part of your body  Manage your symptoms:   · Wear pressure stockings as directed  Pressure stockings help keep blood from pooling in your leg veins  Your healthcare provider can prescribe stockings that are right for you  Do not buy over-the-counter pressure stockings unless your healthcare provider says it is okay  They may not fit correctly or may have elastic that cuts off your circulation  Ask your healthcare provider when to start wearing pressure stockings and how long to wear them each day  · Do not sit or stand for long periods of time  If you have to sit for a long time, flex and extend your legs, feet, and ankles  Do this about 10 times every 30 minutes to help keep blood flowing  If you have to stand for a long time, take breaks and sit with your legs elevated  · Elevate your legs  Elevate your legs above the level of your heart to reduce swelling  Your healthcare provider may recommend that you keep your legs elevated for 30 minutes at a time  You may need to do this 3 to 4 times per day, or more if your healthcare provider recommends  · Do not smoke  Nicotine and other chemicals in cigarettes and cigars can cause blood vessel damage  Ask your healthcare provider for information if you currently smoke and need help to quit  E-cigarettes or smokeless tobacco still contain nicotine  Talk to your healthcare provider before you use these products  · Reach or maintain a healthy weight  Extra weight can make venous insufficiency worse  Ask your healthcare provider how much you should weigh  He can help you create a weight loss plan if you need to lose weight  · Exercise as directed  Walking can help increase blood flow in your calves  Ask your healthcare provider how much exercise you need each day and which exercises are best for you  · Care for your skin  Keep your skin clean  Do not use any soaps or lotions that may dry your skin  For example, do not use products that contain fragrance or alcohol  If you have a skin ulcer, your healthcare provider may recommend a wet-to-dry bandage  To do this, apply a wet bandage to your wound and allow it to dry  This will help remove drainage from your wound each time you change the bandage  Your healthcare provider will tell you how often to change your bandage and which kind of bandage to use  Check your wound for signs of infection, such as swelling or pus  · Go to physical therapy (PT) as directed  A physical therapist can help you increase movement and range of motion in your legs  Follow up with your healthcare provider as directed:  Write down your questions so you remember to ask them during your visits  © 2017 [employer] Information is for End User's use only and may not be sold, redistributed or otherwise used for commercial purposes  All illustrations and images included in CareNotes® are the copyrighted property of [employer]  or [employer]  The above information is an  only   It is not intended as medical advice for individual conditions or treatments  Talk to your doctor, nurse or pharmacist before following any medical regimen to see if it is safe and effective for you

## 2020-01-31 ENCOUNTER — HOSPITAL ENCOUNTER (OUTPATIENT)
Dept: GASTROENTEROLOGY | Facility: HOSPITAL | Age: 40
Discharge: HOME | End: 2020-01-31
Payer: MEDICARE

## 2020-01-31 VITALS
HEART RATE: 101 BPM | SYSTOLIC BLOOD PRESSURE: 152 MMHG | TEMPERATURE: 97.6 F | RESPIRATION RATE: 16 BRPM | DIASTOLIC BLOOD PRESSURE: 62 MMHG | OXYGEN SATURATION: 97 %

## 2020-01-31 DIAGNOSIS — R07.89 ATYPICAL CHEST PAIN: ICD-10-CM

## 2020-01-31 PROCEDURE — 91020 GASTRIC MOTILITY STUDIES: CPT

## 2020-01-31 NOTE — PERIOPERATIVE NURSING NOTE
Patient brought in the room and educated on procedure  Lidocaine 2% topical solution inserted via nostrils  Manometry catheter inserted via left nostril and positioned and secured  10 liquid swallow, 5 viscous swallow and 1 rapid swallow performed  Patient had a difficulty with the rapid swallow  Patient tolerated procedure  Catheter removed intact  Discharge instructions given to patient and patient left the room in stable condition with the care takers

## 2020-02-05 PROCEDURE — 91010 ESOPHAGUS MOTILITY STUDY: CPT | Performed by: INTERNAL MEDICINE

## 2020-02-06 ENCOUNTER — TELEPHONE (OUTPATIENT)
Dept: GASTROENTEROLOGY | Facility: MEDICAL CENTER | Age: 40
End: 2020-02-06

## 2020-02-06 ENCOUNTER — OFFICE VISIT (OUTPATIENT)
Dept: URGENT CARE | Facility: MEDICAL CENTER | Age: 40
End: 2020-02-06
Payer: MEDICARE

## 2020-02-06 VITALS
OXYGEN SATURATION: 97 % | BODY MASS INDEX: 42.07 KG/M2 | HEIGHT: 57 IN | HEART RATE: 100 BPM | TEMPERATURE: 98.1 F | SYSTOLIC BLOOD PRESSURE: 135 MMHG | WEIGHT: 195 LBS | DIASTOLIC BLOOD PRESSURE: 65 MMHG | RESPIRATION RATE: 20 BRPM

## 2020-02-06 DIAGNOSIS — S39.012A STRAIN OF MUSCLE, FASCIA AND TENDON OF LOWER BACK, INITIAL ENCOUNTER: Primary | ICD-10-CM

## 2020-02-06 PROCEDURE — 99213 OFFICE O/P EST LOW 20 MIN: CPT | Performed by: PHYSICIAN ASSISTANT

## 2020-02-06 PROCEDURE — G0463 HOSPITAL OUTPT CLINIC VISIT: HCPCS | Performed by: PHYSICIAN ASSISTANT

## 2020-02-06 RX ORDER — LIDOCAINE 50 MG/G
1 PATCH TOPICAL DAILY
Qty: 15 PATCH | Refills: 0 | Status: SHIPPED | OUTPATIENT
Start: 2020-02-06 | End: 2020-10-02

## 2020-02-06 NOTE — LETTER
February 6, 2020     Hansel Maddox, [address] [place] [address]    Patient: Caterina Hampton   YOB: 1980   Date of Visit: 2/6/2020        To whom it May concern: Your patient, Marina Bryan was seen in our urgent care department on 2/6/2020  If you have any questions or concerns, please don't hesitate to call  Patient is use over-the-counter pain relief as needed to help with pain  She is to use lidocaine patches as needed to help alleviate pain  She is clear to take all p m  Medication tonight             Sincerely,        [facility]      CC: [unfilled]    Enclosure

## 2020-02-06 NOTE — TELEPHONE ENCOUNTER
----- Message from Chris Orozco PA-C sent at 2/6/2020  1:37 PM EST -----  Manometry was WNL's, no signs of dysmotility  No explanation for chest pain  Small hiatal hernia   Philomena Collins

## 2020-02-06 NOTE — LETTER
February 6, 2020     Staci Cordoba, [address] [place] [address]    Patient: Francy Russ   YOB: 1980   Date of Visit: 2/6/2020        Do it may concern: Your patient, Nilda Phillip was seen in our urgent care department on 2/6/2020  Chuy Hernandez can begin using lidocaine patches tomorrow as pharmacy is closed  If you have any questions or concerns, please don't hesitate to call             Sincerely,        [facility]      CC: [unfilled]    Enclosure

## 2020-02-07 ENCOUNTER — TELEPHONE (OUTPATIENT)
Dept: FAMILY MEDICINE CLINIC | Facility: CLINIC | Age: 40
End: 2020-02-07

## 2020-02-07 ENCOUNTER — OFFICE VISIT (OUTPATIENT)
Dept: FAMILY MEDICINE CLINIC | Facility: CLINIC | Age: 40
End: 2020-02-07

## 2020-02-07 VITALS
BODY MASS INDEX: 41.25 KG/M2 | HEIGHT: 57 IN | SYSTOLIC BLOOD PRESSURE: 126 MMHG | WEIGHT: 191.2 LBS | RESPIRATION RATE: 16 BRPM | DIASTOLIC BLOOD PRESSURE: 80 MMHG | HEART RATE: 88 BPM | TEMPERATURE: 97.3 F
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
## 2020-02-07 DIAGNOSIS — R06.02 SOB (SHORTNESS OF BREATH): ICD-10-CM

## 2020-02-07 DIAGNOSIS — L30.4 INTERTRIGO: ICD-10-CM

## 2020-02-07 DIAGNOSIS — Z87.2 H/O SUNBURN: ICD-10-CM

## 2020-02-07 DIAGNOSIS — R52 PAIN: ICD-10-CM

## 2020-02-07 DIAGNOSIS — R13.10 DYSPHAGIA, UNSPECIFIED TYPE: ICD-10-CM

## 2020-02-07 PROCEDURE — 1036F TOBACCO NON-USER: CPT | Performed by: FAMILY MEDICINE

## 2020-02-07 PROCEDURE — 99213 OFFICE O/P EST LOW 20 MIN: CPT | Performed by: FAMILY MEDICINE

## 2020-02-07 PROCEDURE — 3008F BODY MASS INDEX DOCD: CPT | Performed by: FAMILY MEDICINE

## 2020-02-07 RX ORDER — CLOTRIMAZOLE 1 %
CREAM (GRAM) TOPICAL 2 TIMES DAILY
Qty: 30 G | Refills: 1 | Status: SHIPPED | OUTPATIENT
Start: 2020-02-07 | End: 2020-03-09 | Stop reason: SDUPTHER

## 2020-02-07 RX ORDER — MAGNESIUM HYDROXIDE/ALUMINUM HYDROXICE/SIMETHICONE 120; 1200; 1200 MG/30ML; MG/30ML; MG/30ML
15 SUSPENSION ORAL EVERY 4 HOURS PRN
Qty: 355 ML | Refills: 5 | Status: SHIPPED | OUTPATIENT
Start: 2020-02-07 | End: 2021-04-12 | Stop reason: SDUPTHER

## 2020-02-07 NOTE — ASSESSMENT & PLAN NOTE
Patient reports fall while ambulating with walker yesterday  -Reports worsening pain in lumbar area  No bowel or urinary incontinence  No red flags on physical exam  -Xray lumbar spine 3 views ordered at this visit to r/o fx   Follow up results  -Continue Ibuprofen 400 mg Q 6-8 hrs PRN and ice packs  -Lidocaine patch not approved by insurance  -If X-ray negative but patient complaining of pain consider PT referral

## 2020-02-07 NOTE — TELEPHONE ENCOUNTER
We received from [employer] the Staffing Ratio Letter for the patient  She has moved to a new house with only one other housemate, they want to adjust the staffing ratio  The Letter is in your bin to review it and sign it   Thanks Dr Sarah Denton

## 2020-02-07 NOTE — PROGRESS NOTES
[facility]        NAME: Vivian Huffman is a 38 y o  female  : 1980    MRN: [id number]  DATE: 2020  TIME: 10:01 PM    Assessment and Plan   Strain of muscle, fascia and tendon of lower back, initial encounter [S39 012A]  1  Strain of muscle, fascia and tendon of lower back, initial encounter  lidocaine (LIDODERM) 5 %         Patient Instructions   Continue with supportive care:  Gentle stretching, over-the-counter pain relief (500 mg p o  Q 8 hours as needed for pain), heat, rest to the area  Follow-up with primary care physician in 3-5 days if symptoms do not resolve  Proceed to the ER with any worsening of symptoms, numbness or tingling, loss of bowel or bladder function  Chief Complaint     Chief Complaint   Patient presents with    Fall     Patient fell onto her butt this afternoon  Patient points to her mid back and states she has pain         History of Present Illness       The patient notes that at 3pm this afternoon she fell on her butt  The patient notes that she was walking with her walker and notes that she lost her balance and hit her butt on the floor  She denies any injury to her head at the time  The patient notes that her pain is located near the lumbar spine more significant on the left side  The patient lives in group in home and she was seen by the nurse there evaluated her and offered to take her to urgent care at that time, but she refused  She now states that the pain has gotten a little bit worse  She wanted to eat dinner and visit with friend before she came to be evaluated  denies numbness and tingling and denies any loss of bowel or bladder function   The patient notes that she has been to the doctor for swelling in the legs- she was ordered compression stockings  She denies any increase in swelling of her legs since this happened  Patient denies use of any kind of blood thinners    Patient denies headaches, changes in vision, chest pain, shortness of breath, sensation changes to the legs or arms  Review of Systems   Review of Systems   Constitutional: Negative for chills and fatigue  HENT: Negative  Eyes: Negative  Respiratory: Negative for chest tightness, shortness of breath and wheezing  Cardiovascular: Negative for chest pain and palpitations  Gastrointestinal: Negative  Genitourinary: Negative  Musculoskeletal: Positive for back pain and myalgias (Left lower back)  Negative for gait problem and neck pain  Skin: Negative for color change and rash  Neurological: Negative for dizziness, numbness and headaches           Current Medications       Current Outpatient Medications:     acetaminophen (TYLENOL) 500 mg tablet, Take 500 mg by mouth every 6 (six) hours as needed for mild pain, Disp: , Rfl:     aluminum-magnesium hydroxide-simethicone (ANTACID) 200-200-20 mg/5 mL suspension, Take 15 mL by mouth every 4 (four) hours as needed for indigestion or heartburn (Patient not taking: Reported on 1/24/2020), Disp: 355 mL, Rfl: 5    ARIPiprazole (ABILIFY) 20 MG tablet, Take 1 tablet (20 mg total) by mouth daily at bedtime for 30 days at 9pm , Disp: 30 tablet, Rfl: 0    bacitracin topical ointment 500 units/g topical ointment, Apply 1 large application topically 3 (three) times a day for 7 days (Patient not taking: Reported on 1/24/2020), Disp: 15 g, Rfl: 0    bismuth subsalicylate (PEPTO BISMOL) 524 mg/30 mL oral suspension, Take 15 mL (262 mg total) by mouth every 6 (six) hours as needed for indigestion, Disp: 360 mL, Rfl: 3    calcium carbonate (TUMS) 500 mg chewable tablet, Chew 1 tablet (500 mg total) 3 (three) times a day as needed for heartburn, Disp: 30 tablet, Rfl: 1    Cholecalciferol (VITAMIN D-3) 1000 units CAPS, Take 2 capsules (2,000 Units total) by mouth daily at 8am , Disp: 180 capsule, Rfl: 1    clotrimazole (LOTRIMIN AF) 1 % cream, Lotrimin AF, Disp: , Rfl:     dextromethorphan-guaiFENesin (ROBITUSSIN DM)  mg/5 mL syrup, Take 5 mL by mouth 3 (three) times a day as needed for cough, Disp: 118 mL, Rfl: 1    escitalopram (LEXAPRO) 20 mg tablet, Take 1 tablet (20 mg total) by mouth daily for 30 days at 8am , Disp: 90 tablet, Rfl: 0    fluticasone (FLONASE) 50 mcg/act nasal spray, 1 spray into each nostril daily as needed for rhinitis (nasal congestion) At 8:00 AM, Disp: 1 Bottle, Rfl: 5    ibuprofen (MOTRIN) 400 mg tablet, Take 1 tablet (400 mg total) by mouth every 8 (eight) hours as needed for mild pain, Disp: 30 tablet, Rfl: 1    lamoTRIgine (LaMICtal) 25 mg tablet, , Disp: , Rfl:     lidocaine (LIDODERM) 5 %, Apply 1 patch topically daily Remove & Discard patch within 12 hours or as directed by MD, Disp: 15 patch, Rfl: 0    lubiprostone (AMITIZA) 24 mcg capsule, Take 1 capsule (24 mcg total) by mouth 2 (two) times a day with meals, Disp: 60 capsule, Rfl: 5    magnesium hydroxide (GNP MILK OF MAGNESIA) 400 mg/5 mL oral suspension, Take 30 mL by mouth daily as needed for constipation If no BM in 3 days, Disp: 355 mL, Rfl: 5    Mouthwashes (LISTERINE ANTISEPTIC) LIQD, Apply 1 application to the mouth or throat 2 (two) times a day, Disp: 500 mL, Rfl: 3    Multiple Vitamins-Minerals (CERTAVITE SENIOR/ANTIOXIDANT) TABS, Take 1 tablet by mouth daily, Disp: 90 tablet, Rfl: 2    mupirocin (BACTROBAN) 2 % ointment, Apply topically 3 (three) times a day for 5 days (Patient not taking: Reported on 1/24/2020), Disp: 22 g, Rfl: 0    norgestimate-ethinyl estradiol (ESTARYLLA) 0 25-35 MG-MCG per tablet, Estarylla 0 25 mg-35 mcg tablet, Disp: , Rfl:     pantoprazole (PROTONIX) 20 mg tablet, Take 1 tablet 30 minutes before breakfast daily, Disp: 31 tablet, Rfl: 5    phenol (CHLORASEPTIC) 1 4 % mucosal liquid, Apply 1 spray to the mouth or throat every 2 (two) hours as needed (for sore throat as needed), Disp: 236 mL, Rfl: 1    polyethylene glycol (MIRALAX) 17 g packet, Take one packet daily as needed for no bowel movement in 24 hours (Patient not taking: Reported on 1/24/2020), Disp: 14 each, Rfl: 3    psyllium (METAMUCIL) 58 6 % packet, Take 1 packet by mouth daily (Patient not taking: Reported on 1/24/2020), Disp: 30 packet, Rfl: 5    RA SUNSCREEN SPF50 LOTN, Apply 15 minutes before sun exposure and every 2 hours, Disp: 1 Bottle, Rfl: 5    senna-docusate sodium (SENEXON-S) 8 6-50 mg per tablet, Take 1 tablet by mouth daily at 8am , Disp: 90 tablet, Rfl: 3    sodium chloride (OCEAN) 0 65 % nasal spray, 1 spray into each nostril as needed (three times daily as needed for nasal congestion), Disp: 60 mL, Rfl: 1    traZODone (DESYREL) 50 mg tablet, Take 1 tablet (50 mg total) by mouth daily at bedtime at 9pm, Disp: , Rfl: 0    zinc oxide (DESITIN) 13 % cream, Apply 1 application topically as needed (for perianal irritation), Disp: 1 Tube, Rfl: 5    Current Allergies     Allergies as of 02/06/2020    (No Known Allergies)            The following portions of the patient's history were reviewed and updated as appropriate: allergies, current medications, past family history, past medical history, past social history, past surgical history and problem list      Past Medical History:   Diagnosis Date    Acid reflux     ADD (attention deficit disorder)     Adjustment disorder     Anxiety     Astigmatism     Brain lesion     Brain lesion     Bronchitis     Calcium deficiency     Cellulitis of foot, right 6/4/2018    Cerebral palsy (HCC)     Cerebral palsy (HCC)     Last Assessed:7/6/2016    Chronic otitis media     Chronically dry eyes, left     Last Assessed:7/6/2016    Constipation     Depression     Last Assessed:7/6/2016    Depressive disorder     Dry eyes     Dysphagia     Esophagitis     Esotropia     Fall     GERD (gastroesophageal reflux disease)     GERD (gastroesophageal reflux disease)     Hiatal hernia     Hydrocephalus (HCC)     Hyperopia with astigmatism     Impaired fasting glucose     Left nephrolithiasis 3/4/2019    Mood disorder (HCC)     Myopia     Oppositional defiant disorder     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HCC)     Psychiatric disorder     Seizures (HCC)     Sensorineural hearing loss     Status post ventriculoatrial shunt placement     Visual impairment        Past Surgical History:   Procedure Laterality Date    CSF SHUNT      Creation of Ventriculo-Peritoneal CSF shunt ; Last Assessed:7/6/2016    EAR SURGERY      Last Assessed:7/6/2016    LEG SURGERY      due to CP     NOSE SURGERY      Last Assessed:7/6/2016    [place] ESOPHAGOGASTRODUODENOSCOPY TRANSORAL DIAGNOSTIC N/A 5/9/2019    Procedure: ESOPHAGOGASTRODUODENOSCOPY (EGD) with biopsy;  Surgeon: Kashif Olivares MD;  Location: [facility]; Service: Gastroenterology    UPPER GASTROINTESTINAL ENDOSCOPY  05/2019       Family History   Problem Relation Age of Onset    Diabetes Mother     No Known Problems Father          Medications have been verified  Objective   /65   Pulse 100   Temp 98 1 °F (36 7 °C)   Resp 20   Ht 4' 9" (1 448 m)   Wt 88 5 kg (195 lb)   LMP  (LMP Unknown)   SpO2 97%   BMI 42 20 kg/m²        Physical Exam     Physical Exam   Constitutional: She appears well-developed and well-nourished  She is cooperative  She does not appear ill  No distress  HENT:   Head: Normocephalic and atraumatic  Right Ear: Hearing, tympanic membrane, external ear and ear canal normal    Left Ear: Hearing, tympanic membrane, external ear and ear canal normal    Nose: Nose normal  No mucosal edema or rhinorrhea  Mouth/Throat: Uvula is midline, oropharynx is clear and moist and mucous membranes are normal  No uvula swelling  No oropharyngeal exudate or posterior oropharyngeal erythema  Eyes: Pupils are equal, round, and reactive to light  Conjunctivae, EOM and lids are normal    Neck: Neck supple  Cardiovascular: Normal rate, regular rhythm, S1 normal, S2 normal and normal heart sounds     No murmur heard   Pulmonary/Chest: Effort normal and breath sounds normal  No stridor  No respiratory distress  She has no decreased breath sounds  She has no wheezes  She has no rales  Abdominal: Soft  Bowel sounds are normal  There is no tenderness  Musculoskeletal:        Lumbar back: She exhibits tenderness (Left-sided paraspinal muscles)  She exhibits no bony tenderness, no swelling, no edema, no deformity, no laceration and no spasm  Arms:  Lymphadenopathy:     She has no cervical adenopathy  Neurological: She is alert  Skin: Skin is warm and dry  No rash noted  She is not diaphoretic  Psychiatric: She has a normal mood and affect

## 2020-02-07 NOTE — PATIENT INSTRUCTIONS
Continue with supportive care:  Gentle stretching, over-the-counter pain relief (500 mg p o  Q 8 hours as needed for pain), heat, rest to the area  Follow-up with primary care physician in 3-5 days if symptoms do not resolve  Proceed to the ER with any worsening of symptoms, numbness or tingling, loss of bowel or bladder function  Low Back Strain   WHAT YOU NEED TO KNOW:   Low back strain is an injury to your lower back muscles or tendons  Tendons are strong tissues that connect muscles to bones  The lower back supports most of your body weight and helps you move, twist, and bend  DISCHARGE INSTRUCTIONS:   Return to the emergency department if:   · You hear or feel a pop in your lower back  · You have increased swelling or pain in your lower back  · You have trouble moving your legs  · Your legs are numb  Contact your healthcare provider if:   · You have a fever  · Your pain does not go away, even after treatment  · You have questions or concerns about your condition or care  Medicines: The following medicines may be ordered by your healthcare provider:  · Acetaminophen decreases pain and fever  It is available without a doctor's order  Ask how much to take and how often to take it  Follow directions  Acetaminophen can cause liver damage if not taken correctly  · NSAIDs , such as ibuprofen, help decrease swelling, pain, and fever  This medicine is available with or without a doctor's order  NSAIDs can cause stomach bleeding or kidney problems in certain people  If you take blood thinner medicine, always ask your healthcare provider if NSAIDs are safe for you  Always read the medicine label and follow directions  · Muscle relaxers  help decrease pain and muscle spasms  · Prescription pain medicine  may be given  Ask how to take this medicine safely  · Take your medicine as directed    Contact your healthcare provider if you think your medicine is not helping or if you have side effects  Tell him or her if you are allergic to any medicine  Keep a list of the medicines, vitamins, and herbs you take  Include the amounts, and when and why you take them  Bring the list or the pill bottles to follow-up visits  Carry your medicine list with you in case of an emergency  Self-care:   · Rest  as directed  You may need to rest in bed for a period of time after your injury  Do not lift heavy objects  · Apply ice  on your back for 15 to 20 minutes every hour or as directed  Use an ice pack, or put crushed ice in a plastic bag  Cover it with a towel  Ice helps prevent tissue damage and decreases swelling and pain  · Apply heat  on your lower back for 20 to 30 minutes every 2 hours for as many days as directed  Heat helps decrease pain and muscle spasms  · Slowly start to increase your activity  as the pain decreases, or as directed  Prevent another low back strain:   · Use correct body movements  ¨ Bend at the hips and knees when you  objects  Do not bend from the waist  Use your leg muscles as you lift the load  Do not use your back  Keep the object close to your chest as you lift it  Try not to twist or lift anything above your waist     ¨ Change your position often when you stand for long periods of time  Rest one foot on a small box or footrest, and then switch to the other foot often  ¨ Try not to sit for long periods of time  When you do, sit in a straight-backed chair with your feet flat on the floor  ¨ Never reach, pull, or push while you are sitting  · Warm up before you exercise  Do exercises that strengthen your back muscles  Ask your healthcare provider about the best exercise plan for you  · Maintain a healthy weight  Ask your healthcare provider how much you should weigh  Ask him to help you create a weight loss plan if you are overweight    © 2017 [employer] Information is for End User's use only and may not be sold, redistributed or otherwise used for commercial purposes  All illustrations and images included in CareNotes® are the copyrighted property of [employer]  or [employer]  The above information is an  only  It is not intended as medical advice for individual conditions or treatments  Talk to your doctor, nurse or pharmacist before following any medical regimen to see if it is safe and effective for you

## 2020-02-07 NOTE — PROGRESS NOTES
Assessment/Plan:     Problem List Items Addressed This Visit     Fall - Primary     Patient reports fall while ambulating with walker yesterday  -Reports worsening pain in lumbar area  No bowel or urinary incontinence  No red flags on physical exam  -Xray lumbar spine 3 views ordered at this visit to r/o fx  Follow up results  -Continue Ibuprofen 400 mg Q 6-8 hrs PRN and ice packs  -Lidocaine patch not approved by insurance  -If X-ray negative but patient complaining of pain consider PT referral          Relevant Orders    XR spine lumbar 2 or 3 views injury      Other Visit Diagnoses     Pain        Dysphagia, unspecified type        Relevant Medications    aluminum-magnesium hydroxide-simethicone (ANTACID) 200-200-20 mg/5 mL suspension    H/O sunburn        Relevant Medications    RA SUNSCREEN SPF50 LOTN          Subjective:     Patient ID: Declan Dill is a 38 y o  female  39 yo F with a history of cerebral palsy and moderate intellectual disability who presents for follow-up of lower back pain associated with a recent fall  Patient reports falling on her buttocks on 2/6/20 around 3 pm when she was ambulating using her walker while at the group home where she resides  Patient reports a sharp, stabbing pain that originates in her lower back and radiates up along the spine towards the middle of her back and also radiates down the lateral aspect of the left thigh and leg  The pain occurs at rest when sitting or lying in bed and is exacerbated by movement  Patient reports taking ibuprofen at home with minimal relief  Patient denies loss of consciousness, headache, dizziness, fever, NVD, urinary/fecal incontinence, changes in appetite and sleep patterns       Past Medical History:   Diagnosis Date    Acid reflux     ADD (attention deficit disorder)     Adjustment disorder     Anxiety     Astigmatism     Brain lesion     Brain lesion     Bronchitis     Calcium deficiency     Cellulitis of foot, right 6/4/2018    Cerebral palsy (HCC)     [facility]y ([facility])     Last Assessed:7/6/2016    Chronic otitis media     Chronically dry eyes, left     Last Assessed:7/6/2016    Constipation     Depression     Last Assessed:7/6/2016    Depressive disorder     Dry eyes     Dysphagia     Esophagitis     Esotropia     Fall     GERD (gastroesophageal reflux disease)     GERD (gastroesophageal reflux disease)     Hiatal hernia     Hydrocephalus ([facility])     Hyperopia with astigmatism     Impaired fasting glucose     Left nephrolithiasis 3/4/2019    Mood disorder ([address] )     Myopia     Oppositional defiant disorder     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[facility])     Psychiatric disorder     Seizures ([facility])     Sensorineural hearing loss     Status post ventriculoatrial shunt placement     Visual impairment        Current Outpatient Medications:     acetaminophen (TYLENOL) 500 mg tablet, Take 500 mg by mouth every 6 (six) hours as needed for mild pain, Disp: , Rfl:     aluminum-magnesium hydroxide-simethicone (ANTACID) 200-200-20 mg/5 mL suspension, Take 15 mL by mouth every 4 (four) hours as needed for indigestion or heartburn, Disp: 355 mL, Rfl: 5    ARIPiprazole (ABILIFY) 20 MG tablet, Take 1 tablet (20 mg total) by mouth daily at bedtime for 30 days at 9pm , Disp: 30 tablet, Rfl: 0    bacitracin topical ointment 500 units/g topical ointment, Apply 1 large application topically 3 (three) times a day for 7 days, Disp: 15 g, Rfl: 0    bismuth subsalicylate (PEPTO BISMOL) 524 mg/30 mL oral suspension, Take 15 mL (262 mg total) by mouth every 6 (six) hours as needed for indigestion, Disp: 360 mL, Rfl: 3    calcium carbonate (TUMS) 500 mg chewable tablet, Chew 1 tablet (500 mg total) 3 (three) times a day as needed for heartburn, Disp: 30 tablet, Rfl: 1    Cholecalciferol (VITAMIN D-3) 1000 units CAPS, Take 2 capsules (2,000 Units total) by mouth daily at 8am , Disp: 180 capsule, Rfl: 1    clotrimazole (LOTRIMIN AF) 1 % cream, Apply topically 2 (two) times a day, Disp: 30 g, Rfl: 1    dextromethorphan-guaiFENesin (ROBITUSSIN DM)  mg/5 mL syrup, Take 5 mL by mouth 3 (three) times a day as needed for cough, Disp: 118 mL, Rfl: 1    escitalopram (LEXAPRO) 20 mg tablet, Take 1 tablet (20 mg total) by mouth daily for 30 days at 8am , Disp: 90 tablet, Rfl: 0    fluticasone (FLONASE) 50 mcg/act nasal spray, 1 spray into each nostril daily as needed for rhinitis (nasal congestion) At 8:00 AM, Disp: 1 Bottle, Rfl: 5    ibuprofen (MOTRIN) 400 mg tablet, Take 1 tablet (400 mg total) by mouth every 8 (eight) hours as needed for mild pain, Disp: 30 tablet, Rfl: 1    lamoTRIgine (LaMICtal) 25 mg tablet, , Disp: , Rfl:     lidocaine (LIDODERM) 5 %, Apply 1 patch topically daily Remove & Discard patch within 12 hours or as directed by MD, Disp: 15 patch, Rfl: 0    lubiprostone (AMITIZA) 24 mcg capsule, Take 1 capsule (24 mcg total) by mouth 2 (two) times a day with meals, Disp: 60 capsule, Rfl: 5    magnesium hydroxide (GNP MILK OF MAGNESIA) 400 mg/5 mL oral suspension, Take 30 mL by mouth daily as needed for constipation If no BM in 3 days, Disp: 355 mL, Rfl: 5    Mouthwashes (LISTERINE ANTISEPTIC) LIQD, Apply 1 application to the mouth or throat 2 (two) times a day, Disp: 500 mL, Rfl: 3    Multiple Vitamins-Minerals (CERTAVITE SENIOR/ANTIOXIDANT) TABS, Take 1 tablet by mouth daily, Disp: 90 tablet, Rfl: 2    norgestimate-ethinyl estradiol (ESTARYLLA) 0 25-35 MG-MCG per tablet, Estarylla 0 25 mg-35 mcg tablet, Disp: , Rfl:     pantoprazole (PROTONIX) 20 mg tablet, Take 1 tablet 30 minutes before breakfast daily, Disp: 31 tablet, Rfl: 5    phenol (CHLORASEPTIC) 1 4 % mucosal liquid, Apply 1 spray to the mouth or throat every 2 (two) hours as needed (for sore throat as needed), Disp: 236 mL, Rfl: 1    RA SUNSCREEN SPF50 LOTN, Apply 15 minutes before sun exposure and every 2 hours, Disp: 1 Bottle, Rfl: 5    senna-docusate sodium (SENEXON-S) 8 6-50 mg per tablet, Take 1 tablet by mouth daily at 8am , Disp: 90 tablet, Rfl: 3    sodium chloride (OCEAN) 0 65 % nasal spray, 1 spray into each nostril as needed (three times daily as needed for nasal congestion), Disp: 60 mL, Rfl: 1    traZODone (DESYREL) 50 mg tablet, Take 1 tablet (50 mg total) by mouth daily at bedtime at 9pm, Disp: , Rfl: 0    zinc oxide (DESITIN) 13 % cream, Apply 1 application topically as needed (for perianal irritation), Disp: 1 Tube, Rfl: 5    mupirocin (BACTROBAN) 2 % ointment, Apply topically 3 (three) times a day for 5 days (Patient not taking: Reported on 1/24/2020), Disp: 22 g, Rfl: 0    polyethylene glycol (MIRALAX) 17 g packet, Take one packet daily as needed for no bowel movement in 24 hours (Patient not taking: Reported on 1/24/2020), Disp: 14 each, Rfl: 3    psyllium (METAMUCIL) 58 6 % packet, Take 1 packet by mouth daily (Patient not taking: Reported on 1/24/2020), Disp: 30 packet, Rfl: 5     Review of Systems   Reason unable to perform ROS: Limited due to cerebral palsy  Constitutional: Negative  HENT: Negative  Eyes: Negative  Respiratory: Negative  Gastrointestinal: Negative  Genitourinary: Negative  Denies urinary or bowel intontinence   Musculoskeletal: Positive for back pain and myalgias  Skin: Negative  Neurological: Negative  Objective:    Vitals:    02/07/20 1506   BP: 126/80   BP Location: Left arm   Patient Position: Sitting   Cuff Size: Large   Pulse: 88   Resp: 16   Temp: (!) 97 3 °F (36 3 °C)   TempSrc: Tympanic   Weight: 86 7 kg (191 lb 3 2 oz)   Height: 4' 9" (1 448 m)          Physical Exam   Constitutional: She is oriented to person, place, and time  She appears well-developed and well-nourished  HENT:   Head: Normocephalic and atraumatic  Neck: Normal range of motion  Neck supple  Cardiovascular: Normal rate, regular rhythm and normal heart sounds     Pulmonary/Chest: Effort normal and breath sounds normal    Musculoskeletal: Normal range of motion  She exhibits tenderness  Thoracic back: She exhibits tenderness, bony tenderness and pain  Lumbar back: She exhibits tenderness, bony tenderness and pain  Legs:  Pain at palpation in Lumbar spine  Neurological: She is alert and oriented to person, place, and time  No sensory deficit  Psychiatric: She has a normal mood and affect  Her behavior is normal    Nursing note and vitals reviewed

## 2020-02-10 ENCOUNTER — TELEPHONE (OUTPATIENT)
Dept: FAMILY MEDICINE CLINIC | Facility: CLINIC | Age: 40
End: 2020-02-10

## 2020-02-10 NOTE — TELEPHONE ENCOUNTER
Brendan Alpers from [employer],  Requesting a clarification of why Clotrimozole 1 percent cream was prescribed

## 2020-02-11 NOTE — TELEPHONE ENCOUNTER
On previous visit Elizabeth's caretaker brought a note with medications needed to be refilled and antifungal cream was needed for intertrigo

## 2020-02-17 ENCOUNTER — TRANSCRIBE ORDERS (OUTPATIENT)
Dept: RADIOLOGY | Facility: HOSPITAL | Age: 40
End: 2020-02-17

## 2020-02-17 ENCOUNTER — HOSPITAL ENCOUNTER (OUTPATIENT)
Dept: RADIOLOGY | Facility: HOSPITAL | Age: 40
Discharge: HOME | End: 2020-02-17
Payer: MEDICARE
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
## 2020-02-17 PROCEDURE — 72100 X-RAY EXAM L-S SPINE 2/3 VWS: CPT

## 2020-02-18 NOTE — PROGRESS NOTES
History of Present Illness:     Patient presents for Medicare Annual Wellness visit    Patient Care Team:  Micaela Ribera MD as PCP - General (Family Medicine)  MD Salbador Cleary PA-C Kathlee Burton, MD (Psychiatry)  Flower Cerda DPM (Podiatry)     Problem List:     Patient Active Problem List   Diagnosis    Leg swelling    Generalized anxiety disorder    Brain lesion    [facility]ere[facility] palsy ([facility]    Chronic knee pain    Constipation    Severe episode of recurrent major depressive disorder, with psychotic features ([facility] [facility] )    Dizziness    Functional dysphagia    Hearing impairment    Acquired renal cyst of left kidney    Low back pain    Moderate intellectual disability    Pituitary neoplasm    Seasonal allergies    Varicose veins with pain    Medicare annual wellness visit, subsequent    Hematuria    Pituitary microadenoma ([facility]    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[facility] [facility]    Ambulatory dysfunction    Bruise    Bilateral impacted cerumen    TMJ (temporomandibular joint disorder)    Gait disturbance    Bilateral thoracic back pain    Cerumen impaction    Skin picking habit    Hiatal hernia    SOB (shortness of breath)    Dysuria    Left nephrolithiasis    Non-seasonal allergic rhinitis    Viral URI    Self-injurious behavior    Suprasellar extension of pituitary adenoma ([facility] [address] [facility]    Gastroesophageal reflux disease without esophagitis    Atypical chest pain    Intertrigo    Nausea and vomiting    Excoriation of face    Sore throat    Impetigo    Pain of right calf    Elevated blood pressure reading in office without diagnosis of hypertension    Fall      Past Medical and Surgical History:     Past Medical History:   Diagnosis Date    Acid reflux     ADD (attention deficit disorder)     Adjustment disorder     Anxiety     Astigmatism     Brain lesion     Brain lesion     Bronchitis     Calcium deficiency     Cellulitis of foot, right 6/4/2018    Cerebral palsy ([address] )  Cerebral palsy (HCC)     Last Assessed:7/6/2016    Chronic otitis media     Chronically dry eyes, left     Last Assessed:7/6/2016    Constipation     Depression     Last Assessed:7/6/2016    Depressive disorder     Dry eyes     Dysphagia     Esophagitis     Esotropia     Fall     GERD (gastroesophageal reflux disease)     GERD (gastroesophageal reflux disease)     Hiatal hernia     Hydrocephalus (HCC)     Hyperopia with astigmatism     Impaired fasting glucose     Left nephrolithiasis 3/4/2019    Mood disorder ([address] )     Myopia     Oppositional defiant disorder     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HCC)     Psychiatric disorder     Seizures (HCC)     Sensorineural hearing loss     Status post ventriculoatrial shunt placement     Visual impairment      Past Surgical History:   Procedure Laterality Date    CSF SHUNT      Creation of Ventriculo-Peritoneal CSF shunt ; Last Assessed:7/6/2016    EAR SURGERY      Last Assessed:7/6/2016    LEG SURGERY      due to CP     NOSE SURGERY      Last Assessed:7/6/2016    WI ESOPHAGOGASTRODUODENOSCOPY TRANSORAL DIAGNOSTIC N/A 5/9/2019    Procedure: ESOPHAGOGASTRODUODENOSCOPY (EGD) with biopsy;  Surgeon: Edgar Zaman MD;  Location: [facility];   Service: Gastroenterology    UPPER GASTROINTESTINAL ENDOSCOPY  05/2019      Family History:     Family History   Problem Relation Age of Onset    Diabetes Mother     No Known Problems Father       Social History:     E-Cigarette/Vaping    E-Cigarette Use Never User      E-Cigarette/Vaping Substances    Nicotine No     Flavoring No      Social History     Socioeconomic History    Marital status: Single     Spouse name: None    Number of children: None    Years of education: None    Highest education level: None   Occupational History    None   Social Needs    Financial resource strain: None    Food insecurity:     Worry: None     Inability: None    Transportation needs:     Medical: None     Non-medical: None   Tobacco Use    Smoking status: Never Smoker    Smokeless tobacco: Never Used   Substance and Sexual Activity    Alcohol use: No    Drug use: No    Sexual activity: Never   Lifestyle    Physical activity:     Days per week: None     Minutes per session: None    Stress: None   Relationships    Social connections:     Talks on phone: None     Gets together: None     Attends Christian service: None     Active member of club or organization: None     Attends meetings of clubs or organizations: None     Relationship status: None    Intimate partner violence:     Fear of current or ex partner: None     Emotionally abused: None     Physically abused: None     Forced sexual activity: None   Other Topics Concern    None   Social History Narrative    Always uses seat belt    Lives in group home      Medications and Allergies:     Current Outpatient Medications   Medication Sig Dispense Refill    acetaminophen (TYLENOL) 500 mg tablet Take 500 mg by mouth every 6 (six) hours as needed for mild pain      aluminum-magnesium hydroxide-simethicone (ANTACID) 200-200-20 mg/5 mL suspension Take 15 mL by mouth every 4 (four) hours as needed for indigestion or heartburn 355 mL 5    ARIPiprazole (ABILIFY) 20 MG tablet Take 1 tablet (20 mg total) by mouth daily at bedtime for 30 days at 9pm  30 tablet 0    bacitracin topical ointment 500 units/g topical ointment Apply 1 large application topically 3 (three) times a day for 7 days 15 g 0    bismuth subsalicylate (PEPTO BISMOL) 524 mg/30 mL oral suspension Take 15 mL (262 mg total) by mouth every 6 (six) hours as needed for indigestion 360 mL 3    calcium carbonate (TUMS) 500 mg chewable tablet Chew 1 tablet (500 mg total) 3 (three) times a day as needed for heartburn 30 tablet 1    Cholecalciferol (VITAMIN D-3) 1000 units CAPS Take 2 capsules (2,000 Units total) by mouth daily at 8am  180 capsule 1    clotrimazole (LOTRIMIN AF) 1 % cream Apply topically 2 (two) times a day 30 g 1    dextromethorphan-guaiFENesin (ROBITUSSIN DM)  mg/5 mL syrup Take 5 mL by mouth 3 (three) times a day as needed for cough 118 mL 1    escitalopram (LEXAPRO) 20 mg tablet Take 1 tablet (20 mg total) by mouth daily for 30 days at 8am  90 tablet 0    fluticasone (FLONASE) 50 mcg/act nasal spray 1 spray into each nostril daily as needed for rhinitis (nasal congestion) At 8:00 AM 1 Bottle 5    ibuprofen (MOTRIN) 400 mg tablet Take 1 tablet (400 mg total) by mouth every 8 (eight) hours as needed for mild pain 30 tablet 1    lamoTRIgine (LaMICtal) 25 mg tablet       lidocaine (LIDODERM) 5 % Apply 1 patch topically daily Remove & Discard patch within 12 hours or as directed by MD 15 patch 0    lubiprostone (AMITIZA) 24 mcg capsule Take 1 capsule (24 mcg total) by mouth 2 (two) times a day with meals 60 capsule 5    magnesium hydroxide (GNP MILK OF MAGNESIA) 400 mg/5 mL oral suspension Take 30 mL by mouth daily as needed for constipation If no BM in 3 days 355 mL 5    Mouthwashes (LISTERINE ANTISEPTIC) LIQD Apply 1 application to the mouth or throat 2 (two) times a day 500 mL 3    Multiple Vitamins-Minerals (CERTAVITE SENIOR/ANTIOXIDANT) TABS Take 1 tablet by mouth daily 90 tablet 2    norgestimate-ethinyl estradiol (ESTARYLLA) 0 25-35 MG-MCG per tablet Estarylla 0 25 mg-35 mcg tablet      pantoprazole (PROTONIX) 20 mg tablet Take 1 tablet 30 minutes before breakfast daily 31 tablet 5    phenol (CHLORASEPTIC) 1 4 % mucosal liquid Apply 1 spray to the mouth or throat every 2 (two) hours as needed (for sore throat as needed) 236 mL 1    RA SUNSCREEN SPF50 LOTN Apply 15 minutes before sun exposure and every 2 hours 1 Bottle 5    senna-docusate sodium (SENEXON-S) 8 6-50 mg per tablet Take 1 tablet by mouth daily at 8am  90 tablet 3    sodium chloride (OCEAN) 0 65 % nasal spray 1 spray into each nostril as needed (three times daily as needed for nasal congestion) 60 mL 1    traZODone (DESYREL) 50 mg tablet Take 1 tablet (50 mg total) by mouth daily at bedtime at 9pm  0    zinc oxide (DESITIN) 13 % cream Apply 1 application topically as needed (for perianal irritation) 1 Tube 5    mupirocin (BACTROBAN) 2 % ointment Apply topically 3 (three) times a day for 5 days (Patient not taking: Reported on 1/24/2020) 22 g 0    polyethylene glycol (MIRALAX) 17 g packet Take one packet daily as needed for no bowel movement in 24 hours (Patient not taking: Reported on 1/24/2020) 14 each 3    psyllium (METAMUCIL) 58 6 % packet Take 1 packet by mouth daily (Patient not taking: Reported on 1/24/2020) 30 packet 5     No current facility-administered medications for this visit  No Known Allergies   Immunizations:     Immunization History   Administered Date(s) Administered     Influenza (IM) Preservative Free 10/04/2016    INFLUENZA 12/01/2000, 12/12/2001, 10/09/2002, 10/14/2003, 10/20/2004, 10/11/2006, 10/17/2007, 10/14/2008, 09/29/2009, 09/14/2010, 09/09/2011, 09/05/2012, 09/03/2013, 10/22/2014, 10/14/2015, 10/04/2016    Influenza, injectable, quadrivalent, preservative free 0 5 mL 10/04/2018, 10/21/2019    Td (adult), adsorbed 09/09/2003    Tdap 09/14/2010    Tuberculin Skin Test-PPD Intradermal 04/05/2000, 10/16/2000, 10/09/2002, 10/20/2004, 11/03/2008, 04/10/2018      Health Maintenance:         Topic Date Due    CRC Screening: Colonoscopy  1980    MAMMOGRAM  03/06/2020    Cervical Cancer Screening  06/17/2022     There are no preventive care reminders to display for this patient  Medicare Health Risk Assessment:     /80 (BP Location: Left arm, Patient Position: Sitting, Cuff Size: Large)   Pulse 86   Temp 98 1 °F (36 7 °C) (Tympanic)   Resp 16   Ht 4' 9" (1 448 m)   Wt 85 2 kg (187 lb 12 8 oz)   LMP  (LMP Unknown)   BMI 40 64 kg/m²          Health Risk Assessment:   Patient rates overall health as good  Patient feels that their physical health rating is slightly better  Eyesight was rated as same  Hearing was rated as same  Patient feels that their emotional and mental health rating is same  Pain experienced in the last 7 days has been some  Patient's pain rating has been 9/10  Patient states that she has experienced no weight loss or gain in last 6 months  Depression Screening:   PHQ-2 Score: 0  PHQ-9 Score: 0      Fall Risk Screening: In the past year, patient has experienced: no history of falling in past year      Urinary Incontinence Screening:   Patient has not leaked urine accidently in the last six months  Home Safety:  Patient has trouble with stairs inside or outside of their home  Patient has working smoke alarms and has working carbon monoxide detector  Home safety hazards include: loose rugs on the floor  Nutrition:   Is on a gerd diet and food has to be cut into one inch sized pieces and eat slowly  Small frequent meals throughout the day  Medications:   Patient is currently taking over-the-counter supplements  OTC medications include: see medication list      Activities of Daily Living (ADLs)/Instrumental Activities of Daily Living (IADLs):   Walk and transfer into and out of bed and chair?: Yes  Dress and groom yourself?: Yes    Bathe or shower yourself?: Yes    Feed yourself?  Yes  Do your laundry/housekeeping?: No  Manage your money, pay your bills and track your expenses?: No  Make your own meals?: No    Do your own shopping?: No    Previous Hospitalizations:   Any hospitalizations or ED visits within the last 12 months?: Yes      Advance Care Planning:   Living will: No    Durable POA for healthcare: No    Advanced directive: No    Advanced directive counseling given: No    Five wishes given: No      PREVENTIVE SCREENINGS      Cardiovascular Screening:    General: Screening Current      Diabetes Screening:     General: Screening Current      Colorectal Cancer Screening:     General: Screening Not Indicated      Breast Cancer Screening: General: Screening Current      Cervical Cancer Screening:    General: Screening Current      Osteoporosis Screening:    General: Screening Not Indicated      Abdominal Aortic Aneurysm (AAA) Screening:        General: Screening Not Indicated      Lung Cancer Screening:     General: Screening Not Indicated      Hepatitis C Screening:    General: Screening Not Indicated    Other Counseling Topics:   Car/seat belt/driving safety         Diana Gibson MD

## 2020-02-19 ENCOUNTER — TELEPHONE (OUTPATIENT)
Dept: FAMILY MEDICINE CLINIC | Facility: CLINIC | Age: 40
End: 2020-02-19

## 2020-02-19 ENCOUNTER — OFFICE VISIT (OUTPATIENT)
Dept: FAMILY MEDICINE CLINIC | Facility: CLINIC | Age: 40
End: 2020-02-19

## 2020-02-19 VITALS
DIASTOLIC BLOOD PRESSURE: 80 MMHG | BODY MASS INDEX: 40.51 KG/M2 | RESPIRATION RATE: 16 BRPM | TEMPERATURE: 98.1 F | SYSTOLIC BLOOD PRESSURE: 122 MMHG | WEIGHT: 187.8 LBS | HEART RATE: 86 BPM | HEIGHT: 57 IN

## 2020-02-19 DIAGNOSIS — G47.9 SLEEP DISTURBANCE: ICD-10-CM

## 2020-02-19 DIAGNOSIS — Z11.1 ENCOUNTER FOR PPD TEST: Primary | ICD-10-CM

## 2020-02-19 DIAGNOSIS — K59.04 CHRONIC IDIOPATHIC CONSTIPATION: ICD-10-CM

## 2020-02-19 DIAGNOSIS — Z11.4 SCREENING FOR HIV (HUMAN IMMUNODEFICIENCY VIRUS): ICD-10-CM

## 2020-02-19 PROCEDURE — G0438 PPPS, INITIAL VISIT: HCPCS | Performed by: FAMILY MEDICINE

## 2020-02-19 PROCEDURE — 86580 TB INTRADERMAL TEST: CPT | Performed by: FAMILY MEDICINE

## 2020-02-19 PROCEDURE — 1036F TOBACCO NON-USER: CPT | Performed by: FAMILY MEDICINE

## 2020-02-19 PROCEDURE — 3008F BODY MASS INDEX DOCD: CPT | Performed by: FAMILY MEDICINE

## 2020-02-19 PROCEDURE — 99213 OFFICE O/P EST LOW 20 MIN: CPT | Performed by: FAMILY MEDICINE

## 2020-02-19 NOTE — PROGRESS NOTES
Assessment/Plan:    Constipation  Chronic  Continue Bowel regimen with metamucil, miralax    Sleep disturbance  Caregiver reports that since changing her to another facility 2-3 weeks ago  -DDx: Anxiety, NADIYA  -After talking about sleep medicine referral caregiver reports they might try to do conservative management and if symptoms don't improve will ask for referral to sleep medicine  -Follow up Symptoms         Problem List Items Addressed This Visit        Other    Constipation     Chronic  Continue Bowel regimen with metamucil, miralax         Sleep disturbance     Caregiver reports that since changing her to another facility 2-3 weeks ago  -DDx: Anxiety, NADIYA  -After talking about sleep medicine referral caregiver reports they might try to do conservative management and if symptoms don't improve will ask for referral to sleep medicine  -Follow up Symptoms           Other Visit Diagnoses     Encounter for PPD test    -  Primary    Relevant Orders    TB Skin Test (Completed)    Screening for HIV (human immunodeficiency virus)              BMI Counseling: Body mass index is 40 64 kg/m²  Follow-up plan was not completed due to elderly patient (71 years old) where weight reduction/weight gain would complicate underlying health condition such as: illness or physical disability  Subjective:      Patient ID: Dheeraj Subramanian is a 38 y o  female  Patient is a 37 yo F with PMH of cerebral palsy, GERD, pituitary adenoma, depression, gait disturbance with use of walker who presents to the office for MAW and for PPD testing  Adonis Brown reports that since she was changed to another facility she hasn't been able to sleep well  She feels more anxious and wants something to sleep  She reports she snores, feels very tired during the day and not sleeping well through the night          The following portions of the patient's history were reviewed and updated as appropriate:   She  has a past medical history of Acid reflux, ADD (attention deficit disorder), Adjustment disorder, Anxiety, Astigmatism, Brain lesion, Brain lesion, Bronchitis, Calcium deficiency, Cellulitis of foot, right (6/4/2018), Cerebral palsy (HCC), Cerebral palsy ([address] ), Chronic otitis media, Chronically dry eyes, left, Constipation, Depression, Depressive disorder, Dry eyes, Dysphagia, Esophagitis, Esotropia, Fall, GERD (gastroesophageal reflux disease), GERD (gastroesophageal reflux disease), Hiatal hernia, Hydrocephalus ([address] ), Hyperopia with astigmatism, Impaired fasting glucose, Left nephrolithiasis (3/4/2019), Mood disorder ([address] ), Myopia, Oppositional defiant disorder, 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[address] ), Psychiatric disorder, Seizures ([address] ), Sensorineural hearing loss, Status post ventriculoatrial shunt placement, and Visual impairment  She  has a past surgical history that includes CSF shunt; Leg Surgery; EAR SURGERY; Nose surgery; pr esophagogastroduodenoscopy transoral diagnostic (N/A, 5/9/2019); and Upper gastrointestinal endoscopy (05/2019)  Her family history includes Diabetes in her mother; No Known Problems in her father    Current Outpatient Medications   Medication Sig Dispense Refill    acetaminophen (TYLENOL) 500 mg tablet Take 500 mg by mouth every 6 (six) hours as needed for mild pain      aluminum-magnesium hydroxide-simethicone (ANTACID) 200-200-20 mg/5 mL suspension Take 15 mL by mouth every 4 (four) hours as needed for indigestion or heartburn 355 mL 5    ARIPiprazole (ABILIFY) 20 MG tablet Take 1 tablet (20 mg total) by mouth daily at bedtime for 30 days at 9pm  30 tablet 0    bacitracin topical ointment 500 units/g topical ointment Apply 1 large application topically 3 (three) times a day for 7 days 15 g 0    bismuth subsalicylate (PEPTO BISMOL) 524 mg/30 mL oral suspension Take 15 mL (262 mg total) by mouth every 6 (six) hours as needed for indigestion 360 mL 3    calcium carbonate (TUMS) 500 mg chewable tablet Chew 1 tablet (500 mg total) 3 (three) times a day as needed for heartburn 30 tablet 1    Cholecalciferol (VITAMIN D-3) 1000 units CAPS Take 2 capsules (2,000 Units total) by mouth daily at 8am  180 capsule 1    clotrimazole (LOTRIMIN AF) 1 % cream Apply topically 2 (two) times a day 30 g 1    dextromethorphan-guaiFENesin (ROBITUSSIN DM)  mg/5 mL syrup Take 5 mL by mouth 3 (three) times a day as needed for cough 118 mL 1    escitalopram (LEXAPRO) 20 mg tablet Take 1 tablet (20 mg total) by mouth daily for 30 days at 8am  90 tablet 0    fluticasone (FLONASE) 50 mcg/act nasal spray 1 spray into each nostril daily as needed for rhinitis (nasal congestion) At 8:00 AM 1 Bottle 5    ibuprofen (MOTRIN) 400 mg tablet Take 1 tablet (400 mg total) by mouth every 8 (eight) hours as needed for mild pain 30 tablet 1    lamoTRIgine (LaMICtal) 25 mg tablet       lidocaine (LIDODERM) 5 % Apply 1 patch topically daily Remove & Discard patch within 12 hours or as directed by MD 15 patch 0    lubiprostone (AMITIZA) 24 mcg capsule Take 1 capsule (24 mcg total) by mouth 2 (two) times a day with meals 60 capsule 5    magnesium hydroxide (GNP MILK OF MAGNESIA) 400 mg/5 mL oral suspension Take 30 mL by mouth daily as needed for constipation If no BM in 3 days 355 mL 5    Mouthwashes (LISTERINE ANTISEPTIC) LIQD Apply 1 application to the mouth or throat 2 (two) times a day 500 mL 3    Multiple Vitamins-Minerals (CERTAVITE SENIOR/ANTIOXIDANT) TABS Take 1 tablet by mouth daily 90 tablet 2    norgestimate-ethinyl estradiol (ESTARYLLA) 0 25-35 MG-MCG per tablet Estarylla 0 25 mg-35 mcg tablet      pantoprazole (PROTONIX) 20 mg tablet Take 1 tablet 30 minutes before breakfast daily 31 tablet 5    phenol (CHLORASEPTIC) 1 4 % mucosal liquid Apply 1 spray to the mouth or throat every 2 (two) hours as needed (for sore throat as needed) 236 mL 1    RA SUNSCREEN SPF50 LOTN Apply 15 minutes before sun exposure and every 2 hours 1 Bottle 5    senna-docusate sodium (SENEXON-S) 8 6-50 mg per tablet Take 1 tablet by mouth daily at 8am  90 tablet 3    sodium chloride (OCEAN) 0 65 % nasal spray 1 spray into each nostril as needed (three times daily as needed for nasal congestion) 60 mL 1    traZODone (DESYREL) 50 mg tablet Take 1 tablet (50 mg total) by mouth daily at bedtime at 9pm  0    zinc oxide (DESITIN) 13 % cream Apply 1 application topically as needed (for perianal irritation) 1 Tube 5    polyethylene glycol (MIRALAX) 17 g packet Take one packet daily as needed for no bowel movement in 24 hours (Patient not taking: Reported on 1/24/2020) 14 each 3    psyllium (METAMUCIL) 58 6 % packet Take 1 packet by mouth daily (Patient not taking: Reported on 1/24/2020) 30 packet 5     No current facility-administered medications for this visit  She has No Known Allergies       Review of Systems   Constitutional: Negative for appetite change, diaphoresis, fatigue and fever  HENT: Negative for congestion, rhinorrhea and sinus pain  Respiratory: Negative for cough, chest tightness and shortness of breath  Cardiovascular: Negative for chest pain, palpitations and leg swelling  Gastrointestinal: Negative for abdominal distention, abdominal pain, constipation, diarrhea, nausea and vomiting  Genitourinary: Negative for difficulty urinating, frequency and urgency  Musculoskeletal: Negative for back pain and neck pain  Neurological: Negative for weakness, light-headedness, numbness and headaches  Psychiatric/Behavioral: Positive for sleep disturbance  Negative for agitation and behavioral problems  The patient is not nervous/anxious            Objective:      /80 (BP Location: Left arm, Patient Position: Sitting, Cuff Size: Large)   Pulse 86   Temp 98 1 °F (36 7 °C) (Tympanic)   Resp 16   Ht 4' 9" (1 448 m)   Wt 85 2 kg (187 lb 12 8 oz)   LMP  (LMP Unknown)   BMI 40 64 kg/m²          Physical Exam   Constitutional: She appears well-developed and well-nourished  No distress  HENT:   Head: Normocephalic and atraumatic  Neck: Normal range of motion  Neck supple  No thyromegaly present  Cardiovascular: Normal rate, regular rhythm, normal heart sounds and intact distal pulses  Exam reveals no gallop and no friction rub  No murmur heard  Pulmonary/Chest: Effort normal and breath sounds normal  No respiratory distress  She has no wheezes  She has no rales  She exhibits no tenderness  Abdominal: Soft  Bowel sounds are normal  She exhibits no distension and no mass  There is no tenderness  There is no rebound and no guarding  Musculoskeletal: Normal range of motion  She exhibits no edema, tenderness or deformity  Ambulatory dysfunction with use of walker   Lymphadenopathy:     She has no cervical adenopathy  Neurological: She is alert  Cerebral palsy   Skin: Skin is warm and dry  No rash noted  She is not diaphoretic  No erythema  Psychiatric: She has a normal mood and affect  Her behavior is normal  Judgment and thought content normal    Vitals reviewed

## 2020-02-19 NOTE — ASSESSMENT & PLAN NOTE
Caregiver reports that since changing her to another facility 2-3 weeks ago  -DDx:  Anxiety, NADIYA  -After talking about sleep medicine referral caregiver reports they might try to do conservative management and if symptoms don't improve will ask for referral to sleep medicine  -Follow up Symptoms

## 2020-02-19 NOTE — TELEPHONE ENCOUNTER
Care taker took paperwork with her  Stated she will bring paper work with her when ppd reading will take place

## 2020-02-19 NOTE — PATIENT INSTRUCTIONS
Medicare Preventive Visit Patient Instructions  Thank you for completing your Welcome to Medicare Visit or Medicare Annual Wellness Visit today  Your next wellness visit will be due in one year (2/19/2021)  The screening/preventive services that you may require over the next 5-10 years are detailed below  Some tests may not apply to you based off risk factors and/or age  Screening tests ordered at today's visit but not completed yet may show as past due  Also, please note that scanned in results may not display below  Preventive Screenings:  Service Recommendations Previous Testing/Comments   Colorectal Cancer Screening  * Colonoscopy    * Fecal Occult Blood Test (FOBT)/Fecal Immunochemical Test (FIT)  * Fecal DNA/Cologuard Test  * Flexible Sigmoidoscopy Age: 54-65 years old   Colonoscopy: every 10 years (may be performed more frequently if at higher risk)  OR  FOBT/FIT: every 1 year  OR  Cologuard: every 3 years  OR  Sigmoidoscopy: every 5 years  Screening may be recommended earlier than age 48 if at higher risk for colorectal cancer  Also, an individualized decision between you and your healthcare provider will decide whether screening between the ages of 74-80 would be appropriate  Colonoscopy: Not on file  FOBT/FIT: Not on file  Cologuard: Not on file  Sigmoidoscopy: Not on file         Breast Cancer Screening Age: 36 years old  Frequency: every 1-2 years  Not required if history of left and right mastectomy Mammogram: 03/06/2019       Cervical Cancer Screening Between the ages of 21-29, pap smear recommended once every 3 years  Between the ages of 33-67, can perform pap smear with HPV co-testing every 5 years     Recommendations may differ for women with a history of total hysterectomy, cervical cancer, or abnormal pap smears in past  Pap Smear: 06/17/2019       Hepatitis C Screening Once for adults born between 1945 and 1965  More frequently in patients at high risk for Hepatitis C Hep C Antibody: Not on file       Diabetes Screening 1-2 times per year if you're at risk for diabetes or have pre-diabetes Fasting glucose: 76 mg/dL   A1C: 5 2 %       Cholesterol Screening Once every 5 years if you don't have a lipid disorder  May order more often based on risk factors  Lipid panel: 01/03/2020         Other Preventive Screenings Covered by Medicare:  1  Abdominal Aortic Aneurysm (AAA) Screening: covered once if your at risk  You're considered to be at risk if you have a family history of AAA  2  Lung Cancer Screening: covers low dose CT scan once per year if you meet all of the following conditions: (1) Age 50-69; (2) No signs or symptoms of lung cancer; (3) Current smoker or have quit smoking within the last 15 years; (4) You have a tobacco smoking history of at least 30 pack years (packs per day multiplied by number of years you smoked); (5) You get a written order from a healthcare provider  3  Glaucoma Screening: covered annually if you're considered high risk: (1) You have diabetes OR (2) Family history of glaucoma OR (3)  aged 48 and older OR (3)  American aged 72 and older  3  Osteoporosis Screening: covered every 2 years if you meet one of the following conditions: (1) You're estrogen deficient and at risk for osteoporosis based off medical history and other findings; (2) Have a vertebral abnormality; (3) On glucocorticoid therapy for more than 3 months; (4) Have primary hyperparathyroidism; (5) On osteoporosis medications and need to assess response to drug therapy  · Last bone density test (DXA Scan): Not on file  5  HIV Screening: covered annually if you're between the age of 12-76  Also covered annually if you are younger than 13 and older than 72 with risk factors for HIV infection  For pregnant patients, it is covered up to 3 times per pregnancy      Immunizations:  Immunization Recommendations   Influenza Vaccine Annual influenza vaccination during flu season is recommended for all persons aged >= 6 months who do not have contraindications   Pneumococcal Vaccine (Prevnar and Pneumovax)  * Prevnar = PCV13  * Pneumovax = PPSV23   Adults 25-60 years old: 1-3 doses may be recommended based on certain risk factors  Adults 72 years old: Prevnar (PCV13) vaccine recommended followed by Pneumovax (PPSV23) vaccine  If already received PPSV23 since turning 65, then PCV13 recommended at least one year after PPSV23 dose  Hepatitis B Vaccine 3 dose series if at intermediate or high risk (ex: diabetes, end stage renal disease, liver disease)   Tetanus (Td) Vaccine - COST NOT COVERED BY MEDICARE PART B Following completion of primary series, a booster dose should be given every 10 years to maintain immunity against tetanus  Td may also be given as tetanus wound prophylaxis  Tdap Vaccine - COST NOT COVERED BY MEDICARE PART B Recommended at least once for all adults  For pregnant patients, recommended with each pregnancy  Shingles Vaccine (Shingrix) - COST NOT COVERED BY MEDICARE PART B  2 shot series recommended in those aged 48 and above     Health Maintenance Due:      Topic Date Due    CRC Screening: Colonoscopy  1980    MAMMOGRAM  03/06/2020    Cervical Cancer Screening  06/17/2022     Immunizations Due:  There are no preventive care reminders to display for this patient  Advance Directives   What are advance directives? Advance directives are legal documents that state your wishes and plans for medical care  These plans are made ahead of time in case you lose your ability to make decisions for yourself  Advance directives can apply to any medical decision, such as the treatments you want, and if you want to donate organs  What are the types of advance directives? There are many types of advance directives, and each state has rules about how to use them  You may choose a combination of any of the following:  · Living will: This is a written record of the treatment you want   You can also choose which treatments you do not want, which to limit, and which to stop at a certain time  This includes surgery, medicine, IV fluid, and tube feedings  · Durable power of  for healthcare Rew [facility]): This is a written record that states who you want to make healthcare choices for you when you are unable to make them for yourself  This person, called a proxy, is usually a family member or a friend  You may choose more than 1 proxy  · Do not resuscitate (DNR) order:  A DNR order is used in case your heart stops beating or you stop breathing  It is a request not to have certain forms of treatment, such as CPR  A DNR order may be included in other types of advance directives  · Medical directive: This covers the care that you want if you are in a coma, near death, or unable to make decisions for yourself  You can list the treatments you want for each condition  Treatment may include pain medicine, surgery, blood transfusions, dialysis, IV or tube feedings, and a ventilator (breathing machine)  · Values history: This document has questions about your views, beliefs, and how you feel and think about life  This information can help others choose the care that you would choose  Why are advance directives important? An advance directive helps you control your care  Although spoken wishes may be used, it is better to have your wishes written down  Spoken wishes can be misunderstood, or not followed  Treatments may be given even if you do not want them  An advance directive may make it easier for your family to make difficult choices about your care  Weight Management   Why it is important to manage your weight:  Being overweight increases your risk of health conditions such as heart disease, high blood pressure, type 2 diabetes, and certain types of cancer  It can also increase your risk for osteoarthritis, sleep apnea, and other respiratory problems  Aim for a slow, steady weight loss   Even a small amount of weight loss can lower your risk of health problems  How to lose weight safely:  A safe and healthy way to lose weight is to eat fewer calories and get regular exercise  You can lose up about 1 pound a week by decreasing the number of calories you eat by 500 calories each day  Healthy meal plan for weight management:  A healthy meal plan includes a variety of foods, contains fewer calories, and helps you stay healthy  A healthy meal plan includes the following:  · Eat whole-grain foods more often  A healthy meal plan should contain fiber  Fiber is the part of grains, fruits, and vegetables that is not broken down by your body  Whole-grain foods are healthy and provide extra fiber in your diet  Some examples of whole-grain foods are whole-wheat breads and pastas, oatmeal, brown rice, and bulgur  · Eat a variety of vegetables every day  Include dark, leafy greens such as spinach, kale, lily greens, and mustard greens  Eat yellow and orange vegetables such as carrots, sweet potatoes, and winter squash  · Eat a variety of fruits every day  Choose fresh or canned fruit (canned in its own juice or light syrup) instead of juice  Fruit juice has very little or no fiber  · Eat low-fat dairy foods  Drink fat-free (skim) milk or 1% milk  Eat fat-free yogurt and low-fat cottage cheese  Try low-fat cheeses such as mozzarella and other reduced-fat cheeses  · Choose meat and other protein foods that are low in fat  Choose beans or other legumes such as split peas or lentils  Choose fish, skinless poultry (chicken or turkey), or lean cuts of red meat (beef or pork)  Before you cook meat or poultry, cut off any visible fat  · Use less fat and oil  Try baking foods instead of frying them  Add less fat, such as margarine, sour cream, regular salad dressing and mayonnaise to foods  Eat fewer high-fat foods  Some examples of high-fat foods include french fries, doughnuts, ice cream, and cakes    · Eat fewer sweets  Limit foods and drinks that are high in sugar  This includes candy, cookies, regular soda, and sweetened drinks  Exercise:  Exercise at least 30 minutes per day on most days of the week  Some examples of exercise include walking, biking, dancing, and swimming  You can also fit in more physical activity by taking the stairs instead of the elevator or parking farther away from stores  Ask your healthcare provider about the best exercise plan for you  © Copyright [employer] 2018 Information is for End User's use only and may not be sold, redistributed or otherwise used for commercial purposes   All illustrations and images included in CareNotes® are the copyrighted property of A D A M , Inc  or [address] [place] [address]

## 2020-02-20 ENCOUNTER — OFFICE VISIT (OUTPATIENT)
Dept: GASTROENTEROLOGY | Facility: MEDICAL CENTER | Age: 40
End: 2020-02-20
Payer: MEDICARE

## 2020-02-20 VITALS
HEIGHT: 57 IN | HEART RATE: 100 BPM | DIASTOLIC BLOOD PRESSURE: 82 MMHG | BODY MASS INDEX: 40.99 KG/M2 | WEIGHT: 190 LBS | SYSTOLIC BLOOD PRESSURE: 130 MMHG

## 2020-02-20 DIAGNOSIS — R07.89 ATYPICAL CHEST PAIN: ICD-10-CM

## 2020-02-20 DIAGNOSIS — R13.10 DYSPHAGIA, UNSPECIFIED TYPE: ICD-10-CM

## 2020-02-20 DIAGNOSIS — K59.04 CHRONIC IDIOPATHIC CONSTIPATION: ICD-10-CM

## 2020-02-20 DIAGNOSIS — K21.9 GASTROESOPHAGEAL REFLUX DISEASE WITHOUT ESOPHAGITIS: Primary | ICD-10-CM

## 2020-02-20 PROCEDURE — 3008F BODY MASS INDEX DOCD: CPT | Performed by: PHYSICIAN ASSISTANT

## 2020-02-20 PROCEDURE — 1036F TOBACCO NON-USER: CPT | Performed by: PHYSICIAN ASSISTANT

## 2020-02-20 PROCEDURE — 99214 OFFICE O/P EST MOD 30 MIN: CPT | Performed by: PHYSICIAN ASSISTANT

## 2020-02-20 NOTE — PROGRESS NOTES
[facility] - Outpatient Follow-up Note  Ulises Daniels 38 y o  female MRN: [id number]  Encounter: [id number]          ASSESSMENT AND PLAN:      1  Abdominal pain:  2  Constipation: she is currently on amitiza 24mcg bid as well as metamucil daily and miralax as needed  She states she is having daily bowel movements and currently denies any abdominal pain    -continue amitizia 24 mcg  -continue metamucil daily  -continue miralax as needed    3  Dysphagia:  4  Atypical chest pain: she had been experiencing atypical chest pain and acid reflux  She underwent barium swallow, EGD that were normal  She recently underwent manometry study that was also normal  Today she is doing well and denies her previous symptoms of chest pain  She is on protonix 20mg daily  If symptoms return, could consider bentyl vs TCA  -continue protonix 20mg daily  -take small bites, chew food well, take sips of liquid between bites, remain upright during and 30 minutes after meals    F/u 6 months   ______________________________________________________________________    SUBJECTIVE:  Baron See is a 37 yo female who is here for follow-up after manometry study  She has a history of dysphagia, epigastric abdominal pain and the patient  She also has history of mental disability and presents with a caretaker from her group home  She had been experiencing dysphagia and atypical chest pain associated with acid reflux for the past few months  She underwent barium swallow, speech and swallow evaluation as well as an EGD that were all normal   She recently underwent a manometry study due to persistent symptoms, which was also normal   She is currently on Protonix 20 mg daily  Today she states that her symptoms have completely resolved  She is not experiencing any acid reflux, dysphagia, chest pain or epigastric abdominal pain    If her symptoms return, could consider Bentyl or tricyclic antidepressant, would want this to be started by her psychiatrist due to multiple psychiatric medications  She also suffers from constipation but this is well controlled with Amitiza 24 micro g twice daily, Metamucil and MiraLax as needed  She is having a daily formed bowel movement  REVIEW OF SYSTEMS IS OTHERWISE NEGATIVE  Historical Information   Past Medical History:   Diagnosis Date    Acid reflux     ADD (attention deficit disorder)     Adjustment disorder     Anxiety     Astigmatism     Brain lesion     Brain lesion     Bronchitis     Calcium deficiency     Cellulitis of foot, right 6/4/2018    Cerebral palsy (HCC)     Cerebral palsy (HCC)     Last Assessed:7/6/2016    Chronic otitis media     Chronically dry eyes, left     Last Assessed:7/6/2016    Constipation     Depression     Last Assessed:7/6/2016    Depressive disorder     Dry eyes     Dysphagia     Esophagitis     Esotropia     Fall     GERD (gastroesophageal reflux disease)     GERD (gastroesophageal reflux disease)     Hiatal hernia     Hydrocephalus ([facility])     Hyperopia with astigmatism     Impaired fasting glucose     Left nephrolithiasis 3/4/2019    Mood disorder ([address] )     Myopia     Oppositional defiant disorder     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[facility])     Psychiatric disorder     Seizures ([facility])     Sensorineural hearing loss     Status post ventriculoatrial shunt placement     Visual impairment      Past Surgical History:   Procedure Laterality Date    CSF SHUNT      Creation of Ventriculo-Peritoneal CSF shunt ; Last Assessed:7/6/2016    EAR SURGERY      Last Assessed:7/6/2016    LEG SURGERY      due to CP     NOSE SURGERY      Last Assessed:7/6/2016    [place] ESOPHAGOGASTRODUODENOSCOPY TRANSORAL DIAGNOSTIC N/A 5/9/2019    Procedure: ESOPHAGOGASTRODUODENOSCOPY (EGD) with biopsy;  Surgeon: Concha Mccallum MD;  Location: [facility];   Service: Gastroenterology    UPPER GASTROINTESTINAL ENDOSCOPY  05/2019     Social History   Social History Substance and Sexual Activity   Alcohol Use No     Social History     Substance and Sexual Activity   Drug Use No     Social History     Tobacco Use   Smoking Status Never Smoker   Smokeless Tobacco Never Used     Family History   Problem Relation Age of Onset    Diabetes Mother     No Known Problems Father        Meds/Allergies       Current Outpatient Medications:     acetaminophen (TYLENOL) 500 mg tablet    aluminum-magnesium hydroxide-simethicone (ANTACID) 200-200-20 mg/5 mL suspension    bismuth subsalicylate (PEPTO BISMOL) 524 mg/30 mL oral suspension    calcium carbonate (TUMS) 500 mg chewable tablet    Cholecalciferol (VITAMIN D-3) 1000 units CAPS    clotrimazole (LOTRIMIN AF) 1 % cream    dextromethorphan-guaiFENesin (ROBITUSSIN DM)  mg/5 mL syrup    fluticasone (FLONASE) 50 mcg/act nasal spray    ibuprofen (MOTRIN) 400 mg tablet    lamoTRIgine (LaMICtal) 25 mg tablet    lidocaine (LIDODERM) 5 %    lubiprostone (AMITIZA) 24 mcg capsule    magnesium hydroxide (GNP MILK OF MAGNESIA) 400 mg/5 mL oral suspension    Multiple Vitamins-Minerals (CERTAVITE SENIOR/ANTIOXIDANT) TABS    norgestimate-ethinyl estradiol (ESTARYLLA) 0 25-35 MG-MCG per tablet    pantoprazole (PROTONIX) 20 mg tablet    phenol (CHLORASEPTIC) 1 4 % mucosal liquid    polyethylene glycol (MIRALAX) 17 g packet    psyllium (METAMUCIL) 58 6 % packet    RA SUNSCREEN SPF50 LOTN    senna-docusate sodium (SENEXON-S) 8 6-50 mg per tablet    sodium chloride (OCEAN) 0 65 % nasal spray    traZODone (DESYREL) 50 mg tablet    zinc oxide (DESITIN) 13 % cream    ARIPiprazole (ABILIFY) 20 MG tablet    bacitracin topical ointment 500 units/g topical ointment    escitalopram (LEXAPRO) 20 mg tablet    Mouthwashes (LISTERINE ANTISEPTIC) LIQD    No Known Allergies        Objective     Blood pressure 130/82, pulse 100, height 4' 9" (1 448 m), weight 86 2 kg (190 lb)  Body mass index is 41 12 kg/m²        PHYSICAL EXAM: General Appearance:   Alert, cooperative, no distress   HEENT:   Normocephalic, atraumatic, anicteric  Right eye exhibits no discharge  Left eye exhibits no discharge  No scleral icterus     Neck:  Supple, symmetrical, trachea midline, no stridor    Lungs:   Clear to auscultation bilaterally; no rales, rhonchi or wheezing; respirations unlabored    Heart[de-identified]   Regular rate and rhythm; no murmur, rub, or gallop  Abdomen:   Soft, non-tender, non-distended; normal bowel sounds; no masses, no organomegaly    Genitalia:   Deferred    Rectal:   Deferred    Extremities:  No cyanosis, clubbing or edema        Skin:  No jaundice, rashes, or lesions          Lab Results:   No visits with results within 1 Day(s) from this visit  Latest known visit with results is:   Appointment on 01/03/2020   Component Date Value    Cholesterol 01/03/2020 163     Triglycerides 01/03/2020 151*    HDL, Direct 01/03/2020 68     LDL Calculated 01/03/2020 65     Non-HDL-Chol (CHOL-HDL) 01/03/2020 95     Hemoglobin A1C 01/03/2020 5 2     EAG 01/03/2020 103          Radiology Results:   Vas Lower Limb Venous Duplex Study, Complete Bilateral    Result Date: 1/22/2020  Narrative:  [facility] REPORT [facility]IN[facility]: Indications: Patient presents with right calf pain and swelling x 1 day  She denies any trauma  She denies any concerns with the left leg  Operative History: No cardiovascular surgeries noted  Risk Factors No cardiovascular risk factors noted  FINDINGS:  Segment  Right            Left              Impression       Impression       CFV      Normal (Patent)  Normal (Patent)     CONCLUSION:  Impression: RIGHT LOWER LIMB: No evidence of acute or chronic deep vein thrombosis  No evidence of superficial thrombophlebitis noted  Doppler evaluation shows a normal response to augmentation maneuvers  Popliteal, posterior tibial and anterior tibial arterial Doppler waveforms are triphasic    LEFT LOWER LIMB: No evidence of acute or chronic deep vein thrombosis  No evidence of superficial thrombophlebitis noted  Doppler evaluation shows a normal response to augmentation maneuvers  Popliteal, posterior tibial and anterior tibial arterial Doppler waveforms are triphasic  Technically challenging evaluation of right peroneal veins due to swelling

## 2020-02-21 ENCOUNTER — CLINICAL SUPPORT (OUTPATIENT)
Dept: FAMILY MEDICINE CLINIC | Facility: CLINIC | Age: 40
End: 2020-02-21

## 2020-02-21 DIAGNOSIS — Z11.1 ENCOUNTER FOR PPD SKIN TEST READING: Primary | ICD-10-CM

## 2020-02-21 LAB
INDURATION: 0 MM
TB SKIN TEST: NEGATIVE

## 2020-02-25 NOTE — PROGRESS NOTES
Progress Note    Name:  Esperanza Falcon  :  1980  Age:  38 y o  Date of Evaluation: 20     Scanned in HA chart part 2        Indigo Petty , CCC-A  Clinical Audiologist

## 2020-03-09 DIAGNOSIS — L30.4 INTERTRIGO: ICD-10-CM

## 2020-03-09 RX ORDER — CLOTRIMAZOLE 1 %
CREAM (GRAM) TOPICAL 2 TIMES DAILY
Qty: 30 G | Refills: 5 | Status: SHIPPED | OUTPATIENT
Start: 2020-03-09 | End: 2020-06-12

## 2020-03-17 ENCOUNTER — OFFICE VISIT (OUTPATIENT)
Dept: NEUROSURGERY | Facility: CLINIC | Age: 40
End: 2020-03-17

## 2020-03-17 VITALS
WEIGHT: 190 LBS | BODY MASS INDEX: 40.99 KG/M2 | SYSTOLIC BLOOD PRESSURE: 130 MMHG | HEIGHT: 57 IN | RESPIRATION RATE: 16 BRPM | DIASTOLIC BLOOD PRESSURE: 82 MMHG | TEMPERATURE: 98.2 F | HEART RATE: 78 BPM

## 2020-03-17 DIAGNOSIS — Z01.818 PREPROCEDURAL EXAMINATION: Primary | ICD-10-CM

## 2020-03-17 PROCEDURE — PREOP: Performed by: PHYSICIAN ASSISTANT

## 2020-03-17 PROCEDURE — 3008F BODY MASS INDEX DOCD: CPT | Performed by: PHYSICIAN ASSISTANT

## 2020-03-17 RX ORDER — LAMOTRIGINE 100 MG/1
100 TABLET ORAL
COMMUNITY
Start: 2020-02-26 | End: 2020-10-02

## 2020-03-17 NOTE — PROGRESS NOTES
Patient ID: Dheeraj Subramanian is a 38 y o  female  Diagnoses and all orders for this visit:    Preprocedural examination           Assessment/Plan:    Very pleasant 69-year-old female, with mild-to-moderate intellectual and developmental disability and known history of CP, presents for preprocedure examination/clearance, has planned MRI of the brain, under anesthesia to evaluate pituitary abnormalities/microadenoma  She requires anesthesia for her imaging as a consequence of her mild-to-moderate intellectual developmental disability  She is a nonsmoker  She has a prior history of procedures under general anesthesia which were tolerated without complication, which include multiple episodes anesthesia for testing (MRI), V-A shunt placement, ear surgery, leg surgery  She does have history of seizure disorder with no recent events reported  She denies bleeding disorder or history of same  There is no history of chronic pulmonary conditions, such as asthma/chronic bronchitis/pneumonia  She does admit that chest pain typically once every 2 weeks which occurs when she bends over to pick something up, is not activity related  She follows regularly with [address] [place] [address], and is followed regularly for wellness, and anxiety  She also follows with GI, (Dr Annabelle Carbajal and LYDIA Tamez) for GERD symptoms  She has had recent visit with cardiology for atypical chest pain, stress EKG has been requested, their notes suggest GERD may be factor in her chest symptoms  She does report and echocardiogram has been advised, this is not scheduled yet she reports     There are no known medication allergies reported  She does understand that she remain NPO from midnight the night prior to testing  Return for Planned follow-up      Chief Complaint  Offers no complaints today, pre procedure examination (MRI)    HPI       The following portions of the patient's history were reviewed and updated as appropriate: allergies, current medications, past family history, past medical history, past social history and past surgical history  Review of Systems   Constitutional: Negative  HENT: Negative  Eyes: Negative  Respiratory: Negative  Negative for apnea, cough, choking, chest tightness, shortness of breath, wheezing and stridor  Cardiovascular: Negative  Gastrointestinal: Negative  Endocrine: Negative  Genitourinary: Negative  Musculoskeletal: Positive for gait problem (leg pain when sitting/standing)  Negative for arthralgias, back pain, joint swelling, myalgias, neck pain and neck stiffness  Skin: Negative  Allergic/Immunologic: Negative  Neurological: Positive for dizziness and numbness (and tingling on both feet)  Negative for tremors, seizures, syncope, facial asymmetry, speech difficulty, weakness, light-headedness and headaches  Hematological: Negative  Psychiatric/Behavioral: Negative  All other systems reviewed and are negative  Objective:    Physical Exam   Constitutional: She is oriented to person, place, and time  She appears well-developed and well-nourished  HENT:   Head: Normocephalic and atraumatic  Eyes: Pupils are equal, round, and reactive to light  EOM are normal    Cardiovascular: Normal rate  Pulmonary/Chest: Effort normal and breath sounds normal    Abdominal: Soft  Neurological: She is alert and oriented to person, place, and time  Skin: Skin is warm and dry  Psychiatric: She has a normal mood and affect  Vitals reviewed  Neurologic Exam     Mental Status   Oriented to person, place, and time  Cranial Nerves     CN III, IV, VI   Pupils are equal, round, and reactive to light    Extraocular motions are normal

## 2020-03-18 ENCOUNTER — HOSPITAL ENCOUNTER (OUTPATIENT)
Dept: RADIOLOGY | Facility: HOSPITAL | Age: 40
Discharge: HOME | End: 2020-03-18
Payer: MEDICARE

## 2020-03-18 VITALS — WEIGHT: 190 LBS | HEIGHT: 57 IN | BODY MASS INDEX: 40.99 KG/M2

## 2020-03-18 DIAGNOSIS — Z12.39 SCREENING FOR BREAST CANCER: ICD-10-CM

## 2020-03-18 PROCEDURE — 77067 SCR MAMMO BI INCL CAD: CPT

## 2020-03-19 ENCOUNTER — TELEPHONE (OUTPATIENT)
Dept: FAMILY MEDICINE CLINIC | Facility: CLINIC | Age: 40
End: 2020-03-19

## 2020-03-19 NOTE — TELEPHONE ENCOUNTER
Received forms from [employer] for visit of 2/19/20  I have completed, need you to review  In your bin for signature

## 2020-03-20 ENCOUNTER — HOSPITAL ENCOUNTER (OUTPATIENT)
Dept: NON INVASIVE DIAGNOSTICS | Facility: CLINIC | Age: 40
Discharge: HOME | End: 2020-03-20
Payer: MEDICARE

## 2020-03-20 DIAGNOSIS — R06.02 SOB (SHORTNESS OF BREATH): ICD-10-CM

## 2020-03-20 DIAGNOSIS — R07.9 CHEST PAIN, UNSPECIFIED TYPE: ICD-10-CM

## 2020-03-20 PROCEDURE — 93306 TTE W/DOPPLER COMPLETE: CPT

## 2020-03-25 ENCOUNTER — ANESTHESIA EVENT (OUTPATIENT)
Dept: RADIOLOGY | Facility: HOSPITAL | Age: 40
End: 2020-03-25

## 2020-03-31 ENCOUNTER — TELEPHONE (OUTPATIENT)
Dept: RADIOLOGY | Facility: HOSPITAL | Age: 40
End: 2020-03-31

## 2020-03-31 ENCOUNTER — TELEPHONE (OUTPATIENT)
Dept: NEUROSURGERY | Facility: CLINIC | Age: 40
End: 2020-03-31

## 2020-03-31 NOTE — TELEPHONE ENCOUNTER
Due to concern for coronavirus containment the patient's ordering provider Rex Taylor PA-C was called to discuss possibly rescheduling the patient's upcoming MRI of the brain performed under general anesthesia to evaluate pituitary abnormalities/microadenoma       No answer, left voicemail with return phone number ([phone], ext [phone])

## 2020-03-31 NOTE — TELEPHONE ENCOUNTER
Due to concern for coronavirus containment the patient was called to discuss postponement of her upcoming elective MRI study of the brain with anesthesia  The patient lives in a group home, so I spoke with her caregiver, Siomara Cee (Medical Director)  The patient's ordering provider Dr Ramírez Ardon was contacted to discuss the urgency of the patient's image study and get clearance for rescheduling, if appropriate  Dr Susy Leon agreed with postponement of the MRI for 4 - 6 weeks from now  The patient's caregiver was given the appropriate phone numbers to arrange a new appointment time for the MRI study ([phone])

## 2020-03-31 NOTE — TELEPHONE ENCOUNTER
Received a call from patients caregiver at group [place]  She asked if its ok to take medications the morning of anethesia MRI? Contacted [facility] and was advised that a call will be made to them the night before to provide instructions about prep  Called caregiver back and left message requesting call back with questions

## 2020-04-01 ENCOUNTER — HOSPITAL ENCOUNTER (OUTPATIENT)
Dept: RADIOLOGY | Facility: HOSPITAL | Age: 40
Discharge: HOME | End: 2020-04-01

## 2020-04-01 ENCOUNTER — TELEPHONE (OUTPATIENT)
Dept: NEUROSURGERY | Facility: CLINIC | Age: 40
End: 2020-04-01

## 2020-04-01 ENCOUNTER — ANESTHESIA (OUTPATIENT)
Dept: RADIOLOGY | Facility: HOSPITAL | Age: 40
End: 2020-04-01

## 2020-04-01 NOTE — TELEPHONE ENCOUNTER
Patients MRI has been rescheduled to 6/24/2020 at 10AM with an [address] [address] [address] arrival   Follow up appointment in our office is 6/26/2020

## 2020-04-01 NOTE — TELEPHONE ENCOUNTER
This is a non urgent study, and may be rescheduled to a later date in 8-10 weeks, provided the current health crisis (COVID-19) is in a more stable state

## 2020-04-03 DIAGNOSIS — E55.9 VITAMIN D DEFICIENCY: ICD-10-CM

## 2020-04-03 DIAGNOSIS — Z00.00 ROUTINE HEALTH MAINTENANCE: ICD-10-CM

## 2020-04-03 DIAGNOSIS — K59.00 CONSTIPATION, UNSPECIFIED CONSTIPATION TYPE: ICD-10-CM

## 2020-04-03 RX ORDER — CHOLECALCIFEROL (VITAMIN D3) 25 MCG
2000 CAPSULE ORAL DAILY
Qty: 60 CAPSULE | Refills: 5 | Status: SHIPPED | OUTPATIENT
Start: 2020-04-03 | End: 2020-09-16 | Stop reason: SDUPTHER

## 2020-04-03 RX ORDER — MULTIVIT-MIN/FA/LYCOPEN/LUTEIN .4-300-25
1 TABLET ORAL DAILY
Qty: 30 TABLET | Refills: 5 | Status: SHIPPED | OUTPATIENT
Start: 2020-04-03 | End: 2020-09-16 | Stop reason: SDUPTHER

## 2020-04-06 ENCOUNTER — TELEPHONE (OUTPATIENT)
Dept: FAMILY MEDICINE CLINIC | Facility: CLINIC | Age: 40
End: 2020-04-06

## 2020-04-06 ENCOUNTER — TELEMEDICINE (OUTPATIENT)
Dept: FAMILY MEDICINE CLINIC | Facility: CLINIC | Age: 40
End: 2020-04-06

## 2020-04-06 VITALS — TEMPERATURE: 95.5 F | BODY MASS INDEX: 40.99 KG/M2 | WEIGHT: 190 LBS | HEIGHT: 57 IN

## 2020-04-06 DIAGNOSIS — Z20.822 EXPOSURE TO COVID-19 VIRUS: Primary | ICD-10-CM

## 2020-04-06 PROCEDURE — G0071 COMM SVCS BY RHC/FQHC 5 MIN: HCPCS | Performed by: FAMILY MEDICINE

## 2020-04-07 DIAGNOSIS — K08.9 TOOTH DISORDER: ICD-10-CM

## 2020-04-07 RX ORDER — EUCALYP/ME-SALICYLATE/MEN/THYM
1 MOUTHWASH MUCOUS MEMBRANE 2 TIMES DAILY
Qty: 500 ML | Refills: 3 | Status: SHIPPED | OUTPATIENT
Start: 2020-04-07 | End: 2020-04-15 | Stop reason: SDUPTHER

## 2020-04-15 ENCOUNTER — TELEMEDICINE (OUTPATIENT)
Dept: FAMILY MEDICINE CLINIC | Facility: CLINIC | Age: 40
End: 2020-04-15

## 2020-04-15 VITALS
SYSTOLIC BLOOD PRESSURE: 116 MMHG | TEMPERATURE: 98.6 F | DIASTOLIC BLOOD PRESSURE: 80 MMHG | BODY MASS INDEX: 40.25 KG/M2 | WEIGHT: 186 LBS

## 2020-04-15 DIAGNOSIS — Z20.822 EXPOSURE TO COVID-19 VIRUS: Primary | ICD-10-CM

## 2020-04-15 DIAGNOSIS — R50.9 FEVER, UNSPECIFIED FEVER CAUSE: ICD-10-CM

## 2020-04-15 DIAGNOSIS — K08.9 TOOTH DISORDER: ICD-10-CM

## 2020-04-15 PROCEDURE — G0071 COMM SVCS BY RHC/FQHC 5 MIN: HCPCS | Performed by: FAMILY MEDICINE

## 2020-04-15 RX ORDER — ACETAMINOPHEN 500 MG
500 TABLET ORAL EVERY 6 HOURS PRN
Qty: 30 TABLET | Refills: 1 | Status: SHIPPED | OUTPATIENT
Start: 2020-04-15 | End: 2021-03-16 | Stop reason: SDUPTHER

## 2020-04-15 RX ORDER — EUCALYP/ME-SALICYLATE/MEN/THYM
1 MOUTHWASH MUCOUS MEMBRANE 2 TIMES DAILY
Qty: 500 ML | Refills: 3 | Status: SHIPPED | OUTPATIENT
Start: 2020-04-15 | End: 2020-09-16 | Stop reason: SDUPTHER

## 2020-04-17 ENCOUNTER — TELEPHONE (OUTPATIENT)
Dept: FAMILY MEDICINE CLINIC | Facility: CLINIC | Age: 40
End: 2020-04-17

## 2020-05-06 ENCOUNTER — TELEPHONE (OUTPATIENT)
Dept: FAMILY MEDICINE CLINIC | Facility: CLINIC | Age: 40
End: 2020-05-06

## 2020-05-20 ENCOUNTER — HOSPITAL ENCOUNTER (EMERGENCY)
Facility: HOSPITAL | Age: 40
Discharge: HOME | End: 2020-05-20
Attending: EMERGENCY MEDICINE
Payer: MEDICARE

## 2020-05-20 ENCOUNTER — APPOINTMENT (EMERGENCY)
Dept: CT IMAGING | Facility: HOSPITAL | Age: 40
End: 2020-05-20
Payer: MEDICARE

## 2020-05-20 ENCOUNTER — APPOINTMENT (EMERGENCY)
Dept: RADIOLOGY | Facility: HOSPITAL | Age: 40
End: 2020-05-20
Payer: MEDICARE

## 2020-05-20 VITALS
HEART RATE: 92 BPM | BODY MASS INDEX: 42.65 KG/M2 | DIASTOLIC BLOOD PRESSURE: 64 MMHG | TEMPERATURE: 99.3 F | SYSTOLIC BLOOD PRESSURE: 125 MMHG | RESPIRATION RATE: 20 BRPM | WEIGHT: 197.09 LBS | OXYGEN SATURATION: 97 %

## 2020-05-20 DIAGNOSIS — M79.10 MYALGIA: ICD-10-CM

## 2020-05-20 DIAGNOSIS — R06.02 SHORTNESS OF BREATH: Primary | ICD-10-CM

## 2020-05-20 LAB
ALBUMIN SERPL BCP-MCNC: 2.9 G/DL (ref 3.5–5)
ALP SERPL-CCNC: 176 U/L (ref 46–116)
ALT SERPL W P-5'-P-CCNC: 19 U/L (ref 12–78)
ANION GAP SERPL CALCULATED.3IONS-SCNC: 8 MMOL/L (ref 4–13)
APTT PPP: 33 SECONDS (ref 23–37)
AST SERPL W P-5'-P-CCNC: 17 U/L (ref 5–45)
ATRIAL RATE: 106 BPM
BACTERIA UR QL AUTO: ABNORMAL /HPF
BASOPHILS # BLD AUTO: 0.02 THOUSANDS/ΜL (ref 0–0.1)
BASOPHILS NFR BLD AUTO: 0 % (ref 0–1)
BILIRUB SERPL-MCNC: <0.1 MG/DL (ref 0.2–1)
BILIRUB UR QL STRIP: NEGATIVE
BUN SERPL-MCNC: 11 MG/DL (ref 5–25)
CALCIUM SERPL-MCNC: 8.1 MG/DL (ref 8.3–10.1)
CHLORIDE SERPL-SCNC: 105 MMOL/L (ref 100–108)
CLARITY UR: CLEAR
CLARITY, POC: CLEAR
CO2 SERPL-SCNC: 27 MMOL/L (ref 21–32)
COLOR UR: YELLOW
COLOR, POC: YELLOW
CREAT SERPL-MCNC: 0.77 MG/DL (ref 0.6–1.3)
D DIMER PPP FEU-MCNC: 0.91 UG/ML FEU
EOSINOPHIL # BLD AUTO: 0.09 THOUSAND/ΜL (ref 0–0.61)
EOSINOPHIL NFR BLD AUTO: 1 % (ref 0–6)
ERYTHROCYTE [DISTWIDTH] IN BLOOD BY AUTOMATED COUNT: 13.6 % (ref 11.6–15.1)
EXT PREG TEST URINE: NEGATIVE
EXT. CONTROL ED NAV: NORMAL
GFR SERPL CREATININE-BSD FRML MDRD: 96 ML/MIN/1.73SQ M
GLUCOSE SERPL-MCNC: 103 MG/DL (ref 65–140)
GLUCOSE UR STRIP-MCNC: NEGATIVE MG/DL
HCT VFR BLD AUTO: 39.3 % (ref 34.8–46.1)
HGB BLD-MCNC: 13 G/DL (ref 11.5–15.4)
HGB UR QL STRIP.AUTO: ABNORMAL
IMM GRANULOCYTES # BLD AUTO: 0.04 THOUSAND/UL (ref 0–0.2)
IMM GRANULOCYTES NFR BLD AUTO: 0 % (ref 0–2)
INR PPP: 0.92 (ref 0.84–1.19)
KETONES UR STRIP-MCNC: NEGATIVE MG/DL
LACTATE SERPL-SCNC: 1.2 MMOL/L (ref 0.5–2)
LEUKOCYTE ESTERASE UR QL STRIP: NEGATIVE
LYMPHOCYTES # BLD AUTO: 1.98 THOUSANDS/ΜL (ref 0.6–4.47)
LYMPHOCYTES NFR BLD AUTO: 19 % (ref 14–44)
MCH RBC QN AUTO: 31.6 PG (ref 26.8–34.3)
MCHC RBC AUTO-ENTMCNC: 33.1 G/DL (ref 31.4–37.4)
MCV RBC AUTO: 95 FL (ref 82–98)
MONOCYTES # BLD AUTO: 0.76 THOUSAND/ΜL (ref 0.17–1.22)
MONOCYTES NFR BLD AUTO: 7 % (ref 4–12)
NEUTROPHILS # BLD AUTO: 7.52 THOUSANDS/ΜL (ref 1.85–7.62)
NEUTS SEG NFR BLD AUTO: 73 % (ref 43–75)
NITRITE UR QL STRIP: NEGATIVE
NON-SQ EPI CELLS URNS QL MICRO: ABNORMAL /HPF
NRBC BLD AUTO-RTO: 0 /100 WBCS
P AXIS: 49 DEGREES
PH UR STRIP.AUTO: 7 [PH] (ref 4.5–8)
PLATELET # BLD AUTO: 274 THOUSANDS/UL (ref 149–390)
PMV BLD AUTO: 9.6 FL (ref 8.9–12.7)
POTASSIUM SERPL-SCNC: 4 MMOL/L (ref 3.5–5.3)
PR INTERVAL: 126 MS
PROCALCITONIN SERPL-MCNC: <0.05 NG/ML
PROT SERPL-MCNC: 7.2 G/DL (ref 6.4–8.2)
PROT UR STRIP-MCNC: NEGATIVE MG/DL
PROTHROMBIN TIME: 12.4 SECONDS (ref 11.6–14.5)
QRS AXIS: 6 DEGREES
QRSD INTERVAL: 66 MS
QT INTERVAL: 314 MS
QTC INTERVAL: 417 MS
RBC # BLD AUTO: 4.12 MILLION/UL (ref 3.81–5.12)
RBC #/AREA URNS AUTO: ABNORMAL /HPF
SARS-COV-2 RNA RESP QL NAA+PROBE: NEGATIVE
SODIUM SERPL-SCNC: 140 MMOL/L (ref 136–145)
SP GR UR STRIP.AUTO: 1.01 (ref 1–1.03)
T WAVE AXIS: 50 DEGREES
TROPONIN I SERPL-MCNC: <0.02 NG/ML
TROPONIN I SERPL-MCNC: <0.02 NG/ML
UROBILINOGEN UR QL STRIP.AUTO: 0.2 E.U./DL
VENTRICULAR RATE: 106 BPM
WBC # BLD AUTO: 10.41 THOUSAND/UL (ref 4.31–10.16)
WBC #/AREA URNS AUTO: ABNORMAL /HPF

## 2020-05-20 PROCEDURE — 81001 URINALYSIS AUTO W/SCOPE: CPT

## 2020-05-20 PROCEDURE — 71275 CT ANGIOGRAPHY CHEST: CPT

## 2020-05-20 PROCEDURE — 93005 ELECTROCARDIOGRAM TRACING: CPT

## 2020-05-20 PROCEDURE — 96374 THER/PROPH/DIAG INJ IV PUSH: CPT

## 2020-05-20 PROCEDURE — 85730 THROMBOPLASTIN TIME PARTIAL: CPT | Performed by: EMERGENCY MEDICINE

## 2020-05-20 PROCEDURE — 81025 URINE PREGNANCY TEST: CPT | Performed by: EMERGENCY MEDICINE

## 2020-05-20 PROCEDURE — 71046 X-RAY EXAM CHEST 2 VIEWS: CPT

## 2020-05-20 PROCEDURE — 80053 COMPREHEN METABOLIC PANEL: CPT | Performed by: EMERGENCY MEDICINE

## 2020-05-20 PROCEDURE — 93010 ELECTROCARDIOGRAM REPORT: CPT | Performed by: INTERNAL MEDICINE

## 2020-05-20 PROCEDURE — 74018 RADEX ABDOMEN 1 VIEW: CPT

## 2020-05-20 PROCEDURE — 84484 ASSAY OF TROPONIN QUANT: CPT | Performed by: EMERGENCY MEDICINE

## 2020-05-20 PROCEDURE — 71045 X-RAY EXAM CHEST 1 VIEW: CPT

## 2020-05-20 PROCEDURE — 36415 COLL VENOUS BLD VENIPUNCTURE: CPT | Performed by: EMERGENCY MEDICINE

## 2020-05-20 PROCEDURE — 87040 BLOOD CULTURE FOR BACTERIA: CPT | Performed by: EMERGENCY MEDICINE

## 2020-05-20 PROCEDURE — 85379 FIBRIN DEGRADATION QUANT: CPT | Performed by: EMERGENCY MEDICINE

## 2020-05-20 PROCEDURE — 99285 EMERGENCY DEPT VISIT HI MDM: CPT | Performed by: EMERGENCY MEDICINE

## 2020-05-20 PROCEDURE — 70450 CT HEAD/BRAIN W/O DYE: CPT

## 2020-05-20 PROCEDURE — 84145 PROCALCITONIN (PCT): CPT | Performed by: EMERGENCY MEDICINE

## 2020-05-20 PROCEDURE — 70250 X-RAY EXAM OF SKULL: CPT

## 2020-05-20 PROCEDURE — 87635 SARS-COV-2 COVID-19 AMP PRB: CPT | Performed by: EMERGENCY MEDICINE

## 2020-05-20 PROCEDURE — 85025 COMPLETE CBC W/AUTO DIFF WBC: CPT | Performed by: EMERGENCY MEDICINE

## 2020-05-20 PROCEDURE — 85610 PROTHROMBIN TIME: CPT | Performed by: EMERGENCY MEDICINE

## 2020-05-20 PROCEDURE — 83605 ASSAY OF LACTIC ACID: CPT | Performed by: EMERGENCY MEDICINE

## 2020-05-20 PROCEDURE — 99285 EMERGENCY DEPT VISIT HI MDM: CPT

## 2020-05-20 RX ORDER — ESCITALOPRAM OXALATE 20 MG/1
20 TABLET ORAL DAILY
COMMUNITY
End: 2020-10-15 | Stop reason: SDUPTHER

## 2020-05-20 RX ORDER — MECLIZINE HCL 12.5 MG/1
25 TABLET ORAL ONCE
Status: COMPLETED | OUTPATIENT
Start: 2020-05-20 | End: 2020-05-20

## 2020-05-20 RX ORDER — ACETAMINOPHEN 325 MG/1
975 TABLET ORAL ONCE
Status: COMPLETED | OUTPATIENT
Start: 2020-05-20 | End: 2020-05-20

## 2020-05-20 RX ORDER — KETOROLAC TROMETHAMINE 30 MG/ML
15 INJECTION, SOLUTION INTRAMUSCULAR; INTRAVENOUS ONCE
Status: COMPLETED | OUTPATIENT
Start: 2020-05-20 | End: 2020-05-20

## 2020-05-20 RX ORDER — ARIPIPRAZOLE 20 MG/1
20 TABLET ORAL
COMMUNITY
End: 2020-10-15 | Stop reason: SDUPTHER

## 2020-05-20 RX ADMIN — ACETAMINOPHEN 975 MG: 325 TABLET, FILM COATED ORAL at 13:15

## 2020-05-20 RX ADMIN — IOHEXOL 65 ML: 350 INJECTION, SOLUTION INTRAVENOUS at 14:39

## 2020-05-20 RX ADMIN — MECLIZINE 25 MG: 12.5 TABLET ORAL at 15:30

## 2020-05-20 RX ADMIN — KETOROLAC TROMETHAMINE 15 MG: 30 INJECTION, SOLUTION INTRAMUSCULAR at 13:15

## 2020-05-25 LAB
BACTERIA BLD CULT: NORMAL
BACTERIA BLD CULT: NORMAL

## 2020-05-27 ENCOUNTER — TELEMEDICINE (OUTPATIENT)
Dept: FAMILY MEDICINE CLINIC | Facility: CLINIC | Age: 40
End: 2020-05-27

## 2020-05-27 DIAGNOSIS — Z09 ENCOUNTER FOR FOLLOW-UP: Primary | ICD-10-CM

## 2020-05-27 DIAGNOSIS — Z20.822 EXPOSURE TO COVID-19 VIRUS: ICD-10-CM

## 2020-05-27 DIAGNOSIS — R06.02 SOB (SHORTNESS OF BREATH): ICD-10-CM

## 2020-05-27 DIAGNOSIS — G44.209 ACUTE NON INTRACTABLE TENSION-TYPE HEADACHE: ICD-10-CM

## 2020-05-27 PROCEDURE — G2025 DIS SITE TELE SVCS RHC/FQHC: HCPCS | Performed by: FAMILY MEDICINE

## 2020-05-28 ENCOUNTER — TELEPHONE (OUTPATIENT)
Dept: FAMILY MEDICINE CLINIC | Facility: CLINIC | Age: 40
End: 2020-05-28

## 2020-06-01 ENCOUNTER — TELEPHONE (OUTPATIENT)
Dept: FAMILY MEDICINE CLINIC | Facility: CLINIC | Age: 40
End: 2020-06-01

## 2020-06-02 DIAGNOSIS — G80.1 SPASTIC DIPLEGIC CEREBRAL PALSY (HCC): Primary | ICD-10-CM

## 2020-06-03 ENCOUNTER — HOSPITAL ENCOUNTER (EMERGENCY)
Facility: HOSPITAL | Age: 40
Discharge: HOME | End: 2020-06-03
Attending: EMERGENCY MEDICINE | Admitting: EMERGENCY MEDICINE
Payer: MEDICARE

## 2020-06-03 VITALS
DIASTOLIC BLOOD PRESSURE: 79 MMHG | OXYGEN SATURATION: 97 % | WEIGHT: 195.99 LBS | TEMPERATURE: 99.3 F | SYSTOLIC BLOOD PRESSURE: 110 MMHG | BODY MASS INDEX: 42.41 KG/M2 | HEART RATE: 79 BPM | RESPIRATION RATE: 20 BRPM

## 2020-06-03 DIAGNOSIS — F41.9 ANXIETY: Primary | ICD-10-CM

## 2020-06-03 DIAGNOSIS — R07.89 ATYPICAL CHEST PAIN: ICD-10-CM

## 2020-06-03 LAB
ALBUMIN SERPL BCP-MCNC: 3 G/DL (ref 3.5–5)
ALP SERPL-CCNC: 159 U/L (ref 46–116)
ALT SERPL W P-5'-P-CCNC: 21 U/L (ref 12–78)
ANION GAP SERPL CALCULATED.3IONS-SCNC: 13 MMOL/L (ref 4–13)
AST SERPL W P-5'-P-CCNC: 28 U/L (ref 5–45)
ATRIAL RATE: 99 BPM
BILIRUB DIRECT SERPL-MCNC: 0.05 MG/DL (ref 0–0.2)
BILIRUB SERPL-MCNC: 0.2 MG/DL (ref 0.2–1)
BUN SERPL-MCNC: 9 MG/DL (ref 5–25)
CALCIUM SERPL-MCNC: 8 MG/DL (ref 8.3–10.1)
CHLORIDE SERPL-SCNC: 106 MMOL/L (ref 100–108)
CO2 SERPL-SCNC: 19 MMOL/L (ref 21–32)
CREAT SERPL-MCNC: 0.79 MG/DL (ref 0.6–1.3)
GFR SERPL CREATININE-BSD FRML MDRD: 93 ML/MIN/1.73SQ M
GLUCOSE SERPL-MCNC: 101 MG/DL (ref 65–140)
P AXIS: 54 DEGREES
POTASSIUM SERPL-SCNC: 4.3 MMOL/L (ref 3.5–5.3)
PR INTERVAL: 128 MS
PROT SERPL-MCNC: 7.1 G/DL (ref 6.4–8.2)
QRS AXIS: 20 DEGREES
QRSD INTERVAL: 66 MS
QT INTERVAL: 330 MS
QTC INTERVAL: 423 MS
SODIUM SERPL-SCNC: 138 MMOL/L (ref 136–145)
T WAVE AXIS: 60 DEGREES
TROPONIN I SERPL-MCNC: <0.02 NG/ML
VENTRICULAR RATE: 99 BPM

## 2020-06-03 PROCEDURE — 84484 ASSAY OF TROPONIN QUANT: CPT | Performed by: EMERGENCY MEDICINE

## 2020-06-03 PROCEDURE — 93005 ELECTROCARDIOGRAM TRACING: CPT

## 2020-06-03 PROCEDURE — 80048 BASIC METABOLIC PNL TOTAL CA: CPT | Performed by: EMERGENCY MEDICINE

## 2020-06-03 PROCEDURE — 93010 ELECTROCARDIOGRAM REPORT: CPT | Performed by: INTERNAL MEDICINE

## 2020-06-03 PROCEDURE — 99285 EMERGENCY DEPT VISIT HI MDM: CPT

## 2020-06-03 PROCEDURE — 36415 COLL VENOUS BLD VENIPUNCTURE: CPT | Performed by: EMERGENCY MEDICINE

## 2020-06-03 PROCEDURE — 80076 HEPATIC FUNCTION PANEL: CPT | Performed by: EMERGENCY MEDICINE

## 2020-06-03 PROCEDURE — 99284 EMERGENCY DEPT VISIT MOD MDM: CPT | Performed by: EMERGENCY MEDICINE

## 2020-06-05 ENCOUNTER — OFFICE VISIT (OUTPATIENT)
Dept: AUDIOLOGY | Age: 40
End: 2020-06-05

## 2020-06-05 DIAGNOSIS — H90.3 SENSORY HEARING LOSS, BILATERAL: Primary | ICD-10-CM

## 2020-06-09 DIAGNOSIS — R10.9 ABDOMINAL PAIN, UNSPECIFIED ABDOMINAL LOCATION: ICD-10-CM

## 2020-06-09 RX ORDER — PANTOPRAZOLE SODIUM 20 MG/1
20 TABLET, DELAYED RELEASE ORAL DAILY
Qty: 30 TABLET | Refills: 2 | Status: SHIPPED | OUTPATIENT
Start: 2020-06-09 | End: 2020-09-02

## 2020-06-11 ENCOUNTER — TELEPHONE (OUTPATIENT)
Dept: FAMILY MEDICINE CLINIC | Facility: CLINIC | Age: 40
End: 2020-06-11

## 2020-06-12 ENCOUNTER — OFFICE VISIT (OUTPATIENT)
Dept: FAMILY MEDICINE CLINIC | Facility: CLINIC | Age: 40
End: 2020-06-12

## 2020-06-12 VITALS
SYSTOLIC BLOOD PRESSURE: 118 MMHG | TEMPERATURE: 96.3 F | WEIGHT: 197 LBS | DIASTOLIC BLOOD PRESSURE: 80 MMHG | RESPIRATION RATE: 18 BRPM | HEART RATE: 88 BPM | BODY MASS INDEX: 42.63 KG/M2

## 2020-06-12 DIAGNOSIS — J00 COMMON COLD VIRUS: ICD-10-CM

## 2020-06-12 DIAGNOSIS — Z01.818 PREOPERATIVE CLEARANCE: Primary | ICD-10-CM

## 2020-06-12 DIAGNOSIS — R52 PAIN: ICD-10-CM

## 2020-06-12 DIAGNOSIS — J30.89 NON-SEASONAL ALLERGIC RHINITIS, UNSPECIFIED TRIGGER: ICD-10-CM

## 2020-06-12 DIAGNOSIS — E23.6 PITUITARY MASS (HCC): ICD-10-CM

## 2020-06-12 PROCEDURE — PREOP: Performed by: FAMILY MEDICINE

## 2020-06-12 RX ORDER — HYDROXYZINE HYDROCHLORIDE 25 MG/1
TABLET, FILM COATED ORAL
COMMUNITY
End: 2020-11-10 | Stop reason: ALTCHOICE

## 2020-06-12 RX ORDER — IBUPROFEN 400 MG/1
400 TABLET ORAL EVERY 8 HOURS PRN
Qty: 30 TABLET | Refills: 5 | Status: SHIPPED | OUTPATIENT
Start: 2020-06-12 | End: 2022-05-27

## 2020-06-12 RX ORDER — FLUTICASONE PROPIONATE 50 MCG
1 SPRAY, SUSPENSION (ML) NASAL DAILY PRN
Qty: 1 BOTTLE | Refills: 5 | Status: SHIPPED | OUTPATIENT
Start: 2020-06-12 | End: 2021-06-11 | Stop reason: SDUPTHER

## 2020-06-16 ENCOUNTER — TELEPHONE (OUTPATIENT)
Dept: FAMILY MEDICINE CLINIC | Facility: CLINIC | Age: 40
End: 2020-06-16

## 2020-06-18 ENCOUNTER — ANNUAL EXAM (OUTPATIENT)
Dept: OBGYN CLINIC | Facility: CLINIC | Age: 40
End: 2020-06-18

## 2020-06-18 VITALS
BODY MASS INDEX: 42.46 KG/M2 | SYSTOLIC BLOOD PRESSURE: 128 MMHG | DIASTOLIC BLOOD PRESSURE: 81 MMHG | TEMPERATURE: 97.9 F | HEART RATE: 112 BPM | WEIGHT: 196.2 LBS

## 2020-06-18 DIAGNOSIS — Z12.39 SCREENING FOR BREAST CANCER: ICD-10-CM

## 2020-06-18 DIAGNOSIS — Z11.59 SCREENING FOR VIRAL DISEASE: ICD-10-CM

## 2020-06-18 DIAGNOSIS — N92.6 IRREGULAR MENSES: ICD-10-CM

## 2020-06-18 DIAGNOSIS — Z01.419 ENCOUNTER FOR GYNECOLOGICAL EXAMINATION WITHOUT ABNORMAL FINDING: Primary | ICD-10-CM

## 2020-06-18 DIAGNOSIS — Z12.4 SCREENING FOR CERVICAL CANCER: ICD-10-CM

## 2020-06-18 PROCEDURE — U0003 INFECTIOUS AGENT DETECTION BY NUCLEIC ACID (DNA OR RNA); SEVERE ACUTE RESPIRATORY SYNDROME CORONAVIRUS 2 (SARS-COV-2) (CORONAVIRUS DISEASE [COVID-19]), AMPLIFIED PROBE TECHNIQUE, MAKING USE OF HIGH THROUGHPUT TECHNOLOGIES AS DESCRIBED BY CMS-2020-01-R: HCPCS

## 2020-06-18 PROCEDURE — G0101 CA SCREEN;PELVIC/BREAST EXAM: HCPCS | Performed by: NURSE PRACTITIONER

## 2020-06-18 RX ORDER — NORGESTIMATE AND ETHINYL ESTRADIOL 0.25-0.035
1 KIT ORAL DAILY
Qty: 28 TABLET | Refills: 11 | Status: SHIPPED | OUTPATIENT
Start: 2020-06-18 | End: 2021-05-28 | Stop reason: SDUPTHER

## 2020-06-19 ENCOUNTER — TELEPHONE (OUTPATIENT)
Dept: FAMILY MEDICINE CLINIC | Facility: CLINIC | Age: 40
End: 2020-06-19

## 2020-06-19 LAB — SARS-COV-2 RNA SPEC QL NAA+PROBE: NOT DETECTED

## 2020-06-24 ENCOUNTER — HOSPITAL ENCOUNTER (OUTPATIENT)
Dept: RADIOLOGY | Facility: HOSPITAL | Age: 40
Discharge: HOME | End: 2020-06-24
Payer: MEDICARE

## 2020-06-24 VITALS
TEMPERATURE: 99.2 F | SYSTOLIC BLOOD PRESSURE: 138 MMHG | RESPIRATION RATE: 16 BRPM | DIASTOLIC BLOOD PRESSURE: 82 MMHG | OXYGEN SATURATION: 100 % | HEART RATE: 88 BPM

## 2020-06-24 DIAGNOSIS — D35.2 PITUITARY MICROADENOMA (HCC): ICD-10-CM

## 2020-06-24 DIAGNOSIS — Z11.59 SCREENING FOR VIRAL DISEASE: Primary | ICD-10-CM

## 2020-06-24 DIAGNOSIS — D35.2: ICD-10-CM

## 2020-06-24 PROCEDURE — 70553 MRI BRAIN STEM W/O & W/DYE: CPT

## 2020-06-24 PROCEDURE — A9585 GADOBUTROL INJECTION: HCPCS | Performed by: PHYSICIAN ASSISTANT

## 2020-06-24 RX ORDER — ONDANSETRON 2 MG/ML
4 INJECTION INTRAMUSCULAR; INTRAVENOUS ONCE AS NEEDED
Status: CANCELLED | OUTPATIENT
Start: 2020-06-24

## 2020-06-24 RX ORDER — PROPOFOL 10 MG/ML
INJECTION, EMULSION INTRAVENOUS AS NEEDED
Status: DISCONTINUED | OUTPATIENT
Start: 2020-06-24 | End: 2020-06-24 | Stop reason: SURG

## 2020-06-24 RX ORDER — LIDOCAINE HYDROCHLORIDE 10 MG/ML
0.5 INJECTION, SOLUTION EPIDURAL; INFILTRATION; INTRACAUDAL; PERINEURAL ONCE AS NEEDED
Status: DISCONTINUED | OUTPATIENT
Start: 2020-06-24 | End: 2020-06-25 | Stop reason: HOSPADM

## 2020-06-24 RX ORDER — ONDANSETRON 2 MG/ML
INJECTION INTRAMUSCULAR; INTRAVENOUS AS NEEDED
Status: DISCONTINUED | OUTPATIENT
Start: 2020-06-24 | End: 2020-06-24 | Stop reason: SURG

## 2020-06-24 RX ORDER — SODIUM CHLORIDE, SODIUM LACTATE, POTASSIUM CHLORIDE, CALCIUM CHLORIDE 600; 310; 30; 20 MG/100ML; MG/100ML; MG/100ML; MG/100ML
125 INJECTION, SOLUTION INTRAVENOUS CONTINUOUS
Status: DISCONTINUED | OUTPATIENT
Start: 2020-06-24 | End: 2020-06-25 | Stop reason: HOSPADM

## 2020-06-24 RX ADMIN — SODIUM CHLORIDE, SODIUM LACTATE, POTASSIUM CHLORIDE, AND CALCIUM CHLORIDE: .6; .31; .03; .02 INJECTION, SOLUTION INTRAVENOUS at 10:42

## 2020-06-24 RX ADMIN — ONDANSETRON 4 MG: 2 INJECTION INTRAMUSCULAR; INTRAVENOUS at 10:47

## 2020-06-24 RX ADMIN — GADOBUTROL 8 ML: 604.72 INJECTION INTRAVENOUS at 11:08

## 2020-06-24 RX ADMIN — PROPOFOL 150 MG: 10 INJECTION, EMULSION INTRAVENOUS at 10:47

## 2020-06-26 ENCOUNTER — OFFICE VISIT (OUTPATIENT)
Dept: NEUROSURGERY | Facility: CLINIC | Age: 40
End: 2020-06-26
Payer: MEDICARE

## 2020-06-26 VITALS
RESPIRATION RATE: 18 BRPM | HEART RATE: 100 BPM | SYSTOLIC BLOOD PRESSURE: 158 MMHG | TEMPERATURE: 98.2 F | DIASTOLIC BLOOD PRESSURE: 96 MMHG

## 2020-06-26 DIAGNOSIS — D35.2 PITUITARY MICROADENOMA (HCC): Primary | ICD-10-CM

## 2020-06-26 PROCEDURE — 1036F TOBACCO NON-USER: CPT | Performed by: NURSE PRACTITIONER

## 2020-06-26 PROCEDURE — 99214 OFFICE O/P EST MOD 30 MIN: CPT | Performed by: NURSE PRACTITIONER

## 2020-06-30 DIAGNOSIS — K59.04 CHRONIC IDIOPATHIC CONSTIPATION: ICD-10-CM

## 2020-07-16 ENCOUNTER — TELEPHONE (OUTPATIENT)
Dept: FAMILY MEDICINE CLINIC | Facility: CLINIC | Age: 40
End: 2020-07-16

## 2020-07-16 DIAGNOSIS — H91.8X1 OTHER SPECIFIED HEARING LOSS OF RIGHT EAR, UNSPECIFIED HEARING STATUS ON CONTRALATERAL SIDE: Primary | Chronic | ICD-10-CM

## 2020-07-16 NOTE — TELEPHONE ENCOUNTER
Nurse from person directive support calling - pt has appt at [facility] audiology on 8/5 and needs order for comprehensive hearing evaluation placed   Can fax to [phone]

## 2020-07-29 DIAGNOSIS — K21.00 GASTROESOPHAGEAL REFLUX DISEASE WITH ESOPHAGITIS: ICD-10-CM

## 2020-08-05 ENCOUNTER — OFFICE VISIT (OUTPATIENT)
Dept: AUDIOLOGY | Age: 40
End: 2020-08-05
Payer: MEDICARE

## 2020-08-05 DIAGNOSIS — H90.3 SENSORINEURAL HEARING LOSS, BILATERAL: Primary | ICD-10-CM

## 2020-08-05 PROCEDURE — 92567 TYMPANOMETRY: CPT | Performed by: AUDIOLOGIST

## 2020-08-05 PROCEDURE — 92552 PURE TONE AUDIOMETRY AIR: CPT | Performed by: AUDIOLOGIST

## 2020-08-05 NOTE — PROGRESS NOTES
HEARING EVALUATION    Name:  Luis Magallon  :  1980  Age:  38 y o  Date of Evaluation: 20     History: Annual Hearing Test  Reason for visit: Luis Magallon is being seen today at the request of Dr Seble Velazquez for an evaluation of hearing  Caregiver reports that Zaina Kahn does well with her hearing aid, but has not been wearing it recently as she is out of batteries  EVALUATION:    Otoscopic Evaluation:   Right Ear: Occluded   Left Ear: Clear and healthy ear canal and tympanic membrane    Tympanometry:   Right: Type A - normal middle ear pressure and compliance   Left: Type C - negative pressure, reduced compliance    Audiogram Results:  Testing was initiated in the right ear, but discontinued as thresholds are worse than last year and change may be related to cerumen today  *see attached audiogram      RECOMMENDATIONS:  Return to [facility]  for F/U, Ear Cleaning and Copy to Patient/Caregiver, hearing evaluation/hearing aid check to be scheduled following ear cleaning  PATIENT EDUCATION:   Discussed results and recommendations with caregiver  Questions were addressed and the caregiver was encouraged to contact our department should concerns arise  Indigo Velasco   Clinical Audiologist       Hearing Aid Visit:    Name:  Luis Magallon  :  1980  Age:  38 y o  Date of Evaluation: 20     Luis Magallon is being seen for a hearing aid visit  Patient is fit with Chalice Boatman 2 Series i110 BTE hearing aid(s)  Right serial number [id number]  Warranty date: 2021 (Loss/Damage and repair)  Caregiver reports that Zaina Kahn does well with hearing aid, but has not recently been wearing as she is out of batteries  Action:  [place] was full of cerumen  Cleaned  Cleaned and checked hearing aid - good sound quality  Recommendations:   Caregiver will contact center with any concerns      Indigo Velasco   Clinical Audiologist

## 2020-08-24 ENCOUNTER — TELEPHONE (OUTPATIENT)
Dept: FAMILY MEDICINE CLINIC | Facility: CLINIC | Age: 40
End: 2020-08-24
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
## 2020-09-02 DIAGNOSIS — K59.00 CONSTIPATION, UNSPECIFIED CONSTIPATION TYPE: ICD-10-CM

## 2020-09-02 DIAGNOSIS — R10.9 ABDOMINAL PAIN, UNSPECIFIED ABDOMINAL LOCATION: ICD-10-CM

## 2020-09-02 RX ORDER — PANTOPRAZOLE SODIUM 20 MG/1
TABLET, DELAYED RELEASE ORAL
Qty: 30 TABLET | Refills: 0 | Status: SHIPPED | OUTPATIENT
Start: 2020-09-02 | End: 2020-09-16 | Stop reason: SDUPTHER

## 2020-09-02 RX ORDER — LUBIPROSTONE 24 UG/1
CAPSULE, GELATIN COATED ORAL
Qty: 60 CAPSULE | Refills: 0 | Status: SHIPPED | OUTPATIENT
Start: 2020-09-02 | End: 2020-10-15 | Stop reason: SDUPTHER

## 2020-09-10 RX ORDER — LAMOTRIGINE 25 MG/1
25 TABLET ORAL 2 TIMES DAILY
COMMUNITY
Start: 2020-08-11

## 2020-09-14 ENCOUNTER — CLINICAL SUPPORT (OUTPATIENT)
Dept: FAMILY MEDICINE CLINIC | Facility: CLINIC | Age: 40
End: 2020-09-14

## 2020-09-14 ENCOUNTER — TELEPHONE (OUTPATIENT)
Dept: FAMILY MEDICINE CLINIC | Facility: CLINIC | Age: 40
End: 2020-09-14

## 2020-09-14 DIAGNOSIS — Z23 ENCOUNTER FOR IMMUNIZATION: Primary | ICD-10-CM

## 2020-09-14 PROCEDURE — 90715 TDAP VACCINE 7 YRS/> IM: CPT

## 2020-09-14 PROCEDURE — 90471 IMMUNIZATION ADMIN: CPT

## 2020-09-15 ENCOUNTER — OFFICE VISIT (OUTPATIENT)
Dept: FAMILY MEDICINE CLINIC | Facility: CLINIC | Age: 40
End: 2020-09-15

## 2020-09-15 ENCOUNTER — TELEPHONE (OUTPATIENT)
Dept: FAMILY MEDICINE CLINIC | Facility: CLINIC | Age: 40
End: 2020-09-15

## 2020-09-15 VITALS
SYSTOLIC BLOOD PRESSURE: 128 MMHG | BODY MASS INDEX: 41.72 KG/M2 | RESPIRATION RATE: 18 BRPM | TEMPERATURE: 98.5 F | HEIGHT: 57 IN | WEIGHT: 193.4 LBS | HEART RATE: 74 BPM | DIASTOLIC BLOOD PRESSURE: 70 MMHG

## 2020-09-15 DIAGNOSIS — Z23 ENCOUNTER FOR IMMUNIZATION: Primary | ICD-10-CM

## 2020-09-15 DIAGNOSIS — H61.21 IMPACTED CERUMEN OF RIGHT EAR: Primary | ICD-10-CM

## 2020-09-15 DIAGNOSIS — Z23 ENCOUNTER FOR IMMUNIZATION: ICD-10-CM

## 2020-09-15 PROCEDURE — 90686 IIV4 VACC NO PRSV 0.5 ML IM: CPT

## 2020-09-15 PROCEDURE — G0008 ADMIN INFLUENZA VIRUS VAC: HCPCS

## 2020-09-15 PROCEDURE — 99213 OFFICE O/P EST LOW 20 MIN: CPT | Performed by: FAMILY MEDICINE

## 2020-09-15 NOTE — TELEPHONE ENCOUNTER
Dipak Kirby From [facility] called to request a 's order for annual comprehensive hearing eval scheduled on 10/7/20 at [facility]

## 2020-09-15 NOTE — ASSESSMENT & PLAN NOTE
Patient presents for decreased hearing and ear wax removal  -L ear normal  -R ear with cerumen impaction  Will prescribe debrox drops TID for 2 weeks   Caregiver recommended to come back for cerumen removal at the end of 2 weeks    -Flu shot given at this visit

## 2020-09-15 NOTE — PROGRESS NOTES
Assessment/Plan:    Cerumen impaction  Patient presents for decreased hearing and ear wax removal  -L ear normal  -R ear with cerumen impaction  Will prescribe debrox drops TID for 2 weeks  Caregiver recommended to come back for cerumen removal at the end of 2 weeks    -Flu shot given at this visit         Problem List Items Addressed This Visit        Nervous and Auditory    Cerumen impaction - Primary     Patient presents for decreased hearing and ear wax removal  -L ear normal  -R ear with cerumen impaction  Will prescribe debrox drops TID for 2 weeks  Caregiver recommended to come back for cerumen removal at the end of 2 weeks    -Flu shot given at this visit         Relevant Medications    carbamide peroxide (DEBROX) 6 5 % otic solution      Other Visit Diagnoses     Encounter for immunization        Relevant Orders    influenza vaccine, quadrivalent, 0 5 mL, preservative-free, for adult and pediatric patients 6 mos+ (AFLURIA, FLUARIX, FLULAVAL, FLUZONE) (Completed)            Subjective:      Patient ID: Wenceslao Mcclellan is a 38 y o  female  HPI     Patient here for acute visit for ear cleaning  She reports she has decreased hearing on both ears  Caregiver reports patient has been complaining of having ear wax on ears  She denies fever, ear pain, ear drainage  Has no other complains at this appointment       The following portions of the patient's history were reviewed and updated as appropriate:   She  has a past medical history of ADD (attention deficit disorder), Anxiety, Astigmatism, Brain lesion, Calcium deficiency, Cellulitis of foot, right (6/4/2018), Cerebral palsy ([address] ), Chronic otitis media, Constipation, Depression, Dysphagia, Esophagitis, Esotropia, GERD (gastroesophageal reflux disease), Hiatal hernia, Hydrocephalus ([address] ), Impaired fasting glucose, Left nephrolithiasis (03/04/2019), Myopia, Oppositional defiant disorder, 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[address] ), Seizures ([address] ), Sensorineural hearing loss, Status post ventriculoatrial shunt placement, and Visual impairment  She  has a past surgical history that includes CSF shunt; Leg Surgery; EAR SURGERY; Nose surgery; pr esophagogastroduodenoscopy transoral diagnostic (N/A, 5/9/2019); Upper gastrointestinal endoscopy (05/2019); and Breast biopsy  She  reports that she has never smoked  She has never used smokeless tobacco  She reports that she does not drink alcohol or use drugs    Current Outpatient Medications   Medication Sig Dispense Refill    acetaminophen (TYLENOL) 500 mg tablet Take 1 tablet (500 mg total) by mouth every 6 (six) hours as needed for mild pain 30 tablet 1    aluminum-magnesium hydroxide-simethicone (ANTACID) 200-200-20 mg/5 mL suspension Take 15 mL by mouth every 4 (four) hours as needed for indigestion or heartburn 355 mL 5    Amitiza 24 MCG capsule TAKE 1 CAPSULE BY MOUTH TWICE DAILY AT 8A-8P (CONSTIPATION)*DEL CID 60 capsule 0    ARIPiprazole (ABILIFY) 20 MG tablet Take 20 mg by mouth daily at bedtime      bismuth subsalicylate (PEPTO BISMOL) 524 mg/30 mL oral suspension Take 15 mL (262 mg total) by mouth every 6 (six) hours as needed for indigestion 360 mL 3    calcium carbonate (TUMS) 500 mg chewable tablet Chew 1 tablet (500 mg total) 3 (three) times a day as needed for heartburn 30 tablet 1    carbamide peroxide (DEBROX) 6 5 % otic solution Administer 5 drops to the right ear 2 (two) times a day 15 mL 0    Cholecalciferol (VITAMIN D-3) 25 MCG (1000 UT) CAPS Take 2 capsules (2,000 Units total) by mouth daily at 8am  60 capsule 5    escitalopram (LEXAPRO) 20 mg tablet Take 20 mg by mouth daily      fluticasone (FLONASE) 50 mcg/act nasal spray 1 spray into each nostril daily as needed for rhinitis (nasal congestion) At 8:00 AM 1 Bottle 5    hydrOXYzine HCL (ATARAX) 25 mg tablet hydroxyzine HCl 25 mg tablet      ibuprofen (MOTRIN) 400 mg tablet Take 1 tablet (400 mg total) by mouth every 8 (eight) hours as needed for mild pain 30 tablet 5    lamoTRIgine (LaMICtal) 100 mg tablet Take 100 mg by mouth daily at bedtime       lamoTRIgine (LaMICtal) 25 mg tablet       lidocaine (LIDODERM) 5 % Apply 1 patch topically daily Remove & Discard patch within 12 hours or as directed by MD 15 patch 0    magnesium hydroxide (GNP MILK OF MAGNESIA) 400 mg/5 mL oral suspension Take 30 mL by mouth daily as needed for constipation If no BM in 3 days 355 mL 5    Mouthwashes (LISTERINE ANTISEPTIC) LIQD Apply 1 application to the mouth or throat 2 (two) times a day 500 mL 3    Multiple Vitamins-Minerals (CERTAVITE SENIOR/ANTIOXIDANT) TABS Take 1 tablet by mouth daily 30 tablet 5    norgestimate-ethinyl estradiol (ESTARYLLA) 0 25-35 MG-MCG per tablet Estarylla 0 25 mg-35 mcg tablet      norgestimate-ethinyl estradiol (ORTHO-CYCLEN) 0 25-35 MG-MCG per tablet Take 1 tablet by mouth daily 28 tablet 11    pantoprazole (PROTONIX) 20 mg tablet TAKE 1 TABLET BY MOUTH ONCE DAILY @7AM (GERD) 30 tablet 0    phenol (CHLORASEPTIC) 1 4 % mucosal liquid Apply 1 spray to the mouth or throat every 2 (two) hours as needed (for sore throat as needed) 236 mL 1    polyethylene glycol (MIRALAX) 17 g packet Take one packet daily as needed for no bowel movement in 24 hours 14 each 3    psyllium (METAMUCIL) 58 6 % packet Take 1 packet by mouth daily 30 packet 5    RA SUNSCREEN SPF50 LOTN Apply 15 minutes before sun exposure and every 2 hours 1 Bottle 5    senna-docusate sodium (SENEXON-S) 8 6-50 mg per tablet Take 1 tablet by mouth daily at 8am  90 tablet 3    sodium chloride (OCEAN) 0 65 % nasal spray 1 spray into each nostril as needed (three times daily as needed for nasal congestion) 60 mL 1    tetanus-diphtheria-acellular pertussis (ADACEL) 5-2-15 5 LF-mcg/0 5 injection Inject 0 5 mL into a muscle once for 1 dose 0 5 mL 0    traZODone (DESYREL) 50 mg tablet Take 1 tablet (50 mg total) by mouth daily at bedtime at 9pm  0    zinc oxide (DESITIN) 13 % cream Apply 1 application topically as needed (for perianal irritation) 1 Tube 5     No current facility-administered medications for this visit  She has No Known Allergies       Review of Systems   HENT: Positive for ear pain and hearing loss  Negative for ear discharge  Respiratory: Negative for shortness of breath  Cardiovascular: Negative for chest pain  Gastrointestinal: Negative for abdominal pain  Skin: Negative for rash  Psychiatric/Behavioral: The patient is not nervous/anxious  Objective:      /70 (BP Location: Left arm, Patient Position: Sitting, Cuff Size: Large)   Pulse 74   Temp 98 5 °F (36 9 °C) (Tympanic)   Resp 18   Ht 4' 9" (1 448 m)   Wt 87 7 kg (193 lb 6 4 oz)   BMI 41 85 kg/m²          Physical Exam  Constitutional:       General: She is not in acute distress  Appearance: She is obese  Comments: Wheel chair bounded   HENT:      Right Ear: Tympanic membrane, ear canal and external ear normal  Decreased hearing noted  No tenderness  There is impacted cerumen  Left Ear: Tympanic membrane, ear canal and external ear normal  Decreased hearing noted  No tenderness  No middle ear effusion  There is no impacted cerumen  Cardiovascular:      Rate and Rhythm: Normal rate and regular rhythm  Pulses: Normal pulses  Heart sounds: No murmur  Pulmonary:      Effort: Pulmonary effort is normal    Abdominal:      General: Abdomen is flat  Bowel sounds are normal  There is no distension  Skin:     General: Skin is warm  Capillary Refill: Capillary refill takes less than 2 seconds  Neurological:      Mental Status: She is alert  Mental status is at baseline

## 2020-09-16 DIAGNOSIS — R05.9 COUGH: Primary | ICD-10-CM

## 2020-09-16 DIAGNOSIS — E55.9 VITAMIN D DEFICIENCY: ICD-10-CM

## 2020-09-16 DIAGNOSIS — K08.9 TOOTH DISORDER: ICD-10-CM

## 2020-09-16 DIAGNOSIS — Z00.00 ROUTINE HEALTH MAINTENANCE: ICD-10-CM

## 2020-09-16 DIAGNOSIS — K59.00 CONSTIPATION, UNSPECIFIED CONSTIPATION TYPE: ICD-10-CM

## 2020-09-16 DIAGNOSIS — R10.9 ABDOMINAL PAIN, UNSPECIFIED ABDOMINAL LOCATION: ICD-10-CM

## 2020-09-16 DIAGNOSIS — L29.0 PERIANAL IRRITATION: ICD-10-CM

## 2020-09-16 DIAGNOSIS — K21.9 GASTROESOPHAGEAL REFLUX DISEASE WITHOUT ESOPHAGITIS: ICD-10-CM

## 2020-09-16 RX ORDER — MAGNESIUM HYDROXIDE 1200 MG/15ML
30 SUSPENSION ORAL DAILY PRN
Qty: 355 ML | Refills: 5 | Status: SHIPPED | OUTPATIENT
Start: 2020-09-16 | End: 2022-06-01

## 2020-09-16 RX ORDER — GUAIFENESIN 100 MG/5ML
200 SYRUP ORAL 3 TIMES DAILY PRN
Qty: 120 ML | Refills: 5 | Status: SHIPPED | OUTPATIENT
Start: 2020-09-16 | End: 2020-09-26

## 2020-09-16 RX ORDER — CHOLECALCIFEROL (VITAMIN D3) 25 MCG
2000 CAPSULE ORAL DAILY
Qty: 30 CAPSULE | Refills: 5 | Status: SHIPPED | OUTPATIENT
Start: 2020-09-16 | End: 2020-10-15 | Stop reason: SDUPTHER

## 2020-09-16 RX ORDER — CALCIUM CARBONATE 200(500)MG
1 TABLET,CHEWABLE ORAL 3 TIMES DAILY PRN
Qty: 30 TABLET | Refills: 5 | Status: SHIPPED | OUTPATIENT
Start: 2020-09-16 | End: 2021-10-26 | Stop reason: SDUPTHER

## 2020-09-16 RX ORDER — PANTOPRAZOLE SODIUM 20 MG/1
20 TABLET, DELAYED RELEASE ORAL DAILY
Qty: 30 TABLET | Refills: 5 | Status: SHIPPED | OUTPATIENT
Start: 2020-09-16 | End: 2020-10-15 | Stop reason: SDUPTHER

## 2020-09-16 RX ORDER — EUCALYP/ME-SALICYLATE/MEN/THYM
1 MOUTHWASH MUCOUS MEMBRANE 2 TIMES DAILY
Qty: 500 ML | Refills: 5 | Status: SHIPPED | OUTPATIENT
Start: 2020-09-16 | End: 2021-04-12 | Stop reason: SDUPTHER

## 2020-09-16 RX ORDER — MULTIVIT-MIN/FA/LYCOPEN/LUTEIN .4-300-25
1 TABLET ORAL DAILY
Qty: 30 TABLET | Refills: 5 | Status: SHIPPED | OUTPATIENT
Start: 2020-09-16 | End: 2021-03-30

## 2020-10-02 ENCOUNTER — OFFICE VISIT (OUTPATIENT)
Dept: CARDIOLOGY CLINIC | Facility: CLINIC | Age: 40
End: 2020-10-02
Payer: MEDICARE

## 2020-10-02 VITALS
HEART RATE: 92 BPM | WEIGHT: 184 LBS | HEIGHT: 57 IN | TEMPERATURE: 98.6 F | BODY MASS INDEX: 39.7 KG/M2 | RESPIRATION RATE: 18 BRPM | SYSTOLIC BLOOD PRESSURE: 122 MMHG | DIASTOLIC BLOOD PRESSURE: 82 MMHG

## 2020-10-02 DIAGNOSIS — R07.89 ATYPICAL CHEST PAIN: ICD-10-CM

## 2020-10-02 DIAGNOSIS — R06.02 SOB (SHORTNESS OF BREATH): Primary | ICD-10-CM

## 2020-10-02 PROCEDURE — 99214 OFFICE O/P EST MOD 30 MIN: CPT | Performed by: INTERNAL MEDICINE

## 2020-10-02 RX ORDER — AMITRIPTYLINE HYDROCHLORIDE 10 MG/1
10 TABLET, FILM COATED ORAL
COMMUNITY

## 2020-10-07 ENCOUNTER — OFFICE VISIT (OUTPATIENT)
Dept: AUDIOLOGY | Age: 40
End: 2020-10-07

## 2020-10-07 DIAGNOSIS — H90.3 SENSORY HEARING LOSS, BILATERAL: Primary | ICD-10-CM

## 2020-10-14 ENCOUNTER — TELEPHONE (OUTPATIENT)
Dept: FAMILY MEDICINE CLINIC | Facility: CLINIC | Age: 40
End: 2020-10-14

## 2020-10-15 ENCOUNTER — OFFICE VISIT (OUTPATIENT)
Dept: FAMILY MEDICINE CLINIC | Facility: CLINIC | Age: 40
End: 2020-10-15

## 2020-10-15 VITALS
BODY MASS INDEX: 42.24 KG/M2 | HEART RATE: 86 BPM | HEIGHT: 57 IN | SYSTOLIC BLOOD PRESSURE: 124 MMHG | TEMPERATURE: 98.2 F | DIASTOLIC BLOOD PRESSURE: 80 MMHG | WEIGHT: 195.8 LBS | RESPIRATION RATE: 14 BRPM

## 2020-10-15 DIAGNOSIS — Z00.00 ROUTINE HEALTH MAINTENANCE: ICD-10-CM

## 2020-10-15 DIAGNOSIS — K59.00 CONSTIPATION, UNSPECIFIED CONSTIPATION TYPE: ICD-10-CM

## 2020-10-15 DIAGNOSIS — H61.23 BILATERAL IMPACTED CERUMEN: Primary | ICD-10-CM

## 2020-10-15 DIAGNOSIS — B37.2 CANDIDIASIS OF SKIN: ICD-10-CM

## 2020-10-15 DIAGNOSIS — J00 COMMON COLD VIRUS: ICD-10-CM

## 2020-10-15 DIAGNOSIS — E55.9 VITAMIN D DEFICIENCY: ICD-10-CM

## 2020-10-15 DIAGNOSIS — J06.9 VIRAL URI: ICD-10-CM

## 2020-10-15 DIAGNOSIS — F33.3 SEVERE EPISODE OF RECURRENT MAJOR DEPRESSIVE DISORDER, WITH PSYCHOTIC FEATURES (HCC): ICD-10-CM

## 2020-10-15 DIAGNOSIS — L85.3 DRY SKIN: ICD-10-CM

## 2020-10-15 DIAGNOSIS — Z72.89 SELF-INJURIOUS BEHAVIOR: ICD-10-CM

## 2020-10-15 DIAGNOSIS — J30.89 NON-SEASONAL ALLERGIC RHINITIS, UNSPECIFIED TRIGGER: ICD-10-CM

## 2020-10-15 DIAGNOSIS — B35.1 ONYCHOMYCOSIS: ICD-10-CM

## 2020-10-15 DIAGNOSIS — R10.9 ABDOMINAL PAIN, UNSPECIFIED ABDOMINAL LOCATION: ICD-10-CM

## 2020-10-15 PROCEDURE — 99213 OFFICE O/P EST LOW 20 MIN: CPT | Performed by: FAMILY MEDICINE

## 2020-10-15 PROCEDURE — 69209 REMOVE IMPACTED EAR WAX UNI: CPT | Performed by: FAMILY MEDICINE

## 2020-10-15 RX ORDER — NYSTATIN 100000 [USP'U]/G
POWDER TOPICAL 4 TIMES DAILY
Qty: 15 G | Refills: 0 | Status: SHIPPED | OUTPATIENT
Start: 2020-10-15 | End: 2020-12-01 | Stop reason: SDUPTHER

## 2020-10-15 RX ORDER — PHENOL 1.4 %
1 AEROSOL, SPRAY (ML) MUCOUS MEMBRANE EVERY 2 HOUR PRN
Qty: 236 ML | Refills: 1 | Status: SHIPPED | OUTPATIENT
Start: 2020-10-15

## 2020-10-15 RX ORDER — PANTOPRAZOLE SODIUM 20 MG/1
20 TABLET, DELAYED RELEASE ORAL DAILY
Qty: 30 TABLET | Refills: 5 | Status: SHIPPED | OUTPATIENT
Start: 2020-10-15 | End: 2020-12-29 | Stop reason: SDUPTHER

## 2020-10-15 RX ORDER — CERAMIDES 1,3,6-II
CREAM (GRAM) TOPICAL 2 TIMES DAILY
Qty: 340 G | Refills: 0 | Status: SHIPPED | OUTPATIENT
Start: 2020-10-15 | End: 2020-12-09 | Stop reason: SDUPTHER

## 2020-10-15 RX ORDER — CHOLECALCIFEROL (VITAMIN D3) 25 MCG
2000 CAPSULE ORAL DAILY
Qty: 30 CAPSULE | Refills: 5 | Status: SHIPPED | OUTPATIENT
Start: 2020-10-15 | End: 2021-03-30 | Stop reason: SDUPTHER

## 2020-10-15 RX ORDER — ARIPIPRAZOLE 20 MG/1
20 TABLET ORAL
Qty: 90 TABLET | Refills: 2 | Status: SHIPPED | OUTPATIENT
Start: 2020-10-15

## 2020-10-15 RX ORDER — ESCITALOPRAM OXALATE 20 MG/1
20 TABLET ORAL DAILY
Qty: 90 TABLET | Refills: 2 | Status: SHIPPED | OUTPATIENT
Start: 2020-10-15

## 2020-10-22 ENCOUNTER — PATIENT OUTREACH (OUTPATIENT)
Dept: FAMILY MEDICINE CLINIC | Facility: CLINIC | Age: 40
End: 2020-10-22

## 2020-10-27 ENCOUNTER — APPOINTMENT (EMERGENCY)
Dept: RADIOLOGY | Facility: HOSPITAL | Age: 40
End: 2020-10-27
Payer: MEDICARE

## 2020-10-27 ENCOUNTER — HOSPITAL ENCOUNTER (EMERGENCY)
Facility: HOSPITAL | Age: 40
Discharge: HOME | End: 2020-10-27
Attending: EMERGENCY MEDICINE | Admitting: EMERGENCY MEDICINE
Payer: MEDICARE

## 2020-10-27 VITALS
BODY MASS INDEX: 42.36 KG/M2 | OXYGEN SATURATION: 96 % | WEIGHT: 195.77 LBS | SYSTOLIC BLOOD PRESSURE: 118 MMHG | TEMPERATURE: 97.8 F | HEART RATE: 90 BPM | RESPIRATION RATE: 16 BRPM | DIASTOLIC BLOOD PRESSURE: 77 MMHG

## 2020-10-27 DIAGNOSIS — F41.9 ANXIETY: Primary | ICD-10-CM

## 2020-10-27 LAB
ATRIAL RATE: 108 BPM
P AXIS: 65 DEGREES
PR INTERVAL: 128 MS
QRS AXIS: 51 DEGREES
QRSD INTERVAL: 76 MS
QT INTERVAL: 342 MS
QTC INTERVAL: 458 MS
T WAVE AXIS: 73 DEGREES
VENTRICULAR RATE: 108 BPM

## 2020-10-27 PROCEDURE — 93005 ELECTROCARDIOGRAM TRACING: CPT

## 2020-10-27 PROCEDURE — 99285 EMERGENCY DEPT VISIT HI MDM: CPT

## 2020-10-27 PROCEDURE — 71046 X-RAY EXAM CHEST 2 VIEWS: CPT

## 2020-10-27 PROCEDURE — 99285 EMERGENCY DEPT VISIT HI MDM: CPT | Performed by: EMERGENCY MEDICINE

## 2020-10-27 PROCEDURE — 93010 ELECTROCARDIOGRAM REPORT: CPT | Performed by: INTERNAL MEDICINE

## 2020-10-27 RX ORDER — LORAZEPAM 1 MG/1
1 TABLET ORAL ONCE
Status: COMPLETED | OUTPATIENT
Start: 2020-10-27 | End: 2020-10-27

## 2020-10-27 RX ADMIN — LORAZEPAM 1 MG: 1 TABLET ORAL at 11:32

## 2020-10-30 ENCOUNTER — OFFICE VISIT (OUTPATIENT)
Dept: GASTROENTEROLOGY | Facility: MEDICAL CENTER | Age: 40
End: 2020-10-30
Payer: MEDICARE

## 2020-10-30 VITALS
BODY MASS INDEX: 42.28 KG/M2 | SYSTOLIC BLOOD PRESSURE: 134 MMHG | TEMPERATURE: 98.5 F | HEART RATE: 111 BPM | DIASTOLIC BLOOD PRESSURE: 88 MMHG | HEIGHT: 57 IN | WEIGHT: 196 LBS

## 2020-10-30 DIAGNOSIS — K59.04 CHRONIC IDIOPATHIC CONSTIPATION: ICD-10-CM

## 2020-10-30 DIAGNOSIS — R07.89 ATYPICAL CHEST PAIN: Primary | ICD-10-CM

## 2020-10-30 DIAGNOSIS — K21.9 GASTROESOPHAGEAL REFLUX DISEASE WITHOUT ESOPHAGITIS: ICD-10-CM

## 2020-10-30 PROCEDURE — 99214 OFFICE O/P EST MOD 30 MIN: CPT | Performed by: PHYSICIAN ASSISTANT

## 2020-11-03 ENCOUNTER — OFFICE VISIT (OUTPATIENT)
Dept: FAMILY MEDICINE CLINIC | Facility: CLINIC | Age: 40
End: 2020-11-03

## 2020-11-03 VITALS
WEIGHT: 203.8 LBS | TEMPERATURE: 98.4 F | DIASTOLIC BLOOD PRESSURE: 60 MMHG | HEART RATE: 82 BPM | BODY MASS INDEX: 43.97 KG/M2 | SYSTOLIC BLOOD PRESSURE: 130 MMHG | HEIGHT: 57 IN | RESPIRATION RATE: 16 BRPM

## 2020-11-03 DIAGNOSIS — R07.89 ATYPICAL CHEST PAIN: Primary | ICD-10-CM

## 2020-11-03 PROCEDURE — 99213 OFFICE O/P EST LOW 20 MIN: CPT | Performed by: FAMILY MEDICINE

## 2020-11-05 ENCOUNTER — OFFICE VISIT (OUTPATIENT)
Dept: FAMILY MEDICINE CLINIC | Facility: CLINIC | Age: 40
End: 2020-11-05

## 2020-11-05 VITALS
HEART RATE: 100 BPM | WEIGHT: 198.4 LBS | DIASTOLIC BLOOD PRESSURE: 84 MMHG | TEMPERATURE: 97 F | RESPIRATION RATE: 20 BRPM | SYSTOLIC BLOOD PRESSURE: 140 MMHG | BODY MASS INDEX: 42.93 KG/M2
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
## 2020-11-05 PROCEDURE — 99213 OFFICE O/P EST LOW 20 MIN: CPT | Performed by: FAMILY MEDICINE

## 2020-11-10 ENCOUNTER — APPOINTMENT (EMERGENCY)
Dept: RADIOLOGY | Facility: HOSPITAL | Age: 40
End: 2020-11-10
Payer: MEDICARE

## 2020-11-10 ENCOUNTER — HOSPITAL ENCOUNTER (EMERGENCY)
Facility: HOSPITAL | Age: 40
Discharge: HOME | End: 2020-11-10
Attending: EMERGENCY MEDICINE | Admitting: EMERGENCY MEDICINE
Payer: MEDICARE

## 2020-11-10 ENCOUNTER — APPOINTMENT (EMERGENCY)
Dept: CT IMAGING | Facility: HOSPITAL | Age: 40
End: 2020-11-10
Payer: MEDICARE

## 2020-11-10 VITALS
RESPIRATION RATE: 16 BRPM | OXYGEN SATURATION: 94 % | HEART RATE: 93 BPM | WEIGHT: 204.15 LBS | BODY MASS INDEX: 44.18 KG/M2 | TEMPERATURE: 99.2 F | SYSTOLIC BLOOD PRESSURE: 120 MMHG | DIASTOLIC BLOOD PRESSURE: 58 MMHG

## 2020-11-10 DIAGNOSIS — R07.81 RIB PAIN ON RIGHT SIDE: Primary | ICD-10-CM

## 2020-11-10 LAB
ALBUMIN SERPL BCP-MCNC: 2.7 G/DL (ref 3.5–5)
ALP SERPL-CCNC: 157 U/L (ref 46–116)
ALT SERPL W P-5'-P-CCNC: 17 U/L (ref 12–78)
ANION GAP SERPL CALCULATED.3IONS-SCNC: 8 MMOL/L (ref 4–13)
AST SERPL W P-5'-P-CCNC: 14 U/L (ref 5–45)
ATRIAL RATE: 103 BPM
BACTERIA UR QL AUTO: ABNORMAL /HPF
BASOPHILS # BLD AUTO: 0.03 THOUSANDS/ΜL (ref 0–0.1)
BASOPHILS NFR BLD AUTO: 0 % (ref 0–1)
BILIRUB SERPL-MCNC: 0.12 MG/DL (ref 0.2–1)
BILIRUB UR QL STRIP: NEGATIVE
BUN SERPL-MCNC: 12 MG/DL (ref 5–25)
CALCIUM ALBUM COR SERPL-MCNC: 9.2 MG/DL (ref 8.3–10.1)
CALCIUM SERPL-MCNC: 8.2 MG/DL (ref 8.3–10.1)
CHLORIDE SERPL-SCNC: 105 MMOL/L (ref 100–108)
CLARITY UR: CLEAR
CO2 SERPL-SCNC: 26 MMOL/L (ref 21–32)
COLOR UR: YELLOW
CREAT SERPL-MCNC: 0.71 MG/DL (ref 0.6–1.3)
D DIMER PPP FEU-MCNC: 0.67 UG/ML FEU
EOSINOPHIL # BLD AUTO: 0.08 THOUSAND/ΜL (ref 0–0.61)
EOSINOPHIL NFR BLD AUTO: 1 % (ref 0–6)
ERYTHROCYTE [DISTWIDTH] IN BLOOD BY AUTOMATED COUNT: 13.6 % (ref 11.6–15.1)
EXT PREG TEST URINE: NORMAL
EXT. CONTROL ED NAV: NORMAL
GFR SERPL CREATININE-BSD FRML MDRD: 106 ML/MIN/1.73SQ M
GLUCOSE SERPL-MCNC: 91 MG/DL (ref 65–140)
GLUCOSE UR STRIP-MCNC: NEGATIVE MG/DL
HCG SERPL QL: NEGATIVE
HCT VFR BLD AUTO: 38.9 % (ref 34.8–46.1)
HGB BLD-MCNC: 12.6 G/DL (ref 11.5–15.4)
HGB UR QL STRIP.AUTO: ABNORMAL
IMM GRANULOCYTES # BLD AUTO: 0.02 THOUSAND/UL (ref 0–0.2)
IMM GRANULOCYTES NFR BLD AUTO: 0 % (ref 0–2)
KETONES UR STRIP-MCNC: NEGATIVE MG/DL
LEUKOCYTE ESTERASE UR QL STRIP: NEGATIVE
LIPASE SERPL-CCNC: 163 U/L (ref 73–393)
LYMPHOCYTES # BLD AUTO: 2.16 THOUSANDS/ΜL (ref 0.6–4.47)
LYMPHOCYTES NFR BLD AUTO: 27 % (ref 14–44)
MCH RBC QN AUTO: 30.1 PG (ref 26.8–34.3)
MCHC RBC AUTO-ENTMCNC: 32.4 G/DL (ref 31.4–37.4)
MCV RBC AUTO: 93 FL (ref 82–98)
MONOCYTES # BLD AUTO: 0.71 THOUSAND/ΜL (ref 0.17–1.22)
MONOCYTES NFR BLD AUTO: 9 % (ref 4–12)
NEUTROPHILS # BLD AUTO: 4.97 THOUSANDS/ΜL (ref 1.85–7.62)
NEUTS SEG NFR BLD AUTO: 63 % (ref 43–75)
NITRITE UR QL STRIP: NEGATIVE
NON-SQ EPI CELLS URNS QL MICRO: ABNORMAL /HPF
NRBC BLD AUTO-RTO: 0 /100 WBCS
P AXIS: 49 DEGREES
PH UR STRIP.AUTO: 6 [PH] (ref 4.5–8)
PLATELET # BLD AUTO: 272 THOUSANDS/UL (ref 149–390)
PMV BLD AUTO: 8.8 FL (ref 8.9–12.7)
POTASSIUM SERPL-SCNC: 4.2 MMOL/L (ref 3.5–5.3)
PR INTERVAL: 122 MS
PROT SERPL-MCNC: 6.9 G/DL (ref 6.4–8.2)
PROT UR STRIP-MCNC: NEGATIVE MG/DL
QRS AXIS: 15 DEGREES
QRSD INTERVAL: 72 MS
QT INTERVAL: 330 MS
QTC INTERVAL: 432 MS
RBC # BLD AUTO: 4.19 MILLION/UL (ref 3.81–5.12)
RBC #/AREA URNS AUTO: ABNORMAL /HPF
SODIUM SERPL-SCNC: 139 MMOL/L (ref 136–145)
SP GR UR STRIP.AUTO: 1.01 (ref 1–1.03)
T WAVE AXIS: 55 DEGREES
TROPONIN I SERPL-MCNC: <0.02 NG/ML
UROBILINOGEN UR QL STRIP.AUTO: 0.2 E.U./DL
VENTRICULAR RATE: 103 BPM
WBC # BLD AUTO: 7.97 THOUSAND/UL (ref 4.31–10.16)
WBC #/AREA URNS AUTO: ABNORMAL /HPF

## 2020-11-10 PROCEDURE — G1004 CDSM NDSC: HCPCS

## 2020-11-10 PROCEDURE — 80053 COMPREHEN METABOLIC PANEL: CPT | Performed by: EMERGENCY MEDICINE

## 2020-11-10 PROCEDURE — 71275 CT ANGIOGRAPHY CHEST: CPT

## 2020-11-10 PROCEDURE — 99285 EMERGENCY DEPT VISIT HI MDM: CPT | Performed by: EMERGENCY MEDICINE

## 2020-11-10 PROCEDURE — 36415 COLL VENOUS BLD VENIPUNCTURE: CPT | Performed by: EMERGENCY MEDICINE

## 2020-11-10 PROCEDURE — 93010 ELECTROCARDIOGRAM REPORT: CPT | Performed by: INTERNAL MEDICINE

## 2020-11-10 PROCEDURE — 93005 ELECTROCARDIOGRAM TRACING: CPT

## 2020-11-10 PROCEDURE — 96374 THER/PROPH/DIAG INJ IV PUSH: CPT

## 2020-11-10 PROCEDURE — 85025 COMPLETE CBC W/AUTO DIFF WBC: CPT | Performed by: EMERGENCY MEDICINE

## 2020-11-10 PROCEDURE — 84703 CHORIONIC GONADOTROPIN ASSAY: CPT | Performed by: EMERGENCY MEDICINE

## 2020-11-10 PROCEDURE — 71101 X-RAY EXAM UNILAT RIBS/CHEST: CPT

## 2020-11-10 PROCEDURE — 85379 FIBRIN DEGRADATION QUANT: CPT | Performed by: EMERGENCY MEDICINE

## 2020-11-10 PROCEDURE — 74177 CT ABD & PELVIS W/CONTRAST: CPT

## 2020-11-10 PROCEDURE — 83690 ASSAY OF LIPASE: CPT | Performed by: EMERGENCY MEDICINE

## 2020-11-10 PROCEDURE — 84484 ASSAY OF TROPONIN QUANT: CPT | Performed by: EMERGENCY MEDICINE

## 2020-11-10 PROCEDURE — 99285 EMERGENCY DEPT VISIT HI MDM: CPT

## 2020-11-10 PROCEDURE — 96361 HYDRATE IV INFUSION ADD-ON: CPT

## 2020-11-10 PROCEDURE — 81001 URINALYSIS AUTO W/SCOPE: CPT

## 2020-11-10 PROCEDURE — 81025 URINE PREGNANCY TEST: CPT | Performed by: EMERGENCY MEDICINE

## 2020-11-10 RX ORDER — HYDROXYZINE HYDROCHLORIDE 25 MG/1
25 TABLET, FILM COATED ORAL 3 TIMES DAILY
COMMUNITY
End: 2020-12-29 | Stop reason: SDUPTHER

## 2020-11-10 RX ORDER — KETOROLAC TROMETHAMINE 30 MG/ML
15 INJECTION, SOLUTION INTRAMUSCULAR; INTRAVENOUS ONCE
Status: COMPLETED | OUTPATIENT
Start: 2020-11-10 | End: 2020-11-10

## 2020-11-10 RX ORDER — LORAZEPAM 0.5 MG/1
0.25 TABLET ORAL 2 TIMES DAILY
COMMUNITY

## 2020-11-10 RX ADMIN — KETOROLAC TROMETHAMINE 15 MG: 30 INJECTION, SOLUTION INTRAMUSCULAR at 14:42

## 2020-11-10 RX ADMIN — IOHEXOL 100 ML: 350 INJECTION, SOLUTION INTRAVENOUS at 14:09

## 2020-11-10 RX ADMIN — SODIUM CHLORIDE 500 ML: 0.9 INJECTION, SOLUTION INTRAVENOUS at 11:15

## 2020-11-11 ENCOUNTER — OFFICE VISIT (OUTPATIENT)
Dept: AUDIOLOGY | Age: 40
End: 2020-11-11
Payer: MEDICARE

## 2020-11-11 DIAGNOSIS — H90.3 SENSORY HEARING LOSS, BILATERAL: Primary | ICD-10-CM

## 2020-11-11 PROCEDURE — 92556 SPEECH AUDIOMETRY COMPLETE: CPT | Performed by: AUDIOLOGIST-HEARING AID FITTER

## 2020-11-11 PROCEDURE — 92567 TYMPANOMETRY: CPT | Performed by: AUDIOLOGIST-HEARING AID FITTER

## 2020-11-11 PROCEDURE — 92552 PURE TONE AUDIOMETRY AIR: CPT | Performed by: AUDIOLOGIST-HEARING AID FITTER

## 2020-11-12 DIAGNOSIS — J02.9 SORETHROAT: Primary | ICD-10-CM

## 2020-11-13 ENCOUNTER — OFFICE VISIT (OUTPATIENT)
Dept: FAMILY MEDICINE CLINIC | Facility: CLINIC | Age: 40
End: 2020-11-13

## 2020-11-13 VITALS
SYSTOLIC BLOOD PRESSURE: 124 MMHG | BODY MASS INDEX: 42.98 KG/M2 | TEMPERATURE: 99.2 F | WEIGHT: 199.2 LBS | HEART RATE: 80 BPM | DIASTOLIC BLOOD PRESSURE: 80 MMHG | HEIGHT: 57 IN

## 2020-11-13 DIAGNOSIS — J02.9 SORETHROAT: ICD-10-CM

## 2020-11-13 DIAGNOSIS — R07.89 ATYPICAL CHEST PAIN: ICD-10-CM

## 2020-11-13 DIAGNOSIS — R07.82 INTERCOSTAL PAIN: Primary | ICD-10-CM

## 2020-11-13 PROCEDURE — 99213 OFFICE O/P EST LOW 20 MIN: CPT | Performed by: FAMILY MEDICINE

## 2020-11-13 RX ORDER — LIDOCAINE 50 MG/G
1 PATCH TOPICAL DAILY
Qty: 10 PATCH | Refills: 0 | Status: SHIPPED | OUTPATIENT
Start: 2020-11-13 | End: 2021-10-06 | Stop reason: HOSPADM

## 2020-11-24 ENCOUNTER — TELEPHONE (OUTPATIENT)
Dept: FAMILY MEDICINE CLINIC | Facility: CLINIC | Age: 40
End: 2020-11-24

## 2020-12-01 DIAGNOSIS — B37.2 CANDIDIASIS OF SKIN: ICD-10-CM

## 2020-12-01 RX ORDER — NYSTATIN 100000 [USP'U]/G
POWDER TOPICAL 4 TIMES DAILY
Qty: 45 G | Refills: 2 | Status: SHIPPED | OUTPATIENT
Start: 2020-12-01 | End: 2021-03-30 | Stop reason: SDUPTHER

## 2020-12-09 DIAGNOSIS — L85.3 DRY SKIN: ICD-10-CM

## 2020-12-09 RX ORDER — CERAMIDES 1,3,6-II
CREAM (GRAM) TOPICAL 2 TIMES DAILY
Qty: 453 G | Refills: 5 | Status: SHIPPED | OUTPATIENT
Start: 2020-12-09 | End: 2021-10-11

## 2020-12-16 ENCOUNTER — TELEPHONE (OUTPATIENT)
Dept: OBGYN CLINIC | Facility: CLINIC | Age: 40
End: 2020-12-16

## 2020-12-23 ENCOUNTER — TELEPHONE (OUTPATIENT)
Dept: FAMILY MEDICINE CLINIC | Facility: CLINIC | Age: 40
End: 2020-12-23

## 2020-12-24 DIAGNOSIS — R13.10 DYSPHAGIA, UNSPECIFIED TYPE: ICD-10-CM

## 2020-12-24 DIAGNOSIS — F45.8 FUNCTIONAL DYSPHAGIA: Primary | ICD-10-CM

## 2020-12-29 DIAGNOSIS — R10.9 ABDOMINAL PAIN, UNSPECIFIED ABDOMINAL LOCATION: ICD-10-CM

## 2020-12-29 DIAGNOSIS — B37.2 CANDIDIASIS OF SKIN: Primary | ICD-10-CM

## 2020-12-29 DIAGNOSIS — K59.00 CONSTIPATION, UNSPECIFIED CONSTIPATION TYPE: ICD-10-CM

## 2020-12-29 RX ORDER — AMOXICILLIN 250 MG
1 CAPSULE ORAL DAILY
Qty: 30 TABLET | Refills: 3 | Status: SHIPPED | OUTPATIENT
Start: 2020-12-29 | End: 2021-05-21 | Stop reason: SDUPTHER

## 2020-12-29 RX ORDER — PANTOPRAZOLE SODIUM 20 MG/1
20 TABLET, DELAYED RELEASE ORAL DAILY
Qty: 30 TABLET | Refills: 5 | Status: SHIPPED | OUTPATIENT
Start: 2020-12-29 | End: 2021-01-18

## 2020-12-29 RX ORDER — HYDROXYZINE HYDROCHLORIDE 25 MG/1
25 TABLET, FILM COATED ORAL 3 TIMES DAILY
Qty: 30 TABLET | Refills: 2 | Status: SHIPPED | OUTPATIENT
Start: 2020-12-29 | End: 2021-10-07 | Stop reason: ALTCHOICE

## 2021-01-06 DIAGNOSIS — Z01.21 ENCOUNTER FOR DENTAL EXAMINATION AND CLEANING WITH ABNORMAL FINDINGS: ICD-10-CM

## 2021-01-07 ENCOUNTER — OFFICE VISIT (OUTPATIENT)
Dept: FAMILY MEDICINE CLINIC | Facility: CLINIC | Age: 41
End: 2021-01-07

## 2021-01-07 ENCOUNTER — HOSPITAL ENCOUNTER (OUTPATIENT)
Dept: RADIOLOGY | Facility: HOSPITAL | Age: 41
Discharge: HOME | End: 2021-01-07
Payer: MEDICARE

## 2021-01-07 VITALS
SYSTOLIC BLOOD PRESSURE: 136 MMHG | HEART RATE: 78 BPM | TEMPERATURE: 98.4 F | DIASTOLIC BLOOD PRESSURE: 80 MMHG | HEIGHT: 57 IN | WEIGHT: 207.6 LBS | RESPIRATION RATE: 16 BRPM | BODY MASS INDEX: 44.79 KG/M2

## 2021-01-07 DIAGNOSIS — R68.89 HEAT INTOLERANCE: Primary | ICD-10-CM

## 2021-01-07 DIAGNOSIS — F45.8 FUNCTIONAL DYSPHAGIA: ICD-10-CM

## 2021-01-07 DIAGNOSIS — R13.10 DYSPHAGIA, UNSPECIFIED TYPE: ICD-10-CM
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
## 2021-01-07 PROCEDURE — 99213 OFFICE O/P EST LOW 20 MIN: CPT | Performed by: FAMILY MEDICINE

## 2021-01-07 PROCEDURE — 74230 X-RAY XM SWLNG FUNCJ C+: CPT

## 2021-01-07 PROCEDURE — 92611 MOTION FLUOROSCOPY/SWALLOW: CPT

## 2021-01-07 NOTE — ASSESSMENT & PLAN NOTE
Mechanical fall due to not using walker on 1/1/20  -Patient and caregiver denied head trauma  Mild R shoulder pain  -On physical exam patient reports has moderate pain to light palpation  Decreased range of motion could be multifactorial secondary to the right arm spasticity but would like to rule out fracture  -x-ray of right shoulder order at this time    Follow up results

## 2021-01-07 NOTE — ASSESSMENT & PLAN NOTE
Patient reports these as "hot flashes"  Caregiver reports patient has regular periods  Currently on [facility] OCP      -patient reports she has always feeling warm even when other people is normal  -TSH order at this time  -last TSH 1 7 on 2017

## 2021-01-07 NOTE — PROGRESS NOTES
Assessment/Plan:    Fall  Mechanical fall due to not using walker on 1/1/20  -Patient and caregiver denied head trauma  Mild R shoulder pain  -On physical exam patient reports has moderate pain to light palpation  Decreased range of motion could be multifactorial secondary to the right arm spasticity but would like to rule out fracture  -x-ray of right shoulder order at this time  Follow up results    Heat intolerance  Patient reports these as "hot flashes"  Caregiver reports patient has regular periods  Currently on [facility] OCP  -patient reports she has always feeling warm even when other people is normal  -TSH order at this time  -last TSH 1 7 on 2017         Problem List Items Addressed This Visit        Other    Fall     Mechanical fall due to not using walker on 1/1/20  -Patient and caregiver denied head trauma  Mild R shoulder pain  -On physical exam patient reports has moderate pain to light palpation  Decreased range of motion could be multifactorial secondary to the right arm spasticity but would like to rule out fracture  -x-ray of right shoulder order at this time  Follow up results         Relevant Orders    XR shoulder 2+ vw right    Heat intolerance - Primary     Patient reports these as "hot flashes"  Caregiver reports patient has regular periods  Currently on Ortho [facility] OCP  -patient reports she has always feeling warm even when other people is normal  -TSH order at this time  -last TSH 1 7 on 2017         Relevant Orders    TSH, 3rd generation            Subjective:      Patient ID: Arley Messina is a 38 y o  female  HPI     Patient is a 43-year-old female who presents to the office for he intolerance and recent fall on 01/01/2021  Caregiver and patient reports patient was not using wheelchair and she had mechanical fall hitting her right shoulder  She denies head trauma or loss of consciousness    Patient reports mild right shoulder pain with movement and on palpation  Regarding heat intolerance patient reports this as hot flashes but states her periods are regular and caregiver denies any menstrual concerns  The following portions of the patient's history were reviewed and updated as appropriate:   She  has a past medical history of ADD (attention deficit disorder), Anxiety, Astigmatism, Brain lesion, Calcium deficiency, Cellulitis of foot, right (6/4/2018), Cerebral palsy ([address] ), Chronic otitis media, Constipation, Depression, Dysphagia, Esophagitis, Esotropia, GERD (gastroesophageal reflux disease), Hiatal hernia, Hydrocephalus ([address] ), Impaired fasting glucose, Left nephrolithiasis (03/04/2019), Myopia, Oppositional defiant disorder, 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[address] ), Seizures ([address] ), Sensorineural hearing loss, Status post ventriculoatrial shunt placement, and Visual impairment  She  has a past surgical history that includes CSF shunt; Leg Surgery; EAR SURGERY; Nose surgery; pr esophagogastroduodenoscopy transoral diagnostic (N/A, 5/9/2019); Upper gastrointestinal endoscopy (05/2019); and Breast biopsy  She  reports that she has never smoked  She has never used smokeless tobacco  She reports that she does not drink alcohol or use drugs    Current Outpatient Medications   Medication Sig Dispense Refill    acetaminophen (TYLENOL) 500 mg tablet Take 1 tablet (500 mg total) by mouth every 6 (six) hours as needed for mild pain 30 tablet 1    aluminum-magnesium hydroxide-simethicone (ANTACID) 200-200-20 mg/5 mL suspension Take 15 mL by mouth every 4 (four) hours as needed for indigestion or heartburn 355 mL 5    amitriptyline (ELAVIL) 10 mg tablet Take 10 mg by mouth daily at bedtime      ARIPiprazole (ABILIFY) 20 MG tablet Take 1 tablet (20 mg total) by mouth daily at bedtime 90 tablet 2    bismuth subsalicylate (PEPTO BISMOL) 524 mg/30 mL oral suspension Take 15 mL (262 mg total) by mouth every 6 (six) hours as needed for indigestion 360 mL 3    calcium carbonate (TUMS) 500 mg chewable tablet Chew 1 tablet (500 mg total) 3 (three) times a day as needed for heartburn 30 tablet 5    Cholecalciferol (Vitamin D-3) 25 MCG (1000 UT) CAPS Take 2 capsules (2,000 Units total) by mouth daily at 8am  30 capsule 5    Dyclonine-Glycerin (Cepacol Sore Throat Spray) 0 1-33 % LIQD Apply 1 spray to the mouth or throat 3 (three) times a day as needed (sorethroat) 118 mL 2    Emollient (CeraVe) CREA Apply topically 2 (two) times a day Apply topically 2 times to affected heel twice daily @8a-8p 453 g 5    escitalopram (LEXAPRO) 20 mg tablet Take 1 tablet (20 mg total) by mouth daily 90 tablet 2    fluticasone (FLONASE) 50 mcg/act nasal spray 1 spray into each nostril daily as needed for rhinitis (nasal congestion) At 8:00 AM 1 Bottle 5    guaiFENesin (ROBITUSSIN) 100 MG/5ML oral liquid Take 200 mg by mouth 3 (three) times a day as needed for cough      hydrOXYzine HCL (ATARAX) 25 mg tablet Take 1 tablet (25 mg total) by mouth 3 (three) times a day 30 tablet 2    ibuprofen (MOTRIN) 400 mg tablet Take 1 tablet (400 mg total) by mouth every 8 (eight) hours as needed for mild pain 30 tablet 5    lamoTRIgine (LaMICtal) 25 mg tablet Take 25 mg by mouth 2 (two) times a day       lidocaine (LIDODERM) 5 % Apply 1 patch topically daily Remove & Discard patch within 12 hours or as directed by MD 10 patch 0    LORazepam (ATIVAN) 0 5 mg tablet Take 0 25 mg by mouth 2 (two) times a day      lubiprostone (Amitiza) 24 mcg capsule Take 1 capsule (24 mcg total) by mouth daily with breakfast 60 capsule 5    magnesium hydroxide (GNP Milk of Magnesia) 400 mg/5 mL oral suspension Take 30 mL by mouth daily as needed for constipation If no BM in 3 days 355 mL 5    Mouthwashes (Listerine Antiseptic) LIQD Apply 1 application to the mouth or throat 2 (two) times a day (Patient taking differently: Swish and spit 5 mL 2 (two) times a day ) 500 mL 5    Multiple Vitamins-Minerals (CertaVite Senior/Antioxidant) TABS Take 1 tablet by mouth daily 30 tablet 5    norgestimate-ethinyl estradiol (ORTHO-CYCLEN) 0 25-35 MG-MCG per tablet Take 1 tablet by mouth daily 28 tablet 11    nystatin (MYCOSTATIN) powder Apply topically 4 (four) times a day 45 g 2    pantoprazole (PROTONIX) 20 mg tablet Take 1 tablet (20 mg total) by mouth daily 30 tablet 5    phenol (Chloraseptic) 1 4 % mucosal liquid Apply 1 spray to the mouth or throat every 2 (two) hours as needed (for sore throat as needed) 236 mL 1    polyethylene glycol (MIRALAX) 17 g packet Take one packet daily as needed for no bowel movement in 24 hours 14 each 3    psyllium (METAMUCIL) 58 6 % packet Take 1 packet by mouth daily 30 packet 5    RA SUNSCREEN SPF50 LOTN Apply 15 minutes before sun exposure and every 2 hours 1 Bottle 5    senna-docusate sodium (Senexon-S) 8 6-50 mg per tablet Take 1 tablet by mouth daily at 8am  30 tablet 3    sodium chloride (OCEAN) 0 65 % nasal spray 1 spray into each nostril as needed (three times daily as needed for nasal congestion) 60 mL 1    zinc oxide (DESITIN) 13 % cream Apply 1 application topically as needed (for perianal irritation) 1 Tube 5    Multiple Vitamins-Minerals (CERTAVITE SENIOR/ANTIOXIDANT PO) CertaVite Senior       No current facility-administered medications for this visit  She has No Known Allergies       Review of Systems   Constitutional: Negative for fatigue and fever  Respiratory: Negative for shortness of breath  Cardiovascular: Negative for chest pain and palpitations  Gastrointestinal: Negative for constipation, diarrhea, nausea and vomiting  Endocrine: Positive for heat intolerance  Genitourinary: Negative for menstrual problem  Musculoskeletal: Positive for arthralgias (R shoulder pain)  Psychiatric/Behavioral: The patient is not nervous/anxious            Objective:      /80 (BP Location: Left arm, Patient Position: Sitting, Cuff Size: Large)   Pulse 78   Temp 98 4 °F (36 9 °C) (Tympanic)   Resp 16   Ht 4' 9" (1 448 m)   Wt 94 2 kg (207 lb 9 6 oz)   BMI 44 92 kg/m²          Physical Exam  Constitutional:       Appearance: She is obese  Eyes:      Conjunctiva/sclera: Conjunctivae normal    Neck:      Musculoskeletal: Normal range of motion  Cardiovascular:      Rate and Rhythm: Normal rate and regular rhythm  Heart sounds: No murmur  Abdominal:      General: Abdomen is flat  There is no distension  Palpations: Abdomen is soft  Tenderness: There is no abdominal tenderness  Musculoskeletal:         General: Tenderness (to palpation on anterior R shoulder) present  Right shoulder: She exhibits tenderness  She exhibits normal range of motion, no bony tenderness and no swelling  Skin:     General: Skin is warm  Capillary Refill: Capillary refill takes less than 2 seconds  Neurological:      Mental Status: She is alert  Mental status is at baseline  Psychiatric:         Mood and Affect: Mood normal          Cognition and Memory: Cognition is impaired

## 2021-01-07 NOTE — PROCEDURES
Video Swallow Study      Patient Name: Adebayo Cruz  [id number] Date: 1/7/2021        Past Medical History  Past Medical History:   Diagnosis Date    ADD (attention deficit disorder)     Anxiety     Astigmatism     Brain lesion     Calcium deficiency     Cellulitis of foot, right 6/4/2018    Cerebral palsy (HCC)     Chronic otitis media     Constipation     Depression     Last Assessed:7/6/2016    Dysphagia     Esophagitis     Esotropia     GERD (gastroesophageal reflux disease)     Hiatal hernia     Hydrocephalus (HCC)     Impaired fasting glucose     Left nephrolithiasis 03/04/2019    Myopia     Oppositional defiant disorder     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HCC)     Seizures (HCC)     Sensorineural hearing loss     Status post ventriculoatrial shunt placement     Visual impairment         Past Surgical History  Past Surgical History:   Procedure Laterality Date    BREAST BIOPSY      X 2 (not sure of years)    CSF SHUNT      Creation of Ventriculo-Peritoneal CSF shunt ; Last Assessed:7/6/2016    EAR SURGERY      Last Assessed:7/6/2016    LEG SURGERY      due to CP     NOSE SURGERY      Last Assessed:7/6/2016    [place] ESOPHAGOGASTRODUODENOSCOPY TRANSORAL DIAGNOSTIC N/A 5/9/2019    Procedure: ESOPHAGOGASTRODUODENOSCOPY (EGD) with biopsy;  Surgeon: Davon Carr MD;  Location: [facility]; Service: Gastroenterology    UPPER GASTROINTESTINAL ENDOSCOPY  05/2019     Video Barium Swallow Study    Summary:  Oral and pharyngeal stages were WNL  The pt chewed very slowly this study  Mastication, bolus formation, control, and transfer were WNL  Swallows were prompt  Hyolaryngeal excursion, epiglottic inversion, and pharyngeal constriction were WNL  No pharyngeal retention  No penetration or aspiration  The barium tablet was briefly held up in the valleculae  Otherwise WNL  Per gross esophageal screen: Mild retropulsion noted mid esophagus       Images are on PACS for review  Recommendations:  Diet: Regular  By history, pt has had her food cut for her  Tougher foods were cut into a 1" diameter  Recommend continuing this  Cue for slow rate as needed  Liquids: thin  Meds: as tolerated  Staff reported no issues  If pt is having ongoing difficulty, consider giving in puree/pudding or crush as needed  Pt tends to hang her head when eating  This may be affecting transit of the pill  Recommend head upright for po and pills  Strategies: Head at midline  Slow rate  Small bites  Chew well  Upright position  F/u ST tx: not at this time  Full Supervision  Aspiration Precautions  Reflux Precautions  Results reviewed with: pt, cg    Pt is a 40yof referred for VBS due to pt c/o pill dysphagia  Previous VBS:   7/21/16 4/18/19  Summary:  Pt c/o dislike of the barium-laden food throughout the study  Mastication was prolonged but suspect due to dislike of the food and not wanting to swallow it  (CG reported pt needs cues to slow down and take smaller bites)  Oral stage was WNL this study  Occasional piecemeal transfer  Pharyngeal stage was also WNL  No penetration, aspiration, or retention  (Rate of eating was slow this study)  Pt belched a few times, however no reflux was observed per gross esophageal screening  Recommendations:  Diet: Dental soft (CG reported foods that are tougher in cut into 1" diameter, agree)  Liquids: thin  Meds:as tolerated  Taken WNL whole w/ water today  Strategies: slow rate, small bites, small sips  Upright position  F/u ST tx: None at this time (CG reported no concerns, and states she always cuts the pt's food into smaller pieces)  Full Supervision (cue for slow rate, chew, swallow after each bite, small bites)  Aspiration Precautions  Reflux Precautions  Results reviewed with: pt, CG from [address] [place] [address]  If the pt can not follow strategies, she may need the texture downgraded further  CG that was w/ the pt today reported no issues when she is w/ her  Current Diet:  Regular per staff  Dentition:  natural  O2 requirement:  none  Oral mech:  Strength and ROM:  wfl  Vocal Quality/Speech:  Mildly dysarthric  Cognitive status:  Alert, eventually refused to hold her head up    Consistencies administered: Barium laden applesauce, nutrigrain bar, hard cookie, ham sandwich, thin liquids, 13mm barium pill  Liquids were administered by straw  Pt was seated laterally at 90 degrees  Oral stage:  WNL  Lip closure:+  Mastication:+, chewed well and took her time this study  Bolus formation:+  Bolus control:+  Transfer:+  Residue:-/trace    Pharyngeal stage: WNL  Swallow promptness:+  Spill to valleculae:-  Spill to pyriforms:-  Epiglottic inversion:+  Laryngeal excursion:appeared adequate, view was limited beyond C5 due to patient's size  Pharyngeal constriction:+  Vallecular retention:-  Pyriform retention:-  PPW coating:-  Osteophytes:  CP prominence:view was limited beyond C5  Retropulsion from prominence:view was limited beyond C5    Transient penetration:-  Epiglottic undercoat:-  Penetration:-  Aspiration:-    Screening of Esophageal stage:  Mild retropuslion mid esophagus

## 2021-01-11 DIAGNOSIS — K59.04 CHRONIC IDIOPATHIC CONSTIPATION: ICD-10-CM

## 2021-01-12 ENCOUNTER — HOSPITAL ENCOUNTER (OUTPATIENT)
Dept: RADIOLOGY | Facility: HOSPITAL | Age: 41
Discharge: HOME | End: 2021-01-12
Payer: MEDICARE

## 2021-01-12 ENCOUNTER — LAB (OUTPATIENT)
Dept: LAB | Facility: HOSPITAL | Age: 41
End: 2021-01-12
Payer: MEDICARE

## 2021-01-12 DIAGNOSIS — R68.89 HEAT INTOLERANCE: ICD-10-CM
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
## 2021-01-12 LAB — TSH SERPL DL<=0.05 MIU/L-ACNC: 2.33 UIU/ML (ref 0.36–3.74)

## 2021-01-12 PROCEDURE — 84443 ASSAY THYROID STIM HORMONE: CPT

## 2021-01-12 PROCEDURE — 36415 COLL VENOUS BLD VENIPUNCTURE: CPT

## 2021-01-12 PROCEDURE — 73030 X-RAY EXAM OF SHOULDER: CPT

## 2021-01-18 ENCOUNTER — TELEPHONE (OUTPATIENT)
Dept: GASTROENTEROLOGY | Facility: MEDICAL CENTER | Age: 41
End: 2021-01-18

## 2021-01-18 ENCOUNTER — OFFICE VISIT (OUTPATIENT)
Dept: GASTROENTEROLOGY | Facility: MEDICAL CENTER | Age: 41
End: 2021-01-18

## 2021-01-18 VITALS
DIASTOLIC BLOOD PRESSURE: 94 MMHG | TEMPERATURE: 98.2 F | HEIGHT: 57 IN | BODY MASS INDEX: 44.66 KG/M2 | WEIGHT: 207 LBS | HEART RATE: 128 BPM | SYSTOLIC BLOOD PRESSURE: 136 MMHG

## 2021-01-18 DIAGNOSIS — R13.10 DYSPHAGIA, UNSPECIFIED TYPE: Primary | ICD-10-CM

## 2021-01-18 DIAGNOSIS — K21.9 GASTROESOPHAGEAL REFLUX DISEASE WITHOUT ESOPHAGITIS: ICD-10-CM

## 2021-01-18 DIAGNOSIS — R13.10 DYSPHAGIA, UNSPECIFIED TYPE: ICD-10-CM

## 2021-01-18 PROCEDURE — 99214 OFFICE O/P EST MOD 30 MIN: CPT | Performed by: PHYSICIAN ASSISTANT

## 2021-01-18 RX ORDER — PANTOPRAZOLE SODIUM 20 MG/1
20 TABLET, DELAYED RELEASE ORAL 2 TIMES DAILY
Qty: 60 TABLET | Refills: 3 | Status: SHIPPED | OUTPATIENT
Start: 2021-01-18 | End: 2021-01-18 | Stop reason: SDUPTHER

## 2021-01-18 RX ORDER — DICYCLOMINE HCL 20 MG
20 TABLET ORAL EVERY 6 HOURS PRN
Qty: 120 TABLET | Refills: 2 | Status: SHIPPED | OUTPATIENT
Start: 2021-01-18

## 2021-01-18 RX ORDER — DICYCLOMINE HCL 20 MG
20 TABLET ORAL EVERY 6 HOURS PRN
Qty: 120 TABLET | Refills: 2 | Status: SHIPPED | OUTPATIENT
Start: 2021-01-18 | End: 2021-01-18 | Stop reason: SDUPTHER

## 2021-01-18 RX ORDER — PANTOPRAZOLE SODIUM 20 MG/1
20 TABLET, DELAYED RELEASE ORAL 2 TIMES DAILY
Qty: 60 TABLET | Refills: 3 | Status: SHIPPED | OUTPATIENT
Start: 2021-01-18 | End: 2021-03-30 | Stop reason: SDUPTHER

## 2021-01-18 NOTE — PROGRESS NOTES
[facility] - Outpatient Follow-up Note  Mackenzie Edouard 42 y o  female MRN: [id number]  Encounter: [id number]          ASSESSMENT AND PLAN:      1  Dysphagia, unspecified type: she continues to experience occasional dysphagia but states that her chest pain has resolved  He has had testing including barium swallow, EGD that were normal   For also had a manometry study that was normal   She recently had a speech and swallow eval with normal oral and pharyngeal stages  Mild retropulsion in the midesophagus and recommended to have thin liquids with a regular diet  She is already on amitriptyline so we could try Bentyl see if this gets any relief of her symptoms  Her and her caretaker no this there was no relief that she should discontinue it    2  Gastroesophageal reflux disease without esophagitis: currently on protonix 20 mg daily and does not think that her symptoms are well controlled  This could be contributing to her occasional dysphagia  Will increase to twice daily dosing  -increase protonix to 20mg bid    3  Constipation: well controlled with amitiza  -continue amitiza 24mcg    ______________________________________________________________________    SUBJECTIVE:  Ritchie Delgado is a 41 yo female here for follow up of chest pain, dysphagia and constipation  She has a history of mental disability and was then secured in her group home  She states that her Protonix is 20 mL and daily and states that she still has acid reflux  She still experiences occasional dysphagia but her chest pain has resolved  She had a negative EGD, barium swallow speech and swallow evaluation and we manometry study  She had again another recent speech evaluation with mild retropulsion of esophageal contents  Her constipation is well controlled Amitiza 24 micro g daily      REVIEW OF SYSTEMS IS OTHERWISE NEGATIVE        Historical Information   Past Medical History:   Diagnosis Date    ADD (attention deficit disorder)     Anxiety     Astigmatism     Brain lesion     Calcium deficiency     Cellulitis of foot, right 6/4/2018    Cerebral palsy (HCC)     Chronic otitis media     Constipation     Depression     Last Assessed:7/6/2016    Dysphagia     Esophagitis     Esotropia     GERD (gastroesophageal reflux disease)     Hiatal hernia     Hydrocephalus (HCC)     Impaired fasting glucose     Left nephrolithiasis 03/04/2019    Myopia     Oppositional defiant disorder     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HCC)     Seizures (HCC)     Sensorineural hearing loss     Status post ventriculoatrial shunt placement     Visual impairment      Past Surgical History:   Procedure Laterality Date    BREAST BIOPSY      X 2 (not sure of years)    CSF SHUNT      Creation of Ventriculo-Peritoneal CSF shunt ; Last Assessed:7/6/2016    EAR SURGERY      Last Assessed:7/6/2016    LEG SURGERY      due to CP     NOSE SURGERY      Last Assessed:7/6/2016    [place] ESOPHAGOGASTRODUODENOSCOPY TRANSORAL DIAGNOSTIC N/A 5/9/2019    Procedure: ESOPHAGOGASTRODUODENOSCOPY (EGD) with biopsy;  Surgeon: Danitza Meraz MD;  Location: [facility];   Service: Gastroenterology    UPPER GASTROINTESTINAL ENDOSCOPY  05/2019     Social History   Social History     Substance and Sexual Activity   Alcohol Use Never    Frequency: Never     Social History     Substance and Sexual Activity   Drug Use Never     Social History     Tobacco Use   Smoking Status Never Smoker   Smokeless Tobacco Never Used     Family History   Problem Relation Age of Onset    Diabetes Mother     Colon cancer Father     No Known Problems Maternal Grandmother     No Known Problems Maternal Grandfather     No Known Problems Paternal Grandmother     No Known Problems Paternal Grandfather        Meds/Allergies       Current Outpatient Medications:     acetaminophen (TYLENOL) 500 mg tablet    aluminum-magnesium hydroxide-simethicone (ANTACID) 200-200-20 mg/5 mL suspension    amitriptyline (ELAVIL) 10 mg tablet    ARIPiprazole (ABILIFY) 20 MG tablet    bismuth subsalicylate (PEPTO BISMOL) 524 mg/30 mL oral suspension    calcium carbonate (TUMS) 500 mg chewable tablet    Cholecalciferol (Vitamin D-3) 25 MCG (1000 UT) CAPS    dicyclomine (BENTYL) 20 mg tablet    Dyclonine-Glycerin (Cepacol Sore Throat Spray) 0 1-33 % LIQD    Emollient (CeraVe) CREA    escitalopram (LEXAPRO) 20 mg tablet    fluticasone (FLONASE) 50 mcg/act nasal spray    guaiFENesin (ROBITUSSIN) 100 MG/5ML oral liquid    hydrOXYzine HCL (ATARAX) 25 mg tablet    ibuprofen (MOTRIN) 400 mg tablet    lamoTRIgine (LaMICtal) 25 mg tablet    lidocaine (LIDODERM) 5 %    LORazepam (ATIVAN) 0 5 mg tablet    lubiprostone (Amitiza) 24 mcg capsule    magnesium hydroxide (GNP Milk of Magnesia) 400 mg/5 mL oral suspension    Mouthwashes (Listerine Antiseptic) LIQD    Multiple Vitamins-Minerals (CERTAVITE SENIOR/ANTIOXIDANT PO)    Multiple Vitamins-Minerals (CertaVite Senior/Antioxidant) TABS    norgestimate-ethinyl estradiol (ORTHO-CYCLEN) 0 25-35 MG-MCG per tablet    nystatin (MYCOSTATIN) powder    pantoprazole (PROTONIX) 20 mg tablet    phenol (Chloraseptic) 1 4 % mucosal liquid    polyethylene glycol (MIRALAX) 17 g packet    psyllium (METAMUCIL) 58 6 % packet    RA SUNSCREEN SPF50 LOTN    senna-docusate sodium (Senexon-S) 8 6-50 mg per tablet    sodium chloride (OCEAN) 0 65 % nasal spray    zinc oxide (DESITIN) 13 % cream    No Known Allergies        Objective     Blood pressure 136/94, pulse (!) 128, temperature 98 2 °F (36 8 °C), temperature source Tympanic, height 4' 9" (1 448 m), weight 93 9 kg (207 lb)  Body mass index is 44 79 kg/m²        PHYSICAL EXAM:      General Appearance:   Alert, cooperative, no distress, walks with walker   HEENT:   Normocephalic, atraumatic, anicteric      Neck:  Supple, symmetrical, trachea midline   Lungs:   Clear to auscultation bilaterally; no rales, rhonchi or wheezing; respirations unlabored    Heart[de-identified]   Regular rate and rhythm; no murmur, rub, or gallop  Abdomen:   Soft, non-tender, non-distended; normal bowel sounds; no masses, no organomegaly    Genitalia:   Deferred    Rectal:   Deferred    Extremities:  No cyanosis, clubbing or edema    Pulses:  2+ and symmetric    Skin:  No jaundice, rashes, or lesions    Lymph nodes:  No palpable cervical lymphadenopathy        Lab Results:   No visits with results within 1 Day(s) from this visit  Latest known visit with results is:   Lab on 01/12/2021   Component Date Value    TSH 3RD [place] 01/12/2021 2 329          Radiology Results:   Fl Barium Swallow Video W Speech    Result Date: 1/7/2021  Narrative: A video barium swallow study was performed by the Department of Speech Pathology  Please refer to the report for the official interpretation  The images are stored for archival purposes only  Study images were not formally reviewed by the Radiology Department      Fl Barium Swallow Video W Speech    Result Date: 1/7/2021  Narrative: JUDE Peña     1/7/2021  4:45 PM                                  Video Swallow Study Patient Name: Dariusz Vazquez [id number] Date: 1/7/2021   Past Medical History Past Medical History: Diagnosis Date  ADD (attention deficit disorder)   Anxiety   Astigmatism   Brain lesion   Calcium deficiency   Cellulitis of foot, right 6/4/2018  Cerebral palsy (HCC)   Chronic otitis media   Constipation   Depression   Last Assessed:7/6/2016  Dysphagia   Esophagitis   Esotropia   GERD (gastroesophageal reflux disease)   Hiatal hernia   Hydrocephalus (HCC)   Impaired fasting glucose   Left nephrolithiasis 03/04/2019  Myopia   Oppositional defiant disorder   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HCC)   Seizures (HCC)   Sensorineural hearing loss   Status post ventriculoatrial shunt placement   Visual impairment   Past Surgical History Past Surgical History: Procedure Laterality Date  BREAST BIOPSY    X 2 (not sure of years)  CSF SHUNT    Creation of Ventriculo-Peritoneal CSF shunt ; Last Assessed:7/6/2016  EAR SURGERY    Last Assessed:7/6/2016  LEG SURGERY    due to CP   NOSE SURGERY    Last Assessed:7/6/2016  DE ESOPHAGOGASTRODUODENOSCOPY TRANSORAL DIAGNOSTIC N/A 5/9/2019  Procedure: ESOPHAGOGASTRODUODENOSCOPY (EGD) with biopsy;  Surgeon: Luis Rodriguez MD;  Location: [facility]; Service: Gastroenterology  UPPER GASTROINTESTINAL ENDOSCOPY  05/2019 Video Barium Swallow Study Summary: Oral and pharyngeal stages were WNL  The pt chewed very slowly this study  Mastication, bolus formation, control, and transfer were WNL  Swallows were prompt  Hyolaryngeal excursion, epiglottic inversion, and pharyngeal constriction were WNL  No pharyngeal retention  No penetration or aspiration  The barium tablet was briefly held up in the valleculae  Otherwise WNL  Per gross esophageal screen: Mild retropulsion noted mid esophagus  Images are on PACS for review  Recommendations: Diet: Regular  By history, pt has had her food cut for her  Tougher foods were cut into a 1" diameter  Recommend continuing this  Cue for slow rate as needed  Liquids: thin Meds: as tolerated  Staff reported no issues  If pt is having ongoing difficulty, consider giving in puree/pudding or crush as needed  Pt tends to hang her head when eating  This may be affecting transit of the pill  Recommend head upright for po and pills  Strategies: Head at midline  Slow rate  Small bites  Chew well  Upright position F/u ST tx: not at this time Full Supervision Aspiration Precautions Reflux Precautions Results reviewed with: pt, cg Pt is a 40yof referred for VBS due to pt c/o pill dysphagia  Previous VBS: 7/21/16 4/18/19 Summary: Pt c/o dislike of the barium-laden food throughout the study  Mastication was prolonged but suspect due to dislike of the food and not wanting to swallow it   (CG reported pt needs cues to slow down and take smaller bites)  Oral stage was WNL this study  Occasional piecemeal transfer  Pharyngeal stage was also WNL  No penetration, aspiration, or retention  (Rate of eating was slow this study)  Pt belched a few times, however no reflux was observed per gross esophageal screening  Recommendations: Diet: Dental soft (CG reported foods that are tougher in cut into 1" diameter, agree) Liquids: thin Meds:as tolerated  Taken WNL whole w/ water today  Strategies: slow rate, small bites, small sips Upright position F/u ST tx: None at this time (CG reported no concerns, and states she always cuts the pt's food into smaller pieces) Full Supervision (cue for slow rate, chew, swallow after each bite, small bites) Aspiration Precautions Reflux Precautions Results reviewed with: pt, CG from [address] [place] [address] If the pt can not follow strategies, she may need the texture downgraded further  CG that was w/ the pt today reported no issues when she is w/ her  Current Diet: Regular per staff Dentition: natural O2 requirement: none Oral mech: Strength and ROM: wfl Vocal Quality/Speech: Mildly dysarthric Cognitive status: Alert, eventually refused to hold her head up Consistencies administered: Barium laden applesauce, nutrigrain bar, hard cookie, ham sandwich, thin liquids, 13mm barium pill  Liquids were administered by straw  Pt was seated laterally at 90 degrees  Oral stage: WNL Lip closure:+ Mastication:+, chewed well and took her time this study Bolus formation:+ Bolus control:+ Transfer:+ Residue:-/trace Pharyngeal stage: WNL Swallow promptness:+ Spill to valleculae:- Spill to pyriforms:- Epiglottic inversion:+ Laryngeal excursion:appeared adequate, view was limited beyond C5 due to patient's size  Pharyngeal constriction:+ Vallecular retention:- Pyriform retention:- PPW coating:- Osteophytes: CP prominence:view was limited beyond C5  Retropulsion from prominence:view was limited beyond C5   Transient penetration:- Epiglottic undercoat:- Penetration:- Aspiration:- Screening of Esophageal stage: Mild retropuslion mid esophagus

## 2021-01-20 ENCOUNTER — TELEPHONE (OUTPATIENT)
Dept: GASTROENTEROLOGY | Facility: MEDICAL CENTER | Age: 41
End: 2021-01-20

## 2021-02-03 DIAGNOSIS — K59.04 CHRONIC IDIOPATHIC CONSTIPATION: ICD-10-CM

## 2021-02-09 ENCOUNTER — APPOINTMENT (EMERGENCY)
Dept: RADIOLOGY | Facility: HOSPITAL | Age: 41
End: 2021-02-09
Payer: MEDICARE

## 2021-02-09 ENCOUNTER — HOSPITAL ENCOUNTER (EMERGENCY)
Facility: HOSPITAL | Age: 41
Discharge: HOME | End: 2021-02-09
Attending: EMERGENCY MEDICINE | Admitting: EMERGENCY MEDICINE
Payer: MEDICARE

## 2021-02-09 ENCOUNTER — APPOINTMENT (EMERGENCY)
Dept: CT IMAGING | Facility: HOSPITAL | Age: 41
End: 2021-02-09
Payer: MEDICARE

## 2021-02-09 VITALS
RESPIRATION RATE: 20 BRPM | DIASTOLIC BLOOD PRESSURE: 81 MMHG | HEART RATE: 104 BPM | SYSTOLIC BLOOD PRESSURE: 129 MMHG | OXYGEN SATURATION: 95 % | TEMPERATURE: 98.1 F | WEIGHT: 212.96 LBS | BODY MASS INDEX: 46.09 KG/M2
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
## 2021-02-09 PROCEDURE — 72125 CT NECK SPINE W/O DYE: CPT

## 2021-02-09 PROCEDURE — 71046 X-RAY EXAM CHEST 2 VIEWS: CPT

## 2021-02-09 PROCEDURE — G1004 CDSM NDSC: HCPCS

## 2021-02-09 PROCEDURE — 99285 EMERGENCY DEPT VISIT HI MDM: CPT | Performed by: PHYSICIAN ASSISTANT

## 2021-02-09 PROCEDURE — 93005 ELECTROCARDIOGRAM TRACING: CPT

## 2021-02-09 PROCEDURE — 70450 CT HEAD/BRAIN W/O DYE: CPT

## 2021-02-09 PROCEDURE — 99284 EMERGENCY DEPT VISIT MOD MDM: CPT

## 2021-02-09 NOTE — ED PROVIDER NOTES
History  Chief Complaint   Patient presents with    Fall     pt from group home pt fell backwards no loc c/o head and left hip pain      Donna Collazo is a 41 yo F, hx of CP, ODD, seizure disorder, history of ventriculoperitoneal shunt, presenting from group home after witnessed fall backwards from standing height  Per staff the patient was walking back to room from lunch when she tripped, losing her balance and falling back  Pt reports striking head on ground during fall  No LOC  Patient reports headache and some neck pain  Patient otherwise offers no complaints, however history limited by baseline mental disability  Caretaker also notes some superficial abrasions to nose, which she states are from the patient picking at this site  History provided by:  Patient   used: No    Fall  Mechanism of injury: fall    Injury location:  Head/neck  Fall:     Fall occurred:  Tripped    Height of fall:  Standing    Impact surface:  Hard floor  Prior to arrival data:     Loss of consciousness: no      Amnesic to event: no    Associated symptoms: headaches and neck pain    Associated symptoms: no abdominal pain, no back pain, no chest pain, no loss of consciousness, no nausea, no seizures and no vomiting    Risk factors: no anticoagulation therapy        Prior to Admission Medications   Prescriptions Last Dose Informant Patient Reported? Taking?    ARIPiprazole (ABILIFY) 20 MG tablet  Care Giver No No   Sig: Take 1 tablet (20 mg total) by mouth daily at bedtime   Cholecalciferol (Vitamin D-3) 25 MCG (1000 UT) CAPS  Care Giver No No   Sig: Take 2 capsules (2,000 Units total) by mouth daily at 8am    Dyclonine-Glycerin (Cepacol Sore Throat [place]) 0 1-33 % LIQD  Care Giver No No   Sig: Apply 1 spray to the mouth or throat 3 (three) times a day as needed (sorethroat)   Emollient (CeraVe) CREA  Care Giver No No   Sig: Apply topically 2 (two) times a day Apply topically 2 times to affected heel twice daily @8a-8p LORazepam (ATIVAN) 0 5 mg tablet  Care Giver Yes No   Sig: Take 0 25 mg by mouth 2 (two) times a day   Mouthwashes (Listerine Antiseptic) LIQD  Care Giver No No   Sig: Apply 1 application to the mouth or throat 2 (two) times a day   Patient taking differently: Swish and spit 5 mL 2 (two) times a day    Multiple Vitamins-Minerals (CERTAVITE SENIOR/ANTIOXIDANT PO)   Yes No   Sig: CertaVite Senior   Multiple Vitamins-Minerals (CertaVite Senior/Antioxidant) TABS  Care Giver No No   Sig: Take 1 tablet by mouth daily   RA SUNSCREEN SPF50 LOTN  Care Giver No No   Sig: Apply 15 minutes before sun exposure and every 2 hours   acetaminophen (TYLENOL) 500 mg tablet  Care Giver No No   Sig: Take 1 tablet (500 mg total) by mouth every 6 (six) hours as needed for mild pain   aluminum-magnesium hydroxide-simethicone (ANTACID) 200-200-20 mg/5 mL suspension  Care Giver No No   Sig: Take 15 mL by mouth every 4 (four) hours as needed for indigestion or heartburn   amitriptyline (ELAVIL) 10 mg tablet  Care Giver Yes No   Sig: Take 10 mg by mouth daily at bedtime   bismuth subsalicylate (PEPTO BISMOL) 524 mg/30 mL oral suspension  Care Giver No No   Sig: Take 15 mL (262 mg total) by mouth every 6 (six) hours as needed for indigestion   calcium carbonate (TUMS) 500 mg chewable tablet  Care Giver No No   Sig: Chew 1 tablet (500 mg total) 3 (three) times a day as needed for heartburn   dicyclomine (BENTYL) 20 mg tablet   No No   Sig: Take 1 tablet (20 mg total) by mouth every 6 (six) hours as needed (as needed)   escitalopram (LEXAPRO) 20 mg tablet  Care Giver No No   Sig: Take 1 tablet (20 mg total) by mouth daily   fluticasone (FLONASE) 50 mcg/act nasal spray  Care Giver No No   Si spray into each nostril daily as needed for rhinitis (nasal congestion) At 8:00 AM   guaiFENesin (ROBITUSSIN) 100 MG/5ML oral liquid  Care Giver Yes No   Sig: Take 200 mg by mouth 3 (three) times a day as needed for cough   hydrOXYzine HCL (ATARAX) 25 mg tablet  Care Giver No No   Sig: Take 1 tablet (25 mg total) by mouth 3 (three) times a day   ibuprofen (MOTRIN) 400 mg tablet  Care Giver No No   Sig: Take 1 tablet (400 mg total) by mouth every 8 (eight) hours as needed for mild pain   lamoTRIgine (LaMICtal) 25 mg tablet  Care Giver Yes No   Sig: Take 25 mg by mouth 2 (two) times a day    lidocaine (LIDODERM) 5 %  Care Giver No No   Sig: Apply 1 patch topically daily Remove & Discard patch within 12 hours or as directed by MD   lubiprostone (Amitiza) 24 mcg capsule  Care Giver No No   Sig: Take 1 capsule (24 mcg total) by mouth daily with breakfast   magnesium hydroxide (GNP Milk of Magnesia) 400 mg/5 mL oral suspension  Care Giver No No   Sig: Take 30 mL by mouth daily as needed for constipation If no BM in 3 days   norgestimate-ethinyl estradiol (ORTHO-CYCLEN) 0 25-35 MG-MCG per tablet  Care Giver No No   Sig: Take 1 tablet by mouth daily   nystatin (MYCOSTATIN) powder  Care Giver No No   Sig: Apply topically 4 (four) times a day   pantoprazole (PROTONIX) 20 mg tablet   No No   Sig: Take 1 tablet (20 mg total) by mouth 2 (two) times a day   phenol (Chloraseptic) 1 4 % mucosal liquid  Care Giver No No   Sig: Apply 1 spray to the mouth or throat every 2 (two) hours as needed (for sore throat as needed)   polyethylene glycol (MIRALAX) 17 g packet  Care Giver No No   Sig: Take one packet daily as needed for no bowel movement in 24 hours   psyllium (METAMUCIL) 58 6 % packet   No No   Sig: Take 1 packet by mouth daily   senna-docusate sodium (Senexon-S) 8 6-50 mg per tablet  Care Giver No No   Sig: Take 1 tablet by mouth daily at 8am    sodium chloride (OCEAN) 0 65 % nasal spray  Care Giver No No   Si spray into each nostril as needed (three times daily as needed for nasal congestion)   zinc oxide (DESITIN) 13 % cream  Care Giver No No   Sig: Apply 1 application topically as needed (for perianal irritation)      Facility-Administered Medications: None       Past Medical History:   Diagnosis Date    ADD (attention deficit disorder)     Anxiety     Astigmatism     Brain lesion     Calcium deficiency     Cellulitis of foot, right 6/4/2018    Cerebral palsy (HCC)     Chronic otitis media     Constipation     Depression     Last Assessed:7/6/2016    Dysphagia     Esophagitis     Esotropia     GERD (gastroesophageal reflux disease)     Hiatal hernia     Hydrocephalus (HCC)     Impaired fasting glucose     Left nephrolithiasis 03/04/2019    Myopia     Oppositional defiant disorder     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HCC)     Seizures (HCC)     Sensorineural hearing loss     Status post ventriculoatrial shunt placement     Visual impairment        Past Surgical History:   Procedure Laterality Date    BREAST BIOPSY      X 2 (not sure of years)    CSF SHUNT      Creation of Ventriculo-Peritoneal CSF shunt ; Last Assessed:7/6/2016    EAR SURGERY      Last Assessed:7/6/2016    LEG SURGERY      due to CP     NOSE SURGERY      Last Assessed:7/6/2016    HI ESOPHAGOGASTRODUODENOSCOPY TRANSORAL DIAGNOSTIC N/A 5/9/2019    Procedure: ESOPHAGOGASTRODUODENOSCOPY (EGD) with biopsy;  Surgeon: Vaishnavi Ndiaye MD;  Location: [facility]; Service: Gastroenterology    UPPER GASTROINTESTINAL ENDOSCOPY  05/2019       Family History   Problem Relation Age of Onset    Diabetes Mother     Colon cancer Father     No Known Problems Maternal Grandmother     No Known Problems Maternal Grandfather     No Known Problems Paternal Grandmother     No Known Problems Paternal Grandfather      I have reviewed and agree with the history as documented      E-Cigarette/Vaping    E-Cigarette Use Never User      E-Cigarette/Vaping Substances    Nicotine No     THC No     CBD No     Flavoring No      Social History     Tobacco Use    Smoking status: Never Smoker    Smokeless tobacco: Never Used   Substance Use Topics    Alcohol use: Never     Frequency: Never    Drug use: Never Review of Systems   Constitutional: Negative for chills and fever  HENT: Negative for congestion, rhinorrhea and sore throat  Eyes: Negative for pain and visual disturbance  Respiratory: Negative for cough, shortness of breath and wheezing  Cardiovascular: Negative for chest pain and palpitations  Gastrointestinal: Negative for abdominal pain, nausea and vomiting  Genitourinary: Negative for dysuria, frequency and urgency  Musculoskeletal: Positive for neck pain  Negative for back pain and neck stiffness  Skin: Negative for rash and wound  Neurological: Positive for headaches  Negative for dizziness, seizures, loss of consciousness, weakness, light-headedness and numbness  Physical Exam  Physical Exam  Constitutional:       General: She is not in acute distress  Appearance: She is well-developed  She is not diaphoretic  HENT:      Head: Normocephalic and atraumatic  Right Ear: External ear normal       Left Ear: External ear normal    Eyes:      Conjunctiva/sclera: Conjunctivae normal       Pupils: Pupils are equal, round, and reactive to light  Neck:      Musculoskeletal: Normal range of motion and neck supple  Cardiovascular:      Rate and Rhythm: Normal rate and regular rhythm  Heart sounds: Normal heart sounds  No murmur  No friction rub  No gallop  Pulmonary:      Effort: Pulmonary effort is normal  No respiratory distress  Breath sounds: Normal breath sounds  No wheezing  Abdominal:      General: There is no distension  Palpations: Abdomen is soft  Tenderness: There is no abdominal tenderness  There is no right CVA tenderness, left CVA tenderness, guarding or rebound  Musculoskeletal:      Comments: No midline C/T/L TTP  No chest wall/rib TTP  Contractures to b/l upper extremities noted  No bony TTP, edema, or evidence of traumatic injury to b/l upper and lower extremities  Lymphadenopathy:      Cervical: No cervical adenopathy  Skin:     General: Skin is warm and dry  Capillary Refill: Capillary refill takes less than 2 seconds  Findings: No erythema or rash  Neurological:      Mental Status: She is alert  Cranial Nerves: No cranial nerve deficit or facial asymmetry  Motor: Motor function is intact  No abnormal muscle tone  Coordination: Coordination normal          Vital Signs  ED Triage Vitals [02/09/21 0931]   Temperature Pulse Respirations Blood Pressure SpO2   98 1 °F (36 7 °C) (!) 127 18 135/70 96 %      Temp Source Heart Rate Source Patient Position - Orthostatic VS BP Location FiO2 (%)   Oral Monitor Sitting Right arm --      Pain Score       Worst Possible Pain           Vitals:    02/09/21 0931 02/09/21 1045 02/09/21 1149   BP: 135/70  129/81   Pulse: (!) 127 (!) 110 104   Patient Position - Orthostatic VS: Sitting  Sitting         Visual Acuity      ED Medications  Medications - No data to display    Diagnostic Studies  Results Reviewed     None                 XR chest 2 views   Final Result by Caridad Douglas MD (02/09 1121)      No acute cardiopulmonary disease  Workstation performed: [id number]         CT head without contrast   Final Result by Brittany Oneill MD (02/09 1053)      No acute intracranial abnormality  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  Stable ventricular size and ventricular shunt catheter position  Stable 7 mm round suprasellar lesion  Workstation performed: [id number]         CT spine cervical without contrast   Final Result by Brittany Oneill MD (02/09 1057)      No cervical spine fracture or traumatic malalignment                     Workstation performed: [id number]                    Procedures  ECG 12 Lead Documentation Only    Date/Time: 2/9/2021 2:09 PM  Performed by: Jase Kulkarni PA-C  Authorized by: Jase Kulkarni PA-C Indications / Diagnosis:  Tachycardia  ECG reviewed by me, the ED Provider: yes    Patient location:  ED  Previous ECG:     Previous ECG:  Compared to current    Comparison ECG info:  Nonspecific T wave changes inferior leads    Similarity:  Changes noted    Comparison to cardiac monitor: Yes    Interpretation:     Interpretation: non-specific    Rate:     ECG rate:  107    ECG rate assessment: tachycardic    Rhythm:     Rhythm: sinus tachycardia    Ectopy:     Ectopy: none    QRS:     QRS axis:  Normal    QRS intervals:  Normal  Conduction:     Conduction: normal    ST segments:     ST segments:  Normal  T waves:     T waves: non-specific and inverted      Inverted:  V1             ED Course  ED Course as of Feb 09 1424   Tue Feb 09, 2021   1134 Persistently tachycardic  NSR by EKG  Patient appears to be slightly tachycardic at baseline per recent Ed/outpatient visits  CXR unremarkable  Pulse(!): 110                             SBIRT 22yo+      Most Recent Value   SBIRT (23 yo +)   In order to provide better care to our patients, we are screening all of our patients for alcohol and drug use  Would it be okay to ask you these screening questions? Unable to answer at this time Filed at: 02/09/2021 9202                    MDM  Number of Diagnoses or Management Options  Fall, initial encounter:   Diagnosis management comments: Witnessed mechanical fall at patient's care facility with head strike  No LOC  Patient reports a headache and neck pain  Limited history/exam due to baseline disability  Pt significantly tachycardic at arrival in high 120's but improved to 104 by time of discharge  EKG shows sinus tachycardia, some nonspecific T wave changes  However, patient's baseline heart rate on chart review of recent  No midline spinal tenderness or evidence of chest wall/extremity injury  No gross neurologic deficits by exam  CT head/c-spine without acute traumatic pathology   CXR clear on my initial read without evidence of displaced rib fracture  Will discharge with caretaker back to residence  Pt does have tylenol PRN on file per caretaker  Follow up with PCP encouraged to discussed re-evaluation  Return to ED indications discussed with caretaker  Amount and/or Complexity of Data Reviewed  Tests in the radiology section of CPT®: ordered and reviewed    Patient Progress  Patient progress: stable      Disposition  Final diagnoses:   Fall, initial encounter     Time reflects when diagnosis was documented in both MDM as applicable and the Disposition within this note     Time User Action Codes Description Comment    2/9/2021 12:30 PM Cayla Lombardir  Gloria Sandhu, initial encounter       ED Disposition     ED Disposition Condition Date/Time Comment    Discharge Stable Tue Feb 9, 2021 12:30 PM Robert Li discharge to home/self care              Follow-up Information     Follow up With Specialties Details Why [address] [facility] Emergency Medicine  [facility] wor[facility] [id number]  [address] [facility] Emergency Department, [address] [place] [address], [id number]          Discharge Medication List as of 2/9/2021 12:30 PM      CONTINUE these medications which have NOT CHANGED    Details   acetaminophen (TYLENOL) 500 mg tablet Take 1 tablet (500 mg total) by mouth every 6 (six) hours as needed for mild pain, Starting Wed 4/15/2020, Normal      aluminum-magnesium hydroxide-simethicone (ANTACID) 200-200-20 mg/5 mL suspension Take 15 mL by mouth every 4 (four) hours as needed for indigestion or heartburn, Starting Fri 2/7/2020, Normal      amitriptyline (ELAVIL) 10 mg tablet Take 10 mg by mouth daily at bedtime, Historical Med      ARIPiprazole (ABILIFY) 20 MG tablet Take 1 tablet (20 mg total) by mouth daily at bedtime, Starting Thu 10/15/2020, Normal      bismuth subsalicylate (PEPTO BISMOL) 524 mg/30 mL oral suspension Take 15 mL (262 mg total) by mouth every 6 (six) hours as needed for indigestion, Starting Wed 7/29/2020, Normal      calcium carbonate (TUMS) 500 mg chewable tablet Chew 1 tablet (500 mg total) 3 (three) times a day as needed for heartburn, Starting Wed 9/16/2020, Normal      Cholecalciferol (Vitamin D-3) 25 MCG (1000 UT) CAPS Take 2 capsules (2,000 Units total) by mouth daily at 8am , Starting Thu 10/15/2020, Normal      dicyclomine (BENTYL) 20 mg tablet Take 1 tablet (20 mg total) by mouth every 6 (six) hours as needed (as needed), Starting Mon 1/18/2021, Print      Dyclonine-Glycerin (Cepacol Sore Throat Spray) 0 1-33 % LIQD Apply 1 spray to the mouth or throat 3 (three) times a day as needed (sorethroat), Starting Fri 11/13/2020, Normal      Emollient (CeraVe) CREA Apply topically 2 (two) times a day Apply topically 2 times to affected heel twice daily @8a-8p, Starting Wed 12/9/2020, Normal      escitalopram (LEXAPRO) 20 mg tablet Take 1 tablet (20 mg total) by mouth daily, Starting Thu 10/15/2020, Normal      fluticasone (FLONASE) 50 mcg/act nasal spray 1 spray into each nostril daily as needed for rhinitis (nasal congestion) At 8:00 AM, Starting Fri 6/12/2020, Normal      guaiFENesin (ROBITUSSIN) 100 MG/5ML oral liquid Take 200 mg by mouth 3 (three) times a day as needed for cough, Historical Med      hydrOXYzine HCL (ATARAX) 25 mg tablet Take 1 tablet (25 mg total) by mouth 3 (three) times a day, Starting Tue 12/29/2020, Normal      ibuprofen (MOTRIN) 400 mg tablet Take 1 tablet (400 mg total) by mouth every 8 (eight) hours as needed for mild pain, Starting Fri 6/12/2020, Normal      lamoTRIgine (LaMICtal) 25 mg tablet Take 25 mg by mouth 2 (two) times a day , Starting Tue 8/11/2020, Historical Med      lidocaine (LIDODERM) 5 % Apply 1 patch topically daily Remove & Discard patch within 12 hours or as directed by MD, Starting Fri 11/13/2020, Normal      LORazepam (ATIVAN) 0 5 mg tablet Take 0 25 mg by mouth 2 (two) times a day, Historical Med      lubiprostone (Amitiza) 24 mcg capsule Take 1 capsule (24 mcg total) by mouth daily with breakfast, Starting Fri 10/30/2020, Print      magnesium hydroxide (GNP Milk of Magnesia) 400 mg/5 mL oral suspension Take 30 mL by mouth daily as needed for constipation If no BM in 3 days, Starting Wed 9/16/2020, Normal      Mouthwashes (Listerine Antiseptic) LIQD Apply 1 application to the mouth or throat 2 (two) times a day, Starting Wed 9/16/2020, Normal      !!  Multiple Vitamins-Minerals (CERTAVITE SENIOR/ANTIOXIDANT PO) CertaVite Senior, Historical Med      !! Multiple Vitamins-Minerals (CertaVite Senior/Antioxidant) TABS Take 1 tablet by mouth daily, Starting Wed 9/16/2020, Normal      norgestimate-ethinyl estradiol (ORTHO-CYCLEN) 0 25-35 MG-MCG per tablet Take 1 tablet by mouth daily, Starting Thu 6/18/2020, Normal      nystatin (MYCOSTATIN) powder Apply topically 4 (four) times a day, Starting Tue 12/1/2020, Normal      pantoprazole (PROTONIX) 20 mg tablet Take 1 tablet (20 mg total) by mouth 2 (two) times a day, Starting Mon 1/18/2021, Print      phenol (Chloraseptic) 1 4 % mucosal liquid Apply 1 spray to the mouth or throat every 2 (two) hours as needed (for sore throat as needed), Starting Thu 10/15/2020, Print      polyethylene glycol (MIRALAX) 17 g packet Take one packet daily as needed for no bowel movement in 24 hours, Normal      psyllium (METAMUCIL) 58 6 % packet Take 1 packet by mouth daily, Starting Mon 1/11/2021, Normal      RA SUNSCREEN SPF50 LOTN Apply 15 minutes before sun exposure and every 2 hours, Normal      senna-docusate sodium (Senexon-S) 8 6-50 mg per tablet Take 1 tablet by mouth daily at 8am , Starting Tue 12/29/2020, Normal      sodium chloride (OCEAN) 0 65 % nasal spray 1 spray into each nostril as needed (three times daily as needed for nasal congestion), Starting Thu 10/15/2020, Normal      zinc oxide (DESITIN) 13 % cream Apply 1 application topically as needed (for perianal irritation), Starting Wed 9/16/2020, Normal       !! - Potential duplicate medications found  Please discuss with provider  No discharge procedures on file      PDMP Review     None          ED Provider  Electronically Signed by           Jase Kulkarni PA-C  02/09/21 1429

## 2021-02-10 ENCOUNTER — OFFICE VISIT (OUTPATIENT)
Dept: FAMILY MEDICINE CLINIC | Facility: CLINIC | Age: 41
End: 2021-02-10

## 2021-02-10 VITALS
HEART RATE: 102 BPM | TEMPERATURE: 99.6 F | BODY MASS INDEX: 45.75 KG/M2 | RESPIRATION RATE: 18 BRPM | DIASTOLIC BLOOD PRESSURE: 82 MMHG | OXYGEN SATURATION: 99 % | WEIGHT: 211.4 LBS | SYSTOLIC BLOOD PRESSURE: 120 MMHG

## 2021-02-10 DIAGNOSIS — T14.8XXA WOUND, OPEN: Primary | ICD-10-CM
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
## 2021-02-10 LAB
ATRIAL RATE: 107 BPM
P AXIS: 77 DEGREES
PR INTERVAL: 126 MS
QRS AXIS: 65 DEGREES
QRSD INTERVAL: 66 MS
QT INTERVAL: 316 MS
QTC INTERVAL: 421 MS
T WAVE AXIS: -69 DEGREES
VENTRICULAR RATE: 107 BPM

## 2021-02-10 PROCEDURE — 99214 OFFICE O/P EST MOD 30 MIN: CPT | Performed by: PHYSICIAN ASSISTANT

## 2021-02-10 PROCEDURE — 93010 ELECTROCARDIOGRAM REPORT: CPT | Performed by: INTERNAL MEDICINE

## 2021-02-10 RX ORDER — BACITRACIN, NEOMYCIN, POLYMYXIN B 400; 3.5; 5 [USP'U]/G; MG/G; [USP'U]/G
OINTMENT TOPICAL
Qty: 15 G | Refills: 0 | Status: SHIPPED | OUTPATIENT
Start: 2021-02-10 | End: 2021-02-11 | Stop reason: ALTCHOICE

## 2021-02-10 RX ORDER — BACITRACIN, NEOMYCIN, POLYMYXIN B 400; 3.5; 5 [USP'U]/G; MG/G; [USP'U]/G
OINTMENT TOPICAL
Qty: 15 G | Refills: 0 | Status: SHIPPED | OUTPATIENT
Start: 2021-02-10 | End: 2021-02-10 | Stop reason: SDUPTHER

## 2021-02-10 NOTE — PROGRESS NOTES
Assessment/Plan:    Wound, open  Advised to clean site with soap and water followed by Neosporin BID until healed then PRN  May cover with a dressing if needed  Advised patient to avoid picking or touching at site  Follow-up in 1 week    Addendum change Neosporin to bacitracin    Fall  Pt denies any pain at this time  Encouraged patient to use walker at all times  Avoid walking backwards      Diagnoses and all orders for this visit:    Wound, open  -     Discontinue: neomycin-bacitracin-polymyxin b (NEOSPORIN) ointment; Clean site to nose with soap and water followed by neosporin BID until healed then PRN  -     Discontinue: neomycin-bacitracin-polymyxin b (NEOSPORIN) ointment; Clean site to nose with soap and water followed by neosporin BID until healed then PRN  -     bacitracin topical ointment 500 units/g topical ointment; Cleanse site on nose with soap and water followed by bacitracin BID until healed then p r n  Fall, subsequent encounter        Subjective:      Patient ID: Noreen Granados is a 42 y o  female presents today with caregiver for follow-up fall and wound to nose  Pt reports falling backwards and hitting her head on the ground yesterday  Was taken to [facility] ED where a CT scan of the head CT c-spine as well as CXR was performed  CT scan head  IMPRESSION:  No acute intracranial abnormality          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  Stable ventricular size and ventricular shunt catheter position        Stable 7 mm round suprasellar lesion  CT C-spine impression- No cervical spine fracture or traumatic malalignment  Chest x-ray impression-no acute cardiopulmonary disease  Pt was discharged home advised to take Tylenol as needed  Pt denies any headaches, vomiting, nausea or pain at this time  The patient admits to picking at scabs on nose causing areas to bleeding    States she does is because of her nerves  Caregiver reports this is common for patient however, she has never noticed the area to" look this bad"  Caregiver nor patient are unable to recall what affected jaleel were or resembled prior  Pt denies any pain  The following portions of the patient's history were reviewed and updated as appropriate: allergies, current medications, past family history, past medical history, past social history, past surgical history and problem list     Review of Systems   Constitutional: Negative for chills, fatigue and fever  Respiratory: Negative for chest tightness, shortness of breath and wheezing  Cardiovascular: Positive for chest pain  Gastrointestinal: Negative for abdominal pain, nausea and vomiting  Musculoskeletal: Positive for gait problem  Skin: Positive for rash  Neurological: Negative for dizziness, weakness, light-headedness and headaches  Objective:      /82 (BP Location: Left arm, Patient Position: Sitting, Cuff Size: Large)   Pulse 102   Temp 99 6 °F (37 6 °C) (Tympanic)   Resp 18   Wt 95 9 kg (211 lb 6 4 oz)   SpO2 99%   BMI 45 75 kg/m²          Physical Exam  Constitutional:       Appearance: Normal appearance  She is obese  She is not ill-appearing  Comments: Uses a walker   HENT:      Head: Normocephalic and atraumatic  Pulmonary:      Effort: Pulmonary effort is normal  No respiratory distress  Skin:     Comments: On bridge of nose and right side of nose  approx 1 x 1cm superficial abrasion like wounds with dry blood and partially scabbed  No swelling or warmth  Forehead- small superficial healing abrasion  Neurological:      Mental Status: She is alert  Mental status is at baseline     Psychiatric:         Mood and Affect: Mood normal

## 2021-02-10 NOTE — ASSESSMENT & PLAN NOTE
Pt denies any pain at this time  Encouraged patient to use walker at all times  Avoid walking backwards

## 2021-02-10 NOTE — ASSESSMENT & PLAN NOTE
Advised to clean site with soap and water followed by Neosporin BID until healed then PRN  May cover with a dressing if needed  Advised patient to avoid picking or touching at site  Follow-up in 1 week    Addendum change Neosporin to bacitracin

## 2021-02-11 RX ORDER — GINSENG 100 MG
CAPSULE ORAL
Qty: 15 G | Refills: 0 | Status: SHIPPED | OUTPATIENT
Start: 2021-02-11 | End: 2021-05-05 | Stop reason: SDUPTHER

## 2021-02-22 DIAGNOSIS — K59.00 CONSTIPATION, UNSPECIFIED CONSTIPATION TYPE: ICD-10-CM

## 2021-02-22 RX ORDER — POLYETHYLENE GLYCOL 3350 17 G/17G
POWDER, FOR SOLUTION ORAL
Qty: 30 EACH | Refills: 5 | Status: SHIPPED | OUTPATIENT
Start: 2021-02-22 | End: 2022-08-04 | Stop reason: SDUPTHER

## 2021-02-23 ENCOUNTER — OFFICE VISIT (OUTPATIENT)
Dept: FAMILY MEDICINE CLINIC | Facility: CLINIC | Age: 41
End: 2021-02-23

## 2021-02-23 VITALS
SYSTOLIC BLOOD PRESSURE: 124 MMHG | BODY MASS INDEX: 46.9 KG/M2 | RESPIRATION RATE: 16 BRPM | TEMPERATURE: 98.2 F | WEIGHT: 217.4 LBS | HEART RATE: 84 BPM | DIASTOLIC BLOOD PRESSURE: 72 MMHG | HEIGHT: 57 IN

## 2021-02-23 DIAGNOSIS — Z00.00 MEDICARE ANNUAL WELLNESS VISIT, SUBSEQUENT: Primary | ICD-10-CM

## 2021-02-23 DIAGNOSIS — Z12.4 SCREENING FOR CERVICAL CANCER: ICD-10-CM

## 2021-02-23 PROCEDURE — G0439 PPPS, SUBSEQ VISIT: HCPCS | Performed by: FAMILY MEDICINE

## 2021-02-23 NOTE — PROGRESS NOTES
Assessment and Plan:     Problem List Items Addressed This Visit        Other    Medicare annual wellness visit, subsequent     Medicare annual wellness completed  -Caregiver had no concerns at this visit  immunizations and screenings UTD             Other Visit Diagnoses     Screening for cervical cancer    -  Primary    Encounter for screening mammogram for malignant neoplasm of breast               Preventive health issues were discussed with patient, and age appropriate screening tests were ordered as noted in patient's After Visit Summary  Personalized health advice and appropriate referrals for health education or preventive services given if needed, as noted in patient's After Visit Summary       History of Present Illness:     Patient presents for Medicare Annual Wellness visit    Patient Care Team:  Palmira Pelayo MD as PCP - General (Family Medicine)  MD Rayray Boston PA-C Cookie Fall, MD (Psychiatry)  Kameron Riddle DPM (Podiatry)     Problem List:     Patient Active Problem List   Diagnosis    Leg swelling    Generalized anxiety disorder    Brain lesion    [facility]bral palsy ([facility] [address] [facility]    Chronic knee pain    Constipation    Severe episode of recurrent major depressive disorder, with psychotic features ([facility] [address] )    Dizziness    Functional dysphagia    Hearing impairment    Acquired renal cyst of left kidney    Low back pain    Moderate intellectual disability    Pituitary neoplasm    Seasonal allergies    Varicose veins with pain    Medicare annual wellness visit, subsequent    Hematuria    Pituitary microadenoma ([facility] [address] [facility]    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[facility] [address] [facility]    Ambulatory dysfunction    Bruise    Bilateral impacted cerumen    TMJ (temporomandibular joint disorder)    Gait disturbance    Bilateral thoracic back pain    Cerumen impaction    Skin picking habit    Hiatal hernia    SOB (shortness of breath)    Dysuria    Left nephrolithiasis    Non-seasonal allergic rhinitis    Viral URI    Self-injurious behavior    Suprasellar extension of pituitary adenoma (HCC)    Gastroesophageal reflux disease without esophagitis    Intercostal pain    Intertrigo    Nausea and vomiting    Excoriation of face    Sore throat    Impetigo    Pain of right calf    Elevated blood pressure reading in office without diagnosis of hypertension    Fall    Sleep disturbance    Exposure to COVID-19 virus    Encounter for follow-up    Acute non intractable tension-type headache    Dry skin    Candidiasis of skin    Heat intolerance    Wound, open      Past Medical and Surgical History:     Past Medical History:   Diagnosis Date    ADD (attention deficit disorder)     Anxiety     Astigmatism     Brain lesion     Calcium deficiency     Cellulitis of foot, right 6/4/2018    Cerebral palsy (HCC)     Chronic otitis media     Constipation     Depression     Last Assessed:7/6/2016    Dysphagia     Esophagitis     Esotropia     GERD (gastroesophageal reflux disease)     Hiatal hernia     Hydrocephalus (HCC)     Impaired fasting glucose     Left nephrolithiasis 03/04/2019    Myopia     Oppositional defiant disorder     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HCC)     Seizures (HCC)     Sensorineural hearing loss     Status post ventriculoatrial shunt placement     Visual impairment      Past Surgical History:   Procedure Laterality Date    BREAST BIOPSY      X 2 (not sure of years)    CSF SHUNT      Creation of Ventriculo-Peritoneal CSF shunt ; Last Assessed:7/6/2016    EAR SURGERY      Last Assessed:7/6/2016    LEG SURGERY      due to CP     NOSE SURGERY      Last Assessed:7/6/2016    IA ESOPHAGOGASTRODUODENOSCOPY TRANSORAL DIAGNOSTIC N/A 5/9/2019    Procedure: ESOPHAGOGASTRODUODENOSCOPY (EGD) with biopsy;  Surgeon: Ronal Villarreal MD;  Location: [facility];   Service: Gastroenterology    UPPER GASTROINTESTINAL ENDOSCOPY  05/2019      Family History:     Family History   Problem Relation Age of Onset    Diabetes Mother     Colon cancer Father     No Known Problems Maternal Grandmother     No Known Problems Maternal Grandfather     No Known Problems Paternal Grandmother     No Known Problems Paternal Grandfather       Social History:     E-Cigarette/Vaping    E-Cigarette Use Never User      E-Cigarette/Vaping Substances    Nicotine No     THC No     CBD No     Flavoring No      Social History     Socioeconomic History    Marital status: Single     Spouse name: None    Number of children: None    Years of education: None    Highest education level: None   Occupational History    None   Social Needs    Financial resource strain: Not hard at all   Sharon-Frank insecurity     Worry: Never true     Inability: Never true    Transportation needs     Medical: No     Non-medical: No   Tobacco Use    Smoking status: Never Smoker    Smokeless tobacco: Never Used   Substance and Sexual Activity    Alcohol use: Never     Frequency: Never    Drug use: Never    Sexual activity: Not Currently   Lifestyle    Physical activity     Days per week: None     Minutes per session: None    Stress: None   Relationships    Social connections     Talks on phone: None     Gets together: None     Attends Yarsani service: None     Active member of club or organization: None     Attends meetings of clubs or organizations: None     Relationship status: None    Intimate partner violence     Fear of current or ex partner: None     Emotionally abused: None     Physically abused: None     Forced sexual activity: None   Other Topics Concern    None   Social History Narrative    Always uses seat belt    Lives in group home      Medications and Allergies:     Current Outpatient Medications   Medication Sig Dispense Refill    acetaminophen (TYLENOL) 500 mg tablet Take 1 tablet (500 mg total) by mouth every 6 (six) hours as needed for mild pain 30 tablet 1    aluminum-magnesium hydroxide-simethicone (ANTACID) 200-200-20 mg/5 mL suspension Take 15 mL by mouth every 4 (four) hours as needed for indigestion or heartburn 355 mL 5    amitriptyline (ELAVIL) 10 mg tablet Take 10 mg by mouth daily at bedtime      ARIPiprazole (ABILIFY) 20 MG tablet Take 1 tablet (20 mg total) by mouth daily at bedtime 90 tablet 2    bacitracin topical ointment 500 units/g topical ointment Cleanse site on nose with soap and water followed by bacitracin BID until healed then p r n  15 g 0    bismuth subsalicylate (PEPTO BISMOL) 524 mg/30 mL oral suspension Take 15 mL (262 mg total) by mouth every 6 (six) hours as needed for indigestion 360 mL 3    calcium carbonate (TUMS) 500 mg chewable tablet Chew 1 tablet (500 mg total) 3 (three) times a day as needed for heartburn 30 tablet 5    Cholecalciferol (Vitamin D-3) 25 MCG (1000 UT) CAPS Take 2 capsules (2,000 Units total) by mouth daily at 8am  30 capsule 5    dicyclomine (BENTYL) 20 mg tablet Take 1 tablet (20 mg total) by mouth every 6 (six) hours as needed (as needed) 120 tablet 2    Dyclonine-Glycerin (Cepacol Sore Throat Spray) 0 1-33 % LIQD Apply 1 spray to the mouth or throat 3 (three) times a day as needed (sorethroat) 118 mL 2    Emollient (CeraVe) CREA Apply topically 2 (two) times a day Apply topically 2 times to affected heel twice daily @8a-8p 453 g 5    escitalopram (LEXAPRO) 20 mg tablet Take 1 tablet (20 mg total) by mouth daily 90 tablet 2    fluticasone (FLONASE) 50 mcg/act nasal spray 1 spray into each nostril daily as needed for rhinitis (nasal congestion) At 8:00 AM 1 Bottle 5    guaiFENesin (ROBITUSSIN) 100 MG/5ML oral liquid Take 200 mg by mouth 3 (three) times a day as needed for cough      hydrOXYzine HCL (ATARAX) 25 mg tablet Take 1 tablet (25 mg total) by mouth 3 (three) times a day 30 tablet 2    ibuprofen (MOTRIN) 400 mg tablet Take 1 tablet (400 mg total) by mouth every 8 (eight) hours as needed for mild pain 30 tablet 5    lamoTRIgine (LaMICtal) 25 mg tablet Take 25 mg by mouth 2 (two) times a day       lidocaine (LIDODERM) 5 % Apply 1 patch topically daily Remove & Discard patch within 12 hours or as directed by MD 10 patch 0    LORazepam (ATIVAN) 0 5 mg tablet Take 0 25 mg by mouth 2 (two) times a day      lubiprostone (Amitiza) 24 mcg capsule Take 1 capsule (24 mcg total) by mouth daily with breakfast 60 capsule 5    magnesium hydroxide (GNP Milk of Magnesia) 400 mg/5 mL oral suspension Take 30 mL by mouth daily as needed for constipation If no BM in 3 days 355 mL 5    Mouthwashes (Listerine Antiseptic) LIQD Apply 1 application to the mouth or throat 2 (two) times a day (Patient taking differently: Swish and spit 5 mL 2 (two) times a day ) 500 mL 5    Multiple Vitamins-Minerals (CERTAVITE SENIOR/ANTIOXIDANT PO) CertaVite Senior      Multiple Vitamins-Minerals (CertaVite Senior/Antioxidant) TABS Take 1 tablet by mouth daily 30 tablet 5    norgestimate-ethinyl estradiol (ORTHO-CYCLEN) 0 25-35 MG-MCG per tablet Take 1 tablet by mouth daily 28 tablet 11    nystatin (MYCOSTATIN) powder Apply topically 4 (four) times a day 45 g 2    pantoprazole (PROTONIX) 20 mg tablet Take 1 tablet (20 mg total) by mouth 2 (two) times a day 60 tablet 3    phenol (Chloraseptic) 1 4 % mucosal liquid Apply 1 spray to the mouth or throat every 2 (two) hours as needed (for sore throat as needed) 236 mL 1    polyethylene glycol (MIRALAX) 17 g packet Take one packet daily as needed for no bowel movement in 24 hours 30 each 5    psyllium (METAMUCIL) 58 6 % packet Take 1 packet by mouth daily 30 packet 5    RA SUNSCREEN SPF50 LOTN Apply 15 minutes before sun exposure and every 2 hours 1 Bottle 5    senna-docusate sodium (Senexon-S) 8 6-50 mg per tablet Take 1 tablet by mouth daily at 8am  30 tablet 3    sodium chloride (OCEAN) 0 65 % nasal spray 1 spray into each nostril as needed (three times daily as needed for nasal congestion) 60 mL 1    zinc oxide (DESITIN) 13 % cream Apply 1 application topically as needed (for perianal irritation) 1 Tube 5     No current facility-administered medications for this visit  No Known Allergies   Immunizations:     Immunization History   Administered Date(s) Administered    INFLUENZA 12/01/2000, 12/12/2001, 10/09/2002, 10/14/2003, 10/20/2004, 10/11/2006, 10/17/2007, 10/14/2008, 09/29/2009, 09/14/2010, 09/09/2011, 09/05/2012, 09/03/2013, 10/22/2014, 10/14/2015, 10/04/2016    Influenza, injectable, quadrivalent, preservative free 0 5 mL 10/04/2018, 10/21/2019, 09/15/2020    Influenza, seasonal, injectable, preservative free 10/04/2016    Td (adult), adsorbed 09/09/2003    Tdap 09/14/2010, 09/14/2020    Tuberculin Skin Test-PPD Intradermal 04/05/2000, 10/16/2000, 10/09/2002, 10/20/2004, 11/03/2008, 04/10/2018, 02/19/2020      Health Maintenance:         Topic Date Due    HIV Screening  01/18/1994    MAMMOGRAM  03/18/2021    Cervical Cancer Screening  06/17/2022     There are no preventive care reminders to display for this patient  Medicare Health Risk Assessment:     /72 (BP Location: Left arm, Patient Position: Sitting, Cuff Size: Large)   Pulse 84   Temp 98 2 °F (36 8 °C) (Temporal)   Resp 16   Ht 4' 9" (1 448 m)   Wt 98 6 kg (217 lb 6 4 oz)   BMI 47 04 kg/m²          Health Risk Assessment:   Patient rates overall health as fair  Patient feels that their physical health rating is same  Eyesight was rated as much worse  Hearing was rated as same  Patient feels that their emotional and mental health rating is same  Pain experienced in the last 7 days has been none  Patient states that she has experienced weight loss or gain in last 6 months  Fall Risk Screening:    In the past year, patient has experienced: history of falling in past year    Number of falls: 1  Injured during fall?: No    Feels unsteady when standing or walking?: Yes    Worried about falling?: Yes      Urinary Incontinence Screening:   Patient has not leaked urine accidently in the last six months  Home Safety:  Patient has trouble with stairs inside or outside of their home  Patient has working smoke alarms and has working carbon monoxide detector  Home safety hazards include: none  Nutrition:   Current diet is Regular  Medications:   Patient is not currently taking any over-the-counter supplements  Patient is not able to manage medications  Group home patient    Activities of Daily Living (ADLs)/Instrumental Activities of Daily Living (IADLs):   Walk and transfer into and out of bed and chair?: No  Dress and groom yourself?: Yes    Bathe or shower yourself?: No    Feed yourself? Yes  Do your laundry/housekeeping?: No  Manage your money, pay your bills and track your expenses?: No  Make your own meals?: No    Do your own shopping?: No    Previous Hospitalizations:   Any hospitalizations or ED visits within the last 12 months?: No      Advance Care Planning:   Living will: No    Durable POA for healthcare: No    Advanced directive: No    Advanced directive counseling given: No    Five wishes given: No      Comments: Caregiver unsure if patient has POA    PREVENTIVE SCREENINGS      Cardiovascular Screening:    General: Screening Current      Diabetes Screening:     General: Screening Current      Breast Cancer Screening:     General: Screening Current      Cervical Cancer Screening:    General: Screening Current      Osteoporosis Screening:    General: Screening Not Indicated      Abdominal Aortic Aneurysm (AAA) Screening:        General: Screening Not Indicated      Lung Cancer Screening:     General: Screening Not Indicated      Preventive Screening Comments: Patietn and caregiver unsure of who had colon cancer in her family  Patient knows father  of pancreatic cancer    Other Counseling Topics:   Car/seat belt/driving safety         Donnie Gusman MD

## 2021-02-23 NOTE — ASSESSMENT & PLAN NOTE
Medicare annual wellness completed  -Caregiver had no concerns at this visit  immunizations and screenings UTD

## 2021-02-23 NOTE — PATIENT INSTRUCTIONS
Medicare Preventive Visit Patient Instructions  Thank you for completing your Welcome to Medicare Visit or Medicare Annual Wellness Visit today  Your next wellness visit will be due in one year (2/23/2022)  The screening/preventive services that you may require over the next 5-10 years are detailed below  Some tests may not apply to you based off risk factors and/or age  Screening tests ordered at today's visit but not completed yet may show as past due  Also, please note that scanned in results may not display below  Preventive Screenings:  Service Recommendations Previous Testing/Comments   Colorectal Cancer Screening  * Colonoscopy    * Fecal Occult Blood Test (FOBT)/Fecal Immunochemical Test (FIT)  * Fecal DNA/Cologuard Test  * Flexible Sigmoidoscopy Age: 54-65 years old   Colonoscopy: every 10 years (may be performed more frequently if at higher risk)  OR  FOBT/FIT: every 1 year  OR  Cologuard: every 3 years  OR  Sigmoidoscopy: every 5 years  Screening may be recommended earlier than age 48 if at higher risk for colorectal cancer  Also, an individualized decision between you and your healthcare provider will decide whether screening between the ages of 74-80 would be appropriate  Colonoscopy: Not on file  FOBT/FIT: Not on file  Cologuard: Not on file  Sigmoidoscopy: Not on file         Breast Cancer Screening Age: 36 years old  Frequency: every 1-2 years  Not required if history of left and right mastectomy Mammogram: 03/18/2020    Screening Current   Cervical Cancer Screening Between the ages of 21-29, pap smear recommended once every 3 years  Between the ages of 33-67, can perform pap smear with HPV co-testing every 5 years     Recommendations may differ for women with a history of total hysterectomy, cervical cancer, or abnormal pap smears in past  Pap Smear: 06/18/2020    Screening Current   Hepatitis C Screening Once for adults born between 1945 and 1965  More frequently in patients at high risk for Hepatitis C Hep C Antibody: Not on file       Diabetes Screening 1-2 times per year if you're at risk for diabetes or have pre-diabetes Fasting glucose: 76 mg/dL   A1C: 5 2 %    Screening Current   Cholesterol Screening Once every 5 years if you don't have a lipid disorder  May order more often based on risk factors  Lipid panel: 01/03/2020    Screening Current     Other Preventive Screenings Covered by Medicare:  1  Abdominal Aortic Aneurysm (AAA) Screening: covered once if your at risk  You're considered to be at risk if you have a family history of AAA  2  Lung Cancer Screening: covers low dose CT scan once per year if you meet all of the following conditions: (1) Age 50-69; (2) No signs or symptoms of lung cancer; (3) Current smoker or have quit smoking within the last 15 years; (4) You have a tobacco smoking history of at least 30 pack years (packs per day multiplied by number of years you smoked); (5) You get a written order from a healthcare provider  3  Glaucoma Screening: covered annually if you're considered high risk: (1) You have diabetes OR (2) Family history of glaucoma OR (3)  aged 48 and older OR (3)  American aged 72 and older  3  Osteoporosis Screening: covered every 2 years if you meet one of the following conditions: (1) You're estrogen deficient and at risk for osteoporosis based off medical history and other findings; (2) Have a vertebral abnormality; (3) On glucocorticoid therapy for more than 3 months; (4) Have primary hyperparathyroidism; (5) On osteoporosis medications and need to assess response to drug therapy  · Last bone density test (DXA Scan): Not on file  5  HIV Screening: covered annually if you're between the age of 12-76  Also covered annually if you are younger than 13 and older than 72 with risk factors for HIV infection  For pregnant patients, it is covered up to 3 times per pregnancy      Immunizations:  Immunization Recommendations   Influenza Vaccine Annual influenza vaccination during flu season is recommended for all persons aged >= 6 months who do not have contraindications   Pneumococcal Vaccine (Prevnar and Pneumovax)  * Prevnar = PCV13  * Pneumovax = PPSV23   Adults 25-60 years old: 1-3 doses may be recommended based on certain risk factors  Adults 72 years old: Prevnar (PCV13) vaccine recommended followed by Pneumovax (PPSV23) vaccine  If already received PPSV23 since turning 65, then PCV13 recommended at least one year after PPSV23 dose  Hepatitis B Vaccine 3 dose series if at intermediate or high risk (ex: diabetes, end stage renal disease, liver disease)   Tetanus (Td) Vaccine - COST NOT COVERED BY MEDICARE PART B Following completion of primary series, a booster dose should be given every 10 years to maintain immunity against tetanus  Td may also be given as tetanus wound prophylaxis  Tdap Vaccine - COST NOT COVERED BY MEDICARE PART B Recommended at least once for all adults  For pregnant patients, recommended with each pregnancy  Shingles Vaccine (Shingrix) - COST NOT COVERED BY MEDICARE PART B  2 shot series recommended in those aged 48 and above     Health Maintenance Due:      Topic Date Due    HIV Screening  01/18/1994    MAMMOGRAM  03/18/2021    Cervical Cancer Screening  06/17/2022     Immunizations Due:  There are no preventive care reminders to display for this patient  Advance Directives   What are advance directives? Advance directives are legal documents that state your wishes and plans for medical care  These plans are made ahead of time in case you lose your ability to make decisions for yourself  Advance directives can apply to any medical decision, such as the treatments you want, and if you want to donate organs  What are the types of advance directives? There are many types of advance directives, and each state has rules about how to use them   You may choose a combination of any of the following:  · Living will:  This is a written record of the treatment you want  You can also choose which treatments you do not want, which to limit, and which to stop at a certain time  This includes surgery, medicine, IV fluid, and tube feedings  · Durable power of  for healthcare [place] [facility]): This is a written record that states who you want to make healthcare choices for you when you are unable to make them for yourself  This person, called a proxy, is usually a family member or a friend  You may choose more than 1 proxy  · Do not resuscitate (DNR) order:  A DNR order is used in case your heart stops beating or you stop breathing  It is a request not to have certain forms of treatment, such as CPR  A DNR order may be included in other types of advance directives  · Medical directive: This covers the care that you want if you are in a coma, near death, or unable to make decisions for yourself  You can list the treatments you want for each condition  Treatment may include pain medicine, surgery, blood transfusions, dialysis, IV or tube feedings, and a ventilator (breathing machine)  · Values history: This document has questions about your views, beliefs, and how you feel and think about life  This information can help others choose the care that you would choose  Why are advance directives important? An advance directive helps you control your care  Although spoken wishes may be used, it is better to have your wishes written down  Spoken wishes can be misunderstood, or not followed  Treatments may be given even if you do not want them  An advance directive may make it easier for your family to make difficult choices about your care  Weight Management   Why it is important to manage your weight:  Being overweight increases your risk of health conditions such as heart disease, high blood pressure, type 2 diabetes, and certain types of cancer   It can also increase your risk for osteoarthritis, sleep apnea, and other respiratory problems  Aim for a slow, steady weight loss  Even a small amount of weight loss can lower your risk of health problems  How to lose weight safely:  A safe and healthy way to lose weight is to eat fewer calories and get regular exercise  You can lose up about 1 pound a week by decreasing the number of calories you eat by 500 calories each day  Healthy meal plan for weight management:  A healthy meal plan includes a variety of foods, contains fewer calories, and helps you stay healthy  A healthy meal plan includes the following:  · Eat whole-grain foods more often  A healthy meal plan should contain fiber  Fiber is the part of grains, fruits, and vegetables that is not broken down by your body  Whole-grain foods are healthy and provide extra fiber in your diet  Some examples of whole-grain foods are whole-wheat breads and pastas, oatmeal, brown rice, and bulgur  · Eat a variety of vegetables every day  Include dark, leafy greens such as spinach, kale, lily greens, and mustard greens  Eat yellow and orange vegetables such as carrots, sweet potatoes, and winter squash  · Eat a variety of fruits every day  Choose fresh or canned fruit (canned in its own juice or light syrup) instead of juice  Fruit juice has very little or no fiber  · Eat low-fat dairy foods  Drink fat-free (skim) milk or 1% milk  Eat fat-free yogurt and low-fat cottage cheese  Try low-fat cheeses such as mozzarella and other reduced-fat cheeses  · Choose meat and other protein foods that are low in fat  Choose beans or other legumes such as split peas or lentils  Choose fish, skinless poultry (chicken or turkey), or lean cuts of red meat (beef or pork)  Before you cook meat or poultry, cut off any visible fat  · Use less fat and oil  Try baking foods instead of frying them  Add less fat, such as margarine, sour cream, regular salad dressing and mayonnaise to foods  Eat fewer high-fat foods   Some examples of high-fat foods include french fries, doughnuts, ice cream, and cakes  · Eat fewer sweets  Limit foods and drinks that are high in sugar  This includes candy, cookies, regular soda, and sweetened drinks  Exercise:  Exercise at least 30 minutes per day on most days of the week  Some examples of exercise include walking, biking, dancing, and swimming  You can also fit in more physical activity by taking the stairs instead of the elevator or parking farther away from stores  Ask your healthcare provider about the best exercise plan for you  © Copyright [employer] 2018 Information is for End User's use only and may not be sold, redistributed or otherwise used for commercial purposes   All illustrations and images included in CareNotes® are the copyrighted property of A D A M , Inc  or [address] [place], [place] [address]susanne samantha

## 2021-02-24 ENCOUNTER — TELEPHONE (OUTPATIENT)
Dept: FAMILY MEDICINE CLINIC | Facility: CLINIC | Age: 41
End: 2021-02-24

## 2021-02-26 DIAGNOSIS — T14.8XXA WOUND, OPEN: ICD-10-CM

## 2021-02-26 RX ORDER — GINSENG 100 MG
CAPSULE ORAL
Qty: 15 G | Refills: 1 | Status: CANCELLED | OUTPATIENT
Start: 2021-02-26

## 2021-03-09 ENCOUNTER — OFFICE VISIT (OUTPATIENT)
Dept: FAMILY MEDICINE CLINIC | Facility: CLINIC | Age: 41
End: 2021-03-09

## 2021-03-09 VITALS
HEIGHT: 57 IN | TEMPERATURE: 98.4 F | BODY MASS INDEX: 46.13 KG/M2 | HEART RATE: 104 BPM | OXYGEN SATURATION: 97 % | SYSTOLIC BLOOD PRESSURE: 142 MMHG | RESPIRATION RATE: 18 BRPM | DIASTOLIC BLOOD PRESSURE: 80 MMHG | WEIGHT: 213.8 LBS
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
## 2021-03-09 DIAGNOSIS — G80.1 SPASTIC DIPLEGIC CEREBRAL PALSY (HCC): Primary | ICD-10-CM

## 2021-03-09 PROCEDURE — 99213 OFFICE O/P EST LOW 20 MIN: CPT | Performed by: FAMILY MEDICINE

## 2021-03-09 NOTE — PROGRESS NOTES
Assessment/Plan:    Accidental fall  · Patient had a witnessed fall on Sunday  · No LOC, patient has been acting normally since  · Only reason for visit today is to have paperwork saying she was evaluated signed  · PE unremarkable  · No imaging or testing necessary at this time       Diagnoses and all orders for this visit:    Accidental fall, initial encounter          Subjective:      Patient ID: Taqueria Roman is a 42 y o  female  41 YO F, PMHx of  CP, ODD, seizure disorder, history of ventriculoperitoneal shunt  She presents to the office to have paperwork filled out after a witnessed mechanical fall on Sunday  Caregiver, who is present at bedside, states that per group home protocol, any time there is a fall patient needs to be seen by a PCP  The fall occurred on Sunday  Patient was using her walker normally when she went to pick something up  She lost her balance and ended up falling onto her bottom  There was no LOC and patient did not strike her head  Since then, nobody at the facility has noticed anything wrong with patient  Patient states that she feels fine  She is not in any pain  She has no complaints today  Patient hx limited secondary to cognitive disability  The following portions of the patient's history were reviewed and updated as appropriate: allergies, current medications, past family history, past medical history, past social history, past surgical history and problem list     Review of Systems   Constitutional: Negative  HENT: Negative  Eyes: Negative  Respiratory: Negative  Cardiovascular: Negative  Gastrointestinal: Negative  Genitourinary: Negative  Musculoskeletal: Negative for back pain, gait problem, neck pain and neck stiffness  Skin: Negative  Negative for color change and wound  Neurological: Negative  Negative for dizziness and headaches  All other systems reviewed and are negative          Objective:      /80 (BP Location: Left arm, Patient Position: Sitting, Cuff Size: Large)   Pulse 104   Temp 98 4 °F (36 9 °C) (Tympanic)   Resp 18   Ht 4' 9" (1 448 m)   Wt 97 kg (213 lb 12 8 oz)   SpO2 97%   BMI 46 27 kg/m²          Physical Exam  Vitals signs and nursing note reviewed  Constitutional:       General: She is not in acute distress  Appearance: Normal appearance  She is obese  She is not ill-appearing, toxic-appearing or diaphoretic  Comments: Uses walker   HENT:      Head: Normocephalic and atraumatic  Right Ear: External ear normal       Left Ear: External ear normal       Nose: Nose normal    Eyes:      General: No scleral icterus  Right eye: No discharge  Left eye: No discharge  Neck:      Musculoskeletal: Normal range of motion and neck supple  Cardiovascular:      Rate and Rhythm: Regular rhythm  Tachycardia present  Pulses: Normal pulses  Heart sounds: Normal heart sounds  Pulmonary:      Effort: Pulmonary effort is normal  No respiratory distress  Breath sounds: Normal breath sounds  No wheezing  Musculoskeletal: Normal range of motion  General: No deformity or signs of injury  Skin:     General: Skin is warm and dry  Coloration: Skin is not jaundiced or pale  Findings: No erythema or rash  Neurological:      General: No focal deficit present  Mental Status: She is alert  Mental status is at baseline  Cranial Nerves: No cranial nerve deficit  Comments: At baseline per caretaker   Psychiatric:         Mood and Affect: Mood normal          Behavior: Behavior normal       Comments:  At baseline per caretaker

## 2021-03-09 NOTE — ASSESSMENT & PLAN NOTE
· Patient had a witnessed fall on Sunday  · No LOC, patient has been acting normally since  · Only reason for visit today is to have paperwork saying she was evaluated signed  · PE unremarkable  · No imaging or testing necessary at this time

## 2021-03-16 DIAGNOSIS — R50.9 FEVER, UNSPECIFIED FEVER CAUSE: ICD-10-CM

## 2021-03-16 RX ORDER — ACETAMINOPHEN 500 MG
500 TABLET ORAL EVERY 6 HOURS PRN
Qty: 30 TABLET | Refills: 5 | Status: SHIPPED | OUTPATIENT
Start: 2021-03-16 | End: 2022-03-14 | Stop reason: SDUPTHER

## 2021-03-22 ENCOUNTER — HOSPITAL ENCOUNTER (OUTPATIENT)
Dept: MAMMOGRAPHY | Facility: CLINIC | Age: 41
Discharge: HOME | End: 2021-03-22
Payer: MEDICARE

## 2021-03-22 VITALS — HEIGHT: 57 IN | WEIGHT: 213 LBS | BODY MASS INDEX: 45.95 KG/M2

## 2021-03-22 DIAGNOSIS — Z12.31 VISIT FOR SCREENING MAMMOGRAM: ICD-10-CM

## 2021-03-22 DIAGNOSIS — Z12.39 SCREENING FOR BREAST CANCER: ICD-10-CM

## 2021-03-22 PROCEDURE — 77063 BREAST TOMOSYNTHESIS BI: CPT

## 2021-03-22 PROCEDURE — 77067 SCR MAMMO BI INCL CAD: CPT

## 2021-03-30 ENCOUNTER — OFFICE VISIT (OUTPATIENT)
Dept: FAMILY MEDICINE CLINIC | Facility: CLINIC | Age: 41
End: 2021-03-30

## 2021-03-30 VITALS
SYSTOLIC BLOOD PRESSURE: 124 MMHG | WEIGHT: 214.6 LBS | BODY MASS INDEX: 43.26 KG/M2 | DIASTOLIC BLOOD PRESSURE: 76 MMHG | HEIGHT: 59 IN | TEMPERATURE: 98.1 F | RESPIRATION RATE: 16 BRPM | HEART RATE: 120 BPM | OXYGEN SATURATION: 98 %

## 2021-03-30 DIAGNOSIS — B37.2 CANDIDIASIS OF SKIN: ICD-10-CM

## 2021-03-30 DIAGNOSIS — K21.9 GASTROESOPHAGEAL REFLUX DISEASE WITHOUT ESOPHAGITIS: ICD-10-CM

## 2021-03-30 DIAGNOSIS — E55.9 VITAMIN D DEFICIENCY: ICD-10-CM

## 2021-03-30 DIAGNOSIS — R07.82 INTERCOSTAL PAIN: ICD-10-CM

## 2021-03-30 DIAGNOSIS — R00.2 PALPITATIONS: Primary | ICD-10-CM

## 2021-03-30 PROCEDURE — 99213 OFFICE O/P EST LOW 20 MIN: CPT | Performed by: FAMILY MEDICINE

## 2021-03-30 RX ORDER — NYSTATIN 100000 [USP'U]/G
POWDER TOPICAL 4 TIMES DAILY
Qty: 45 G | Refills: 2 | Status: SHIPPED | OUTPATIENT
Start: 2021-03-30 | End: 2021-09-27 | Stop reason: SDUPTHER

## 2021-03-30 RX ORDER — PANTOPRAZOLE SODIUM 20 MG/1
20 TABLET, DELAYED RELEASE ORAL 2 TIMES DAILY
Qty: 60 TABLET | Refills: 3 | Status: SHIPPED | OUTPATIENT
Start: 2021-03-30 | End: 2021-04-16 | Stop reason: SDUPTHER

## 2021-03-30 RX ORDER — MULTIVIT-MIN/FA/LYCOPEN/LUTEIN .4-300-25
1 TABLET ORAL DAILY
Qty: 90 TABLET | Refills: 0 | Status: SHIPPED | OUTPATIENT
Start: 2021-03-30 | End: 2021-07-13 | Stop reason: SDUPTHER

## 2021-03-30 RX ORDER — CHOLECALCIFEROL (VITAMIN D3) 25 MCG
2000 CAPSULE ORAL DAILY
Qty: 30 CAPSULE | Refills: 5 | Status: SHIPPED | OUTPATIENT
Start: 2021-03-30 | End: 2021-10-04

## 2021-03-30 NOTE — PROGRESS NOTES
Assessment/Plan:    Intercostal pain  Intermittent  -Multiple visits to ED due to chest pain  Last visit on 3/28/21  Troponin negative, CXR with sinus tachycardia and anterior infarcted noted on previous EKGs  -Chest pain believed to be MSK vs anxiety in the past  Instructed to take Tylenol and use of lidocaine patch   -Holter monitor ordered at this time  Palpitations    Sinus tachycardia  -On ED EKG showed sinus tachycardia with anterior infarct noted on previous EKGs  -Will order Holter monitor 48 hrs  If any abnormalities will consult cardiology    Pituitary microadenoma [facility])  -MRI brain/pituitary 6/24/2020: No interval change in the 6 to 7 mm round suprasellar lesion along the left margin of the pituitary stalk and superior margin of the pituitary gland  Stable dysmorphic appearance to the brain parenchyma with abnormalities in migration and sulcation  Stable ventricular size and ventricular shunt catheter position   -Recommendation was to follow up MRI 1 year after previous one  MRI order reprinted at this time         Problem List Items Addressed This Visit        Other    Intercostal pain     Intermittent  -Multiple visits to ED due to chest pain  Last visit on 3/28/21  Troponin negative, CXR with sinus tachycardia and anterior infarcted noted on previous EKGs  -Chest pain believed to be MSK vs anxiety in the past  Instructed to take Tylenol and use of lidocaine patch   -Holter monitor ordered at this time  Relevant Orders    Holter monitor - 48 hour    Palpitations - Primary       Sinus tachycardia  -On ED EKG showed sinus tachycardia with anterior infarct noted on previous EKGs  -Will order Holter monitor 48 hrs  If any abnormalities will consult cardiology         Relevant Orders    Holter monitor - 48 hour            Subjective:      Patient ID: Vivian Huffman is a 42 y o  female      HPI     Patient is a 43 yo F who presents to the office after ED evaluation for chest pain and SI  The following portions of the patient's history were reviewed and updated as appropriate: She  has a past medical history of ADD (attention deficit disorder), Anxiety, Astigmatism, Brain lesion, Calcium deficiency, Cellulitis of foot, right (6/4/2018), Cerebral palsy ([address] ), Chronic otitis media, Constipation, Depression, Dysphagia, Esophagitis, Esotropia, GERD (gastroesophageal reflux disease), Hiatal hernia, Hydrocephalus ([address] ), Impaired fasting glucose, Left nephrolithiasis (03/04/2019), Myopia, Oppositional defiant disorder, 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[address] ), Seizures ([address] ), Sensorineural hearing loss, Status post ventriculoatrial shunt placement, and Visual impairment  She  has a past surgical history that includes CSF shunt; Leg Surgery; EAR SURGERY; Nose surgery; pr esophagogastroduodenoscopy transoral diagnostic (N/A, 5/9/2019); Upper gastrointestinal endoscopy (05/2019); and Breast biopsy (Left)  She  reports that she has never smoked  She has never used smokeless tobacco  She reports that she does not drink alcohol or use drugs    Current Outpatient Medications   Medication Sig Dispense Refill    acetaminophen (TYLENOL) 500 mg tablet Take 1 tablet (500 mg total) by mouth every 6 (six) hours as needed for mild pain 30 tablet 5    aluminum-magnesium hydroxide-simethicone (ANTACID) 200-200-20 mg/5 mL suspension Take 15 mL by mouth every 4 (four) hours as needed for indigestion or heartburn 355 mL 5    amitriptyline (ELAVIL) 10 mg tablet Take 10 mg by mouth daily at bedtime      ARIPiprazole (ABILIFY) 20 MG tablet Take 1 tablet (20 mg total) by mouth daily at bedtime 90 tablet 2    bacitracin topical ointment 500 units/g topical ointment Cleanse site on nose with soap and water followed by bacitracin BID until healed then p r n  15 g 0    bismuth subsalicylate (PEPTO BISMOL) 524 mg/30 mL oral suspension Take 15 mL (262 mg total) by mouth every 6 (six) hours as needed for indigestion 360 mL 3    calcium carbonate (TUMS) 500 mg chewable tablet Chew 1 tablet (500 mg total) 3 (three) times a day as needed for heartburn 30 tablet 5    Cholecalciferol (Vitamin D-3) 25 MCG (1000 UT) CAPS Take 2 capsules (2,000 Units total) by mouth daily at 8am  30 capsule 5    dicyclomine (BENTYL) 20 mg tablet Take 1 tablet (20 mg total) by mouth every 6 (six) hours as needed (as needed) 120 tablet 2    Dyclonine-Glycerin (Cepacol Sore Throat Spray) 0 1-33 % LIQD Apply 1 spray to the mouth or throat 3 (three) times a day as needed (sorethroat) 118 mL 2    Emollient (CeraVe) CREA Apply topically 2 (two) times a day Apply topically 2 times to affected heel twice daily @8a-8p 453 g 5    escitalopram (LEXAPRO) 20 mg tablet Take 1 tablet (20 mg total) by mouth daily 90 tablet 2    fluticasone (FLONASE) 50 mcg/act nasal spray 1 spray into each nostril daily as needed for rhinitis (nasal congestion) At 8:00 AM 1 Bottle 5    guaiFENesin (ROBITUSSIN) 100 MG/5ML oral liquid Take 200 mg by mouth 3 (three) times a day as needed for cough      hydrOXYzine HCL (ATARAX) 25 mg tablet Take 1 tablet (25 mg total) by mouth 3 (three) times a day 30 tablet 2    ibuprofen (MOTRIN) 400 mg tablet Take 1 tablet (400 mg total) by mouth every 8 (eight) hours as needed for mild pain 30 tablet 5    lamoTRIgine (LaMICtal) 25 mg tablet Take 25 mg by mouth 2 (two) times a day       lidocaine (LIDODERM) 5 % Apply 1 patch topically daily Remove & Discard patch within 12 hours or as directed by MD 10 patch 0    LORazepam (ATIVAN) 0 5 mg tablet Take 0 25 mg by mouth 2 (two) times a day      lubiprostone (Amitiza) 24 mcg capsule Take 1 capsule (24 mcg total) by mouth daily with breakfast 60 capsule 5    magnesium hydroxide (GNP Milk of Magnesia) 400 mg/5 mL oral suspension Take 30 mL by mouth daily as needed for constipation If no BM in 3 days 355 mL 5    Mouthwashes (Listerine Antiseptic) LIQD Apply 1 application to the mouth or throat 2 (two) times a day (Patient taking differently: Swish and spit 5 mL 2 (two) times a day ) 500 mL 5    Multiple Vitamins-Minerals (CERTAVITE SENIOR/ANTIOXIDANT PO) CertaVite Senior      Multiple Vitamins-Minerals (CertaVite Senior/Antioxidant) TABS Take 1 tablet by mouth daily 30 tablet 5    norgestimate-ethinyl estradiol (ORTHO-CYCLEN) 0 25-35 MG-MCG per tablet Take 1 tablet by mouth daily 28 tablet 11    nystatin (MYCOSTATIN) powder Apply topically 4 (four) times a day 45 g 2    pantoprazole (PROTONIX) 20 mg tablet Take 1 tablet (20 mg total) by mouth 2 (two) times a day 60 tablet 3    phenol (Chloraseptic) 1 4 % mucosal liquid Apply 1 spray to the mouth or throat every 2 (two) hours as needed (for sore throat as needed) 236 mL 1    polyethylene glycol (MIRALAX) 17 g packet Take one packet daily as needed for no bowel movement in 24 hours 30 each 5    psyllium (METAMUCIL) 58 6 % packet Take 1 packet by mouth daily 30 packet 5    RA SUNSCREEN SPF50 LOTN Apply 15 minutes before sun exposure and every 2 hours 1 Bottle 5    senna-docusate sodium (Senexon-S) 8 6-50 mg per tablet Take 1 tablet by mouth daily at 8am  30 tablet 3    sodium chloride (OCEAN) 0 65 % nasal spray 1 spray into each nostril as needed (three times daily as needed for nasal congestion) 60 mL 1    zinc oxide (DESITIN) 13 % cream Apply 1 application topically as needed (for perianal irritation) 1 Tube 5     No current facility-administered medications for this visit  She has No Known Allergies       Review of Systems   Constitutional: Negative for fever  Respiratory: Negative for cough and shortness of breath  Cardiovascular: Positive for palpitations  Negative for chest pain  Neurological: Negative for light-headedness and headaches  Psychiatric/Behavioral: The patient is not nervous/anxious            Objective:      /76 (BP Location: Left arm, Patient Position: Sitting, Cuff Size: Extra-Large)   Pulse (!) 120   Temp 98 1 °F (36 7 °C) (Temporal)   Resp 16   Ht 4' 11" (1 499 m)   Wt 97 3 kg (214 lb 9 6 oz)   SpO2 98%   BMI 43 34 kg/m²          Physical Exam  Constitutional:       General: She is not in acute distress  Appearance: She is not ill-appearing  Cardiovascular:      Rate and Rhythm: Regular rhythm  Tachycardia present  Pulses: Normal pulses  Heart sounds: Normal heart sounds  No murmur  Pulmonary:      Effort: Pulmonary effort is normal  No respiratory distress  Breath sounds: Normal breath sounds  No wheezing  Abdominal:      General: Abdomen is flat  There is no distension  Tenderness: There is no abdominal tenderness  Skin:     General: Skin is warm  Capillary Refill: Capillary refill takes less than 2 seconds  Neurological:      Mental Status: She is alert and oriented to person, place, and time  Mental status is at baseline     Psychiatric:         Mood and Affect: Mood normal

## 2021-03-30 NOTE — ASSESSMENT & PLAN NOTE
Intermittent  -Multiple visits to ED due to chest pain  Last visit on 3/28/21  Troponin negative, CXR with sinus tachycardia and anterior infarcted noted on previous EKGs  -Chest pain believed to be MSK vs anxiety in the past  Instructed to take Tylenol and use of lidocaine patch   -Holter monitor ordered at this time

## 2021-03-30 NOTE — ASSESSMENT & PLAN NOTE
-MRI brain/pituitary 6/24/2020: No interval change in the 6 to 7 mm round suprasellar lesion along the left margin of the pituitary stalk and superior margin of the pituitary gland  Stable dysmorphic appearance to the brain parenchyma with abnormalities in migration and sulcation  Stable ventricular size and ventricular shunt catheter position   -Recommendation was to follow up MRI 1 year after previous one   MRI order reprinted at this time

## 2021-03-30 NOTE — ASSESSMENT & PLAN NOTE
Sinus tachycardia  -On ED EKG showed sinus tachycardia with anterior infarct noted on previous EKGs  -Will order Holter monitor 48 hrs   If any abnormalities will consult cardiology

## 2021-04-04 ENCOUNTER — APPOINTMENT (EMERGENCY)
Dept: RADIOLOGY | Facility: HOSPITAL | Age: 41
End: 2021-04-04
Payer: MEDICARE

## 2021-04-04 ENCOUNTER — APPOINTMENT (EMERGENCY)
Dept: CT IMAGING | Facility: HOSPITAL | Age: 41
End: 2021-04-04
Payer: MEDICARE

## 2021-04-04 ENCOUNTER — HOSPITAL ENCOUNTER (EMERGENCY)
Facility: HOSPITAL | Age: 41
Discharge: HOME | End: 2021-04-04
Attending: EMERGENCY MEDICINE
Payer: MEDICARE

## 2021-04-04 VITALS
TEMPERATURE: 98 F | OXYGEN SATURATION: 95 % | DIASTOLIC BLOOD PRESSURE: 67 MMHG | HEART RATE: 100 BPM | RESPIRATION RATE: 18 BRPM | SYSTOLIC BLOOD PRESSURE: 148 MMHG

## 2021-04-04 DIAGNOSIS — R07.9 CHEST PAIN: Primary | ICD-10-CM

## 2021-04-04 DIAGNOSIS — K44.9 HIATAL HERNIA: ICD-10-CM

## 2021-04-04 LAB
ANION GAP SERPL CALCULATED.3IONS-SCNC: 10 MMOL/L (ref 4–13)
ATRIAL RATE: 112 BPM
BASOPHILS # BLD AUTO: 0.04 THOUSANDS/ΜL (ref 0–0.1)
BASOPHILS NFR BLD AUTO: 0 % (ref 0–1)
BUN SERPL-MCNC: 10 MG/DL (ref 5–25)
CALCIUM SERPL-MCNC: 8.3 MG/DL (ref 8.3–10.1)
CHLORIDE SERPL-SCNC: 105 MMOL/L (ref 100–108)
CO2 SERPL-SCNC: 27 MMOL/L (ref 21–32)
CREAT SERPL-MCNC: 0.99 MG/DL (ref 0.6–1.3)
D DIMER PPP FEU-MCNC: 0.86 UG/ML FEU
EOSINOPHIL # BLD AUTO: 0.17 THOUSAND/ΜL (ref 0–0.61)
EOSINOPHIL NFR BLD AUTO: 2 % (ref 0–6)
ERYTHROCYTE [DISTWIDTH] IN BLOOD BY AUTOMATED COUNT: 14 % (ref 11.6–15.1)
GFR SERPL CREATININE-BSD FRML MDRD: 71 ML/MIN/1.73SQ M
GLUCOSE SERPL-MCNC: 89 MG/DL (ref 65–140)
HCT VFR BLD AUTO: 40.9 % (ref 34.8–46.1)
HGB BLD-MCNC: 13.4 G/DL (ref 11.5–15.4)
IMM GRANULOCYTES # BLD AUTO: 0.05 THOUSAND/UL (ref 0–0.2)
IMM GRANULOCYTES NFR BLD AUTO: 1 % (ref 0–2)
LYMPHOCYTES # BLD AUTO: 2.72 THOUSANDS/ΜL (ref 0.6–4.47)
LYMPHOCYTES NFR BLD AUTO: 28 % (ref 14–44)
MCH RBC QN AUTO: 30.2 PG (ref 26.8–34.3)
MCHC RBC AUTO-ENTMCNC: 32.8 G/DL (ref 31.4–37.4)
MCV RBC AUTO: 92 FL (ref 82–98)
MONOCYTES # BLD AUTO: 0.75 THOUSAND/ΜL (ref 0.17–1.22)
MONOCYTES NFR BLD AUTO: 8 % (ref 4–12)
NEUTROPHILS # BLD AUTO: 5.93 THOUSANDS/ΜL (ref 1.85–7.62)
NEUTS SEG NFR BLD AUTO: 61 % (ref 43–75)
NRBC BLD AUTO-RTO: 0 /100 WBCS
P AXIS: 51 DEGREES
PLATELET # BLD AUTO: 289 THOUSANDS/UL (ref 149–390)
PMV BLD AUTO: 9 FL (ref 8.9–12.7)
POTASSIUM SERPL-SCNC: 3.7 MMOL/L (ref 3.5–5.3)
PR INTERVAL: 128 MS
QRS AXIS: 38 DEGREES
QRSD INTERVAL: 68 MS
QT INTERVAL: 324 MS
QTC INTERVAL: 442 MS
RBC # BLD AUTO: 4.44 MILLION/UL (ref 3.81–5.12)
SODIUM SERPL-SCNC: 142 MMOL/L (ref 136–145)
T WAVE AXIS: 55 DEGREES
TROPONIN I SERPL-MCNC: <0.02 NG/ML
TROPONIN I SERPL-MCNC: <0.02 NG/ML
VENTRICULAR RATE: 112 BPM
WBC # BLD AUTO: 9.66 THOUSAND/UL (ref 4.31–10.16)

## 2021-04-04 PROCEDURE — 71045 X-RAY EXAM CHEST 1 VIEW: CPT

## 2021-04-04 PROCEDURE — 71275 CT ANGIOGRAPHY CHEST: CPT

## 2021-04-04 PROCEDURE — 93010 ELECTROCARDIOGRAM REPORT: CPT | Performed by: INTERNAL MEDICINE

## 2021-04-04 PROCEDURE — 80048 BASIC METABOLIC PNL TOTAL CA: CPT | Performed by: EMERGENCY MEDICINE

## 2021-04-04 PROCEDURE — 99285 EMERGENCY DEPT VISIT HI MDM: CPT

## 2021-04-04 PROCEDURE — 85025 COMPLETE CBC W/AUTO DIFF WBC: CPT | Performed by: EMERGENCY MEDICINE

## 2021-04-04 PROCEDURE — 99285 EMERGENCY DEPT VISIT HI MDM: CPT | Performed by: EMERGENCY MEDICINE

## 2021-04-04 PROCEDURE — 36415 COLL VENOUS BLD VENIPUNCTURE: CPT | Performed by: EMERGENCY MEDICINE

## 2021-04-04 PROCEDURE — 93005 ELECTROCARDIOGRAM TRACING: CPT

## 2021-04-04 PROCEDURE — 85379 FIBRIN DEGRADATION QUANT: CPT | Performed by: EMERGENCY MEDICINE

## 2021-04-04 PROCEDURE — 84484 ASSAY OF TROPONIN QUANT: CPT | Performed by: EMERGENCY MEDICINE

## 2021-04-04 RX ADMIN — IOHEXOL 100 ML: 350 INJECTION, SOLUTION INTRAVENOUS at 20:12

## 2021-04-04 NOTE — ED PROVIDER NOTES
History  Chief Complaint   Patient presents with    Anxiety     patient states started having increased anxiety  chest pain, abd pain, back pain  patient from group home       History provided by:  Patient, EMS personnel and caregiver   used: No    Anxiety  Presenting symptoms: no agitation    Associated symptoms: chest pain    Associated symptoms: no abdominal pain and no headaches    Chest Pain  Pain location:  R chest  Pain quality: aching    Pain radiates to:  Does not radiate  Pain radiates to the back: no    Pain severity:  Moderate  Onset quality:  Gradual  Duration:  1 hour  Timing:  Intermittent  Progression:  Waxing and waning  Chronicity:  New  Context: at rest    Relieved by:  Nothing  Worsened by:  Nothing tried  Ineffective treatments:  None tried  Associated symptoms: anxiety    Associated symptoms: no abdominal pain, no back pain, no cough, no dizziness, no dysphagia, no fever, no headache, no lower extremity edema, no nausea, no shortness of breath and not vomiting    Risk factors comment:  Cerebral palsy, Generalized anxiety disorderr      Prior to Admission Medications   Prescriptions Last Dose Informant Patient Reported? Taking?    ARIPiprazole (ABILIFY) 20 MG tablet  Care Giver No Yes   Sig: Take 1 tablet (20 mg total) by mouth daily at bedtime   Cholecalciferol (Vitamin D-3) 25 MCG (1000 UT) CAPS   No No   Sig: Take 2 capsules (2,000 Units total) by mouth daily at 8am    Dyclonine-Glycerin (Cepacol Sore Throat Bimble) 0 1-33 % LIQD  Care Giver No No   Sig: Apply 1 spray to the mouth or throat 3 (three) times a day as needed (sorethroat)   Emollient (CeraVe) CREA  Care Giver No No   Sig: Apply topically 2 (two) times a day Apply topically 2 times to affected heel twice daily @8a-8p   LORazepam (ATIVAN) 0 5 mg tablet  Care Giver Yes Yes   Sig: Take 0 25 mg by mouth 2 (two) times a day   Mouthwashes (Listerine Antiseptic) LIQD  Care Giver No No   Sig: Apply 1 application to the mouth or throat 2 (two) times a day   Patient taking differently: Swish and spit 5 mL 2 (two) times a day    Multiple Vitamins-Minerals (CertaVite Senior/Antioxidant) TABS   No Yes   Sig: Take 1 tablet by mouth daily   RA SUNSCREEN SPF50 LOTN  Care Giver No No   Sig: Apply 15 minutes before sun exposure and every 2 hours   acetaminophen (TYLENOL) 500 mg tablet   No No   Sig: Take 1 tablet (500 mg total) by mouth every 6 (six) hours as needed for mild pain   aluminum-magnesium hydroxide-simethicone (ANTACID) 200-200-20 mg/5 mL suspension  Care Giver No No   Sig: Take 15 mL by mouth every 4 (four) hours as needed for indigestion or heartburn   amitriptyline (ELAVIL) 10 mg tablet  Care Giver Yes Yes   Sig: Take 10 mg by mouth daily at bedtime   bacitracin topical ointment 500 units/g topical ointment  Care Giver No No   Sig: Cleanse site on nose with soap and water followed by bacitracin BID until healed then p r n    bismuth subsalicylate (PEPTO BISMOL) 524 mg/30 mL oral suspension  Care Giver No No   Sig: Take 15 mL (262 mg total) by mouth every 6 (six) hours as needed for indigestion   calcium carbonate (TUMS) 500 mg chewable tablet  Care Giver No No   Sig: Chew 1 tablet (500 mg total) 3 (three) times a day as needed for heartburn   dicyclomine (BENTYL) 20 mg tablet  Care Giver No No   Sig: Take 1 tablet (20 mg total) by mouth every 6 (six) hours as needed (as needed)   escitalopram (LEXAPRO) 20 mg tablet  Care Giver No Yes   Sig: Take 1 tablet (20 mg total) by mouth daily   fluticasone (FLONASE) 50 mcg/act nasal spray  Care Giver No No   Si spray into each nostril daily as needed for rhinitis (nasal congestion) At 8:00 AM   guaiFENesin (ROBITUSSIN) 100 MG/5ML oral liquid  Care Giver Yes No   Sig: Take 200 mg by mouth 3 (three) times a day as needed for cough   hydrOXYzine HCL (ATARAX) 25 mg tablet  Care Giver No Yes   Sig: Take 1 tablet (25 mg total) by mouth 3 (three) times a day   ibuprofen (MOTRIN) 400 mg tablet  Care Giver No No   Sig: Take 1 tablet (400 mg total) by mouth every 8 (eight) hours as needed for mild pain   lamoTRIgine (LaMICtal) 25 mg tablet  Care Giver Yes No   Sig: Take 25 mg by mouth 2 (two) times a day    lidocaine (LIDODERM) 5 %  Care Giver No No   Sig: Apply 1 patch topically daily Remove & Discard patch within 12 hours or as directed by MD   lubiprostone (Amitiza) 24 mcg capsule  Care Giver No Yes   Sig: Take 1 capsule (24 mcg total) by mouth daily with breakfast   magnesium hydroxide (GNP Milk of Magnesia) 400 mg/5 mL oral suspension  Care Giver No No   Sig: Take 30 mL by mouth daily as needed for constipation If no BM in 3 days   norgestimate-ethinyl estradiol (ORTHO-CYCLEN) 0 25-35 MG-MCG per tablet  Care Giver No No   Sig: Take 1 tablet by mouth daily   nystatin (MYCOSTATIN) powder   No Yes   Sig: Apply topically 4 (four) times a day   pantoprazole (PROTONIX) 20 mg tablet   No Yes   Sig: Take 1 tablet (20 mg total) by mouth 2 (two) times a day   phenol (Chloraseptic) 1 4 % mucosal liquid  Care Giver No No   Sig: Apply 1 spray to the mouth or throat every 2 (two) hours as needed (for sore throat as needed)   polyethylene glycol (MIRALAX) 17 g packet  Care Giver No Yes   Sig: Take one packet daily as needed for no bowel movement in 24 hours   psyllium (METAMUCIL) 58 6 % packet  Care Giver No Yes   Sig: Take 1 packet by mouth daily   senna-docusate sodium (Senexon-S) 8 6-50 mg per tablet  Care Giver No Yes   Sig: Take 1 tablet by mouth daily at 8am    sodium chloride (OCEAN) 0 65 % nasal spray  Care Giver No No   Si spray into each nostril as needed (three times daily as needed for nasal congestion)   zinc oxide (DESITIN) 13 % cream  Care Giver No No   Sig: Apply 1 application topically as needed (for perianal irritation)      Facility-Administered Medications: None       Past Medical History:   Diagnosis Date    ADD (attention deficit disorder)     Anxiety     Astigmatism     Brain lesion     Calcium deficiency     Cellulitis of foot, right 6/4/2018    Cerebral palsy (HCC)     Chronic otitis media     Constipation     Depression     Last Assessed:7/6/2016    Dysphagia     Esophagitis     Esotropia     GERD (gastroesophageal reflux disease)     Hiatal hernia     Hydrocephalus (HCC)     Impaired fasting glucose     Left nephrolithiasis 03/04/2019    Myopia     Oppositional defiant disorder     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HCC)     Seizures (HCC)     Sensorineural hearing loss     Status post ventriculoatrial shunt placement     Visual impairment        Past Surgical History:   Procedure Laterality Date    BREAST BIOPSY Left     X 2 (not sure of years)    CSF SHUNT      Creation of Ventriculo-Peritoneal CSF shunt ; Last Assessed:7/6/2016    EAR SURGERY      Last Assessed:7/6/2016    LEG SURGERY      due to CP     NOSE SURGERY      Last Assessed:7/6/2016    [place] ESOPHAGOGASTRODUODENOSCOPY TRANSORAL DIAGNOSTIC N/A 5/9/2019    Procedure: ESOPHAGOGASTRODUODENOSCOPY (EGD) with biopsy;  Surgeon: Myra Huff MD;  Location: [facility]; Service: Gastroenterology    UPPER GASTROINTESTINAL ENDOSCOPY  05/2019       Family History   Problem Relation Age of Onset    Diabetes Mother     Colon cancer Father     No Known Problems Maternal Grandmother     No Known Problems Maternal Grandfather     No Known Problems Paternal Grandmother     No Known Problems Paternal Grandfather      I have reviewed and agree with the history as documented  E-Cigarette/Vaping    E-Cigarette Use Never User      E-Cigarette/Vaping Substances    Nicotine No     THC No     CBD No     Flavoring No      Social History     Tobacco Use    Smoking status: Never Smoker    Smokeless tobacco: Never Used   Substance Use Topics    Alcohol use: Never     Frequency: Never    Drug use: Never       Review of Systems   Constitutional: Negative for chills and fever     HENT: Negative for facial swelling, sore throat and trouble swallowing  Eyes: Negative for pain and visual disturbance  Respiratory: Negative for cough and shortness of breath  Cardiovascular: Positive for chest pain  Negative for leg swelling  Gastrointestinal: Negative for abdominal pain, diarrhea, nausea and vomiting  Genitourinary: Negative for dysuria and flank pain  Musculoskeletal: Negative for back pain, neck pain and neck stiffness  Skin: Negative for pallor and rash  Allergic/Immunologic: Negative for environmental allergies and immunocompromised state  Neurological: Negative for dizziness and headaches  Hematological: Negative for adenopathy  Does not bruise/bleed easily  Psychiatric/Behavioral: Negative for agitation and behavioral problems  All other systems reviewed and are negative  Physical Exam  Physical Exam  Vitals signs and nursing note reviewed  Constitutional:       General: She is not in acute distress  Appearance: She is well-developed  HENT:      Head: Normocephalic and atraumatic  Mouth/Throat:      Mouth: Mucous membranes are moist    Eyes:      Extraocular Movements: Extraocular movements intact  Neck:      Musculoskeletal: Normal range of motion and neck supple  Cardiovascular:      Rate and Rhythm: Regular rhythm  Tachycardia present  Pulmonary:      Effort: Pulmonary effort is normal  No respiratory distress  Abdominal:      General: Bowel sounds are normal       Palpations: Abdomen is soft  Tenderness: There is no abdominal tenderness  There is no guarding or rebound  Musculoskeletal: Normal range of motion  Skin:     General: Skin is warm and dry  Neurological:      General: No focal deficit present  Mental Status: She is alert  Mental status is at baseline           Vital Signs  ED Triage Vitals   Temperature Pulse Respirations Blood Pressure SpO2   04/04/21 1909 04/04/21 1728 04/04/21 1728 04/04/21 1728 04/04/21 1728   98 °F (36 7 °C) (!) 112 18 125/71 95 %      Temp src Heart Rate Source Patient Position - Orthostatic VS BP Location FiO2 (%)   -- 04/04/21 2130 04/04/21 2130 04/04/21 2130 --    Monitor Lying Right arm       Pain Score       --                  Vitals:    04/04/21 1728 04/04/21 2130   BP: 125/71 148/67   Pulse: (!) 112 100   Patient Position - Orthostatic VS:  Lying         Visual Acuity      ED Medications  Medications   iohexol (OMNIPAQUE) 350 MG/ML injection (SINGLE-DOSE) 100 mL (100 mL Intravenous Given 4/4/21 2012)       Diagnostic Studies  Results Reviewed     Procedure Component Value Units Date/Time    Troponin I repeat in 3hrs [[id number]]  (Normal) Collected: 04/04/21 2129    Lab Status: Final result Specimen: Blood from Arm, Left Updated: 04/04/21 2153     Troponin I <0 02 ng/mL     Troponin I [[id number]]  (Normal) Collected: 04/04/21 1823    Lab Status: Final result Specimen: Blood from Arm, Left Updated: 04/04/21 1857     Troponin I <0 02 ng/mL     D-Dimer [[id number]]  (Abnormal) Collected: 04/04/21 1824    Lab Status: Final result Specimen: Blood from Arm, Left Updated: 04/04/21 1847     D-Dimer, Quant 0 86 ug/ml [facility]     Basic metabolic panel [[id number]] Collected: 04/04/21 1823    Lab Status: Final result Specimen: Blood from Arm, Left Updated: 04/04/21 1847     Sodium 142 mmol/L      Potassium 3 7 mmol/L      Chloride 105 mmol/L      CO2 27 mmol/L      ANION GAP 10 mmol/L      BUN 10 mg/dL      Creatinine 0 99 mg/dL      Glucose 89 mg/dL      Calcium 8 3 mg/dL      eGFR 71 ml/min/1 73sq m     Narrative:      Raquel guidelines for Chronic Kidney Disease (CKD):     Stage 1 with normal or high GFR (GFR > 90 mL/min/1 73 square meters)    Stage 2 Mild CKD (GFR = 60-89 mL/min/1 73 square meters)    Stage 3A Moderate CKD (GFR = 45-59 mL/min/1 73 square meters)    Stage 3B Moderate CKD (GFR = 30-44 mL/min/1 73 square meters)    Stage 4 Severe CKD (GFR = 15-29 mL/min/1 73 square meters)    Stage 5 End Stage CKD (GFR <15 mL/min/1 73 square meters)  Note: GFR calculation is accurate only with a steady state creatinine    CBC and differential [[id number]] Collected: 04/04/21 1823    Lab Status: Final result Specimen: Blood from Arm, Left Updated: 04/04/21 1831     WBC 9 66 Thousand/uL      RBC 4 44 Million/uL      Hemoglobin 13 4 g/dL      Hematocrit 40 9 %      MCV 92 fL      MCH 30 2 pg      MCHC 32 8 g/dL      RDW 14 0 %      MPV 9 0 fL      Platelets 984 Thousands/uL      nRBC 0 /100 WBCs      Neutrophils Relative 61 %      Immat GRANS % 1 %      Lymphocytes Relative 28 %      Monocytes Relative 8 %      Eosinophils Relative 2 %      Basophils Relative 0 %      Neutrophils Absolute 5 93 Thousands/µL      Immature Grans Absolute 0 05 Thousand/uL      Lymphocytes Absolute 2 72 Thousands/µL      Monocytes Absolute 0 75 Thousand/µL      Eosinophils Absolute 0 17 Thousand/µL      Basophils Absolute 0 04 Thousands/µL                  CTA ED chest PE study   Final Result by Josie Fernandez MD (04/04 2024)      No pulmonary embolus is seen  Large hiatal hernia noted  No mediastinal or hilar lymphadenopathy              Workstation performed: [id number]         XR chest 1 view portable    (Results Pending)              Procedures  ECG 12 Lead Documentation Only    Date/Time: 4/4/2021 6:42 PM  Performed by: Inocencio Jovel MD  Authorized by: Inocencio Jovel MD     Indications / Diagnosis:  Chest pain  ECG reviewed by me, the ED Provider: yes    Patient location:  ED  Interpretation:     Interpretation: normal    Rate:     ECG rate:  113    ECG rate assessment: tachycardic    Rhythm:     Rhythm: sinus tachycardia    Ectopy:     Ectopy: none    QRS:     QRS axis:  Normal  Conduction:     Conduction: normal    ST segments:     ST segments:  Normal  T waves:     T waves: normal               ED Course  ED Course as of Apr 04 2239   Sun Apr 04, 2021   1754 Tried calling the telephone numbers for family and Group Home contact under demographics, no response; message left on [facility] contact number  [facility] Staff arrived in ER, states that patient c/o chest poin around 5 pm for which 911 was called  1912 WBC: 9 66   1912 Hemoglobin: 13 4   1912 Platelet Count: [address] [place] Sodium: 142   1912 Potassium: 3 7   1912 BUN: 10   1912 Creatinine: 0 99   1912 Troponin I: <0 02   1912 Labs reviewed, elevated d-dimer noted, we will get CTP PE Study  D-Dimer, Quant(!): 0 86   2026 CT scan negative for pulmonary embolism, large hiatal hernia reported  2138 EKG repeated, reviewed, no significant acute abnormality, tachycardia improved  2155 Delta troponin negative  Troponin I: <0 02   2156 We will discharge, caregiver informed about the hiatal hernia, advise follow-up with PCP  Note: Advised follow up with PCP and GI  SBIRT 22yo+      Most Recent Value   SBIRT (22 yo +)   In order to provide better care to our patients, we are screening all of our patients for alcohol and drug use  Would it be okay to ask you these screening questions?   No Filed at: 04/04/2021 1746          Wells' Criteria for PE      Most Recent Value   Wells' Criteria for PE   Clinical signs and symptoms of DVT  0 Filed at: 04/04/2021 1926   PE is primary diagnosis or equally likely  3 Filed at: 04/04/2021 1926   HR >100  1 5 Filed at: 04/04/2021 1926   Immobilization at least 3 days or Surgery in the previous 4 weeks  1 5 Filed at: 04/04/2021 1926   Previous, objectively diagnosed PE or DVT  0 Filed at: 04/04/2021 1926   Hemoptysis  0 Filed at: 04/04/2021 1926   Malignancy with treatment within 6 months or palliative  0 Filed at: 04/04/2021 Aziza Borges' Criteria Total  6 Filed at: 04/04/2021 1926                MDM  Number of Diagnoses or Management Options  Chest pain: new and requires workup  Hiatal hernia:   Diagnosis management comments: D/D: Non-specific chest pain, hx of anxiety, tachycardia, possible ACS, PE, we will check labs including CBC, BMP, Troponin, D-dimer, CXR  Amount and/or Complexity of Data Reviewed  Clinical lab tests: reviewed and ordered  Tests in the radiology section of CPT®: ordered and reviewed  Tests in the medicine section of CPT®: ordered and reviewed  Obtain history from someone other than the patient: yes (Group Home Staff present in ER)  Independent visualization of images, tracings, or specimens: yes        Disposition  Final diagnoses:   Chest pain   Hiatal hernia     Time reflects when diagnosis was documented in both MDM as applicable and the Disposition within this note     Time User Action Codes Description Comment    4/4/2021  6:44 PM Adelita Soledad Add [R07 9] Chest pain     4/4/2021  9:56 PM Adelita Soledad Add [K44 9] Hiatal hernia       ED Disposition     ED Disposition Condition Date/Time Comment    Discharge Stable Sun Apr 4, 2021  9:56 PM [facility] discharge to home/self care              Follow-up Information     Follow up With Specialties Details Why Contact Info Additional [address] [facility] Schedule an appointment as soon as possible for a visit   [address], [address] [facility] [address]  [address], [address], [address] [place], [place], [address]    [facility] Schedule an appointment as soon as possible for a visit   [address] [facility]  [address] [address]  Raúl Duncan Huff [address] [facility] consuelo, [address] [facility], [address] [place] [address], [address] [phone]          Discharge Medication List as of 4/4/2021  9:57 PM      CONTINUE these medications which have NOT CHANGED    Details   amitriptyline (ELAVIL) 10 mg tablet Take 10 mg by mouth daily at bedtime, Historical Med      ARIPiprazole (ABILIFY) 20 MG tablet Take 1 tablet (20 mg total) by mouth daily at bedtime, Starting Thu 10/15/2020, Normal      escitalopram (LEXAPRO) 20 mg tablet Take 1 tablet (20 mg total) by mouth daily, Starting Thu 10/15/2020, Normal      hydrOXYzine HCL (ATARAX) 25 mg tablet Take 1 tablet (25 mg total) by mouth 3 (three) times a day, Starting Tue 12/29/2020, Normal      LORazepam (ATIVAN) 0 5 mg tablet Take 0 25 mg by mouth 2 (two) times a day, Historical Med      lubiprostone (Amitiza) 24 mcg capsule Take 1 capsule (24 mcg total) by mouth daily with breakfast, Starting Fri 10/30/2020, Print      Multiple Vitamins-Minerals (CertaVite Senior/Antioxidant) TABS Take 1 tablet by mouth daily, Starting Tue 3/30/2021, Normal      nystatin (MYCOSTATIN) powder Apply topically 4 (four) times a day, Starting Tue 3/30/2021, Normal      pantoprazole (PROTONIX) 20 mg tablet Take 1 tablet (20 mg total) by mouth 2 (two) times a day, Starting Tue 3/30/2021, Print      polyethylene glycol (MIRALAX) 17 g packet Take one packet daily as needed for no bowel movement in 24 hours, Normal      psyllium (METAMUCIL) 58 6 % packet Take 1 packet by mouth daily, Starting Mon 1/11/2021, Normal      senna-docusate sodium (Senexon-S) 8 6-50 mg per tablet Take 1 tablet by mouth daily at 8am , Starting Tue 12/29/2020, Normal      acetaminophen (TYLENOL) 500 mg tablet Take 1 tablet (500 mg total) by mouth every 6 (six) hours as needed for mild pain, Starting Tue 3/16/2021, Normal      aluminum-magnesium hydroxide-simethicone (ANTACID) 200-200-20 mg/5 mL suspension Take 15 mL by mouth every 4 (four) hours as needed for indigestion or heartburn, Starting Fri 2/7/2020, Normal      bacitracin topical ointment 500 units/g topical ointment Cleanse site on nose with soap and water followed by bacitracin BID until healed then p r n , Normal      bismuth subsalicylate (PEPTO BISMOL) 524 mg/30 mL oral suspension Take 15 mL (262 mg total) by mouth every 6 (six) hours as needed for indigestion, Starting Wed 7/29/2020, Normal      calcium carbonate (TUMS) 500 mg chewable tablet Chew 1 tablet (500 mg total) 3 (three) times a day as needed for heartburn, Starting Wed 9/16/2020, Normal      Cholecalciferol (Vitamin D-3) 25 MCG (1000 UT) CAPS Take 2 capsules (2,000 Units total) by mouth daily at 8am , Starting Tue 3/30/2021, Normal      dicyclomine (BENTYL) 20 mg tablet Take 1 tablet (20 mg total) by mouth every 6 (six) hours as needed (as needed), Starting Mon 1/18/2021, Print      Dyclonine-Glycerin (Cepacol Sore Throat Spray) 0 1-33 % LIQD Apply 1 spray to the mouth or throat 3 (three) times a day as needed (sorethroat), Starting Fri 11/13/2020, Normal      Emollient (CeraVe) CREA Apply topically 2 (two) times a day Apply topically 2 times to affected heel twice daily @8a-8p, Starting Wed 12/9/2020, Normal      fluticasone (FLONASE) 50 mcg/act nasal spray 1 spray into each nostril daily as needed for rhinitis (nasal congestion) At 8:00 AM, Starting Fri 6/12/2020, Normal      guaiFENesin (ROBITUSSIN) 100 MG/5ML oral liquid Take 200 mg by mouth 3 (three) times a day as needed for cough, Historical Med      ibuprofen (MOTRIN) 400 mg tablet Take 1 tablet (400 mg total) by mouth every 8 (eight) hours as needed for mild pain, Starting Fri 6/12/2020, Normal      lamoTRIgine (LaMICtal) 25 mg tablet Take 25 mg by mouth 2 (two) times a day , Starting Tue 8/11/2020, Historical Med      lidocaine (LIDODERM) 5 % Apply 1 patch topically daily Remove & Discard patch within 12 hours or as directed by MD, Starting Fri 11/13/2020, Normal      magnesium hydroxide (GNP Milk of Magnesia) 400 mg/5 mL oral suspension Take 30 mL by mouth daily as needed for constipation If no BM in 3 days, Starting Wed 9/16/2020, Normal      Mouthwashes (Listerine Antiseptic) LIQD Apply 1 application to the mouth or throat 2 (two) times a day, Starting Wed 9/16/2020, Normal      norgestimate-ethinyl estradiol (ORTHO-CYCLEN) 0 25-35 MG-MCG per tablet Take 1 tablet by mouth daily, Starting Thu 6/18/2020, Normal      phenol (Chloraseptic) 1 4 % mucosal liquid Apply 1 spray to the mouth or throat every 2 (two) hours as needed (for sore throat as needed), Starting Thu 10/15/2020, Print      RA SUNSCREEN SPF50 LOTN Apply 15 minutes before sun exposure and every 2 hours, Normal      sodium chloride (OCEAN) 0 65 % nasal spray 1 spray into each nostril as needed (three times daily as needed for nasal congestion), Starting Thu 10/15/2020, Normal      zinc oxide (DESITIN) 13 % cream Apply 1 application topically as needed (for perianal irritation), Starting Wed 9/16/2020, Normal           No discharge procedures on file      PDMP Review     None          ED Provider  Electronically Signed by           Mehdi Villatoro MD  04/04/21 6147

## 2021-04-04 NOTE — ED NOTES
RN attempted IV access x2, unable to obtain access, Nabeel Leon, TIANA at bedside with US       Javier Talbert RN  04/04/21 [id number]

## 2021-04-05 LAB
ATRIAL RATE: 102 BPM
P AXIS: 52 DEGREES
PR INTERVAL: 126 MS
QRS AXIS: 36 DEGREES
QRSD INTERVAL: 78 MS
QT INTERVAL: 336 MS
QTC INTERVAL: 437 MS
T WAVE AXIS: 52 DEGREES
VENTRICULAR RATE: 102 BPM

## 2021-04-05 PROCEDURE — 93010 ELECTROCARDIOGRAM REPORT: CPT | Performed by: INTERNAL MEDICINE

## 2021-04-07 ENCOUNTER — HOSPITAL ENCOUNTER (OUTPATIENT)
Dept: NON INVASIVE DIAGNOSTICS | Facility: HOSPITAL | Age: 41
Discharge: HOME | End: 2021-04-07
Payer: MEDICARE

## 2021-04-07 DIAGNOSIS — R00.2 PALPITATIONS: ICD-10-CM

## 2021-04-07 DIAGNOSIS — R07.82 INTERCOSTAL PAIN: ICD-10-CM

## 2021-04-07 PROCEDURE — 93225 XTRNL ECG REC<48 HRS REC: CPT

## 2021-04-07 PROCEDURE — 93226 XTRNL ECG REC<48 HR SCAN A/R: CPT

## 2021-04-08 ENCOUNTER — HOSPITAL ENCOUNTER (EMERGENCY)
Facility: HOSPITAL | Age: 41
Discharge: HOME | End: 2021-04-08
Attending: EMERGENCY MEDICINE
Payer: MEDICARE

## 2021-04-08 ENCOUNTER — HOSPITAL ENCOUNTER (EMERGENCY)
Facility: HOSPITAL | Age: 41
Discharge: HOME | End: 2021-04-08
Attending: EMERGENCY MEDICINE | Admitting: EMERGENCY MEDICINE
Payer: MEDICARE

## 2021-04-08 ENCOUNTER — APPOINTMENT (EMERGENCY)
Dept: RADIOLOGY | Facility: HOSPITAL | Age: 41
End: 2021-04-08
Payer: MEDICARE

## 2021-04-08 VITALS
HEART RATE: 108 BPM | OXYGEN SATURATION: 98 % | DIASTOLIC BLOOD PRESSURE: 91 MMHG | RESPIRATION RATE: 23 BRPM | SYSTOLIC BLOOD PRESSURE: 138 MMHG | TEMPERATURE: 98.7 F

## 2021-04-08 VITALS
HEART RATE: 103 BPM | RESPIRATION RATE: 18 BRPM | BODY MASS INDEX: 43.33 KG/M2 | WEIGHT: 214.51 LBS | SYSTOLIC BLOOD PRESSURE: 151 MMHG | TEMPERATURE: 97.3 F | OXYGEN SATURATION: 95 % | DIASTOLIC BLOOD PRESSURE: 70 MMHG

## 2021-04-08 DIAGNOSIS — M25.562 BILATERAL KNEE PAIN: ICD-10-CM

## 2021-04-08 DIAGNOSIS — R42 LIGHTHEADEDNESS: ICD-10-CM

## 2021-04-08 DIAGNOSIS — R07.9 CHEST PAIN: Primary | ICD-10-CM

## 2021-04-08 DIAGNOSIS — M25.561 BILATERAL KNEE PAIN: ICD-10-CM
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
## 2021-04-08 LAB
ALBUMIN SERPL BCP-MCNC: 3 G/DL (ref 3.5–5)
ALP SERPL-CCNC: 142 U/L (ref 46–116)
ALT SERPL W P-5'-P-CCNC: 19 U/L (ref 12–78)
ANION GAP SERPL CALCULATED.3IONS-SCNC: 6 MMOL/L (ref 4–13)
AST SERPL W P-5'-P-CCNC: 16 U/L (ref 5–45)
ATRIAL RATE: 100 BPM
ATRIAL RATE: 110 BPM
ATRIAL RATE: 113 BPM
BASOPHILS # BLD AUTO: 0.03 THOUSANDS/ΜL (ref 0–0.1)
BASOPHILS NFR BLD AUTO: 0 % (ref 0–1)
BILIRUB SERPL-MCNC: 0.18 MG/DL (ref 0.2–1)
BUN SERPL-MCNC: 9 MG/DL (ref 5–25)
CALCIUM ALBUM COR SERPL-MCNC: 8.5 MG/DL (ref 8.3–10.1)
CALCIUM SERPL-MCNC: 7.7 MG/DL (ref 8.3–10.1)
CHLORIDE SERPL-SCNC: 110 MMOL/L (ref 100–108)
CO2 SERPL-SCNC: 23 MMOL/L (ref 21–32)
CREAT SERPL-MCNC: 0.85 MG/DL (ref 0.6–1.3)
D DIMER PPP FEU-MCNC: 0.83 UG/ML FEU
EOSINOPHIL # BLD AUTO: 0.14 THOUSAND/ΜL (ref 0–0.61)
EOSINOPHIL NFR BLD AUTO: 1 % (ref 0–6)
ERYTHROCYTE [DISTWIDTH] IN BLOOD BY AUTOMATED COUNT: 14.3 % (ref 11.6–15.1)
FLUAV RNA RESP QL NAA+PROBE: NEGATIVE
FLUBV RNA RESP QL NAA+PROBE: NEGATIVE
GFR SERPL CREATININE-BSD FRML MDRD: 85 ML/MIN/1.73SQ M
GLUCOSE SERPL-MCNC: 86 MG/DL (ref 65–140)
HCT VFR BLD AUTO: 39.1 % (ref 34.8–46.1)
HGB BLD-MCNC: 12.8 G/DL (ref 11.5–15.4)
IMM GRANULOCYTES # BLD AUTO: 0.04 THOUSAND/UL (ref 0–0.2)
IMM GRANULOCYTES NFR BLD AUTO: 0 % (ref 0–2)
LYMPHOCYTES # BLD AUTO: 2.74 THOUSANDS/ΜL (ref 0.6–4.47)
LYMPHOCYTES NFR BLD AUTO: 24 % (ref 14–44)
MCH RBC QN AUTO: 29.7 PG (ref 26.8–34.3)
MCHC RBC AUTO-ENTMCNC: 32.7 G/DL (ref 31.4–37.4)
MCV RBC AUTO: 91 FL (ref 82–98)
MONOCYTES # BLD AUTO: 0.84 THOUSAND/ΜL (ref 0.17–1.22)
MONOCYTES NFR BLD AUTO: 7 % (ref 4–12)
NEUTROPHILS # BLD AUTO: 7.54 THOUSANDS/ΜL (ref 1.85–7.62)
NEUTS SEG NFR BLD AUTO: 68 % (ref 43–75)
NRBC BLD AUTO-RTO: 0 /100 WBCS
P AXIS: 49 DEGREES
P AXIS: 53 DEGREES
P AXIS: 54 DEGREES
PLATELET # BLD AUTO: 294 THOUSANDS/UL (ref 149–390)
PMV BLD AUTO: 9.2 FL (ref 8.9–12.7)
POTASSIUM SERPL-SCNC: 3.9 MMOL/L (ref 3.5–5.3)
PR INTERVAL: 128 MS
PROT SERPL-MCNC: 7.4 G/DL (ref 6.4–8.2)
QRS AXIS: -8 DEGREES
QRS AXIS: 25 DEGREES
QRS AXIS: 25 DEGREES
QRSD INTERVAL: 68 MS
QT INTERVAL: 298 MS
QT INTERVAL: 316 MS
QT INTERVAL: 326 MS
QTC INTERVAL: 403 MS
QTC INTERVAL: 407 MS
QTC INTERVAL: 447 MS
RBC # BLD AUTO: 4.31 MILLION/UL (ref 3.81–5.12)
RSV RNA RESP QL NAA+PROBE: NEGATIVE
SARS-COV-2 RNA RESP QL NAA+PROBE: NEGATIVE
SODIUM SERPL-SCNC: 139 MMOL/L (ref 136–145)
T WAVE AXIS: 50 DEGREES
T WAVE AXIS: 61 DEGREES
T WAVE AXIS: 68 DEGREES
TROPONIN I SERPL-MCNC: <0.02 NG/ML
TROPONIN I SERPL-MCNC: <0.02 NG/ML
VENTRICULAR RATE: 100 BPM
VENTRICULAR RATE: 110 BPM
VENTRICULAR RATE: 113 BPM
WBC # BLD AUTO: 11.33 THOUSAND/UL (ref 4.31–10.16)

## 2021-04-08 PROCEDURE — 85025 COMPLETE CBC W/AUTO DIFF WBC: CPT | Performed by: EMERGENCY MEDICINE

## 2021-04-08 PROCEDURE — 93005 ELECTROCARDIOGRAM TRACING: CPT

## 2021-04-08 PROCEDURE — 93010 ELECTROCARDIOGRAM REPORT: CPT | Performed by: INTERNAL MEDICINE

## 2021-04-08 PROCEDURE — 80053 COMPREHEN METABOLIC PANEL: CPT | Performed by: EMERGENCY MEDICINE

## 2021-04-08 PROCEDURE — 84484 ASSAY OF TROPONIN QUANT: CPT | Performed by: EMERGENCY MEDICINE

## 2021-04-08 PROCEDURE — 99285 EMERGENCY DEPT VISIT HI MDM: CPT | Performed by: EMERGENCY MEDICINE

## 2021-04-08 PROCEDURE — 36415 COLL VENOUS BLD VENIPUNCTURE: CPT | Performed by: EMERGENCY MEDICINE

## 2021-04-08 PROCEDURE — 96361 HYDRATE IV INFUSION ADD-ON: CPT

## 2021-04-08 PROCEDURE — 96360 HYDRATION IV INFUSION INIT: CPT

## 2021-04-08 PROCEDURE — 85379 FIBRIN DEGRADATION QUANT: CPT | Performed by: EMERGENCY MEDICINE

## 2021-04-08 PROCEDURE — 99284 EMERGENCY DEPT VISIT MOD MDM: CPT

## 2021-04-08 PROCEDURE — 0241U HB NFCT DS VIR RESP RNA 4 TRGT: CPT | Performed by: EMERGENCY MEDICINE

## 2021-04-08 PROCEDURE — 73564 X-RAY EXAM KNEE 4 OR MORE: CPT

## 2021-04-08 PROCEDURE — 71045 X-RAY EXAM CHEST 1 VIEW: CPT

## 2021-04-08 PROCEDURE — 99285 EMERGENCY DEPT VISIT HI MDM: CPT

## 2021-04-08 RX ADMIN — SODIUM CHLORIDE 1000 ML: 0.9 INJECTION, SOLUTION INTRAVENOUS at 11:49

## 2021-04-08 NOTE — DISCHARGE INSTRUCTIONS
Please follow-up with your primary care physician in 1-2 days  Please return to the emergency department if you develop worsening symptoms, loss of consciousness, difficulty breathing, or anything else concerning to you

## 2021-04-08 NOTE — ED PROVIDER NOTES
Final Diagnosis:  1  Chest pain      ED Course as of Apr 08 1343   Thu Apr 08, 2021   1141 Procedure Note: EKG  Date/Time: 04/08/21 11:41 AM   Interpreted by: SHAYY Monroy   Indications / Diagnosis: CP  ECG reviewed by me, the ED Provider: yes   The EKG demonstrates:  Rhythm:  ST   Intervals: normal intervals  Axis: normal axis  QRS/Blocks: normal QRS  ST Changes: Faint STD in V5-V6, V1  Similar to previous from April 4 2021      1144 Careworker from facility here  States that the patient does attention seeking behavior and requests minimal workup  States that the patient normally does this when she is at work to avoid it  Jose Elias Schilling MD, saw and evaluated the patient  All available labs and X-rays were ordered by me or the resident and have been reviewed by myself  I discussed the patient with the resident / non-physician and agree with the resident's / non-physician practitioner's findings and plan as documented in the resident's / non-physician practicitioner's note, except where noted  At this point, I agree with the current assessment done in the ED  I was present during key portions of all procedures performed unless otherwise stated  Chief Complaint   Patient presents with    Chest Pain     reports chest pain that has been ongoing for a few months, EMS reports pt was at work when her chest pain started again, described at "poking " pt reports being anxious at work and anxious now which makes her pain worse  pt arrived with a holter monitor inappropriately worn, with several stacks of leads on top of eachother  This is a 42 y o  female presenting for evaluation of chest pain  The patient states that about 2 hours ago she did have chest pain that was severe, central chest pain  Some shortness of breath  No fevers chills nausea vomiting with it  No falls or injuries  States that she has little bit dysuria at times but not currently    No lower extremity edema  Patient does MR      PMH:   has a past medical history of ADD (attention deficit disorder), Anxiety, Astigmatism, Brain lesion, Calcium deficiency, Cellulitis of foot, right (6/4/2018), Cerebral palsy ([address] ), Chronic otitis media, Constipation, Depression, Dysphagia, Esophagitis, Esotropia, GERD (gastroesophageal reflux disease), Hiatal hernia, Hydrocephalus ([address] ), Impaired fasting glucose, Left nephrolithiasis (03/04/2019), Myopia, Oppositional defiant disorder, 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[address] ), Seizures ([address] ), Sensorineural hearing loss, Status post ventriculoatrial shunt placement, and Visual impairment  PSH:   has a past surgical history that includes CSF shunt; Leg Surgery; EAR SURGERY; Nose surgery; pr esophagogastroduodenoscopy transoral diagnostic (N/A, 5/9/2019); Upper gastrointestinal endoscopy (05/2019); and Breast biopsy (Left)  Social:  Social History     Substance and Sexual Activity   Alcohol Use Never    Frequency: Never     Social History     Tobacco Use   Smoking Status Never Smoker   Smokeless Tobacco Never Used     Social History     Substance and Sexual Activity   Drug Use Never     PE:  Vitals:    04/08/21 1114 04/08/21 1322   BP: 153/72 138/91   BP Location:  Right arm   Pulse: (!) 119 (!) 108   Resp: (!) 24 (!) 23   Temp: 98 7 °F (37 1 °C)    SpO2: 97% 98%     General: VSS, NAD, awake, alert  Well-nourished, well-developed  Appears stated age  Speaking normally in full sentences  Head: Normocephalic, atraumatic, nontender  Eyes: PERRL, EOM-I  No diplopia  No hyphema  No subconjunctival hemorrhages  Symmetrical lids  ENT: Atraumatic external nose and ears  MMM  No malocclusion  No stridor  Normal phonation  No drooling  Normal swallowing  Neck: Symmetric, trachea midline  No JVD  CV: mild tachycardia   +S1/S2  No murmurs or gallops  Peripheral pulses +2 throughout  No chest wall tenderness     Lungs:   Unlabored No retractions  CTAB, lungs sounds equal bilateral    No tachypnea  Abd: +BS, soft, NT/ND    MSK:   FROM   Back:   No rashes  Skin: Dry, intact  Neuro: awake  Alert  +MR  AAOx3, GCS 15, CN II-XII grossly intact  Motor grossly intact  Psychiatric/Behavioral: Appropriate mood and affect   Exam: deferred  A:  - CP  P:  - EKG/Trop  - DC     HEART Score = [2]  [0] History = Highly / Moderately / Slightly Suspicious  [1] EKG = Significant STD / Non-specific repolarization / Normal  [0] Age = >65, 45-65, <45  [1] Risk Factors (0, 1-2, 3+): Cholesterol, HTN, DM, Cigarettes, FH, Obesity  [0] Troponin: 3+ x normal, 1-3x normal, <normal    Low Score (0-3 points), risk of MACE of 0 9-1 7%  - 13 point ROS was performed and all are normal unless stated in the history above  - Nursing note reviewed  Vitals reviewed  - Orders placed by myself and/or advanced practitioner / resident     - Previous chart was reviewed  - No language barrier    - History obtained from patient  - There are no limitations to the history obtained  - Critical care time: Not applicable for this patient  Code Status: Prior  Advance Directive and Living Will:      Power of :    POLST:      Medications   sodium chloride 0 9 % bolus 1,000 mL (0 mL Intravenous Stopped 4/8/21 1338)     XR chest 1 view portable   ED Interpretation   No pna  No ptx        Orders Placed This Encounter   Procedures    COVID19, Influenza A/B, RSV PCR, [facility]    XR chest 1 view portable    Troponin I    ECG 12 lead     Labs Reviewed   COVID19, INFLUENZA A/B, RSV PCR, [facility] - Normal       Result Value Ref Range Status    SARS-CoV-2 Negative  Negative Final    INFLUENZA A PCR Negative  Negative Final    INFLUENZA B PCR Negative  Negative Final    RSV PCR Negative  Negative Final    Narrative: This test has been authorized by FDA under an EUA (Emergency Use Assay) for use by authorized laboratories    Clinical caution and judgement should be used with the interpretation of these results with consideration of the clinical impression and other laboratory testing  Testing reported as "Positive" or "Negative" has been proven to be accurate according to standard laboratory validation requirements  All testing is performed with control materials showing appropriate reactivity at standard intervals  TROPONIN I - Normal    Troponin I <0 02  <=0 04 ng/mL Final    Comment: Siemens Chemistry analyzer 99% cutoff is > 0 04 ng/mL in network labs     o cTnI 99% cutoff is useful only when applied to patients in the clinical setting of myocardial ischemia   o cTnI 99% cutoff should be interpreted in the context of clinical history, ECG findings and possibly cardiac imaging to establish correct diagnosis  o cTnI 99% cutoff may be suggestive but clearly not indicative of a coronary event without the clinical setting of myocardial ischemia  Time reflects when diagnosis was documented in both MDM as applicable and the Disposition within this note     Time User Action Codes Description Comment    4/8/2021  1:15 PM Princess Olivares Add [R07 9] Chest pain       ED Disposition     ED Disposition Condition Date/Time Comment    Discharge Stable Thu Apr 8, 2021  1:15 PM Christoph Abad discharge to home/self care  Follow-up Information     Follow up With Specialties Details Why Contact Info Additional [address] Emergency Department Emergency Medicine Go to  If symptoms worsen [address] [place] [address] Emergency Department, [address] [place] [address], [facility]   [phone]        Patient's Medications   Discharge Prescriptions    No medications on file     No discharge procedures on file  Prior to Admission Medications   Prescriptions Last Dose Informant Patient Reported? Taking?    ARIPiprazole (ABILIFY) 20 MG tablet  Care Giver No No   Sig: Take 1 tablet (20 mg total) by mouth daily at bedtime   Cholecalciferol (Vitamin D-3) 25 MCG (1000 UT) CAPS   No No   Sig: Take 2 capsules (2,000 Units total) by mouth daily at 8am    Dyclonine-Glycerin (Cepacol Sore Throat Glendo) 0 1-33 % LIQD  Care Giver No No   Sig: Apply 1 spray to the mouth or throat 3 (three) times a day as needed (sorethroat)   Emollient (CeraVe) CREA  Care Giver No No   Sig: Apply topically 2 (two) times a day Apply topically 2 times to affected heel twice daily @8a-8p   LORazepam (ATIVAN) 0 5 mg tablet  Care Giver Yes No   Sig: Take 0 25 mg by mouth 2 (two) times a day   Mouthwashes (Listerine Antiseptic) LIQD  Care Giver No No   Sig: Apply 1 application to the mouth or throat 2 (two) times a day   Patient taking differently: Swish and spit 5 mL 2 (two) times a day    Multiple Vitamins-Minerals (CertaVite Senior/Antioxidant) TABS   No No   Sig: Take 1 tablet by mouth daily   RA SUNSCREEN SPF50 LOTN  Care Giver No No   Sig: Apply 15 minutes before sun exposure and every 2 hours   acetaminophen (TYLENOL) 500 mg tablet   No No   Sig: Take 1 tablet (500 mg total) by mouth every 6 (six) hours as needed for mild pain   aluminum-magnesium hydroxide-simethicone (ANTACID) 200-200-20 mg/5 mL suspension  Care Giver No No   Sig: Take 15 mL by mouth every 4 (four) hours as needed for indigestion or heartburn   amitriptyline (ELAVIL) 10 mg tablet  Care Giver Yes No   Sig: Take 10 mg by mouth daily at bedtime   bacitracin topical ointment 500 units/g topical ointment  Care Giver No No   Sig: Cleanse site on nose with soap and water followed by bacitracin BID until healed then p r n    bismuth subsalicylate (PEPTO BISMOL) 524 mg/30 mL oral suspension  Care Giver No No   Sig: Take 15 mL (262 mg total) by mouth every 6 (six) hours as needed for indigestion   calcium carbonate (TUMS) 500 mg chewable tablet  Care Giver No No   Sig: Chew 1 tablet (500 mg total) 3 (three) times a day as needed for heartburn   dicyclomine (BENTYL) 20 mg tablet  Care Giver No No   Sig: Take 1 tablet (20 mg total) by mouth every 6 (six) hours as needed (as needed)   escitalopram (LEXAPRO) 20 mg tablet  Care Giver No No   Sig: Take 1 tablet (20 mg total) by mouth daily   fluticasone (FLONASE) 50 mcg/act nasal spray  Care Giver No No   Si spray into each nostril daily as needed for rhinitis (nasal congestion) At 8:00 AM   guaiFENesin (ROBITUSSIN) 100 MG/5ML oral liquid  Care Giver Yes No   Sig: Take 200 mg by mouth 3 (three) times a day as needed for cough   hydrOXYzine HCL (ATARAX) 25 mg tablet  Care Giver No No   Sig: Take 1 tablet (25 mg total) by mouth 3 (three) times a day   ibuprofen (MOTRIN) 400 mg tablet  Care Giver No No   Sig: Take 1 tablet (400 mg total) by mouth every 8 (eight) hours as needed for mild pain   lamoTRIgine (LaMICtal) 25 mg tablet  Care Giver Yes No   Sig: Take 25 mg by mouth 2 (two) times a day    lidocaine (LIDODERM) 5 %  Care Giver No No   Sig: Apply 1 patch topically daily Remove & Discard patch within 12 hours or as directed by MD   lubiprostone (Amitiza) 24 mcg capsule  Care Giver No No   Sig: Take 1 capsule (24 mcg total) by mouth daily with breakfast   magnesium hydroxide (GNP Milk of Magnesia) 400 mg/5 mL oral suspension  Care Giver No No   Sig: Take 30 mL by mouth daily as needed for constipation If no BM in 3 days   norgestimate-ethinyl estradiol (ORTHO-CYCLEN) 0 25-35 MG-MCG per tablet  Care Giver No No   Sig: Take 1 tablet by mouth daily   nystatin (MYCOSTATIN) powder   No No   Sig: Apply topically 4 (four) times a day   pantoprazole (PROTONIX) 20 mg tablet   No No   Sig: Take 1 tablet (20 mg total) by mouth 2 (two) times a day   phenol (Chloraseptic) 1 4 % mucosal liquid  Care Giver No No   Sig: Apply 1 spray to the mouth or throat every 2 (two) hours as needed (for sore throat as needed)   polyethylene glycol (MIRALAX) 17 g packet  Care Giver No No   Sig: Take one packet daily as needed for no bowel movement in 24 hours   psyllium (METAMUCIL) 58 6 % packet  Care Giver No No   Sig: Take 1 packet by mouth daily   senna-docusate sodium (Senexon-S) 8 6-50 mg per tablet  Care Giver No No   Sig: Take 1 tablet by mouth daily at 8am    sodium chloride (OCEAN) 0 65 % nasal spray  Care Giver No No   Si spray into each nostril as needed (three times daily as needed for nasal congestion)   zinc oxide (DESITIN) 13 % cream  Care Giver No No   Sig: Apply 1 application topically as needed (for perianal irritation)      Facility-Administered Medications: None       Portions of the record may have been created with voice recognition software  Occasional wrong word or "sound a like" substitutions may have occurred due to the inherent limitations of voice recognition software  Read the chart carefully and recognize, using context, where substitutions have occurred      Electronically signed by:  Isaiah Spangler MD  [id number]

## 2021-04-08 NOTE — ED PROVIDER NOTES
History  Chief Complaint   Patient presents with    Fall     Pt was just discharged and fell on the way to the car when her legs gave out  Pt reports pain in the right knee but fell on to the left side     70-year-old female with history of ADD, cerebral palsy, depression, GERD, hiatal hernia, hydrocephalus, intellectual disability who presents for evaluation after fall  Patient was discharged from the ED after evaluation for chest pain when she was walking through the parking lot and her left leg gave out from underneath her    The ate accompanying her from her group home reports that she was able to catch her and blunt her fall  She did not hit her head  She landed on her left leg on the ground  The patient reports mild lightheadedness preceding her fall  She complains of bilateral knee pain but otherwise feels at baseline  She denies any chest pain or shortness of breath at this time  Prior to Admission Medications   Prescriptions Last Dose Informant Patient Reported? Taking?    ARIPiprazole (ABILIFY) 20 MG tablet  Care Giver No No   Sig: Take 1 tablet (20 mg total) by mouth daily at bedtime   Cholecalciferol (Vitamin D-3) 25 MCG (1000 UT) CAPS   No No   Sig: Take 2 capsules (2,000 Units total) by mouth daily at 8am    Dyclonine-Glycerin (Cepacol Sore Throat [place]) 0 1-33 % LIQD  Care Giver No No   Sig: Apply 1 spray to the mouth or throat 3 (three) times a day as needed (sorethroat)   Emollient (CeraVe) CREA  Care Giver No No   Sig: Apply topically 2 (two) times a day Apply topically 2 times to affected heel twice daily @8a-8p   LORazepam (ATIVAN) 0 5 mg tablet  Care Giver Yes No   Sig: Take 0 25 mg by mouth 2 (two) times a day   Mouthwashes (Listerine Antiseptic) LIQD  Care Giver No No   Sig: Apply 1 application to the mouth or throat 2 (two) times a day   Patient taking differently: Swish and spit 5 mL 2 (two) times a day    Multiple Vitamins-Minerals (CertaVite Senior/Antioxidant) TABS   No No Sig: Take 1 tablet by mouth daily   RA SUNSCREEN SPF50 LOTN  Care Giver No No   Sig: Apply 15 minutes before sun exposure and every 2 hours   acetaminophen (TYLENOL) 500 mg tablet   No No   Sig: Take 1 tablet (500 mg total) by mouth every 6 (six) hours as needed for mild pain   aluminum-magnesium hydroxide-simethicone (ANTACID) 200-200-20 mg/5 mL suspension  Care Giver No No   Sig: Take 15 mL by mouth every 4 (four) hours as needed for indigestion or heartburn   amitriptyline (ELAVIL) 10 mg tablet  Care Giver Yes No   Sig: Take 10 mg by mouth daily at bedtime   bacitracin topical ointment 500 units/g topical ointment  Care Giver No No   Sig: Cleanse site on nose with soap and water followed by bacitracin BID until healed then p r n    bismuth subsalicylate (PEPTO BISMOL) 524 mg/30 mL oral suspension  Care Giver No No   Sig: Take 15 mL (262 mg total) by mouth every 6 (six) hours as needed for indigestion   calcium carbonate (TUMS) 500 mg chewable tablet  Care Giver No No   Sig: Chew 1 tablet (500 mg total) 3 (three) times a day as needed for heartburn   dicyclomine (BENTYL) 20 mg tablet  Care Giver No No   Sig: Take 1 tablet (20 mg total) by mouth every 6 (six) hours as needed (as needed)   escitalopram (LEXAPRO) 20 mg tablet  Care Giver No No   Sig: Take 1 tablet (20 mg total) by mouth daily   fluticasone (FLONASE) 50 mcg/act nasal spray  Care Giver No No   Si spray into each nostril daily as needed for rhinitis (nasal congestion) At 8:00 AM   guaiFENesin (ROBITUSSIN) 100 MG/5ML oral liquid  Care Giver Yes No   Sig: Take 200 mg by mouth 3 (three) times a day as needed for cough   hydrOXYzine HCL (ATARAX) 25 mg tablet  Care Giver No No   Sig: Take 1 tablet (25 mg total) by mouth 3 (three) times a day   ibuprofen (MOTRIN) 400 mg tablet  Care Giver No No   Sig: Take 1 tablet (400 mg total) by mouth every 8 (eight) hours as needed for mild pain   lamoTRIgine (LaMICtal) 25 mg tablet  Care Giver Yes No   Sig: Take 25 mg by mouth 2 (two) times a day    lidocaine (LIDODERM) 5 %  Care Giver No No   Sig: Apply 1 patch topically daily Remove & Discard patch within 12 hours or as directed by MD mayeriprostone (Amitiza) 24 mcg capsule  Care Giver No No   Sig: Take 1 capsule (24 mcg total) by mouth daily with breakfast   magnesium hydroxide (GNP Milk of Magnesia) 400 mg/5 mL oral suspension  Care Giver No No   Sig: Take 30 mL by mouth daily as needed for constipation If no BM in 3 days   norgestimate-ethinyl estradiol (ORTHO-CYCLEN) 0 25-35 MG-MCG per tablet  Care Giver No No   Sig: Take 1 tablet by mouth daily   nystatin (MYCOSTATIN) powder   No No   Sig: Apply topically 4 (four) times a day   pantoprazole (PROTONIX) 20 mg tablet   No No   Sig: Take 1 tablet (20 mg total) by mouth 2 (two) times a day   phenol (Chloraseptic) 1 4 % mucosal liquid  Care Giver No No   Sig: Apply 1 spray to the mouth or throat every 2 (two) hours as needed (for sore throat as needed)   polyethylene glycol (MIRALAX) 17 g packet  Care Giver No No   Sig: Take one packet daily as needed for no bowel movement in 24 hours   psyllium (METAMUCIL) 58 6 % packet  Care Giver No No   Sig: Take 1 packet by mouth daily   senna-docusate sodium (Senexon-S) 8 6-50 mg per tablet  Care Giver No No   Sig: Take 1 tablet by mouth daily at 8am    sodium chloride (OCEAN) 0 65 % nasal spray  Care Giver No No   Si spray into each nostril as needed (three times daily as needed for nasal congestion)   zinc oxide (DESITIN) 13 % cream  Care Giver No No   Sig: Apply 1 application topically as needed (for perianal irritation)      Facility-Administered Medications: None       Past Medical History:   Diagnosis Date    ADD (attention deficit disorder)     Anxiety     Astigmatism     Brain lesion     Calcium deficiency     Cellulitis of foot, right 2018    Cerebral palsy (HCC)     Chronic otitis media     Constipation     Depression     Last Assessed:2016    Dysphagia     Esophagitis     Esotropia     GERD (gastroesophageal reflux disease)     Hiatal hernia     Hydrocephalus (HCC)     Impaired fasting glucose     Left nephrolithiasis 03/04/2019    Myopia     Oppositional defiant disorder     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HCC)     Seizures (HCC)     Sensorineural hearing loss     Status post ventriculoatrial shunt placement     Visual impairment        Past Surgical History:   Procedure Laterality Date    BREAST BIOPSY Left     X 2 (not sure of years)    CSF SHUNT      Creation of Ventriculo-Peritoneal CSF shunt ; Last Assessed:7/6/2016    EAR SURGERY      Last Assessed:7/6/2016    LEG SURGERY      due to CP     NOSE SURGERY      Last Assessed:7/6/2016    NM ESOPHAGOGASTRODUODENOSCOPY TRANSORAL DIAGNOSTIC N/A 5/9/2019    Procedure: ESOPHAGOGASTRODUODENOSCOPY (EGD) with biopsy;  Surgeon: Anastasia Perez MD;  Location: [facility]; Service: Gastroenterology    UPPER GASTROINTESTINAL ENDOSCOPY  05/2019       Family History   Problem Relation Age of Onset    Diabetes Mother     Colon cancer Father     No Known Problems Maternal Grandmother     No Known Problems Maternal Grandfather     No Known Problems Paternal Grandmother     No Known Problems Paternal Grandfather      I have reviewed and agree with the history as documented  E-Cigarette/Vaping    E-Cigarette Use Never User      E-Cigarette/Vaping Substances    Nicotine No     THC No     CBD No     Flavoring No      Social History     Tobacco Use    Smoking status: Never Smoker    Smokeless tobacco: Never Used   Substance Use Topics    Alcohol use: Never     Frequency: Never    Drug use: Never        Review of Systems   Constitutional: Negative for chills and fever  Eyes: Negative for visual disturbance  Respiratory: Negative for cough, chest tightness and shortness of breath  Cardiovascular: Negative for chest pain and leg swelling     Gastrointestinal: Negative for abdominal distention, abdominal pain, diarrhea, nausea and vomiting  Genitourinary: Negative for dysuria, flank pain, frequency and urgency  Musculoskeletal: Positive for arthralgias  Negative for back pain and gait problem  Skin: Negative for pallor and rash  Neurological: Positive for light-headedness  Negative for syncope, weakness and headaches  All other systems reviewed and are negative  Physical Exam  ED Triage Vitals   Temperature Pulse Respirations Blood Pressure SpO2   04/08/21 1444 04/08/21 1444 04/08/21 1444 04/08/21 1446 04/08/21 1444   (!) 97 3 °F (36 3 °C) 103 18 151/70 95 %      Temp src Heart Rate Source Patient Position - Orthostatic VS BP Location FiO2 (%)   -- -- -- -- --             Pain Score       04/08/21 1444       Worst Possible Pain             Orthostatic Vital Signs  Vitals:    04/08/21 1444 04/08/21 1446   BP:  151/70   Pulse: 103        Physical Exam  Vitals signs and nursing note reviewed  Constitutional:       General: She is not in acute distress  Appearance: She is well-developed  HENT:      Head: Normocephalic and atraumatic  Right Ear: External ear normal       Left Ear: External ear normal       Nose: Nose normal       Mouth/Throat:      Comments: No intraoral injuries  Eyes:      Extraocular Movements: Extraocular movements intact  Pupils: Pupils are equal, round, and reactive to light  Neck:      Musculoskeletal: Normal range of motion and neck supple  No neck rigidity or muscular tenderness  Trachea: No tracheal deviation  Cardiovascular:      Rate and Rhythm: Regular rhythm  Tachycardia present  Heart sounds: No murmur  No friction rub  No gallop  Pulmonary:      Effort: Pulmonary effort is normal       Breath sounds: Normal breath sounds  No wheezing or rales  Chest:      Chest wall: No tenderness  Abdominal:      General: Bowel sounds are normal  There is no distension  Palpations: Abdomen is soft  Tenderness:  There is no abdominal tenderness  Musculoskeletal: Normal range of motion  General: No tenderness or deformity  Comments: No T or L-spine tenderness  Tenderness to palpation of bilateral knees  Range of motion of bilateral knees intact  Range of motion of bilateral hips, ankles intact and nontender  MSK exam otherwise unremarkable  Skin:     General: Skin is warm and dry  Comments: No external signs of trauma  Neurological:      General: No focal deficit present  Mental Status: She is alert and oriented to person, place, and time  Motor: No abnormal muscle tone     Psychiatric:         Behavior: Behavior normal          ED Medications  Medications - No data to display    Diagnostic Studies  Results Reviewed     Procedure Component Value Units Date/Time    Troponin I [[id number]]  (Normal) Collected: 04/08/21 1526    Lab Status: Final result Specimen: Blood from Arm, Left Updated: 04/08/21 1604     Troponin I <0 02 ng/mL     Comprehensive metabolic panel [[id number]]  (Abnormal) Collected: 04/08/21 1526    Lab Status: Final result Specimen: Blood from Arm, Left Updated: 04/08/21 1603     Sodium 139 mmol/L      Potassium 3 9 mmol/L      Chloride 110 mmol/L      CO2 23 mmol/L      ANION GAP 6 mmol/L      BUN 9 mg/dL      Creatinine 0 85 mg/dL      Glucose 86 mg/dL      Calcium 7 7 mg/dL      Corrected Calcium 8 5 mg/dL      AST 16 U/L      ALT 19 U/L      Alkaline Phosphatase 142 U/L      Total Protein 7 4 g/dL      Albumin 3 0 g/dL      Total Bilirubin 0 18 mg/dL      eGFR 85 ml/min/1 73sq m     Narrative:      [place] guidelines for Chronic Kidney Disease (CKD):     Stage 1 with normal or high GFR (GFR > 90 mL/min/1 73 square meters)    Stage 2 Mild CKD (GFR = 60-89 mL/min/1 73 square meters)    Stage 3A Moderate CKD (GFR = 45-59 mL/min/1 73 square meters)    Stage 3B Moderate CKD (GFR = 30-44 mL/min/1 73 square meters)    Stage 4 Severe CKD (GFR = 15-29 mL/min/1 73 square meters)    Stage 5 End Stage CKD (GFR <15 mL/min/1 73 square meters)  Note: GFR calculation is accurate only with a steady state creatinine    D-Dimer [[id number]]  (Abnormal) Collected: 04/08/21 1526    Lab Status: Final result Specimen: Blood from Arm, Left Updated: 04/08/21 1554     D-Dimer, Quant 0 83 ug/ml FEU     CBC and differential [[id number]]  (Abnormal) Collected: 04/08/21 1526    Lab Status: Final result Specimen: Blood from Arm, Left Updated: 04/08/21 1552     WBC 11 33 Thousand/uL      RBC 4 31 Million/uL      Hemoglobin 12 8 g/dL      Hematocrit 39 1 %      MCV 91 fL      MCH 29 7 pg      MCHC 32 7 g/dL      RDW 14 3 %      MPV 9 2 fL      Platelets 139 Thousands/uL      nRBC 0 /100 WBCs      Neutrophils Relative 68 %      Immat GRANS % 0 %      Lymphocytes Relative 24 %      Monocytes Relative 7 %      Eosinophils Relative 1 %      Basophils Relative 0 %      Neutrophils Absolute 7 54 Thousands/µL      Immature Grans Absolute 0 04 Thousand/uL      Lymphocytes Absolute 2 74 Thousands/µL      Monocytes Absolute 0 84 Thousand/µL      Eosinophils Absolute 0 14 Thousand/µL      Basophils Absolute 0 03 Thousands/µL                  XR knee 4+ views left injury   ED Interpretation by Andrea Patel MD (04/08 1632)   No acute abnormality  XR knee 4+ views Right injury   ED Interpretation by Andrea Patel MD (04/08 1632)   No acute abnormality  Procedures  Procedures      ED Course  ED Course as of Apr 08 1759   Thu Apr 08, 2021   1632 Troponin I: <0 02   1632 Chronically elevated and at baseline     D-Dimer, Quant(!): 0 83   1632 Comprehensive metabolic panel(!)        Procedure Note: EKG  Date/Time: 04/08/21 5:59 PM   Interpreted by: Rachana Stewart  Indications / Diagnosis: lightheadedness  ECG reviewed by me, the ED Provider: yes   The EKG demonstrates:  Rhythm: normal sinus  Intervals: normal intervals  Axis: normal axis  QRS/Blocks: normal QRS  ST Changes: No acute ST Changes, no STD/[place]                                 MDM  Number of Diagnoses or Management Options  Bilateral knee pain: new and requires workup  Fall, initial encounter: new and requires workup  Lightheadedness: new and requires workup  Diagnosis management comments: 26-year-old female who presents for evaluation after fall  Fall was without head strike but preceded by lightheadedness  Given this, will evaluate patient with comprehensive labs including repeat troponin, CBC, CMP, D-dimer given previous chest pain and tachycardia  Labs within normal limits apart from elevated D-dimer which on review, is persistently elevated in this patient and appears at baseline  EKG without acute changes  X-rays of bilateral knees without acute fracture noted  Patient able to ambulate within the department  Compression bandages were applied to bilateral knees for patient's comfort  Discussed all results with patient's aide at the bedside  Advised follow-up with primary care physician  Return precautions discussed  Amount and/or Complexity of Data Reviewed  Clinical lab tests: ordered and reviewed  Tests in the radiology section of CPT®: ordered and reviewed  Decide to obtain previous medical records or to obtain history from someone other than the patient: yes  Obtain history from someone other than the patient: yes  Review and summarize past medical records: yes    Risk of Complications, Morbidity, and/or Mortality  Presenting problems: high  Diagnostic procedures: low  Management options: minimal    Patient Progress  Patient progress: stable      Disposition  Final diagnoses:   Fall, initial encounter   Bilateral knee pain   Lightheadedness     Time reflects when diagnosis was documented in both MDM as applicable and the Disposition within this note     Time User Action Codes Description Comment    4/8/2021  4:47 PM Laurel Corona [M78  TYOM] Fall, initial encounter     4/8/2021  4:47 PM Laurel Corona [N59 625,  M25 562] Bilateral knee pain     4/8/2021  4:48 PM Nate Sarmiento Add [R42] Lightheadedness       ED Disposition     ED Disposition Condition Date/Time Comment    Discharge Stable Thu Apr 8, 2021  4:47 PM George Ashley discharge to home/self care              Follow-up Information     Follow up With Specialties Details [facility] [address]  In 1 day Or your primary care physician [phone]            Discharge Medication List as of 4/8/2021  4:49 PM      CONTINUE these medications which have NOT CHANGED    Details   acetaminophen (TYLENOL) 500 mg tablet Take 1 tablet (500 mg total) by mouth every 6 (six) hours as needed for mild pain, Starting Tue 3/16/2021, Normal      aluminum-magnesium hydroxide-simethicone (ANTACID) 200-200-20 mg/5 mL suspension Take 15 mL by mouth every 4 (four) hours as needed for indigestion or heartburn, Starting Fri 2/7/2020, Normal      amitriptyline (ELAVIL) 10 mg tablet Take 10 mg by mouth daily at bedtime, Historical Med      ARIPiprazole (ABILIFY) 20 MG tablet Take 1 tablet (20 mg total) by mouth daily at bedtime, Starting Thu 10/15/2020, Normal      bacitracin topical ointment 500 units/g topical ointment Cleanse site on nose with soap and water followed by bacitracin BID until healed then p r n , Normal      bismuth subsalicylate (PEPTO BISMOL) 524 mg/30 mL oral suspension Take 15 mL (262 mg total) by mouth every 6 (six) hours as needed for indigestion, Starting Wed 7/29/2020, Normal      calcium carbonate (TUMS) 500 mg chewable tablet Chew 1 tablet (500 mg total) 3 (three) times a day as needed for heartburn, Starting Wed 9/16/2020, Normal      Cholecalciferol (Vitamin D-3) 25 MCG (1000 UT) CAPS Take 2 capsules (2,000 Units total) by mouth daily at 8am , Starting Tue 3/30/2021, Normal      dicyclomine (BENTYL) 20 mg tablet Take 1 tablet (20 mg total) by mouth every 6 (six) hours as needed (as needed), Starting Mon 1/18/2021, Print      Dyclonine-Glycerin (Cepacol Sore Throat Spray) 0 1-33 % LIQD Apply 1 spray to the mouth or throat 3 (three) times a day as needed (sorethroat), Starting Fri 11/13/2020, Normal      Emollient (CeraVe) CREA Apply topically 2 (two) times a day Apply topically 2 times to affected heel twice daily @8a-8p, Starting Wed 12/9/2020, Normal      escitalopram (LEXAPRO) 20 mg tablet Take 1 tablet (20 mg total) by mouth daily, Starting Thu 10/15/2020, Normal      fluticasone (FLONASE) 50 mcg/act nasal spray 1 spray into each nostril daily as needed for rhinitis (nasal congestion) At 8:00 AM, Starting Fri 6/12/2020, Normal      guaiFENesin (ROBITUSSIN) 100 MG/5ML oral liquid Take 200 mg by mouth 3 (three) times a day as needed for cough, Historical Med      hydrOXYzine HCL (ATARAX) 25 mg tablet Take 1 tablet (25 mg total) by mouth 3 (three) times a day, Starting Tue 12/29/2020, Normal      ibuprofen (MOTRIN) 400 mg tablet Take 1 tablet (400 mg total) by mouth every 8 (eight) hours as needed for mild pain, Starting Fri 6/12/2020, Normal      lamoTRIgine (LaMICtal) 25 mg tablet Take 25 mg by mouth 2 (two) times a day , Starting Tue 8/11/2020, Historical Med      lidocaine (LIDODERM) 5 % Apply 1 patch topically daily Remove & Discard patch within 12 hours or as directed by MD, Starting Fri 11/13/2020, Normal      LORazepam (ATIVAN) 0 5 mg tablet Take 0 25 mg by mouth 2 (two) times a day, Historical Med      lubiprostone (Amitiza) 24 mcg capsule Take 1 capsule (24 mcg total) by mouth daily with breakfast, Starting Fri 10/30/2020, Print      magnesium hydroxide (GNP Milk of Magnesia) 400 mg/5 mL oral suspension Take 30 mL by mouth daily as needed for constipation If no BM in 3 days, Starting Wed 9/16/2020, Normal      Mouthwashes (Listerine Antiseptic) LIQD Apply 1 application to the mouth or throat 2 (two) times a day, Starting Wed 9/16/2020, Normal      Multiple Vitamins-Minerals (CertaVite Senior/Antioxidant) TABS Take 1 tablet by mouth daily, Starting Tue 3/30/2021, Normal      norgestimate-ethinyl estradiol (ORTHO-CYCLEN) 0 25-35 MG-MCG per tablet Take 1 tablet by mouth daily, Starting Thu 6/18/2020, Normal      nystatin (MYCOSTATIN) powder Apply topically 4 (four) times a day, Starting Tue 3/30/2021, Normal      pantoprazole (PROTONIX) 20 mg tablet Take 1 tablet (20 mg total) by mouth 2 (two) times a day, Starting Tue 3/30/2021, Print      phenol (Chloraseptic) 1 4 % mucosal liquid Apply 1 spray to the mouth or throat every 2 (two) hours as needed (for sore throat as needed), Starting Thu 10/15/2020, Print      polyethylene glycol (MIRALAX) 17 g packet Take one packet daily as needed for no bowel movement in 24 hours, Normal      psyllium (METAMUCIL) 58 6 % packet Take 1 packet by mouth daily, Starting Mon 1/11/2021, Normal      RA SUNSCREEN SPF50 LOTN Apply 15 minutes before sun exposure and every 2 hours, Normal      senna-docusate sodium (Senexon-S) 8 6-50 mg per tablet Take 1 tablet by mouth daily at 8am , Starting Tue 12/29/2020, Normal      sodium chloride (OCEAN) 0 65 % nasal spray 1 spray into each nostril as needed (three times daily as needed for nasal congestion), Starting Thu 10/15/2020, Normal      zinc oxide (DESITIN) 13 % cream Apply 1 application topically as needed (for perianal irritation), Starting Wed 9/16/2020, Normal           No discharge procedures on file  PDMP Review     None           ED Provider  Attending physically available and evaluated Hector Sosa  I managed the patient along with the ED Attending      Electronically Signed by         Hiren Matt MD  04/08/21 1800

## 2021-04-09 ENCOUNTER — TELEPHONE (OUTPATIENT)
Dept: FAMILY MEDICINE CLINIC | Facility: CLINIC | Age: 41
End: 2021-04-09

## 2021-04-09 DIAGNOSIS — G80.1 SPASTIC DIPLEGIC CEREBRAL PALSY (HCC): Primary | ICD-10-CM

## 2021-04-09 DIAGNOSIS — R26.2 AMBULATORY DYSFUNCTION: ICD-10-CM

## 2021-04-09 NOTE — ED ATTENDING ATTESTATION
4/8/2021  IPinky MD, saw and evaluated the patient  I have discussed the patient with the resident/non-physician practitioner and agree with the resident's/non-physician practitioner's findings, Plan of Care, and MDM as documented in the resident's/non-physician practitioner's note, except where noted  All available labs and Radiology studies were reviewed  I was present for key portions of any procedure(s) performed by the resident/non-physician practitioner and I was immediately available to provide assistance  At this point I agree with the current assessment done in the Emergency Department  I have conducted an independent evaluation of this patient a history and physical is as follows:    ED Course     58-year-old female presents with left lower extremity injury status post fall in parking lot  Denies head strike  History limited secondary to patient's baseline majority of history is supplied by caregiver present  Vitals reviewed patient has mild tenderness bilateral knees  Range of motions intact normal range of motion hips  Remaining  MSK exam unremarkable    Who patient to full we with lower extremity pain limbs warm well perfused neurovascular intact will obtain skeletal radiographs of knees exclude fracture pain control  Check screening labs anticipate discharge if ED workup unremarkable    Patient noted to have an elevated D-dimer however on review of chart historically patient's D-dimer is always been persistently elevated low suspicion for VTE will not pursue further workup         Critical Care Time  Procedures

## 2021-04-09 NOTE — TELEPHONE ENCOUNTER
Josselyn from [employer],  requesting an order for a new walker  Must be specifically as follows:    [employer] Rollator Walker     MDS# [id number] Francy Solano  Caller states, old walker unable to be repaired

## 2021-04-12 DIAGNOSIS — R06.02 SOB (SHORTNESS OF BREATH): ICD-10-CM

## 2021-04-12 DIAGNOSIS — R13.10 DYSPHAGIA, UNSPECIFIED TYPE: ICD-10-CM

## 2021-04-12 DIAGNOSIS — K08.9 TOOTH DISORDER: ICD-10-CM

## 2021-04-12 RX ORDER — EUCALYP/ME-SALICYLATE/MEN/THYM
5 MOUTHWASH MUCOUS MEMBRANE 2 TIMES DAILY
Qty: 1000 ML | Refills: 5 | Status: SHIPPED | OUTPATIENT
Start: 2021-04-12 | End: 2021-08-20 | Stop reason: SDUPTHER

## 2021-04-12 RX ORDER — MAGNESIUM HYDROXIDE/ALUMINUM HYDROXICE/SIMETHICONE 120; 1200; 1200 MG/30ML; MG/30ML; MG/30ML
15 SUSPENSION ORAL EVERY 4 HOURS PRN
Qty: 355 ML | Refills: 5 | Status: SHIPPED | OUTPATIENT
Start: 2021-04-12 | End: 2022-07-18

## 2021-04-13 ENCOUNTER — OFFICE VISIT (OUTPATIENT)
Dept: FAMILY MEDICINE CLINIC | Facility: CLINIC | Age: 41
End: 2021-04-13

## 2021-04-13 ENCOUNTER — TELEPHONE (OUTPATIENT)
Dept: FAMILY MEDICINE CLINIC | Facility: CLINIC | Age: 41
End: 2021-04-13

## 2021-04-13 VITALS
HEIGHT: 59 IN | SYSTOLIC BLOOD PRESSURE: 144 MMHG | HEART RATE: 121 BPM | WEIGHT: 220 LBS | DIASTOLIC BLOOD PRESSURE: 90 MMHG | RESPIRATION RATE: 18 BRPM | BODY MASS INDEX: 44.35 KG/M2 | OXYGEN SATURATION: 97 % | TEMPERATURE: 98.5 F

## 2021-04-13 DIAGNOSIS — R00.2 PALPITATIONS: Primary | ICD-10-CM

## 2021-04-13 DIAGNOSIS — S00.81XA EXCORIATION OF FACE, INITIAL ENCOUNTER: ICD-10-CM

## 2021-04-13 PROCEDURE — 93227 XTRNL ECG REC<48 HR R&I: CPT | Performed by: INTERNAL MEDICINE

## 2021-04-13 PROCEDURE — 99213 OFFICE O/P EST LOW 20 MIN: CPT | Performed by: FAMILY MEDICINE

## 2021-04-13 NOTE — ASSESSMENT & PLAN NOTE
Anxiety  - Patient presenting with symptoms of anxiety causing palpitations, CP  Patient is picking at her forehead    Currently on multiple medications of psychiatric illness    Plan  - F/u with psychiatrist and behavioral health specialist for re-evaluation

## 2021-04-13 NOTE — PROGRESS NOTES
Assessment/Plan:    Palpitations  Chronic  -Patient has been tachycardic during multiple ED visits and office visits with elevated /90 today and , ED visit 4/8/21   She appears anxious  Hx of negative cardiac work up  She had a holter monitor placed 4/7/21 but results not yet available and patient presented to ED with leads malpositioned  Discussed with pt and group home care taker   -Start Metoprolol 12 5 mg BID  -Encouraged f/u with behavioral health for medication reconcilation  - F/u 1 month    Severe episode of recurrent major depressive disorder, with psychotic features ([facility] [address] )  Anxiety  - Patient presenting with symptoms of anxiety causing palpitations, CP  Patient is picking at her forehead  Currently on multiple medications of psychiatric illness    Plan  - F/u with psychiatrist and behavioral health specialist for re-evaluation    Excoriation of face  Noted excoriations on forehead   -Aid reports patient has been picking at her forehead more often for the last week  -This could be secondary to worsening anxiety  Advised to follow up with behavioral therapist and psychiatry at group home to help patient to cope with anxiety  -No signs of infection on physical exam  Recommend to use Vaseline to avoid infection   -call back the office if symptoms worsen             Subjective:      Patient ID: Jo Ann Huffman is a 42 y o  female  Audrey Nguyen is here for f/u after going to the ED last Thursday for CP   Patient reports that she was exercising at work when the pain began in the center of the chest  It does not radiate anywhere and describes it as a "punching" feeling  The pain gets better when she is not exercising  She reports that it also occurs when she takes a deep breath  She has not taken any medication to help relieve the pain  Patient is anxious today since her BP is 144/90 and pulse is 121   has been with her for a year and reports this is an ongoing issue   Patient has been to cardiologists and workup has been negative  Regarding abdominal pain, patient notes her stomach was upset this morning after she ate some toast but she is feeling well and is not having any pain  After her initial ED visit patient fell, and landed on her R knee, she has some knee pain but is ambulating with a walker  Patient notes she is more fatigued, wakes up tired, snores at night, and has a HA in the morning  No SOB  No F/C/N/V  The following portions of the patient's history were reviewed and updated as appropriate:   She  has a past medical history of ADD (attention deficit disorder), Anxiety, Astigmatism, Brain lesion, Calcium deficiency, Cellulitis of foot, right (6/4/2018), Cerebral palsy ([address] ), Chronic otitis media, Constipation, Depression, Dysphagia, Esophagitis, Esotropia, GERD (gastroesophageal reflux disease), Hiatal hernia, Hydrocephalus ([address] ), Impaired fasting glucose, Left nephrolithiasis (03/04/2019), Myopia, Oppositional defiant disorder, 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[address] ), Seizures ([address] ), Sensorineural hearing loss, Status post ventriculoatrial shunt placement, and Visual impairment  She  has a past surgical history that includes CSF shunt; Leg Surgery; EAR SURGERY; Nose surgery; pr esophagogastroduodenoscopy transoral diagnostic (N/A, 5/9/2019); Upper gastrointestinal endoscopy (05/2019); and Breast biopsy (Left)  She  reports that she has never smoked  She has never used smokeless tobacco  She reports that she does not drink alcohol or use drugs    Current Outpatient Medications   Medication Sig Dispense Refill    acetaminophen (TYLENOL) 500 mg tablet Take 1 tablet (500 mg total) by mouth every 6 (six) hours as needed for mild pain 30 tablet 5    aluminum-magnesium hydroxide-simethicone (Antacid) 200-200-20 mg/5 mL suspension Take 15 mL by mouth every 4 (four) hours as needed for indigestion or heartburn 355 mL 5    amitriptyline (ELAVIL) 10 mg tablet Take 10 mg by mouth daily at bedtime      ARIPiprazole (ABILIFY) 20 MG tablet Take 1 tablet (20 mg total) by mouth daily at bedtime 90 tablet 2    bacitracin topical ointment 500 units/g topical ointment Cleanse site on nose with soap and water followed by bacitracin BID until healed then p r n  15 g 0    bismuth subsalicylate (PEPTO BISMOL) 524 mg/30 mL oral suspension Take 15 mL (262 mg total) by mouth every 6 (six) hours as needed for indigestion 360 mL 3    calcium carbonate (TUMS) 500 mg chewable tablet Chew 1 tablet (500 mg total) 3 (three) times a day as needed for heartburn 30 tablet 5    Cholecalciferol (Vitamin D-3) 25 MCG (1000 UT) CAPS Take 2 capsules (2,000 Units total) by mouth daily at 8am  30 capsule 5    dicyclomine (BENTYL) 20 mg tablet Take 1 tablet (20 mg total) by mouth every 6 (six) hours as needed (as needed) 120 tablet 2    Dyclonine-Glycerin (Cepacol Sore Throat Spray) 0 1-33 % LIQD Apply 1 spray to the mouth or throat 3 (three) times a day as needed (sorethroat) 118 mL 2    Emollient (CeraVe) CREA Apply topically 2 (two) times a day Apply topically 2 times to affected heel twice daily @8a-8p 453 g 5    escitalopram (LEXAPRO) 20 mg tablet Take 1 tablet (20 mg total) by mouth daily 90 tablet 2    fluticasone (FLONASE) 50 mcg/act nasal spray 1 spray into each nostril daily as needed for rhinitis (nasal congestion) At 8:00 AM 1 Bottle 5    guaiFENesin (ROBITUSSIN) 100 MG/5ML oral liquid Take 200 mg by mouth 3 (three) times a day as needed for cough      hydrOXYzine HCL (ATARAX) 25 mg tablet Take 1 tablet (25 mg total) by mouth 3 (three) times a day 30 tablet 2    ibuprofen (MOTRIN) 400 mg tablet Take 1 tablet (400 mg total) by mouth every 8 (eight) hours as needed for mild pain 30 tablet 5    lamoTRIgine (LaMICtal) 25 mg tablet Take 25 mg by mouth 2 (two) times a day       lidocaine (LIDODERM) 5 % Apply 1 patch topically daily Remove & Discard patch within 12 hours or as directed by MD 10 patch 0    LORazepam (ATIVAN) 0 5 mg tablet Take 0 25 mg by mouth 2 (two) times a day      lubiprostone (Amitiza) 24 mcg capsule Take 1 capsule (24 mcg total) by mouth daily with breakfast 60 capsule 5    magnesium hydroxide (GNP Milk of Magnesia) 400 mg/5 mL oral suspension Take 30 mL by mouth daily as needed for constipation If no BM in 3 days 355 mL 5    Mouthwashes (Listerine Antiseptic) LIQD Swish and spit 5 mL 2 (two) times a day 1000 mL 5    Multiple Vitamins-Minerals (CertaVite Senior/Antioxidant) TABS Take 1 tablet by mouth daily 90 tablet 0    norgestimate-ethinyl estradiol (ORTHO-CYCLEN) 0 25-35 MG-MCG per tablet Take 1 tablet by mouth daily 28 tablet 11    nystatin (MYCOSTATIN) powder Apply topically 4 (four) times a day 45 g 2    pantoprazole (PROTONIX) 20 mg tablet Take 1 tablet (20 mg total) by mouth 2 (two) times a day 60 tablet 3    phenol (Chloraseptic) 1 4 % mucosal liquid Apply 1 spray to the mouth or throat every 2 (two) hours as needed (for sore throat as needed) 236 mL 1    polyethylene glycol (MIRALAX) 17 g packet Take one packet daily as needed for no bowel movement in 24 hours 30 each 5    psyllium (METAMUCIL) 58 6 % packet Take 1 packet by mouth daily 30 packet 5    RA SUNSCREEN SPF50 LOTN Apply 15 minutes before sun exposure and every 2 hours 1 Bottle 5    senna-docusate sodium (Senexon-S) 8 6-50 mg per tablet Take 1 tablet by mouth daily at 8am  30 tablet 3    sodium chloride (OCEAN) 0 65 % nasal spray 1 spray into each nostril as needed (three times daily as needed for nasal congestion) 60 mL 1    zinc oxide (DESITIN) 13 % cream Apply 1 application topically as needed (for perianal irritation) 1 Tube 5    metoprolol tartrate (LOPRESSOR) 25 mg tablet Take 0 5 tablets (12 5 mg total) by mouth every 12 (twelve) hours 60 tablet 1     No current facility-administered medications for this visit  She has No Known Allergies       Review of Systems   Constitutional: Negative for chills, diaphoresis and fever  HENT: Negative for hearing loss, sinus pain and trouble swallowing  Eyes: Negative for visual disturbance  Respiratory: Negative for cough, shortness of breath and wheezing  Cardiovascular: Positive for chest pain  Gastrointestinal: Negative for abdominal pain  Genitourinary: Negative for difficulty urinating, frequency and urgency  Musculoskeletal: Negative for arthralgias and myalgias  Neurological: Negative for light-headedness and headaches  Objective:      /90 (BP Location: Left arm, Patient Position: Sitting, Cuff Size: Large)   Pulse (!) 121   Temp 98 5 °F (36 9 °C) (Temporal)   Resp 18   Ht 4' 11" (1 499 m)   Wt 99 8 kg (220 lb)   SpO2 97%   BMI 44 43 kg/m²          Physical Exam  Constitutional:       Appearance: Normal appearance  HENT:      Head:     Cardiovascular:      Rate and Rhythm: Regular rhythm  Tachycardia present  Pulses: Normal pulses  Heart sounds: Normal heart sounds  No murmur  No gallop  Pulmonary:      Effort: Pulmonary effort is normal  No respiratory distress  Breath sounds: Normal breath sounds  No wheezing  Abdominal:      General: Bowel sounds are normal       Palpations: Abdomen is soft  Skin:     General: Skin is warm  Capillary Refill: Capillary refill takes less than 2 seconds  Neurological:      Mental Status: She is alert  Mental status is at baseline  Psychiatric:         Mood and Affect: Mood is anxious  Cognition and Memory: Cognition is impaired

## 2021-04-13 NOTE — TELEPHONE ENCOUNTER
Nurse calling to get a PRN for patient who is picking at her forehead it will get infected  Patient might be lactose intolerant gets belly ache every time she has ice cream

## 2021-04-13 NOTE — ASSESSMENT & PLAN NOTE
Chronic  -Patient has been tachycardic during multiple ED visits and office visits with elevated /90 today and , ED visit 4/8/21   She appears anxious  Hx of negative cardiac work up  She had a holter monitor placed 4/7/21 but results not yet available and patient presented to ED with leads malpositioned   Discussed with pt and group home care taker   -Start Metoprolol 12 5 mg BID  -Encouraged f/u with behavioral health for medication reconcilation  - F/u 1 month

## 2021-04-14 DIAGNOSIS — K21.9 GASTROESOPHAGEAL REFLUX DISEASE WITHOUT ESOPHAGITIS: ICD-10-CM

## 2021-04-14 RX ORDER — PANTOPRAZOLE SODIUM 20 MG/1
20 TABLET, DELAYED RELEASE ORAL 2 TIMES DAILY
Qty: 60 TABLET | Refills: 5 | OUTPATIENT
Start: 2021-04-14

## 2021-04-14 RX ORDER — PETROLATUM,WHITE
1 OINTMENT IN PACKET (GRAM) TOPICAL 2 TIMES DAILY
Qty: 500 G | Refills: 0 | Status: SHIPPED | OUTPATIENT
Start: 2021-04-14 | End: 2021-10-08 | Stop reason: CLARIF

## 2021-04-14 NOTE — TELEPHONE ENCOUNTER
Nurse is afraid patient may get infection on her forehead and wants to know if an ointment can be prescribed

## 2021-04-14 NOTE — ASSESSMENT & PLAN NOTE
Noted excoriations on forehead   -Aid reports patient has been picking at her forehead more often for the last week  -This could be secondary to worsening anxiety   Advised to follow up with behavioral therapist and psychiatry at group home to help patient to cope with anxiety  -No signs of infection on physical exam  Recommend to use Vaseline to avoid infection   -call back the office if symptoms worsen

## 2021-04-14 NOTE — TELEPHONE ENCOUNTER
Excoriations on face not infected on evaluation yesterday  I prescribed Vaseline or white petrolatum to apply twice a day for 7 days  Advised to follow up with behavioral health since excoriations are from patient picking at forehead due to anxiety  If symptoms do not improve or worsen please tell them to call the office

## 2021-04-16 DIAGNOSIS — K21.9 GASTROESOPHAGEAL REFLUX DISEASE WITHOUT ESOPHAGITIS: ICD-10-CM

## 2021-04-19 ENCOUNTER — OFFICE VISIT (OUTPATIENT)
Dept: GASTROENTEROLOGY | Facility: CLINIC | Age: 41
End: 2021-04-19
Payer: MEDICARE

## 2021-04-19 ENCOUNTER — TELEPHONE (OUTPATIENT)
Dept: FAMILY MEDICINE CLINIC | Facility: CLINIC | Age: 41
End: 2021-04-19

## 2021-04-19 VITALS
SYSTOLIC BLOOD PRESSURE: 118 MMHG | BODY MASS INDEX: 43.95 KG/M2 | DIASTOLIC BLOOD PRESSURE: 80 MMHG | TEMPERATURE: 98.7 F | HEIGHT: 59 IN | WEIGHT: 218 LBS | HEART RATE: 101 BPM

## 2021-04-19 DIAGNOSIS — I83.819 VARICOSE VEINS WITH PAIN: Primary | ICD-10-CM

## 2021-04-19 DIAGNOSIS — K59.04 CHRONIC IDIOPATHIC CONSTIPATION: ICD-10-CM

## 2021-04-19 DIAGNOSIS — F45.8 FUNCTIONAL DYSPHAGIA: Primary | ICD-10-CM

## 2021-04-19 DIAGNOSIS — R07.89 ATYPICAL CHEST PAIN: ICD-10-CM

## 2021-04-19 PROCEDURE — 99214 OFFICE O/P EST MOD 30 MIN: CPT | Performed by: INTERNAL MEDICINE

## 2021-04-19 RX ORDER — PANTOPRAZOLE SODIUM 20 MG/1
20 TABLET, DELAYED RELEASE ORAL 2 TIMES DAILY
Qty: 62 TABLET | Refills: 5 | Status: SHIPPED | OUTPATIENT
Start: 2021-04-19 | End: 2021-10-12 | Stop reason: SDUPTHER

## 2021-04-19 NOTE — TELEPHONE ENCOUNTER
Josselyn, patients medical coordinator requesting an order for compression stocking  Patient currently wears them it was discussed at last visit 04/13 but somehow did not get the order

## 2021-04-22 NOTE — PROGRESS NOTES
[facility] - Outpatient Consultation  Lincoln Zhu 42 y o  female MRN: [id number]  Encounter: [id number]      PCP: Erlin Fleming MD  Referring: No referring provider defined for this encounter  ASSESSMENT AND PLAN:      1  Functional dysphagia  2  Atypical chest pain  Episodes of occasional dysphagia/atypical chest pain  GI evaluation unrevealing for etiology  Barium swallow, EGD, manometry all normal  Normal speech and swallow evaluation  No benefit with amitriptyline, Bentyl, PPI  Recommend continued Psychiatry follow-up/management    3  Chronic idiopathic constipation  Continue Amitiza      ______________________________________________________________________    CC:  Chief Complaint   Patient presents with    Follow-up     ED f/u on 4/4/ for Hital hernia        HPI:      Patient is a 43-year-old female for follow-up of atypical chest pain, dysphagia, constipation  She lives in a group home, and is present with aide from group home who contributes to history  She was recently seen in the ED on 04/08, 4/4 for atypical chest pain  This started during an episode of anxiety  Cardiac evaluation was negative  Today, GI evaluation including EGD, manometry, barium swallow remain unrevealing  She has had no benefit with Bentyl, ppi, amitriptyline  She is following with Psychiatry  REVIEW OF SYSTEMS:    CONSTITUTIONAL: Denies any fever, chills, rigors, and weight loss  HEENT: No earache or tinnitus  Denies hearing loss or visual disturbances  CARDIOVASCULAR: No chest pain or palpitations  RESPIRATORY: Denies any cough, hemoptysis, shortness of breath or dyspnea on exertion  GASTROINTESTINAL: As noted in the History of Present Illness  GENITOURINARY: No problems with urination  Denies any hematuria or dysuria  NEUROLOGIC: No dizziness or vertigo, denies headaches  MUSCULOSKELETAL: Denies any muscle or joint pain  SKIN: Denies skin rashes or itching  ENDOCRINE: Denies excessive thirst  Denies intolerance to heat or cold  PSYCHOSOCIAL: Denies depression or anxiety  Denies any recent memory loss  Historical Information   Past Medical History:   Diagnosis Date    ADD (attention deficit disorder)     Anxiety     Astigmatism     Brain lesion     Calcium deficiency     Cellulitis of foot, right 6/4/2018    Cerebral palsy (HCC)     Chronic otitis media     Constipation     Depression     Last Assessed:7/6/2016    Dysphagia     Esophagitis     Esotropia     GERD (gastroesophageal reflux disease)     Hiatal hernia     Hydrocephalus (HCC)     Impaired fasting glucose     Left nephrolithiasis 03/04/2019    Myopia     Oppositional defiant disorder     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HCC)     Seizures (HCC)     Sensorineural hearing loss     Status post ventriculoatrial shunt placement     Visual impairment      Past Surgical History:   Procedure Laterality Date    BREAST BIOPSY Left     X 2 (not sure of years)    CSF SHUNT      Creation of Ventriculo-Peritoneal CSF shunt ; Last Assessed:7/6/2016    EAR SURGERY      Last Assessed:7/6/2016    LEG SURGERY      due to CP     NOSE SURGERY      Last Assessed:7/6/2016    ID ESOPHAGOGASTRODUODENOSCOPY TRANSORAL DIAGNOSTIC N/A 5/9/2019    Procedure: ESOPHAGOGASTRODUODENOSCOPY (EGD) with biopsy;  Surgeon: Halima Falcon MD;  Location: [facility];   Service: Gastroenterology    UPPER GASTROINTESTINAL ENDOSCOPY  05/2019     Social History   Social History     Substance and Sexual Activity   Alcohol Use Never    Frequency: Never     Social History     Substance and Sexual Activity   Drug Use Never     Social History     Tobacco Use   Smoking Status Never Smoker   Smokeless Tobacco Never Used     Family History   Problem Relation Age of Onset    Diabetes Mother     Colon cancer Father     No Known Problems Maternal Grandmother     No Known Problems Maternal Grandfather     No Known Problems Paternal Grandmother     No Known Problems Paternal Grandfather        Meds/Allergies       Current Outpatient Medications:     acetaminophen (TYLENOL) 500 mg tablet    aluminum-magnesium hydroxide-simethicone (Antacid) 200-200-20 mg/5 mL suspension    amitriptyline (ELAVIL) 10 mg tablet    ARIPiprazole (ABILIFY) 20 MG tablet    bacitracin topical ointment 500 units/g topical ointment    bismuth subsalicylate (PEPTO BISMOL) 524 mg/30 mL oral suspension    calcium carbonate (TUMS) 500 mg chewable tablet    Cholecalciferol (Vitamin D-3) 25 MCG (1000 UT) CAPS    dicyclomine (BENTYL) 20 mg tablet    Dyclonine-Glycerin (Cepacol Sore Throat Spray) 0 1-33 % LIQD    Emollient (CeraVe) CREA    escitalopram (LEXAPRO) 20 mg tablet    fluticasone (FLONASE) 50 mcg/act nasal spray    guaiFENesin (ROBITUSSIN) 100 MG/5ML oral liquid    hydrOXYzine HCL (ATARAX) 25 mg tablet    ibuprofen (MOTRIN) 400 mg tablet    lamoTRIgine (LaMICtal) 25 mg tablet    lidocaine (LIDODERM) 5 %    LORazepam (ATIVAN) 0 5 mg tablet    lubiprostone (Amitiza) 24 mcg capsule    magnesium hydroxide (GNP Milk of Magnesia) 400 mg/5 mL oral suspension    metoprolol tartrate (LOPRESSOR) 25 mg tablet    Mouthwashes (Listerine Antiseptic) LIQD    Multiple Vitamins-Minerals (CertaVite Senior/Antioxidant) TABS    norgestimate-ethinyl estradiol (ORTHO-CYCLEN) 0 25-35 MG-MCG per tablet    nystatin (MYCOSTATIN) powder    pantoprazole (PROTONIX) 20 mg tablet    phenol (Chloraseptic) 1 4 % mucosal liquid    polyethylene glycol (MIRALAX) 17 g packet    psyllium (METAMUCIL) 58 6 % packet    RA SUNSCREEN SPF50 LOTN    senna-docusate sodium (Senexon-S) 8 6-50 mg per tablet    sodium chloride (OCEAN) 0 65 % nasal spray    white petrolatum    zinc oxide (DESITIN) 13 % cream    No Known Allergies        Objective     Blood pressure 118/80, pulse 101, temperature 98 7 °F (37 1 °C), temperature source Tympanic, height 4' 11" (1 499 m), weight 98 9 kg (218 lb)  Body mass index is 44 03 kg/m²  PHYSICAL EXAM:      General Appearance:   Alert, cooperative, no distress   HEENT:   Normocephalic, atraumatic, anicteric  Neck:  Supple, symmetrical, trachea midline   Lungs:   Clear to auscultation bilaterally; no rales, rhonchi or wheezing; respirations unlabored    Heart[de-identified]   Regular rate and rhythm; no murmur, rub, or gallop  Abdomen:   Soft, non-tender, non-distended; normal bowel sounds; no masses, no organomegaly    Genitalia:   Deferred    Rectal:   Deferred    Extremities:  No cyanosis, clubbing or edema    Pulses:  2+ and symmetric    Skin:  No jaundice, rashes, or lesions    Lymph nodes:  No palpable cervical lymphadenopathy        Lab Results:     Lab Results   Component Value Date    WBC 11 33 (H) 04/08/2021    HGB 12 8 04/08/2021    HCT 39 1 04/08/2021    MCV 91 04/08/2021     04/08/2021       Lab Results   Component Value Date    K 3 9 04/08/2021     (H) 04/08/2021    CO2 23 04/08/2021    BUN 9 04/08/2021    CREATININE 0 85 04/08/2021    GLUF 76 07/11/2018    CALCIUM 7 7 (L) 04/08/2021    CORRECTEDCA 8 5 04/08/2021    AST 16 04/08/2021    ALT 19 04/08/2021    ALKPHOS 142 (H) 04/08/2021    EGFR 85 04/08/2021       Lab Results   Component Value Date    INR 0 92 05/20/2020    PROTIME 12 4 05/20/2020         Radiology Results:   Cta Ed Chest Pe Study    Result Date: 4/4/2021  Narrative: CTA - CHEST WITH IV CONTRAST - PULMONARY ANGIOGRAM INDICATION:   Chest pain, tachycardia, Elevated D-dimer  COMPARISON: None  TECHNIQUE: CTA examination of the chest was performed using angiographic technique according to a protocol specifically tailored to evaluate for pulmonary embolism  Axial, sagittal, and coronal 2D reformatted images were created from the source data and  submitted for interpretation  In addition, coronal 3D MIP postprocessing was performed on the acquisition scanner  Radiation dose length product (DLP) for this visit:  915 mGy-cm   This examination, like all CT scans performed in the [facility], was performed utilizing techniques to minimize radiation dose exposure, including the use of iterative reconstruction and automated exposure control  IV Contrast:  100 mL of iohexol (OMNIPAQUE)  FINDINGS: PULMONARY ARTERIAL TREE:  No pulmonary embolus is seen  LUNGS:  Lungs are clear  There is no tracheal or endobronchial lesion  PLEURA:  Unremarkable  HEART/GREAT VESSELS:  Unremarkable for patient's age  MEDIASTINUM AND KEMAR:  Large hiatal hernia noted  No mediastinal or hilar lymphadenopathy  CHEST WALL AND LOWER NECK:   Unremarkable  VISUALIZED STRUCTURES IN THE UPPER ABDOMEN:  Unremarkable  OSSEOUS STRUCTURES:  No acute fracture or destructive osseous lesion  Impression: No pulmonary embolus is seen  Large hiatal hernia noted  No mediastinal or hilar lymphadenopathy  Workstation performed: [id number]       Portions of the record may have been created with voice recognition software  Occasional wrong word or "sound a like" substitutions may have occurred due to the inherent limitations of voice recognition software  Read the chart carefully and recognize, using context, where substitutions have occurred

## 2021-04-27 NOTE — TELEPHONE ENCOUNTER
Order from 4/19/21 for compression stocking faxed to [facility] @ [phone] and [employer] @ [phone]

## 2021-05-05 ENCOUNTER — OFFICE VISIT (OUTPATIENT)
Dept: FAMILY MEDICINE CLINIC | Facility: CLINIC | Age: 41
End: 2021-05-05

## 2021-05-05 VITALS
OXYGEN SATURATION: 99 % | TEMPERATURE: 97.8 F | RESPIRATION RATE: 18 BRPM | SYSTOLIC BLOOD PRESSURE: 152 MMHG | DIASTOLIC BLOOD PRESSURE: 98 MMHG | HEIGHT: 59 IN | WEIGHT: 217.8 LBS | BODY MASS INDEX: 43.91 KG/M2 | HEART RATE: 108 BPM

## 2021-05-05 DIAGNOSIS — T14.8XXA WOUND, OPEN: ICD-10-CM

## 2021-05-05 DIAGNOSIS — F42.4 SKIN PICKING HABIT: ICD-10-CM

## 2021-05-05 DIAGNOSIS — S00.81XD EXCORIATION OF FACE, SUBSEQUENT ENCOUNTER: Primary | ICD-10-CM

## 2021-05-05 DIAGNOSIS — R07.82 INTERCOSTAL PAIN: ICD-10-CM

## 2021-05-05 PROCEDURE — 99214 OFFICE O/P EST MOD 30 MIN: CPT | Performed by: FAMILY MEDICINE

## 2021-05-05 RX ORDER — GINSENG 100 MG
CAPSULE ORAL
Qty: 15 G | Refills: 0 | Status: SHIPPED | OUTPATIENT
Start: 2021-05-05 | End: 2021-06-11 | Stop reason: SDUPTHER

## 2021-05-05 NOTE — PATIENT INSTRUCTIONS
Acute Wound Care   AMBULATORY CARE:   An acute wound  is an injury that causes a break in the skin  An acute wound can happen suddenly, last a short time, and may heal on its own  Common signs and symptoms of an acute wound:   · A cut, tear, or gash in your skin    · Bleeding, swelling, pain, or trouble moving the affected area    · Dirt or foreign objects inside the wound     · Milky, yellow, green, or brown pus in the wound     · Red, tender, or warm area around the pus    · Fever  Seek care immediately if:   · You have pus or a foul odor coming from the wound  · You have sudden trouble breathing or chest pain  · Blood soaks through your bandage  Contact your healthcare provider if:   · You have muscle, joint, or body aches, sweating, or a fever  · You have more swelling, redness, or bleeding in your wound  · Your skin is itchy, swollen, or you have a rash  · You have questions or concerns about your condition or care  Treatment for an acute wound  may include any of the following:  · Cleansing  is done with soap and water to wash away germs and decrease the risk of infection  Sterile water further cleans the wound  The cleaning is done under high pressure with a catheter tip and large syringe  A solution that kills germs may also be used  · Debridement  is done to clean and remove objects, dirt, or dead tissues from the open wound  Healthcare providers may also drain the wound to clean out pus  · Closure of the wound  is done with stitches, staples, skin adhesive, or other treatments  This may be done if the wound is wide or deep  Stitches may be needed if the wound is in an area that moves a lot, such as the hands, feet, and joints  Stitches may help to keep the wound from getting infected  They may also decrease the amount of scarring you have  Some wounds may heal better without stitches    Wound care:   · If your wound was closed with thin strips of medical tape, keep them clean and dry  The strips of medical tape will fall off on their own  Do not pull them off  · Keep the bandage clean and dry  Do not remove the bandage over your wound unless your healthcare provider says it is okay  · Wash your hands before and after you take care of your wound to prevent infection  · Clean the wound as directed  If you cannot reach the wound, have someone help you  · If you have packing, make sure all the gauze used to pack the wound is taken out and replaced as directed  Keep track of how many gauze dressings are placed inside the wound  Follow up with your healthcare provider as directed:  Write down your questions so you remember to ask them during your visits  © 2016 [address] is for End User's use only and may not be sold, redistributed or otherwise used for commercial purposes  All illustrations and images included in CareNotes® are the copyrighted property of A [employer]  or [employer]  The above information is an  only  It is not intended as medical advice for individual conditions or treatments  Talk to your doctor, nurse or pharmacist before following any medical regimen to see if it is safe and effective for you

## 2021-05-05 NOTE — ASSESSMENT & PLAN NOTE
Present for a month, petroleum and vaseline not helping  Pt scratches at excoriation, worsening it, likely some component of anxiety as well  - use aquaphor ointment as needed for itchiness and dryness, use bacitracin cream  - monitor for signs and symptoms of infection   - can use bandaid to prevent itching as needed   - recommend continued moisturizing and barrier protection   - keep next appointment 5/17, can follow up  - call if worsening

## 2021-05-05 NOTE — PROGRESS NOTES
Assessment/Plan:    Excoriation of face  Present for a month, petroleum and vaseline not helping  Pt scratches at excoriation, worsening it, likely some component of anxiety as well  - use aquaphor ointment as needed for itchiness and dryness, use bacitracin cream  - monitor for signs and symptoms of infection   - can use bandaid to prevent itching as needed   - recommend continued moisturizing and barrier protection   - keep next appointment 5/17, can follow up  - call if worsening     Intercostal pain  Pt complaining of chest pain and difficulty breathing that started today while in office but did not complain of at home  Given pt's history, not concerning for acute ACS and exam benign (see below)  - speaking to home manager, pt has hx of complaining of chest pain and being evaluated in ER with negative cardiac work up  - seems to act up in front of health professionals "for attention" and wants further evaluation at hospital  - chest pain reproducible on exam and pt appears comfortable and at baseline especially when MD not watching, not asked about chest pain  - do not recommend further evaluation at this time, no need to go to hospital unless recurring or worsening       Diagnoses and all orders for this visit:    Excoriation of face, subsequent encounter  -     mineral oil-hydrophilic petrolatum (AQUAPHOR) ointment; Apply topically as needed for dry skin    Wound, open  -     bacitracin topical ointment 500 units/g topical ointment; Cleanse site on nose with soap and water followed by bacitracin BID until healed then p r n   -     mineral oil-hydrophilic petrolatum (AQUAPHOR) ointment; Apply topically as needed for dry skin    Skin picking habit    Intercostal pain        Subjective:      Patient ID: Janet Felix is a 42 y o  female  HPI  41 yo female presenting with spot on face  Patient comes from a home and is accompanied by staff from home    Also provided by staff with phone to discuss with home manager who requested to speak to me  Home manager states that pt is here to evaluate spot on her nose  Pt has excoriation on nose present for about a month  Started out as small excoriation now enlarging and reddening  Pt picks at it and makes it worse  Previously seen for similar issue and was recommended vaseline and petroleum gel which has not been helping  Requesting aquaphor as needed to place when pt itches as well as neosporin  Pt herself was complaining of chest pain that started today (per staff, started right in the office)  Pt states it hurts when she breathes  Pt has hx cerebral palsy and disability and is a generally poor historian  Over the phone, home manager requested to speak privately to me when discussing chest pain and difficulty breathing  She states that patient has a history of complaining of chest pain in front of providers " when she wants attention"  and likes going to the hospital to be evaluated  Has history of going to the ED for chest pain with negative cardiac workup  Home manager requests that we evaluate patient  with that in mind  She states that patient was not complaining of chest pain or difficulty breathing when at home but often when in the presence of healthcare providers will exaggerate these symptoms " for attention " Pt does appear comfortable at baseline but will complain excessively of symptoms when asked about them  Did exhibit self injurious behavior by hitting head and chest      The following portions of the patient's history were reviewed and updated as appropriate: allergies, current medications, past family history, past medical history, past social history, past surgical history and problem list     ROS difficult to obtain due to pt's disability   Review of Systems   Constitutional: Negative for chills and fever  HENT: Negative for congestion  Respiratory: Positive for shortness of breath  Negative for cough      Cardiovascular: Positive for chest pain  Gastrointestinal: Negative for abdominal pain, diarrhea and vomiting  Skin: Positive for rash and wound  Neurological: Negative for seizures and syncope  Psychiatric/Behavioral: Positive for behavioral problems  Negative for agitation and confusion  Objective:    /98 (BP Location: Left arm, Patient Position: Sitting, Cuff Size: Large)   Pulse (!) 108   Temp 97 8 °F (36 6 °C) (Temporal)   Resp 18   Ht 4' 11" (1 499 m)   Wt 98 8 kg (217 lb 12 8 oz)   SpO2 99%   BMI 43 99 kg/m²      Physical Exam  Constitutional:       General: She is not in acute distress  Appearance: Normal appearance  She is obese  She is not ill-appearing or toxic-appearing  Comments: Patient appears comfortable at baseline but occasionally when MD is in the room will demonstrate self-injurious behavior and hitting self in face and chest, and start breathing rapidly   HENT:      Head: Normocephalic and atraumatic  Right Ear: External ear normal       Left Ear: External ear normal       Nose: Nose normal       Mouth/Throat:      Mouth: Mucous membranes are moist       Pharynx: Oropharynx is clear  Eyes:      Extraocular Movements: Extraocular movements intact  Conjunctiva/sclera: Conjunctivae normal    Neck:      Musculoskeletal: Normal range of motion and neck supple  Cardiovascular:      Rate and Rhythm: Normal rate and regular rhythm  Pulses: Normal pulses  Heart sounds: Normal heart sounds  No murmur  Comments: Chest pain reproducible on exam, with light palpation of chest will double over and express significant pain  Pulmonary:      Effort: Pulmonary effort is normal  No respiratory distress  Breath sounds: Normal breath sounds  No wheezing or rales  Comments: Not in acute respiratory distress at baseline but when asked  about chest pain or difficulty breathing will start to take rapid shallow breath  Abdominal:      General: Abdomen is flat  Palpations: Abdomen is soft  Tenderness: There is no abdominal tenderness  Musculoskeletal: Normal range of motion  Right lower leg: No edema  Left lower leg: No edema  Skin:     General: Skin is warm  Capillary Refill: Capillary refill takes less than 2 seconds  Findings: Lesion present  No rash  Comments: Erythematous excoriation lesion about 2-3cm in size on R side of nose, no discharge, pus, or active bleeding   Neurological:      General: No focal deficit present  Mental Status: She is alert  Mental status is at baseline     Psychiatric:      Comments: Difficult to assess         Anu Hogue MD, PGY1   [facility]  Date: 5/5/2021 Time: 4:13 PM

## 2021-05-14 ENCOUNTER — TELEPHONE (OUTPATIENT)
Dept: FAMILY MEDICINE CLINIC | Facility: CLINIC | Age: 41
End: 2021-05-14

## 2021-05-17 ENCOUNTER — OFFICE VISIT (OUTPATIENT)
Dept: FAMILY MEDICINE CLINIC | Facility: CLINIC | Age: 41
End: 2021-05-17

## 2021-05-17 VITALS
WEIGHT: 217.8 LBS | DIASTOLIC BLOOD PRESSURE: 82 MMHG | HEART RATE: 113 BPM | SYSTOLIC BLOOD PRESSURE: 126 MMHG | HEIGHT: 59 IN | TEMPERATURE: 97.1 F | OXYGEN SATURATION: 97 % | RESPIRATION RATE: 18 BRPM | BODY MASS INDEX: 43.91 KG/M2

## 2021-05-17 DIAGNOSIS — R00.2 PALPITATIONS: ICD-10-CM

## 2021-05-17 DIAGNOSIS — M79.89 LEG SWELLING: ICD-10-CM

## 2021-05-17 DIAGNOSIS — R03.0 ELEVATED BLOOD PRESSURE READING IN OFFICE WITHOUT DIAGNOSIS OF HYPERTENSION: ICD-10-CM

## 2021-05-17 DIAGNOSIS — B35.1 ONYCHOMYCOSIS: Primary | ICD-10-CM

## 2021-05-17 DIAGNOSIS — M79.672 LEFT FOOT PAIN: ICD-10-CM

## 2021-05-17 PROCEDURE — 99213 OFFICE O/P EST LOW 20 MIN: CPT | Performed by: FAMILY MEDICINE

## 2021-05-17 NOTE — PROGRESS NOTES
Assessment/Plan:    Elevated blood pressure reading in office without diagnosis of hypertension  Episodes of elevated BP mostly in ED or when patient in pain   -Patient started on Metoprolol tartrate 12 5 mg BID on previous visit for hx of palpitations and elevated HR  -BP wnl at this time  Continue to monitor closely  Palpitations  Resolved  -Patient denies episodes of palpitations at this visit  -HR elevated at 113 on this visit  Will increase metoprolol 12 5 mg to 25 mg BID  -Close follow up in 1 month    Leg swelling  Chronic  Non-pitting edema  -Denies pain of LE  No problems ambulating with walker  -Encouraged leg elevation at least twice a day  -Continue to monitor clincially    Left foot pain  Patient reports L foot pain that started a couple of hours ago  -No hx of trauma or falls recently  -No red flags on physical exam  Patient able to ambulate with walker    -Continue to monitor clinically  Encouraged to call back the office if symptoms do not improve or worsen         Problem List Items Addressed This Visit        Other    Leg swelling     Chronic  Non-pitting edema  -Denies pain of LE  No problems ambulating with walker  -Encouraged leg elevation at least twice a day  -Continue to monitor clincially         Elevated blood pressure reading in office without diagnosis of hypertension     Episodes of elevated BP mostly in ED or when patient in pain   -Patient started on Metoprolol tartrate 12 5 mg BID on previous visit for hx of palpitations and elevated HR  -BP wnl at this time  Continue to monitor closely  Palpitations     Resolved  -Patient denies episodes of palpitations at this visit  -HR elevated at 113 on this visit  Will increase metoprolol 12 5 mg to 25 mg BID     -Close follow up in 1 month         Relevant Medications    metoprolol tartrate (LOPRESSOR) 25 mg tablet    Left foot pain     Patient reports L foot pain that started a couple of hours ago  -No hx of trauma or falls recently  -No red flags on physical exam  Patient able to ambulate with walker    -Continue to monitor clinically  Encouraged to call back the office if symptoms do not improve or worsen           Other Visit Diagnoses     Onychomycosis    -  Primary    Relevant Orders    Ambulatory referral to Podiatry            Subjective:      Patient ID: Karen Pfeiffer is a 42 y o  female  HPI     Patient is a 41 yo F who presents to the office to follow up intermittent episodes of chest pain, palpitations and excoriations on the face  On previous appointment patient started on metoprolol 12 5 mg due to elevated HR and also hx of palpitations  At this appointment patient reports feeling better  She states chest pain and palpitations have resolved  She reports mild SOB and L foot pain that started around 1 hour ago  Denies trauma or falls recently    The following portions of the patient's history were reviewed and updated as appropriate:   She  has a past medical history of ADD (attention deficit disorder), Anxiety, Astigmatism, Brain lesion, Calcium deficiency, Cellulitis of foot, right (6/4/2018), Cerebral palsy ([address] ), Chronic otitis media, Constipation, Depression, Dysphagia, Esophagitis, Esotropia, GERD (gastroesophageal reflux disease), Hiatal hernia, Hydrocephalus ([address] ), Impaired fasting glucose, Left nephrolithiasis (03/04/2019), Myopia, Oppositional defiant disorder, 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[address] ), Seizures ([address] ), Sensorineural hearing loss, Status post ventriculoatrial shunt placement, and Visual impairment  She  has a past surgical history that includes CSF shunt; Leg Surgery; EAR SURGERY; Nose surgery; pr esophagogastroduodenoscopy transoral diagnostic (N/A, 5/9/2019); Upper gastrointestinal endoscopy (05/2019); and Breast biopsy (Left)  She  reports that she has never smoked  She has never used smokeless tobacco  She reports that she does not drink alcohol or use drugs    Current Outpatient Medications Medication Sig Dispense Refill    acetaminophen (TYLENOL) 500 mg tablet Take 1 tablet (500 mg total) by mouth every 6 (six) hours as needed for mild pain 30 tablet 5    aluminum-magnesium hydroxide-simethicone (Antacid) 200-200-20 mg/5 mL suspension Take 15 mL by mouth every 4 (four) hours as needed for indigestion or heartburn 355 mL 5    amitriptyline (ELAVIL) 10 mg tablet Take 10 mg by mouth daily at bedtime      ARIPiprazole (ABILIFY) 20 MG tablet Take 1 tablet (20 mg total) by mouth daily at bedtime 90 tablet 2    bacitracin topical ointment 500 units/g topical ointment Cleanse site on nose with soap and water followed by bacitracin BID until healed then p r n  15 g 0    bismuth subsalicylate (PEPTO BISMOL) 524 mg/30 mL oral suspension Take 15 mL (262 mg total) by mouth every 6 (six) hours as needed for indigestion 360 mL 3    calcium carbonate (TUMS) 500 mg chewable tablet Chew 1 tablet (500 mg total) 3 (three) times a day as needed for heartburn 30 tablet 5    Cholecalciferol (Vitamin D-3) 25 MCG (1000 UT) CAPS Take 2 capsules (2,000 Units total) by mouth daily at 8am  30 capsule 5    dicyclomine (BENTYL) 20 mg tablet Take 1 tablet (20 mg total) by mouth every 6 (six) hours as needed (as needed) 120 tablet 2    Dyclonine-Glycerin (Cepacol Sore Throat Spray) 0 1-33 % LIQD Apply 1 spray to the mouth or throat 3 (three) times a day as needed (sorethroat) 118 mL 2    Emollient (CeraVe) CREA Apply topically 2 (two) times a day Apply topically 2 times to affected heel twice daily @8a-8p 453 g 5    escitalopram (LEXAPRO) 20 mg tablet Take 1 tablet (20 mg total) by mouth daily 90 tablet 2    fluticasone (FLONASE) 50 mcg/act nasal spray 1 spray into each nostril daily as needed for rhinitis (nasal congestion) At 8:00 AM 1 Bottle 5    guaiFENesin (ROBITUSSIN) 100 MG/5ML oral liquid Take 200 mg by mouth 3 (three) times a day as needed for cough      hydrOXYzine HCL (ATARAX) 25 mg tablet Take 1 tablet (25 mg total) by mouth 3 (three) times a day 30 tablet 2    ibuprofen (MOTRIN) 400 mg tablet Take 1 tablet (400 mg total) by mouth every 8 (eight) hours as needed for mild pain 30 tablet 5    lamoTRIgine (LaMICtal) 25 mg tablet Take 25 mg by mouth 2 (two) times a day       lidocaine (LIDODERM) 5 % Apply 1 patch topically daily Remove & Discard patch within 12 hours or as directed by MD 10 patch 0    LORazepam (ATIVAN) 0 5 mg tablet Take 0 25 mg by mouth 2 (two) times a day      lubiprostone (Amitiza) 24 mcg capsule Take 1 capsule (24 mcg total) by mouth daily with breakfast 60 capsule 5    magnesium hydroxide (GNP Milk of Magnesia) 400 mg/5 mL oral suspension Take 30 mL by mouth daily as needed for constipation If no BM in 3 days 355 mL 5    metoprolol tartrate (LOPRESSOR) 25 mg tablet Take 1 tablet (25 mg total) by mouth every 12 (twelve) hours 60 tablet 1    mineral oil-hydrophilic petrolatum (AQUAPHOR) ointment Apply topically as needed for dry skin 420 g 0    Multiple Vitamins-Minerals (CertaVite Senior/Antioxidant) TABS Take 1 tablet by mouth daily 90 tablet 0    norgestimate-ethinyl estradiol (ORTHO-CYCLEN) 0 25-35 MG-MCG per tablet Take 1 tablet by mouth daily 28 tablet 11    nystatin (MYCOSTATIN) powder Apply topically 4 (four) times a day 45 g 2    pantoprazole (PROTONIX) 20 mg tablet Take 1 tablet (20 mg total) by mouth 2 (two) times a day 62 tablet 5    phenol (Chloraseptic) 1 4 % mucosal liquid Apply 1 spray to the mouth or throat every 2 (two) hours as needed (for sore throat as needed) 236 mL 1    polyethylene glycol (MIRALAX) 17 g packet Take one packet daily as needed for no bowel movement in 24 hours 30 each 5    psyllium (METAMUCIL) 58 6 % packet Take 1 packet by mouth daily 30 packet 5    RA SUNSCREEN SPF50 LOTN Apply 15 minutes before sun exposure and every 2 hours 1 Bottle 5    senna-docusate sodium (Senexon-S) 8 6-50 mg per tablet Take 1 tablet by mouth daily at 8am  30 tablet 3    sodium chloride (OCEAN) 0 65 % nasal spray 1 spray into each nostril as needed (three times daily as needed for nasal congestion) 60 mL 1    white petrolatum Apply 1 application topically 2 (two) times a day Apply to forehead where excoriations are noted twice a day for 7 days 500 g 0    zinc oxide (DESITIN) 13 % cream Apply 1 application topically as needed (for perianal irritation) 1 Tube 5    Mouthwashes (Listerine Antiseptic) LIQD Swish and spit 5 mL 2 (two) times a day 1000 mL 5     No current facility-administered medications for this visit  She has No Known Allergies       Review of Systems   Constitutional: Negative for fever  Respiratory: Positive for shortness of breath  Negative for cough  Cardiovascular: Positive for leg swelling  Negative for chest pain and palpitations  Musculoskeletal: Positive for arthralgias (L foot pain)  Neurological: Negative for headaches  Psychiatric/Behavioral: The patient is not nervous/anxious  Objective:      /82 (BP Location: Left arm, Patient Position: Sitting, Cuff Size: Large)   Pulse (!) 113   Temp (!) 97 1 °F (36 2 °C) (Temporal)   Resp 18   Ht 4' 11" (1 499 m)   Wt 98 8 kg (217 lb 12 8 oz)   SpO2 97%   BMI 43 99 kg/m²          Physical Exam  Vitals signs reviewed  Constitutional:       General: She is not in acute distress  Appearance: She is well-developed  She is not diaphoretic  HENT:      Head: Normocephalic and atraumatic  Neck:      Musculoskeletal: Normal range of motion and neck supple  Thyroid: No thyromegaly  Cardiovascular:      Rate and Rhythm: Regular rhythm  Tachycardia present  Heart sounds: Normal heart sounds  No murmur  Pulmonary:      Effort: Pulmonary effort is normal  No respiratory distress  Breath sounds: Normal breath sounds  No wheezing  Abdominal:      General: Bowel sounds are normal  There is no distension  Palpations: Abdomen is soft  Tenderness:  There is no abdominal tenderness  There is no guarding  Musculoskeletal: Normal range of motion  Right lower leg: Edema (non pitting edema) present  Left lower leg: Edema (non pitting edema) present  Feet:       Comments: Stable ambulation with walker   Feet:      Right foot:      Skin integrity: Callus and dry skin present  No erythema  Toenail Condition: Right toenails are abnormally thick  Fungal disease present  Left foot:      Skin integrity: Callus and dry skin present  No erythema  Toenail Condition: Left toenails are abnormally thick  Fungal disease present  Lymphadenopathy:      Cervical: No cervical adenopathy  Skin:     General: Skin is warm and dry  Capillary Refill: Capillary refill takes less than 2 seconds  Findings: No erythema or rash  Neurological:      Mental Status: She is alert and oriented to person, place, and time  Mental status is at baseline     Psychiatric:         Mood and Affect: Mood normal          Behavior: Behavior normal

## 2021-05-17 NOTE — ASSESSMENT & PLAN NOTE
Resolved  -Patient denies episodes of palpitations at this visit  -HR elevated at 113 on this visit  Will increase metoprolol 12 5 mg to 25 mg BID     -Close follow up in 1 month

## 2021-05-17 NOTE — ASSESSMENT & PLAN NOTE
Chronic  Non-pitting edema  -Denies pain of LE  No problems ambulating with walker  -Encouraged leg elevation at least twice a day     -Continue to monitor clincially

## 2021-05-17 NOTE — ASSESSMENT & PLAN NOTE
Patient reports L foot pain that started a couple of hours ago  -No hx of trauma or falls recently  -No red flags on physical exam  Patient able to ambulate with walker    -Continue to monitor clinically   Encouraged to call back the office if symptoms do not improve or worsen

## 2021-05-17 NOTE — ASSESSMENT & PLAN NOTE
Episodes of elevated BP mostly in ED or when patient in pain   -Patient started on Metoprolol tartrate 12 5 mg BID on previous visit for hx of palpitations and elevated HR  -BP wnl at this time  Continue to monitor closely

## 2021-05-21 DIAGNOSIS — K59.00 CONSTIPATION, UNSPECIFIED CONSTIPATION TYPE: ICD-10-CM

## 2021-05-21 RX ORDER — AMOXICILLIN 250 MG
1 CAPSULE ORAL DAILY
Qty: 30 TABLET | Refills: 3 | Status: SHIPPED | OUTPATIENT
Start: 2021-05-21 | End: 2021-06-11 | Stop reason: SDUPTHER

## 2021-05-24 ENCOUNTER — HOSPITAL ENCOUNTER (OUTPATIENT)
Dept: RADIOLOGY | Facility: HOSPITAL | Age: 41
Discharge: HOME | End: 2021-05-24
Attending: FAMILY MEDICINE
Payer: MEDICARE

## 2021-05-24 ENCOUNTER — OFFICE VISIT (OUTPATIENT)
Dept: FAMILY MEDICINE CLINIC | Facility: CLINIC | Age: 41
End: 2021-05-24

## 2021-05-24 VITALS
RESPIRATION RATE: 18 BRPM | BODY MASS INDEX: 44.47 KG/M2 | OXYGEN SATURATION: 98 % | HEIGHT: 59 IN | HEART RATE: 101 BPM | SYSTOLIC BLOOD PRESSURE: 134 MMHG | DIASTOLIC BLOOD PRESSURE: 90 MMHG | WEIGHT: 220.6 LBS | TEMPERATURE: 97.6 F

## 2021-05-24 DIAGNOSIS — R29.6 FREQUENT FALLS: ICD-10-CM

## 2021-05-24 DIAGNOSIS — M79.601 PAIN OF RIGHT UPPER EXTREMITY: ICD-10-CM
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
## 2021-05-24 PROCEDURE — 99213 OFFICE O/P EST LOW 20 MIN: CPT | Performed by: FAMILY MEDICINE

## 2021-05-24 PROCEDURE — 73030 X-RAY EXAM OF SHOULDER: CPT

## 2021-05-24 PROCEDURE — 73060 X-RAY EXAM OF HUMERUS: CPT

## 2021-05-24 NOTE — ASSESSMENT & PLAN NOTE
Patient had a witnessed fal on Friday, May 21st, no head injury, fell on the right shoulder and arm  Reports pain in the right upper extremity, large hematoma noted on the anterior aspect of mid humerus, reports tenderness at both anterior and posterior aspects of the right humerus; ROM is limited at baseline as per staff  Will order Xray of the right humerus and shoulder to r/u fracture  May take Tylenol and ice pack PRN for pain  Follow up if no improvement in pain in 1 week

## 2021-05-24 NOTE — PROGRESS NOTES
Assessment/Plan:    Fall  Patient had a witnessed fal on Friday, May 21st, no head injury, fell on the right shoulder and arm  Reports pain in the right upper extremity, large hematoma noted on the anterior aspect of mid humerus, reports tenderness at both anterior and posterior aspects of the right humerus; ROM is limited at baseline as per staff  Will order Xray of the right humerus and shoulder to r/u fracture  May take Tylenol and ice pack PRN for pain  Follow up if no improvement in pain in 1 week    Frequent falls  Frequent falls (5 in the past year)  High risk level for fall (score 15)  Referred to PT/OT       Diagnoses and all orders for this visit:    Fall, initial encounter  -     Ambulatory referral to Physical Therapy; Future  -     Ambulatory referral to Occupational Therapy; Future    Pain of right upper extremity  -     XR humerus right; Future  -     XR shoulder 2+ vw right; Future    Frequent falls  -     Ambulatory referral to Physical Therapy; Future  -     Ambulatory referral to Occupational Therapy; Future          Subjective:      Patient ID: Lorin Aguilar is a 42 y o  female  Patient presented after the witnessed fall 3 days ago; she has a large bruise on the right shoulder and reports pain at the site of trauma  Denies other complains at present  The following portions of the patient's history were reviewed and updated as appropriate: allergies, current medications, past family history, past medical history, past social history, past surgical history and problem list     Review of Systems   HENT: Negative  Respiratory: Negative  Cardiovascular: Negative  Gastrointestinal: Negative  Musculoskeletal: Positive for arthralgias (right arm pain)           Objective:      /90 (BP Location: Left arm, Patient Position: Sitting, Cuff Size: Large)   Pulse 101   Temp 97 6 °F (36 4 °C) (Temporal)   Resp 18   Ht 4' 11" (1 499 m)   Wt 100 kg (220 lb 9 6 oz)   SpO2 98% BMI 44 56 kg/m²          Physical Exam  Cardiovascular:      Rate and Rhythm: Normal rate  Pulmonary:      Effort: Pulmonary effort is normal  No respiratory distress  Musculoskeletal:      Right shoulder: She exhibits decreased range of motion  She exhibits no tenderness and no swelling  Right upper arm: She exhibits tenderness, bony tenderness (questionable) and edema (hematoma)  She exhibits no swelling, no deformity and no laceration  Arms:    Neurological:      Mental Status: She is alert  Mental status is at baseline

## 2021-05-27 ENCOUNTER — TELEPHONE (OUTPATIENT)
Dept: OBGYN CLINIC | Facility: CLINIC | Age: 41
End: 2021-05-27

## 2021-05-27 PROBLEM — R29.6 FREQUENT FALLS: Status: ACTIVE | Noted: 2021-05-27

## 2021-05-28 ENCOUNTER — TELEPHONE (OUTPATIENT)
Dept: OBGYN CLINIC | Facility: CLINIC | Age: 41
End: 2021-05-28

## 2021-05-28 DIAGNOSIS — N92.6 IRREGULAR MENSES: ICD-10-CM

## 2021-05-28 RX ORDER — NORGESTIMATE AND ETHINYL ESTRADIOL 0.25-0.035
1 KIT ORAL DAILY
Qty: 28 TABLET | Refills: 11 | Status: SHIPPED | OUTPATIENT
Start: 2021-05-28 | End: 2021-06-21 | Stop reason: SDUPTHER

## 2021-06-11 DIAGNOSIS — S00.81XD EXCORIATION OF FACE, SUBSEQUENT ENCOUNTER: ICD-10-CM

## 2021-06-11 DIAGNOSIS — J30.89 NON-SEASONAL ALLERGIC RHINITIS, UNSPECIFIED TRIGGER: ICD-10-CM

## 2021-06-11 DIAGNOSIS — R00.2 PALPITATIONS: ICD-10-CM

## 2021-06-11 DIAGNOSIS — J00 COMMON COLD VIRUS: ICD-10-CM

## 2021-06-11 DIAGNOSIS — T14.8XXA WOUND, OPEN: ICD-10-CM

## 2021-06-11 DIAGNOSIS — K59.04 CHRONIC IDIOPATHIC CONSTIPATION: ICD-10-CM

## 2021-06-11 DIAGNOSIS — K59.00 CONSTIPATION, UNSPECIFIED CONSTIPATION TYPE: ICD-10-CM

## 2021-06-11 RX ORDER — GINSENG 100 MG
CAPSULE ORAL
Qty: 15 G | Refills: 2 | Status: SHIPPED | OUTPATIENT
Start: 2021-06-11 | End: 2021-08-27 | Stop reason: SDUPTHER

## 2021-06-11 RX ORDER — FLUTICASONE PROPIONATE 50 MCG
1 SPRAY, SUSPENSION (ML) NASAL DAILY PRN
Qty: 18.2 ML | Refills: 5 | Status: SHIPPED | OUTPATIENT
Start: 2021-06-11

## 2021-06-11 RX ORDER — AMOXICILLIN 250 MG
1 CAPSULE ORAL DAILY
Qty: 30 TABLET | Refills: 5 | Status: SHIPPED | OUTPATIENT
Start: 2021-06-11 | End: 2021-12-07 | Stop reason: SDUPTHER

## 2021-06-14 ENCOUNTER — HOSPITAL ENCOUNTER (EMERGENCY)
Facility: HOSPITAL | Age: 41
Discharge: HOME | End: 2021-06-14
Attending: EMERGENCY MEDICINE
Payer: MEDICARE

## 2021-06-14 ENCOUNTER — EVALUATION (OUTPATIENT)
Dept: PHYSICAL THERAPY | Facility: REHABILITATION | Age: 41
End: 2021-06-14
Payer: MEDICARE

## 2021-06-14 VITALS
SYSTOLIC BLOOD PRESSURE: 116 MMHG | DIASTOLIC BLOOD PRESSURE: 81 MMHG | OXYGEN SATURATION: 95 % | RESPIRATION RATE: 16 BRPM | TEMPERATURE: 98.1 F | HEART RATE: 100 BPM
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
## 2021-06-14 DIAGNOSIS — R29.6 FREQUENT FALLS: ICD-10-CM

## 2021-06-14 DIAGNOSIS — S01.511A LIP LACERATION, INITIAL ENCOUNTER: ICD-10-CM

## 2021-06-14 PROCEDURE — 97162 PT EVAL MOD COMPLEX 30 MIN: CPT | Performed by: PHYSICAL THERAPIST

## 2021-06-14 PROCEDURE — 99282 EMERGENCY DEPT VISIT SF MDM: CPT | Performed by: PHYSICIAN ASSISTANT

## 2021-06-14 PROCEDURE — 97110 THERAPEUTIC EXERCISES: CPT | Performed by: PHYSICAL THERAPIST

## 2021-06-14 PROCEDURE — 99283 EMERGENCY DEPT VISIT LOW MDM: CPT

## 2021-06-14 NOTE — ED PROVIDER NOTES
History  Chief Complaint   Patient presents with    Fall     Pt tripped walking out door with walker and fell on right knee  Laceration present to bottom lip  Denies loc  Patient is a 41 y/o female with hx of ID, CP, seizures, ADD, anxiety, hydrocephalous (frequent falls, presents to the ED via EMS for evaluation of fall  Patient with caregiver who witnessed fall, states they were leaving house for appointments, patient tripped on carpet, falling down onto her knee and hitting her lip on the wheelchair, did not hit her head on the ground or lose consciousness  Patient states she was not dizziness/lightheaded prior to fall, no syncope  Pt sustained small laceration to lip, bleeding controlled with pressure  Pt without fever, chest pain, SOB, knee pain, hip pain, arm pain, neck pain, back pain  Tetanus UTD (9/14/2020)  History provided by:  Caregiver  History limited by: ID       Prior to Admission Medications   Prescriptions Last Dose Informant Patient Reported? Taking?    ARIPiprazole (ABILIFY) 20 MG tablet  Care Giver No No   Sig: Take 1 tablet (20 mg total) by mouth daily at bedtime   Cholecalciferol (Vitamin D-3) 25 MCG (1000 UT) CAPS  Care Giver No No   Sig: Take 2 capsules (2,000 Units total) by mouth daily at 8am    Dyclonine-Glycerin (Cepacol Sore Throat [place]) 0 1-33 % LIQD  Care Giver No No   Sig: Apply 1 spray to the mouth or throat 3 (three) times a day as needed (sorethroat)   Emollient (CeraVe) CREA  Care Giver No No   Sig: Apply topically 2 (two) times a day Apply topically 2 times to affected heel twice daily @8a-8p   LORazepam (ATIVAN) 0 5 mg tablet  Care Giver Yes No   Sig: Take 0 25 mg by mouth 2 (two) times a day   Mouthwashes (Listerine Antiseptic) LIQD  Care Giver No No   Sig: Swish and spit 5 mL 2 (two) times a day   Multiple Vitamins-Minerals (CertaVite Senior/Antioxidant) TABS  Care Giver No No   Sig: Take 1 tablet by mouth daily   RA SUNSCREEN SPF50 LOTN  Care Giver No No   Sig: Apply 15 minutes before sun exposure and every 2 hours   acetaminophen (TYLENOL) 500 mg tablet  Care Giver No No   Sig: Take 1 tablet (500 mg total) by mouth every 6 (six) hours as needed for mild pain   aluminum-magnesium hydroxide-simethicone (Antacid) 200-200-20 mg/5 mL suspension  Care Giver No No   Sig: Take 15 mL by mouth every 4 (four) hours as needed for indigestion or heartburn   amitriptyline (ELAVIL) 10 mg tablet  Care Giver Yes No   Sig: Take 10 mg by mouth daily at bedtime   bacitracin topical ointment 500 units/g topical ointment   No No   Sig: Cleanse site on nose with soap and water followed by bacitracin BID until healed then p r n    bismuth subsalicylate (PEPTO BISMOL) 524 mg/30 mL oral suspension  Care Giver No No   Sig: Take 15 mL (262 mg total) by mouth every 6 (six) hours as needed for indigestion   calcium carbonate (TUMS) 500 mg chewable tablet  Care Giver No No   Sig: Chew 1 tablet (500 mg total) 3 (three) times a day as needed for heartburn   dicyclomine (BENTYL) 20 mg tablet  Care Giver No No   Sig: Take 1 tablet (20 mg total) by mouth every 6 (six) hours as needed (as needed)   escitalopram (LEXAPRO) 20 mg tablet  Care Giver No No   Sig: Take 1 tablet (20 mg total) by mouth daily   fluticasone (FLONASE) 50 mcg/act nasal spray   No No   Si spray into each nostril daily as needed for rhinitis (nasal congestion) At 8:00 AM   guaiFENesin (ROBITUSSIN) 100 MG/5ML oral liquid  Care Giver Yes No   Sig: Take 200 mg by mouth 3 (three) times a day as needed for cough   hydrOXYzine HCL (ATARAX) 25 mg tablet  Care Giver No No   Sig: Take 1 tablet (25 mg total) by mouth 3 (three) times a day   ibuprofen (MOTRIN) 400 mg tablet  Care Giver No No   Sig: Take 1 tablet (400 mg total) by mouth every 8 (eight) hours as needed for mild pain   lamoTRIgine (LaMICtal) 25 mg tablet  Care Giver Yes No   Sig: Take 25 mg by mouth 2 (two) times a day    lidocaine (LIDODERM) 5 %  Care Giver No No   Sig: Apply 1 patch topically daily Remove & Discard patch within 12 hours or as directed by MD   lubiprostone (Amitiza) 24 mcg capsule  Care Giver No No   Sig: Take 1 capsule (24 mcg total) by mouth daily with breakfast   magnesium hydroxide (GNP Milk of Magnesia) 400 mg/5 mL oral suspension  Care Giver No No   Sig: Take 30 mL by mouth daily as needed for constipation If no BM in 3 days   metoprolol tartrate (LOPRESSOR) 25 mg tablet   No No   Sig: Take 1 tablet (25 mg total) by mouth every 12 (twelve) hours   mineral oil-hydrophilic petrolatum (AQUAPHOR) ointment   No No   Sig: Apply topically as needed for dry skin   norgestimate-ethinyl estradiol (ORTHO-CYCLEN) 0 25-35 MG-MCG per tablet   No No   Sig: Take 1 tablet by mouth daily   nystatin (MYCOSTATIN) powder  Care Giver No No   Sig: Apply topically 4 (four) times a day   pantoprazole (PROTONIX) 20 mg tablet  Care Giver No No   Sig: Take 1 tablet (20 mg total) by mouth 2 (two) times a day   phenol (Chloraseptic) 1 4 % mucosal liquid  Care Giver No No   Sig: Apply 1 spray to the mouth or throat every 2 (two) hours as needed (for sore throat as needed)   polyethylene glycol (MIRALAX) 17 g packet  Care Giver No No   Sig: Take one packet daily as needed for no bowel movement in 24 hours   psyllium (METAMUCIL) 58 6 % packet   No No   Sig: Take 1 packet by mouth daily   senna-docusate sodium (Senexon-S) 8 6-50 mg per tablet   No No   Sig: Take 1 tablet by mouth daily at 8am    sodium chloride (OCEAN) 0 65 % nasal spray  Care Giver No No   Si spray into each nostril as needed (three times daily as needed for nasal congestion)   white petrolatum  Care Giver No No   Sig: Apply 1 application topically 2 (two) times a day Apply to forehead where excoriations are noted twice a day for 7 days   zinc oxide (DESITIN) 13 % cream  Care Giver No No   Sig: Apply 1 application topically as needed (for perianal irritation)      Facility-Administered Medications: None       Past Medical History:   Diagnosis Date    ADD (attention deficit disorder)     Anxiety     Astigmatism     Brain lesion     Calcium deficiency     Cellulitis of foot, right 6/4/2018    Cerebral palsy (HCC)     Chronic otitis media     Constipation     Depression     Last Assessed:7/6/2016    Dysphagia     Esophagitis     Esotropia     GERD (gastroesophageal reflux disease)     Hiatal hernia     Hydrocephalus (HCC)     Impaired fasting glucose     Left nephrolithiasis 03/04/2019    Myopia     Oppositional defiant disorder     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HCC)     Seizures (HCC)     Sensorineural hearing loss     Status post ventriculoatrial shunt placement     Visual impairment        Past Surgical History:   Procedure Laterality Date    BREAST BIOPSY Left     X 2 (not sure of years)    CSF SHUNT      Creation of Ventriculo-Peritoneal CSF shunt ; Last Assessed:7/6/2016    EAR SURGERY      Last Assessed:7/6/2016    LEG SURGERY      due to CP     NOSE SURGERY      Last Assessed:7/6/2016    AK ESOPHAGOGASTRODUODENOSCOPY TRANSORAL DIAGNOSTIC N/A 5/9/2019    Procedure: ESOPHAGOGASTRODUODENOSCOPY (EGD) with biopsy;  Surgeon: Daniel Ceja MD;  Location: [facility]; Service: Gastroenterology    UPPER GASTROINTESTINAL ENDOSCOPY  05/2019       Family History   Problem Relation Age of Onset    Diabetes Mother     Colon cancer Father     No Known Problems Maternal Grandmother     No Known Problems Maternal Grandfather     No Known Problems Paternal Grandmother     No Known Problems Paternal Grandfather      I have reviewed and agree with the history as documented      E-Cigarette/Vaping    E-Cigarette Use Never User      E-Cigarette/Vaping Substances    Nicotine No     THC No     CBD No     Flavoring No     Other No     Unknown No      Social History     Tobacco Use    Smoking status: Never Smoker    Smokeless tobacco: Never Used   Vaping Use    Vaping Use: Never used   Substance Use Topics    Alcohol use: Never    Drug use: Never       Review of Systems   Reason unable to perform ROS: ID        Physical Exam  Physical Exam  Constitutional:       General: She is not in acute distress  Appearance: She is well-developed  She is not ill-appearing or toxic-appearing  HENT:      Head: Normocephalic and atraumatic  No raccoon eyes, Mckinney's sign, abrasion or contusion  Right Ear: External ear normal       Left Ear: External ear normal       Nose: Nose normal  No nasal deformity or signs of injury  Mouth/Throat:      Lips: Pink  Mouth: Mucous membranes are moist  Lacerations present  Pharynx: Oropharynx is clear  Uvula midline  Comments: No dental injury - no tooth tenderness or instability   Eyes:      Conjunctiva/sclera: Conjunctivae normal       Pupils: Pupils are equal, round, and reactive to light  Cardiovascular:      Rate and Rhythm: Normal rate and regular rhythm  Pulmonary:      Effort: Pulmonary effort is normal       Breath sounds: Normal breath sounds  No stridor  No decreased breath sounds, wheezing, rhonchi or rales  Musculoskeletal:         General: Normal range of motion  Cervical back: Normal, normal range of motion and neck supple  No spinous process tenderness or muscular tenderness  Thoracic back: Normal       Lumbar back: Normal    Skin:     General: Skin is warm and dry  Capillary Refill: Capillary refill takes less than 2 seconds  Neurological:      Mental Status: She is alert and oriented to person, place, and time     Psychiatric:         Mood and Affect: Mood and affect normal          Vital Signs  ED Triage Vitals [06/14/21 1114]   Temperature Pulse Respirations Blood Pressure SpO2   98 1 °F (36 7 °C) 100 16 116/81 95 %      Temp Source Heart Rate Source Patient Position - Orthostatic VS BP Location FiO2 (%)   Oral Monitor Lying Right arm --      Pain Score       --           Vitals:    06/14/21 1114   BP: 116/81   Pulse: 100 Patient Position - Orthostatic VS: Lying         Visual Acuity      ED Medications  Medications - No data to display    Diagnostic Studies  Results Reviewed     None                 No orders to display              Procedures  Procedures         ED Course                             SBIRT 20yo+      Most Recent Value   SBIRT (22 yo +)   In order to provide better care to our patients, we are screening all of our patients for alcohol and drug use  Would it be okay to ask you these screening questions? Yes Filed at: 06/14/2021 1116   Initial Alcohol Screen: US AUDIT-C    1  How often do you have a drink containing alcohol?  0 Filed at: 06/14/2021 1116   2  How many drinks containing alcohol do you have on a typical day you are drinking? 0 Filed at: 06/14/2021 1116   3a  Male UNDER 65: How often do you have five or more drinks on one occasion? 0 Filed at: 06/14/2021 1116   3b  FEMALE Any Age, or MALE 65+: How often do you have 4 or more drinks on one occassion? 0 Filed at: 06/14/2021 1116   Audit-C Score  0 Filed at: 06/14/2021 1116   SUKHDEV: How many times in the past year have you    Used an illegal drug or used a prescription medication for non-medical reasons? Never Filed at: 06/14/2021 1116                    MDM  Number of Diagnoses or Management Options  Fall, initial encounter: new and does not require workup  Lip laceration, initial encounter: new and does not require workup  Diagnosis management comments: Patient is a 41 y/o female with hx of ID, CP, seizures, ADD, anxiety, hydrocephalous (frequent falls, presents to the ED via EMS for evaluation of fall  Patient with caregiver who witnessed fall, states they were leaving house for appointments, patient tripped on carpet, falling down onto her knee and hitting her lip on the wheelchair, did not hit her head on the ground or lose consciousness  Patient states she was not dizziness/lightheaded prior to fall, no syncope   Pt sustained small laceration to lip, bleeding controlled with pressure  GCS 15  No evidence of head trauma on exam, no head injury or LOC, patient acting appropriately per caregiver, no imaging indicated at this time  Tetanus UTD  0 5cm laceration to inner lower lip, patient declining suture, laceration is not through and through, explained to patient to keep area clean  No dental injury  Signs and symptoms of infection discussed with patient/caregiver and reasons to return to the ED  Patient does have appointment with PCP and PT/OT today for evaluation of frequent falls  Discussed with caregiver, verbalizes understanding and agrees with plan  The management plan was discussed in detail with the patient at bedside and all questions were answered  Prior to discharge, I provided both verbal and written instructions  I discussed with the caregiver the signs and symptoms for which to return to the emergency department  All questions were answered and patient and caregiver was comfortable with the plan of care and discharged to home  The caregiver agrees to return to the Emergency Department for concerns and/or progression of illness  Disposition  Final diagnoses:   Fall, initial encounter   Lip laceration, initial encounter     Time reflects when diagnosis was documented in both MDM as applicable and the Disposition within this note     Time User Action Codes Description Comment    6/14/2021 12:06 PM Jennifer Martin Add [[id number]] Fall, initial encounter     6/14/2021 12:06 PM Jennifer Martin Add [[id number]] Lip laceration, initial encounter       ED Disposition     ED Disposition Condition Date/Time Comment    Discharge Stable Mon Jun 14, 2021 12:06 PM Brandon Lagunas discharge to home/self care              Follow-up Information     Follow up With Specialties Details Why Contact Info Additional [address] [facility] Emergency Department Emergency Medicine Go to  If symptoms worsen [address] [facility] [place] [id number]  [address] [facility] Emergency Department, [address] , [facility], [place], [address]          Patient's Medications   Discharge Prescriptions    No medications on file     No discharge procedures on file      PDMP Review     None          ED Provider  Electronically Signed by           Job Zazueta PA-C  06/14/21 9344

## 2021-06-14 NOTE — PROGRESS NOTES
PT Evaluation     Today's date: 2021  Patient name: Aixa Stanley  : 1980  MRN: [id number]  Referring provider: Larissa Metcalf MD  Dx:   Encounter Diagnosis     ICD-10-CM    1  Fall, initial encounter  [address]  XXXA Ambulatory referral to Physical Therapy   2  Frequent falls  R29 6 Ambulatory referral to Physical Therapy                  Assessment  Assessment details: [facility] [place] reports to PT with CC of worsening falls with hx significant for cerebral palsy, seizures, and ADD  Results of evaluation in indicate impaired LE strength, LE spasticity, abnormal gait, and decreased functional endurance  She is at risk for falls per multiple outcome measures  This is resulting in difficulty with household and community ambulation, and perform ADLs  The patient would benefit from skilled PT to address these deficits and maximize function  Barriers to therapy: Cognitive deficits, attention deficits  Understanding of Dx/Px/POC: good   Prognosis: fair    Goals  STGs (4 weeks)  Pt will be independent with comprehensive HEP   Pt will demonstrate at least 3s improvement on 5xSTS  Pt will demonstrate at least 3s improvement on TUG    LTG's (to be achieved by d/c)  Pt will be able to self manage sx's independently   Pt will demonstrate at least 100ft foot improvement in max walking endurance with AD  Pt will report at least 50% reduced difficulty with balance when performing ADL's including dressing     Plan  Plan details: Initiate POC     Patient would benefit from: skilled physical therapy  Planned therapy interventions: balance, gait training, home exercise program, therapeutic activities, therapeutic exercise, stretching, strengthening, patient education, neuromuscular re-education and manual therapy  Frequency: 2x week  Duration in weeks: 8  Plan of Care beginning date: 2021  Plan of Care expiration date: 2021  Treatment plan discussed with: patient and caregiver        Subjective Evaluation    History of Present Illness  Mechanism of injury: Comes to PT with caregiver Cecy Trotter who is with her most days of the week  Comes to PT with CC of increasing falls  Hx of CP, seizures,   Reports she often trips or her R knee "gives out" on her which makes her fall  She sustained a fall earlier today when she tripped on carpet  She lacerated her lower lip during this fall and was brought urgent care center to be evaluated  No imaging was warranted  Lives in a group home  Uses Rolator walker for all mobility  Often only uses her L UE to hold walker due to weakness and deformity on RUE  Caregiver was wondering about strengthening her RUE       Goals: improve balance particularly when dressing   Pain  Location: lower lip           Objective    HR: 115  O2: 95%  BP: unable to read       Manual Muscle Testing - Hip Left Right   Flexion 5 4+   Extension     Abduction     External Rotation       Manual Muscle Testing - Knee Left Right   Flexion 4 3+   Extension 4+ 4     Manual Muscle Testing - Ankle Left Right   Doriflexion 4 3+   Plantarflexion 4 3+         Coordination Left Right   Heel To Shin     Finger To Nose     Rapid Alternating Supination     Alt toe tapping         Sensation Left Right   Kinesthesia     Light Touch     Sharp/Dull     2 Point Discrimination         Muscle Tone (Modified Dexter Scale**) Left Right   Hamstring 0 2   Gastroc 2 2   Quad 0 2   **  0 = no increase in muscle tone  1 = slight increase in muscle tone, minimal resistance at the end of ROM when moved  1+ = minimal resistance throughout the remainder (less than half) of ROM when moved  2 =  Butch increase in muscle tone through most of the ROM, but still moved easily  3 = considerable increase in muscle tone, movement difficult  4 = affects parts rigid       Balance Test Initial Eval      5x Sit to Stand: 33s, 22 in surface, 1 UE      TUs  33s      Gait Speed:        2 Minute Walk Test: Requested to stop at 1:47, walked 100ft with RW    HR: 123  O2: 96%      6 Minute Walk Test:       Cunningham Balance Scale:       FGA:        Modified Addison Scale Score:       Patient-Reported Outcome Measure  Stroke Impact Scale:            MCTSIB Number of Seconds   Feet Together, Eyes Open    Feet Together, Eyes Closed    Feet Together, Eyes Open Foam    Feet Together, Eyes Closed Foam        Gait Assessment: decreased stride length and mid foot strike b/l  Foot externally rotated on R  Precautions: Seizures, intellectual disability, ADHD       HEP: seated march, LAQ, seated heel/toe raises, STS      Manuals 6/14                                                                Neuro Re-Ed                                                                                                        Ther Ex             Educated in HEP 8 min                                                                                                       Ther Activity                                       Gait Training                                       Modalities

## 2021-06-15 ENCOUNTER — OFFICE VISIT (OUTPATIENT)
Dept: FAMILY MEDICINE CLINIC | Facility: CLINIC | Age: 41
End: 2021-06-15

## 2021-06-15 VITALS
WEIGHT: 216.8 LBS | RESPIRATION RATE: 18 BRPM | HEIGHT: 59 IN | SYSTOLIC BLOOD PRESSURE: 144 MMHG | BODY MASS INDEX: 43.71 KG/M2 | DIASTOLIC BLOOD PRESSURE: 72 MMHG | HEART RATE: 80 BPM | TEMPERATURE: 97.4 F

## 2021-06-15 DIAGNOSIS — R03.0 ELEVATED BLOOD PRESSURE READING IN OFFICE WITHOUT DIAGNOSIS OF HYPERTENSION: ICD-10-CM

## 2021-06-15 DIAGNOSIS — M25.561 ACUTE PAIN OF RIGHT KNEE: ICD-10-CM
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
## 2021-06-15 DIAGNOSIS — S01.511D LIP LACERATION, SUBSEQUENT ENCOUNTER: Primary | ICD-10-CM

## 2021-06-15 PROBLEM — S01.511A LACERATION OF LIP: Status: ACTIVE | Noted: 2019-09-23

## 2021-06-15 PROCEDURE — 99213 OFFICE O/P EST LOW 20 MIN: CPT | Performed by: FAMILY MEDICINE

## 2021-06-15 NOTE — PROGRESS NOTES
Assessment/Plan:    Laceration of lip  Advised patient and caregiver to apply vasoline on the lower lip lesion and take Tylenol for pain as needed  Elevated blood pressure reading in office without diagnosis of hypertension  Blood Pressure: 144/72   Caregiver reports patient has been anxious since fall episode yesterday    -Currently on Metoprolol tartrate 12 5 mg BID for palpitations and tachycardia  Tolerating well  -HR wnl at this visit  -Continue to monitor closely    Fall  Patient had witnessed fall yesterday when she tripped with carpet  No head trauma, LOC  Has excoriation on Lip    -On ED evaluation yesterday patient refused sutures on upper lip  Did not complained of pain  -Continue encouraging follow up with PT  Patient had first visit yesterday  -Tylenol for mild pain    Acute pain of right knee  After mechanical fall yesterday    -Patient evaluated in ED  Patient did not complained of pain  -No red flags on physical exam  Mild tenderness to palpation on anterior patella  -Advised patient and caregiver to call back the office if pain worsens   -Tylenol as needed to manage pain  Problem List Items Addressed This Visit        Digestive    Laceration of lip - Primary     Advised patient and caregiver to apply vasoline on the lower lip lesion and take Tylenol for pain as needed  Other    Acute pain of right knee     After mechanical fall yesterday    -Patient evaluated in ED  Patient did not complained of pain  -No red flags on physical exam  Mild tenderness to palpation on anterior patella  -Advised patient and caregiver to call back the office if pain worsens   -Tylenol as needed to manage pain  Elevated blood pressure reading in office without diagnosis of hypertension     Blood Pressure: 144/72   Caregiver reports patient has been anxious since fall episode yesterday    -Currently on Metoprolol tartrate 12 5 mg BID for palpitations and tachycardia   Tolerating well  -HR wnl at this visit  -Continue to monitor closely         Fall     Patient had witnessed fall yesterday when she tripped with carpet  No head trauma, LOC  Has excoriation on Lip    -On ED evaluation yesterday patient refused sutures on upper lip  Did not complained of pain  -Continue encouraging follow up with PT  Patient had first visit yesterday  -Tylenol for mild pain                 Subjective:      Patient ID: Isabel Coronado is a 42 y o  female  The patient is a 41 y/o female with a PMHx of cerebral palsy, hydrocephalus, pituitary adenoma, frequent falls, and ambulatory dysfunction who presents to follow up on a fall she experienced yesterday  She sustained a small lower left lip lesion as well as right knee pain due to bruised tissue  She denies feeling light headed before falling and she did not hit her head  The patient tripped on the carpet at home and her caregiver immediately brought her to the ER  No chest pain, no palpitations  The following portions of the patient's history were reviewed and updated as appropriate:   She  has a past medical history of ADD (attention deficit disorder), Anxiety, Astigmatism, Brain lesion, Calcium deficiency, Cellulitis of foot, right (6/4/2018), Cerebral palsy ([address] ), Chronic otitis media, Constipation, Depression, Dysphagia, Esophagitis, Esotropia, GERD (gastroesophageal reflux disease), Hiatal hernia, Hydrocephalus ([address] ), Impaired fasting glucose, Left nephrolithiasis (03/04/2019), Myopia, Oppositional defiant disorder, 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[address] ), Seizures ([address] ), Sensorineural hearing loss, Status post ventriculoatrial shunt placement, and Visual impairment  She  has a past surgical history that includes CSF shunt; Leg Surgery; EAR SURGERY; Nose surgery; pr esophagogastroduodenoscopy transoral diagnostic (N/A, 5/9/2019); Upper gastrointestinal endoscopy (05/2019); and Breast biopsy (Left)  She  reports that she has never smoked   She has never used smokeless tobacco  She reports that she does not drink alcohol and does not use drugs    Current Outpatient Medications   Medication Sig Dispense Refill    acetaminophen (TYLENOL) 500 mg tablet Take 1 tablet (500 mg total) by mouth every 6 (six) hours as needed for mild pain 30 tablet 5    aluminum-magnesium hydroxide-simethicone (Antacid) 200-200-20 mg/5 mL suspension Take 15 mL by mouth every 4 (four) hours as needed for indigestion or heartburn 355 mL 5    amitriptyline (ELAVIL) 10 mg tablet Take 10 mg by mouth daily at bedtime      ARIPiprazole (ABILIFY) 20 MG tablet Take 1 tablet (20 mg total) by mouth daily at bedtime 90 tablet 2    bacitracin topical ointment 500 units/g topical ointment Cleanse site on nose with soap and water followed by bacitracin BID until healed then p r n  15 g 2    bismuth subsalicylate (PEPTO BISMOL) 524 mg/30 mL oral suspension Take 15 mL (262 mg total) by mouth every 6 (six) hours as needed for indigestion 360 mL 3    calcium carbonate (TUMS) 500 mg chewable tablet Chew 1 tablet (500 mg total) 3 (three) times a day as needed for heartburn 30 tablet 5    Cholecalciferol (Vitamin D-3) 25 MCG (1000 UT) CAPS Take 2 capsules (2,000 Units total) by mouth daily at 8am  30 capsule 5    dicyclomine (BENTYL) 20 mg tablet Take 1 tablet (20 mg total) by mouth every 6 (six) hours as needed (as needed) 120 tablet 2    Dyclonine-Glycerin (Cepacol Sore Throat Spray) 0 1-33 % LIQD Apply 1 spray to the mouth or throat 3 (three) times a day as needed (sorethroat) 118 mL 2    Emollient (CeraVe) CREA Apply topically 2 (two) times a day Apply topically 2 times to affected heel twice daily @8a-8p 453 g 5    escitalopram (LEXAPRO) 20 mg tablet Take 1 tablet (20 mg total) by mouth daily 90 tablet 2    fluticasone (FLONASE) 50 mcg/act nasal spray 1 spray into each nostril daily as needed for rhinitis (nasal congestion) At 8:00 AM 18 2 mL 5    guaiFENesin (ROBITUSSIN) 100 MG/5ML oral liquid Take 200 mg by mouth 3 (three) times a day as needed for cough      hydrOXYzine HCL (ATARAX) 25 mg tablet Take 1 tablet (25 mg total) by mouth 3 (three) times a day 30 tablet 2    ibuprofen (MOTRIN) 400 mg tablet Take 1 tablet (400 mg total) by mouth every 8 (eight) hours as needed for mild pain 30 tablet 5    lamoTRIgine (LaMICtal) 25 mg tablet Take 25 mg by mouth 2 (two) times a day       lidocaine (LIDODERM) 5 % Apply 1 patch topically daily Remove & Discard patch within 12 hours or as directed by MD 10 patch 0    LORazepam (ATIVAN) 0 5 mg tablet Take 0 25 mg by mouth 2 (two) times a day      lubiprostone (Amitiza) 24 mcg capsule Take 1 capsule (24 mcg total) by mouth daily with breakfast 60 capsule 5    magnesium hydroxide (GNP Milk of Magnesia) 400 mg/5 mL oral suspension Take 30 mL by mouth daily as needed for constipation If no BM in 3 days 355 mL 5    metoprolol tartrate (LOPRESSOR) 25 mg tablet Take 1 tablet (25 mg total) by mouth every 12 (twelve) hours 60 tablet 5    mineral oil-hydrophilic petrolatum (AQUAPHOR) ointment Apply topically as needed for dry skin 420 g 5    Multiple Vitamins-Minerals (CertaVite Senior/Antioxidant) TABS Take 1 tablet by mouth daily 90 tablet 0    norgestimate-ethinyl estradiol (ORTHO-CYCLEN) 0 25-35 MG-MCG per tablet Take 1 tablet by mouth daily 28 tablet 11    nystatin (MYCOSTATIN) powder Apply topically 4 (four) times a day 45 g 2    pantoprazole (PROTONIX) 20 mg tablet Take 1 tablet (20 mg total) by mouth 2 (two) times a day 62 tablet 5    phenol (Chloraseptic) 1 4 % mucosal liquid Apply 1 spray to the mouth or throat every 2 (two) hours as needed (for sore throat as needed) 236 mL 1    polyethylene glycol (MIRALAX) 17 g packet Take one packet daily as needed for no bowel movement in 24 hours 30 each 5    psyllium (METAMUCIL) 58 6 % packet Take 1 packet by mouth daily 30 packet 5    RA SUNSCREEN SPF50 LOTN Apply 15 minutes before sun exposure and every 2 hours 1 Bottle 5  senna-docusate sodium (Senexon-S) 8 6-50 mg per tablet Take 1 tablet by mouth daily at 8am  30 tablet 5    sodium chloride (OCEAN) 0 65 % nasal spray 1 spray into each nostril as needed (three times daily as needed for nasal congestion) 60 mL 1    white petrolatum Apply 1 application topically 2 (two) times a day Apply to forehead where excoriations are noted twice a day for 7 days 500 g 0    zinc oxide (DESITIN) 13 % cream Apply 1 application topically as needed (for perianal irritation) 1 Tube 5    Mouthwashes (Listerine Antiseptic) LIQD Swish and spit 5 mL 2 (two) times a day 1000 mL 5     No current facility-administered medications for this visit  She has No Known Allergies       Review of Systems   Respiratory: Negative for shortness of breath  Cardiovascular: Positive for leg swelling  Negative for chest pain and palpitations  Gastrointestinal: Negative for abdominal pain  Objective:      /72 (BP Location: Left arm, Patient Position: Sitting, Cuff Size: Large)   Pulse 80   Temp (!) 97 4 °F (36 3 °C) (Temporal)   Resp 18   Ht 4' 11" (1 499 m)   Wt 98 3 kg (216 lb 12 8 oz)   BMI 43 79 kg/m²          Physical Exam  Constitutional:       Appearance: She is obese  Comments: Stable gait with walker   HENT:      Left Ear: There is impacted cerumen  Mouth/Throat:      Pharynx: Posterior oropharyngeal erythema present  Comments: Small lower left lip lesion, no bleeding  Cardiovascular:      Rate and Rhythm: Normal rate and regular rhythm  Heart sounds: Normal heart sounds  No murmur heard  Pulmonary:      Effort: Pulmonary effort is normal  No respiratory distress  Breath sounds: Normal breath sounds  No wheezing  Abdominal:      Palpations: Abdomen is soft  Musculoskeletal:         General: Tenderness present  Right lower leg: Edema (Non pitting edema) present  Left lower leg: Edema (non pitting edema) present  Comments:  The right knee is tender to palpation  Normal range of motion  Skin:     General: Skin is warm  Capillary Refill: Capillary refill takes less than 2 seconds  Neurological:      Mental Status: She is alert  Mental status is at baseline  Psychiatric:         Cognition and Memory: Cognition is impaired

## 2021-06-15 NOTE — ASSESSMENT & PLAN NOTE
Advised patient and caregiver to follow up for x-ray if right knee becomes stiff  Tylenol as needed to manage pain

## 2021-06-15 NOTE — ASSESSMENT & PLAN NOTE
After mechanical fall yesterday    -Patient evaluated in ED  Patient did not complained of pain  -No red flags on physical exam  Mild tenderness to palpation on anterior patella  -Advised patient and caregiver to call back the office if pain worsens   -Tylenol as needed to manage pain

## 2021-06-15 NOTE — ASSESSMENT & PLAN NOTE
Blood Pressure: 144/72   Caregiver reports patient has been anxious since fall episode yesterday    -Currently on Metoprolol tartrate 12 5 mg BID for palpitations and tachycardia   Tolerating well  -HR wnl at this visit  -Continue to monitor closely

## 2021-06-15 NOTE — ASSESSMENT & PLAN NOTE
Patient had witnessed fall yesterday when she tripped with carpet  No head trauma, LOC  Has excoriation on Lip    -On ED evaluation yesterday patient refused sutures on upper lip  Did not complained of pain  -Continue encouraging follow up with PT   Patient had first visit yesterday  -Tylenol for mild pain

## 2021-06-15 NOTE — ASSESSMENT & PLAN NOTE
Advised patient and caregiver to apply vasoline on the lower lip lesion and take Tylenol for pain as needed

## 2021-06-21 ENCOUNTER — ANNUAL EXAM (OUTPATIENT)
Dept: OBGYN CLINIC | Facility: CLINIC | Age: 41
End: 2021-06-21

## 2021-06-21 VITALS
HEART RATE: 102 BPM | SYSTOLIC BLOOD PRESSURE: 124 MMHG | BODY MASS INDEX: 43.83 KG/M2 | DIASTOLIC BLOOD PRESSURE: 84 MMHG | WEIGHT: 217 LBS

## 2021-06-21 DIAGNOSIS — N92.6 IRREGULAR MENSES: ICD-10-CM

## 2021-06-21 DIAGNOSIS — Z13.31 POSITIVE DEPRESSION SCREENING: ICD-10-CM

## 2021-06-21 DIAGNOSIS — Z01.419 ENCOUNTER FOR GYNECOLOGICAL EXAMINATION WITHOUT ABNORMAL FINDING: Primary | ICD-10-CM

## 2021-06-21 DIAGNOSIS — Z12.4 SCREENING FOR CERVICAL CANCER: ICD-10-CM

## 2021-06-21 DIAGNOSIS — B37.3 VULVAL CANDIDIASIS: ICD-10-CM

## 2021-06-21 DIAGNOSIS — Z12.31 ENCOUNTER FOR SCREENING MAMMOGRAM FOR MALIGNANT NEOPLASM OF BREAST: ICD-10-CM

## 2021-06-21 DIAGNOSIS — Z30.09 UNWANTED FERTILITY: ICD-10-CM

## 2021-06-21 PROCEDURE — G0101 CA SCREEN;PELVIC/BREAST EXAM: HCPCS | Performed by: NURSE PRACTITIONER

## 2021-06-21 RX ORDER — NORGESTIMATE AND ETHINYL ESTRADIOL 0.25-0.035
1 KIT ORAL DAILY
Qty: 28 TABLET | Refills: 11 | Status: SHIPPED | OUTPATIENT
Start: 2021-06-21 | End: 2022-05-26 | Stop reason: SDUPTHER

## 2021-06-21 RX ORDER — FLUCONAZOLE 150 MG/1
150 TABLET ORAL ONCE
Qty: 1 TABLET | Refills: 0 | Status: SHIPPED | OUTPATIENT
Start: 2021-06-21 | End: 2021-06-21

## 2021-06-21 NOTE — PATIENT INSTRUCTIONS
OBESITY     Obesity is defined as a body mass index (BMI) which is greater than 30  Your There is no height or weight on file to calculate BMI       The risks of obesity include  many health problems, such as injuries or physical disability  You may need tests to check for the following:  · Diabetes     · High blood pressure or high cholesterol     · Heart disease     · Gallbladder or liver disease     · Cancer of the colon, breast, prostate, liver, or kidney     · Sleep apnea     · Arthritis or gout    Seek care immediately if:   · You have a severe headache, confusion, or difficulty speaking  · You have weakness on one side of your body  · You have chest pain, sweating, or shortness of breath  Contact your healthcare provider if:   · You have symptoms of gallbladder or liver disease, such as pain in your upper abdomen  · You have knee or hip pain and discomfort while walking  · You have symptoms of diabetes, such as intense hunger and thirst, and frequent urination  · You have symptoms of sleep apnea, such as snoring or daytime sleepiness  · You have questions or concerns about your condition or care  Treatment for obesity  focuses on helping you lose weight to improve your health  Even a small decrease in BMI can reduce the risk for many health problems  Your healthcare provider will help you set a weight-loss goal   · Lifestyle changes  are the first step in treating obesity  These include making healthy food choices and getting regular physical activity  Your healthcare provider may suggest a weight-loss program that involves coaching, education, and therapy  · Medicine  may help you lose weight when it is used with a healthy diet and physical activity  · Surgery  can help you lose weight if you are very obese and have other health problems  There are several types of weight-loss surgery  Ask your healthcare provider for more information      Be successful losing weight: · Set small, realistic goals  An example of a small goal is to walk for 20 minutes 5 days a week  Anther goal is to lose 5% of your body weight  · Tell friends, family members, and coworkers about your goals  and ask for their support  Ask a friend to lose weight with you, or join a weight-loss support group  · Identify foods or triggers that may cause you to overeat , and find ways to avoid them  Remove tempting high-calorie foods from your home and workplace  Place a bowl of fresh fruit on your kitchen counter  If stress causes you to eat, then find other ways to cope with stress  · Keep a diary to track what you eat and drink  Also write down how many minutes of physical activity you do each day  Weigh yourself once a week and record it in your diary  Eating changes: You will need to eat 500 to 1,000 fewer calories each day than you currently eat to lose 1 to 2 pounds a week  The following changes will help you cut calories:  · Eat smaller portions  Use small plates, no larger than 9 inches in diameter  Fill your plate half full of fruits and vegetables  Measure your food using measuring cups until you know what a serving size looks like  · Eat 3 meals and 1 or 2 snacks each day  Plan your meals in advance  Bandar David and eat at home most of the time  Eat slowly  · Eat fruits and vegetables at every meal   They are low in calories and high in fiber, which makes you feel full  Do not add butter, margarine, or cream sauce to vegetables  Use herbs to season steamed vegetables  · Eat less fat and fewer fried foods  Eat more baked or grilled chicken and fish  These protein sources are lower in calories and fat than red meat  Limit fast food  Dress your salads with olive oil and vinegar instead of bottled dressing  · Limit the amount of sugar you eat  Do not drink sugary beverages  Limit alcohol  Activity changes:  Physical activity is good for your body in many ways   It helps you burn calories and build strong muscles  It decreases stress and depression, and improves your mood  It can also help you sleep better  Talk to your healthcare provider before you begin an exercise program   · Exercise for at least 30 minutes 5 days a week  Start slowly  Set aside time each day for physical activity that you enjoy and that is convenient for you  It is best to do both weight training and an activity that increases your heart rate, such as walking, bicycling, or swimming  · Find ways to be more active  Do yard work and housecleaning  Walk up the stairs instead of using elevators  Spend your leisure time going to events that require walking, such as outdoor festivals or fairs  This extra physical activity can help you lose weight and keep it off  Follow up with your primary healthcare provider as directed  You may need to meet with a dietitian  Write down your questions so you remember to ask them during your visits

## 2021-06-21 NOTE — PROGRESS NOTES
ANNUAL GYNECOLOGICAL EXAMINATION    Karen Pfeiffer is a 42 y o  female who presents today for annual GYN exam   Her last pap smear was performed 2019 and result was NILM with negative HPV  She reports no history of abnormal pap smears in her past  Her last mammogram was performed 3/22/2021 and result was WNL  She reports menses as regular  Her contraceptive method is OCP  She is mentally challenged and is a poor historian  Her general medical history has been reviewed as best as possible and is reported as follows:    Past Medical History:   Diagnosis Date    ADD (attention deficit disorder)     Anxiety     Astigmatism     Brain lesion     Calcium deficiency     Cellulitis of foot, right 2018    Cerebral palsy (HCC)     Chronic otitis media     Constipation     Depression     Dysphagia     Esophagitis     Esotropia     GERD (gastroesophageal reflux disease)     Hiatal hernia     Hydrocephalus (HCC)     Impaired fasting glucose     Left nephrolithiasis 2019    Myopia     Oppositional defiant disorder     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HCC)     Seizures (HCC)     Sensorineural hearing loss     Status post ventriculoatrial shunt placement     Visual impairment      Past Surgical History:   Procedure Laterality Date    BREAST BIOPSY Left     X 2 (not sure of years)    CSF SHUNT      Creation of Ventriculo-Peritoneal CSF shunt ; Last Assessed:2016    EAR SURGERY      Last Assessed:2016    LEG SURGERY      due to CP     NOSE SURGERY      Last Assessed:2016    [place] ESOPHAGOGASTRODUODENOSCOPY TRANSORAL DIAGNOSTIC N/A 2019    Procedure: ESOPHAGOGASTRODUODENOSCOPY (EGD) with biopsy;  Surgeon: Beverly Panchal MD;  Location: [facility];   Service: Gastroenterology    UPPER GASTROINTESTINAL ENDOSCOPY  2019     OB History        0    Para   0    Term   0       0    AB   0    Living   0       SAB   0    TAB   0    Ectopic   0    Multiple   0    Live Births 0               Social History     Tobacco Use    Smoking status: Never Smoker    Smokeless tobacco: Never Used   Vaping Use    Vaping Use: Never used   Substance Use Topics    Alcohol use: Never    Drug use: Never     Cancer-related family history includes Colon cancer in her father      Current Outpatient Medications   Medication Instructions    acetaminophen (TYLENOL) 500 mg, Oral, Every 6 hours PRN    aluminum-magnesium hydroxide-simethicone (Antacid) 200-200-20 mg/5 mL suspension 15 mL, Oral, Every 4 hours PRN    amitriptyline (ELAVIL) 10 mg, Oral, Daily at bedtime    ARIPiprazole (ABILIFY) 20 mg, Oral, Daily at bedtime    bacitracin topical ointment 500 units/g topical ointment Cleanse site on nose with soap and water followed by bacitracin BID until healed then p r n     bismuth subsalicylate (PEPTO BISMOL) 262 mg, Oral, Every 6 hours PRN    calcium carbonate (TUMS) 500 mg, Oral, 3 times daily PRN    dicyclomine (BENTYL) 20 mg, Oral, Every 6 hours PRN    Dyclonine-Glycerin (Cepacol Sore Throat Spray) 0 1-33 % LIQD 1 spray, Mouth/Throat, 3 times daily PRN    Emollient (CeraVe) CREA Apply externally, 2 times daily, Apply topically 2 times to affected heel twice daily @8a-8p    escitalopram (LEXAPRO) 20 mg, Oral, Daily    fluticasone (FLONASE) 50 mcg/act nasal spray 1 spray, Nasal, Daily PRN, At 8:00 AM    guaiFENesin (ROBITUSSIN) 200 mg, Oral, 3 times daily PRN    hydrOXYzine HCL (ATARAX) 25 mg, Oral, 3 times daily    ibuprofen (MOTRIN) 400 mg, Oral, Every 8 hours PRN    lamoTRIgine (LAMICTAL) 25 mg, Oral, 2 times daily    lidocaine (LIDODERM) 5 % 1 patch, Topical, Daily, Remove & Discard patch within 12 hours or as directed by MD    LORazepam (ATIVAN) 0 25 mg, Oral, 2 times daily    lubiprostone (AMITIZA) 24 mcg, Oral, Daily with breakfast    magnesium hydroxide (GNP Milk of Magnesia) 400 mg/5 mL oral suspension 30 mL, Oral, Daily PRN, If no BM in 3 days    metoprolol tartrate (LOPRESSOR) 25 mg, Oral, Every 12 hours scheduled    mineral oil-hydrophilic petrolatum (AQUAPHOR) ointment Topical, As needed    Mouthwashes (Listerine Antiseptic) LIQD 5 mL, Swish & Spit, 2 times daily    Multiple Vitamins-Minerals (CertaVite Senior/Antioxidant) TABS 1 tablet, Oral, Daily    norgestimate-ethinyl estradiol (ORTHO-CYCLEN) 0 25-35 MG-MCG per tablet 1 tablet, Oral, Daily    nystatin (MYCOSTATIN) powder Topical, 4 times daily    pantoprazole (PROTONIX) 20 mg, Oral, 2 times daily    phenol (Chloraseptic) 1 4 % mucosal liquid 1 spray, Mouth/Throat, Every 2 hour PRN    polyethylene glycol (MIRALAX) 17 g packet Take one packet daily as needed for no bowel movement in 24 hours    psyllium (METAMUCIL) 58 6 % packet 1 packet, Oral, Daily    RA SUNSCREEN SPF50 LOTN Apply 15 minutes before sun exposure and every 2 hours    senna-docusate sodium (Senexon-S) 8 6-50 mg per tablet 1 tablet, Oral, Daily, at 8am     sodium chloride (OCEAN) 0 65 % nasal spray 1 spray, Nasal, As needed    Vitamin D-3 2,000 Units, Oral, Daily, at 8am     white petrolatum 1 application, Topical, 2 times daily, Apply to forehead where excoriations are noted twice a day for 7 days    zinc oxide (DESITIN) 13 % cream 1 application, Topical, As needed       Review of Systems:  Review of Systems   Constitutional: Negative  Gastrointestinal: Negative  Genitourinary: Negative for difficulty urinating, menstrual problem, pelvic pain and vaginal discharge  Vulvar burning with urination   Skin: Negative  Physical Exam:  /84   Pulse 102   Wt 98 4 kg (217 lb)   BMI 43 83 kg/m²   Physical Exam  Constitutional:       General: She is not in acute distress  Appearance: She is well-developed  Genitourinary:      Uterus normal       Vulval rash present  No lesions in the vagina  Vaginal discharge and erythema present  No cervical motion tenderness or lesion  Uterus is not tender  No right or left adnexal mass present  Right adnexa not tender  Left adnexa not tender  Neck:      Thyroid: No thyromegaly  Cardiovascular:      Rate and Rhythm: Normal rate and regular rhythm  Pulmonary:      Effort: Pulmonary effort is normal    Chest:      Breasts:         Right: No mass, nipple discharge, skin change or tenderness  Left: No mass, nipple discharge, skin change or tenderness  Abdominal:      Palpations: Abdomen is soft  Tenderness: There is no abdominal tenderness  Musculoskeletal:      Cervical back: Neck supple  Neurological:      Mental Status: She is alert and oriented to person, place, and time  Skin:     General: Skin is warm and dry  Vitals reviewed  Assessment/Plan:   1  Normal well-woman GYN exam   2  Cervical cancer screening:  Normal cervical exam   Pap smear not indicated at this time  3  STD screening:  Patient declines  4  Breast cancer screening:  Normal breast exam   Repeat bilateral screening mammogram next year  Reviewed breast self-awareness  5  Depression Screening: Patient's depression screening was assessed with a PHQ-2 score of 5  Their PHQ-9 score was 20  Patient advised to follow-up with PCP for further management  Referral placed for  consultation  6  BMI Counseling: Body mass index is 43 83 kg/m²  Discussed the patient's BMI with her  The BMI is above normal  Patient referred to PCP due to patient being obese  7  Contraception:  Uses OCP's for menses management  Given Rx refills     8  Vulvar candidiasis:  Given Rx Diflucan and Mycolog cream    9  Return to office in 1 year for annual GYN exam

## 2021-06-22 ENCOUNTER — PATIENT OUTREACH (OUTPATIENT)
Dept: OBGYN CLINIC | Facility: CLINIC | Age: 41
End: 2021-06-22

## 2021-06-22 NOTE — PROGRESS NOTES
JESSICA DRAPER outreached pt regarding Depression Screening  Spoke with Mya (support staff at [facility] which is a group home where pt resides)  Pt currently receives OP [address] services from [facility]  Denies concerns with this at this time  JESSICA DRAPER provided contact information and will remain available for further consult as needed  Pt's caregiver denies any further care needs at this time

## 2021-06-23 ENCOUNTER — OFFICE VISIT (OUTPATIENT)
Dept: FAMILY MEDICINE CLINIC | Facility: CLINIC | Age: 41
End: 2021-06-23

## 2021-06-23 VITALS
RESPIRATION RATE: 18 BRPM | DIASTOLIC BLOOD PRESSURE: 82 MMHG | HEIGHT: 59 IN | BODY MASS INDEX: 44.64 KG/M2 | WEIGHT: 221.4 LBS | TEMPERATURE: 98.1 F | HEART RATE: 96 BPM | OXYGEN SATURATION: 98 % | SYSTOLIC BLOOD PRESSURE: 132 MMHG

## 2021-06-23 DIAGNOSIS — N30.00 ACUTE CYSTITIS WITHOUT HEMATURIA: Primary | ICD-10-CM

## 2021-06-23 PROBLEM — N39.0 UTI (URINARY TRACT INFECTION): Status: ACTIVE | Noted: 2021-06-23

## 2021-06-23 PROCEDURE — 99213 OFFICE O/P EST LOW 20 MIN: CPT | Performed by: FAMILY MEDICINE

## 2021-06-23 NOTE — ASSESSMENT & PLAN NOTE
Patient is here for UTI f/u   Presented to the ED with urinary symptoms and positive UA on 06/18  Due to history of UTI, patient was started on cefuroxime for 10 days  Followed up with Ob/gyn on Monday  Symptoms have significantly improved  No new complaints today

## 2021-06-23 NOTE — PROGRESS NOTES
OFFICE VISIT NOTE - [facility]  Jenifer Mahajan 42 y o  (:1980) female MRN: [id number]  Unit/Bed#:  Encounter: [id number]      Assessment/Plan:    UTI (urinary tract infection)  Patient is here for UTI f/u   Presented to the ED with urinary symptoms and positive UA on   Due to history of UTI, patient was started on cefuroxime for 10 days  Followed up with Ob/gyn on Monday  Symptoms have significantly improved  No new complaints today       There are no diagnoses linked to this encounter  Subjective:      Patient ID: Jenifer Mahajan is a 42 y o  female  HPI     The patient is a 43 y/o female with a PMHx of cerebral palsy, hydrocephalus, pituitary adenoma, ambulatory dysfunction who presents for f/u of UTI per ED visit  Caregiver is present during visit  She reports that patient presented with UTI symptoms on  to the ED during which UA was found to be positive  She was then started on cefuroxime x 10 days  Patient has been compliant with medication and symptoms have significantly improved  She reports no new symptoms today a caregiver reports that he is at baseline otherwise  She denies fever, chills, fatigue, dysuria, abdominal or pelvic pain or back pain, no nausea or vomiting  The following portions of the patient's history were reviewed and updated as appropriate: allergies, current medications, past family history, past medical history, past social history, past surgical history and problem list     Review of Systems   Constitutional: Negative for chills and fever  Respiratory: Negative for cough and shortness of breath  Cardiovascular: Negative for chest pain, palpitations and leg swelling  Gastrointestinal: Negative for abdominal pain, nausea and vomiting  Genitourinary: Negative for dysuria, pelvic pain, vaginal bleeding and vaginal discharge  Musculoskeletal: Negative for back pain  Neurological: Negative for headaches  Objective:    /82 (BP Location: Left arm, Patient Position: Sitting, Cuff Size: Large)   Pulse 96   Temp 98 1 °F (36 7 °C) (Temporal)   Resp 18   Ht 4' 11" (1 499 m)   Wt 100 kg (221 lb 6 4 oz)   SpO2 98%   BMI 44 72 kg/m²      Physical Exam  Constitutional:       General: She is not in acute distress  Appearance: She is morbidly obese  She is not ill-appearing  HENT:      Head: Normocephalic and atraumatic  Right Ear: External ear normal       Left Ear: External ear normal    Eyes:      Conjunctiva/sclera: Conjunctivae normal    Cardiovascular:      Rate and Rhythm: Normal rate and regular rhythm  Heart sounds: Normal heart sounds  Pulmonary:      Effort: No respiratory distress  Breath sounds: Normal breath sounds  No wheezing  Abdominal:      General: Bowel sounds are normal  There is no distension  Palpations: Abdomen is soft  There is no mass  Tenderness: There is no abdominal tenderness  Musculoskeletal:         General: No swelling, tenderness or deformity  Normal range of motion  Right lower leg: No edema  Left lower leg: No edema  Skin:     General: Skin is warm  Findings: No rash  Neurological:      General: No focal deficit present  Mental Status: She is alert             Iain Hernandez MD  PGY-1 Family Medicine  06/23/21

## 2021-06-25 ENCOUNTER — HOSPITAL ENCOUNTER (OUTPATIENT)
Dept: NON INVASIVE DIAGNOSTICS | Facility: HOSPITAL | Age: 41
Discharge: HOME | End: 2021-06-25
Attending: EMERGENCY MEDICINE
Payer: MEDICARE

## 2021-06-25 ENCOUNTER — APPOINTMENT (OUTPATIENT)
Dept: LAB | Facility: CLINIC | Age: 41
End: 2021-06-25
Payer: MEDICARE

## 2021-06-25 DIAGNOSIS — Z79.899 ENCOUNTER FOR LONG-TERM (CURRENT) USE OF OTHER MEDICATIONS: ICD-10-CM

## 2021-06-25 LAB
ALBUMIN SERPL BCP-MCNC: 3 G/DL (ref 3.5–5)
ALP SERPL-CCNC: 139 U/L (ref 46–116)
ALT SERPL W P-5'-P-CCNC: 21 U/L (ref 12–78)
ANION GAP SERPL CALCULATED.3IONS-SCNC: 10 MMOL/L (ref 4–13)
AST SERPL W P-5'-P-CCNC: 18 U/L (ref 5–45)
ATRIAL RATE: 90 BPM
BASOPHILS # BLD AUTO: 0.03 THOUSANDS/ΜL (ref 0–0.1)
BASOPHILS NFR BLD AUTO: 0 % (ref 0–1)
BILIRUB SERPL-MCNC: 0.24 MG/DL (ref 0.2–1)
BUN SERPL-MCNC: 11 MG/DL (ref 5–25)
CALCIUM ALBUM COR SERPL-MCNC: 9.4 MG/DL (ref 8.3–10.1)
CALCIUM SERPL-MCNC: 8.6 MG/DL (ref 8.3–10.1)
CHLORIDE SERPL-SCNC: 104 MMOL/L (ref 100–108)
CHOLEST SERPL-MCNC: 148 MG/DL (ref 50–200)
CO2 SERPL-SCNC: 22 MMOL/L (ref 21–32)
CREAT SERPL-MCNC: 0.85 MG/DL (ref 0.6–1.3)
EOSINOPHIL # BLD AUTO: 0.14 THOUSAND/ΜL (ref 0–0.61)
EOSINOPHIL NFR BLD AUTO: 1 % (ref 0–6)
ERYTHROCYTE [DISTWIDTH] IN BLOOD BY AUTOMATED COUNT: 14 % (ref 11.6–15.1)
GFR SERPL CREATININE-BSD FRML MDRD: 85 ML/MIN/1.73SQ M
GLUCOSE P FAST SERPL-MCNC: 103 MG/DL (ref 65–99)
HCT VFR BLD AUTO: 42.2 % (ref 34.8–46.1)
HDLC SERPL-MCNC: 68 MG/DL
HGB BLD-MCNC: 13.7 G/DL (ref 11.5–15.4)
IMM GRANULOCYTES # BLD AUTO: 0.06 THOUSAND/UL (ref 0–0.2)
IMM GRANULOCYTES NFR BLD AUTO: 1 % (ref 0–2)
LDLC SERPL CALC-MCNC: 39 MG/DL (ref 0–100)
LYMPHOCYTES # BLD AUTO: 1.57 THOUSANDS/ΜL (ref 0.6–4.47)
LYMPHOCYTES NFR BLD AUTO: 15 % (ref 14–44)
MCH RBC QN AUTO: 30.3 PG (ref 26.8–34.3)
MCHC RBC AUTO-ENTMCNC: 32.5 G/DL (ref 31.4–37.4)
MCV RBC AUTO: 93 FL (ref 82–98)
MONOCYTES # BLD AUTO: 0.79 THOUSAND/ΜL (ref 0.17–1.22)
MONOCYTES NFR BLD AUTO: 7 % (ref 4–12)
NEUTROPHILS # BLD AUTO: 8.24 THOUSANDS/ΜL (ref 1.85–7.62)
NEUTS SEG NFR BLD AUTO: 76 % (ref 43–75)
NONHDLC SERPL-MCNC: 80 MG/DL
NRBC BLD AUTO-RTO: 0 /100 WBCS
P AXIS: 47 DEGREES
PLATELET # BLD AUTO: 281 THOUSANDS/UL (ref 149–390)
PMV BLD AUTO: 9.4 FL (ref 8.9–12.7)
POTASSIUM SERPL-SCNC: 3.8 MMOL/L (ref 3.5–5.3)
PR INTERVAL: 130 MS
PROT SERPL-MCNC: 7.6 G/DL (ref 6.4–8.2)
QRS AXIS: 10 DEGREES
QRSD INTERVAL: 72 MS
QT INTERVAL: 366 MS
QTC INTERVAL: 447 MS
RBC # BLD AUTO: 4.52 MILLION/UL (ref 3.81–5.12)
SODIUM SERPL-SCNC: 136 MMOL/L (ref 136–145)
T WAVE AXIS: 46 DEGREES
T4 SERPL-MCNC: 13.8 UG/DL (ref 4.7–13.3)
TRIGL SERPL-MCNC: 206 MG/DL
TSH SERPL DL<=0.05 MIU/L-ACNC: 3.98 UIU/ML (ref 0.36–3.74)
VENTRICULAR RATE: 90 BPM
WBC # BLD AUTO: 10.83 THOUSAND/UL (ref 4.31–10.16)

## 2021-06-25 PROCEDURE — 93005 ELECTROCARDIOGRAM TRACING: CPT

## 2021-06-25 PROCEDURE — 93010 ELECTROCARDIOGRAM REPORT: CPT | Performed by: INTERNAL MEDICINE

## 2021-06-25 PROCEDURE — 85025 COMPLETE CBC W/AUTO DIFF WBC: CPT

## 2021-06-25 PROCEDURE — 80061 LIPID PANEL: CPT

## 2021-06-25 PROCEDURE — 80053 COMPREHEN METABOLIC PANEL: CPT

## 2021-06-25 PROCEDURE — 84443 ASSAY THYROID STIM HORMONE: CPT

## 2021-06-25 PROCEDURE — 36415 COLL VENOUS BLD VENIPUNCTURE: CPT

## 2021-06-25 PROCEDURE — 84436 ASSAY OF TOTAL THYROXINE: CPT

## 2021-06-28 ENCOUNTER — TELEPHONE (OUTPATIENT)
Dept: NEUROSURGERY | Facility: CLINIC | Age: 41
End: 2021-06-28

## 2021-06-28 NOTE — TELEPHONE ENCOUNTER
Received another call from [place] stating the Rx has to be changed to anesthesia  Reviewed MRI and sedation requirements are documented as anesthesia  Called her back to advise  She was appreciative

## 2021-06-28 NOTE — TELEPHONE ENCOUNTER
Received a call from Davian Castle, care coordinator, who had questions regarding patient's appts  Returned call and left a voicemail explaining patient has an MRI today at [facility] at 1200  Advised her f/u appt with our office will then be at 7/2 at 0930 in [place]  Provided office number should she have other questions

## 2021-07-01 NOTE — TELEPHONE ENCOUNTER
Patient's appointment on 7/2 is now for MRI with anesthesia clearance  Ok to do here per office NB/SG, patient does not need to see PCP  Josselyn (Care Coordinator) will send with staff - face sheet demos and meds list with patient to appointment  Patient will need f/u appointment rescheduled as SNPX with PA/HDM for after MRI is completed  [facility] has been called, patient is on wait list for MRI with anesthesia (aware non programmable shunt), Jamie should be calling Josselyn for any additional information/answers to questions needed  Chema Damon is aware to contact our office as soon as MRI appointment is scheduled to schedule follow up appointment  Office phone number has been provided

## 2021-07-02 ENCOUNTER — OFFICE VISIT (OUTPATIENT)
Dept: NEUROSURGERY | Facility: CLINIC | Age: 41
End: 2021-07-02

## 2021-07-02 VITALS
WEIGHT: 219 LBS | DIASTOLIC BLOOD PRESSURE: 80 MMHG | HEIGHT: 59 IN | SYSTOLIC BLOOD PRESSURE: 135 MMHG | BODY MASS INDEX: 44.15 KG/M2 | TEMPERATURE: 97.4 F

## 2021-07-02 DIAGNOSIS — D49.7 PITUITARY NEOPLASM: Primary | ICD-10-CM

## 2021-07-02 DIAGNOSIS — K59.04 CHRONIC IDIOPATHIC CONSTIPATION: ICD-10-CM

## 2021-07-02 PROCEDURE — PREOP: Performed by: NURSE PRACTITIONER

## 2021-07-02 NOTE — PROGRESS NOTES
Neurosurgery Office Note  Rahat Narayan 42 y o  female MRN: [id number]      Assessment/Plan     Pituitary neoplasm  Patient presents for preprocedure examination/clearance, has planned MRI of the brain under anesthesia to evaluate pituitary microadenoma  · Discovered incidentally on CT head completed 2/2017 for head injury  Has been followed serially since  · Hx CP, s/p [facility] shunt  · Evaluated by ophthalmologist Dr Kathlee Mcardle in 2018, OCT and visual fields unable to be assessed 2/2 ambylopia and results of no value  · Pituitary panel completed February 2017, within normal limits  · Exam remains stable  No changes in vision or functional ability  Plan:   · Patient has had prior history of procedures/imaging under general anesthesia which were tolerated without complication  · Patient no history of chronic pulmonary conditions  · Patient aware night before MRI she will be NPO at midnight and if patient needs to take medication day of MRI to take with small sips of water  · Patient has already scheduled MRI brain pituitary for surveillance of pituitary macroadenoma  · Continue annual eye exams in interim  · Patient will follow-up with Dr Garcia once imaging completed or sooner if symptoms worsen  · Patient made aware if she experiences any new or worsening neurological changes or red flags to follow-up sooner  · Patient made aware to contact Neurosurgery with any further questions or concerns  There are no diagnoses linked to this encounter  CHIEF COMPLAINT    Chief Complaint   Patient presents with    Follow-up       HISTORY    History of Present Illness     42y o  year old female with a mild to moderate intellectual development disability, cerebral palsy, and status post VA shunt  Patient seen in outpatient office today for preprocedure examination/clearance for planned MRI of the brain under anesthesia to 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      Patient requires anesthesia for her imaging as a consequence of her mild to moderate intellectual developmental disability  Per chart review and patient she does have prior history of procedures done under general anesthesia which she has tolerated without complications  Patient denies any smoking history  She does have history of seizure disorder with no recent events reported  She denies any history of chronic pulmonary conditions  She does report at times when she exerts herself some shortness of breath and chest pain  Patient did see cardiology for atypical chest pain and their notes suggest GERD may be a factor in her chest symptoms  Per chart review patient has had multiple falls over the past few months  She does report at times she cannot make it to the bathroom in time but no incontinence  Patient offers no new complaints today  She denies any changes since last visit and denies any headaches, dizziness, blurry vision, abdominal pain, nausea, vomiting, diarrhea, no problems with bowel or bladder, no new weakness or numbness/tingling  Patient follows with her PCP regularly  Patient currently resides in a group home  Patient denies any difficulty with her balance but does ambulate with a walker  She denies any endocrine complaints such as increase in hand or foot size, intolerance to hot or cold, or extensive sweating or thirst Spoke with patient and she understands that she will be NPO from midnight the night prior to testing and if she needs to take any medications morning of MRI to take with small sips of water  In February 2017 patient had a fall and underwent CT/MRI which revealed an incidental lesion underlying the pituitary gland  Patient did have pituitary lab panel done in February 2017 which were all within normal limits  Per chart review patient did she opththalmology in May 2018 and OCT as well as visual firelds were attempted however patient was unable to cooperate with examination, results were of no value  HPI    See Discussion    REVIEW OF SYSTEMS    Review of Systems   Constitutional: Negative  HENT: Negative  Eyes: Negative  Respiratory: Negative  Negative for apnea, cough, choking, chest tightness, shortness of breath, wheezing and stridor  Cardiovascular: Negative  Gastrointestinal: Negative  Endocrine: Negative  Genitourinary: Negative  Musculoskeletal: Positive for gait problem (leg pain when sitting/standing)  Negative for arthralgias, back pain, joint swelling, myalgias, neck pain and neck stiffness  Skin: Negative  Allergic/Immunologic: Negative  Neurological: Positive for numbness (and tingling on left foot )  Negative for dizziness, tremors, seizures, syncope, facial asymmetry, speech difficulty, weakness, light-headedness and headaches (slight HA this am )  Hematological: Negative  Psychiatric/Behavioral: Negative  All other systems reviewed and are negative  ROS reviewed with patient and agree and changes were made as needed    Patient denied any numbness and tingling during my exam     Meds/Allergies     Current Outpatient Medications   Medication Sig Dispense Refill    acetaminophen (TYLENOL) 500 mg tablet Take 1 tablet (500 mg total) by mouth every 6 (six) hours as needed for mild pain 30 tablet 5    aluminum-magnesium hydroxide-simethicone (Antacid) 200-200-20 mg/5 mL suspension Take 15 mL by mouth every 4 (four) hours as needed for indigestion or heartburn 355 mL 5    amitriptyline (ELAVIL) 10 mg tablet Take 10 mg by mouth daily at bedtime      ARIPiprazole (ABILIFY) 20 MG tablet Take 1 tablet (20 mg total) by mouth daily at bedtime 90 tablet 2    bacitracin topical ointment 500 units/g topical ointment Cleanse site on nose with soap and water followed by bacitracin BID until healed then p r n  15 g 2    bismuth subsalicylate (PEPTO BISMOL) 524 mg/30 mL oral suspension Take 15 mL (262 mg total) by mouth every 6 (six) hours as needed for indigestion 360 mL 3    calcium carbonate (TUMS) 500 mg chewable tablet Chew 1 tablet (500 mg total) 3 (three) times a day as needed for heartburn 30 tablet 5    Cholecalciferol (Vitamin D-3) 25 MCG (1000 UT) CAPS Take 2 capsules (2,000 Units total) by mouth daily at 8am  30 capsule 5    dicyclomine (BENTYL) 20 mg tablet Take 1 tablet (20 mg total) by mouth every 6 (six) hours as needed (as needed) 120 tablet 2    Dyclonine-Glycerin (Cepacol Sore Throat Spray) 0 1-33 % LIQD Apply 1 spray to the mouth or throat 3 (three) times a day as needed (sorethroat) 118 mL 2    Emollient (CeraVe) CREA Apply topically 2 (two) times a day Apply topically 2 times to affected heel twice daily @8a-8p 453 g 5    escitalopram (LEXAPRO) 20 mg tablet Take 1 tablet (20 mg total) by mouth daily 90 tablet 2    fluticasone (FLONASE) 50 mcg/act nasal spray 1 spray into each nostril daily as needed for rhinitis (nasal congestion) At 8:00 AM 18 2 mL 5    guaiFENesin (ROBITUSSIN) 100 MG/5ML oral liquid Take 200 mg by mouth 3 (three) times a day as needed for cough      hydrOXYzine HCL (ATARAX) 25 mg tablet Take 1 tablet (25 mg total) by mouth 3 (three) times a day 30 tablet 2    ibuprofen (MOTRIN) 400 mg tablet Take 1 tablet (400 mg total) by mouth every 8 (eight) hours as needed for mild pain 30 tablet 5    lamoTRIgine (LaMICtal) 25 mg tablet Take 25 mg by mouth 2 (two) times a day       lidocaine (LIDODERM) 5 % Apply 1 patch topically daily Remove & Discard patch within 12 hours or as directed by MD 10 patch 0    LORazepam (ATIVAN) 0 5 mg tablet Take 0 25 mg by mouth 2 (two) times a day      lubiprostone (Amitiza) 24 mcg capsule Take 1 capsule (24 mcg total) by mouth daily with breakfast 60 capsule 5    magnesium hydroxide (GNP Milk of Magnesia) 400 mg/5 mL oral suspension Take 30 mL by mouth daily as needed for constipation If no BM in 3 days 355 mL 5    metoprolol tartrate (LOPRESSOR) 25 mg tablet Take 1 tablet (25 mg total) by mouth every 12 (twelve) hours 60 tablet 5    mineral oil-hydrophilic petrolatum (AQUAPHOR) ointment Apply topically as needed for dry skin 420 g 5    Multiple Vitamins-Minerals (CertaVite Senior/Antioxidant) TABS Take 1 tablet by mouth daily 90 tablet 0    norgestimate-ethinyl estradiol (ORTHO-CYCLEN) 0 25-35 MG-MCG per tablet Take 1 tablet by mouth daily 28 tablet 11    nystatin (MYCOSTATIN) powder Apply topically 4 (four) times a day 45 g 2    nystatin-triamcinolone (MYCOLOG-II) cream Apply topically 2 (two) times a day 30 g 2    pantoprazole (PROTONIX) 20 mg tablet Take 1 tablet (20 mg total) by mouth 2 (two) times a day 62 tablet 5    phenol (Chloraseptic) 1 4 % mucosal liquid Apply 1 spray to the mouth or throat every 2 (two) hours as needed (for sore throat as needed) 236 mL 1    polyethylene glycol (MIRALAX) 17 g packet Take one packet daily as needed for no bowel movement in 24 hours 30 each 5    psyllium (METAMUCIL) 58 6 % packet Take 1 packet by mouth daily 30 packet 5    RA SUNSCREEN SPF50 LOTN Apply 15 minutes before sun exposure and every 2 hours 1 Bottle 5    senna-docusate sodium (Senexon-S) 8 6-50 mg per tablet Take 1 tablet by mouth daily at 8am  30 tablet 5    sodium chloride (OCEAN) 0 65 % nasal spray 1 spray into each nostril as needed (three times daily as needed for nasal congestion) 60 mL 1    white petrolatum Apply 1 application topically 2 (two) times a day Apply to forehead where excoriations are noted twice a day for 7 days 500 g 0    zinc oxide (DESITIN) 13 % cream Apply 1 application topically as needed (for perianal irritation) 1 Tube 5    Mouthwashes (Listerine Antiseptic) LIQD Swish and spit 5 mL 2 (two) times a day 1000 mL 5     No current facility-administered medications for this visit         No Known Allergies    PAST HISTORY    Past Medical History:   Diagnosis Date    ADD (attention deficit disorder)     Anxiety     Astigmatism     Brain lesion     Calcium deficiency     Cellulitis of foot, right 6/4/2018    Cerebral palsy (HCC)     Chronic otitis media     Constipation     Depression     Dysphagia     Esophagitis     Esotropia     GERD (gastroesophageal reflux disease)     Hiatal hernia     Hydrocephalus (HCC)     Impaired fasting glucose     Left nephrolithiasis 03/04/2019    Myopia     Oppositional defiant disorder     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HCC)     Seizures (HCC)     Sensorineural hearing loss     Status post ventriculoatrial shunt placement     Visual impairment        Past Surgical History:   Procedure Laterality Date    BREAST BIOPSY Left     X 2 (not sure of years)    CSF SHUNT      Creation of Ventriculo-Peritoneal CSF shunt ; Last Assessed:7/6/2016    EAR SURGERY      Last Assessed:7/6/2016    LEG SURGERY      due to CP     NOSE SURGERY      Last Assessed:7/6/2016    IN ESOPHAGOGASTRODUODENOSCOPY TRANSORAL DIAGNOSTIC N/A 5/9/2019    Procedure: ESOPHAGOGASTRODUODENOSCOPY (EGD) with biopsy;  Surgeon: Sharron Chatterjee MD;  Location: [facility]; Service: Gastroenterology    UPPER GASTROINTESTINAL ENDOSCOPY  05/2019       Social History     Tobacco Use    Smoking status: Never Smoker    Smokeless tobacco: Never Used   Vaping Use    Vaping Use: Never used   Substance Use Topics    Alcohol use: Never    Drug use: Never       Family History   Problem Relation Age of Onset    Diabetes Mother     Colon cancer Father     No Known Problems Maternal Grandmother     No Known Problems Maternal Grandfather     No Known Problems Paternal Grandmother     No Known Problems Paternal Grandfather          Above history personally reviewed  EXAM    Vitals:Blood pressure 135/80, temperature (!) 97 4 °F (36 3 °C), temperature source Temporal, height 4' 11" (1 499 m), weight 99 3 kg (219 lb)  ,Body mass index is 44 23 kg/m²  Physical Exam  Vitals reviewed  Constitutional:       General: She is awake  She is not in acute distress  Appearance: Normal appearance  She is not ill-appearing  HENT:      Head: Normocephalic and atraumatic  Eyes:      Extraocular Movements: Extraocular movements intact  Conjunctiva/sclera: Conjunctivae normal       Pupils: Pupils are equal, round, and reactive to light  Cardiovascular:      Rate and Rhythm: Normal rate and regular rhythm  Pulmonary:      Effort: Pulmonary effort is normal  No respiratory distress  Breath sounds: Normal breath sounds  Chest:      Chest wall: No tenderness  Abdominal:      General: There is no distension  Palpations: Abdomen is soft  Tenderness: There is no abdominal tenderness  Musculoskeletal:         General: Normal range of motion  Cervical back: Normal range of motion and neck supple  Comments: Right arm contracted   Skin:     General: Skin is warm and dry  Neurological:      Mental Status: She is alert and oriented to person, place, and time  Deep Tendon Reflexes:      Reflex Scores:       Tricep reflexes are 2+ on the right side and 2+ on the left side  Bicep reflexes are 2+ on the right side and 2+ on the left side  Brachioradialis reflexes are 2+ on the right side and 2+ on the left side  Patellar reflexes are 2+ on the right side and 2+ on the left side  Psychiatric:         Attention and Perception: Attention normal          Mood and Affect: Mood and affect normal          Speech: Speech normal          Behavior: Behavior normal  Behavior is cooperative  Thought Content: Thought content normal       Comments: History of cerebral palsy         Neurologic Exam     Mental Status   Oriented to person, place, and time  Follows 2 step commands  Attention: normal  Concentration: normal    Speech: speech is normal   Level of consciousness: alert  Knowledge: good  Able to name object  Able to repeat  Normal comprehension       Cranial Nerves     CN III, IV, VI   Pupils are equal, round, and reactive to light   CN III: no CN III palsy  CN VI: no CN VI palsy  Nystagmus: none   Diplopia: none    CN V   Facial sensation intact  CN VII   Facial expression full, symmetric  CN VIII   CN VIII normal    Hearing: intact    CN IX, X   CN IX normal      CN XI   CN XI normal      CN XII   CN XII normal    Ambylopia     Motor Exam   Muscle bulk: normal  Right arm tone: spastic  Right leg tone: spastic    Strength   Strength 5/5 except as noted  RUE 4/5     Sensory Exam   Light touch normal      Gait, Coordination, and Reflexes     Gait  Gait: (walks with walker)    Tremor   Resting tremor: absent  Intention tremor: absent  Action tremor: absent    Reflexes   Right brachioradialis: 2+  Left brachioradialis: 2+  Right biceps: 2+  Left biceps: 2+  Right triceps: 2+  Left triceps: 2+  Right patellar: 2+  Left patellar: 2+  Right Conway: absent  Left Conway: absent  Right ankle clonus: absent  Left ankle clonus: absent        MEDICAL DECISION MAKING    Imaging Studies:     No results found  I have personally reviewed pertinent reports     and I have personally reviewed pertinent films in PACS

## 2021-07-02 NOTE — ASSESSMENT & PLAN NOTE
Patient presents for preprocedure examination/clearance, has planned MRI of the brain under anesthesia to evaluate pituitary microadenoma  · Discovered incidentally on CT head completed 2/2017 for head injury  Has been followed serially since  · Hx CP, s/p [facility] shunt  · Evaluated by ophthalmologist Dr Tapan Maharaj in 2018, OCT and visual fields unable to be assessed 2/2 ambylopia and results of no value  · Pituitary panel completed February 2017, within normal limits  · Exam remains stable  No changes in vision or functional ability  Plan:   · Patient has had prior history of procedures/imaging under general anesthesia which were tolerated without complication  · Patient no history of chronic pulmonary conditions  · Patient aware night before MRI she will be NPO at midnight and if patient needs to take medication day of MRI to take with small sips of water  · Patient has already scheduled MRI brain pituitary for surveillance of pituitary macroadenoma  · Continue annual eye exams in interim  · Patient will follow-up with Dr Garcia once imaging completed or sooner if symptoms worsen  · Patient made aware if she experiences any new or worsening neurological changes or red flags to follow-up sooner  · Patient made aware to contact Neurosurgery with any further questions or concerns

## 2021-07-06 ENCOUNTER — OFFICE VISIT (OUTPATIENT)
Dept: PHYSICAL THERAPY | Facility: REHABILITATION | Age: 41
End: 2021-07-06
Payer: MEDICARE

## 2021-07-06 DIAGNOSIS — R29.6 FREQUENT FALLS: ICD-10-CM
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
## 2021-07-06 PROCEDURE — 97110 THERAPEUTIC EXERCISES: CPT | Performed by: PHYSICAL THERAPIST

## 2021-07-06 PROCEDURE — 97112 NEUROMUSCULAR REEDUCATION: CPT | Performed by: PHYSICAL THERAPIST

## 2021-07-08 ENCOUNTER — APPOINTMENT (OUTPATIENT)
Dept: PHYSICAL THERAPY | Facility: REHABILITATION | Age: 41
End: 2021-07-08
Payer: MEDICARE

## 2021-07-12 ENCOUNTER — OFFICE VISIT (OUTPATIENT)
Dept: PHYSICAL THERAPY | Facility: REHABILITATION | Age: 41
End: 2021-07-12
Payer: MEDICARE

## 2021-07-12 DIAGNOSIS — R29.6 FREQUENT FALLS: ICD-10-CM
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
## 2021-07-12 PROCEDURE — 97110 THERAPEUTIC EXERCISES: CPT | Performed by: PHYSICAL THERAPIST

## 2021-07-12 PROCEDURE — 97112 NEUROMUSCULAR REEDUCATION: CPT | Performed by: PHYSICAL THERAPIST

## 2021-07-12 NOTE — PROGRESS NOTES
Progress Update    Today's date: 2021  Patient name: Hannah Sue  : 1980  MRN: [id number]  Referring provider: Lauren Birmingham MD  Dx:   Encounter Diagnosis     ICD-10-CM    1  Fall, initial encounter  Via R[address] 32  XXXA    2  Frequent falls  R29 6        SUBJECTIVE: Care giver states she is not aware of Naveed Wetzel having any falls since last visit  Also states that Naveed Wetzel often refuses to take walks or exercise at home secondary to her congnitive deficits  Precautions: Seizures, intellectual disability, ADHD       HEP: seated march, LAQ, seated heel/toe raises, STS    OBJECTIVE       Balance Test Initial Eval         5x Sit to Stand: 33s, 22 in surface, 1 UE  31s, from 22in surface, 1 UE       TUs  33s  33s with RW       Gait Speed:            2 Minute Walk Test: Requested to stop at 1:47, walked 100ft with RW     HR: 123  O2: 96%  Requested to stop at 1:35, walked 100ft with RW     HR: 128  O2: 97%       6 Minute Walk Test:           Cunningham Balance Scale:           FGA:            Modified Kevin Scale Score:           Patient-Reported Outcome Measure  Stroke Impact Scale:               Goals  STGs (4 weeks)  Pt will be independent with comprehensive HEP - not met  Pt will demonstrate at least 3s improvement on 5xSTS - not met  Pt will demonstrate at least 3s improvement on TUG - not met    LTG's (to be achieved by d/c)  Pt will be able to self manage sx's independently   Pt will demonstrate at least 100ft foot improvement in max walking endurance with AD  Pt will report at least 50% reduced difficulty with balance when performing ADL's including dressing     Manuals 21                                                    Neuro Re-Ed  overground walking with rollator walker, focus on keeping right hand on rollator and hitting left heel, 250 feet  150 feet           Step over cones   Using RW 15ft x2        Step ups   4in, in //bars  x5 ea        Side stepping   1 lap in //bars x2 Dynamic Reaching                                             Ther Ex           Educated in HEP 8 min Walking in [facility] on [employer] treadmill, 0 4 mph, 1 min, stopped due to patient fatigue         Scifit   SciFit level 1, 5 min, about 60 steps/min 5 5min L1           LAQ, 3 lbs each, 30 sec each  Sit to stand, 30 sec  Seated march, 3 lbs on ankles, 30 sec  Sit to stand, 12 x 2 sets  Standing heel raise 10 x 2 sets                                                                Ther Activity                                 Gait Training                                 Modalities                                       ASSESSMENT  Since eval, Maikel Mathew has been seen for 1 treatment visit in PT due to scheduling issues  Her outcomes measures demonstrate similar findings in functional LE strength, walking endurance, and mobility, which is expected considering her lack of treatment  She is at risk for falls based on 5xSTS and TUG tests  Requires frequent cues to redirect attention and encouragement to complete exercises  She reported widespread general fatigue and pain, but is will to continue with seated rest breaks  Would benefit from continued skilled PT to address gait, balance, strength, endurance, and reduce falls  PLAN   Continue per plan of care  Continue for 4 more weeks at 2x per week

## 2021-07-13 ENCOUNTER — TELEPHONE (OUTPATIENT)
Dept: NEUROSURGERY | Facility: CLINIC | Age: 41
End: 2021-07-13

## 2021-07-13 DIAGNOSIS — E55.9 VITAMIN D DEFICIENCY: ICD-10-CM

## 2021-07-13 NOTE — TELEPHONE ENCOUNTER
Call primary contact number on the chart, after discovering patients had H&P 7/2/2021 for anesthesia MRI, however no MRI has been scheduled at this time  Left message indicating that the H&P would likely no longer be valid as it can take 1-2 months before anesthesia MRI can be scheduled  Left the contact number for central scheduling for rescheduling MRI and call the office back to discuss fup

## 2021-07-14 ENCOUNTER — OFFICE VISIT (OUTPATIENT)
Dept: PHYSICAL THERAPY | Facility: REHABILITATION | Age: 41
End: 2021-07-14
Payer: MEDICARE

## 2021-07-14 ENCOUNTER — CLINICAL SUPPORT (OUTPATIENT)
Dept: DENTISTRY | Facility: CLINIC | Age: 41
End: 2021-07-14

## 2021-07-14 ENCOUNTER — TELEPHONE (OUTPATIENT)
Dept: NEUROSURGERY | Facility: CLINIC | Age: 41
End: 2021-07-14

## 2021-07-14 VITALS — HEART RATE: 96 BPM | DIASTOLIC BLOOD PRESSURE: 82 MMHG | TEMPERATURE: 97.2 F | SYSTOLIC BLOOD PRESSURE: 126 MMHG

## 2021-07-14 DIAGNOSIS — K03.6 ACCRETIONS ON TEETH: ICD-10-CM
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
## 2021-07-14 DIAGNOSIS — R29.6 FREQUENT FALLS: ICD-10-CM

## 2021-07-14 DIAGNOSIS — Z01.21 ENCOUNTER FOR DENTAL EXAMINATION AND CLEANING WITH ABNORMAL FINDINGS: Primary | ICD-10-CM

## 2021-07-14 PROCEDURE — D1330 ORAL HYGIENE INSTRUCTIONS: HCPCS | Performed by: DENTAL HYGIENIST

## 2021-07-14 PROCEDURE — 97110 THERAPEUTIC EXERCISES: CPT | Performed by: PHYSICAL THERAPIST

## 2021-07-14 PROCEDURE — T1015 CLINIC SERVICE: HCPCS | Performed by: DENTAL HYGIENIST

## 2021-07-14 PROCEDURE — 97112 NEUROMUSCULAR REEDUCATION: CPT | Performed by: PHYSICAL THERAPIST

## 2021-07-14 PROCEDURE — D0120 PERIODIC ORAL EVALUATION - ESTABLISHED PATIENT: HCPCS | Performed by: DENTIST

## 2021-07-14 PROCEDURE — D1110 PROPHYLAXIS - ADULT: HCPCS | Performed by: DENTAL HYGIENIST

## 2021-07-14 NOTE — PROGRESS NOTES
Adult Prophy     Exams:  Periodic exam ;  Mentally and physically challenged  Did well today - let me scale all teeth except for 2nd molars (couldn't get them too well)  Handled polish and floss good too  Xrays:     None today; Take PAN and BW4 at restorative visit - didn't have time today,  Type of Treatment:  Adult Prophy - Hand scaling,  Polished, Flossed  Reviewed OHI  Brush:  2X/day and Floss 1X/day  Recommended Listerine  / ACT  mouth rinses  EO/OCS Exams:  No significant findings  IO:    Crowding of ant teeth  Oral Hygiene:  Poor  Plaque:  Heavy  Calculus: Moderate   Bleeding:  Light to moderate  Gingiva:    Red  / Spongy /  Inflamed  Stain:  None  Perio Charting:   Periocharting was not completed  Perio Findings: Moderate gingivitis  Caries Findings:  #29 - MO - defective margin;     #17 starting to erupt    Caries Risk Assessment:  Moderate caries risk    Treatment Plan:  Updated    Dr  Exam:  Dr Delgado [place]  Referral:  No referral given  NV1:  Rest 29, PAN, BW4 - 75 min  NV2:  6  Month Recall - 60 min

## 2021-07-14 NOTE — TELEPHONE ENCOUNTER
SPOKE WITH BRENDAN FROM [facility] ADVISED DATE AND TIME OF [facility] MRI ADVISED SHE NEEDS HNP WITHIN 30 DAYS OF THIS MRI BRENDAN WILL ARRANGE AT [facility] GAVE APT AFTER MRI AS WELL TO BRENDAN FROM [facility] -[facility]

## 2021-07-14 NOTE — TELEPHONE ENCOUNTER
I CALLED TO SEE WHAT WAS GOING ON WITH MRI SCHEDULEING AS PT IS ON WAIT LIST MRI SCHEDULED FOR NEXT AVAIL 10/6/2021 B ARRIVAL TIME IS 10AM - I CALLED BRENDAN PERSON DIRECTED SUPPORT - FREDI TO CALL ME BACK TO GIVE INFO - F/U BOOKED AS [facility]DM AFTER PT WILL REQUIRE NEW H& P WITHIN 30 DAYS -AWAITING  CALL BACK FROM BRENDAN JESUS

## 2021-07-14 NOTE — PROGRESS NOTES
Progress Update    Today's date: 2021  Patient name: Esperanza Falcon  : 1980  MRN: [id number]  Referring provider: Leena Mims MD  Dx:   Encounter Diagnosis     ICD-10-CM    1  Fall, initial encounter  Via Rg 32  XXXA    2  Frequent falls  R29 6        SUBJECTIVE:   Elizabeth's knee gave out after returning home from physical therapy , caught by staff  She is moving a little bit better, did a lot of walking in stores today, usually uses the seat on her walker for breaks  Precautions: Seizures, intellectual disability, ADHD       HEP: seated march, LAQ, seated heel/toe raises, STS    OBJECTIVE       Balance Test Initial Eval         5x Sit to Stand: 33s, 22 in surface, 1 UE  31s, from 22in surface, 1 UE       TUs  33s  33s with RW       Gait Speed:            2 Minute Walk Test: Requested to stop at 1:47, walked 100ft with RW     HR: 123  O2: 96%  Requested to stop at 1:35, walked 100ft with RW     HR: 128  O2: 97%       6 Minute Walk Test:           Cunningham Balance Scale:           FGA:            Modified Hoffman Scale Score:           Patient-Reported Outcome Measure  Stroke Impact Scale:               Goals  STGs (4 weeks)  Pt will be independent with comprehensive HEP - not met  Pt will demonstrate at least 3s improvement on 5xSTS - not met  Pt will demonstrate at least 3s improvement on TUG - not met    LTG's (to be achieved by d/c)  Pt will be able to self manage sx's independently   Pt will demonstrate at least 100ft foot improvement in max walking endurance with AD  Pt will report at least 50% reduced difficulty with balance when performing ADL's including dressing     Manuals 21                               Neuro Re-Ed  overground walking with rollator walker, focus on keeping right hand on rollator and hitting left heel, 250 feet  150 feet    Overground walking 150 feet  300 feet  150 feet  Forward and backwards walking in parallel bars 12 feet x 6     Step over cones   Using RW 15ft x2    Step ups   4in, in //bars  x5 ea L railing  4" step with left leg leading, 10  2" step with right leg leading, 10   Side stepping   1 lap in //bars x2 12 feet x 6 sets, 3 with catching and throwing   Dynamic Reaching     Forwards and backwards walking in parallel bars, 12 feet x 4 sets  Retrieve object, turn 180 degrees and toss, near railing, 8 x 2 sets                        Ther Ex       Educated in HEP 8 min Walking in [facility] on [employer] treadmill, 0 4 mph, 1 min, stopped due to patient fatigue     Scifit   SciFit level 1, 5 min, about 60 steps/min 5 5min L1       LAQ, 3 lbs each, 30 sec each  Sit to stand, 30 sec  Seated march, 3 lbs on ankles, 30 sec  Sit to stand, 12 x 2 sets  Standing heel raise 10 x 2 sets                                        Ther Activity                     Gait Training                     Modalities                           ASSESSMENTDid well with therapy today, still limited by poor endurance  Walk to tolerance with walker and staff at home  No buckling of knees durinhg therapy today  PLAN   Continue per plan of care  Continue for 4 more weeks at 2x per week

## 2021-07-15 ENCOUNTER — APPOINTMENT (OUTPATIENT)
Dept: PHYSICAL THERAPY | Facility: REHABILITATION | Age: 41
End: 2021-07-15
Payer: MEDICARE

## 2021-07-15 RX ORDER — MULTIVIT-MIN/FA/LYCOPEN/LUTEIN .4-300-25
1 TABLET ORAL DAILY
Qty: 30 TABLET | Refills: 5 | Status: SHIPPED | OUTPATIENT
Start: 2021-07-15 | End: 2022-01-05 | Stop reason: SDUPTHER

## 2021-07-16 ENCOUNTER — OFFICE VISIT (OUTPATIENT)
Dept: FAMILY MEDICINE CLINIC | Facility: CLINIC | Age: 41
End: 2021-07-16

## 2021-07-16 VITALS
TEMPERATURE: 98.3 F | SYSTOLIC BLOOD PRESSURE: 126 MMHG | WEIGHT: 219 LBS | OXYGEN SATURATION: 98 % | HEIGHT: 59 IN | HEART RATE: 109 BPM | DIASTOLIC BLOOD PRESSURE: 72 MMHG | BODY MASS INDEX: 44.15 KG/M2 | RESPIRATION RATE: 18 BRPM

## 2021-07-16 DIAGNOSIS — Z11.59 ENCOUNTER FOR HEPATITIS C SCREENING TEST FOR LOW RISK PATIENT: ICD-10-CM

## 2021-07-16 DIAGNOSIS — Z71.3 NUTRITIONAL COUNSELING: ICD-10-CM

## 2021-07-16 DIAGNOSIS — N30.00 ACUTE CYSTITIS WITHOUT HEMATURIA: Primary | ICD-10-CM

## 2021-07-16 DIAGNOSIS — Z11.4 ENCOUNTER FOR SCREENING FOR HIV: ICD-10-CM

## 2021-07-16 PROCEDURE — 99213 OFFICE O/P EST LOW 20 MIN: CPT | Performed by: FAMILY MEDICINE

## 2021-07-16 NOTE — PROGRESS NOTES
OFFICE VISIT NOTE - [facility]  Zoë Bauer 42 y o  (:1980) female MRN: [id number]  Unit/Bed#:  Encounter: [id number]      Date: 21    Assessment and Plan:    UTI (urinary tract infection)  UTI resolved  No new symptoms  Patient is here with caregiver who reports that patient is at baseline  Patient reports that she feels fine  will f/u as needed      Diagnoses and all orders for this visit:    Acute cystitis without hematuria    Encounter for hepatitis C screening test for low risk patient  -     Hepatitis C antibody; Future    Encounter for screening for HIV  -     HIV 1/2 Antigen/Antibody (4th Generation) w Reflex [facility]; Future    Nutritional counseling  BMI Counseling: Body mass index is 44 23 kg/m²  The BMI is above normal  Nutrition recommendations include reducing portion sizes, decreasing overall calorie intake, 3-5 servings of fruits/vegetables daily, reducing fast food intake, consuming healthier snacks and decreasing soda and/or juice intake  Subjective:  Chief Complaint   Patient presents with    Urinary Tract Infection     follow up/ patient feels lot better       HPI    Zoë Bauer is a 42 y o  female here for follow up of UTI  She was last seen in the office on   for  UTI f/u during which she had mild symptoms of dysuria  She is now S/P treatment with cefuroxime and feels fine with no new complaints  She denies dysuria, fever, nausea, vomiting, abdominal pain  Caregiver is here and reports the patient is at baseline  The following portions of the patient's history were reviewed and updated as appropriate: allergies, current medications, past family history, past medical history, past social history, past surgical history and problem list     Review of Systems   Constitutional: Negative for chills, fatigue and fever  HENT: Negative for sore throat  Eyes: Negative for photophobia, redness and visual disturbance  Respiratory: Negative for cough, shortness of breath and wheezing  Cardiovascular: Negative for chest pain, palpitations and leg swelling  Gastrointestinal: Negative for abdominal distention, abdominal pain, blood in stool, constipation, diarrhea, nausea and vomiting  Musculoskeletal: Negative for arthralgias, back pain and neck pain  Skin: Negative for rash  Neurological: Negative for dizziness, weakness, numbness and headaches  Objective     Vitals: /72 (BP Location: Right arm, Patient Position: Sitting, Cuff Size: Large)   Pulse (!) 109   Temp 98 3 °F (36 8 °C) (Temporal)   Resp 18   Ht 4' 11" (1 499 m)   Wt 99 3 kg (219 lb)   SpO2 98%   BMI 44 23 kg/m²     Recent Labs  No results found for this or any previous visit (from the past 24 hour(s))  Physical Exam  Constitutional:       General: She is not in acute distress  Appearance: She is morbidly obese  She is not ill-appearing  HENT:      Head: Normocephalic and atraumatic  Right Ear: External ear normal       Left Ear: External ear normal       Nose: Nose normal  No congestion or rhinorrhea  Eyes:      Conjunctiva/sclera: Conjunctivae normal    Cardiovascular:      Rate and Rhythm: Normal rate and regular rhythm  Heart sounds: Normal heart sounds  Pulmonary:      Effort: No respiratory distress  Breath sounds: Normal breath sounds  No wheezing  Abdominal:      General: Bowel sounds are normal  There is no distension  Palpations: Abdomen is soft  There is no mass  Tenderness: There is no abdominal tenderness  Musculoskeletal:         General: No swelling, tenderness or deformity  Normal range of motion  Right lower leg: No edema  Left lower leg: No edema  Skin:     General: Skin is warm  Findings: No rash  Neurological:      Mental Status: She is alert and oriented to person, place, and time     Psychiatric:         Behavior: Behavior normal          Keyur Willis MD  Family Medicine PGY-2  07/16/21

## 2021-07-16 NOTE — ASSESSMENT & PLAN NOTE
UTI resolved  No new symptoms  Patient is here with caregiver who reports that patient is at baseline  Patient reports that she feels fine  will f/u as needed

## 2021-07-20 ENCOUNTER — OFFICE VISIT (OUTPATIENT)
Dept: PHYSICAL THERAPY | Facility: REHABILITATION | Age: 41
End: 2021-07-20
Payer: MEDICARE

## 2021-07-20 DIAGNOSIS — R29.6 FREQUENT FALLS: Primary | ICD-10-CM

## 2021-07-20 PROCEDURE — 97110 THERAPEUTIC EXERCISES: CPT

## 2021-07-20 PROCEDURE — 97112 NEUROMUSCULAR REEDUCATION: CPT

## 2021-07-20 NOTE — PROGRESS NOTES
Daily Note    Today's date: 2021  Patient name: Mady Villalobos  : 1980  MRN: [id number]  Referring provider: Loree Joshi MD  Dx:   No diagnosis found  SUBJECTIVE: Patient reports she fell of Saturday when she was stepping up the stairs, she states she almost fell because her legs were shaking really bad  She states that her R knee feels stiff        Precautions: Seizures, intellectual disability, ADHD  HEP: seated march, LAQ, seated heel/toe raises, STS    OBJECTIVE       Balance Test Initial Eval         5x Sit to Stand: 33s, 22 in surface, 1 UE  31s, from 22in surface, 1 UE       TUs  33s  33s with RW       Gait Speed:            2 Minute Walk Test: Requested to stop at 1:47, walked 100ft with RW     HR: 123  O2: 96%  Requested to stop at 1:35, walked 100ft with RW     HR: 128  O2: 97%       6 Minute Walk Test:           Cunningham Balance Scale:           FGA:            Modified [place] Scale Score:           Patient-Reported Outcome Measure  Stroke Impact Scale:               Goals  STGs (4 weeks)  Pt will be independent with comprehensive HEP - not met  Pt will demonstrate at least 3s improvement on 5xSTS - not met  Pt will demonstrate at least 3s improvement on TUG - not met    LTG's (to be achieved by d/c)  Pt will be able to self manage sx's independently   Pt will demonstrate at least 100ft foot improvement in max walking endurance with AD  Pt will report at least 50% reduced difficulty with balance when performing ADL's including dressing     Manuals 21                                   Neuro Re-Ed  overground walking with rollator walker, focus on keeping right hand on rollator and hitting left heel, 250 feet  150 feet    Overground walking 150 feet  300 feet  150 feet  Forward and backwards walking in parallel bars 12 feet x 6   Walking figure-8's with rollator walker x 2 reps of 25 feet    Stepping over obstacles, patient with significantly wide ELINA x 1 lap  Backwards walking x 3 laps in // bars with minimal UE support and CGA for balance  Sidestepping in // bars x 3 laps each direction  Step over cones   Using RW 15ft x2     Step ups   4in, in //bars  x5 ea L railing  4" step with left leg leading, 10  2" step with right leg leading, 10 L railing 4" step x 7 reps with each leg leading, vc's for controlling ascent on R side   Side stepping   1 lap in //bars x2 12 feet x 6 sets, 3 with catching and throwing Side stepping in // bars without UE support x 3 laps each direction  Dynamic Reaching     Forwards and backwards walking in parallel bars, 12 feet x 4 sets  Retrieve object, turn 180 degrees and toss, near railing, 8 x 2 sets                            Ther Ex        Educated in HEP 8 min Walking in [facility] on [employer] treadmill, 0 4 mph, 1 min, stopped due to patient fatigue   Knee flexion stretch in sitting 10sec hold x 5 reps on R knee  Reviewed marching and ankle pumps 2 x 10  Scifit   SciFit level 1, 5 min, about 60 steps/min 5 5min L1   SciFit level 1, 6 minutes with L UE and bilateral LE's     LAQ, 3 lbs each, 30 sec each  Sit to stand, 30 sec  Seated march, 3 lbs on ankles, 30 sec  Sit to stand, 12 x 2 sets  Standing heel raise 10 x 2 sets                                              Ther Activity                        Gait Training                        Modalities                              ASSESSMENT    Patient did well with therapy during today's session, continues to have decreased endurance  No buckling of knees durinhg therapy today  Patient with some tightness in R knee, reports decreased pain and tightness post knee flexion stretch in sitting, educated to perform as part of HEP  Also educated patient and caregiver to go up step into house with L (stronger) LE first, and down with R LE first with fatigue to avoid buckling of the knee and decrease chance of falls  PLAN   Continue per plan of care

## 2021-07-22 ENCOUNTER — APPOINTMENT (OUTPATIENT)
Dept: PHYSICAL THERAPY | Facility: REHABILITATION | Age: 41
End: 2021-07-22
Payer: MEDICARE

## 2021-07-23 ENCOUNTER — OFFICE VISIT (OUTPATIENT)
Dept: PHYSICAL THERAPY | Facility: REHABILITATION | Age: 41
End: 2021-07-23
Payer: MEDICARE

## 2021-07-23 DIAGNOSIS — R29.6 FREQUENT FALLS: Primary | ICD-10-CM

## 2021-07-23 PROCEDURE — 97112 NEUROMUSCULAR REEDUCATION: CPT

## 2021-07-23 PROCEDURE — 97110 THERAPEUTIC EXERCISES: CPT

## 2021-07-23 NOTE — PROGRESS NOTES
Daily Note     Today's date: 2021  Patient name: Zoë Bauer  : 1980  MRN: [id number]  Referring provider: Rosita Michael MD  Dx: No diagnosis found  Subjective: Patient reports her legs are very stiff and sore today  Patient arrived 15 minutes late for appointment, left 15 minutes early due to stating she had a work shift that started at [facility] [employer] (appointment [phone])  Objective: See treatment diary below      Assessment: Tolerated treatment fair  Patient demonstrated fatigue post treatment and would benefit from continued PT  Patient limited during session due to knee pain, limited session due to patient being late and needing to leave early to get to work  Continue to work on dynamic balance, patient is very fearful of falling   Advised patient to continue with HEP, and ice knee as needed for pain control    Plan: Continue per plan of care              Balance Test Initial Eval         5x Sit to Stand: 33s, 22 in surface, 1 UE  31s, from 22in surface, 1 UE       TUs  33s  33s with RW       Gait Speed:            2 Minute Walk Test: Requested to stop at 1:47, walked 100ft with RW     HR: 123  O2: 96%  Requested to stop at 1:35, walked 100ft with RW     HR: 128  O2: 97%       6 Minute Walk Test:           Cunningham Balance Scale:           FGA:            Modified Kevin Scale Score:           Patient-Reported Outcome Measure  Stroke Impact Scale:               Goals  STGs (4 weeks)  Pt will be independent with comprehensive HEP - not met  Pt will demonstrate at least 3s improvement on 5xSTS - not met  Pt will demonstrate at least 3s improvement on TUG - not met    LTG's (to be achieved by d/c)  Pt will be able to self manage sx's independently   Pt will demonstrate at least 100ft foot improvement in max walking endurance with AD  Pt will report at least 50% reduced difficulty with balance when performing ADL's including dressing     Manuals 21 7/23                                       Neuro Re-Ed  overground walking with rollator walker, focus on keeping right hand on rollator and hitting left heel, 250 feet  150 feet    Overground walking 150 feet  300 feet  150 feet  Forward and backwards walking in parallel bars 12 feet x 6   Walking figure-8's with rollator walker x 2 reps of 25 feet  Stepping over obstacles, patient with significantly wide ELINA x 1 lap  Backwards walking x 3 laps in // bars with minimal UE support and CGA for balance  Sidestepping in // bars x 3 laps each direction  Walking over cone obstacles with rollator walker x 2 reps of 25 feet, patient requires vc's for maintaining UE's on walker and foot placement  Overground walking around therapy clinic with rollator walker x 150 feet, vc's for hand placement and walker management   Step over cones   Using RW 15ft x2      Step ups   4in, in //bars  x5 ea L railing  4" step with left leg leading, 10  2" step with right leg leading, 10 L railing 4" step x 7 reps with each leg leading, vc's for controlling ascent on R side    Side stepping   1 lap in //bars x2 12 feet x 6 sets, 3 with catching and throwing Side stepping in // bars without UE support x 3 laps each direction  Dynamic Reaching     Forwards and backwards walking in parallel bars, 12 feet x 4 sets  Retrieve object, turn 180 degrees and toss, near railing, 8 x 2 sets                                Ther Ex         Educated in HEP 8 min Walking in [facility] on [employer] treadmill, 0 4 mph, 1 min, stopped due to patient fatigue   Knee flexion stretch in sitting 10sec hold x 5 reps on R knee  Reviewed marching and ankle pumps 2 x 10      Scifit   SciFit level 1, 5 min, about 60 steps/min 5 5min L1   SciFit level 1, 6 minutes with L UE and bilateral LE's      LAQ, 3 lbs each, 30 sec each  Sit to stand, 30 sec  Seated march, 3 lbs on ankles, 30 sec  Sit to stand, 12 x 2 sets  Standing heel raise 10 x 2 sets    Knee flexion/extension with towels on floor x 2 min, hip abduction/adduction 2 minutes for                                                 Ther Activity      Patient used bathroom during session, with CS/min A for pants management and balance  Required vc's for safe movement around sink, to paper towels, and garbage bin  Gait Training                           Modalities      Ice to L knee during rest break after bathroom for 5 minutes

## 2021-07-26 ENCOUNTER — APPOINTMENT (OUTPATIENT)
Dept: LAB | Facility: CLINIC | Age: 41
End: 2021-07-26
Payer: MEDICARE

## 2021-07-26 DIAGNOSIS — Z11.59 ENCOUNTER FOR HEPATITIS C SCREENING TEST FOR LOW RISK PATIENT: ICD-10-CM

## 2021-07-26 DIAGNOSIS — Z11.4 ENCOUNTER FOR SCREENING FOR HIV: ICD-10-CM

## 2021-07-26 LAB — HCV AB SER QL: NORMAL

## 2021-07-26 PROCEDURE — 87389 HIV-1 AG W/HIV-1&-2 AB AG IA: CPT

## 2021-07-26 PROCEDURE — 86803 HEPATITIS C AB TEST: CPT

## 2021-07-26 PROCEDURE — 36415 COLL VENOUS BLD VENIPUNCTURE: CPT

## 2021-07-26 NOTE — PROGRESS NOTES
[facility]  Mahesh Zapata 42 y o  female MRN: [id number]  Encounter: [id number]          ASSESSMENT AND PLAN:      Sandra Trejo is a 41 y/o female with cerebral palsy here for follow-up on functional dysphagia  1  Functional Dysphagia  2  Atypical chest pain  3  Hiatal hernia   EGD, esophageal manometry, barium swallow were all WNL and without evidenced of etiology for dysphagia  Pt was tried on PPI, elavil, and bentyl without relief  Pt was recommended to continue with psych management for this, but it appears she has not followed up  Both the patient and her aid state she has not had any issues with dysphagia recently and she has not complained of any chest pain  Pt confirms the protonix helps alleviate her heartburn and rarely gets breakthrough symptoms    -continue protonix 20 mg BID with hopes to reduce this at her next appt  -call our office if dysphagia returns but go to ED for any choking episodes     4  CIC  On amitiza 24 mcg and stable    -can use OTC stool softeners PRN   ______________________________________________________________________    SUBJECTIVE:  Sandra Trejo is a 41 y/o female with cerebral palsy here for follow-up on functional dysphagia  Patient is accompanied by her aid  Both confirm that she has not had any episodes of dysphagia recently and is eating and drinking well  They also say she does not get the chest pain as often either  She is on protonix twice daily for heartburn and says that this helps alleviate her heartburn  Patient says she moves her bowels daily on amitiza and has not had issues with constipation since starting it  The patient says hat if the constipation returns, she wants to know if she can add any stool softeners to her regimen  No abdominal pain, bloody or black stools  REVIEW OF SYSTEMS IS OTHERWISE NEGATIVE        Historical Information   Past Medical History:   Diagnosis Date    ADD (attention deficit disorder)     Anxiety     Astigmatism     Brain lesion     Calcium deficiency     Cellulitis of foot, right 6/4/2018    Cerebral palsy (HCC)     Chronic otitis media     Constipation     Depression     Dysphagia     Esophagitis     Esotropia     GERD (gastroesophageal reflux disease)     Hiatal hernia     Hydrocephalus (HCC)     Impaired fasting glucose     Left nephrolithiasis 03/04/2019    Myopia     Oppositional defiant disorder     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HCC)     Seizures (HCC)     Sensorineural hearing loss     Status post ventriculoatrial shunt placement     Visual impairment      Past Surgical History:   Procedure Laterality Date    BREAST BIOPSY Left     X 2 (not sure of years)    CSF SHUNT      Creation of Ventriculo-Peritoneal CSF shunt ; Last Assessed:7/6/2016    EAR SURGERY      Last Assessed:7/6/2016    LEG SURGERY      due to CP     NOSE SURGERY      Last Assessed:7/6/2016    [place] ESOPHAGOGASTRODUODENOSCOPY TRANSORAL DIAGNOSTIC N/A 5/9/2019    Procedure: ESOPHAGOGASTRODUODENOSCOPY (EGD) with biopsy;  Surgeon: Trevor Feldman MD;  Location: [facility];   Service: Gastroenterology    UPPER GASTROINTESTINAL ENDOSCOPY  05/2019     Social History   Social History     Substance and Sexual Activity   Alcohol Use Never     Social History     Substance and Sexual Activity   Drug Use Never     Social History     Tobacco Use   Smoking Status Never Smoker   Smokeless Tobacco Never Used     Family History   Problem Relation Age of Onset    Diabetes Mother     Colon cancer Father     No Known Problems Maternal Grandmother     No Known Problems Maternal Grandfather     No Known Problems Paternal Grandmother     No Known Problems Paternal Grandfather        Meds/Allergies       Current Outpatient Medications:     acetaminophen (TYLENOL) 500 mg tablet    aluminum-magnesium hydroxide-simethicone (Antacid) 200-200-20 mg/5 mL suspension    amitriptyline (ELAVIL) 10 mg tablet   ARIPiprazole (ABILIFY) 20 MG tablet    bacitracin topical ointment 500 units/g topical ointment    bismuth subsalicylate (PEPTO BISMOL) 524 mg/30 mL oral suspension    calcium carbonate (TUMS) 500 mg chewable tablet    Cholecalciferol (Vitamin D-3) 25 MCG (1000 UT) CAPS    dicyclomine (BENTYL) 20 mg tablet    Dyclonine-Glycerin (Cepacol Sore Throat Spray) 0 1-33 % LIQD    Emollient (CeraVe) CREA    escitalopram (LEXAPRO) 20 mg tablet    fluticasone (FLONASE) 50 mcg/act nasal spray    guaiFENesin (ROBITUSSIN) 100 MG/5ML oral liquid    hydrOXYzine HCL (ATARAX) 25 mg tablet    ibuprofen (MOTRIN) 400 mg tablet    lamoTRIgine (LaMICtal) 25 mg tablet    lidocaine (LIDODERM) 5 %    LORazepam (ATIVAN) 0 5 mg tablet    lubiprostone (Amitiza) 24 mcg capsule    magnesium hydroxide (GNP Milk of Magnesia) 400 mg/5 mL oral suspension    metoprolol tartrate (LOPRESSOR) 25 mg tablet    mineral oil-hydrophilic petrolatum (AQUAPHOR) ointment    Mouthwashes (Listerine Antiseptic) LIQD    Multiple Vitamins-Minerals (CertaVite Senior/Antioxidant) TABS    norgestimate-ethinyl estradiol (ORTHO-CYCLEN) 0 25-35 MG-MCG per tablet    nystatin (MYCOSTATIN) powder    nystatin-triamcinolone (MYCOLOG-II) cream    pantoprazole (PROTONIX) 20 mg tablet    phenol (Chloraseptic) 1 4 % mucosal liquid    polyethylene glycol (MIRALAX) 17 g packet    psyllium (METAMUCIL) 58 6 % packet    RA SUNSCREEN SPF50 LOTN    senna-docusate sodium (Senexon-S) 8 6-50 mg per tablet    sodium chloride (OCEAN) 0 65 % nasal spray    white petrolatum    zinc oxide (DESITIN) 13 % cream    No Known Allergies        Objective     There were no vitals taken for this visit  There is no height or weight on file to calculate BMI        PHYSICAL EXAM:      General Appearance:   Alert, cooperative, no distress   HEENT:   Normocephalic, atraumatic, anicteric      Neck:  Supple, symmetrical, trachea midline   Lungs:   Clear to auscultation bilaterally; no rales, rhonchi or wheezing; respirations unlabored    Heart[de-identified]   Regular rate and rhythm; no murmur, rub, or gallop  Abdomen:   Soft, non-tender, non-distended; normal bowel sounds; no masses, no organomegaly    Genitalia:   Deferred    Rectal:   Deferred    Extremities:  No cyanosis, clubbing or edema    Pulses:  2+ and symmetric    Skin:  No jaundice, rashes, or lesions    Lymph nodes:  No palpable cervical lymphadenopathy        Lab Results:   No visits with results within 1 Day(s) from this visit  Latest known visit with results is:   Hospital Outpatient Visit on 06/25/2021   Component Date Value    Ventricular Rate 06/25/2021 90     Atrial Rate 06/25/2021 90     [place] Interval 06/25/2021 130     QRSD Interval 06/25/2021 72     QT Interval 06/25/2021 366     QTC Interval 06/25/2021 447     P Axis 06/25/2021 47     QRS Axis 06/25/2021 10     T Wave Axis 06/25/2021 46          Radiology Results:   No results found

## 2021-07-27 ENCOUNTER — OFFICE VISIT (OUTPATIENT)
Dept: PHYSICAL THERAPY | Facility: REHABILITATION | Age: 41
End: 2021-07-27
Payer: MEDICARE

## 2021-07-27 DIAGNOSIS — R29.6 FREQUENT FALLS: Primary | ICD-10-CM
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
## 2021-07-27 PROCEDURE — 97112 NEUROMUSCULAR REEDUCATION: CPT | Performed by: PHYSICAL THERAPIST

## 2021-07-27 NOTE — PROGRESS NOTES
Daily Note     Today's date: 2021  Patient name: Sanya Figueroa  : 1980  MRN: [id number]  Referring provider: Chandrika Lopez MD  Dx:   Encounter Diagnosis     ICD-10-CM    1  Frequent falls  R29 6    2  Fall, initial encounter  [address]  XXXA                   Subjective: Denies falls since last visit  Objective: See treatment diary below      Assessment: Tolerated treatment fair  Pt is challenged with staying engaged with exercises often requesting to stop reported fatigue, or becoming easily distracted  Needs frequent cues to redirect  Reported R knee pain today limited time performed on scifit  Was able to perform some walking w/o UE support today in //bars  Patient demonstrated fatigue post treatment and would benefit from continued PT  Plan: Continue per plan of care  Manuals 21                                           Neuro Re-Ed  overground walking with rollator walker, focus on keeping right hand on rollator and hitting left heel, 250 feet  150 feet    Overground walking 150 feet  300 feet  150 feet  Forward and backwards walking in parallel bars 12 feet x 6   Walking figure-8's with rollator walker x 2 reps of 25 feet  Stepping over obstacles, patient with significantly wide ELINA x 1 lap  Backwards walking x 3 laps in // bars with minimal UE support and CGA for balance  Sidestepping in // bars x 3 laps each direction  Walking over cone obstacles with rollator walker x 2 reps of 25 feet, patient requires vc's for maintaining UE's on walker and foot placement      Overground walking around therapy clinic with rollator walker x 150 feet, vc's for hand placement and walker management Overground walking 100ft with rollator, cues to maintain RUE on walker    Walking in //bars no UE support, with CG 3 laps               Step over cones   Using RW 15ft x2    20ft x6 with rollator   Step ups   4in, in //bars  x5 ea L railing  4" step with left leg leading, 10  2" step with right leg leading, 10 L railing 4" step x 7 reps with each leg leading, vc's for controlling ascent on R side  L railing 4" step x 7 reps with each leg leading, vc's for controlling ascent on R side   Side stepping   1 lap in //bars x2 12 feet x 6 sets, 3 with catching and throwing Side stepping in // bars without UE support x 3 laps each direction  In //bars 3 laps no UE support  With CG   Dynamic Reaching     Forwards and backwards walking in parallel bars, 12 feet x 4 sets  Retrieve object, turn 180 degrees and toss, near railing, 8 x 2 sets   For cones hi and low, both hands 12x in //bars  Taking hands off bars  Ball toss        Static in //bars 12x, taking hands off bars  Ther Ex          Educated in HEP 8 min [facility] [employer] treadmill, 0 4 mph, 1 min, stopped due to patient fatigue   Knee flexion stretch in sitting 10sec hold x 5 reps on R knee  Reviewed marching and ankle pumps 2 x 10  Scifit   SciFit level 1, 5 min, about 60 steps/min 5 5min L1   SciFit level 1, 6 minutes with L UE and bilateral LE's  L1 3 5 min      LAQ, 3 lbs each, 30 sec each  Sit to stand, 30 sec  Seated march, 3 lbs on ankles, 30 sec  Sit to stand, 12 x 2 sets  Standing heel raise 10 x 2 sets    Knee flexion/extension with towels on floor x 2 min, hip abduction/adduction 2 minutes for     STS                                                  Ther Activity      Patient used bathroom during session, with CS/min A for pants management and balance  Required vc's for safe movement around sink, to paper towels, and garbage bin  Gait Training                              Modalities      Ice to L knee during rest break after bathroom for 5 minutes

## 2021-07-28 ENCOUNTER — OFFICE VISIT (OUTPATIENT)
Dept: GASTROENTEROLOGY | Facility: CLINIC | Age: 41
End: 2021-07-28
Payer: MEDICARE

## 2021-07-28 VITALS
TEMPERATURE: 98 F | HEART RATE: 79 BPM | WEIGHT: 217.8 LBS | HEIGHT: 59 IN | SYSTOLIC BLOOD PRESSURE: 120 MMHG | BODY MASS INDEX: 43.91 KG/M2 | DIASTOLIC BLOOD PRESSURE: 80 MMHG

## 2021-07-28 DIAGNOSIS — K59.04 CHRONIC IDIOPATHIC CONSTIPATION: Primary | ICD-10-CM

## 2021-07-28 LAB — HIV 1+2 AB+HIV1 P24 AG SERPL QL IA: NORMAL

## 2021-07-28 PROCEDURE — 99214 OFFICE O/P EST MOD 30 MIN: CPT | Performed by: PHYSICIAN ASSISTANT

## 2021-07-29 ENCOUNTER — APPOINTMENT (OUTPATIENT)
Dept: PHYSICAL THERAPY | Facility: REHABILITATION | Age: 41
End: 2021-07-29
Payer: MEDICARE

## 2021-07-30 ENCOUNTER — APPOINTMENT (OUTPATIENT)
Dept: PHYSICAL THERAPY | Facility: REHABILITATION | Age: 41
End: 2021-07-30
Payer: MEDICARE

## 2021-07-31 ENCOUNTER — OFFICE VISIT (OUTPATIENT)
Dept: URGENT CARE | Age: 41
End: 2021-07-31
Payer: MEDICARE

## 2021-07-31 ENCOUNTER — APPOINTMENT (OUTPATIENT)
Dept: RADIOLOGY | Age: 41
End: 2021-07-31
Payer: MEDICARE

## 2021-07-31 VITALS
DIASTOLIC BLOOD PRESSURE: 85 MMHG | TEMPERATURE: 100 F | SYSTOLIC BLOOD PRESSURE: 149 MMHG | BODY MASS INDEX: 43.42 KG/M2 | HEART RATE: 120 BPM | OXYGEN SATURATION: 96 % | WEIGHT: 215 LBS | RESPIRATION RATE: 22 BRPM

## 2021-07-31 DIAGNOSIS — S00.83XA CONTUSION OF FACE, INITIAL ENCOUNTER: ICD-10-CM

## 2021-07-31 DIAGNOSIS — S80.02XA CONTUSION OF LEFT KNEE, INITIAL ENCOUNTER: Primary | ICD-10-CM

## 2021-07-31 DIAGNOSIS — S89.91XA KNEE INJURY, RIGHT, INITIAL ENCOUNTER: ICD-10-CM

## 2021-07-31 PROCEDURE — 99213 OFFICE O/P EST LOW 20 MIN: CPT | Performed by: PHYSICIAN ASSISTANT

## 2021-07-31 PROCEDURE — 73564 X-RAY EXAM KNEE 4 OR MORE: CPT

## 2021-07-31 PROCEDURE — G0463 HOSPITAL OUTPT CLINIC VISIT: HCPCS | Performed by: PHYSICIAN ASSISTANT

## 2021-07-31 NOTE — PROGRESS NOTES
[facility]        NAME: Bogdan Aguiar is a 42 y o  female  : 1980    MRN: [id number]  DATE: 2021  TIME: 4:57 PM    Assessment and Plan   Contusion of left knee, initial encounter [S80 [id number]]  1  Contusion of left knee, initial encounter  XR knee 4+ vw right injury    Compression bandages   2  Contusion of face, initial encounter       Pt sustained bruise to R side of face  Not on blood thinners  No red flag signs  Pt and caregiver asked about XR of head  I informed her that XR is not preferred imaging for that and that would need to be a CT scan  I informed them that with her symptoms, a CT scan is not indicated, however if she feels she needs to be seen at a higher level of care I have no problem putting a transfer order in  She discussed with her caregiver and they decided not to go to ED  I educated them on signs and sx of worsening condition and indicaitons to go immediately to the ED  They state they understand and agree  Patient Instructions     Rest, Ice, and Elevate limb  Continue to monitor symptoms  If symptoms do not improve in one week, follow up with orthopedics  Call Kerrie Holguin [phone] to schedule and appointment  If new or worsening symptoms occur, go immediately to the ER  Chief Complaint     Chief Complaint   Patient presents with    Knee Pain     Right Knee Injury, Right Facial Injury from fall today         History of Present Illness       Pt has cerebral palsy and walks with a walker  She has frequent falls  She was walking along concrete when she fell forward, caught herself on her knees then fell to the side  She struck the R side of her head on the concrete  No LOC  Got up immediately  No double vision or blurred vision  No Nausea or vomiting  No change of mental status  Denies any headache  No pain around eye  No pain with EOM  No dental pain  Pt not on any blood thinners      Pt complaining of pain with ambulation to R knee as well as bruise to R cheek  Review of Systems   Review of Systems   Constitutional: Negative for chills, diaphoresis, fatigue and fever  HENT: Negative for congestion, ear pain, facial swelling, mouth sores, nosebleeds, rhinorrhea, sinus pressure and voice change  Eyes: Negative  Respiratory: Negative for cough, chest tightness and shortness of breath  Cardiovascular: Negative for chest pain and palpitations  Gastrointestinal: Negative for abdominal pain, constipation, diarrhea, nausea and vomiting  Endocrine: Negative  Genitourinary: Negative for dysuria  Musculoskeletal: Positive for gait problem  Negative for back pain, myalgias and neck pain  Skin: Negative for pallor and rash  Allergic/Immunologic: Negative  Neurological: Negative for dizziness, seizures, syncope, speech difficulty, weakness, light-headedness and headaches  Hematological: Negative  Psychiatric/Behavioral: Negative            Current Medications       Current Outpatient Medications:     acetaminophen (TYLENOL) 500 mg tablet, Take 1 tablet (500 mg total) by mouth every 6 (six) hours as needed for mild pain, Disp: 30 tablet, Rfl: 5    aluminum-magnesium hydroxide-simethicone (Antacid) 200-200-20 mg/5 mL suspension, Take 15 mL by mouth every 4 (four) hours as needed for indigestion or heartburn, Disp: 355 mL, Rfl: 5    amitriptyline (ELAVIL) 10 mg tablet, Take 10 mg by mouth daily at bedtime, Disp: , Rfl:     ARIPiprazole (ABILIFY) 20 MG tablet, Take 1 tablet (20 mg total) by mouth daily at bedtime, Disp: 90 tablet, Rfl: 2    bismuth subsalicylate (PEPTO BISMOL) 524 mg/30 mL oral suspension, Take 15 mL (262 mg total) by mouth every 6 (six) hours as needed for indigestion, Disp: 360 mL, Rfl: 3    calcium carbonate (TUMS) 500 mg chewable tablet, Chew 1 tablet (500 mg total) 3 (three) times a day as needed for heartburn, Disp: 30 tablet, Rfl: 5    Cholecalciferol (Vitamin D-3) 25 MCG (1000 UT) CAPS, Take 2 capsules (2,000 Units total) by mouth daily at 8am , Disp: 30 capsule, Rfl: 5    dicyclomine (BENTYL) 20 mg tablet, Take 1 tablet (20 mg total) by mouth every 6 (six) hours as needed (as needed), Disp: 120 tablet, Rfl: 2    escitalopram (LEXAPRO) 20 mg tablet, Take 1 tablet (20 mg total) by mouth daily, Disp: 90 tablet, Rfl: 2    fluticasone (FLONASE) 50 mcg/act nasal spray, 1 spray into each nostril daily as needed for rhinitis (nasal congestion) At 8:00 AM, Disp: 18 2 mL, Rfl: 5    guaiFENesin (ROBITUSSIN) 100 MG/5ML oral liquid, Take 200 mg by mouth 3 (three) times a day as needed for cough, Disp: , Rfl:     hydrOXYzine HCL (ATARAX) 25 mg tablet, Take 1 tablet (25 mg total) by mouth 3 (three) times a day, Disp: 30 tablet, Rfl: 2    ibuprofen (MOTRIN) 400 mg tablet, Take 1 tablet (400 mg total) by mouth every 8 (eight) hours as needed for mild pain, Disp: 30 tablet, Rfl: 5    lamoTRIgine (LaMICtal) 25 mg tablet, Take 25 mg by mouth 2 (two) times a day , Disp: , Rfl:     LORazepam (ATIVAN) 0 5 mg tablet, Take 0 25 mg by mouth 2 (two) times a day, Disp: , Rfl:     lubiprostone (Amitiza) 24 mcg capsule, Take 1 capsule (24 mcg total) by mouth daily with breakfast, Disp: 60 capsule, Rfl: 5    magnesium hydroxide (GNP Milk of Magnesia) 400 mg/5 mL oral suspension, Take 30 mL by mouth daily as needed for constipation If no BM in 3 days, Disp: 355 mL, Rfl: 5    metoprolol tartrate (LOPRESSOR) 25 mg tablet, Take 1 tablet (25 mg total) by mouth every 12 (twelve) hours, Disp: 60 tablet, Rfl: 5    mineral oil-hydrophilic petrolatum (AQUAPHOR) ointment, Apply topically as needed for dry skin, Disp: 420 g, Rfl: 5    nystatin (MYCOSTATIN) powder, Apply topically 4 (four) times a day, Disp: 45 g, Rfl: 2    nystatin-triamcinolone (MYCOLOG-II) cream, Apply topically 2 (two) times a day, Disp: 30 g, Rfl: 2    pantoprazole (PROTONIX) 20 mg tablet, Take 1 tablet (20 mg total) by mouth 2 (two) times a day, Disp: 62 tablet, Rfl: 5    phenol (Chloraseptic) 1 4 % mucosal liquid, Apply 1 spray to the mouth or throat every 2 (two) hours as needed (for sore throat as needed), Disp: 236 mL, Rfl: 1    polyethylene glycol (MIRALAX) 17 g packet, Take one packet daily as needed for no bowel movement in 24 hours, Disp: 30 each, Rfl: 5    psyllium (METAMUCIL) 58 6 % packet, Take 1 packet by mouth daily, Disp: 30 packet, Rfl: 5    RA SUNSCREEN SPF50 LOTN, Apply 15 minutes before sun exposure and every 2 hours, Disp: 1 Bottle, Rfl: 5    senna-docusate sodium (Senexon-S) 8 6-50 mg per tablet, Take 1 tablet by mouth daily at 8am , Disp: 30 tablet, Rfl: 5    sodium chloride (OCEAN) 0 65 % nasal spray, 1 spray into each nostril as needed (three times daily as needed for nasal congestion), Disp: 60 mL, Rfl: 1    zinc oxide (DESITIN) 13 % cream, Apply 1 application topically as needed (for perianal irritation), Disp: 1 Tube, Rfl: 5    bacitracin topical ointment 500 units/g topical ointment, Cleanse site on nose with soap and water followed by bacitracin BID until healed then p r n , Disp: 15 g, Rfl: 2    Dyclonine-Glycerin (Cepacol Sore Throat Spray) 0 1-33 % LIQD, Apply 1 spray to the mouth or throat 3 (three) times a day as needed (sorethroat) (Patient not taking: Reported on 7/31/2021), Disp: 118 mL, Rfl: 2    Emollient (CeraVe) CREA, Apply topically 2 (two) times a day Apply topically 2 times to affected heel twice daily @8a-8p (Patient not taking: Reported on 7/31/2021), Disp: 453 g, Rfl: 5    lidocaine (LIDODERM) 5 %, Apply 1 patch topically daily Remove & Discard patch within 12 hours or as directed by MD, Disp: 10 patch, Rfl: 0    Mouthwashes (Listerine Antiseptic) LIQD, Swish and spit 5 mL 2 (two) times a day, Disp: 1000 mL, Rfl: 5    Multiple Vitamins-Minerals (CertaVite Senior/Antioxidant) TABS, Take 1 tablet by mouth daily, Disp: 30 tablet, Rfl: 5    norgestimate-ethinyl estradiol (ORTHO-CYCLEN) 0 25-35 MG-MCG per tablet, Take 1 tablet by mouth daily, Disp: 28 tablet, Rfl: 11    white petrolatum, Apply 1 application topically 2 (two) times a day Apply to forehead where excoriations are noted twice a day for 7 days, Disp: 500 g, Rfl: 0    Current Allergies     Allergies as of 07/31/2021    (No Known Allergies)            The following portions of the patient's history were reviewed and updated as appropriate: allergies, current medications, past family history, past medical history, past social history, past surgical history and problem list      Past Medical History:   Diagnosis Date    ADD (attention deficit disorder)     Anxiety     Astigmatism     Brain lesion     Calcium deficiency     Cellulitis of foot, right 6/4/2018    Cerebral palsy (HCC)     Chronic otitis media     Constipation     Depression     Dysphagia     Esophagitis     Esotropia     GERD (gastroesophageal reflux disease)     Hiatal hernia     Hydrocephalus ([address] )     Impaired fasting glucose     Left nephrolithiasis 03/04/2019    Myopia     Oppositional defiant disorder     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HCC)     Seizures ([address] )     Sensorineural hearing loss     Status post ventriculoatrial shunt placement     Visual impairment        Past Surgical History:   Procedure Laterality Date    BREAST BIOPSY Left     X 2 (not sure of years)    CSF SHUNT      Creation of Ventriculo-Peritoneal CSF shunt ; Last Assessed:7/6/2016    EAR SURGERY      Last Assessed:7/6/2016    LEG SURGERY      due to CP     NOSE SURGERY      Last Assessed:7/6/2016    [place] ESOPHAGOGASTRODUODENOSCOPY TRANSORAL DIAGNOSTIC N/A 5/9/2019    Procedure: ESOPHAGOGASTRODUODENOSCOPY (EGD) with biopsy;  Surgeon: Trevor Feldman MD;  Location: [facility];   Service: Gastroenterology    UPPER GASTROINTESTINAL ENDOSCOPY  05/2019       Family History   Problem Relation Age of Onset    Diabetes Mother     Colon cancer Father     No Known Problems Maternal Grandmother     No Known Problems Maternal Grandfather     No Known Problems Paternal Grandmother     No Known Problems Paternal Grandfather          Medications have been verified  Objective   /85 (BP Location: Right arm, Patient Position: Sitting, Cuff Size: Standard)   Pulse (!) 120   Temp 100 °F (37 8 °C) (Tympanic)   Resp 22   Wt 97 5 kg (215 lb)   SpO2 96%   BMI 43 42 kg/m²        Physical Exam     Physical Exam  Vitals and nursing note reviewed  Constitutional:       General: She is not in acute distress  Appearance: Normal appearance  She is well-developed  She is not ill-appearing or diaphoretic  HENT:      Head: Normocephalic  Right Ear: Hearing, tympanic membrane, ear canal and external ear normal  There is no impacted cerumen  No hemotympanum  Left Ear: Hearing, tympanic membrane, ear canal and external ear normal  There is no impacted cerumen  No hemotympanum  Nose: Nose normal  No congestion or rhinorrhea  Mouth/Throat:      Mouth: No injury  Pharynx: Oropharynx is clear  No oropharyngeal exudate, posterior oropharyngeal erythema or uvula swelling  Comments: FROM of jaw without popping or crepitus  Eyes:      General: No scleral icterus  Right eye: No discharge  Left eye: No discharge  Extraocular Movements: Extraocular movements intact  Conjunctiva/sclera: Conjunctivae normal       Pupils: Pupils are equal, round, and reactive to light  Cardiovascular:      Rate and Rhythm: Normal rate and regular rhythm  Heart sounds: Normal heart sounds  Pulmonary:      Effort: Pulmonary effort is normal  No respiratory distress  Breath sounds: Normal breath sounds  No wheezing, rhonchi or rales  Musculoskeletal:         General: No deformity  Cervical back: Normal range of motion and neck supple  Right knee: Ecchymosis present  No swelling, deformity, effusion, lacerations or bony tenderness  Normal range of motion  Tenderness (Inferior patellar region) present  Instability Tests: Anterior drawer test negative  Posterior drawer test negative  Comments: Pt wheelchair bound  Does not tolerate special testing   Skin:     General: Skin is warm  Capillary Refill: Capillary refill takes less than 2 seconds  Coloration: Skin is not pale  Findings: No rash  Neurological:      General: No focal deficit present  Mental Status: She is alert and oriented to person, place, and time  GCS: GCS eye subscore is 4  GCS verbal subscore is 5  GCS motor subscore is 6  Cranial Nerves: No cranial nerve deficit or facial asymmetry  Sensory: No sensory deficit  Motor: Motor function is intact  No weakness or pronator drift  Coordination: Coordination is intact  Romberg sign negative   Coordination normal

## 2021-07-31 NOTE — PATIENT INSTRUCTIONS
Continue to monitor symptoms  If new or worsening symptoms develop, go immediately to Er  Drink plenty of fluids  Follow up with Family Doctor this week  Knee Pain   WHAT YOU NEED TO KNOW:   Knee pain may start suddenly, or it may be a long-term problem  You may have pain on the side, front, or back of your knee  You may have knee stiffness and swelling  You may hear popping sounds or feel like your knee is giving way or locking up as you walk  You may feel pain when you sit, stand, walk, or climb up and down stairs  Knee pain can be caused by conditions such as obesity, inflammation, or strains or tears in ligaments or tendons  DISCHARGE INSTRUCTIONS:   Return to the emergency department if:   · Your pain is worse, even after treatment  · You cannot bend or straighten your leg completely  · The swelling around your knee does not go down even with treatment  · Your knee is painful and hot to the touch  Contact your healthcare provider if:   · You have questions or concerns about your condition or care  Medicines: You may need any of the following:  · NSAIDs  help decrease swelling and pain or fever  This medicine is available with or without a doctor's order  NSAIDs can cause stomach bleeding or kidney problems in certain people  If you take blood thinner medicine, always ask your healthcare provider if NSAIDs are safe for you  Always read the medicine label and follow directions  · Acetaminophen  decreases pain and fever  It is available without a doctor's order  Ask how much to take and how often to take it  Follow directions  Read the labels of all other medicines you are using to see if they also contain acetaminophen, or ask your doctor or pharmacist  Acetaminophen can cause liver damage if not taken correctly  Do not use more than 4 grams (4,000 milligrams) total of acetaminophen in one day  · Prescription pain medicine  may be given   Ask your healthcare provider how to take this medicine safely  Some prescription pain medicines contain acetaminophen  Do not take other medicines that contain acetaminophen without talking to your healthcare provider  Too much acetaminophen may cause liver damage  Prescription pain medicine may cause constipation  Ask your healthcare provider how to prevent or treat constipation  · Take your medicine as directed  Contact your healthcare provider if you think your medicine is not helping or if you have side effects  Tell him or her if you are allergic to any medicine  Keep a list of the medicines, vitamins, and herbs you take  Include the amounts, and when and why you take them  Bring the list or the pill bottles to follow-up visits  Carry your medicine list with you in case of an emergency  What you can do to manage your symptoms:   · Rest your knee so it can heal   Limit activities that increase your pain  Do low-impact exercises, such as walking or swimming  · Apply ice to help reduce swelling and pain  Use an ice pack, or put crushed ice in a plastic bag  Cover it with a towel before you apply it to your knee  Apply ice for 15 to 20 minutes every hour, or as directed  · Apply compression to help reduce swelling  Use a brace or bandage only as directed  · Elevate your knee to help decrease pain and swelling  Elevate your knee while you are sitting or lying down  Prop your leg on pillows to keep your knee above the level of your heart  · Prevent your knee from moving as directed  Your healthcare provider may put on a cast or splint  You may need to wear a leg brace to stabilize your knee  A leg brace can be adjusted to increase your range of motion as your knee heals  What you can do to prevent knee pain:   · Maintain a healthy weight  Extra weight increases your risk for knee pain  Ask your healthcare provider how much you should weigh   He or she can help you create a safe weight loss plan if you need to lose weight  · Exercise or train properly  Use the correct equipment for sports  Wear shoes that provide good support  Check your posture often as you exercise, play sports, or train for an event  This can help prevent stress and strain on your knees  Rest between sessions so you do not overwork your knees  Follow up with your healthcare provider within 24 hours or as directed: You may need follow-up treatments, such as steroid injections to decrease pain  Write down your questions so you remember to ask them during your visits  © Copyright [employer] 2021 Information is for End User's use only and may not be sold, redistributed or otherwise used for commercial purposes  All illustrations and images included in CareNotes® are the copyrighted property of A D A M , Inc  or [facility] Rosetta Cabezas   The above information is an  only  It is not intended as medical advice for individual conditions or treatments  Talk to your doctor, nurse or pharmacist before following any medical regimen to see if it is safe and effective for you  Contusion in Adults, Ambulatory Care   GENERAL INFORMATION:   A contusion  is a bruise that appears on your skin after an injury  A bruise happens when small blood vessels tear but skin does not  When blood vessels tear, blood leaks into nearby tissue, such as soft tissue or muscle    Common symptoms include the following:   · Pain that increases when you touch the bruise, walk, or use the area around the bruise    · Swelling or a lump at the site of the bruise or near it    · Red, blue, or black skin that may change to green or yellow after a few days    · Stiffness or problems moving the bruised area of your body  Seek immediate care for the following symptoms:   · New difficulty moving your injured area    · Tingling or numbness in or near the injured area    · Hand or foot below the bruise gets cold or turns pale  Treatment for a contusion  may include any of the following:  · NSAIDs  help decrease swelling and pain or fever  This medicine is available with or without a doctor's order  NSAIDs can cause stomach bleeding or kidney problems in certain people  If you take blood thinner medicine, always ask your healthcare provider if NSAIDs are safe for you  Always read the medicine label and follow directions  · Pain medicine  to decrease or take away pain  Do not wait until the pain is severe before you take your medicine  · Aspiration  to drain pooled blood in your muscle may be done to help prevent increased pressure in the muscle  · Surgery  may be done to repair a tear in the muscle or relieve pressure in the muscle caused by swelling  Care for a contusion:   · Rest the injured area  or use it less than usual  If you bruised your leg or foot, you may need crutches or a cane to help you walk  This will help you keep weight off your injured body part  Use crutches or a cane as directed  · Use ice  to decrease swelling and pain  Ice may also help prevent tissue damage  Use an ice pack, or put crushed ice in a plastic bag  Cover it with a towel and place it on your bruise for 15 to 20 minutes every hour or as directed  · Use Compression  An elastic bandage may be wrapped around a bruised muscle to support the area and decrease swelling  Make sure the bandage is not too tight  You should be able to fit 1 finger between the bandage and your skin  · Elevate (raise) your injured body part  above the level of your heart to help decrease pain and swelling  Use pillows, blankets, or rolled towels to elevate the area as often as you can  · Do not massage or use heat  Heat and massage may slow healing of the area  · Do not drink alcohol  Alcohol may slow healing of your injury  · Do not stretch injured muscles  Ask your healthcare provider when and how you may safely stretch after your injury    Prevent a contusion:   · Stretch and warm up before you play sports or exercise  · Wear protective gear when you play sports  Examples are shin guards and padding  · If you begin a new physical activity, start slowly to give your body a chance to adjust   Follow up with your healthcare provider as directed:  Write down your questions so you remember to ask them during your visits  CARE AGREEMENT:   You have the right to help plan your care  Learn about your health condition and how it may be treated  Discuss treatment options with your caregivers to decide what care you want to receive  You always have the right to refuse treatment  The above information is an  only  It is not intended as medical advice for individual conditions or treatments  Talk to your doctor, nurse or pharmacist before following any medical regimen to see if it is safe and effective for you  © 2014 [address] is for End User's use only and may not be sold, redistributed or otherwise used for commercial purposes  All illustrations and images included in CareNotes® are the copyrighted property of A D A M , Inc  or Franco Epperson

## 2021-08-02 ENCOUNTER — OFFICE VISIT (OUTPATIENT)
Dept: PHYSICAL THERAPY | Facility: REHABILITATION | Age: 41
End: 2021-08-02
Payer: MEDICARE
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
## 2021-08-02 DIAGNOSIS — R29.6 FREQUENT FALLS: Primary | ICD-10-CM

## 2021-08-02 PROCEDURE — 97112 NEUROMUSCULAR REEDUCATION: CPT | Performed by: PHYSICAL THERAPIST

## 2021-08-02 PROCEDURE — 97110 THERAPEUTIC EXERCISES: CPT | Performed by: PHYSICAL THERAPIST

## 2021-08-02 NOTE — PROGRESS NOTES
Daily Note     Today's date: 2021  Patient name: Meryle Gouge  : 1980  MRN: [id number]  Referring provider: Cisco Cheng MD  Dx:   Encounter Diagnosis     ICD-10-CM    1  Frequent falls  R29 6    2  Fall, initial encounter  [address]  XXXA                   Subjective: Reports fall on Saturday  Caregiver states she was not present for falls but states her coworker informed her that Aixa Arredondo fell forward over top of her walker  Not clear why she fell  Took her to urgent care center, x-ray of R knee was negative  She reports swelling and increased pain of R knee  Aixa Arredondo states she is interested in getting a wheelchair because she is frustrated with her falls  Objective: See treatment diary below    Examined bruising and R knee today    Assessment: Tolerated treatment fair  Demonstrated bruising to R cheek and b/l upper arms  Increased knee swelling present on R which is wrapped in ace bandage today  Had increased difficulty with tolerating walking and exercises today complaining of pain  Pt was tearful today reporting frustration with her mobility and falling  Will contact PCP with recommendation for referral to orthopedics  This plan was discussed with caregiver who expressed agreement  Patient demonstrated fatigue post treatment and would benefit from continued PT  Plan: Continue per plan of care  Monitor R knee pain  Manuals 21                                           Neuro Re-Ed overground walking with rollator walker, focus on keeping right hand on rollator and hitting left heel, 250 feet  150 feet    Overground walking 150 feet  300 feet  150 feet  Forward and backwards walking in parallel bars 12 feet x 6   Walking figure-8's with rollator walker x 2 reps of 25 feet  Stepping over obstacles, patient with significantly wide ELINA x 1 lap  Backwards walking x 3 laps in // bars with minimal UE support and CGA for balance    Sidestepping in // bars x 3 laps each direction  Walking over cone obstacles with rollator walker x 2 reps of 25 feet, patient requires vc's for maintaining UE's on walker and foot placement  Overground walking around therapy clinic with rollator walker x 150 feet, vc's for hand placement and walker management Overground walking 100ft with rollator, cues to maintain RUE on walker    Walking in //bars no UE support, with CG 3 laps             Overground walking 50ftx2 with rollator, cues to maintain RUE on walker       Step over cones  Using RW 15ft x2    20ft x6 with rollator In //bars 3 laps   Step ups  4in, in //bars  x5 ea L railing  4" step with left leg leading, 10  2" step with right leg leading, 10 L railing 4" step x 7 reps with each leg leading, vc's for controlling ascent on R side  L railing 4" step x 7 reps with each leg leading, vc's for controlling ascent on R side    Side stepping  1 lap in //bars x2 12 feet x 6 sets, 3 with catching and throwing Side stepping in // bars without UE support x 3 laps each direction  In //bars 3 laps no UE support  With CG    Dynamic Reaching    Forwards and backwards walking in parallel bars, 12 feet x 4 sets  Retrieve object, turn 180 degrees and toss, near railing, 8 x 2 sets   For cones hi and low, both hands 12x in //bars  Taking hands off bars  Ball toss       Static in //bars 12x, taking hands off bars  Ther Ex          Educated in HEP Walking in [place] on biodex treadmill, 0 4 mph, 1 min, stopped due to patient fatigue   Knee flexion stretch in sitting 10sec hold x 5 reps on R knee  Reviewed marching and ankle pumps 2 x 10        Scifit  SciFit level 1, 5 min, about 60 steps/min 5 5min L1   SciFit level 1, 6 minutes with L UE and bilateral LE's  L1 3 5 min     Seated exercises LAQ, 3 lbs each, 30 sec each  Sit to stand, 30 sec  Seated march, 3 lbs on ankles, 30 sec  Sit to stand, 12 x 2 sets  Standing heel raise 10 x 2 sets    Knee flexion/extension with towels on floor x 2 min, hip abduction/adduction 2 minutes for   LAQs, 2# on R, 0# on L  2x10    Seated march 2# on R, 0# on L  2x10    Seated hip abd purple TB 2x10     STS                                                  Ther Activity     Patient used bathroom during session, with CS/min A for pants management and balance  Required vc's for safe movement around sink, to paper towels, and garbage bin  Gait Training                              Modalities     Ice to L knee during rest break after bathroom for 5 minutes

## 2021-08-03 ENCOUNTER — OFFICE VISIT (OUTPATIENT)
Dept: FAMILY MEDICINE CLINIC | Facility: CLINIC | Age: 41
End: 2021-08-03

## 2021-08-03 VITALS
OXYGEN SATURATION: 97 % | HEART RATE: 105 BPM | SYSTOLIC BLOOD PRESSURE: 130 MMHG | WEIGHT: 216 LBS | RESPIRATION RATE: 18 BRPM | BODY MASS INDEX: 43.55 KG/M2 | HEIGHT: 59 IN | DIASTOLIC BLOOD PRESSURE: 80 MMHG | TEMPERATURE: 97.8 F

## 2021-08-03 DIAGNOSIS — R26.2 AMBULATORY DYSFUNCTION: ICD-10-CM

## 2021-08-03 DIAGNOSIS — E66.9 OBESITY (BMI 35.0-39.9 WITHOUT COMORBIDITY): ICD-10-CM

## 2021-08-03 DIAGNOSIS — M25.561 ACUTE PAIN OF RIGHT KNEE: Primary | ICD-10-CM

## 2021-08-03 PROCEDURE — 99213 OFFICE O/P EST LOW 20 MIN: CPT | Performed by: FAMILY MEDICINE

## 2021-08-03 NOTE — ASSESSMENT & PLAN NOTE
Right knee pain following fall on Saturday 7/31  Patient was evaluated at urgent care on same day  Imaging showed no fracture/dislocation or anything remarkable  Her walker broke following incident and she requests a new one  Pain is relieved with Motrin per caregiver who is present with patient today  Patient already participates in physical therapy for ambulatory dysfunction  Patient/caregiver reassured  Continue with Motrin and physical therapy  Follow up if sx fail to improve within 2 wks

## 2021-08-03 NOTE — PROGRESS NOTES
OFFICE VISIT NOTE - [facility]  Meryle Gouge 42 y o  (:1980) female MRN: [id number]  Unit/Bed#:  Encounter: [id number]      Date: 21    Assessment and Plan     Acute pain of right knee  Right knee pain following fall on   Patient was evaluated at urgent care on same day  Imaging showed no fracture/dislocation or anything remarkable  Her walker broke following incident and she requests a new one  Pain is relieved with Motrin per caregiver who is present with patient today  Patient already participates in physical therapy for ambulatory dysfunction  Patient/caregiver reassured  Continue with Motrin and physical therapy  Follow up if sx fail to improve within 2 wks      Diagnoses and all orders for this visit:    Acute pain of right knee  -     Ambulatory referral to Orthopedic Surgery; Future    Obesity (BMI 35 0-39 9 without comorbidity)  -     Ambulatory referral to Weight Management; Future    Ambulatory dysfunction  -     Walker      Subjective:  Chief Complaint   Patient presents with    Fall     Urgent Care Follow up       History of Present Illness     Meryle Gouge is a 42 y o  female wt h/o cerebral palsy, ambulatory dysfunction and frequent falls who is here for urgent care follow up on account of a fall that happened on   She was walking along concrete with her walker when she fell forward, caught herself on her knees then fell to the right side  She struck the right side of her face on the concrete  She was evaluated at care now following the fall event on the same day  Caregiver denies LOC, visual changes, nausea, vomiting, diaphoresis  Pt is not on any blood thinners  She endorses pain with ambulation to R knee as well as bruise to right cheek (picture in chart)  Otherwise, she appears to be at baseline and denies other sx    The company [employer]) will like her to see orthopedics and weight management to support her current weight loss journey  Referral was placed for both today  The following portions of the patient's history were reviewed and updated as appropriate: allergies, current medications, past family history, past medical history, past social history, past surgical history and problem list     Review of Systems     Review of Systems   Constitutional: Negative for chills, fatigue and fever  HENT: Negative for congestion and sore throat  Eyes: Negative for visual disturbance  Respiratory: Negative for cough, shortness of breath and wheezing  Cardiovascular: Negative for chest pain, palpitations and leg swelling  Gastrointestinal: Negative for abdominal distention, abdominal pain, constipation, diarrhea, nausea and vomiting  Genitourinary: Negative for dysuria, pelvic pain, vaginal bleeding and vaginal discharge  Musculoskeletal: Negative for arthralgias, back pain and joint swelling  Skin: Negative for rash  Neurological: Negative for dizziness, weakness, numbness and headaches  Psychiatric/Behavioral: Negative for sleep disturbance  The patient is not nervous/anxious          Current Medications     Current Outpatient Medications on File Prior to Visit   Medication Sig Dispense Refill    acetaminophen (TYLENOL) 500 mg tablet Take 1 tablet (500 mg total) by mouth every 6 (six) hours as needed for mild pain 30 tablet 5    aluminum-magnesium hydroxide-simethicone (Antacid) 200-200-20 mg/5 mL suspension Take 15 mL by mouth every 4 (four) hours as needed for indigestion or heartburn 355 mL 5    amitriptyline (ELAVIL) 10 mg tablet Take 10 mg by mouth daily at bedtime      ARIPiprazole (ABILIFY) 20 MG tablet Take 1 tablet (20 mg total) by mouth daily at bedtime 90 tablet 2    bacitracin topical ointment 500 units/g topical ointment Cleanse site on nose with soap and water followed by bacitracin BID until healed then p r n  15 g 2    bismuth subsalicylate (PEPTO BISMOL) 524 mg/30 mL oral suspension Take 15 mL (262 mg total) by mouth every 6 (six) hours as needed for indigestion 360 mL 3    calcium carbonate (TUMS) 500 mg chewable tablet Chew 1 tablet (500 mg total) 3 (three) times a day as needed for heartburn 30 tablet 5    Cholecalciferol (Vitamin D-3) 25 MCG (1000 UT) CAPS Take 2 capsules (2,000 Units total) by mouth daily at 8am  30 capsule 5    dicyclomine (BENTYL) 20 mg tablet Take 1 tablet (20 mg total) by mouth every 6 (six) hours as needed (as needed) 120 tablet 2    Dyclonine-Glycerin (Cepacol Sore Throat Spray) 0 1-33 % LIQD Apply 1 spray to the mouth or throat 3 (three) times a day as needed (sorethroat) (Patient not taking: Reported on 7/31/2021) 118 mL 2    Emollient (CeraVe) CREA Apply topically 2 (two) times a day Apply topically 2 times to affected heel twice daily @8a-8p (Patient not taking: Reported on 7/31/2021) 453 g 5    escitalopram (LEXAPRO) 20 mg tablet Take 1 tablet (20 mg total) by mouth daily 90 tablet 2    fluticasone (FLONASE) 50 mcg/act nasal spray 1 spray into each nostril daily as needed for rhinitis (nasal congestion) At 8:00 AM 18 2 mL 5    guaiFENesin (ROBITUSSIN) 100 MG/5ML oral liquid Take 200 mg by mouth 3 (three) times a day as needed for cough      hydrOXYzine HCL (ATARAX) 25 mg tablet Take 1 tablet (25 mg total) by mouth 3 (three) times a day 30 tablet 2    ibuprofen (MOTRIN) 400 mg tablet Take 1 tablet (400 mg total) by mouth every 8 (eight) hours as needed for mild pain 30 tablet 5    lamoTRIgine (LaMICtal) 25 mg tablet Take 25 mg by mouth 2 (two) times a day       lidocaine (LIDODERM) 5 % Apply 1 patch topically daily Remove & Discard patch within 12 hours or as directed by MD 10 patch 0    LORazepam (ATIVAN) 0 5 mg tablet Take 0 25 mg by mouth 2 (two) times a day      lubiprostone (Amitiza) 24 mcg capsule Take 1 capsule (24 mcg total) by mouth daily with breakfast 60 capsule 5    magnesium hydroxide (GNP Milk of Magnesia) 400 mg/5 mL oral suspension Take 30 mL by mouth daily as needed for constipation If no BM in 3 days 355 mL 5    metoprolol tartrate (LOPRESSOR) 25 mg tablet Take 1 tablet (25 mg total) by mouth every 12 (twelve) hours 60 tablet 5    mineral oil-hydrophilic petrolatum (AQUAPHOR) ointment Apply topically as needed for dry skin 420 g 5    Mouthwashes (Listerine Antiseptic) LIQD Swish and spit 5 mL 2 (two) times a day 1000 mL 5    Multiple Vitamins-Minerals (CertaVite Senior/Antioxidant) TABS Take 1 tablet by mouth daily 30 tablet 5    norgestimate-ethinyl estradiol (ORTHO-CYCLEN) 0 25-35 MG-MCG per tablet Take 1 tablet by mouth daily 28 tablet 11    nystatin (MYCOSTATIN) powder Apply topically 4 (four) times a day 45 g 2    nystatin-triamcinolone (MYCOLOG-II) cream Apply topically 2 (two) times a day 30 g 2    pantoprazole (PROTONIX) 20 mg tablet Take 1 tablet (20 mg total) by mouth 2 (two) times a day 62 tablet 5    phenol (Chloraseptic) 1 4 % mucosal liquid Apply 1 spray to the mouth or throat every 2 (two) hours as needed (for sore throat as needed) 236 mL 1    polyethylene glycol (MIRALAX) 17 g packet Take one packet daily as needed for no bowel movement in 24 hours 30 each 5    psyllium (METAMUCIL) 58 6 % packet Take 1 packet by mouth daily 30 packet 5    RA SUNSCREEN SPF50 LOTN Apply 15 minutes before sun exposure and every 2 hours 1 Bottle 5    senna-docusate sodium (Senexon-S) 8 6-50 mg per tablet Take 1 tablet by mouth daily at 8am  30 tablet 5    sodium chloride (OCEAN) 0 65 % nasal spray 1 spray into each nostril as needed (three times daily as needed for nasal congestion) 60 mL 1    white petrolatum Apply 1 application topically 2 (two) times a day Apply to forehead where excoriations are noted twice a day for 7 days 500 g 0    zinc oxide (DESITIN) 13 % cream Apply 1 application topically as needed (for perianal irritation) 1 Tube 5     No current facility-administered medications on file prior to visit  Objective     Vitals: /80 (BP Location: Right arm, Patient Position: Sitting, Cuff Size: Large)   Pulse 105   Temp 97 8 °F (36 6 °C) (Temporal)   Resp 18   Ht 4' 11" (1 499 m)   Wt 98 kg (216 lb)   SpO2 97%   BMI 43 63 kg/m²     Physical Exam  Constitutional:       General: She is not in acute distress  Appearance: She is not ill-appearing  HENT:      Head: Normocephalic and atraumatic  Comments: Right side of face with dark bruise about 3x2, nontender and no open areas  Right Ear: External ear normal       Left Ear: External ear normal       Nose: Nose normal  No congestion or rhinorrhea  Eyes:      Conjunctiva/sclera: Conjunctivae normal    Cardiovascular:      Rate and Rhythm: Normal rate and regular rhythm  Heart sounds: Normal heart sounds  Pulmonary:      Effort: No respiratory distress  Breath sounds: Normal breath sounds  No wheezing  Abdominal:      General: Bowel sounds are normal  There is no distension  Palpations: Abdomen is soft  There is no mass  Tenderness: There is no abdominal tenderness  Musculoskeletal:         General: No swelling or deformity  Normal range of motion  Right knee: No swelling, deformity, effusion, erythema, ecchymosis, lacerations, bony tenderness or crepitus  Normal range of motion  Tenderness present  Left knee: Normal       Right lower leg: No edema  Left lower leg: No edema  Skin:     General: Skin is warm  Findings: No rash  Neurological:      Mental Status: She is alert and oriented to person, place, and time     Psychiatric:         Behavior: Behavior normal          FUTURE CONFIRMED APPOINTMENTS:  Future Appointments   Date Time Provider Mariana Reese   8/4/2021 10:00 AM Silvestre Hernandez PT AL PT [facility] AL PT [facility]   8/9/2021  2:00 PM DIOMEDES Crow PT, PT [facility] AL PT [place]   8/11/2021 11:00 AM Franca Blake PT AL PT [facility] AL PT [facility]   8/17/2021  1:00 PM Franca Blake PT AL PT [facility] AL PT [facility]   2021 10:00 AM Ruther Pump, PT AL PT [facility]avis AL PT [facility]   2021  1:20 PM Oj Chin MD [address] FP BE [address]   10/6/2021 12:00 PM BE MRI 1 BE MRI [facility]   10/15/2021  9:15 AM Altagracia Steele MD NEURO [facility]   10/20/2021  3:15 PM Janeece Apgar [address] DENT SIG [address]   2021 10:15 AM Js Rang BE SLN [facility] BE [place]   2022 11:00 AM [address] [facility] [address]   2022 10:50 AM Oj Chin MD [address] FP BE [address]   2022 10:30 AM TA Hernadez [address], MD  Family Medicine PGY-2  21

## 2021-08-04 ENCOUNTER — OFFICE VISIT (OUTPATIENT)
Dept: PHYSICAL THERAPY | Facility: REHABILITATION | Age: 41
End: 2021-08-04
Payer: MEDICARE
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
## 2021-08-04 DIAGNOSIS — R29.6 FREQUENT FALLS: Primary | ICD-10-CM

## 2021-08-04 PROCEDURE — 97110 THERAPEUTIC EXERCISES: CPT | Performed by: PHYSICAL THERAPIST

## 2021-08-04 PROCEDURE — 97112 NEUROMUSCULAR REEDUCATION: CPT | Performed by: PHYSICAL THERAPIST

## 2021-08-04 NOTE — PROGRESS NOTES
for   LAQs, 2# on R, 0# on L  2x10    Seated march 2# on R, 0# on L  2x10    Seated hip abd purple TB 2x10   LAQ 3 lbs, 10 x 2 sets on left    Standing heel raise, 1 5 min with frequent verbal cues    Step up 4" step, 1 railing, 10 each side, 2 sets on right foot    Sit to stand, 10 x 2 sets   STS                                   Ther Activity Patient used bathroom during session, with CS/min A for pants management and balance  Required vc's for safe movement around sink, to paper towels, and garbage bin  Gait Training                     Modalities Ice to L knee during rest break after bathroom for 5 minutes

## 2021-08-09 ENCOUNTER — OFFICE VISIT (OUTPATIENT)
Dept: PHYSICAL THERAPY | Facility: REHABILITATION | Age: 41
End: 2021-08-09
Payer: MEDICARE

## 2021-08-09 DIAGNOSIS — R29.6 FREQUENT FALLS: Primary | ICD-10-CM
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
## 2021-08-09 PROCEDURE — 97112 NEUROMUSCULAR REEDUCATION: CPT | Performed by: PHYSICAL THERAPIST

## 2021-08-09 NOTE — PROGRESS NOTES
Daily Note     Today's date: 2021  Patient name: Esperanza Falcon  : 1980  MRN: [id number]  Referring provider: Leena Mims MD  Dx:   Encounter Diagnosis     ICD-10-CM    1  Frequent falls  R29 6    2  Fall, initial encounter  Via R[address] 32  XXXA        Subjective: Reports R knee pain is "only a little" today  No falls since last visit  Objective: See treatment diary below    Assessment: Tolerated treatment well  Pt able to tolerated increased volume of exercise today due to reduced knee pain  Still requires frequent cues to redirect due to ADHD and sometimes needed encouragement to participate  Challenged with adequately lifting RLE to clear obstacles  Was fatigued asking for rest breaks often  Would benefit from continued PT  Plan: Continue per plan of care  Monitor R knee pain  Manuals 21                               Neuro Re-Ed Overground walking 100ft with rollator, cues to maintain RUE on walker    Walking in //bars no UE support, with CG 3 laps             Overground walking 50ftx2 with rollator, cues to maintain RUE on walker     Overground walking with rollator, 100 feet x 2, then 100 feet    SciFit level 1, 6 5 min  Then 2 min     Overground walking 100ft with rollator, cues to maintain RUE on walker  x5    Walking in //bars no UE support, 5 laps with CG   Step over cones 20ft x6 with rollator In //bars 3 laps  20ft x4 with rollator    Step ups L railing 4" step x 7 reps with each leg leading, vc's for controlling ascent on R side   4in x10 ea in //bars, 1 hand on rail    Side stepping In //bars 3 laps no UE support  With CG   In //bars 3 laps no UE support  With CG   Dynamic Reaching  For cones hi and low, both hands 12x in //bars  Taking hands off bars  Ball toss  Static in //bars 12x, taking hands off bars  Static in //bars 12x, taking hands off bars                   Ther Ex       Educated in HEP       Scifit  L1 3 5 min    L1 5 5min    Seated exercises  LAQs, 2# on R, 0# on L  2x10    Seated march 2# on R, 0# on L  2x10    Seated hip abd purple TB 2x10   LAQ 3 lbs, 10 x 2 sets on left    Standing heel raise, 1 5 min with frequent verbal cues    Step up 4" step, 1 railing, 10 each side, 2 sets on right foot    Sit to stand, 10 x 2 sets    STS                                   Ther Activity                     Gait Training                     Modalities

## 2021-08-11 ENCOUNTER — APPOINTMENT (OUTPATIENT)
Dept: PHYSICAL THERAPY | Facility: REHABILITATION | Age: 41
End: 2021-08-11
Payer: MEDICARE

## 2021-08-17 ENCOUNTER — EVALUATION (OUTPATIENT)
Dept: PHYSICAL THERAPY | Facility: REHABILITATION | Age: 41
End: 2021-08-17
Payer: MEDICARE

## 2021-08-17 DIAGNOSIS — R29.6 FREQUENT FALLS: Primary | ICD-10-CM
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
## 2021-08-17 PROCEDURE — 97110 THERAPEUTIC EXERCISES: CPT | Performed by: PHYSICAL THERAPIST

## 2021-08-17 PROCEDURE — 97112 NEUROMUSCULAR REEDUCATION: CPT | Performed by: PHYSICAL THERAPIST

## 2021-08-17 NOTE — PROGRESS NOTES
Progress Update    Today's date: 2021  Patient name: Quang Rm  : 1980  MRN: [id number]  Referring provider: Darlin Kaye MD  Dx:   Encounter Diagnosis     ICD-10-CM    1  Frequent falls  R29 6    2  Fall, initial encounter  Via R[address] 32  XXXA        Subjective: Reports feeling well today  States her knee pain has been "minimal" lately  Her caregiver feels she is moving better since starting PT  Has not fallen since last visit  Objective: See treatment diary below       Balance Test Initial Eval       5x Sit to Stand: 33s, 22 in surface, 1 UE  31s, from 22in surface, 1 UE  14s from 22in surface, 1 UE    15s from 22in surface no UE's     TUs  33s  33s with RW  29s with RW     Gait Speed:            2 Minute Walk Test: Requested to stop at 1:47, walked 100ft with RW     HR: 123  O2: 96%  Requested to stop at 1:35, walked 100ft with RW     HR: 128  O2: 97%  Completed full time 140ft with RW    Max distance about 200ft      HR-110  O2-96%     6 Minute Walk Test:           Cunningham Balance Scale:           FGA:                                     Goals  STGs (4 weeks)  Pt will be independent with comprehensive HEP - not met  Pt will demonstrate at least 3s improvement on 5xSTS - MET  Pt will demonstrate at least 3s improvement on TUG - MET    LTG's (to be achieved by d/c)  Pt will be able to self manage sx's independently - progressing  Pt will demonstrate at least 100ft foot improvement in max walking endurance with AD - progressing   Pt will report at least 50% reduced difficulty with balance when performing ADL's including dressing - progressing      Assessment: Since last progress update, Angela Marcano has demonstrated continued improvement  Demonstrated improvements in functional LE strength, mobility, and functional endurance based on outcome measures today  She requires frequent cues to redirect and encouragement to complete testing   Recently her R knee pain has been improving and she has been tolerating more activity during visits as a results, however pt does still report pain and will request to stop exercises due to knee pain  Although she has improved, still presents with deficits in gait, LE strength, and balance which are limiting her household and community mobility, increasing her risk for falls  She is at risk for falls based on TUG test  She would benefit from continued skilled PT to further address these deficits, minimize fall risk, and maximize mobility  Plan: Continue for 4 more weeks at 2x per week  Will be seeing orthopedics for R knee  HEP: hip abd/ext/flex, STS, heel raises, LAQs walking program,     Manuals 7/27 8/2 8/04/21 8/9 8/17                                   Neuro Re-Ed Overground walking 100ft with rollator, cues to maintain RUE on walker    Walking in //bars no UE support, with CG 3 laps             Overground walking 50ftx2 with rollator, cues to maintain RUE on walker     Overground walking with rollator, 100 feet x 2, then 100 feet    SciFit level 1, 6 5 min  Then 2 min     Overground walking 100ft with rollator, cues to maintain RUE on walker  x5    Walking in //bars no UE support, 5 laps with CG Walking in //bars no UE support, 2 laps with CG, break between  Step over cones 20ft x6 with rollator In //bars 3 laps  20ft x4 with rollator     Step ups L railing 4" step x 7 reps with each leg leading, vc's for controlling ascent on R side   4in x10 ea in //bars, 1 hand on rail  4in x8 ea in //bars, 1 hand on rail    Side stepping In //bars 3 laps no UE support  With CG   In //bars 3 laps no UE support  With CG    Dynamic Reaching  For cones hi and low, both hands 12x in //bars  Taking hands off bars  Ball toss  Static in //bars 12x, taking hands off bars  Static in //bars 12x, taking hands off bars                      Ther Ex        Educated in HEP        Scifit  L1 3 5 min    L1 5 5min  Pt declined today   Seated exercises  LAQs, 2# on R, 0# on L  2x10    Seated march 2# on R, 0# on L  2x10    Seated hip abd purple TB 2x10   LAQ 3 lbs, 10 x 2 sets on left    Standing heel raise, 1 5 min with frequent verbal cues    Step up 4" step, 1 railing, 10 each side, 2 sets on right foot    Sit to stand, 10 x 2 sets  LAQ 3 lbs, 10 x 3 sets each    Seated march 3# 4x10      STS        Hip abd     x10 ea OTB   Hip flex     x10 ea OTB   Hip ext     2x10 ea OTB           Ther Activity                        Gait Training                        Modalities

## 2021-08-18 ENCOUNTER — OFFICE VISIT (OUTPATIENT)
Dept: FAMILY MEDICINE CLINIC | Facility: CLINIC | Age: 41
End: 2021-08-18

## 2021-08-18 VITALS
DIASTOLIC BLOOD PRESSURE: 78 MMHG | HEART RATE: 113 BPM | TEMPERATURE: 98.2 F | HEIGHT: 59 IN | WEIGHT: 215 LBS | RESPIRATION RATE: 18 BRPM | BODY MASS INDEX: 43.34 KG/M2 | SYSTOLIC BLOOD PRESSURE: 118 MMHG

## 2021-08-18 DIAGNOSIS — R35.0 URINE FREQUENCY: Primary | ICD-10-CM

## 2021-08-18 DIAGNOSIS — R32 URINARY INCONTINENCE, UNSPECIFIED TYPE: ICD-10-CM

## 2021-08-18 LAB
SL AMB  POCT GLUCOSE, UA: NEGATIVE
SL AMB LEUKOCYTE ESTERASE,UA: NEGATIVE
SL AMB POCT BILIRUBIN,UA: NEGATIVE
SL AMB POCT BLOOD,UA: ABNORMAL
SL AMB POCT CLARITY,UA: ABNORMAL
SL AMB POCT COLOR,UA: YELLOW
SL AMB POCT KETONES,UA: NEGATIVE
SL AMB POCT NITRITE,UA: NEGATIVE
SL AMB POCT PH,UA: 5
SL AMB POCT SPECIFIC GRAVITY,UA: 1.01
SL AMB POCT URINE PROTEIN: NEGATIVE
SL AMB POCT UROBILINOGEN: 0.2

## 2021-08-18 PROCEDURE — 81001 URINALYSIS AUTO W/SCOPE: CPT

## 2021-08-18 PROCEDURE — 81002 URINALYSIS NONAUTO W/O SCOPE: CPT | Performed by: FAMILY MEDICINE

## 2021-08-18 PROCEDURE — 99214 OFFICE O/P EST MOD 30 MIN: CPT | Performed by: FAMILY MEDICINE

## 2021-08-18 NOTE — ASSESSMENT & PLAN NOTE
Differential diagnosis: UTI, stress incontinence  Urine dip showed trace blood only  UA with reflex culture sent out  Referral to urology provided  Follow-up in 1 month to recheck symptoms and review findings from specialist visit

## 2021-08-18 NOTE — PROGRESS NOTES
Assessment/Plan:    Urine frequency  Differential diagnosis: UTI, stress incontinence  Urine dip showed trace blood only  UA with reflex culture sent out  Referral to urology provided  Follow-up in 1 month to recheck symptoms and review findings from specialist visit  Diagnoses and all orders for this visit:    Urine frequency  -     POCT urine dip  -     Ambulatory referral to Urology; Future  -     UA w Reflex to Microscopic w Reflex to Culture - Clinic Collect    Urinary incontinence, unspecified type  -     POCT urine dip  -     Ambulatory referral to Urology; Future  -     UA w Reflex to Microscopic w Reflex to Culture - Clinic Collect        Subjective:      Patient ID: Remigio Cox is a 42 y o  female  Patient is a 58-year-old female presenting for inability to hold urine and increased frequency for the past 2 days  This has been going on for some time now (about 2 years), but over the past 2 days symptoms have been worsening  She says that she does not have the ability to tell when it is time to go to the bathroom and urinates on herself multiple times throughout the day  She denies dysuria or discomfort while using the bathroom  She also admits to belly pain that feels like pressure similar to when her bladder is full  Patient has history of UTIs (most recently June 2021 where she was seen at an urgent care center), and in the past her symptoms were dysuria as well as discomfort with urinating at that time  No other complaints today  The following portions of the patient's history were reviewed and updated as appropriate: allergies, current medications, past family history, past medical history, past social history, past surgical history and problem list     Review of Systems   Constitutional: Negative for chills and fever  HENT: Negative for congestion, ear pain and trouble swallowing  Eyes: Negative for visual disturbance     Respiratory: Negative for cough and shortness of breath  Cardiovascular: Negative for chest pain  Gastrointestinal: Positive for abdominal pain  Negative for constipation and diarrhea  2/10   Genitourinary: Positive for frequency  Negative for difficulty urinating, dysuria, flank pain and urgency  Musculoskeletal: Negative for back pain  Neurological: Negative for weakness and light-headedness  Objective:      /78 (BP Location: Left arm, Patient Position: Sitting, Cuff Size: Large)   Pulse (!) 113   Temp 98 2 °F (36 8 °C) (Temporal)   Resp 18   Ht 4' 11" (1 499 m)   Wt 97 5 kg (215 lb)   BMI 43 42 kg/m²          Physical Exam  Constitutional:       Appearance: Normal appearance  HENT:      Head: Normocephalic and atraumatic  Nose: Nose normal       Mouth/Throat:      Mouth: Mucous membranes are moist    Eyes:      General: No scleral icterus  Cardiovascular:      Rate and Rhythm: Normal rate and regular rhythm  Pulses: Normal pulses  Heart sounds: Normal heart sounds  Pulmonary:      Effort: Pulmonary effort is normal       Breath sounds: Normal breath sounds  Abdominal:      General: Bowel sounds are normal  There is no distension  Palpations: Abdomen is soft  Tenderness: There is no abdominal tenderness  There is no right CVA tenderness, left CVA tenderness or guarding  Musculoskeletal:         General: Normal range of motion  Cervical back: Normal range of motion  Skin:     General: Skin is warm and dry  Neurological:      General: No focal deficit present  Mental Status: She is alert and oriented to person, place, and time     Psychiatric:         Mood and Affect: Mood normal          Behavior: Behavior normal

## 2021-08-18 NOTE — PATIENT INSTRUCTIONS
Urinary Urgency and Frequency   AMBULATORY CARE:   Urinary urgency and frequency  is a condition that increases how strongly or how often you need to urinate  The condition may also be called urgency-frequency syndrome  Urinary urgency means you feel such a strong need to urinate that you have trouble waiting  You may also feel discomfort in your bladder  Urinary frequency means you need to urinate many times during the day  This may also be called increased daytime frequency  You may be woken from sleep by the need to urinate  Urgency and frequency often happen together, but you may only have one  Contact your healthcare provider if:   · Your urine is pink, or you notice blood in your urine  · You have pain with urination  · You continue to have symptoms even after you take your medicine  · You have new or worsening symptoms  · You have questions or concerns about your condition or care  Treatment  will depend on the type and cause of your urination problems  You may need any of the following:  · Medicines  may be given to relax your bladder and decrease urination  You may also need antibiotics if your symptoms are caused by a bacterial infection  · Sacral nerve stimulation  sends electrical signals to your sacral nerve through a small device implanted under your skin  Your sacral nerve controls your bladder, sphincter, and pelvic floor muscles  · Botox injections  into your bladder may help relax your bladder muscle to decrease urgency and frequency  · Surgery  may be done if all other treatments cannot help you control your bladder  Manage urinary urgency and frequency:   · Keep a record of your urination patterns for a few days  Write down the number of times you urinate over 24 hours, the amount, and if you have urine leakage  Record how strong the urge to urinate was each time  Your healthcare provider may also want you to record the type and amount of liquids you drink  · Train your bladder  Go to the bathroom at set times, such as every 2 hours, even if you do not feel the urge to go  You can also try to hold your urine when you feel the urge to go  For example, hold your urine for 5 minutes when you feel the urge to go  As that becomes easier, hold your urine for 10 minutes  Work up to every 3 or 4 hours to help control your bladder  · Limit liquids as directed  Limit liquids to decrease the amount you urinate  Ask how much liquid to drink each day and which liquids are best for you  You may need to avoid drinking liquids several hours before you go to sleep  Your healthcare provider may also recommend that you limit caffeine and alcohol  · Do Kegel exercises often  Kegel exercises help strengthen your pelvic muscles and improve bladder control  These muscles help you stop urinating  Squeeze these muscles tightly for 5 seconds like you are trying to stop the flow of urine  Then relax for 5 seconds  Gradually work up to squeezing for 10 seconds  Do 3 sets of 15 repetitions a day, or as directed  · Exercise regularly and maintain a healthy weight  Ask your healthcare provider how much you should weigh and about the best exercise plan for you  Extra weight puts pressure on your bladder and may make your symptoms worse  Ask your provider to help you create a safe weight loss plan if you are overweight  Follow up with your healthcare provider as directed: Your healthcare provider may refer you to a specialist to find the cause of your symptoms  Write down your questions so you remember to ask them during your visits  © [employer] 2021 Information is for End User's use only and may not be sold, redistributed or otherwise used for commercial purposes  All illustrations and images included in CareNotes® are the copyrighted property of [employer]  or [facility] Rosetta Cabezas   The above information is an  only   It is not intended as medical advice for individual conditions or treatments  Talk to your doctor, nurse or pharmacist before following any medical regimen to see if it is safe and effective for you

## 2021-08-19 LAB
BACTERIA UR QL AUTO: ABNORMAL /HPF
BILIRUB UR QL STRIP: NEGATIVE
CLARITY UR: CLEAR
COLOR UR: YELLOW
GLUCOSE UR STRIP-MCNC: NEGATIVE MG/DL
HGB UR QL STRIP.AUTO: NEGATIVE
HYALINE CASTS #/AREA URNS LPF: ABNORMAL /LPF
KETONES UR STRIP-MCNC: NEGATIVE MG/DL
LEUKOCYTE ESTERASE UR QL STRIP: ABNORMAL
NITRITE UR QL STRIP: NEGATIVE
NON-SQ EPI CELLS URNS QL MICRO: ABNORMAL /HPF
PH UR STRIP.AUTO: 6 [PH]
PROT UR STRIP-MCNC: NEGATIVE MG/DL
RBC #/AREA URNS AUTO: ABNORMAL /HPF
SP GR UR STRIP.AUTO: 1.02 (ref 1–1.03)
UROBILINOGEN UR QL STRIP.AUTO: 0.2 E.U./DL
WBC #/AREA URNS AUTO: ABNORMAL /HPF

## 2021-08-20 ENCOUNTER — OFFICE VISIT (OUTPATIENT)
Dept: PHYSICAL THERAPY | Facility: REHABILITATION | Age: 41
End: 2021-08-20
Payer: MEDICARE
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
## 2021-08-20 DIAGNOSIS — K08.9 TOOTH DISORDER: ICD-10-CM

## 2021-08-20 DIAGNOSIS — R29.6 FREQUENT FALLS: Primary | ICD-10-CM

## 2021-08-20 PROCEDURE — 97110 THERAPEUTIC EXERCISES: CPT | Performed by: PHYSICAL THERAPIST

## 2021-08-20 PROCEDURE — 97112 NEUROMUSCULAR REEDUCATION: CPT | Performed by: PHYSICAL THERAPIST

## 2021-08-20 NOTE — PROGRESS NOTES
Daily Note    Today's date: 2021  Patient name: Yarely Alfaro  : 1980  MRN: [id number]  Referring provider: Toney Macias MD  Dx:   Encounter Diagnosis     ICD-10-CM    1  Frequent falls  R29 6    2  Fall, initial encounter  Via [address] 32  XXXA        Subjective: Reports feeling well today  Wants to be able to walk w/o someone being next to her all the time  Objective: See treatment diary below        Assessment: Tolerated treatment well  Pt not reporting knee pain today and was toleratant of increased walking/activity particularly w/o use of her walker  Pt was willing to use harness system today to allow safety while walking w/o walker  She does fatigue easily requesting seated rest breaks  Frequent cues needed to redirect and perform exercises properly  Would benefit from continued PT  Plan: Continue for 4 more weeks at 2x per week  Will be seeing orthopedics for R knee  HEP: hip abd/ext/flex, STS, heel raises, LAQs walking program,     Manuals 21                                   Neuro Re-Ed Overground walking 50ftx2 with rollator, cues to maintain RUE on walker     Overground walking with rollator, 100 feet x 2, then 100 feet    SciFit level 1, 6 5 min  Then 2 min     Overground walking 100ft with rollator, cues to maintain RUE on walker  x5    Walking in //bars no UE support, 5 laps with CG Walking in //bars no UE support, 2 laps with CG, break between  Walking in [place] [facility] with RW 100ft x3        Step over cones In //bars 3 laps  20ft x4 with rollator      Step ups   4in x10 ea in //bars, 1 hand on rail  4in x8 ea in //bars, 1 hand on rail     Side stepping   In //bars 3 laps no UE support  With CG     Dynamic Reaching         Ball toss    Static in //bars 12x, taking hands off bars  Static standing in SOLO 5min   Bean bag toss     Taken bean bag, turn 180deg and toss  2x10  In SOLO   Weaving between cones       No walker in [place] [facility]   20ft x4   Ther Ex        Educated in HEP Scifit    L1 5 5min  Pt declined today Pt declined today   Seated exercises LAQs, 2# on R, 0# on L  2x10    Seated march 2# on R, 0# on L  2x10    Seated hip abd purple TB 2x10   LAQ 3 lbs, 10 x 2 sets on left    Standing heel raise, 1 5 min with frequent verbal cues    Step up 4" step, 1 railing, 10 each side, 2 sets on right foot    Sit to stand, 10 x 2 sets  LAQ 3 lbs, 10 x 3 sets each    Seated march 3# 4x10    LAQ 3 lbs, 10 x 3 sets each    Seated march 3# 4x10      STS        Hip abd    x10 ea OTB    Hip flex    x10 ea OTB    Hip ext    2x10 ea OTB            Ther Activity                        Gait Training                        Modalities

## 2021-08-23 ENCOUNTER — OFFICE VISIT (OUTPATIENT)
Dept: URGENT CARE | Facility: MEDICAL CENTER | Age: 41
End: 2021-08-23
Payer: MEDICARE

## 2021-08-23 ENCOUNTER — OFFICE VISIT (OUTPATIENT)
Dept: OBGYN CLINIC | Facility: MEDICAL CENTER | Age: 41
End: 2021-08-23
Payer: MEDICARE

## 2021-08-23 VITALS
SYSTOLIC BLOOD PRESSURE: 180 MMHG | BODY MASS INDEX: 46.6 KG/M2 | TEMPERATURE: 98.1 F | DIASTOLIC BLOOD PRESSURE: 90 MMHG | HEART RATE: 100 BPM | RESPIRATION RATE: 16 BRPM | OXYGEN SATURATION: 96 % | HEIGHT: 57 IN | WEIGHT: 216 LBS

## 2021-08-23 VITALS
WEIGHT: 216 LBS | DIASTOLIC BLOOD PRESSURE: 77 MMHG | HEART RATE: 103 BPM | HEIGHT: 57 IN | BODY MASS INDEX: 46.6 KG/M2 | SYSTOLIC BLOOD PRESSURE: 120 MMHG

## 2021-08-23 DIAGNOSIS — M25.561 ACUTE PAIN OF RIGHT KNEE: ICD-10-CM

## 2021-08-23 DIAGNOSIS — M17.11 PRIMARY OSTEOARTHRITIS OF RIGHT KNEE: ICD-10-CM

## 2021-08-23 DIAGNOSIS — S80.01XA CONTUSION OF RIGHT KNEE, INITIAL ENCOUNTER: Primary | ICD-10-CM

## 2021-08-23 PROCEDURE — 99213 OFFICE O/P EST LOW 20 MIN: CPT | Performed by: PHYSICIAN ASSISTANT

## 2021-08-23 PROCEDURE — 99203 OFFICE O/P NEW LOW 30 MIN: CPT | Performed by: EMERGENCY MEDICINE

## 2021-08-23 PROCEDURE — G0463 HOSPITAL OUTPT CLINIC VISIT: HCPCS | Performed by: PHYSICIAN ASSISTANT

## 2021-08-23 RX ORDER — HYDROXYZINE HYDROCHLORIDE 10 MG/1
10 TABLET, FILM COATED ORAL 3 TIMES DAILY
COMMUNITY
Start: 2021-08-20 | End: 2021-10-08 | Stop reason: SDUPTHER

## 2021-08-23 RX ORDER — MULTIVIT-MIN/IRON/FOLIC ACID/K 18-600-40
CAPSULE ORAL
COMMUNITY
Start: 2021-08-20 | End: 2021-10-12 | Stop reason: SDUPTHER

## 2021-08-23 RX ORDER — EUCALYP/ME-SALICYLATE/MEN/THYM
5 MOUTHWASH MUCOUS MEMBRANE 2 TIMES DAILY
Qty: 1000 ML | Refills: 5 | Status: SHIPPED | OUTPATIENT
Start: 2021-08-23 | End: 2021-12-27 | Stop reason: SDUPTHER

## 2021-08-23 RX ORDER — IBUPROFEN 600 MG/1
600 TABLET ORAL ONCE
Status: COMPLETED | OUTPATIENT
Start: 2021-08-23 | End: 2021-08-23

## 2021-08-23 RX ORDER — KETOCONAZOLE 20 MG/G
CREAM TOPICAL
COMMUNITY
Start: 2021-08-19 | End: 2022-05-23 | Stop reason: SDUPTHER

## 2021-08-23 RX ADMIN — IBUPROFEN 600 MG: 600 TABLET ORAL at 13:36

## 2021-08-23 NOTE — PROGRESS NOTES
Assessment/Plan:    Diagnoses and all orders for this visit:    Contusion of right knee, initial encounter  -     Ambulatory referral to Physical Therapy; Future    Acute pain of right knee  -     Ambulatory referral to Orthopedic Surgery  -     Ambulatory referral to Physical Therapy; Future    Primary osteoarthritis of right knee  -     Ambulatory referral to Physical Therapy; Future    Patient with right anterior knee pain with frequent falls  Xrays reveal OA with no acute changes  Recommend PT focused on right knee, continue OTC meds PRN, ice, walker  Patient declines steroid injection    Return in about 6 weeks (around 10/4/2021)  Chief Complaint:     Chief Complaint   Patient presents with    Right Knee - Pain       Subjective:   Patient ID: Yumiko Fagan is a 42 y o  female  The patient is a 41 y/o female with a PMHx of cerebral palsy, hydrocephalus, pituitary adenoma, frequent falls, and ambulatory dysfunction who presents for right knee pain of unknown duration, although it seems pain has been present at least since 4/2021 when Xrays b/l knees were obtained  Taking OTC meds PRN  She uses rolling walker, participating in PT for frequent falls  Review of Systems    The following portions of the patient's chart were reviewed and updated as appropriate:    Allergy:  No Known Allergies      Past Medical History:   Diagnosis Date    ADD (attention deficit disorder)     Anxiety     Astigmatism     Brain lesion     Calcium deficiency     Cellulitis of foot, right 6/4/2018    Cerebral palsy (HCC)     Chronic otitis media     Constipation     Depression     Dysphagia     Esophagitis     Esotropia     GERD (gastroesophageal reflux disease)     Hiatal hernia     Hydrocephalus (HCC)     Impaired fasting glucose     Left nephrolithiasis 03/04/2019    Myopia     Oppositional defiant disorder     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HCC)     Seizures (HCC)     Sensorineural hearing loss  Status post ventriculoatrial shunt placement     Visual impairment        Past Surgical History:   Procedure Laterality Date    BREAST BIOPSY Left     X 2 (not sure of years)    CSF SHUNT      Creation of Ventriculo-Peritoneal CSF shunt ; Last Assessed:7/6/2016    EAR SURGERY      Last Assessed:7/6/2016    LEG SURGERY      due to CP     NOSE SURGERY      Last Assessed:7/6/2016    OR ESOPHAGOGASTRODUODENOSCOPY TRANSORAL DIAGNOSTIC N/A 5/9/2019    Procedure: ESOPHAGOGASTRODUODENOSCOPY (EGD) with biopsy;  Surgeon: Pop Buckley MD;  Location: [facility]; Service: Gastroenterology    UPPER GASTROINTESTINAL ENDOSCOPY  05/2019       Social History     Socioeconomic History    Marital status: Single     Spouse name: Not on file    Number of children: Not on file    Years of education: Not on file    Highest education level: Not on file   Occupational History    Not on file   Tobacco Use    Smoking status: Never Smoker    Smokeless tobacco: Never Used   Vaping Use    Vaping Use: Never used   Substance and Sexual Activity    Alcohol use: Never    Drug use: Never    Sexual activity: Not Currently   Other Topics Concern    Not on file   Social History Narrative    Always uses seat belt    Lives in group home     Social Determinants of Health     Financial Resource Strain: Low Risk     Difficulty of Paying Living Expenses: Not hard at all   Food Insecurity: No Food Insecurity    Worried About [facility] in the Last Year: Never true    Naty of Food in the Last Year: Never true   Transportation Needs: No Transportation Needs    Lack of Transportation (Medical): No    Lack of Transportation (Non-Medical):  No   Physical Activity:     Days of Exercise per Week:     Minutes of Exercise per Session:    Stress:     Feeling of Stress :    Social Connections:     Frequency of Communication with Friends and Family:     Frequency of Social Gatherings with Friends and Family:     Attends Mandaen Services:     Active Member of Clubs or Organizations:     Attends Club or Organization Meetings:     Marital Status:    Intimate Partner Violence:     Fear of Current or Ex-Partner:     Emotionally Abused:     Physically Abused:     Sexually Abused:        Family History   Problem Relation Age of Onset    Diabetes Mother     Colon cancer Father     No Known Problems Maternal Grandmother     No Known Problems Maternal Grandfather     No Known Problems Paternal Grandmother     No Known Problems Paternal Grandfather        Medications:    Current Outpatient Medications:     acetaminophen (TYLENOL) 500 mg tablet, Take 1 tablet (500 mg total) by mouth every 6 (six) hours as needed for mild pain, Disp: 30 tablet, Rfl: 5    aluminum-magnesium hydroxide-simethicone (Antacid) 200-200-20 mg/5 mL suspension, Take 15 mL by mouth every 4 (four) hours as needed for indigestion or heartburn, Disp: 355 mL, Rfl: 5    amitriptyline (ELAVIL) 10 mg tablet, Take 10 mg by mouth daily at bedtime, Disp: , Rfl:     ARIPiprazole (ABILIFY) 20 MG tablet, Take 1 tablet (20 mg total) by mouth daily at bedtime, Disp: 90 tablet, Rfl: 2    bacitracin topical ointment 500 units/g topical ointment, Cleanse site on nose with soap and water followed by bacitracin BID until healed then p r n , Disp: 15 g, Rfl: 2    bismuth subsalicylate (PEPTO BISMOL) 524 mg/30 mL oral suspension, Take 15 mL (262 mg total) by mouth every 6 (six) hours as needed for indigestion, Disp: 360 mL, Rfl: 3    calcium carbonate (TUMS) 500 mg chewable tablet, Chew 1 tablet (500 mg total) 3 (three) times a day as needed for heartburn, Disp: 30 tablet, Rfl: 5    Cholecalciferol (Vitamin D-3) 25 MCG (1000 UT) CAPS, Take 2 capsules (2,000 Units total) by mouth daily at 8am , Disp: 30 capsule, Rfl: 5    dicyclomine (BENTYL) 20 mg tablet, Take 1 tablet (20 mg total) by mouth every 6 (six) hours as needed (as needed), Disp: 120 tablet, Rfl: 2   Dyclonine-Glycerin (Cepacol Sore Throat Spray) 0 1-33 % LIQD, Apply 1 spray to the mouth or throat 3 (three) times a day as needed (sorethroat), Disp: 118 mL, Rfl: 2    Emollient (CeraVe) CREA, Apply topically 2 (two) times a day Apply topically 2 times to affected heel twice daily @8a-8p, Disp: 453 g, Rfl: 5    escitalopram (LEXAPRO) 20 mg tablet, Take 1 tablet (20 mg total) by mouth daily, Disp: 90 tablet, Rfl: 2    fluticasone (FLONASE) 50 mcg/act nasal spray, 1 spray into each nostril daily as needed for rhinitis (nasal congestion) At 8:00 AM, Disp: 18 2 mL, Rfl: 5    guaiFENesin (ROBITUSSIN) 100 MG/5ML oral liquid, Take 200 mg by mouth 3 (three) times a day as needed for cough, Disp: , Rfl:     hydrOXYzine HCL (ATARAX) 25 mg tablet, Take 1 tablet (25 mg total) by mouth 3 (three) times a day, Disp: 30 tablet, Rfl: 2    ibuprofen (MOTRIN) 400 mg tablet, Take 1 tablet (400 mg total) by mouth every 8 (eight) hours as needed for mild pain, Disp: 30 tablet, Rfl: 5    lamoTRIgine (LaMICtal) 25 mg tablet, Take 25 mg by mouth 2 (two) times a day , Disp: , Rfl:     lidocaine (LIDODERM) 5 %, Apply 1 patch topically daily Remove & Discard patch within 12 hours or as directed by MD, Disp: 10 patch, Rfl: 0    LORazepam (ATIVAN) 0 5 mg tablet, Take 0 25 mg by mouth 2 (two) times a day, Disp: , Rfl:     lubiprostone (Amitiza) 24 mcg capsule, Take 1 capsule (24 mcg total) by mouth daily with breakfast, Disp: 60 capsule, Rfl: 5    magnesium hydroxide (GNP Milk of Magnesia) 400 mg/5 mL oral suspension, Take 30 mL by mouth daily as needed for constipation If no BM in 3 days, Disp: 355 mL, Rfl: 5    metoprolol tartrate (LOPRESSOR) 25 mg tablet, Take 1 tablet (25 mg total) by mouth every 12 (twelve) hours, Disp: 60 tablet, Rfl: 5    mineral oil-hydrophilic petrolatum (AQUAPHOR) ointment, Apply topically as needed for dry skin, Disp: 420 g, Rfl: 5    Mouthwashes (Listerine Antiseptic) LIQD, Swish and spit 5 mL 2 (two) times a day, Disp: 1000 mL, Rfl: 5    Multiple Vitamins-Minerals (CertaVite Senior/Antioxidant) TABS, Take 1 tablet by mouth daily, Disp: 30 tablet, Rfl: 5    norgestimate-ethinyl estradiol (ORTHO-CYCLEN) 0 25-35 MG-MCG per tablet, Take 1 tablet by mouth daily, Disp: 28 tablet, Rfl: 11    nystatin (MYCOSTATIN) powder, Apply topically 4 (four) times a day, Disp: 45 g, Rfl: 2    nystatin-triamcinolone (MYCOLOG-II) cream, Apply topically 2 (two) times a day, Disp: 30 g, Rfl: 2    pantoprazole (PROTONIX) 20 mg tablet, Take 1 tablet (20 mg total) by mouth 2 (two) times a day, Disp: 62 tablet, Rfl: 5    phenol (Chloraseptic) 1 4 % mucosal liquid, Apply 1 spray to the mouth or throat every 2 (two) hours as needed (for sore throat as needed), Disp: 236 mL, Rfl: 1    polyethylene glycol (MIRALAX) 17 g packet, Take one packet daily as needed for no bowel movement in 24 hours, Disp: 30 each, Rfl: 5    psyllium (METAMUCIL) 58 6 % packet, Take 1 packet by mouth daily, Disp: 30 packet, Rfl: 5    RA SUNSCREEN SPF50 LOTN, Apply 15 minutes before sun exposure and every 2 hours, Disp: 1 Bottle, Rfl: 5    senna-docusate sodium (Senexon-S) 8 6-50 mg per tablet, Take 1 tablet by mouth daily at 8am , Disp: 30 tablet, Rfl: 5    sodium chloride (OCEAN) 0 65 % nasal spray, 1 spray into each nostril as needed (three times daily as needed for nasal congestion), Disp: 60 mL, Rfl: 1    white petrolatum, Apply 1 application topically 2 (two) times a day Apply to forehead where excoriations are noted twice a day for 7 days, Disp: 500 g, Rfl: 0    zinc oxide (DESITIN) 13 % cream, Apply 1 application topically as needed (for perianal irritation), Disp: 1 Tube, Rfl: 5    Patient Active Problem List   Diagnosis    Leg swelling    Generalized anxiety disorder    Brain lesion    Cerebral palsy (HCC)    Acute pain of right knee    Constipation    Severe episode of recurrent major depressive disorder, with psychotic features ([facility] [address] )    Dizziness    Functional dysphagia    Hearing impairment    Acquired renal cyst of left kidney    Low back pain    Moderate intellectual disability    Pituitary neoplasm    Seasonal allergies    Varicose veins with pain    Medicare annual wellness visit, subsequent    Hematuria    Pituitary microadenoma (HCC)    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[address] )    Ambulatory dysfunction    Bruise    Bilateral impacted cerumen    TMJ (temporomandibular joint disorder)    Gait disturbance    Bilateral thoracic back pain    Cerumen impaction    Skin picking habit    Hiatal hernia    SOB (shortness of breath)    Dysuria    Left nephrolithiasis    Non-seasonal allergic rhinitis    Viral URI    Self-injurious behavior    Suprasellar extension of pituitary adenoma (HCC)    Gastroesophageal reflux disease without esophagitis    Intercostal pain    Intertrigo    Nausea and vomiting    Laceration of lip    Sore throat    Impetigo    Pain of right calf    Elevated blood pressure reading in office without diagnosis of hypertension    Fall    Sleep disturbance    Exposure to COVID-19 virus    Encounter for follow-up    Acute non intractable tension-type headache    Dry skin    Candidiasis of skin    Heat intolerance    Wound, open    Palpitations    Left foot pain    Frequent falls    UTI (urinary tract infection)    Urine frequency    Urinary incontinence       Objective:  /77   Pulse 103   Ht 4' 9" (1 448 m)   Wt 98 kg (216 lb)   BMI 46 74 kg/m²     Right Knee Exam     Tenderness   The patient is experiencing tenderness in the patellar tendon and patella  Range of Motion   The patient has normal right knee ROM  Tests   Varus: negative Valgus: negative    Other   Erythema: absent  Sensation: normal    Comments:  No significant swelling b/l            Physical Exam      Neurologic Exam    Procedures    I have personally reviewed the written report of the pertinent studies     RIGHT KNEE     INDICATION:   S89  91XA: Unspecified injury of right lower leg, initial encounter      COMPARISON:  None     VIEWS:  XR KNEE 4+ VW RIGHT INJURY         FINDINGS:     There is no acute fracture or dislocation      Somewhat high positioning of the patella unchanged from 4/8/2021      There is no joint effusion      Mild tricompartmental osteoarthritis      No lytic or blastic osseous lesion      Soft tissues are unremarkable      IMPRESSION:     There is no acute fracture or dislocation

## 2021-08-23 NOTE — PATIENT INSTRUCTIONS
I apply six-inch Ace wrap around right knee  Patient refers to some improvement in the pain  She was also given ibuprofen 600 mg orally x1 dose in the office  I recommend over-the-counter Tylenol or ibuprofen as needed every 6 hours for pain if present  She is to apply ice 15 minutes hourly for the next 24 hours  She is to keep right leg elevated when not ambulating  She was also given a prescription for knee brace she is to wear in the daytime while walking and to remove before bedtime  She is to follow up with primary care provider is knee pain persists beyond a week  Knee Pain   WHAT YOU NEED TO KNOW:   Knee pain may start suddenly, or it may be a long-term problem  You may have pain on the side, front, or back of your knee  You may have knee stiffness and swelling  You may hear popping sounds or feel like your knee is giving way or locking up as you walk  You may feel pain when you sit, stand, walk, or climb up and down stairs  Knee pain can be caused by conditions such as obesity, inflammation, or strains or tears in ligaments or tendons  DISCHARGE INSTRUCTIONS:   Return to the emergency department if:   · Your pain is worse, even after treatment  · You cannot bend or straighten your leg completely  · The swelling around your knee does not go down even with treatment  · Your knee is painful and hot to the touch  Contact your healthcare provider if:   · You have questions or concerns about your condition or care  Medicines: You may need any of the following:  · NSAIDs  help decrease swelling and pain or fever  This medicine is available with or without a doctor's order  NSAIDs can cause stomach bleeding or kidney problems in certain people  If you take blood thinner medicine, always ask your healthcare provider if NSAIDs are safe for you  Always read the medicine label and follow directions  · Acetaminophen  decreases pain and fever  It is available without a doctor's order  Ask how much to take and how often to take it  Follow directions  Read the labels of all other medicines you are using to see if they also contain acetaminophen, or ask your doctor or pharmacist  Acetaminophen can cause liver damage if not taken correctly  Do not use more than 4 grams (4,000 milligrams) total of acetaminophen in one day  · Prescription pain medicine  may be given  Ask your healthcare provider how to take this medicine safely  Some prescription pain medicines contain acetaminophen  Do not take other medicines that contain acetaminophen without talking to your healthcare provider  Too much acetaminophen may cause liver damage  Prescription pain medicine may cause constipation  Ask your healthcare provider how to prevent or treat constipation  · Take your medicine as directed  Contact your healthcare provider if you think your medicine is not helping or if you have side effects  Tell him or her if you are allergic to any medicine  Keep a list of the medicines, vitamins, and herbs you take  Include the amounts, and when and why you take them  Bring the list or the pill bottles to follow-up visits  Carry your medicine list with you in case of an emergency  What you can do to manage your symptoms:   · Rest your knee so it can heal   Limit activities that increase your pain  Do low-impact exercises, such as walking or swimming  · Apply ice to help reduce swelling and pain  Use an ice pack, or put crushed ice in a plastic bag  Cover it with a towel before you apply it to your knee  Apply ice for 15 to 20 minutes every hour, or as directed  · Apply compression to help reduce swelling  Use a brace or bandage only as directed  · Elevate your knee to help decrease pain and swelling  Elevate your knee while you are sitting or lying down  Prop your leg on pillows to keep your knee above the level of your heart  · Prevent your knee from moving as directed    Your healthcare provider may put on a cast or splint  You may need to wear a leg brace to stabilize your knee  A leg brace can be adjusted to increase your range of motion as your knee heals  What you can do to prevent knee pain:   · Maintain a healthy weight  Extra weight increases your risk for knee pain  Ask your healthcare provider how much you should weigh  He or she can help you create a safe weight loss plan if you need to lose weight  · Exercise or train properly  Use the correct equipment for sports  Wear shoes that provide good support  Check your posture often as you exercise, play sports, or train for an event  This can help prevent stress and strain on your knees  Rest between sessions so you do not overwork your knees  Follow up with your healthcare provider within 24 hours or as directed: You may need follow-up treatments, such as steroid injections to decrease pain  Write down your questions so you remember to ask them during your visits  © [employer] 2021 Information is for End User's use only and may not be sold, redistributed or otherwise used for commercial purposes  All illustrations and images included in CareNotes® are the copyrighted property of [employer]  or [facility] Rosetta Cabezas   The above information is an  only  It is not intended as medical advice for individual conditions or treatments  Talk to your doctor, nurse or pharmacist before following any medical regimen to see if it is safe and effective for you

## 2021-08-23 NOTE — PROGRESS NOTES
[facility]        NAME: Quang Rm is a 42 y o  female  : 1980    MRN: [id number]  DATE: 2021  TIME: 1:55 PM    Assessment and Plan   Contusion of right knee, initial encounter [S80 01XA]  1  Contusion of right knee, initial encounter  ibuprofen (MOTRIN) tablet 600 mg    Elastic Bandages & Supports (Neoprene Knee Brace) [facility]         Patient Instructions       Follow up with PCP in 3-5 days  Proceed to  ER if symptoms worsen  Chief Complaint     Chief Complaint   Patient presents with    Knee Pain     Pt states that her right knee gave out and she fell when leaving her ortho appt today  Pt denies any head injury  C/o "a lot of pain" in her knee  History of Present Illness        44-year-old female here today with complaint of right knee pain after falling outside urgent care within the last hour  Patient was leaving the facility after seeing orthopedic specialist earlier today  She usually ambulates with the assistance of a walker  However caretaker who gives history informs me that she was unsteady on her feet and fell landing directly on her right knee  She has a known history of osteoarthritis of the right knee  Caretaker informs me she has had unsteady gait and frequent falls for quite a while  She has a known history of cerebral palsy  Patient on exam, has pain on weight-bearing  Not ambulating  Caretaker also informs she is currently undergoing physical therapy for unsteady gait  Review of Systems   Review of Systems   Constitutional: Negative  Musculoskeletal: Positive for arthralgias and joint swelling  Neurological: Positive for dizziness and weakness           Current Medications       Current Outpatient Medications:     acetaminophen (TYLENOL) 500 mg tablet, Take 1 tablet (500 mg total) by mouth every 6 (six) hours as needed for mild pain, Disp: 30 tablet, Rfl: 5    aluminum-magnesium hydroxide-simethicone (Antacid) 200-200-20 mg/5 mL suspension, Take 15 mL by mouth every 4 (four) hours as needed for indigestion or heartburn, Disp: 355 mL, Rfl: 5    amitriptyline (ELAVIL) 10 mg tablet, Take 10 mg by mouth daily at bedtime, Disp: , Rfl:     ARIPiprazole (ABILIFY) 20 MG tablet, Take 1 tablet (20 mg total) by mouth daily at bedtime, Disp: 90 tablet, Rfl: 2    bacitracin topical ointment 500 units/g topical ointment, Cleanse site on nose with soap and water followed by bacitracin BID until healed then p r n , Disp: 15 g, Rfl: 2    bismuth subsalicylate (PEPTO BISMOL) 524 mg/30 mL oral suspension, Take 15 mL (262 mg total) by mouth every 6 (six) hours as needed for indigestion, Disp: 360 mL, Rfl: 3    calcium carbonate (TUMS) 500 mg chewable tablet, Chew 1 tablet (500 mg total) 3 (three) times a day as needed for heartburn, Disp: 30 tablet, Rfl: 5    Cholecalciferol (Vitamin D-3) 25 MCG (1000 UT) CAPS, Take 2 capsules (2,000 Units total) by mouth daily at 8am , Disp: 30 capsule, Rfl: 5    D3-1000 25 MCG (1000 UT) tablet, , Disp: , Rfl:     dicyclomine (BENTYL) 20 mg tablet, Take 1 tablet (20 mg total) by mouth every 6 (six) hours as needed (as needed), Disp: 120 tablet, Rfl: 2    Dyclonine-Glycerin (Cepacol Sore Throat Spray) 0 1-33 % LIQD, Apply 1 spray to the mouth or throat 3 (three) times a day as needed (sorethroat), Disp: 118 mL, Rfl: 2    Elastic Bandages & Supports (Neoprene Knee Brace) MISC, Apply right knee brace in morning; remove at night, Disp: 1 each, Rfl: 0    Emollient (CeraVe) CREA, Apply topically 2 (two) times a day Apply topically 2 times to affected heel twice daily @8a-8p, Disp: 453 g, Rfl: 5    escitalopram (LEXAPRO) 20 mg tablet, Take 1 tablet (20 mg total) by mouth daily, Disp: 90 tablet, Rfl: 2    fluticasone (FLONASE) 50 mcg/act nasal spray, 1 spray into each nostril daily as needed for rhinitis (nasal congestion) At 8:00 AM, Disp: 18 2 mL, Rfl: 5    guaiFENesin (ROBITUSSIN) 100 MG/5ML oral liquid, Take 200 mg by mouth 3 (three) times a day as needed for cough, Disp: , Rfl:     hydrOXYzine HCL (ATARAX) 10 mg tablet, , Disp: , Rfl:     hydrOXYzine HCL (ATARAX) 25 mg tablet, Take 1 tablet (25 mg total) by mouth 3 (three) times a day, Disp: 30 tablet, Rfl: 2    ibuprofen (MOTRIN) 400 mg tablet, Take 1 tablet (400 mg total) by mouth every 8 (eight) hours as needed for mild pain, Disp: 30 tablet, Rfl: 5    ketoconazole (NIZORAL) 2 % cream, , Disp: , Rfl:     lamoTRIgine (LaMICtal) 25 mg tablet, Take 25 mg by mouth 2 (two) times a day , Disp: , Rfl:     lidocaine (LIDODERM) 5 %, Apply 1 patch topically daily Remove & Discard patch within 12 hours or as directed by MD, Disp: 10 patch, Rfl: 0    LORazepam (ATIVAN) 0 5 mg tablet, Take 0 25 mg by mouth 2 (two) times a day, Disp: , Rfl:     lubiprostone (Amitiza) 24 mcg capsule, Take 1 capsule (24 mcg total) by mouth daily with breakfast, Disp: 60 capsule, Rfl: 5    magnesium hydroxide (GNP Milk of Magnesia) 400 mg/5 mL oral suspension, Take 30 mL by mouth daily as needed for constipation If no BM in 3 days, Disp: 355 mL, Rfl: 5    metoprolol tartrate (LOPRESSOR) 25 mg tablet, Take 1 tablet (25 mg total) by mouth every 12 (twelve) hours, Disp: 60 tablet, Rfl: 5    mineral oil-hydrophilic petrolatum (AQUAPHOR) ointment, Apply topically as needed for dry skin, Disp: 420 g, Rfl: 5    Mouthwashes (Listerine Antiseptic) LIQD, Swish and spit 5 mL 2 (two) times a day, Disp: 1000 mL, Rfl: 5    Multiple Vitamins-Minerals (CertaVite Senior/Antioxidant) TABS, Take 1 tablet by mouth daily, Disp: 30 tablet, Rfl: 5    norgestimate-ethinyl estradiol (ORTHO-CYCLEN) 0 25-35 MG-MCG per tablet, Take 1 tablet by mouth daily, Disp: 28 tablet, Rfl: 11    nystatin (MYCOSTATIN) powder, Apply topically 4 (four) times a day, Disp: 45 g, Rfl: 2    nystatin-triamcinolone (MYCOLOG-II) cream, Apply topically 2 (two) times a day, Disp: 30 g, Rfl: 2    pantoprazole (PROTONIX) 20 mg tablet, Take 1 tablet (20 mg total) by mouth 2 (two) times a day, Disp: 62 tablet, Rfl: 5    phenol (Chloraseptic) 1 4 % mucosal liquid, Apply 1 spray to the mouth or throat every 2 (two) hours as needed (for sore throat as needed), Disp: 236 mL, Rfl: 1    polyethylene glycol (MIRALAX) 17 g packet, Take one packet daily as needed for no bowel movement in 24 hours, Disp: 30 each, Rfl: 5    psyllium (METAMUCIL) 58 6 % packet, Take 1 packet by mouth daily, Disp: 30 packet, Rfl: 5    RA SUNSCREEN SPF50 LOTN, Apply 15 minutes before sun exposure and every 2 hours, Disp: 1 Bottle, Rfl: 5    senna-docusate sodium (Senexon-S) 8 6-50 mg per tablet, Take 1 tablet by mouth daily at 8am , Disp: 30 tablet, Rfl: 5    sodium chloride (OCEAN) 0 65 % nasal spray, 1 spray into each nostril as needed (three times daily as needed for nasal congestion), Disp: 60 mL, Rfl: 1    white petrolatum, Apply 1 application topically 2 (two) times a day Apply to forehead where excoriations are noted twice a day for 7 days, Disp: 500 g, Rfl: 0    zinc oxide (DESITIN) 13 % cream, Apply 1 application topically as needed (for perianal irritation), Disp: 1 Tube, Rfl: 5  No current facility-administered medications for this visit      Current Allergies     Allergies as of 08/23/2021    (No Known Allergies)            The following portions of the patient's history were reviewed and updated as appropriate: allergies, current medications, past family history, past medical history, past social history, past surgical history and problem list      Past Medical History:   Diagnosis Date    ADD (attention deficit disorder)     Anxiety     Astigmatism     Brain lesion     Calcium deficiency     Cellulitis of foot, right 6/4/2018    Cerebral palsy (HCC)     Chronic otitis media     Constipation     Depression     Dysphagia     Esophagitis     Esotropia     GERD (gastroesophageal reflux disease)     Hiatal hernia     Hydrocephalus (HCC)     Impaired fasting glucose     Left nephrolithiasis 03/04/2019    Myopia     Oppositional defiant disorder     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HCC)     Seizures (HCC)     Sensorineural hearing loss     Status post ventriculoatrial shunt placement     Visual impairment        Past Surgical History:   Procedure Laterality Date    BREAST BIOPSY Left     X 2 (not sure of years)    CSF SHUNT      Creation of Ventriculo-Peritoneal CSF shunt ; Last Assessed:7/6/2016    EAR SURGERY      Last Assessed:7/6/2016    LEG SURGERY      due to CP     NOSE SURGERY      Last Assessed:7/6/2016    TN ESOPHAGOGASTRODUODENOSCOPY TRANSORAL DIAGNOSTIC N/A 5/9/2019    Procedure: ESOPHAGOGASTRODUODENOSCOPY (EGD) with biopsy;  Surgeon: Gary Alvarado MD;  Location: [facility]; Service: Gastroenterology    UPPER GASTROINTESTINAL ENDOSCOPY  05/2019       Family History   Problem Relation Age of Onset    Diabetes Mother     Colon cancer Father     No Known Problems Maternal Grandmother     No Known Problems Maternal Grandfather     No Known Problems Paternal Grandmother     No Known Problems Paternal Grandfather          Medications have been verified  Objective   BP (!) 180/90   Pulse 100   Temp 98 1 °F (36 7 °C)   Resp 16   Ht 4' 9" (1 448 m)   Wt 98 kg (216 lb)   SpO2 96%   BMI 46 74 kg/m²   No LMP recorded  (Menstrual status: Birth Control)  Physical Exam     Physical Exam  Vitals and nursing note reviewed  Musculoskeletal:         General: Tenderness present  Comments: Right upper extremity reveals noticeable atrophy of the right hand secondary to cerebral palsy  Right lower extremity reveals limited range on flexion and extension secondary to pain  Lachman's test - negative  Argelia's test - negative  There is infrapatellar effusion observed but no ecchymosis appreciated  Gait - antalgic  Neurological:      Mental Status: She is alert

## 2021-08-25 DIAGNOSIS — S80.01XA CONTUSION OF RIGHT KNEE, INITIAL ENCOUNTER: ICD-10-CM

## 2021-08-25 DIAGNOSIS — M25.561 ACUTE PAIN OF RIGHT KNEE: Primary | ICD-10-CM

## 2021-08-25 DIAGNOSIS — M17.11 PRIMARY OSTEOARTHRITIS OF RIGHT KNEE: ICD-10-CM

## 2021-08-27 ENCOUNTER — OFFICE VISIT (OUTPATIENT)
Dept: FAMILY MEDICINE CLINIC | Facility: CLINIC | Age: 41
End: 2021-08-27

## 2021-08-27 VITALS
TEMPERATURE: 97.7 F | DIASTOLIC BLOOD PRESSURE: 80 MMHG | OXYGEN SATURATION: 97 % | HEART RATE: 89 BPM | WEIGHT: 216.2 LBS | RESPIRATION RATE: 18 BRPM | HEIGHT: 57 IN | BODY MASS INDEX: 46.64 KG/M2 | SYSTOLIC BLOOD PRESSURE: 126 MMHG

## 2021-08-27 DIAGNOSIS — T14.8XXA WOUND, OPEN: ICD-10-CM
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
## 2021-08-27 PROCEDURE — 99213 OFFICE O/P EST LOW 20 MIN: CPT | Performed by: FAMILY MEDICINE

## 2021-08-27 RX ORDER — DEXAMETHASONE SODIUM PHOSPHATE 1 MG/ML
SOLUTION/ DROPS OPHTHALMIC
COMMUNITY
Start: 2021-08-23 | End: 2021-10-06 | Stop reason: HOSPADM

## 2021-08-27 RX ORDER — GINSENG 100 MG
CAPSULE ORAL
Qty: 15 G | Refills: 2 | Status: SHIPPED | OUTPATIENT
Start: 2021-08-27 | End: 2022-05-09 | Stop reason: SDUPTHER

## 2021-08-27 NOTE — ASSESSMENT & PLAN NOTE
Patient had witnessed fall on 08/23 outside of Orthopedics office when she was there evaluate right knee pain  No head trauma, LOC     patient had right knee contusion for which Motrin and elastic bandages were prescribed by the Orthopedics   symptoms improving   continue Motrin/Tylenol as needed   group home paper filled and signed

## 2021-08-27 NOTE — PROGRESS NOTES
Assessment/Plan:    Fall   Patient had witnessed fall on 08/23 outside of Orthopedics office when she was there evaluate right knee pain  No head trauma, LOC  patient had right knee contusion for which Motrin and elastic bandages were prescribed by the Orthopedics   symptoms improving   continue Motrin/Tylenol as needed   group home paper filled and signed       Diagnoses and all orders for this visit:    Fall, sequela    Wound, open  -     bacitracin topical ointment 500 units/g topical ointment; Cleanse site on nose with soap and water followed by bacitracin BID until healed then p r n  Other orders  -     dexamethasone sodium phosphate 0 1 % ophthalmic solution        Subjective:      Patient ID: Meryle Gouge is a 42 y o  female  Av bronson is here with her caregiver as a follow-up for fall  Patient has history of cerebral palsy, recurrent falls  Patient was recently seen in urgent care for the same and evaluated  Workup was negative for fractures/dislocations  Seen by orthopedics for right knee contusion recommended Motrin and elastic bandages  Patient reports right knee pain  Patient was also seen in the ER yesterday for chest pain  Cardiac Workup was negative  Patient currently has no chest pain  Denies any shortness of breath, chest tightness, nausea, abdominal pain, changes in bladder or bowel habits        The following portions of the patient's history were reviewed and updated as appropriate:   She  has a past medical history of ADD (attention deficit disorder), Anxiety, Astigmatism, Brain lesion, Calcium deficiency, Cellulitis of foot, right (6/4/2018), Cerebral palsy ([address] ), Chronic otitis media, Constipation, Depression, Dysphagia, Esophagitis, Esotropia, GERD (gastroesophageal reflux disease), Hiatal hernia, Hydrocephalus ([address] ), Impaired fasting glucose, Left nephrolithiasis (03/04/2019), Myopia, Oppositional defiant disorder, 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[address] ), Seizures ([address] ), Sensorineural hearing loss, Status post ventriculoatrial shunt placement, and Visual impairment    She   Patient Active Problem List    Diagnosis Date Noted    Urine frequency 08/18/2021    Urinary incontinence 08/18/2021    UTI (urinary tract infection) 06/23/2021    Frequent falls 05/27/2021    Left foot pain 05/17/2021    Palpitations 03/30/2021    Wound, open 02/10/2021    Heat intolerance 01/07/2021    Dry skin 10/15/2020    Candidiasis of skin 10/15/2020    Encounter for follow-up 05/27/2020    Acute non intractable tension-type headache 05/27/2020    Exposure to COVID-19 virus 04/06/2020    Sleep disturbance 02/19/2020    Fall 02/07/2020    Elevated blood pressure reading in office without diagnosis of hypertension 01/24/2020    Pain of right calf 01/22/2020    Impetigo 11/18/2019    Sore throat 10/02/2019    Laceration of lip 09/23/2019    Nausea and vomiting 06/19/2019    Intertrigo 06/06/2019    Intercostal pain 05/16/2019    Gastroesophageal reflux disease without esophagitis 04/10/2019    Suprasellar extension of pituitary adenoma ([facility] ) 04/05/2019    Self-injurious behavior 03/26/2019    Non-seasonal allergic rhinitis 03/20/2019    Viral URI 03/20/2019    Dysuria 03/04/2019    Left nephrolithiasis 03/04/2019    SOB (shortness of breath) 02/25/2019    Hiatal hernia 10/30/2018    Skin picking habit 09/10/2018    Cerumen impaction 08/09/2018    Gait disturbance 08/01/2018    Bilateral thoracic back pain 08/01/2018    Bilateral impacted cerumen 05/21/2018    TMJ (temporomandibular joint disorder) 05/21/2018    Bruise 05/14/2018    Ambulatory dysfunction 04/16/2018    Pituitary microadenoma ([address] ) 04/06/2018    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[address] ) 04/06/2018    Hematuria 02/21/2018    Medicare annual wellness visit, subsequent 02/12/2018    Leg swelling 02/02/2018    Varicose veins with pain 10/27/2017    Dizziness 10/16/2017    Pituitary neoplasm 03/13/2017    Generalized anxiety disorder 02/14/2017    Moderate intellectual disability 02/14/2017    Brain lesion 02/03/2017    Low back pain 01/25/2017    Acquired renal cyst of left kidney 11/02/2016    Hearing impairment 09/30/2016    Cerebral palsy ([address] ) 07/06/2016    Acute pain of right knee 07/06/2016    Constipation 07/06/2016    Severe episode of recurrent major depressive disorder, with psychotic features ([address] ) 07/06/2016    Functional dysphagia 07/06/2016    Seasonal allergies 07/06/2016     She  has a past surgical history that includes CSF shunt; Leg Surgery; EAR SURGERY; Nose surgery; pr esophagogastroduodenoscopy transoral diagnostic (N/A, 5/9/2019); Upper gastrointestinal endoscopy (05/2019); and Breast biopsy (Left)  Her family history includes Colon cancer in her father; Diabetes in her mother; No Known Problems in her maternal grandfather, maternal grandmother, paternal grandfather, and paternal grandmother  She  reports that she has never smoked  She has never used smokeless tobacco  She reports that she does not drink alcohol and does not use drugs    Current Outpatient Medications   Medication Sig Dispense Refill    acetaminophen (TYLENOL) 500 mg tablet Take 1 tablet (500 mg total) by mouth every 6 (six) hours as needed for mild pain 30 tablet 5    aluminum-magnesium hydroxide-simethicone (Antacid) 200-200-20 mg/5 mL suspension Take 15 mL by mouth every 4 (four) hours as needed for indigestion or heartburn 355 mL 5    amitriptyline (ELAVIL) 10 mg tablet Take 10 mg by mouth daily at bedtime      ARIPiprazole (ABILIFY) 20 MG tablet Take 1 tablet (20 mg total) by mouth daily at bedtime 90 tablet 2    bacitracin topical ointment 500 units/g topical ointment Cleanse site on nose with soap and water followed by bacitracin BID until healed then p r n  15 g 2    bismuth subsalicylate (PEPTO BISMOL) 524 mg/30 mL oral suspension Take 15 mL (262 mg total) by mouth every 6 (six) hours as needed for indigestion 360 mL 3    calcium carbonate (TUMS) 500 mg chewable tablet Chew 1 tablet (500 mg total) 3 (three) times a day as needed for heartburn 30 tablet 5    Cholecalciferol (Vitamin D-3) 25 MCG (1000 UT) CAPS Take 2 capsules (2,000 Units total) by mouth daily at 8am  30 capsule 5    D3-1000 25 MCG (1000 UT) tablet       dicyclomine (BENTYL) 20 mg tablet Take 1 tablet (20 mg total) by mouth every 6 (six) hours as needed (as needed) 120 tablet 2    Dyclonine-Glycerin (Cepacol Sore Throat Spray) 0 1-33 % LIQD Apply 1 spray to the mouth or throat 3 (three) times a day as needed (sorethroat) 118 mL 2    Elastic Bandages & Supports (Neoprene Knee Brace) MISC Apply right knee brace in morning; remove at night 1 each 0    Emollient (CeraVe) CREA Apply topically 2 (two) times a day Apply topically 2 times to affected heel twice daily @8a-8p 453 g 5    escitalopram (LEXAPRO) 20 mg tablet Take 1 tablet (20 mg total) by mouth daily 90 tablet 2    fluticasone (FLONASE) 50 mcg/act nasal spray 1 spray into each nostril daily as needed for rhinitis (nasal congestion) At 8:00 AM 18 2 mL 5    guaiFENesin (ROBITUSSIN) 100 MG/5ML oral liquid Take 200 mg by mouth 3 (three) times a day as needed for cough      hydrOXYzine HCL (ATARAX) 10 mg tablet       hydrOXYzine HCL (ATARAX) 25 mg tablet Take 1 tablet (25 mg total) by mouth 3 (three) times a day 30 tablet 2    ibuprofen (MOTRIN) 400 mg tablet Take 1 tablet (400 mg total) by mouth every 8 (eight) hours as needed for mild pain 30 tablet 5    ketoconazole (NIZORAL) 2 % cream       lamoTRIgine (LaMICtal) 25 mg tablet Take 25 mg by mouth 2 (two) times a day       lidocaine (LIDODERM) 5 % Apply 1 patch topically daily Remove & Discard patch within 12 hours or as directed by MD 10 patch 0    LORazepam (ATIVAN) 0 5 mg tablet Take 0 25 mg by mouth 2 (two) times a day      lubiprostone (Amitiza) 24 mcg capsule Take 1 capsule (24 mcg total) by mouth daily with breakfast 60 capsule 5  magnesium hydroxide (GNP Milk of Magnesia) 400 mg/5 mL oral suspension Take 30 mL by mouth daily as needed for constipation If no BM in 3 days 355 mL 5    metoprolol tartrate (LOPRESSOR) 25 mg tablet Take 1 tablet (25 mg total) by mouth every 12 (twelve) hours 60 tablet 5    mineral oil-hydrophilic petrolatum (AQUAPHOR) ointment Apply topically as needed for dry skin 420 g 5    Mouthwashes (Listerine Antiseptic) LIQD Swish and spit 5 mL 2 (two) times a day 1000 mL 5    Multiple Vitamins-Minerals (CertaVite Senior/Antioxidant) TABS Take 1 tablet by mouth daily 30 tablet 5    norgestimate-ethinyl estradiol (ORTHO-CYCLEN) 0 25-35 MG-MCG per tablet Take 1 tablet by mouth daily 28 tablet 11    nystatin (MYCOSTATIN) powder Apply topically 4 (four) times a day 45 g 2    nystatin-triamcinolone (MYCOLOG-II) cream Apply topically 2 (two) times a day 30 g 2    pantoprazole (PROTONIX) 20 mg tablet Take 1 tablet (20 mg total) by mouth 2 (two) times a day 62 tablet 5    phenol (Chloraseptic) 1 4 % mucosal liquid Apply 1 spray to the mouth or throat every 2 (two) hours as needed (for sore throat as needed) 236 mL 1    polyethylene glycol (MIRALAX) 17 g packet Take one packet daily as needed for no bowel movement in 24 hours 30 each 5    psyllium (METAMUCIL) 58 6 % packet Take 1 packet by mouth daily 30 packet 5    RA SUNSCREEN SPF50 LOTN Apply 15 minutes before sun exposure and every 2 hours 1 Bottle 5    senna-docusate sodium (Senexon-S) 8 6-50 mg per tablet Take 1 tablet by mouth daily at 8am  30 tablet 5    sodium chloride (OCEAN) 0 65 % nasal spray 1 spray into each nostril as needed (three times daily as needed for nasal congestion) 60 mL 1    white petrolatum Apply 1 application topically 2 (two) times a day Apply to forehead where excoriations are noted twice a day for 7 days 500 g 0    zinc oxide (DESITIN) 13 % cream Apply 1 application topically as needed (for perianal irritation) 1 Tube 5    dexamethasone sodium phosphate 0 1 % ophthalmic solution        No current facility-administered medications for this visit       Current Outpatient Medications on File Prior to Visit   Medication Sig    acetaminophen (TYLENOL) 500 mg tablet Take 1 tablet (500 mg total) by mouth every 6 (six) hours as needed for mild pain    aluminum-magnesium hydroxide-simethicone (Antacid) 200-200-20 mg/5 mL suspension Take 15 mL by mouth every 4 (four) hours as needed for indigestion or heartburn    amitriptyline (ELAVIL) 10 mg tablet Take 10 mg by mouth daily at bedtime    ARIPiprazole (ABILIFY) 20 MG tablet Take 1 tablet (20 mg total) by mouth daily at bedtime    bismuth subsalicylate (PEPTO BISMOL) 524 mg/30 mL oral suspension Take 15 mL (262 mg total) by mouth every 6 (six) hours as needed for indigestion    calcium carbonate (TUMS) 500 mg chewable tablet Chew 1 tablet (500 mg total) 3 (three) times a day as needed for heartburn    Cholecalciferol (Vitamin D-3) 25 MCG (1000 UT) CAPS Take 2 capsules (2,000 Units total) by mouth daily at 8am     D3-1000 25 MCG (1000 UT) tablet     dicyclomine (BENTYL) 20 mg tablet Take 1 tablet (20 mg total) by mouth every 6 (six) hours as needed (as needed)    Dyclonine-Glycerin (Cepacol Sore Throat Spray) 0 1-33 % LIQD Apply 1 spray to the mouth or throat 3 (three) times a day as needed (sorethroat)    Elastic Bandages & Supports (Neoprene Knee Brace) MISC Apply right knee brace in morning; remove at night    Emollient (CeraVe) CREA Apply topically 2 (two) times a day Apply topically 2 times to affected heel twice daily @8a-8p    escitalopram (LEXAPRO) 20 mg tablet Take 1 tablet (20 mg total) by mouth daily    fluticasone (FLONASE) 50 mcg/act nasal spray 1 spray into each nostril daily as needed for rhinitis (nasal congestion) At 8:00 AM    guaiFENesin (ROBITUSSIN) 100 MG/5ML oral liquid Take 200 mg by mouth 3 (three) times a day as needed for cough    hydrOXYzine HCL (ATARAX) 10 mg tablet     hydrOXYzine HCL (ATARAX) 25 mg tablet Take 1 tablet (25 mg total) by mouth 3 (three) times a day    ibuprofen (MOTRIN) 400 mg tablet Take 1 tablet (400 mg total) by mouth every 8 (eight) hours as needed for mild pain    ketoconazole (NIZORAL) 2 % cream     lamoTRIgine (LaMICtal) 25 mg tablet Take 25 mg by mouth 2 (two) times a day     lidocaine (LIDODERM) 5 % Apply 1 patch topically daily Remove & Discard patch within 12 hours or as directed by MD    LORazepam (ATIVAN) 0 5 mg tablet Take 0 25 mg by mouth 2 (two) times a day    lubiprostone (Amitiza) 24 mcg capsule Take 1 capsule (24 mcg total) by mouth daily with breakfast    magnesium hydroxide (GNP Milk of Magnesia) 400 mg/5 mL oral suspension Take 30 mL by mouth daily as needed for constipation If no BM in 3 days    metoprolol tartrate (LOPRESSOR) 25 mg tablet Take 1 tablet (25 mg total) by mouth every 12 (twelve) hours    mineral oil-hydrophilic petrolatum (AQUAPHOR) ointment Apply topically as needed for dry skin    Mouthwashes (Listerine Antiseptic) LIQD Swish and spit 5 mL 2 (two) times a day    Multiple Vitamins-Minerals (CertaVite Senior/Antioxidant) TABS Take 1 tablet by mouth daily    norgestimate-ethinyl estradiol (ORTHO-CYCLEN) 0 25-35 MG-MCG per tablet Take 1 tablet by mouth daily    nystatin (MYCOSTATIN) powder Apply topically 4 (four) times a day    nystatin-triamcinolone (MYCOLOG-II) cream Apply topically 2 (two) times a day    pantoprazole (PROTONIX) 20 mg tablet Take 1 tablet (20 mg total) by mouth 2 (two) times a day    phenol (Chloraseptic) 1 4 % mucosal liquid Apply 1 spray to the mouth or throat every 2 (two) hours as needed (for sore throat as needed)    polyethylene glycol (MIRALAX) 17 g packet Take one packet daily as needed for no bowel movement in 24 hours    psyllium (METAMUCIL) 58 6 % packet Take 1 packet by mouth daily    RA SUNSCREEN SPF50 LOTN Apply 15 minutes before sun exposure and every 2 hours  senna-docusate sodium (Senexon-S) 8 6-50 mg per tablet Take 1 tablet by mouth daily at 8am     sodium chloride (OCEAN) 0 65 % nasal spray 1 spray into each nostril as needed (three times daily as needed for nasal congestion)    white petrolatum Apply 1 application topically 2 (two) times a day Apply to forehead where excoriations are noted twice a day for 7 days    zinc oxide (DESITIN) 13 % cream Apply 1 application topically as needed (for perianal irritation)    [DISCONTINUED] bacitracin topical ointment 500 units/g topical ointment Cleanse site on nose with soap and water followed by bacitracin BID until healed then p r n     dexamethasone sodium phosphate 0 1 % ophthalmic solution      No current facility-administered medications on file prior to visit  She has No Known Allergies       Review of Systems   Constitutional: Negative for chills and fever  HENT: Negative for ear pain and sore throat  Eyes: Negative for pain and visual disturbance  Respiratory: Negative for cough and shortness of breath  Cardiovascular: Negative for chest pain and palpitations  Gastrointestinal: Negative for abdominal pain and vomiting  Genitourinary: Negative for dysuria and hematuria  Musculoskeletal: Positive for arthralgias (right knee pain)  Negative for back pain  Skin: Negative for color change and rash  Neurological: Negative for seizures and syncope  Psychiatric/Behavioral: Negative for behavioral problems  All other systems reviewed and are negative  Objective:      /80 (BP Location: Left arm, Patient Position: Sitting, Cuff Size: Large)   Pulse 89   Temp 97 7 °F (36 5 °C) (Temporal)   Resp 18   Ht 4' 9" (1 448 m)   Wt 98 1 kg (216 lb 3 2 oz)   SpO2 97%   BMI 46 79 kg/m²          Physical Exam  Vitals reviewed  Constitutional:       General: She is not in acute distress  Comments: At baseline   HENT:      Head: Normocephalic and atraumatic     Eyes: Conjunctiva/sclera: Conjunctivae normal    Cardiovascular:      Rate and Rhythm: Normal rate and regular rhythm  Pulses: Normal pulses  Heart sounds: Normal heart sounds  Pulmonary:      Effort: Pulmonary effort is normal       Breath sounds: Normal breath sounds  Abdominal:      General: Bowel sounds are normal       Palpations: Abdomen is soft  Musculoskeletal:         General: Tenderness (right knee) and signs of injury (Rt knee contusion noted ) present  Cervical back: Normal range of motion  Right lower leg: No edema  Left lower leg: No edema  Skin:     General: Skin is warm and dry  Capillary Refill: Capillary refill takes less than 2 seconds  Neurological:      Mental Status: Mental status is at baseline     Psychiatric:      Comments: Baseline

## 2021-08-31 ENCOUNTER — TELEPHONE (OUTPATIENT)
Dept: NEUROSURGERY | Facility: CLINIC | Age: 41
End: 2021-08-31

## 2021-08-31 ENCOUNTER — OFFICE VISIT (OUTPATIENT)
Dept: PHYSICAL THERAPY | Facility: REHABILITATION | Age: 41
End: 2021-08-31
Payer: MEDICARE

## 2021-08-31 DIAGNOSIS — R29.6 FREQUENT FALLS: Primary | ICD-10-CM
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
## 2021-08-31 PROCEDURE — 97110 THERAPEUTIC EXERCISES: CPT | Performed by: PHYSICAL THERAPIST

## 2021-08-31 PROCEDURE — 97140 MANUAL THERAPY 1/> REGIONS: CPT | Performed by: PHYSICAL THERAPIST

## 2021-08-31 NOTE — TELEPHONE ENCOUNTER
Left Darian, patient's caregiver a message to call back regarding message from [facility] to adjust/change appointment, see below message for reference

## 2021-08-31 NOTE — TELEPHONE ENCOUNTER
----- Message from Shiva Ennis MD sent at 8/30/2021  2:24 PM EDT -----  Amira Wang,  Please change this patient's appt for 10/15 to PA solo or another provider (e g  [address])       Thanks

## 2021-08-31 NOTE — PROGRESS NOTES
Daily Note    Today's date: 2021  Patient name: Hannah Sue  : 1980  MRN: [id number]  Referring provider: Lauren Birmingham MD  Dx:   Encounter Diagnosis     ICD-10-CM    1  Frequent falls  R29 6    2  Fall, initial encounter  [address]  XXXA        Pt treated 1 on  from 1000 to 1030a    Subjective: Arrives 15min late for visit  Since last visit was seen in orthopedics  Was offered an injection for her R knee pain, but refused  While walking out the door from her ortho visit she fell and landed on R knee  Pt was not clear on the reason for her fall  Was taken to and urgent care who dx her knee with a contusion, no imaging warranted  On the  was taken to the ER for complaints of chest pain (has complained of this previously), but all testing was negative  States today that her knee hurts and "I don't want to walk"  Objective: See treatment diary below    Unable to visualize R knee, patient's pant sleeve cannot be rolled up  Assessment: Tolerated treatment fair  Pt demonstrates no significant changes in gait and is weight bearing on her RLE  Pt refused to do any significant walking while in clinic today citing R knee pain  She appeared anxious today and was considerably more distractible and hard to redirect  She consented to some manual therapy and seated/standing LE exercises which she tolerated well with minimal complaints of pain  Discussed with pt the importance of participating in PT to improve her balance and gait to reduce future falls  She is expressing increasing fear of walking due to falls  Would benefit from continued PT  Plan: Continue for 4 more weeks at 2x per week  Will be seeing orthopedics for R knee         HEP: hip abd/ext/flex, STS, heel raises, LAQs walking program,     Manuals 21   Gentle knee flexion, ext     5'   STM to distal quad     5'                   Neuro Re-Ed Overground walking with rollator, 100 feet x 2, then 100 feet    SciFit level 1, 6 5 min  Then 2 min     Overground walking 100ft with rollator, cues to maintain RUE on walker  x5    Walking in //bars no UE support, 5 laps with CG Walking in //bars no UE support, 2 laps with CG, break between  Walking in [place] with RW 100ft x3      Declined walking    Step over cones  20ft x4 with rollator       Step ups  4in x10 ea in //bars, 1 hand on rail  4in x8 ea in //bars, 1 hand on rail      Side stepping  In //bars 3 laps no UE support  With CG      Dynamic Reaching         Ball toss   Static in //bars 12x, taking hands off bars  Static standing in SOLO 5min    Bean bag toss    Taken bean bag, turn 180deg and toss  2x10  In SOLO    Weaving between cones      No walker in [place]   20ft x4    Ther Ex        Educated in [address] [place] [address]   L1 5 5min  Pt declined today Pt declined today Pt declined    Seated exercises LAQ 3 lbs, 10 x 2 sets on left    Standing heel raise, 1 5 min with frequent verbal cues    Step up 4" step, 1 railing, 10 each side, 2 sets on right foot    Sit to stand, 10 x 2 sets  LAQ 3 lbs, 10 x 3 sets each    Seated march 3# 4x10    LAQ 3 lbs, 10 x 3 sets each    Seated march 3# 4x10    LAQ 3 # on L, 0# on R, 10 x 3 sets each    Seated march 3# 4x10    Seated clamshell purple band 3x10   STS        Hip abd   x10 ea OTB  2x10 ea   Hip flex   x10 ea OTB  2x10 ea   Hip ext   2x10 ea OTB  2x10 ea           Ther Activity                        Gait Training                        Modalities        Ice pack      Patient declined

## 2021-09-02 ENCOUNTER — OFFICE VISIT (OUTPATIENT)
Dept: PHYSICAL THERAPY | Facility: REHABILITATION | Age: 41
End: 2021-09-02
Payer: MEDICARE

## 2021-09-02 ENCOUNTER — APPOINTMENT (OUTPATIENT)
Dept: LAB | Facility: CLINIC | Age: 41
End: 2021-09-02
Payer: MEDICARE

## 2021-09-02 DIAGNOSIS — Z79.899 ENCOUNTER FOR LONG-TERM (CURRENT) USE OF OTHER MEDICATIONS: ICD-10-CM

## 2021-09-02 DIAGNOSIS — R29.6 FREQUENT FALLS: Primary | ICD-10-CM

## 2021-09-02 DIAGNOSIS — E55.9 VITAMIN D DEFICIENCY DISEASE: ICD-10-CM
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
## 2021-09-02 LAB
25(OH)D3 SERPL-MCNC: 54.7 NG/ML (ref 30–100)
ALBUMIN SERPL BCP-MCNC: 3.2 G/DL (ref 3.5–5)
ALP SERPL-CCNC: 180 U/L (ref 46–116)
ALT SERPL W P-5'-P-CCNC: 30 U/L (ref 12–78)
ANION GAP SERPL CALCULATED.3IONS-SCNC: 3 MMOL/L (ref 4–13)
AST SERPL W P-5'-P-CCNC: 16 U/L (ref 5–45)
BASOPHILS # BLD AUTO: 0.06 THOUSANDS/ΜL (ref 0–0.1)
BASOPHILS NFR BLD AUTO: 1 % (ref 0–1)
BILIRUB SERPL-MCNC: 0.29 MG/DL (ref 0.2–1)
BUN SERPL-MCNC: 11 MG/DL (ref 5–25)
CALCIUM ALBUM COR SERPL-MCNC: 8.8 MG/DL (ref 8.3–10.1)
CALCIUM SERPL-MCNC: 8.2 MG/DL (ref 8.3–10.1)
CHLORIDE SERPL-SCNC: 109 MMOL/L (ref 100–108)
CHOLEST SERPL-MCNC: 154 MG/DL (ref 50–200)
CO2 SERPL-SCNC: 25 MMOL/L (ref 21–32)
CREAT SERPL-MCNC: 0.74 MG/DL (ref 0.6–1.3)
EOSINOPHIL # BLD AUTO: 0.09 THOUSAND/ΜL (ref 0–0.61)
EOSINOPHIL NFR BLD AUTO: 1 % (ref 0–6)
ERYTHROCYTE [DISTWIDTH] IN BLOOD BY AUTOMATED COUNT: 13.6 % (ref 11.6–15.1)
GFR SERPL CREATININE-BSD FRML MDRD: 100 ML/MIN/1.73SQ M
GLUCOSE P FAST SERPL-MCNC: 85 MG/DL (ref 65–99)
HCT VFR BLD AUTO: 43.3 % (ref 34.8–46.1)
HDLC SERPL-MCNC: 56 MG/DL
HGB BLD-MCNC: 14.1 G/DL (ref 11.5–15.4)
IMM GRANULOCYTES # BLD AUTO: 0.02 THOUSAND/UL (ref 0–0.2)
IMM GRANULOCYTES NFR BLD AUTO: 0 % (ref 0–2)
LDLC SERPL CALC-MCNC: 69 MG/DL (ref 0–100)
LYMPHOCYTES # BLD AUTO: 1.6 THOUSANDS/ΜL (ref 0.6–4.47)
LYMPHOCYTES NFR BLD AUTO: 21 % (ref 14–44)
MCH RBC QN AUTO: 30.5 PG (ref 26.8–34.3)
MCHC RBC AUTO-ENTMCNC: 32.6 G/DL (ref 31.4–37.4)
MCV RBC AUTO: 94 FL (ref 82–98)
MONOCYTES # BLD AUTO: 0.53 THOUSAND/ΜL (ref 0.17–1.22)
MONOCYTES NFR BLD AUTO: 7 % (ref 4–12)
NEUTROPHILS # BLD AUTO: 5.45 THOUSANDS/ΜL (ref 1.85–7.62)
NEUTS SEG NFR BLD AUTO: 70 % (ref 43–75)
NONHDLC SERPL-MCNC: 98 MG/DL
NRBC BLD AUTO-RTO: 0 /100 WBCS
PLATELET # BLD AUTO: 299 THOUSANDS/UL (ref 149–390)
PMV BLD AUTO: 9.2 FL (ref 8.9–12.7)
POTASSIUM SERPL-SCNC: 4.1 MMOL/L (ref 3.5–5.3)
PROT SERPL-MCNC: 7.6 G/DL (ref 6.4–8.2)
RBC # BLD AUTO: 4.63 MILLION/UL (ref 3.81–5.12)
SODIUM SERPL-SCNC: 137 MMOL/L (ref 136–145)
T4 FREE SERPL-MCNC: 1.01 NG/DL (ref 0.76–1.46)
TRIGL SERPL-MCNC: 143 MG/DL
TSH SERPL DL<=0.05 MIU/L-ACNC: 2.82 UIU/ML (ref 0.36–3.74)
WBC # BLD AUTO: 7.75 THOUSAND/UL (ref 4.31–10.16)

## 2021-09-02 PROCEDURE — 85025 COMPLETE CBC W/AUTO DIFF WBC: CPT

## 2021-09-02 PROCEDURE — 36415 COLL VENOUS BLD VENIPUNCTURE: CPT

## 2021-09-02 PROCEDURE — 82306 VITAMIN D 25 HYDROXY: CPT

## 2021-09-02 PROCEDURE — 97110 THERAPEUTIC EXERCISES: CPT | Performed by: PHYSICAL THERAPIST

## 2021-09-02 PROCEDURE — 84439 ASSAY OF FREE THYROXINE: CPT

## 2021-09-02 PROCEDURE — 80053 COMPREHEN METABOLIC PANEL: CPT

## 2021-09-02 PROCEDURE — 97140 MANUAL THERAPY 1/> REGIONS: CPT | Performed by: PHYSICAL THERAPIST

## 2021-09-02 PROCEDURE — 80061 LIPID PANEL: CPT

## 2021-09-02 PROCEDURE — 84443 ASSAY THYROID STIM HORMONE: CPT

## 2021-09-02 NOTE — PROGRESS NOTES
Daily Note    Today's date: 2021  Patient name: Parmjit Guardado  : 1980  MRN: [id number]  Referring provider: Ileana Carrasquillo MD  Dx:   Encounter Diagnosis     ICD-10-CM    1  Frequent falls  R29 6    2  Fall, initial encounter  [address]  XXXA        Pt treated 1 on 1 from 1000 to 1030a    Subjective: Arrives 15min late for visit  She stated that the stretching last visit helped her knee feel better  She states that she does not want to walk today and wants to just do stretches  Objective: See treatment diary below    Assessment: Declined walking this visit due to the pain in her knee, was able to get 5 minutes on the scifit before she wanted to get off due to pain  She tolerated stretching with slight discomfort at first  Incorporated lying leg exercises and she tolerated the sci fit at end of session today  Continue to trial gait during visit to get her more independent with house hold mobility with caregiver next to her  Requiring frequent cues to keep up pace on scifit during the session  Plan: Continue for 4 more weeks at 2x per week  Will be seeing orthopedics for R knee  HEP: hip abd/ext/flex, STS, heel raises, LAQs walking program,     Manuals 21 9   Gentle knee flexion, ext     5' 5'   STM to distal quad     5' 5'                     Neuro Re-Ed Overground walking with rollator, 100 feet x 2, then 100 feet    SciFit level 1, 6 5 min  Then 2 min     Overground walking 100ft with rollator, cues to maintain RUE on walker  x5    Walking in //bars no UE support, 5 laps with CG Walking in //bars no UE support, 2 laps with CG, break between  Walking in [place] with RW 100ft x3      Declined walking  Declined walking   Step over cones  20ft x4 with rollator        Step ups  4in x10 ea in //bars, 1 hand on rail  4in x8 ea in //bars, 1 hand on rail       Side stepping  In //bars 3 laps no UE support   With CG       Dynamic Reaching          Ball toss   Static in //bars 12x, taking hands off bars  Static standing in SOLO 5min     Bean bag toss    Taken bean bag, turn 180deg and toss  2x10  In SOLO     Weaving between cones      No walker in [place] [facility]   20ft x4     Ther Ex         Educated in [address]   L1 5 5min  Pt declined today Pt declined today Pt declined 5 mins lv 1 at beginning  8 mins lv 1 at end of session    Seated exercises LAQ 3 lbs, 10 x 2 sets on left    Standing heel raise, 1 5 min with frequent verbal cues    Step up 4" step, 1 railing, 10 each side, 2 sets on right foot    Sit to stand, 10 x 2 sets  LAQ 3 lbs, 10 x 3 sets each    Seated march 3# 4x10    LAQ 3 lbs, 10 x 3 sets each    Seated march 3# 4x10    LAQ 3 lbs, 10 x 3 sets each  Seated march 3# 4x10    Seated clamshell purple band 3x10 SAQ 2x15  Supine marching 2x15  Leg extesnion supine 2x15  Adductor squeeze 2x15   Abductor manual resisted 2x15   Supine calf stretch, hamstring stretch, glute stretch    STS         Hip abd   x10 ea OTB  2x10 ea    Hip flex   x10 ea OTB  2x10 ea    Hip ext   2x10 ea OTB  2x10 ea             Ther Activity                           Gait Training                           Modalities         Ice pack      Patient declined                  Treatment performed by DANIELA Gutierrez, under direct supervision from Dottie Bueno, PT

## 2021-09-02 NOTE — TELEPHONE ENCOUNTER
Received message from [facility] returning my call regarding rescheduling patients appointment, stated I am able to leave a message on her voicemail with the changed/updated appointment date/time/location  I left Tiannagemma a detailed message stating appointment date will remain the same but the appointment time and location has changed, patient's appointment is now scheduled for Friday, Oct 15 with Dr Reyna Hamman at 9 am at the [facility] office  I also asked her to call back to confirm she received my message as well as ok with the change

## 2021-09-07 ENCOUNTER — OFFICE VISIT (OUTPATIENT)
Dept: PHYSICAL THERAPY | Facility: REHABILITATION | Age: 41
End: 2021-09-07
Payer: MEDICARE

## 2021-09-07 DIAGNOSIS — R29.6 FREQUENT FALLS: Primary | ICD-10-CM
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
## 2021-09-07 PROCEDURE — 97110 THERAPEUTIC EXERCISES: CPT

## 2021-09-07 PROCEDURE — 97140 MANUAL THERAPY 1/> REGIONS: CPT

## 2021-09-07 NOTE — PROGRESS NOTES
Daily Note    Today's date: 2021  Patient name: Yumiko Fagan  : 1980  MRN: [id number]  Referring provider: Amador Martin MD  Dx:   Encounter Diagnosis     ICD-10-CM    1  Frequent falls  R29 6    2  Fall, initial encounter  [facility]  XXXA            Subjective: Patient arrived 45 minutes late for session  Seen from [id number]  Patient reports she did not sleep well last night and is tired today    Objective: See treatment diary below    Assessment: Declined walking this visit due to the pain in her knee  Patient declined walking due to pain in the knee  Tolerated supine exercises while having ice on the R knee  Patient reported decreased pain with cryotherapy, however continued to refuse walking during session  Patient was not able to tolerate MFR to knee musculature due to pain, even with very light pressure  Advised patient to try ice on her knee at home for pain control  Plan: Continue for 4 more weeks at 2x per week  Will be seeing orthopedics for R knee  HEP: hip abd/ext/flex, STS, heel raises, LAQs walking program,     Manuals 21   Gentle knee flexion, ext     5' 5' 5' in supine   STM to distal quad     5' 5' 3', very tender at medial quads, unable to tolerate more pressure                       Neuro Re-Ed Overground walking with rollator, 100 feet x 2, then 100 feet    SciFit level 1, 6 5 min  Then 2 min     Overground walking 100ft with rollator, cues to maintain RUE on walker  x5    Walking in //bars no UE support, 5 laps with CG Walking in //bars no UE support, 2 laps with CG, break between  Walking in [place] with RW 100ft x3      Declined walking  Declined walking Declined walking   Step over cones  20ft x4 with rollator         Step ups  4in x10 ea in //bars, 1 hand on rail  4in x8 ea in //bars, 1 hand on rail        Side stepping  In //bars 3 laps no UE support   With CG        Dynamic Reaching           Ball toss   Static in //bars 12x, taking hands off bars  Static standing in SOLO 5min      Bean bag toss    Taken bean bag, turn 180deg and toss  2x10  In SOLO      Weaving between cones      No walker in [facility] [facility]  20ft x4      Ther Ex          Educated in [address]   L1 5 5min  Pt declined today Pt declined today Pt declined 5 mins lv 1 at beginning  8 mins lv 1 at end of session  6 minutes L1 at start of session  5 minutes L1 at end of session  Seated exercises LAQ 3 lbs, 10 x 2 sets on left    Standing heel raise, 1 5 min with frequent verbal cues    Step up 4" step, 1 railing, 10 each side, 2 sets on right foot    Sit to stand, 10 x 2 sets  LAQ 3 lbs, 10 x 3 sets each    Seated march 3# 4x10    LAQ 3 lbs, 10 x 3 sets each    Seated march 3# 4x10    LAQ 3 lbs, 10 x 3 sets each  Seated march 3# 4x10    Seated clamshell purple band 3x10 SAQ 2x15  Supine marching 2x15  Leg extesnion supine 2x15  Adductor squeeze 2x15   Abductor manual resisted 2x15   Supine calf stretch, hamstring stretch, glute stretch  Supine hamstring stretch, gastroc stretch, glute stretch 15 sec x 5 each    Adductor squeeze 2 x 10, L SLR flexion x 10, SAQ on R LE 2 x 10  Hip abductor with manual resistance x 10 with 3 second hold  Bridging 2 x 5     STS          Hip abd   x10 ea OTB  2x10 ea     Hip flex   x10 ea OTB  2x10 ea     Hip ext   2x10 ea OTB  2x10 ea               Ther Activity                              Gait Training                              Modalities          Ice pack      Patient declined   10 minutes in supine

## 2021-09-09 ENCOUNTER — OFFICE VISIT (OUTPATIENT)
Dept: PHYSICAL THERAPY | Facility: REHABILITATION | Age: 41
End: 2021-09-09
Payer: MEDICARE

## 2021-09-09 DIAGNOSIS — R29.6 FREQUENT FALLS: Primary | ICD-10-CM
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
## 2021-09-09 PROCEDURE — 97112 NEUROMUSCULAR REEDUCATION: CPT | Performed by: PHYSICAL THERAPIST

## 2021-09-09 PROCEDURE — 97110 THERAPEUTIC EXERCISES: CPT | Performed by: PHYSICAL THERAPIST

## 2021-09-09 PROCEDURE — 97140 MANUAL THERAPY 1/> REGIONS: CPT | Performed by: PHYSICAL THERAPIST

## 2021-09-09 NOTE — PROGRESS NOTES
Daily Note    Today's date: 2021  Patient name: Meryle Gouge  : 1980  MRN: [id number]  Referring provider: Cisco Cheng MD  Dx:   Encounter Diagnosis     ICD-10-CM    1  Frequent falls  R29 6    2  Fall, initial encounter  [address]  XXXA            Subjective: Reports having a fall earlier today, not sure why  Caregiver who was present during fall states Aixa Arredondo was walking with her walker in side and is not sure why she became unsteady  Landed on R hip, no injury reported  Objective: See treatment diary below    Assessment: Tolerated treatment fair  Occasionally reported pain with manual therapy and exercises, but will to continue  Pt is more willing to perform exercises with walking and in standing when in //bars vs walking with her rollator  Frequent cues and redirection needed to perform exercises  Would benefit from continued PT  Plan: Continue for 4 more weeks at 2x per week  Will be seeing orthopedics for R knee  HEP: hip abd/ext/flex, STS, heel raises, LAQs walking program,     Manuals    Gentle knee flexion, ext   5' 5' 5' in supine 5' supine   STM to distal quad   5' 5' 3', very tender at medial quads, unable to tolerate more pressure 3' using foam roller                      Neuro Re-Ed Walking in //bars no UE support, 2 laps with CG, break between  Walking in [place] with RW 100ft x3      Declined walking  Declined walking Declined walking Fwd walking in //bars 1 laps, w/ UE support       Step over cones      Marching in //bars 2 laps   Step ups 4in x8 ea in //bars, 1 hand on rail         Side stepping      2 lap in //bars   Dynamic Reaching          [place] toss   Static standing in SOLO 5min       Bean bag toss  Taken bean bag, turn 180deg and toss  2x10  In SOLO       Weaving between cones    No walker in [place]   20ft x4       Ther Ex         Educated in [address]  Pt declined today Pt declined today Pt declined 5 mins lv 1 at beginning  8 mins lv 1 at end of session  6 minutes L1 at start of session  5 minutes L1 at end of session  5min at start of visit       Seated exercises LAQ 3 lbs, 10 x 3 sets each    Seated march 3# 4x10    LAQ 3 lbs, 10 x 3 sets each    Seated march 3# 4x10    LAQ 3 lbs, 10 x 3 sets each  Seated march 3# 4x10    Seated clamshell purple band 3x10 SAQ 2x15  Supine marching 2x15  Leg extesnion supine 2x15  Adductor squeeze 2x15   Abductor manual resisted 2x15   Supine calf stretch, hamstring stretch, glute stretch  Supine hamstring stretch, gastroc stretch, glute stretch 15 sec x 5 each    Adductor squeeze 2 x 10, L SLR flexion x 10, SAQ on R LE 2 x 10  Hip abductor with manual resistance x 10 with 3 second hold  Bridging 2 x 5   Supine hammy stretch 1'x2    SAQs 3# 3x10    Supine clamshell PTB 3x10    Bridges 2x10       STS      2x5 from hi/low table   Hip abd x10 ea OTB  2x10 ea      Hip flex x10 ea OTB  2x10 ea      Hip ext 2x10 ea OTB  2x10 ea               Ther Activity                           Gait Training                           Modalities         Ice pack    Patient declined   10 minutes in supine

## 2021-09-13 ENCOUNTER — OFFICE VISIT (OUTPATIENT)
Dept: FAMILY MEDICINE CLINIC | Facility: CLINIC | Age: 41
End: 2021-09-13

## 2021-09-13 ENCOUNTER — OFFICE VISIT (OUTPATIENT)
Dept: PHYSICAL THERAPY | Facility: REHABILITATION | Age: 41
End: 2021-09-13
Payer: MEDICARE

## 2021-09-13 VITALS
HEART RATE: 80 BPM | SYSTOLIC BLOOD PRESSURE: 100 MMHG | WEIGHT: 218 LBS | BODY MASS INDEX: 47.03 KG/M2 | HEIGHT: 57 IN | TEMPERATURE: 97.9 F | DIASTOLIC BLOOD PRESSURE: 70 MMHG | RESPIRATION RATE: 16 BRPM

## 2021-09-13 DIAGNOSIS — D35.2 PITUITARY MICROADENOMA (HCC): Primary | ICD-10-CM

## 2021-09-13 DIAGNOSIS — D49.7 PITUITARY NEOPLASM: ICD-10-CM

## 2021-09-13 DIAGNOSIS — R29.6 FREQUENT FALLS: Primary | ICD-10-CM

## 2021-09-13 DIAGNOSIS — Z01.818 PREOPERATIVE CLEARANCE: ICD-10-CM
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
## 2021-09-13 PROCEDURE — 97112 NEUROMUSCULAR REEDUCATION: CPT | Performed by: PHYSICAL THERAPIST

## 2021-09-13 PROCEDURE — 99213 OFFICE O/P EST LOW 20 MIN: CPT | Performed by: FAMILY MEDICINE

## 2021-09-13 PROCEDURE — 97110 THERAPEUTIC EXERCISES: CPT | Performed by: PHYSICAL THERAPIST

## 2021-09-13 PROCEDURE — 97140 MANUAL THERAPY 1/> REGIONS: CPT | Performed by: PHYSICAL THERAPIST

## 2021-09-13 NOTE — PROGRESS NOTES
OFFICE VISIT NOTE - [facility]  Hannah Sue 42 y o  (:1980) female MRN: [id number]  Unit/Bed#:  Encounter: [id number]      Date: 21    Assessment and Plan     Pituitary microadenoma ([facility] )  -MRI brain/pituitary 2020: No interval change in the 6 to 7 mm round suprasellar lesion along the left margin of the pituitary stalk and superior margin of the pituitary gland  Stable dysmorphic appearance to the brain parenchyma with abnormalities in migration and sulcation  Stable ventricular size and ventricular shunt catheter position   -Recommendation was to follow up MRI 1 year after previous one  MRI order is now scheduled and patient is ok to have procedure with anesthesia  Diagnoses and all orders for this visit:    Pituitary microadenoma [facility])    Pituitary neoplasm    Preoperative clearance      Subjective:  Chief Complaint   Patient presents with    Pre-op Clearance       History of Present Illness     Hannah Sue is a 42 y o  female Group home resident with history of mild to moderate intellectual development disability, cerebral palsy, frequent falls, pituitary micro adenoma and status post [facility] shunt  She is here today with group home staff at bedside for preprocedure examination/clearance for planned MRI of the brain under anesthesia to evaluate pituitary microadenoma per Neurosurgery  MRI is scheduled for 10/6/2021  Patient requires anesthesia for her imaging as a consequence of her mild to moderate intellectual developmental disability  Per chart review and patient she does have prior history of procedures done under general anesthesia which she has tolerated without complications  Patient was seen in the ED yesterday for atypical chest pain  Trop was neg x2, other labs and imaging were unremarkable  and their notes suggest GERD may be a factor in her chest symptoms  Per chart review patient has had multiple falls over the past few months   She does report at times she cannot make it to the bathroom in time but no incontinence  Patient offers no new complaints today  She currently denies chest pain, SOB, changes with vision, palpitations  However, she has had multiple falls over the past few months  Patient denies any difficulty with her balance but does ambulate with a walker  She is currently doing physical therapy and states that it helps a lot      The following portions of the patient's history were reviewed and updated as appropriate: allergies, current medications, past family history, past medical history, past social history, past surgical history and problem list     Review of Systems     Review of Systems    Current Medications     Current Outpatient Medications on File Prior to Visit   Medication Sig Dispense Refill    acetaminophen (TYLENOL) 500 mg tablet Take 1 tablet (500 mg total) by mouth every 6 (six) hours as needed for mild pain 30 tablet 5    aluminum-magnesium hydroxide-simethicone (Antacid) 200-200-20 mg/5 mL suspension Take 15 mL by mouth every 4 (four) hours as needed for indigestion or heartburn 355 mL 5    amitriptyline (ELAVIL) 10 mg tablet Take 10 mg by mouth daily at bedtime      ARIPiprazole (ABILIFY) 20 MG tablet Take 1 tablet (20 mg total) by mouth daily at bedtime 90 tablet 2    bacitracin topical ointment 500 units/g topical ointment Cleanse site on nose with soap and water followed by bacitracin BID until healed then p r n  15 g 2    bismuth subsalicylate (PEPTO BISMOL) 524 mg/30 mL oral suspension Take 15 mL (262 mg total) by mouth every 6 (six) hours as needed for indigestion 360 mL 3    calcium carbonate (TUMS) 500 mg chewable tablet Chew 1 tablet (500 mg total) 3 (three) times a day as needed for heartburn 30 tablet 5    Cholecalciferol (Vitamin D-3) 25 MCG (1000 UT) CAPS Take 2 capsules (2,000 Units total) by mouth daily at 8am  30 capsule 5    D3-1000 25 MCG (1000 UT) tablet       dexamethasone sodium phosphate 0 1 % ophthalmic solution       dicyclomine (BENTYL) 20 mg tablet Take 1 tablet (20 mg total) by mouth every 6 (six) hours as needed (as needed) 120 tablet 2    Dyclonine-Glycerin (Cepacol Sore Throat Spray) 0 1-33 % LIQD Apply 1 spray to the mouth or throat 3 (three) times a day as needed (sorethroat) 118 mL 2    Elastic Bandages & Supports (Neoprene Knee Brace) MISC Apply right knee brace in morning; remove at night 1 each 0    Emollient (CeraVe) CREA Apply topically 2 (two) times a day Apply topically 2 times to affected heel twice daily @8a-8p 453 g 5    escitalopram (LEXAPRO) 20 mg tablet Take 1 tablet (20 mg total) by mouth daily 90 tablet 2    fluticasone (FLONASE) 50 mcg/act nasal spray 1 spray into each nostril daily as needed for rhinitis (nasal congestion) At 8:00 AM 18 2 mL 5    guaiFENesin (ROBITUSSIN) 100 MG/5ML oral liquid Take 200 mg by mouth 3 (three) times a day as needed for cough      hydrOXYzine HCL (ATARAX) 10 mg tablet       hydrOXYzine HCL (ATARAX) 25 mg tablet Take 1 tablet (25 mg total) by mouth 3 (three) times a day 30 tablet 2    ibuprofen (MOTRIN) 400 mg tablet Take 1 tablet (400 mg total) by mouth every 8 (eight) hours as needed for mild pain 30 tablet 5    ketoconazole (NIZORAL) 2 % cream       lamoTRIgine (LaMICtal) 25 mg tablet Take 25 mg by mouth 2 (two) times a day       lidocaine (LIDODERM) 5 % Apply 1 patch topically daily Remove & Discard patch within 12 hours or as directed by MD 10 patch 0    LORazepam (ATIVAN) 0 5 mg tablet Take 0 25 mg by mouth 2 (two) times a day      lubiprostone (Amitiza) 24 mcg capsule Take 1 capsule (24 mcg total) by mouth daily with breakfast 60 capsule 5    magnesium hydroxide (GNP Milk of Magnesia) 400 mg/5 mL oral suspension Take 30 mL by mouth daily as needed for constipation If no BM in 3 days 355 mL 5    metoprolol tartrate (LOPRESSOR) 25 mg tablet Take 1 tablet (25 mg total) by mouth every 12 (twelve) hours 60 tablet 5  mineral oil-hydrophilic petrolatum (AQUAPHOR) ointment Apply topically as needed for dry skin 420 g 5    Mouthwashes (Listerine Antiseptic) LIQD Swish and spit 5 mL 2 (two) times a day 1000 mL 5    Multiple Vitamins-Minerals (CertaVite Senior/Antioxidant) TABS Take 1 tablet by mouth daily 30 tablet 5    norgestimate-ethinyl estradiol (ORTHO-CYCLEN) 0 25-35 MG-MCG per tablet Take 1 tablet by mouth daily 28 tablet 11    nystatin (MYCOSTATIN) powder Apply topically 4 (four) times a day 45 g 2    nystatin-triamcinolone (MYCOLOG-II) cream Apply topically 2 (two) times a day 30 g 2    pantoprazole (PROTONIX) 20 mg tablet Take 1 tablet (20 mg total) by mouth 2 (two) times a day 62 tablet 5    phenol (Chloraseptic) 1 4 % mucosal liquid Apply 1 spray to the mouth or throat every 2 (two) hours as needed (for sore throat as needed) 236 mL 1    polyethylene glycol (MIRALAX) 17 g packet Take one packet daily as needed for no bowel movement in 24 hours 30 each 5    psyllium (METAMUCIL) 58 6 % packet Take 1 packet by mouth daily 30 packet 5    RA SUNSCREEN SPF50 LOTN Apply 15 minutes before sun exposure and every 2 hours 1 Bottle 5    senna-docusate sodium (Senexon-S) 8 6-50 mg per tablet Take 1 tablet by mouth daily at 8am  30 tablet 5    sodium chloride (OCEAN) 0 65 % nasal spray 1 spray into each nostril as needed (three times daily as needed for nasal congestion) 60 mL 1    white petrolatum Apply 1 application topically 2 (two) times a day Apply to forehead where excoriations are noted twice a day for 7 days 500 g 0    zinc oxide (DESITIN) 13 % cream Apply 1 application topically as needed (for perianal irritation) 1 Tube 5     No current facility-administered medications on file prior to visit           Objective     Vitals: /70 (BP Location: Left arm, Patient Position: Sitting, Cuff Size: Large)   Pulse 80   Temp 97 9 °F (36 6 °C) (Temporal)   Resp 16   Ht 4' 9" (1 448 m)   Wt 98 9 kg (218 lb) BMI 47 17 kg/m²     Recent Labs & Imaging  Recent Results (from the past 24 hour(s))   NOVEL CORONAVIRUS (COVID-19), PCR [facility]    Collection Time: 09/12/21 10:21 PM    Specimen type and source: Separate Accession, Nasopharynx (LAB)   Result Value Ref Range    SARS Coronavirus 2 PCR Not Detected Not Detected    Specimen Description NASOPHARYNGEAL SWAB     First test? UNKNOWN     Prior test type? Not applicable     Prior test result? Not applicable     Prior test date?       ^^^UNKNA^Unknown or not applicable^L^^^Unknown or not applicable    Employed in healthcare setting? No     Symptomatic as defined by CDC? YES     Date of symptom onset?       ^^^UNKNA^Unknown or not applicable^L^^^Unknown or not applicable    Currently hospitalized? No     In ICU? Unknown     Resident in congregate care setting? YES     Pregnant? UNKNOWN        No results found  Physical Exam  Constitutional:       General: She is not in acute distress  Appearance: She is morbidly obese  She is not ill-appearing  HENT:      Head: Normocephalic and atraumatic  Right Ear: External ear normal       Left Ear: External ear normal       Nose: Nose normal  No congestion or rhinorrhea  Eyes:      Conjunctiva/sclera: Conjunctivae normal    Cardiovascular:      Rate and Rhythm: Normal rate and regular rhythm  Heart sounds: Normal heart sounds  Pulmonary:      Effort: No respiratory distress  Breath sounds: Normal breath sounds  No wheezing  Abdominal:      General: Bowel sounds are normal  There is no distension  Palpations: Abdomen is soft  There is no mass  Tenderness: There is no abdominal tenderness  Musculoskeletal:         General: No swelling, tenderness or deformity  Normal range of motion  Skin:     General: Skin is warm  Findings: No rash  Neurological:      Mental Status: She is alert             Bianca Killian MD  Family Medicine PGY-2  09/13/21

## 2021-09-13 NOTE — PROGRESS NOTES
Daily Note    Today's date: 2021  Patient name: Scotty Aguilar  : 1980  MRN: [id number]  Referring provider: Ignacio Vidal MD  Dx:   Encounter Diagnosis     ICD-10-CM    1  Frequent falls  R29 6    2  Fall, initial encounter  [address]  XXXA            Subjective: Was taken to ED last night for reports of chest pain  Documentation indicates heart burn as cause  Was discharged last night  Reports not sleeping last night as a result  No falls since last visit  Reports L thigh pain today, not sure of cause  Objective: See treatment diary below    BP-140/84  HR-90  O2-99%    Assessment: Tolerated treatment fair  Pt reported pain in L thigh today  No bruising, increased swelling, redness present  Demonstrates b/l LE edema which appears unchanged from previous visit  Pt was lethargic during visit occasionally falling as sleep for brief periods  Was reluctant to perform any exercise in standing or perform much walking today needing encouragement however did not complain of pain while performing  Pt is more willing to perform exercise in parallel bars than walking with her rolator  Would benefit from continued PT  Supervised 1 on 1 by Tuan Echavarria PT from 5:30 to 5:45    Plan: Continue for 4 more weeks at 2x per week          HEP: hip abd/ext/flex, STS, heel raises, LAQs walking program,     Manuals    Gentle knee flexion, ext  5' 5' 5' in supine 5' supine 5' supine   STM to distal quad  5' 5' 3', very tender at medial quads, unable to tolerate more pressure 3' using foam roller  3' using foam roller                      Neuro Re-Ed Walking in [place] with RW 100ft x3      Declined walking  Declined walking Declined walking Fwd walking in //bars 1 laps, w/ UE support     Fwd walking in //bars 2 laps, w/ UE support    Step up/down 4in 8x   Step over cones     Marching in //bars 2 laps Marching in place x10   Step ups         Side stepping     2 lap in //bars 1 laps in //bars Dynamic Reaching          Ball toss  Static standing in SOLO 5min        Bean bag toss Taken bean bag, turn 180deg and toss  2x10  In SOLO        Weaving between cones   No walker in [place] [facility]  20ft x4        Ther Ex         Educated in [address]  Pt declined today Pt declined 5 mins lv 1 at beginning  8 mins lv 1 at end of session  6 minutes L1 at start of session  5 minutes L1 at end of session  5min at start of visit     Declined    Seated exercises LAQ 3 lbs, 10 x 3 sets each    Seated march 3# 4x10    LAQ 3 lbs, 10 x 3 sets each  Seated march 3# 4x10    Seated clamshell purple band 3x10 SAQ 2x15  Supine marching 2x15  Leg extesnion supine 2x15  Adductor squeeze 2x15   Abductor manual resisted 2x15   Supine calf stretch, hamstring stretch, glute stretch  Supine hamstring stretch, gastroc stretch, glute stretch 15 sec x 5 each    Adductor squeeze 2 x 10, L SLR flexion x 10, SAQ on R LE 2 x 10  Hip abductor with manual resistance x 10 with 3 second hold  Bridging 2 x 5   Supine hammy stretch 1'x2    SAQs 3# 3x10    Supine clamshell PTB 3x10    Bridges 2x10       SAQs 3# 3x10    Seated hammy curl BTB 3x10       STS     2x5 from hi/low table 2x5 from hi/low table   Hip abd  2x10 ea    2x10 ea   Hip flex  2x10 ea       Hip ext  2x10 ea                Ther Activity                           Gait Training                           Modalities         Ice pack   Patient declined   10 minutes in supine

## 2021-09-13 NOTE — ASSESSMENT & PLAN NOTE
-MRI brain/pituitary 6/24/2020: No interval change in the 6 to 7 mm round suprasellar lesion along the left margin of the pituitary stalk and superior margin of the pituitary gland  Stable dysmorphic appearance to the brain parenchyma with abnormalities in migration and sulcation  Stable ventricular size and ventricular shunt catheter position   -Recommendation was to follow up MRI 1 year after previous one  MRI order is now scheduled and patient is ok to have procedure with anesthesia

## 2021-09-14 ENCOUNTER — TELEPHONE (OUTPATIENT)
Dept: FAMILY MEDICINE CLINIC | Facility: CLINIC | Age: 41
End: 2021-09-14

## 2021-09-14 ENCOUNTER — APPOINTMENT (OUTPATIENT)
Dept: PHYSICAL THERAPY | Facility: REHABILITATION | Age: 41
End: 2021-09-14
Payer: MEDICARE

## 2021-09-14 NOTE — TELEPHONE ENCOUNTER
Vickie Cristina nurse from patients group home called wants to know if patient qualifies and meets the criteria for the Booster shot   Agnieszka Lee can be reached at [phone]

## 2021-09-16 ENCOUNTER — EVALUATION (OUTPATIENT)
Dept: PHYSICAL THERAPY | Facility: REHABILITATION | Age: 41
End: 2021-09-16
Payer: MEDICARE

## 2021-09-16 DIAGNOSIS — R29.6 FREQUENT FALLS: Primary | ICD-10-CM
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
## 2021-09-16 PROCEDURE — 97112 NEUROMUSCULAR REEDUCATION: CPT | Performed by: PHYSICAL THERAPIST

## 2021-09-16 PROCEDURE — 97110 THERAPEUTIC EXERCISES: CPT | Performed by: PHYSICAL THERAPIST

## 2021-09-16 NOTE — PROGRESS NOTES
Progress Update    Today's date: 2021  Patient name: Sanya Figueroa  : 1980  MRN: [id number]  Referring provider: Chandrika Lopez MD  Dx:   Encounter Diagnosis     ICD-10-CM    1  Frequent falls  R29 6    2  Fall, subsequent encounter  W19  Carley Segundo            Subjective: When asked if she feels therpay is helpful she states "I can't answer that"  States she would like to continue therapy because "I think it will help me"  Reports new pain in L knee and thigh region  She denies any injury to her L knee and thigh  States "I don't want to do too much today  Her care giver states she is not aware of any injury or cause for her L thigh pain and states Iker Gotti has not fallen to her knowledge  Caregiver states she sometimes refused to walk with the staff at home  Iker Gotti has been gaining weight which is an issue, staff has been trying to restrict her diet with little success       Objective: See treatment diary below    BP-140/84  HR-90  O2-99%       Balance Test Initial Eval     5x Sit to Stand: 33s, 22 in surface, 1 UE  31s, from 22in surface, 1 UE  14s from 22in surface, 1 UE     15s from 22in surface no UE's  30s from 22in surface, 1 UE   TUs  33s  33s with RW  29s with RW  44s with RW (cueing needed)    42s with RW (cueing needed)   Gait Speed:            2 Minute Walk Test: Requested to stop at 1:47, walked 100ft with RW     HR: 123  O2: 96%  Requested to stop at 1:35, walked 100ft with RW     HR: 128  O2: 97%  Completed full time 140ft with RW     Max distance about 200ft        HR-110  O2-96%  Pt refused to perform today   6 Minute Walk Test:           Cunningham Balance Scale:           FGA:                                       Goals  STGs (4 weeks)  Pt will be independent with comprehensive HEP - not met  Pt will demonstrate at least 3s improvement on 5xSTS - MET  Pt will demonstrate at least 3s improvement on TUG - MET    LTG's (to be achieved by d/c)  Pt will be able to self manage sx's independently - progressing  Pt will demonstrate at least 100ft foot improvement in max walking endurance with AD - progressing   Pt will report at least 50% reduced difficulty with balance when performing ADL's including dressing - progressing      Assessment: Progress update performed today  Pt is demonstrating new LLE pain superior to knee with no known causes  This was a limiting factor today for her performance on outcome measures as she reported pain with walking  Over the past month her condition has been unstable due to falls and fluctuation in R knee pain (being followed by orthopedics)  These conditions were strong contributors to her regression on functional outcome measures today  She has been responding well to exercises for ROM and strengthening of the LE's to help address her pain  During her course of therapy she has required frequent cueing and changes to her exercises and POC due to fluctuations  She continues to demonstrate high risk for falls per multiple outcome measures, LE weakness, abnormal gait, and impaired mobility  She would benefit from continued skilled PT to address her pain, mobility, and gait to prevent falls and restore mobility to reduce along with reducing future health care burden  Will continue to monitor L thigh pain, however she does not demonstrate any signs of injury or red flags at this time  Plan: Continue for 4 more weeks at 2x per week          HEP: hip abd/ext/flex, STS, heel raises, LAQs walking program,     Manuals 8/31 9/2 9/7 9/9 9/13 9/16   Gentle knee flexion, ext 5' 5' 5' in supine 5' supine 5' supine 5' supine   STM to distal quad 5' 5' 3', very tender at medial quads, unable to tolerate more pressure 3' using foam roller  3' using foam roller  3' using foam roller                      Neuro Re-Ed Declined walking  Declined walking Declined walking Fwd walking in //bars 1 laps, w/ UE support     Fwd walking in //bars 2 laps, w/ UE support    Step up/down 4in 8x Fwd walking in //bars 2 laps, w/ UE support    See testing    Step over cones    Marching in //bars 2 laps Marching in place x10 Marching in place x10    Walking march 1 lap in //bars   Step ups         Side stepping    2 lap in //bars 1 laps in //bars    Dynamic Reaching          [employer] toss          Bean bag toss         Weaving between cones           Ther Ex      See testing    Educated in HEP         Scifit  Pt declined 5 mins lv 1 at beginning  8 mins lv 1 at end of session  6 minutes L1 at start of session  5 minutes L1 at end of session  5min at start of visit     Declined  Declined    Seated exercises LAQ 3 lbs, 10 x 3 sets each  Seated march 3# 4x10    Seated clamshell purple band 3x10 SAQ 2x15  Supine marching 2x15  Leg extesnion supine 2x15  Adductor squeeze 2x15   Abductor manual resisted 2x15   Supine calf stretch, hamstring stretch, glute stretch  Supine hamstring stretch, gastroc stretch, glute stretch 15 sec x 5 each    Adductor squeeze 2 x 10, L SLR flexion x 10, SAQ on R LE 2 x 10  Hip abductor with manual resistance x 10 with 3 second hold  Bridging 2 x 5   Supine hammy stretch 1'x2    SAQs 3# 3x10    Supine clamshell PTB 3x10    Bridges 2x10       SAQs 3# 3x10    Seated hammy curl BTB 3x10     SAQs 3# 4x10 ea    Seated hammy stretch R 2'       STS    2x5 from hi/low table 2x5 from hi/low table 2x5 from hi/low table   Hip abd 2x10 ea    2x10 ea 2x10 ea   Hip flex 2x10 ea        Hip ext 2x10 ea                 Ther Activity                           Gait Training                           Modalities         Ice pack  Patient declined   10 minutes in supine

## 2021-09-16 NOTE — TELEPHONE ENCOUNTER
Spoke with Yin James  Discussed that patient is not officially eligible being she does not specifically meet criteria for immunosuppression  However, being a resident of a long-term facility keeps her at "borderline" eligibility and can certainly get it when the booster becomes available for this category of people  Also reiterated that there has to be 8 mo wait time between last dose of covid vaccine and booster shot  She is agreeable

## 2021-09-21 ENCOUNTER — OFFICE VISIT (OUTPATIENT)
Dept: UROLOGY | Facility: AMBULATORY SURGERY CENTER | Age: 41
End: 2021-09-21
Payer: MEDICARE

## 2021-09-21 ENCOUNTER — TELEPHONE (OUTPATIENT)
Dept: UROLOGY | Facility: AMBULATORY SURGERY CENTER | Age: 41
End: 2021-09-21

## 2021-09-21 ENCOUNTER — APPOINTMENT (OUTPATIENT)
Dept: PHYSICAL THERAPY | Facility: REHABILITATION | Age: 41
End: 2021-09-21
Payer: MEDICARE

## 2021-09-21 VITALS
BODY MASS INDEX: 47.03 KG/M2 | WEIGHT: 218 LBS | DIASTOLIC BLOOD PRESSURE: 70 MMHG | SYSTOLIC BLOOD PRESSURE: 100 MMHG | HEART RATE: 82 BPM | HEIGHT: 57 IN

## 2021-09-21 DIAGNOSIS — R35.0 URINE FREQUENCY: ICD-10-CM

## 2021-09-21 DIAGNOSIS — R32 URINARY INCONTINENCE, UNSPECIFIED TYPE: Primary | ICD-10-CM

## 2021-09-21 LAB
BACTERIA UR QL AUTO: ABNORMAL /HPF
BILIRUB UR QL STRIP: NEGATIVE
CLARITY UR: ABNORMAL
COLOR UR: YELLOW
GLUCOSE UR STRIP-MCNC: NEGATIVE MG/DL
HGB UR QL STRIP.AUTO: NEGATIVE
KETONES UR STRIP-MCNC: NEGATIVE MG/DL
LEUKOCYTE ESTERASE UR QL STRIP: ABNORMAL
NITRITE UR QL STRIP: NEGATIVE
NON-SQ EPI CELLS URNS QL MICRO: ABNORMAL /HPF
PH UR STRIP.AUTO: 6 [PH]
POST-VOID RESIDUAL VOLUME, ML POC: 75 ML
PROT UR STRIP-MCNC: NEGATIVE MG/DL
RBC #/AREA URNS AUTO: ABNORMAL /HPF
SL AMB  POCT GLUCOSE, UA: NORMAL
SL AMB LEUKOCYTE ESTERASE,UA: NORMAL
SL AMB POCT BILIRUBIN,UA: NORMAL
SL AMB POCT BLOOD,UA: NORMAL
SL AMB POCT CLARITY,UA: CLEAR
SL AMB POCT COLOR,UA: YELLOW
SL AMB POCT KETONES,UA: NORMAL
SL AMB POCT NITRITE,UA: NORMAL
SL AMB POCT PH,UA: 6
SL AMB POCT SPECIFIC GRAVITY,UA: 1.01
SL AMB POCT URINE PROTEIN: NORMAL
SL AMB POCT UROBILINOGEN: 0.2
SP GR UR STRIP.AUTO: 1.02 (ref 1–1.03)
UROBILINOGEN UR QL STRIP.AUTO: 0.2 E.U./DL
WBC #/AREA URNS AUTO: ABNORMAL /HPF

## 2021-09-21 PROCEDURE — 51798 US URINE CAPACITY MEASURE: CPT | Performed by: NURSE PRACTITIONER

## 2021-09-21 PROCEDURE — 99204 OFFICE O/P NEW MOD 45 MIN: CPT | Performed by: NURSE PRACTITIONER

## 2021-09-21 PROCEDURE — 87086 URINE CULTURE/COLONY COUNT: CPT | Performed by: NURSE PRACTITIONER

## 2021-09-21 PROCEDURE — 81002 URINALYSIS NONAUTO W/O SCOPE: CPT | Performed by: NURSE PRACTITIONER

## 2021-09-21 PROCEDURE — 81001 URINALYSIS AUTO W/SCOPE: CPT | Performed by: NURSE PRACTITIONER

## 2021-09-21 NOTE — PROGRESS NOTES
9/21/2021    Javid Wisdom  1980  [id number]        Assessment  -Urinary incontinence    Discussion/Plan  Maikel Mathew is a 42 y o  female who presents in consultation     1  Urinary incontinence-    PVR in the office today is 75 mL  Urine dip appears negative for infection, small blood noted  We will send for urinalysis with microscopic and culture and call with any significant findings  Due to her recent increase in urinary frequency and incontinence, I recommend obtaining a renal ultrasound to ensure there is no anatomical cause for her symptoms  Will call facility with results  If findings are unremarkable, consider starting oxybutynin for management of urinary frequency  We also discussed importance of avoiding bladder irritants, increasing water intake, and practicing timed double voiding  Cause of her urinary symptoms likely secondary to behavioral component  Plan to follow-up in 3 months for re-evaluation with PVR assessment  However, will be in contact with facility sooner with results of renal ultrasound and urine testing  They were instructed to call with any issues     -All questions answered, patient's caretaker agree with plan     History of Present Illness  42 y o  female who presents in consultation today for evaluation of urinary frequency and incontinence  She is accompanied today by caretaker  Patient has history of developmental delay, and resides in a group home  Caretaker states patient has been experiencing increased episodes of urinary frequency and incontinence  She does occasionally independently use the restroom  Caretaker states she wears several sanitary pads daily and has episodes of enuresis  She primarily drinks ice tea and soda  No reports of gross hematuria, dysuria, or flank pain  Caretaker denies any prior urologic history, surgical intervention, or instrumentation  Review of Systems  Review of Systems   Constitutional: Negative  HENT: Negative  Respiratory: Negative  Cardiovascular: Negative  Gastrointestinal: Negative  Genitourinary: Positive for frequency  Negative for decreased urine volume, difficulty urinating, dysuria, flank pain, hematuria and urgency  Musculoskeletal: Negative  Skin: Negative  Neurological: Negative  Psychiatric/Behavioral: Negative  Past Medical History  Past Medical History:   Diagnosis Date    ADD (attention deficit disorder)     Anxiety     Astigmatism     Brain lesion     Calcium deficiency     Cellulitis of foot, right 6/4/2018    Cerebral palsy (HCC)     Chronic otitis media     Constipation     Depression     Dysphagia     Esophagitis     Esotropia     GERD (gastroesophageal reflux disease)     Hiatal hernia     Hydrocephalus (HCC)     Impaired fasting glucose     Left nephrolithiasis 03/04/2019    Myopia     Oppositional defiant disorder     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HCC)     Seizures (HCC)     Sensorineural hearing loss     Status post ventriculoatrial shunt placement     Visual impairment        Past Social History  Past Surgical History:   Procedure Laterality Date    BREAST BIOPSY Left     X 2 (not sure of years)    CSF SHUNT      Creation of Ventriculo-Peritoneal CSF shunt ; Last Assessed:7/6/2016    EAR SURGERY      Last Assessed:7/6/2016    LEG SURGERY      due to CP     NOSE SURGERY      Last Assessed:7/6/2016    SC ESOPHAGOGASTRODUODENOSCOPY TRANSORAL DIAGNOSTIC N/A 5/9/2019    Procedure: ESOPHAGOGASTRODUODENOSCOPY (EGD) with biopsy;  Surgeon: Pop Buckley MD;  Location: [facility];   Service: Gastroenterology    UPPER GASTROINTESTINAL ENDOSCOPY  05/2019       Past Family History  Family History   Problem Relation Age of Onset    Diabetes Mother     Colon cancer Father     No Known Problems Maternal Grandmother     No Known Problems Maternal Grandfather     No Known Problems Paternal Grandmother     No Known Problems Paternal Grandfather        Past Social history  Social History     Socioeconomic History    Marital status: Single     Spouse name: Not on file    Number of children: Not on file    Years of education: Not on file    Highest education level: Not on file   Occupational History    Not on file   Tobacco Use    Smoking status: Never Smoker    Smokeless tobacco: Never Used   Vaping Use    Vaping Use: Never used   Substance and Sexual Activity    Alcohol use: Never    Drug use: Never    Sexual activity: Not Currently   Other Topics Concern    Not on file   Social History Narrative    Always uses seat belt    Lives in group home     Social Determinants of Health     Financial Resource Strain: Low Risk     Difficulty of Paying Living Expenses: Not hard at all   Food Insecurity: No Food Insecurity    Worried About [address] tracx in the Last Year: Never true    Naty of Food in the Last Year: Never true   Transportation Needs: No Transportation Needs    Lack of Transportation (Medical): No    Lack of Transportation (Non-Medical):  No   Physical Activity:     Days of Exercise per Week:     Minutes of Exercise per Session:    Stress:     Feeling of Stress :    Social Connections:     Frequency of Communication with Friends and Family:     Frequency of Social Gatherings with Friends and Family:     Attends Buddhism Services:     Active Member of Clubs or Organizations:     Attends Club or Organization Meetings:     Marital Status:    Intimate Partner Violence:     Fear of Current or Ex-Partner:     Emotionally Abused:     Physically Abused:     Sexually Abused:        Current Medications  Current Outpatient Medications   Medication Sig Dispense Refill    acetaminophen (TYLENOL) 500 mg tablet Take 1 tablet (500 mg total) by mouth every 6 (six) hours as needed for mild pain 30 tablet 5    aluminum-magnesium hydroxide-simethicone (Antacid) 200-200-20 mg/5 mL suspension Take 15 mL by mouth every 4 (four) hours as needed for indigestion or heartburn 355 mL 5    amitriptyline (ELAVIL) 10 mg tablet Take 10 mg by mouth daily at bedtime      ARIPiprazole (ABILIFY) 20 MG tablet Take 1 tablet (20 mg total) by mouth daily at bedtime 90 tablet 2    bacitracin topical ointment 500 units/g topical ointment Cleanse site on nose with soap and water followed by bacitracin BID until healed then p r n  15 g 2    bismuth subsalicylate (PEPTO BISMOL) 524 mg/30 mL oral suspension Take 15 mL (262 mg total) by mouth every 6 (six) hours as needed for indigestion 360 mL 3    calcium carbonate (TUMS) 500 mg chewable tablet Chew 1 tablet (500 mg total) 3 (three) times a day as needed for heartburn 30 tablet 5    Cholecalciferol (Vitamin D-3) 25 MCG (1000 UT) CAPS Take 2 capsules (2,000 Units total) by mouth daily at 8am  30 capsule 5    D3-1000 25 MCG (1000 UT) tablet       dexamethasone sodium phosphate 0 1 % ophthalmic solution       dicyclomine (BENTYL) 20 mg tablet Take 1 tablet (20 mg total) by mouth every 6 (six) hours as needed (as needed) 120 tablet 2    Dyclonine-Glycerin (Cepacol Sore Throat Spray) 0 1-33 % LIQD Apply 1 spray to the mouth or throat 3 (three) times a day as needed (sorethroat) 118 mL 2    Elastic Bandages & Supports (Neoprene Knee Brace) MISC Apply right knee brace in morning; remove at night 1 each 0    Emollient (CeraVe) CREA Apply topically 2 (two) times a day Apply topically 2 times to affected heel twice daily @8a-8p 453 g 5    escitalopram (LEXAPRO) 20 mg tablet Take 1 tablet (20 mg total) by mouth daily 90 tablet 2    fluticasone (FLONASE) 50 mcg/act nasal spray 1 spray into each nostril daily as needed for rhinitis (nasal congestion) At 8:00 AM 18 2 mL 5    guaiFENesin (ROBITUSSIN) 100 MG/5ML oral liquid Take 200 mg by mouth 3 (three) times a day as needed for cough      hydrOXYzine HCL (ATARAX) 10 mg tablet       hydrOXYzine HCL (ATARAX) 25 mg tablet Take 1 tablet (25 mg total) by mouth 3 (three) times a day 30 tablet 2    ibuprofen (MOTRIN) 400 mg tablet Take 1 tablet (400 mg total) by mouth every 8 (eight) hours as needed for mild pain 30 tablet 5    ketoconazole (NIZORAL) 2 % cream       lamoTRIgine (LaMICtal) 25 mg tablet Take 25 mg by mouth 2 (two) times a day       lidocaine (LIDODERM) 5 % Apply 1 patch topically daily Remove & Discard patch within 12 hours or as directed by MD 10 patch 0    LORazepam (ATIVAN) 0 5 mg tablet Take 0 25 mg by mouth 2 (two) times a day      lubiprostone (Amitiza) 24 mcg capsule Take 1 capsule (24 mcg total) by mouth daily with breakfast 60 capsule 5    magnesium hydroxide (GNP Milk of Magnesia) 400 mg/5 mL oral suspension Take 30 mL by mouth daily as needed for constipation If no BM in 3 days 355 mL 5    metoprolol tartrate (LOPRESSOR) 25 mg tablet Take 1 tablet (25 mg total) by mouth every 12 (twelve) hours 60 tablet 5    mineral oil-hydrophilic petrolatum (AQUAPHOR) ointment Apply topically as needed for dry skin 420 g 5    Mouthwashes (Listerine Antiseptic) LIQD Swish and spit 5 mL 2 (two) times a day 1000 mL 5    Multiple Vitamins-Minerals (CertaVite Senior/Antioxidant) TABS Take 1 tablet by mouth daily 30 tablet 5    norgestimate-ethinyl estradiol (ORTHO-CYCLEN) 0 25-35 MG-MCG per tablet Take 1 tablet by mouth daily 28 tablet 11    nystatin (MYCOSTATIN) powder Apply topically 4 (four) times a day 45 g 2    nystatin-triamcinolone (MYCOLOG-II) cream Apply topically 2 (two) times a day 30 g 2    pantoprazole (PROTONIX) 20 mg tablet Take 1 tablet (20 mg total) by mouth 2 (two) times a day 62 tablet 5    phenol (Chloraseptic) 1 4 % mucosal liquid Apply 1 spray to the mouth or throat every 2 (two) hours as needed (for sore throat as needed) 236 mL 1    polyethylene glycol (MIRALAX) 17 g packet Take one packet daily as needed for no bowel movement in 24 hours 30 each 5    psyllium (METAMUCIL) 58 6 % packet Take 1 packet by mouth daily 30 packet 5    RA SUNSCREEN SPF50 LOTN Apply 15 minutes before sun exposure and every 2 hours 1 Bottle 5    senna-docusate sodium (Senexon-S) 8 6-50 mg per tablet Take 1 tablet by mouth daily at 8am  30 tablet 5    sodium chloride (OCEAN) 0 65 % nasal spray 1 spray into each nostril as needed (three times daily as needed for nasal congestion) 60 mL 1    white petrolatum Apply 1 application topically 2 (two) times a day Apply to forehead where excoriations are noted twice a day for 7 days 500 g 0    zinc oxide (DESITIN) 13 % cream Apply 1 application topically as needed (for perianal irritation) 1 Tube 5     No current facility-administered medications for this visit  Allergies  No Known Allergies    Past medical history, social history, family history, medications and allergies were reviewed  Vitals  Vitals:    09/21/21 1352   BP: 100/70   Pulse: 82   Weight: 98 9 kg (218 lb)   Height: 4' 9" (1 448 m)       Physical Exam  Physical Exam  Constitutional:       Appearance: Normal appearance  She is well-developed  HENT:      Head: Normocephalic  Eyes:      Pupils: Pupils are equal, round, and reactive to light  Pulmonary:      Effort: Pulmonary effort is normal    Abdominal:      Palpations: Abdomen is soft  Tenderness: There is no right CVA tenderness or left CVA tenderness  Musculoskeletal:         General: Normal range of motion  Cervical back: Normal range of motion  Comments: Ambulates with walker     Skin:     General: Skin is warm and dry  Neurological:      General: No focal deficit present  Mental Status: She is alert     Psychiatric:         Mood and Affect: Mood normal          Results    I have personally reviewed all pertinent lab results and reviewed with patient  Lab Results   Component Value Date    CALCIUM 8 2 (L) 09/02/2021    K 4 1 09/02/2021    CO2 25 09/02/2021     (H) 09/02/2021    BUN 11 09/02/2021    CREATININE 0 74 09/02/2021     Lab Results   Component Value Date    WBC 7 75 09/02/2021    HGB 14 1 09/02/2021    HCT 43 3 09/02/2021    MCV 94 09/02/2021     09/02/2021     Recent Results (from the past 1 hour(s))   POCT Measure PVR    Collection Time: 09/21/21  1:59 PM   Result Value Ref Range    POST-VOID RESIDUAL VOLUME, ML POC 75 mL   POCT urine dip    Collection Time: 09/21/21  2:00 PM   Result Value Ref Range    LEUKOCYTE ESTERASE,UA small     NITRITE,UA -     SL AMB POCT UROBILINOGEN 0 2     POCT URINE PROTEIN -      PH,UA 6 0     BLOOD,UA small     SPECIFIC GRAVITY,UA 1 015     KETONES,UA -     BILIRUBIN,UA -     GLUCOSE, UA -      COLOR,UA yellow     CLARITY,UA clear

## 2021-09-23 LAB — BACTERIA UR CULT: NORMAL

## 2021-09-27 ENCOUNTER — CONSULT (OUTPATIENT)
Dept: BARIATRICS | Facility: CLINIC | Age: 41
End: 2021-09-27
Payer: MEDICARE

## 2021-09-27 VITALS
TEMPERATURE: 97.7 F | BODY MASS INDEX: 45.61 KG/M2 | HEART RATE: 86 BPM | DIASTOLIC BLOOD PRESSURE: 66 MMHG | HEIGHT: 57 IN | WEIGHT: 211.4 LBS | RESPIRATION RATE: 20 BRPM | SYSTOLIC BLOOD PRESSURE: 118 MMHG

## 2021-09-27 DIAGNOSIS — E66.9 OBESITY (BMI 35.0-39.9 WITHOUT COMORBIDITY): ICD-10-CM

## 2021-09-27 DIAGNOSIS — B37.3 VULVAL CANDIDIASIS: ICD-10-CM

## 2021-09-27 DIAGNOSIS — J06.9 VIRAL URI: Primary | ICD-10-CM

## 2021-09-27 DIAGNOSIS — E66.01 OBESITY, CLASS III, BMI 40-49.9 (MORBID OBESITY) (HCC): Primary | ICD-10-CM

## 2021-09-27 DIAGNOSIS — K21.9 GASTROESOPHAGEAL REFLUX DISEASE WITHOUT ESOPHAGITIS: ICD-10-CM

## 2021-09-27 DIAGNOSIS — B37.2 CANDIDIASIS OF SKIN: ICD-10-CM

## 2021-09-27 DIAGNOSIS — Z91.89 AT RISK FOR SLEEP APNEA: ICD-10-CM

## 2021-09-27 DIAGNOSIS — R73.01 ELEVATED FASTING GLUCOSE: ICD-10-CM

## 2021-09-27 DIAGNOSIS — I70.209 ATHEROSCLEROSIS OF ARTERIES OF EXTREMITIES (HCC): ICD-10-CM

## 2021-09-27 DIAGNOSIS — F33.3 SEVERE EPISODE OF RECURRENT MAJOR DEPRESSIVE DISORDER, WITH PSYCHOTIC FEATURES (HCC): ICD-10-CM

## 2021-09-27 DIAGNOSIS — R00.2 PALPITATIONS: ICD-10-CM

## 2021-09-27 PROBLEM — E66.813 OBESITY, CLASS III, BMI 40-49.9 (MORBID OBESITY): Status: ACTIVE | Noted: 2021-09-27

## 2021-09-27 PROCEDURE — 99204 OFFICE O/P NEW MOD 45 MIN: CPT | Performed by: PHYSICIAN ASSISTANT

## 2021-09-27 NOTE — ASSESSMENT & PLAN NOTE
-Discussed options of [facility]-Intensive Lifestyle Intervention Program, Very Low Calorie Diet-VLCD, Conservative Program, Georgina-En-Y Gastric Bypass and Vertical Sleeve Gastrectomy and the role of weight loss medications   -Initial weight loss goal of 5-10% weight loss for improved health  -Screening labs  Recommend checking lab coverage before having labs drawn   -cmp, tsh, lipid reviewed from 9/2021 all within acceptable limits except for elevated fasting glucose   - STOP BANG- 4/8  -Patient is interested in pursuing Conservative Program   -not a wellbutrin/topamax candidate due to seizures and kidney stones  -not a phentermine candidate due to palpitations     Goals:  Food log (ie ) www myfitnesspal com,sparkpeople  com,loseit com,calorieking  com,etc  baritastic  No sugary beverages  At least 64oz of water daily  -one cup of iced tea or juice per day, medium coffee per day-monitor sugar    Increase physical activity by 10 minutes daily   Gradually increase physical activity to a goal of 5 days per week for 30 minutes of MODERATE intensity PLUS 2 days per week of FULL BODY resistance training-5 minute continuous walk per day   Portion plate given -1 cup of nonstarchy veggies, <1/2 cup cooked starches, 3-4 oz of protein   nonstartchy veggie list given  Snack should be fruit or vegetable   Will review written food log at next appt

## 2021-09-27 NOTE — PATIENT INSTRUCTIONS
Goals: Food log (ie ) www myfitnesspal com,sparkpeople  com,loseit com,calorieking  com,etc  baritastic  No sugary beverages  At least 64oz of water daily  -one cup of iced tea or juice per day, medium coffee per day-monitor sugar    Increase physical activity by 10 minutes daily   Gradually increase physical activity to a goal of 5 days per week for 30 minutes of MODERATE intensity PLUS 2 days per week of FULL BODY resistance training-5 minute continuous walk per day   Portion plate given -1 cup of nonstarchy veggies, <1/2 cup cooked starches, 3-4 oz of protein   nonstartchy veggie list given  Snack should be fruit or vegetable   Will review written food log at next appt-will be spefic with types of food and servings size

## 2021-09-27 NOTE — PROGRESS NOTES
Assessment/Plan:    Obesity, Class III, BMI 40-49 9 (morbid obesity) ([facility] [address] )  -Discussed options of HealthyCORE-Intensive Lifestyle Intervention Program, Very Low Calorie Diet-VLCD, Conservative Program, Georgina-En-Y Gastric Bypass and Vertical Sleeve Gastrectomy and the role of weight loss medications   -Initial weight loss goal of 5-10% weight loss for improved health  -Screening labs  Recommend checking lab coverage before having labs drawn   -cmp, tsh, lipid reviewed from 2021 all within acceptable limits except for elevated fasting glucose   - STOP BANG-   -Patient is interested in pursuing Conservative Program   -not a wellbutrin/topamax candidate due to seizures and kidney stones  -not a phentermine candidate due to palpitations     Goals:  Food log (ie ) www myfitnesspal com,sparkpeople  com,loseit com,calorieking  com,etc  baritastic  No sugary beverages  At least 64oz of water daily  -one cup of iced tea or juice per day, medium coffee per day-monitor sugar    Increase physical activity by 10 minutes daily  Gradually increase physical activity to a goal of 5 days per week for 30 minutes of MODERATE intensity PLUS 2 days per week of FULL BODY resistance training-5 minute continuous walk per day   Portion plate given -1 cup of nonstarchy veggies, <1/2 cup cooked starches, 3-4 oz of protein   nonstartchy veggie list given  Snack should be fruit or vegetable   Will review written food log at next appt     At risk for sleep apnea  STOP BAN/8  - Sleep medicine referral placed   -Discussed risks of untreated sleep apnea such as sudden cardiac death by arrhythmia, uncontrolled hypertension, difficulty with weight loss, decreased quality sleep, increased insulin resistance, and stroke    -Should improve with dietary and lifestyle changes        Severe episode of recurrent major depressive disorder, with psychotic features ([facility] [address] )  Taking atarax, abilify, rlavil, lexapro   -continue management with prescribing provider      Gastroesophageal reflux disease without esophagitis  Taking protonix  -should improve with weight loss, dietary, and lifestyle changes  -continue management with prescribing provider      Palpitations  Taking lopressor  -continue management with prescribing provider        Follow up in approximately 3 months with Non-Surgical Physician/Advanced Practitioner  Diagnoses and all orders for this visit:    Obesity, Class III, BMI 40-49 9 (morbid obesity) ([facility])  -     Hemoglobin A1C; Future  -     Insulin, fasting; Future  -     Ambulatory referral to Sleep Medicine; Future    Obesity (BMI 35 0-39 9 without comorbidity)  -     Ambulatory referral to Weight Management    At risk for sleep apnea  -     Ambulatory referral to Sleep Medicine; Future    Severe episode of recurrent major depressive disorder, with psychotic features ([place] [id number] )  -     Ambulatory referral to Sleep Medicine; Future    Gastroesophageal reflux disease without esophagitis  -     Ambulatory referral to Sleep Medicine; Future    Palpitations  -     Ambulatory referral to Sleep Medicine; Future    Elevated fasting glucose  -     Hemoglobin A1C; Future  -     Insulin, fasting; Future  -     Ambulatory referral to Sleep Medicine; Future    Atherosclerosis of arteries of extremities ([facility] )  -     Ambulatory referral to Sleep Medicine; Future          Subjective:   Chief Complaint   Patient presents with    Consult     MWM consult        Patient ID: Vy Desai  is a 42 y o  female with excess weight/obesity here to pursue weight management      Past Medical History:   Diagnosis Date    ADD (attention deficit disorder)     Anxiety     Astigmatism     Brain lesion     Calcium deficiency     Cellulitis of foot, right 6/4/2018    Cerebral palsy ([facility])     Chronic otitis media     Constipation     Depression     Dysphagia     Esophagitis     Esotropia     GERD (gastroesophageal reflux disease)     Hiatal hernia     Hydrocephalus (HCC)     Impaired fasting glucose     Left nephrolithiasis 03/04/2019    Myopia     Oppositional defiant disorder     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HCC)     Seizures (HCC)     Sensorineural hearing loss     Status post ventriculoatrial shunt placement     Visual impairment        HPI: patient is MR and has  with her   Obesity/Excess Weight:  Severity: Severe  Onset:  Unsure    Modifiers: never tried to lose weight --pcp put her on diet with fast fo only once per week monitoring what she is eating and portions   Contributing factors: Poor Food Choices, Insufficient Physical Activity and portion size   Associated symptoms: decreased exercise capacity, decreased mobility and clothes do not fit    Hydration: 1 bottle of water, mostly iced tea, apple juice, coffee with sugar and 1% milk   Alcohol: none     Colonoscopy-Not applicable    The following portions of the patient's history were reviewed and updated as appropriate: allergies, current medications, past family history, past medical history, past social history, past surgical history and problem list     Review of Systems   HENT: Negative for sore throat  Respiratory: Negative for cough and shortness of breath  Cardiovascular: Negative for chest pain and palpitations  Gastrointestinal: Negative for abdominal pain, constipation, diarrhea, nausea and vomiting         + GERD-controlled with meds    Endocrine: Negative for cold intolerance and heat intolerance  Genitourinary: Negative for dysuria  Musculoskeletal: Negative for arthralgias and back pain  Skin: Positive for rash (between legs )  Neurological: Negative for headaches  Psychiatric/Behavioral: Negative for suicidal ideas (denies HI)          + Depression and anxiety-controlled        Objective:    /66 (BP Location: Left arm, Patient Position: Sitting, Cuff Size: Adult)   Pulse 86   Temp 97 7 °F (36 5 °C) (Tympanic)   Resp 20   Ht 4' 8 8" (1 443 m)   Wt 95 9 kg (211 lb 6 4 oz)   BMI 46 07 kg/m²     Physical Exam  Vitals and nursing note reviewed  Constitutional   General appearance: Abnormal   well developed and morbidly obese  Eyes No conjunctival pallor  Pulmonary   Respiratory effort: No increased work of breathing or signs of respiratory distress  Abdomen   Abdomen: Abnormal   The abdomen was obese    Musculoskeletal   Gait and station: Normal     Psychiatric   Orientation to person, place and time: Normal     Affect: appropriate

## 2021-09-28 ENCOUNTER — OFFICE VISIT (OUTPATIENT)
Dept: PHYSICAL THERAPY | Facility: REHABILITATION | Age: 41
End: 2021-09-28
Payer: MEDICARE
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
## 2021-09-28 DIAGNOSIS — R29.6 FREQUENT FALLS: Primary | ICD-10-CM

## 2021-09-28 PROCEDURE — 97110 THERAPEUTIC EXERCISES: CPT | Performed by: PHYSICAL THERAPIST

## 2021-09-28 PROCEDURE — 97140 MANUAL THERAPY 1/> REGIONS: CPT | Performed by: PHYSICAL THERAPIST

## 2021-09-28 PROCEDURE — 97112 NEUROMUSCULAR REEDUCATION: CPT | Performed by: PHYSICAL THERAPIST

## 2021-09-28 NOTE — PROGRESS NOTES
Daily Note    Today's date: 2021  Patient name: Vy Desai  : 1980  MRN: [id number]  Referring provider: Judge Alyssa MD  Dx:   Encounter Diagnosis     ICD-10-CM    1  Frequent falls  R29 6    2  Fall, subsequent encounter  W19  XXXA            Subjective: No complaints today  Objective: See treatment diary below    Assessment: Tolerated treatment well  Pt needs encouragement and frequent verbal cues to perform exercises  She had minimal complaints of pain today which made her more able to perform exercises with walking and standing  Requests seated rest breaks through out due to fatigue  Demonstrated some instability with steps ups needing contact guard  Frequent cues to  walker with both hands  Would benefit from continued PT  Plan: Continue for 4 more weeks at 2x per week  HEP: hip abd/ext/flex, STS, heel raises, LAQs walking program,     Manuals    Gentle knee flexion, ext 5' in supine 5' supine 5' supine 5' supine 6' b/l supine   STM to distal quad 3', very tender at medial quads, unable to tolerate more pressure 3' using foam roller  3' using foam roller  3' using foam roller     Manual hammy stretch     2' ea           Neuro Re-Ed Declined walking Fwd walking in //bars 1 laps, w/ UE support     Fwd walking in //bars 2 laps, w/ UE support    Step up/down 4in 8x Fwd walking in //bars 2 laps, w/ UE support    See testing  Fwd walking in //bars 2 laps, w/ UE support      80ft with rollator x2   Step over cones  Marching in //bars 2 laps Marching in place x10 Marching in place x10    Walking march 1 lap in //bars    Step ups     4in x10 ea with UE support   Side stepping  2 lap in //bars 1 laps in //bars  2 laps in //bars   Dynamic Reaching         Alejos Olszewski toss         Bean bag toss        Weaving between cones          Ther Ex    See testing     Educated in HEP        Scifit  6 minutes L1 at start of session  5 minutes L1 at end of session     5min at start of visit     Declined  Declined     Seated exercises Supine hamstring stretch, gastroc stretch, glute stretch 15 sec x 5 each    Adductor squeeze 2 x 10, L SLR flexion x 10, SAQ on R LE 2 x 10  Hip abductor with manual resistance x 10 with 3 second hold  Bridging 2 x 5   Supine hammy stretch 1'x2    SAQs 3# 3x10    Supine clamshell PTB 3x10    Bridges 2x10       SAQs 3# 3x10    Seated hammy curl BTB 3x10     SAQs 3# 4x10 ea    Seated hammy stretch R 2'     LAQs 3# 3x10    Supine clamshell PTB 3x10    Bridges 3x10        STS  2x5 from hi/low table 2x5 from hi/low table 2x5 from hi/low table X8, x10 from hi/low table   Hip abd   2x10 ea 2x10 ea    Hip flex        Hip ext                Ther Activity                        Gait Training                        Modalities        Ice pack  10 minutes in supine

## 2021-09-30 ENCOUNTER — OFFICE VISIT (OUTPATIENT)
Dept: PHYSICAL THERAPY | Facility: REHABILITATION | Age: 41
End: 2021-09-30
Payer: MEDICARE

## 2021-09-30 ENCOUNTER — APPOINTMENT (OUTPATIENT)
Dept: LAB | Facility: CLINIC | Age: 41
End: 2021-09-30
Payer: MEDICARE

## 2021-09-30 DIAGNOSIS — R29.6 FREQUENT FALLS: Primary | ICD-10-CM

## 2021-09-30 DIAGNOSIS — E66.01 OBESITY, CLASS III, BMI 40-49.9 (MORBID OBESITY) (HCC): ICD-10-CM

## 2021-09-30 DIAGNOSIS — R73.01 ELEVATED FASTING GLUCOSE: ICD-10-CM
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
## 2021-09-30 LAB
EST. AVERAGE GLUCOSE BLD GHB EST-MCNC: 108 MG/DL
HBA1C MFR BLD: 5.4 %
INSULIN SERPL-ACNC: 10.9 MU/L (ref 3–25)

## 2021-09-30 PROCEDURE — 97140 MANUAL THERAPY 1/> REGIONS: CPT | Performed by: PHYSICAL THERAPIST

## 2021-09-30 PROCEDURE — 36415 COLL VENOUS BLD VENIPUNCTURE: CPT

## 2021-09-30 PROCEDURE — 97112 NEUROMUSCULAR REEDUCATION: CPT | Performed by: PHYSICAL THERAPIST

## 2021-09-30 PROCEDURE — 83525 ASSAY OF INSULIN: CPT

## 2021-09-30 PROCEDURE — 83036 HEMOGLOBIN GLYCOSYLATED A1C: CPT

## 2021-09-30 NOTE — PROGRESS NOTES
Daily Note    Today's date: 2021  Patient name: Parmjit Guardado  : 1980  MRN: [id number]  Referring provider: Ileana Carrasquillo MD  Dx:   Encounter Diagnosis     ICD-10-CM    1  Frequent falls  R29 6    2  Fall, subsequent encounter  W19  XXXA            Subjective: No complaints today  Objective: See treatment diary below    Assessment: Full treatment not completed today  Pt had reported having to use the restroom during exercise, but was unable to make it before accidentally voiding  Assisted caregiver in getting pt cleaned up, discussed POC, and coordinated future visits  Pt tolerated walking on treadmill well today with cues to increase step amplitude and speed  Would benefit from continued PT  Plan: Continue for 4 more weeks at 2x per week  Precautions: cognitive impairment, R knee pain, urinary urgency, visual impairments, hearing impaired    HEP: hip abd/ext/flex, STS, heel raises, LAQs walking program,     Manuals    Gentle knee flexion, ext 5' supine 5' supine 5' supine 6' b/l supine 8' b/l supine   STM to distal quad 3' using foam roller  3' using foam roller  3' using foam roller      Manual hammy stretch    2' ea 2' ea           Neuro Re-Ed Fwd walking in //bars 1 laps, w/ UE support     Fwd walking in //bars 2 laps, w/ UE support    Step up/down 4in 8x Fwd walking in //bars 2 laps, w/ UE support    See testing  Fwd walking in //bars 2 laps, w/ UE support      80ft with rollator x2 50ft with rollator    Treadmill      0 3 to 0 4mph   40s x6 with standing rest breaks   Step over cones Marching in //bars 2 laps Marching in place x10 Marching in place x10    Walking march 1 lap in //bars     Step ups    4in x10 ea with UE support    Side stepping 2 lap in //bars 1 laps in //bars  2 laps in //bars    Dynamic Reaching         Lisa Kilgore toss         Bean bag toss        Weaving between cones          Ther Ex   See testing      Educated in HEP        Scifit  5min at start of visit     Declined  Declined      Seated exercises Supine hammy stretch 1'x2    SAQs 3# 3x10    Supine clamshell PTB 3x10    Bridges 2x10       SAQs 3# 3x10    Seated hammy curl BTB 3x10     SAQs 3# 4x10 ea    Seated hammy stretch R 2'     LAQs 3# 3x10    Supine clamshell PTB 3x10    Bridges 3x10         STS 2x5 from hi/low table 2x5 from hi/low table 2x5 from hi/low table X8, x10 from hi/low table    Hip abd  2x10 ea 2x10 ea     Hip flex        Hip ext                Ther Activity                        Gait Training                        Modalities        Ice pack

## 2021-10-01 ENCOUNTER — TELEPHONE (OUTPATIENT)
Dept: UROLOGY | Facility: AMBULATORY SURGERY CENTER | Age: 41
End: 2021-10-01

## 2021-10-01 ENCOUNTER — TELEPHONE (OUTPATIENT)
Dept: BARIATRICS | Facility: CLINIC | Age: 41
End: 2021-10-01

## 2021-10-04 ENCOUNTER — OFFICE VISIT (OUTPATIENT)
Dept: URGENT CARE | Facility: MEDICAL CENTER | Age: 41
End: 2021-10-04
Payer: MEDICARE

## 2021-10-04 ENCOUNTER — HOSPITAL ENCOUNTER (INPATIENT)
Facility: HOSPITAL | Age: 41
LOS: 2 days | Discharge: HOME | DRG: 389 | End: 2021-10-06
Attending: EMERGENCY MEDICINE | Admitting: SURGERY
Payer: MEDICARE

## 2021-10-04 ENCOUNTER — APPOINTMENT (EMERGENCY)
Dept: CT IMAGING | Facility: HOSPITAL | Age: 41
DRG: 389 | End: 2021-10-04
Payer: MEDICARE

## 2021-10-04 ENCOUNTER — TELEPHONE (OUTPATIENT)
Dept: UROLOGY | Facility: CLINIC | Age: 41
End: 2021-10-04

## 2021-10-04 VITALS
BODY MASS INDEX: 47.47 KG/M2 | HEIGHT: 56 IN | HEART RATE: 80 BPM | TEMPERATURE: 97.8 F | DIASTOLIC BLOOD PRESSURE: 80 MMHG | SYSTOLIC BLOOD PRESSURE: 130 MMHG | WEIGHT: 211 LBS | OXYGEN SATURATION: 98 % | RESPIRATION RATE: 16 BRPM

## 2021-10-04 DIAGNOSIS — R10.11 RIGHT UPPER QUADRANT ABDOMINAL PAIN: Primary | ICD-10-CM

## 2021-10-04 DIAGNOSIS — R11.0 NAUSEA: ICD-10-CM

## 2021-10-04 DIAGNOSIS — K56.600 PARTIAL SMALL BOWEL OBSTRUCTION (HCC): Primary | ICD-10-CM

## 2021-10-04 LAB
ALBUMIN SERPL BCP-MCNC: 3.3 G/DL (ref 3.5–5)
ALP SERPL-CCNC: 175 U/L (ref 46–116)
ALT SERPL W P-5'-P-CCNC: 29 U/L (ref 12–78)
ANION GAP SERPL CALCULATED.3IONS-SCNC: 10 MMOL/L (ref 4–13)
AST SERPL W P-5'-P-CCNC: 30 U/L (ref 5–45)
BACTERIA UR QL AUTO: ABNORMAL /HPF
BASOPHILS # BLD AUTO: 0.05 THOUSANDS/ΜL (ref 0–0.1)
BASOPHILS NFR BLD AUTO: 0 % (ref 0–1)
BILIRUB SERPL-MCNC: 0.29 MG/DL (ref 0.2–1)
BILIRUB UR QL STRIP: ABNORMAL
BUN SERPL-MCNC: 9 MG/DL (ref 5–25)
CALCIUM ALBUM COR SERPL-MCNC: 8.9 MG/DL (ref 8.3–10.1)
CALCIUM SERPL-MCNC: 8.3 MG/DL (ref 8.3–10.1)
CHLORIDE SERPL-SCNC: 103 MMOL/L (ref 100–108)
CLARITY UR: CLEAR
CO2 SERPL-SCNC: 28 MMOL/L (ref 21–32)
COLOR UR: YELLOW
CREAT SERPL-MCNC: 0.92 MG/DL (ref 0.6–1.3)
EOSINOPHIL # BLD AUTO: 0.07 THOUSAND/ΜL (ref 0–0.61)
EOSINOPHIL NFR BLD AUTO: 1 % (ref 0–6)
ERYTHROCYTE [DISTWIDTH] IN BLOOD BY AUTOMATED COUNT: 13.6 % (ref 11.6–15.1)
EXT PREG TEST URINE: NEGATIVE
EXT. CONTROL ED NAV: NORMAL
GFR SERPL CREATININE-BSD FRML MDRD: 77 ML/MIN/1.73SQ M
GLUCOSE SERPL-MCNC: 105 MG/DL (ref 65–140)
GLUCOSE UR STRIP-MCNC: NEGATIVE MG/DL
HCT VFR BLD AUTO: 44.1 % (ref 34.8–46.1)
HGB BLD-MCNC: 14.5 G/DL (ref 11.5–15.4)
HGB UR QL STRIP.AUTO: ABNORMAL
IMM GRANULOCYTES # BLD AUTO: 0.04 THOUSAND/UL (ref 0–0.2)
IMM GRANULOCYTES NFR BLD AUTO: 0 % (ref 0–2)
KETONES UR STRIP-MCNC: ABNORMAL MG/DL
LEUKOCYTE ESTERASE UR QL STRIP: ABNORMAL
LIPASE SERPL-CCNC: 64 U/L (ref 73–393)
LYMPHOCYTES # BLD AUTO: 2.09 THOUSANDS/ΜL (ref 0.6–4.47)
LYMPHOCYTES NFR BLD AUTO: 16 % (ref 14–44)
MCH RBC QN AUTO: 30.7 PG (ref 26.8–34.3)
MCHC RBC AUTO-ENTMCNC: 32.9 G/DL (ref 31.4–37.4)
MCV RBC AUTO: 93 FL (ref 82–98)
MONOCYTES # BLD AUTO: 0.77 THOUSAND/ΜL (ref 0.17–1.22)
MONOCYTES NFR BLD AUTO: 6 % (ref 4–12)
NEUTROPHILS # BLD AUTO: 10.11 THOUSANDS/ΜL (ref 1.85–7.62)
NEUTS SEG NFR BLD AUTO: 77 % (ref 43–75)
NITRITE UR QL STRIP: NEGATIVE
NON-SQ EPI CELLS URNS QL MICRO: ABNORMAL /HPF
NRBC BLD AUTO-RTO: 0 /100 WBCS
PH UR STRIP.AUTO: 7 [PH] (ref 4.5–8)
PLATELET # BLD AUTO: 287 THOUSANDS/UL (ref 149–390)
PMV BLD AUTO: 9.2 FL (ref 8.9–12.7)
POTASSIUM SERPL-SCNC: 4.4 MMOL/L (ref 3.5–5.3)
PROT SERPL-MCNC: 8 G/DL (ref 6.4–8.2)
PROT UR STRIP-MCNC: ABNORMAL MG/DL
RBC # BLD AUTO: 4.73 MILLION/UL (ref 3.81–5.12)
RBC #/AREA URNS AUTO: ABNORMAL /HPF
SODIUM SERPL-SCNC: 141 MMOL/L (ref 136–145)
SP GR UR STRIP.AUTO: 1.02 (ref 1–1.03)
UROBILINOGEN UR QL STRIP.AUTO: 0.2 E.U./DL
WBC # BLD AUTO: 13.13 THOUSAND/UL (ref 4.31–10.16)
WBC #/AREA URNS AUTO: ABNORMAL /HPF

## 2021-10-04 PROCEDURE — 96365 THER/PROPH/DIAG IV INF INIT: CPT

## 2021-10-04 PROCEDURE — 36415 COLL VENOUS BLD VENIPUNCTURE: CPT | Performed by: SURGERY

## 2021-10-04 PROCEDURE — 99285 EMERGENCY DEPT VISIT HI MDM: CPT | Performed by: SURGERY

## 2021-10-04 PROCEDURE — 74177 CT ABD & PELVIS W/CONTRAST: CPT

## 2021-10-04 PROCEDURE — 81001 URINALYSIS AUTO W/SCOPE: CPT

## 2021-10-04 PROCEDURE — 83690 ASSAY OF LIPASE: CPT | Performed by: SURGERY

## 2021-10-04 PROCEDURE — G1004 CDSM NDSC: HCPCS

## 2021-10-04 PROCEDURE — 99285 EMERGENCY DEPT VISIT HI MDM: CPT

## 2021-10-04 PROCEDURE — 99213 OFFICE O/P EST LOW 20 MIN: CPT | Performed by: PHYSICIAN ASSISTANT

## 2021-10-04 PROCEDURE — NC001 PR NO CHARGE: Performed by: SURGERY

## 2021-10-04 PROCEDURE — G0463 HOSPITAL OUTPT CLINIC VISIT: HCPCS | Performed by: PHYSICIAN ASSISTANT

## 2021-10-04 PROCEDURE — 96375 TX/PRO/DX INJ NEW DRUG ADDON: CPT

## 2021-10-04 PROCEDURE — 85025 COMPLETE CBC W/AUTO DIFF WBC: CPT | Performed by: SURGERY

## 2021-10-04 PROCEDURE — 81025 URINE PREGNANCY TEST: CPT | Performed by: SURGERY

## 2021-10-04 PROCEDURE — 80053 COMPREHEN METABOLIC PANEL: CPT | Performed by: SURGERY

## 2021-10-04 PROCEDURE — 96366 THER/PROPH/DIAG IV INF ADDON: CPT

## 2021-10-04 RX ORDER — LORAZEPAM 2 MG/ML
0.5 INJECTION INTRAMUSCULAR EVERY 12 HOURS PRN
Status: DISCONTINUED | OUTPATIENT
Start: 2021-10-04 | End: 2021-10-06 | Stop reason: HOSPADM

## 2021-10-04 RX ORDER — LAMOTRIGINE 25 MG/1
25 TABLET ORAL 2 TIMES DAILY
Status: DISCONTINUED | OUTPATIENT
Start: 2021-10-05 | End: 2021-10-06 | Stop reason: HOSPADM

## 2021-10-04 RX ORDER — ONDANSETRON 2 MG/ML
4 INJECTION INTRAMUSCULAR; INTRAVENOUS ONCE
Status: COMPLETED | OUTPATIENT
Start: 2021-10-04 | End: 2021-10-04

## 2021-10-04 RX ORDER — METOPROLOL TARTRATE 5 MG/5ML
2.5 INJECTION INTRAVENOUS EVERY 6 HOURS
Status: DISCONTINUED | OUTPATIENT
Start: 2021-10-04 | End: 2021-10-06 | Stop reason: HOSPADM

## 2021-10-04 RX ORDER — METOPROLOL TARTRATE 5 MG/5ML
5 INJECTION INTRAVENOUS EVERY 8 HOURS
Status: DISCONTINUED | OUTPATIENT
Start: 2021-10-04 | End: 2021-10-04

## 2021-10-04 RX ORDER — GUAIFENESIN 100 MG/5ML
200 SYRUP ORAL 3 TIMES DAILY PRN
Qty: 120 ML | Refills: 5 | Status: SHIPPED | OUTPATIENT
Start: 2021-10-04 | End: 2021-10-06 | Stop reason: HOSPADM

## 2021-10-04 RX ORDER — AMITRIPTYLINE HYDROCHLORIDE 10 MG/1
10 TABLET, FILM COATED ORAL
Status: DISCONTINUED | OUTPATIENT
Start: 2021-10-04 | End: 2021-10-06 | Stop reason: HOSPADM

## 2021-10-04 RX ORDER — NORGESTIMATE AND ETHINYL ESTRADIOL 0.25-0.035
1 KIT ORAL DAILY
COMMUNITY

## 2021-10-04 RX ORDER — HYDROMORPHONE HCL/PF 1 MG/ML
0.2 SYRINGE (ML) INJECTION EVERY 4 HOURS PRN
Status: DISCONTINUED | OUTPATIENT
Start: 2021-10-04 | End: 2021-10-06

## 2021-10-04 RX ORDER — HYDROMORPHONE HCL/PF 1 MG/ML
0.5 SYRINGE (ML) INJECTION EVERY 4 HOURS PRN
Status: DISCONTINUED | OUTPATIENT
Start: 2021-10-04 | End: 2021-10-06

## 2021-10-04 RX ORDER — NYSTATIN 100000 [USP'U]/G
1 POWDER TOPICAL 4 TIMES DAILY
Qty: 45 G | Refills: 5 | Status: SHIPPED | OUTPATIENT
Start: 2021-10-04

## 2021-10-04 RX ORDER — SODIUM CHLORIDE, SODIUM GLUCONATE, SODIUM ACETATE, POTASSIUM CHLORIDE, MAGNESIUM CHLORIDE, SODIUM PHOSPHATE, DIBASIC, AND POTASSIUM PHOSPHATE .53; .5; .37; .037; .03; .012; .00082 G/100ML; G/100ML; G/100ML; G/100ML; G/100ML; G/100ML; G/100ML
125 INJECTION, SOLUTION INTRAVENOUS CONTINUOUS
Status: DISCONTINUED | OUTPATIENT
Start: 2021-10-04 | End: 2021-10-06

## 2021-10-04 RX ORDER — HEPARIN SODIUM 5000 [USP'U]/ML
5000 INJECTION, SOLUTION INTRAVENOUS; SUBCUTANEOUS EVERY 8 HOURS SCHEDULED
Status: DISCONTINUED | OUTPATIENT
Start: 2021-10-04 | End: 2021-10-06 | Stop reason: HOSPADM

## 2021-10-04 RX ORDER — PANTOPRAZOLE SODIUM 40 MG/1
40 INJECTION, POWDER, FOR SOLUTION INTRAVENOUS
Status: DISCONTINUED | OUTPATIENT
Start: 2021-10-05 | End: 2021-10-06 | Stop reason: HOSPADM

## 2021-10-04 RX ORDER — PSYLLIUM HUSK 3.4 G/12G
GRANULE ORAL
COMMUNITY
Start: 2021-10-02

## 2021-10-04 RX ORDER — ONDANSETRON 2 MG/ML
4 INJECTION INTRAMUSCULAR; INTRAVENOUS EVERY 4 HOURS PRN
Status: DISCONTINUED | OUTPATIENT
Start: 2021-10-04 | End: 2021-10-06 | Stop reason: HOSPADM

## 2021-10-04 RX ORDER — ESCITALOPRAM OXALATE 10 MG/1
20 TABLET ORAL DAILY
Status: DISCONTINUED | OUTPATIENT
Start: 2021-10-05 | End: 2021-10-06 | Stop reason: HOSPADM

## 2021-10-04 RX ORDER — ARIPIPRAZOLE 10 MG/1
20 TABLET ORAL
Status: DISCONTINUED | OUTPATIENT
Start: 2021-10-04 | End: 2021-10-06 | Stop reason: HOSPADM

## 2021-10-04 RX ADMIN — SODIUM CHLORIDE, SODIUM LACTATE, POTASSIUM CHLORIDE, AND CALCIUM CHLORIDE 1000 ML: .6; .31; .03; .02 INJECTION, SOLUTION INTRAVENOUS at 21:02

## 2021-10-04 RX ADMIN — ONDANSETRON 4 MG: 2 INJECTION INTRAMUSCULAR; INTRAVENOUS at 20:12

## 2021-10-04 RX ADMIN — IOHEXOL 100 ML: 350 INJECTION, SOLUTION INTRAVENOUS at 20:55

## 2021-10-05 ENCOUNTER — TELEPHONE (OUTPATIENT)
Dept: RADIOLOGY | Facility: HOSPITAL | Age: 41
End: 2021-10-05

## 2021-10-05 LAB
ANION GAP SERPL CALCULATED.3IONS-SCNC: 11 MMOL/L (ref 4–13)
BUN SERPL-MCNC: 8 MG/DL (ref 5–25)
CALCIUM SERPL-MCNC: 7.7 MG/DL (ref 8.3–10.1)
CHLORIDE SERPL-SCNC: 104 MMOL/L (ref 100–108)
CO2 SERPL-SCNC: 26 MMOL/L (ref 21–32)
CREAT SERPL-MCNC: 0.9 MG/DL (ref 0.6–1.3)
ERYTHROCYTE [DISTWIDTH] IN BLOOD BY AUTOMATED COUNT: 13.5 % (ref 11.6–15.1)
ERYTHROCYTE [DISTWIDTH] IN BLOOD BY AUTOMATED COUNT: 13.7 % (ref 11.6–15.1)
GFR SERPL CREATININE-BSD FRML MDRD: 79 ML/MIN/1.73SQ M
GLUCOSE SERPL-MCNC: 114 MG/DL (ref 65–140)
HCT VFR BLD AUTO: 23.1 % (ref 34.8–46.1)
HCT VFR BLD AUTO: 42.2 % (ref 34.8–46.1)
HGB BLD-MCNC: 13.9 G/DL (ref 11.5–15.4)
HGB BLD-MCNC: 6.7 G/DL (ref 11.5–15.4)
MAGNESIUM SERPL-MCNC: 2.7 MG/DL (ref 1.6–2.6)
MCH RBC QN AUTO: 31 PG (ref 26.8–34.3)
MCH RBC QN AUTO: 31 PG (ref 26.8–34.3)
MCHC RBC AUTO-ENTMCNC: 29 G/DL (ref 31.4–37.4)
MCHC RBC AUTO-ENTMCNC: 32.9 G/DL (ref 31.4–37.4)
MCV RBC AUTO: 107 FL (ref 82–98)
MCV RBC AUTO: 94 FL (ref 82–98)
PLATELET # BLD AUTO: 264 THOUSANDS/UL (ref 149–390)
PLATELET # BLD AUTO: 266 THOUSANDS/UL (ref 149–390)
PLATELET # BLD AUTO: 97 THOUSANDS/UL (ref 149–390)
PMV BLD AUTO: 10.4 FL (ref 8.9–12.7)
PMV BLD AUTO: 9.3 FL (ref 8.9–12.7)
PMV BLD AUTO: 9.6 FL (ref 8.9–12.7)
POTASSIUM SERPL-SCNC: 4.3 MMOL/L (ref 3.5–5.3)
RBC # BLD AUTO: 2.16 MILLION/UL (ref 3.81–5.12)
RBC # BLD AUTO: 4.49 MILLION/UL (ref 3.81–5.12)
SODIUM SERPL-SCNC: 141 MMOL/L (ref 136–145)
WBC # BLD AUTO: 11.64 THOUSAND/UL (ref 4.31–10.16)
WBC # BLD AUTO: 5.02 THOUSAND/UL (ref 4.31–10.16)

## 2021-10-05 PROCEDURE — 83735 ASSAY OF MAGNESIUM: CPT | Performed by: SURGERY

## 2021-10-05 PROCEDURE — 85027 COMPLETE CBC AUTOMATED: CPT | Performed by: SURGERY

## 2021-10-05 PROCEDURE — 85049 AUTOMATED PLATELET COUNT: CPT | Performed by: SURGERY

## 2021-10-05 PROCEDURE — 36415 COLL VENOUS BLD VENIPUNCTURE: CPT | Performed by: SURGERY

## 2021-10-05 PROCEDURE — 99232 SBSQ HOSP IP/OBS MODERATE 35: CPT | Performed by: SURGERY

## 2021-10-05 PROCEDURE — 80048 BASIC METABOLIC PNL TOTAL CA: CPT | Performed by: SURGERY

## 2021-10-05 PROCEDURE — C9113 INJ PANTOPRAZOLE SODIUM, VIA: HCPCS | Performed by: SURGERY

## 2021-10-05 RX ADMIN — METOROPROLOL TARTRATE 2.5 MG: 5 INJECTION, SOLUTION INTRAVENOUS at 00:05

## 2021-10-05 RX ADMIN — MORPHINE SULFATE 2 MG: 2 INJECTION, SOLUTION INTRAMUSCULAR; INTRAVENOUS at 23:14

## 2021-10-05 RX ADMIN — HEPARIN SODIUM 5000 UNITS: 5000 INJECTION INTRAVENOUS; SUBCUTANEOUS at 22:14

## 2021-10-05 RX ADMIN — HEPARIN SODIUM 5000 UNITS: 5000 INJECTION INTRAVENOUS; SUBCUTANEOUS at 06:21

## 2021-10-05 RX ADMIN — METOROPROLOL TARTRATE 2.5 MG: 5 INJECTION, SOLUTION INTRAVENOUS at 23:03

## 2021-10-05 RX ADMIN — HEPARIN SODIUM 5000 UNITS: 5000 INJECTION INTRAVENOUS; SUBCUTANEOUS at 15:36

## 2021-10-05 RX ADMIN — AMITRIPTYLINE HYDROCHLORIDE 10 MG: 10 TABLET, FILM COATED ORAL at 22:13

## 2021-10-05 RX ADMIN — ARIPIPRAZOLE 20 MG: 10 TABLET ORAL at 22:14

## 2021-10-05 RX ADMIN — SODIUM CHLORIDE, SODIUM GLUCONATE, SODIUM ACETATE, POTASSIUM CHLORIDE, MAGNESIUM CHLORIDE, SODIUM PHOSPHATE, DIBASIC, AND POTASSIUM PHOSPHATE 125 ML/HR: .53; .5; .37; .037; .03; .012; .00082 INJECTION, SOLUTION INTRAVENOUS at 23:17

## 2021-10-05 RX ADMIN — LAMOTRIGINE 25 MG: 25 TABLET ORAL at 20:19

## 2021-10-05 RX ADMIN — PANTOPRAZOLE SODIUM 40 MG: 40 INJECTION, POWDER, FOR SOLUTION INTRAVENOUS at 08:37

## 2021-10-05 RX ADMIN — SODIUM CHLORIDE, SODIUM GLUCONATE, SODIUM ACETATE, POTASSIUM CHLORIDE, MAGNESIUM CHLORIDE, SODIUM PHOSPHATE, DIBASIC, AND POTASSIUM PHOSPHATE 125 ML/HR: .53; .5; .37; .037; .03; .012; .00082 INJECTION, SOLUTION INTRAVENOUS at 10:32

## 2021-10-05 RX ADMIN — SODIUM CHLORIDE, SODIUM GLUCONATE, SODIUM ACETATE, POTASSIUM CHLORIDE, MAGNESIUM CHLORIDE, SODIUM PHOSPHATE, DIBASIC, AND POTASSIUM PHOSPHATE 125 ML/HR: .53; .5; .37; .037; .03; .012; .00082 INJECTION, SOLUTION INTRAVENOUS at 00:08

## 2021-10-05 RX ADMIN — ONDANSETRON 4 MG: 2 INJECTION INTRAMUSCULAR; INTRAVENOUS at 00:26

## 2021-10-05 RX ADMIN — METOROPROLOL TARTRATE 2.5 MG: 5 INJECTION, SOLUTION INTRAVENOUS at 06:17

## 2021-10-05 RX ADMIN — METOROPROLOL TARTRATE 2.5 MG: 5 INJECTION, SOLUTION INTRAVENOUS at 12:45

## 2021-10-05 RX ADMIN — ONDANSETRON 4 MG: 2 INJECTION INTRAMUSCULAR; INTRAVENOUS at 07:19

## 2021-10-05 RX ADMIN — HEPARIN SODIUM 5000 UNITS: 5000 INJECTION INTRAVENOUS; SUBCUTANEOUS at 00:03

## 2021-10-05 RX ADMIN — HYDROMORPHONE HYDROCHLORIDE 0.5 MG: 1 INJECTION, SOLUTION INTRAMUSCULAR; INTRAVENOUS; SUBCUTANEOUS at 00:28

## 2021-10-06 ENCOUNTER — TELEPHONE (OUTPATIENT)
Dept: UROLOGY | Facility: MEDICAL CENTER | Age: 41
End: 2021-10-06

## 2021-10-06 VITALS
BODY MASS INDEX: 46.96 KG/M2 | TEMPERATURE: 97.7 F | OXYGEN SATURATION: 96 % | HEART RATE: 92 BPM | WEIGHT: 208.78 LBS | SYSTOLIC BLOOD PRESSURE: 112 MMHG | DIASTOLIC BLOOD PRESSURE: 62 MMHG | RESPIRATION RATE: 19 BRPM | HEIGHT: 56 IN

## 2021-10-06 PROBLEM — K56.600 PARTIAL SMALL BOWEL OBSTRUCTION (HCC): Status: RESOLVED | Noted: 2021-10-06 | Resolved: 2021-10-06

## 2021-10-06 PROBLEM — K56.600 PARTIAL SMALL BOWEL OBSTRUCTION (HCC): Status: ACTIVE | Noted: 2021-10-06

## 2021-10-06 LAB
ANION GAP SERPL CALCULATED.3IONS-SCNC: 9 MMOL/L (ref 4–13)
BUN SERPL-MCNC: 6 MG/DL (ref 5–25)
CALCIUM SERPL-MCNC: 7 MG/DL (ref 8.3–10.1)
CHLORIDE SERPL-SCNC: 102 MMOL/L (ref 100–108)
CO2 SERPL-SCNC: 26 MMOL/L (ref 21–32)
CREAT SERPL-MCNC: 0.93 MG/DL (ref 0.6–1.3)
ERYTHROCYTE [DISTWIDTH] IN BLOOD BY AUTOMATED COUNT: 13.4 % (ref 11.6–15.1)
GFR SERPL CREATININE-BSD FRML MDRD: 76 ML/MIN/1.73SQ M
GLUCOSE SERPL-MCNC: 119 MG/DL (ref 65–140)
HCT VFR BLD AUTO: 37.9 % (ref 34.8–46.1)
HGB BLD-MCNC: 12.3 G/DL (ref 11.5–15.4)
MAGNESIUM SERPL-MCNC: 2.1 MG/DL (ref 1.6–2.6)
MCH RBC QN AUTO: 30.8 PG (ref 26.8–34.3)
MCHC RBC AUTO-ENTMCNC: 32.5 G/DL (ref 31.4–37.4)
MCV RBC AUTO: 95 FL (ref 82–98)
PLATELET # BLD AUTO: 227 THOUSANDS/UL (ref 149–390)
PMV BLD AUTO: 9.4 FL (ref 8.9–12.7)
POTASSIUM SERPL-SCNC: 3.5 MMOL/L (ref 3.5–5.3)
RBC # BLD AUTO: 3.99 MILLION/UL (ref 3.81–5.12)
SODIUM SERPL-SCNC: 137 MMOL/L (ref 136–145)
WBC # BLD AUTO: 8.83 THOUSAND/UL (ref 4.31–10.16)

## 2021-10-06 PROCEDURE — 85027 COMPLETE CBC AUTOMATED: CPT | Performed by: SURGERY

## 2021-10-06 PROCEDURE — 80048 BASIC METABOLIC PNL TOTAL CA: CPT | Performed by: SURGERY

## 2021-10-06 PROCEDURE — NC001 PR NO CHARGE: Performed by: SURGERY

## 2021-10-06 PROCEDURE — C9113 INJ PANTOPRAZOLE SODIUM, VIA: HCPCS | Performed by: SURGERY

## 2021-10-06 PROCEDURE — 83735 ASSAY OF MAGNESIUM: CPT | Performed by: SURGERY

## 2021-10-06 RX ORDER — ACETAMINOPHEN 325 MG/1
650 TABLET ORAL EVERY 6 HOURS PRN
Status: DISCONTINUED | OUTPATIENT
Start: 2021-10-06 | End: 2021-10-06 | Stop reason: HOSPADM

## 2021-10-06 RX ORDER — OXYCODONE HYDROCHLORIDE 5 MG/1
2.5 TABLET ORAL EVERY 4 HOURS PRN
Status: DISCONTINUED | OUTPATIENT
Start: 2021-10-06 | End: 2021-10-06 | Stop reason: HOSPADM

## 2021-10-06 RX ORDER — OXYCODONE HYDROCHLORIDE 5 MG/1
5 TABLET ORAL EVERY 4 HOURS PRN
Status: DISCONTINUED | OUTPATIENT
Start: 2021-10-06 | End: 2021-10-06 | Stop reason: HOSPADM

## 2021-10-06 RX ADMIN — ESCITALOPRAM OXALATE 20 MG: 10 TABLET ORAL at 08:04

## 2021-10-06 RX ADMIN — METOROPROLOL TARTRATE 2.5 MG: 5 INJECTION, SOLUTION INTRAVENOUS at 05:19

## 2021-10-06 RX ADMIN — HEPARIN SODIUM 5000 UNITS: 5000 INJECTION INTRAVENOUS; SUBCUTANEOUS at 13:03

## 2021-10-06 RX ADMIN — METOROPROLOL TARTRATE 2.5 MG: 5 INJECTION, SOLUTION INTRAVENOUS at 11:48

## 2021-10-06 RX ADMIN — PANTOPRAZOLE SODIUM 40 MG: 40 INJECTION, POWDER, FOR SOLUTION INTRAVENOUS at 08:04

## 2021-10-06 RX ADMIN — ACETAMINOPHEN 650 MG: 325 TABLET, FILM COATED ORAL at 15:07

## 2021-10-06 RX ADMIN — HEPARIN SODIUM 5000 UNITS: 5000 INJECTION INTRAVENOUS; SUBCUTANEOUS at 05:19

## 2021-10-06 RX ADMIN — LAMOTRIGINE 25 MG: 25 TABLET ORAL at 08:04

## 2021-10-07 ENCOUNTER — APPOINTMENT (OUTPATIENT)
Dept: PHYSICAL THERAPY | Facility: REHABILITATION | Age: 41
End: 2021-10-07
Payer: MEDICARE

## 2021-10-07 ENCOUNTER — TELEPHONE (OUTPATIENT)
Dept: FAMILY MEDICINE CLINIC | Facility: CLINIC | Age: 41
End: 2021-10-07

## 2021-10-07 RX ORDER — HYDROXYZINE HYDROCHLORIDE 10 MG/1
10 TABLET, FILM COATED ORAL
Qty: 30 TABLET | Status: CANCELLED | OUTPATIENT
Start: 2021-10-07

## 2021-10-08 ENCOUNTER — OFFICE VISIT (OUTPATIENT)
Dept: OBGYN CLINIC | Facility: MEDICAL CENTER | Age: 41
End: 2021-10-08
Payer: MEDICARE

## 2021-10-08 ENCOUNTER — TELEPHONE (OUTPATIENT)
Dept: FAMILY MEDICINE CLINIC | Facility: CLINIC | Age: 41
End: 2021-10-08

## 2021-10-08 VITALS
HEART RATE: 91 BPM | WEIGHT: 216 LBS | HEIGHT: 57 IN | BODY MASS INDEX: 46.6 KG/M2 | SYSTOLIC BLOOD PRESSURE: 122 MMHG | DIASTOLIC BLOOD PRESSURE: 79 MMHG

## 2021-10-08 DIAGNOSIS — M17.11 PRIMARY OSTEOARTHRITIS OF RIGHT KNEE: ICD-10-CM

## 2021-10-08 DIAGNOSIS — F41.1 GENERALIZED ANXIETY DISORDER: Primary | ICD-10-CM

## 2021-10-08 DIAGNOSIS — S80.01XA CONTUSION OF RIGHT KNEE, INITIAL ENCOUNTER: ICD-10-CM

## 2021-10-08 DIAGNOSIS — M25.561 ACUTE PAIN OF RIGHT KNEE: Primary | ICD-10-CM

## 2021-10-08 PROCEDURE — 99213 OFFICE O/P EST LOW 20 MIN: CPT | Performed by: EMERGENCY MEDICINE

## 2021-10-11 ENCOUNTER — OFFICE VISIT (OUTPATIENT)
Dept: PHYSICAL THERAPY | Facility: REHABILITATION | Age: 41
End: 2021-10-11
Payer: MEDICARE

## 2021-10-11 ENCOUNTER — TELEPHONE (OUTPATIENT)
Dept: FAMILY MEDICINE CLINIC | Facility: CLINIC | Age: 41
End: 2021-10-11

## 2021-10-11 ENCOUNTER — OFFICE VISIT (OUTPATIENT)
Dept: FAMILY MEDICINE CLINIC | Facility: CLINIC | Age: 41
End: 2021-10-11

## 2021-10-11 VITALS
TEMPERATURE: 97.6 F | RESPIRATION RATE: 20 BRPM | DIASTOLIC BLOOD PRESSURE: 80 MMHG | HEART RATE: 98 BPM | WEIGHT: 211.6 LBS | BODY MASS INDEX: 45.65 KG/M2 | HEIGHT: 57 IN | SYSTOLIC BLOOD PRESSURE: 120 MMHG | OXYGEN SATURATION: 97 %

## 2021-10-11 DIAGNOSIS — K56.600 PARTIAL SMALL BOWEL OBSTRUCTION (HCC): Primary | ICD-10-CM

## 2021-10-11 DIAGNOSIS — R29.6 FREQUENT FALLS: Primary | ICD-10-CM
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
## 2021-10-11 PROCEDURE — 97112 NEUROMUSCULAR REEDUCATION: CPT | Performed by: PHYSICAL THERAPIST

## 2021-10-11 PROCEDURE — 99213 OFFICE O/P EST LOW 20 MIN: CPT | Performed by: FAMILY MEDICINE

## 2021-10-12 ENCOUNTER — OFFICE VISIT (OUTPATIENT)
Dept: FAMILY MEDICINE CLINIC | Facility: CLINIC | Age: 41
End: 2021-10-12

## 2021-10-12 VITALS
DIASTOLIC BLOOD PRESSURE: 70 MMHG | TEMPERATURE: 98.3 F | OXYGEN SATURATION: 96 % | BODY MASS INDEX: 45.52 KG/M2 | WEIGHT: 211 LBS | HEIGHT: 57 IN | RESPIRATION RATE: 18 BRPM | HEART RATE: 100 BPM | SYSTOLIC BLOOD PRESSURE: 114 MMHG

## 2021-10-12 DIAGNOSIS — K21.9 GASTROESOPHAGEAL REFLUX DISEASE WITHOUT ESOPHAGITIS: ICD-10-CM

## 2021-10-12 DIAGNOSIS — R29.6 FREQUENT FALLS: Primary | ICD-10-CM

## 2021-10-12 DIAGNOSIS — G80.1 SPASTIC DIPLEGIC CEREBRAL PALSY (HCC): Primary | ICD-10-CM

## 2021-10-12 PROCEDURE — 99213 OFFICE O/P EST LOW 20 MIN: CPT | Performed by: FAMILY MEDICINE

## 2021-10-12 RX ORDER — MULTIVIT-MIN/IRON/FOLIC ACID/K 18-600-40
2000 CAPSULE ORAL DAILY
Qty: 62 TABLET | Refills: 5 | Status: SHIPPED | OUTPATIENT
Start: 2021-10-12 | End: 2022-04-06 | Stop reason: SDUPTHER

## 2021-10-12 RX ORDER — HYDROXYZINE HYDROCHLORIDE 10 MG/1
10 TABLET, FILM COATED ORAL 3 TIMES DAILY
Qty: 30 TABLET | Refills: 3 | Status: SHIPPED | OUTPATIENT
Start: 2021-10-12

## 2021-10-12 RX ORDER — PANTOPRAZOLE SODIUM 20 MG/1
20 TABLET, DELAYED RELEASE ORAL DAILY
Qty: 62 TABLET | Refills: 5 | Status: SHIPPED | OUTPATIENT
Start: 2021-10-12 | End: 2022-04-06 | Stop reason: SDUPTHER

## 2021-10-13 ENCOUNTER — EVALUATION (OUTPATIENT)
Dept: PHYSICAL THERAPY | Facility: REHABILITATION | Age: 41
End: 2021-10-13
Payer: MEDICARE
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
## 2021-10-13 DIAGNOSIS — R29.6 FREQUENT FALLS: Primary | ICD-10-CM

## 2021-10-13 PROCEDURE — 97112 NEUROMUSCULAR REEDUCATION: CPT | Performed by: PHYSICAL THERAPIST

## 2021-10-13 PROCEDURE — 97110 THERAPEUTIC EXERCISES: CPT | Performed by: PHYSICAL THERAPIST

## 2021-10-16 ENCOUNTER — IMMUNIZATIONS (OUTPATIENT)
Dept: FAMILY MEDICINE CLINIC | Facility: MEDICAL CENTER | Age: 41
End: 2021-10-16

## 2021-10-16 DIAGNOSIS — Z23 ENCOUNTER FOR IMMUNIZATION: Primary | ICD-10-CM

## 2021-10-16 PROCEDURE — 91300 SARSCOV2 VAC 30MCG/0.3ML IM: CPT

## 2021-10-20 ENCOUNTER — OFFICE VISIT (OUTPATIENT)
Dept: DENTISTRY | Facility: CLINIC | Age: 41
End: 2021-10-20

## 2021-10-20 VITALS — TEMPERATURE: 96.4 F

## 2021-10-20 DIAGNOSIS — Z01.21 ENCOUNTER FOR DENTAL EXAMINATION AND CLEANING WITH ABNORMAL FINDINGS: Primary | ICD-10-CM

## 2021-10-20 PROCEDURE — D0274 BITEWINGS - 4 RADIOGRAPHIC IMAGES: HCPCS | Performed by: DENTIST

## 2021-10-25 ENCOUNTER — HOSPITAL ENCOUNTER (OUTPATIENT)
Dept: ULTRASOUND IMAGING | Facility: HOSPITAL | Age: 41
Discharge: HOME | End: 2021-10-25
Payer: MEDICARE

## 2021-10-25 DIAGNOSIS — R32 URINARY INCONTINENCE, UNSPECIFIED TYPE: ICD-10-CM

## 2021-10-25 PROCEDURE — 76770 US EXAM ABDO BACK WALL COMP: CPT

## 2021-10-26 ENCOUNTER — OFFICE VISIT (OUTPATIENT)
Dept: FAMILY MEDICINE CLINIC | Facility: CLINIC | Age: 41
End: 2021-10-26

## 2021-10-26 VITALS
WEIGHT: 206.2 LBS | HEART RATE: 101 BPM | OXYGEN SATURATION: 98 % | SYSTOLIC BLOOD PRESSURE: 128 MMHG | BODY MASS INDEX: 44.49 KG/M2 | RESPIRATION RATE: 18 BRPM | HEIGHT: 57 IN | TEMPERATURE: 97.7 F | DIASTOLIC BLOOD PRESSURE: 84 MMHG

## 2021-10-26 DIAGNOSIS — J30.89 NON-SEASONAL ALLERGIC RHINITIS, UNSPECIFIED TRIGGER: ICD-10-CM

## 2021-10-26 DIAGNOSIS — K21.9 GASTROESOPHAGEAL REFLUX DISEASE WITHOUT ESOPHAGITIS: ICD-10-CM

## 2021-10-26 DIAGNOSIS — L29.0 PERIANAL IRRITATION: ICD-10-CM

## 2021-10-26 DIAGNOSIS — K56.600 PARTIAL SMALL BOWEL OBSTRUCTION (HCC): ICD-10-CM

## 2021-10-26 DIAGNOSIS — K44.9 HIATAL HERNIA: ICD-10-CM

## 2021-10-26 DIAGNOSIS — J02.9 SORETHROAT: ICD-10-CM

## 2021-10-26 DIAGNOSIS — H91.8X1 OTHER SPECIFIED HEARING LOSS OF RIGHT EAR, UNSPECIFIED HEARING STATUS ON CONTRALATERAL SIDE: Chronic | ICD-10-CM

## 2021-10-26 DIAGNOSIS — G80.9 CEREBRAL PALSY, UNSPECIFIED TYPE (HCC): Primary | ICD-10-CM

## 2021-10-26 PROCEDURE — 99213 OFFICE O/P EST LOW 20 MIN: CPT | Performed by: FAMILY MEDICINE

## 2021-10-26 RX ORDER — CALCIUM CARBONATE 200(500)MG
1 TABLET,CHEWABLE ORAL 3 TIMES DAILY PRN
Qty: 30 TABLET | Refills: 5 | Status: SHIPPED | OUTPATIENT
Start: 2021-10-26

## 2021-11-09 ENCOUNTER — OFFICE VISIT (OUTPATIENT)
Dept: GASTROENTEROLOGY | Facility: MEDICAL CENTER | Age: 41
End: 2021-11-09
Payer: MEDICARE

## 2021-11-09 VITALS
SYSTOLIC BLOOD PRESSURE: 126 MMHG | BODY MASS INDEX: 43.97 KG/M2 | HEART RATE: 91 BPM | WEIGHT: 203.2 LBS | DIASTOLIC BLOOD PRESSURE: 76 MMHG | TEMPERATURE: 98.3 F

## 2021-11-09 DIAGNOSIS — K56.600 PARTIAL SMALL BOWEL OBSTRUCTION (HCC): Primary | ICD-10-CM

## 2021-11-09 DIAGNOSIS — F45.8 FUNCTIONAL DYSPHAGIA: ICD-10-CM

## 2021-11-09 PROCEDURE — 99213 OFFICE O/P EST LOW 20 MIN: CPT | Performed by: INTERNAL MEDICINE

## 2021-11-12 ENCOUNTER — OFFICE VISIT (OUTPATIENT)
Dept: AUDIOLOGY | Age: 41
End: 2021-11-12

## 2021-11-12 DIAGNOSIS — K59.04 CHRONIC IDIOPATHIC CONSTIPATION: ICD-10-CM

## 2021-11-12 DIAGNOSIS — H90.3 SENSORY HEARING LOSS, BILATERAL: Primary | ICD-10-CM

## 2021-11-15 ENCOUNTER — EVALUATION (OUTPATIENT)
Dept: PHYSICAL THERAPY | Facility: REHABILITATION | Age: 41
End: 2021-11-15
Payer: MEDICARE
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
## 2021-11-15 DIAGNOSIS — R29.6 FREQUENT FALLS: Primary | ICD-10-CM

## 2021-11-15 PROCEDURE — 97110 THERAPEUTIC EXERCISES: CPT | Performed by: PHYSICAL THERAPIST

## 2021-11-15 PROCEDURE — 97112 NEUROMUSCULAR REEDUCATION: CPT | Performed by: PHYSICAL THERAPIST

## 2021-11-17 ENCOUNTER — TELEPHONE (OUTPATIENT)
Dept: OBGYN CLINIC | Facility: CLINIC | Age: 41
End: 2021-11-17

## 2021-11-18 ENCOUNTER — OFFICE VISIT (OUTPATIENT)
Dept: FAMILY MEDICINE CLINIC | Facility: CLINIC | Age: 41
End: 2021-11-18

## 2021-11-18 VITALS
BODY MASS INDEX: 44.66 KG/M2 | DIASTOLIC BLOOD PRESSURE: 72 MMHG | SYSTOLIC BLOOD PRESSURE: 102 MMHG | TEMPERATURE: 97.9 F | WEIGHT: 207 LBS | HEART RATE: 75 BPM | HEIGHT: 57 IN | OXYGEN SATURATION: 98 %

## 2021-11-18 DIAGNOSIS — R05.9 COUGH: Primary | ICD-10-CM

## 2021-11-18 DIAGNOSIS — H61.20 IMPACTED CERUMEN, UNSPECIFIED LATERALITY: ICD-10-CM

## 2021-11-18 PROCEDURE — 99213 OFFICE O/P EST LOW 20 MIN: CPT | Performed by: FAMILY MEDICINE

## 2021-11-18 PROCEDURE — U0005 INFEC AGEN DETEC AMPLI PROBE: HCPCS | Performed by: FAMILY MEDICINE

## 2021-11-18 PROCEDURE — U0003 INFECTIOUS AGENT DETECTION BY NUCLEIC ACID (DNA OR RNA); SEVERE ACUTE RESPIRATORY SYNDROME CORONAVIRUS 2 (SARS-COV-2) (CORONAVIRUS DISEASE [COVID-19]), AMPLIFIED PROBE TECHNIQUE, MAKING USE OF HIGH THROUGHPUT TECHNOLOGIES AS DESCRIBED BY CMS-2020-01-R: HCPCS | Performed by: FAMILY MEDICINE

## 2021-11-19 ENCOUNTER — APPOINTMENT (EMERGENCY)
Dept: RADIOLOGY | Facility: HOSPITAL | Age: 41
End: 2021-11-19
Payer: MEDICARE

## 2021-11-19 ENCOUNTER — HOSPITAL ENCOUNTER (EMERGENCY)
Facility: HOSPITAL | Age: 41
Discharge: HOME | End: 2021-11-19
Attending: EMERGENCY MEDICINE
Payer: MEDICARE

## 2021-11-19 VITALS
SYSTOLIC BLOOD PRESSURE: 152 MMHG | DIASTOLIC BLOOD PRESSURE: 74 MMHG | TEMPERATURE: 98.3 F | HEART RATE: 100 BPM | RESPIRATION RATE: 18 BRPM | OXYGEN SATURATION: 97 %

## 2021-11-19 DIAGNOSIS — M25.561 ACUTE PAIN OF BOTH KNEES: Primary | ICD-10-CM

## 2021-11-19 DIAGNOSIS — M25.562 ACUTE PAIN OF BOTH KNEES: Primary | ICD-10-CM

## 2021-11-19 PROCEDURE — 73564 X-RAY EXAM KNEE 4 OR MORE: CPT

## 2021-11-19 PROCEDURE — 99284 EMERGENCY DEPT VISIT MOD MDM: CPT | Performed by: PHYSICIAN ASSISTANT

## 2021-11-19 PROCEDURE — 99283 EMERGENCY DEPT VISIT LOW MDM: CPT

## 2021-11-19 RX ORDER — IBUPROFEN 600 MG/1
600 TABLET ORAL ONCE
Status: COMPLETED | OUTPATIENT
Start: 2021-11-19 | End: 2021-11-19

## 2021-11-19 RX ADMIN — IBUPROFEN 600 MG: 600 TABLET ORAL at 21:30

## 2021-11-22 ENCOUNTER — OFFICE VISIT (OUTPATIENT)
Dept: BARIATRICS | Facility: CLINIC | Age: 41
End: 2021-11-22
Payer: MEDICARE

## 2021-11-22 VITALS
WEIGHT: 209.6 LBS | HEIGHT: 57 IN | BODY MASS INDEX: 45.22 KG/M2 | TEMPERATURE: 97.6 F | DIASTOLIC BLOOD PRESSURE: 80 MMHG | RESPIRATION RATE: 18 BRPM | HEART RATE: 93 BPM | SYSTOLIC BLOOD PRESSURE: 136 MMHG

## 2021-11-22 DIAGNOSIS — F33.3 SEVERE EPISODE OF RECURRENT MAJOR DEPRESSIVE DISORDER, WITH PSYCHOTIC FEATURES (HCC): ICD-10-CM

## 2021-11-22 DIAGNOSIS — E66.01 OBESITY, CLASS III, BMI 40-49.9 (MORBID OBESITY) (HCC): Primary | ICD-10-CM

## 2021-11-22 DIAGNOSIS — R00.2 PALPITATIONS: ICD-10-CM

## 2021-11-22 DIAGNOSIS — K21.9 GASTROESOPHAGEAL REFLUX DISEASE WITHOUT ESOPHAGITIS: ICD-10-CM

## 2021-11-22 PROCEDURE — 99214 OFFICE O/P EST MOD 30 MIN: CPT | Performed by: PHYSICIAN ASSISTANT

## 2021-11-24 ENCOUNTER — TELEPHONE (OUTPATIENT)
Dept: GASTROENTEROLOGY | Facility: CLINIC | Age: 41
End: 2021-11-24

## 2021-11-29 ENCOUNTER — OFFICE VISIT (OUTPATIENT)
Dept: FAMILY MEDICINE CLINIC | Facility: CLINIC | Age: 41
End: 2021-11-29

## 2021-11-29 VITALS
SYSTOLIC BLOOD PRESSURE: 120 MMHG | DIASTOLIC BLOOD PRESSURE: 80 MMHG | TEMPERATURE: 98.2 F | BODY MASS INDEX: 44.88 KG/M2 | OXYGEN SATURATION: 98 % | WEIGHT: 208 LBS | RESPIRATION RATE: 20 BRPM | HEART RATE: 97 BPM | HEIGHT: 57 IN
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
## 2021-11-29 PROCEDURE — 99213 OFFICE O/P EST LOW 20 MIN: CPT | Performed by: FAMILY MEDICINE

## 2021-11-30 DIAGNOSIS — K59.04 CHRONIC IDIOPATHIC CONSTIPATION: ICD-10-CM

## 2021-12-02 DIAGNOSIS — K59.04 CHRONIC IDIOPATHIC CONSTIPATION: ICD-10-CM

## 2021-12-02 DIAGNOSIS — B37.3 VULVAL CANDIDIASIS: ICD-10-CM

## 2021-12-02 DIAGNOSIS — R00.2 PALPITATIONS: Primary | ICD-10-CM

## 2021-12-06 ENCOUNTER — OFFICE VISIT (OUTPATIENT)
Dept: AUDIOLOGY | Age: 41
End: 2021-12-06

## 2021-12-06 DIAGNOSIS — H90.3 SENSORY HEARING LOSS, BILATERAL: Primary | ICD-10-CM

## 2021-12-07 DIAGNOSIS — K59.00 CONSTIPATION, UNSPECIFIED CONSTIPATION TYPE: ICD-10-CM

## 2021-12-08 RX ORDER — AMOXICILLIN 250 MG
1 CAPSULE ORAL DAILY
Qty: 30 TABLET | Refills: 5 | Status: SHIPPED | OUTPATIENT
Start: 2021-12-08 | End: 2022-06-06 | Stop reason: SDUPTHER

## 2021-12-09 ENCOUNTER — OFFICE VISIT (OUTPATIENT)
Dept: FAMILY MEDICINE CLINIC | Facility: CLINIC | Age: 41
End: 2021-12-09

## 2021-12-09 VITALS
SYSTOLIC BLOOD PRESSURE: 138 MMHG | HEIGHT: 57 IN | BODY MASS INDEX: 45.35 KG/M2 | TEMPERATURE: 97.4 F | OXYGEN SATURATION: 96 % | HEART RATE: 92 BPM | RESPIRATION RATE: 18 BRPM | DIASTOLIC BLOOD PRESSURE: 84 MMHG | WEIGHT: 210.2 LBS

## 2021-12-09 DIAGNOSIS — H61.23 BILATERAL IMPACTED CERUMEN: Primary | ICD-10-CM

## 2021-12-09 PROCEDURE — 69210 REMOVE IMPACTED EAR WAX UNI: CPT | Performed by: FAMILY MEDICINE

## 2021-12-09 RX ORDER — LAMOTRIGINE 100 MG/1
TABLET ORAL
COMMUNITY
Start: 2021-12-08

## 2021-12-20 ENCOUNTER — TELEPHONE (OUTPATIENT)
Dept: FAMILY MEDICINE CLINIC | Facility: CLINIC | Age: 41
End: 2021-12-20

## 2021-12-20 DIAGNOSIS — H61.20 IMPACTED CERUMEN, UNSPECIFIED LATERALITY: ICD-10-CM

## 2021-12-27 DIAGNOSIS — K08.9 TOOTH DISORDER: ICD-10-CM

## 2021-12-27 DIAGNOSIS — K59.04 CHRONIC IDIOPATHIC CONSTIPATION: ICD-10-CM

## 2021-12-28 RX ORDER — EUCALYP/ME-SALICYLATE/MEN/THYM
5 MOUTHWASH MUCOUS MEMBRANE 2 TIMES DAILY
Qty: 1000 ML | Refills: 5 | Status: SHIPPED | OUTPATIENT
Start: 2021-12-28 | End: 2022-05-12 | Stop reason: SDUPTHER

## 2022-01-02 ENCOUNTER — APPOINTMENT (EMERGENCY)
Dept: RADIOLOGY | Facility: HOSPITAL | Age: 42
End: 2022-01-02
Payer: MEDICARE

## 2022-01-02 ENCOUNTER — HOSPITAL ENCOUNTER (EMERGENCY)
Facility: HOSPITAL | Age: 42
Discharge: HOME | End: 2022-01-02
Attending: EMERGENCY MEDICINE
Payer: MEDICARE

## 2022-01-02 VITALS
SYSTOLIC BLOOD PRESSURE: 125 MMHG | RESPIRATION RATE: 18 BRPM | BODY MASS INDEX: 45.21 KG/M2 | WEIGHT: 207.45 LBS | DIASTOLIC BLOOD PRESSURE: 64 MMHG | OXYGEN SATURATION: 97 % | HEART RATE: 100 BPM | TEMPERATURE: 97.8 F

## 2022-01-02 DIAGNOSIS — B34.9 ACUTE VIRAL SYNDROME: Primary | ICD-10-CM

## 2022-01-02 DIAGNOSIS — Z20.822 PERSON UNDER INVESTIGATION FOR COVID-19: ICD-10-CM

## 2022-01-02 PROCEDURE — 87636 SARSCOV2 & INF A&B AMP PRB: CPT | Performed by: PHYSICIAN ASSISTANT

## 2022-01-02 PROCEDURE — 71045 X-RAY EXAM CHEST 1 VIEW: CPT

## 2022-01-02 PROCEDURE — 99283 EMERGENCY DEPT VISIT LOW MDM: CPT

## 2022-01-02 PROCEDURE — 99285 EMERGENCY DEPT VISIT HI MDM: CPT | Performed by: PHYSICIAN ASSISTANT

## 2022-01-02 NOTE — DISCHARGE INSTRUCTIONS
DISCHARGE INSTRUCTIONS:    FOLLOW UP WITH YOUR PRIMARY CARE PROVIDER OR THE [address] [place] [address]  MAKE AN APPOINTMENT TO BE SEEN  TAKE TYLENOL OR MOTRIN FOR PAIN OR FEVER  REST AND DRINK PLENTY OF FLUIDS  IF SYMPTOMS WORSEN OR NEW SYMPTOMS ARISE, RETURN TO THE ER TO BE SEEN

## 2022-01-02 NOTE — ED PROVIDER NOTES
History  Chief Complaint   Patient presents with    Cough     Cough starting last night  Requesting a covid test       41y  o female with PMH of ADD, anxiety, astigmatism, brain lesion, calcium deficiency, cerebral palsy, constipation, depression, esophagitis, GERD, hiatal hernia, hydrocephalus, nephrolithiasis, ODD, seizures, hearing loss and  shunt placement presents to the ER for cough, congestion and throat irritation when coughing for 3 days  Patient denies taking any medication for symptoms  Symptoms are constant  She denies sick contacts or recent travel  She denies fever, chills, chest pain, dyspnea, N/V/D, abdominal pain, weakness or paresthesias  History provided by:  Patient   used: No        Prior to Admission Medications   Prescriptions Last Dose Informant Patient Reported? Taking?    ARIPiprazole (ABILIFY) 20 MG tablet  Care Giver No No   Sig: Take 1 tablet (20 mg total) by mouth daily at bedtime   D3-1000 25 MCG (1000 UT) tablet  Care Giver No No   Sig: Take 2 tablets (2,000 Units total) by mouth daily   Dyclonine-Glycerin (Cepacol Sore Throat Vest) 0 1-33 % LIQD  Care Giver No No   Sig: Apply 1 spray to the mouth or throat 3 (three) times a day as needed (sorethroat)   Elastic Bandages & Supports (Neoprene Knee Brace) MISC  Care Giver No No   Sig: Apply right knee brace in morning; remove at night   Konsyl Daily Fiber 28 3 %  Care Giver Yes No   LORazepam (ATIVAN) 0 5 mg tablet  Care Giver Yes No   Sig: Take 0 25 mg by mouth 2 (two) times a day   Mouthwashes (Listerine Antiseptic) LIQD   No No   Sig: Swish and spit 5 mL 2 (two) times a day   Multiple Vitamins-Minerals (CertaVite Senior/Antioxidant) TABS  Care Giver No No   Sig: Take 1 tablet by mouth daily   RA SUNSCREEN SPF50 LOTN  Care Giver No No   Sig: Apply 15 minutes before sun exposure and every 2 hours   acetaminophen (TYLENOL) 500 mg tablet  Care Giver No No   Sig: Take 1 tablet (500 mg total) by mouth every 6 (six) hours as needed for mild pain   aluminum-magnesium hydroxide-simethicone (Antacid) 200-200-20 mg/5 mL suspension  Care Giver No No   Sig: Take 15 mL by mouth every 4 (four) hours as needed for indigestion or heartburn   amitriptyline (ELAVIL) 10 mg tablet  Care Giver Yes No   Sig: Take 10 mg by mouth daily at bedtime   bacitracin topical ointment 500 units/g topical ointment  Care Giver No No   Sig: Cleanse site on nose with soap and water followed by bacitracin BID until healed then p r n    bismuth subsalicylate (PEPTO BISMOL) 524 mg/30 mL oral suspension  Care Giver No No   Sig: Take 15 mL (262 mg total) by mouth every 6 (six) hours as needed for indigestion   calcium carbonate (TUMS) 500 mg chewable tablet  Care Giver No No   Sig: Chew 1 tablet (500 mg total) 3 (three) times a day as needed for heartburn   carbamide peroxide (DEBROX) 6 5 % otic solution   No No   Sig: Administer 5 drops into the left ear 2 (two) times a day   dicyclomine (BENTYL) 20 mg tablet  Care Giver No No   Sig: Take 1 tablet (20 mg total) by mouth every 6 (six) hours as needed (as needed)   escitalopram (LEXAPRO) 20 mg tablet  Care Giver No No   Sig: Take 1 tablet (20 mg total) by mouth daily   fluticasone (FLONASE) 50 mcg/act nasal spray  Care Giver No No   Si spray into each nostril daily as needed for rhinitis (nasal congestion) At 8:00 AM   guaiFENesin (ROBITUSSIN) 100 MG/5ML oral liquid  Care Giver Yes No   Sig: Take 200 mg by mouth 3 (three) times a day as needed for cough   hydrOXYzine HCL (ATARAX) 10 mg tablet  Care Giver No No   Sig: Take 1 tablet (10 mg total) by mouth 3 (three) times a day   ibuprofen (MOTRIN) 400 mg tablet  Care Giver No No   Sig: Take 1 tablet (400 mg total) by mouth every 8 (eight) hours as needed for mild pain   ketoconazole (NIZORAL) 2 % cream  Care Giver Yes No   lamoTRIgine (LaMICtal) 100 mg tablet   Yes No   lamoTRIgine (LaMICtal) 25 mg tablet  Care Giver Yes No   Sig: Take 25 mg by mouth 2 (two) times a day    Patient not taking: Reported on 2021    lubiprostone (Amitiza) 24 mcg capsule  Care Giver No No   Sig: Take 1 capsule (24 mcg total) by mouth daily with breakfast   magnesium hydroxide (GNP Milk of Magnesia) 400 mg/5 mL oral suspension  Care Giver No No   Sig: Take 30 mL by mouth daily as needed for constipation If no BM in 3 days   metoprolol tartrate (LOPRESSOR) 25 mg tablet  Care Giver No No   Sig: Take 1 tablet (25 mg total) by mouth 2 (two) times a day   mineral oil-hydrophilic petrolatum (AQUAPHOR) ointment  Care Giver No No   Sig: Apply topically as needed for dry skin   norgestimate-ethinyl estradiol (ORTHO-CYCLEN) 0 25-35 MG-MCG per tablet  Care Giver No No   Sig: Take 1 tablet by mouth daily   norgestimate-ethinyl estradiol (ORTHO-CYCLEN) 0 25-35 MG-MCG per tablet  Care Giver Yes No   Sig: Take 1 tablet by mouth daily   nystatin (MYCOSTATIN) powder  Care Giver No No   Sig: Apply 1 application topically 4 (four) times a day   nystatin-triamcinolone (MYCOLOG-II) cream  Care Giver No No   Sig: Apply topically 2 (two) times a day   pantoprazole (PROTONIX) 20 mg tablet  Care Giver No No   Sig: Take 1 tablet (20 mg total) by mouth daily   phenol (Chloraseptic) 1 4 % mucosal liquid  Care Giver No No   Sig: Apply 1 spray to the mouth or throat every 2 (two) hours as needed (for sore throat as needed)   polyethylene glycol (MIRALAX) 17 g packet  Care Giver No No   Sig: Take one packet daily as needed for no bowel movement in 24 hours   psyllium (METAMUCIL) 58 6 % packet   No No   Sig: Take 1 packet by mouth daily   senna-docusate sodium (Senexon-S) 8 6-50 mg per tablet  Care Giver No No   Sig: Take 1 tablet by mouth daily at 8am    sodium chloride (OCEAN) 0 65 % nasal spray  Care Giver No No   Si spray into each nostril as needed (three times daily as needed for nasal congestion)   zinc oxide (DESITIN) 13 % cream  Care Giver No No   Sig: Apply 1 application topically as needed (for perianal irritation)      Facility-Administered Medications: None       Past Medical History:   Diagnosis Date    ADD (attention deficit disorder)     Anxiety     Astigmatism     Brain lesion     Calcium deficiency     Cellulitis of foot, right 6/4/2018    Cerebral palsy (HCC)     Chronic otitis media     Constipation     Depression     Dysphagia     Esophagitis     Esotropia     GERD (gastroesophageal reflux disease)     Hiatal hernia     Hydrocephalus (HCC)     Impaired fasting glucose     Left nephrolithiasis 03/04/2019    Myopia     Oppositional defiant disorder     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HCC)     Seizures (HCC)     Sensorineural hearing loss     Status post ventriculoatrial shunt placement     Visual impairment        Past Surgical History:   Procedure Laterality Date    BREAST BIOPSY Left     X 2 (not sure of years)    CSF SHUNT      Creation of Ventriculo-Peritoneal CSF shunt ; Last Assessed:7/6/2016    EAR SURGERY      Last Assessed:7/6/2016    LEG SURGERY      due to CP     NOSE SURGERY      Last Assessed:7/6/2016    [place] ESOPHAGOGASTRODUODENOSCOPY TRANSORAL DIAGNOSTIC N/A 5/9/2019    Procedure: ESOPHAGOGASTRODUODENOSCOPY (EGD) with biopsy;  Surgeon: Emmit Sandhoff, MD;  Location: [facility]; Service: Gastroenterology    UPPER GASTROINTESTINAL ENDOSCOPY  05/2019       Family History   Problem Relation Age of Onset    Diabetes Mother     Colon cancer Father     No Known Problems Maternal Grandmother     No Known Problems Maternal Grandfather     No Known Problems Paternal Grandmother     No Known Problems Paternal Grandfather     Hypertension Neg Hx     Heart disease Neg Hx     Stroke Neg Hx     Thyroid disease Neg Hx      I have reviewed and agree with the history as documented      E-Cigarette/Vaping    E-Cigarette Use Never User      E-Cigarette/Vaping Substances    Nicotine No     THC No     CBD No     Flavoring No     Other No     Unknown No      Social History     Tobacco Use    Smoking status: Never Smoker    Smokeless tobacco: Never Used   Vaping Use    Vaping Use: Never used   Substance Use Topics    Alcohol use: Never    Drug use: Never       Review of Systems   Constitutional: Negative for activity change, appetite change, chills and fever  HENT: Positive for congestion and sore throat  Negative for drooling, ear discharge, ear pain, facial swelling and rhinorrhea  Eyes: Negative for redness  Respiratory: Positive for cough  Negative for shortness of breath  Cardiovascular: Negative for chest pain  Gastrointestinal: Negative for abdominal pain, diarrhea, nausea and vomiting  Musculoskeletal: Negative for neck stiffness  Skin: Negative for rash  Allergic/Immunologic: Negative for food allergies  Neurological: Negative for weakness and numbness  Physical Exam  Physical Exam  Vitals and nursing note reviewed  Constitutional:       General: She is not in acute distress  Appearance: She is not toxic-appearing  HENT:      Head: Normocephalic and atraumatic  Eyes:      Conjunctiva/sclera: Conjunctivae normal    Neck:      Trachea: No tracheal deviation  Cardiovascular:      Rate and Rhythm: Normal rate and regular rhythm  Heart sounds: Normal heart sounds, S1 normal and S2 normal  No murmur heard  No friction rub  No gallop  Pulmonary:      Effort: Pulmonary effort is normal  No respiratory distress  Breath sounds: Normal breath sounds  No decreased breath sounds, wheezing, rhonchi or rales  Chest:      Chest wall: No tenderness  Abdominal:      General: There is no distension  Musculoskeletal:      Cervical back: Normal range of motion and neck supple  Skin:     General: Skin is warm and dry  Findings: No rash  Neurological:      Mental Status: She is alert  GCS: GCS eye subscore is 4  GCS verbal subscore is 5  GCS motor subscore is 6     Psychiatric:         Mood and Affect: Mood normal  Vital Signs  ED Triage Vitals   Temperature Pulse Respirations Blood Pressure SpO2   01/02/22 1113 01/02/22 1113 01/02/22 1113 01/02/22 1114 01/02/22 1113   97 8 °F (36 6 °C) 100 18 125/64 97 %      Temp Source Heart Rate Source Patient Position - Orthostatic VS BP Location FiO2 (%)   01/02/22 1113 01/02/22 1113 01/02/22 1113 01/02/22 1113 --   Oral Monitor Sitting Right arm       Pain Score       --                  Vitals:    01/02/22 1113 01/02/22 1114   BP:  125/64   Pulse: 100    Patient Position - Orthostatic VS: Sitting          Visual Acuity      ED Medications  Medications - No data to display    Diagnostic Studies  Results Reviewed     Procedure Component Value Units Date/Time    COVID/FLU - 24 hour TAT [[id number]] Collected: 01/02/22 1200    Lab Status: In process Specimen: Nares from Nasopharyngeal Swab Updated: 01/02/22 1207                 XR chest 1 view portable   ED Interpretation by José Mariano PA-C (01/02 1214)   No acute abnormalities seen by me at this time  Final Result by Bobby Castorena MD (01/02 1324)      No acute cardiopulmonary disease  Workstation performed: [id number]                    Procedures  Procedures         ED Course                                             MDM  Number of Diagnoses or Management Options  Acute viral syndrome: new and requires workup  Person under investigation for COVID-19: new and requires workup  Diagnosis management comments: DDX consists of but not limited to: viral syndrome, covid, pneumonia    Will check a covid test  Will discharge patient prior to test resulting  Will call patient with results  Patient and staff member agreeable to plan  Staff states facility patient resides at is requesting a CXR  Will check CXR  Informed patient I did not see any acute abnormalities on xray at this time and if the radiologist saw anything concerning when reading the xray, we would call to inform them   Patient agreeable  The management plan was discussed in detail with the patient at bedside and all questions were answered  Prior to discharge, we provided both verbal and written instructions  We discussed with the patient the signs and symptoms for which to return to the emergency department  All questions were answered and patient was comfortable with the plan of care and discharged to home  Instructed the patient to follow up with the primary care provider and/or specialist provided and their written instructions  The patient verbalized understanding of our discussion and plan of care, and agrees to return to the Emergency Department for concerns and progression of illness  At discharge, I instructed the patient to:  -follow up with pcp  -take Tylenol or Motrin for pain or fever  -rest and drink plenty of fluids  -return to the ER if symptoms worsened or new symptoms arose  Patient agreed to this plan and was stable at time of discharge  Amount and/or Complexity of Data Reviewed  Clinical lab tests: ordered and reviewed  Tests in the radiology section of CPT®: ordered and reviewed  Obtain history from someone other than the patient: yes  Independent visualization of images, tracings, or specimens: yes    Patient Progress  Patient progress: stable      Disposition  Final diagnoses:   Acute viral syndrome   Person under investigation for COVID-19     Time reflects when diagnosis was documented in both MDM as applicable and the Disposition within this note     Time User Action Codes Description Comment    1/2/2022 12:15 PM Nick MAURER Add [B34 9] Acute viral syndrome     1/2/2022 12:15 PM Nick MAURER Add [Z20 822] Person under investigation for COVID-19       ED Disposition     ED Disposition Condition Date/Time Comment    Discharge Stable Sun Jan 2, 2022 12:15 PM Maxi Farooq discharge to home/self care              Follow-up Information     Follow up With Specialties Details Why Contact Info Additional [address] [facility] Schedule an appointment as soon as possible for a visit  As needed [address], [address] [facility] [id number]  [address], [address], [address] [place], [place], [address]          Discharge Medication List as of 1/2/2022 12:15 PM      CONTINUE these medications which have NOT CHANGED    Details   acetaminophen (TYLENOL) 500 mg tablet Take 1 tablet (500 mg total) by mouth every 6 (six) hours as needed for mild pain, Starting Tue 3/16/2021, Normal      aluminum-magnesium hydroxide-simethicone (Antacid) 200-200-20 mg/5 mL suspension Take 15 mL by mouth every 4 (four) hours as needed for indigestion or heartburn, Starting Mon 4/12/2021, Normal      amitriptyline (ELAVIL) 10 mg tablet Take 10 mg by mouth daily at bedtime, Historical Med      ARIPiprazole (ABILIFY) 20 MG tablet Take 1 tablet (20 mg total) by mouth daily at bedtime, Starting Thu 10/15/2020, Normal      bacitracin topical ointment 500 units/g topical ointment Cleanse site on nose with soap and water followed by bacitracin BID until healed then p r n , Normal      bismuth subsalicylate (PEPTO BISMOL) 524 mg/30 mL oral suspension Take 15 mL (262 mg total) by mouth every 6 (six) hours as needed for indigestion, Starting Wed 7/29/2020, Normal      calcium carbonate (TUMS) 500 mg chewable tablet Chew 1 tablet (500 mg total) 3 (three) times a day as needed for heartburn, Starting Tue 10/26/2021, Normal      carbamide peroxide (DEBROX) 6 5 % otic solution Administer 5 drops into the left ear 2 (two) times a day, Starting Mon 12/20/2021, Normal      D3-1000 25 MCG (1000 UT) tablet Take 2 tablets (2,000 Units total) by mouth daily, Starting Tue 10/12/2021, Normal      dicyclomine (BENTYL) 20 mg tablet Take 1 tablet (20 mg total) by mouth every 6 (six) hours as needed (as needed), Starting Mon 1/18/2021, Print      Dyclonine-Glycerin (Cepacol Sore Throat Spray) 0 1-33 % LIQD Apply 1 spray to the mouth or throat 3 (three) times a day as needed (sorethroat), Starting Tue 10/26/2021, Normal      Elastic Bandages & Supports (Neoprene Knee Brace) MISC Apply right knee brace in morning; remove at night, Normal      escitalopram (LEXAPRO) 20 mg tablet Take 1 tablet (20 mg total) by mouth daily, Starting Thu 10/15/2020, Normal      fluticasone (FLONASE) 50 mcg/act nasal spray 1 spray into each nostril daily as needed for rhinitis (nasal congestion) At 8:00 AM, Starting Fri 6/11/2021, Normal      guaiFENesin (ROBITUSSIN) 100 MG/5ML oral liquid Take 200 mg by mouth 3 (three) times a day as needed for cough, Historical Med      hydrOXYzine HCL (ATARAX) 10 mg tablet Take 1 tablet (10 mg total) by mouth 3 (three) times a day, Starting Tue 10/12/2021, Normal      ibuprofen (MOTRIN) 400 mg tablet Take 1 tablet (400 mg total) by mouth every 8 (eight) hours as needed for mild pain, Starting Fri 6/12/2020, Normal      ketoconazole (NIZORAL) 2 % cream Starting Thu 8/19/2021, Historical Med      Konsyl Daily Fiber 28 3 % Starting Sat 10/2/2021, Historical Med      !! lamoTRIgine (LaMICtal) 100 mg tablet Starting Wed 12/8/2021, Historical Med      !! lamoTRIgine (LaMICtal) 25 mg tablet Take 25 mg by mouth 2 (two) times a day , Starting Tue 8/11/2020, Historical Med      LORazepam (ATIVAN) 0 5 mg tablet Take 0 25 mg by mouth 2 (two) times a day, Historical Med      lubiprostone (Amitiza) 24 mcg capsule Take 1 capsule (24 mcg total) by mouth daily with breakfast, Starting Thu 12/2/2021, Normal      magnesium hydroxide (GNP Milk of Magnesia) 400 mg/5 mL oral suspension Take 30 mL by mouth daily as needed for constipation If no BM in 3 days, Starting Wed 9/16/2020, Normal      metoprolol tartrate (LOPRESSOR) 25 mg tablet Take 1 tablet (25 mg total) by mouth 2 (two) times a day, Starting Thu 12/2/2021, Normal mineral oil-hydrophilic petrolatum (AQUAPHOR) ointment Apply topically as needed for dry skin, Starting Fri 6/11/2021, Normal      Mouthwashes (Listerine Antiseptic) LIQD Swish and spit 5 mL 2 (two) times a day, Starting Tue 12/28/2021, Until Thu 1/27/2022, Normal      Multiple Vitamins-Minerals (CertaVite Senior/Antioxidant) TABS Take 1 tablet by mouth daily, Starting Thu 7/15/2021, Normal      !! norgestimate-ethinyl estradiol (ORTHO-CYCLEN) 0 25-35 MG-MCG per tablet Take 1 tablet by mouth daily, Starting Mon 6/21/2021, Normal      !! norgestimate-ethinyl estradiol (ORTHO-CYCLEN) 0 25-35 MG-MCG per tablet Take 1 tablet by mouth daily, Historical Med      nystatin (MYCOSTATIN) powder Apply 1 application topically 4 (four) times a day, Starting Mon 10/4/2021, Normal      nystatin-triamcinolone (MYCOLOG-II) cream Apply topically 2 (two) times a day, Starting Thu 12/2/2021, Normal      pantoprazole (PROTONIX) 20 mg tablet Take 1 tablet (20 mg total) by mouth daily, Starting Tue 10/12/2021, Normal      phenol (Chloraseptic) 1 4 % mucosal liquid Apply 1 spray to the mouth or throat every 2 (two) hours as needed (for sore throat as needed), Starting Thu 10/15/2020, Print      polyethylene glycol (MIRALAX) 17 g packet Take one packet daily as needed for no bowel movement in 24 hours, Normal      psyllium (METAMUCIL) 58 6 % packet Take 1 packet by mouth daily, Starting Tue 12/28/2021, Normal      RA SUNSCREEN SPF50 LOTN Apply 15 minutes before sun exposure and every 2 hours, Normal      senna-docusate sodium (Senexon-S) 8 6-50 mg per tablet Take 1 tablet by mouth daily at 8am , Starting Wed 12/8/2021, Normal      sodium chloride (OCEAN) 0 65 % nasal spray 1 spray into each nostril as needed (three times daily as needed for nasal congestion), Starting Tue 10/26/2021, Normal      zinc oxide (DESITIN) 13 % cream Apply 1 application topically as needed (for perianal irritation), Starting Tue 10/26/2021, Normal       !! - Potential duplicate medications found  Please discuss with provider  No discharge procedures on file      PDMP Review       Value Time User    PDMP Reviewed  Yes 10/6/2021  1:11 PM Shmuel Cardona PA-C          ED Provider  Electronically Signed by           Chastity Goldsmith PA-C  01/02/22 8109

## 2022-01-03 ENCOUNTER — TELEPHONE (OUTPATIENT)
Dept: UROLOGY | Facility: AMBULATORY SURGERY CENTER | Age: 42
End: 2022-01-03

## 2022-01-03 NOTE — TELEPHONE ENCOUNTER
LM notifying pt that due to an emergency, provider is unavailable, and we need to cx the appt today 1/3/21 and reschedule to next available in [place]  Pt is to cb to reschedule

## 2022-01-05 DIAGNOSIS — E55.9 VITAMIN D DEFICIENCY: ICD-10-CM

## 2022-01-05 RX ORDER — MULTIVIT-MIN/FA/LYCOPEN/LUTEIN .4-300-25
1 TABLET ORAL DAILY
Qty: 31 TABLET | Refills: 5 | Status: SHIPPED | OUTPATIENT
Start: 2022-01-05 | End: 2022-07-07 | Stop reason: SDUPTHER

## 2022-01-06 LAB
FLUAV RNA RESP QL NAA+PROBE: NEGATIVE
FLUBV RNA RESP QL NAA+PROBE: NEGATIVE
SARS-COV-2 RNA RESP QL NAA+PROBE: POSITIVE

## 2022-01-07 ENCOUNTER — TELEMEDICINE (OUTPATIENT)
Dept: FAMILY MEDICINE CLINIC | Facility: CLINIC | Age: 42
End: 2022-01-07

## 2022-01-07 VITALS — TEMPERATURE: 97.5 F | OXYGEN SATURATION: 95 %

## 2022-01-07 DIAGNOSIS — U07.1 COVID-19: Primary | ICD-10-CM

## 2022-01-07 PROCEDURE — G2025 DIS SITE TELE SVCS RHC/FQHC: HCPCS | Performed by: FAMILY MEDICINE

## 2022-01-07 NOTE — ASSESSMENT & PLAN NOTE
Symptom onset: 1/1 (only cough and body aches)  Tested positive: 1/2 following ED visit for cough  Patient is vaccinated x3, no other symptoms  Patient's caretaker reports that she is at baseline otherwise and have significantly improved  Counseled about social distancing, hand hygiene and masking guidelines  Follow up if symptoms worsen or fail to resolve

## 2022-01-07 NOTE — PROGRESS NOTES
COVID-19 Outpatient Progress Note    Assessment/Plan:    Problem List Items Addressed This Visit        Other    COVID-19 - Primary     Symptom onset: 1/1 (only cough and body aches)  Tested positive: 1/2 following ED visit for cough  Patient is vaccinated x3, no other symptoms  Patient's caretaker reports that she is at baseline otherwise and have significantly improved  Counseled about social distancing, hand hygiene and masking guidelines  Follow up if symptoms worsen or fail to resolve  Disposition:     Patient is fully vaccinated and has received their booster shot  According to CDC guidelines, quarantine is not required after close contact exposure and they were advised to wear a mask for 10 days after the exposure  If patient were to develop symptoms, they should immediately self isolate and call our office for further guidance  I have spent 15 minutes directly with the patient  Greater than 50% of this time was spent in counseling/coordination of care regarding: instructions for management, patient and family education and impressions  Encounter provider Rocael Sinha MD    Provider located at [address] [place] [address]   [phone]    Recent Visits  No visits were found meeting these conditions  Showing recent visits within past 7 days and meeting all other requirements  Today's Visits  Date Type Provider Dept   01/07/22 Telemedicine Rocael Sinha MD [facility] today's visits and meeting all other requirements  Future Appointments  No visits were found meeting these conditions  Showing future appointments within next 150 days and meeting all other requirements     This virtual check-in was done via telephone and she agrees to proceed      Patient agrees to participate in a virtual check in via telephone or video visit instead of presenting to the office to address urgent/immediate medical needs  Patient is aware this is a billable service  After connecting through Telephone, the patient was identified by name and date of birth  Jabari Castañeda was informed that this was a telemedicine visit and that the exam was being conducted confidentially over secure lines  My office door was closed  No one else was in the room  Jabari Castañeda acknowledged consent and understanding of privacy and security of the telemedicine visit  I informed the patient that I have reviewed her record in Epic and presented the opportunity for her to ask any questions regarding the visit today  The patient agreed to participate  It was my intent to perform this visit via video technology but the patient was not able to do a video connection so the visit was completed via audio telephone only  Verification of patient location:  Patient is located in the following state in which I hold an active license: [place]    Subjective:   Jabari Castañeda is a 42 y o  female who is concerned about COVID-19  Patient is currently asymptomatic  Patient's symptoms include cough and myalgias  Patient denies fever, chills, fatigue, malaise, congestion, rhinorrhea, sore throat, anosmia, loss of taste, shortness of breath, chest tightness, abdominal pain, nausea, vomiting, diarrhea and headaches         COVID-19 vaccination status: Fully vaccinated with booster    Exposure:   Contact with a person who is under investigation (PUI) for or who is positive for COVID-19 within the last 14 days?: No    Hospitalized recently for fever and/or lower respiratory symptoms?: No      Currently a healthcare worker that is involved in direct patient care?: No      Works in a special setting where the risk of COVID-19 transmission may be high? (this may include long-term care, correctional and senior care facilities; homeless shelters; assisted-living facilities and group homes ): No      Resident in a special setting where the risk of COVID-19 transmission may be high? (this may include long-term care, correctional and [facility]CC facilities; homeless shelters; assisted-living facilities and group homes ): Yes      Lab Results   Component Value Date    SARSCOV2 Positive (A) 01/02/2022    SARSCOV2 Not Detected 06/18/2020    [facility] [address] Not Detected 09/12/2021     Past Medical History:   Diagnosis Date    ADD (attention deficit disorder)     Anxiety     Astigmatism     Brain lesion     Calcium deficiency     Cellulitis of foot, right 6/4/2018    Cerebral palsy (HCC)     Chronic otitis media     Constipation     Depression     Dysphagia     Esophagitis     Esotropia     GERD (gastroesophageal reflux disease)     Hiatal hernia     Hydrocephalus (HCC)     Impaired fasting glucose     Left nephrolithiasis 03/04/2019    Myopia     Oppositional defiant disorder     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HCC)     Seizures (HCC)     Sensorineural hearing loss     Status post ventriculoatrial shunt placement     Visual impairment      Past Surgical History:   Procedure Laterality Date    BREAST BIOPSY Left     X 2 (not sure of years)    CSF SHUNT      Creation of Ventriculo-Peritoneal CSF shunt ; Last Assessed:7/6/2016    EAR SURGERY      Last Assessed:7/6/2016    LEG SURGERY      due to CP     NOSE SURGERY      Last Assessed:7/6/2016    NV ESOPHAGOGASTRODUODENOSCOPY TRANSORAL DIAGNOSTIC N/A 5/9/2019    Procedure: ESOPHAGOGASTRODUODENOSCOPY (EGD) with biopsy;  Surgeon: Jeanine Louie MD;  Location: [facility];   Service: Gastroenterology    UPPER GASTROINTESTINAL ENDOSCOPY  05/2019     Current Outpatient Medications   Medication Sig Dispense Refill    acetaminophen (TYLENOL) 500 mg tablet Take 1 tablet (500 mg total) by mouth every 6 (six) hours as needed for mild pain 30 tablet 5    aluminum-magnesium hydroxide-simethicone (Antacid) 200-200-20 mg/5 mL suspension Take 15 mL by mouth every 4 (four) hours as needed for indigestion or heartburn 355 mL 5  amitriptyline (ELAVIL) 10 mg tablet Take 10 mg by mouth daily at bedtime      ARIPiprazole (ABILIFY) 20 MG tablet Take 1 tablet (20 mg total) by mouth daily at bedtime 90 tablet 2    bacitracin topical ointment 500 units/g topical ointment Cleanse site on nose with soap and water followed by bacitracin BID until healed then p r n  15 g 2    bismuth subsalicylate (PEPTO BISMOL) 524 mg/30 mL oral suspension Take 15 mL (262 mg total) by mouth every 6 (six) hours as needed for indigestion 360 mL 3    calcium carbonate (TUMS) 500 mg chewable tablet Chew 1 tablet (500 mg total) 3 (three) times a day as needed for heartburn 30 tablet 5    carbamide peroxide (DEBROX) 6 5 % otic solution Administer 5 drops into the left ear 2 (two) times a day 15 mL 0    D3-1000 25 MCG (1000 UT) tablet Take 2 tablets (2,000 Units total) by mouth daily 62 tablet 5    dicyclomine (BENTYL) 20 mg tablet Take 1 tablet (20 mg total) by mouth every 6 (six) hours as needed (as needed) 120 tablet 2    Dyclonine-Glycerin (Cepacol Sore Throat Spray) 0 1-33 % LIQD Apply 1 spray to the mouth or throat 3 (three) times a day as needed (sorethroat) 118 mL 5    Elastic Bandages & Supports (Neoprene Knee Brace) MISC Apply right knee brace in morning; remove at night 1 each 0    escitalopram (LEXAPRO) 20 mg tablet Take 1 tablet (20 mg total) by mouth daily 90 tablet 2    fluticasone (FLONASE) 50 mcg/act nasal spray 1 spray into each nostril daily as needed for rhinitis (nasal congestion) At 8:00 AM 18 2 mL 5    guaiFENesin (ROBITUSSIN) 100 MG/5ML oral liquid Take 200 mg by mouth 3 (three) times a day as needed for cough      hydrOXYzine HCL (ATARAX) 10 mg tablet Take 1 tablet (10 mg total) by mouth 3 (three) times a day 30 tablet 3    ibuprofen (MOTRIN) 400 mg tablet Take 1 tablet (400 mg total) by mouth every 8 (eight) hours as needed for mild pain 30 tablet 5    ketoconazole (NIZORAL) 2 % cream       Konsyl Daily Fiber 28 3 %       lamoTRIgine (LaMICtal) 100 mg tablet       lamoTRIgine (LaMICtal) 25 mg tablet Take 25 mg by mouth 2 (two) times a day  (Patient not taking: Reported on 12/9/2021 )      LORazepam (ATIVAN) 0 5 mg tablet Take 0 25 mg by mouth 2 (two) times a day      lubiprostone (Amitiza) 24 mcg capsule Take 1 capsule (24 mcg total) by mouth daily with breakfast 60 capsule 0    magnesium hydroxide (GNP Milk of Magnesia) 400 mg/5 mL oral suspension Take 30 mL by mouth daily as needed for constipation If no BM in 3 days 355 mL 5    metoprolol tartrate (LOPRESSOR) 25 mg tablet Take 1 tablet (25 mg total) by mouth 2 (two) times a day 60 tablet 5    mineral oil-hydrophilic petrolatum (AQUAPHOR) ointment Apply topically as needed for dry skin 420 g 5    Mouthwashes (Listerine Antiseptic) LIQD Swish and spit 5 mL 2 (two) times a day 1000 mL 5    Multiple Vitamins-Minerals (CertaVite Senior/Antioxidant) TABS Take 1 tablet by mouth daily 31 tablet 5    norgestimate-ethinyl estradiol (ORTHO-CYCLEN) 0 25-35 MG-MCG per tablet Take 1 tablet by mouth daily 28 tablet 11    norgestimate-ethinyl estradiol (ORTHO-CYCLEN) 0 25-35 MG-MCG per tablet Take 1 tablet by mouth daily      nystatin (MYCOSTATIN) powder Apply 1 application topically 4 (four) times a day 45 g 5    nystatin-triamcinolone (MYCOLOG-II) cream Apply topically 2 (two) times a day 30 g 0    pantoprazole (PROTONIX) 20 mg tablet Take 1 tablet (20 mg total) by mouth daily 62 tablet 5    phenol (Chloraseptic) 1 4 % mucosal liquid Apply 1 spray to the mouth or throat every 2 (two) hours as needed (for sore throat as needed) 236 mL 1    polyethylene glycol (MIRALAX) 17 g packet Take one packet daily as needed for no bowel movement in 24 hours 30 each 5    psyllium (METAMUCIL) 58 6 % packet Take 1 packet by mouth daily 30 packet 5    RA SUNSCREEN SPF50 LOTN Apply 15 minutes before sun exposure and every 2 hours 1 Bottle 5    senna-docusate sodium (Senexon-S) 8 6-50 mg per tablet Take 1 tablet by mouth daily at 8am  30 tablet 5    sodium chloride (OCEAN) 0 65 % nasal spray 1 spray into each nostril as needed (three times daily as needed for nasal congestion) 60 mL 5    zinc oxide (DESITIN) 13 % cream Apply 1 application topically as needed (for perianal irritation) 454 g 5     No current facility-administered medications for this visit  No Known Allergies    Review of Systems   Constitutional: Negative for chills, fatigue and fever  HENT: Negative for congestion, rhinorrhea and sore throat  Respiratory: Positive for cough  Negative for chest tightness and shortness of breath  Gastrointestinal: Negative for abdominal pain, diarrhea, nausea and vomiting  Musculoskeletal: Positive for myalgias  Neurological: Negative for headaches  Objective:    Vitals:    01/07/22 1025   Temp: 97 5 °F (36 4 °C)   SpO2: 95%       Physical Exam  Constitutional:       General: She is not in acute distress  HENT:      Head: Normocephalic  Pulmonary:      Effort: Pulmonary effort is normal  No respiratory distress  Chest:      Chest wall: No tenderness  Neurological:      Mental Status: She is alert  VIRTUAL VISIT DISCLAIMER    Jason Silva verbally agrees to participate in [employer]  Pt is aware that [employer] could be limited without vital signs or the ability to perform a full hands-on physical exam  Elizabeth Rogers understands she or the provider may request at any time to terminate the video visit and request the patient to seek care or treatment in person        Daphne Luu MD  PGY-2 Family Medicine  01/07/22

## 2022-01-20 ENCOUNTER — OFFICE VISIT (OUTPATIENT)
Dept: SLEEP CENTER | Facility: CLINIC | Age: 42
End: 2022-01-20
Payer: MEDICARE

## 2022-01-20 VITALS
BODY MASS INDEX: 46.34 KG/M2 | HEART RATE: 74 BPM | HEIGHT: 56 IN | DIASTOLIC BLOOD PRESSURE: 74 MMHG | WEIGHT: 206 LBS | SYSTOLIC BLOOD PRESSURE: 120 MMHG | OXYGEN SATURATION: 98 %

## 2022-01-20 DIAGNOSIS — E66.01 OBESITY, CLASS III, BMI 40-49.9 (MORBID OBESITY) (HCC): ICD-10-CM

## 2022-01-20 DIAGNOSIS — G47.33 OBSTRUCTIVE SLEEP APNEA: Primary | ICD-10-CM

## 2022-01-20 DIAGNOSIS — R00.2 PALPITATIONS: ICD-10-CM

## 2022-01-20 DIAGNOSIS — F33.3 SEVERE EPISODE OF RECURRENT MAJOR DEPRESSIVE DISORDER, WITH PSYCHOTIC FEATURES (HCC): ICD-10-CM

## 2022-01-20 DIAGNOSIS — I70.209 ATHEROSCLEROSIS OF ARTERIES OF EXTREMITIES (HCC): ICD-10-CM

## 2022-01-20 DIAGNOSIS — K21.9 GASTROESOPHAGEAL REFLUX DISEASE WITHOUT ESOPHAGITIS: ICD-10-CM

## 2022-01-20 PROCEDURE — 99205 OFFICE O/P NEW HI 60 MIN: CPT | Performed by: NURSE PRACTITIONER

## 2022-01-20 NOTE — PATIENT INSTRUCTIONS
1   Schedule a split night sleep study  2  Schedule set up of CPAP equipment  3  Schedule follow up for compliance    Nursing Support:  When: Monday through Friday 7A-5PM except holidays  Where: Our direct line is [phone]  If you are having a true emergency please call 911  In the event that the line is busy or it is after hours please leave a voice message and we will return your call  Please speak clearly, leaving your full name, birth date, best number to reach you and the reason for your call  Medication refills: We will need the name of the medication, the dosage, the ordering provider, whether you get a 30 or 90 day refill, and the pharmacy name and address  Medications will be ordered by the provider only  Nurses cannot call in prescriptions  Please allow 7 days for medication refills  Physician requested updates: If your provider requested that you call with an update after starting medication, please be ready to provide us the medication and dosage, what time you take your medication, the time you attempt to fall asleep, time you fall asleep, when you wake up, and what time you get out of bed  Sleep Study Results: We will contact you with sleep study results and/or next steps after the physician has reviewed your testing

## 2022-01-20 NOTE — PROGRESS NOTES
Consultation - [address] Located within [facility],2Nd Floor, 1980, MRN: [id number]    1/20/2022        Reason for Consult / Principal Problem:    Evaluation of possible Obstructive Sleep Apnea       Thank you for the opportunity of participating in the evaluation and care of this patient in the Sleep Clinic at [facility]  Subjective:     HPI: Lucina Paul is a 36y o  year old female  She presents with a group home attendant for a consultation regarding evaluation of possible obstructive sleep apnea  She was working with the bariatric specialists and was identified as being at risk for obstructive sleep apnea  STOPBANG was 4/8  She reports that she snores  The attendant confirms that she awakens 2-3 times per night for urination  She takes a daytime nap for up to 2 hours on some days  She states that her father had sleep apnea and used a mask when sleeping  Comorbid conditions: Morbidly obese  Palpitations  GERD  Cerebral palsy  Anxiety  Depression  Hx of seizures  Review of Systems      Genitourinary need to urinate more than twice a night   Cardiology none   Gastrointestinal frequent heartburn/acid reflux   Neurology balance problems   Constitutional fatigue   Integumentary none   Psychiatry none   Musculoskeletal back pain   Pulmonary snoring   ENT none   Endocrine none   Hematological none       Sleep Study Results:  No prior sleep study      Employment:  Intellectually disabled    Sleep Schedule:       Bedtime:  8:00pm      Latency:  Unable to quantify but does not seem to have difficulty per the attendant      Wakeup time:  7-8:00am    Awakenings:       Frequency:  2-3 times per night      Causes:   For urination      Duration:  Returns to sleep without difficulty    Daytime Sleepiness / Inappropriate Sleep:       Most severe:  midday       Naps :  Most days      Time:  midday      Duration:  2 hours       Inappropriate drowsiness / sleep:  She does not doze with sedentary activities    Snoring:  She states that she snores loudly  Her attendant states that she doesn't gasp or snore loudly    Apnea:  Unable to determine    Change in Weight:  Weight has been relatively stable    Restless Leg Syndrome:  Unable to determine if clinical symptoms consistent with this diagnosis are present     Other Complaints:  No reports of sleep walking, sleep talking  Unable to assess for sleep paralysis or hallucinations surrounding sleep because patient answers affirmatively to most questions per the attendant  Social History:      Caffeine:  One cup of Coffee in the am       Tobacco:   reports that she has never smoked  She has never used smokeless tobacco      E-cig/Vaping:    E-Cigarette/Vaping    E-Cigarette Use Never User       E-Cigarette/Vaping Substances    Nicotine No     THC No     CBD No     Flavoring No     Other No     Unknown No          Alcohol:   reports no history of alcohol use  Drugs:   reports no history of drug use  The review of systems and following portions of the patient's history were reviewed and updated as appropriate: allergies, current medications, past family history, past medical history, past social history, past surgical history and problem list         Objective:       Vitals:    01/20/22 1200   BP: 120/74   Pulse: 74   SpO2: 98%   Weight: 93 4 kg (206 lb)   Height: 4' 8" (1 422 m)     Body mass index is 46 18 kg/m²  Neck Circumference: 19  [place] Sleepiness Scale:  Total score: 3      Current Outpatient Medications:     acetaminophen (TYLENOL) 500 mg tablet, Take 1 tablet (500 mg total) by mouth every 6 (six) hours as needed for mild pain, Disp: 30 tablet, Rfl: 5    aluminum-magnesium hydroxide-simethicone (Antacid) 200-200-20 mg/5 mL suspension, Take 15 mL by mouth every 4 (four) hours as needed for indigestion or heartburn, Disp: 355 mL, Rfl: 5    amitriptyline (ELAVIL) 10 mg tablet, Take 10 mg by mouth daily at bedtime, Disp: , Rfl:     ARIPiprazole (ABILIFY) 20 MG tablet, Take 1 tablet (20 mg total) by mouth daily at bedtime, Disp: 90 tablet, Rfl: 2    bacitracin topical ointment 500 units/g topical ointment, Cleanse site on nose with soap and water followed by bacitracin BID until healed then p r n , Disp: 15 g, Rfl: 2    bismuth subsalicylate (PEPTO BISMOL) 524 mg/30 mL oral suspension, Take 15 mL (262 mg total) by mouth every 6 (six) hours as needed for indigestion, Disp: 360 mL, Rfl: 3    calcium carbonate (TUMS) 500 mg chewable tablet, Chew 1 tablet (500 mg total) 3 (three) times a day as needed for heartburn, Disp: 30 tablet, Rfl: 5    carbamide peroxide (DEBROX) 6 5 % otic solution, Administer 5 drops into the left ear 2 (two) times a day, Disp: 15 mL, Rfl: 0    D3-1000 25 MCG (1000 UT) tablet, Take 2 tablets (2,000 Units total) by mouth daily, Disp: 62 tablet, Rfl: 5    dicyclomine (BENTYL) 20 mg tablet, Take 1 tablet (20 mg total) by mouth every 6 (six) hours as needed (as needed), Disp: 120 tablet, Rfl: 2    Dyclonine-Glycerin (Cepacol Sore Throat Spray) 0 1-33 % LIQD, Apply 1 spray to the mouth or throat 3 (three) times a day as needed (sorethroat), Disp: 118 mL, Rfl: 5    Elastic Bandages & Supports (Neoprene Knee Brace) MISC, Apply right knee brace in morning; remove at night, Disp: 1 each, Rfl: 0    escitalopram (LEXAPRO) 20 mg tablet, Take 1 tablet (20 mg total) by mouth daily, Disp: 90 tablet, Rfl: 2    fluticasone (FLONASE) 50 mcg/act nasal spray, 1 spray into each nostril daily as needed for rhinitis (nasal congestion) At 8:00 AM, Disp: 18 2 mL, Rfl: 5    guaiFENesin (ROBITUSSIN) 100 MG/5ML oral liquid, Take 200 mg by mouth 3 (three) times a day as needed for cough, Disp: , Rfl:     hydrOXYzine HCL (ATARAX) 10 mg tablet, Take 1 tablet (10 mg total) by mouth 3 (three) times a day, Disp: 30 tablet, Rfl: 3    ibuprofen (MOTRIN) 400 mg tablet, Take 1 tablet (400 mg total) by mouth every 8 (eight) hours as needed for mild pain, Disp: 30 tablet, Rfl: 5    ketoconazole (NIZORAL) 2 % cream, , Disp: , Rfl:     Konsyl Daily Fiber 28 3 %, , Disp: , Rfl:     lamoTRIgine (LaMICtal) 100 mg tablet, , Disp: , Rfl:     lamoTRIgine (LaMICtal) 25 mg tablet, Take 25 mg by mouth 2 (two) times a day  , Disp: , Rfl:     LORazepam (ATIVAN) 0 5 mg tablet, Take 0 25 mg by mouth 2 (two) times a day, Disp: , Rfl:     lubiprostone (Amitiza) 24 mcg capsule, Take 1 capsule (24 mcg total) by mouth daily with breakfast, Disp: 60 capsule, Rfl: 0    magnesium hydroxide (GNP Milk of Magnesia) 400 mg/5 mL oral suspension, Take 30 mL by mouth daily as needed for constipation If no BM in 3 days, Disp: 355 mL, Rfl: 5    metoprolol tartrate (LOPRESSOR) 25 mg tablet, Take 1 tablet (25 mg total) by mouth 2 (two) times a day, Disp: 60 tablet, Rfl: 5    mineral oil-hydrophilic petrolatum (AQUAPHOR) ointment, Apply topically as needed for dry skin, Disp: 420 g, Rfl: 5    Mouthwashes (Listerine Antiseptic) LIQD, Swish and spit 5 mL 2 (two) times a day, Disp: 1000 mL, Rfl: 5    Multiple Vitamins-Minerals (CertaVite Senior/Antioxidant) TABS, Take 1 tablet by mouth daily, Disp: 31 tablet, Rfl: 5    norgestimate-ethinyl estradiol (ORTHO-CYCLEN) 0 25-35 MG-MCG per tablet, Take 1 tablet by mouth daily, Disp: 28 tablet, Rfl: 11    norgestimate-ethinyl estradiol (ORTHO-CYCLEN) 0 25-35 MG-MCG per tablet, Take 1 tablet by mouth daily, Disp: , Rfl:     nystatin (MYCOSTATIN) powder, Apply 1 application topically 4 (four) times a day, Disp: 45 g, Rfl: 5    nystatin-triamcinolone (MYCOLOG-II) cream, Apply topically 2 (two) times a day, Disp: 30 g, Rfl: 0    pantoprazole (PROTONIX) 20 mg tablet, Take 1 tablet (20 mg total) by mouth daily, Disp: 62 tablet, Rfl: 5    phenol (Chloraseptic) 1 4 % mucosal liquid, Apply 1 spray to the mouth or throat every 2 (two) hours as needed (for sore throat as needed), Disp: 236 mL, Rfl: 1    polyethylene glycol (MIRALAX) 17 g packet, Take one packet daily as needed for no bowel movement in 24 hours, Disp: 30 each, Rfl: 5    psyllium (METAMUCIL) 58 6 % packet, Take 1 packet by mouth daily, Disp: 30 packet, Rfl: 5    RA SUNSCREEN SPF50 LOTN, Apply 15 minutes before sun exposure and every 2 hours, Disp: 1 Bottle, Rfl: 5    senna-docusate sodium (Senexon-S) 8 6-50 mg per tablet, Take 1 tablet by mouth daily at 8am , Disp: 30 tablet, Rfl: 5    sodium chloride (OCEAN) 0 65 % nasal spray, 1 spray into each nostril as needed (three times daily as needed for nasal congestion), Disp: 60 mL, Rfl: 5    zinc oxide (DESITIN) 13 % cream, Apply 1 application topically as needed (for perianal irritation), Disp: 454 g, Rfl: 5    Physical Exam  General Appearance:   Alert, cooperative, no distress, morbidly obese     Head:   Normocephalic, without obvious abnormality, atraumatic     Eyes:   PERRL, conjunctiva/corneas clear          Nose:  Nares normal, septum midline, mucosa normal, no drainage or sinus tenderness           Throat:  Lips, teeth and gums normal; tongue large in size and prominent, midline in position; mucosa moist with low-lying soft palatal tissue, uvula normal, tonsils not visualized, Mallampati class 4       Neck:  Supple, short and webbed,symmetrical, trachea midline, no adenopathy; no thyromegaly noted, no carotid bruit or JVD     Lungs:      Clear to auscultation bilaterally, respirations unlabored     Heart:   Regular rate and rhythm, S1 and S2 normal, no murmur, rub or gallop       Extremities:  Decreased range of motion of right hand, atraumatic, no cyanosis and edema noted in LE bilaterally, ambulates with a rollator walker       Skin:  Skin color, texture, turgor normal       Neurologic:  No focal deficits noted          ASSESSMENT / PLAN     1  Obstructive sleep apnea  Ambulatory referral to Sleep Medicine    Split Study   2   Obesity, Class III, BMI 40-49 9 (morbid obesity) [facility])  Ambulatory referral to Sleep Medicine   3  Severe episode of recurrent major depressive disorder, with psychotic features [facility])  Ambulatory referral to Sleep Medicine   4  Gastroesophageal reflux disease without esophagitis  Ambulatory referral to Sleep Medicine   5  Palpitations  Ambulatory referral to Sleep Medicine   6  Elevated fasting glucose  Ambulatory referral to Sleep Medicine   7  Atherosclerosis of arteries of extremities [facility])  Ambulatory referral to Sleep Medicine         Counseling / Coordination of Care  Total clinic time spent today 60 minutes  Greater than 50% of total time was spent with the patient and / or family counseling and / or coordination of care  A description of the counseling / coordination of care:     diagnostic results, instructions for management, risk factor reductions, prognosis, patient and family education, impressions, risks and benefits of treatment options and importance of compliance with treatment    Today we discussed the anatomy and physiology of the upper airway  I pointed out how changes in this region can result in both snoring and abnormal breathing events including apneas and hypopneas  I explained the most common co-morbidities of untreated sleep apnea  After this we talked about some forms of treatment including application of positive airway pressure, mandibular advancement devices and surgery  In order to evaluate the severity of Obstructive Sleep Apnea as a cause of the patient's worsening symptoms, a split study test will be completed  If significant abnormal nocturnal breathing is detected, nasal CPAP will be titrated to find the optimum pressure needed to maintain upper airway patency during sleep  Following testing, the patient will return to the Sleep [address] [facility] for a review of the test results and to set up equipment  The following instructions have been given to the patient today:    Patient Instructions   1    Schedule a split night sleep study  2  Schedule set up of CPAP equipment  3  Schedule follow up for compliance    Nursing Support:  When: Monday through Friday 7A-5PM except holidays  Where: Our direct line is [phone]  If you are having a true emergency please call 911  In the event that the line is busy or it is after hours please leave a voice message and we will return your call  Please speak clearly, leaving your full name, birth date, best number to reach you and the reason for your call  Medication refills: We will need the name of the medication, the dosage, the ordering provider, whether you get a 30 or 90 day refill, and the pharmacy name and address  Medications will be ordered by the provider only  Nurses cannot call in prescriptions  Please allow 7 days for medication refills  Physician requested updates: If your provider requested that you call with an update after starting medication, please be ready to provide us the medication and dosage, what time you take your medication, the time you attempt to fall asleep, time you fall asleep, when you wake up, and what time you get out of bed  Sleep Study Results: We will contact you with sleep study results and/or next steps after the physician has reviewed your testing          Adalid Izaguirre, [address] [facility]

## 2022-01-25 ENCOUNTER — OFFICE VISIT (OUTPATIENT)
Dept: DENTISTRY | Facility: CLINIC | Age: 42
End: 2022-01-25

## 2022-01-25 VITALS — DIASTOLIC BLOOD PRESSURE: 77 MMHG | HEART RATE: 82 BPM | SYSTOLIC BLOOD PRESSURE: 113 MMHG | TEMPERATURE: 97.4 F

## 2022-01-25 DIAGNOSIS — K03.6 ACCRETIONS ON TEETH: ICD-10-CM

## 2022-01-25 DIAGNOSIS — Z01.20 ENCOUNTER FOR DENTAL EXAMINATION: Primary | ICD-10-CM

## 2022-01-25 DIAGNOSIS — K03.6 DENTAL CALCULUS: ICD-10-CM

## 2022-01-25 PROCEDURE — D1110 PROPHYLAXIS - ADULT: HCPCS

## 2022-01-25 PROCEDURE — D0120 PERIODIC ORAL EVALUATION - ESTABLISHED PATIENT: HCPCS | Performed by: DENTIST

## 2022-01-25 PROCEDURE — D1330 ORAL HYGIENE INSTRUCTIONS: HCPCS

## 2022-01-25 NOTE — PROGRESS NOTES
Prophy    Dental procedures in this visit     - PERIODIC ORAL EVALUATION - ESTABLISHED PATIENT (Completed)     Evaluate # 29     Service provider: Shivam Ley DMD     Billing provider: Kiki Vora DDS     - PROPHYLAXIS - ADULT (Completed)     Service provider: Danette Ramirez, [facility], [address] [facility]     Billing provider: Danette Ramirez, [facility], [address] [facility]  - ORAL HYGIENE INSTRUCTIONS (Completed)     Service provider: Danette Ramirez, [facility], [address] [facility]     Billing provider: Danette Ramirez RD, PHDHP     ASA 2 patient presented with caregiver from group home  Reviewed medical history  Caregiver reports no changes to medications  Joe Cornejo did have COVID 1/6/22 but had minor symptoms  asymptomatic now       Method Used:  · Prophy Method Used: Ultrasonic Scaling  · Hand Scaling  · Polished  · Flossed    Radiographs Taken:  · None    Intra/Extra Oral Cancer Screening:  · Within normal limits as much as we could assess  ·   Oral Hygiene:  · Poor    Plaque:  · Generalized  · Moderate    Calculus:  · Light    Bleeding:  Bleeding on probing: No periodontal exam for this visit  · Light    Stain:  · Diana Knutson    Stressed with Joe Cornejo and caregiver better home care facial areas   She tends to pull her lip in tight!!! Complained most of the visit  Compromise scale   We were able to remove a lot of debris from lower anterior teeth    NV 6 mos recall     Next BW attempt to get PAX retained deciduous tooth H/11 area    We were not able to get PANO on her    No orders of the defined types were placed in this encounter

## 2022-01-27 ENCOUNTER — HOSPITAL ENCOUNTER (OUTPATIENT)
Dept: SLEEP CENTER | Facility: CLINIC | Age: 42
Discharge: HOME | End: 2022-01-27
Payer: MEDICARE

## 2022-01-27 DIAGNOSIS — G47.33 OBSTRUCTIVE SLEEP APNEA: ICD-10-CM

## 2022-01-27 PROCEDURE — 95810 POLYSOM 6/> YRS 4/> PARAM: CPT | Performed by: INTERNAL MEDICINE

## 2022-01-27 PROCEDURE — 95810 POLYSOM 6/> YRS 4/> PARAM: CPT

## 2022-01-28 NOTE — PROGRESS NOTES
Sleep Study Documentation    Pre-Sleep Study       Sleep testing procedure explained to patient:YES    Patient napped prior to study:NO    Caffeine:Nightshift worker after midnight  Caffeine use:YES- coffee  6 ounces    Alcohol:Dayshift workers after 5PM: Alcohol use:NO    Typical day for patient:YES       Study Documentation    Sleep Study Indications: Unrefreshing sleep, mood disturbance    Sleep Study: Diagnostic   Snore: Moderate  Supplemental O2: no    Minimum SaO2 82  Baseline SaO2 95      EKG abnormalities: no     EEG abnormalities: no    Sleep Study Recorded < 2 hours: N/A    Sleep Study Recorded > 2 hours but incomplete study: N/A    Sleep Study Recorded 6 hours but no sleep obtained: NO    Patient classification: disabled       Post-Sleep Study    Medication used at bedtime or during sleep study:YES other prescription medications    Patient reports time it took to fall asleep:less than 20 minutes    Patient reports waking up during study:1 to 2 times  Patient reports returning to sleep without difficulty  Patient reports sleeping more than 8 hours without dreaming  Patient reports sleep during study:typical    Patient rated sleepiness: Not sleepy or tired    PAP treatment:no

## 2022-01-31 ENCOUNTER — TELEPHONE (OUTPATIENT)
Dept: SLEEP CENTER | Facility: CLINIC | Age: 42
End: 2022-01-31

## 2022-01-31 NOTE — TELEPHONE ENCOUNTER
----- Message from Khoa Hawk, [address] sent at 1/29/2022 11:36 AM EST -----  No sleep apnea was identified on the night of the sleep study  CPAP is not needed  Patient to follow up to review results of the study and plan of treatment

## 2022-01-31 NOTE — TELEPHONE ENCOUNTER
Left voice message for patient to call office to review sleep study results  Patient has follow up 6/14/22 that she may want to move sooner

## 2022-02-01 NOTE — TELEPHONE ENCOUNTER
Returned the care giver's call  She left message stating she has Elizabeth sleep study report  She was asking for sooner appointment   Left office phone number for her to call to reschedule

## 2022-02-08 ENCOUNTER — TELEPHONE (OUTPATIENT)
Dept: FAMILY MEDICINE CLINIC | Facility: CLINIC | Age: 42
End: 2022-02-08

## 2022-02-08 NOTE — TELEPHONE ENCOUNTER
Caller is requesting an order for an Annual Hearing Test be placed in Epic appointment scheduled for Feb 25th and would like to  order at Nurse visit here Feb 15th  RECENT DIAGNOSIS 12/13/16, WILL OBTAIN VISUAL FIELDS.

## 2022-02-09 ENCOUNTER — OFFICE VISIT (OUTPATIENT)
Dept: GASTROENTEROLOGY | Facility: MEDICAL CENTER | Age: 42
End: 2022-02-09
Payer: MEDICARE

## 2022-02-09 VITALS
WEIGHT: 207.8 LBS | TEMPERATURE: 98.4 F | BODY MASS INDEX: 46.59 KG/M2 | DIASTOLIC BLOOD PRESSURE: 66 MMHG | HEART RATE: 93 BPM | SYSTOLIC BLOOD PRESSURE: 120 MMHG

## 2022-02-09 DIAGNOSIS — K56.600 PARTIAL SMALL BOWEL OBSTRUCTION (HCC): ICD-10-CM

## 2022-02-09 DIAGNOSIS — R13.10 DYSPHAGIA, UNSPECIFIED TYPE: Primary | ICD-10-CM

## 2022-02-09 PROCEDURE — 99214 OFFICE O/P EST MOD 30 MIN: CPT | Performed by: PHYSICIAN ASSISTANT

## 2022-02-09 NOTE — PROGRESS NOTES
[facility] - Outpatient Follow-up Note  Yancy Borges 36 y o  female MRN: [id number]  Encounter: [id number]          ASSESSMENT AND PLAN:      1  Dysphagia, unspecified type:  2  Acid reflux: hx of dysphagia, fortunately improved  She has had barium swallow, EGD and manometry that have been normal  This is likely functional in nature  She is on protonix 20mg bid for her acid reflux which is controlling her symptoms well  -continue protonix 20mg bid    2  Partial small bowel obstruction ([address] ): hx of recurrent partial SBO  The last time this occurred was in 10/2021 confirmed on CT scan  She did not tolerate NGT insertion and she was treated conservatively at that time  Fortunately she remains feeling well  She is having regular bowel movements on her bowel regimen with fiber and miralax and denies any abdominal pain  If this occurs again, consider small bowel CT enterography   -resolved  -continue current bowel regimen    ______________________________________________________________________    SUBJECTIVE:  Symone Zapata is a 37 yo female with pmh cerebral palsy, and acid reflux who is here for follow up of dysphagia and recurrent partial SBO  Fortunately her dysphagia has resolved  She has had extensive work up for this including EGD, manometry and barium swallow which were normal  She is currently on protonix 20mg bid and denies any symptoms a this time  She was most recently seen in the office as a hospital follow up for SBO  She went to the ED 10/8/21 with abdominal pain  She had a normal kub but CT showed partial small bowel obstruction  She could not tolerate NGT insertion and she was ultimately treated conservatively and discharged  She is on a bowel regimen with fiber and miralax  She is having bowel movements and denies any further episodes of abdominal pain  REVIEW OF SYSTEMS IS OTHERWISE NEGATIVE        Historical Information   Past Medical History:   Diagnosis Date    ADD (attention deficit disorder)     Anxiety     Astigmatism     Brain lesion     Calcium deficiency     Cellulitis of foot, right 6/4/2018    Cerebral palsy (HCC)     Chronic otitis media     Constipation     Depression     Dysphagia     Esophagitis     Esotropia     GERD (gastroesophageal reflux disease)     Hiatal hernia     Hydrocephalus (HCC)     Impaired fasting glucose     Left nephrolithiasis 03/04/2019    Myopia     Oppositional defiant disorder     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HCC)     Seizures (HCC)     Sensorineural hearing loss     Status post ventriculoatrial shunt placement     Visual impairment      Past Surgical History:   Procedure Laterality Date    BREAST BIOPSY Left     X 2 (not sure of years)    CSF SHUNT      Creation of Ventriculo-Peritoneal CSF shunt ; Last Assessed:7/6/2016    EAR SURGERY      Last Assessed:7/6/2016    LEG SURGERY      due to CP     NOSE SURGERY      Last Assessed:7/6/2016    SD ESOPHAGOGASTRODUODENOSCOPY TRANSORAL DIAGNOSTIC N/A 5/9/2019    Procedure: ESOPHAGOGASTRODUODENOSCOPY (EGD) with biopsy;  Surgeon: Sofia Shahid MD;  Location: [facility];   Service: Gastroenterology    UPPER GASTROINTESTINAL ENDOSCOPY  05/2019     Social History   Social History     Substance and Sexual Activity   Alcohol Use Never     Social History     Substance and Sexual Activity   Drug Use Never     Social History     Tobacco Use   Smoking Status Never Smoker   Smokeless Tobacco Never Used     Family History   Problem Relation Age of Onset    Diabetes Mother     Colon cancer Father     No Known Problems Maternal Grandmother     No Known Problems Maternal Grandfather     No Known Problems Paternal Grandmother     No Known Problems Paternal Grandfather     Hypertension Neg Hx     Heart disease Neg Hx     Stroke Neg Hx     Thyroid disease Neg Hx        Meds/Allergies       Current Outpatient Medications:     acetaminophen (TYLENOL) 500 mg tablet   aluminum-magnesium hydroxide-simethicone (Antacid) 200-200-20 mg/5 mL suspension    amitriptyline (ELAVIL) 10 mg tablet    ARIPiprazole (ABILIFY) 20 MG tablet    bacitracin topical ointment 500 units/g topical ointment    bismuth subsalicylate (PEPTO BISMOL) 524 mg/30 mL oral suspension    calcium carbonate (TUMS) 500 mg chewable tablet    carbamide peroxide (DEBROX) 6 5 % otic solution    D3-1000 25 MCG (1000 UT) tablet    dicyclomine (BENTYL) 20 mg tablet    Dyclonine-Glycerin (Cepacol Sore Throat Spray) 0 1-33 % LIQD    Elastic Bandages & Supports (Neoprene Knee Brace) MISC    escitalopram (LEXAPRO) 20 mg tablet    fluticasone (FLONASE) 50 mcg/act nasal spray    guaiFENesin (ROBITUSSIN) 100 MG/5ML oral liquid    hydrOXYzine HCL (ATARAX) 10 mg tablet    ibuprofen (MOTRIN) 400 mg tablet    ketoconazole (NIZORAL) 2 % cream    Konsyl Daily Fiber 28 3 %    lamoTRIgine (LaMICtal) 100 mg tablet    lamoTRIgine (LaMICtal) 25 mg tablet    LORazepam (ATIVAN) 0 5 mg tablet    lubiprostone (Amitiza) 24 mcg capsule    magnesium hydroxide (GNP Milk of Magnesia) 400 mg/5 mL oral suspension    metoprolol tartrate (LOPRESSOR) 25 mg tablet    mineral oil-hydrophilic petrolatum (AQUAPHOR) ointment    Multiple Vitamins-Minerals (CertaVite Senior/Antioxidant) TABS    norgestimate-ethinyl estradiol (ORTHO-CYCLEN) 0 25-35 MG-MCG per tablet    norgestimate-ethinyl estradiol (ORTHO-CYCLEN) 0 25-35 MG-MCG per tablet    nystatin (MYCOSTATIN) powder    nystatin-triamcinolone (MYCOLOG-II) cream    pantoprazole (PROTONIX) 20 mg tablet    phenol (Chloraseptic) 1 4 % mucosal liquid    polyethylene glycol (MIRALAX) 17 g packet    psyllium (METAMUCIL) 58 6 % packet    RA SUNSCREEN SPF50 LOTN    senna-docusate sodium (Senexon-S) 8 6-50 mg per tablet    sodium chloride (OCEAN) 0 65 % nasal spray    zinc oxide (DESITIN) 13 % cream    Mouthwashes (Listerine Antiseptic) LIQD    No Known Allergies        Objective Blood pressure 120/66, pulse 93, temperature 98 4 °F (36 9 °C), weight 94 3 kg (207 lb 12 8 oz), not currently breastfeeding  Body mass index is 46 59 kg/m²  PHYSICAL EXAM:      General Appearance:   Alert, cooperative, no distress, uses walker   HEENT:   Normocephalic, atraumatic, anicteric      Neck:  Supple, symmetrical, trachea midline   Lungs:   Clear to auscultation bilaterally; no rales, rhonchi or wheezing; respirations unlabored    Heart[de-identified]   Regular rate and rhythm; no murmur, rub, or gallop  Abdomen:   Soft, non-tender, non-distended; normal bowel sounds; no masses, no organomegaly    Genitalia:   Deferred    Rectal:   Deferred    Extremities:  No cyanosis, clubbing or edema    Pulses:  2+ and symmetric    Skin:  No jaundice, rashes, or lesions    Lymph nodes:  No palpable cervical lymphadenopathy        Lab Results:   No visits with results within 1 Day(s) from this visit  Latest known visit with results is:   Admission on 01/02/2022, Discharged on 01/02/2022   Component Date Value    SARS-CoV-2 01/02/2022 Positive*    INFLUENZA A PCR 01/02/2022 Negative     INFLUENZA B PCR 01/02/2022 Negative          Radiology Results:   Diagnostic Sleep Study    Result Date: 1/28/2022  Narrative: Diagnostic Report Patient Name: Danuta Stuart Patient Details: Female, 42 years  Patient #: [id number] PSG YOB: 1980 Referring Physician: TA Ashton Height: 56 0 in Interpreting Physician: Dr Damion Cardona Weight: 206 0 lbs Study Date: 1/27/2022 B  M I : 46 2 STUDY FORMAT: The patient was admitted to the sleep laboratory at the [address] [place] [address] and oriented to procedures and equipment  Data was obtained using the [employer] sleep monitoring system; Parameters monitored: EEG (frontal, central, occipital), EOG, EMG (chin and leg), ECG, body position, abdominal and thoracic respiratory effort, snoring, pulse oximetry, and airflow    The sleep study was scored following the rules established by the American Academy of Sleep Medicine (AASM)  Hypopnea: event lasting ? 10 seconds with a ?30% reduction in airflow amplitude and a ?4% decrease in SpO2  HISTORY: 35 y/o F with PMHx of GERD, cerebral palsy, seizures, anxiety and depression who comes in for evaluation of snoring and nocturnal awakenings  SLEEP: Patient slept for 389 5 minutes out of 458 3 minutes in bed representing a sleep efficiency of 85 0%  Sleep architecture showed a sleep latency of 2 8 minutes and a REM sleep latency of 268 0 minutes  There was 32 0% Stage N1 noted  There was 63 2% Stage N2 noted  There was 0 0% Stage N3 noted  There was 4 9% REM sleep noted  The patient had 68 arousals for an average of 10 5 arousals per hour of sleep  RESPIRATORY: There were a total of 4 respiratory events made up of 0 obstructive apneas, 0 mixed apneas, 0 central apneas, and 4 hypopneas resulting in an apnea/hypopnea index (AHI) of 0 6 events per hour of sleep  The AHI in the supine position was 0 0  The AHI during REM sleep was 12 6  OXIMETRY: The baseline oxygen saturation with the patient awake was 94 3%  The mean oxygen saturation throughout the study was 93 1%  The amount of sleep time below 90% was 2 7 minutes  The lowest oxygen saturation was 82 0%  BODY POSITION: The patient slept 12 8% of the evening in the supine position, 87 2% on left side, 0 0% on right side, and 0 0%  in the prone position  LEG MOVEMENT: There were 106 PLMs; PLM index of 16 3; 8 PLMs associated with arousal; PLM w/arousal index of 1 2  SUMMARY:       TOTAL INDEX Apneas and Hypopneas 4 0 6 Central Apneas 0 0 0 Arousals 68 10 5 PLMs 106 16 3     Impression:  Ms Neo Landon  sleep staging demonstrated a reduced sleep latency (2 8 minutes) but delayed REM latency  Staging also revealed reduced no stage 3 sleep and minimal REMsleep  Thiscan be related to hypersomnia or narcolepsy    If there is still daytime sleepiness, I recommend a diagnostic sleep study with MSLT  she demonstrated a few sleep related respiratory events (AHI - 0 6) which were predominantly in REM sleep (REM AHI - 12 6)  Therefore, minimal REM sleep may have underestimated the severity of her NADIYA  She did have frequent limb movements (PLM index - 16 3) which is consistent with periodic limb movement in sleep  I would check iron and magnesium levels  If daytime sleepiness persists, I would consider a trial of Neurontin, Mirapex, Requip or Lyrica                 Board Certified Sleep Physician

## 2022-02-15 ENCOUNTER — CLINICAL SUPPORT (OUTPATIENT)
Dept: FAMILY MEDICINE CLINIC | Facility: CLINIC | Age: 42
End: 2022-02-15

## 2022-02-15 ENCOUNTER — TELEPHONE (OUTPATIENT)
Dept: FAMILY MEDICINE CLINIC | Facility: CLINIC | Age: 42
End: 2022-02-15

## 2022-02-15 DIAGNOSIS — Z11.1 ENCOUNTER FOR PPD TEST: Primary | ICD-10-CM

## 2022-02-15 PROCEDURE — 86580 TB INTRADERMAL TEST: CPT

## 2022-02-15 NOTE — TELEPHONE ENCOUNTER
Folder (To be completed by) - Nurse     Name of Form - Medical examination casenote    Form to be given back to patient at visit    Patient was made aware of the 7 business day form policy

## 2022-02-15 NOTE — TELEPHONE ENCOUNTER
Person Directed supports form completed, copied, and handed back to staff  PPD Placed in left lower forearm

## 2022-02-17 ENCOUNTER — CLINICAL SUPPORT (OUTPATIENT)
Dept: FAMILY MEDICINE CLINIC | Facility: CLINIC | Age: 42
End: 2022-02-17

## 2022-02-17 DIAGNOSIS — Z11.1 ENCOUNTER FOR PPD SKIN TEST READING: Primary | ICD-10-CM

## 2022-02-17 LAB
INDURATION: 0 MM
TB SKIN TEST: NEGATIVE

## 2022-02-21 ENCOUNTER — RA CDI HCC (OUTPATIENT)
Dept: OTHER | Facility: HOSPITAL | Age: 42
End: 2022-02-21

## 2022-02-22 DIAGNOSIS — K21.00 GASTROESOPHAGEAL REFLUX DISEASE WITH ESOPHAGITIS: ICD-10-CM

## 2022-02-24 ENCOUNTER — OFFICE VISIT (OUTPATIENT)
Dept: AUDIOLOGY | Age: 42
End: 2022-02-24
Payer: MEDICARE

## 2022-02-24 DIAGNOSIS — H90.3 SENSORY HEARING LOSS, BILATERAL: Primary | ICD-10-CM

## 2022-02-24 PROCEDURE — 92567 TYMPANOMETRY: CPT | Performed by: AUDIOLOGIST

## 2022-02-24 PROCEDURE — 92552 PURE TONE AUDIOMETRY AIR: CPT | Performed by: AUDIOLOGIST

## 2022-02-24 PROCEDURE — 92556 SPEECH AUDIOMETRY COMPLETE: CPT | Performed by: AUDIOLOGIST

## 2022-02-24 NOTE — PROGRESS NOTES
HEARING EVALUATION    Name:  Jason Silva  :  1980  Age:  36 y o  Date of Evaluation: 22     History: known hearing loss  Reason for visit: Jason Silva is being seen today at the request of Dr Bobbi Meier for an evaluation of hearing  Caregiver reports no concern over change in hearing  EVALUATION:    Otoscopic Evaluation:   Right Ear: Clear and healthy ear canal and tympanic membrane   Left Ear: Clear and healthy ear canal and tympanic membrane    Tympanometry:   Right: Type A - normal middle ear pressure and compliance   Left: Type B - middle ear disorder    Audiogram Results:  David Medina was very restless throughout today's evaluation and required several reinstructions and encouragement to remain on task  Responses were obtained with fair reliability  Right: Mild hearing loss   Left: Mild/moderate hearing loss    Stable from last evaluation 2020    *see attached audiogram      RECOMMENDATIONS:  Annual hearing eval, Return to [facility]  for F/U and Copy to Patient/Caregiver    PATIENT EDUCATION:   Discussed results and recommendations with patient and caregiver  Questions were addressed and the patient was encouraged to contact our department should concerns arise  Hearing Aid Visit:    Name:  Jason Silva  :  1980  Age:  36 y o  Date of Evaluation: 22     Jason Silva is being seen for a hearing aid visit  Patient is fit with Right serial number [id number]  Warranty date 1/3/21 - No L/D  Caregiver reports that David Medina no longer has her left hearing aid and inquired about process to get 2 new hearing aids  Explained that the foundation that David Medina received her hearing aids through is no longer open  Action:  Cleaned and checked hearing aid and earmold  Retubed earmold - good sound quality  Recommendations:   Patient will contact center with any concerns        Indigo Beecrril   Clinical Audiologist

## 2022-02-25 ENCOUNTER — OFFICE VISIT (OUTPATIENT)
Dept: FAMILY MEDICINE CLINIC | Facility: CLINIC | Age: 42
End: 2022-02-25

## 2022-02-25 ENCOUNTER — OFFICE VISIT (OUTPATIENT)
Dept: UROLOGY | Facility: AMBULATORY SURGERY CENTER | Age: 42
End: 2022-02-25
Payer: MEDICARE

## 2022-02-25 VITALS
SYSTOLIC BLOOD PRESSURE: 119 MMHG | TEMPERATURE: 98.4 F | HEART RATE: 93 BPM | BODY MASS INDEX: 46.79 KG/M2 | RESPIRATION RATE: 22 BRPM | OXYGEN SATURATION: 97 % | HEIGHT: 56 IN | WEIGHT: 208 LBS | DIASTOLIC BLOOD PRESSURE: 76 MMHG

## 2022-02-25 VITALS
BODY MASS INDEX: 46.79 KG/M2 | HEIGHT: 56 IN | RESPIRATION RATE: 20 BRPM | HEART RATE: 117 BPM | OXYGEN SATURATION: 97 % | WEIGHT: 208 LBS

## 2022-02-25 DIAGNOSIS — Z00.00 MEDICARE ANNUAL WELLNESS VISIT, SUBSEQUENT: ICD-10-CM

## 2022-02-25 DIAGNOSIS — Z13.6 SCREENING FOR CARDIOVASCULAR CONDITION: Primary | ICD-10-CM

## 2022-02-25 DIAGNOSIS — R32 URINARY INCONTINENCE, UNSPECIFIED TYPE: Primary | ICD-10-CM

## 2022-02-25 PROBLEM — R61 HYPERHIDROSIS: Status: ACTIVE | Noted: 2017-12-28

## 2022-02-25 LAB — POST-VOID RESIDUAL VOLUME, ML POC: 55 ML

## 2022-02-25 PROCEDURE — 99213 OFFICE O/P EST LOW 20 MIN: CPT | Performed by: NURSE PRACTITIONER

## 2022-02-25 PROCEDURE — G0439 PPPS, SUBSEQ VISIT: HCPCS | Performed by: FAMILY MEDICINE

## 2022-02-25 PROCEDURE — 51798 US URINE CAPACITY MEASURE: CPT | Performed by: NURSE PRACTITIONER

## 2022-02-25 RX ORDER — MAXIMUM STRENGTH PEPTO BISMOL 525 MG/15ML
SUSPENSION ORAL
COMMUNITY
Start: 2022-02-22

## 2022-02-25 RX ORDER — TROSPIUM CHLORIDE 20 MG/1
20 TABLET, FILM COATED ORAL 2 TIMES DAILY
Qty: 60 TABLET | Refills: 3 | Status: SHIPPED | OUTPATIENT
Start: 2022-02-25 | End: 2022-05-26 | Stop reason: SDUPTHER

## 2022-02-25 RX ORDER — TROSPIUM CHLORIDE 20 MG/1
20 TABLET, FILM COATED ORAL 2 TIMES DAILY
Qty: 60 TABLET | Refills: 3 | Status: SHIPPED | OUTPATIENT
Start: 2022-02-25 | End: 2022-02-25 | Stop reason: SDUPTHER

## 2022-02-25 NOTE — PROGRESS NOTES
[address] [facility] 36 [facility] o  (:1980) female MRN: [id number]  Unit/Bed#:  Encounter: [id number]    Medicare annual wellness visit, subsequent  Medicare annual wellness completed  -Caregiver had no concerns at this visit  immunizations and screenings UTANUM Viera is here for her Subsequent Wellness visit  Last Medicare Wellness visit information reviewed, patient interviewed, no change since last AWV  Health Risk Assessment:   Patient rates overall health as good  Patient feels that their physical health rating is same  Eyesight was rated as slightly worse  Hearing was rated as slightly worse  Pain experienced in the last 7 days has been a lot  Depression Screening:   PHQ-9 Score: 0      Fall Risk Screening: In the past year, patient has experienced: history of falling in past year    Number of falls: 2 or more  Injured during fall?: Yes    Feels unsteady when standing or walking?: Yes    Worried about falling?: Yes      Urinary Incontinence Screening:   Patient has leaked urine accidently in the last six months  Home Safety:  Patient has trouble with stairs inside or outside of their home  Patient has working smoke alarms and has working carbon monoxide detector  Home safety hazards include: none  Nutrition:   Current diet is Regular  Medications:   Patient is not currently taking any over-the-counter supplements  Patient is able to manage medications  Activities of Daily Living (ADLs)/Instrumental Activities of Daily Living (IADLs):   Walk and transfer into and out of bed and chair?: No  Dress and groom yourself?: No    Bathe or shower yourself?: No    Feed yourself?  No  Do your laundry/housekeeping?: No  Manage your money, pay your bills and track your expenses?: No  Make your own meals?: No    Do your own shopping?: No    PREVENTIVE SCREENINGS      Cardiovascular Screening:    General: Screening Current      Diabetes Screening:     General: Screening Current      Breast Cancer Screening:     General: Screening Current      Cervical Cancer Screening:    General: Screening Current      Lung Cancer Screening:     General: Screening Not Indicated      Hepatitis C Screening:    General: Screening Current    Screening, Brief Intervention, and Referral to Treatment (SBIRT)    Screening  Typical number of drinks in a day: 0  Typical number of drinks in a week: 0  Interpretation: Low risk drinking behavior      Torsten Maradiaga MD  PGY-2 Family Medicine  02/25/22

## 2022-02-25 NOTE — PROGRESS NOTES
Wade Peña is here for her Subsequent Wellness visit  Last Medicare Wellness visit information reviewed, patient interviewed, no change since last AWV  Health Risk Assessment:   Patient rates overall health as good  Patient feels that their physical health rating is same  Eyesight was rated as slightly worse  Hearing was rated as slightly worse  Pain experienced in the last 7 days has been a lot  Depression Screening:   PHQ-9 Score: 0      Fall Risk Screening: In the past year, patient has experienced: history of falling in past year    Number of falls: 2 or more  Injured during fall?: Yes    Feels unsteady when standing or walking?: Yes    Worried about falling?: Yes      Urinary Incontinence Screening:   Patient has leaked urine accidently in the last six months  Home Safety:  Patient has trouble with stairs inside or outside of their home  Patient has working smoke alarms and has working carbon monoxide detector  Home safety hazards include: none  Nutrition:   Current diet is Regular  Medications:   Patient is not currently taking any over-the-counter supplements  Patient is able to manage medications  Activities of Daily Living (ADLs)/Instrumental Activities of Daily Living (IADLs):   Walk and transfer into and out of bed and chair?: No  Dress and groom yourself?: No    Bathe or shower yourself?: No    Feed yourself?  No  Do your laundry/housekeeping?: No  Manage your money, pay your bills and track your expenses?: No  Make your own meals?: No    Do your own shopping?: No    PREVENTIVE SCREENINGS      Cardiovascular Screening:    General: Screening Current      Diabetes Screening:     General: Screening Current      Breast Cancer Screening:     General: Screening Current      Cervical Cancer Screening:    General: Screening Current      Lung Cancer Screening:     General: Screening Not Indicated      Hepatitis C Screening:    General: Screening Current    Screening, Brief Intervention, and Referral to Treatment (SBIRT)    Screening  Typical number of drinks in a day: 0  Typical number of drinks in a week: 0  Interpretation: Low risk drinking behavior

## 2022-02-25 NOTE — PATIENT INSTRUCTIONS

## 2022-02-25 NOTE — PROGRESS NOTES
2/25/2022    Efraín Bryan  1980  [id number]        Assessment  -Urinary incontinence    Discussion/Plan  Alice Blair is a 36 y o  female being managed by our office    1  Urinary incontinence- patent unable to urinate and provide urine specimen today  PVR assessment is 55 mL  We discussed the results of her recent renal ultrasound from December 2021 which was unremarkable  Reviewed dietary and behavioral modifications  Discussed kegel exercises and referral to pelvic floor physical therapy  Referral placed  We also reviewed starting trospium for management of overactive bladder  Would hold off on trialing anticholinergics  Plan to follow up in 3 months with PVR assessment, and re-evaluation of symptoms      -All questions answered, patients agree with plan     History of Present Illness  36 y o  female with a history of urinary incontinence presents today for follow up  Patient last seen in the office in September 2021  She resides in a group home and is accompanied today by caretaker  Patient had been experiencing increased urinary frequency and incontinence  This prompted a renal ultrasound which was unremarkable  Bilateral renal cyst noted  Caretaker states that she continues to report increased urinary frequency, urgency, and incontinence  No gross hematuria or dysuria  Review of Systems  Review of Systems   Constitutional: Negative  HENT: Negative  Respiratory: Negative  Cardiovascular: Negative  Gastrointestinal: Negative  Genitourinary: Negative for decreased urine volume, difficulty urinating, dysuria, flank pain, frequency, hematuria and urgency  Musculoskeletal: Negative  Skin: Negative  Neurological: Negative  Psychiatric/Behavioral: Negative          Past Medical History  Past Medical History:   Diagnosis Date    ADD (attention deficit disorder)     Anxiety     Astigmatism     Brain lesion     Calcium deficiency     Cellulitis of foot, right 6/4/2018    Cerebral palsy (HCC)     Chronic otitis media     Constipation     Depression     Dysphagia     Esophagitis     Esotropia     GERD (gastroesophageal reflux disease)     Hiatal hernia     Hydrocephalus (HCC)     Impaired fasting glucose     Left nephrolithiasis 03/04/2019    Myopia     Oppositional defiant disorder     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HCC)     Seizures (HCC)     Sensorineural hearing loss     Status post ventriculoatrial shunt placement     Visual impairment        Past Social History  Past Surgical History:   Procedure Laterality Date    BREAST BIOPSY Left     X 2 (not sure of years)    CSF SHUNT      Creation of Ventriculo-Peritoneal CSF shunt ; Last Assessed:7/6/2016    EAR SURGERY      Last Assessed:7/6/2016    LEG SURGERY      due to CP     NOSE SURGERY      Last Assessed:7/6/2016    GA ESOPHAGOGASTRODUODENOSCOPY TRANSORAL DIAGNOSTIC N/A 5/9/2019    Procedure: ESOPHAGOGASTRODUODENOSCOPY (EGD) with biopsy;  Surgeon: Joyce Garnett MD;  Location: [facility];   Service: Gastroenterology    UPPER GASTROINTESTINAL ENDOSCOPY  05/2019       Past Family History  Family History   Problem Relation Age of Onset    Diabetes Mother     Colon cancer Father     No Known Problems Maternal Grandmother     No Known Problems Maternal Grandfather     No Known Problems Paternal Grandmother     No Known Problems Paternal Grandfather     Hypertension Neg Hx     Heart disease Neg Hx     Stroke Neg Hx     Thyroid disease Neg Hx        Past Social history  Social History     Socioeconomic History    Marital status: Single     Spouse name: Not on file    Number of children: Not on file    Years of education: Not on file    Highest education level: Not on file   Occupational History    Not on file   Tobacco Use    Smoking status: Never Smoker    Smokeless tobacco: Never Used   Vaping Use    Vaping Use: Never used   Substance and Sexual Activity    Alcohol use: Never    Drug use: Never    Sexual activity: Not Currently   Other Topics Concern    Not on file   Social History Narrative    Always uses seat belt    Lives in group home     Social Determinants of Health     Financial Resource Strain: Not on file   Food Insecurity: Not on file   Transportation Needs: Not on file   Physical Activity: Not on file   Stress: Not on file   Social Connections: Not on file   Intimate Partner Violence: Not on file   Housing Stability: Not on file       Current Medications  Current Outpatient Medications   Medication Sig Dispense Refill    acetaminophen (TYLENOL) 500 mg tablet Take 1 tablet (500 mg total) by mouth every 6 (six) hours as needed for mild pain 30 tablet 5    aluminum-magnesium hydroxide-simethicone (Antacid) 200-200-20 mg/5 mL suspension Take 15 mL by mouth every 4 (four) hours as needed for indigestion or heartburn 355 mL 5    amitriptyline (ELAVIL) 10 mg tablet Take 10 mg by mouth daily at bedtime      ARIPiprazole (ABILIFY) 20 MG tablet Take 1 tablet (20 mg total) by mouth daily at bedtime 90 tablet 2    bacitracin topical ointment 500 units/g topical ointment Cleanse site on nose with soap and water followed by bacitracin BID until healed then p r n  15 g 2    bismuth subsalicylate (PEPTO BISMOL) 524 mg/30 mL oral suspension Take 15 mL (262 mg total) by mouth every 6 (six) hours as needed for indigestion 360 mL 5    calcium carbonate (TUMS) 500 mg chewable tablet Chew 1 tablet (500 mg total) 3 (three) times a day as needed for heartburn 30 tablet 5    carbamide peroxide (DEBROX) 6 5 % otic solution Administer 5 drops into the left ear 2 (two) times a day 15 mL 0    D3-1000 25 MCG (1000 UT) tablet Take 2 tablets (2,000 Units total) by mouth daily 62 tablet 5    dicyclomine (BENTYL) 20 mg tablet Take 1 tablet (20 mg total) by mouth every 6 (six) hours as needed (as needed) 120 tablet 2    Dyclonine-Glycerin (Cepacol Sore Throat Spray) 0 1-33 % LIQD Apply 1 spray to the mouth or throat 3 (three) times a day as needed (sorethroat) 118 mL 5    Elastic Bandages & Supports (Neoprene Knee Brace) MISC Apply right knee brace in morning; remove at night 1 each 0    escitalopram (LEXAPRO) 20 mg tablet Take 1 tablet (20 mg total) by mouth daily 90 tablet 2    fluticasone (FLONASE) 50 mcg/act nasal spray 1 spray into each nostril daily as needed for rhinitis (nasal congestion) At 8:00 AM 18 2 mL 5    GNP Stomach Relief Max St 525 MG/15ML SUSP       guaiFENesin (ROBITUSSIN) 100 MG/5ML oral liquid Take 200 mg by mouth 3 (three) times a day as needed for cough      hydrOXYzine HCL (ATARAX) 10 mg tablet Take 1 tablet (10 mg total) by mouth 3 (three) times a day 30 tablet 3    ibuprofen (MOTRIN) 400 mg tablet Take 1 tablet (400 mg total) by mouth every 8 (eight) hours as needed for mild pain 30 tablet 5    ketoconazole (NIZORAL) 2 % cream       Konsyl Daily Fiber 28 3 %       lamoTRIgine (LaMICtal) 100 mg tablet       lamoTRIgine (LaMICtal) 25 mg tablet Take 25 mg by mouth 2 (two) times a day        LORazepam (ATIVAN) 0 5 mg tablet Take 0 25 mg by mouth 2 (two) times a day      lubiprostone (Amitiza) 24 mcg capsule Take 1 capsule (24 mcg total) by mouth daily with breakfast 60 capsule 0    magnesium hydroxide (GNP Milk of Magnesia) 400 mg/5 mL oral suspension Take 30 mL by mouth daily as needed for constipation If no BM in 3 days 355 mL 5    metoprolol tartrate (LOPRESSOR) 25 mg tablet Take 1 tablet (25 mg total) by mouth 2 (two) times a day 60 tablet 5    mineral oil-hydrophilic petrolatum (AQUAPHOR) ointment Apply topically as needed for dry skin 420 g 5    Multiple Vitamins-Minerals (CertaVite Senior/Antioxidant) TABS Take 1 tablet by mouth daily 31 tablet 5    norgestimate-ethinyl estradiol (ORTHO-CYCLEN) 0 25-35 MG-MCG per tablet Take 1 tablet by mouth daily 28 tablet 11    norgestimate-ethinyl estradiol (ORTHO-CYCLEN) 0 25-35 MG-MCG per tablet Take 1 tablet by mouth daily      nystatin (MYCOSTATIN) powder Apply 1 application topically 4 (four) times a day 45 g 5    nystatin-triamcinolone (MYCOLOG-II) cream Apply topically 2 (two) times a day 30 g 0    pantoprazole (PROTONIX) 20 mg tablet Take 1 tablet (20 mg total) by mouth daily 62 tablet 5    phenol (Chloraseptic) 1 4 % mucosal liquid Apply 1 spray to the mouth or throat every 2 (two) hours as needed (for sore throat as needed) 236 mL 1    polyethylene glycol (MIRALAX) 17 g packet Take one packet daily as needed for no bowel movement in 24 hours 30 each 5    psyllium (METAMUCIL) 58 6 % packet Take 1 packet by mouth daily 30 packet 5    RA SUNSCREEN SPF50 LOTN Apply 15 minutes before sun exposure and every 2 hours 1 Bottle 5    senna-docusate sodium (Senexon-S) 8 6-50 mg per tablet Take 1 tablet by mouth daily at 8am  30 tablet 5    sodium chloride (OCEAN) 0 65 % nasal spray 1 spray into each nostril as needed (three times daily as needed for nasal congestion) 60 mL 5    zinc oxide (DESITIN) 13 % cream Apply 1 application topically as needed (for perianal irritation) 454 g 5    Mouthwashes (Listerine Antiseptic) LIQD Swish and spit 5 mL 2 (two) times a day 1000 mL 5    trospium chloride (SANCTURA) 20 mg tablet Take 1 tablet (20 mg total) by mouth 2 (two) times a day 60 tablet 3     No current facility-administered medications for this visit  Allergies  No Known Allergies    Past medical history, social history, family history, medications and allergies were reviewed  Vitals  Vitals:    02/25/22 1419   Pulse: (!) 117   Resp: 20   SpO2: 97%   Weight: 94 3 kg (208 lb)   Height: 4' 8" (1 422 m)       Physical Exam  Physical Exam  Constitutional:       Appearance: Normal appearance  She is well-developed  HENT:      Head: Normocephalic  Eyes:      Pupils: Pupils are equal, round, and reactive to light  Pulmonary:      Effort: Pulmonary effort is normal    Abdominal:      Palpations: Abdomen is soft  Musculoskeletal:         General: Normal range of motion  Cervical back: Normal range of motion  Skin:     General: Skin is warm and dry  Neurological:      General: No focal deficit present  Mental Status: She is alert and oriented to person, place, and time  Psychiatric:         Mood and Affect: Mood normal          Behavior: Behavior normal          Results    I have personally reviewed all pertinent lab results and reviewed with patient  Lab Results   Component Value Date    CALCIUM 7 0 (L) 10/06/2021    K 3 5 10/06/2021    CO2 26 10/06/2021     10/06/2021    BUN 6 10/06/2021    CREATININE 0 93 10/06/2021     Lab Results   Component Value Date    WBC 8 83 10/06/2021    HGB 12 3 10/06/2021    HCT 37 9 10/06/2021    MCV 95 10/06/2021     10/06/2021     No results found for this or any previous visit (from the past 1 hour(s))

## 2022-02-28 ENCOUNTER — TELEPHONE (OUTPATIENT)
Dept: FAMILY MEDICINE CLINIC | Facility: CLINIC | Age: 42
End: 2022-02-28

## 2022-02-28 ENCOUNTER — APPOINTMENT (OUTPATIENT)
Dept: LAB | Facility: CLINIC | Age: 42
End: 2022-02-28
Payer: MEDICARE

## 2022-02-28 DIAGNOSIS — R32 URINARY INCONTINENCE, UNSPECIFIED TYPE: ICD-10-CM

## 2022-02-28 DIAGNOSIS — G80.9 CEREBRAL PALSY, UNSPECIFIED TYPE (HCC): Primary | ICD-10-CM

## 2022-02-28 DIAGNOSIS — Z13.6 SCREENING FOR CARDIOVASCULAR CONDITION: ICD-10-CM

## 2022-02-28 LAB
ANION GAP SERPL CALCULATED.3IONS-SCNC: 8 MMOL/L (ref 4–13)
BUN SERPL-MCNC: 17 MG/DL (ref 5–25)
CALCIUM SERPL-MCNC: 8.9 MG/DL (ref 8.3–10.1)
CHLORIDE SERPL-SCNC: 106 MMOL/L (ref 100–108)
CHOLEST SERPL-MCNC: 164 MG/DL
CO2 SERPL-SCNC: 24 MMOL/L (ref 21–32)
CREAT SERPL-MCNC: 0.83 MG/DL (ref 0.6–1.3)
GFR SERPL CREATININE-BSD FRML MDRD: 86 ML/MIN/1.73SQ M
GLUCOSE P FAST SERPL-MCNC: 88 MG/DL (ref 65–99)
HDLC SERPL-MCNC: 74 MG/DL
LDLC SERPL CALC-MCNC: 63 MG/DL (ref 0–100)
NONHDLC SERPL-MCNC: 90 MG/DL
POTASSIUM SERPL-SCNC: 3.9 MMOL/L (ref 3.5–5.3)
SODIUM SERPL-SCNC: 138 MMOL/L (ref 136–145)
TRIGL SERPL-MCNC: 134 MG/DL

## 2022-02-28 PROCEDURE — 80048 BASIC METABOLIC PNL TOTAL CA: CPT

## 2022-02-28 PROCEDURE — 80061 LIPID PANEL: CPT

## 2022-02-28 PROCEDURE — 36415 COLL VENOUS BLD VENIPUNCTURE: CPT

## 2022-02-28 RX ORDER — DIAPER,BRIEF,ADULT, DISPOSABLE
EACH MISCELLANEOUS 2 TIMES DAILY
Qty: 60 EACH | Refills: 5 | Status: SHIPPED | OUTPATIENT
Start: 2022-02-28

## 2022-02-28 NOTE — TELEPHONE ENCOUNTER
Patient had an order for Briefs last week but   Patient needs an order Attends which are Pull Ups the only ones that she can wear    Allan Salle can be reached at [phone]

## 2022-03-03 ENCOUNTER — ANESTHESIA EVENT (OUTPATIENT)
Dept: RADIOLOGY | Facility: HOSPITAL | Age: 42
End: 2022-03-03

## 2022-03-04 ENCOUNTER — OFFICE VISIT (OUTPATIENT)
Dept: FAMILY MEDICINE CLINIC | Facility: CLINIC | Age: 42
End: 2022-03-04

## 2022-03-04 ENCOUNTER — TELEPHONE (OUTPATIENT)
Dept: FAMILY MEDICINE CLINIC | Facility: CLINIC | Age: 42
End: 2022-03-04

## 2022-03-04 VITALS
BODY MASS INDEX: 47.24 KG/M2 | DIASTOLIC BLOOD PRESSURE: 80 MMHG | RESPIRATION RATE: 22 BRPM | TEMPERATURE: 98.2 F | SYSTOLIC BLOOD PRESSURE: 123 MMHG | OXYGEN SATURATION: 97 % | HEART RATE: 100 BPM | HEIGHT: 56 IN | WEIGHT: 210 LBS

## 2022-03-04 DIAGNOSIS — M54.50 ACUTE RIGHT-SIDED LOW BACK PAIN, UNSPECIFIED WHETHER SCIATICA PRESENT: Primary | ICD-10-CM

## 2022-03-04 DIAGNOSIS — M25.551 RIGHT HIP PAIN: ICD-10-CM

## 2022-03-04 PROCEDURE — 99213 OFFICE O/P EST LOW 20 MIN: CPT | Performed by: FAMILY MEDICINE

## 2022-03-04 RX ORDER — LIDOCAINE 50 MG/G
1 PATCH TOPICAL DAILY
Qty: 5 PATCH | Refills: 0 | Status: SHIPPED | OUTPATIENT
Start: 2022-03-04 | End: 2022-03-11 | Stop reason: SDUPTHER

## 2022-03-04 NOTE — PROGRESS NOTES
OFFICE VISIT NOTE - [facility]  FLORENTINO 36 y o  (:1980) female MRN: [id number]  Unit/Bed#:  Encounter: [id number]      Date: 22    Assessment and Plan     Low back pain  Acute on chronic right-sided low back pain, no recent trauma or injury  Most likely due to ambulatory dysfunction vs sedentary lifestyle  Several prior PT referrals but has never participated per caretaker as patient usually declines  Patient encouraged about the importance of exercise in mobility and avoidance of low back pain   Referral to PT  Will obtain XR as well  Advised to use Voltaren gel and lidocaine patch  Diagnoses and all orders for this visit:    Acute right-sided low back pain, unspecified whether sciatica present  -     XR hip/pelv 2-3 vws right if performed; Future  -     XR spine lumbar minimum 4 views non injury; Future  -     Diclofenac Sodium (VOLTAREN) 1 %; Apply 2 g topically 4 (four) times a day  -     lidocaine (Lidoderm) 5 %; Apply 1 patch topically daily Remove & Discard patch within 12 hours or as directed by MD  -     Ambulatory Referral to Physical Therapy; Future    Right hip pain  -     XR hip/pelv 2-3 vws right if performed; Future  -     XR spine lumbar minimum 4 views non injury; Future        Subjective:  Chief Complaint   Patient presents with    Hip Pain     for 2 weeks, pt took tylenol and advil and didn't help, pt hasn't fallen       History of Present Illness     FLORENTINO is a 36 y o  female seen last week for a physical and here today with caretaker for Right-sided low back pain pain started 2 weeks ago  No trauma or injury  Worse with ambulation especially when trying to sit or stand  No other new complaints  She has tried Advil and Tylenol with moderate relief      The following portions of the patient's history were reviewed and updated as appropriate: allergies, current medications, past family history, past medical history, past social history, past surgical history and problem list     Review of Systems     Review of Systems   Constitutional: Negative for fatigue and fever  HENT: Negative for sore throat  Respiratory: Negative for cough, shortness of breath and wheezing  Cardiovascular: Negative for chest pain, palpitations and leg swelling  Gastrointestinal: Negative for abdominal pain, diarrhea, nausea and vomiting  Genitourinary: Negative for dysuria and pelvic pain  Musculoskeletal: Positive for back pain  Skin: Negative for rash  Neurological: Negative for weakness and headaches         Current Medications     Current Outpatient Medications on File Prior to Visit   Medication Sig Dispense Refill    trospium chloride (SANCTURA) 20 mg tablet Take 1 tablet (20 mg total) by mouth 2 (two) times a day 60 tablet 3    acetaminophen (TYLENOL) 500 mg tablet Take 1 tablet (500 mg total) by mouth every 6 (six) hours as needed for mild pain 30 tablet 5    aluminum-magnesium hydroxide-simethicone (Antacid) 200-200-20 mg/5 mL suspension Take 15 mL by mouth every 4 (four) hours as needed for indigestion or heartburn 355 mL 5    amitriptyline (ELAVIL) 10 mg tablet Take 10 mg by mouth daily at bedtime      ARIPiprazole (ABILIFY) 20 MG tablet Take 1 tablet (20 mg total) by mouth daily at bedtime 90 tablet 2    bacitracin topical ointment 500 units/g topical ointment Cleanse site on nose with soap and water followed by bacitracin BID until healed then p r n  15 g 2    bismuth subsalicylate (PEPTO BISMOL) 524 mg/30 mL oral suspension Take 15 mL (262 mg total) by mouth every 6 (six) hours as needed for indigestion 360 mL 5    calcium carbonate (TUMS) 500 mg chewable tablet Chew 1 tablet (500 mg total) 3 (three) times a day as needed for heartburn 30 tablet 5    carbamide peroxide (DEBROX) 6 5 % otic solution Administer 5 drops into the left ear 2 (two) times a day 15 mL 0    D3-1000 25 MCG (1000 UT) tablet Take 2 tablets (2,000 Units total) by mouth daily 62 tablet 5    dicyclomine (BENTYL) 20 mg tablet Take 1 tablet (20 mg total) by mouth every 6 (six) hours as needed (as needed) 120 tablet 2    Dyclonine-Glycerin (Cepacol Sore Throat Spray) 0 1-33 % LIQD Apply 1 spray to the mouth or throat 3 (three) times a day as needed (sorethroat) 118 mL 5    Elastic Bandages & Supports (Neoprene Knee Brace) MISC Apply right knee brace in morning; remove at night 1 each 0    escitalopram (LEXAPRO) 20 mg tablet Take 1 tablet (20 mg total) by mouth daily 90 tablet 2    fluticasone (FLONASE) 50 mcg/act nasal spray 1 spray into each nostril daily as needed for rhinitis (nasal congestion) At 8:00 AM 18 2 mL 5    GNP Stomach Relief Max St 525 MG/15ML SUSP       guaiFENesin (ROBITUSSIN) 100 MG/5ML oral liquid Take 200 mg by mouth 3 (three) times a day as needed for cough      hydrOXYzine HCL (ATARAX) 10 mg tablet Take 1 tablet (10 mg total) by mouth 3 (three) times a day 30 tablet 3    ibuprofen (MOTRIN) 400 mg tablet Take 1 tablet (400 mg total) by mouth every 8 (eight) hours as needed for mild pain 30 tablet 5    Incontinence Supply Disposable (Depend Underwear Large) MISC Use 2 (two) times a day 60 each 5    ketoconazole (NIZORAL) 2 % cream       Konsyl Daily Fiber 28 3 %       lamoTRIgine (LaMICtal) 100 mg tablet       lamoTRIgine (LaMICtal) 25 mg tablet Take 25 mg by mouth 2 (two) times a day        LORazepam (ATIVAN) 0 5 mg tablet Take 0 25 mg by mouth 2 (two) times a day      lubiprostone (Amitiza) 24 mcg capsule Take 1 capsule (24 mcg total) by mouth daily with breakfast 60 capsule 0    magnesium hydroxide (GNP Milk of Magnesia) 400 mg/5 mL oral suspension Take 30 mL by mouth daily as needed for constipation If no BM in 3 days 355 mL 5    metoprolol tartrate (LOPRESSOR) 25 mg tablet Take 1 tablet (25 mg total) by mouth 2 (two) times a day 60 tablet 5    mineral oil-hydrophilic petrolatum (AQUAPHOR) ointment Apply topically as needed for dry skin 420 g 5    Mouthwashes (Listerine Antiseptic) LIQD Swish and spit 5 mL 2 (two) times a day 1000 mL 5    Multiple Vitamins-Minerals (CertaVite Senior/Antioxidant) TABS Take 1 tablet by mouth daily 31 tablet 5    norgestimate-ethinyl estradiol (ORTHO-CYCLEN) 0 25-35 MG-MCG per tablet Take 1 tablet by mouth daily 28 tablet 11    norgestimate-ethinyl estradiol (ORTHO-CYCLEN) 0 25-35 MG-MCG per tablet Take 1 tablet by mouth daily      nystatin (MYCOSTATIN) powder Apply 1 application topically 4 (four) times a day 45 g 5    nystatin-triamcinolone (MYCOLOG-II) cream Apply topically 2 (two) times a day 30 g 0    pantoprazole (PROTONIX) 20 mg tablet Take 1 tablet (20 mg total) by mouth daily 62 tablet 5    phenol (Chloraseptic) 1 4 % mucosal liquid Apply 1 spray to the mouth or throat every 2 (two) hours as needed (for sore throat as needed) 236 mL 1    polyethylene glycol (MIRALAX) 17 g packet Take one packet daily as needed for no bowel movement in 24 hours 30 each 5    psyllium (METAMUCIL) 58 6 % packet Take 1 packet by mouth daily 30 packet 5    RA SUNSCREEN SPF50 LOTN Apply 15 minutes before sun exposure and every 2 hours 1 Bottle 5    senna-docusate sodium (Senexon-S) 8 6-50 mg per tablet Take 1 tablet by mouth daily at 8am  30 tablet 5    sodium chloride (OCEAN) 0 65 % nasal spray 1 spray into each nostril as needed (three times daily as needed for nasal congestion) 60 mL 5    zinc oxide (DESITIN) 13 % cream Apply 1 application topically as needed (for perianal irritation) 454 g 5     No current facility-administered medications on file prior to visit           Objective     Vitals: /80 (BP Location: Left arm, Patient Position: Sitting, Cuff Size: Large)   Pulse 100   Temp 98 2 °F (36 8 °C) (Temporal)   Resp 22   Ht 4' 8" (1 422 m)   Wt 95 3 kg (210 lb)   SpO2 97%   BMI 47 08 kg/m²     Recent Labs & Imaging  No results found for this or any previous visit (from the past 24 hour(s))  Diagnostic Sleep Study    Result Date: 1/28/2022  Impression:  Ms Joesph Najjar demonstrated a reduced sleep latency (2 8 minutes) but delayed REM latency  Staging also revealed reduced no stage 3 sleep and minimal REMsleep  Thiscan be related to hypersomnia or narcolepsy  If there is still daytime sleepiness, I recommend a diagnostic sleep study with MSLT  she demonstrated a few sleep related respiratory events (AHI - 0 6) which were predominantly in REM sleep (REM AHI - 12 6)  Therefore, minimal REM sleep may have underestimated the severity of her NADIYA  She did have frequent limb movements (PLM index - 16 3) which is consistent with periodic limb movement in sleep  I would check iron and magnesium levels  If daytime sleepiness persists, I would consider a trial of Neurontin, Mirapex, Requip or Lyrica  Board Certified Sleep Physician         Physical Exam  Cardiovascular:      Rate and Rhythm: Regular rhythm  Heart sounds: Normal heart sounds  No murmur heard  Pulmonary:      Effort: Pulmonary effort is normal  No respiratory distress  Musculoskeletal:         General: Tenderness (right paraspinal tenderness) present  No swelling, deformity or signs of injury  Cervical back: No tenderness  Lumbar back: Normal  No swelling, deformity, signs of trauma or lacerations  Normal range of motion  Right lower leg: No edema  Left lower leg: No edema  Comments: No other signs of inflammation   Skin:     General: Skin is warm             Reba Peña MD  Family Medicine PGY-2  03/04/22

## 2022-03-04 NOTE — TELEPHONE ENCOUNTER
Folder (To be completed by) - Dr Jose Norris     Name of Form - [employer]     Came through [employer] just as patient being called to go back for appt

## 2022-03-04 NOTE — ASSESSMENT & PLAN NOTE
Acute on chronic right-sided low back pain, no recent trauma or injury  Most likely due to ambulatory dysfunction vs sedentary lifestyle  Several prior PT referrals but has never participated per caretaker as patient usually declines  Patient encouraged about the importance of exercise in mobility and avoidance of low back pain   Referral to PT  Will obtain XR as well  Advised to use Voltaren gel and lidocaine patch

## 2022-03-08 ENCOUNTER — APPOINTMENT (OUTPATIENT)
Dept: RADIOLOGY | Facility: MEDICAL CENTER | Age: 42
End: 2022-03-08
Payer: MEDICARE

## 2022-03-08 DIAGNOSIS — M25.551 RIGHT HIP PAIN: ICD-10-CM

## 2022-03-08 DIAGNOSIS — M54.50 ACUTE RIGHT-SIDED LOW BACK PAIN, UNSPECIFIED WHETHER SCIATICA PRESENT: ICD-10-CM

## 2022-03-08 PROCEDURE — 73502 X-RAY EXAM HIP UNI 2-3 VIEWS: CPT

## 2022-03-08 PROCEDURE — 72110 X-RAY EXAM L-2 SPINE 4/>VWS: CPT

## 2022-03-08 NOTE — PRE-PROCEDURE INSTRUCTIONS
Pre-Surgery Instructions:   Medication Instructions    acetaminophen (TYLENOL) 500 mg tablet Instructed patient per Anesthesia Guidelines   amitriptyline (ELAVIL) 10 mg tablet Instructed patient per Anesthesia Guidelines   ARIPiprazole (ABILIFY) 20 MG tablet Instructed patient per Anesthesia Guidelines   D3-1000 25 MCG (1000 UT) tablet Instructed patient per Anesthesia Guidelines   dicyclomine (BENTYL) 20 mg tablet Instructed patient per Anesthesia Guidelines   escitalopram (LEXAPRO) 20 mg tablet Instructed patient per Anesthesia Guidelines   hydrOXYzine HCL (ATARAX) 10 mg tablet Instructed patient per Anesthesia Guidelines   lamoTRIgine (LaMICtal) 100 mg tablet Instructed patient per Anesthesia Guidelines   lamoTRIgine (LaMICtal) 25 mg tablet Instructed patient per Anesthesia Guidelines   LORazepam (ATIVAN) 0 5 mg tablet Instructed patient per Anesthesia Guidelines   lubiprostone (Amitiza) 24 mcg capsule Instructed patient per Anesthesia Guidelines   metoprolol tartrate (LOPRESSOR) 25 mg tablet Instructed patient per Anesthesia Guidelines   Multiple Vitamins-Minerals (CertaVite Senior/Antioxidant) TABS Instructed patient per Anesthesia Guidelines   norgestimate-ethinyl estradiol (ORTHO-CYCLEN) 0 25-35 MG-MCG per tablet Instructed patient per Anesthesia Guidelines   pantoprazole (PROTONIX) 20 mg tablet Instructed patient per Anesthesia Guidelines   senna-docusate sodium (Senexon-S) 8 6-50 mg per tablet Instructed patient per Anesthesia Guidelines        Patient may take the following the morning of MRI: Abilify, Bentyl, Lexapro, Flonase, Atarax, Lamictal, Ativan, Amitiza, Lopressor, pantoprazole, and orth-cyclen with a few sips of water

## 2022-03-09 DIAGNOSIS — M54.50 ACUTE RIGHT-SIDED LOW BACK PAIN, UNSPECIFIED WHETHER SCIATICA PRESENT: ICD-10-CM

## 2022-03-09 RX ORDER — LIDOCAINE 50 MG/G
1 PATCH TOPICAL DAILY
Qty: 5 PATCH | Refills: 0 | Status: CANCELLED | OUTPATIENT
Start: 2022-03-09

## 2022-03-11 DIAGNOSIS — Z87.2 H/O SUNBURN: ICD-10-CM

## 2022-03-11 RX ORDER — LIDOCAINE 50 MG/G
1 PATCH TOPICAL DAILY
Qty: 5 PATCH | Refills: 0 | Status: SHIPPED | OUTPATIENT
Start: 2022-03-11 | End: 2022-03-22 | Stop reason: SDUPTHER

## 2022-03-11 NOTE — TELEPHONE ENCOUNTER
Received voicemail message from [employer] to refill patient's Lidocaine 5% patches  After chart review, patient was last seen on 03/04/2022 and is to get imaging completed  Patient had x-ray done on 03/08/2022, results pending  Will give one refill of medication to hold over until results are received

## 2022-03-14 ENCOUNTER — OFFICE VISIT (OUTPATIENT)
Dept: FAMILY MEDICINE CLINIC | Facility: CLINIC | Age: 42
End: 2022-03-14

## 2022-03-14 ENCOUNTER — TELEPHONE (OUTPATIENT)
Dept: FAMILY MEDICINE CLINIC | Facility: CLINIC | Age: 42
End: 2022-03-14

## 2022-03-14 VITALS
OXYGEN SATURATION: 97 % | WEIGHT: 206 LBS | HEIGHT: 56 IN | DIASTOLIC BLOOD PRESSURE: 82 MMHG | SYSTOLIC BLOOD PRESSURE: 120 MMHG | TEMPERATURE: 98.3 F | RESPIRATION RATE: 18 BRPM | BODY MASS INDEX: 46.34 KG/M2

## 2022-03-14 DIAGNOSIS — M54.50 ACUTE RIGHT-SIDED LOW BACK PAIN, UNSPECIFIED WHETHER SCIATICA PRESENT: ICD-10-CM

## 2022-03-14 DIAGNOSIS — R50.9 FEVER, UNSPECIFIED FEVER CAUSE: ICD-10-CM

## 2022-03-14 PROCEDURE — 99213 OFFICE O/P EST LOW 20 MIN: CPT | Performed by: FAMILY MEDICINE

## 2022-03-14 RX ORDER — ACETAMINOPHEN 500 MG
500 TABLET ORAL EVERY 6 HOURS PRN
Qty: 30 TABLET | Refills: 5 | Status: SHIPPED | OUTPATIENT
Start: 2022-03-14

## 2022-03-14 NOTE — PROGRESS NOTES
OFFICE VISIT NOTE - [facility]  FLORENTINO 36 y o  (:1980) female MRN: [id number]  Unit/Bed#:  Encounter: [id number]      Date: 22    Assessment and Plan     Low back pain  Most likely exacerbation of osteoarthritis in the setting of obesity vs Sedentary lifestyle  XR lumbar spine and hip showing no acute abnormality but noted degenerative changes and osteoarthritis  Yet to start PT, encouraged to do so  Can apply otc voltaren gel and lidocaine patch  Diagnoses and all orders for this visit:    Acute right-sided low back pain, unspecified whether sciatica present  -     Diclofenac Sodium (VOLTAREN) 1 %; Apply 2 g topically 4 (four) times a day    Fever, unspecified fever cause  -     acetaminophen (TYLENOL) 500 mg tablet; Take 1 tablet (500 mg total) by mouth every 6 (six) hours as needed for mild pain        Subjective:  Chief Complaint   Patient presents with    Hip Pain     xray results        History of Present Illness     FLORENTINO is a 36 y o  female following up for acute nontraumatic Right-sided low back pain pain that is worse with ambulation especially when trying to sit or stand  XR hip and lumbar spine ordered at last visit were reviewed today  No acute osseous abnormality, showed evidence of mild osteoarthritis and degenerative changes  She has tried Advil and Tylenol with moderate relief  Also, patient was given referral to Physical therapy at last visit but yet to start sessions  She is not being physically active throughout the course of her day  She denies other symptoms  The following portions of the patient's history were reviewed and updated as appropriate: allergies, current medications, past family history, past medical history, past social history, past surgical history and problem list     Review of Systems     Review of Systems   Constitutional: Negative for fatigue and fever     Cardiovascular: Negative for chest pain, palpitations and leg swelling  Gastrointestinal: Negative for abdominal pain, diarrhea, nausea and vomiting  Genitourinary: Negative for pelvic pain  Musculoskeletal: Positive for back pain  Skin: Negative for rash  Neurological: Negative for weakness and headaches         Current Medications     Current Outpatient Medications on File Prior to Visit   Medication Sig Dispense Refill    aluminum-magnesium hydroxide-simethicone (Antacid) 200-200-20 mg/5 mL suspension Take 15 mL by mouth every 4 (four) hours as needed for indigestion or heartburn 355 mL 5    amitriptyline (ELAVIL) 10 mg tablet Take 10 mg by mouth daily at bedtime      ARIPiprazole (ABILIFY) 20 MG tablet Take 1 tablet (20 mg total) by mouth daily at bedtime 90 tablet 2    bacitracin topical ointment 500 units/g topical ointment Cleanse site on nose with soap and water followed by bacitracin BID until healed then p r n  15 g 2    bismuth subsalicylate (PEPTO BISMOL) 524 mg/30 mL oral suspension Take 15 mL (262 mg total) by mouth every 6 (six) hours as needed for indigestion 360 mL 5    calcium carbonate (TUMS) 500 mg chewable tablet Chew 1 tablet (500 mg total) 3 (three) times a day as needed for heartburn 30 tablet 5    carbamide peroxide (DEBROX) 6 5 % otic solution Administer 5 drops into the left ear 2 (two) times a day 15 mL 0    D3-1000 25 MCG (1000 UT) tablet Take 2 tablets (2,000 Units total) by mouth daily 62 tablet 5    dicyclomine (BENTYL) 20 mg tablet Take 1 tablet (20 mg total) by mouth every 6 (six) hours as needed (as needed) 120 tablet 2    Dyclonine-Glycerin (Cepacol Sore Throat Spray) 0 1-33 % LIQD Apply 1 spray to the mouth or throat 3 (three) times a day as needed (sorethroat) 118 mL 5    Elastic Bandages & Supports (Neoprene Knee Brace) MISC Apply right knee brace in morning; remove at night 1 each 0    escitalopram (LEXAPRO) 20 mg tablet Take 1 tablet (20 mg total) by mouth daily 90 tablet 2    fluticasone (FLONASE) 50 mcg/act nasal spray 1 spray into each nostril daily as needed for rhinitis (nasal congestion) At 8:00 AM 18 2 mL 5    GNP Stomach Relief Max St 525 MG/15ML SUSP       guaiFENesin (ROBITUSSIN) 100 MG/5ML oral liquid Take 200 mg by mouth 3 (three) times a day as needed for cough      hydrOXYzine HCL (ATARAX) 10 mg tablet Take 1 tablet (10 mg total) by mouth 3 (three) times a day 30 tablet 3    ibuprofen (MOTRIN) 400 mg tablet Take 1 tablet (400 mg total) by mouth every 8 (eight) hours as needed for mild pain 30 tablet 5    Incontinence Supply Disposable (Depend Underwear Large) MISC Use 2 (two) times a day 60 each 5    ketoconazole (NIZORAL) 2 % cream       Konsyl Daily Fiber 28 3 %       lamoTRIgine (LaMICtal) 100 mg tablet       lamoTRIgine (LaMICtal) 25 mg tablet Take 25 mg by mouth 2 (two) times a day        lidocaine (Lidoderm) 5 % Apply 1 patch topically daily Remove & Discard patch within 12 hours or as directed by MD 5 patch 0    LORazepam (ATIVAN) 0 5 mg tablet Take 0 25 mg by mouth 2 (two) times a day      lubiprostone (Amitiza) 24 mcg capsule Take 1 capsule (24 mcg total) by mouth daily with breakfast 60 capsule 0    magnesium hydroxide (GNP Milk of Magnesia) 400 mg/5 mL oral suspension Take 30 mL by mouth daily as needed for constipation If no BM in 3 days 355 mL 5    metoprolol tartrate (LOPRESSOR) 25 mg tablet Take 1 tablet (25 mg total) by mouth 2 (two) times a day 60 tablet 5    mineral oil-hydrophilic petrolatum (AQUAPHOR) ointment Apply topically as needed for dry skin 420 g 5    Multiple Vitamins-Minerals (CertaVite Senior/Antioxidant) TABS Take 1 tablet by mouth daily 31 tablet 5    norgestimate-ethinyl estradiol (ORTHO-CYCLEN) 0 25-35 MG-MCG per tablet Take 1 tablet by mouth daily 28 tablet 11    norgestimate-ethinyl estradiol (ORTHO-CYCLEN) 0 25-35 MG-MCG per tablet Take 1 tablet by mouth daily      nystatin (MYCOSTATIN) powder Apply 1 application topically 4 (four) times a day 45 g 5    nystatin-triamcinolone (MYCOLOG-II) cream Apply topically 2 (two) times a day 30 g 0    pantoprazole (PROTONIX) 20 mg tablet Take 1 tablet (20 mg total) by mouth daily 62 tablet 5    phenol (Chloraseptic) 1 4 % mucosal liquid Apply 1 spray to the mouth or throat every 2 (two) hours as needed (for sore throat as needed) 236 mL 1    polyethylene glycol (MIRALAX) 17 g packet Take one packet daily as needed for no bowel movement in 24 hours 30 each 5    psyllium (METAMUCIL) 58 6 % packet Take 1 packet by mouth daily 30 packet 5    RA Sunscreen SPF50 LOTN Apply 15 minutes before sun exposure and every 2 hours 237 mL 0    senna-docusate sodium (Senexon-S) 8 6-50 mg per tablet Take 1 tablet by mouth daily at 8am  30 tablet 5    sodium chloride (OCEAN) 0 65 % nasal spray 1 spray into each nostril as needed (three times daily as needed for nasal congestion) 60 mL 5    trospium chloride (SANCTURA) 20 mg tablet Take 1 tablet (20 mg total) by mouth 2 (two) times a day 60 tablet 3    zinc oxide (DESITIN) 13 % cream Apply 1 application topically as needed (for perianal irritation) 454 g 5    [DISCONTINUED] acetaminophen (TYLENOL) 500 mg tablet Take 1 tablet (500 mg total) by mouth every 6 (six) hours as needed for mild pain 30 tablet 5    [DISCONTINUED] Diclofenac Sodium (VOLTAREN) 1 % Apply 2 g topically 4 (four) times a day 50 g 0    Mouthwashes (Listerine Antiseptic) LIQD Swish and spit 5 mL 2 (two) times a day 1000 mL 5     No current facility-administered medications on file prior to visit  Objective     Vitals: /82 (BP Location: Left arm, Patient Position: Sitting, Cuff Size: Standard)   Temp 98 3 °F (36 8 °C) (Temporal)   Resp 18   Ht 4' 8" (1 422 m)   Wt 93 4 kg (206 lb)   SpO2 97%   BMI 46 18 kg/m²       Physical Exam  Constitutional:       General: She is not in acute distress  Appearance: She is not ill-appearing     HENT:      Head: Normocephalic and atraumatic  Right Ear: External ear normal       Left Ear: External ear normal       Nose: Nose normal    Eyes:      Conjunctiva/sclera: Conjunctivae normal    Cardiovascular:      Rate and Rhythm: Normal rate and regular rhythm  Heart sounds: Normal heart sounds  Pulmonary:      Effort: No respiratory distress  Breath sounds: Normal breath sounds  No wheezing  Abdominal:      Palpations: Abdomen is soft  Tenderness: There is no abdominal tenderness  Musculoskeletal:         General: No swelling, tenderness or deformity  Normal range of motion  Right lower leg: No edema  Left lower leg: No edema  Skin:     General: Skin is warm  Findings: No rash  Neurological:      Mental Status: She is alert             Ariadne Eason MD  Family Medicine PGY-2  03/14/22

## 2022-03-14 NOTE — ASSESSMENT & PLAN NOTE
Most likely exacerbation of osteoarthritis in the setting of obesity vs Sedentary lifestyle  XR lumbar spine and hip showing no acute abnormality but noted degenerative changes and osteoarthritis  Yet to start PT, encouraged to do so  Can apply otc voltaren gel and lidocaine patch

## 2022-03-14 NOTE — TELEPHONE ENCOUNTER
Folder (To be completed by) - Dr Rita West     Name of Form -  Case note   form was not available at visit  Form to be Faxed (Fax #),     Patient was made aware of the 7 business day form policy

## 2022-03-15 ENCOUNTER — TELEPHONE (OUTPATIENT)
Dept: FAMILY MEDICINE CLINIC | Facility: CLINIC | Age: 42
End: 2022-03-15

## 2022-03-15 NOTE — TELEPHONE ENCOUNTER
Folder (To be completed by) -Dr Shauna Diaz     Name of Form - Case Notes & Parking Disability Placard    Form to be Faxed (Fax #) [phone]

## 2022-03-15 NOTE — TELEPHONE ENCOUNTER
Folder (To be completed by) - Dr Karolyn Jackson  On Dr Sasha Silverio     Name of Form - Doctor Recommendations      Form to be Faxed (Fax #), [phone]  Person Directed Support

## 2022-03-15 NOTE — TELEPHONE ENCOUNTER
Folder (To be completed by) -Dr Madison Ppier     Name of Form - Case note Person directed    Form to be Faxed [phone]

## 2022-03-16 ENCOUNTER — ANESTHESIA EVENT (OUTPATIENT)
Dept: ANESTHESIOLOGY | Facility: HOSPITAL | Age: 42
End: 2022-03-16

## 2022-03-16 ENCOUNTER — ANESTHESIA (OUTPATIENT)
Dept: ANESTHESIOLOGY | Facility: HOSPITAL | Age: 42
End: 2022-03-16

## 2022-03-16 ENCOUNTER — ANESTHESIA (OUTPATIENT)
Dept: RADIOLOGY | Facility: HOSPITAL | Age: 42
End: 2022-03-16

## 2022-03-16 ENCOUNTER — HOSPITAL ENCOUNTER (OUTPATIENT)
Dept: RADIOLOGY | Facility: HOSPITAL | Age: 42
Discharge: HOME | End: 2022-03-16
Payer: MEDICARE

## 2022-03-16 VITALS
WEIGHT: 210 LBS | HEIGHT: 56 IN | HEART RATE: 107 BPM | TEMPERATURE: 97.5 F | OXYGEN SATURATION: 97 % | RESPIRATION RATE: 16 BRPM | SYSTOLIC BLOOD PRESSURE: 102 MMHG | BODY MASS INDEX: 47.24 KG/M2 | DIASTOLIC BLOOD PRESSURE: 73 MMHG

## 2022-03-16 DIAGNOSIS — D35.2 PITUITARY MICROADENOMA (HCC): ICD-10-CM

## 2022-03-16 LAB — B-HCG SERPL-ACNC: <2 MIU/ML

## 2022-03-16 PROCEDURE — 84702 CHORIONIC GONADOTROPIN TEST: CPT | Performed by: ANESTHESIOLOGY

## 2022-03-16 PROCEDURE — 70553 MRI BRAIN STEM W/O & W/DYE: CPT

## 2022-03-16 PROCEDURE — A9585 GADOBUTROL INJECTION: HCPCS | Performed by: NURSE PRACTITIONER

## 2022-03-16 RX ORDER — ONDANSETRON 2 MG/ML
INJECTION INTRAMUSCULAR; INTRAVENOUS AS NEEDED
Status: DISCONTINUED | OUTPATIENT
Start: 2022-03-16 | End: 2022-03-16

## 2022-03-16 RX ORDER — EPHEDRINE SULFATE 50 MG/ML
INJECTION INTRAVENOUS AS NEEDED
Status: DISCONTINUED | OUTPATIENT
Start: 2022-03-16 | End: 2022-03-16

## 2022-03-16 RX ORDER — PROPOFOL 10 MG/ML
INJECTION, EMULSION INTRAVENOUS AS NEEDED
Status: DISCONTINUED | OUTPATIENT
Start: 2022-03-16 | End: 2022-03-16

## 2022-03-16 RX ORDER — SODIUM CHLORIDE, SODIUM LACTATE, POTASSIUM CHLORIDE, CALCIUM CHLORIDE 600; 310; 30; 20 MG/100ML; MG/100ML; MG/100ML; MG/100ML
INJECTION, SOLUTION INTRAVENOUS CONTINUOUS PRN
Status: DISCONTINUED | OUTPATIENT
Start: 2022-03-16 | End: 2022-03-16

## 2022-03-16 RX ORDER — DEXAMETHASONE SODIUM PHOSPHATE 10 MG/ML
INJECTION, SOLUTION INTRAMUSCULAR; INTRAVENOUS AS NEEDED
Status: DISCONTINUED | OUTPATIENT
Start: 2022-03-16 | End: 2022-03-16

## 2022-03-16 RX ORDER — GLYCOPYRROLATE 0.2 MG/ML
INJECTION INTRAMUSCULAR; INTRAVENOUS AS NEEDED
Status: DISCONTINUED | OUTPATIENT
Start: 2022-03-16 | End: 2022-03-16

## 2022-03-16 RX ADMIN — PROPOFOL 100 MG: 10 INJECTION, EMULSION INTRAVENOUS at 11:17

## 2022-03-16 RX ADMIN — PHENYLEPHRINE HYDROCHLORIDE 20 MCG/MIN: 10 INJECTION INTRAVENOUS at 11:32

## 2022-03-16 RX ADMIN — GADOBUTROL 10 ML: 604.72 INJECTION INTRAVENOUS at 11:41

## 2022-03-16 RX ADMIN — GLYCOPYRROLATE 0.2 MCG: 0.2 INJECTION, SOLUTION INTRAMUSCULAR; INTRAVENOUS at 11:20

## 2022-03-16 RX ADMIN — EPHEDRINE SULFATE 5 MG: 50 INJECTION INTRAVENOUS at 11:34

## 2022-03-16 RX ADMIN — SODIUM CHLORIDE, SODIUM LACTATE, POTASSIUM CHLORIDE, AND CALCIUM CHLORIDE: .6; .31; .03; .02 INJECTION, SOLUTION INTRAVENOUS at 11:10

## 2022-03-16 RX ADMIN — ONDANSETRON 4 MG: 2 INJECTION INTRAMUSCULAR; INTRAVENOUS at 11:20

## 2022-03-16 RX ADMIN — DEXAMETHASONE SODIUM PHOSPHATE 10 MG: 10 INJECTION, SOLUTION INTRAMUSCULAR; INTRAVENOUS at 11:20

## 2022-03-16 RX ADMIN — PROPOFOL 100 MG: 10 INJECTION, EMULSION INTRAVENOUS at 11:16

## 2022-03-16 NOTE — ANESTHESIA POSTPROCEDURE EVALUATION
Post-Op Assessment Note    CV Status:  Stable    Pain management: adequate     Mental Status:  Alert and awake   Hydration Status:  Euvolemic   PONV Controlled:  Controlled   Airway Patency:  Patent      Post Op Vitals Reviewed: Yes      Staff: CRNA         No complications documented      BP   119/89   Temp   97 0   Pulse  104   Resp   12   SpO2   97

## 2022-03-16 NOTE — ANESTHESIA PREPROCEDURE EVALUATION
Procedure:  MRI BRAIN PITUITARY WO AND W CONTRAST    Relevant Problems   CARDIO   (+) Bilateral thoracic back pain   (+) Intercostal pain      GI/HEPATIC   (+) Functional dysphagia   (+) Gastroesophageal reflux disease without esophagitis   (+) Hiatal hernia   (+) Partial small bowel obstruction (HCC)      /RENAL   (+) Acquired renal cyst of left kidney   (+) Left nephrolithiasis      MUSCULOSKELETAL   (+) Bilateral thoracic back pain   (+) Low back pain      NEURO/PSYCH   (+) Acute non intractable tension-type headache   (+) Generalized anxiety disorder   (+) Severe episode of recurrent major depressive disorder, with psychotic features (HCC)      PULMONARY   (+) Obstructive sleep apnea   (+) SOB (shortness of breath)   (+) Viral URI        Physical Exam    Airway    Mallampati score: III  TM Distance: >3 FB  Neck ROM: limited     Dental       Cardiovascular  Cardiovascular exam normal    Pulmonary  Pulmonary exam normal     Other Findings        Anesthesia Plan  ASA Score- 3     Anesthesia Type- general with ASA Monitors  Additional Monitors:   Airway Plan: LMA  Plan Factors-Exercise tolerance (METS): <4 METS  Chart reviewed  Existing labs reviewed  Patient summary reviewed  Patient is not a current smoker  Induction- intravenous  Postoperative Plan-   Planned trial extubation    Informed Consent- Anesthetic plan and risks discussed with patient  I personally reviewed this patient with the CRNA  Discussed and agreed on the Anesthesia Plan with the CRNA  Raina Zapata

## 2022-03-17 ENCOUNTER — EVALUATION (OUTPATIENT)
Dept: PHYSICAL THERAPY | Facility: REHABILITATION | Age: 42
End: 2022-03-17
Payer: MEDICARE

## 2022-03-17 DIAGNOSIS — M54.50 ACUTE RIGHT-SIDED LOW BACK PAIN, UNSPECIFIED WHETHER SCIATICA PRESENT: Primary | ICD-10-CM

## 2022-03-17 PROCEDURE — 97161 PT EVAL LOW COMPLEX 20 MIN: CPT | Performed by: PHYSICAL THERAPIST

## 2022-03-17 PROCEDURE — 97110 THERAPEUTIC EXERCISES: CPT | Performed by: PHYSICAL THERAPIST

## 2022-03-17 NOTE — PROGRESS NOTES
PT Evaluation     Today's date: 3/17/2022  Patient name: Lincoln Zhu  : 1980  MRN: [id number]  Referring provider: Sharif Flores MD  Dx:   Encounter Diagnosis     ICD-10-CM    1  Acute right-sided low back pain, unspecified whether sciatica present  M54 50 Ambulatory Referral to Physical Therapy                  Assessment  Assessment details: Lincoln Zhu is a 36 y o  female referred to outpt PT with dx of right lumbar acute dysfunction  Pt presents with good trunk ROM and grossly normal LE strength with signficant tenderness to palpation lower right quarter  Pt funct is limited with pain during all funct tasks  Pt was issued HEP to perform at home with assistance of care givers and to f/u in 2 weeks  Pt may also benefit from further referral and testing due to lower right quadrant sx's  Goals  Independent with HEP x1 visit  Plan  Patient would benefit from: skilled physical therapy  Planned therapy interventions: home exercise program  Frequency: 1x week  Duration in weeks: 2        Subjective Evaluation    History of Present Illness  Mechanism of injury: Pt reports that she began having right sided back and hip pain approx 3 weeks with insidious onset  Pt notes that she is having pain throughout her day and also waking with pain at night  Pt reports increase in sx's with increase in walking tolerance, but also notes that sx's are present "all the time for the last 3 weeks "  Pt also reports being dx'd with arthritis and is "looking for the arthritis to go away "         Objective     Active Range of Motion     Lumbar   Flexion:  WFL  Extension:  WFL  Left lateral flexion:  WFL  Right lateral flexion:  WFL  Left rotation:  [facility]  Right rotation:  [facility]    Additional Active Range of Motion Details  Pt has no increase in concordant sx's with trunk ROM in all directions and demo's good ROM in all directions      Pt has most severe TTP present in lower right quadrant and abdomen, noting that these are the sx's that have been present for the last 3 weeks  When discussing with caregiver, she notes that she does have hx of bowel impaction and they do watch her BM regularly due to hx  Strength/Myotome Testing     Additional Strength Details  Strength grossly WNL's        Precautions: hydrocephalus, seizures, CP, brain lesion          Manual  3/17/22                                                                                   Neuro Re-Ed                                                                                                  Therex            LTR 5"x10           seated trunk flex 5"x10                                                                                                               Gait Training                                 Modalities

## 2022-03-18 ENCOUNTER — TELEPHONE (OUTPATIENT)
Dept: FAMILY MEDICINE CLINIC | Facility: CLINIC | Age: 42
End: 2022-03-18

## 2022-03-18 NOTE — TELEPHONE ENCOUNTER
Folder (To be completed by) - Dr Reshma Tavera     Name of Form - Person Directed Support recommendation for trip to [place]    Form to be Faxed to [phone]    Patient was made aware of the 7 business day form policy

## 2022-03-22 ENCOUNTER — TELEPHONE (OUTPATIENT)
Dept: NEUROSURGERY | Facility: CLINIC | Age: 42
End: 2022-03-22

## 2022-03-22 DIAGNOSIS — M54.50 ACUTE RIGHT-SIDED LOW BACK PAIN, UNSPECIFIED WHETHER SCIATICA PRESENT: ICD-10-CM

## 2022-03-22 RX ORDER — LIDOCAINE 50 MG/G
1 PATCH TOPICAL DAILY
Qty: 5 PATCH | Refills: 3 | Status: SHIPPED | OUTPATIENT
Start: 2022-03-22 | End: 2022-03-24 | Stop reason: SDUPTHER

## 2022-03-22 NOTE — TELEPHONE ENCOUNTER
Lea Juarez called in regards to scheduling follow up appt with office  Stated that it took so long bc patient needed mri with anesthesia and it was rescheduled a couple of times      appt made with [facility] for 4/11/22 @10;30am

## 2022-03-23 ENCOUNTER — HOSPITAL ENCOUNTER (OUTPATIENT)
Dept: MAMMOGRAPHY | Facility: CLINIC | Age: 42
Discharge: HOME | End: 2022-03-23
Payer: MEDICARE

## 2022-03-23 DIAGNOSIS — Z12.31 ENCOUNTER FOR SCREENING MAMMOGRAM FOR MALIGNANT NEOPLASM OF BREAST: ICD-10-CM

## 2022-03-23 PROCEDURE — 77067 SCR MAMMO BI INCL CAD: CPT

## 2022-03-23 PROCEDURE — 77063 BREAST TOMOSYNTHESIS BI: CPT

## 2022-03-24 ENCOUNTER — TELEPHONE (OUTPATIENT)
Dept: FAMILY MEDICINE CLINIC | Facility: CLINIC | Age: 42
End: 2022-03-24

## 2022-03-24 DIAGNOSIS — M54.50 ACUTE RIGHT-SIDED LOW BACK PAIN, UNSPECIFIED WHETHER SCIATICA PRESENT: ICD-10-CM

## 2022-03-24 RX ORDER — LIDOCAINE 50 MG/G
1 PATCH TOPICAL DAILY
Qty: 5 PATCH | Refills: 5 | Status: SHIPPED | OUTPATIENT
Start: 2022-03-24 | End: 2022-04-08 | Stop reason: SDUPTHER

## 2022-03-25 ENCOUNTER — TELEPHONE (OUTPATIENT)
Dept: OTHER | Facility: OTHER | Age: 42
End: 2022-03-25

## 2022-03-25 ENCOUNTER — TELEPHONE (OUTPATIENT)
Dept: UROLOGY | Facility: AMBULATORY SURGERY CENTER | Age: 42
End: 2022-03-25

## 2022-03-25 NOTE — TELEPHONE ENCOUNTER
Herbert Castillo called in regarding prior auth needed to continue Trospium Chloride  Pt will be out of this medication by tomorrow  She is requesting a call back

## 2022-03-25 NOTE — TELEPHONE ENCOUNTER
Not a lot I can do about the processing of this drug prior authorization  I will request an EXPEDITED request but I wasn't given any time to processing more timely    Pharmacy benefits were verified with [facility]: BIN#:  [id number] - PCN#:  Willem Clement GRP#:  [id number] - ID#:  [id number] through [address]  I did mention to the pharmacist that [address] likely will NOT cover this drug  They were aware and have already negotiated a cash price with her care facility if drug coverage is denied  Prior Authorization for Trospium Choride IR 20mg was requested and initiated via CoverMyMeds  com - Jerrell FREEMAN Case ID: [id number]  Response questions answered and submitted for consideration  Final determination is expected within 24-72 hours

## 2022-03-25 NOTE — TELEPHONE ENCOUNTER
----- Message from Ludin Johansen sent at 3/25/2022  9:24 AM EDT -----  Regarding: Trospium 20 mg  Please complete the prior authorization for Trospium Chloride 20 mg - insurance covered the first fill as a courtesy   She will be out of this medication tomorrow am  Pharmacy and medical management at [facility] have attempted to reach out since 3/21/22 for this request

## 2022-03-25 NOTE — TELEPHONE ENCOUNTER
Prior Authorization forTrospium Choride IR 20mg was APPROVED (Case WJ#:[id number]) and valid from 2/23/22 until 3/25/23  Pharmacy notified of same; NO copayment due by the patient  Facility notified as well  No further action required

## 2022-03-25 NOTE — TELEPHONE ENCOUNTER
Med She has a pacemaker and will check with her cardiologist if the pacemaker is MRI compatible and schedule an MRI of the brain and an MRA of the head.  Differential diagnosis of intracranial lymphoma, multiple sclerosis, brainstem CVA was considered.    RN returned call to patient and chart reviewed from Rut Enriquez NP - patient would need to follow the device protocol.. RN discussed with MRI team and pace watch. A Attestation was sent to provider - RN will alert provider.     Updated patient a few steps needed to take place prior to scheduling, she returned understanding that she will be receiving a call.

## 2022-03-28 NOTE — PROGRESS NOTES
PT Evaluation /Discharge Summary     Today's date: 3/30/2022  Patient name: Justine Shah  : 1980  MRN: [id number]  Referring provider:  Bridget CHAPPELL  Dx:   Encounter Diagnosis     ICD-10-CM    1  Urinary incontinence, unspecified type  R32        Start Time: 1015  Stop Time: 1100  Total time in clinic (min): 45 minutes    Assessment  Assessment details: Kel Hughes is a 36y o  year old female with complaints of urinary incontinence, urgency and frequency  The following impairments were found on evaluation: poor bladder habits and awareness  Patient was accompanied to appointment with staff from group home  They have been completing timed voiding every 2 hours  Pt has had UI for as long as staff can remember  Pt was unable to participate in any of subjective information  Pt reports she is at PT for her hip  Pt redirected often t/o eval   No internal assessment completed today due to lack of appropriateness and pts unable to follow complex commands  Provided group home staff with recommendations for timed voiding, proper fluid intake, double void, and HEP for overflow strengthening (3 total exercises)  Pts mobility is limited- walks with rollator at all times  Very poor endurance and arrived to PT in wheelchair  At this time recommend group home staff assist pt with strategies and completing HEP  Pt evaluated and discharged today  All questions answered  Patient educated on diagnosis, plan of care and prognosis  Isabel Lu is in agreement with recommended plan of care, goals for therapy and demonstrates motivation for active participation in proposed plan of care      Thank you Anamaria Sepulveda for this referral!    Impairments: abnormal gait, activity intolerance, impaired balance, impaired physical strength, lacks appropriate home exercise program, pain with function, poor posture  and poor body mechanics  Barriers to therapy: none  Understanding of Dx/Px/POC: fair   Prognosis: fair    Plan  Patient would benefit from: skilled physical therapy  Referral necessary: No  Planned therapy interventions: activity modification, behavior modification, body mechanics training, breathing training, home exercise program, joint mobilization, manual therapy, motor coordination training, neuromuscular re-education, patient education, strengthening and therapeutic activities  Frequency: eval only   Duration in weeks: 1  Plan of Care beginning date: 3/30/2022  Plan of Care expiration date: 3/30/2022  Treatment plan discussed with: patient, PTA and referring physician        PT Pelvic Floor Subjective:   History of Present Illness:   Jenifer Mahajan is a 36y o  year old female who presents to [facility] with primary complaint of urinary incontience, frequency and urgency  They are referred to OPPT by TA Lay  Jenifer Mahajan reports urinary symptoms have gotten worse in February  Pt is accompanied to appointment with Franco Valencia who is the manager at the group home  Pt does live in a group home  Trospium for OAB  Reports medication has been helping with urinary symptoms    Has RW and cannot walk without it      2/25/2022 PVR 55mL, US unremarkable,     Social Support:     Lives in:  [address] [place] [address] (group home )    Lives with: 1 house mate     Relationship status: never     Work status: unemployed  Diet and Exercise:      Exercise type: walking and no activity    Walking in the house   Co-morbidities:    Functional dysphagia, GERD, hx of partial SBO, pituitary adenoma, hearing impairment, cerebral palsy, anxiety, constipation, depression, dizziness, frequent falls, ADD, hx of seizures, hydrocephalus, oppositional defiant disorder,   OB/ gyn History    Gestational History:     Prior Pregnancy: No      Menstrual History:      Menstrual irregularities irregular menses    Tolerates tampons: uses pads     Menopausal: no menopause    Birth control method: birth control pills  Bladder Function:     Voiding Difficulties positive for: frequent urination      Voiding Difficulties comments:     Voiding frequency: every 1-2 hours (pt reports very 5 min or so - vague answer, staff states they try to get her to go every 2 hours )    Urinary leakage: no urine leakage    Intake (ounces): Intake (ounces) comment: Flavored water - 32 ox x2   Incontinence Management:     Pads/Diaper Use:  24 hours    Pads/Diapers Additional Comments: pull up - changes due to being damp   Bowel Function:     Voiding DIfficulties: painful defecating      Bowel Function comments:  Reports she pushes really hard and its hard to poop     Bowel frequency: daily (staff has to help wipe patient )    [place] Stool Scale: type 3  Sexual Function:     Sexually Active:  Non-contributory and not sexually active  Pain:     No pain reported by patient  Diagnostic Tests:     Post void residual: normal   Treatments:     None    Patient Goals:     Patient goals for therapy:  Fully empty bladder or bowels and improved bladder or bowel function    Other patient goals:  Unable to provide goals       Objective  Not completed- pt did not have rolling walker with her and not appropriate for PFM internal exam          Precautions: standard     Access Code: [email or web address]  URL: [email or web address]  Date: 03/30/2022  Prepared by: Robin Lopez    Exercises  Seated Hip Adduction Isometrics with Jason Gaines - 1 x daily - 2 sets - 10 reps  Seated Hip Abduction - 1 x daily - 2 sets - 10 reps  Sit to Stand with Armchair - 1 x daily - 10 reps       3/30/2022          Patient Ed           PFM anatomy and function                                 Neuro Re-Ed                                                                                        Ther Ex                                                                                                   Ther Activity                                 Manual                                 Modalities

## 2022-03-30 ENCOUNTER — EVALUATION (OUTPATIENT)
Dept: PHYSICAL THERAPY | Facility: REHABILITATION | Age: 42
End: 2022-03-30
Payer: MEDICARE

## 2022-03-30 DIAGNOSIS — R32 URINARY INCONTINENCE, UNSPECIFIED TYPE: Primary | ICD-10-CM

## 2022-03-30 PROCEDURE — 97530 THERAPEUTIC ACTIVITIES: CPT

## 2022-03-30 PROCEDURE — 97162 PT EVAL MOD COMPLEX 30 MIN: CPT

## 2022-03-31 ENCOUNTER — OFFICE VISIT (OUTPATIENT)
Dept: PHYSICAL THERAPY | Facility: REHABILITATION | Age: 42
End: 2022-03-31
Payer: MEDICARE

## 2022-03-31 DIAGNOSIS — M54.50 ACUTE RIGHT-SIDED LOW BACK PAIN, UNSPECIFIED WHETHER SCIATICA PRESENT: Primary | ICD-10-CM

## 2022-03-31 PROCEDURE — 97110 THERAPEUTIC EXERCISES: CPT | Performed by: PHYSICAL THERAPIST

## 2022-03-31 NOTE — PROGRESS NOTES
Daily Note     Today's date: 3/31/2022  Patient name: Jo Ann Huffman  : 1980  MRN: [id number]  Referring provider: Taylor Rush MD  Dx:   Encounter Diagnosis     ICD-10-CM    1  Acute right-sided low back pain, unspecified whether sciatica present  M54 49                   Subjective: "I have back pain, but I've got to live with it "        Objective: See treatment diary below    Precautions: hydrocephalus, seizures, CP, brain lesion         Manual  3/17/22                                                                                       Neuro Re-Ed                                                                                                                             Therex             LTR 5"x10   5"x10  seated          seated trunk flex 5"x10  5"x10           seated marches   Alt x10 ea          sit to stand   x10                                                                                 Gait Training                                 Modalities                                                                Assessment: Pt saw pelvic  yesterday due to incontinence and was issued HEP  Pt notes that she gets the most back pain with walking and limits with use of RW  Pt encouraged to perform exercises while not in PT and issued calendar for April to check off the days that she has performed  Pt's care giver does note that pt argues with performing when asked and reiterated to patient the importance of movement and focus on exercises to assist with flexibility and strengthening  Plan: Continue per plan of care  f/u in 2 weeks

## 2022-04-05 DIAGNOSIS — M54.50 ACUTE RIGHT-SIDED LOW BACK PAIN, UNSPECIFIED WHETHER SCIATICA PRESENT: ICD-10-CM

## 2022-04-06 DIAGNOSIS — G80.1 SPASTIC DIPLEGIC CEREBRAL PALSY (HCC): ICD-10-CM

## 2022-04-06 DIAGNOSIS — K21.9 GASTROESOPHAGEAL REFLUX DISEASE WITHOUT ESOPHAGITIS: ICD-10-CM

## 2022-04-06 RX ORDER — PANTOPRAZOLE SODIUM 20 MG/1
20 TABLET, DELAYED RELEASE ORAL DAILY
Qty: 62 TABLET | Refills: 5 | Status: SHIPPED | OUTPATIENT
Start: 2022-04-06

## 2022-04-06 RX ORDER — MULTIVIT-MIN/IRON/FOLIC ACID/K 18-600-40
2000 CAPSULE ORAL DAILY
Qty: 62 TABLET | Refills: 5 | Status: SHIPPED | OUTPATIENT
Start: 2022-04-06

## 2022-04-08 ENCOUNTER — TELEPHONE (OUTPATIENT)
Dept: FAMILY MEDICINE CLINIC | Facility: CLINIC | Age: 42
End: 2022-04-08

## 2022-04-08 DIAGNOSIS — M25.551 RIGHT HIP PAIN: Primary | ICD-10-CM

## 2022-04-08 DIAGNOSIS — M54.50 ACUTE RIGHT-SIDED LOW BACK PAIN, UNSPECIFIED WHETHER SCIATICA PRESENT: ICD-10-CM

## 2022-04-08 RX ORDER — LIDOCAINE 50 MG/G
1 PATCH TOPICAL DAILY
Qty: 5 PATCH | Refills: 5 | Status: SHIPPED | OUTPATIENT
Start: 2022-04-08 | End: 2022-04-08

## 2022-04-08 RX ORDER — LIDOCAINE 40 MG/G
CREAM TOPICAL AS NEEDED
Qty: 30 G | Refills: 0 | Status: SHIPPED | OUTPATIENT
Start: 2022-04-08

## 2022-04-08 NOTE — TELEPHONE ENCOUNTER
Le from [address] [place] [address] leave a voice mail on med line requesting a call back with clarification on Protonix instructions  The script that was sent to patient pharmacy state to take Protonix once a day and patient was taking this medication one tablet twice a day  Le would like a call back with the correct instructions  Please review  Thank You

## 2022-04-08 NOTE — TELEPHONE ENCOUNTER
Just got the requrset for   - lidocaine (Lidoderm) 5 %   A Prior Auth is needed for this medication or PCP can choose another one that will be covered by insurance    Sujey Nichols can be reached at [phone]

## 2022-04-11 ENCOUNTER — OFFICE VISIT (OUTPATIENT)
Dept: NEUROSURGERY | Facility: CLINIC | Age: 42
End: 2022-04-11
Payer: MEDICARE

## 2022-04-11 ENCOUNTER — TELEPHONE (OUTPATIENT)
Dept: PHYSICAL THERAPY | Facility: REHABILITATION | Age: 42
End: 2022-04-11

## 2022-04-11 ENCOUNTER — TELEPHONE (OUTPATIENT)
Dept: FAMILY MEDICINE CLINIC | Facility: CLINIC | Age: 42
End: 2022-04-11

## 2022-04-11 VITALS
HEIGHT: 56 IN | RESPIRATION RATE: 18 BRPM | WEIGHT: 205 LBS | DIASTOLIC BLOOD PRESSURE: 82 MMHG | TEMPERATURE: 98.7 F | BODY MASS INDEX: 46.12 KG/M2 | SYSTOLIC BLOOD PRESSURE: 121 MMHG | HEART RATE: 111 BPM

## 2022-04-11 DIAGNOSIS — E23.6 PITUITARY CYST (HCC): Primary | ICD-10-CM

## 2022-04-11 DIAGNOSIS — B37.3 VULVAL CANDIDIASIS: ICD-10-CM

## 2022-04-11 PROBLEM — D49.7 PITUITARY NEOPLASM: Status: RESOLVED | Noted: 2017-03-13 | Resolved: 2022-04-11

## 2022-04-11 PROBLEM — D35.2 PITUITARY MICROADENOMA (HCC): Status: RESOLVED | Noted: 2018-04-06 | Resolved: 2022-04-11

## 2022-04-11 PROBLEM — D35.2: Status: RESOLVED | Noted: 2019-04-05 | Resolved: 2022-04-11

## 2022-04-11 PROCEDURE — 99213 OFFICE O/P EST LOW 20 MIN: CPT | Performed by: NEUROLOGICAL SURGERY

## 2022-04-11 RX ORDER — NAFTIFINE HYDROCHLORIDE 1 MG/G
CREAM TOPICAL DAILY
COMMUNITY

## 2022-04-11 NOTE — TELEPHONE ENCOUNTER
Chet Pelayo from [facility] called to request a discontinue script for Lidocaine patches   Patient is no longer using them

## 2022-04-11 NOTE — TELEPHONE ENCOUNTER
Notified by  that caregivers from group home left message asking for information on POC  Called at 1:15 pm and 1:50 pm with no answer- direct to voicemail  Left  with callback number        Simi Ajit [place]  4/11/2022   2:00 PM

## 2022-04-11 NOTE — PROGRESS NOTES
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  This has been followed for 5 years and has shown no signs of change  Per [facility]os 2018 guidelines, I have suggested this requires no further follow-up  The patient, her caregivers are in agreement  They will contact us with any questions or concerns  CHIEF COMPLAINT    Chief Complaint   Patient presents with    Follow-up     FOLLOW UP AFTER MRI BRAIN PIT 3/16/22       HISTORY    History of Present Illness     36y o  year old female     HPI    See Discussion    REVIEW OF SYSTEMS    Review of Systems   Constitutional: Positive for fatigue  HENT: Positive for hearing loss (bilateral ears, wears hearing aids)  Eyes: Negative  Respiratory: Negative  Cardiovascular: Negative  Gastrointestinal: Negative  Endocrine: Negative  Genitourinary: Negative  Musculoskeletal: Positive for gait problem (uses walker, no recent falls)  Skin: Negative  Allergic/Immunologic: Negative  Neurological: Positive for numbness (and tingling on left foot )  Negative for dizziness, seizures, syncope, speech difficulty, weakness (generalized) and headaches  Hematological: Negative  Psychiatric/Behavioral: Negative            Meds/Allergies     Current Outpatient Medications   Medication Sig Dispense Refill    acetaminophen (TYLENOL) 500 mg tablet Take 1 tablet (500 mg total) by mouth every 6 (six) hours as needed for mild pain 30 tablet 5    aluminum-magnesium hydroxide-simethicone (Antacid) 200-200-20 mg/5 mL suspension Take 15 mL by mouth every 4 (four) hours as needed for indigestion or heartburn 355 mL 5    amitriptyline (ELAVIL) 10 mg tablet Take 10 mg by mouth daily at bedtime      ARIPiprazole (ABILIFY) 20 MG tablet Take 1 tablet (20 mg total) by mouth daily at bedtime 90 tablet 2    bacitracin topical ointment 500 units/g topical ointment Cleanse site on nose with soap and water followed by bacitracin BID until healed then p r n  15 g 2    bismuth subsalicylate (PEPTO BISMOL) 524 mg/30 mL oral suspension Take 15 mL (262 mg total) by mouth every 6 (six) hours as needed for indigestion 360 mL 5    calcium carbonate (TUMS) 500 mg chewable tablet Chew 1 tablet (500 mg total) 3 (three) times a day as needed for heartburn 30 tablet 5    carbamide peroxide (DEBROX) 6 5 % otic solution Administer 5 drops into the left ear 2 (two) times a day 15 mL 0    D3-1000 25 MCG (1000 UT) tablet Take 2 tablets (2,000 Units total) by mouth daily 62 tablet 5    Diclofenac Sodium (VOLTAREN) 1 % Apply 2 g topically 4 (four) times a day 50 g 0    dicyclomine (BENTYL) 20 mg tablet Take 1 tablet (20 mg total) by mouth every 6 (six) hours as needed (as needed) 120 tablet 2    Dyclonine-Glycerin (Cepacol Sore Throat Spray) 0 1-33 % LIQD Apply 1 spray to the mouth or throat 3 (three) times a day as needed (sorethroat) 118 mL 5    Elastic Bandages & Supports (Neoprene Knee Brace) MISC Apply right knee brace in morning; remove at night 1 each 0    escitalopram (LEXAPRO) 20 mg tablet Take 1 tablet (20 mg total) by mouth daily 90 tablet 2    fluticasone (FLONASE) 50 mcg/act nasal spray 1 spray into each nostril daily as needed for rhinitis (nasal congestion) At 8:00 AM 18 2 mL 5    GNP Stomach Relief Max St 525 MG/15ML SUSP       guaiFENesin (ROBITUSSIN) 100 MG/5ML oral liquid Take 200 mg by mouth 3 (three) times a day as needed for cough      hydrOXYzine HCL (ATARAX) 10 mg tablet Take 1 tablet (10 mg total) by mouth 3 (three) times a day 30 tablet 3    ibuprofen (MOTRIN) 400 mg tablet Take 1 tablet (400 mg total) by mouth every 8 (eight) hours as needed for mild pain 30 tablet 5    Incontinence Supply Disposable (Depend Underwear Large) MISC Use 2 (two) times a day 60 each 5    ketoconazole (NIZORAL) 2 % cream       Konsyl Daily Fiber 28 3 %       lamoTRIgine (LaMICtal) 100 mg tablet       lamoTRIgine (LaMICtal) 25 mg tablet Take 25 mg by mouth 2 (two) times a day        lidocaine (LMX) 4 % cream Apply topically as needed for mild pain 30 g 0    LORazepam (ATIVAN) 0 5 mg tablet Take 0 25 mg by mouth 2 (two) times a day      lubiprostone (Amitiza) 24 mcg capsule Take 1 capsule (24 mcg total) by mouth daily with breakfast 60 capsule 0    magnesium hydroxide (GNP Milk of Magnesia) 400 mg/5 mL oral suspension Take 30 mL by mouth daily as needed for constipation If no BM in 3 days 355 mL 5    metoprolol tartrate (LOPRESSOR) 25 mg tablet Take 1 tablet (25 mg total) by mouth 2 (two) times a day 60 tablet 5    mineral oil-hydrophilic petrolatum (AQUAPHOR) ointment Apply topically as needed for dry skin 420 g 5    Mouthwashes (Listerine Antiseptic) LIQD Swish and spit 5 mL 2 (two) times a day 1000 mL 5    Multiple Vitamins-Minerals (CertaVite Senior/Antioxidant) TABS Take 1 tablet by mouth daily 31 tablet 5    naftifine (NAFTIN) 1 % cream Apply topically daily      norgestimate-ethinyl estradiol (ORTHO-CYCLEN) 0 25-35 MG-MCG per tablet Take 1 tablet by mouth daily 28 tablet 11    nystatin (MYCOSTATIN) powder Apply 1 application topically 4 (four) times a day 45 g 5    nystatin-triamcinolone (MYCOLOG-II) cream Apply topically 2 (two) times a day 30 g 0    pantoprazole (PROTONIX) 20 mg tablet Take 1 tablet (20 mg total) by mouth daily 62 tablet 5    phenol (Chloraseptic) 1 4 % mucosal liquid Apply 1 spray to the mouth or throat every 2 (two) hours as needed (for sore throat as needed) 236 mL 1    polyethylene glycol (MIRALAX) 17 g packet Take one packet daily as needed for no bowel movement in 24 hours 30 each 5    psyllium (METAMUCIL) 58 6 % packet Take 1 packet by mouth daily 30 packet 5    RA Sunscreen SPF50 LOTN Apply 15 minutes before sun exposure and every 2 hours 237 mL 0    senna-docusate sodium (Senexon-S) 8 6-50 mg per tablet Take 1 tablet by mouth daily at 8am  30 tablet 5    sodium chloride (OCEAN) 0 65 % nasal spray 1 spray into each nostril as needed (three times daily as needed for nasal congestion) 60 mL 5    trospium chloride (SANCTURA) 20 mg tablet Take 1 tablet (20 mg total) by mouth 2 (two) times a day 60 tablet 3    zinc oxide (DESITIN) 13 % cream Apply 1 application topically as needed (for perianal irritation) 454 g 5    norgestimate-ethinyl estradiol (ORTHO-CYCLEN) 0 25-35 MG-MCG per tablet Take 1 tablet by mouth daily (Patient not taking: Reported on 4/11/2022 )       No current facility-administered medications for this visit  No Known Allergies    PAST HISTORY    Past Medical History:   Diagnosis Date    ADD (attention deficit disorder)     Anxiety     Astigmatism     Brain lesion     Calcium deficiency     Cellulitis of foot, right 6/4/2018    Cerebral palsy (HCC)     Chronic otitis media     Constipation     Depression     Dysphagia     Esophagitis     Esotropia     GERD (gastroesophageal reflux disease)     Hiatal hernia     Hydrocephalus (HCC)     Impaired fasting glucose     Left nephrolithiasis 03/04/2019    Myopia     Oppositional defiant disorder     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HCC)     Seizures (HCC)     Sensorineural hearing loss     Sleep apnea     Status post ventriculoatrial shunt placement     Visual impairment        Past Surgical History:   Procedure Laterality Date    BREAST BIOPSY Left     X 2 (not sure of years)    CSF SHUNT      Creation of Ventriculo-Peritoneal CSF shunt ;  Last Assessed:7/6/2016    EAR SURGERY      Last Assessed:7/6/2016    LEG SURGERY      due to CP     NOSE SURGERY      Last Assessed:7/6/2016    MS ESOPHAGOGASTRODUODENOSCOPY TRANSORAL DIAGNOSTIC N/A 5/9/2019    Procedure: ESOPHAGOGASTRODUODENOSCOPY (EGD) with biopsy;  Surgeon: Libby Lynn MD;  Location: [facility]; Service: Gastroenterology    UPPER GASTROINTESTINAL ENDOSCOPY  05/2019       Social History     Tobacco Use    Smoking status: Never Smoker    Smokeless tobacco: Never Used   Vaping Use    Vaping Use: Never used   Substance Use Topics    Alcohol use: Never    Drug use: Never       Family History   Problem Relation Age of Onset    Diabetes Mother     Colon cancer Father     No Known Problems Maternal Grandmother     No Known Problems Maternal Grandfather     No Known Problems Paternal Grandmother     No Known Problems Paternal Grandfather     Hypertension Neg Hx     Heart disease Neg Hx     Stroke Neg Hx     Thyroid disease Neg Hx          The following portions of the patient's history were reviewed in this encounter and updated as appropriate: Past medical, surgical, family, and social history, as well as medications, allergies, and review of systems  EXAM    Vitals:Blood pressure 121/82, pulse (!) 111, temperature 98 7 °F (37 1 °C), resp  rate 18, height 4' 8" (1 422 m), weight 93 kg (205 lb), not currently breastfeeding  ,Body mass index is 45 96 kg/m²  Physical Exam  Vitals reviewed  Constitutional:       Appearance: She is well-developed  Eyes:      General: No scleral icterus  Cardiovascular:      Rate and Rhythm: Tachycardia present  Pulmonary:      Effort: Pulmonary effort is normal    Skin:     General: Skin is warm and dry  Neurological:      Mental Status: She is alert  GCS: GCS eye subscore is 4  GCS verbal subscore is 5  GCS motor subscore is 6           Neurologic Exam      MEDICAL DECISION MAKING    Imaging Studies:     MRI brain pituitary wo and w contrast    Result Date: 3/17/2022  Narrative: MRI BRAIN AND SELLA  WITH AND WITHOUT CONTRAST INDICATION:  D35 2: Benign neoplasm of pituitary gland COMPARISON: Multiple prior examinations, most recently 6/24/2020 TECHNIQUE: Brain: Axial diffusion-weighted imaging  Axial FLAIR and axial T2  Axial gradient  Axial T1 postcontrast Axial bravo postcontrast  Sella: Sagittal and coronal T1  Coronal T2  Sagittal and coronal T1 postcontrast with fat suppression  Targeted images of the sella were performed requiring additional time at acquisition and interpretation of approximately 25% IV Contrast:  10 mL of Gadobutrol injection (SINGLE-DOSE) IMAGE QUALITY:  Diagnostic  FINDINGS: BRAIN PARENCHYMA:  Stable dysmorphic brain parenchyma compared to multiple prior examinations  There is marked volume loss of the majority of the corpus callosum with only a small amount of corpus callosal tissue remaining anteriorly and posteriorly  There is volume loss within the left frontal and parietal vertices as well as portions of the temporal and occipital lobe with ex vacuo dilatation of the left lateral ventricle which is also dysmorphic  There is a shunt catheter via a left posterior approach  Diffusion imaging demonstrates no signs of acute ischemia  No parenchymal mass or mass effect  Stable appearance of the brainstem and posterior fossa as well  VENTRICLES:  No obstructive hydrocephalus  Shunt catheter unchanged in position  SELLA AND PITUITARY GLAND:  6 mm poorly enhancing nodule within the suprasellar cistern is inseparable from the superior aspect of the pituitary gland and the pituitary stalk  This is unchanged in size and configuration from the prior examination  Mild  thickening of the stalk along the right lateral aspect of this abnormality  There is mild mass effect upon the undersurface of the optic chiasm  No change  ORBITS:  Normal  PARANASAL SINUSES:  Normal  VASCULATURE:  Evaluation of the major intracranial vasculature demonstrates appropriate flow voids   CALVARIUM AND SKULL BASE:  Normal  EXTRACRANIAL SOFT TISSUES:  Normal      Impression: Stable approximately 6 mm round poorly enhancing nodule inseparable from the superior aspect of the pituitary gland and pituitary stalk, presumably microadenoma  Stable dysmorphic brain parenchyma and ventricular system  Workstation performed: [id number]       I have personally reviewed pertinent reports  and I have personally reviewed pertinent films in PACS      PLEASE NOTE:  This encounter may have been completed utilizing the [employer] Speech Voice Recognition Software  Grammatical errors, random word insertions, pronoun errors and incomplete sentences are occasional consequences of the system due to software limitations, ambient noise and hardware issues  These may be missed by proof reading prior to affixing electronic signature  Any questions or concerns about the content, text or information contained within the body of this dictation should be directly addressed to the advanced practitioner or physician for clarification

## 2022-04-14 ENCOUNTER — APPOINTMENT (OUTPATIENT)
Dept: PHYSICAL THERAPY | Facility: REHABILITATION | Age: 42
End: 2022-04-14
Payer: MEDICARE

## 2022-04-14 DIAGNOSIS — B37.3 VULVAL CANDIDIASIS: ICD-10-CM

## 2022-04-19 ENCOUNTER — OFFICE VISIT (OUTPATIENT)
Dept: PHYSICAL THERAPY | Facility: REHABILITATION | Age: 42
End: 2022-04-19
Payer: MEDICARE

## 2022-04-19 DIAGNOSIS — M54.50 ACUTE RIGHT-SIDED LOW BACK PAIN, UNSPECIFIED WHETHER SCIATICA PRESENT: Primary | ICD-10-CM

## 2022-04-19 PROCEDURE — 97110 THERAPEUTIC EXERCISES: CPT | Performed by: PHYSICAL THERAPIST

## 2022-04-19 NOTE — PROGRESS NOTES
Daily Note     Today's date: 2022  Patient name: Hussain Arreaga  : 1980  MRN: [id number]  Referring provider: Katheryn Contreras MD  Dx:   Encounter Diagnosis     ICD-10-CM    1  Acute right-sided low back pain, unspecified whether sciatica present  M54 49                   Subjective: Pt reports that she is having no LBP  Pt hasn't performed HEP, but states she is walking more  Objective: See treatment diary below    Precautions: hydrocephalus, seizures, CP, brain lesion         Manual  3/17/22  4/18/22  4/19/22                                                                                   Neuro Re-Ed                                                                                                                             Therex             LTR 5"x10   5"x10  seated  5"x10         seated trunk flex 5"x10  5"x10   5"x10         seated marches   Alt x10 ea  alt x10 ea        sit to stand   x10   x10                                                                               Gait Training                                 Modalities                                                                Assessment: Good tolerance to exercises with min rest breaks needed and good tolerance to lower reps  Pt issued updated HEP with 4 exercises to assist with carry over to home activities  Plan: Pt placed on hold from PT x1 month secondary to no back pain and encouraged to cont with HEP

## 2022-04-20 ENCOUNTER — TELEPHONE (OUTPATIENT)
Dept: FAMILY MEDICINE CLINIC | Facility: CLINIC | Age: 42
End: 2022-04-20

## 2022-04-20 NOTE — TELEPHONE ENCOUNTER
Please send D/C order over to [facility] for the medication Voltaren 1% per Carmen

## 2022-04-26 ENCOUNTER — HOSPITAL ENCOUNTER (EMERGENCY)
Facility: HOSPITAL | Age: 42
Discharge: HOME | End: 2022-04-26
Attending: EMERGENCY MEDICINE | Admitting: EMERGENCY MEDICINE
Payer: MEDICARE

## 2022-04-26 VITALS
SYSTOLIC BLOOD PRESSURE: 152 MMHG | RESPIRATION RATE: 18 BRPM | OXYGEN SATURATION: 99 % | TEMPERATURE: 98.1 F | DIASTOLIC BLOOD PRESSURE: 87 MMHG | HEART RATE: 100 BPM

## 2022-04-26 DIAGNOSIS — F43.21 GRIEF REACTION: Primary | ICD-10-CM

## 2022-04-26 PROCEDURE — 99284 EMERGENCY DEPT VISIT MOD MDM: CPT

## 2022-04-26 PROCEDURE — 99282 EMERGENCY DEPT VISIT SF MDM: CPT | Performed by: EMERGENCY MEDICINE

## 2022-04-26 NOTE — ED NOTES
Patient was brought to the Emergency Department after making a statement in her group home that she wanted to harm herself  The patient found out this morning that one of her house mates passed away last night and was very distraught  Patient denies current suicidal ideation, homicidal ideation, auditory/visual/tactile hallucinations  Patient reports she feels she will be safe returning home  Patient's  reports that in the past patient has slapped herself, but never attempts to kill herself  Worker feels patient is safe to return home at this time      Princess Lopez  Crisis Worker, [place]  04/26/22

## 2022-04-26 NOTE — ED PROVIDER NOTES
History  Chief Complaint   Patient presents with    Depression     Pt arrives from [facility] - depressed due to roommate recently passing away  Started hitting self in the head repeatedly  Denies SI/HI/AH/VH at this time  Pt 1:1 supervision at [facility]  37 yo F h/o cerebral palsy, resides at [facility] home, brought in by [facility] staff for evaluation of depression  Pt found out another resident at the group [facility] passed away last night and she became upset  She told staff she wanted to hurt herself and slapped herself in the head  She did not strike her head on any objects  No syncope  No injuries  Denies HI/AH/VH  Pt wants to go back home and group home staff are comfortable with her being discharged          Prior to Admission Medications   Prescriptions Last Dose Informant Patient Reported? Taking?    ARIPiprazole (ABILIFY) 20 MG tablet  Care Giver No No   Sig: Take 1 tablet (20 mg total) by mouth daily at bedtime   D3-1000 25 MCG (1000 UT) tablet  Care Giver No No   Sig: Take 2 tablets (2,000 Units total) by mouth daily   Diclofenac Sodium (VOLTAREN) 1 %  Care Giver No No   Sig: Apply 2 g topically 4 (four) times a day   Dyclonine-Glycerin (Cepacol Sore Throat [place]) 0 1-33 % LIQD  Care Giver No No   Sig: Apply 1 spray to the mouth or throat 3 (three) times a day as needed (sorethroat)   Elastic Bandages & Supports (Neoprene Knee Brace) [facility]  Care Giver No No   Sig: Apply right knee brace in morning; remove at night   GNP Stomach Relief Max St 525 MG/15ML SUSP  Care Giver Yes No   Incontinence Supply Disposable (Depend Underwear Large) MISC  Care Giver No No   Sig: Use 2 (two) times a day   Konsyl Daily Fiber 28 3 %  Care Giver Yes No   LORazepam (ATIVAN) 0 5 mg tablet  Care Giver Yes No   Sig: Take 0 25 mg by mouth 2 (two) times a day   Mouthwashes (Listerine Antiseptic) LIQD  Care Giver No No   Sig: Swish and spit 5 mL 2 (two) times a day   Multiple Vitamins-Minerals (CertaVite Senior/Antioxidant) TABS Care Giver No No   Sig: Take 1 tablet by mouth daily   RA Sunscreen SPF50 LOTN  Care Giver No No   Sig: Apply 15 minutes before sun exposure and every 2 hours   acetaminophen (TYLENOL) 500 mg tablet  Care Giver No No   Sig: Take 1 tablet (500 mg total) by mouth every 6 (six) hours as needed for mild pain   aluminum-magnesium hydroxide-simethicone (Antacid) 200-200-20 mg/5 mL suspension  Care Giver No No   Sig: Take 15 mL by mouth every 4 (four) hours as needed for indigestion or heartburn   amitriptyline (ELAVIL) 10 mg tablet  Care Giver Yes No   Sig: Take 10 mg by mouth daily at bedtime   bacitracin topical ointment 500 units/g topical ointment  Care Giver No No   Sig: Cleanse site on nose with soap and water followed by bacitracin BID until healed then p r n    bismuth subsalicylate (PEPTO BISMOL) 524 mg/30 mL oral suspension  Care Giver No No   Sig: Take 15 mL (262 mg total) by mouth every 6 (six) hours as needed for indigestion   calcium carbonate (TUMS) 500 mg chewable tablet  Care Giver No No   Sig: Chew 1 tablet (500 mg total) 3 (three) times a day as needed for heartburn   carbamide peroxide (DEBROX) 6 5 % otic solution  Care Giver No No   Sig: Administer 5 drops into the left ear 2 (two) times a day   dicyclomine (BENTYL) 20 mg tablet  Care Giver No No   Sig: Take 1 tablet (20 mg total) by mouth every 6 (six) hours as needed (as needed)   escitalopram (LEXAPRO) 20 mg tablet  Care Giver No No   Sig: Take 1 tablet (20 mg total) by mouth daily   fluticasone (FLONASE) 50 mcg/act nasal spray  Care Giver No No   Si spray into each nostril daily as needed for rhinitis (nasal congestion) At 8:00 AM   guaiFENesin (ROBITUSSIN) 100 MG/5ML oral liquid  Care Giver Yes No   Sig: Take 200 mg by mouth 3 (three) times a day as needed for cough   hydrOXYzine HCL (ATARAX) 10 mg tablet  Care Giver No No   Sig: Take 1 tablet (10 mg total) by mouth 3 (three) times a day   ibuprofen (MOTRIN) 400 mg tablet  Care Giver No No Sig: Take 1 tablet (400 mg total) by mouth every 8 (eight) hours as needed for mild pain   ketoconazole (NIZORAL) 2 % cream  Care Giver Yes No   lamoTRIgine (LaMICtal) 100 mg tablet  Care Giver Yes No   lamoTRIgine (LaMICtal) 25 mg tablet  Care Giver Yes No   Sig: Take 25 mg by mouth 2 (two) times a day     lidocaine (LMX) 4 % cream  Care Giver No No   Sig: Apply topically as needed for mild pain   lubiprostone (Amitiza) 24 mcg capsule  Care Giver No No   Sig: Take 1 capsule (24 mcg total) by mouth daily with breakfast   magnesium hydroxide (GNP Milk of Magnesia) 400 mg/5 mL oral suspension  Care Giver No No   Sig: Take 30 mL by mouth daily as needed for constipation If no BM in 3 days   metoprolol tartrate (LOPRESSOR) 25 mg tablet  Care Giver No No   Sig: Take 1 tablet (25 mg total) by mouth 2 (two) times a day   mineral oil-hydrophilic petrolatum (AQUAPHOR) ointment  Care Giver No No   Sig: Apply topically as needed for dry skin   naftifine (NAFTIN) 1 % cream  Care Giver Yes No   Sig: Apply topically daily   norgestimate-ethinyl estradiol (ORTHO-CYCLEN) 0 25-35 MG-MCG per tablet  Care Giver No No   Sig: Take 1 tablet by mouth daily   norgestimate-ethinyl estradiol (ORTHO-CYCLEN) 0 25-35 MG-MCG per tablet  Care Giver Yes No   Sig: Take 1 tablet by mouth daily   Patient not taking: Reported on 4/11/2022    nystatin (MYCOSTATIN) powder  Care Giver No No   Sig: Apply 1 application topically 4 (four) times a day   nystatin-triamcinolone (MYCOLOG-II) cream   No No   Sig: Apply topically 2 (two) times a day   pantoprazole (PROTONIX) 20 mg tablet  Care Giver No No   Sig: Take 1 tablet (20 mg total) by mouth daily   phenol (Chloraseptic) 1 4 % mucosal liquid  Care Giver No No   Sig: Apply 1 spray to the mouth or throat every 2 (two) hours as needed (for sore throat as needed)   polyethylene glycol (MIRALAX) 17 g packet  Care Giver No No   Sig: Take one packet daily as needed for no bowel movement in 24 hours   psyllium (METAMUCIL) 58 6 % packet  Care Giver No No   Sig: Take 1 packet by mouth daily   senna-docusate sodium (Senexon-S) 8 6-50 mg per tablet  Care Giver No No   Sig: Take 1 tablet by mouth daily at 8am    sodium chloride (OCEAN) 0 65 % nasal spray  Care Giver No No   Si spray into each nostril as needed (three times daily as needed for nasal congestion)   trospium chloride (SANCTURA) 20 mg tablet  Care Giver No No   Sig: Take 1 tablet (20 mg total) by mouth 2 (two) times a day   zinc oxide (DESITIN) 13 % cream  Care Giver No No   Sig: Apply 1 application topically as needed (for perianal irritation)      Facility-Administered Medications: None       Past Medical History:   Diagnosis Date    ADD (attention deficit disorder)     Anxiety     Astigmatism     Brain lesion     Calcium deficiency     Cellulitis of foot, right 2018    Cerebral palsy (HCC)     Chronic otitis media     Constipation     Depression     Dysphagia     Esophagitis     Esotropia     GERD (gastroesophageal reflux disease)     Hiatal hernia     Hydrocephalus (HCC)     Impaired fasting glucose     Left nephrolithiasis 2019    Myopia     Oppositional defiant disorder     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HCC)     Seizures (HCC)     Sensorineural hearing loss     Sleep apnea     Status post ventriculoatrial shunt placement     Visual impairment        Past Surgical History:   Procedure Laterality Date    BREAST BIOPSY Left     X 2 (not sure of years)    CSF SHUNT      Creation of Ventriculo-Peritoneal CSF shunt ; Last Assessed:2016    EAR SURGERY      Last Assessed:2016    LEG SURGERY      due to CP     NOSE SURGERY      Last Assessed:2016    IA ESOPHAGOGASTRODUODENOSCOPY TRANSORAL DIAGNOSTIC N/A 2019    Procedure: ESOPHAGOGASTRODUODENOSCOPY (EGD) with biopsy;  Surgeon: Vaishnavi Ndiaye MD;  Location: [facility];   Service: Gastroenterology    UPPER GASTROINTESTINAL ENDOSCOPY  2019       Family History Problem Relation Age of Onset    Diabetes Mother     Colon cancer Father     No Known Problems Maternal Grandmother     No Known Problems Maternal Grandfather     No Known Problems Paternal Grandmother     No Known Problems Paternal Grandfather     Hypertension Neg Hx     Heart disease Neg Hx     Stroke Neg Hx     Thyroid disease Neg Hx      I have reviewed and agree with the history as documented  E-Cigarette/Vaping    E-Cigarette Use Never User      E-Cigarette/Vaping Substances    Nicotine No     THC No     CBD No     Flavoring No     Other No     Unknown No      Social History     Tobacco Use    Smoking status: Never Smoker    Smokeless tobacco: Never Used   Vaping Use    Vaping Use: Never used   Substance Use Topics    Alcohol use: Never    Drug use: Never       Review of Systems   Constitutional: Negative for chills and fever  HENT: Negative for ear pain, rhinorrhea and sore throat  Eyes: Negative for pain and visual disturbance  Respiratory: Negative for cough and shortness of breath  Cardiovascular: Negative for chest pain and leg swelling  Gastrointestinal: Negative for abdominal pain, constipation, diarrhea, nausea and vomiting  Genitourinary: Negative for dysuria, frequency, hematuria and urgency  Musculoskeletal: Negative for back pain, myalgias and neck pain  Skin: Negative for color change and rash  Allergic/Immunologic: Negative for environmental allergies and immunocompromised state  Neurological: Negative for dizziness, weakness, light-headedness, numbness and headaches  Psychiatric/Behavioral: Positive for self-injury  Negative for agitation, confusion and hallucinations  All other systems reviewed and are negative  Physical Exam  Physical Exam  Vitals and nursing note reviewed  Constitutional:       General: She is not in acute distress  Appearance: She is well-developed  HENT:      Head: Normocephalic and atraumatic        Comments: Few old scabs to face  No open wounds  No facial swelling  No facial bone tenderness  No deformity  No scalp tenderness to palpation     Nose: Nose normal  No congestion  Eyes:      Extraocular Movements: Extraocular movements intact  Conjunctiva/sclera: Conjunctivae normal    Neck:      Comments: No midline C/T/L spine ttp/stepoff/deformity  Cardiovascular:      Rate and Rhythm: Normal rate and regular rhythm  Heart sounds: Normal heart sounds  Pulmonary:      Effort: Pulmonary effort is normal  No respiratory distress  Breath sounds: Normal breath sounds  No stridor  Chest:      Chest wall: No tenderness  Abdominal:      General: There is no distension  Palpations: Abdomen is soft  Tenderness: There is no abdominal tenderness  Musculoskeletal:         General: No deformity  Cervical back: Normal range of motion and neck supple  Skin:     General: Skin is warm and dry  Findings: No rash  Neurological:      Mental Status: She is alert and oriented to person, place, and time  Mental status is at baseline  Motor: No weakness or abnormal muscle tone  Coordination: Coordination normal    Psychiatric:         Thought Content:  Thought content normal          Judgment: Judgment normal          Vital Signs  ED Triage Vitals [04/26/22 1727]   Temperature Pulse Respirations Blood Pressure SpO2   98 1 °F (36 7 °C) 100 18 152/87 99 %      Temp src Heart Rate Source Patient Position - Orthostatic VS BP Location FiO2 (%)   -- Monitor Sitting Right arm --      Pain Score       --           Vitals:    04/26/22 1727   BP: 152/87   Pulse: 100   Patient Position - Orthostatic VS: Sitting         Visual Acuity      ED Medications  Medications - No data to display    Diagnostic Studies  Results Reviewed     None                 No orders to display              Procedures  Procedures         ED Course                                             MDM  Number of Diagnoses or Management Options  Grief reaction  Diagnosis management comments: 35 yo F who found out another group home resident passed away and she became upset/hitting herself in head, no injuries, pt and staff comfortable with discharge  Pt has 1:1 at the group home      Disposition  Final diagnoses:   Grief reaction     Time reflects when diagnosis was documented in both MDM as applicable and the Disposition within this note     Time User Action Codes Description Comment    4/26/2022  5:50 PM Ammon Mckinney Add [F43 21] Grief reaction       ED Disposition     ED Disposition Condition Date/Time Comment    Discharge Stable Tue Apr 26, 2022  5:50 PM Vivian Huffman discharge to home/self care              Follow-up Information    None         Discharge Medication List as of 4/26/2022  5:50 PM      CONTINUE these medications which have NOT CHANGED    Details   acetaminophen (TYLENOL) 500 mg tablet Take 1 tablet (500 mg total) by mouth every 6 (six) hours as needed for mild pain, Starting Mon 3/14/2022, Normal      aluminum-magnesium hydroxide-simethicone (Antacid) 200-200-20 mg/5 mL suspension Take 15 mL by mouth every 4 (four) hours as needed for indigestion or heartburn, Starting Mon 4/12/2021, Normal      amitriptyline (ELAVIL) 10 mg tablet Take 10 mg by mouth daily at bedtime, Historical Med      ARIPiprazole (ABILIFY) 20 MG tablet Take 1 tablet (20 mg total) by mouth daily at bedtime, Starting Thu 10/15/2020, Normal      bacitracin topical ointment 500 units/g topical ointment Cleanse site on nose with soap and water followed by bacitracin BID until healed then p r n , Normal      bismuth subsalicylate (PEPTO BISMOL) 524 mg/30 mL oral suspension Take 15 mL (262 mg total) by mouth every 6 (six) hours as needed for indigestion, Starting Tue 2/22/2022, Normal      calcium carbonate (TUMS) 500 mg chewable tablet Chew 1 tablet (500 mg total) 3 (three) times a day as needed for heartburn, Starting Tue 10/26/2021, Normal carbamide peroxide (DEBROX) 6 5 % otic solution Administer 5 drops into the left ear 2 (two) times a day, Starting Mon 12/20/2021, Normal      D3-1000 25 MCG (1000 UT) tablet Take 2 tablets (2,000 Units total) by mouth daily, Starting Wed 4/6/2022, Normal      Diclofenac Sodium (VOLTAREN) 1 % Apply 2 g topically 4 (four) times a day, Starting Tue 4/5/2022, Normal      dicyclomine (BENTYL) 20 mg tablet Take 1 tablet (20 mg total) by mouth every 6 (six) hours as needed (as needed), Starting Mon 1/18/2021, Print      Dyclonine-Glycerin (Cepacol Sore Throat Spray) 0 1-33 % LIQD Apply 1 spray to the mouth or throat 3 (three) times a day as needed (sorethroat), Starting Tue 10/26/2021, Normal      Elastic Bandages & Supports (Neoprene Knee Brace) MISC Apply right knee brace in morning; remove at night, Normal      escitalopram (LEXAPRO) 20 mg tablet Take 1 tablet (20 mg total) by mouth daily, Starting Thu 10/15/2020, Normal      fluticasone (FLONASE) 50 mcg/act nasal spray 1 spray into each nostril daily as needed for rhinitis (nasal congestion) At 8:00 AM, Starting Fri 6/11/2021, Normal      GNP Stomach Relief Max St 525 MG/15ML SUSP Starting Tue 2/22/2022, Historical Med      guaiFENesin (ROBITUSSIN) 100 MG/5ML oral liquid Take 200 mg by mouth 3 (three) times a day as needed for cough, Historical Med      hydrOXYzine HCL (ATARAX) 10 mg tablet Take 1 tablet (10 mg total) by mouth 3 (three) times a day, Starting Tue 10/12/2021, Normal      ibuprofen (MOTRIN) 400 mg tablet Take 1 tablet (400 mg total) by mouth every 8 (eight) hours as needed for mild pain, Starting Fri 6/12/2020, Normal      Incontinence Supply Disposable (Depend Underwear Large) MISC Use 2 (two) times a day, Starting Mon 2/28/2022, Normal      ketoconazole (NIZORAL) 2 % cream Starting Thu 8/19/2021, Historical Med      Konsyl Daily Fiber 28 3 % Starting Sat 10/2/2021, Historical Med      !! lamoTRIgine (LaMICtal) 100 mg tablet Starting Wed 12/8/2021, Historical Med      !! lamoTRIgine (LaMICtal) 25 mg tablet Take 25 mg by mouth 2 (two) times a day  , Starting Tue 8/11/2020, Historical Med      lidocaine (LMX) 4 % cream Apply topically as needed for mild pain, Starting Fri 4/8/2022, Normal      LORazepam (ATIVAN) 0 5 mg tablet Take 0 25 mg by mouth 2 (two) times a day, Historical Med      lubiprostone (Amitiza) 24 mcg capsule Take 1 capsule (24 mcg total) by mouth daily with breakfast, Starting Thu 12/2/2021, Normal      magnesium hydroxide (GNP Milk of Magnesia) 400 mg/5 mL oral suspension Take 30 mL by mouth daily as needed for constipation If no BM in 3 days, Starting Wed 9/16/2020, Normal      metoprolol tartrate (LOPRESSOR) 25 mg tablet Take 1 tablet (25 mg total) by mouth 2 (two) times a day, Starting Thu 12/2/2021, Normal      mineral oil-hydrophilic petrolatum (AQUAPHOR) ointment Apply topically as needed for dry skin, Starting Fri 6/11/2021, Normal      Mouthwashes (Listerine Antiseptic) LIQD Swish and spit 5 mL 2 (two) times a day, Starting Tue 12/28/2021, Until Mon 4/11/2022, Normal      Multiple Vitamins-Minerals (CertaVite Senior/Antioxidant) TABS Take 1 tablet by mouth daily, Starting Wed 1/5/2022, Normal      naftifine (NAFTIN) 1 % cream Apply topically daily, Historical Med      !! norgestimate-ethinyl estradiol (ORTHO-CYCLEN) 0 25-35 MG-MCG per tablet Take 1 tablet by mouth daily, Starting Mon 6/21/2021, Normal      !! norgestimate-ethinyl estradiol (ORTHO-CYCLEN) 0 25-35 MG-MCG per tablet Take 1 tablet by mouth daily, Historical Med      nystatin (MYCOSTATIN) powder Apply 1 application topically 4 (four) times a day, Starting Mon 10/4/2021, Normal      nystatin-triamcinolone (MYCOLOG-II) cream Apply topically 2 (two) times a day, Starting Thu 4/14/2022, Normal      pantoprazole (PROTONIX) 20 mg tablet Take 1 tablet (20 mg total) by mouth daily, Starting Wed 4/6/2022, Normal      phenol (Chloraseptic) 1 4 % mucosal liquid Apply 1 spray to the mouth or throat every 2 (two) hours as needed (for sore throat as needed), Starting Thu 10/15/2020, Print      polyethylene glycol (MIRALAX) 17 g packet Take one packet daily as needed for no bowel movement in 24 hours, Normal      psyllium (METAMUCIL) 58 6 % packet Take 1 packet by mouth daily, Starting Tue 12/28/2021, Normal      RA Sunscreen SPF50 LOTN Apply 15 minutes before sun exposure and every 2 hours, Normal      senna-docusate sodium (Senexon-S) 8 6-50 mg per tablet Take 1 tablet by mouth daily at 8am , Starting Wed 12/8/2021, Normal      sodium chloride (OCEAN) 0 65 % nasal spray 1 spray into each nostril as needed (three times daily as needed for nasal congestion), Starting Tue 10/26/2021, Normal      trospium chloride (SANCTURA) 20 mg tablet Take 1 tablet (20 mg total) by mouth 2 (two) times a day, Starting Fri 2/25/2022, Print      zinc oxide (DESITIN) 13 % cream Apply 1 application topically as needed (for perianal irritation), Starting Tue 10/26/2021, Normal       !! - Potential duplicate medications found  Please discuss with provider  No discharge procedures on file      PDMP Review       Value Time User    PDMP Reviewed  Yes 10/6/2021  1:11 PM Barbara Mir PA-C          ED Provider  Electronically Signed by           Shala Guerrero DO  04/26/22 1711

## 2022-05-05 NOTE — TELEPHONE ENCOUNTER
From: Brandon Holcomb  To: Office of Lyndsey Ortega MD  Sent: 4/14/2022 9:34 AM EDT  Subject: Medication Renewal Request    Refills have been requested for the following medications:    Other - Aquaphor - PRN as needed for dry skin, Antiseptic Mouth Rinse - [place] w/ 1 tsp & spit twice daily after brushing teeth, Ketoconazole 2% Cream Apply topically to affected area once daily at 8 am, Bacitracin 500 units apply topically to nose 2x daliy as needed after cleansing with soap and water    Preferred pharmacy: [id number] [place] [address]      Medication renewals requested in this message routed separately:     nystatin-triamcinolone (MYCOLOG-II) cream TA Thomas]

## 2022-05-09 DIAGNOSIS — T14.8XXA WOUND, OPEN: ICD-10-CM

## 2022-05-09 RX ORDER — GINSENG 100 MG
CAPSULE ORAL
Qty: 15 G | Refills: 2 | Status: SHIPPED | OUTPATIENT
Start: 2022-05-09

## 2022-05-12 DIAGNOSIS — K08.9 TOOTH DISORDER: ICD-10-CM

## 2022-05-12 DIAGNOSIS — Z87.2 H/O SUNBURN: ICD-10-CM

## 2022-05-12 DIAGNOSIS — K59.04 CHRONIC IDIOPATHIC CONSTIPATION: ICD-10-CM

## 2022-05-12 RX ORDER — EUCALYP/ME-SALICYLATE/MEN/THYM
5 MOUTHWASH MUCOUS MEMBRANE 2 TIMES DAILY
Qty: 1000 ML | Refills: 5 | Status: SHIPPED | OUTPATIENT
Start: 2022-05-12 | End: 2022-06-17

## 2022-05-12 NOTE — TELEPHONE ENCOUNTER
Caregiver Mya called for medication refill for   -Metamucil packet  -Mouth wash  Send to [address] [facility]                                                      Thanks

## 2022-05-13 ENCOUNTER — OFFICE VISIT (OUTPATIENT)
Dept: FAMILY MEDICINE CLINIC | Facility: CLINIC | Age: 42
End: 2022-05-13

## 2022-05-13 VITALS
RESPIRATION RATE: 18 BRPM | BODY MASS INDEX: 46.93 KG/M2 | HEIGHT: 56 IN | WEIGHT: 208.6 LBS | OXYGEN SATURATION: 92 % | DIASTOLIC BLOOD PRESSURE: 84 MMHG | HEART RATE: 91 BPM | SYSTOLIC BLOOD PRESSURE: 122 MMHG | TEMPERATURE: 97.3 F

## 2022-05-13 DIAGNOSIS — F43.21 GRIEF REACTION: Primary | ICD-10-CM

## 2022-05-13 PROBLEM — F43.20 GRIEF REACTION: Status: ACTIVE | Noted: 2022-05-13

## 2022-05-13 PROCEDURE — 99213 OFFICE O/P EST LOW 20 MIN: CPT | Performed by: FAMILY MEDICINE

## 2022-05-13 NOTE — ASSESSMENT & PLAN NOTE
Now Resolved on no medication  Per ED eval on 4/26, concern for grief reaction of self-harm  Today, pt denies sadness, sleep disturbance, self-injurious behavior, suicidal ideation      Plan:  · Continue to monitor patient at home  · Follow-up if concerns about self-injurious behavior, suicidal ideation, mood change

## 2022-05-13 NOTE — PROGRESS NOTES
OFFICE VISIT NOTE - [facility]  FLORENTINO 36 y o  (:1980) female MRN: [id number]  Unit/Bed#:  Encounter: [id number]      Assessment/Plan:    Grief reaction  Now Resolved on no medication  Per ED eval on , concern for grief reaction of self-harm  Today, pt denies sadness, sleep disturbance, self-injurious behavior, suicidal ideation  Plan:  · Continue to monitor patient at home  · Follow-up if concerns about self-injurious behavior, suicidal ideation, mood change           Subjective:      Patient ID: FLORENTINO is a 36 y o  female  Presents for ER visit follow-up for suicidal ideation after the passing of a friend at her group home  She says she feels happy and denies problems today  She has moved to a different side of the group home and feels very comfortable and at home there  She is able to walk around and sit outside on her new patio  She enjoys spending time with all of the people working at the home  She is accompanied by staff at all times  She denies feeling sad, has no suicidal ideation and does not want to hurt herself  She has no questions or concerns at this time  The following portions of the patient's history were reviewed and updated as appropriate: current medications, past medical history, past social history and problem list     Review of Systems   Constitutional: Negative for activity change and appetite change  HENT: Negative for congestion  Eyes: Negative for photophobia  Respiratory: Negative for cough and shortness of breath  Cardiovascular: Negative for chest pain  Gastrointestinal: Negative for abdominal distention and abdominal pain  Genitourinary: Negative for difficulty urinating  Musculoskeletal: Negative for arthralgias  Skin: Negative for color change  Neurological: Negative for headaches  Hematological: Negative for adenopathy     Psychiatric/Behavioral: Negative for agitation, behavioral problems, dysphoric mood, self-injury, sleep disturbance and suicidal ideas  Objective:    /84 (BP Location: Left arm, Patient Position: Sitting, Cuff Size: Large)   Pulse 91   Temp (!) 97 3 °F (36 3 °C) (Temporal)   Resp 18   Ht 4' 8" (1 422 m)   Wt 94 6 kg (208 lb 9 6 oz)   SpO2 92%   BMI 46 77 kg/m²        Physical Exam  Constitutional:       Appearance: Normal appearance  She is obese  HENT:      Head: Normocephalic and atraumatic  Right Ear: External ear normal       Left Ear: External ear normal       Nose: Nose normal       Mouth/Throat:      Mouth: Mucous membranes are moist       Pharynx: Oropharynx is clear  Eyes:      Extraocular Movements: Extraocular movements intact  Pupils: Pupils are equal, round, and reactive to light  Cardiovascular:      Rate and Rhythm: Normal rate and regular rhythm  Heart sounds: Normal heart sounds  Pulmonary:      Effort: Pulmonary effort is normal       Breath sounds: Normal breath sounds  Abdominal:      General: Abdomen is flat  Bowel sounds are normal       Palpations: Abdomen is soft  Musculoskeletal:         General: Normal range of motion  Cervical back: Normal range of motion and neck supple  Skin:     General: Skin is warm and dry  Neurological:      General: No focal deficit present  Mental Status: She is alert and oriented to person, place, and time  Psychiatric:         Mood and Affect: Mood normal          Behavior: Behavior normal          Thought Content:  Thought content normal            Josue Victoria MD  PGY-2 Family Medicine  05/13/22

## 2022-05-23 DIAGNOSIS — B37.2 CANDIDIASIS OF SKIN: Primary | ICD-10-CM

## 2022-05-23 RX ORDER — KETOCONAZOLE 20 MG/G
CREAM TOPICAL DAILY
Qty: 30 G | Refills: 5 | Status: SHIPPED | OUTPATIENT
Start: 2022-05-23 | End: 2022-05-25 | Stop reason: SDUPTHER

## 2022-05-23 NOTE — PROGRESS NOTES
5/26/2022    Arley Messina  1980  [id number]        Assessment  -Urinary incontinence  -Overactive bladder    Discussion/Plan  Vianey Pretty is a 36 y o  female being managed by our office    1  Urinary incontinence, overactive bladder- PVR in the office today is 92 mL  She reports improvement in her urinary symptoms and has no complaints  Patient will remain on trospium 20 mg b i d  Prescription refill was electronically sent to her pharmacy  Reviewed dietary and behavior modifications  Plan to follow-up in 1 year for re-evaluation and PVR assessment  Instructions provided to group home  They were advised to call sooner with any issues     -All questions answered, patient and caretaker agree with plan     History of Present Illness  36 y o  female with a history of urinary incontinence presents today for follow up  Patient resides in a group home and is accompanied today by caretaker  She was last seen in the office in February 2022  Patient on trospium 20 mg b i d   Caretaker in patient report improvement in her urinary symptoms of urgency and frequency since starting for medication  Patient denies any gross hematuria or dysuria  No changes in her overall health  Review of Systems  Review of Systems   Constitutional: Negative  HENT: Negative  Respiratory: Negative  Cardiovascular: Negative  Gastrointestinal: Negative  Genitourinary: Negative for decreased urine volume, difficulty urinating, dysuria, flank pain, frequency, hematuria and urgency  Musculoskeletal: Negative  Skin: Negative  Neurological: Negative  Psychiatric/Behavioral: Negative          Past Medical History  Past Medical History:   Diagnosis Date    ADD (attention deficit disorder)     Anxiety     Astigmatism     Brain lesion     Calcium deficiency     Cellulitis of foot, right 6/4/2018    Cerebral palsy (HCC)     Chronic otitis media     Constipation     Depression     Dysphagia     Esophagitis     Esotropia     GERD (gastroesophageal reflux disease)     Hiatal hernia     Hydrocephalus (HCC)     Impaired fasting glucose     Left nephrolithiasis 03/04/2019    Myopia     Oppositional defiant disorder     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HCC)     Seizures (HCC)     Sensorineural hearing loss     Sleep apnea     Status post ventriculoatrial shunt placement     Visual impairment        Past Social History  Past Surgical History:   Procedure Laterality Date    BREAST BIOPSY Left     X 2 (not sure of years)    CSF SHUNT      Creation of Ventriculo-Peritoneal CSF shunt ; Last Assessed:7/6/2016    EAR SURGERY      Last Assessed:7/6/2016    LEG SURGERY      due to CP     NOSE SURGERY      Last Assessed:7/6/2016    IA ESOPHAGOGASTRODUODENOSCOPY TRANSORAL DIAGNOSTIC N/A 5/9/2019    Procedure: ESOPHAGOGASTRODUODENOSCOPY (EGD) with biopsy;  Surgeon: Skyler Rodgers MD;  Location: [facility];   Service: Gastroenterology    UPPER GASTROINTESTINAL ENDOSCOPY  05/2019       Past Family History  Family History   Problem Relation Age of Onset    Diabetes Mother     Colon cancer Father     No Known Problems Maternal Grandmother     No Known Problems Maternal Grandfather     No Known Problems Paternal Grandmother     No Known Problems Paternal Grandfather     Hypertension Neg Hx     Heart disease Neg Hx     Stroke Neg Hx     Thyroid disease Neg Hx        Past Social history  Social History     Socioeconomic History    Marital status: Single     Spouse name: Not on file    Number of children: Not on file    Years of education: Not on file    Highest education level: Not on file   Occupational History    Not on file   Tobacco Use    Smoking status: Never Smoker    Smokeless tobacco: Never Used   Vaping Use    Vaping Use: Never used   Substance and Sexual Activity    Alcohol use: Never    Drug use: Never    Sexual activity: Not Currently   Other Topics Concern    Not on file   Social History Narrative    Always uses seat belt    Lives in group home     Social Determinants of Health     Financial Resource Strain: Not on file   Food Insecurity: Not on file   Transportation Needs: Not on file   Physical Activity: Not on file   Stress: Not on file   Social Connections: Not on file   Intimate Partner Violence: Not on file   Housing Stability: Not on file       Current Medications  Current Outpatient Medications   Medication Sig Dispense Refill    acetaminophen (TYLENOL) 500 mg tablet Take 1 tablet (500 mg total) by mouth every 6 (six) hours as needed for mild pain 30 tablet 5    aluminum-magnesium hydroxide-simethicone (Antacid) 200-200-20 mg/5 mL suspension Take 15 mL by mouth every 4 (four) hours as needed for indigestion or heartburn 355 mL 5    amitriptyline (ELAVIL) 10 mg tablet Take 10 mg by mouth daily at bedtime      ARIPiprazole (ABILIFY) 20 MG tablet Take 1 tablet (20 mg total) by mouth daily at bedtime 90 tablet 2    bacitracin topical ointment 500 units/g topical ointment Cleanse site on nose with soap and water followed by bacitracin BID until healed then p r n  15 g 2    bismuth subsalicylate (PEPTO BISMOL) 524 mg/30 mL oral suspension Take 15 mL (262 mg total) by mouth every 6 (six) hours as needed for indigestion 360 mL 5    calcium carbonate (TUMS) 500 mg chewable tablet Chew 1 tablet (500 mg total) 3 (three) times a day as needed for heartburn 30 tablet 5    carbamide peroxide (DEBROX) 6 5 % otic solution Administer 5 drops into the left ear 2 (two) times a day 15 mL 0    D3-1000 25 MCG (1000 UT) tablet Take 2 tablets (2,000 Units total) by mouth daily 62 tablet 5    Diclofenac Sodium (VOLTAREN) 1 % Apply 2 g topically 4 (four) times a day 50 g 0    dicyclomine (BENTYL) 20 mg tablet Take 1 tablet (20 mg total) by mouth every 6 (six) hours as needed (as needed) 120 tablet 2    Dyclonine-Glycerin (Cepacol Sore Throat Spray) 0 1-33 % LIQD Apply 1 spray to the mouth or throat 3 (three) times a day as needed (sorethroat) 118 mL 5    Elastic Bandages & Supports (Neoprene Knee Brace) MISC Apply right knee brace in morning; remove at night 1 each 0    escitalopram (LEXAPRO) 20 mg tablet Take 1 tablet (20 mg total) by mouth daily 90 tablet 2    fluticasone (FLONASE) 50 mcg/act nasal spray 1 spray into each nostril daily as needed for rhinitis (nasal congestion) At 8:00 AM 18 2 mL 5    GNP Stomach Relief Max St 525 MG/15ML SUSP       guaiFENesin (ROBITUSSIN) 100 MG/5ML oral liquid Take 200 mg by mouth 3 (three) times a day as needed for cough      hydrOXYzine HCL (ATARAX) 10 mg tablet Take 1 tablet (10 mg total) by mouth 3 (three) times a day 30 tablet 3    ibuprofen (MOTRIN) 400 mg tablet Take 1 tablet (400 mg total) by mouth every 8 (eight) hours as needed for mild pain 30 tablet 5    Incontinence Supply Disposable (Depend Underwear Large) MISC Use 2 (two) times a day 60 each 5    ketoconazole (NIZORAL) 2 % cream       Konsyl Daily Fiber 28 3 %       lamoTRIgine (LaMICtal) 100 mg tablet       lamoTRIgine (LaMICtal) 25 mg tablet Take 25 mg by mouth 2 (two) times a day        lidocaine (LMX) 4 % cream Apply topically as needed for mild pain 30 g 0    LORazepam (ATIVAN) 0 5 mg tablet Take 0 25 mg by mouth 2 (two) times a day      lubiprostone (Amitiza) 24 mcg capsule Take 1 capsule (24 mcg total) by mouth daily with breakfast 60 capsule 5    magnesium hydroxide (GNP Milk of Magnesia) 400 mg/5 mL oral suspension Take 30 mL by mouth daily as needed for constipation If no BM in 3 days 355 mL 5    metoprolol tartrate (LOPRESSOR) 25 mg tablet Take 1 tablet (25 mg total) by mouth 2 (two) times a day 60 tablet 5    mineral oil-hydrophilic petrolatum (AQUAPHOR) ointment Apply topically as needed for dry skin 420 g 5    Mouthwashes (Listerine Antiseptic) LIQD Swish and spit 5 mL 2 (two) times a day 1000 mL 5    Multiple Vitamins-Minerals (CertaVite Senior/Antioxidant) TABS Take 1 tablet by mouth daily 31 tablet 5    naftifine (NAFTIN) 1 % cream Apply topically daily      norgestimate-ethinyl estradiol (ORTHO-CYCLEN) 0 25-35 MG-MCG per tablet Take 1 tablet by mouth daily 28 tablet 11    norgestimate-ethinyl estradiol (ORTHO-CYCLEN) 0 25-35 MG-MCG per tablet Take 1 tablet by mouth in the morning   nystatin (MYCOSTATIN) powder Apply 1 application topically 4 (four) times a day 45 g 5    nystatin-triamcinolone (MYCOLOG-II) cream Apply topically 2 (two) times a day 30 g 0    pantoprazole (PROTONIX) 20 mg tablet Take 1 tablet (20 mg total) by mouth daily 62 tablet 5    phenol (Chloraseptic) 1 4 % mucosal liquid Apply 1 spray to the mouth or throat every 2 (two) hours as needed (for sore throat as needed) 236 mL 1    polyethylene glycol (MIRALAX) 17 g packet Take one packet daily as needed for no bowel movement in 24 hours 30 each 5    psyllium (METAMUCIL) 58 6 % packet Take 1 packet by mouth in the morning  30 packet 5    RA Sunscreen SPF50 LOTN Apply 15 minutes before sun exposure and every 2 hours 237 mL 0    senna-docusate sodium (Senexon-S) 8 6-50 mg per tablet Take 1 tablet by mouth daily at 8am  30 tablet 5    sodium chloride (OCEAN) 0 65 % nasal spray 1 spray into each nostril as needed (three times daily as needed for nasal congestion) 60 mL 5    trospium chloride (SANCTURA) 20 mg tablet Take 1 tablet (20 mg total) by mouth 2 (two) times a day 60 tablet 3    zinc oxide (DESITIN) 13 % cream Apply 1 application topically as needed (for perianal irritation) 454 g 5     No current facility-administered medications for this visit  Allergies  No Known Allergies    Past medical history, social history, family history, medications and allergies were reviewed  Vitals  There were no vitals filed for this visit  Physical Exam  Physical Exam  Constitutional:       Appearance: Normal appearance  She is well-developed  HENT:      Head: Normocephalic     Eyes: Pupils: Pupils are equal, round, and reactive to light  Pulmonary:      Effort: Pulmonary effort is normal    Abdominal:      Palpations: Abdomen is soft  Musculoskeletal:         General: Normal range of motion  Cervical back: Normal range of motion  Comments: Ambulates with walker   Skin:     General: Skin is warm and dry  Neurological:      General: No focal deficit present  Mental Status: She is alert and oriented to person, place, and time  Psychiatric:         Mood and Affect: Mood normal          Thought Content: Thought content normal          Judgment: Judgment normal          Results    I have personally reviewed all pertinent lab results and reviewed with patient  Lab Results   Component Value Date    CALCIUM 8 9 02/28/2022    K 3 9 02/28/2022    CO2 24 02/28/2022     02/28/2022    BUN 17 02/28/2022    CREATININE 0 83 02/28/2022     Lab Results   Component Value Date    WBC 8 83 10/06/2021    HGB 12 3 10/06/2021    HCT 37 9 10/06/2021    MCV 95 10/06/2021     10/06/2021     No results found for this or any previous visit (from the past 1 hour(s))

## 2022-05-23 NOTE — TELEPHONE ENCOUNTER
Documentation in [facility] regarding medication refill and D/C Tussin

## 2022-05-25 DIAGNOSIS — B37.2 CANDIDIASIS OF SKIN: ICD-10-CM

## 2022-05-25 RX ORDER — KETOCONAZOLE 20 MG/G
CREAM TOPICAL DAILY
Qty: 30 G | Refills: 5 | Status: SHIPPED | OUTPATIENT
Start: 2022-05-25 | End: 2022-08-16 | Stop reason: SDUPTHER

## 2022-05-26 ENCOUNTER — OFFICE VISIT (OUTPATIENT)
Dept: UROLOGY | Facility: AMBULATORY SURGERY CENTER | Age: 42
End: 2022-05-26
Payer: MEDICARE

## 2022-05-26 VITALS
HEART RATE: 100 BPM | DIASTOLIC BLOOD PRESSURE: 62 MMHG | SYSTOLIC BLOOD PRESSURE: 110 MMHG | BODY MASS INDEX: 46.34 KG/M2 | WEIGHT: 206 LBS | HEIGHT: 56 IN

## 2022-05-26 DIAGNOSIS — R32 URINARY INCONTINENCE, UNSPECIFIED TYPE: Primary | ICD-10-CM

## 2022-05-26 DIAGNOSIS — N32.81 OAB (OVERACTIVE BLADDER): ICD-10-CM

## 2022-05-26 DIAGNOSIS — B37.3 VULVAL CANDIDIASIS: ICD-10-CM

## 2022-05-26 LAB — POST-VOID RESIDUAL VOLUME, ML POC: 92 ML

## 2022-05-26 PROCEDURE — 99213 OFFICE O/P EST LOW 20 MIN: CPT | Performed by: NURSE PRACTITIONER

## 2022-05-26 PROCEDURE — 51798 US URINE CAPACITY MEASURE: CPT | Performed by: NURSE PRACTITIONER

## 2022-05-26 RX ORDER — TROSPIUM CHLORIDE 20 MG/1
20 TABLET, FILM COATED ORAL 2 TIMES DAILY
Qty: 180 TABLET | Refills: 3 | Status: SHIPPED | OUTPATIENT
Start: 2022-05-26

## 2022-05-26 NOTE — TELEPHONE ENCOUNTER
Document reviewed by Dr Chanel Landon and Ketoconazole sent to pharmacy and [facility] timothy  Attempted made to contact Darian and didn't answer  Detailed message left on voice mail with the information and to call us back with any question

## 2022-05-27 DIAGNOSIS — R52 PAIN: ICD-10-CM

## 2022-05-27 RX ORDER — IBUPROFEN 400 MG/1
TABLET ORAL
Qty: 30 TABLET | Refills: 0 | Status: SHIPPED | OUTPATIENT
Start: 2022-05-27

## 2022-05-31 ENCOUNTER — OFFICE VISIT (OUTPATIENT)
Dept: AUDIOLOGY | Age: 42
End: 2022-05-31

## 2022-05-31 DIAGNOSIS — H90.3 SENSORY HEARING LOSS, BILATERAL: Primary | ICD-10-CM

## 2022-05-31 DIAGNOSIS — K59.00 CONSTIPATION, UNSPECIFIED CONSTIPATION TYPE: ICD-10-CM

## 2022-05-31 NOTE — PROGRESS NOTES
Progress Note    Name:  Mackenzie Edouard  :  1980  Age:  36 y o  Date of Evaluation: 22     Patient was scheduled to have earmold impressions completed  Could not complete today due to wax build up bilaterally  Advised to have ears cleaned and return to complete impressions  Gave hearing aid quote today         Indigo Mcleod , CCC-A  Clinical Audiologist

## 2022-06-01 RX ORDER — MAGNESIUM HYDROXIDE 1200 MG/15ML
SUSPENSION ORAL
Qty: 355 ML | Refills: 0 | Status: SHIPPED | OUTPATIENT
Start: 2022-06-01

## 2022-06-06 DIAGNOSIS — K59.00 CONSTIPATION, UNSPECIFIED CONSTIPATION TYPE: ICD-10-CM

## 2022-06-06 DIAGNOSIS — R00.2 PALPITATIONS: ICD-10-CM

## 2022-06-06 RX ORDER — AMOXICILLIN 250 MG
1 CAPSULE ORAL DAILY
Qty: 30 TABLET | Refills: 5 | Status: SHIPPED | OUTPATIENT
Start: 2022-06-06

## 2022-06-08 ENCOUNTER — TELEPHONE (OUTPATIENT)
Dept: FAMILY MEDICINE CLINIC | Facility: CLINIC | Age: 42
End: 2022-06-08

## 2022-06-08 NOTE — TELEPHONE ENCOUNTER
Folder (To be completed by) - Dr Crys Ac   Name of Form  - Personal Directed Support  Referral for ENT for complete Ear Lavage    Form to be Faxed (Fax #) [phone]

## 2022-06-08 NOTE — TELEPHONE ENCOUNTER
In [facility] clerical folder for Dr Raffy Casas that would be in office on 6/10/22

## 2022-06-10 DIAGNOSIS — H61.23 BILATERAL IMPACTED CERUMEN: Primary | ICD-10-CM

## 2022-06-13 ENCOUNTER — TELEPHONE (OUTPATIENT)
Dept: FAMILY MEDICINE CLINIC | Facility: CLINIC | Age: 42
End: 2022-06-13

## 2022-06-17 ENCOUNTER — OFFICE VISIT (OUTPATIENT)
Dept: FAMILY MEDICINE CLINIC | Facility: CLINIC | Age: 42
End: 2022-06-17

## 2022-06-17 VITALS
SYSTOLIC BLOOD PRESSURE: 120 MMHG | OXYGEN SATURATION: 94 % | WEIGHT: 208.8 LBS | TEMPERATURE: 97.4 F | HEIGHT: 56 IN | BODY MASS INDEX: 46.97 KG/M2 | HEART RATE: 123 BPM | RESPIRATION RATE: 18 BRPM | DIASTOLIC BLOOD PRESSURE: 80 MMHG

## 2022-06-17 DIAGNOSIS — Z20.822 ENCOUNTER FOR LABORATORY TESTING FOR COVID-19 VIRUS: ICD-10-CM

## 2022-06-17 DIAGNOSIS — H91.8X1 OTHER SPECIFIED HEARING LOSS OF RIGHT EAR, UNSPECIFIED HEARING STATUS ON CONTRALATERAL SIDE: Primary | ICD-10-CM

## 2022-06-17 DIAGNOSIS — H61.23 BILATERAL IMPACTED CERUMEN: ICD-10-CM

## 2022-06-17 PROCEDURE — U0003 INFECTIOUS AGENT DETECTION BY NUCLEIC ACID (DNA OR RNA); SEVERE ACUTE RESPIRATORY SYNDROME CORONAVIRUS 2 (SARS-COV-2) (CORONAVIRUS DISEASE [COVID-19]), AMPLIFIED PROBE TECHNIQUE, MAKING USE OF HIGH THROUGHPUT TECHNOLOGIES AS DESCRIBED BY CMS-2020-01-R: HCPCS | Performed by: FAMILY MEDICINE

## 2022-06-17 PROCEDURE — U0005 INFEC AGEN DETEC AMPLI PROBE: HCPCS | Performed by: FAMILY MEDICINE

## 2022-06-17 PROCEDURE — 99213 OFFICE O/P EST LOW 20 MIN: CPT | Performed by: FAMILY MEDICINE

## 2022-06-17 NOTE — ASSESSMENT & PLAN NOTE
Bilateral ear irrigation performed  Tolerated well    - Patient referred to ENT for full audiology assessment per group home request

## 2022-06-17 NOTE — ASSESSMENT & PLAN NOTE
Patient requires hearing aids but could not receive them due to bilateral impacted cerumen  Bilateral ear irrigation performed today, impacted cerumen resolved     - Patient referred to ENT for follow up

## 2022-06-17 NOTE — PROGRESS NOTES
OFFICE VISIT NOTE - [facility]  FLORENTINO 36 y o  (:1980) female MRN: [id number]  Unit/Bed#:  Encounter: [id number]      Date: 22    Assessment and Plan     Bilateral impacted cerumen  Bilateral ear irrigation performed  Tolerated well  - Patient referred to ENT for full audiology assessment per group home request    Hearing impairment  Patient requires hearing aids but could not receive them due to bilateral impacted cerumen  Bilateral ear irrigation performed today, impacted cerumen resolved  - Patient referred to ENT for follow up       Diagnoses and all orders for this visit:    Other specified hearing loss of right ear, unspecified hearing status on contralateral side  -     Ambulatory Referral to Otolaryngology; Future    Bilateral impacted cerumen    Encounter for laboratory testing for COVID-19 virus  -     COVID Only- Office Collect        Subjective:  Chief Complaint   Patient presents with    Earwax removal       History of Present Illness     FLORENTINO is a 36 y o  female here for referral to ENT for full audiology evaluation per her group home  She has a h/o b/l cerumen impaction  No new concerns today  B/l ear irrigation was performed today and patient tolerated well  She denies ear pain, discharge, tinnitus, headache or dizziness  The following portions of the patient's history were reviewed and updated as appropriate: allergies, current medications, past family history, past medical history, past social history, past surgical history and problem list     Review of Systems     Review of Systems   Constitutional: Negative for fatigue and fever  HENT: Positive for postnasal drip and sore throat  Respiratory: Negative for cough, shortness of breath and wheezing  Cardiovascular: Negative for chest pain, palpitations and leg swelling  Gastrointestinal: Negative for abdominal pain, diarrhea, nausea and vomiting  Genitourinary: Negative for dysuria and pelvic pain  Skin: Negative for rash  Neurological: Negative for weakness and headaches         Current Medications     Current Outpatient Medications on File Prior to Visit   Medication Sig Dispense Refill    acetaminophen (TYLENOL) 500 mg tablet Take 1 tablet (500 mg total) by mouth every 6 (six) hours as needed for mild pain 30 tablet 5    aluminum-magnesium hydroxide-simethicone (Antacid) 200-200-20 mg/5 mL suspension Take 15 mL by mouth every 4 (four) hours as needed for indigestion or heartburn 355 mL 5    amitriptyline (ELAVIL) 10 mg tablet Take 10 mg by mouth daily at bedtime      ARIPiprazole (ABILIFY) 20 MG tablet Take 1 tablet (20 mg total) by mouth daily at bedtime 90 tablet 2    bacitracin topical ointment 500 units/g topical ointment Cleanse site on nose with soap and water followed by bacitracin BID until healed then p r n  15 g 2    bismuth subsalicylate (PEPTO BISMOL) 524 mg/30 mL oral suspension Take 15 mL (262 mg total) by mouth every 6 (six) hours as needed for indigestion 360 mL 5    calcium carbonate (TUMS) 500 mg chewable tablet Chew 1 tablet (500 mg total) 3 (three) times a day as needed for heartburn 30 tablet 5    carbamide peroxide (DEBROX) 6 5 % otic solution Administer 5 drops into the left ear 2 (two) times a day 15 mL 0    D3-1000 25 MCG (1000 UT) tablet Take 2 tablets (2,000 Units total) by mouth daily 62 tablet 5    Diclofenac Sodium (VOLTAREN) 1 % Apply 2 g topically 4 (four) times a day 50 g 0    dicyclomine (BENTYL) 20 mg tablet Take 1 tablet (20 mg total) by mouth every 6 (six) hours as needed (as needed) 120 tablet 2    Dyclonine-Glycerin (Cepacol Sore Throat Spray) 0 1-33 % LIQD Apply 1 spray to the mouth or throat 3 (three) times a day as needed (sorethroat) 118 mL 5    Elastic Bandages & Supports (Neoprene Knee Brace) MISC Apply right knee brace in morning; remove at night 1 each 0    escitalopram (LEXAPRO) 20 mg tablet Take 1 tablet (20 mg total) by mouth daily 90 tablet 2    fluticasone (FLONASE) 50 mcg/act nasal spray 1 spray into each nostril daily as needed for rhinitis (nasal congestion) At 8:00 AM 18 2 mL 5    GNP Milk of Magnesia 1200 MG/15ML oral suspension 2 TBSP (30ML) BY MOUTH DAILY AS NEEDED IF NO BM IN 3 DAYS (CONSTIPATION) *PONCHO 355 mL 0    GNP Stomach Relief Max St 525 MG/15ML SUSP       hydrOXYzine HCL (ATARAX) 10 mg tablet Take 1 tablet (10 mg total) by mouth 3 (three) times a day 30 tablet 3    ibuprofen (MOTRIN) 400 mg tablet TAKE 1 TABLET BY MOUTH EVERY 8 HOURS AS NEEDED FOR MILD/MOD PAIN OR HEADACHES *PONCHO 30 tablet 0    Incontinence Supply Disposable (Depend Underwear Large) MISC Use 2 (two) times a day 60 each 5    ketoconazole (NIZORAL) 2 % cream Apply topically daily 30 g 5    Konsyl Daily Fiber 28 3 %       lamoTRIgine (LaMICtal) 100 mg tablet       lamoTRIgine (LaMICtal) 25 mg tablet Take 25 mg by mouth 2 (two) times a day        lidocaine (LMX) 4 % cream Apply topically as needed for mild pain 30 g 0    LORazepam (ATIVAN) 0 5 mg tablet Take 0 25 mg by mouth 2 (two) times a day      lubiprostone (Amitiza) 24 mcg capsule Take 1 capsule (24 mcg total) by mouth daily with breakfast 60 capsule 5    metoprolol tartrate (LOPRESSOR) 25 mg tablet Take 1 tablet (25 mg total) by mouth 2 (two) times a day 60 tablet 5    mineral oil-hydrophilic petrolatum (AQUAPHOR) ointment Apply topically as needed for dry skin 420 g 5    Mouthwashes (Listerine Antiseptic) LIQD Swish and spit 5 mL 2 (two) times a day 1000 mL 5    Multiple Vitamins-Minerals (CertaVite Senior/Antioxidant) TABS Take 1 tablet by mouth daily 31 tablet 5    naftifine (NAFTIN) 1 % cream Apply topically daily      norgestimate-ethinyl estradiol (ORTHO-CYCLEN) 0 25-35 MG-MCG per tablet Take 1 tablet by mouth in the morning        norgestimate-ethinyl estradiol (ORTHO-CYCLEN) 0 25-35 MG-MCG per tablet Take 1 tablet by mouth daily 28 tablet 1    nystatin (MYCOSTATIN) powder Apply 1 application topically 4 (four) times a day 45 g 5    nystatin-triamcinolone (MYCOLOG-II) cream Apply topically 2 (two) times a day 30 g 0    pantoprazole (PROTONIX) 20 mg tablet Take 1 tablet (20 mg total) by mouth daily 62 tablet 5    phenol (Chloraseptic) 1 4 % mucosal liquid Apply 1 spray to the mouth or throat every 2 (two) hours as needed (for sore throat as needed) 236 mL 1    polyethylene glycol (MIRALAX) 17 g packet Take one packet daily as needed for no bowel movement in 24 hours 30 each 5    psyllium (METAMUCIL) 58 6 % packet Take 1 packet by mouth in the morning  30 packet 5    RA Sunscreen SPF50 LOTN Apply 15 minutes before sun exposure and every 2 hours 237 mL 0    senna-docusate sodium (Senexon-S) 8 6-50 mg per tablet Take 1 tablet by mouth daily at 8am  30 tablet 5    sodium chloride (OCEAN) 0 65 % nasal spray 1 spray into each nostril as needed (three times daily as needed for nasal congestion) 60 mL 5    trospium chloride (SANCTURA) 20 mg tablet Take 1 tablet (20 mg total) by mouth 2 (two) times a day 180 tablet 3    zinc oxide (DESITIN) 13 % cream Apply 1 application topically as needed (for perianal irritation) 454 g 5     No current facility-administered medications on file prior to visit  Objective     Vitals: /80 (BP Location: Left arm, Patient Position: Sitting, Cuff Size: Large)   Pulse (!) 123   Temp (!) 97 4 °F (36 3 °C) (Temporal)   Resp 18   Ht 4' 8" (1 422 m)   Wt 94 7 kg (208 lb 12 8 oz)   SpO2 94%   BMI 46 81 kg/m²       Physical Exam  Constitutional:       General: She is not in acute distress  Appearance: She is obese  She is not ill-appearing  HENT:      Right Ear: Tympanic membrane and external ear normal  There is impacted cerumen  Left Ear: Tympanic membrane and external ear normal  There is impacted cerumen     Eyes:      Conjunctiva/sclera: Conjunctivae normal       Pupils: Pupils are equal, round, and reactive to light  Cardiovascular:      Rate and Rhythm: Normal rate  Heart sounds: Normal heart sounds  Pulmonary:      Effort: Pulmonary effort is normal  No respiratory distress  Musculoskeletal:      Right lower leg: No edema  Left lower leg: No edema  Skin:     General: Skin is warm  Capillary Refill: Capillary refill takes less than 2 seconds  Neurological:      Mental Status: She is alert           Apoorva Varela MD  Family Medicine PGY-2  06/17/22

## 2022-06-18 LAB — SARS-COV-2 RNA RESP QL NAA+PROBE: NEGATIVE

## 2022-06-20 DIAGNOSIS — Z87.2 H/O SUNBURN: ICD-10-CM

## 2022-06-22 ENCOUNTER — ANNUAL EXAM (OUTPATIENT)
Dept: OBGYN CLINIC | Facility: CLINIC | Age: 42
End: 2022-06-22

## 2022-06-22 VITALS
WEIGHT: 205.6 LBS | HEART RATE: 97 BPM | BODY MASS INDEX: 46.09 KG/M2 | DIASTOLIC BLOOD PRESSURE: 78 MMHG | SYSTOLIC BLOOD PRESSURE: 123 MMHG

## 2022-06-22 DIAGNOSIS — Z12.4 SCREENING FOR CERVICAL CANCER: ICD-10-CM

## 2022-06-22 DIAGNOSIS — Z12.31 ENCOUNTER FOR SCREENING MAMMOGRAM FOR MALIGNANT NEOPLASM OF BREAST: ICD-10-CM

## 2022-06-22 DIAGNOSIS — Z01.419 ENCOUNTER FOR GYNECOLOGICAL EXAMINATION WITHOUT ABNORMAL FINDING: Primary | ICD-10-CM

## 2022-06-22 DIAGNOSIS — Z12.39 ENCOUNTER FOR BREAST CANCER SCREENING USING NON-MAMMOGRAM MODALITY: ICD-10-CM

## 2022-06-22 PROCEDURE — G0145 SCR C/V CYTO,THINLAYER,RESCR: HCPCS | Performed by: NURSE PRACTITIONER

## 2022-06-22 PROCEDURE — G0476 HPV COMBO ASSAY CA SCREEN: HCPCS | Performed by: NURSE PRACTITIONER

## 2022-06-22 NOTE — LETTER
2022    To Myles Garza  : 1980      This letter is to advise you that your recent PAP SMEAR results were reviewed by me and are NORMAL    We will see you in 1 year for your annual exam     TA Nelson

## 2022-06-22 NOTE — PATIENT INSTRUCTIONS
Thank you for your confidence in our team    We appreciate you and welcome your feedback  If you receive a survey from us, please take a few moments to let us know how we are doing     Sincerely,  Elna Meigs, CRNP

## 2022-06-22 NOTE — PROGRESS NOTES
ANNUAL GYNECOLOGICAL EXAMINATION    Robert Li is a 36 y o  female who presents today for annual GYN exam   Her last pap smear was performed 2019 and result was NILM with negative HPV  She reports no history of abnormal pap smears in her past  Her last mammogram was performed 3/22/2022 and result was WNL  She reports menses as regular  She is mentally disabled and is a poor historian  Her general medical history has been reviewed as best as possible and is reported as follows:    Past Medical History:   Diagnosis Date    ADD (attention deficit disorder)     Anxiety     Astigmatism     Brain lesion     Calcium deficiency     Cellulitis of foot, right 2018    Cerebral palsy (HCC)     Chronic otitis media     Constipation     Depression     Dysphagia     Esophagitis     Esotropia     GERD (gastroesophageal reflux disease)     Hiatal hernia     Hydrocephalus (HCC)     Impaired fasting glucose     Left nephrolithiasis 2019    Myopia     Oppositional defiant disorder     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HCC)     Seizures (HCC)     Sensorineural hearing loss     Sleep apnea     Status post ventriculoatrial shunt placement     Visual impairment      Past Surgical History:   Procedure Laterality Date    BREAST BIOPSY Left     X 2 (not sure of years)    CSF SHUNT      Creation of Ventriculo-Peritoneal CSF shunt ; Last Assessed:2016    EAR SURGERY      Last Assessed:2016    LEG SURGERY      due to CP     NOSE SURGERY      Last Assessed:2016    CA ESOPHAGOGASTRODUODENOSCOPY TRANSORAL DIAGNOSTIC N/A 2019    Procedure: ESOPHAGOGASTRODUODENOSCOPY (EGD) with biopsy;  Surgeon: Libby Lynn MD;  Location: [facility];   Service: Gastroenterology    UPPER GASTROINTESTINAL ENDOSCOPY  2019     OB History        0    Para   0    Term   0       0    AB   0    Living   0       SAB   0    IAB   0    Ectopic   0    Multiple   0    Live Births   0 Social History     Tobacco Use    Smoking status: Never Smoker    Smokeless tobacco: Never Used   Vaping Use    Vaping Use: Never used   Substance Use Topics    Alcohol use: Never    Drug use: Never     Social History     Substance and Sexual Activity   Sexual Activity Never    Birth control/protection: OCP     Cancer-related family history includes Colon cancer in her father  Current Outpatient Medications   Medication Instructions    acetaminophen (TYLENOL) 500 mg, Oral, Every 6 hours PRN    aluminum-magnesium hydroxide-simethicone (Antacid) 200-200-20 mg/5 mL suspension 15 mL, Oral, Every 4 hours PRN    amitriptyline (ELAVIL) 10 mg, Oral, Daily at bedtime    ARIPiprazole (ABILIFY) 20 mg, Oral, Daily at bedtime    bacitracin topical ointment 500 units/g topical ointment Cleanse site on nose with soap and water followed by bacitracin BID until healed then p r n     bismuth subsalicylate (PEPTO BISMOL) 262 mg, Oral, Every 6 hours PRN    calcium carbonate (TUMS) 500 mg, Oral, 3 times daily PRN    carbamide peroxide (DEBROX) 6 5 % otic solution 5 drops, Left Ear, 2 times daily    D3-1000 2,000 Units, Oral, Daily    Diclofenac Sodium (VOLTAREN) 2 g, Topical, 4 times daily    dicyclomine (BENTYL) 20 mg, Oral, Every 6 hours PRN    Dyclonine-Glycerin (Cepacol Sore Throat Spray) 0 1-33 % LIQD 1 spray, Mouth/Throat, 3 times daily PRN    Elastic Bandages & Supports (Neoprene Knee Brace) MISC Apply right knee brace in morning; remove at night    escitalopram (LEXAPRO) 20 mg, Oral, Daily    fluticasone (FLONASE) 50 mcg/act nasal spray 1 spray, Nasal, Daily PRN, At 8:00 AM    GNP Milk of Magnesia 1200 MG/15ML oral suspension 2 TBSP (30ML) BY MOUTH DAILY AS NEEDED IF NO BM IN 3 DAYS (CONSTIPATION) *PONCHO    GNP Stomach Relief Max St 525 MG/15ML SUSP No dose, route, or frequency recorded      hydrOXYzine HCL (ATARAX) 10 mg, Oral, 3 times daily    ibuprofen (MOTRIN) 400 mg tablet TAKE 1 TABLET BY MOUTH EVERY 8 HOURS AS NEEDED FOR MILD/MOD PAIN OR HEADACHES *PONCHO    Incontinence Supply Disposable (Depend Underwear Large) MISC Does not apply, 2 times daily    ketoconazole (NIZORAL) 2 % cream Topical, Daily    Konsyl Daily Fiber 28 3 % No dose, route, or frequency recorded   lamoTRIgine (LaMICtal) 100 mg tablet No dose, route, or frequency recorded      lamoTRIgine (LAMICTAL) 25 mg, Oral, 2 times daily    lidocaine (LMX) 4 % cream Topical, As needed    LORazepam (ATIVAN) 0 25 mg, Oral, 2 times daily    lubiprostone (AMITIZA) 24 mcg, Oral, Daily with breakfast    metoprolol tartrate (LOPRESSOR) 25 mg, Oral, 2 times daily    mineral oil-hydrophilic petrolatum (AQUAPHOR) ointment Topical, As needed    Mouthwashes (Listerine Antiseptic) LIQD 5 mL, Swish & Spit, 2 times daily    Multiple Vitamins-Minerals (CertaVite Senior/Antioxidant) TABS 1 tablet, Oral, Daily    naftifine (NAFTIN) 1 % cream Topical, Daily    norgestimate-ethinyl estradiol (ORTHO-CYCLEN) 0 25-35 MG-MCG per tablet 1 tablet, Oral, Daily    norgestimate-ethinyl estradiol (ORTHO-CYCLEN) 0 25-35 MG-MCG per tablet 1 tablet, Oral, Daily    nystatin (MYCOSTATIN) powder 1 application, Topical, 4 times daily    nystatin-triamcinolone (MYCOLOG-II) cream Topical, 2 times daily    pantoprazole (PROTONIX) 20 mg, Oral, Daily    phenol (Chloraseptic) 1 4 % mucosal liquid 1 spray, Mouth/Throat, Every 2 hour PRN    polyethylene glycol (MIRALAX) 17 g packet Take one packet daily as needed for no bowel movement in 24 hours    psyllium (METAMUCIL) 58 6 % packet 1 packet, Oral, Daily    RA Sunscreen SPF50 LOTN Apply 15 minutes before sun exposure and every 2 hours    senna-docusate sodium (Senexon-S) 8 6-50 mg per tablet 1 tablet, Oral, Daily, at 8am     sodium chloride (OCEAN) 0 65 % nasal spray 1 spray, Nasal, As needed    trospium chloride (SANCTURA) 20 mg, Oral, 2 times daily    zinc oxide (DESITIN) 13 % cream 1 application, Topical, As needed       Review of Systems:  Review of Systems   Constitutional: Negative  Gastrointestinal: Negative  Genitourinary: Negative for difficulty urinating, menstrual problem, pelvic pain and vaginal discharge  Skin: Negative  Physical Exam:  /78   Pulse 97   Wt 93 3 kg (205 lb 9 6 oz)   LMP  (Approximate) Comment: unknown  BMI 46 09 kg/m²   Physical Exam  Constitutional:       General: She is not in acute distress  Appearance: She is well-developed  Genitourinary:      Vulva normal       No lesions in the vagina  Right Adnexa: not tender and no mass present  Left Adnexa: not tender and no mass present  No cervical motion tenderness or lesion  Uterus is not tender  Breasts:      Right: No mass, nipple discharge, skin change or tenderness  Left: No mass, nipple discharge, skin change or tenderness  Neck:      Thyroid: No thyromegaly  Cardiovascular:      Rate and Rhythm: Normal rate and regular rhythm  Pulmonary:      Effort: Pulmonary effort is normal    Abdominal:      Palpations: Abdomen is soft  Tenderness: There is no abdominal tenderness  Musculoskeletal:      Cervical back: Neck supple  Neurological:      Mental Status: She is alert and oriented to person, place, and time  Skin:     General: Skin is warm and dry  Vitals reviewed  Assessment/Plan:   1  Normal well-woman GYN exam   2  Cervical cancer screening:  Normal cervical exam   Pap smear done with HPV co-testing  3  STD screening:  Patient declines  4  Breast cancer screening:  Normal breast exam   Order placed for bilateral screening mammogram   Reviewed breast self-awareness  5  Depression Screening: Patient's depression screening was assessed with a PHQ-2 score of 1  Their PHQ-9 score was 9  Continue regular follow-up with their psychologist/therapist/psychiatrist who is managing their mental health condition(s)  6  BMI Counseling:  Body mass index is 46 09 kg/m²  Discussed the patient's BMI with her  The BMI is above normal  Patient referred to PCP due to patient being obese  7  Contraception:  OCP's    8  Return to office in 1 year for annual GYN exam     Reviewed with patient that test results are available in [address] immediately, but that they will not necessarily be reviewed by me immediately  Explained that I will review results at my earliest opportunity and contact patient appropriately

## 2022-06-28 ENCOUNTER — TELEPHONE (OUTPATIENT)
Dept: FAMILY MEDICINE CLINIC | Facility: CLINIC | Age: 42
End: 2022-06-28

## 2022-06-28 DIAGNOSIS — R05.9 COUGH: Primary | ICD-10-CM

## 2022-06-28 RX ORDER — GUAIFENESIN 100 MG/5ML
200 SYRUP ORAL 3 TIMES DAILY PRN
Qty: 120 ML | Refills: 0 | Status: SHIPPED | OUTPATIENT
Start: 2022-06-28 | End: 2022-07-08

## 2022-06-28 NOTE — TELEPHONE ENCOUNTER
Refill request noted  Refill sent for requested medication electronically to [facility]

## 2022-06-28 NOTE — TELEPHONE ENCOUNTER
Received faxed refill from [facility] for Tussin 100 mg/5 ML Syrup   Script in scanned into chart

## 2022-06-28 NOTE — TELEPHONE ENCOUNTER
Good Afternoon Dr Leonardo Kumar send us the request via fax for medication refill for   -Tussin 100MG/ML Syrup , script is under media tab(6/28/22)  Please review and refill as appropriate   Thanks

## 2022-06-29 LAB
LAB AP GYN PRIMARY INTERPRETATION: NORMAL
Lab: NORMAL

## 2022-07-01 ENCOUNTER — OFFICE VISIT (OUTPATIENT)
Dept: AUDIOLOGY | Age: 42
End: 2022-07-01

## 2022-07-01 DIAGNOSIS — H90.3 SENSORY HEARING LOSS, BILATERAL: Primary | ICD-10-CM

## 2022-07-01 NOTE — PROGRESS NOTES
Progress Note    Name:  Maria Elena Keen  :  1980  Age:  36 y o  Date of Evaluation: 22     Patient was seen today to have earmold impressions completed  Bilateral earmold impressions were completed without incident  Patient needs to pay the down payment for the new hearing aids  Will bring in check to pay  Will order once paid       Indigo Toure , CCC-A  Clinical Audiologist

## 2022-07-06 ENCOUNTER — APPOINTMENT (OUTPATIENT)
Dept: LAB | Facility: CLINIC | Age: 42
End: 2022-07-06
Payer: MEDICARE

## 2022-07-06 DIAGNOSIS — E55.9 AVITAMINOSIS D: ICD-10-CM

## 2022-07-06 DIAGNOSIS — Z79.899 ENCOUNTER FOR LONG-TERM (CURRENT) USE OF OTHER MEDICATIONS: ICD-10-CM

## 2022-07-06 LAB
25(OH)D3 SERPL-MCNC: 53.1 NG/ML (ref 30–100)
ALBUMIN SERPL BCP-MCNC: 3 G/DL (ref 3.5–5)
ALP SERPL-CCNC: 158 U/L (ref 46–116)
ALT SERPL W P-5'-P-CCNC: 20 U/L (ref 12–78)
ANION GAP SERPL CALCULATED.3IONS-SCNC: 8 MMOL/L (ref 4–13)
AST SERPL W P-5'-P-CCNC: 19 U/L (ref 5–45)
BASOPHILS # BLD AUTO: 0.03 THOUSANDS/ΜL (ref 0–0.1)
BASOPHILS NFR BLD AUTO: 0 % (ref 0–1)
BILIRUB SERPL-MCNC: 0.53 MG/DL (ref 0.2–1)
BUN SERPL-MCNC: 12 MG/DL (ref 5–25)
CALCIUM ALBUM COR SERPL-MCNC: 9 MG/DL (ref 8.3–10.1)
CALCIUM SERPL-MCNC: 8.2 MG/DL (ref 8.3–10.1)
CHLORIDE SERPL-SCNC: 108 MMOL/L (ref 100–108)
CHOLEST SERPL-MCNC: 154 MG/DL
CO2 SERPL-SCNC: 24 MMOL/L (ref 21–32)
CREAT SERPL-MCNC: 0.88 MG/DL (ref 0.6–1.3)
EOSINOPHIL # BLD AUTO: 0.1 THOUSAND/ΜL (ref 0–0.61)
EOSINOPHIL NFR BLD AUTO: 1 % (ref 0–6)
ERYTHROCYTE [DISTWIDTH] IN BLOOD BY AUTOMATED COUNT: 14.5 % (ref 11.6–15.1)
GFR SERPL CREATININE-BSD FRML MDRD: 80 ML/MIN/1.73SQ M
GLUCOSE P FAST SERPL-MCNC: 94 MG/DL (ref 65–99)
HCT VFR BLD AUTO: 40.6 % (ref 34.8–46.1)
HDLC SERPL-MCNC: 71 MG/DL
HGB BLD-MCNC: 13.4 G/DL (ref 11.5–15.4)
IMM GRANULOCYTES # BLD AUTO: 0.04 THOUSAND/UL (ref 0–0.2)
IMM GRANULOCYTES NFR BLD AUTO: 1 % (ref 0–2)
LDLC SERPL CALC-MCNC: 66 MG/DL (ref 0–100)
LYMPHOCYTES # BLD AUTO: 1.78 THOUSANDS/ΜL (ref 0.6–4.47)
LYMPHOCYTES NFR BLD AUTO: 20 % (ref 14–44)
MCH RBC QN AUTO: 30.3 PG (ref 26.8–34.3)
MCHC RBC AUTO-ENTMCNC: 33 G/DL (ref 31.4–37.4)
MCV RBC AUTO: 92 FL (ref 82–98)
MONOCYTES # BLD AUTO: 0.62 THOUSAND/ΜL (ref 0.17–1.22)
MONOCYTES NFR BLD AUTO: 7 % (ref 4–12)
NEUTROPHILS # BLD AUTO: 6.17 THOUSANDS/ΜL (ref 1.85–7.62)
NEUTS SEG NFR BLD AUTO: 71 % (ref 43–75)
NONHDLC SERPL-MCNC: 83 MG/DL
NRBC BLD AUTO-RTO: 0 /100 WBCS
PLATELET # BLD AUTO: 291 THOUSANDS/UL (ref 149–390)
PMV BLD AUTO: 9.1 FL (ref 8.9–12.7)
POTASSIUM SERPL-SCNC: 3.9 MMOL/L (ref 3.5–5.3)
PROT SERPL-MCNC: 7.4 G/DL (ref 6.4–8.2)
RBC # BLD AUTO: 4.42 MILLION/UL (ref 3.81–5.12)
SODIUM SERPL-SCNC: 140 MMOL/L (ref 136–145)
T4 FREE SERPL-MCNC: 1.01 NG/DL (ref 0.76–1.46)
TRIGL SERPL-MCNC: 84 MG/DL
TSH SERPL DL<=0.05 MIU/L-ACNC: 4.17 UIU/ML (ref 0.45–4.5)
WBC # BLD AUTO: 8.74 THOUSAND/UL (ref 4.31–10.16)

## 2022-07-06 PROCEDURE — 80061 LIPID PANEL: CPT

## 2022-07-06 PROCEDURE — 36415 COLL VENOUS BLD VENIPUNCTURE: CPT

## 2022-07-06 PROCEDURE — 84443 ASSAY THYROID STIM HORMONE: CPT

## 2022-07-06 PROCEDURE — 84439 ASSAY OF FREE THYROXINE: CPT

## 2022-07-06 PROCEDURE — 82306 VITAMIN D 25 HYDROXY: CPT

## 2022-07-06 PROCEDURE — 85025 COMPLETE CBC W/AUTO DIFF WBC: CPT

## 2022-07-06 PROCEDURE — 80053 COMPREHEN METABOLIC PANEL: CPT

## 2022-07-06 PROCEDURE — 80175 DRUG SCREEN QUAN LAMOTRIGINE: CPT

## 2022-07-06 NOTE — PROGRESS NOTES
Progress Note    Name:  George Ashley  :  1980  Age:  36 y o  Date of Evaluation: 22     Georgi Steele [id number]    Oticon Zircon 2 miniBTE-R    SN: [id number]   Warranty ends: 25  Hearing aids working well in office   Waiting for earmolds         Indigo Stanley   Clinical Audiologist

## 2022-07-07 ENCOUNTER — TELEPHONE (OUTPATIENT)
Dept: FAMILY MEDICINE CLINIC | Facility: CLINIC | Age: 42
End: 2022-07-07

## 2022-07-07 DIAGNOSIS — E55.9 VITAMIN D DEFICIENCY: ICD-10-CM

## 2022-07-07 DIAGNOSIS — L29.0 PERIANAL IRRITATION: ICD-10-CM

## 2022-07-07 RX ORDER — MULTIVIT-MIN/FA/LYCOPEN/LUTEIN .4-300-25
1 TABLET ORAL DAILY
Qty: 31 TABLET | Refills: 5 | Status: SHIPPED | OUTPATIENT
Start: 2022-07-07

## 2022-07-07 NOTE — TELEPHONE ENCOUNTER
Regarding: Desitin refill  ----- Message from Yovana Surendra, [address] [place] sent at 7/6/2022  3:14 PM EDT -----       ----- Message from Sheyla Carvalho to Jens Rivera MD sent at 7/5/2022  9:02 PM -----   Please send refill to [facility] in [place]    zinc oxide 13 % cream  Commonly known as: DESITIN  Apply 1 application topically as needed (for perianal irritation)

## 2022-07-07 NOTE — TELEPHONE ENCOUNTER
From [facility] refill  -Certavite-Antioxidant  QTY: 31 Tab    Fax  [phone]

## 2022-07-09 LAB — LAMOTRIGINE SERPL-MCNC: 1.9 UG/ML (ref 2–20)

## 2022-07-18 ENCOUNTER — HOSPITAL ENCOUNTER (EMERGENCY)
Facility: HOSPITAL | Age: 42
Discharge: HOME | End: 2022-07-19
Attending: EMERGENCY MEDICINE
Payer: MEDICARE

## 2022-07-18 DIAGNOSIS — R10.84 GENERALIZED ABDOMINAL PAIN: Primary | ICD-10-CM

## 2022-07-18 DIAGNOSIS — R13.10 DYSPHAGIA, UNSPECIFIED TYPE: ICD-10-CM

## 2022-07-18 DIAGNOSIS — R06.02 SOB (SHORTNESS OF BREATH): ICD-10-CM

## 2022-07-18 LAB
ALBUMIN SERPL BCP-MCNC: 3 G/DL (ref 3.5–5)
ALP SERPL-CCNC: 176 U/L (ref 46–116)
ALT SERPL W P-5'-P-CCNC: 17 U/L (ref 12–78)
ANION GAP SERPL CALCULATED.3IONS-SCNC: 12 MMOL/L (ref 4–13)
AST SERPL W P-5'-P-CCNC: 11 U/L (ref 5–45)
BASOPHILS # BLD AUTO: 0.05 THOUSANDS/ΜL (ref 0–0.1)
BASOPHILS NFR BLD AUTO: 1 % (ref 0–1)
BILIRUB SERPL-MCNC: 0.16 MG/DL (ref 0.2–1)
BUN SERPL-MCNC: 10 MG/DL (ref 5–25)
CALCIUM ALBUM COR SERPL-MCNC: 8.8 MG/DL (ref 8.3–10.1)
CALCIUM SERPL-MCNC: 8 MG/DL (ref 8.3–10.1)
CHLORIDE SERPL-SCNC: 103 MMOL/L (ref 96–108)
CO2 SERPL-SCNC: 26 MMOL/L (ref 21–32)
CREAT SERPL-MCNC: 1.05 MG/DL (ref 0.6–1.3)
EOSINOPHIL # BLD AUTO: 0.11 THOUSAND/ΜL (ref 0–0.61)
EOSINOPHIL NFR BLD AUTO: 1 % (ref 0–6)
ERYTHROCYTE [DISTWIDTH] IN BLOOD BY AUTOMATED COUNT: 13.8 % (ref 11.6–15.1)
GFR SERPL CREATININE-BSD FRML MDRD: 65 ML/MIN/1.73SQ M
GLUCOSE SERPL-MCNC: 135 MG/DL (ref 65–140)
HCT VFR BLD AUTO: 40.9 % (ref 34.8–46.1)
HGB BLD-MCNC: 13.5 G/DL (ref 11.5–15.4)
IMM GRANULOCYTES # BLD AUTO: 0.05 THOUSAND/UL (ref 0–0.2)
IMM GRANULOCYTES NFR BLD AUTO: 1 % (ref 0–2)
LIPASE SERPL-CCNC: 113 U/L (ref 73–393)
LYMPHOCYTES # BLD AUTO: 2.64 THOUSANDS/ΜL (ref 0.6–4.47)
LYMPHOCYTES NFR BLD AUTO: 25 % (ref 14–44)
MCH RBC QN AUTO: 31 PG (ref 26.8–34.3)
MCHC RBC AUTO-ENTMCNC: 33 G/DL (ref 31.4–37.4)
MCV RBC AUTO: 94 FL (ref 82–98)
MONOCYTES # BLD AUTO: 0.67 THOUSAND/ΜL (ref 0.17–1.22)
MONOCYTES NFR BLD AUTO: 6 % (ref 4–12)
NEUTROPHILS # BLD AUTO: 7.05 THOUSANDS/ΜL (ref 1.85–7.62)
NEUTS SEG NFR BLD AUTO: 66 % (ref 43–75)
NRBC BLD AUTO-RTO: 0 /100 WBCS
PLATELET # BLD AUTO: 266 THOUSANDS/UL (ref 149–390)
PMV BLD AUTO: 8.9 FL (ref 8.9–12.7)
POTASSIUM SERPL-SCNC: 3.9 MMOL/L (ref 3.5–5.3)
PROT SERPL-MCNC: 7.5 G/DL (ref 6.4–8.4)
RBC # BLD AUTO: 4.36 MILLION/UL (ref 3.81–5.12)
SODIUM SERPL-SCNC: 141 MMOL/L (ref 135–147)
WBC # BLD AUTO: 10.57 THOUSAND/UL (ref 4.31–10.16)

## 2022-07-18 PROCEDURE — 80053 COMPREHEN METABOLIC PANEL: CPT | Performed by: EMERGENCY MEDICINE

## 2022-07-18 PROCEDURE — 85025 COMPLETE CBC W/AUTO DIFF WBC: CPT | Performed by: EMERGENCY MEDICINE

## 2022-07-18 PROCEDURE — 36415 COLL VENOUS BLD VENIPUNCTURE: CPT

## 2022-07-18 PROCEDURE — 99284 EMERGENCY DEPT VISIT MOD MDM: CPT | Performed by: PHYSICIAN ASSISTANT

## 2022-07-18 PROCEDURE — 83690 ASSAY OF LIPASE: CPT | Performed by: EMERGENCY MEDICINE

## 2022-07-18 PROCEDURE — 99284 EMERGENCY DEPT VISIT MOD MDM: CPT

## 2022-07-18 RX ORDER — ALUMINUM HYDROXIDE, MAGNESIUM HYDROXIDE, DIMETHICONE 200; 200; 20 MG/5ML; MG/5ML; MG/5ML
LIQUID ORAL
Qty: 355 ML | Refills: 0 | Status: SHIPPED | OUTPATIENT
Start: 2022-07-18 | End: 2022-10-03 | Stop reason: SDUPTHER

## 2022-07-19 ENCOUNTER — TELEPHONE (OUTPATIENT)
Dept: OBGYN CLINIC | Facility: CLINIC | Age: 42
End: 2022-07-19

## 2022-07-19 ENCOUNTER — APPOINTMENT (EMERGENCY)
Dept: CT IMAGING | Facility: HOSPITAL | Age: 42
End: 2022-07-19
Payer: MEDICARE

## 2022-07-19 VITALS
SYSTOLIC BLOOD PRESSURE: 140 MMHG | HEART RATE: 79 BPM | RESPIRATION RATE: 18 BRPM | DIASTOLIC BLOOD PRESSURE: 66 MMHG | OXYGEN SATURATION: 97 % | TEMPERATURE: 98.4 F

## 2022-07-19 PROCEDURE — 74176 CT ABD & PELVIS W/O CONTRAST: CPT

## 2022-07-19 PROCEDURE — 96374 THER/PROPH/DIAG INJ IV PUSH: CPT

## 2022-07-19 PROCEDURE — G1004 CDSM NDSC: HCPCS

## 2022-07-19 RX ORDER — KETOROLAC TROMETHAMINE 30 MG/ML
15 INJECTION, SOLUTION INTRAMUSCULAR; INTRAVENOUS ONCE
Status: COMPLETED | OUTPATIENT
Start: 2022-07-19 | End: 2022-07-19

## 2022-07-19 RX ADMIN — KETOROLAC TROMETHAMINE 15 MG: 30 INJECTION, SOLUTION INTRAMUSCULAR; INTRAVENOUS at 01:09

## 2022-07-19 NOTE — DISCHARGE INSTRUCTIONS
Please refer to the attached information for strict return instructions  If symptoms worsen or new symptoms develop please return to the ER  Please follow-up with your primary care physician for re-evaluation of symptoms

## 2022-07-19 NOTE — ED PROVIDER NOTES
History  Chief Complaint   Patient presents with    Abdominal Pain     Patient reporting abdominal pain starting tonight with intermittent nausea  37 y/o F h/o cerebral palsy and CSF shunt placement presenting for evaluation of generalized abdominal pain  Per group home caregiver, patient complained of abdominal pain around 8PM  She did have small BM today  Per patient, she has h/o of hernia "by my belly button and that's where it hurts " She denies N/V/D, fevers, chills  She is able to pass flatus, she has had increased belching over the passed couple days  She was also complaining of acid reflux that improved with home meds the passed 3 days, she denies any today  She states her abdomen is worse with palpation  She denies any other complaints at this time  History provided by:  Patient and caregiver  Abdominal Pain  Pain location:  Generalized  Pain radiates to:  Does not radiate  Pain severity:  Moderate  Onset quality:  Sudden  Timing:  Constant  Progression:  Unchanged  Chronicity:  New  Relieved by:  None tried  Worsened by:  Palpation  Ineffective treatments:  None tried  Associated symptoms: belching and flatus    Associated symptoms: no chest pain, no chills, no constipation, no cough, no diarrhea, no dysuria, no fever, no hematemesis, no hematochezia, no hematuria, no melena, no nausea, no shortness of breath, no sore throat, no vaginal bleeding, no vaginal discharge and no vomiting        Prior to Admission Medications   Prescriptions Last Dose Informant Patient Reported? Taking?    ARIPiprazole (ABILIFY) 20 MG tablet  Care Giver No No   Sig: Take 1 tablet (20 mg total) by mouth daily at bedtime   D3-1000 25 MCG (1000 UT) tablet  Care Giver No No   Sig: Take 2 tablets (2,000 Units total) by mouth daily   Diclofenac Sodium (VOLTAREN) 1 %  Care Giver No No   Sig: Apply 2 g topically 4 (four) times a day   Dyclonine-Glycerin (Cepacol Sore Throat [place]) 0 1-33 % LIQD  Care Giver No No   Sig: Apply 1 spray to the mouth or throat 3 (three) times a day as needed (sorethroat)   Elastic Bandages & Supports (Neoprene Knee Brace) [facility]  Care Giver No No   Sig: Apply right knee brace in morning; remove at night   GNP Antacid & Anti-Gas 200-200-20 MG/5ML suspension   No No   Sig: TAKE 1 TBSP(15ML) BY MOUTH EVERY 4 HRS AS NEEDED FOR INDIG  OR HEARTBURN   GNP Milk of Magnesia 1200 MG/15ML oral suspension   No No   Si TBSP (30ML) BY MOUTH DAILY AS NEEDED IF NO BM IN 3 DAYS (CONSTIPATION) *PONCHO   GNP Stomach Relief Max St 525 MG/15ML SUSP  Care Giver Yes No   Incontinence Supply Disposable (Depend Underwear Large) MISC  Care Giver No No   Sig: Use 2 (two) times a day   Konsyl Daily Fiber 28 3 %  Care Giver Yes No   LORazepam (ATIVAN) 0 5 mg tablet  Care Giver Yes No   Sig: Take 0 25 mg by mouth 2 (two) times a day   Mouthwashes (Listerine Antiseptic) LIQD  Care Giver No No   Sig: Swish and spit 5 mL 2 (two) times a day   Multiple Vitamins-Minerals (CertaVite Senior/Antioxidant) TABS   No No   Sig: Take 1 tablet by mouth daily   RA Sunscreen SPF50 LOTN   No No   Sig: Apply 15 minutes before sun exposure and every 2 hours   acetaminophen (TYLENOL) 500 mg tablet  Care Giver No No   Sig: Take 1 tablet (500 mg total) by mouth every 6 (six) hours as needed for mild pain   amitriptyline (ELAVIL) 10 mg tablet  Care Giver Yes No   Sig: Take 10 mg by mouth daily at bedtime   bacitracin topical ointment 500 units/g topical ointment  Care Giver No No   Sig: Cleanse site on nose with soap and water followed by bacitracin BID until healed then p r n    bismuth subsalicylate (PEPTO BISMOL) 524 mg/30 mL oral suspension  Care Giver No No   Sig: Take 15 mL (262 mg total) by mouth every 6 (six) hours as needed for indigestion   calcium carbonate (TUMS) 500 mg chewable tablet  Care Giver No No   Sig: Chew 1 tablet (500 mg total) 3 (three) times a day as needed for heartburn   carbamide peroxide (DEBROX) 6 5 % otic solution   No No   Sig: Administer 5 drops into the left ear 2 (two) times a day Use 1 week prior to ENT appointment  Also can use 4 gtts twice weekly (Tuesday and Friday for example) to each ear for prevention  Keep hearing aid out x 10 minutes after ear drops administered  dicyclomine (BENTYL) 20 mg tablet  Care Giver No No   Sig: Take 1 tablet (20 mg total) by mouth every 6 (six) hours as needed (as needed)   escitalopram (LEXAPRO) 20 mg tablet  Care Giver No No   Sig: Take 1 tablet (20 mg total) by mouth daily   fluticasone (FLONASE) 50 mcg/act nasal spray  Care Giver No No   Si spray into each nostril daily as needed for rhinitis (nasal congestion) At 8:00 AM   hydrOXYzine HCL (ATARAX) 10 mg tablet  Care Giver No No   Sig: Take 1 tablet (10 mg total) by mouth 3 (three) times a day   ibuprofen (MOTRIN) 400 mg tablet   No No   Sig: TAKE 1 TABLET BY MOUTH EVERY 8 HOURS AS NEEDED FOR MILD/MOD PAIN OR HEADACHES *PONCHO   ketoconazole (NIZORAL) 2 % cream   No No   Sig: Apply topically daily   lamoTRIgine (LaMICtal) 100 mg tablet  Care Giver Yes No   lamoTRIgine (LaMICtal) 25 mg tablet  Care Giver Yes No   Sig: Take 25 mg by mouth 2 (two) times a day     lidocaine (LMX) 4 % cream  Care Giver No No   Sig: Apply topically as needed for mild pain   lubiprostone (Amitiza) 24 mcg capsule  Care Giver No No   Sig: Take 1 capsule (24 mcg total) by mouth daily with breakfast   metoprolol tartrate (LOPRESSOR) 25 mg tablet   No No   Sig: Take 1 tablet (25 mg total) by mouth 2 (two) times a day   mineral oil-hydrophilic petrolatum (AQUAPHOR) ointment  Care Giver No No   Sig: Apply topically as needed for dry skin   naftifine (NAFTIN) 1 % cream  Care Giver Yes No   Sig: Apply topically daily   norgestimate-ethinyl estradiol (ORTHO-CYCLEN) 0 25-35 MG-MCG per tablet  Care Giver Yes No   Sig: Take 1 tablet by mouth in the morning     norgestimate-ethinyl estradiol (ORTHO-CYCLEN) 0 25-35 MG-MCG per tablet   No No   Sig: Take 1 tablet by mouth daily nystatin (MYCOSTATIN) powder  Care Giver No No   Sig: Apply 1 application topically 4 (four) times a day   nystatin-triamcinolone (MYCOLOG-II) cream   No No   Sig: Apply topically 2 (two) times a day   pantoprazole (PROTONIX) 20 mg tablet  Care Giver No No   Sig: Take 1 tablet (20 mg total) by mouth daily   phenol (Chloraseptic) 1 4 % mucosal liquid  Care Giver No No   Sig: Apply 1 spray to the mouth or throat every 2 (two) hours as needed (for sore throat as needed)   polyethylene glycol (MIRALAX) 17 g packet  Care Giver No No   Sig: Take one packet daily as needed for no bowel movement in 24 hours   psyllium (METAMUCIL) 58 6 % packet  Care Giver No No   Sig: Take 1 packet by mouth in the morning     senna-docusate sodium (Senexon-S) 8 6-50 mg per tablet   No No   Sig: Take 1 tablet by mouth daily at 8am    sodium chloride (OCEAN) 0 65 % nasal spray  Care Giver No No   Si spray into each nostril as needed (three times daily as needed for nasal congestion)   trospium chloride (SANCTURA) 20 mg tablet   No No   Sig: Take 1 tablet (20 mg total) by mouth 2 (two) times a day   zinc oxide (DESITIN) 13 % cream   No No   Sig: Apply 1 application topically as needed (for perianal irritation)      Facility-Administered Medications: None       Past Medical History:   Diagnosis Date    ADD (attention deficit disorder)     Anxiety     Astigmatism     Brain lesion     Calcium deficiency     Cellulitis of foot, right 2018    Cerebral palsy (HCC)     Chronic otitis media     Constipation     Depression     Dysphagia     Esophagitis     Esotropia     GERD (gastroesophageal reflux disease)     Hiatal hernia     Hydrocephalus (HCC)     Impaired fasting glucose     Left nephrolithiasis 2019    Myopia     Oppositional defiant disorder     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HCC)     Seizures ([facility])     Sensorineural hearing loss     Sleep apnea     Status post ventriculoatrial shunt placement     Visual impairment        Past Surgical History:   Procedure Laterality Date    BREAST BIOPSY Left     X 2 (not sure of years)    CSF SHUNT      Creation of Ventriculo-Peritoneal CSF shunt ; Last Assessed:7/6/2016    EAR SURGERY      Last Assessed:7/6/2016    LEG SURGERY      due to CP     NOSE SURGERY      Last Assessed:7/6/2016    NH ESOPHAGOGASTRODUODENOSCOPY TRANSORAL DIAGNOSTIC N/A 5/9/2019    Procedure: ESOPHAGOGASTRODUODENOSCOPY (EGD) with biopsy;  Surgeon: Rosmery Johnson MD;  Location: [facility]; Service: Gastroenterology    UPPER GASTROINTESTINAL ENDOSCOPY  05/2019       Family History   Problem Relation Age of Onset    Diabetes Mother     Colon cancer Father     No Known Problems Maternal Grandmother     No Known Problems Maternal Grandfather     No Known Problems Paternal Grandmother     No Known Problems Paternal Grandfather     Hypertension Neg Hx     Heart disease Neg Hx     Stroke Neg Hx     Thyroid disease Neg Hx      I have reviewed and agree with the history as documented  E-Cigarette/Vaping    E-Cigarette Use Never User      E-Cigarette/Vaping Substances    Nicotine No     THC No     CBD No     Flavoring No     Other No     Unknown No      Social History     Tobacco Use    Smoking status: Never Smoker    Smokeless tobacco: Never Used   Vaping Use    Vaping Use: Never used   Substance Use Topics    Alcohol use: Never    Drug use: Never       Review of Systems   Constitutional: Negative for chills and fever  HENT: Negative for congestion, ear pain, rhinorrhea, sinus pain, sore throat and trouble swallowing  Eyes: Negative for redness and visual disturbance  Respiratory: Negative for cough and shortness of breath  Cardiovascular: Negative for chest pain, palpitations and leg swelling  Gastrointestinal: Positive for abdominal pain and flatus  Negative for constipation, diarrhea, hematemesis, hematochezia, melena, nausea and vomiting     Genitourinary: Negative for dysuria, frequency, hematuria, urgency, vaginal bleeding and vaginal discharge  Musculoskeletal: Negative for back pain, myalgias and neck pain  Skin: Negative for color change and rash  Neurological: Negative for dizziness, weakness, light-headedness, numbness and headaches  All other systems reviewed and are negative  Physical Exam  Physical Exam  Vitals reviewed  Constitutional:       General: She is not in acute distress  Appearance: Normal appearance  She is well-developed and well-groomed  She is not ill-appearing, toxic-appearing or diaphoretic  HENT:      Head: Normocephalic and atraumatic  Right Ear: External ear normal       Left Ear: External ear normal       Nose: Nose normal  No congestion or rhinorrhea  Mouth/Throat:      Mouth: Mucous membranes are moist       Pharynx: Oropharynx is clear  No oropharyngeal exudate or posterior oropharyngeal erythema  Eyes:      General:         Right eye: No discharge  Left eye: No discharge  Conjunctiva/sclera: Conjunctivae normal    Cardiovascular:      Rate and Rhythm: Normal rate and regular rhythm  Pulses: Normal pulses  Heart sounds: No murmur heard  No friction rub  No gallop  Pulmonary:      Effort: Pulmonary effort is normal  No respiratory distress  Breath sounds: Normal breath sounds  No wheezing, rhonchi or rales  Abdominal:      General: Abdomen is flat and protuberant  Palpations: Abdomen is soft  Tenderness: There is generalized abdominal tenderness  There is guarding  There is no right CVA tenderness, left CVA tenderness or rebound  Hernia: A hernia is present  Hernia is present in the ventral area  Musculoskeletal:         General: No deformity  Normal range of motion  Cervical back: Normal range of motion and neck supple  Skin:     General: Skin is warm and dry  Capillary Refill: Capillary refill takes less than 2 seconds        Coloration: Skin is not jaundiced or pale  Findings: No rash  Neurological:      General: No focal deficit present  Mental Status: She is alert  Psychiatric:         Mood and Affect: Mood normal          Behavior: Behavior normal  Behavior is cooperative           Vital Signs  ED Triage Vitals   Temperature Pulse Respirations Blood Pressure SpO2   07/18/22 2107 07/18/22 2107 07/18/22 2107 07/18/22 2107 07/18/22 2107   98 4 °F (36 9 °C) 92 18 126/92 94 %      Temp Source Heart Rate Source Patient Position - Orthostatic VS BP Location FiO2 (%)   07/18/22 2107 07/19/22 0130 07/19/22 0130 07/19/22 0130 --   Oral Monitor Lying Left arm       Pain Score       07/19/22 0109       8           Vitals:    07/18/22 2107 07/19/22 0130   BP: 126/92 140/66   Pulse: 92 79   Patient Position - Orthostatic VS:  Lying         Visual Acuity      ED Medications  Medications   ketorolac (TORADOL) injection 15 mg (15 mg Intravenous Given 7/19/22 0109)   barium (READI-CAT 2) suspension 900 mL (900 mL Oral Given 7/19/22 0157)       Diagnostic Studies  Results Reviewed     Procedure Component Value Units Date/Time    Lipase [[id number]]  (Normal) Collected: 07/18/22 2114    Lab Status: Final result Specimen: Blood from Arm, Left Updated: 07/18/22 2139     Lipase 113 u/L     Comprehensive metabolic panel [[id number]]  (Abnormal) Collected: 07/18/22 2114    Lab Status: Final result Specimen: Blood from Arm, Left Updated: 07/18/22 2139     Sodium 141 mmol/L      Potassium 3 9 mmol/L      Chloride 103 mmol/L      CO2 26 mmol/L      ANION GAP 12 mmol/L      BUN 10 mg/dL      Creatinine 1 05 mg/dL      Glucose 135 mg/dL      Calcium 8 0 mg/dL      Corrected Calcium 8 8 mg/dL      AST 11 U/L      ALT 17 U/L      Alkaline Phosphatase 176 U/L      Total Protein 7 5 g/dL      Albumin 3 0 g/dL      Total Bilirubin 0 16 mg/dL      eGFR 65 ml/min/1 73sq m     Narrative:      [place] guidelines for Chronic Kidney Disease (CKD):     Stage 1 with normal or high GFR (GFR > 90 mL/min/1 73 square meters)    Stage 2 Mild CKD (GFR = 60-89 mL/min/1 73 square meters)    Stage 3A Moderate CKD (GFR = 45-59 mL/min/1 73 square meters)    Stage 3B Moderate CKD (GFR = 30-44 mL/min/1 73 square meters)    Stage 4 Severe CKD (GFR = 15-29 mL/min/1 73 square meters)    Stage 5 End Stage CKD (GFR <15 mL/min/1 73 square meters)  Note: GFR calculation is accurate only with a steady state creatinine    CBC and differential [[id number]]  (Abnormal) Collected: 07/18/22 2114    Lab Status: Final result Specimen: Blood from Arm, Left Updated: 07/18/22 2121     WBC 10 57 Thousand/uL      RBC 4 36 Million/uL      Hemoglobin 13 5 g/dL      Hematocrit 40 9 %      MCV 94 fL      MCH 31 0 pg      MCHC 33 0 g/dL      RDW 13 8 %      MPV 8 9 fL      Platelets 138 Thousands/uL      nRBC 0 /100 WBCs      Neutrophils Relative 66 %      Immat GRANS % 1 %      Lymphocytes Relative 25 %      Monocytes Relative 6 %      Eosinophils Relative 1 %      Basophils Relative 1 %      Neutrophils Absolute 7 05 Thousands/µL      Immature Grans Absolute 0 05 Thousand/uL      Lymphocytes Absolute 2 64 Thousands/µL      Monocytes Absolute 0 67 Thousand/µL      Eosinophils Absolute 0 11 Thousand/µL      Basophils Absolute 0 05 Thousands/µL                  CT abdomen pelvis wo contrast   Final Result by Jane Samayoa MD (07/19 0228)      No acute intra-abdominal abnormality  No free air or free fluid  No evidence of large or small bowel obstruction  Small sliding hiatal hernia  Oral contrast material is noted within the visualized distal esophagus suggestive of gastroesophageal reflux  Markedly atrophic right kidney  Workstation performed: [id number]                    Procedures  Procedures         ED Course  ED Course as of 07/19/22 0308   Tue Jul 19, 2022   0240 CT abdomen pelvis wo contrast  No acute intra-abdominal abnormality    No free air or free fluid      No evidence of large or small bowel obstruction      Small sliding hiatal hernia  Oral contrast material is noted within the visualized distal esophagus suggestive of gastroesophageal reflux      Markedly atrophic right kidney  MDM  Number of Diagnoses or Management Options  Generalized abdominal pain: new and requires workup  Diagnosis management comments:     Patient presenting for evaluation of generalized abdominal pain that started suddenly at [address] done prior to evaluation of patient, mild elevation of WBC and ALP, otherwise unremarkable  Abd has generalized ttp and ventral hernia noted on exam  CT A/P with PO contrast ordered as patient has h/o partial SBO  Will re-evaluate pain  Patient asleep when approached for re-evaluation  CT unremarkable aside from small sliding hiatal hernia  Patient's pain has resolved at this time  Strict return precautions discussed with patient and caregiver bedside  Patient and caregiver comfortable discharge at this time  Understanding of when to return to the emergency department  Prior to discharge, discharge instructions were discussed with patient at bedside  Patient was provided both verbal and written instructions  Patient is understanding of the discharge instructions and is agreeable to plan of care  Return precautions were discussed with patient bedside, patient verbalized understanding of signs and symptoms that would necessitate return to the ED  All questions were answered  Patient was comfortable with the plan of care and discharged to home  Dispo: discharge home with follow up to PCP as needed  Patient stable, in no acute distress and non-toxic at discharge         Amount and/or Complexity of Data Reviewed  Clinical lab tests: ordered and reviewed  Tests in the radiology section of CPT®: ordered and reviewed  Tests in the medicine section of CPT®: ordered and reviewed  Decide to obtain previous medical records or to obtain history from someone other than the patient: yes  Obtain history from someone other than the patient: yes  Review and summarize past medical records: yes  Independent visualization of images, tracings, or specimens: yes    Risk of Complications, Morbidity, and/or Mortality  Presenting problems: moderate  Diagnostic procedures: low  Management options: low    Patient Progress  Patient progress: resolved      Disposition  Final diagnoses:   Generalized abdominal pain     Time reflects when diagnosis was documented in both MDM as applicable and the Disposition within this note     Time User Action Codes Description Comment    7/19/2022  2:40 AM Joss Flow Add [R10 84] Generalized abdominal pain       ED Disposition     ED Disposition   Discharge    Condition   Stable    Date/Time   Tue Jul 19, 2022  2:40 AM    Comment   Jo Ann Huffman discharge to home/self care                 Follow-up Information     Follow up With Specialties Details Why Contact Info Additional [address]  [facility] Schedule an appointment as soon as possible for a visit  As needed [address] [phone]  [facility] [address], [address] [facility], [address] [place] [address]          Discharge Medication List as of 7/19/2022  2:54 AM      CONTINUE these medications which have NOT CHANGED    Details   acetaminophen (TYLENOL) 500 mg tablet Take 1 tablet (500 mg total) by mouth every 6 (six) hours as needed for mild pain, Starting Mon 3/14/2022, Normal      amitriptyline (ELAVIL) 10 mg tablet Take 10 mg by mouth daily at bedtime, Historical Med      ARIPiprazole (ABILIFY) 20 MG tablet Take 1 tablet (20 mg total) by mouth daily at bedtime, Starting Thu 10/15/2020, Normal      bacitracin topical ointment 500 units/g topical ointment Cleanse site on nose with soap and water followed by bacitracin BID until healed then p r n , Normal      bismuth subsalicylate (PEPTO BISMOL) 524 mg/30 mL oral suspension Take 15 mL (262 mg total) by mouth every 6 (six) hours as needed for indigestion, Starting Tue 2/22/2022, Normal      calcium carbonate (TUMS) 500 mg chewable tablet Chew 1 tablet (500 mg total) 3 (three) times a day as needed for heartburn, Starting Tue 10/26/2021, Normal      carbamide peroxide (DEBROX) 6 5 % otic solution Administer 5 drops into the left ear 2 (two) times a day Use 1 week prior to ENT appointment  Also can use 4 gtts twice weekly (Tuesday and Friday for example) to each ear for prevention  Keep hearing aid out x 10 minutes after ear drops administered  ,  Starting Thu 7/7/2022, Normal      D3-1000 25 MCG (1000 UT) tablet Take 2 tablets (2,000 Units total) by mouth daily, Starting Wed 4/6/2022, Normal      Diclofenac Sodium (VOLTAREN) 1 % Apply 2 g topically 4 (four) times a day, Starting Tue 4/5/2022, Normal      dicyclomine (BENTYL) 20 mg tablet Take 1 tablet (20 mg total) by mouth every 6 (six) hours as needed (as needed), Starting Mon 1/18/2021, Print      Dyclonine-Glycerin (Cepacol Sore Throat Spray) 0 1-33 % LIQD Apply 1 spray to the mouth or throat 3 (three) times a day as needed (sorethroat), Starting Tue 10/26/2021, Normal      Elastic Bandages & Supports (Neoprene Knee Brace) MISC Apply right knee brace in morning; remove at night, Normal      escitalopram (LEXAPRO) 20 mg tablet Take 1 tablet (20 mg total) by mouth daily, Starting Thu 10/15/2020, Normal      fluticasone (FLONASE) 50 mcg/act nasal spray 1 spray into each nostril daily as needed for rhinitis (nasal congestion) At 8:00 AM, Starting Fri 6/11/2021, Normal      GNP Antacid & Anti-Gas 200-200-20 MG/5ML suspension TAKE 1 TBSP(15ML) BY MOUTH EVERY 4 HRS AS NEEDED FOR INDIG  OR HEARTBURN, Normal      GNP Milk of Magnesia 1200 MG/15ML oral suspension 2 TBSP (30ML) BY MOUTH DAILY AS NEEDED IF NO BM IN 3 DAYS (CONSTIPATION) *PONCHO, Normal      GNP Stomach Relief Max St 525 MG/15ML SUSP Starting Tue 2/22/2022, Historical Med      hydrOXYzine HCL (ATARAX) 10 mg tablet Take 1 tablet (10 mg total) by mouth 3 (three) times a day, Starting Tue 10/12/2021, Normal      ibuprofen (MOTRIN) 400 mg tablet TAKE 1 TABLET BY MOUTH EVERY 8 HOURS AS NEEDED FOR MILD/MOD PAIN OR HEADACHES *PONCHO, Normal      Incontinence Supply Disposable (Depend Underwear Large) MISC Use 2 (two) times a day, Starting Mon 2/28/2022, Normal      ketoconazole (NIZORAL) 2 % cream Apply topically daily, Starting Wed 5/25/2022, Normal      Konsyl Daily Fiber 28 3 % Starting Sat 10/2/2021, Historical Med      !! lamoTRIgine (LaMICtal) 100 mg tablet Starting Wed 12/8/2021, Historical Med      !! lamoTRIgine (LaMICtal) 25 mg tablet Take 25 mg by mouth 2 (two) times a day  , Starting Tue 8/11/2020, Historical Med      lidocaine (LMX) 4 % cream Apply topically as needed for mild pain, Starting Fri 4/8/2022, Normal      LORazepam (ATIVAN) 0 5 mg tablet Take 0 25 mg by mouth 2 (two) times a day, Historical Med      lubiprostone (Amitiza) 24 mcg capsule Take 1 capsule (24 mcg total) by mouth daily with breakfast, Starting Thu 5/12/2022, Normal      metoprolol tartrate (LOPRESSOR) 25 mg tablet Take 1 tablet (25 mg total) by mouth 2 (two) times a day, Starting Mon 6/6/2022, Normal      mineral oil-hydrophilic petrolatum (AQUAPHOR) ointment Apply topically as needed for dry skin, Starting Fri 6/11/2021, Normal      Mouthwashes (Listerine Antiseptic) LIQD Swish and spit 5 mL 2 (two) times a day, Starting Thu 5/12/2022, Until Fri 6/17/2022, Normal      Multiple Vitamins-Minerals (CertaVite Senior/Antioxidant) TABS Take 1 tablet by mouth daily, Starting Thu 7/7/2022, Normal      naftifine (NAFTIN) 1 % cream Apply topically daily, Historical Med      !! norgestimate-ethinyl estradiol (ORTHO-CYCLEN) 0 25-35 MG-MCG per tablet Take 1 tablet by mouth in the morning , Historical Med      !! norgestimate-ethinyl estradiol (ORTHO-CYCLEN) 0 25-35 MG-MCG per tablet Take 1 tablet by mouth daily, Starting Fri 5/27/2022, Normal      nystatin (MYCOSTATIN) powder Apply 1 application topically 4 (four) times a day, Starting Mon 10/4/2021, Normal      nystatin-triamcinolone (MYCOLOG-II) cream Apply topically 2 (two) times a day, Starting Fri 5/27/2022, Normal      pantoprazole (PROTONIX) 20 mg tablet Take 1 tablet (20 mg total) by mouth daily, Starting Wed 4/6/2022, Normal      phenol (Chloraseptic) 1 4 % mucosal liquid Apply 1 spray to the mouth or throat every 2 (two) hours as needed (for sore throat as needed), Starting Thu 10/15/2020, Print      polyethylene glycol (MIRALAX) 17 g packet Take one packet daily as needed for no bowel movement in 24 hours, Normal      psyllium (METAMUCIL) 58 6 % packet Take 1 packet by mouth in the morning , Starting Thu 5/12/2022, Normal      RA Sunscreen SPF50 LOTN Apply 15 minutes before sun exposure and every 2 hours, Normal      senna-docusate sodium (Senexon-S) 8 6-50 mg per tablet Take 1 tablet by mouth daily at 8am , Starting Mon 6/6/2022, Normal      sodium chloride (OCEAN) 0 65 % nasal spray 1 spray into each nostril as needed (three times daily as needed for nasal congestion), Starting Tue 10/26/2021, Normal      trospium chloride (SANCTURA) 20 mg tablet Take 1 tablet (20 mg total) by mouth 2 (two) times a day, Starting Thu 5/26/2022, Normal      zinc oxide (DESITIN) 13 % cream Apply 1 application topically as needed (for perianal irritation), Starting Thu 7/7/2022, Normal       !! - Potential duplicate medications found  Please discuss with provider  No discharge procedures on file      PDMP Review       Value Time User    PDMP Reviewed  Yes 10/6/2021  1:11 PM Blake Perez PA-C          ED Provider  Electronically Signed by ERICA Arboleda PA-C  07/19/22 1949

## 2022-07-20 DIAGNOSIS — B37.31 VULVAL CANDIDIASIS: ICD-10-CM

## 2022-07-22 ENCOUNTER — OFFICE VISIT (OUTPATIENT)
Dept: SLEEP CENTER | Facility: CLINIC | Age: 42
End: 2022-07-22
Payer: MEDICARE

## 2022-07-22 ENCOUNTER — OFFICE VISIT (OUTPATIENT)
Dept: AUDIOLOGY | Age: 42
End: 2022-07-22
Payer: MEDICARE

## 2022-07-22 VITALS
HEIGHT: 56 IN | BODY MASS INDEX: 47.92 KG/M2 | WEIGHT: 213 LBS | SYSTOLIC BLOOD PRESSURE: 119 MMHG | HEART RATE: 99 BPM | DIASTOLIC BLOOD PRESSURE: 60 MMHG

## 2022-07-22 DIAGNOSIS — G47.9 SLEEP DISTURBANCE: ICD-10-CM

## 2022-07-22 DIAGNOSIS — H90.3 SENSORY HEARING LOSS, BILATERAL: Primary | ICD-10-CM

## 2022-07-22 DIAGNOSIS — G47.19 EXCESSIVE DAYTIME SLEEPINESS: ICD-10-CM

## 2022-07-22 DIAGNOSIS — Z86.2 HISTORY OF ANEMIA: ICD-10-CM

## 2022-07-22 DIAGNOSIS — F71 MODERATE INTELLECTUAL DISABILITY: ICD-10-CM

## 2022-07-22 DIAGNOSIS — G47.61 PLMD (PERIODIC LIMB MOVEMENT DISORDER): Primary | ICD-10-CM

## 2022-07-22 DIAGNOSIS — E66.01 OBESITY, CLASS III, BMI 40-49.9 (MORBID OBESITY) (HCC): ICD-10-CM

## 2022-07-22 PROCEDURE — 99214 OFFICE O/P EST MOD 30 MIN: CPT | Performed by: NURSE PRACTITIONER

## 2022-07-22 PROCEDURE — V5160 DISPENSING FEE BINAURAL: HCPCS | Performed by: AUDIOLOGIST

## 2022-07-22 PROCEDURE — V5261 HEARING AID, DIGIT, BIN, BTE: HCPCS | Performed by: AUDIOLOGIST

## 2022-07-22 RX ORDER — GABAPENTIN 100 MG/1
CAPSULE ORAL
Qty: 60 CAPSULE | Refills: 4 | Status: SHIPPED | OUTPATIENT
Start: 2022-07-22 | End: 2022-07-22 | Stop reason: SDUPTHER

## 2022-07-22 RX ORDER — GABAPENTIN 100 MG/1
CAPSULE ORAL
Qty: 60 CAPSULE | Refills: 4 | Status: SHIPPED | OUTPATIENT
Start: 2022-07-22 | End: 2022-07-29 | Stop reason: ALTCHOICE

## 2022-07-22 NOTE — PATIENT INSTRUCTIONS
No sleep apnea was noted during the study  Begin gabapentin 100mg capsules at bedtime for one week, then increase to 2 capsules at bedtime to help with periodic limb movements of sleep  Discuss daytime sleepiness with psychiatry  Atarax and ativan can worsen daytime sleepiness  Atarax should not be given at bedtime  It can worsen restless legs  This would need to be discussed with her psychiatrist   Schedule follow up in 4 months  You can call if symptoms are not improving or worsening  Nursing Support:  When: Monday through Friday 7A-5PM except holidays  Where: Our direct line is [phone]  If you are having a true emergency please call 911  In the event that the line is busy or it is after hours please leave a voice message and we will return your call  Please speak clearly, leaving your full name, birth date, best number to reach you and the reason for your call  Medication refills: We will need the name of the medication, the dosage, the ordering provider, whether you get a 30 or 90 day refill, and the pharmacy name and address  Medications will be ordered by the provider only  Nurses cannot call in prescriptions  Please allow 7 days for medication refills  Physician requested updates: If your provider requested that you call with an update after starting medication, please be ready to provide us the medication and dosage, what time you take your medication, the time you attempt to fall asleep, time you fall asleep, when you wake up, and what time you get out of bed  Sleep Study Results: We will contact you with sleep study results and/or next steps after the physician has reviewed your testing

## 2022-07-22 NOTE — PROGRESS NOTES
Progress Note    Name:  Lucina Paul  :  1980  Age:  36 y o  Date of Evaluation: 22     Patients hearing aids arrived  Waiting on earmolds  Patient dropped of payment today for hearing aids  Will call for [facility] when earmolds arrive         Indigo Willard , CCC-A  Clinical Audiologist

## 2022-07-22 NOTE — PROGRESS NOTES
Progress Note - Hank Doss 36 y o  female   :1980, MRN: [id number]  2022          Follow Up Evaluation / Problem:     Periodic limb movements  Excessive daytime sleepiness      Thank you for the opportunity of participating in the evaluation and care of this patient in the Sleep Clinic at [facility]  HPI: George Ashley is a 36y o  year old female  She presents with an attendant from her group home for a follow up to review results of her recent sleep study  She was working with the bariatric specialists and was identified as being at risk for obstructive sleep apnea  STOPBANG was   She reported loud snoring, frequent night time awakenings, non-restful sleep and excessive daytime sleepiness        Review of Systems      Genitourinary none   Cardiology none   Gastrointestinal none   Neurology none   Constitutional none   Integumentary none   Psychiatry none   Musculoskeletal none   Pulmonary none   ENT none   Endocrine none   Hematological none       Current Outpatient Medications:     acetaminophen (TYLENOL) 500 mg tablet, Take 1 tablet (500 mg total) by mouth every 6 (six) hours as needed for mild pain, Disp: 30 tablet, Rfl: 5    amitriptyline (ELAVIL) 10 mg tablet, Take 10 mg by mouth daily at bedtime, Disp: , Rfl:     ARIPiprazole (ABILIFY) 20 MG tablet, Take 1 tablet (20 mg total) by mouth daily at bedtime, Disp: 90 tablet, Rfl: 2    bacitracin topical ointment 500 units/g topical ointment, Cleanse site on nose with soap and water followed by bacitracin BID until healed then p r n , Disp: 15 g, Rfl: 2    bismuth subsalicylate (PEPTO BISMOL) 524 mg/30 mL oral suspension, Take 15 mL (262 mg total) by mouth every 6 (six) hours as needed for indigestion, Disp: 360 mL, Rfl: 5    calcium carbonate (TUMS) 500 mg chewable tablet, Chew 1 tablet (500 mg total) 3 (three) times a day as needed for heartburn, Disp: 30 tablet, Rfl: 5    carbamide peroxide (DEBROX) 6 5 % otic solution, Administer 5 drops into the left ear 2 (two) times a day Use 1 week prior to ENT appointment  Also can use 4 gtts twice weekly (Tuesday and Friday for example) to each ear for prevention  Keep hearing aid out x 10 minutes after ear drops administered  , Disp: 15 mL, Rfl: 0    D3-1000 25 MCG (1000 UT) tablet, Take 2 tablets (2,000 Units total) by mouth daily, Disp: 62 tablet, Rfl: 5    Diclofenac Sodium (VOLTAREN) 1 %, Apply 2 g topically 4 (four) times a day, Disp: 50 g, Rfl: 0    dicyclomine (BENTYL) 20 mg tablet, Take 1 tablet (20 mg total) by mouth every 6 (six) hours as needed (as needed), Disp: 120 tablet, Rfl: 2    Dyclonine-Glycerin (Cepacol Sore Throat Spray) 0 1-33 % LIQD, Apply 1 spray to the mouth or throat 3 (three) times a day as needed (sorethroat), Disp: 118 mL, Rfl: 5    Elastic Bandages & Supports (Neoprene Knee Brace) MISC, Apply right knee brace in morning; remove at night, Disp: 1 each, Rfl: 0    escitalopram (LEXAPRO) 20 mg tablet, Take 1 tablet (20 mg total) by mouth daily, Disp: 90 tablet, Rfl: 2    fluticasone (FLONASE) 50 mcg/act nasal spray, 1 spray into each nostril daily as needed for rhinitis (nasal congestion) At 8:00 AM, Disp: 18 2 mL, Rfl: 5    gabapentin (Neurontin) 100 mg capsule, Take one capsule daily at bedtime for one week, then increase to 2 capsules daily at bedtime, Disp: 60 capsule, Rfl: 4    GNP Antacid & Anti-Gas 200-200-20 MG/5ML suspension, TAKE 1 TBSP(15ML) BY MOUTH EVERY 4 HRS AS NEEDED FOR INDIG  OR HEARTBURN, Disp: 355 mL, Rfl: 0    GNP Milk of Magnesia 1200 MG/15ML oral suspension, 2 TBSP (30ML) BY MOUTH DAILY AS NEEDED IF NO BM IN 3 DAYS (CONSTIPATION) *PONCHO, Disp: 355 mL, Rfl: 0    GNP Stomach Relief Max St 525 MG/15ML SUSP, , Disp: , Rfl:     hydrOXYzine HCL (ATARAX) 10 mg tablet, Take 1 tablet (10 mg total) by mouth 3 (three) times a day, Disp: 30 tablet, Rfl: 3    ibuprofen (MOTRIN) 400 mg tablet, TAKE 1 TABLET BY MOUTH EVERY 8 HOURS AS NEEDED FOR MILD/MOD PAIN OR HEADACHES *PONCHO, Disp: 30 tablet, Rfl: 0    Incontinence Supply Disposable (Depend Underwear Large) MISC, Use 2 (two) times a day, Disp: 60 each, Rfl: 5    ketoconazole (NIZORAL) 2 % cream, Apply topically daily, Disp: 30 g, Rfl: 5    Konsyl Daily Fiber 28 3 %, , Disp: , Rfl:     lamoTRIgine (LaMICtal) 100 mg tablet, , Disp: , Rfl:     lamoTRIgine (LaMICtal) 25 mg tablet, Take 25 mg by mouth 2 (two) times a day  , Disp: , Rfl:     lidocaine (LMX) 4 % cream, Apply topically as needed for mild pain, Disp: 30 g, Rfl: 0    LORazepam (ATIVAN) 0 5 mg tablet, Take 0 25 mg by mouth 2 (two) times a day, Disp: , Rfl:     lubiprostone (Amitiza) 24 mcg capsule, Take 1 capsule (24 mcg total) by mouth daily with breakfast, Disp: 60 capsule, Rfl: 5    metoprolol tartrate (LOPRESSOR) 25 mg tablet, Take 1 tablet (25 mg total) by mouth 2 (two) times a day, Disp: 60 tablet, Rfl: 5    mineral oil-hydrophilic petrolatum (AQUAPHOR) ointment, Apply topically as needed for dry skin, Disp: 420 g, Rfl: 5    Multiple Vitamins-Minerals (CertaVite Senior/Antioxidant) TABS, Take 1 tablet by mouth daily, Disp: 31 tablet, Rfl: 5    naftifine (NAFTIN) 1 % cream, Apply topically daily, Disp: , Rfl:     norgestimate-ethinyl estradiol (ORTHO-CYCLEN) 0 25-35 MG-MCG per tablet, Take 1 tablet by mouth in the morning , Disp: , Rfl:     norgestimate-ethinyl estradiol (ORTHO-CYCLEN) 0 25-35 MG-MCG per tablet, Take 1 tablet by mouth daily, Disp: 28 tablet, Rfl: 1    nystatin (MYCOSTATIN) powder, Apply 1 application topically 4 (four) times a day, Disp: 45 g, Rfl: 5    nystatin-triamcinolone (MYCOLOG-II) cream, Apply topically 2 (two) times a day, Disp: 60 g, Rfl: 2    pantoprazole (PROTONIX) 20 mg tablet, Take 1 tablet (20 mg total) by mouth daily, Disp: 62 tablet, Rfl: 5    phenol (Chloraseptic) 1 4 % mucosal liquid, Apply 1 spray to the mouth or throat every 2 (two) hours as needed (for sore throat as needed), Disp: 236 mL, Rfl: 1    polyethylene glycol (MIRALAX) 17 g packet, Take one packet daily as needed for no bowel movement in 24 hours, Disp: 30 each, Rfl: 5    psyllium (METAMUCIL) 58 6 % packet, Take 1 packet by mouth in the morning , Disp: 30 packet, Rfl: 5    RA Sunscreen SPF50 LOTN, Apply 15 minutes before sun exposure and every 2 hours, Disp: 237 mL, Rfl: 0    senna-docusate sodium (Senexon-S) 8 6-50 mg per tablet, Take 1 tablet by mouth daily at 8am , Disp: 30 tablet, Rfl: 5    sodium chloride (OCEAN) 0 65 % nasal spray, 1 spray into each nostril as needed (three times daily as needed for nasal congestion), Disp: 60 mL, Rfl: 5    trospium chloride (SANCTURA) 20 mg tablet, Take 1 tablet (20 mg total) by mouth 2 (two) times a day, Disp: 180 tablet, Rfl: 3    zinc oxide (DESITIN) 13 % cream, Apply 1 application topically as needed (for perianal irritation), Disp: 454 g, Rfl: 5    Mouthwashes (Listerine Antiseptic) LIQD, Swish and spit 5 mL 2 (two) times a day, Disp: 1000 mL, Rfl: 5    [place] Sleepiness Scale  Sitting and reading: Moderate chance of dozing  Watching TV: Moderate chance of dozing  Sitting, inactive in a public place (e g  a theatre or a meeting): Moderate chance of dozing  As a passenger in a car for an hour without a break: Moderate chance of dozing  Lying down to rest in the afternoon when circumstances permit: Moderate chance of dozing  Sitting and talking to someone: Moderate chance of dozing  Sitting quietly after a lunch without alcohol: Moderate chance of dozing  In a car, while stopped for a few minutes in traffic: Would never doze  Total score: 14              Vitals:    07/22/22 1335   BP: 119/60   BP Location: Left arm   Patient Position: Sitting   Cuff Size: Large   Pulse: 99   Weight: 96 6 kg (213 lb)   Height: 4' 8" (1 422 m)       Body mass index is 47 75 kg/m²              EPWORTH SLEEPINESS SCORE  Total score: 14      Past History Since Last Sleep Center Visit:   She denies any significant changes to her health since her last visit  She states that she is very restless when she sleeps  She does not feel refreshed in the morning  She takes a nap daily, sometimes 2  Naps are 30-60 minutes and are refreshing  Results of the diagnostic sleep study, completed on January 27, 2022, are as follows:  IMPRESSION:   Ms Zendejas demonstrated a reduced sleep latency (2 8 minutes) but delayed REM latency  Staging also revealed reduced no stage 3 sleep and minimal REMsleep  Thiscan be related to hypersomnia or narcolepsy  If there is still daytime sleepiness, I recommend a diagnostic sleep study with MSLT  she demonstrated a few sleep related respiratory events (AHI - 0 6) which were predominantly in REM sleep (REM AHI - 12 6)  Therefore, minimal REM sleep may have underestimated the severity of her NADIYA  She did have frequent limb movements (PLM index - 16 3) which is consistent with periodic limb movement in sleep  I would check iron and magnesium levels  If daytime sleepiness persists, I would consider a trial of Neurontin, Mirapex, Requip or Lyrica  The review of systems and following portions of the patient's history were reviewed and updated as appropriate: allergies, current medications, past family history, past medical history, past social history, past surgical history, and problem list         OBJECTIVE    Physical Exam:     General Appearance:   Alert, cooperative, anxious, appears stated age, morbidly obese     Head:   Normocephalic, without obvious abnormality, atraumatic     Eyes:   PERRL, conjunctiva/corneas clear          Nose:  Nares normal, septum midline, no drainage or sinus tenderness     Neck:      Throat:      Supple, short and webbed, symmetrical, trachea midline, no adenopathy;  Thyroid: No enlargement, tenderness or nodules; no carotid bruit or JVD      Lips, teeth and gums normal; tongue normal size and  shape and midline in position; mucosa somewhat dry, uvula normal, tonsils not visualized, Mallampati class 4      Lungs:   Clear to auscultation bilaterally, respirations unlabored     Heart:   Regular rate and rhythm, S1 and S2 normal, no murmur, rub or gallop       Extremities:  Extremities normal, atraumatic, no cyanosis and bilateral LE edema, ambulates with a rollator walker       Skin:  Skin color, texture, turgor normal, no rashes or lesions       Neurologic:  No focal deficits noted       ASSESSMENT / PLAN    1  PLMD (periodic limb movement disorder)  gabapentin (Neurontin) 100 mg capsule    Iron Panel (Includes Ferritin, Iron Sat%, Iron, and TIBC)    DISCONTINUED: gabapentin (Neurontin) 100 mg capsule   2  Sleep disturbance  gabapentin (Neurontin) 100 mg capsule    DISCONTINUED: gabapentin (Neurontin) 100 mg capsule   3  Moderate intellectual disability     4  Obesity, Class III, BMI 40-49 9 (morbid obesity) ([address] )     5  Excessive daytime sleepiness  Iron Panel (Includes Ferritin, Iron Sat%, Iron, and TIBC)   6  History of anemia  Iron Panel (Includes Ferritin, Iron Sat%, Iron, and TIBC)         Counseling / Coordination of Care  Total clinic time spent today 60 minutes  Greater than 50% of total time was spent with the patient and / or family counseling and / or coordination of care  A description of the counseling / coordination of care:     Impressions, Diagnostic results, Prognosis, Instructions for management, Risks and benefits of treatment, Patient and family education, Risk factor reductions and Importance of compliance with treatment    Today we reviewed the results of the recent diagnostic sleep study  The AHI was less than 5, which is not diagnostic for sleep apnea  The lowest oxygen saturation during the study was as low as 82%, however it was only less than 90% for 7 minutes cummulatively    There was some mild snoring noted, which is not classified as sleep apnea  Treatment with CPAP is not indicated  It is recommended that lifestyle changes regarding diet and exercise be considered, which can help with symptoms of snoring  Breathe Right Nasal Strips can sometimes be beneficial with mild snoring  The use of a mandibular advancement device can also be helpful to improve snoring  Narcolepsy is not likely and testing will not be considered at this time, due to several medications she takes that would affect the results  Several medications she takes may be contributing to daytime sleepiness, including atarax and ativan  It is recommended that daytime sleepiness be discussed with psychiatry  Periodic limb movements were noted during the study  Atarax can worsen limb movements and should be avoided at bedtime  Lab testing has been ordered  She will begin gabapentin 100mg at bedtime to help with PLMs  This can be increased to 2 capsules, if needed  (low dose due to lamotrigine)  She will schedule a follow up visit in 4 months  She was advised to call if symptoms are worsening  The following instructions have been given to the patient today:    Patient Instructions   1  No sleep apnea was noted during the study  2  Begin gabapentin 100mg capsules at bedtime for one week, then increase to 2 capsules at bedtime to help with periodic limb movements of sleep  3  Discuss daytime sleepiness with psychiatry  Atarax and ativan can worsen daytime sleepiness  4  Atarax should not be given at bedtime  It can worsen restless legs  This would need to be discussed with her psychiatrist   5  Schedule follow up in 4 months  6  You can call if symptoms are not improving or worsening  Nursing Support:  When: Monday through Friday 7A-5PM except holidays  Where: Our direct line is [phone]  If you are having a true emergency please call 911    In the event that the line is busy or it is after hours please leave a voice message and we will return your call  Please speak clearly, leaving your full name, birth date, best number to reach you and the reason for your call  Medication refills: We will need the name of the medication, the dosage, the ordering provider, whether you get a 30 or 90 day refill, and the pharmacy name and address  Medications will be ordered by the provider only  Nurses cannot call in prescriptions  Please allow 7 days for medication refills  Physician requested updates: If your provider requested that you call with an update after starting medication, please be ready to provide us the medication and dosage, what time you take your medication, the time you attempt to fall asleep, time you fall asleep, when you wake up, and what time you get out of bed  Sleep Study Results: We will contact you with sleep study results and/or next steps after the physician has reviewed your testing        Anu Contreras, [address] [facility]

## 2022-07-25 ENCOUNTER — OFFICE VISIT (OUTPATIENT)
Dept: GASTROENTEROLOGY | Facility: MEDICAL CENTER | Age: 42
End: 2022-07-25
Payer: MEDICARE

## 2022-07-25 VITALS
SYSTOLIC BLOOD PRESSURE: 127 MMHG | TEMPERATURE: 97.8 F | BODY MASS INDEX: 47.22 KG/M2 | HEART RATE: 56 BPM | WEIGHT: 210.6 LBS | DIASTOLIC BLOOD PRESSURE: 72 MMHG

## 2022-07-25 DIAGNOSIS — K21.9 GASTROESOPHAGEAL REFLUX DISEASE WITHOUT ESOPHAGITIS: Primary | ICD-10-CM

## 2022-07-25 DIAGNOSIS — F45.8 FUNCTIONAL DYSPHAGIA: ICD-10-CM

## 2022-07-25 DIAGNOSIS — R10.9 ABDOMINAL PAIN, UNSPECIFIED ABDOMINAL LOCATION: ICD-10-CM

## 2022-07-25 PROCEDURE — 99213 OFFICE O/P EST LOW 20 MIN: CPT | Performed by: PHYSICIAN ASSISTANT

## 2022-07-25 NOTE — PROGRESS NOTES
[facility] - Outpatient Follow-up Note  Cher Dominguez 36 y o  female MRN: [id number]  Encounter: [id number]          ASSESSMENT AND PLAN:      1  Dysphagia, unspecified type:  2  Acid reflux: hx of dysphagia, fortunately improved  She has had barium swallow, EGD and manometry that have been normal  This is likely functional in nature  She is on protonix 20mg bid for her acid reflux which is controlling her symptoms well  Recent CT in the ER with small hiatal hernia  -continue protonix 20mg bid  -Pepcid PRN     2  Partial small bowel obstruction ([address] ): hx of recurrent partial SBO  The last time this occurred was in 10/2021 confirmed on CT scan  She did not tolerate NGT insertion and she was treated conservatively at that time  Fortunately she remains feeling well  She is having regular bowel movements on her bowel regimen with fiber and miralax and denies any abdominal pain  If this occurs again, consider small bowel CT enterography   -resolved  -continue current bowel regimen  ______________________________________________________________________    SUBJECTIVE:  Mana Gomez is a 37 yo female with pmh cerebral palsy, and acid reflux who is here for follow up of ER visit  She presented to the ED 7/18 with acid reflux  This improved  She had CT a/p for generalized abdominal pain and this was negative  She admits that her symptoms have resolved and she is back to her baseline  She admits to having daily BMs and her acid reflux is controlled  Fortunately her dysphagia has resolved  She has had extensive work up for this including EGD, manometry and barium swallow which were normal  She is currently on protonix 20mg bid and denies any symptoms a this time  She was most recently seen in the office as a hospital follow up for SBO  She went to the ED 10/8/21 with abdominal pain  She had a normal kub but CT showed partial small bowel obstruction   She could not tolerate NGT insertion and she was ultimately treated conservatively and discharged  She is on a bowel regimen with fiber and miralax, senna  REVIEW OF SYSTEMS IS OTHERWISE NEGATIVE  Historical Information   Past Medical History:   Diagnosis Date    ADD (attention deficit disorder)     Anxiety     Astigmatism     Brain lesion     Calcium deficiency     Cellulitis of foot, right 6/4/2018    Cerebral palsy (HCC)     Chronic otitis media     Constipation     Depression     Dysphagia     Esophagitis     Esotropia     GERD (gastroesophageal reflux disease)     Hiatal hernia     Hydrocephalus (HCC)     Impaired fasting glucose     Left nephrolithiasis 03/04/2019    Myopia     Oppositional defiant disorder     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HCC)     Seizures (HCC)     Sensorineural hearing loss     Sleep apnea     Status post ventriculoatrial shunt placement     Visual impairment      Past Surgical History:   Procedure Laterality Date    BREAST BIOPSY Left     X 2 (not sure of years)    CSF SHUNT      Creation of Ventriculo-Peritoneal CSF shunt ; Last Assessed:7/6/2016    EAR SURGERY      Last Assessed:7/6/2016    LEG SURGERY      due to CP     NOSE SURGERY      Last Assessed:7/6/2016    NE ESOPHAGOGASTRODUODENOSCOPY TRANSORAL DIAGNOSTIC N/A 5/9/2019    Procedure: ESOPHAGOGASTRODUODENOSCOPY (EGD) with biopsy;  Surgeon: Anastasia Perez MD;  Location: [facility];   Service: Gastroenterology    UPPER GASTROINTESTINAL ENDOSCOPY  05/2019     Social History   Social History     Substance and Sexual Activity   Alcohol Use Never     Social History     Substance and Sexual Activity   Drug Use Never     Social History     Tobacco Use   Smoking Status Never Smoker   Smokeless Tobacco Never Used     Family History   Problem Relation Age of Onset    Diabetes Mother     Colon cancer Father     No Known Problems Maternal Grandmother     No Known Problems Maternal Grandfather     No Known Problems Paternal Grandmother     No Known Problems Paternal Grandfather     Hypertension Neg Hx     Heart disease Neg Hx     Stroke Neg Hx     Thyroid disease Neg Hx        Meds/Allergies       Current Outpatient Medications:     acetaminophen (TYLENOL) 500 mg tablet    amitriptyline (ELAVIL) 10 mg tablet    ARIPiprazole (ABILIFY) 20 MG tablet    bacitracin topical ointment 500 units/g topical ointment    bismuth subsalicylate (PEPTO BISMOL) 524 mg/30 mL oral suspension    calcium carbonate (TUMS) 500 mg chewable tablet    carbamide peroxide (DEBROX) 6 5 % otic solution    D3-1000 25 MCG (1000 UT) tablet    Diclofenac Sodium (VOLTAREN) 1 %    dicyclomine (BENTYL) 20 mg tablet    Dyclonine-Glycerin (Cepacol Sore Throat Spray) 0 1-33 % LIQD    Elastic Bandages & Supports (Neoprene Knee Brace) MISC    escitalopram (LEXAPRO) 20 mg tablet    fluticasone (FLONASE) 50 mcg/act nasal spray    gabapentin (Neurontin) 100 mg capsule    GNP Antacid & Anti-Gas 200-200-20 MG/5ML suspension    GNP Milk of Magnesia 1200 MG/15ML oral suspension    GNP Stomach Relief Max St 525 MG/15ML SUSP    hydrOXYzine HCL (ATARAX) 10 mg tablet    ibuprofen (MOTRIN) 400 mg tablet    Incontinence Supply Disposable (Depend Underwear Large) MISC    ketoconazole (NIZORAL) 2 % cream    Konsyl Daily Fiber 28 3 %    lamoTRIgine (LaMICtal) 100 mg tablet    lamoTRIgine (LaMICtal) 25 mg tablet    lidocaine (LMX) 4 % cream    LORazepam (ATIVAN) 0 5 mg tablet    lubiprostone (Amitiza) 24 mcg capsule    metoprolol tartrate (LOPRESSOR) 25 mg tablet    mineral oil-hydrophilic petrolatum (AQUAPHOR) ointment    Multiple Vitamins-Minerals (CertaVite Senior/Antioxidant) TABS    naftifine (NAFTIN) 1 % cream    norgestimate-ethinyl estradiol (ORTHO-CYCLEN) 0 25-35 MG-MCG per tablet    norgestimate-ethinyl estradiol (ORTHO-CYCLEN) 0 25-35 MG-MCG per tablet    nystatin (MYCOSTATIN) powder    nystatin-triamcinolone (MYCOLOG-II) cream    pantoprazole (PROTONIX) 20 mg tablet    phenol (Chloraseptic) 1 4 % mucosal liquid    polyethylene glycol (MIRALAX) 17 g packet    psyllium (METAMUCIL) 58 6 % packet    RA Sunscreen SPF50 LOTN    senna-docusate sodium (Senexon-S) 8 6-50 mg per tablet    sodium chloride (OCEAN) 0 65 % nasal spray    trospium chloride (SANCTURA) 20 mg tablet    zinc oxide (DESITIN) 13 % cream    Mouthwashes (Listerine Antiseptic) LIQD    No Known Allergies        Objective     Blood pressure 127/72, pulse 56, temperature 97 8 °F (36 6 °C), weight 95 5 kg (210 lb 9 6 oz), not currently breastfeeding  Body mass index is 47 22 kg/m²  PHYSICAL EXAM:      General Appearance:   Alert, cooperative, no distress   HEENT:   Normocephalic, atraumatic, anicteric      Neck:  Supple, symmetrical, trachea midline   Lungs:   Clear to auscultation bilaterally; no rales, rhonchi or wheezing; respirations unlabored    Heart[de-identified]   Regular rate and rhythm; no murmur, rub, or gallop  Abdomen:   Soft, non-tender, non-distended; normal bowel sounds; no masses, no organomegaly    Genitalia:   Deferred    Rectal:   Deferred    Extremities:  No cyanosis, clubbing or edema        Skin:  No jaundice, rashes, or lesions          Lab Results:   No visits with results within 1 Day(s) from this visit     Latest known visit with results is:   Admission on 07/18/2022, Discharged on 07/19/2022   Component Date Value    WBC 07/18/2022 10 57 (A)    RBC 07/18/2022 4 36     Hemoglobin 07/18/2022 13 5     Hematocrit 07/18/2022 40 9     MCV 07/18/2022 94     MCH 07/18/2022 31 0     MCHC 07/18/2022 33 0     RDW 07/18/2022 13 8     MPV 07/18/2022 8 9     Platelets 28/08/6845 266     nRBC 07/18/2022 0     Neutrophils Relative 07/18/2022 66     Immat GRANS % 07/18/2022 1     Lymphocytes Relative 07/18/2022 25     Monocytes Relative 07/18/2022 6     Eosinophils Relative 07/18/2022 1     Basophils Relative 07/18/2022 1     Neutrophils Absolute 07/18/2022 7 05     Immature Grans Absolute 07/18/2022 0 05     Lymphocytes Absolute 07/18/2022 2 64     Monocytes Absolute 07/18/2022 0 67     Eosinophils Absolute 07/18/2022 0 11     Basophils Absolute 07/18/2022 0 05     Sodium 07/18/2022 141     Potassium 07/18/2022 3 9     Chloride 07/18/2022 103     CO2 07/18/2022 26     ANION GAP 07/18/2022 12     BUN 07/18/2022 10     Creatinine 07/18/2022 1 05     Glucose 07/18/2022 135     Calcium 07/18/2022 8 0 (A)    Corrected Calcium 07/18/2022 8 8     AST 07/18/2022 11     ALT 07/18/2022 17     Alkaline Phosphatase 07/18/2022 176 (A)    Total Protein 07/18/2022 7 5     Albumin 07/18/2022 3 0 (A)    Total Bilirubin 07/18/2022 0 16 (A)    eGFR 07/18/2022 65     Lipase 07/18/2022 113          Radiology Results:   CT abdomen pelvis wo contrast    Result Date: 7/19/2022  Narrative: CT ABDOMEN AND PELVIS WITHOUT IV CONTRAST INDICATION:   Abdominal pain, acute, nonlocalized generalized abdominal pain, h/o ventral hernia  COMPARISON:  CT abdomen/pelvis dated October 4, 2021  TECHNIQUE:  CT examination of the abdomen and pelvis was performed without intravenous contrast  This examination was performed without intravenous contrast in the context of the critical nationwide Omnipaque shortage  Axial, sagittal, and coronal 2D reformatted images were created from the source data and submitted for interpretation  Radiation dose length product (DLP) for this visit:  965 mGy-cm   This examination, like all CT scans performed in the [facility], was performed utilizing techniques to minimize radiation dose exposure, including the use of iterative reconstruction and automated exposure control  Enteric contrast was administered  FINDINGS: ABDOMEN LOWER CHEST:  Atelectatic changes are noted at the lung bases  LIVER/BILIARY TREE:  Unremarkable  GALLBLADDER:  No calcified gallstones  No pericholecystic inflammatory change  SPLEEN:  Unremarkable  PANCREAS:  Unremarkable   ADRENAL GLANDS: Unremarkable  KIDNEYS/URETERS:  Again noted is a markedly atrophic right kidney  A lobulated left renal upper pole cyst is reidentified measuring up to 5 6 cm  No hydronephrosis  STOMACH AND BOWEL:  There is a small sliding hiatal hernia  No evidence of large or small bowel obstruction  Oral contrast material is noted within the visualized distal esophagus suggestive of gastroesophageal reflux  APPENDIX:  A normal appendix was visualized  ABDOMINOPELVIC CAVITY:  No ascites  No pneumoperitoneum  No lymphadenopathy  VESSELS:  Unremarkable for patient's age  PELVIS REPRODUCTIVE ORGANS:  There is a 2 8 cm left ovarian cyst  URINARY BLADDER:  Unremarkable  ABDOMINAL WALL/INGUINAL REGIONS:  There is a tiny fat-containing umbilical hernia  OSSEOUS STRUCTURES:  No acute fracture or destructive osseous lesion  Impression: No acute intra-abdominal abnormality  No free air or free fluid  No evidence of large or small bowel obstruction  Small sliding hiatal hernia  Oral contrast material is noted within the visualized distal esophagus suggestive of gastroesophageal reflux  Markedly atrophic right kidney

## 2022-07-28 ENCOUNTER — TELEPHONE (OUTPATIENT)
Dept: SLEEP CENTER | Facility: CLINIC | Age: 42
End: 2022-07-28

## 2022-07-28 ENCOUNTER — OFFICE VISIT (OUTPATIENT)
Dept: DENTISTRY | Facility: CLINIC | Age: 42
End: 2022-07-28

## 2022-07-28 VITALS — SYSTOLIC BLOOD PRESSURE: 122 MMHG | DIASTOLIC BLOOD PRESSURE: 81 MMHG | HEART RATE: 125 BPM | TEMPERATURE: 98.1 F

## 2022-07-28 DIAGNOSIS — Z01.20 ENCOUNTER FOR DENTAL EXAMINATION: Primary | ICD-10-CM

## 2022-07-28 PROCEDURE — D1110 PROPHYLAXIS - ADULT: HCPCS | Performed by: DENTAL HYGIENIST

## 2022-07-28 PROCEDURE — D0120 PERIODIC ORAL EVALUATION - ESTABLISHED PATIENT: HCPCS | Performed by: DENTAL HYGIENIST

## 2022-07-28 NOTE — PROGRESS NOTES
Dental procedures in this visit     - PERIODIC ORAL EVALUATION - ESTABLISHED PATIENT (Completed)     Service provider: Reyna Winston DDS     Billing provider: Marina Correia DMD     - PROPHYLAXIS - ADULT (Completed)     Service provider: Lisa Scott [facility]     Billing provider: Marina Correia DMD     Subjective   Patient ID: Maria Elena Keen is a 36 y o  female  Chief Complaint   Patient presents with    Periodic Exam    Routine Oral Cleaning     Adult Prophy    ASA II  Pain:  0  Reviewed M/DH;   Special Needs , Cerebral palsy     Exams:  Periodic exam   Xrays:     none  Type of Treatment:  Adult Prophy - used Ultrasonic and Hand scaling,  Polished, Flossed  Reviewed OHI  Brush:  2X/day and Floss 1X/day  Recommended Listerine  / ACT  mouth rinses  Recommended Act mouth rinse  EO/OCS Exams:  No significant findings  IO: No significant findings  Oral Hygiene: Poor  Plaque:  Light  / Moderate    Calculus:  Light  / Moderate   Bleeding:  Light  / Moderate   Gingiva:    Red  / Spongy /  Inflamed  Stain:  None  Perio Charting:  Periocharting was not completed  Try to probe at next recall  Pt has very strong tongue and gags when you try to go back too far  Perio Findings:   Moderate gingivitis  Caries Findings:  3 - O, 29 - MO, 31 - O  Caries Risk Assessment:   Moderate caries risk    Treatment Plan:  Updated    Dr  Exam:  Dr Clinton Mcdaniels  Referral:  No referral given  NV1:  Rest 31 - O - 60 min  NV2:  6mrc w/ Bws - 50 min

## 2022-07-28 NOTE — TELEPHONE ENCOUNTER
Returned call from [email or web address] (caregiver for Kalie Duffy). Anastacia Lopez reports that Valentina Horner is not taking the gabapentin and sleeping very well without it. Anastacia Lopez is requesting that gabapentin (Neurontin) 100 mg capsule be discontinued.

## 2022-07-29 NOTE — PROGRESS NOTES
Caregiver called to report that the patient is sleeping well and would like to have gabapentin discontinued  Gabapentin has been discontinued

## 2022-08-01 DIAGNOSIS — Z30.09 UNWANTED FERTILITY: Primary | ICD-10-CM

## 2022-08-02 RX ORDER — NORGESTIMATE AND ETHINYL ESTRADIOL 0.25-0.035
KIT ORAL
Qty: 28 TABLET | Refills: 3 | Status: SHIPPED | OUTPATIENT
Start: 2022-08-02

## 2022-08-04 ENCOUNTER — PATIENT MESSAGE (OUTPATIENT)
Dept: GASTROENTEROLOGY | Facility: MEDICAL CENTER | Age: 42
End: 2022-08-04

## 2022-08-04 DIAGNOSIS — K59.00 CONSTIPATION, UNSPECIFIED CONSTIPATION TYPE: ICD-10-CM

## 2022-08-04 RX ORDER — POLYETHYLENE GLYCOL 3350 17 G/17G
POWDER, FOR SOLUTION ORAL
Qty: 30 EACH | Refills: 5 | Status: SHIPPED | OUTPATIENT
Start: 2022-08-04 | End: 2022-08-16 | Stop reason: SDUPTHER

## 2022-08-15 ENCOUNTER — APPOINTMENT (OUTPATIENT)
Dept: LAB | Facility: CLINIC | Age: 42
End: 2022-08-15
Payer: MEDICARE

## 2022-08-15 DIAGNOSIS — Z86.2 HISTORY OF ANEMIA: ICD-10-CM

## 2022-08-15 DIAGNOSIS — G47.19 EXCESSIVE DAYTIME SLEEPINESS: ICD-10-CM

## 2022-08-15 DIAGNOSIS — G47.61 PLMD (PERIODIC LIMB MOVEMENT DISORDER): ICD-10-CM

## 2022-08-15 LAB
FERRITIN SERPL-MCNC: 24 NG/ML (ref 8–388)
IRON SATN MFR SERPL: 31 % (ref 15–50)
IRON SERPL-MCNC: 108 UG/DL (ref 50–170)
TIBC SERPL-MCNC: 349 UG/DL (ref 250–450)

## 2022-08-15 PROCEDURE — 83550 IRON BINDING TEST: CPT

## 2022-08-15 PROCEDURE — 36415 COLL VENOUS BLD VENIPUNCTURE: CPT

## 2022-08-15 PROCEDURE — 82728 ASSAY OF FERRITIN: CPT

## 2022-08-15 PROCEDURE — 83540 ASSAY OF IRON: CPT

## 2022-08-16 ENCOUNTER — OFFICE VISIT (OUTPATIENT)
Dept: FAMILY MEDICINE CLINIC | Facility: CLINIC | Age: 42
End: 2022-08-16

## 2022-08-16 ENCOUNTER — TELEPHONE (OUTPATIENT)
Dept: SLEEP CENTER | Facility: CLINIC | Age: 42
End: 2022-08-16

## 2022-08-16 VITALS
HEART RATE: 113 BPM | SYSTOLIC BLOOD PRESSURE: 120 MMHG | BODY MASS INDEX: 47.92 KG/M2 | OXYGEN SATURATION: 94 % | TEMPERATURE: 98.1 F | HEIGHT: 56 IN | DIASTOLIC BLOOD PRESSURE: 80 MMHG | WEIGHT: 213 LBS | RESPIRATION RATE: 21 BRPM

## 2022-08-16 DIAGNOSIS — K59.04 CHRONIC IDIOPATHIC CONSTIPATION: ICD-10-CM

## 2022-08-16 DIAGNOSIS — R32 URINARY INCONTINENCE, UNSPECIFIED TYPE: ICD-10-CM

## 2022-08-16 DIAGNOSIS — T14.8XXA WOUND, OPEN: ICD-10-CM

## 2022-08-16 DIAGNOSIS — M79.672 LEFT FOOT PAIN: ICD-10-CM

## 2022-08-16 DIAGNOSIS — K59.00 CONSTIPATION, UNSPECIFIED CONSTIPATION TYPE: ICD-10-CM

## 2022-08-16 DIAGNOSIS — S00.81XD EXCORIATION OF FACE, SUBSEQUENT ENCOUNTER: ICD-10-CM

## 2022-08-16 DIAGNOSIS — K21.9 GASTROESOPHAGEAL REFLUX DISEASE WITHOUT ESOPHAGITIS: ICD-10-CM

## 2022-08-16 DIAGNOSIS — L29.0 PERIANAL IRRITATION: ICD-10-CM

## 2022-08-16 DIAGNOSIS — K21.9 GASTROESOPHAGEAL REFLUX DISEASE WITHOUT ESOPHAGITIS: Primary | ICD-10-CM

## 2022-08-16 DIAGNOSIS — J30.89 NON-SEASONAL ALLERGIC RHINITIS, UNSPECIFIED TRIGGER: ICD-10-CM

## 2022-08-16 DIAGNOSIS — B37.31 VULVAL CANDIDIASIS: ICD-10-CM

## 2022-08-16 DIAGNOSIS — B37.2 CANDIDIASIS OF SKIN: ICD-10-CM

## 2022-08-16 DIAGNOSIS — R00.2 PALPITATIONS: ICD-10-CM

## 2022-08-16 DIAGNOSIS — F71 MODERATE INTELLECTUAL DISABILITY: ICD-10-CM

## 2022-08-16 PROCEDURE — 99213 OFFICE O/P EST LOW 20 MIN: CPT | Performed by: FAMILY MEDICINE

## 2022-08-16 RX ORDER — LUBIPROSTONE 24 UG/1
24 CAPSULE ORAL
Qty: 60 CAPSULE | Refills: 5 | Status: SHIPPED | OUTPATIENT
Start: 2022-08-16 | End: 2023-03-01 | Stop reason: SDUPTHER

## 2022-08-16 RX ORDER — AMOXICILLIN 250 MG
1 CAPSULE ORAL DAILY
Qty: 30 TABLET | Refills: 5 | Status: SHIPPED | OUTPATIENT
Start: 2022-08-16 | End: 2023-03-01 | Stop reason: SDUPTHER

## 2022-08-16 RX ORDER — POLYETHYLENE GLYCOL 3350 17 G/17G
POWDER, FOR SOLUTION ORAL
Qty: 30 EACH | Refills: 5 | Status: SHIPPED | OUTPATIENT
Start: 2022-08-16 | End: 2023-03-01 | Stop reason: SDUPTHER

## 2022-08-16 RX ORDER — PANTOPRAZOLE SODIUM 20 MG/1
20 TABLET, DELAYED RELEASE ORAL DAILY
Qty: 62 TABLET | Refills: 5 | Status: SHIPPED | OUTPATIENT
Start: 2022-08-16 | End: 2023-03-01 | Stop reason: SDUPTHER

## 2022-08-16 RX ORDER — TROSPIUM CHLORIDE 20 MG/1
20 TABLET, FILM COATED ORAL 2 TIMES DAILY
Qty: 180 TABLET | Refills: 3 | Status: SHIPPED | OUTPATIENT
Start: 2022-08-16 | End: 2023-03-01 | Stop reason: SDUPTHER

## 2022-08-16 RX ORDER — KETOCONAZOLE 20 MG/G
CREAM TOPICAL DAILY
Qty: 30 G | Refills: 5 | Status: SHIPPED | OUTPATIENT
Start: 2022-08-16 | End: 2023-03-01 | Stop reason: SDUPTHER

## 2022-08-16 RX ORDER — NAFTIFINE HYDROCHLORIDE 1 MG/G
CREAM TOPICAL DAILY
Qty: 30 G | Refills: 2 | Status: SHIPPED | OUTPATIENT
Start: 2022-08-16 | End: 2022-08-30 | Stop reason: ALTCHOICE

## 2022-08-16 NOTE — PROGRESS NOTES
OFFICE VISIT NOTE - [facility]  FLORENTINO 36 y o  (:1980) female MRN: [id number]  Unit/Bed#:  Encounter: [id number]      Assessment/Plan:  Gastroesophageal reflux disease without esophagitis  Abdominal pain and reflux sx now resolved  Seen by GI 3 weeks ago  F/u as needed  Left foot pain  Elizabeth's injury to her left second toe involving bruising and pain has resolved as evidenced by subjective resolution of symptoms,   normal x ray studies, and normal physical exam    No further follow up necessary  Subjective:     Nathen Lopez is a 36year old female with a history of cerebral palsy  presented today for a follow up regarding pain and bruising in her left toe  She was seen in an urgent care on  and an x ray revealed no abnormalities  Per patient and caregivers, her pain and bruising have completely resolved  Nathne Lopez has no complaints of pain in her toes or elsewhere in the body  She also denies abdominal pain, which was reported at another recent ED visit on   She was bright on approach and sitting comfortably throughout the interview  Caregivers also requested refills for patient's medications, which were then sent to pt's pharmacy  Review of Systems   Constitutional: Negative for fatigue and fever  HENT: Negative for sore throat  Respiratory: Negative for cough, shortness of breath and wheezing  Cardiovascular: Negative for chest pain, palpitations and leg swelling  Gastrointestinal: Negative for abdominal pain, diarrhea, nausea and vomiting  Genitourinary: Negative for dysuria and pelvic pain  Skin: Negative for rash  Neurological: Negative for weakness and headaches         Objective:    /80 (BP Location: Left arm, Patient Position: Sitting, Cuff Size: Large)   Pulse (!) 113   Temp 98 1 °F (36 7 °C) (Temporal)   Resp 21   Ht 4' 8" (1 422 m)   Wt 96 6 kg (213 lb)   SpO2 94%   BMI 47 75 kg/m²      Physical Exam  Constitutional:       General: She is not in acute distress  Appearance: She is obese  She is not ill-appearing  HENT:      Head: Normocephalic and atraumatic  Right Ear: External ear normal       Left Ear: External ear normal       Nose: Nose normal    Eyes:      Conjunctiva/sclera: Conjunctivae normal    Cardiovascular:      Rate and Rhythm: Normal rate and regular rhythm  Heart sounds: Normal heart sounds  Pulmonary:      Effort: Pulmonary effort is normal  No respiratory distress  Breath sounds: Normal breath sounds  No wheezing  Abdominal:      Palpations: Abdomen is soft  Tenderness: There is no abdominal tenderness  Musculoskeletal:         General: No swelling, tenderness or deformity  Normal range of motion  Right lower leg: No edema  Left lower leg: No edema  Comments: Elizabeth's left second toe has no swelling or discoloration  On palpation of the MTP, PIP, DIP, and surrounding soft tissue, no pain was elicited  Also, no pain was elicited on flexion or extension of the digit  Skin:     General: Skin is warm  Findings: No rash     Neurological:      Mental Status: She is alert       :    Nataliia Kohler MD  PGY-3 Family Medicine  08/16/22

## 2022-08-16 NOTE — TELEPHONE ENCOUNTER
----- Message from Giselle Higuera,  Ken Falcon sent at 8/15/2022  3:12 PM EDT -----  Ferritin level is in normal range, but at 24, could be contributing to periodic limb movements in sleep and reports of restlessness  The group  reported that the patient appears to be sleeping well  A trial of oral iron with vitamin C could be consider, however, it could worsen constipation  Iron infusions could be considered if symptoms worsen

## 2022-08-16 NOTE — ASSESSMENT & PLAN NOTE
Elizabeth's injury to her left second toe involving bruising and pain has resolved as evidenced by subjective resolution of symptoms,   normal x ray studies, and normal physical exam    No further follow up necessary

## 2022-08-17 ENCOUNTER — TELEPHONE (OUTPATIENT)
Dept: FAMILY MEDICINE CLINIC | Facility: CLINIC | Age: 42
End: 2022-08-17

## 2022-08-17 NOTE — TELEPHONE ENCOUNTER
Pt needs comprehensive hearing eval not the audiogram I placed the order for your signature please cancel order for audiogram  Thank you Staging Info: By selecting yes to the question above you will include information on AJCC 8 tumor staging in your Mohs note. Information on tumor staging will be automatically added for SCCs on the head and neck. AJCC 8 includes tumor size, tumor depth, perineural involvement and bone invasion.

## 2022-08-17 NOTE — TELEPHONE ENCOUNTER
Visit Summary Sheet    [facility]  [address]  [place] [phone]    Scanned to chart

## 2022-08-18 DIAGNOSIS — B37.2 CANDIDIASIS OF SKIN: ICD-10-CM

## 2022-08-18 NOTE — TELEPHONE ENCOUNTER
Pharmacy requesting NYSTOP 1000,000 units  30 mg    sig: Apply topically under breast 4 times daily at 3z-05v-8p-8p    Could not find medication in chart

## 2022-08-19 RX ORDER — NYSTATIN 100000 [USP'U]/G
1 POWDER TOPICAL 4 TIMES DAILY
Qty: 45 G | Refills: 5 | Status: SHIPPED | OUTPATIENT
Start: 2022-08-19

## 2022-08-23 NOTE — PROGRESS NOTES
Progress Note    Name:  Yancy Borges  :  1980  Age:  36 y o  Date of Evaluation: 22     Earmolds have arrived  Right SN: [id number]  Left SN: [id number]  Warranty: 10/29/2022    [employer] sent to [facility] to schedule HAP with Colin Ramirez    Clinical Audiologist

## 2022-08-26 ENCOUNTER — TELEPHONE (OUTPATIENT)
Dept: FAMILY MEDICINE CLINIC | Facility: CLINIC | Age: 42
End: 2022-08-26

## 2022-08-26 NOTE — TELEPHONE ENCOUNTER
Fax from [employer] placed in BLUE clinical folder stating "Please discontinue the Naftin cream because is not covered by insurance  Mycostatin was written instead"

## 2022-09-08 ENCOUNTER — DOCUMENTATION (OUTPATIENT)
Dept: AUDIOLOGY | Age: 42
End: 2022-09-08

## 2022-09-17 DIAGNOSIS — R32 URINARY INCONTINENCE, UNSPECIFIED TYPE: ICD-10-CM

## 2022-09-17 DIAGNOSIS — R13.10 DYSPHAGIA, UNSPECIFIED TYPE: ICD-10-CM

## 2022-09-17 DIAGNOSIS — J06.9 VIRAL URI: ICD-10-CM

## 2022-09-17 DIAGNOSIS — G80.9 CEREBRAL PALSY, UNSPECIFIED TYPE (HCC): ICD-10-CM

## 2022-09-17 RX ORDER — DICYCLOMINE HCL 20 MG
TABLET ORAL
Qty: 120 TABLET | Refills: 0 | Status: SHIPPED | OUTPATIENT
Start: 2022-09-17

## 2022-09-18 RX ORDER — DIAPER,BRIEF,ADULT, DISPOSABLE
EACH MISCELLANEOUS
Qty: 60 EACH | Refills: 0 | Status: SHIPPED | OUTPATIENT
Start: 2022-09-18 | End: 2022-09-19 | Stop reason: SDUPTHER

## 2022-09-19 DIAGNOSIS — Z87.2 H/O SUNBURN: ICD-10-CM

## 2022-09-19 DIAGNOSIS — R32 URINARY INCONTINENCE, UNSPECIFIED TYPE: ICD-10-CM

## 2022-09-19 DIAGNOSIS — M25.551 RIGHT HIP PAIN: ICD-10-CM

## 2022-09-19 DIAGNOSIS — R52 PAIN: ICD-10-CM

## 2022-09-19 DIAGNOSIS — K59.00 CONSTIPATION, UNSPECIFIED CONSTIPATION TYPE: ICD-10-CM

## 2022-09-19 DIAGNOSIS — G80.9 CEREBRAL PALSY, UNSPECIFIED TYPE (HCC): ICD-10-CM

## 2022-09-19 RX ORDER — IBUPROFEN 400 MG/1
TABLET ORAL
Qty: 30 TABLET | Refills: 0 | Status: SHIPPED | OUTPATIENT
Start: 2022-09-19

## 2022-09-19 RX ORDER — MAGNESIUM HYDROXIDE 1200 MG/15ML
SUSPENSION ORAL
Qty: 355 ML | Refills: 0 | Status: SHIPPED | OUTPATIENT
Start: 2022-09-19

## 2022-09-19 RX ORDER — LIDOCAINE 40 MG/G
CREAM TOPICAL AS NEEDED
Qty: 30 G | Refills: 0 | Status: SHIPPED | OUTPATIENT
Start: 2022-09-19

## 2022-09-19 RX ORDER — DIAPER,BRIEF,ADULT, DISPOSABLE
EACH MISCELLANEOUS
Qty: 60 EACH | Refills: 0 | Status: SHIPPED | OUTPATIENT
Start: 2022-09-19

## 2022-09-19 RX ORDER — PHENOL 1.4 G/100ML
SPRAY ORAL
Qty: 177 ML | Refills: 0 | Status: SHIPPED | OUTPATIENT
Start: 2022-09-19

## 2022-09-20 ENCOUNTER — OFFICE VISIT (OUTPATIENT)
Dept: AUDIOLOGY | Age: 42
End: 2022-09-20

## 2022-09-20 DIAGNOSIS — H90.3 SENSORY HEARING LOSS, BILATERAL: Primary | ICD-10-CM

## 2022-09-22 NOTE — PROGRESS NOTES
Progress Note    Name:  Jaci Loyola  :  1980  Age:  36 y o  Date of Evaluation: 22     Patient was seen today to be fit with new hearing aids, however, the earmolds that were sent were the wrong style and not compatible with the patients hearing aids  The correct earmolds were ordered and patient will return to be fit on 10/4       Indigo Moody , CCC-A  Clinical Audiologist

## 2022-10-03 ENCOUNTER — OFFICE VISIT (OUTPATIENT)
Dept: OBGYN CLINIC | Facility: CLINIC | Age: 42
End: 2022-10-03
Payer: MEDICARE

## 2022-10-03 VITALS
SYSTOLIC BLOOD PRESSURE: 122 MMHG | HEIGHT: 56 IN | BODY MASS INDEX: 47.92 KG/M2 | WEIGHT: 213 LBS | DIASTOLIC BLOOD PRESSURE: 78 MMHG

## 2022-10-03 DIAGNOSIS — S00.81XD EXCORIATION OF FACE, SUBSEQUENT ENCOUNTER: ICD-10-CM

## 2022-10-03 DIAGNOSIS — Z87.2 H/O SUNBURN: ICD-10-CM

## 2022-10-03 DIAGNOSIS — K21.00 GASTROESOPHAGEAL REFLUX DISEASE WITH ESOPHAGITIS: ICD-10-CM

## 2022-10-03 DIAGNOSIS — M47.816 FACET ARTHROPATHY, LUMBAR: ICD-10-CM

## 2022-10-03 DIAGNOSIS — G89.29 CHRONIC RIGHT-SIDED LOW BACK PAIN WITHOUT SCIATICA: Primary | ICD-10-CM

## 2022-10-03 DIAGNOSIS — R13.10 DYSPHAGIA, UNSPECIFIED TYPE: ICD-10-CM

## 2022-10-03 DIAGNOSIS — M54.50 CHRONIC RIGHT-SIDED LOW BACK PAIN WITHOUT SCIATICA: Primary | ICD-10-CM

## 2022-10-03 DIAGNOSIS — T14.8XXA WOUND, OPEN: ICD-10-CM

## 2022-10-03 DIAGNOSIS — M16.11 PRIMARY OSTEOARTHRITIS OF ONE HIP, RIGHT: ICD-10-CM

## 2022-10-03 DIAGNOSIS — M25.551 PAIN IN RIGHT HIP: ICD-10-CM

## 2022-10-03 DIAGNOSIS — R06.02 SOB (SHORTNESS OF BREATH): ICD-10-CM

## 2022-10-03 PROCEDURE — 99214 OFFICE O/P EST MOD 30 MIN: CPT | Performed by: EMERGENCY MEDICINE

## 2022-10-03 NOTE — PROGRESS NOTES
Assessment/Plan:    Diagnoses and all orders for this visit:    Chronic right-sided low back pain without sciatica  -     XR femur 2 vw right; Future  -     Ambulatory Referral to Physical Therapy; Future    Facet arthropathy, lumbar  -     Ambulatory Referral to Physical Therapy; Future    Pain in right hip  -     XR femur 2 vw right; Future  -     Ambulatory Referral to Physical Therapy; Future    Primary osteoarthritis of one hip, right  -     Ambulatory Referral to Physical Therapy; Future    Continue walker PRN  Start PT  OTC meds  If no improvement t/c MRI L spine vs CSI right hip    Return in about 6 weeks (around 11/14/2022)  Chief Complaint:   No chief complaint on file  Subjective:   Patient ID: Mary Jane García is a 36 y o  female  Patient presents for Right hip pain which she localized to Right LB with radiation of pain down right leg  She states this has been ongoing for a month however she does have a hx of Xrays L spine and right hip in 3/2022 and had attended 2 sessions of PT  Review of Systems    The following portions of the patient's chart were reviewed and updated as appropriate:    Allergy:  No Known Allergies      Past Medical History:   Diagnosis Date    ADD (attention deficit disorder)     Anxiety     Astigmatism     Brain lesion     Calcium deficiency     Cellulitis of foot, right 6/4/2018    Cerebral palsy (HCC)     Chronic otitis media     Constipation     Depression     Dysphagia     Esophagitis     Esotropia     GERD (gastroesophageal reflux disease)     Hiatal hernia     Hydrocephalus (HCC)     Impaired fasting glucose     Left nephrolithiasis 03/04/2019    Myopia     Oppositional defiant disorder     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HCC)     Seizures (HCC)     Sensorineural hearing loss     Sleep apnea     Status post ventriculoatrial shunt placement     Visual impairment        Past Surgical History:   Procedure Laterality Date    BREAST BIOPSY Left     X 2 (not sure of years)    CSF SHUNT      Creation of Ventriculo-Peritoneal CSF shunt ; Last Assessed:7/6/2016    EAR SURGERY      Last Assessed:7/6/2016    LEG SURGERY      due to CP     NOSE SURGERY      Last Assessed:7/6/2016    ME ESOPHAGOGASTRODUODENOSCOPY TRANSORAL DIAGNOSTIC N/A 5/9/2019    Procedure: ESOPHAGOGASTRODUODENOSCOPY (EGD) with biopsy;  Surgeon: Edmar Reilly MD;  Location: [facility];   Service: Gastroenterology    UPPER GASTROINTESTINAL ENDOSCOPY  05/2019       Social History     Socioeconomic History    Marital status: Single     Spouse name: Not on file    Number of children: Not on file    Years of education: Not on file    Highest education level: Not on file   Occupational History    Not on file   Tobacco Use    Smoking status: Never Smoker    Smokeless tobacco: Never Used   Vaping Use    Vaping Use: Never used   Substance and Sexual Activity    Alcohol use: Never    Drug use: Never    Sexual activity: Never     Birth control/protection: OCP   Other Topics Concern    Not on file   Social History Narrative    Always uses seat belt    Lives in group home     Social Determinants of Health     Financial Resource Strain: Not on file   Food Insecurity: Not on file   Transportation Needs: Not on file   Physical Activity: Not on file   Stress: Not on file   Social Connections: Not on file   Intimate Partner Violence: Not on file   Housing Stability: Not on file       Family History   Problem Relation Age of Onset    Diabetes Mother     Colon cancer Father     No Known Problems Maternal Grandmother     No Known Problems Maternal Grandfather     No Known Problems Paternal Grandmother     No Known Problems Paternal Grandfather     Hypertension Neg Hx     Heart disease Neg Hx     Stroke Neg Hx     Thyroid disease Neg Hx        Medications:    Current Outpatient Medications:     acetaminophen (TYLENOL) 500 mg tablet, Take 1 tablet (500 mg total) by mouth every 6 (six) hours as needed for mild pain, Disp: 30 tablet, Rfl: 5    amitriptyline (ELAVIL) 10 mg tablet, Take 10 mg by mouth daily at bedtime, Disp: , Rfl:     ARIPiprazole (ABILIFY) 20 MG tablet, Take 1 tablet (20 mg total) by mouth daily at bedtime, Disp: 90 tablet, Rfl: 2    bacitracin topical ointment 500 units/g topical ointment, Cleanse site on nose with soap and water followed by bacitracin BID until healed then p r n , Disp: 15 g, Rfl: 2    bismuth subsalicylate (PEPTO BISMOL) 524 mg/30 mL oral suspension, Take 15 mL (262 mg total) by mouth every 6 (six) hours as needed for indigestion, Disp: 360 mL, Rfl: 5    calcium carbonate (TUMS) 500 mg chewable tablet, Chew 1 tablet (500 mg total) 3 (three) times a day as needed for heartburn, Disp: 30 tablet, Rfl: 5    carbamide peroxide (DEBROX) 6 5 % otic solution, Administer 5 drops into the left ear 2 (two) times a day Use 1 week prior to ENT appointment  Also can use 4 gtts twice weekly (Tuesday and Friday for example) to each ear for prevention  Keep hearing aid out x 10 minutes after ear drops administered  , Disp: 15 mL, Rfl: 0    D3-1000 25 MCG (1000 UT) tablet, Take 2 tablets (2,000 Units total) by mouth daily, Disp: 62 tablet, Rfl: 5    Diclofenac Sodium (VOLTAREN) 1 %, Apply 2 g topically 4 (four) times a day, Disp: 50 g, Rfl: 0    dicyclomine (BENTYL) 20 mg tablet, TAKE 1 TABLET BY MOUTH EVERY 6 HOURS AS NEEDED (DYSPHAGIA), Disp: 120 tablet, Rfl: 0    Dyclonine-Glycerin (Cepacol Sore Throat Spray) 0 1-33 % LIQD, Apply 1 spray to the mouth or throat 3 (three) times a day as needed (sorethroat), Disp: 118 mL, Rfl: 5    Elastic Bandages & Supports (Neoprene Knee Brace) MISC, Apply right knee brace in morning; remove at night, Disp: 1 each, Rfl: 0    escitalopram (LEXAPRO) 20 mg tablet, Take 1 tablet (20 mg total) by mouth daily, Disp: 90 tablet, Rfl: 2    Estarylla 0 25-35 MG-MCG per tablet, TAKE 1 TAB BY MOUTH DAILY AT 8AM (BIRTH CONTROL), Disp: 28 tablet, Rfl: 3    fluticasone (FLONASE) 50 mcg/act nasal spray, 1 spray into each nostril daily as needed for rhinitis (nasal congestion) At 8:00 AM, Disp: 18 2 mL, Rfl: 5    GNP Antacid & Anti-Gas 553-396-71 MG/5ML suspension, TAKE 1 TBSP(15ML) BY MOUTH EVERY 4 HRS AS NEEDED FOR INDIG  OR HEARTBURN, Disp: 355 mL, Rfl: 0    GNP Sore Throat Spray 1 4 % mucosal liquid, APPLY 1 SPRAY TO MOUTH OR THROAT EVERY 2 HRS AS NEEDED FOR SORE THROAT, Disp: 177 mL, Rfl: 0    GNP Stomach Relief Max St 525 MG/15ML SUSP, , Disp: , Rfl:     hydrOXYzine HCL (ATARAX) 10 mg tablet, Take 1 tablet (10 mg total) by mouth 3 (three) times a day, Disp: 30 tablet, Rfl: 3    ibuprofen (MOTRIN) 400 mg tablet, TAKE 1 TABLET BY MOUTH EVERY 8 HOURS AS NEEDED FOR MILD/MOD PAIN OR HEADACHES, Disp: 30 tablet, Rfl: 0    Incontinence Supply Disposable (Wings Choice Plus Adult Briefs) MISC, Use as directed (R39 81), Disp: 60 each, Rfl: 0    ketoconazole (NIZORAL) 2 % cream, Apply topically daily, Disp: 30 g, Rfl: 5    Konsyl Daily Fiber 28 3 %, , Disp: , Rfl:     lamoTRIgine (LaMICtal) 100 mg tablet, , Disp: , Rfl:     lamoTRIgine (LaMICtal) 25 mg tablet, Take 25 mg by mouth 2 (two) times a day  , Disp: , Rfl:     lidocaine (LMX) 4 % cream, Apply topically as needed for mild pain, Disp: 30 g, Rfl: 0    LORazepam (ATIVAN) 0 5 mg tablet, Take 0 25 mg by mouth 2 (two) times a day, Disp: , Rfl:     lubiprostone (Amitiza) 24 mcg capsule, Take 1 capsule (24 mcg total) by mouth daily with breakfast, Disp: 60 capsule, Rfl: 5    magnesium hydroxide (GNP Milk of Magnesia) 400 mg/5 mL oral suspension, 2 TBSP (30ML) BY MOUTH DAILY AS NEEDED IF NO BM IN 3 DAYS (CONSTIPATION), Disp: 355 mL, Rfl: 0    metoprolol tartrate (LOPRESSOR) 25 mg tablet, Take 1 tablet (25 mg total) by mouth 2 (two) times a day, Disp: 60 tablet, Rfl: 5    mineral oil-hydrophilic petrolatum (AQUAPHOR) ointment, Apply topically as needed for dry skin, Disp: 420 g, Rfl: 5   Mouthwashes (Listerine Antiseptic) LIQD, Swish and spit 5 mL 2 (two) times a day, Disp: 1000 mL, Rfl: 5    Multiple Vitamins-Minerals (CertaVite Senior/Antioxidant) TABS, Take 1 tablet by mouth daily, Disp: 31 tablet, Rfl: 5    norgestimate-ethinyl estradiol (ORTHO-CYCLEN) 0 25-35 MG-MCG per tablet, Take 1 tablet by mouth in the morning , Disp: , Rfl:     norgestimate-ethinyl estradiol (ORTHO-CYCLEN) 0 25-35 MG-MCG per tablet, Take 1 tablet by mouth daily, Disp: 28 tablet, Rfl: 1    nystatin (MYCOSTATIN) powder, Apply 1 application topically 4 (four) times a day, Disp: 45 g, Rfl: 5    nystatin-triamcinolone (MYCOLOG-II) cream, Apply topically 2 (two) times a day, Disp: 60 g, Rfl: 2    pantoprazole (PROTONIX) 20 mg tablet, Take 1 tablet (20 mg total) by mouth daily, Disp: 62 tablet, Rfl: 5    polyethylene glycol (MIRALAX) 17 g packet, Take one packet daily as needed for no bowel movement in 24 hours, Disp: 30 each, Rfl: 5    psyllium (METAMUCIL) 58 6 % packet, Take 1 packet by mouth in the morning , Disp: 30 packet, Rfl: 5    RA Sunscreen SPF50 LOTN, Apply 15 minutes before sun exposure and every 2 hours, Disp: 237 mL, Rfl: 0    senna-docusate sodium (Senexon-S) 8 6-50 mg per tablet, Take 1 tablet by mouth daily at 8am , Disp: 30 tablet, Rfl: 5    sodium chloride (OCEAN) 0 65 % nasal spray, 1 spray into each nostril as needed (three times daily as needed for nasal congestion), Disp: 60 mL, Rfl: 5    trospium chloride (SANCTURA) 20 mg tablet, Take 1 tablet (20 mg total) by mouth 2 (two) times a day, Disp: 180 tablet, Rfl: 3    zinc oxide (DESITIN) 13 % cream, Apply 1 application topically as needed (for perianal irritation), Disp: 454 g, Rfl: 5    Patient Active Problem List   Diagnosis    Leg swelling    Generalized anxiety disorder    Cerebral palsy (HCC)    Acute pain of right knee    Constipation    Severe episode of recurrent major depressive disorder, with psychotic features ([facility] [address] )    Dizziness    Functional dysphagia    Hearing impairment    Acquired renal cyst of left kidney    Low back pain    Moderate intellectual disability    Seasonal allergies    Varicose veins with pain    Medicare annual wellness visit, subsequent    Hematuria    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[address] )    Ambulatory dysfunction    Bruise    Bilateral impacted cerumen    TMJ (temporomandibular joint disorder)    Gait disturbance    Bilateral thoracic back pain    Cerumen impaction    Skin picking habit    Hiatal hernia    SOB (shortness of breath)    Dysuria    Left nephrolithiasis    Non-seasonal allergic rhinitis    Viral URI    Self-injurious behavior    Gastroesophageal reflux disease without esophagitis    Intercostal pain    Intertrigo    Nausea and vomiting    Laceration of lip    Sore throat    Impetigo    Pain of right calf    Elevated blood pressure reading in office without diagnosis of hypertension    Fall    Sleep disturbance    Exposure to COVID-19 virus    Encounter for follow-up    Acute non intractable tension-type headache    Dry skin    Candidiasis of skin    Heat intolerance    Wound, open    Palpitations    Left foot pain    Frequent falls    UTI (urinary tract infection)    Urine frequency    Urinary incontinence    Obesity, Class III, BMI 40-49 9 (morbid obesity) (HCC)    At risk for sleep apnea    Atherosclerosis of arteries of extremities (HCC)    Partial small bowel obstruction (HCC)    COVID-19    Obstructive sleep apnea    Hyperhidrosis    Grief reaction    PLMD (periodic limb movement disorder)    Excessive daytime sleepiness    History of anemia       Objective:  /78   Ht 4' 8" (1 422 m)   Wt 96 6 kg (213 lb)   BMI 47 75 kg/m²     Back Exam     Tests   Straight leg raise right: negative    Comments:  Pain of the right thigh with passive rotation hip as well as active hip flexion  Tenderness of the right anterior thigh    Exam limited Physical Exam  Musculoskeletal:      Lumbar back: Negative right straight leg raise test            Neurologic Exam    Procedures    I have personally reviewed pertinent films in PACS  and I have personally reviewed the written report of the pertinent studies     Xrays Right femur obtained today:      Prior Xrays Right hip and L spine

## 2022-10-03 NOTE — PROGRESS NOTES
Progress Note    Name:  Jason Silva  :  1980  Age:  36 y o  Date of Evaluation: 10/03/22     Earmolds have arrived    Right SN: [id number]  Left SN: [id number]  Warranty: 10/29/2022    Indigo Brown   Clinical Audiologist

## 2022-10-03 NOTE — TELEPHONE ENCOUNTER
Darian Nurse from [employer] called for medication refil for   -Antacid Liquid   -Aquaphor  -Asper Cream  -Sunscreen  -Bacitracin  Please send medications to   [address] [facility]        Thanks

## 2022-10-03 NOTE — LETTER
October 3, 2022     Patient: Agustín Alonso  YOB: 1980  Date of Visit: 10/3/2022      To Whom it May Concern:    Gretchen Morales is under my professional care  Sudha Sam was seen in my office on 10/3/2022  Will start Physical therapy  You may use Advil (ibuprofen) 600mg every 6 hours or at least twice per day OR Aleve (naproxen) 250-500mg every 12 hours as needed for pain and inflammation  You may also take Tylenol 500mg every 4-6 hours as needed OR max 1,000mg per dose up to 3 times per day for a total of 3,000mg per day  Check with your primary care physician to see if these medications are safe to take and to make sure they do not interfere with your other medications and medical issues  If you have any questions or concerns, please don't hesitate to call           Sincerely,          Eduardo Sky MD        CC: No Recipients

## 2022-10-04 ENCOUNTER — OFFICE VISIT (OUTPATIENT)
Dept: AUDIOLOGY | Age: 42
End: 2022-10-04

## 2022-10-04 DIAGNOSIS — H90.3 SENSORY HEARING LOSS, BILATERAL: Primary | ICD-10-CM

## 2022-10-04 NOTE — PROGRESS NOTES
Hearing Aid Fitting    Name:  Tara Oconnor  :  1980  Age:  36 y o  Date of Evaluation: 10/04/22     Tara Oconnor is being see today to be fit with new hearing aids through [employer]  Patient is fit with Oticon Zircon 2 Mini [facility] hearing aid(s)  Right serial number [id number]  Left serial number [id number]  Warranty date: 25 (Loss/Damage and repair)  Ear mold Battery  [facility]   Right FS  [id number] - -   Left FS   [id number] - -     The hearing aid(s) were adjusted based on the patient's most recent audiogram and patient comfort  Patient noted good sound quality, and was happy with the fitting  Care and cleaning of the hearing aids was reviewed  Domes, filters, and batteries and user manual were reviewed with the patient  Insertion and removal of the hearing aids was done  The patient practiced insertion and removal of the devices in the office, they demonstrated good ability to manipulate the hearing aids  Telephone use was reviewed with the patient  The patient expressed satisfaction with the hearing aids  Recommendation:   Follow up in two weeks         Indigo Vo , CCC-A  Clinical Audiologist

## 2022-10-05 RX ORDER — GINSENG 100 MG
CAPSULE ORAL
Qty: 15 G | Refills: 2 | Status: SHIPPED | OUTPATIENT
Start: 2022-10-05

## 2022-10-05 RX ORDER — MAGNESIUM HYDROXIDE/ALUMINUM HYDROXICE/SIMETHICONE 120; 1200; 1200 MG/30ML; MG/30ML; MG/30ML
15 SUSPENSION ORAL EVERY 4 HOURS PRN
Qty: 355 ML | Refills: 0 | Status: SHIPPED | OUTPATIENT
Start: 2022-10-05

## 2022-10-06 DIAGNOSIS — G80.1 SPASTIC DIPLEGIC CEREBRAL PALSY (HCC): ICD-10-CM

## 2022-10-07 RX ORDER — MULTIVIT-MIN/IRON/FOLIC ACID/K 18-600-40
2000 CAPSULE ORAL DAILY
Qty: 62 TABLET | Refills: 5 | Status: SHIPPED | OUTPATIENT
Start: 2022-10-07

## 2022-10-12 PROBLEM — N39.0 UTI (URINARY TRACT INFECTION): Status: RESOLVED | Noted: 2021-06-23 | Resolved: 2022-10-12

## 2022-10-12 PROBLEM — S01.511A LACERATION OF LIP: Status: RESOLVED | Noted: 2019-09-23 | Resolved: 2022-10-12

## 2022-10-19 ENCOUNTER — OFFICE VISIT (OUTPATIENT)
Dept: URGENT CARE | Age: 42
End: 2022-10-19
Payer: MEDICARE

## 2022-10-19 VITALS
WEIGHT: 220 LBS | HEART RATE: 86 BPM | TEMPERATURE: 97.8 F | HEIGHT: 57 IN | BODY MASS INDEX: 47.46 KG/M2 | OXYGEN SATURATION: 98 % | RESPIRATION RATE: 18 BRPM

## 2022-10-19 DIAGNOSIS — M79.18 BUTTOCK PAIN: Primary | ICD-10-CM

## 2022-10-19 PROCEDURE — G0463 HOSPITAL OUTPT CLINIC VISIT: HCPCS

## 2022-10-19 PROCEDURE — 99213 OFFICE O/P EST LOW 20 MIN: CPT

## 2022-10-19 NOTE — PROGRESS NOTES
[facility]        NAME: Luis Magallon is a 36 y o  female  : 1980    MRN: [id number]  DATE: 2022  TIME: 3:44 PM    Assessment and Plan   Buttock pain [M79 18]  1  Buttock pain       Patient presents with complaints of buttock pain after sustaining a fall onto her buttocks a little while ago  Was attempting to get into car and fell  Uses walker baseline and has baseline gait dysfunction  Has been ambulating at baseline per caretaker that is present  No bruising, swelling at current time  Patient states she does not feel she needs an xray  Denies numbness, tingling, changes to normal physical condition  Did not hit head  Fall was witnessed  Patient Instructions       Follow up with PCP as needed    Chief Complaint     Chief Complaint   Patient presents with   • Chelsea Velasco on buttocks outdoor         History of Present Illness       Patient presents with complaints of buttock pain after sustaining a fall onto her buttocks a little while ago  Was attempting to get into car and fell  Uses walker baseline and has baseline gait dysfunction  Has been ambulating at baseline per caretaker that is present  No bruising, swelling at current time  Patient states she does not feel she needs an xray  Denies numbness, tingling, changes to normal physical condition  Did not hit head  Fall was witnessed  Review of Systems   Review of Systems   Constitutional: Negative for chills and fever  HENT: Negative for congestion, ear pain, postnasal drip, sinus pain and sore throat  Eyes: Negative for pain and itching  Respiratory: Negative for cough, shortness of breath and wheezing  Cardiovascular: Negative for chest pain and palpitations  Gastrointestinal: Negative for abdominal pain, constipation, diarrhea, nausea and vomiting  Genitourinary: Negative for difficulty urinating and hematuria  Musculoskeletal: Positive for gait problem and myalgias  Negative for arthralgias  Baseline gait dysfunction   Skin: Negative for rash  Neurological: Negative for dizziness, light-headedness and headaches  Psychiatric/Behavioral: Negative for agitation and sleep disturbance  The patient is not nervous/anxious  Current Medications       Current Outpatient Medications:   •  acetaminophen (TYLENOL) 500 mg tablet, Take 1 tablet (500 mg total) by mouth every 6 (six) hours as needed for mild pain, Disp: 30 tablet, Rfl: 5  •  aluminum-magnesium hydroxide-simethicone (GNP Antacid & Anti-Gas) 3574-3571-522 mg/30 mL suspension, Take 15 mL by mouth every 4 (four) hours as needed for indigestion or heartburn, Disp: 355 mL, Rfl: 0  •  amitriptyline (ELAVIL) 10 mg tablet, Take 10 mg by mouth daily at bedtime, Disp: , Rfl:   •  ARIPiprazole (ABILIFY) 20 MG tablet, Take 1 tablet (20 mg total) by mouth daily at bedtime, Disp: 90 tablet, Rfl: 2  •  bacitracin topical ointment 500 units/g topical ointment, Cleanse site on nose with soap and water followed by bacitracin BID until healed then p r n , Disp: 15 g, Rfl: 2  •  bismuth subsalicylate (PEPTO BISMOL) 524 mg/30 mL oral suspension, Take 15 mL (262 mg total) by mouth every 6 (six) hours as needed for indigestion, Disp: 360 mL, Rfl: 5  •  calcium carbonate (TUMS) 500 mg chewable tablet, Chew 1 tablet (500 mg total) 3 (three) times a day as needed for heartburn, Disp: 30 tablet, Rfl: 5  •  carbamide peroxide (DEBROX) 6 5 % otic solution, Administer 5 drops into the left ear 2 (two) times a day Use 1 week prior to ENT appointment  Also can use 4 gtts twice weekly (Tuesday and Friday for example) to each ear for prevention  Keep hearing aid out x 10 minutes after ear drops administered  , Disp: 15 mL, Rfl: 1  •  D3-1000 25 MCG (1000 UT) tablet, Take 2 tablets (2,000 Units total) by mouth daily, Disp: 62 tablet, Rfl: 5  •  Diclofenac Sodium (VOLTAREN) 1 %, Apply 2 g topically 4 (four) times a day, Disp: 50 g, Rfl: 0  •  dicyclomine (BENTYL) 20 mg tablet, TAKE 1 TABLET BY MOUTH EVERY 6 HOURS AS NEEDED (DYSPHAGIA), Disp: 120 tablet, Rfl: 0  •  Dyclonine-Glycerin (Cepacol Sore Throat Spray) 0 1-33 % LIQD, Apply 1 spray to the mouth or throat 3 (three) times a day as needed (sorethroat), Disp: 118 mL, Rfl: 5  •  Elastic Bandages & Supports (Neoprene Knee Brace) [facility] [place] [place], Apply right knee brace in morning; remove at night, Disp: 1 each, Rfl: 0  •  escitalopram (LEXAPRO) 20 mg tablet, Take 1 tablet (20 mg total) by mouth daily, Disp: 90 tablet, Rfl: 2  •  Estarylla 0 25-35 MG-MCG per tablet, TAKE 1 TAB BY MOUTH DAILY AT 8AM (BIRTH CONTROL), Disp: 28 tablet, Rfl: 3  •  fluticasone (FLONASE) 50 mcg/act nasal spray, 1 spray into each nostril daily as needed for rhinitis (nasal congestion) At 8:00 AM, Disp: 18 2 mL, Rfl: 5  •  GNP Sore Throat Spray 1 4 % mucosal liquid, APPLY 1 SPRAY TO MOUTH OR THROAT EVERY 2 HRS AS NEEDED FOR SORE THROAT, Disp: 177 mL, Rfl: 0  •  GNP Stomach Relief Max St 525 MG/15ML SUSP, , Disp: , Rfl:   •  hydrOXYzine HCL (ATARAX) 10 mg tablet, Take 1 tablet (10 mg total) by mouth 3 (three) times a day, Disp: 30 tablet, Rfl: 3  •  ibuprofen (MOTRIN) 400 mg tablet, TAKE 1 TABLET BY MOUTH EVERY 8 HOURS AS NEEDED FOR MILD/MOD PAIN OR HEADACHES, Disp: 30 tablet, Rfl: 0  •  Incontinence Supply Disposable (Wings Choice Plus Adult Briefs) MISC, Use as directed (R39 81), Disp: 60 each, Rfl: 0  •  ketoconazole (NIZORAL) 2 % cream, Apply topically daily, Disp: 30 g, Rfl: 5  •  Konsyl Daily Fiber 28 3 %, , Disp: , Rfl:   •  lamoTRIgine (LaMICtal) 100 mg tablet, , Disp: , Rfl:   •  lamoTRIgine (LaMICtal) 25 mg tablet, Take 25 mg by mouth 2 (two) times a day  , Disp: , Rfl:   •  lidocaine (LMX) 4 % cream, Apply topically as needed for mild pain, Disp: 30 g, Rfl: 0  •  LORazepam (ATIVAN) 0 5 mg tablet, Take 0 25 mg by mouth 2 (two) times a day, Disp: , Rfl:   •  lubiprostone (Amitiza) 24 mcg capsule, Take 1 capsule (24 mcg total) by mouth daily with breakfast, Disp: 60 capsule, Rfl: 5  •  magnesium hydroxide (GNP Milk of Magnesia) 400 mg/5 mL oral suspension, 2 TBSP (30ML) BY MOUTH DAILY AS NEEDED IF NO BM IN 3 DAYS (CONSTIPATION), Disp: 355 mL, Rfl: 0  •  metoprolol tartrate (LOPRESSOR) 25 mg tablet, Take 1 tablet (25 mg total) by mouth 2 (two) times a day, Disp: 60 tablet, Rfl: 5  •  mineral oil-hydrophilic petrolatum (AQUAPHOR) ointment, Apply topically as needed for dry skin, Disp: 420 g, Rfl: 5  •  Multiple Vitamins-Minerals (CertaVite Senior/Antioxidant) TABS, Take 1 tablet by mouth daily, Disp: 31 tablet, Rfl: 5  •  norgestimate-ethinyl estradiol (ORTHO-CYCLEN) 0 25-35 MG-MCG per tablet, Take 1 tablet by mouth in the morning , Disp: , Rfl:   •  norgestimate-ethinyl estradiol (ORTHO-CYCLEN) 0 25-35 MG-MCG per tablet, Take 1 tablet by mouth daily, Disp: 28 tablet, Rfl: 1  •  nystatin (MYCOSTATIN) powder, Apply 1 application topically 4 (four) times a day, Disp: 45 g, Rfl: 5  •  nystatin-triamcinolone (MYCOLOG-II) cream, Apply topically 2 (two) times a day, Disp: 60 g, Rfl: 2  •  pantoprazole (PROTONIX) 20 mg tablet, Take 1 tablet (20 mg total) by mouth daily, Disp: 62 tablet, Rfl: 5  •  polyethylene glycol (MIRALAX) 17 g packet, Take one packet daily as needed for no bowel movement in 24 hours, Disp: 30 each, Rfl: 5  •  psyllium (METAMUCIL) 58 6 % packet, Take 1 packet by mouth in the morning , Disp: 30 packet, Rfl: 5  •  RA Sunscreen SPF50 LOTN, Apply 15 minutes before sun exposure and every 2 hours, Disp: 237 mL, Rfl: 0  •  senna-docusate sodium (Senexon-S) 8 6-50 mg per tablet, Take 1 tablet by mouth daily at 8am , Disp: 30 tablet, Rfl: 5  •  sodium chloride (OCEAN) 0 65 % nasal spray, 1 spray into each nostril as needed (three times daily as needed for nasal congestion), Disp: 60 mL, Rfl: 5  •  trospium chloride (SANCTURA) 20 mg tablet, Take 1 tablet (20 mg total) by mouth 2 (two) times a day, Disp: 180 tablet, Rfl: 3  •  zinc oxide (DESITIN) 13 % cream, Apply 1 application topically as needed (for perianal irritation), Disp: 454 g, Rfl: 5  •  Mouthwashes (Listerine Antiseptic) LIQD, Swish and spit 5 mL 2 (two) times a day, Disp: 1000 mL, Rfl: 5    Current Allergies     Allergies as of 10/19/2022   • (No Known Allergies)            The following portions of the patient's history were reviewed and updated as appropriate: allergies, current medications, past family history, past medical history, past social history, past surgical history and problem list      Past Medical History:   Diagnosis Date   • ADD (attention deficit disorder)    • Anxiety    • Astigmatism    • Brain lesion    • Calcium deficiency    • Cellulitis of foot, right 6/4/2018   • Cerebral palsy (HCC)    • Chronic otitis media    • Constipation    • Depression    • Dysphagia    • Esophagitis    • Esotropia    • GERD (gastroesophageal reflux disease)    • Hiatal hernia    • Hydrocephalus (HCC)    • Impaired fasting glucose    • Left nephrolithiasis 03/04/2019   • Myopia    • Oppositional defiant disorder    • 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HCC)    • Seizures (HCC)    • Sensorineural hearing loss    • Sleep apnea    • Status post ventriculoatrial shunt placement    • Visual impairment        Past Surgical History:   Procedure Laterality Date   • BREAST BIOPSY Left     X 2 (not sure of years)   • CSF SHUNT      Creation of Ventriculo-Peritoneal CSF shunt ; Last Assessed:7/6/2016   • EAR SURGERY      Last Assessed:7/6/2016   • LEG SURGERY      due to CP    • NOSE SURGERY      Last Assessed:7/6/2016   • SD ESOPHAGOGASTRODUODENOSCOPY TRANSORAL DIAGNOSTIC N/A 5/9/2019    Procedure: ESOPHAGOGASTRODUODENOSCOPY (EGD) with biopsy;  Surgeon: Latia Rao MD;  Location: [facility];   Service: Gastroenterology   • UPPER GASTROINTESTINAL ENDOSCOPY  05/2019       Family History   Problem Relation Age of Onset   • Diabetes Mother    • Colon cancer Father    • No Known Problems Maternal Grandmother    • No Known Problems Maternal Grandfather    • No Known Problems Paternal Grandmother    • No Known Problems Paternal Grandfather    • Hypertension Neg Hx    • Heart disease Neg Hx    • Stroke Neg Hx    • Thyroid disease Neg Hx          Medications have been verified  Objective   Pulse 86   Temp 97 8 °F (36 6 °C)   Resp 18   Ht 4' 9" (1 448 m)   Wt 99 8 kg (220 lb)   SpO2 98%   BMI 47 61 kg/m²   No LMP recorded  Physical Exam     Physical Exam  Vitals reviewed  Constitutional:       General: She is not in acute distress  Appearance: Normal appearance  She is not ill-appearing  HENT:      Head: Normocephalic and atraumatic  Eyes:      Extraocular Movements: Extraocular movements intact  Conjunctiva/sclera: Conjunctivae normal    Musculoskeletal:         General: Tenderness and signs of injury present  Cervical back: Normal range of motion  Skin:     General: Skin is warm  Findings: No bruising  Neurological:      General: No focal deficit present  Mental Status: She is alert     Psychiatric:         Mood and Affect: Mood normal          Behavior: Behavior normal          Judgment: Judgment normal

## 2022-10-21 ENCOUNTER — OFFICE VISIT (OUTPATIENT)
Dept: AUDIOLOGY | Age: 42
End: 2022-10-21

## 2022-10-21 DIAGNOSIS — H90.3 SENSORY HEARING LOSS, BILATERAL: Primary | ICD-10-CM

## 2022-10-21 NOTE — PROGRESS NOTES
Hearing Aid Visit:    Name:  Kari Robb  :  1980  Age:  36 y o  Date of Evaluation: 10/21/22     Kari Robb is being seen for a hearing aid visit  Patient is fit with [employer] Zircon 2 Mini [facility] hearing aid(s)  Right serial number [id number]  Left serial number [id number]  Warranty date: 25 (Loss/Damage and repair)  Patient reports the left hearing aid sounds weak  Action:  Cleaned hearing aids and earmolds  Caregiver noted patient often only wears the right hearing aid  Increased overall gain by 2 clicks  Recommendations: Follow up PRN        Indigo Roca , CCC-A  Clinical Audiologist

## 2022-10-27 ENCOUNTER — TELEPHONE (OUTPATIENT)
Dept: FAMILY MEDICINE CLINIC | Facility: CLINIC | Age: 42
End: 2022-10-27

## 2022-10-27 ENCOUNTER — OFFICE VISIT (OUTPATIENT)
Dept: FAMILY MEDICINE CLINIC | Facility: CLINIC | Age: 42
End: 2022-10-27

## 2022-10-27 VITALS
HEART RATE: 101 BPM | TEMPERATURE: 98.3 F | BODY MASS INDEX: 48.9 KG/M2 | RESPIRATION RATE: 16 BRPM | DIASTOLIC BLOOD PRESSURE: 82 MMHG | HEIGHT: 56 IN | OXYGEN SATURATION: 97 % | WEIGHT: 217.4 LBS | SYSTOLIC BLOOD PRESSURE: 131 MMHG

## 2022-10-27 DIAGNOSIS — M79.18 GLUTEAL PAIN: Primary | ICD-10-CM

## 2022-10-27 PROCEDURE — 99213 OFFICE O/P EST LOW 20 MIN: CPT | Performed by: FAMILY MEDICINE

## 2022-10-27 RX ORDER — NAPROXEN 500 MG/1
500 TABLET ORAL 2 TIMES DAILY WITH MEALS
Qty: 14 TABLET | Refills: 0 | Status: SHIPPED | OUTPATIENT
Start: 2022-10-27 | End: 2022-10-27 | Stop reason: ALTCHOICE

## 2022-10-27 NOTE — PROGRESS NOTES
Name: Kayy Waddell      : 1980      MRN: [id number]  Encounter Provider: Jennifer Mckenna MD  Encounter Date: 10/27/2022   Encounter department: [address] [place] [address]  Gluteal pain  Assessment & Plan:  Rere Saha 2 wks ago while stepping out of car and hit buttock, no head strike or LOC, gradually improving but still with pain  Most likely gluteal sprain, has not used any analgesic  Can use OTC Tylenol and ibuprofen for pain           Subjective      Violetta Markham is a 50-year-old group home resident presenting today after a witnessed fall 2 weeks ago  She was getting out of the car, lost her balance after stepping out after getting her feet down and then landed on her buttocks  She has been slightly improving since then but still has pain  No bruising, swelling, head strike or LOC  Uses walker baseline  She went to urgent care on 10/19, no labs or interventions  Has not been using any analgesics  Patient is at baseline per caretaker  Review of Systems   Constitutional: Negative for fatigue and fever  Respiratory: Negative for cough, shortness of breath and wheezing  Cardiovascular: Negative for chest pain  Gastrointestinal: Negative for abdominal pain, diarrhea, nausea and vomiting  Neurological: Negative for weakness and headaches         Current Outpatient Medications on File Prior to Visit   Medication Sig   • acetaminophen (TYLENOL) 500 mg tablet Take 1 tablet (500 mg total) by mouth every 6 (six) hours as needed for mild pain   • aluminum-magnesium hydroxide-simethicone (GNP Antacid & Anti-Gas) 0030-3448-784 mg/30 mL suspension Take 15 mL by mouth every 4 (four) hours as needed for indigestion or heartburn   • amitriptyline (ELAVIL) 10 mg tablet Take 10 mg by mouth daily at bedtime   • ARIPiprazole (ABILIFY) 20 MG tablet Take 1 tablet (20 mg total) by mouth daily at bedtime   • bacitracin topical ointment 500 units/g topical ointment Cleanse site on nose with soap and water followed by bacitracin BID until healed then p r n    • bismuth subsalicylate (PEPTO BISMOL) 524 mg/30 mL oral suspension Take 15 mL (262 mg total) by mouth every 6 (six) hours as needed for indigestion   • calcium carbonate (TUMS) 500 mg chewable tablet Chew 1 tablet (500 mg total) 3 (three) times a day as needed for heartburn   • carbamide peroxide (DEBROX) 6 5 % otic solution Administer 5 drops into the left ear 2 (two) times a day Use 1 week prior to ENT appointment  Also can use 4 gtts twice weekly (Tuesday and Friday for example) to each ear for prevention  Keep hearing aid out x 10 minutes after ear drops administered     • D3-1000 25 MCG (1000 UT) tablet Take 2 tablets (2,000 Units total) by mouth daily   • Diclofenac Sodium (VOLTAREN) 1 % Apply 2 g topically 4 (four) times a day   • dicyclomine (BENTYL) 20 mg tablet TAKE 1 TABLET BY MOUTH EVERY 6 HOURS AS NEEDED (DYSPHAGIA)   • Dyclonine-Glycerin (Cepacol Sore Throat Spray) 0 1-33 % LIQD Apply 1 spray to the mouth or throat 3 (three) times a day as needed (sorethroat)   • Elastic Bandages & Supports (Neoprene Knee Brace) MISC Apply right knee brace in morning; remove at night   • escitalopram (LEXAPRO) 20 mg tablet Take 1 tablet (20 mg total) by mouth daily   • Estarylla 0 25-35 MG-MCG per tablet TAKE 1 TAB BY MOUTH DAILY AT 8AM (BIRTH CONTROL)   • fluticasone (FLONASE) 50 mcg/act nasal spray 1 spray into each nostril daily as needed for rhinitis (nasal congestion) At 8:00 AM   • GNP Sore Throat Spray 1 4 % mucosal liquid APPLY 1 SPRAY TO MOUTH OR THROAT EVERY 2 HRS AS NEEDED FOR SORE THROAT   • GNP Stomach Relief Max St 525 MG/15ML SUSP    • hydrOXYzine HCL (ATARAX) 10 mg tablet Take 1 tablet (10 mg total) by mouth 3 (three) times a day   • ibuprofen (MOTRIN) 400 mg tablet TAKE 1 TABLET BY MOUTH EVERY 8 HOURS AS NEEDED FOR MILD/MOD PAIN OR HEADACHES   • Incontinence Supply Disposable (Wings Choice Plus Adult Briefs) MISC Use as directed (R39 59)   • ketoconazole (NIZORAL) 2 % cream Apply topically daily   • Konsyl Daily Fiber 28 3 %    • lamoTRIgine (LaMICtal) 100 mg tablet    • lamoTRIgine (LaMICtal) 25 mg tablet Take 25 mg by mouth 2 (two) times a day     • lidocaine (LMX) 4 % cream Apply topically as needed for mild pain   • LORazepam (ATIVAN) 0 5 mg tablet Take 0 25 mg by mouth 2 (two) times a day   • lubiprostone (Amitiza) 24 mcg capsule Take 1 capsule (24 mcg total) by mouth daily with breakfast   • magnesium hydroxide (GNP Milk of Magnesia) 400 mg/5 mL oral suspension 2 TBSP (30ML) BY MOUTH DAILY AS NEEDED IF NO BM IN 3 DAYS (CONSTIPATION)   • metoprolol tartrate (LOPRESSOR) 25 mg tablet Take 1 tablet (25 mg total) by mouth 2 (two) times a day   • mineral oil-hydrophilic petrolatum (AQUAPHOR) ointment Apply topically as needed for dry skin   • Multiple Vitamins-Minerals (CertaVite Senior/Antioxidant) TABS Take 1 tablet by mouth daily   • norgestimate-ethinyl estradiol (ORTHO-CYCLEN) 0 25-35 MG-MCG per tablet Take 1 tablet by mouth in the morning  • norgestimate-ethinyl estradiol (ORTHO-CYCLEN) 0 25-35 MG-MCG per tablet Take 1 tablet by mouth daily   • nystatin (MYCOSTATIN) powder Apply 1 application topically 4 (four) times a day   • nystatin-triamcinolone (MYCOLOG-II) cream Apply topically 2 (two) times a day   • pantoprazole (PROTONIX) 20 mg tablet Take 1 tablet (20 mg total) by mouth daily   • polyethylene glycol (MIRALAX) 17 g packet Take one packet daily as needed for no bowel movement in 24 hours   • psyllium (METAMUCIL) 58 6 % packet Take 1 packet by mouth in the morning     • RA Sunscreen SPF50 LOTN Apply 15 minutes before sun exposure and every 2 hours   • senna-docusate sodium (Senexon-S) 8 6-50 mg per tablet Take 1 tablet by mouth daily at 8am    • sodium chloride (OCEAN) 0 65 % nasal spray 1 spray into each nostril as needed (three times daily as needed for nasal congestion)   • trospium chloride (SANCTURA) 20 mg tablet Take 1 tablet (20 mg total) by mouth 2 (two) times a day   • zinc oxide (DESITIN) 13 % cream Apply 1 application topically as needed (for perianal irritation)   • Mouthwashes (Listerine Antiseptic) LIQD Swish and spit 5 mL 2 (two) times a day       Objective     /82 (BP Location: Left arm, Patient Position: Sitting, Cuff Size: Large)   Pulse 101   Temp 98 3 °F (36 8 °C) (Temporal)   Resp 16   Ht 4' 8" (1 422 m)   Wt 98 6 kg (217 lb 6 4 oz)   SpO2 97%   BMI 48 74 kg/m²     Physical Exam  Constitutional:       General: She is not in acute distress  Appearance: Normal appearance  She is obese  She is not ill-appearing, toxic-appearing or diaphoretic  Comments: Walker at baseline   HENT:      Right Ear: External ear normal       Left Ear: External ear normal       Mouth/Throat:      Mouth: Mucous membranes are moist    Eyes:      Conjunctiva/sclera: Conjunctivae normal    Pulmonary:      Effort: Pulmonary effort is normal  No respiratory distress  Musculoskeletal:         General: Tenderness (tenderness on medial aspect of gluteal region  Normal gait) present  No swelling  Right lower leg: No edema  Left lower leg: No edema  Neurological:      Mental Status: She is alert            Merlin Jacobsen, MD

## 2022-10-27 NOTE — TELEPHONE ENCOUNTER
Folder (To be completed by) -Dr Eliel Austin     Name of Form - Medical Exam [place]     Color folder (blue    Form to be given back to patient    Patient was made aware of the 7-10 business day form policy

## 2022-10-27 NOTE — ASSESSMENT & PLAN NOTE
Jannie Rojo 2 wks ago while stepping out of car and hit buttock, no head strike or LOC, gradually improving but still with pain  Most likely gluteal sprain, has not used any analgesic  Can use OTC Tylenol and ibuprofen for pain

## 2022-11-08 ENCOUNTER — EVALUATION (OUTPATIENT)
Dept: PHYSICAL THERAPY | Facility: REHABILITATION | Age: 42
End: 2022-11-08

## 2022-11-08 DIAGNOSIS — M16.11 PRIMARY OSTEOARTHRITIS OF ONE HIP, RIGHT: ICD-10-CM

## 2022-11-08 DIAGNOSIS — M47.816 FACET ARTHROPATHY, LUMBAR: ICD-10-CM

## 2022-11-08 DIAGNOSIS — G89.29 CHRONIC RIGHT-SIDED LOW BACK PAIN WITHOUT SCIATICA: ICD-10-CM

## 2022-11-08 DIAGNOSIS — M25.551 PAIN IN RIGHT HIP: ICD-10-CM

## 2022-11-08 DIAGNOSIS — M54.50 CHRONIC RIGHT-SIDED LOW BACK PAIN WITHOUT SCIATICA: ICD-10-CM

## 2022-11-08 NOTE — PROGRESS NOTES
PT Evaluation     Today's date: 2022  Patient name: Noreen Granados  : 1980  MRN: [id number]  Referring provider: Nasra De Los Santos MD  Dx:   Encounter Diagnosis     ICD-10-CM    1  Chronic right-sided low back pain without sciatica  M54 50 Ambulatory Referral to Physical Therapy    G89 29    2  Pain in right hip  M25 551 Ambulatory Referral to Physical Therapy   3  Primary osteoarthritis of one hip, right  M16 11 Ambulatory Referral to Physical Therapy   4  Facet arthropathy, lumbar  M47 816 Ambulatory Referral to Physical Therapy                  Assessment  Assessment details: Noreen Granados is a 36 y o  female referred to outpt PT with dx of LBP and bilat hip pain  Pt presents with decrease in bilat LE strength, positive TTP, and pain with funct ROM  Pt funct is limited with decrease in tolerance to transfers sit to stand, decrease in tolerance to prolonged sitting, and positive pain with amb  Pt would benefit from skilled PT to address these limitations and max funct  Impairments: abnormal or restricted ROM, impaired physical strength, lacks appropriate home exercise program and pain with function     Prognosis: good    Goals  Funct 1  Improve sit to stand tolerance with min pain x4 weeks  2  Min pain with Nery J Luis transfers x4 weeks  Impairment 1  Increase strength by 1/2 grade x4 weeks  2  Decrease pain by 25 % x4 weeks      Plan  Patient would benefit from: skilled physical therapy  Planned therapy interventions: manual therapy, patient education, postural training, strengthening, stretching, therapeutic exercise and home exercise program  Frequency: 1x week  Duration in weeks: 4        Subjective Evaluation    History of Present Illness  Mechanism of injury: Pt reports having bilateral hip pain which she has been struggling with over the last month per physician's note  Pt had xrays performed at that time with negative findings    Pt notes that since then, she did fall when getting out of [place] and landed on her buttocks  Pt notes since, she has bruising left buttocks and increase in left sided hip pain  Pt does notes either hip is more painful at different point during evaluation  Pt's caregiver notes that she is independent with [place] transfers, but notes she has pain while performing  Pt is able to perform sit to stand transfers with use of hands, again noting increase in pain  Pt does also notes sx's with sitting prolonged and caregiver notes that she walks throughout her day  Pt denies sleep disturbances due to hip or back pain  Pt notes pain becomes severe at times  Pain  Quality: sharp    Patient Goals  Patient goals for therapy: decreased pain and independence with ADLs/IADLs          Objective     Active Range of Motion     Lumbar   Flexion:  Restriction level: minimal  Extension:  with pain Restriction level: moderate    Additional Active Range of Motion Details  Pt has severe TTP L3-5 centrally with min TTP bilat SIJ and glutes      Strength/Myotome Testing     Left Hip   Planes of Motion   Flexion: 3+  Abduction: 4-  Adduction: 3  External rotation: 4  Internal rotation: 4-    Right Hip   Planes of Motion   Flexion: 3+  Abduction: 4-  Adduction: 3  External rotation: 4  Internal rotation: 4-    Left Knee   Flexion: 4+  Extension: 5    Right Knee   Flexion: 4+  Extension: 5        Precautions: visual and hearing disturbances, hx seizures, CP        Manual  11/8/22                                                                                   Neuro Re-Ed                                                                                                  Therex            Seated pball flex            seated marchin x10 ea           Hip add isomet 5"x10           Sit to stand table              side stepping                                                                      Gait Training                                 Modalities Access Code: [email or web address]  URL: [email or web address]  Date: 11/08/2022  Prepared by: Teja Truong Notes   Karena Gomez    Exercises  · Seated March - 2 x daily - 1 sets - 10 reps - 5 hold  · Seated Hip Adduction Squeeze with Ball - 2 x daily - 1 sets - 10 reps - 5 hold

## 2022-11-14 ENCOUNTER — OFFICE VISIT (OUTPATIENT)
Dept: DENTISTRY | Facility: CLINIC | Age: 42
End: 2022-11-14

## 2022-11-14 VITALS — HEART RATE: 103 BPM | TEMPERATURE: 98.5 F | SYSTOLIC BLOOD PRESSURE: 130 MMHG | DIASTOLIC BLOOD PRESSURE: 84 MMHG

## 2022-11-14 DIAGNOSIS — K02.9 CARIES: Primary | ICD-10-CM

## 2022-11-14 NOTE — PROGRESS NOTES
37 yo patient presents to dental clinic with caregiver for restorative work  She is ambulatory but uses a walker  ASA Type 2 significant for special needs and cerebral palsy  [email or web address]  No changes  Today: SDF application on #2-I and #31-O  Patient was cooperative  She bites so beware of your fingers  Reviewed benefits of SDF to include the possibility of arresting caries with multiple applications  Also reviewed possible side effects of SDF application, to include the staining of carious lesions jet black and the possibility of staining mucosal tissues and skin upon accidental contact (which will resolve in 1-2 weeks)  Guardian aware that SDF requires multiple applications with initial visit, then reapplication at 6 months, and potentially more reapplications in subsequent 6 months intervals to arrest caries  Furthermore, guardian is aware that SDF is not definitive treatment, and serves to delay the progression of caries  All questions asked were answered and guardian demonstrates an understanding of all information presented during the discussion  Verbal and written informed consent was obtained for the application of SDF to carious teeth  Dried teeth #3 and #31  Isolation achieved with cotton rolls  Utilized one drop of SDF and applied to affected tooth surfaces for 60 seconds with microbrush  Removed any gross excess with cotton roll  Informed guardian that carious surfaces will begin to turn black over the next few days, and such a process indicates effective treatment  Advised no eating or drinking for thirty minutes  Guardian is aware of necessity to return in six months for reapplication      N V: 3- month prophy recall and   NNV: 3 month prophy recall SDF reapplication in 6 months

## 2022-11-18 ENCOUNTER — OFFICE VISIT (OUTPATIENT)
Dept: PHYSICAL THERAPY | Facility: REHABILITATION | Age: 42
End: 2022-11-18

## 2022-11-18 DIAGNOSIS — G89.29 CHRONIC RIGHT-SIDED LOW BACK PAIN WITHOUT SCIATICA: Primary | ICD-10-CM

## 2022-11-18 DIAGNOSIS — M54.50 CHRONIC RIGHT-SIDED LOW BACK PAIN WITHOUT SCIATICA: Primary | ICD-10-CM

## 2022-11-18 DIAGNOSIS — M25.551 PAIN IN RIGHT HIP: ICD-10-CM

## 2022-11-18 DIAGNOSIS — M47.816 FACET ARTHROPATHY, LUMBAR: ICD-10-CM

## 2022-11-18 DIAGNOSIS — M16.11 PRIMARY OSTEOARTHRITIS OF ONE HIP, RIGHT: ICD-10-CM

## 2022-11-18 NOTE — PROGRESS NOTES
Daily Note     Today's date: 2022  Patient name: Lincoln Zhu  : 1980  MRN: [id number]  Referring provider: Maggi Fountain MD  Dx:   Encounter Diagnosis     ICD-10-CM    1  Chronic right-sided low back pain without sciatica  M54 50     G89 29       2  Pain in right hip  M25 551       3  Primary osteoarthritis of one hip, right  M16 11       4  Facet arthropathy, lumbar  M47 816                      Subjective: Pt denies pain arriving to PT for today's visit  Pt notes performing HEP at times over the last week  Pt is motivated for today's PT visit  Objective: See treatment diary below       Precautions: visual and hearing disturbances, hx seizures, CP        Manual  22                                                                                     Neuro Re-Ed              sit to stand from table   2x5          side stepping                                                                                                 Therex             Seated pball flex    5"x10          seated marchin x10 ea  x10 ea         Hip add isomet 5"x10  5"x10         LAQ   x10 ea                                                                               Gait Training                                 Modalities                                                                   Assessment: Pt does well with manual, verbal, and tactile cueing throughout her session, including following demonstration from therapist   Pt denies pain throughout exercises, however does have fatigue with lower reps performed as above  Pt issued updated HEP  Plan: Continue per plan of care  Access Code: [email or web address]  URL: [email or web address]  Date: 2022  Prepared by: Jocelyne Mariscal    Program Notes   Bianca Covert    Exercises  • Seated March - 2 x daily - 1 sets - 10 reps - 5 hold  • Seated Hip Adduction Squeeze with Ball - 2 x daily - 1 sets - 10 reps - 5 hold  • Seated Flexion Stretch with The Umer - 1 sets - 10 reps - 5 hold  • Seated Long Arc Quad - 1 sets - 10 reps - 5 hold

## 2022-12-01 DIAGNOSIS — K59.04 CHRONIC IDIOPATHIC CONSTIPATION: ICD-10-CM

## 2022-12-02 ENCOUNTER — OFFICE VISIT (OUTPATIENT)
Dept: AUDIOLOGY | Age: 42
End: 2022-12-02

## 2022-12-02 DIAGNOSIS — H90.3 SENSORY HEARING LOSS, BILATERAL: Primary | ICD-10-CM

## 2022-12-02 NOTE — PROGRESS NOTES
Hearing Aid Visit:    Name:  Arely Wright  :  1980  Age:  36 y o  Date of Evaluation: 22     Arely Wright is being seen for a hearing aid visit  Patient is fit with Oticon Zircon 2 Mini [facility] hearing aid(s)  Right serial [id number]  Left serial [id number]  Warranty date: 25 (Loss/Damage and repair)  Patient reports the left tubing ripped  Action:  Replaced tubing  Reviewed with patient how to remove hearing aid without pulling on tubing  Recommendations: Follow up PRN        Indigo Perales , CCC-A  Clinical Audiologist

## 2022-12-07 ENCOUNTER — HOSPITAL ENCOUNTER (EMERGENCY)
Facility: HOSPITAL | Age: 42
Discharge: HOME | End: 2022-12-07
Attending: EMERGENCY MEDICINE

## 2022-12-07 ENCOUNTER — APPOINTMENT (EMERGENCY)
Dept: NON INVASIVE DIAGNOSTICS | Facility: HOSPITAL | Age: 42
End: 2022-12-07

## 2022-12-07 ENCOUNTER — APPOINTMENT (EMERGENCY)
Dept: RADIOLOGY | Facility: HOSPITAL | Age: 42
End: 2022-12-07

## 2022-12-07 VITALS
WEIGHT: 220 LBS | TEMPERATURE: 98.7 F | SYSTOLIC BLOOD PRESSURE: 115 MMHG | RESPIRATION RATE: 19 BRPM | DIASTOLIC BLOOD PRESSURE: 61 MMHG | HEART RATE: 95 BPM | HEIGHT: 56 IN | OXYGEN SATURATION: 95 % | BODY MASS INDEX: 49.49 KG/M2

## 2022-12-07 DIAGNOSIS — R07.89 NON-CARDIAC CHEST PAIN: Primary | ICD-10-CM

## 2022-12-07 DIAGNOSIS — M79.606 LEG PAIN: ICD-10-CM

## 2022-12-07 DIAGNOSIS — R60.9 PERIPHERAL EDEMA: ICD-10-CM

## 2022-12-07 LAB
ANION GAP SERPL CALCULATED.3IONS-SCNC: 7 MMOL/L (ref 4–13)
ATRIAL RATE: 93 BPM
BASOPHILS # BLD AUTO: 0.03 THOUSANDS/ÂΜL (ref 0–0.1)
BASOPHILS NFR BLD AUTO: 0 % (ref 0–1)
BUN SERPL-MCNC: 8 MG/DL (ref 5–25)
CALCIUM SERPL-MCNC: 7.9 MG/DL (ref 8.3–10.1)
CARDIAC TROPONIN I PNL SERPL HS: <2 NG/L
CHLORIDE SERPL-SCNC: 104 MMOL/L (ref 96–108)
CO2 SERPL-SCNC: 28 MMOL/L (ref 21–32)
CREAT SERPL-MCNC: 0.81 MG/DL (ref 0.6–1.3)
EOSINOPHIL # BLD AUTO: 0.07 THOUSAND/ÂΜL (ref 0–0.61)
EOSINOPHIL NFR BLD AUTO: 1 % (ref 0–6)
ERYTHROCYTE [DISTWIDTH] IN BLOOD BY AUTOMATED COUNT: 14 % (ref 11.6–15.1)
GFR SERPL CREATININE-BSD FRML MDRD: 89 ML/MIN/1.73SQ M
GLUCOSE SERPL-MCNC: 98 MG/DL (ref 65–140)
HCT VFR BLD AUTO: 41 % (ref 34.8–46.1)
HGB BLD-MCNC: 13.5 G/DL (ref 11.5–15.4)
IMM GRANULOCYTES # BLD AUTO: 0.02 THOUSAND/UL (ref 0–0.2)
IMM GRANULOCYTES NFR BLD AUTO: 0 % (ref 0–2)
LYMPHOCYTES # BLD AUTO: 1.85 THOUSANDS/ÂΜL (ref 0.6–4.47)
LYMPHOCYTES NFR BLD AUTO: 18 % (ref 14–44)
MCH RBC QN AUTO: 30.5 PG (ref 26.8–34.3)
MCHC RBC AUTO-ENTMCNC: 32.9 G/DL (ref 31.4–37.4)
MCV RBC AUTO: 93 FL (ref 82–98)
MONOCYTES # BLD AUTO: 0.76 THOUSAND/ÂΜL (ref 0.17–1.22)
MONOCYTES NFR BLD AUTO: 7 % (ref 4–12)
NEUTROPHILS # BLD AUTO: 7.83 THOUSANDS/ÂΜL (ref 1.85–7.62)
NEUTS SEG NFR BLD AUTO: 74 % (ref 43–75)
NRBC BLD AUTO-RTO: 0 /100 WBCS
NT-PROBNP SERPL-MCNC: 184 PG/ML
P AXIS: 61 DEGREES
PLATELET # BLD AUTO: 290 THOUSANDS/UL (ref 149–390)
PMV BLD AUTO: 8.8 FL (ref 8.9–12.7)
POTASSIUM SERPL-SCNC: 4.1 MMOL/L (ref 3.5–5.3)
PR INTERVAL: 130 MS
QRS AXIS: 28 DEGREES
QRSD INTERVAL: 70 MS
QT INTERVAL: 364 MS
QTC INTERVAL: 452 MS
RBC # BLD AUTO: 4.42 MILLION/UL (ref 3.81–5.12)
SODIUM SERPL-SCNC: 139 MMOL/L (ref 135–147)
T WAVE AXIS: 51 DEGREES
VENTRICULAR RATE: 93 BPM
WBC # BLD AUTO: 10.56 THOUSAND/UL (ref 4.31–10.16)

## 2022-12-07 RX ORDER — FUROSEMIDE 10 MG/ML
20 INJECTION INTRAMUSCULAR; INTRAVENOUS ONCE
Status: COMPLETED | OUTPATIENT
Start: 2022-12-07 | End: 2022-12-07

## 2022-12-07 RX ORDER — ACETAMINOPHEN 325 MG/1
975 TABLET ORAL ONCE
Status: COMPLETED | OUTPATIENT
Start: 2022-12-07 | End: 2022-12-07

## 2022-12-07 RX ADMIN — FUROSEMIDE 20 MG: 10 INJECTION, SOLUTION INTRAMUSCULAR; INTRAVENOUS at 13:45

## 2022-12-07 RX ADMIN — ACETAMINOPHEN 975 MG: 325 TABLET, FILM COATED ORAL at 13:00

## 2022-12-07 NOTE — ED PROVIDER NOTES
History  Chief Complaint   Patient presents with   • Chest Pain     Pt reports chest pressure x 1 week  Pt was in about a week ago for the same thing  Pt recently had the passing of her roommate   • Leg Swelling     Pt has swelling in LE  Pt states she has some pain in the legs as well  35y F from group home here with multiple complaints  Reports diffuse chest pressure for the last week  Nothing makes it better/worse  Denies assoc lh, diaphoresis, sob/saucedo, n/v  Notes some occas palpitations  Nothing makes it better/worse and has been relatively constant since onset  Pt w/ increased stress - did have a roommate recently die  Additionally pt c/o increased b/l LE edema w/ ?worsening swelling noted on the right  C/o diffuse lower leg pain b/l, also R>L w/ some increased pain in the right foot  Denies any falls or injuries, no trauma to the foot  Nothing makes the pain better/worse  Denies any rashes, no redness or warmth  No other co      History provided by:  Patient and caregiver   used: No    Medical Problem  Location:  Chest pain, LE edema  Quality:  See above  Severity:  Mild  Onset quality:  Gradual  Duration:  1 week  Timing:  Constant  Progression:  Unchanged  Chronicity:  New  Context:  N/a  Relieved by:  Nothing  Worsened by:   nothing  Ineffective treatments:  N/a  Associated symptoms: chest pain and fatigue    Associated symptoms: no abdominal pain, no congestion, no cough, no fever, no myalgias, no rash and no shortness of breath        Prior to Admission Medications   Prescriptions Last Dose Informant Patient Reported? Taking?    ARIPiprazole (ABILIFY) 20 MG tablet 12/6/2022  No Yes   Sig: Take 1 tablet (20 mg total) by mouth daily at bedtime   D3-1000 25 MCG (1000 UT) tablet 12/7/2022  No Yes   Sig: Take 2 tablets (2,000 Units total) by mouth daily   Diclofenac Sodium (VOLTAREN) 1 % 12/7/2022  No Yes   Sig: Apply 2 g topically 4 (four) times a day   Dyclonine-Glycerin (Cepacol Sore Throat Spray) 0 1-33 % LIQD Past Week  No Yes   Sig: Apply 1 spray to the mouth or throat 3 (three) times a day as needed (sorethroat)   Elastic Bandages & Supports (Neoprene Knee Brace) [facility] [place]/7/2022  No Yes   Sig: Apply right knee brace in morning; remove at night   Estarylla 0 25-35 MG-MCG per tablet 12/7/2022  No Yes   Sig: TAKE 1 TAB BY MOUTH DAILY AT 8AM (BIRTH CONTROL)   GNP Sore Throat Spray 1 4 % mucosal liquid 12/7/2022  No Yes   Sig: APPLY 1 SPRAY TO MOUTH OR THROAT EVERY 2 HRS AS NEEDED FOR SORE THROAT   GNP Stomach Relief Max St 525 MG/15ML SUSP 12/7/2022  Yes Yes   Incontinence Supply Disposable (Wings Choice Plus Adult Briefs) MISC 12/7/2022  No Yes   Sig: Use as directed (R39 81)   Konsyl Daily Fiber 28 3 % 12/7/2022  Yes Yes   LORazepam (ATIVAN) 0 5 mg tablet 12/7/2022  Yes Yes   Sig: Take 0 25 mg by mouth 2 (two) times a day   Mouthwashes (Listerine Antiseptic) LIQD   No No   Sig: Swish and spit 5 mL 2 (two) times a day   Multiple Vitamins-Minerals (CertaVite Senior/Antioxidant) TABS 12/7/2022  No Yes   Sig: Take 1 tablet by mouth daily   RA Sunscreen SPF50 LOTN Unknown  No No   Sig: Apply 15 minutes before sun exposure and every 2 hours   acetaminophen (TYLENOL) 500 mg tablet Past Week  No Yes   Sig: Take 1 tablet (500 mg total) by mouth every 6 (six) hours as needed for mild pain   aluminum-magnesium hydroxide-simethicone (GNP Antacid & Anti-Gas) 5837-1911-476 mg/30 mL suspension 12/7/2022  No Yes   Sig: Take 15 mL by mouth every 4 (four) hours as needed for indigestion or heartburn   amitriptyline (ELAVIL) 10 mg tablet 12/6/2022  Yes Yes   Sig: Take 10 mg by mouth daily at bedtime   bacitracin topical ointment 500 units/g topical ointment 12/6/2022  No Yes   Sig: Cleanse site on nose with soap and water followed by bacitracin BID until healed then p r n    bismuth subsalicylate (PEPTO BISMOL) 524 mg/30 mL oral suspension 12/7/2022  No Yes   Sig: Take 15 mL (262 mg total) by mouth every 6 (six) hours as needed for indigestion   calcium carbonate (TUMS) 500 mg chewable tablet 2022  No Yes   Sig: Chew 1 tablet (500 mg total) 3 (three) times a day as needed for heartburn   carbamide peroxide (DEBROX) 6 5 % otic solution 2022  No Yes   Sig: Administer 5 drops into the left ear 2 (two) times a day Use 1 week prior to ENT appointment  Also can use 4 gtts twice weekly (Tuesday and Friday for example) to each ear for prevention  Keep hearing aid out x 10 minutes after ear drops administered     dicyclomine (BENTYL) 20 mg tablet 2022  No Yes   Sig: TAKE 1 TABLET BY MOUTH EVERY 6 HOURS AS NEEDED (DYSPHAGIA)   escitalopram (LEXAPRO) 20 mg tablet 2022  No Yes   Sig: Take 1 tablet (20 mg total) by mouth daily   fluticasone (FLONASE) 50 mcg/act nasal spray 2022  No Yes   Si spray into each nostril daily as needed for rhinitis (nasal congestion) At 8:00 AM   hydrOXYzine HCL (ATARAX) 10 mg tablet 2022  No Yes   Sig: Take 1 tablet (10 mg total) by mouth 3 (three) times a day   ibuprofen (MOTRIN) 400 mg tablet 2022  No Yes   Sig: TAKE 1 TABLET BY MOUTH EVERY 8 HOURS AS NEEDED FOR MILD/MOD PAIN OR HEADACHES   ketoconazole (NIZORAL) 2 % cream 2022  No Yes   Sig: Apply topically daily   lamoTRIgine (LaMICtal) 100 mg tablet 2022  Yes Yes   lamoTRIgine (LaMICtal) 25 mg tablet 2022  Yes Yes   Sig: Take 25 mg by mouth 2 (two) times a day     lidocaine (LMX) 4 % cream 2022  No Yes   Sig: Apply topically as needed for mild pain   lubiprostone (Amitiza) 24 mcg capsule 2022  No Yes   Sig: Take 1 capsule (24 mcg total) by mouth daily with breakfast   magnesium hydroxide (GNP Milk of Magnesia) 400 mg/5 mL oral suspension 2022  No Yes   Si TBSP (30ML) BY MOUTH DAILY AS NEEDED IF NO BM IN 3 DAYS (CONSTIPATION)   metoprolol tartrate (LOPRESSOR) 25 mg tablet 2022  No Yes   Sig: Take 1 tablet (25 mg total) by mouth 2 (two) times a day   mineral oil-hydrophilic petrolatum (AQUAPHOR) ointment 2022  No Yes   Sig: Apply topically as needed for dry skin   norgestimate-ethinyl estradiol (ORTHO-CYCLEN) 0 25-35 MG-MCG per tablet 2022  Yes Yes   Sig: Take 1 tablet by mouth in the morning     norgestimate-ethinyl estradiol (ORTHO-CYCLEN) 0 25-35 MG-MCG per tablet 2022  No Yes   Sig: Take 1 tablet by mouth daily   nystatin (MYCOSTATIN) powder 2022  No Yes   Sig: Apply 1 application topically 4 (four) times a day   nystatin-triamcinolone (MYCOLOG-II) cream   No No   Sig: Apply topically 2 (two) times a day   pantoprazole (PROTONIX) 20 mg tablet 2022  No Yes   Sig: Take 1 tablet (20 mg total) by mouth daily   polyethylene glycol (MIRALAX) 17 g packet 2022  No Yes   Sig: Take one packet daily as needed for no bowel movement in 24 hours   psyllium (METAMUCIL) 58 6 % packet   No No   Sig: Take 1 packet by mouth daily   senna-docusate sodium (Senexon-S) 8 6-50 mg per tablet 2022  No Yes   Sig: Take 1 tablet by mouth daily at 8am    sodium chloride (OCEAN) 0 65 % nasal spray 2022  No Yes   Si spray into each nostril as needed (three times daily as needed for nasal congestion)   trospium chloride (SANCTURA) 20 mg tablet 2022  No Yes   Sig: Take 1 tablet (20 mg total) by mouth 2 (two) times a day   zinc oxide (DESITIN) 13 % cream Unknown  No No   Sig: Apply 1 application topically as needed (for perianal irritation)      Facility-Administered Medications: None       Past Medical History:   Diagnosis Date   • ADD (attention deficit disorder)    • Anxiety    • Astigmatism    • Brain lesion    • Calcium deficiency    • Cellulitis of foot, right 2018   • Cerebral palsy (HCC)    • Chronic otitis media    • Constipation    • Depression    • Dysphagia    • Esophagitis    • Esotropia    • GERD (gastroesophageal reflux disease)    • Hiatal hernia    • Hydrocephalus (HCC)    • Impaired fasting glucose    • Left nephrolithiasis 03/04/2019   • Myopia    • Oppositional defiant disorder    • 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HCC)    • Seizures (HCC)    • Sensorineural hearing loss    • Sleep apnea    • Status post ventriculoatrial shunt placement    • Visual impairment        Past Surgical History:   Procedure Laterality Date   • BREAST BIOPSY Left     X 2 (not sure of years)   • CSF SHUNT      Creation of Ventriculo-Peritoneal CSF shunt ; Last Assessed:7/6/2016   • EAR SURGERY      Last Assessed:7/6/2016   • LEG SURGERY      due to CP    • NOSE SURGERY      Last Assessed:7/6/2016   • CA ESOPHAGOGASTRODUODENOSCOPY TRANSORAL DIAGNOSTIC N/A 5/9/2019    Procedure: ESOPHAGOGASTRODUODENOSCOPY (EGD) with biopsy;  Surgeon: Dariel Blunt MD;  Location: [facility]; Service: Gastroenterology   • UPPER GASTROINTESTINAL ENDOSCOPY  05/2019       Family History   Problem Relation Age of Onset   • Diabetes Mother    • Colon cancer Father    • No Known Problems Maternal Grandmother    • No Known Problems Maternal Grandfather    • No Known Problems Paternal Grandmother    • No Known Problems Paternal Grandfather    • Hypertension Neg Hx    • Heart disease Neg Hx    • Stroke Neg Hx    • Thyroid disease Neg Hx      I have reviewed and agree with the history as documented  E-Cigarette/Vaping   • E-Cigarette Use Never User      E-Cigarette/Vaping Substances   • Nicotine No    • THC No    • CBD No    • Flavoring No    • Other No    • Unknown No      Social History     Tobacco Use   • Smoking status: Never   • Smokeless tobacco: Never   Vaping Use   • Vaping Use: Never used   Substance Use Topics   • Alcohol use: Never   • Drug use: Never       Review of Systems   Constitutional: Positive for fatigue  Negative for chills, diaphoresis and fever  HENT: Negative for congestion  Respiratory: Negative for cough, chest tightness and shortness of breath  Cardiovascular: Positive for chest pain, palpitations and leg swelling     Gastrointestinal: Negative for abdominal pain    Musculoskeletal: Negative for arthralgias, joint swelling and myalgias  Skin: Negative for color change, rash and wound  Neurological: Negative for weakness and numbness  All other systems reviewed and are negative  Physical Exam  Physical Exam  Vitals and nursing note reviewed  Constitutional:       General: She is not in acute distress  Appearance: She is obese  She is not ill-appearing, toxic-appearing or diaphoretic  Comments: Morbidly obese w/ BMI 49 32   HENT:      Nose: Nose normal    Eyes:      Conjunctiva/sclera: Conjunctivae normal    Cardiovascular:      Rate and Rhythm: Normal rate and regular rhythm  Heart sounds: No murmur heard  Pulmonary:      Effort: Pulmonary effort is normal       Breath sounds: Normal breath sounds  Abdominal:      Palpations: Abdomen is soft  Tenderness: There is no abdominal tenderness  Musculoskeletal:      Cervical back: Normal range of motion  Right lower leg: No edema  Left lower leg: No edema  Skin:     General: Skin is warm  Neurological:      General: No focal deficit present  Mental Status: She is alert     Psychiatric:         Mood and Affect: Mood normal          Vital Signs  ED Triage Vitals [12/07/22 1016]   Temperature Pulse Respirations Blood Pressure SpO2   98 7 °F (37 1 °C) 95 19 129/68 95 %      Temp Source Heart Rate Source Patient Position - Orthostatic VS BP Location FiO2 (%)   Oral Monitor Lying Right arm --      Pain Score       3           Vitals:    12/07/22 1016 12/07/22 1230   BP: 129/68 115/61   Pulse: 95 95   Patient Position - Orthostatic VS: Lying Lying         Visual Acuity      ED Medications  Medications   acetaminophen (TYLENOL) tablet 975 mg (975 mg Oral Given 12/7/22 1300)   furosemide (LASIX) injection 20 mg (20 mg Intravenous Given 12/7/22 1345)       Diagnostic Studies  Results Reviewed     Procedure Component Value Units Date/Time    Basic metabolic panel [[id number]] (Abnormal) Collected: 12/07/22 1153    Lab Status: Final result Specimen: Blood from Arm, Left Updated: 12/07/22 1225     Sodium 139 mmol/L      Potassium 4 1 mmol/L      Chloride 104 mmol/L      CO2 28 mmol/L      ANION GAP 7 mmol/L      BUN 8 mg/dL      Creatinine 0 81 mg/dL      Glucose 98 mg/dL      Calcium 7 9 mg/dL      eGFR 89 ml/min/1 73sq m     Narrative:      [place] guidelines for Chronic Kidney Disease (CKD):   •  Stage 1 with normal or high GFR (GFR > 90 mL/min/1 73 square meters)  •  Stage 2 Mild CKD (GFR = 60-89 mL/min/1 73 square meters)  •  Stage 3A Moderate CKD (GFR = 45-59 mL/min/1 73 square meters)  •  Stage 3B Moderate CKD (GFR = 30-44 mL/min/1 73 square meters)  •  Stage 4 Severe CKD (GFR = 15-29 mL/min/1 73 square meters)  •  Stage 5 End Stage CKD (GFR <15 mL/min/1 73 square meters)  Note: GFR calculation is accurate only with a steady state creatinine    NT-BNP PRO [[id number]]  (Abnormal) Collected: 12/07/22 1153    Lab Status: Final result Specimen: Blood from Arm, Left Updated: 12/07/22 1225     NT-proBNP 184 pg/mL     HS Troponin 0hr (reflex protocol) [[id number]]  (Normal) Collected: 12/07/22 1153    Lab Status: Final result Specimen: Blood from Arm, Left Updated: 12/07/22 1222     hs TnI 0hr <2 ng/L     CBC and differential [[id number]]  (Abnormal) Collected: 12/07/22 1153    Lab Status: Final result Specimen: Blood from Arm, Left Updated: 12/07/22 1201     WBC 10 56 Thousand/uL      RBC 4 42 Million/uL      Hemoglobin 13 5 g/dL      Hematocrit 41 0 %      MCV 93 fL      MCH 30 5 pg      MCHC 32 9 g/dL      RDW 14 0 %      MPV 8 8 fL      Platelets 144 Thousands/uL      nRBC 0 /100 WBCs      Neutrophils Relative 74 %      Immat GRANS % 0 %      Lymphocytes Relative 18 %      Monocytes Relative 7 %      Eosinophils Relative 1 %      Basophils Relative 0 %      Neutrophils Absolute 7 83 Thousands/µL      Immature Grans Absolute 0 02 Thousand/uL      Lymphocytes Absolute 1 85 Thousands/µL      Monocytes Absolute 0 76 Thousand/µL      Eosinophils Absolute 0 07 Thousand/µL      Basophils Absolute 0 03 Thousands/µL                  XR foot 3+ views RIGHT   ED Interpretation by Baldomero Shah DO (12/07 1336)   No acute findings      Final Result by Madelyn Harris DO (12/07 1400)      No acute osseous abnormality  Workstation performed: [id number]         XR chest 1 view portable   ED Interpretation by Baldomero Shah DO (12/07 1336)   No acute findings      Final Result by Sophie Bell MD (12/07 1344)      Borderline cardiomegaly  Mild vascular congestion                    Workstation performed: [id number]         VAS lower limb venous duplex study, unilateral/limited   ED Interpretation by Baldomero Shah DO (12/07 1249)   Verbal report - negative                 Procedures  ECG 12 Lead Documentation Only    Date/Time: 12/7/2022 10:30 AM  Performed by: Baldomero Shah DO  Authorized by: Baldomero Shah DO     ECG reviewed by me, the ED Provider: yes    Patient location:  ED  Previous ECG:     Previous ECG:  Compared to current    Similarity:  Changes noted  Interpretation:     Interpretation: non-specific    Rate:     ECG rate assessment: normal    Rhythm:     Rhythm: sinus rhythm    Ectopy:     Ectopy: none    ST segments:     ST segments:  Non-specific  T waves:     T waves: non-specific               ED Course             HEART Risk Score    Flowsheet Row Most Recent Value   Heart Score Risk Calculator    History 0 Filed at: 12/07/2022 1338   ECG 1 Filed at: 12/07/2022 1338   Age 0 Filed at: 12/07/2022 1338   Risk Factors 1 Filed at: 12/07/2022 1338   Troponin 0 Filed at: 12/07/2022 1338   HEART Score 2 Filed at: 12/07/2022 1338                                      MDM  Number of Diagnoses or Management Options  Leg pain: new and requires workup  Non-cardiac chest pain: new and requires workup  Peripheral edema: new and requires workup  Diagnosis management comments: 1wk chest pain, vague le edema / discomfort w/ relatively non-focal exam   ? Mild increased swelling in the right leg compared to the left w/ negative homans  ddx includes/not limited to stress/situational symptoms, occult pna, pulm edema/vascular congestion, doubt acs, dvt  Will ck labs, ekg, cxr and re-eval       Amount and/or Complexity of Data Reviewed  Clinical lab tests: reviewed and ordered  Tests in the radiology section of CPT®: reviewed and ordered  Tests in the medicine section of CPT®: reviewed and ordered  Obtain history from someone other than the patient: yes  Independent visualization of images, tracings, or specimens: yes    Patient Progress  Patient progress: stable      Disposition  Final diagnoses:   Non-cardiac chest pain   Peripheral edema   Leg pain     Time reflects when diagnosis was documented in both MDM as applicable and the Disposition within this note     Time User Action Codes Description Comment    12/7/2022  1:38 PM Milana Damian Add [R07 89] Non-cardiac chest pain     12/7/2022  1:40 PM Milana Damian Add [R60 9] Peripheral edema     12/7/2022  1:40 PM Nic Heath Add [M79 606] Leg pain       ED Disposition     ED Disposition   Discharge    Condition   Stable    Date/Time   Wed Dec 7, 2022  1:38 PM    Comment   Justine Shah discharge to home/self care                 Follow-up Information     Follow up With Specialties Details Why Contact Info Additional [address] [facility] Schedule an appointment as soon as possible for a visit in 1 week for further evaluation and treatment [address], [address] [facility] [id number]  [address], [address], [address] [place], [place], [address]          Discharge Medication List as of 12/7/2022  2:29 PM      CONTINUE these medications which have NOT CHANGED Details   acetaminophen (TYLENOL) 500 mg tablet Take 1 tablet (500 mg total) by mouth every 6 (six) hours as needed for mild pain, Starting Mon 3/14/2022, Normal      aluminum-magnesium hydroxide-simethicone (GNP Antacid & Anti-Gas) 9684-0357-701 mg/30 mL suspension Take 15 mL by mouth every 4 (four) hours as needed for indigestion or heartburn, Starting Wed 10/5/2022, Normal      amitriptyline (ELAVIL) 10 mg tablet Take 10 mg by mouth daily at bedtime, Historical Med      ARIPiprazole (ABILIFY) 20 MG tablet Take 1 tablet (20 mg total) by mouth daily at bedtime, Starting u 10/15/2020, Normal      bacitracin topical ointment 500 units/g topical ointment Cleanse site on nose with soap and water followed by bacitracin BID until healed then p r n , Normal      bismuth subsalicylate (PEPTO BISMOL) 524 mg/30 mL oral suspension Take 15 mL (262 mg total) by mouth every 6 (six) hours as needed for indigestion, Starting Tue 2/22/2022, Normal      calcium carbonate (TUMS) 500 mg chewable tablet Chew 1 tablet (500 mg total) 3 (three) times a day as needed for heartburn, Starting Tue 10/26/2021, Normal      carbamide peroxide (DEBROX) 6 5 % otic solution Administer 5 drops into the left ear 2 (two) times a day Use 1 week prior to ENT appointment  Also can use 4 gtts twice weekly (Tuesday and Friday for example) to each ear for prevention  Keep hearing aid out x 10 minutes after ear drops administered  ,  Starting Fri 10/14/2022, Normal      D3-1000 25 MCG (1000 UT) tablet Take 2 tablets (2,000 Units total) by mouth daily, Starting Fri 10/7/2022, Normal      Diclofenac Sodium (VOLTAREN) 1 % Apply 2 g topically 4 (four) times a day, Starting Tue 4/5/2022, Normal      dicyclomine (BENTYL) 20 mg tablet TAKE 1 TABLET BY MOUTH EVERY 6 HOURS AS NEEDED (DYSPHAGIA), Normal      Dyclonine-Glycerin (Cepacol Sore Throat Spray) 0 1-33 % LIQD Apply 1 spray to the mouth or throat 3 (three) times a day as needed (sorethroat), Starting Tue 10/26/2021, Normal      Elastic Bandages & Supports (Neoprene Knee Brace) MISC Apply right knee brace in morning; remove at night, Normal      escitalopram (LEXAPRO) 20 mg tablet Take 1 tablet (20 mg total) by mouth daily, Starting Thu 10/15/2020, Normal      !!  Estarylla 0 25-35 MG-MCG per tablet TAKE 1 TAB BY MOUTH DAILY AT 8AM (BIRTH CONTROL), Normal      fluticasone (FLONASE) 50 mcg/act nasal spray 1 spray into each nostril daily as needed for rhinitis (nasal congestion) At 8:00 AM, Starting Fri 6/11/2021, Normal      GNP Sore Throat Spray 1 4 % mucosal liquid APPLY 1 SPRAY TO MOUTH OR THROAT EVERY 2 HRS AS NEEDED FOR SORE THROAT, Normal      GNP Stomach Relief Max St 525 MG/15ML SUSP Starting Tue 2/22/2022, Historical Med      hydrOXYzine HCL (ATARAX) 10 mg tablet Take 1 tablet (10 mg total) by mouth 3 (three) times a day, Starting Tue 10/12/2021, Normal      ibuprofen (MOTRIN) 400 mg tablet TAKE 1 TABLET BY MOUTH EVERY 8 HOURS AS NEEDED FOR MILD/MOD PAIN OR HEADACHES, Normal      Incontinence Supply Disposable (Wings Choice Plus Adult Briefs) MISC Use as directed (R39 81), Normal      ketoconazole (NIZORAL) 2 % cream Apply topically daily, Starting Tue 8/16/2022, Normal      Konsyl Daily Fiber 28 3 % Starting Sat 10/2/2021, Historical Med      !! lamoTRIgine (LaMICtal) 100 mg tablet Starting Wed 12/8/2021, Historical Med      !! lamoTRIgine (LaMICtal) 25 mg tablet Take 25 mg by mouth 2 (two) times a day  , Starting Tue 8/11/2020, Historical Med      lidocaine (LMX) 4 % cream Apply topically as needed for mild pain, Starting Mon 9/19/2022, Normal      LORazepam (ATIVAN) 0 5 mg tablet Take 0 25 mg by mouth 2 (two) times a day, Historical Med      lubiprostone (Amitiza) 24 mcg capsule Take 1 capsule (24 mcg total) by mouth daily with breakfast, Starting Tue 8/16/2022, Normal      magnesium hydroxide (GNP Milk of Magnesia) 400 mg/5 mL oral suspension 2 TBSP (30ML) BY MOUTH DAILY AS NEEDED IF NO BM IN 3 DAYS (CONSTIPATION), Normal      metoprolol tartrate (LOPRESSOR) 25 mg tablet Take 1 tablet (25 mg total) by mouth 2 (two) times a day, Starting Tue 8/16/2022, Normal      mineral oil-hydrophilic petrolatum (AQUAPHOR) ointment Apply topically as needed for dry skin, Starting Wed 10/5/2022, Normal      Multiple Vitamins-Minerals (CertaVite Senior/Antioxidant) TABS Take 1 tablet by mouth daily, Starting Thu 7/7/2022, Normal      !! norgestimate-ethinyl estradiol (ORTHO-CYCLEN) 0 25-35 MG-MCG per tablet Take 1 tablet by mouth in the morning , Historical Med      !! norgestimate-ethinyl estradiol (ORTHO-CYCLEN) 0 25-35 MG-MCG per tablet Take 1 tablet by mouth daily, Starting Fri 5/27/2022, Normal      nystatin (MYCOSTATIN) powder Apply 1 application topically 4 (four) times a day, Starting Fri 8/19/2022, Normal      pantoprazole (PROTONIX) 20 mg tablet Take 1 tablet (20 mg total) by mouth daily, Starting Tue 8/16/2022, Normal      polyethylene glycol (MIRALAX) 17 g packet Take one packet daily as needed for no bowel movement in 24 hours, Normal      senna-docusate sodium (Senexon-S) 8 6-50 mg per tablet Take 1 tablet by mouth daily at 8am , Starting Tue 8/16/2022, Normal      sodium chloride (OCEAN) 0 65 % nasal spray 1 spray into each nostril as needed (three times daily as needed for nasal congestion), Starting Tue 8/16/2022, Normal      trospium chloride (SANCTURA) 20 mg tablet Take 1 tablet (20 mg total) by mouth 2 (two) times a day, Starting Tue 8/16/2022, Normal      Mouthwashes (Listerine Antiseptic) LIQD Swish and spit 5 mL 2 (two) times a day, Starting Thu 5/12/2022, Until Fri 6/17/2022, Normal      nystatin-triamcinolone (MYCOLOG-II) cream Apply topically 2 (two) times a day, Starting Tue 8/16/2022, Normal      psyllium (METAMUCIL) 58 6 % packet Take 1 packet by mouth daily, Starting Thu 12/1/2022, Normal      RA Sunscreen SPF50 LOTN Apply 15 minutes before sun exposure and every 2 hours, Normal      zinc oxide (DESITIN) 13 % cream Apply 1 application topically as needed (for perianal irritation), Starting Tue 8/16/2022, Normal       !! - Potential duplicate medications found  Please discuss with provider  No discharge procedures on file      PDMP Review       Value Time User    PDMP Reviewed  Yes 10/6/2021  1:11 PM Toribio Stone PA-C          ED Provider  Electronically Signed by           Jamar Rosario DO  12/07/22 2937

## 2022-12-07 NOTE — DISCHARGE INSTRUCTIONS
Return for any trouble breathing, persistent vomiting, fever more than 101, worsening symptoms or for any concerns  Follow up with the primary care provider in a week for further evaluation if the symptoms continue

## 2022-12-12 ENCOUNTER — APPOINTMENT (EMERGENCY)
Dept: RADIOLOGY | Facility: HOSPITAL | Age: 42
End: 2022-12-12

## 2022-12-12 ENCOUNTER — HOSPITAL ENCOUNTER (EMERGENCY)
Facility: HOSPITAL | Age: 42
Discharge: HOME | End: 2022-12-12
Attending: EMERGENCY MEDICINE

## 2022-12-12 VITALS
RESPIRATION RATE: 20 BRPM | OXYGEN SATURATION: 98 % | DIASTOLIC BLOOD PRESSURE: 78 MMHG | SYSTOLIC BLOOD PRESSURE: 139 MMHG | TEMPERATURE: 97.6 F | HEART RATE: 94 BPM

## 2022-12-12 DIAGNOSIS — R07.9 CHEST PAIN: ICD-10-CM

## 2022-12-12 DIAGNOSIS — R05.9 COUGH: Primary | ICD-10-CM

## 2022-12-12 LAB
ALBUMIN SERPL BCP-MCNC: 3.1 G/DL (ref 3.5–5)
ALP SERPL-CCNC: 150 U/L (ref 46–116)
ALT SERPL W P-5'-P-CCNC: 19 U/L (ref 12–78)
ANION GAP SERPL CALCULATED.3IONS-SCNC: 8 MMOL/L (ref 4–13)
AST SERPL W P-5'-P-CCNC: 17 U/L (ref 5–45)
ATRIAL RATE: 85 BPM
ATRIAL RATE: 97 BPM
BASOPHILS # BLD AUTO: 0.02 THOUSANDS/ÂΜL (ref 0–0.1)
BASOPHILS NFR BLD AUTO: 0 % (ref 0–1)
BILIRUB SERPL-MCNC: 0.17 MG/DL (ref 0.2–1)
BUN SERPL-MCNC: 10 MG/DL (ref 5–25)
CALCIUM ALBUM COR SERPL-MCNC: 9.1 MG/DL (ref 8.3–10.1)
CALCIUM SERPL-MCNC: 8.4 MG/DL (ref 8.3–10.1)
CARDIAC TROPONIN I PNL SERPL HS: <2 NG/L
CARDIAC TROPONIN I PNL SERPL HS: <2 NG/L
CHLORIDE SERPL-SCNC: 104 MMOL/L (ref 96–108)
CO2 SERPL-SCNC: 26 MMOL/L (ref 21–32)
CREAT SERPL-MCNC: 0.87 MG/DL (ref 0.6–1.3)
EOSINOPHIL # BLD AUTO: 0.09 THOUSAND/ÂΜL (ref 0–0.61)
EOSINOPHIL NFR BLD AUTO: 1 % (ref 0–6)
ERYTHROCYTE [DISTWIDTH] IN BLOOD BY AUTOMATED COUNT: 13.9 % (ref 11.6–15.1)
FLUAV RNA RESP QL NAA+PROBE: NEGATIVE
FLUBV RNA RESP QL NAA+PROBE: NEGATIVE
GFR SERPL CREATININE-BSD FRML MDRD: 81 ML/MIN/1.73SQ M
GLUCOSE SERPL-MCNC: 98 MG/DL (ref 65–140)
HCT VFR BLD AUTO: 41 % (ref 34.8–46.1)
HGB BLD-MCNC: 13.5 G/DL (ref 11.5–15.4)
IMM GRANULOCYTES # BLD AUTO: 0.03 THOUSAND/UL (ref 0–0.2)
IMM GRANULOCYTES NFR BLD AUTO: 0 % (ref 0–2)
LYMPHOCYTES # BLD AUTO: 1.58 THOUSANDS/ÂΜL (ref 0.6–4.47)
LYMPHOCYTES NFR BLD AUTO: 19 % (ref 14–44)
MCH RBC QN AUTO: 30.9 PG (ref 26.8–34.3)
MCHC RBC AUTO-ENTMCNC: 32.9 G/DL (ref 31.4–37.4)
MCV RBC AUTO: 94 FL (ref 82–98)
MONOCYTES # BLD AUTO: 0.54 THOUSAND/ÂΜL (ref 0.17–1.22)
MONOCYTES NFR BLD AUTO: 7 % (ref 4–12)
NEUTROPHILS # BLD AUTO: 5.95 THOUSANDS/ÂΜL (ref 1.85–7.62)
NEUTS SEG NFR BLD AUTO: 73 % (ref 43–75)
NRBC BLD AUTO-RTO: 0 /100 WBCS
NT-PROBNP SERPL-MCNC: 111 PG/ML
P AXIS: 51 DEGREES
P AXIS: 59 DEGREES
PLATELET # BLD AUTO: 271 THOUSANDS/UL (ref 149–390)
PMV BLD AUTO: 8.8 FL (ref 8.9–12.7)
POTASSIUM SERPL-SCNC: 3.9 MMOL/L (ref 3.5–5.3)
PR INTERVAL: 134 MS
PR INTERVAL: 136 MS
PROT SERPL-MCNC: 7.5 G/DL (ref 6.4–8.4)
QRS AXIS: 27 DEGREES
QRS AXIS: 29 DEGREES
QRSD INTERVAL: 66 MS
QRSD INTERVAL: 70 MS
QT INTERVAL: 344 MS
QT INTERVAL: 344 MS
QTC INTERVAL: 409 MS
QTC INTERVAL: 436 MS
RBC # BLD AUTO: 4.37 MILLION/UL (ref 3.81–5.12)
RSV RNA RESP QL NAA+PROBE: NEGATIVE
SARS-COV-2 RNA RESP QL NAA+PROBE: NEGATIVE
SODIUM SERPL-SCNC: 138 MMOL/L (ref 135–147)
T WAVE AXIS: 44 DEGREES
T WAVE AXIS: 47 DEGREES
VENTRICULAR RATE: 85 BPM
VENTRICULAR RATE: 97 BPM
WBC # BLD AUTO: 8.21 THOUSAND/UL (ref 4.31–10.16)

## 2022-12-12 NOTE — ED PROVIDER NOTES
History  Chief Complaint   Patient presents with   • Cough     Pt reports cough and sob beginning 2 minutes prior to calling ems  +covid contact     This is a 41-year-old female patient with cerebral palsy, impaired hearing, moderate intellectual disability brought in by EMS because she complained of being short of breath for 2 minutes prior to EMS arrival   According to the story she has a housemate that has COVID and she became short of breath  She tells me that she has some chest pain  It is unclear whether she does have chest pain or shortness of breath she is not tachypneic not tachycardic and not hypoxic  She is resting comfortably in her bed in no acute distress  Denies fevers chills no sore throat no nausea vomiting diarrhea abdominal pain  Because of her intellectual disability patient will have a COVID, influenza, RSV test and a cardiac work-up but is unclear whether the chest pain is present it just occurred prior to arrival   She did not tell EMS nor the nurse that she has chest pain  Initial EKG interpreted by me 97 bpm normal sinus rhythm no ST elevation normal axis  no significant change from previous          Prior to Admission Medications   Prescriptions Last Dose Informant Patient Reported? Taking?    ARIPiprazole (ABILIFY) 20 MG tablet   No No   Sig: Take 1 tablet (20 mg total) by mouth daily at bedtime   D3-1000 25 MCG (1000 UT) tablet   No No   Sig: Take 2 tablets (2,000 Units total) by mouth daily   Diclofenac Sodium (VOLTAREN) 1 %   No No   Sig: Apply 2 g topically 4 (four) times a day   Dyclonine-Glycerin (Cepacol Sore Throat Spray) 0 1-33 % LIQD   No No   Sig: Apply 1 spray to the mouth or throat 3 (three) times a day as needed (sorethroat)   Elastic Bandages & Supports (Neoprene Knee Brace) [facility]   No No   Sig: Apply right knee brace in morning; remove at night   Estarylla 0 25-35 MG-MCG per tablet   No No   Sig: TAKE 1 TAB BY MOUTH DAILY AT 8AM (BIRTH CONTROL)   GNP Sore Throat Spray 1 4 % mucosal liquid   No No   Sig: APPLY 1 SPRAY TO MOUTH OR THROAT EVERY 2 HRS AS NEEDED FOR SORE THROAT   GNP Stomach Relief Max St 525 MG/15ML SUSP   Yes No   Incontinence Supply Disposable (Wings Choice Plus Adult Briefs) MISC   No No   Sig: Use as directed (R39 81)   Konsyl Daily Fiber 28 3 %   Yes No   LORazepam (ATIVAN) 0 5 mg tablet   Yes No   Sig: Take 0 25 mg by mouth 2 (two) times a day   Mouthwashes (Listerine Antiseptic) LIQD   No No   Sig: Swish and spit 5 mL 2 (two) times a day   Multiple Vitamins-Minerals (CertaVite Senior/Antioxidant) TABS   No No   Sig: Take 1 tablet by mouth daily   RA Sunscreen SPF50 LOTN   No No   Sig: Apply 15 minutes before sun exposure and every 2 hours   acetaminophen (TYLENOL) 500 mg tablet   No No   Sig: Take 1 tablet (500 mg total) by mouth every 6 (six) hours as needed for mild pain   aluminum-magnesium hydroxide-simethicone (GNP Antacid & Anti-Gas) 0938-1688-299 mg/30 mL suspension   No No   Sig: Take 15 mL by mouth every 4 (four) hours as needed for indigestion or heartburn   amitriptyline (ELAVIL) 10 mg tablet   Yes No   Sig: Take 10 mg by mouth daily at bedtime   bacitracin topical ointment 500 units/g topical ointment   No No   Sig: Cleanse site on nose with soap and water followed by bacitracin BID until healed then p r n    bismuth subsalicylate (PEPTO BISMOL) 524 mg/30 mL oral suspension   No No   Sig: Take 15 mL (262 mg total) by mouth every 6 (six) hours as needed for indigestion   calcium carbonate (TUMS) 500 mg chewable tablet   No No   Sig: Chew 1 tablet (500 mg total) 3 (three) times a day as needed for heartburn   carbamide peroxide (DEBROX) 6 5 % otic solution   No No   Sig: Administer 5 drops into the left ear 2 (two) times a day Use 1 week prior to ENT appointment  Also can use 4 gtts twice weekly (Tuesday and Friday for example) to each ear for prevention  Keep hearing aid out x 10 minutes after ear drops administered     dicyclomine (BENTYL) 20 mg tablet   No No   Sig: TAKE 1 TABLET BY MOUTH EVERY 6 HOURS AS NEEDED (DYSPHAGIA)   escitalopram (LEXAPRO) 20 mg tablet   No No   Sig: Take 1 tablet (20 mg total) by mouth daily   fluticasone (FLONASE) 50 mcg/act nasal spray   No No   Si spray into each nostril daily as needed for rhinitis (nasal congestion) At 8:00 AM   hydrOXYzine HCL (ATARAX) 10 mg tablet   No No   Sig: Take 1 tablet (10 mg total) by mouth 3 (three) times a day   ibuprofen (MOTRIN) 400 mg tablet   No No   Sig: TAKE 1 TABLET BY MOUTH EVERY 8 HOURS AS NEEDED FOR MILD/MOD PAIN OR HEADACHES   ketoconazole (NIZORAL) 2 % cream   No No   Sig: Apply topically daily   lamoTRIgine (LaMICtal) 100 mg tablet   Yes No   lamoTRIgine (LaMICtal) 25 mg tablet   Yes No   Sig: Take 25 mg by mouth 2 (two) times a day     lidocaine (LMX) 4 % cream   No No   Sig: Apply topically as needed for mild pain   lubiprostone (Amitiza) 24 mcg capsule   No No   Sig: Take 1 capsule (24 mcg total) by mouth daily with breakfast   magnesium hydroxide (GNP Milk of Magnesia) 400 mg/5 mL oral suspension   No No   Si TBSP (30ML) BY MOUTH DAILY AS NEEDED IF NO BM IN 3 DAYS (CONSTIPATION)   metoprolol tartrate (LOPRESSOR) 25 mg tablet   No No   Sig: Take 1 tablet (25 mg total) by mouth 2 (two) times a day   mineral oil-hydrophilic petrolatum (AQUAPHOR) ointment   No No   Sig: Apply topically as needed for dry skin   norgestimate-ethinyl estradiol (ORTHO-CYCLEN) 0 25-35 MG-MCG per tablet   Yes No   Sig: Take 1 tablet by mouth in the morning     norgestimate-ethinyl estradiol (ORTHO-CYCLEN) 0 25-35 MG-MCG per tablet   No No   Sig: Take 1 tablet by mouth daily   nystatin (MYCOSTATIN) powder   No No   Sig: Apply 1 application topically 4 (four) times a day   nystatin-triamcinolone (MYCOLOG-II) cream   No No   Sig: Apply topically 2 (two) times a day   pantoprazole (PROTONIX) 20 mg tablet   No No   Sig: Take 1 tablet (20 mg total) by mouth daily   polyethylene glycol (MIRALAX) 17 g packet   No No   Sig: Take one packet daily as needed for no bowel movement in 24 hours   psyllium (METAMUCIL) 58 6 % packet   No No   Sig: Take 1 packet by mouth daily   senna-docusate sodium (Senexon-S) 8 6-50 mg per tablet   No No   Sig: Take 1 tablet by mouth daily at 8am    sodium chloride (OCEAN) 0 65 % nasal spray   No No   Si spray into each nostril as needed (three times daily as needed for nasal congestion)   trospium chloride (SANCTURA) 20 mg tablet   No No   Sig: Take 1 tablet (20 mg total) by mouth 2 (two) times a day   zinc oxide (DESITIN) 13 % cream   No No   Sig: Apply 1 application topically as needed (for perianal irritation)      Facility-Administered Medications: None       Past Medical History:   Diagnosis Date   • ADD (attention deficit disorder)    • Anxiety    • Astigmatism    • Brain lesion    • Calcium deficiency    • Cellulitis of foot, right 2018   • Cerebral palsy (HCC)    • Chronic otitis media    • Constipation    • Depression    • Dysphagia    • Esophagitis    • Esotropia    • GERD (gastroesophageal reflux disease)    • Hiatal hernia    • Hydrocephalus (HCC)    • Impaired fasting glucose    • Left nephrolithiasis 2019   • Myopia    • Oppositional defiant disorder    • 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HCC)    • Seizures (HCC)    • Sensorineural hearing loss    • Sleep apnea    • Status post ventriculoatrial shunt placement    • Visual impairment        Past Surgical History:   Procedure Laterality Date   • BREAST BIOPSY Left     X 2 (not sure of years)   • CSF SHUNT      Creation of Ventriculo-Peritoneal CSF shunt ; Last Assessed:2016   • EAR SURGERY      Last Assessed:2016   • LEG SURGERY      due to CP    • NOSE SURGERY      Last Assessed:2016   • IL ESOPHAGOGASTRODUODENOSCOPY TRANSORAL DIAGNOSTIC N/A 2019    Procedure: ESOPHAGOGASTRODUODENOSCOPY (EGD) with biopsy;  Surgeon: Emmit Sandhoff, MD;  Location: [facility];   Service: Gastroenterology   • UPPER GASTROINTESTINAL ENDOSCOPY  05/2019       Family History   Problem Relation Age of Onset   • Diabetes Mother    • Colon cancer Father    • No Known Problems Maternal Grandmother    • No Known Problems Maternal Grandfather    • No Known Problems Paternal Grandmother    • No Known Problems Paternal Grandfather    • Hypertension Neg Hx    • Heart disease Neg Hx    • Stroke Neg Hx    • Thyroid disease Neg Hx      I have reviewed and agree with the history as documented  E-Cigarette/Vaping   • E-Cigarette Use Never User      E-Cigarette/Vaping Substances   • Nicotine No    • THC No    • CBD No    • Flavoring No    • Other No    • Unknown No      Social History     Tobacco Use   • Smoking status: Never   • Smokeless tobacco: Never   Vaping Use   • Vaping Use: Never used   Substance Use Topics   • Alcohol use: Never   • Drug use: Never       Review of Systems   Constitutional: Negative for diaphoresis, fatigue and fever  HENT: Negative for congestion, ear pain, nosebleeds and sore throat  Eyes: Negative for photophobia, pain, discharge and visual disturbance  Respiratory: Positive for cough and shortness of breath  Negative for choking, chest tightness and wheezing  Cardiovascular: Positive for chest pain  Negative for palpitations  Gastrointestinal: Negative for abdominal distention, abdominal pain, diarrhea and vomiting  Genitourinary: Negative for dysuria, flank pain and frequency  Musculoskeletal: Negative for back pain, gait problem and joint swelling  Skin: Negative for color change and rash  Neurological: Negative for dizziness, syncope and headaches  Psychiatric/Behavioral: Negative for behavioral problems and confusion  The patient is not nervous/anxious  All other systems reviewed and are negative  Physical Exam  Physical Exam  Vitals and nursing note reviewed  Constitutional:       General: She is not in acute distress  Appearance: Normal appearance   She is not ill-appearing, toxic-appearing or diaphoretic  HENT:      Head: Normocephalic and atraumatic  Right Ear: Tympanic membrane, ear canal and external ear normal       Left Ear: Tympanic membrane, ear canal and external ear normal       Nose: Nose normal  No congestion or rhinorrhea  Mouth/Throat:      Mouth: Mucous membranes are dry  Pharynx: Oropharynx is clear  No oropharyngeal exudate or posterior oropharyngeal erythema  Eyes:      Extraocular Movements: Extraocular movements intact  Conjunctiva/sclera: Conjunctivae normal       Pupils: Pupils are equal, round, and reactive to light  Cardiovascular:      Rate and Rhythm: Normal rate and regular rhythm  Pulmonary:      Effort: Pulmonary effort is normal  No respiratory distress  Breath sounds: Normal breath sounds  Abdominal:      General: Bowel sounds are normal       Palpations: Abdomen is soft  Tenderness: There is no abdominal tenderness  Musculoskeletal:         General: No tenderness, deformity or signs of injury  Normal range of motion  Cervical back: Normal range of motion and neck supple  No rigidity or tenderness  Right lower leg: Edema present  Left lower leg: Edema present  Comments: No sign of DVT lower extremities   Lymphadenopathy:      Cervical: No cervical adenopathy  Skin:     General: Skin is warm and dry  Capillary Refill: Capillary refill takes less than 2 seconds  Findings: No rash  Neurological:      General: No focal deficit present  Mental Status: She is alert  Mental status is at baseline     Psychiatric:         Mood and Affect: Mood normal          Behavior: Behavior normal          Vital Signs  ED Triage Vitals   Temperature Pulse Respirations Blood Pressure SpO2   12/12/22 0908 12/12/22 0908 12/12/22 0908 12/12/22 0908 12/12/22 0908   97 6 °F (36 4 °C) 99 18 143/85 99 %      Temp Source Heart Rate Source Patient Position - Orthostatic VS BP Location FiO2 (%) 12/12/22 0908 12/12/22 0908 12/12/22 0908 12/12/22 0908 --   Oral Monitor Lying Left arm       Pain Score       12/12/22 1044       No Pain           Vitals:    12/12/22 0908 12/12/22 1044 12/12/22 1145 12/12/22 1238   BP: 143/85 142/84 142/61 139/78   Pulse: 99 87 88 94   Patient Position - Orthostatic VS: Lying Lying Lying Lying         Visual Acuity      ED Medications  Medications - No data to display    Diagnostic Studies  Results Reviewed     Procedure Component Value Units Date/Time    HS Troponin I 2hr [[id number]] Collected: 12/12/22 1141    Lab Status: Final result Specimen: Blood from Arm, Left Updated: 12/12/22 1210     hs TnI 2hr <2 ng/L      Delta 2hr hsTnI --    NT-BNP PRO [[id number]]  (Normal) Collected: 12/12/22 0946    Lab Status: Final result Specimen: Blood from Arm, Left Updated: 12/12/22 1201     NT-proBNP 111 pg/mL     FLU/RSV/COVID - if FLU/RSV clinically relevant [[id number]]  (Normal) Collected: 12/12/22 0946    Lab Status: Final result Specimen: Nares from Nose Updated: 12/12/22 1033     SARS-CoV-2 Negative     INFLUENZA A PCR Negative     INFLUENZA B PCR Negative     RSV PCR Negative    Narrative:      FOR PEDIATRIC PATIENTS - copy/paste COVID Guidelines URL to browser: [email or web address]    SARS-CoV-2 assay is a Nucleic Acid Amplification assay intended for the  qualitative detection of nucleic acid from SARS-CoV-2 in nasopharyngeal  swabs  Results are for the presumptive identification of SARS-CoV-2 RNA  Positive results are indicative of infection with SARS-CoV-2, the virus  causing COVID-19, but do not rule out bacterial infection or co-infection  with other viruses  Laboratories within the [place] and its  territories are required to report all positive results to the appropriate  public health authorities   Negative results do not preclude SARS-CoV-2  infection and should not be used as the sole basis for treatment or other  patient management decisions  Negative results must be combined with  clinical observations, patient history, and epidemiological information  This test has not been FDA cleared or approved  This test has been authorized by FDA under an Emergency Use Authorization  (EUA)  This test is only authorized for the duration of time the  declaration that circumstances exist justifying the authorization of the  emergency use of an in vitro diagnostic tests for detection of SARS-CoV-2  virus and/or diagnosis of COVID-19 infection under section 564(b)(1) of  the Act, 21 U  S C  001XTY-7(B)(5), unless the authorization is terminated  or revoked sooner  The test has been validated but independent review by FDA  and CLIA is pending  Test performed using [employer] GeneXpert: This RT-PCR assay targets N2,  a region unique to SARS-CoV-2  A conserved region in the E-gene was chosen  for pan-Sarbecovirus detection which includes SARS-CoV-2  According to CMS-2020-01-R, this platform meets the definition of high-throughput technology      HS Troponin 0hr (reflex protocol) [[id number]]  (Normal) Collected: 12/12/22 0946    Lab Status: Final result Specimen: Blood from Arm, Left Updated: 12/12/22 1023     hs TnI 0hr <2 ng/L     Comprehensive metabolic panel [[id number]]  (Abnormal) Collected: 12/12/22 0946    Lab Status: Final result Specimen: Blood from Arm, Left Updated: 12/12/22 1022     Sodium 138 mmol/L      Potassium 3 9 mmol/L      Chloride 104 mmol/L      CO2 26 mmol/L      ANION GAP 8 mmol/L      BUN 10 mg/dL      Creatinine 0 87 mg/dL      Glucose 98 mg/dL      Calcium 8 4 mg/dL      Corrected Calcium 9 1 mg/dL      AST 17 U/L      ALT 19 U/L      Alkaline Phosphatase 150 U/L      Total Protein 7 5 g/dL      Albumin 3 1 g/dL      Total Bilirubin 0 17 mg/dL      eGFR 81 ml/min/1 73sq m     Narrative:      [place] guidelines for Chronic Kidney Disease (CKD):   •  Stage 1 with normal or high GFR (GFR > 90 mL/min/1 73 square meters)  •  Stage 2 Mild CKD (GFR = 60-89 mL/min/1 73 square meters)  •  Stage 3A Moderate CKD (GFR = 45-59 mL/min/1 73 square meters)  •  Stage 3B Moderate CKD (GFR = 30-44 mL/min/1 73 square meters)  •  Stage 4 Severe CKD (GFR = 15-29 mL/min/1 73 square meters)  •  Stage 5 End Stage CKD (GFR <15 mL/min/1 73 square meters)  Note: GFR calculation is accurate only with a steady state creatinine    CBC and differential [[id number]]  (Abnormal) Collected: 12/12/22 0946    Lab Status: Final result Specimen: Blood from Arm, Left Updated: 12/12/22 0955     WBC 8 21 Thousand/uL      RBC 4 37 Million/uL      Hemoglobin 13 5 g/dL      Hematocrit 41 0 %      MCV 94 fL      MCH 30 9 pg      MCHC 32 9 g/dL      RDW 13 9 %      MPV 8 8 fL      Platelets 726 Thousands/uL      nRBC 0 /100 WBCs      Neutrophils Relative 73 %      Immat GRANS % 0 %      Lymphocytes Relative 19 %      Monocytes Relative 7 %      Eosinophils Relative 1 %      Basophils Relative 0 %      Neutrophils Absolute 5 95 Thousands/µL      Immature Grans Absolute 0 03 Thousand/uL      Lymphocytes Absolute 1 58 Thousands/µL      Monocytes Absolute 0 54 Thousand/µL      Eosinophils Absolute 0 09 Thousand/µL      Basophils Absolute 0 02 Thousands/µL                  XR chest 1 view portable   ED Interpretation by Neftaly Rodríguez PA-C (12/12 1240)   Unchanged from previous      Final Result by Sita Braun MD (12/12 1305)      No acute cardiopulmonary disease                    Workstation performed: [id number]                    Procedures  Procedures         ED Course             HEART Risk Score    Flowsheet Row Most Recent Value   Heart Score Risk Calculator    History 0 Filed at: 12/12/2022 1138   ECG 0 Filed at: 12/12/2022 1138   Age 0 Filed at: 12/12/2022 1138   Risk Factors 2 Filed at: 12/12/2022 1138   Troponin 0 Filed at: 12/12/2022 1138   HEART Score 2 Filed at: 12/12/2022 1138                [address]  Rule for PE Flowsheet Row Most Recent Value   PERC Rule for PE    Age >=50 0 Filed at: 12/12/2022 1138   HR >=100 0 Filed at: 12/12/2022 1138   O2 Sat on room air < 95% 0 Filed at: 12/12/2022 1138   History of PE or DVT 0 Filed at: 12/12/2022 1138   Recent trauma or surgery 0 Filed at: 12/12/2022 1138   Hemoptysis 0 Filed at: 12/12/2022 1138   Exogenous estrogen 0 Filed at: 12/12/2022 1138   Unilateral leg swelling 0 Filed at: 12/12/2022 1138   [address] for PE Results 0 Filed at: 12/12/2022 1138                            MDM  Number of Diagnoses or Management Options  Chest pain: new and requires workup  Cough: new and requires workup  Diagnosis management comments: 69-year-old female patient by group home who presents for a cough that started approximately 2 minutes before arrival and suppose a chest pain  She is nontoxic in no acute distress she has a moderate intellectual delay  Negative COVID and negative flu negative troponin x2 negative EKG x2  No objective findings heart score 2  Patient stable for discharge      Disposition  Final diagnoses:   Cough   Chest pain     Time reflects when diagnosis was documented in both MDM as applicable and the Disposition within this note     Time User Action Codes Description Comment    12/12/2022 12:16 PM Natalie Ricci Add [R05 9] Cough     12/12/2022 12:16 PM Tammy [address] [place] [address] [R07 9] Chest pain       ED Disposition     ED Disposition   Discharge    Condition   Stable    Date/Time   Mon Dec 12, 2022 12:16 PM    Comment   Lamar Bound discharge to home/self care                 Follow-up Information     Follow up With Specialties Details Why Contact Info [facility] Schedule an appointment as soon as possible for a visit   Frank [id number]  [address] [place] [address] [facility] [address], [place], [facility], [phone]    [facility] [address] [place] [address] Family Medicine Schedule an appointment as soon as possible for a visit   So Jose Armando[address] [facility]  [place] [address] [facility]  [phone]             Discharge Medication List as of 12/12/2022 12:17 PM      CONTINUE these medications which have NOT CHANGED    Details   acetaminophen (TYLENOL) 500 mg tablet Take 1 tablet (500 mg total) by mouth every 6 (six) hours as needed for mild pain, Starting Mon 3/14/2022, Normal      aluminum-magnesium hydroxide-simethicone (GNP Antacid & Anti-Gas) 2765-9544-466 mg/30 mL suspension Take 15 mL by mouth every 4 (four) hours as needed for indigestion or heartburn, Starting Wed 10/5/2022, Normal      amitriptyline (ELAVIL) 10 mg tablet Take 10 mg by mouth daily at bedtime, Historical Med      ARIPiprazole (ABILIFY) 20 MG tablet Take 1 tablet (20 mg total) by mouth daily at bedtime, Starting Thu 10/15/2020, Normal      bacitracin topical ointment 500 units/g topical ointment Cleanse site on nose with soap and water followed by bacitracin BID until healed then p r n , Normal      bismuth subsalicylate (PEPTO BISMOL) 524 mg/30 mL oral suspension Take 15 mL (262 mg total) by mouth every 6 (six) hours as needed for indigestion, Starting Tue 2/22/2022, Normal      calcium carbonate (TUMS) 500 mg chewable tablet Chew 1 tablet (500 mg total) 3 (three) times a day as needed for heartburn, Starting Tue 10/26/2021, Normal      carbamide peroxide (DEBROX) 6 5 % otic solution Administer 5 drops into the left ear 2 (two) times a day Use 1 week prior to ENT appointment  Also can use 4 gtts twice weekly (Tuesday and Friday for example) to each ear for prevention  Keep hearing aid out x 10 minutes after ear drops administered  ,  Starting Fri 10/14/2022, Normal      D3-1000 25 MCG (1000 UT) tablet Take 2 tablets (2,000 Units total) by mouth daily, Starting Fri 10/7/2022, Normal      Diclofenac Sodium (VOLTAREN) 1 % Apply 2 g topically 4 (four) times a day, Starting Tue 4/5/2022, Normal      dicyclomine (BENTYL) 20 mg tablet TAKE 1 TABLET BY MOUTH EVERY 6 HOURS AS NEEDED (DYSPHAGIA), Normal      Dyclonine-Glycerin (Cepacol Sore Throat Spray) 0 1-33 % LIQD Apply 1 spray to the mouth or throat 3 (three) times a day as needed (sorethroat), Starting Tue 10/26/2021, Normal      Elastic Bandages & Supports (Neoprene Knee Brace) MISC Apply right knee brace in morning; remove at night, Normal      escitalopram (LEXAPRO) 20 mg tablet Take 1 tablet (20 mg total) by mouth daily, Starting Thu 10/15/2020, Normal      !!  Estarylla 0 25-35 MG-MCG per tablet TAKE 1 TAB BY MOUTH DAILY AT 8AM (BIRTH CONTROL), Normal      fluticasone (FLONASE) 50 mcg/act nasal spray 1 spray into each nostril daily as needed for rhinitis (nasal congestion) At 8:00 AM, Starting Fri 6/11/2021, Normal      GNP Sore Throat Spray 1 4 % mucosal liquid APPLY 1 SPRAY TO MOUTH OR THROAT EVERY 2 HRS AS NEEDED FOR SORE THROAT, Normal      GNP Stomach Relief Max St 525 MG/15ML SUSP Starting Tue 2/22/2022, Historical Med      hydrOXYzine HCL (ATARAX) 10 mg tablet Take 1 tablet (10 mg total) by mouth 3 (three) times a day, Starting Tue 10/12/2021, Normal      ibuprofen (MOTRIN) 400 mg tablet TAKE 1 TABLET BY MOUTH EVERY 8 HOURS AS NEEDED FOR MILD/MOD PAIN OR HEADACHES, Normal      Incontinence Supply Disposable (Wings Choice Plus Adult Briefs) MISC Use as directed (R39 81), Normal      ketoconazole (NIZORAL) 2 % cream Apply topically daily, Starting Tue 8/16/2022, Normal      Konsyl Daily Fiber 28 3 % Starting Sat 10/2/2021, Historical Med      !! lamoTRIgine (LaMICtal) 100 mg tablet Starting Wed 12/8/2021, Historical Med      !! lamoTRIgine (LaMICtal) 25 mg tablet Take 25 mg by mouth 2 (two) times a day  , Starting Tue 8/11/2020, Historical Med      lidocaine (LMX) 4 % cream Apply topically as needed for mild pain, Starting Mon 9/19/2022, Normal      LORazepam (ATIVAN) 0 5 mg tablet Take 0 25 mg by mouth 2 (two) times a day, Historical Med      lubiprostone (Amitiza) 24 mcg capsule Take 1 capsule (24 mcg total) by mouth daily with breakfast, Starting Tue 8/16/2022, Normal      magnesium hydroxide (GNP Milk of Magnesia) 400 mg/5 mL oral suspension 2 TBSP (30ML) BY MOUTH DAILY AS NEEDED IF NO BM IN 3 DAYS (CONSTIPATION), Normal      metoprolol tartrate (LOPRESSOR) 25 mg tablet Take 1 tablet (25 mg total) by mouth 2 (two) times a day, Starting Tue 8/16/2022, Normal      mineral oil-hydrophilic petrolatum (AQUAPHOR) ointment Apply topically as needed for dry skin, Starting Wed 10/5/2022, Normal      Mouthwashes (Listerine Antiseptic) LIQD Swish and spit 5 mL 2 (two) times a day, Starting Thu 5/12/2022, Until Fri 6/17/2022, Normal      Multiple Vitamins-Minerals (CertaVite Senior/Antioxidant) TABS Take 1 tablet by mouth daily, Starting Thu 7/7/2022, Normal      !! norgestimate-ethinyl estradiol (ORTHO-CYCLEN) 0 25-35 MG-MCG per tablet Take 1 tablet by mouth in the morning , Historical Med      !! norgestimate-ethinyl estradiol (ORTHO-CYCLEN) 0 25-35 MG-MCG per tablet Take 1 tablet by mouth daily, Starting Fri 5/27/2022, Normal      nystatin (MYCOSTATIN) powder Apply 1 application topically 4 (four) times a day, Starting Fri 8/19/2022, Normal      nystatin-triamcinolone (MYCOLOG-II) cream Apply topically 2 (two) times a day, Starting Tue 8/16/2022, Normal      pantoprazole (PROTONIX) 20 mg tablet Take 1 tablet (20 mg total) by mouth daily, Starting Tue 8/16/2022, Normal      polyethylene glycol (MIRALAX) 17 g packet Take one packet daily as needed for no bowel movement in 24 hours, Normal      psyllium (METAMUCIL) 58 6 % packet Take 1 packet by mouth daily, Starting Thu 12/1/2022, Normal      RA Sunscreen SPF50 LOTN Apply 15 minutes before sun exposure and every 2 hours, Normal      senna-docusate sodium (Senexon-S) 8 6-50 mg per tablet Take 1 tablet by mouth daily at 8am , Starting Tue 8/16/2022, Normal      sodium chloride (OCEAN) 0 65 % nasal spray 1 spray into each nostril as needed (three times daily as needed for nasal congestion), Starting Tue 8/16/2022, Normal      trospium chloride (SANCTURA) 20 mg tablet Take 1 tablet (20 mg total) by mouth 2 (two) times a day, Starting Tue 8/16/2022, Normal      zinc oxide (DESITIN) 13 % cream Apply 1 application topically as needed (for perianal irritation), Starting Tue 8/16/2022, Normal       !! - Potential duplicate medications found  Please discuss with provider  No discharge procedures on file      PDMP Review       Value Time User    PDMP Reviewed  Yes 10/6/2021  1:11 PM Kaleb Obando PA-C          ED Provider  Electronically Signed by           Adriana Francois PA-C  12/12/22 [address] [facility]  12/12/22 [id number]

## 2022-12-12 NOTE — DISCHARGE INSTRUCTIONS
Return with any worsening symptoms questions comments or concerns    Follow-up with your family doctor and cardiologist listed for ongoing care on re-evaluation

## 2022-12-16 DIAGNOSIS — Z30.09 UNWANTED FERTILITY: ICD-10-CM

## 2022-12-20 RX ORDER — NORGESTIMATE AND ETHINYL ESTRADIOL 0.25-0.035
1 KIT ORAL DAILY
Qty: 28 TABLET | Refills: 0 | Status: SHIPPED | OUTPATIENT
Start: 2022-12-20

## 2023-01-04 DIAGNOSIS — E55.9 VITAMIN D DEFICIENCY: ICD-10-CM

## 2023-01-05 ENCOUNTER — OFFICE VISIT (OUTPATIENT)
Dept: GASTROENTEROLOGY | Facility: MEDICAL CENTER | Age: 43
End: 2023-01-05

## 2023-01-05 VITALS
DIASTOLIC BLOOD PRESSURE: 66 MMHG | HEART RATE: 76 BPM | TEMPERATURE: 99 F | SYSTOLIC BLOOD PRESSURE: 122 MMHG | BODY MASS INDEX: 46.21 KG/M2 | WEIGHT: 206.1 LBS

## 2023-01-05 DIAGNOSIS — K21.9 GASTROESOPHAGEAL REFLUX DISEASE WITHOUT ESOPHAGITIS: ICD-10-CM

## 2023-01-05 DIAGNOSIS — K59.04 CHRONIC IDIOPATHIC CONSTIPATION: ICD-10-CM

## 2023-01-05 DIAGNOSIS — K56.600 PARTIAL SMALL BOWEL OBSTRUCTION (HCC): ICD-10-CM

## 2023-01-05 DIAGNOSIS — F45.8 FUNCTIONAL DYSPHAGIA: Primary | ICD-10-CM

## 2023-01-05 RX ORDER — FOLIC ACID/MV,IRON,MIN/LUTEIN 0.4-18-25
TABLET ORAL
Qty: 31 TABLET | Refills: 0 | Status: SHIPPED | OUTPATIENT
Start: 2023-01-05

## 2023-01-05 NOTE — PROGRESS NOTES
[facility]  Kari Robb 36 y o  female MRN: [id number]  Encounter: [id number]          ASSESSMENT AND PLAN:      1  Functional dysphagia  2  Gastroesophageal reflux disease without esophagitis: hx of dysphagia, fortunately improved  She has had barium swallow, EGD and manometry that have been normal  This is likely functional in nature  She is on protonix 20mg bid for her acid reflux which is controlling her symptoms well  Recent CT in the ER with small hiatal hernia  -continue protonix 20mg bid  -Pepcid PRN    3  Chronic idiopathic constipation  4  Partial small bowel obstruction (HCC):hx of recurrent partial SBO  The last time this occurred was in 10/2021 confirmed on CT scan  She did not tolerate NGT insertion and she was treated conservatively at that time  Fortunately she remains feeling well  She is having regular bowel movements on her bowel regimen with daily fiber and miralax prn and denies any abdominal pain  If this occurs again, consider small bowel CT enterography   -resolved  -continue current bowel regimen    ______________________________________________________________________    SUBJECTIVE:   Brent Traylor is a 35 yo female with pmh cerebral palsy, and acid reflux who is here for follow up  She presented to the ED 7/18 with acid reflux  This improved  She had CT a/p for generalized abdominal pain and this was negative  She admits that her symptoms have resolved and she is back to her baseline  She admits to having daily BMs and her acid reflux is controlled  Fortunately her dysphagia has resolved  She has had extensive work up for this including EGD, manometry and barium swallow which were normal  She is currently on protonix 20mg bid and denies any symptoms a this time  She was most recently seen in the office as a hospital follow up for SBO  She went to the ED 10/8/21 with abdominal pain   She had a normal kub but CT showed partial small bowel obstruction  She could not tolerate NGT insertion and she was ultimately treated conservatively and discharged  She is on a bowel regimen with daily fiber with good movement of her bowels  She uses miralax prn         REVIEW OF SYSTEMS IS OTHERWISE NEGATIVE  Historical Information   Past Medical History:   Diagnosis Date   • ADD (attention deficit disorder)    • Anxiety    • Astigmatism    • Brain lesion    • Calcium deficiency    • Cellulitis of foot, right 6/4/2018   • Cerebral palsy (HCC)    • Chronic otitis media    • Constipation    • Depression    • Dysphagia    • Esophagitis    • Esotropia    • GERD (gastroesophageal reflux disease)    • Hiatal hernia    • Hydrocephalus (HCC)    • Impaired fasting glucose    • Left nephrolithiasis 03/04/2019   • Myopia    • Oppositional defiant disorder    • 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HCC)    • Seizures ([facility])    • Sensorineural hearing loss    • Sleep apnea    • Status post ventriculoatrial shunt placement    • Visual impairment      Past Surgical History:   Procedure Laterality Date   • BREAST BIOPSY Left     X 2 (not sure of years)   • CSF SHUNT      Creation of Ventriculo-Peritoneal CSF shunt ; Last Assessed:7/6/2016   • EAR SURGERY      Last Assessed:7/6/2016   • LEG SURGERY      due to CP    • NOSE SURGERY      Last Assessed:7/6/2016   • MT ESOPHAGOGASTRODUODENOSCOPY TRANSORAL DIAGNOSTIC N/A 5/9/2019    Procedure: ESOPHAGOGASTRODUODENOSCOPY (EGD) with biopsy;  Surgeon: Emmit Sandhoff, MD;  Location: [facility];   Service: Gastroenterology   • UPPER GASTROINTESTINAL ENDOSCOPY  05/2019     Social History   Social History     Substance and Sexual Activity   Alcohol Use Never     Social History     Substance and Sexual Activity   Drug Use Never     Social History     Tobacco Use   Smoking Status Never   Smokeless Tobacco Never     Family History   Problem Relation Age of Onset   • Diabetes Mother    • Colon cancer Father    • No Known Problems Maternal Grandmother    • No Known Problems Maternal Grandfather    • No Known Problems Paternal Grandmother    • No Known Problems Paternal Grandfather    • Hypertension Neg Hx    • Heart disease Neg Hx    • Stroke Neg Hx    • Thyroid disease Neg Hx        Meds/Allergies       Current Outpatient Medications:   •  acetaminophen (TYLENOL) 500 mg tablet  •  aluminum-magnesium hydroxide-simethicone (GNP Antacid & Anti-Gas) 7855-8200-905 mg/30 mL suspension  •  amitriptyline (ELAVIL) 10 mg tablet  •  ARIPiprazole (ABILIFY) 20 MG tablet  •  bacitracin topical ointment 500 units/g topical ointment  •  bismuth subsalicylate (PEPTO BISMOL) 524 mg/30 mL oral suspension  •  calcium carbonate (TUMS) 500 mg chewable tablet  •  carbamide peroxide (DEBROX) 6 5 % otic solution  •  D3-1000 25 MCG (1000 UT) tablet  •  Diclofenac Sodium (VOLTAREN) 1 %  •  dicyclomine (BENTYL) 20 mg tablet  •  Dyclonine-Glycerin (Cepacol Sore Throat Spray) 0 1-33 % LIQD  •  Elastic Bandages & Supports (Neoprene Knee Brace) MISC  •  escitalopram (LEXAPRO) 20 mg tablet  •  fluticasone (FLONASE) 50 mcg/act nasal spray  •  GNP Sore Throat Spray 1 4 % mucosal liquid  •  GNP Stomach Relief Max St 525 MG/15ML SUSP  •  hydrOXYzine HCL (ATARAX) 10 mg tablet  •  ibuprofen (MOTRIN) 400 mg tablet  •  Incontinence Supply Disposable (Wings Choice Plus Adult Briefs) MISC  •  ketoconazole (NIZORAL) 2 % cream  •  Konsyl Daily Fiber 28 3 %  •  lamoTRIgine (LaMICtal) 100 mg tablet  •  lamoTRIgine (LaMICtal) 25 mg tablet  •  lidocaine (LMX) 4 % cream  •  LORazepam (ATIVAN) 0 5 mg tablet  •  lubiprostone (Amitiza) 24 mcg capsule  •  magnesium hydroxide (GNP Milk of Magnesia) 400 mg/5 mL oral suspension  •  metoprolol tartrate (LOPRESSOR) 25 mg tablet  •  mineral oil-hydrophilic petrolatum (AQUAPHOR) ointment  •  Mouthwashes (Listerine Antiseptic) LIQD  •  Multiple Vitamins-Minerals (CertaVite Senior/Antioxidant) TABS  •  norgestimate-ethinyl estradiol (Estarylla) 0 25-35 MG-MCG per tablet  •  norgestimate-ethinyl estradiol (ORTHO-CYCLEN) 0 25-35 MG-MCG per tablet  •  norgestimate-ethinyl estradiol (ORTHO-CYCLEN) 0 25-35 MG-MCG per tablet  •  nystatin (MYCOSTATIN) powder  •  nystatin-triamcinolone (MYCOLOG-II) cream  •  pantoprazole (PROTONIX) 20 mg tablet  •  polyethylene glycol (MIRALAX) 17 g packet  •  psyllium (METAMUCIL) 58 6 % packet  •  RA Sunscreen SPF50 LOTN  •  senna-docusate sodium (Senexon-S) 8 6-50 mg per tablet  •  sodium chloride (OCEAN) 0 65 % nasal spray  •  trospium chloride (SANCTURA) 20 mg tablet  •  zinc oxide (DESITIN) 13 % cream    No Known Allergies        Objective     not currently breastfeeding  There is no height or weight on file to calculate BMI  PHYSICAL EXAM:      General Appearance:   Alert, cooperative, no distress   HEENT:   Normocephalic, atraumatic, anicteric      Neck:  Supple, symmetrical, trachea midline   Lungs:   Clear to auscultation bilaterally; no rales, rhonchi or wheezing; respirations unlabored    Heart[de-identified]   Regular rate and rhythm; no murmur, rub, or gallop  Abdomen:   Soft, non-tender, non-distended; normal bowel sounds; no masses, no organomegaly    Genitalia:   Deferred    Rectal:   Deferred    Extremities:  No cyanosis, clubbing or edema        Skin:  No jaundice, rashes, or lesions          Lab Results:   No visits with results within 1 Day(s) from this visit     Latest known visit with results is:   Admission on 12/12/2022, Discharged on 12/12/2022   Component Date Value   • Ventricular Rate 12/12/2022 97    • Atrial Rate 12/12/2022 97    • CT Interval 12/12/2022 134    • QRSD Interval 12/12/2022 70    • QT Interval 12/12/2022 344    • QTC Interval 12/12/2022 436    • P Axis 12/12/2022 51    • QRS Axis 12/12/2022 27    • T Wave Axis 12/12/2022 44    • WBC 12/12/2022 8 21    • RBC 12/12/2022 4 37    • Hemoglobin 12/12/2022 13 5    • Hematocrit 12/12/2022 41 0    • MCV 12/12/2022 94    • MCH 12/12/2022 30 9    • MCHC 12/12/2022 32 9 • RDW 12/12/2022 13 9    • MPV 12/12/2022 8 8 (L)    • Platelets 05/61/4358 271    • nRBC 12/12/2022 0    • Neutrophils Relative 12/12/2022 73    • Immat GRANS % 12/12/2022 0    • Lymphocytes Relative 12/12/2022 19    • Monocytes Relative 12/12/2022 7    • Eosinophils Relative 12/12/2022 1    • Basophils Relative 12/12/2022 0    • Neutrophils Absolute 12/12/2022 5 95    • Immature Grans Absolute 12/12/2022 0 03    • Lymphocytes Absolute 12/12/2022 1 58    • Monocytes Absolute 12/12/2022 0 54    • Eosinophils Absolute 12/12/2022 0 09    • Basophils Absolute 12/12/2022 0 02    • Sodium 12/12/2022 138    • Potassium 12/12/2022 3 9    • Chloride 12/12/2022 104    • CO2 12/12/2022 26    • ANION GAP 12/12/2022 8    • BUN 12/12/2022 10    • Creatinine 12/12/2022 0 87    • Glucose 12/12/2022 98    • Calcium 12/12/2022 8 4    • Corrected Calcium 12/12/2022 9 1    • AST 12/12/2022 17    • ALT 12/12/2022 19    • Alkaline Phosphatase 12/12/2022 150 (H)    • Total Protein 12/12/2022 7 5    • Albumin 12/12/2022 3 1 (L)    • Total Bilirubin 12/12/2022 0 17 (L)    • eGFR 12/12/2022 81    • hs TnI 0hr 12/12/2022 <2    • SARS-CoV-2 12/12/2022 Negative    • INFLUENZA A PCR 12/12/2022 Negative    • INFLUENZA B PCR 12/12/2022 Negative    • RSV PCR 12/12/2022 Negative    • NT-proBNP 12/12/2022 111    • hs TnI 2hr 12/12/2022 <2    • Delta 2hr hsTnI 12/12/2022     • Ventricular Rate 12/12/2022 85    • Atrial Rate 12/12/2022 85    • NH Interval 12/12/2022 136    • QRSD Interval 12/12/2022 66    • QT Interval 12/12/2022 344    • QTC Interval 12/12/2022 409    • P Axis 12/12/2022 59    • QRS Axis 12/12/2022 29    • T Wave Axis 12/12/2022 47          Radiology Results:   XR chest 1 view portable    Result Date: 12/12/2022  Narrative: CHEST INDICATION:   cough/chest pain  COMPARISON:  MRI brain pituitary with and without contrast March 16, 2022  Chest radiograph December 7, 2022   EXAM PERFORMED/VIEWS:  XR CHEST PORTABLE Images:  1 FINDINGS: Unchanged partially imaged left ventriculoatrial shunt with calcific encrustations in left lower neck  Cardiomediastinal silhouette appears unremarkable  The lungs are clear  No pneumothorax or pleural effusion  Osseous structures appear within normal limits for patient age  Impression: No acute cardiopulmonary disease  Workstation performed: [id number]     XR chest 1 view portable    Result Date: 12/7/2022  Narrative: CHEST INDICATION:   chest pressure  COMPARISON:  1/2/2022 EXAM PERFORMED/VIEWS:  XR CHEST PORTABLE FINDINGS: Cardiomediastinal silhouette appears borderline enlarged with mild vascular congestion  Left line remains  No pneumothorax or pleural effusion  Osseous structures appear within normal limits for patient age  Impression: Borderline cardiomegaly  Mild vascular congestion  Workstation performed: [id number]     XR foot 3+ views RIGHT    Result Date: 12/7/2022  Narrative: RIGHT FOOT INDICATION:   swelling, pain  COMPARISON:  None VIEWS:  XR FOOT 3+ VW RIGHT Images: 4 FINDINGS: Bone mineralization within normal limits for patient's age  Shortening of the 3rd metatarsal  No acute fracture or dislocation  Mild diffuse degenerative changes  No lytic or blastic osseous lesion  Contracture deformities of the digits  Diffuse soft tissue swelling lower extremity especially dorsal forefoot  No soft tissue gas  Benign-appearing coarse calcifications in the region of the Achilles tendon  Impression: No acute osseous abnormality  Workstation performed: [id number]     VAS lower limb venous duplex study, unilateral/limited    Result Date: 12/7/2022  Narrative:  [facility] REPORT CLINICAL: Indications: Patient presents with right lower extremity edema  Operative History: No cardiovascular surgeries noted  Risk Factors The patient has history of Obesity and cerebral palsy  CONCLUSION:  Impression: RIGHT LOWER LIMB No gross evidence of acute deep vein thrombosis    No evidence of superficial thrombophlebitis noted  Doppler evaluation shows a normal response to augmentation maneuvers  Popliteal, posterior tibial and anterior tibial arterial Doppler waveforms are triphasic  LEFT LOWER LIMB LIMITED Evaluation shows no evidence of thrombus in the common femoral vein  Doppler evaluation shows a normal response to augmentation maneuvers  Technical findings were given to Dr Carly Malone    Technically difficult/limited study  SIGNATURE: Electronically Signed by: Adwoa Guzman on 2022-12-07 05:39:43 PM

## 2023-01-12 ENCOUNTER — OFFICE VISIT (OUTPATIENT)
Dept: SLEEP CENTER | Facility: CLINIC | Age: 43
End: 2023-01-12

## 2023-01-12 VITALS
DIASTOLIC BLOOD PRESSURE: 65 MMHG | BODY MASS INDEX: 46.12 KG/M2 | WEIGHT: 205 LBS | SYSTOLIC BLOOD PRESSURE: 136 MMHG | HEART RATE: 90 BPM | HEIGHT: 56 IN

## 2023-01-12 DIAGNOSIS — G47.19 EXCESSIVE DAYTIME SLEEPINESS: ICD-10-CM

## 2023-01-12 DIAGNOSIS — G47.61 PLMD (PERIODIC LIMB MOVEMENT DISORDER): Primary | ICD-10-CM

## 2023-01-12 PROBLEM — J30.89 NON-SEASONAL ALLERGIC RHINITIS: Status: RESOLVED | Noted: 2019-03-20 | Resolved: 2023-01-12

## 2023-01-12 RX ORDER — GABAPENTIN 100 MG/1
CAPSULE ORAL
Qty: 60 CAPSULE | Refills: 5 | Status: SHIPPED | OUTPATIENT
Start: 2023-01-12

## 2023-01-12 NOTE — ASSESSMENT & PLAN NOTE
Patient was noted to have periodic limb movement disorder on a sleep study that was obtained on 1/27/2022  The patient notes that she does move around a lot in her sleep and is feeling tired during the day  Gabapentin was ordered by Vianey Nguyen in July 2022  The patient took it for approximately 5 days and then stopped it due to the fact her caregiver felt she did not need it  Since she is still complaining of limb movements and daytime sleepiness, I advised her we can try the medication again, and she could take it for a longer period of time to see if it helped her symptoms  She will resume gabapentin therapy as soon as possible and follow-up in 3 months  I advised the caregiver to call sooner if she had any difficulty in taking the medication  Iron study was also obtained which showed a ferritin level of 24  Due to the patient's history of chronic constipation iron therapy was not started

## 2023-01-12 NOTE — ASSESSMENT & PLAN NOTE
Patient is experiencing excessive daytime sleepiness  It is possible if we treat her periodic limb movement disorder her daytime sleepiness could improve  Is also possible her daytime sleepiness could be result of side effects from her medications  In particular her Atarax and Ativan could potentially be causing time sleepiness  She could discuss these medications with the prescriber to see if she needs to take them regularly

## 2023-01-12 NOTE — PATIENT INSTRUCTIONS
Start taking your Neurontin 1 pill before bedtime for the next week and then increase to 2 pills before bedtime  We will follow back with you in 3 months unless you have any concerns call sooner  Hopefully this will decrease your limb movements at night which will make you feel more rested during the day  Nursing Support:  When: Monday through Friday 7A-5PM except holidays  Where: Our direct line is [phone]  If you are having a true emergency please call 911  In the event that the line is busy or it is after hours please leave a voice message and we will return your call  Please speak clearly, leaving your full name, birth date, best number to reach you and the reason for your call  Medication refills: We will need the name of the medication, the dosage, the ordering provider, whether you get a 30 or 90 day refill, and the pharmacy name and address  Medications will be ordered by the provider only  Nurses cannot call in prescriptions  Please allow 7 days for medication refills  Physician requested updates: If your provider requested that you call with an update after starting medication, please be ready to provide us the medication and dosage, what time you take your medication, the time you attempt to fall asleep, time you fall asleep, when you wake up, and what time you get out of bed  Sleep Study Results: We will contact you with sleep study results and/or next steps after the physician has reviewed your testing

## 2023-01-12 NOTE — PROGRESS NOTES
Progress Note - Hank Glenroy Doss 36 y o  female   :1980, MRN: [id number]  2023          Follow Up Evaluation / Problem:     Periodic limb movement disorder  Excessive daytime sleepiness      Thank you for the opportunity of participating in the evaluation and care of this patient in the Sleep Clinic at [facility]  HPI: Tara Oconnor is a 36y o  year old female who presents for follow-up of periodic limb movement disorder and excessive daytime sleepiness  She had a diagnostic sleep study performed on 2022 which did not show sleep apnea but did show periodic limb movement disorder  Iron panel was checked which showed a ferritin level of 24  Iron supplementation was not started due to the patient's longstanding history of constipation  Stephen Lowe recommended and ordered gabapentin 100 mg to be taken at bedtime for 1 week and then increase to 200 mg at bedtime  The patient took this medication for approximately 5 days when her caregiver from the group home called in and stated she was sleeping well and did not need the medication  Gabapentin was stopped at that time  She was not noted to have any bad side effects due to gabapentin  The caregiver that is with her today states she is not getting up out of the bed and appears to be sleeping when they do their nightly bed checks twice a night  However, the patient tells me she feels she is moving around in her sleep a lot and she feels excessively tired during the day          Current Outpatient Medications:   •  acetaminophen (TYLENOL) 500 mg tablet, Take 1 tablet (500 mg total) by mouth every 6 (six) hours as needed for mild pain, Disp: 30 tablet, Rfl: 5  •  aluminum-magnesium hydroxide-simethicone (GNP Antacid & Anti-Gas) 5396-4468-445 mg/30 mL suspension, Take 15 mL by mouth every 4 (four) hours as needed for indigestion or heartburn, Disp: 355 mL, Rfl: 0  •  amitriptyline (ELAVIL) 10 mg tablet, Take 10 mg by mouth daily at bedtime, Disp: , Rfl:   •  ARIPiprazole (ABILIFY) 20 MG tablet, Take 1 tablet (20 mg total) by mouth daily at bedtime, Disp: 90 tablet, Rfl: 2  •  bacitracin topical ointment 500 units/g topical ointment, Cleanse site on nose with soap and water followed by bacitracin BID until healed then p r n , Disp: 15 g, Rfl: 2  •  bismuth subsalicylate (PEPTO BISMOL) 524 mg/30 mL oral suspension, Take 15 mL (262 mg total) by mouth every 6 (six) hours as needed for indigestion, Disp: 360 mL, Rfl: 5  •  calcium carbonate (TUMS) 500 mg chewable tablet, Chew 1 tablet (500 mg total) 3 (three) times a day as needed for heartburn, Disp: 30 tablet, Rfl: 5  •  carbamide peroxide (DEBROX) 6 5 % otic solution, Administer 5 drops into the left ear 2 (two) times a day Use 1 week prior to ENT appointment  Also can use 4 gtts twice weekly (Tuesday and Friday for example) to each ear for prevention  Keep hearing aid out x 10 minutes after ear drops administered  , Disp: 15 mL, Rfl: 1  •  D3-1000 25 MCG (1000 UT) tablet, Take 2 tablets (2,000 Units total) by mouth daily, Disp: 62 tablet, Rfl: 5  •  Diclofenac Sodium (VOLTAREN) 1 %, Apply 2 g topically 4 (four) times a day, Disp: 50 g, Rfl: 0  •  dicyclomine (BENTYL) 20 mg tablet, TAKE 1 TABLET BY MOUTH EVERY 6 HOURS AS NEEDED (DYSPHAGIA), Disp: 120 tablet, Rfl: 0  •  Dyclonine-Glycerin (Cepacol Sore Throat Spray) 0 1-33 % LIQD, Apply 1 spray to the mouth or throat 3 (three) times a day as needed (sorethroat), Disp: 118 mL, Rfl: 5  •  Elastic Bandages & Supports (Neoprene Knee Brace) MISC, Apply right knee brace in morning; remove at night, Disp: 1 each, Rfl: 0  •  escitalopram (LEXAPRO) 20 mg tablet, Take 1 tablet (20 mg total) by mouth daily, Disp: 90 tablet, Rfl: 2  •  fluticasone (FLONASE) 50 mcg/act nasal spray, 1 spray into each nostril daily as needed for rhinitis (nasal congestion) At 8:00 AM, Disp: 18 2 mL, Rfl: 5  •  gabapentin (NEURONTIN) 100 mg capsule, Take 1 pill nightly for 1 week and then increase to 2 pills nightly, Disp: 60 capsule, Rfl: 5  •  GNP Sore Throat Spray 1 4 % mucosal liquid, APPLY 1 SPRAY TO MOUTH OR THROAT EVERY 2 HRS AS NEEDED FOR SORE THROAT, Disp: 177 mL, Rfl: 0  •  GNP Stomach Relief Max St 525 MG/15ML SUSP, , Disp: , Rfl:   •  hydrOXYzine HCL (ATARAX) 10 mg tablet, Take 1 tablet (10 mg total) by mouth 3 (three) times a day, Disp: 30 tablet, Rfl: 3  •  ibuprofen (MOTRIN) 400 mg tablet, TAKE 1 TABLET BY MOUTH EVERY 8 HOURS AS NEEDED FOR MILD/MOD PAIN OR HEADACHES, Disp: 30 tablet, Rfl: 0  •  Incontinence Supply Disposable (Wings Choice Plus Adult Briefs) MISC, Use as directed (R39 81), Disp: 60 each, Rfl: 0  •  ketoconazole (NIZORAL) 2 % cream, Apply topically daily, Disp: 30 g, Rfl: 5  •  Konsyl Daily Fiber 28 3 %, , Disp: , Rfl:   •  lamoTRIgine (LaMICtal) 100 mg tablet, , Disp: , Rfl:   •  lamoTRIgine (LaMICtal) 25 mg tablet, Take 25 mg by mouth 2 (two) times a day  , Disp: , Rfl:   •  lidocaine (LMX) 4 % cream, Apply topically as needed for mild pain, Disp: 30 g, Rfl: 0  •  LORazepam (ATIVAN) 0 5 mg tablet, Take 0 25 mg by mouth 2 (two) times a day, Disp: , Rfl:   •  lubiprostone (Amitiza) 24 mcg capsule, Take 1 capsule (24 mcg total) by mouth daily with breakfast, Disp: 60 capsule, Rfl: 5  •  magnesium hydroxide (GNP Milk of Magnesia) 400 mg/5 mL oral suspension, 2 TBSP (30ML) BY MOUTH DAILY AS NEEDED IF NO BM IN 3 DAYS (CONSTIPATION), Disp: 355 mL, Rfl: 0  •  metoprolol tartrate (LOPRESSOR) 25 mg tablet, Take 1 tablet (25 mg total) by mouth 2 (two) times a day, Disp: 60 tablet, Rfl: 5  •  mineral oil-hydrophilic petrolatum (AQUAPHOR) ointment, Apply topically as needed for dry skin, Disp: 420 g, Rfl: 5  •  Multiple Vitamins-Minerals (CertaVite/Antioxidants) TABS, TAKE 1 TABLET BY MOUTH DAILY AT 8AM (SUPPLEMENT) *MOHINI, Disp: 31 tablet, Rfl: 0  •  norgestimate-ethinyl estradiol (Estarylla) 0 25-35 MG-MCG per tablet, Take 1 tablet by mouth daily, Disp: 28 tablet, Rfl: 0  •  norgestimate-ethinyl estradiol (ORTHO-CYCLEN) 0 25-35 MG-MCG per tablet, Take 1 tablet by mouth in the morning , Disp: , Rfl:   •  norgestimate-ethinyl estradiol (ORTHO-CYCLEN) 0 25-35 MG-MCG per tablet, Take 1 tablet by mouth daily, Disp: 28 tablet, Rfl: 1  •  nystatin (MYCOSTATIN) powder, Apply 1 application topically 4 (four) times a day, Disp: 45 g, Rfl: 5  •  nystatin-triamcinolone (MYCOLOG-II) cream, Apply topically 2 (two) times a day, Disp: 60 g, Rfl: 2  •  pantoprazole (PROTONIX) 20 mg tablet, Take 1 tablet (20 mg total) by mouth daily, Disp: 62 tablet, Rfl: 5  •  polyethylene glycol (MIRALAX) 17 g packet, Take one packet daily as needed for no bowel movement in 24 hours, Disp: 30 each, Rfl: 5  •  psyllium (METAMUCIL SMOOTH TEXTURE) 28 % packet, , Disp: , Rfl:   •  psyllium (METAMUCIL) 58 6 % packet, Take 1 packet by mouth daily, Disp: 30 packet, Rfl: 5  •  RA Sunscreen SPF50 LOTN, Apply 15 minutes before sun exposure and every 2 hours, Disp: 237 mL, Rfl: 0  •  senna-docusate sodium (Senexon-S) 8 6-50 mg per tablet, Take 1 tablet by mouth daily at 8am , Disp: 30 tablet, Rfl: 5  •  sodium chloride (OCEAN) 0 65 % nasal spray, 1 spray into each nostril as needed (three times daily as needed for nasal congestion), Disp: 60 mL, Rfl: 5  •  trospium chloride (SANCTURA) 20 mg tablet, Take 1 tablet (20 mg total) by mouth 2 (two) times a day, Disp: 180 tablet, Rfl: 3  •  zinc oxide (DESITIN) 13 % cream, Apply 1 application topically as needed (for perianal irritation), Disp: 454 g, Rfl: 5  •  Mouthwashes (Listerine Antiseptic) LIQD, Swish and spit 5 mL 2 (two) times a day, Disp: 1000 mL, Rfl: 5    [place] Sleepiness Scale  Sitting and reading: Would never doze  Watching TV: Slight chance of dozing  Sitting, inactive in a public place (e g  a theatre or a meeting): Slight chance of dozing  As a passenger in a car for an hour without a break: Slight chance of dozing  Lying down to rest in the afternoon when circumstances permit: High chance of dozing  Sitting and talking to someone: Would never doze  Sitting quietly after a lunch without alcohol: Would never doze  In a car, while stopped for a few minutes in traffic: Would never doze  Total score: 6              Vitals:    01/12/23 1123   BP: 136/65   BP Location: Left arm   Patient Position: Sitting   Cuff Size: Standard   Pulse: 90   Weight: 93 kg (205 lb)   Height: 4' 8" (1 422 m)       Body mass index is 45 96 kg/m²  EPWORTH SLEEPINESS SCORE  Total score: 6      Past History Since Last Sleep Center Visit:     Stopped taking gabapentin due to the fact the caregiver at her group home felt that she did not need it  The review of systems and following portions of the patient's history were reviewed and updated as appropriate: allergies, current medications, past family history, past medical history, past social history, past surgical history, and problem list         OBJECTIVE          Physical Exam:     General Appearance:   Alert, cooperative, no distress, appears stated age, obese     Head:   Normocephalic, without obvious abnormality, atraumatic     Eyes:  conjunctiva/corneas clear          Nose:  Nares normal, septum midline, no drainage or sinus tenderness           Throat:             Lips and gums normal; poor dentition, tongue large size and  shape and midline in position; mucosa moist, uvula normal, tonsils not visualized, Mallampati class 4   Lungs:   Clear to auscultation bilaterally, respirations unlabored     Heart:   Regular rate and rhythm, S1 and S2 normal, no murmur, rub or gallop                               ASSESSMENT / PLAN    1  PLMD (periodic limb movement disorder)  Assessment & Plan:  Patient was noted to have periodic limb movement disorder on a sleep study that was obtained on 1/27/2022    The patient notes that she does move around a lot in her sleep and is feeling tired during the day  Gabapentin was ordered by Karolyn Arce in July 2022  The patient took it for approximately 5 days and then stopped it due to the fact her caregiver felt she did not need it  Since she is still complaining of limb movements and daytime sleepiness, I advised her we can try the medication again, and she could take it for a longer period of time to see if it helped her symptoms  She will resume gabapentin therapy as soon as possible and follow-up in 3 months  I advised the caregiver to call sooner if she had any difficulty in taking the medication  Iron study was also obtained which showed a ferritin level of 24  Due to the patient's history of chronic constipation iron therapy was not started  Orders:  -     gabapentin (NEURONTIN) 100 mg capsule; Take 1 pill nightly for 1 week and then increase to 2 pills nightly    2  Excessive daytime sleepiness  Assessment & Plan:  Patient is experiencing excessive daytime sleepiness  It is possible if we treat her periodic limb movement disorder her daytime sleepiness could improve  Is also possible her daytime sleepiness could be result of side effects from her medications  In particular her Atarax and Ativan could potentially be causing time sleepiness  She could discuss these medications with the prescriber to see if she needs to take them regularly  Counseling / Coordination of Care  Total clinic time spent today 35 minutes in chart review, face-to-face time spent with the patient, coordination of care, and documentation       A description of the counseling / coordination of care:     Impressions, Diagnostic results, Prognosis, Instructions for management, Risks and benefits of treatment, Patient and family education, Risk factor reductions and Importance of compliance with treatment    The following instructions have been given to the patient today:    Patient Instructions   Start taking your Neurontin 1 pill before bedtime for the next week and then increase to 2 pills before bedtime  We will follow back with you in 3 months unless you have any concerns call sooner  Hopefully this will decrease your limb movements at night which will make you feel more rested during the day  Nursing Support:  When: Monday through Friday 7A-5PM except holidays  Where: Our direct line is [phone]  If you are having a true emergency please call 911  In the event that the line is busy or it is after hours please leave a voice message and we will return your call  Please speak clearly, leaving your full name, birth date, best number to reach you and the reason for your call  Medication refills: We will need the name of the medication, the dosage, the ordering provider, whether you get a 30 or 90 day refill, and the pharmacy name and address  Medications will be ordered by the provider only  Nurses cannot call in prescriptions  Please allow 7 days for medication refills  Physician requested updates: If your provider requested that you call with an update after starting medication, please be ready to provide us the medication and dosage, what time you take your medication, the time you attempt to fall asleep, time you fall asleep, when you wake up, and what time you get out of bed  Sleep Study Results: We will contact you with sleep study results and/or next steps after the physician has reviewed your testing             ERICA Mcfarland 15

## 2023-01-17 ENCOUNTER — OFFICE VISIT (OUTPATIENT)
Dept: FAMILY MEDICINE CLINIC | Facility: CLINIC | Age: 43
End: 2023-01-17

## 2023-01-17 ENCOUNTER — TELEPHONE (OUTPATIENT)
Dept: OBGYN CLINIC | Facility: CLINIC | Age: 43
End: 2023-01-17

## 2023-01-17 VITALS
RESPIRATION RATE: 22 BRPM | DIASTOLIC BLOOD PRESSURE: 84 MMHG | TEMPERATURE: 97.9 F | BODY MASS INDEX: 48.2 KG/M2 | OXYGEN SATURATION: 95 % | HEART RATE: 96 BPM | SYSTOLIC BLOOD PRESSURE: 124 MMHG | WEIGHT: 215 LBS
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
## 2023-01-17 DIAGNOSIS — N92.6 IRREGULAR MENSES: ICD-10-CM

## 2023-01-17 DIAGNOSIS — Z30.09 UNWANTED FERTILITY: ICD-10-CM

## 2023-01-17 RX ORDER — NORGESTIMATE AND ETHINYL ESTRADIOL 0.25-0.035
1 KIT ORAL DAILY
Qty: 28 TABLET | Refills: 12 | Status: SHIPPED | OUTPATIENT
Start: 2023-01-17

## 2023-01-17 NOTE — PROGRESS NOTES
Name: Sil Ngo      : 1980      MRN: [id number]  Encounter Provider: Tereso Bates MD  Encounter Date: 2023   Encounter department: [address] [place] [address]     1  Fall, subsequent encounter  Assessment & Plan:  Tripped and fell during shower 23, no head strike or LOC  No new sx besides right hip sprain  Pain is well controlled and pt is at baseliner per caretaker  Exam and gait unremarkable  Pt reassured  Continue tylenol prn  Follow up as needed  Subjective      35 yo F h/o cerebral palsy, resides at group home, brought in by group home staff for ED follow-up visit  [facility] to [facility]  She was noted to have tripped and fallen landing on her right hip  No head strike or LOC  Complaining of right hip pain improved since day of injury  At baseline per caretaker  No other complaints  No other injuries  No weakness, numbness or tingling  Normal appearance and vitals  Pain is well controlled with prn tylenol  She had a right hip x-ray that was done on 22 at [facility] with normal findings  Review of Systems   Constitutional: Negative for fatigue and fever  HENT: Negative for sore throat  Respiratory: Negative for cough, shortness of breath and wheezing  Cardiovascular: Negative for chest pain, palpitations and leg swelling  Gastrointestinal: Negative for abdominal pain, diarrhea, nausea and vomiting  Genitourinary: Negative for dysuria and pelvic pain  Musculoskeletal: Negative for gait problem, joint swelling, myalgias and neck pain  Skin: Negative for rash  Neurological: Negative for weakness and headaches         Current Outpatient Medications on File Prior to Visit   Medication Sig   • acetaminophen (TYLENOL) 500 mg tablet Take 1 tablet (500 mg total) by mouth every 6 (six) hours as needed for mild pain   • aluminum-magnesium hydroxide-simethicone (GNP Antacid & Anti-Gas) 7844-6279-407 mg/30 mL suspension Take 15 mL by mouth every 4 (four) hours as needed for indigestion or heartburn   • bacitracin topical ointment 500 units/g topical ointment Cleanse site on nose with soap and water followed by bacitracin BID until healed then p r n    • bismuth subsalicylate (PEPTO BISMOL) 524 mg/30 mL oral suspension Take 15 mL (262 mg total) by mouth every 6 (six) hours as needed for indigestion   • calcium carbonate (TUMS) 500 mg chewable tablet Chew 1 tablet (500 mg total) 3 (three) times a day as needed for heartburn   • carbamide peroxide (DEBROX) 6 5 % otic solution Administer 5 drops into the left ear 2 (two) times a day Use 1 week prior to ENT appointment  Also can use 4 gtts twice weekly (Tuesday and Friday for example) to each ear for prevention  Keep hearing aid out x 10 minutes after ear drops administered     • D3-1000 25 MCG (1000 UT) tablet Take 2 tablets (2,000 Units total) by mouth daily   • Diclofenac Sodium (VOLTAREN) 1 % Apply 2 g topically 4 (four) times a day   • dicyclomine (BENTYL) 20 mg tablet TAKE 1 TABLET BY MOUTH EVERY 6 HOURS AS NEEDED (DYSPHAGIA)   • Dyclonine-Glycerin (Cepacol Sore Throat Spray) 0 1-33 % LIQD Apply 1 spray to the mouth or throat 3 (three) times a day as needed (sorethroat)   • Elastic Bandages & Supports (Neoprene Knee Brace) MISC Apply right knee brace in morning; remove at night   • fluticasone (FLONASE) 50 mcg/act nasal spray 1 spray into each nostril daily as needed for rhinitis (nasal congestion) At 8:00 AM   • gabapentin (NEURONTIN) 100 mg capsule Take 1 pill nightly for 1 week and then increase to 2 pills nightly   • GNP Sore Throat Spray 1 4 % mucosal liquid APPLY 1 SPRAY TO MOUTH OR THROAT EVERY 2 HRS AS NEEDED FOR SORE THROAT   • GNP Stomach Relief Max St 525 MG/15ML SUSP    • hydrOXYzine HCL (ATARAX) 10 mg tablet Take 1 tablet (10 mg total) by mouth 3 (three) times a day   • ibuprofen (MOTRIN) 400 mg tablet TAKE 1 TABLET BY MOUTH EVERY 8 HOURS AS NEEDED FOR MILD/MOD PAIN OR HEADACHES   • Incontinence Supply Disposable (Wings Choice Plus Adult Briefs) MISC Use as directed (R13 62)   • ketoconazole (NIZORAL) 2 % cream Apply topically daily   • Konsyl Daily Fiber 28 3 %    • lamoTRIgine (LaMICtal) 100 mg tablet    • lidocaine (LMX) 4 % cream Apply topically as needed for mild pain   • LORazepam (ATIVAN) 0 5 mg tablet Take 0 25 mg by mouth 2 (two) times a day   • lubiprostone (Amitiza) 24 mcg capsule Take 1 capsule (24 mcg total) by mouth daily with breakfast   • magnesium hydroxide (GNP Milk of Magnesia) 400 mg/5 mL oral suspension 2 TBSP (30ML) BY MOUTH DAILY AS NEEDED IF NO BM IN 3 DAYS (CONSTIPATION)   • metoprolol tartrate (LOPRESSOR) 25 mg tablet Take 1 tablet (25 mg total) by mouth 2 (two) times a day   • mineral oil-hydrophilic petrolatum (AQUAPHOR) ointment Apply topically as needed for dry skin   • Multiple Vitamins-Minerals (CertaVite/Antioxidants) TABS TAKE 1 TABLET BY MOUTH DAILY AT 8AM (SUPPLEMENT) *IGABARI   • nystatin (MYCOSTATIN) powder Apply 1 application topically 4 (four) times a day   • amitriptyline (ELAVIL) 10 mg tablet Take 10 mg by mouth daily at bedtime   • ARIPiprazole (ABILIFY) 20 MG tablet Take 1 tablet (20 mg total) by mouth daily at bedtime   • Diclofenac Sodium (VOLTAREN) 1 % Apply 2 g topically 4 (four) times a day   • dicyclomine (BENTYL) 20 mg tablet TAKE 1 TABLET BY MOUTH EVERY 6 HOURS AS NEEDED (DYSPHAGIA)   • escitalopram (LEXAPRO) 20 mg tablet Take 1 tablet (20 mg total) by mouth daily   • fluticasone (FLONASE) 50 mcg/act nasal spray 1 spray into each nostril daily as needed for rhinitis (nasal congestion) At 8:00 AM   • hydrOXYzine HCL (ATARAX) 10 mg tablet Take 1 tablet (10 mg total) by mouth 3 (three) times a day   • Incontinence Supply Disposable (Wings Choice Plus Adult Briefs) MISC Use as directed (N41 12)   • ketoconazole (NIZORAL) 2 % cream Apply topically daily   • Konsyl Daily Fiber 28 3 %    • lamoTRIgine (LaMICtal) 100 mg tablet    • lamoTRIgine (LaMICtal) 25 mg tablet Take 25 mg by mouth 2 (two) times a day     • lidocaine (LMX) 4 % cream Apply topically as needed for mild pain   • metoprolol tartrate (LOPRESSOR) 25 mg tablet Take 1 tablet (25 mg total) by mouth 2 (two) times a day   • Mouthwashes (Listerine Antiseptic) LIQD Swish and spit 5 mL 2 (two) times a day   • nystatin (MYCOSTATIN) powder Apply 1 application topically 4 (four) times a day   • nystatin-triamcinolone (MYCOLOG-II) cream Apply topically 2 (two) times a day   • pantoprazole (PROTONIX) 20 mg tablet Take 1 tablet (20 mg total) by mouth daily   • polyethylene glycol (MIRALAX) 17 g packet Take one packet daily as needed for no bowel movement in 24 hours   • psyllium (METAMUCIL SMOOTH TEXTURE) 28 % packet    • psyllium (METAMUCIL) 58 6 % packet Take 1 packet by mouth daily   • RA Sunscreen SPF50 LOTN Apply 15 minutes before sun exposure and every 2 hours   • senna-docusate sodium (Senexon-S) 8 6-50 mg per tablet Take 1 tablet by mouth daily at 8am    • sodium chloride (OCEAN) 0 65 % nasal spray 1 spray into each nostril as needed (three times daily as needed for nasal congestion)   • trospium chloride (SANCTURA) 20 mg tablet Take 1 tablet (20 mg total) by mouth 2 (two) times a day   • zinc oxide (DESITIN) 13 % cream Apply 1 application topically as needed (for perianal irritation)   • [DISCONTINUED] norgestimate-ethinyl estradiol (Estarylla) 0 25-35 MG-MCG per tablet Take 1 tablet by mouth daily   • [DISCONTINUED] norgestimate-ethinyl estradiol (ORTHO-CYCLEN) 0 25-35 MG-MCG per tablet Take 1 tablet by mouth in the morning     • [DISCONTINUED] norgestimate-ethinyl estradiol (ORTHO-CYCLEN) 0 25-35 MG-MCG per tablet Take 1 tablet by mouth daily       Objective     /84   Pulse 96   Temp 97 9 °F (36 6 °C)   Resp 22   Wt 97 5 kg (215 lb)   SpO2 95%   BMI 48 20 kg/m²     Physical Exam  Constitutional:       General: She is not in acute distress  Appearance: Normal appearance  She is obese  She is not ill-appearing or diaphoretic  HENT:      Head: Normocephalic and atraumatic  Right Ear: External ear normal       Left Ear: External ear normal    Eyes:      Conjunctiva/sclera: Conjunctivae normal       Pupils: Pupils are equal, round, and reactive to light  Cardiovascular:      Rate and Rhythm: Normal rate and regular rhythm  Heart sounds: Normal heart sounds  Pulmonary:      Effort: No respiratory distress  Breath sounds: Normal breath sounds  No wheezing  Musculoskeletal:         General: No swelling, tenderness or deformity  Normal range of motion  Right lower leg: No edema  Left lower leg: No edema  Skin:     General: Skin is warm  Findings: No bruising or lesion  Neurological:      Mental Status: She is alert  Mental status is at baseline  Motor: No weakness        Gait: Gait normal        Arian Bradford MD

## 2023-01-17 NOTE — ASSESSMENT & PLAN NOTE
Tripped and fell during shower 1/6/23, no head strike or LOC  No new sx besides right hip sprain  Pain is well controlled and pt is at baseliner per caretaker  Exam and gait unremarkable  Pt reassured  Continue tylenol prn  Follow up as needed

## 2023-01-29 ENCOUNTER — OFFICE VISIT (OUTPATIENT)
Dept: URGENT CARE | Facility: MEDICAL CENTER | Age: 43
End: 2023-01-29

## 2023-01-29 VITALS
RESPIRATION RATE: 20 BRPM | OXYGEN SATURATION: 95 % | HEART RATE: 84 BPM | TEMPERATURE: 97.3 F | SYSTOLIC BLOOD PRESSURE: 128 MMHG | DIASTOLIC BLOOD PRESSURE: 86 MMHG

## 2023-01-29 DIAGNOSIS — S20.229A CONTUSION OF THORACIC SPINE: Primary | ICD-10-CM

## 2023-01-29 NOTE — PATIENT INSTRUCTIONS
Mid thoracic spine tenderness but no evidence of ecchymosis or swelling observed on exam   Believe her symptoms are exacerbated since she has kyphosis of the thoracic spine  I recommended patient take Tylenol 500 mg every 6 hours as needed for mild pain (she has a standing order for Tylenol), apply ice to affected area 10 to 15 minutes as needed  If symptoms persist or worsen, follow-up with primary care provider  Home health aide    Contusion in Adults   WHAT YOU NEED TO KNOW:   A contusion is a bruise that appears on your skin after an injury  A bruise happens when small blood vessels tear but skin does not  Blood leaks into nearby tissue, such as soft tissue or muscle  DISCHARGE INSTRUCTIONS:   Return to the emergency department if:   You have new trouble moving the injured area  You have tingling or numbness in or near the injured area  Your hand or foot below the bruise gets cold or turns pale  Call your doctor if:   You find a new lump in the injured area  Your symptoms do not improve with treatment after 4 to 5 days  You have questions or concerns about your condition or care  Medicines: You may need any of the following:  NSAIDs  help decrease swelling and pain or fever  This medicine is available with or without a doctor's order  NSAIDs can cause stomach bleeding or kidney problems in certain people  If you take blood thinner medicine, always ask your healthcare provider if NSAIDs are safe for you  Always read the medicine label and follow directions  Prescription pain medicine  may be given  Ask your healthcare provider how to take this medicine safely  Some prescription pain medicines contain acetaminophen  Do not take other medicines that contain acetaminophen without talking to your healthcare provider  Too much acetaminophen may cause liver damage  Prescription pain medicine may cause constipation  Ask your healthcare provider how to prevent or treat constipation       Take your medicine as directed  Contact your healthcare provider if you think your medicine is not helping or if you have side effects  Tell him of her if you are allergic to any medicine  Keep a list of the medicines, vitamins, and herbs you take  Include the amounts, and when and why you take them  Bring the list or the pill bottles to follow-up visits  Carry your medicine list with you in case of an emergency  Help a contusion heal:   Rest the injured area  or use it less than usual  If you bruised your leg or foot, you may need crutches or a cane to help you walk  This will help you keep weight off your injured body part  Apply ice  to decrease swelling and pain  Ice may also help prevent tissue damage  Use an ice pack, or put crushed ice in a plastic bag  Cover it with a towel and place it on your bruise for 15 to 20 minutes every hour or as directed  Use compression  to support the area and decrease swelling  Wrap an elastic bandage around the area over the bruised muscle  Make sure the bandage is not too tight  You should be able to fit 1 finger between the bandage and your skin  Elevate (raise) your injured body part  above the level of your heart to help decrease pain and swelling  Use pillows, blankets, or rolled towels to elevate the area as often as you can  Do not drink alcohol  as directed  Alcohol may slow healing  Do not stretch injured muscles  right after your injury  Ask your healthcare provider when and how you may safely stretch after your injury  Gentle stretches can help increase your flexibility  Do not massage the area or put heating pads  on the bruise right after your injury  Heat and massage may slow healing  Your healthcare provider may tell you to apply heat after several days  At that time, heat will start to help the injury heal     Prevent another contusion:   Stretch and warm up before you play sports or exercise  Wear protective gear when you play sports  Examples are shin guards and padding  If you begin a new physical activity, start slowly to give your body a chance to adjust     Follow up with your doctor as directed:  Write down your questions so you remember to ask them during your visits  © Copyright [employer] 2022 Information is for End User's use only and may not be sold, redistributed or otherwise used for commercial purposes  All illustrations and images included in CareNotes® are the copyrighted property of A [employer]  or Shefali Falcon  The above information is an  only  It is not intended as medical advice for individual conditions or treatments  Talk to your doctor, nurse or pharmacist before following any medical regimen to see if it is safe and effective for you     expressed understanding

## 2023-01-29 NOTE — PROGRESS NOTES
[facility]        NAME: Rex Vyas is a 39 y o  female  : 1980    MRN: [id number]  DATE: 2023  TIME: 5:36 PM    Assessment and Plan   Contusion of thoracic spine [S20 229A]  1  Contusion of thoracic spine              Patient Instructions       Follow up with PCP in 3-5 days  Proceed to  ER if symptoms worsen  Chief Complaint     Chief Complaint   Patient presents with   • Fall     Nurse received report that the pt was standing at her dresser and slid down to her butt  Pt currently denies pain  History of Present Illness       79-year-old female here today accompanied by home health aide (patient has cerebral palsy and most of history given by home health aide)  Apparently, this morning, the patient was standing and slid down landing on her buttocks  Denies pain in her buttocks however,  recommended patient be evaluated  Home health aide informs me that she often attempts to ambulate without her walker  She has a history of frequent fall  Upon questioning the patient, she is complaining of some upper back pain  Upon entering the room, I observed the patient to be sitting in the wheelchair, not complaining of pain, however, hunched forward  Review of Systems   Review of Systems   Constitutional: Negative  Respiratory: Negative  Musculoskeletal: Positive for arthralgias  Neurological: Positive for weakness           Current Medications       Current Outpatient Medications:   •  acetaminophen (TYLENOL) 500 mg tablet, Take 1 tablet (500 mg total) by mouth every 6 (six) hours as needed for mild pain, Disp: 30 tablet, Rfl: 5  •  aluminum-magnesium hydroxide-simethicone (GNP Antacid & Anti-Gas) 1414-4107-895 mg/30 mL suspension, Take 15 mL by mouth every 4 (four) hours as needed for indigestion or heartburn, Disp: 355 mL, Rfl: 0  •  amitriptyline (ELAVIL) 10 mg tablet, Take 10 mg by mouth daily at bedtime, Disp: , Rfl:   • ARIPiprazole (ABILIFY) 20 MG tablet, Take 1 tablet (20 mg total) by mouth daily at bedtime, Disp: 90 tablet, Rfl: 2  •  bacitracin topical ointment 500 units/g topical ointment, Cleanse site on nose with soap and water followed by bacitracin BID until healed then p r n , Disp: 15 g, Rfl: 2  •  bismuth subsalicylate (PEPTO BISMOL) 524 mg/30 mL oral suspension, Take 15 mL (262 mg total) by mouth every 6 (six) hours as needed for indigestion, Disp: 360 mL, Rfl: 5  •  calcium carbonate (TUMS) 500 mg chewable tablet, Chew 1 tablet (500 mg total) 3 (three) times a day as needed for heartburn, Disp: 30 tablet, Rfl: 5  •  carbamide peroxide (DEBROX) 6 5 % otic solution, Administer 5 drops into the left ear 2 (two) times a day Use 1 week prior to ENT appointment  Also can use 4 gtts twice weekly (Tuesday and Friday for example) to each ear for prevention  Keep hearing aid out x 10 minutes after ear drops administered  , Disp: 15 mL, Rfl: 1  •  D3-1000 25 MCG (1000 UT) tablet, Take 2 tablets (2,000 Units total) by mouth daily, Disp: 62 tablet, Rfl: 5  •  Diclofenac Sodium (VOLTAREN) 1 %, Apply 2 g topically 4 (four) times a day, Disp: 50 g, Rfl: 0  •  dicyclomine (BENTYL) 20 mg tablet, TAKE 1 TABLET BY MOUTH EVERY 6 HOURS AS NEEDED (DYSPHAGIA), Disp: 120 tablet, Rfl: 0  •  Dyclonine-Glycerin (Cepacol Sore Throat Spray) 0 1-33 % LIQD, Apply 1 spray to the mouth or throat 3 (three) times a day as needed (sorethroat), Disp: 118 mL, Rfl: 5  •  Elastic Bandages & Supports (Neoprene Knee Brace) MISC, Apply right knee brace in morning; remove at night, Disp: 1 each, Rfl: 0  •  escitalopram (LEXAPRO) 20 mg tablet, Take 1 tablet (20 mg total) by mouth daily, Disp: 90 tablet, Rfl: 2  •  fluticasone (FLONASE) 50 mcg/act nasal spray, 1 spray into each nostril daily as needed for rhinitis (nasal congestion) At 8:00 AM, Disp: 18 2 mL, Rfl: 5  •  gabapentin (NEURONTIN) 100 mg capsule, Take 1 pill nightly for 1 week and then increase to 2 pills nightly, Disp: 60 capsule, Rfl: 5  •  GNP Sore Throat Spray 1 4 % mucosal liquid, APPLY 1 SPRAY TO MOUTH OR THROAT EVERY 2 HRS AS NEEDED FOR SORE THROAT, Disp: 177 mL, Rfl: 0  •  GNP Stomach Relief Max St 525 MG/15ML SUSP, , Disp: , Rfl:   •  hydrOXYzine HCL (ATARAX) 10 mg tablet, Take 1 tablet (10 mg total) by mouth 3 (three) times a day, Disp: 30 tablet, Rfl: 3  •  ibuprofen (MOTRIN) 400 mg tablet, TAKE 1 TABLET BY MOUTH EVERY 8 HOURS AS NEEDED FOR MILD/MOD PAIN OR HEADACHES, Disp: 30 tablet, Rfl: 0  •  Incontinence Supply Disposable (Wings Choice Plus Adult Briefs) MISC, Use as directed (R39 61), Disp: 60 each, Rfl: 0  •  ketoconazole (NIZORAL) 2 % cream, Apply topically daily, Disp: 30 g, Rfl: 5  •  Konsyl Daily Fiber 28 3 %, , Disp: , Rfl:   •  lamoTRIgine (LaMICtal) 100 mg tablet, , Disp: , Rfl:   •  lamoTRIgine (LaMICtal) 25 mg tablet, Take 25 mg by mouth 2 (two) times a day  , Disp: , Rfl:   •  lidocaine (LMX) 4 % cream, Apply topically as needed for mild pain, Disp: 30 g, Rfl: 0  •  LORazepam (ATIVAN) 0 5 mg tablet, Take 0 25 mg by mouth 2 (two) times a day, Disp: , Rfl:   •  lubiprostone (Amitiza) 24 mcg capsule, Take 1 capsule (24 mcg total) by mouth daily with breakfast, Disp: 60 capsule, Rfl: 5  •  magnesium hydroxide (GNP Milk of Magnesia) 400 mg/5 mL oral suspension, 2 TBSP (30ML) BY MOUTH DAILY AS NEEDED IF NO BM IN 3 DAYS (CONSTIPATION), Disp: 355 mL, Rfl: 0  •  metoprolol tartrate (LOPRESSOR) 25 mg tablet, Take 1 tablet (25 mg total) by mouth 2 (two) times a day, Disp: 60 tablet, Rfl: 5  •  mineral oil-hydrophilic petrolatum (AQUAPHOR) ointment, Apply topically as needed for dry skin, Disp: 420 g, Rfl: 5  •  Multiple Vitamins-Minerals (Certa[employer]ite/Antioxidants) TABS, TAKE 1 TABLET BY MOUTH DAILY AT 8AM (SUPPLEMENT) *MOHINI, Disp: 31 tablet, Rfl: 0  •  norgestimate-ethinyl estradiol (ORTHO-CYCLEN) 0 25-35 MG-MCG per tablet, Take 1 tablet by mouth daily, Disp: 28 tablet, Rfl: 12  •  nystatin (MYCOSTATIN) powder, Apply 1 application topically 4 (four) times a day, Disp: 45 g, Rfl: 5  •  nystatin-triamcinolone (MYCOLOG-II) cream, Apply topically 2 (two) times a day, Disp: 60 g, Rfl: 2  •  pantoprazole (PROTONIX) 20 mg tablet, Take 1 tablet (20 mg total) by mouth daily, Disp: 62 tablet, Rfl: 5  •  polyethylene glycol (MIRALAX) 17 g packet, Take one packet daily as needed for no bowel movement in 24 hours, Disp: 30 each, Rfl: 5  •  psyllium (METAMUCIL SMOOTH TEXTURE) 28 % packet, , Disp: , Rfl:   •  psyllium (METAMUCIL) 58 6 % packet, Take 1 packet by mouth daily, Disp: 30 packet, Rfl: 5  •  RA Sunscreen SPF50 LOTN, Apply 15 minutes before sun exposure and every 2 hours, Disp: 237 mL, Rfl: 0  •  senna-docusate sodium (Senexon-S) 8 6-50 mg per tablet, Take 1 tablet by mouth daily at 8am , Disp: 30 tablet, Rfl: 5  •  sodium chloride (OCEAN) 0 65 % nasal spray, 1 spray into each nostril as needed (three times daily as needed for nasal congestion), Disp: 60 mL, Rfl: 5  •  trospium chloride (SANCTURA) 20 mg tablet, Take 1 tablet (20 mg total) by mouth 2 (two) times a day, Disp: 180 tablet, Rfl: 3  •  zinc oxide (DESITIN) 13 % cream, Apply 1 application topically as needed (for perianal irritation), Disp: 454 g, Rfl: 5  •  Mouthwashes (Listerine Antiseptic) LIQD, Swish and spit 5 mL 2 (two) times a day, Disp: 1000 mL, Rfl: 5    Current Allergies     Allergies as of 01/29/2023   • (No Known Allergies)            The following portions of the patient's history were reviewed and updated as appropriate: allergies, current medications, past family history, past medical history, past social history, past surgical history and problem list      Past Medical History:   Diagnosis Date   • ADD (attention deficit disorder)    • Anxiety    • Astigmatism    • Brain lesion    • Calcium deficiency    • Cellulitis of foot, right 6/4/2018   • Cerebral palsy (HCC)    • Chronic otitis media    • Constipation    • Depression    • Dysphagia    • Esophagitis    • Esotropia    • GERD (gastroesophageal reflux disease)    • Hiatal hernia    • Hydrocephalus (HCC)    • Impaired fasting glucose    • Left nephrolithiasis 03/04/2019   • Myopia    • Oppositional defiant disorder    • 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HCC)    • Seizures (HCC)    • Sensorineural hearing loss    • Sleep apnea    • Status post ventriculoatrial shunt placement    • Visual impairment        Past Surgical History:   Procedure Laterality Date   • BREAST BIOPSY Left     X 2 (not sure of years)   • CSF SHUNT      Creation of Ventriculo-Peritoneal CSF shunt ; Last Assessed:7/6/2016   • EAR SURGERY      Last Assessed:7/6/2016   • LEG SURGERY      due to CP    • NOSE SURGERY      Last Assessed:7/6/2016   • MT ESOPHAGOGASTRODUODENOSCOPY TRANSORAL DIAGNOSTIC N/A 5/9/2019    Procedure: ESOPHAGOGASTRODUODENOSCOPY (EGD) with biopsy;  Surgeon: Ignacio Yu MD;  Location: [facility]; Service: Gastroenterology   • UPPER GASTROINTESTINAL ENDOSCOPY  05/2019       Family History   Problem Relation Age of Onset   • Diabetes Mother    • Colon cancer Father    • No Known Problems Maternal Grandmother    • No Known Problems Maternal Grandfather    • No Known Problems Paternal Grandmother    • No Known Problems Paternal Grandfather    • Hypertension Neg Hx    • Heart disease Neg Hx    • Stroke Neg Hx    • Thyroid disease Neg Hx          Medications have been verified  Objective   /86 (BP Location: Right arm, Patient Position: Sitting, Cuff Size: Large)   Pulse 84   Temp (!) 97 3 °F (36 3 °C) (Tympanic)   Resp 20   SpO2 95%   No LMP recorded  (Menstrual status: Birth Control)  Physical Exam     Physical Exam  Musculoskeletal:         General: Tenderness present        Comments: Physical exam reveals mid thoracic spine tenderness upon palpation with no evidence of ecchymosis or swelling appreciated

## 2023-01-29 NOTE — LETTER
January 29, 2023     Patient: Martina Duke   YOB: 1980   Date of Visit: 1/29/2023       To Whom it May Concern:    Aron Justynhenry was seen in my clinic on 1/29/2023  She may return to school on 1/29/2023  Patient is to be given extra strength Tylenol 500 mg every 6 hours as needed for mild pain, consider applying ice compress to upper back 10 to 15 minutes 2 or 3 times a day if needed  If symptoms persist or worsen, suggest follow-up with primary care provider  If you have any questions or concerns, please don't hesitate to call           Sincerely,          Amandeep Cervantes MD        CC: No Recipients

## 2023-01-31 ENCOUNTER — OFFICE VISIT (OUTPATIENT)
Dept: FAMILY MEDICINE CLINIC | Facility: CLINIC | Age: 43
End: 2023-01-31

## 2023-01-31 ENCOUNTER — OFFICE VISIT (OUTPATIENT)
Dept: DENTISTRY | Facility: CLINIC | Age: 43
End: 2023-01-31

## 2023-01-31 ENCOUNTER — TELEPHONE (OUTPATIENT)
Dept: FAMILY MEDICINE CLINIC | Facility: CLINIC | Age: 43
End: 2023-01-31

## 2023-01-31 VITALS — SYSTOLIC BLOOD PRESSURE: 118 MMHG | TEMPERATURE: 98.7 F | HEART RATE: 109 BPM | DIASTOLIC BLOOD PRESSURE: 80 MMHG

## 2023-01-31 VITALS
BODY MASS INDEX: 48.34 KG/M2 | HEART RATE: 91 BPM | RESPIRATION RATE: 20 BRPM | DIASTOLIC BLOOD PRESSURE: 80 MMHG | SYSTOLIC BLOOD PRESSURE: 129 MMHG | TEMPERATURE: 97.6 F | WEIGHT: 215.6 LBS

## 2023-01-31 DIAGNOSIS — Z01.20 ENCOUNTER FOR DENTAL EXAMINATION: Primary | ICD-10-CM

## 2023-01-31 DIAGNOSIS — R29.6 FREQUENT FALLS: ICD-10-CM

## 2023-01-31 DIAGNOSIS — R26.2 AMBULATORY DYSFUNCTION: Primary | ICD-10-CM

## 2023-01-31 NOTE — PATIENT INSTRUCTIONS
Benefits of an Active Lifestyle   AMBULATORY CARE:   An active lifestyle  means you do physical activity throughout the day  Any activity that gets you up and moving is part of an active lifestyle  Physical activity includes exercise such as walking or lifting weights  It also includes playing sports  Physical activity is different from other kinds of activity, such as reading a book  This kind of activity is called sedentary  A sedentary lifestyle means you sit or do not move much during the day  An active lifestyle has many benefits, such as helping you prevent or manage health conditions  Call your doctor if:   You notice changes in your health, such as new or worsening shortness of breath  You have questions or concerns about your condition or care  Benefits of an active lifestyle: You may be able to do daily activities more easily  Activity helps condition your heart, lungs, and muscles  This can help you get through your daily activities without feeling tired  You can help control your weight  Activity helps your body use the calories you eat instead of storing them as fat  Your body continues to burn calories at a higher rate after you are active  Activity can increase your health  Activity helps lower your risk for cancer, heart disease, diabetes, and stroke  Activity can help you control your blood pressure and blood sugar levels, and lower your cholesterol  If you have arthritis, activity can help your joints move more easily and with less pain  Your bones and muscles will get stronger  This will help prevent osteoporosis and reduce your risk for falls  Activity can help improve your mood  Activity can reduce or prevent depression and stress  Activity can also help improve your sleep  Risks of a sedentary lifestyle:  A sedentary lifestyle increases your risk for diseases such as diabetes, high blood pressure, and heart disease  Your immune system also becomes weaker   This means it cannot fight infections well  How much activity you need:  Any activity is better than no activity at all  When you go from being mostly inactive to adding some activity, you will see health benefits  The following are general guidelines:  Do aerobic activity several days each week  Aerobic activity includes walking, bicycling, dancing, swimming, and raking leaves  Aim for 150 to 300 minutes of moderate activity, or 75 to 150 of vigorous activity each week  You can also do a combination of moderate and vigorous activity  Do strength training at least 2 times each week  Strength training helps you keep the muscles you have and build new muscles  Strength training includes pushups, yoga, cheryl chi, and weightlifting  If you do not have access to weights, you can lift items around your house  Try to work all the major muscle groups, such as your legs, arms, abdomen, and chest  Do 2 or 3 sets on each area  Use a weight that is slightly heavier than you can lift easily  You can work up to [employer]  You can also use resistance bands instead of weights  Steps you can take to become more active:   Set goals  Set some long-term goals and some short-term goals  For example, you may want to be able to walk for 30 minutes without becoming short of breath  Try not to put time requirements on your goals  For example, do not think you should reach your goal in a month  Set smaller goals, such as walking a little longer each week, or feeling less shortness of breath  Be active all day  Activity does not have to mean structured exercise each day  You can be more active by making small changes all day  For example, try parking as far from the entrance of buildings as you can when you run errands  If possible, walk or ride a bike instead of driving  Take the stairs instead of the elevator  Keep a record of your activity and your progress  You can do this by writing down your daily activity   Include the kind of activity and how long you did it  You can also use a program on your phone or other device that will track activity for you  Also record your progress  You may be doing daily activities more easily, sleeping better, or building muscles  Step counting can help you monitor activity  A general guide is to take 10,000 steps each day  A pedometer is a device you can wear to track your steps  Some phones have programs that will count and record steps  You may need to work up to 10,000 steps  Start by finding out how many steps you usually take in a day  Then try to take more steps each day than you took the day before  Tips to help you stay on track:   Start slowly and work up  You do not have to do 30 minutes of activity at one time  You can break the activity up and do a few minutes at a time  Remember that some physical activity is better than none  Stand up during the day, even if you cannot walk around  Your body uses more energy when you stand  You may be able to get a desk that allows you to stand while you type or make phone calls for work  Aim for a speed or intensity that is challenging but not too difficult  You should be able to speak a few words at a time but not be able to sing  Plan activities you enjoy  Do a variety of activities so you do not become bored and you stay challenged  Include activities that strengthen your bones  These activities are called weight-bearing exercises  Examples include tennis, jumping rope, and running  Swimming, riding a bike, and similar exercises keep weight off your bones  They will not help strengthen bones, but they will help your heart and lungs work better  Ask for support from the people in your life  Go for a walk after dinner with your family  Meet friends at the park  Take a break with a coworker and walk around  Find someone who likes to go to the gym at the same time you do   You may be more likely to go if you know another person is counting on you  Get involved in community events, such as cleaning a community park  Ask someone to help you stay on track  For example, you can tell the person about your daily or weekly activity  Treat yourself to a reward when you reach a goal   The rewards can be for activity done for a certain amount of time each day or days each week  Rewards can also be for progress you make  Have rewards that are not food, such as a new clothing item or book  What you need to know about nutrition and activity:  Healthy foods will give you the energy you need to be active  Activity and good nutrition work together to help you reach or maintain a healthy weight  Healthy foods include fruits, vegetables, lean meats, fish, cooked beans, whole-grain breads, and low-fat dairy products  Your healthcare provider can help you create a healthy meal plan  He or she can tell you how many calories you need to stay active and still lose weight if needed  Follow up with your doctor as directed:  Write down your questions so you remember to ask them during your visits  © Copyright [employer] 2022 Information is for End User's use only and may not be sold, redistributed or otherwise used for commercial purposes  All illustrations and images included in CareNotes® are the copyrighted property of A [employer]  or [place] Rosetta Cabezas   The above information is an  only  It is not intended as medical advice for individual conditions or treatments  Talk to your doctor, nurse or pharmacist before following any medical regimen to see if it is safe and effective for you

## 2023-01-31 NOTE — ASSESSMENT & PLAN NOTE
Uses a walker at baseline, h/o frequent falls  Most recent 2 days, no pain or discomfort, head strike or LOC  Patient is very sedentary, no significant physical activity    Will benefit from PT/OT to improve strength and endurance  Referral placed to physical therapy as well as home PT

## 2023-01-31 NOTE — PROGRESS NOTES
Name: Rex Vyas      : 1980      MRN: [id number]  Encounter Provider: Karin Lopez MD  Encounter Date: 2023   Encounter department: [address] [place] [address]  Ambulatory dysfunction  Assessment & Plan:  Uses a walker at baseline, h/o frequent falls  Most recent 2 days, no pain or discomfort, head strike or LOC  Patient is very sedentary, no significant physical activity  Will benefit from PT/OT to improve strength and endurance  Referral placed to physical therapy as well as home PT      2  Frequent falls  -     Ambulatory Referral to Physical Therapy; Future  -     Referral to [address] [place] [address]; Future           Subjective      Patient is a 65-year-old female here with caretaker from [facility] for referral to home PT  Patient with fall 2 days ago, no significant impact or head strike or LOC  No pain or discomfort at this time  Patient is at baseline per caretaker  No other acute concerns  Review of Systems   Constitutional: Negative for fatigue and fever  HENT: Negative for sore throat  Respiratory: Negative for cough, shortness of breath and wheezing  Cardiovascular: Negative for chest pain, palpitations and leg swelling  Gastrointestinal: Negative for abdominal pain, diarrhea, nausea and vomiting  Genitourinary: Negative for dysuria and pelvic pain  Skin: Negative for rash  Neurological: Negative for weakness and headaches         Current Outpatient Medications on File Prior to Visit   Medication Sig   • acetaminophen (TYLENOL) 500 mg tablet Take 1 tablet (500 mg total) by mouth every 6 (six) hours as needed for mild pain   • aluminum-magnesium hydroxide-simethicone (GNP Antacid & Anti-Gas) 0985-6903-033 mg/30 mL suspension Take 15 mL by mouth every 4 (four) hours as needed for indigestion or heartburn   • ARIPiprazole (ABILIFY) 20 MG tablet Take 1 tablet (20 mg total) by mouth daily at bedtime   • bacitracin topical ointment 500 units/g topical ointment Cleanse site on nose with soap and water followed by bacitracin BID until healed then p r n    • bismuth subsalicylate (PEPTO BISMOL) 524 mg/30 mL oral suspension Take 15 mL (262 mg total) by mouth every 6 (six) hours as needed for indigestion   • calcium carbonate (TUMS) 500 mg chewable tablet Chew 1 tablet (500 mg total) 3 (three) times a day as needed for heartburn   • carbamide peroxide (DEBROX) 6 5 % otic solution Administer 5 drops into the left ear 2 (two) times a day Use 1 week prior to ENT appointment  Also can use 4 gtts twice weekly (Tuesday and Friday for example) to each ear for prevention  Keep hearing aid out x 10 minutes after ear drops administered     • D3-1000 25 MCG (1000 UT) tablet Take 2 tablets (2,000 Units total) by mouth daily   • Diclofenac Sodium (VOLTAREN) 1 % Apply 2 g topically 4 (four) times a day   • dicyclomine (BENTYL) 20 mg tablet TAKE 1 TABLET BY MOUTH EVERY 6 HOURS AS NEEDED (DYSPHAGIA)   • Dyclonine-Glycerin (Cepacol Sore Throat Spray) 0 1-33 % LIQD Apply 1 spray to the mouth or throat 3 (three) times a day as needed (sorethroat)   • Elastic Bandages & Supports (Neoprene Knee Brace) MISC Apply right knee brace in morning; remove at night   • escitalopram (LEXAPRO) 20 mg tablet Take 1 tablet (20 mg total) by mouth daily   • fluticasone (FLONASE) 50 mcg/act nasal spray 1 spray into each nostril daily as needed for rhinitis (nasal congestion) At 8:00 AM   • gabapentin (NEURONTIN) 100 mg capsule Take 1 pill nightly for 1 week and then increase to 2 pills nightly   • GNP Sore Throat Spray 1 4 % mucosal liquid APPLY 1 SPRAY TO MOUTH OR THROAT EVERY 2 HRS AS NEEDED FOR SORE THROAT   • GNP Stomach Relief Max St 525 MG/15ML SUSP    • hydrOXYzine HCL (ATARAX) 10 mg tablet Take 1 tablet (10 mg total) by mouth 3 (three) times a day   • ibuprofen (MOTRIN) 400 mg tablet TAKE 1 TABLET BY MOUTH EVERY 8 HOURS AS NEEDED FOR MILD/MOD PAIN OR HEADACHES   • Incontinence Supply Disposable (Wings Choice Plus Adult Briefs) MISC Use as directed (R39 81)   • ketoconazole (NIZORAL) 2 % cream Apply topically daily   • Konsyl Daily Fiber 28 3 %    • lamoTRIgine (LaMICtal) 100 mg tablet    • lamoTRIgine (LaMICtal) 25 mg tablet Take 25 mg by mouth 2 (two) times a day     • lidocaine (LMX) 4 % cream Apply topically as needed for mild pain   • LORazepam (ATIVAN) 0 5 mg tablet Take 0 25 mg by mouth 2 (two) times a day   • lubiprostone (Amitiza) 24 mcg capsule Take 1 capsule (24 mcg total) by mouth daily with breakfast   • magnesium hydroxide (GNP Milk of Magnesia) 400 mg/5 mL oral suspension 2 TBSP (30ML) BY MOUTH DAILY AS NEEDED IF NO BM IN 3 DAYS (CONSTIPATION)   • metoprolol tartrate (LOPRESSOR) 25 mg tablet Take 1 tablet (25 mg total) by mouth 2 (two) times a day   • mineral oil-hydrophilic petrolatum (AQUAPHOR) ointment Apply topically as needed for dry skin   • Multiple Vitamins-Minerals (CertaVite/Antioxidants) TABS TAKE 1 TABLET BY MOUTH DAILY AT 8AM (SUPPLEMENT) *TRISHABARI   • norgestimate-ethinyl estradiol (ORTHO-CYCLEN) 0 25-35 MG-MCG per tablet Take 1 tablet by mouth daily   • nystatin (MYCOSTATIN) powder Apply 1 application topically 4 (four) times a day   • nystatin-triamcinolone (MYCOLOG-II) cream Apply topically 2 (two) times a day   • pantoprazole (PROTONIX) 20 mg tablet Take 1 tablet (20 mg total) by mouth daily   • polyethylene glycol (MIRALAX) 17 g packet Take one packet daily as needed for no bowel movement in 24 hours   • psyllium (METAMUCIL SMOOTH TEXTURE) 28 % packet    • psyllium (METAMUCIL) 58 6 % packet Take 1 packet by mouth daily   • RA Sunscreen SPF50 LOTN Apply 15 minutes before sun exposure and every 2 hours   • senna-docusate sodium (Senexon-S) 8 6-50 mg per tablet Take 1 tablet by mouth daily at 8am    • sodium chloride (OCEAN) 0 65 % nasal spray 1 spray into each nostril as needed (three times daily as needed for nasal congestion) • trospium chloride (SANCTURA) 20 mg tablet Take 1 tablet (20 mg total) by mouth 2 (two) times a day   • zinc oxide (DESITIN) 13 % cream Apply 1 application topically as needed (for perianal irritation)   • amitriptyline (ELAVIL) 10 mg tablet Take 10 mg by mouth daily at bedtime (Patient not taking: Reported on 1/31/2023)   • Mouthwashes (Listerine Antiseptic) LIQD Swish and spit 5 mL 2 (two) times a day       Objective     /80   Pulse 91   Temp 97 6 °F (36 4 °C)   Resp 20   Wt 97 8 kg (215 lb 9 6 oz)   BMI 48 34 kg/m²     Physical Exam  Constitutional:       General: She is not in acute distress  Appearance: She is obese  She is not ill-appearing or diaphoretic  HENT:      Head: Normocephalic and atraumatic  Eyes:      Conjunctiva/sclera: Conjunctivae normal    Cardiovascular:      Rate and Rhythm: Normal rate  Heart sounds: Normal heart sounds  Pulmonary:      Effort: Pulmonary effort is normal  No respiratory distress  Breath sounds: No wheezing  Musculoskeletal:         General: No swelling, tenderness, deformity or signs of injury  Normal range of motion  Right lower leg: No edema  Left lower leg: No edema  Skin:     General: Skin is warm  Findings: No rash  Neurological:      Mental Status: She is alert  Mental status is at baseline         Danella Blizzard, MD

## 2023-01-31 NOTE — TELEPHONE ENCOUNTER
Folder (To be completed by) - Dr Mena Kapoor     Name of Form - Medical Exam [place]    Color folder (blue     Form to be Faxed [phone]    Patient was made aware of the 7-10 business day form policy

## 2023-01-31 NOTE — PROGRESS NOTES
RECALL EXAM, ADULT PROPHY AND 4BWX & Panoramic      REVIEWED MED HX: meds, allergies, health changes reviewed in EPIC  CHIEF CONCERN:  none   PAIN SCALE:  0  ASA CLASS:  I  PLAQUE:  mild  / moderate ( posterior molars   CALCULUS:      light    BLEEDING:   none    light    moderate    severe *  STAIN :   none     ORAL HYGIENE:    fair  ( patient has cerebral palsy and uses a power toothbrush )   PERIO:     Hand scaled, polished and flossed  Used Cavitron  Oral Hygiene Instruction:  recommended brushing 2 x daily for 2 minutes MIN, recommended flossing daily, reviewed dietary precautions  Visual and Tactile Intraoral/ Extraoral evaluation: Oral and Oropharyngeal cancer evaluation  No findings     Dr Randolph Khalil  exam=   Reviewed with patient clinical and radiographic findings and patient verbalized understanding  All questions and concerns addressed  REFERRALS: no referrals needed  CARIES FINDINGS: #20 DO took [place] today Dr Randolph Khalil would like to evaluate #3 D at restorative appointment          TREATMENT  PLAN :   1) Restorative #20 DO     Next Recall: 6 month recall     Last BWX: 1/31/2023 ( difficulty with opening her mouth)   Last Panorex : 1/31/2023

## 2023-01-31 NOTE — TELEPHONE ENCOUNTER
Puma Collazo is faxing case notes for provider to complete for today's visit at 11:30 am    Darian from [facility] can be reached [phone]

## 2023-02-11 ENCOUNTER — HOSPITAL ENCOUNTER (EMERGENCY)
Facility: HOSPITAL | Age: 43
Discharge: HOME | End: 2023-02-12
Attending: EMERGENCY MEDICINE

## 2023-02-11 VITALS
SYSTOLIC BLOOD PRESSURE: 149 MMHG | BODY MASS INDEX: 48.39 KG/M2 | OXYGEN SATURATION: 96 % | RESPIRATION RATE: 20 BRPM | DIASTOLIC BLOOD PRESSURE: 87 MMHG | HEART RATE: 119 BPM | TEMPERATURE: 98.3 F | WEIGHT: 215.83 LBS

## 2023-02-11 DIAGNOSIS — N39.0 UTI (URINARY TRACT INFECTION): ICD-10-CM

## 2023-02-11 DIAGNOSIS — R44.0 AUDITORY HALLUCINATIONS: Primary | ICD-10-CM

## 2023-02-11 LAB
AMPHETAMINES SERPL QL SCN: NEGATIVE
ANION GAP SERPL CALCULATED.3IONS-SCNC: 9 MMOL/L (ref 5–14)
BACTERIA UR QL AUTO: ABNORMAL /HPF
BARBITURATES UR QL: NEGATIVE
BASOPHILS # BLD AUTO: 0.03 THOUSANDS/ÂΜL (ref 0–0.1)
BASOPHILS NFR BLD AUTO: 0 % (ref 0–1)
BENZODIAZ UR QL: NEGATIVE
BILIRUB UR QL STRIP: NEGATIVE
BUN SERPL-MCNC: 11 MG/DL (ref 5–25)
CALCIUM SERPL-MCNC: 7.7 MG/DL (ref 8.4–10.2)
CHLORIDE SERPL-SCNC: 107 MMOL/L (ref 96–108)
CLARITY UR: CLEAR
CO2 SERPL-SCNC: 22 MMOL/L (ref 21–32)
COCAINE UR QL: NEGATIVE
COLOR UR: ABNORMAL
CREAT SERPL-MCNC: 0.77 MG/DL (ref 0.6–1.2)
EOSINOPHIL # BLD AUTO: 0.22 THOUSAND/ÂΜL (ref 0–0.61)
EOSINOPHIL NFR BLD AUTO: 2 % (ref 0–6)
ERYTHROCYTE [DISTWIDTH] IN BLOOD BY AUTOMATED COUNT: 13.3 % (ref 11.6–15.1)
ETHANOL EXG-MCNC: 0 MG/DL
EXT PREGNANCY TEST URINE: NEGATIVE
EXT. CONTROL: NORMAL
FLUAV RNA RESP QL NAA+PROBE: NEGATIVE
FLUBV RNA RESP QL NAA+PROBE: NEGATIVE
GFR SERPL CREATININE-BSD FRML MDRD: 94 ML/MIN/1.73SQ M
GLUCOSE SERPL-MCNC: 113 MG/DL (ref 70–99)
GLUCOSE UR STRIP-MCNC: NEGATIVE MG/DL
HCT VFR BLD AUTO: 40.4 % (ref 34.8–46.1)
HGB BLD-MCNC: 13 G/DL (ref 11.5–15.4)
HGB UR QL STRIP.AUTO: 50
IMM GRANULOCYTES # BLD AUTO: 0.04 THOUSAND/UL (ref 0–0.2)
IMM GRANULOCYTES NFR BLD AUTO: 0 % (ref 0–2)
KETONES UR STRIP-MCNC: NEGATIVE MG/DL
LEUKOCYTE ESTERASE UR QL STRIP: 500
LYMPHOCYTES # BLD AUTO: 2.77 THOUSANDS/ÂΜL (ref 0.6–4.47)
LYMPHOCYTES NFR BLD AUTO: 30 % (ref 14–44)
MCH RBC QN AUTO: 30.2 PG (ref 26.8–34.3)
MCHC RBC AUTO-ENTMCNC: 32.2 G/DL (ref 31.4–37.4)
MCV RBC AUTO: 94 FL (ref 82–98)
METHADONE UR QL: NEGATIVE
MONOCYTES # BLD AUTO: 0.59 THOUSAND/ÂΜL (ref 0.17–1.22)
MONOCYTES NFR BLD AUTO: 6 % (ref 4–12)
NEUTROPHILS # BLD AUTO: 5.73 THOUSANDS/ÂΜL (ref 1.85–7.62)
NEUTS SEG NFR BLD AUTO: 62 % (ref 43–75)
NITRITE UR QL STRIP: NEGATIVE
NON-SQ EPI CELLS URNS QL MICRO: ABNORMAL /HPF
NRBC BLD AUTO-RTO: 0 /100 WBCS
OPIATES UR QL SCN: NEGATIVE
OXYCODONE+OXYMORPHONE UR QL SCN: NEGATIVE
PCP UR QL: NEGATIVE
PH UR STRIP.AUTO: 5 [PH]
PLATELET # BLD AUTO: 286 THOUSANDS/UL (ref 149–390)
PMV BLD AUTO: 8.7 FL (ref 8.9–12.7)
POTASSIUM SERPL-SCNC: 3.7 MMOL/L (ref 3.5–5.3)
PROT UR STRIP-MCNC: ABNORMAL MG/DL
RBC # BLD AUTO: 4.31 MILLION/UL (ref 3.81–5.12)
RBC #/AREA URNS AUTO: ABNORMAL /HPF
RSV RNA RESP QL NAA+PROBE: NEGATIVE
SARS-COV-2 RNA RESP QL NAA+PROBE: NEGATIVE
SODIUM SERPL-SCNC: 138 MMOL/L (ref 135–147)
SP GR UR STRIP.AUTO: 1.01 (ref 1–1.04)
THC UR QL: NEGATIVE
UROBILINOGEN UA: NEGATIVE MG/DL
WBC # BLD AUTO: 9.38 THOUSAND/UL (ref 4.31–10.16)
WBC #/AREA URNS AUTO: ABNORMAL /HPF

## 2023-02-11 RX ORDER — CEPHALEXIN 500 MG/1
500 CAPSULE ORAL EVERY 6 HOURS SCHEDULED
Status: DISCONTINUED | OUTPATIENT
Start: 2023-02-11 | End: 2023-02-12 | Stop reason: HOSPADM

## 2023-02-11 RX ADMIN — CEPHALEXIN 500 MG: 500 CAPSULE ORAL at 22:12

## 2023-02-11 NOTE — Clinical Note
Ehsan Perez should be transferred out to [facility] and has been medically cleared

## 2023-02-12 RX ORDER — CEPHALEXIN 500 MG/1
500 CAPSULE ORAL EVERY 6 HOURS SCHEDULED
Qty: 28 CAPSULE | Refills: 0 | Status: SHIPPED | OUTPATIENT
Start: 2023-02-12 | End: 2023-02-19

## 2023-02-12 NOTE — DISCHARGE INSTRUCTIONS
It is recommended that you discuss feelings of loneliness and depression with your agency Behaviorist and PCP and Psychiatrist   I should discuss the planning of normal activities and socialization with staff and agency  personnel  Please find additional resource for local and online socialization/activities  [employer] [facility]   [address] [place] [address]  [phone]  [email or web address]    Day services, supported employment, training and recreation are provided to people who live in residential settings and at home through,   The [address] [place] [address]: [phone]  Toll-Free TTY Number (Telephone for [address] [place] [address] Only) [phone]  A Customer  will answer calls during normal business hours, which are 8:30 a m  to 4:00 p m  [facility]), Monday through Friday

## 2023-02-12 NOTE — ED CARE HANDOFF
Emergency Department Sign Out Note        Sign out and transfer of care from [place], [place]  See Separate Emergency Department note  The patient, Gerard Lynn, was evaluated by the previous provider for auditory hallucinations  She is a 39 y o  female with a past medical history of hydrocephalus, cerebral palsy, intellectual disability, sensorineural hearing loss for which she wears bilateral hearing aids, anxiety, depression, and seizures  No known admissions for psychiatric care in the past   Today patient reported a new onset of command hallucinations telling her to stab herself with a knife  Patient stated that she did not wish to kill herself or anyone else  She is accompanied by her caregiver from the group home who reported no acute mental status change or recent changes to the patient's medications        Workup Completed:  Results Reviewed     Procedure Component Value Units Date/Time    Basic metabolic panel [[id number]]  (Abnormal) Collected: 02/11/23 2150    Lab Status: Final result Specimen: Blood from Arm, Left Updated: 02/11/23 2207     Sodium 138 mmol/L      Potassium 3 7 mmol/L      Chloride 107 mmol/L      CO2 22 mmol/L      ANION GAP 9 mmol/L      BUN 11 mg/dL      Creatinine 0 77 mg/dL      Glucose 113 mg/dL      Calcium 7 7 mg/dL      eGFR 94 ml/min/1 73sq m     Narrative:      [place] guidelines for Chronic Kidney Disease (CKD):   •  Stage 1 with normal or high GFR (GFR > 90 mL/min/1 73 square meters)  •  Stage 2 Mild CKD (GFR = 60-89 mL/min/1 73 square meters)  •  Stage 3A Moderate CKD (GFR = 45-59 mL/min/1 73 square meters)  •  Stage 3B Moderate CKD (GFR = 30-44 mL/min/1 73 square meters)  •  Stage 4 Severe CKD (GFR = 15-29 mL/min/1 73 square meters)  •  Stage 5 End Stage CKD (GFR <15 mL/min/1 73 square meters)  Note: GFR calculation is accurate only with a steady state creatinine    CBC and differential [[id number]]  (Abnormal) Collected: 02/11/23 2150 Lab Status: Final result Specimen: Blood from Arm, Left Updated: 02/11/23 2158     WBC 9 38 Thousand/uL      RBC 4 31 Million/uL      Hemoglobin 13 0 g/dL      Hematocrit 40 4 %      MCV 94 fL      MCH 30 2 pg      MCHC 32 2 g/dL      RDW 13 3 %      MPV 8 7 fL      Platelets 810 Thousands/uL      nRBC 0 /100 WBCs      Neutrophils Relative 62 %      Immat GRANS % 0 %      Lymphocytes Relative 30 %      Monocytes Relative 6 %      Eosinophils Relative 2 %      Basophils Relative 0 %      Neutrophils Absolute 5 73 Thousands/µL      Immature Grans Absolute 0 04 Thousand/uL      Lymphocytes Absolute 2 77 Thousands/µL      Monocytes Absolute 0 59 Thousand/µL      Eosinophils Absolute 0 22 Thousand/µL      Basophils Absolute 0 03 Thousands/µL     FLU/RSV/COVID - if FLU/RSV clinically relevant [[id number]]  (Normal) Collected: 02/11/23 2042    Lab Status: Final result Specimen: Nares from Nose Updated: 02/11/23 2129     SARS-CoV-2 Negative     INFLUENZA A PCR Negative     INFLUENZA B PCR Negative     RSV PCR Negative    Narrative:      FOR PEDIATRIC PATIENTS - copy/paste COVID Guidelines URL to browser: [email or web address]  [email or web address]    SARS-CoV-2 assay is a Nucleic Acid Amplification assay intended for the  qualitative detection of nucleic acid from SARS-CoV-2 in nasopharyngeal  swabs  Results are for the presumptive identification of SARS-CoV-2 RNA  Positive results are indicative of infection with SARS-CoV-2, the virus  causing COVID-19, but do not rule out bacterial infection or co-infection  with other viruses  Laboratories within the [place] and its  territories are required to report all positive results to the appropriate  public health authorities  Negative results do not preclude SARS-CoV-2  infection and should not be used as the sole basis for treatment or other  patient management decisions   Negative results must be combined with  clinical observations, patient history, and epidemiological information  This test has not been FDA cleared or approved  This test has been authorized by FDA under an Emergency Use Authorization  (EUA)  This test is only authorized for the duration of time the  declaration that circumstances exist justifying the authorization of the  emergency use of an in vitro diagnostic tests for detection of SARS-CoV-2  virus and/or diagnosis of COVID-19 infection under section 564(b)(1) of  the Act, 21 U  S C  495ZHH-9(X)(5), unless the authorization is terminated  or revoked sooner  The test has been validated but independent review by FDA  and CLIA is pending  Test performed using [employer] Gene[employer]pert: This RT-PCR assay targets N2,  a region unique to SARS-CoV-2  A conserved region in the E-gene was chosen  for pan-Sarbecovirus detection which includes SARS-CoV-2  According to CMS-2020-01-R, this platform meets the definition of high-throughput technology  Rapid drug screen, urine [[id number]]  (Normal) Collected: 02/11/23 2051    Lab Status: Final result Specimen: Urine, Clean Catch Updated: 02/11/23 2123     Amph/Meth UR Negative     Barbiturate Ur Negative     Benzodiazepine Urine Negative     Cocaine Urine Negative     Methadone Urine Negative     Opiate Urine Negative     PCP Ur Negative     THC Urine Negative     Oxycodone Urine Negative    Narrative:      FOR MEDICAL PURPOSES ONLY  IF CONFIRMATION NEEDED PLEASE CONTACT THE LAB WITHIN 5 DAYS      Drug Screen Cutoff Levels:  AMPHETAMINE/METHAMPHETAMINES  1000 ng/mL  BARBITURATES     200 ng/mL  BENZODIAZEPINES     200 ng/mL  COCAINE      300 ng/mL  METHADONE      300 ng/mL  OPIATES      300 ng/mL  PHENCYCLIDINE     25 ng/mL  THC       50 ng/mL  OXYCODONE      100 ng/mL    Urine Microscopic [[id number]]  (Abnormal) Collected: 02/11/23 2052    Lab Status: Final result Specimen: Urine, Clean Catch Updated: 02/11/23 2118     RBC, UA 4-10 /hpf      WBC, UA 30-50 /hpf      Epithelial Cells Moderate /hpf      Bacteria, UA Moderate /hpf     Urine culture [[id number]] Collected: 02/11/23 2052    Lab Status: In process Specimen: Urine, Clean Catch Updated: 02/11/23 2118    UA w Reflex to Microscopic w Reflex to Culture [[id number]]  (Abnormal) Collected: 02/11/23 2052    Lab Status: Final result Specimen: Urine, Clean Catch Updated: 02/11/23 2111     Color, UA Corine     Clarity, UA Clear     Specific Gravity, UA 1 015     pH, UA 5 0     Leukocytes,  0     Nitrite, UA Negative     Protein, UA 30 (1+) mg/dl      Glucose, UA Negative mg/dl      Ketones, UA Negative mg/dl      Bilirubin, UA Negative     Occult Blood, UA 50 0     UROBILINOGEN UA Negative mg/dL     POCT pregnancy, urine [[id number]]  (Normal) Resulted: 02/11/23 2054    Lab Status: Final result Specimen: Urine Updated: 02/11/23 2054     EXT Preg Test, Ur Negative     Control Valid    POCT alcohol breath test [[id number]]  (Normal) Resulted: 02/11/23 2035    Lab Status: Final result Updated: 02/11/23 2035     EXTBreath Alcohol 0 000            ED Course / Workup Pending (followup):    ED Course as of 02/12/23 0242   Sat Feb 11, 2023   2335 Sign out from [place], [place]  Patient seen for  that are telling her to kill herself  She stated that she did not want to kill herself, but if she were to do it she would use a knife  No HI or VH  Patient is from a group home and is accompanied by group home staff  She is on Q7 checks  Final disposition pending following crisis consult  Procedures   None    Medical Decision Making  Patient signed out to me by initial provider pending crisis consult for command hallucinations  CBC, viral testing, and UDS all without significant abnormalities  BMP did show hypocalcemia which the patient does have a history of and follows up with her PCP for  Urine microscopic revealed 30-50 WBC/hpf and moderate bacteria therefore the patient was started on Keflex for a UTI    Upon discussion with the ED crisis worker the patient admitted to being lonely and depressed due to her roommate recently passing away  Her caregiver from the group home reported to the crisis worker that the patient had made similar complaints of hallucinations in the past so that she could be taken to the hospital where she likes the interaction with staff (see ED crisis note)  The patient does have a behaviorist within the group home and follows up with an outpatient psychiatrist who manages her medications  At this time the patient is not an immediate threat to herself, has no access to any weapons to harm herself, and will remain on a 1:1 once she returns to the group home  An emphasis was placed on socialization and the patient was discharged with outpatient resources for local socialization agencies as well as activity/support groups for persons with intellectual disabilities  A prescription was also given for an additional week of Keflex to treat her UTI  The patient and her caregiver were given the opportunity to ask questions and return precautions were discussed  Auditory hallucinations: acute illness or injury  UTI (urinary tract infection): acute illness or injury  Amount and/or Complexity of Data Reviewed  Labs: ordered  Risk  Prescription drug management  Disposition  Final diagnoses: Auditory hallucinations   UTI (urinary tract infection)     Time reflects when diagnosis was documented in both MDM as applicable and the Disposition within this note     Time User Action Codes Description Comment    2/11/2023 10:05 PM Gerardine Force Add [R44 0] Auditory hallucinations     2/11/2023 10:05 PM Gernitinne Force Add [N39 0] UTI (urinary tract infection)       ED Disposition     ED Disposition   Discharge    Condition   Stable    Date/Time   Sun Feb 12, 2023 12:13 AM    Comment   Kennedi Koroma should be transferred out to [facility] and has been medically cleared             Follow-up Information    None       Discharge Medication List as of 2/12/2023 12:42 AM      START taking these medications    Details   cephalexin (KEFLEX) 500 mg capsule Take 1 capsule (500 mg total) by mouth every 6 (six) hours for 7 days, Starting Sun 2/12/2023, Until Sun 2/19/2023, Normal         CONTINUE these medications which have NOT CHANGED    Details   acetaminophen (TYLENOL) 500 mg tablet Take 1 tablet (500 mg total) by mouth every 6 (six) hours as needed for mild pain, Starting Mon 3/14/2022, Normal      aluminum-magnesium hydroxide-simethicone (GNP Antacid & Anti-Gas) 8598-7663-009 mg/30 mL suspension Take 15 mL by mouth every 4 (four) hours as needed for indigestion or heartburn, Starting Wed 10/5/2022, Normal      amitriptyline (ELAVIL) 10 mg tablet Take 10 mg by mouth daily at bedtime, Historical Med      ARIPiprazole (ABILIFY) 20 MG tablet Take 1 tablet (20 mg total) by mouth daily at bedtime, Starting u 10/15/2020, Normal      bacitracin topical ointment 500 units/g topical ointment Cleanse site on nose with soap and water followed by bacitracin BID until healed then p r n , Normal      bismuth subsalicylate (PEPTO BISMOL) 524 mg/30 mL oral suspension Take 15 mL (262 mg total) by mouth every 6 (six) hours as needed for indigestion, Starting Tue 2/22/2022, Normal      calcium carbonate (TUMS) 500 mg chewable tablet Chew 1 tablet (500 mg total) 3 (three) times a day as needed for heartburn, Starting Tue 10/26/2021, Normal      carbamide peroxide (DEBROX) 6 5 % otic solution Administer 5 drops into the left ear 2 (two) times a day Use 1 week prior to ENT appointment  Also can use 4 gtts twice weekly (Tuesday and Friday for example) to each ear for prevention  Keep hearing aid out x 10 minutes after ear drops administered  ,  Starting Fri 10/14/2022, Normal      D3-1000 25 MCG (1000 UT) tablet Take 2 tablets (2,000 Units total) by mouth daily, Starting Fri 10/7/2022, Normal      Diclofenac Sodium (VOLTAREN) 1 % Apply 2 g topically 4 (four) times a day, Starting Tue 4/5/2022, Normal      dicyclomine (BENTYL) 20 mg tablet TAKE 1 TABLET BY MOUTH EVERY 6 HOURS AS NEEDED (DYSPHAGIA), Normal      Dyclonine-Glycerin (Cepacol Sore Throat Spray) 0 1-33 % LIQD Apply 1 spray to the mouth or throat 3 (three) times a day as needed (sorethroat), Starting Tue 10/26/2021, Normal      Elastic Bandages & Supports (Neoprene Knee Brace) MISC Apply right knee brace in morning; remove at night, Normal      escitalopram (LEXAPRO) 20 mg tablet Take 1 tablet (20 mg total) by mouth daily, Starting Thu 10/15/2020, Normal      fluticasone (FLONASE) 50 mcg/act nasal spray 1 spray into each nostril daily as needed for rhinitis (nasal congestion) At 8:00 AM, Starting Fri 6/11/2021, Normal      gabapentin (NEURONTIN) 100 mg capsule Take 1 pill nightly for 1 week and then increase to 2 pills nightly, Normal      GNP Sore Throat Spray 1 4 % mucosal liquid APPLY 1 SPRAY TO MOUTH OR THROAT EVERY 2 HRS AS NEEDED FOR SORE THROAT, Normal      GNP Stomach Relief Max St 525 MG/15ML SUSP Starting Tue 2/22/2022, Historical Med      hydrOXYzine HCL (ATARAX) 10 mg tablet Take 1 tablet (10 mg total) by mouth 3 (three) times a day, Starting Tue 10/12/2021, Normal      ibuprofen (MOTRIN) 400 mg tablet TAKE 1 TABLET BY MOUTH EVERY 8 HOURS AS NEEDED FOR MILD/MOD PAIN OR HEADACHES, Normal      Incontinence Supply Disposable (Wings Choice Plus Adult Briefs) MISC Use as directed (R39 81), Normal      ketoconazole (NIZORAL) 2 % cream Apply topically daily, Starting Tue 8/16/2022, Normal      Konsyl Daily Fiber 28 3 % Starting Sat 10/2/2021, Historical Med      !! lamoTRIgine (LaMICtal) 100 mg tablet Starting Wed 12/8/2021, Historical Med      !! lamoTRIgine (LaMICtal) 25 mg tablet Take 25 mg by mouth 2 (two) times a day  , Starting Tue 8/11/2020, Historical Med      lidocaine (LMX) 4 % cream Apply topically as needed for mild pain, Starting Mon 9/19/2022, Normal      LORazepam (ATIVAN) 0 5 mg tablet Take 0 25 mg by mouth 2 (two) times a day, Historical Med      lubiprostone (Amitiza) 24 mcg capsule Take 1 capsule (24 mcg total) by mouth daily with breakfast, Starting Tue 8/16/2022, Normal      magnesium hydroxide (GNP Milk of Magnesia) 400 mg/5 mL oral suspension 2 TBSP (30ML) BY MOUTH DAILY AS NEEDED IF NO BM IN 3 DAYS (CONSTIPATION), Normal      metoprolol tartrate (LOPRESSOR) 25 mg tablet Take 1 tablet (25 mg total) by mouth 2 (two) times a day, Starting Tue 8/16/2022, Normal      mineral oil-hydrophilic petrolatum (AQUAPHOR) ointment Apply topically as needed for dry skin, Starting Wed 10/5/2022, Normal      Mouthwashes (Listerine Antiseptic) LIQD Swish and spit 5 mL 2 (two) times a day, Starting Thu 5/12/2022, Until Fri 6/17/2022, Normal      Multiple Vitamins-Minerals (CertaVite/Antioxidants) TABS TAKE 1 TABLET BY MOUTH DAILY AT 8AM (SUPPLEMENT) *IGABARI, Normal      norgestimate-ethinyl estradiol (ORTHO-CYCLEN) 0 25-35 MG-MCG per tablet Take 1 tablet by mouth daily, Starting Tue 1/17/2023, Normal      nystatin (MYCOSTATIN) powder Apply 1 application topically 4 (four) times a day, Starting Fri 8/19/2022, Normal      nystatin-triamcinolone (MYCOLOG-II) cream Apply topically 2 (two) times a day, Starting Tue 8/16/2022, Normal      pantoprazole (PROTONIX) 20 mg tablet Take 1 tablet (20 mg total) by mouth daily, Starting Tue 8/16/2022, Normal      polyethylene glycol (MIRALAX) 17 g packet Take one packet daily as needed for no bowel movement in 24 hours, Normal      psyllium (METAMUCIL SMOOTH TEXTURE) 28 % packet Starting Tue 1/3/2023, Historical Med      psyllium (METAMUCIL) 58 6 % packet Take 1 packet by mouth daily, Starting Thu 12/1/2022, Normal      RA Sunscreen SPF50 LOTN Apply 15 minutes before sun exposure and every 2 hours, Normal      senna-docusate sodium (Senexon-S) 8 6-50 mg per tablet Take 1 tablet by mouth daily at 8am , Starting Tue 8/16/2022, Normal      sodium chloride (OCEAN) 0 65 % nasal spray 1 spray into each nostril as needed (three times daily as needed for nasal congestion), Starting Tue 8/16/2022, Normal      trospium chloride (SANCTURA) 20 mg tablet Take 1 tablet (20 mg total) by mouth 2 (two) times a day, Starting Tue 8/16/2022, Normal      zinc oxide (DESITIN) 13 % cream Apply 1 application topically as needed (for perianal irritation), Starting Tue 8/16/2022, Normal       !! - Potential duplicate medications found  Please discuss with provider  No discharge procedures on file         ED Provider  Electronically Signed by     Horacio Lal PA-C  02/12/23 1351

## 2023-02-12 NOTE — ED NOTES
Pt is a 39 y o  female who was brought to the ED with   Chief Complaint   Patient presents with   • Hallucinations     Pt presents to the ED with c/o having command hallucinations telling her to kill herself  States that she would like to stab herself with a knife but she has never done anything and does have a hx of intellectual disability  Caregiver at bedside  Intake Assessment completed, Safety risk Assessment completed    Eloisa Crockett    Pt is a 39 yr old female w/ hx of cognitive impairment, cerebral palsy, anxiety and depression  Pt presents to ED with complaints of AH telling herself to hurt herself and/or stab herself with a knife  Pt reports feeling lonely and sad since the passing of her previous roommate  Pt reports her new roommate is a non-verbal male and is unable to communicate or participate in social activities with her  Pt reports she does not speak with friends due to long distance and does not participate in outside social activities  Pt did not elaborate on AH when asked  Pt responded with, "I hear things that say I should hurt myself so need to stay here overnight " CIS explained how she may advocate for herself to live a more active and social life  Pt's caregiver states pt has made similar complaints as a means of attention seeking behavior and enjoys the company, attention and food at the hospital  Caregiver confirms patient has no history of SI attempts well as 1:1 staffing at all times while in the group home  Pt is compliant with her medications and active in her treatment with providers  Patient has no known history of inpatient psychiatric treatment, but does have an established Behaviorist with the agency and will follow up with Dr Alba Reyes (psychiatrist) and current outpatient providers  CIS spoke extensively with caregiver and pt about the importance of social activity and mental health   Pt and caregiver were provided a resource   packet of local socialization agencies, activities and support groups, as well as, online social groups for persons with ID

## 2023-02-12 NOTE — ED PROVIDER NOTES
History  Chief Complaint   Patient presents with   • Hallucinations     Pt presents to the ED with c/o having command hallucinations telling her to kill herself  States that she would like to stab herself with a knife but she has never done anything and does have a hx of intellectual disability  Caregiver at bedside  50-year-old female past medical history of intellectual disability, sensorineural hearing loss, cerebral palsy, hydrocephalus, anxiety, depression, seizures, calcium deficiency presents to emergency department for new onset command hallucinations  She states that the voices she is hearing are telling her to stab herself with a knife  However she states that she does not want to kill herself  Patient is a poor historian  She is unsure of psychiatric diagnoses, whether or not she has had previous psychiatric admissions, which medications she takes  Caregiver is at bedside  She comes from a group home with 24-hour caregivers  Caregiver denies any altered mental status/change from baseline  They both deny any acute complaints other than the command hallucinations  They deny any drug or alcohol use  History provided by:  Patient and medical records      Prior to Admission Medications   Prescriptions Last Dose Informant Patient Reported? Taking?    ARIPiprazole (ABILIFY) 20 MG tablet   No No   Sig: Take 1 tablet (20 mg total) by mouth daily at bedtime   D3-1000 25 MCG (1000 UT) tablet   No No   Sig: Take 2 tablets (2,000 Units total) by mouth daily   Diclofenac Sodium (VOLTAREN) 1 %   No No   Sig: Apply 2 g topically 4 (four) times a day   Dyclonine-Glycerin (Cepacol Sore Throat Spray) 0 1-33 % LIQD   No No   Sig: Apply 1 spray to the mouth or throat 3 (three) times a day as needed (sorethroat)   Elastic Bandages & Supports (Neoprene Knee Brace) [facility]   No No   Sig: Apply right knee brace in morning; remove at night   GNP Sore Throat Spray 1 4 % mucosal liquid   No No   Sig: APPLY 1 SPRAY TO MOUTH OR THROAT EVERY 2 HRS AS NEEDED FOR SORE THROAT   GNP Stomach Relief Max St 525 MG/15ML SUSP   Yes No   Incontinence Supply Disposable (Wings Choice Plus Adult Briefs) MISC   No No   Sig: Use as directed (R39 81)   Konsyl Daily Fiber 28 3 %   Yes No   LORazepam (ATIVAN) 0 5 mg tablet   Yes No   Sig: Take 0 25 mg by mouth 2 (two) times a day   Mouthwashes (Listerine Antiseptic) LIQD   No No   Sig: Swish and spit 5 mL 2 (two) times a day   Multiple Vitamins-Minerals (CertaVite/Antioxidants) TABS   No No   Sig: TAKE 1 TABLET BY MOUTH DAILY AT 8AM (SUPPLEMENT) *CLEMENTEI   RA Sunscreen SPF50 LOTN   No No   Sig: Apply 15 minutes before sun exposure and every 2 hours   acetaminophen (TYLENOL) 500 mg tablet   No No   Sig: Take 1 tablet (500 mg total) by mouth every 6 (six) hours as needed for mild pain   aluminum-magnesium hydroxide-simethicone (GNP Antacid & Anti-Gas) 9669-3290-643 mg/30 mL suspension   No No   Sig: Take 15 mL by mouth every 4 (four) hours as needed for indigestion or heartburn   amitriptyline (ELAVIL) 10 mg tablet   Yes No   Sig: Take 10 mg by mouth daily at bedtime   Patient not taking: Reported on 1/31/2023   bacitracin topical ointment 500 units/g topical ointment   No No   Sig: Cleanse site on nose with soap and water followed by bacitracin BID until healed then p r n    bismuth subsalicylate (PEPTO BISMOL) 524 mg/30 mL oral suspension   No No   Sig: Take 15 mL (262 mg total) by mouth every 6 (six) hours as needed for indigestion   calcium carbonate (TUMS) 500 mg chewable tablet   No No   Sig: Chew 1 tablet (500 mg total) 3 (three) times a day as needed for heartburn   carbamide peroxide (DEBROX) 6 5 % otic solution   No No   Sig: Administer 5 drops into the left ear 2 (two) times a day Use 1 week prior to ENT appointment  Also can use 4 gtts twice weekly (Tuesday and Friday for example) to each ear for prevention  Keep hearing aid out x 10 minutes after ear drops administered     dicyclomine (BENTYL) 20 mg tablet   No No   Sig: TAKE 1 TABLET BY MOUTH EVERY 6 HOURS AS NEEDED (DYSPHAGIA)   escitalopram (LEXAPRO) 20 mg tablet   No No   Sig: Take 1 tablet (20 mg total) by mouth daily   fluticasone (FLONASE) 50 mcg/act nasal spray   No No   Si spray into each nostril daily as needed for rhinitis (nasal congestion) At 8:00 AM   gabapentin (NEURONTIN) 100 mg capsule   No No   Sig: Take 1 pill nightly for 1 week and then increase to 2 pills nightly   hydrOXYzine HCL (ATARAX) 10 mg tablet   No No   Sig: Take 1 tablet (10 mg total) by mouth 3 (three) times a day   ibuprofen (MOTRIN) 400 mg tablet   No No   Sig: TAKE 1 TABLET BY MOUTH EVERY 8 HOURS AS NEEDED FOR MILD/MOD PAIN OR HEADACHES   ketoconazole (NIZORAL) 2 % cream   No No   Sig: Apply topically daily   lamoTRIgine (LaMICtal) 100 mg tablet   Yes No   lamoTRIgine (LaMICtal) 25 mg tablet   Yes No   Sig: Take 25 mg by mouth 2 (two) times a day     lidocaine (LMX) 4 % cream   No No   Sig: Apply topically as needed for mild pain   lubiprostone (Amitiza) 24 mcg capsule   No No   Sig: Take 1 capsule (24 mcg total) by mouth daily with breakfast   magnesium hydroxide (GNP Milk of Magnesia) 400 mg/5 mL oral suspension   No No   Si TBSP (30ML) BY MOUTH DAILY AS NEEDED IF NO BM IN 3 DAYS (CONSTIPATION)   metoprolol tartrate (LOPRESSOR) 25 mg tablet   No No   Sig: Take 1 tablet (25 mg total) by mouth 2 (two) times a day   mineral oil-hydrophilic petrolatum (AQUAPHOR) ointment   No No   Sig: Apply topically as needed for dry skin   norgestimate-ethinyl estradiol (ORTHO-CYCLEN) 0 25-35 MG-MCG per tablet   No No   Sig: Take 1 tablet by mouth daily   nystatin (MYCOSTATIN) powder   No No   Sig: Apply 1 application topically 4 (four) times a day   nystatin-triamcinolone (MYCOLOG-II) cream   No No   Sig: Apply topically 2 (two) times a day   pantoprazole (PROTONIX) 20 mg tablet   No No   Sig: Take 1 tablet (20 mg total) by mouth daily   polyethylene glycol (MIRALAX) 17 g packet   No No   Sig: Take one packet daily as needed for no bowel movement in 24 hours   psyllium (METAMUCIL SMOOTH TEXTURE) 28 % packet   Yes No   psyllium (METAMUCIL) 58 6 % packet   No No   Sig: Take 1 packet by mouth daily   senna-docusate sodium (Senexon-S) 8 6-50 mg per tablet   No No   Sig: Take 1 tablet by mouth daily at 8am    sodium chloride (OCEAN) 0 65 % nasal spray   No No   Si spray into each nostril as needed (three times daily as needed for nasal congestion)   trospium chloride (SANCTURA) 20 mg tablet   No No   Sig: Take 1 tablet (20 mg total) by mouth 2 (two) times a day   zinc oxide (DESITIN) 13 % cream   No No   Sig: Apply 1 application topically as needed (for perianal irritation)      Facility-Administered Medications: None       Past Medical History:   Diagnosis Date   • ADD (attention deficit disorder)    • Anxiety    • Astigmatism    • Brain lesion    • Calcium deficiency    • Cellulitis of foot, right 2018   • Cerebral palsy (HCC)    • Chronic otitis media    • Constipation    • Depression    • Dysphagia    • Esophagitis    • Esotropia    • GERD (gastroesophageal reflux disease)    • Hiatal hernia    • Hydrocephalus (HCC)    • Impaired fasting glucose    • Left nephrolithiasis 2019   • Myopia    • Oppositional defiant disorder    • 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HCC)    • Seizures ([facility])    • Sensorineural hearing loss    • Sleep apnea    • Status post ventriculoatrial shunt placement    • Visual impairment        Past Surgical History:   Procedure Laterality Date   • BREAST BIOPSY Left     X 2 (not sure of years)   • CSF SHUNT      Creation of Ventriculo-Peritoneal CSF shunt ;  Last Assessed:2016   • EAR SURGERY      Last Assessed:2016   • LEG SURGERY      due to CP    • NOSE SURGERY      Last Assessed:2016   • NC ESOPHAGOGASTRODUODENOSCOPY TRANSORAL DIAGNOSTIC N/A 2019    Procedure: ESOPHAGOGASTRODUODENOSCOPY (EGD) with biopsy;  Surgeon: Ryan Macias MD; Location: [facility]; Service: Gastroenterology   • UPPER GASTROINTESTINAL ENDOSCOPY  05/2019       Family History   Problem Relation Age of Onset   • Diabetes Mother    • Colon cancer Father    • No Known Problems Maternal Grandmother    • No Known Problems Maternal Grandfather    • No Known Problems Paternal Grandmother    • No Known Problems Paternal Grandfather    • Hypertension Neg Hx    • Heart disease Neg Hx    • Stroke Neg Hx    • Thyroid disease Neg Hx      I have reviewed and agree with the history as documented  E-Cigarette/Vaping   • E-Cigarette Use Never User      E-Cigarette/Vaping Substances   • Nicotine No    • THC No    • CBD No    • Flavoring No    • Other No    • Unknown No      Social History     Tobacco Use   • Smoking status: Never   • Smokeless tobacco: Never   Vaping Use   • Vaping Use: Never used   Substance Use Topics   • Alcohol use: Never   • Drug use: Never       Review of Systems   Constitutional: Negative for chills and fever  Respiratory: Negative for shortness of breath  Cardiovascular: Negative for chest pain  Gastrointestinal: Negative for abdominal pain and vomiting  Genitourinary: Negative for dysuria and hematuria  Skin: Negative for rash  Neurological: Negative for dizziness, syncope and headaches  Psychiatric/Behavioral: Positive for hallucinations and suicidal ideas  Negative for confusion  All other systems reviewed and are negative  Physical Exam  Physical Exam  Vitals and nursing note reviewed  Constitutional:       General: She is not in acute distress  Appearance: She is not ill-appearing  HENT:      Head: Normocephalic and atraumatic  Eyes:      Pupils: Pupils are equal, round, and reactive to light  Comments: esotropia    Cardiovascular:      Rate and Rhythm: Regular rhythm  Tachycardia present  Heart sounds: Normal heart sounds  Pulmonary:      Effort: Pulmonary effort is normal  No respiratory distress        Breath sounds: Normal breath sounds  Abdominal:      Palpations: Abdomen is soft  Tenderness: There is no abdominal tenderness  Skin:     General: Skin is warm and dry  Findings: No rash  Neurological:      Mental Status: She is alert and oriented to person, place, and time  Comments: Ambulates with walker    Psychiatric:         Attention and Perception: She is attentive  Mood and Affect: Mood is not depressed  Behavior: Behavior is cooperative  Comments: Poor historian  She denies being suicidal but has a plan for suicide given to her by auditory hallucinations            Vital Signs  ED Triage Vitals [02/11/23 1919]   Temperature Pulse Respirations Blood Pressure SpO2   98 3 °F (36 8 °C) (!) 119 20 149/87 96 %      Temp Source Heart Rate Source Patient Position - Orthostatic VS BP Location FiO2 (%)   Tympanic Monitor Sitting Left arm --      Pain Score       --           Vitals:    02/11/23 1919   BP: 149/87   Pulse: (!) 119   Patient Position - Orthostatic VS: Sitting         Visual Acuity      ED Medications  Medications   cephalexin (KEFLEX) capsule 500 mg (500 mg Oral Given 2/11/23 2212)       Diagnostic Studies  Results Reviewed     Procedure Component Value Units Date/Time    Basic metabolic panel [[id number]]  (Abnormal) Collected: 02/11/23 2150    Lab Status: Final result Specimen: Blood from Arm, Left Updated: 02/11/23 2207     Sodium 138 mmol/L      Potassium 3 7 mmol/L      Chloride 107 mmol/L      CO2 22 mmol/L      ANION GAP 9 mmol/L      BUN 11 mg/dL      Creatinine 0 77 mg/dL      Glucose 113 mg/dL      Calcium 7 7 mg/dL      eGFR 94 ml/min/1 73sq m     Narrative:      [place] guidelines for Chronic Kidney Disease (CKD):   •  Stage 1 with normal or high GFR (GFR > 90 mL/min/1 73 square meters)  •  Stage 2 Mild CKD (GFR = 60-89 mL/min/1 73 square meters)  •  Stage 3A Moderate CKD (GFR = 45-59 mL/min/1 73 square meters)  •  Stage 3B Moderate CKD (GFR = 30-44 mL/min/1 73 square meters)  •  Stage 4 Severe CKD (GFR = 15-29 mL/min/1 73 square meters)  •  Stage 5 End Stage CKD (GFR <15 mL/min/1 73 square meters)  Note: GFR calculation is accurate only with a steady state creatinine    CBC and differential [[id number]]  (Abnormal) Collected: 02/11/23 2150    Lab Status: Final result Specimen: Blood from Arm, Left Updated: 02/11/23 2158     WBC 9 38 Thousand/uL      RBC 4 31 Million/uL      Hemoglobin 13 0 g/dL      Hematocrit 40 4 %      MCV 94 fL      MCH 30 2 pg      MCHC 32 2 g/dL      RDW 13 3 %      MPV 8 7 fL      Platelets 556 Thousands/uL      nRBC 0 /100 WBCs      Neutrophils Relative 62 %      Immat GRANS % 0 %      Lymphocytes Relative 30 %      Monocytes Relative 6 %      Eosinophils Relative 2 %      Basophils Relative 0 %      Neutrophils Absolute 5 73 Thousands/µL      Immature Grans Absolute 0 04 Thousand/uL      Lymphocytes Absolute 2 77 Thousands/µL      Monocytes Absolute 0 59 Thousand/µL      Eosinophils Absolute 0 22 Thousand/µL      Basophils Absolute 0 03 Thousands/µL     FLU/RSV/COVID - if FLU/RSV clinically relevant [[id number]]  (Normal) Collected: 02/11/23 2042    Lab Status: Final result Specimen: Nares from Nose Updated: 02/11/23 2129     SARS-CoV-2 Negative     INFLUENZA A PCR Negative     INFLUENZA B PCR Negative     RSV PCR Negative    Narrative:      FOR PEDIATRIC PATIENTS - copy/paste COVID Guidelines URL to browser: [email or web address]    SARS-CoV-2 assay is a Nucleic Acid Amplification assay intended for the  qualitative detection of nucleic acid from SARS-CoV-2 in nasopharyngeal  swabs  Results are for the presumptive identification of SARS-CoV-2 RNA  Positive results are indicative of infection with SARS-CoV-2, the virus  causing COVID-19, but do not rule out bacterial infection or co-infection  with other viruses   Laboratories within the [place] and its  territories are required to report all positive results to the appropriate  public health authorities  Negative results do not preclude SARS-CoV-2  infection and should not be used as the sole basis for treatment or other  patient management decisions  Negative results must be combined with  clinical observations, patient history, and epidemiological information  This test has not been FDA cleared or approved  This test has been authorized by FDA under an Emergency Use Authorization  (EUA)  This test is only authorized for the duration of time the  declaration that circumstances exist justifying the authorization of the  emergency use of an in vitro diagnostic tests for detection of SARS-CoV-2  virus and/or diagnosis of COVID-19 infection under section 564(b)(1) of  the Act, 21 U  S C  928XNL-8(S)(6), unless the authorization is terminated  or revoked sooner  The test has been validated but independent review by FDA  and CLIA is pending  Test performed using [employer] GeneXpert: This RT-PCR assay targets N2,  a region unique to SARS-CoV-2  A conserved region in the E-gene was chosen  for pan-Sarbecovirus detection which includes SARS-CoV-2  According to CMS-2020-01-R, this platform meets the definition of high-throughput technology  Rapid drug screen, urine [[id number]]  (Normal) Collected: 02/11/23 2051    Lab Status: Final result Specimen: Urine, Clean Catch Updated: 02/11/23 2123     Amph/Meth UR Negative     Barbiturate Ur Negative     Benzodiazepine Urine Negative     Cocaine Urine Negative     Methadone Urine Negative     Opiate Urine Negative     PCP Ur Negative     THC Urine Negative     Oxycodone Urine Negative    Narrative:      FOR MEDICAL PURPOSES ONLY  IF CONFIRMATION NEEDED PLEASE CONTACT THE LAB WITHIN 5 DAYS      Drug Screen Cutoff Levels:  AMPHETAMINE/METHAMPHETAMINES  1000 ng/mL  BARBITURATES     200 ng/mL  BENZODIAZEPINES     200 ng/mL  COCAINE      300 ng/mL  METHADONE      300 ng/mL  OPIATES      300 ng/mL  PHENCYCLIDINE     25 ng/mL  THC       50 ng/mL  OXYCODONE      100 ng/mL    Urine Microscopic [[id number]]  (Abnormal) Collected: 02/11/23 2052    Lab Status: Final result Specimen: Urine, Clean Catch Updated: 02/11/23 2118     RBC, UA 4-10 /hpf      WBC, UA 30-50 /hpf      Epithelial Cells Moderate /hpf      Bacteria, UA Moderate /hpf     Urine culture [[id number]] Collected: 02/11/23 2052    Lab Status: In process Specimen: Urine, Clean Catch Updated: 02/11/23 2118    UA w Reflex to Microscopic w Reflex to Culture [[id number]]  (Abnormal) Collected: 02/11/23 2052    Lab Status: Final result Specimen: Urine, Clean Catch Updated: 02/11/23 2111     Color, UA [place]     Clarity, UA Clear     Specific Gravity, UA 1 015     pH, UA 5 0     Leukocytes,  0     Nitrite, UA Negative     Protein, UA 30 (1+) mg/dl      Glucose, UA Negative mg/dl      Ketones, UA Negative mg/dl      Bilirubin, UA Negative     Occult Blood, UA 50 0     UROBILINOGEN UA Negative mg/dL     POCT pregnancy, urine [[id number]]  (Normal) Resulted: 02/11/23 2054    Lab Status: Final result Specimen: Urine Updated: 02/11/23 2054     EXT Preg Test, Ur Negative     Control Valid    POCT alcohol breath test [[id number]]  (Normal) Resulted: 02/11/23 2035    Lab Status: Final result Updated: 02/11/23 2035     EXTBreath Alcohol 0 000                 No orders to display              Procedures  Procedures         ED Course  ED Course as of 02/11/23 2346   Sat Feb 11, 2023 2014 Command hallucinations that started today  Lives in group home  24-hour care  States that the voices are telling her to kill herself  Her plan is to stab herself with a knife  However she states that she does not want to do that and that she feels safe here  She states that she feels like she needs to stay here     2118 Bacteria, UA(!): Moderate   2118 WBC, UA(!): 30-50   2119 RBC, UA(!): 4-10   2159 Procedure Note: EKG  Date/Time: 02/11/23 9:59 PM Performed by: Luigi Altamirano by: Sarthak Mujica  ECG interpreted by me, the ED Provider: yes   The EKG demonstrates:  Rate 101 bpm  Rhythm sinus tachycardia   QTc 461 ms   No ST elevations/depressions     2334 Patient signed out to [facility] pending crisis consult for new onset command hallucinations telling her to kill herself  UTI medications (keflex QID x 7 days) ordered for ED  Medical Decision Making  Patient presenting for psychiatric evaluation  History of intellectual disability  Poor historian  Presents with caregiver from group home, has 24-hour care at group home  New onset of auditory hallucinations telling her to kill herself by stabbing herself with a knife  She denies SI currently  Patient SI screen came back for moderate risk  Placed on q  7   Behavioral health work-up ordered  Patient is at baseline, oriented x3  No acute complaints other than hallucinations  No change from baseline according to caregiver  No self injuries on exam   UA significant for UTI  We will plan to treat with antibiotics  History of hypocalcemia, asymptomatic, follows with PCP  Remainder of labs grossly nonpathologic  No leukocytosis  No sepsis  Medically cleared for behavioral health  Crisis consult placed  Signed out pending completion of consult  Auditory hallucinations: acute illness or injury  UTI (urinary tract infection): acute illness or injury  Amount and/or Complexity of Data Reviewed  Labs: ordered  Decision-making details documented in ED Course  Risk  Prescription drug management  Decision regarding hospitalization  Disposition  Final diagnoses:    Auditory hallucinations   UTI (urinary tract infection)     Time reflects when diagnosis was documented in both MDM as applicable and the Disposition within this note     Time User Action Codes Description Comment    2/11/2023 10:05 PM Sarthak Mujica Add [R44 0] Auditory hallucinations     2/11/2023 10:05 PM Jarocho Mims Add [N39 0] UTI (urinary tract infection)       ED Disposition     ED Disposition   Transfer to [address] [place] [address]   --    Date/Time   Sat Feb 11, 2023 10:27 PM    Comment   Malinda Fernandez should be transferred out to [address] [place] [address] and has been medically cleared  Follow-up Information    None         Patient's Medications   Discharge Prescriptions    No medications on file       No discharge procedures on file      PDMP Review       Value Time User    PDMP Reviewed  Yes 10/6/2021  1:11 PM Rubi Boyd PA-C          ED Provider  Electronically Signed by           Mandy Mina PA-C  02/11/23 4940

## 2023-02-13 LAB
ATRIAL RATE: 101 BPM
P AXIS: 34 DEGREES
PR INTERVAL: 126 MS
QRS AXIS: 1 DEGREES
QRSD INTERVAL: 74 MS
QT INTERVAL: 356 MS
QTC INTERVAL: 461 MS
T WAVE AXIS: 51 DEGREES
VENTRICULAR RATE: 101 BPM

## 2023-02-14 LAB — BACTERIA UR CULT: NORMAL

## 2023-02-15 ENCOUNTER — TELEPHONE (OUTPATIENT)
Dept: FAMILY MEDICINE CLINIC | Facility: CLINIC | Age: 43
End: 2023-02-15

## 2023-02-15 NOTE — TELEPHONE ENCOUNTER
Folder (To be completed by) - Dr Man Donald     Name of Erasmo Xie #[id number]    Color folder [facility])    Form to be Faxed (Fax #), [phone]    Patient was made aware of the 7-10 business day form policy

## 2023-02-17 ENCOUNTER — APPOINTMENT (EMERGENCY)
Dept: CT IMAGING | Facility: HOSPITAL | Age: 43
End: 2023-02-17

## 2023-02-17 ENCOUNTER — HOSPITAL ENCOUNTER (EMERGENCY)
Facility: HOSPITAL | Age: 43
Discharge: HOME | End: 2023-02-17
Attending: EMERGENCY MEDICINE

## 2023-02-17 VITALS
SYSTOLIC BLOOD PRESSURE: 144 MMHG | HEART RATE: 116 BPM | WEIGHT: 220.46 LBS | TEMPERATURE: 98 F | DIASTOLIC BLOOD PRESSURE: 75 MMHG | BODY MASS INDEX: 49.43 KG/M2 | OXYGEN SATURATION: 96 % | RESPIRATION RATE: 20 BRPM
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
## 2023-02-17 DIAGNOSIS — S09.90XA CLOSED HEAD INJURY, INITIAL ENCOUNTER: ICD-10-CM

## 2023-02-20 ENCOUNTER — TELEPHONE (OUTPATIENT)
Dept: FAMILY MEDICINE CLINIC | Facility: CLINIC | Age: 43
End: 2023-02-20

## 2023-02-20 DIAGNOSIS — J06.9 VIRAL URI: Primary | ICD-10-CM

## 2023-02-20 NOTE — TELEPHONE ENCOUNTER
Called patient's caregiver  Patient is Covid negative, has no fever  Caregiver states she is congested, thinks it is allergy related  Requesting Fr for Singulair  Please review  no

## 2023-02-20 NOTE — ED PROVIDER NOTES
History  Chief Complaint   Patient presents with   • Fall     Pt brought from group home after fall  Pt reports the back of her head hurts and she has dizziness  Patient is a 69-year-old female with a h/o CP who presents via [facility] with an acute fall out of bed  Accidentally rolled out and hit back of head  No LOC  C/o post headache  No n/v  No numbness/tingling  Prior to Admission Medications   Prescriptions Last Dose Informant Patient Reported? Taking?    ARIPiprazole (ABILIFY) 20 MG tablet   No No   Sig: Take 1 tablet (20 mg total) by mouth daily at bedtime   D3-1000 25 MCG (1000 UT) tablet   No No   Sig: Take 2 tablets (2,000 Units total) by mouth daily   Diclofenac Sodium (VOLTAREN) 1 %   No No   Sig: Apply 2 g topically 4 (four) times a day   Dyclonine-Glycerin (Cepacol Sore Throat Spray) 0 1-33 % LIQD   No No   Sig: Apply 1 spray to the mouth or throat 3 (three) times a day as needed (sorethroat)   Elastic Bandages & Supports (Neoprene Knee Brace) [facility]   No No   Sig: Apply right knee brace in morning; remove at night   GNP Sore Throat Spray 1 4 % mucosal liquid   No No   Sig: APPLY 1 SPRAY TO MOUTH OR THROAT EVERY 2 HRS AS NEEDED FOR SORE THROAT   GNP Stomach Relief Max St 525 MG/15ML SUSP   Yes No   Incontinence Supply Disposable (Wings Choice Plus Adult Briefs) MISC   No No   Sig: Use as directed (R39 81)   Konsyl Daily Fiber 28 3 %   Yes No   LORazepam (ATIVAN) 0 5 mg tablet   Yes No   Sig: Take 0 25 mg by mouth 2 (two) times a day   Mouthwashes (Listerine Antiseptic) LIQD   No No   Sig: Swish and spit 5 mL 2 (two) times a day   Multiple Vitamins-Minerals (CertaVite/Antioxidants) TABS   No No   Sig: TAKE 1 TABLET BY MOUTH DAILY AT 8AM (SUPPLEMENT) *MOHINI DODD Sunscreen SPF50 LOTN   No No   Sig: Apply 15 minutes before sun exposure and every 2 hours   acetaminophen (TYLENOL) 500 mg tablet   No No   Sig: Take 1 tablet (500 mg total) by mouth every 6 (six) hours as needed for mild pain aluminum-magnesium hydroxide-simethicone (GNP Antacid & Anti-Gas) 0584-7839-965 mg/30 mL suspension   No No   Sig: Take 15 mL by mouth every 4 (four) hours as needed for indigestion or heartburn   amitriptyline (ELAVIL) 10 mg tablet   Yes No   Sig: Take 10 mg by mouth daily at bedtime   Patient not taking: Reported on 2023   bacitracin topical ointment 500 units/g topical ointment   No No   Sig: Cleanse site on nose with soap and water followed by bacitracin BID until healed then p r n    bismuth subsalicylate (PEPTO BISMOL) 524 mg/30 mL oral suspension   No No   Sig: Take 15 mL (262 mg total) by mouth every 6 (six) hours as needed for indigestion   calcium carbonate (TUMS) 500 mg chewable tablet   No No   Sig: Chew 1 tablet (500 mg total) 3 (three) times a day as needed for heartburn   carbamide peroxide (DEBROX) 6 5 % otic solution   No No   Sig: Administer 5 drops into the left ear 2 (two) times a day Use 1 week prior to ENT appointment  Also can use 4 gtts twice weekly (Tuesday and Friday for example) to each ear for prevention  Keep hearing aid out x 10 minutes after ear drops administered     cephalexin (KEFLEX) 500 mg capsule   No No   Sig: Take 1 capsule (500 mg total) by mouth every 6 (six) hours for 7 days   dicyclomine (BENTYL) 20 mg tablet   No No   Sig: TAKE 1 TABLET BY MOUTH EVERY 6 HOURS AS NEEDED (DYSPHAGIA)   escitalopram (LEXAPRO) 20 mg tablet   No No   Sig: Take 1 tablet (20 mg total) by mouth daily   fluticasone (FLONASE) 50 mcg/act nasal spray   No No   Si spray into each nostril daily as needed for rhinitis (nasal congestion) At 8:00 AM   gabapentin (NEURONTIN) 100 mg capsule   No No   Sig: Take 1 pill nightly for 1 week and then increase to 2 pills nightly   hydrOXYzine HCL (ATARAX) 10 mg tablet   No No   Sig: Take 1 tablet (10 mg total) by mouth 3 (three) times a day   ibuprofen (MOTRIN) 400 mg tablet   No No   Sig: TAKE 1 TABLET BY MOUTH EVERY 8 HOURS AS NEEDED FOR MILD/MOD PAIN OR HEADACHES   ketoconazole (NIZORAL) 2 % cream   No No   Sig: Apply topically daily   lamoTRIgine (LaMICtal) 100 mg tablet   Yes No   lamoTRIgine (LaMICtal) 25 mg tablet   Yes No   Sig: Take 25 mg by mouth 2 (two) times a day     lidocaine (LMX) 4 % cream   No No   Sig: Apply topically as needed for mild pain   lubiprostone (Amitiza) 24 mcg capsule   No No   Sig: Take 1 capsule (24 mcg total) by mouth daily with breakfast   magnesium hydroxide (GNP Milk of Magnesia) 400 mg/5 mL oral suspension   No No   Si TBSP (30ML) BY MOUTH DAILY AS NEEDED IF NO BM IN 3 DAYS (CONSTIPATION)   metoprolol tartrate (LOPRESSOR) 25 mg tablet   No No   Sig: Take 1 tablet (25 mg total) by mouth 2 (two) times a day   mineral oil-hydrophilic petrolatum (AQUAPHOR) ointment   No No   Sig: Apply topically as needed for dry skin   norgestimate-ethinyl estradiol (ORTHO-CYCLEN) 0 25-35 MG-MCG per tablet   No No   Sig: Take 1 tablet by mouth daily   nystatin (MYCOSTATIN) powder   No No   Sig: Apply 1 application topically 4 (four) times a day   nystatin-triamcinolone (MYCOLOG-II) cream   No No   Sig: Apply topically 2 (two) times a day   pantoprazole (PROTONIX) 20 mg tablet   No No   Sig: Take 1 tablet (20 mg total) by mouth daily   polyethylene glycol (MIRALAX) 17 g packet   No No   Sig: Take one packet daily as needed for no bowel movement in 24 hours   psyllium (METAMUCIL SMOOTH TEXTURE) 28 % packet   Yes No   psyllium (METAMUCIL) 58 6 % packet   No No   Sig: Take 1 packet by mouth daily   senna-docusate sodium (Senexon-S) 8 6-50 mg per tablet   No No   Sig: Take 1 tablet by mouth daily at 8am    sodium chloride (OCEAN) 0 65 % nasal spray   No No   Si spray into each nostril as needed (three times daily as needed for nasal congestion)   trospium chloride (SANCTURA) 20 mg tablet   No No   Sig: Take 1 tablet (20 mg total) by mouth 2 (two) times a day   zinc oxide (DESITIN) 13 % cream   No No   Sig: Apply 1 application topically as needed (for perianal irritation)      Facility-Administered Medications: None       Past Medical History:   Diagnosis Date   • ADD (attention deficit disorder)    • Anxiety    • Astigmatism    • Brain lesion    • Calcium deficiency    • Cellulitis of foot, right 6/4/2018   • Cerebral palsy (HCC)    • Chronic otitis media    • Constipation    • Depression    • Dysphagia    • Esophagitis    • Esotropia    • GERD (gastroesophageal reflux disease)    • Hiatal hernia    • Hydrocephalus (HCC)    • Impaired fasting glucose    • Left nephrolithiasis 03/04/2019   • Myopia    • Oppositional defiant disorder    • 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HCC)    • Seizures (HCC)    • Sensorineural hearing loss    • Sleep apnea    • Status post ventriculoatrial shunt placement    • Visual impairment        Past Surgical History:   Procedure Laterality Date   • BREAST BIOPSY Left     X 2 (not sure of years)   • CSF SHUNT      Creation of Ventriculo-Peritoneal CSF shunt ; Last Assessed:7/6/2016   • EAR SURGERY      Last Assessed:7/6/2016   • LEG SURGERY      due to CP    • NOSE SURGERY      Last Assessed:7/6/2016   • [place] ESOPHAGOGASTRODUODENOSCOPY TRANSORAL DIAGNOSTIC N/A 5/9/2019    Procedure: ESOPHAGOGASTRODUODENOSCOPY (EGD) with biopsy;  Surgeon: Meryle Sas, MD;  Location: [facility]; Service: Gastroenterology   • UPPER GASTROINTESTINAL ENDOSCOPY  05/2019       Family History   Problem Relation Age of Onset   • Diabetes Mother    • Colon cancer Father    • No Known Problems Maternal Grandmother    • No Known Problems Maternal Grandfather    • No Known Problems Paternal Grandmother    • No Known Problems Paternal Grandfather    • Hypertension Neg Hx    • Heart disease Neg Hx    • Stroke Neg Hx    • Thyroid disease Neg Hx      I have reviewed and agree with the history as documented      E-Cigarette/Vaping   • E-Cigarette Use Never User      E-Cigarette/Vaping Substances   • Nicotine No    • THC No    • CBD No    • Flavoring No    • Other No    • Unknown No      Social History     Tobacco Use   • Smoking status: Never   • Smokeless tobacco: Never   Vaping Use   • Vaping Use: Never used   Substance Use Topics   • Alcohol use: Never   • Drug use: Never       Review of Systems   Unable to perform ROS: Psychiatric disorder       Physical Exam  Physical Exam  Vitals and nursing note reviewed  Constitutional:       Appearance: She is obese  HENT:      Head:      Comments: +ttp over occiput  No hemotympanum, no septal hematom, no malocclusion, no tmj ttp  Picking sore over right bridge of nose  Right Ear: Tympanic membrane, ear canal and external ear normal       Left Ear: Tympanic membrane, ear canal and external ear normal       Nose: Nose normal       Mouth/Throat:      Mouth: Mucous membranes are moist       Pharynx: Oropharynx is clear  Cardiovascular:      Rate and Rhythm: Normal rate and regular rhythm  Pulses: Normal pulses  Pulmonary:      Breath sounds: Normal breath sounds  Musculoskeletal:         General: Normal range of motion  Cervical back: Normal range of motion and neck supple  Skin:     Capillary Refill: Capillary refill takes less than 2 seconds  Comments: See HEENT   Neurological:      Mental Status: She is alert  Mental status is at baseline           Vital Signs  ED Triage Vitals [02/17/23 1945]   Temperature Pulse Respirations Blood Pressure SpO2   98 °F (36 7 °C) (!) 116 20 144/75 96 %      Temp Source Heart Rate Source Patient Position - Orthostatic VS BP Location FiO2 (%)   Oral Monitor Lying Left arm --      Pain Score       --           Vitals:    02/17/23 1945   BP: 144/75   Pulse: (!) 116   Patient Position - Orthostatic VS: Lying         Visual Acuity  Visual Acuity    Flowsheet Row Most Recent Value   L Pupil Size (mm) 1   R Pupil Size (mm) 1          ED Medications  Medications - No data to display    Diagnostic Studies  Results Reviewed     None                 CT head without contrast   Final Result by Yasmin Pritchard MD (02/17 2041)      No acute intracranial abnormality  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, dysmorphic ventricles with unchanged dilation of left lateral ventricle and unchanged position of left parietal approach ventricular catheter  Unchanged small hyperdense suprasellar nodule, which could represent Rathke's cleft cyst over a pituitary microadenoma  Workstation performed: [id number]         CT spine cervical without contrast   Final Result by Yasmin Pritchard MD (02/17 2049)      No cervical spine fracture or traumatic malalignment  Partially imaged left ventriculoatrial shunt catheter tubing with diffuse calcific encrustations and acute angulations in the left midneck  Please see same-day CT head without contrast for further evaluation  Workstation performed: [id number]                    Procedures  Procedures         ED Course  ED Course as of 02/19/23 1913   [place] Feb 17, 2023   2100 Will dc to staffer  SBIRT 22yo+    Flowsheet Row Most Recent Value   SBIRT (23 yo +)    In order to provide better care to our patients, we are screening all of our patients for alcohol and drug use  Would it be okay to ask you these screening questions? No Filed at: 02/17/2023 1948                    Medical Decision Making  Closed head injury, initial encounter: complicated acute illness or injury     Details: fall out of bed  +HT,  scans neg  Fall, initial encounter: self-limited or minor problem  Amount and/or Complexity of Data Reviewed  Independent Historian: EMS     Details: staffer from home  Radiology: ordered  Decision-making details documented in ED Course            Disposition  Final diagnoses:   Fall, initial encounter   Closed head injury, initial encounter     Time reflects when diagnosis was documented in both MDM as applicable and the Disposition within this note     Time User Action Codes Description Comment    2/17/2023  9:01 PM Venkat Mckinney Add [D54  BGUU] Fall, initial encounter     2/17/2023  9:01 PM Venkat Mckinney Add [S09 90XA] Closed head injury, initial encounter       ED Disposition     ED Disposition   Discharge    Condition   Stable    Date/Time   Fri Feb 17, 2023  9:01 PM    Comment   Jonny Barksdale discharge to home/self care                 Follow-up Information     Follow up With Specialties Details Why Contact Info Additional [address]   [address], [address] [facility] [address]  [address], [address], [address] [place], [place], [address]          Discharge Medication List as of 2/17/2023  9:01 PM      CONTINUE these medications which have NOT CHANGED    Details   acetaminophen (TYLENOL) 500 mg tablet Take 1 tablet (500 mg total) by mouth every 6 (six) hours as needed for mild pain, Starting Mon 3/14/2022, Normal      aluminum-magnesium hydroxide-simethicone (GNP Antacid & Anti-Gas) 5332-4266-628 mg/30 mL suspension Take 15 mL by mouth every 4 (four) hours as needed for indigestion or heartburn, Starting Wed 10/5/2022, Normal      amitriptyline (ELAVIL) 10 mg tablet Take 10 mg by mouth daily at bedtime, Historical Med      ARIPiprazole (ABILIFY) 20 MG tablet Take 1 tablet (20 mg total) by mouth daily at bedtime, Starting Thu 10/15/2020, Normal      bacitracin topical ointment 500 units/g topical ointment Cleanse site on nose with soap and water followed by bacitracin BID until healed then p r n , Normal      bismuth subsalicylate (PEPTO BISMOL) 524 mg/30 mL oral suspension Take 15 mL (262 mg total) by mouth every 6 (six) hours as needed for indigestion, Starting Tue 2/22/2022, Normal      calcium carbonate (TUMS) 500 mg chewable tablet Chew 1 tablet (500 mg total) 3 (three) times a day as needed for heartburn, Starting Tue 10/26/2021, Normal      carbamide peroxide (DEBROX) 6 5 % otic solution Administer 5 drops into the left ear 2 (two) times a day Use 1 week prior to ENT appointment  Also can use 4 gtts twice weekly (Tuesday and Friday for example) to each ear for prevention  Keep hearing aid out x 10 minutes after ear drops administered  ,  Starting Fri 10/14/2022, Normal      cephalexin (KEFLEX) 500 mg capsule Take 1 capsule (500 mg total) by mouth every 6 (six) hours for 7 days, Starting Sun 2/12/2023, Until Sun 2/19/2023, Normal      D3-1000 25 MCG (1000 UT) tablet Take 2 tablets (2,000 Units total) by mouth daily, Starting Fri 10/7/2022, Normal      Diclofenac Sodium (VOLTAREN) 1 % Apply 2 g topically 4 (four) times a day, Starting Tue 4/5/2022, Normal      dicyclomine (BENTYL) 20 mg tablet TAKE 1 TABLET BY MOUTH EVERY 6 HOURS AS NEEDED (DYSPHAGIA), Normal      Dyclonine-Glycerin (Cepacol Sore Throat Spray) 0 1-33 % LIQD Apply 1 spray to the mouth or throat 3 (three) times a day as needed (sorethroat), Starting Tue 10/26/2021, Normal      Elastic Bandages & Supports (Neoprene Knee Brace) MISC Apply right knee brace in morning; remove at night, Normal      escitalopram (LEXAPRO) 20 mg tablet Take 1 tablet (20 mg total) by mouth daily, Starting Thu 10/15/2020, Normal      fluticasone (FLONASE) 50 mcg/act nasal spray 1 spray into each nostril daily as needed for rhinitis (nasal congestion) At 8:00 AM, Starting Fri 6/11/2021, Normal      gabapentin (NEURONTIN) 100 mg capsule Take 1 pill nightly for 1 week and then increase to 2 pills nightly, Normal      GNP Sore Throat Spray 1 4 % mucosal liquid APPLY 1 SPRAY TO MOUTH OR THROAT EVERY 2 HRS AS NEEDED FOR SORE THROAT, Normal      GNP Stomach Relief Max St 525 MG/15ML SUSP Starting Tue 2/22/2022, Historical Med      hydrOXYzine HCL (ATARAX) 10 mg tablet Take 1 tablet (10 mg total) by mouth 3 (three) times a day, Starting Tue 10/12/2021, Normal      ibuprofen (MOTRIN) 400 mg tablet TAKE 1 TABLET BY MOUTH EVERY 8 HOURS AS NEEDED FOR MILD/MOD PAIN OR HEADACHES, Normal      Incontinence Supply Disposable (Wings Choice Plus Adult Briefs) MISC Use as directed (R39 81), Normal      ketoconazole (NIZORAL) 2 % cream Apply topically daily, Starting Tue 8/16/2022, Normal      Konsyl Daily Fiber 28 3 % Starting Sat 10/2/2021, Historical Med      !! lamoTRIgine (LaMICtal) 100 mg tablet Starting Wed 12/8/2021, Historical Med      !! lamoTRIgine (LaMICtal) 25 mg tablet Take 25 mg by mouth 2 (two) times a day  , Starting Tue 8/11/2020, Historical Med      lidocaine (LMX) 4 % cream Apply topically as needed for mild pain, Starting Mon 9/19/2022, Normal      LORazepam (ATIVAN) 0 5 mg tablet Take 0 25 mg by mouth 2 (two) times a day, Historical Med      lubiprostone (Amitiza) 24 mcg capsule Take 1 capsule (24 mcg total) by mouth daily with breakfast, Starting Tue 8/16/2022, Normal      magnesium hydroxide (GNP Milk of Magnesia) 400 mg/5 mL oral suspension 2 TBSP (30ML) BY MOUTH DAILY AS NEEDED IF NO BM IN 3 DAYS (CONSTIPATION), Normal      metoprolol tartrate (LOPRESSOR) 25 mg tablet Take 1 tablet (25 mg total) by mouth 2 (two) times a day, Starting Tue 8/16/2022, Normal      mineral oil-hydrophilic petrolatum (AQUAPHOR) ointment Apply topically as needed for dry skin, Starting Wed 10/5/2022, Normal      Mouthwashes (Listerine Antiseptic) LIQD Swish and spit 5 mL 2 (two) times a day, Starting Thu 5/12/2022, Until Fri 6/17/2022, Normal      Multiple Vitamins-Minerals (CertaVite/Antioxidants) TABS TAKE 1 TABLET BY MOUTH DAILY AT 8AM (SUPPLEMENT) *IGABARI, Normal      norgestimate-ethinyl estradiol (ORTHO-CYCLEN) 0 25-35 MG-MCG per tablet Take 1 tablet by mouth daily, Starting Tue 1/17/2023, Normal      nystatin (MYCOSTATIN) powder Apply 1 application topically 4 (four) times a day, Starting Fri 8/19/2022, Normal      nystatin-triamcinolone (MYCOLOG-II) cream Apply topically 2 (two) times a day, Starting Tue 8/16/2022, Normal      pantoprazole (PROTONIX) 20 mg tablet Take 1 tablet (20 mg total) by mouth daily, Starting Tue 8/16/2022, Normal      polyethylene glycol (MIRALAX) 17 g packet Take one packet daily as needed for no bowel movement in 24 hours, Normal      psyllium (METAMUCIL SMOOTH TEXTURE) 28 % packet Starting Tue 1/3/2023, Historical Med      psyllium (METAMUCIL) 58 6 % packet Take 1 packet by mouth daily, Starting u 12/1/2022, Normal      RA Sunscreen SPF50 LOTN Apply 15 minutes before sun exposure and every 2 hours, Normal      senna-docusate sodium (Senexon-S) 8 6-50 mg per tablet Take 1 tablet by mouth daily at 8am , Starting Tue 8/16/2022, Normal      sodium chloride (OCEAN) 0 65 % nasal spray 1 spray into each nostril as needed (three times daily as needed for nasal congestion), Starting Tue 8/16/2022, Normal      trospium chloride (SANCTURA) 20 mg tablet Take 1 tablet (20 mg total) by mouth 2 (two) times a day, Starting Tue 8/16/2022, Normal      zinc oxide (DESITIN) 13 % cream Apply 1 application topically as needed (for perianal irritation), Starting Tue 8/16/2022, Normal       !! - Potential duplicate medications found  Please discuss with provider  No discharge procedures on file      PDMP Review       Value Time User    PDMP Reviewed  Yes 10/6/2021  1:11 PM Anu Celestin PA-C          ED Provider  Electronically Signed by           Khang Bledsoe MD  02/19/23 0997

## 2023-02-20 NOTE — TELEPHONE ENCOUNTER
[facility] wanted to ask if Dr Zari Regalado will prescribe the patient mucinex  The patient is experiencing congestion and cough

## 2023-02-21 ENCOUNTER — TELEPHONE (OUTPATIENT)
Dept: FAMILY MEDICINE CLINIC | Facility: CLINIC | Age: 43
End: 2023-02-21

## 2023-02-21 NOTE — TELEPHONE ENCOUNTER
Folder (To be completed by) -Dr Linh Lopez     Name of [address] [place] [address]  Order # [id number]  Order # [id number]    Color folder (blue    Form to be Faxed [phone]    Patient was made aware of the 7-10 business day form policy

## 2023-02-22 ENCOUNTER — TELEPHONE (OUTPATIENT)
Dept: FAMILY MEDICINE CLINIC | Facility: CLINIC | Age: 43
End: 2023-02-22

## 2023-02-22 DIAGNOSIS — H91.8X1 OTHER SPECIFIED HEARING LOSS OF RIGHT EAR, UNSPECIFIED HEARING STATUS ON CONTRALATERAL SIDE: Primary | Chronic | ICD-10-CM

## 2023-02-22 RX ORDER — GUAIFENESIN 600 MG/1
1200 TABLET, EXTENDED RELEASE ORAL EVERY 12 HOURS SCHEDULED
Qty: 28 TABLET | Refills: 0 | Status: SHIPPED | OUTPATIENT
Start: 2023-02-22 | End: 2023-03-01 | Stop reason: SDUPTHER

## 2023-02-22 NOTE — TELEPHONE ENCOUNTER
Folder (Dr Linh Lopez) - Dr Suad Trejo     Name of Pablo GARCÍA [place]     Order #[id number]     Color folder [facility])    Form to be Faxed ([phone]),           Mailed:  [facility]                  [address] [facility], [address] [facility] [address]             [phone] (o)     Patient was made aware of the 7-10 business day form policy  moist

## 2023-02-23 ENCOUNTER — RA CDI HCC (OUTPATIENT)
Dept: OTHER | Facility: HOSPITAL | Age: 43
End: 2023-02-23

## 2023-02-23 NOTE — PROGRESS NOTES
[address]  coding opportunities       Chart reviewed, no opportunity found: CHART REVIEWED, NO OPPORTUNITY FOUND        Patients Insurance     Medicare Insurance: Medicare

## 2023-02-27 ENCOUNTER — OFFICE VISIT (OUTPATIENT)
Dept: AUDIOLOGY | Age: 43
End: 2023-02-27

## 2023-02-27 DIAGNOSIS — H90.3 SENSORY HEARING LOSS, BILATERAL: Primary | ICD-10-CM

## 2023-02-27 NOTE — PROGRESS NOTES
HEARING EVALUATION    Name:  Italo Tucker  :  1980  Age:  39 y o  Date of Evaluation: 23     History: Annual Hearing Test  Reason for visit: Italo Tucker is being seen today at the request of Dr Lele Moy for an evaluation of hearing  Patient reports no changes or concerns  EVALUATION:    Otoscopic Evaluation:   Right Ear: Clear and healthy ear canal and tympanic membrane   Left Ear: Clear and healthy ear canal and tympanic membrane    Tympanometry:   Right: Type C - negative pressure   Left: Type B - middle ear disorder    Audiogram Results:  Testing not completed today  Waiting on healthy ears  *see attached audiogram      RECOMMENDATIONS:  3 month hearing eval, Return to [facility]  for F/U and Copy to Patient/Caregiver    PATIENT EDUCATION:   Discussed results and recommendations with patient and caregiver  Questions were addressed and the patient was encouraged to contact our department should concerns arise        Indigo Pike , CCC-A  Clinical Audiologist

## 2023-03-01 ENCOUNTER — OFFICE VISIT (OUTPATIENT)
Dept: FAMILY MEDICINE CLINIC | Facility: CLINIC | Age: 43
End: 2023-03-01

## 2023-03-01 VITALS
DIASTOLIC BLOOD PRESSURE: 82 MMHG | SYSTOLIC BLOOD PRESSURE: 138 MMHG | HEIGHT: 56 IN | RESPIRATION RATE: 20 BRPM | HEART RATE: 94 BPM | OXYGEN SATURATION: 98 % | TEMPERATURE: 97.3 F | BODY MASS INDEX: 48.41 KG/M2 | WEIGHT: 215.2 LBS

## 2023-03-01 DIAGNOSIS — M54.50 ACUTE RIGHT-SIDED LOW BACK PAIN, UNSPECIFIED WHETHER SCIATICA PRESENT: ICD-10-CM

## 2023-03-01 DIAGNOSIS — B37.31 VULVAL CANDIDIASIS: ICD-10-CM

## 2023-03-01 DIAGNOSIS — R00.2 PALPITATIONS: ICD-10-CM

## 2023-03-01 DIAGNOSIS — R06.02 SOB (SHORTNESS OF BREATH): ICD-10-CM

## 2023-03-01 DIAGNOSIS — K21.9 GASTROESOPHAGEAL REFLUX DISEASE WITHOUT ESOPHAGITIS: ICD-10-CM

## 2023-03-01 DIAGNOSIS — Z30.09 UNWANTED FERTILITY: ICD-10-CM

## 2023-03-01 DIAGNOSIS — L29.0 PERIANAL IRRITATION: ICD-10-CM

## 2023-03-01 DIAGNOSIS — R52 PAIN: ICD-10-CM

## 2023-03-01 DIAGNOSIS — H61.23 CERUMINOSIS, BILATERAL: ICD-10-CM

## 2023-03-01 DIAGNOSIS — T14.8XXA WOUND, OPEN: ICD-10-CM

## 2023-03-01 DIAGNOSIS — J30.89 NON-SEASONAL ALLERGIC RHINITIS, UNSPECIFIED TRIGGER: ICD-10-CM

## 2023-03-01 DIAGNOSIS — M25.551 RIGHT HIP PAIN: ICD-10-CM

## 2023-03-01 DIAGNOSIS — K21.00 GASTROESOPHAGEAL REFLUX DISEASE WITH ESOPHAGITIS: ICD-10-CM

## 2023-03-01 DIAGNOSIS — F33.3 SEVERE EPISODE OF RECURRENT MAJOR DEPRESSIVE DISORDER, WITH PSYCHOTIC FEATURES (HCC): ICD-10-CM

## 2023-03-01 DIAGNOSIS — Z87.2 H/O SUNBURN: ICD-10-CM

## 2023-03-01 DIAGNOSIS — J06.9 VIRAL URI: ICD-10-CM

## 2023-03-01 DIAGNOSIS — G47.61 PLMD (PERIODIC LIMB MOVEMENT DISORDER): ICD-10-CM

## 2023-03-01 DIAGNOSIS — B37.2 CANDIDIASIS OF SKIN: ICD-10-CM

## 2023-03-01 DIAGNOSIS — K59.00 CONSTIPATION, UNSPECIFIED CONSTIPATION TYPE: ICD-10-CM

## 2023-03-01 DIAGNOSIS — S00.81XD EXCORIATION OF FACE, SUBSEQUENT ENCOUNTER: ICD-10-CM

## 2023-03-01 DIAGNOSIS — G80.1 SPASTIC DIPLEGIC CEREBRAL PALSY (HCC): ICD-10-CM

## 2023-03-01 DIAGNOSIS — Z72.89 SELF-INJURIOUS BEHAVIOR: ICD-10-CM

## 2023-03-01 DIAGNOSIS — F41.1 GENERALIZED ANXIETY DISORDER: ICD-10-CM

## 2023-03-01 DIAGNOSIS — K59.04 CHRONIC IDIOPATHIC CONSTIPATION: ICD-10-CM

## 2023-03-01 DIAGNOSIS — N92.6 IRREGULAR MENSES: ICD-10-CM

## 2023-03-01 DIAGNOSIS — R32 URINARY INCONTINENCE, UNSPECIFIED TYPE: ICD-10-CM

## 2023-03-01 DIAGNOSIS — Z00.00 MEDICARE ANNUAL WELLNESS VISIT, SUBSEQUENT: Primary | ICD-10-CM

## 2023-03-01 DIAGNOSIS — R13.10 DYSPHAGIA, UNSPECIFIED TYPE: ICD-10-CM

## 2023-03-01 RX ORDER — MULTIVIT-MIN/IRON/FOLIC ACID/K 18-600-40
2000 CAPSULE ORAL DAILY
Qty: 62 TABLET | Refills: 5 | Status: SHIPPED | OUTPATIENT
Start: 2023-03-01 | End: 2023-07-03 | Stop reason: SDUPTHER

## 2023-03-01 RX ORDER — GUAIFENESIN 600 MG/1
1200 TABLET, EXTENDED RELEASE ORAL EVERY 12 HOURS SCHEDULED
Qty: 28 TABLET | Refills: 0 | Status: SHIPPED | OUTPATIENT
Start: 2023-03-01 | End: 2023-03-08

## 2023-03-01 RX ORDER — NORGESTIMATE AND ETHINYL ESTRADIOL 0.25-0.035
1 KIT ORAL DAILY
Qty: 28 TABLET | Refills: 12 | Status: SHIPPED | OUTPATIENT
Start: 2023-03-01

## 2023-03-01 RX ORDER — MAGNESIUM HYDROXIDE 1200 MG/15ML
SUSPENSION ORAL
Qty: 355 ML | Refills: 0 | Status: SHIPPED | OUTPATIENT
Start: 2023-03-01

## 2023-03-01 RX ORDER — LUBIPROSTONE 24 UG/1
24 CAPSULE ORAL
Qty: 60 CAPSULE | Refills: 5 | Status: SHIPPED | OUTPATIENT
Start: 2023-03-01

## 2023-03-01 RX ORDER — POLYETHYLENE GLYCOL 3350 17 G/17G
POWDER, FOR SOLUTION ORAL
Qty: 30 EACH | Refills: 5 | Status: SHIPPED | OUTPATIENT
Start: 2023-03-01 | End: 2023-05-04 | Stop reason: ALTCHOICE

## 2023-03-01 RX ORDER — GABAPENTIN 100 MG/1
CAPSULE ORAL
Qty: 60 CAPSULE | Refills: 5 | Status: SHIPPED | OUTPATIENT
Start: 2023-03-01 | End: 2023-04-13

## 2023-03-01 RX ORDER — AMOXICILLIN 250 MG
1 CAPSULE ORAL DAILY
Qty: 30 TABLET | Refills: 5 | Status: SHIPPED | OUTPATIENT
Start: 2023-03-01 | End: 2023-07-03 | Stop reason: SDUPTHER

## 2023-03-01 RX ORDER — IBUPROFEN 400 MG/1
TABLET ORAL
Qty: 30 TABLET | Refills: 0 | Status: SHIPPED | OUTPATIENT
Start: 2023-03-01

## 2023-03-01 RX ORDER — ESCITALOPRAM OXALATE 20 MG/1
20 TABLET ORAL DAILY
Qty: 90 TABLET | Refills: 2 | Status: SHIPPED | OUTPATIENT
Start: 2023-03-01

## 2023-03-01 RX ORDER — ARIPIPRAZOLE 20 MG/1
20 TABLET ORAL
Qty: 90 TABLET | Refills: 2 | Status: SHIPPED | OUTPATIENT
Start: 2023-03-01

## 2023-03-01 RX ORDER — PANTOPRAZOLE SODIUM 20 MG/1
20 TABLET, DELAYED RELEASE ORAL DAILY
Qty: 62 TABLET | Refills: 5 | Status: SHIPPED | OUTPATIENT
Start: 2023-03-01 | End: 2023-07-03 | Stop reason: SDUPTHER

## 2023-03-01 RX ORDER — GINSENG 100 MG
CAPSULE ORAL
Qty: 15 G | Refills: 2 | Status: SHIPPED | OUTPATIENT
Start: 2023-03-01 | End: 2023-07-03 | Stop reason: SDUPTHER

## 2023-03-01 RX ORDER — HYDROXYZINE HYDROCHLORIDE 10 MG/1
10 TABLET, FILM COATED ORAL 3 TIMES DAILY
Qty: 30 TABLET | Refills: 3 | Status: SHIPPED | OUTPATIENT
Start: 2023-03-01

## 2023-03-01 RX ORDER — MAGNESIUM HYDROXIDE/ALUMINUM HYDROXICE/SIMETHICONE 120; 1200; 1200 MG/30ML; MG/30ML; MG/30ML
15 SUSPENSION ORAL EVERY 4 HOURS PRN
Qty: 355 ML | Refills: 0 | Status: SHIPPED | OUTPATIENT
Start: 2023-03-01

## 2023-03-01 RX ORDER — LIDOCAINE 40 MG/G
CREAM TOPICAL AS NEEDED
Qty: 30 G | Refills: 0 | Status: SHIPPED | OUTPATIENT
Start: 2023-03-01

## 2023-03-01 RX ORDER — DICYCLOMINE HCL 20 MG
20 TABLET ORAL EVERY 6 HOURS PRN
Qty: 120 TABLET | Refills: 0 | Status: SHIPPED | OUTPATIENT
Start: 2023-03-01 | End: 2023-07-03 | Stop reason: SDUPTHER

## 2023-03-01 RX ORDER — KETOCONAZOLE 20 MG/G
CREAM TOPICAL DAILY
Qty: 30 G | Refills: 5 | Status: SHIPPED | OUTPATIENT
Start: 2023-03-01 | End: 2023-07-03 | Stop reason: SDUPTHER

## 2023-03-01 RX ORDER — AMITRIPTYLINE HYDROCHLORIDE 10 MG/1
10 TABLET, FILM COATED ORAL
Qty: 90 TABLET | Refills: 0 | Status: SHIPPED | OUTPATIENT
Start: 2023-03-01 | End: 2023-08-15

## 2023-03-01 RX ORDER — TROSPIUM CHLORIDE 20 MG/1
20 TABLET, FILM COATED ORAL 2 TIMES DAILY
Qty: 180 TABLET | Refills: 3 | Status: SHIPPED | OUTPATIENT
Start: 2023-03-01

## 2023-03-01 NOTE — PROGRESS NOTES
Assessment and Plan:     Problem List Items Addressed This Visit        Other    Medicare annual wellness visit, subsequent - Primary       Depression Screening and Follow-up Plan: Clincally patient does not have depression  No treatment is required  Preventive health issues were discussed with patient, and age appropriate screening tests were ordered as noted in patient's After Visit Summary  Personalized health advice and appropriate referrals for health education or preventive services given if needed, as noted in patient's After Visit Summary  History of Present Illness:     Patient presents for a Medicare Wellness Visit  Patient is here with caretaker who reports that patient is at baseline  Patient Care Team:  Danella Blizzard, MD as PCP - General (Family Medicine)  MD Khoa Flood PA-C Jodell Fury, MD (Psychiatry)  Edward Macias DPM (Podiatry)     Review of Systems:     Review of Systems   Constitutional: Negative for activity change, fatigue and fever  HENT: Negative for sore throat  Eyes: Negative for visual disturbance  Respiratory: Negative for cough, shortness of breath and wheezing  Cardiovascular: Negative for chest pain, palpitations and leg swelling  Gastrointestinal: Negative for abdominal pain, diarrhea, nausea and vomiting  Genitourinary: Negative for dysuria and pelvic pain  Skin: Negative for rash  Neurological: Negative for weakness and headaches          Problem List:     Patient Active Problem List   Diagnosis   • Leg swelling   • Generalized anxiety disorder   • Cerebral palsy (HCC)   • Acute pain of right knee   • Constipation   • Severe episode of recurrent major depressive disorder, with psychotic features ([facility] )   • Dizziness   • Functional dysphagia   • Hearing impairment   • Acquired renal cyst of left kidney   • Low back pain   • Moderate intellectual disability   • Seasonal allergies   • Varicose veins with pain   • Medicare annual wellness visit, subsequent   • Hematuria   • 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[facility] )   • Ambulatory dysfunction   • Bruise   • Bilateral impacted cerumen   • TMJ (temporomandibular joint disorder)   • Gait disturbance   • Bilateral thoracic back pain   • Cerumen impaction   • Skin picking habit   • Hiatal hernia   • SOB (shortness of breath)   • Dysuria   • Left nephrolithiasis   • Viral URI   • Self-injurious behavior   • Gastroesophageal reflux disease without esophagitis   • Intercostal pain   • Intertrigo   • Nausea and vomiting   • Sore throat   • Impetigo   • Pain of right calf   • Elevated blood pressure reading in office without diagnosis of hypertension   • Fall   • Sleep disturbance   • Exposure to COVID-19 virus   • Encounter for follow-up   • Acute non intractable tension-type headache   • Dry skin   • Candidiasis of skin   • Heat intolerance   • Wound, open   • Palpitations   • Left foot pain   • Frequent falls   • Urine frequency   • Urinary incontinence   • Obesity, Class III, BMI 40-49 9 (morbid obesity) ([place] [address] )   • At risk for sleep apnea   • Atherosclerosis of arteries of extremities ([facility])   • Partial small bowel obstruction ([facility])   • COVID-19   • Hyperhidrosis   • Grief reaction   • PLMD (periodic limb movement disorder)   • Excessive daytime sleepiness   • History of anemia   • Gluteal pain      Past Medical and Surgical History:     Past Medical History:   Diagnosis Date   • ADD (attention deficit disorder)    • Anxiety    • Astigmatism    • Brain lesion    • Calcium deficiency    • Cellulitis of foot, right 6/4/2018   • Cerebral palsy ([facility])    • Chronic otitis media    • Constipation    • Depression    • Dysphagia    • Esophagitis    • Esotropia    • GERD (gastroesophageal reflux disease)    • Hiatal hernia    • Hydrocephalus ([facility])    • Impaired fasting glucose    • Left nephrolithiasis 03/04/2019   • Myopia    • Oppositional defiant disorder    • 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[facility])    • Seizures ([facility])    • Sensorineural hearing loss    • Sleep apnea    • Status post ventriculoatrial shunt placement    • Visual impairment      Past Surgical History:   Procedure Laterality Date   • BREAST BIOPSY Left     X 2 (not sure of years)   • CSF SHUNT      Creation of Ventriculo-Peritoneal CSF shunt ; Last Assessed:7/6/2016   • EAR SURGERY      Last Assessed:7/6/2016   • LEG SURGERY      due to CP    • NOSE SURGERY      Last Assessed:7/6/2016   • RI ESOPHAGOGASTRODUODENOSCOPY TRANSORAL DIAGNOSTIC N/A 5/9/2019    Procedure: ESOPHAGOGASTRODUODENOSCOPY (EGD) with biopsy;  Surgeon: Naila Nieves MD;  Location: [facility];   Service: Gastroenterology   • UPPER GASTROINTESTINAL ENDOSCOPY  05/2019      Family History:     Family History   Problem Relation Age of Onset   • Diabetes Mother    • Colon cancer Father    • No Known Problems Maternal Grandmother    • No Known Problems Maternal Grandfather    • No Known Problems Paternal Grandmother    • No Known Problems Paternal Grandfather    • Hypertension Neg Hx    • Heart disease Neg Hx    • Stroke Neg Hx    • Thyroid disease Neg Hx       Social History:     Social History     Socioeconomic History   • Marital status: Single     Spouse name: None   • Number of children: None   • Years of education: None   • Highest education level: None   Occupational History   • None   Tobacco Use   • Smoking status: Never   • Smokeless tobacco: Never   Vaping Use   • Vaping Use: Never used   Substance and Sexual Activity   • Alcohol use: Never   • Drug use: Never   • Sexual activity: Never     Birth control/protection: OCP   Other Topics Concern   • None   Social History Narrative    Always uses seat belt    Lives in group home     Social Determinants of Health     Financial Resource Strain: Low Risk    • Difficulty of Paying Living Expenses: Not hard at all   Food Insecurity: No Food Insecurity   • Worried About Running Out of Food in the Last Year: Never true   • Ran Out of Food in the Last Year: Never true   Transportation Needs: No Transportation Needs   • Lack of Transportation (Medical): No   • Lack of Transportation (Non-Medical):  No   Physical Activity: Not on file   Stress: No Stress Concern Present   • Feeling of Stress : Not at all   Social Connections: Not on file   Intimate Partner Violence: Not At Risk   • Fear of Current or Ex-Partner: No   • Emotionally Abused: No   • Physically Abused: No   • Sexually Abused: No   Housing Stability: Low Risk    • Unable to Pay for Housing in the Last Year: No   • Number of Places Lived in the Last Year: 1   • Unstable Housing in the Last Year: No      Medications and Allergies:     Current Outpatient Medications   Medication Sig Dispense Refill   • acetaminophen (TYLENOL) 500 mg tablet Take 1 tablet (500 mg total) by mouth every 6 (six) hours as needed for mild pain 30 tablet 5   • calcium carbonate (TUMS) 500 mg chewable tablet Chew 1 tablet (500 mg total) 3 (three) times a day as needed for heartburn 30 tablet 5   • Dyclonine-Glycerin (Cepacol Sore Throat Spray) 0 1-33 % LIQD Apply 1 spray to the mouth or throat 3 (three) times a day as needed (sorethroat) 118 mL 5   • Elastic Bandages & Supports (Neoprene Knee Brace) MISC Apply right knee brace in morning; remove at night 1 each 0   • fluticasone (FLONASE) 50 mcg/act nasal spray 1 spray into each nostril daily as needed for rhinitis (nasal congestion) At 8:00 AM 18 2 mL 5   • GNP Sore Throat Spray 1 4 % mucosal liquid APPLY 1 SPRAY TO MOUTH OR THROAT EVERY 2 HRS AS NEEDED FOR SORE THROAT 177 mL 0   • GNP Stomach Relief Max St 525 MG/15ML SUSP      • Incontinence Supply Disposable (Wings Choice Plus Adult Briefs) MISC Use as directed (R31 50) 60 each 0   • Konsyl Daily Fiber 28 3 %      • lamoTRIgine (LaMICtal) 100 mg tablet      • lamoTRIgine (LaMICtal) 25 mg tablet Take 25 mg by mouth 2 (two) times a day       • LORazepam (ATIVAN) 0 5 mg tablet Take 0 25 mg by mouth 2 (two) times a day     • Multiple Vitamins-Minerals (CertaVite/Antioxidants) TABS TAKE 1 TABLET BY MOUTH DAILY AT 8AM (SUPPLEMENT) *IGABARI 31 tablet 0   • nystatin (MYCOSTATIN) powder Apply 1 application topically 4 (four) times a day 45 g 5   • psyllium (METAMUCIL SMOOTH TEXTURE) 28 % packet      • aluminum-magnesium hydroxide-simethicone (GNP Antacid & Anti-Gas) 4562-4708-383 mg/30 mL suspension Take 15 mL by mouth every 4 (four) hours as needed for indigestion or heartburn 355 mL 0   • amitriptyline (ELAVIL) 10 mg tablet Take 1 tablet (10 mg total) by mouth daily at bedtime 90 tablet 0   • ARIPiprazole (ABILIFY) 20 MG tablet Take 1 tablet (20 mg total) by mouth daily at bedtime 90 tablet 2   • bacitracin topical ointment 500 units/g topical ointment Cleanse site on nose with soap and water followed by bacitracin BID until healed then p r n  15 g 2   • bismuth subsalicylate (PEPTO BISMOL) 524 mg/30 mL oral suspension Take 15 mL (262 mg total) by mouth every 6 (six) hours as needed for indigestion 360 mL 5   • carbamide peroxide (DEBROX) 6 5 % otic solution Administer 5 drops into the left ear 2 (two) times a day Use 1 week prior to ENT appointment  Also can use 4 gtts twice weekly (Tuesday and Friday for example) to each ear for prevention  Keep hearing aid out x 10 minutes after ear drops administered   15 mL 1   • D3-1000 25 MCG (1000 UT) tablet Take 2 tablets (2,000 Units total) by mouth daily 62 tablet 5   • Diclofenac Sodium (VOLTAREN) 1 % Apply 2 g topically 4 (four) times a day 50 g 0   • dicyclomine (BENTYL) 20 mg tablet Take 1 tablet (20 mg total) by mouth every 6 (six) hours as needed (dysphagia) 120 tablet 0   • escitalopram (LEXAPRO) 20 mg tablet Take 1 tablet (20 mg total) by mouth daily 90 tablet 2   • gabapentin (NEURONTIN) 100 mg capsule Take 1 pill nightly for 1 week and then increase to 2 pills nightly 60 capsule 5   • guaiFENesin (MUCINEX) 600 mg 12 hr tablet Take 2 tablets (1,200 mg total) by mouth every 12 (twelve) hours for 7 days 28 tablet 0   • hydrOXYzine HCL (ATARAX) 10 mg tablet Take 1 tablet (10 mg total) by mouth 3 (three) times a day 30 tablet 3   • ibuprofen (MOTRIN) 400 mg tablet TAKE 1 TABLET BY MOUTH EVERY 8 HOURS AS NEEDED FOR MILD/MOD PAIN OR HEADACHES 30 tablet 0   • ketoconazole (NIZORAL) 2 % cream Apply topically daily 30 g 5   • lidocaine (LMX) 4 % cream Apply topically as needed for mild pain 30 g 0   • lubiprostone (Amitiza) 24 mcg capsule Take 1 capsule (24 mcg total) by mouth daily with breakfast 60 capsule 5   • magnesium hydroxide (GNP Milk of Magnesia) 400 mg/5 mL oral suspension 2 TBSP (30ML) BY MOUTH DAILY AS NEEDED IF NO BM IN 3 DAYS (CONSTIPATION) 355 mL 0   • metoprolol tartrate (LOPRESSOR) 25 mg tablet Take 1 tablet (25 mg total) by mouth 2 (two) times a day 60 tablet 5   • mineral oil-hydrophilic petrolatum (AQUAPHOR) ointment Apply topically as needed for dry skin 420 g 5   • Mouthwashes (Listerine Antiseptic) LIQD Swish and spit 5 mL 2 (two) times a day 1000 mL 5   • norgestimate-ethinyl estradiol (ORTHO-CYCLEN) 0 25-35 MG-MCG per tablet Take 1 tablet by mouth daily 28 tablet 12   • nystatin-triamcinolone (MYCOLOG-II) cream Apply topically 2 (two) times a day 60 g 2   • pantoprazole (PROTONIX) 20 mg tablet Take 1 tablet (20 mg total) by mouth daily 62 tablet 5   • polyethylene glycol (MIRALAX) 17 g packet Take one packet daily as needed for no bowel movement in 24 hours 30 each 5   • psyllium (METAMUCIL) 58 6 % packet Take 1 packet by mouth daily 30 packet 5   • RA Sunscreen SPF50 LOTN Apply 15 minutes before sun exposure and every 2 hours 237 mL 0   • senna-docusate sodium (Senexon-S) 8 6-50 mg per tablet Take 1 tablet by mouth daily at 8am  30 tablet 5   • sodium chloride (OCEAN) 0 65 % nasal spray 1 spray into each nostril as needed (three times daily as needed for nasal congestion) 60 mL 5   • trospium chloride (SANCTURA) 20 mg tablet Take 1 tablet (20 mg total) by mouth 2 (two) times a day 180 tablet 3   • zinc oxide (DESITIN) 13 % cream Apply 1 application topically as needed (for perianal irritation) 454 g 5     No current facility-administered medications for this visit  No Known Allergies   Immunizations:     Immunization History   Administered Date(s) Administered   • COVID-19 MODERNA VACC 0 5 ML IM 03/02/2021   • COVID-19 PFIZER VACCINE 0 3 ML IM 02/02/2021, 10/16/2021   • INFLUENZA 12/01/2000, 12/12/2001, 10/09/2002, 10/14/2003, 10/20/2004, 10/11/2006, 10/17/2007, 10/14/2008, 09/29/2009, 09/14/2010, 09/09/2011, 09/05/2012, 09/03/2013, 10/22/2014, 10/14/2015, 10/04/2016   • Influenza, injectable, quadrivalent, preservative free 0 5 mL 10/04/2018, 10/21/2019, 09/15/2020   • Influenza, seasonal, injectable, preservative free 10/04/2016   • Td (adult), adsorbed 09/09/2003   • Tdap 09/14/2010, 09/14/2020   • Tuberculin Skin Test-PPD Intradermal 04/05/2000, 10/16/2000, 10/09/2002, 10/20/2004, 11/03/2008, 04/10/2018, 02/19/2020, 02/15/2022      Health Maintenance:         Topic Date Due   • Breast Cancer Screening: Mammogram  03/23/2023   • Cervical Cancer Screening  06/22/2025   • HIV Screening  Completed   • Hepatitis C Screening  Completed         Topic Date Due   • COVID-19 Vaccine (4 - Booster) 12/11/2021      Medicare Screening Tests and Risk Assessments:         Health Risk Assessment:   Patient rates overall health as very good  Patient feels that their physical health rating is same  Patient is satisfied with their life  Eyesight was rated as same  Hearing was rated as same  Patient feels that their emotional and mental health rating is much better  Patients states they are sometimes angry  Patient states they are never, rarely unusually tired/fatigued  Pain experienced in the last 7 days has been none  Patient states that she has experienced no weight loss or gain in last 6 months  Fall Risk Screening:    In the past year, patient has experienced: history of falling in past year    Number of falls: 2 or more  Injured during fall?: No    Feels unsteady when standing or walking?: Yes    Worried about falling?: No      Urinary Incontinence Screening:   Patient has leaked urine accidently in the last six months  Home Safety:  Patient has trouble with stairs inside or outside of their home  Patient has working smoke alarms and has working carbon monoxide detector  Home safety hazards include: none  Medications:   Patient is currently taking over-the-counter supplements  OTC medications include: see medication list  Patient is not able to manage medications  Activities of Daily Living (ADLs)/Instrumental Activities of Daily Living (IADLs):   Walk and transfer into and out of bed and chair?: No  Dress and groom yourself?: Yes    Bathe or shower yourself?: Yes    Feed yourself? Yes  Do your laundry/housekeeping?: No  Manage your money, pay your bills and track your expenses?: No  Make your own meals?: No    Do your own shopping?: No    PREVENTIVE SCREENINGS      Cardiovascular Screening:    General: Screening Current      Diabetes Screening:     General: Screening Current      Breast Cancer Screening:     General: Screening Current      Cervical Cancer Screening:    General: Screening Current      Abdominal Aortic Aneurysm (AAA) Screening:        General: Screening Not Indicated      Lung Cancer Screening:     General: Screening Not Indicated      Hepatitis C Screening:    General: Screening Current    No results found  Physical Exam:     /82   Pulse 94   Temp (!) 97 3 °F (36 3 °C)   Resp 20   Ht 4' 8" (1 422 m)   Wt 97 6 kg (215 lb 3 2 oz)   SpO2 98%   BMI 48 25 kg/m²     Physical Exam  Vitals reviewed  Constitutional:       General: She is not in acute distress  Appearance: Normal appearance  She is obese  She is not ill-appearing, toxic-appearing or diaphoretic  HENT:      Head: Normocephalic and atraumatic        Right Ear: External ear normal       Left Ear: Tympanic membrane, ear canal and external ear normal  There is no impacted cerumen  Nose: Nose normal  No congestion  Mouth/Throat:      Mouth: Mucous membranes are dry  Pharynx: No oropharyngeal exudate or posterior oropharyngeal erythema  Eyes:      General: No scleral icterus  Right eye: No discharge  Left eye: No discharge  Extraocular Movements: Extraocular movements intact  Conjunctiva/sclera: Conjunctivae normal       Pupils: Pupils are equal, round, and reactive to light  Cardiovascular:      Rate and Rhythm: Normal rate and regular rhythm  Heart sounds: Normal heart sounds  Pulmonary:      Effort: Pulmonary effort is normal  No respiratory distress  Breath sounds: Normal breath sounds  Chest:      Chest wall: No tenderness  Abdominal:      General: Abdomen is flat  Bowel sounds are normal  There is no distension  Palpations: Abdomen is soft  There is no mass  Tenderness: There is no abdominal tenderness  Musculoskeletal:         General: No swelling, tenderness, deformity or signs of injury  Cervical back: Normal range of motion  Right lower leg: No edema  Left lower leg: No edema  Skin:     General: Skin is warm  Capillary Refill: Capillary refill takes less than 2 seconds  Findings: No rash  Neurological:      General: No focal deficit present  Mental Status: She is alert and oriented to person, place, and time  Mental status is at baseline            Darshana Gallego MD

## 2023-03-02 ENCOUNTER — TELEPHONE (OUTPATIENT)
Dept: FAMILY MEDICINE CLINIC | Facility: CLINIC | Age: 43
End: 2023-03-02

## 2023-03-02 NOTE — TELEPHONE ENCOUNTER
Folder (To be completed by) -   Dr Rod Silva    Name of Pablo [address]                          Order #[id number]    Color folder [facility])    Form to be Faxed (Fax #)  [phone],         Patient was made aware of the 7-10 business day form policy

## 2023-03-06 DIAGNOSIS — E55.9 VITAMIN D DEFICIENCY: ICD-10-CM

## 2023-03-07 DIAGNOSIS — Z71.89 COMPLEX CARE COORDINATION: Primary | ICD-10-CM

## 2023-03-07 RX ORDER — FOLIC ACID/MV,IRON,MIN/LUTEIN 0.4-18-25
TABLET ORAL
Qty: 31 TABLET | Refills: 0 | Status: SHIPPED | OUTPATIENT
Start: 2023-03-07

## 2023-03-08 ENCOUNTER — PATIENT OUTREACH (OUTPATIENT)
Dept: FAMILY MEDICINE CLINIC | Facility: CLINIC | Age: 43
End: 2023-03-08

## 2023-03-08 NOTE — PROGRESS NOTES
Outpatient Care Manager Note    RN CM received referral for Chronic Care Management outreach  Patient medical history includes functional dysphagia, atherosclerosis of arteries of extremities, cerebral palsy, hearing impairment, generalized anxiety disorder, severe episode of recurrent major depressive disorder with psychotic features, moderate intellectual disability, ambulatory dysfunction and obesity  Patient currently reside at a group home, [facility]  , [facility] at [phone]  Called patient's  [facility] with no answer  Left message, this is Robert Brooke the Outpatient Nurse Care Manager at [address] [place] [address] office calling to follow up with you  Requested return phone call at [phone]

## 2023-03-09 ENCOUNTER — PATIENT OUTREACH (OUTPATIENT)
Dept: FAMILY MEDICINE CLINIC | Facility: CLINIC | Age: 43
End: 2023-03-09

## 2023-03-09 ENCOUNTER — TELEPHONE (OUTPATIENT)
Dept: FAMILY MEDICINE CLINIC | Facility: CLINIC | Age: 43
End: 2023-03-09

## 2023-03-09 NOTE — TELEPHONE ENCOUNTER
Folder (To be completed by) -Dr Davide Lanza     Name of [address] Order # [id number]    Color folder (blue    Form to be Faxed [phone]    Patient was made aware of the 7-10 business day form policy

## 2023-03-09 NOTE — PROGRESS NOTES
Outpatient Care Manager Note    RC from patient's , Dwayne Parsons  Introduced myself and explained my role as an Outpatient Care Manager for the [address] and the Chronic Care Management program    stated this would be a good program for the patient to be more involved in her vitor care  She further stated the patient does have an intellectual disability but she will be able to comprehend information regarding her healthcare  Advised the  an initial patient assessment would need to be completed and it could be performed in a conference call with the patient and   Advised  of the possible fee of $8 to $9    stated the patient does have funds but would need approval from the Rep Payee which would take approximately 2 weeks   will need to know if the patient will a change prior to enrollment  RN CM advised  she would contact her manager for direction and return a call to the   Patient's  does not have any further questions, concerns, or other needs at this time   has my contact # and PCP office # if needed   Pt is agreeable for further outreach

## 2023-03-10 ENCOUNTER — PATIENT OUTREACH (OUTPATIENT)
Dept: FAMILY MEDICINE CLINIC | Facility: CLINIC | Age: 43
End: 2023-03-10

## 2023-03-10 NOTE — PROGRESS NOTES
Outpatient Care Manager Note    RC to RN BONNY Forbes,  for patient  Advised in message the patient can be directed to the financial counselors for assistance with the co-payment amount  Provided my contact information for a return call

## 2023-03-13 ENCOUNTER — APPOINTMENT (EMERGENCY)
Dept: CT IMAGING | Facility: HOSPITAL | Age: 43
End: 2023-03-13

## 2023-03-13 ENCOUNTER — HOSPITAL ENCOUNTER (EMERGENCY)
Facility: HOSPITAL | Age: 43
Discharge: HOME | End: 2023-03-13
Attending: EMERGENCY MEDICINE

## 2023-03-13 ENCOUNTER — APPOINTMENT (EMERGENCY)
Dept: RADIOLOGY | Facility: HOSPITAL | Age: 43
End: 2023-03-13

## 2023-03-13 VITALS
RESPIRATION RATE: 16 BRPM | SYSTOLIC BLOOD PRESSURE: 124 MMHG | DIASTOLIC BLOOD PRESSURE: 71 MMHG | WEIGHT: 235.01 LBS | TEMPERATURE: 98.7 F | OXYGEN SATURATION: 97 % | HEART RATE: 90 BPM | BODY MASS INDEX: 52.69 KG/M2

## 2023-03-13 DIAGNOSIS — R07.9 CHEST PAIN WITH LOW RISK OF ACUTE CORONARY SYNDROME: Primary | ICD-10-CM

## 2023-03-13 LAB
2HR DELTA HS TROPONIN: 1 NG/L
ALBUMIN SERPL BCP-MCNC: 3.9 G/DL (ref 3.5–5)
ALP SERPL-CCNC: 138 U/L (ref 34–104)
ALT SERPL W P-5'-P-CCNC: 14 U/L (ref 7–52)
ANION GAP SERPL CALCULATED.3IONS-SCNC: 9 MMOL/L (ref 4–13)
AST SERPL W P-5'-P-CCNC: 13 U/L (ref 13–39)
ATRIAL RATE: 100 BPM
ATRIAL RATE: 86 BPM
BASOPHILS # BLD AUTO: 0.03 THOUSANDS/ÂΜL (ref 0–0.1)
BASOPHILS NFR BLD AUTO: 0 % (ref 0–1)
BILIRUB SERPL-MCNC: 0.22 MG/DL (ref 0.2–1)
BUN SERPL-MCNC: 11 MG/DL (ref 5–25)
CALCIUM SERPL-MCNC: 8.5 MG/DL (ref 8.4–10.2)
CARDIAC TROPONIN I PNL SERPL HS: 2 NG/L
CARDIAC TROPONIN I PNL SERPL HS: 3 NG/L
CHLORIDE SERPL-SCNC: 106 MMOL/L (ref 96–108)
CO2 SERPL-SCNC: 24 MMOL/L (ref 21–32)
CREAT SERPL-MCNC: 0.78 MG/DL (ref 0.6–1.3)
D DIMER PPP FEU-MCNC: 1.6 UG/ML FEU
EOSINOPHIL # BLD AUTO: 0.12 THOUSAND/ÂΜL (ref 0–0.61)
EOSINOPHIL NFR BLD AUTO: 2 % (ref 0–6)
ERYTHROCYTE [DISTWIDTH] IN BLOOD BY AUTOMATED COUNT: 13.7 % (ref 11.6–15.1)
GFR SERPL CREATININE-BSD FRML MDRD: 92 ML/MIN/1.73SQ M
GLUCOSE SERPL-MCNC: 140 MG/DL (ref 65–140)
HCT VFR BLD AUTO: 42.3 % (ref 34.8–46.1)
HGB BLD-MCNC: 13.6 G/DL (ref 11.5–15.4)
IMM GRANULOCYTES # BLD AUTO: 0.03 THOUSAND/UL (ref 0–0.2)
IMM GRANULOCYTES NFR BLD AUTO: 0 % (ref 0–2)
LYMPHOCYTES # BLD AUTO: 1.74 THOUSANDS/ÂΜL (ref 0.6–4.47)
LYMPHOCYTES NFR BLD AUTO: 23 % (ref 14–44)
MCH RBC QN AUTO: 30.4 PG (ref 26.8–34.3)
MCHC RBC AUTO-ENTMCNC: 32.2 G/DL (ref 31.4–37.4)
MCV RBC AUTO: 95 FL (ref 82–98)
MONOCYTES # BLD AUTO: 0.48 THOUSAND/ÂΜL (ref 0.17–1.22)
MONOCYTES NFR BLD AUTO: 6 % (ref 4–12)
NEUTROPHILS # BLD AUTO: 5.15 THOUSANDS/ÂΜL (ref 1.85–7.62)
NEUTS SEG NFR BLD AUTO: 69 % (ref 43–75)
NRBC BLD AUTO-RTO: 0 /100 WBCS
P AXIS: 48 DEGREES
P AXIS: 50 DEGREES
PLATELET # BLD AUTO: 264 THOUSANDS/UL (ref 149–390)
PMV BLD AUTO: 8.8 FL (ref 8.9–12.7)
POTASSIUM SERPL-SCNC: 4.1 MMOL/L (ref 3.5–5.3)
PR INTERVAL: 134 MS
PR INTERVAL: 148 MS
PROT SERPL-MCNC: 7.4 G/DL (ref 6.4–8.4)
QRS AXIS: 11 DEGREES
QRS AXIS: 12 DEGREES
QRSD INTERVAL: 68 MS
QRSD INTERVAL: 68 MS
QT INTERVAL: 326 MS
QT INTERVAL: 346 MS
QTC INTERVAL: 414 MS
QTC INTERVAL: 420 MS
RBC # BLD AUTO: 4.47 MILLION/UL (ref 3.81–5.12)
SODIUM SERPL-SCNC: 139 MMOL/L (ref 135–147)
T WAVE AXIS: 54 DEGREES
T WAVE AXIS: 63 DEGREES
VENTRICULAR RATE: 100 BPM
VENTRICULAR RATE: 86 BPM
WBC # BLD AUTO: 7.55 THOUSAND/UL (ref 4.31–10.16)

## 2023-03-13 RX ORDER — KETOROLAC TROMETHAMINE 30 MG/ML
15 INJECTION, SOLUTION INTRAMUSCULAR; INTRAVENOUS ONCE
Status: COMPLETED | OUTPATIENT
Start: 2023-03-13 | End: 2023-03-13

## 2023-03-13 RX ADMIN — IOHEXOL 73 ML: 350 INJECTION, SOLUTION INTRAVENOUS at 11:48

## 2023-03-13 RX ADMIN — KETOROLAC TROMETHAMINE 15 MG: 30 INJECTION, SOLUTION INTRAMUSCULAR; INTRAVENOUS at 10:07

## 2023-03-13 RX ADMIN — SODIUM CHLORIDE 1000 ML: 0.9 INJECTION, SOLUTION INTRAVENOUS at 11:07

## 2023-03-13 NOTE — ED PROVIDER NOTES
History  Chief Complaint   Patient presents with   • Chest Pain     Pt reports CP that began this morning, also reporting dizziness  14-year-old female from a group home with history of cerebral palsy, intellectual disability, seizures,  shunt presents to the ED complaining of chest pain  Currently there are no caretakers with her and so history may be somewhat unreliable  Patient states she started with sharp left upper chest pain about an hour ago  She states it just stays in one spot  She feels short of breath  No fevers or coughing  She did not take anything for the pain  History provided by:  Patient  History limited by: Intellectual disability  Chest Pain  Pain location:  L chest  Pain radiates to:  Does not radiate  Pain radiates to the back: no    Pain severity:  Unable to specify  Onset quality:  Sudden  Duration:  1 hour  Timing:  Constant  Progression:  Unchanged  Chronicity:  New  Context: at rest    Relieved by:  None tried  Worsened by:  Nothing tried  Ineffective treatments:  None tried  Associated symptoms: no altered mental status, no anorexia, no anxiety, no back pain, no claudication, no cough, no diaphoresis, no dizziness, no fever, no lower extremity edema, no nausea, no near-syncope, no palpitations, no shortness of breath, not vomiting and no weakness    Risk factors: obesity    Risk factors: no coronary artery disease, no diabetes mellitus, no hypertension, no prior DVT/PE, no smoking and no surgery        Prior to Admission Medications   Prescriptions Last Dose Informant Patient Reported? Taking?    ARIPiprazole (ABILIFY) 20 MG tablet   No No   Sig: Take 1 tablet (20 mg total) by mouth daily at bedtime   D3-1000 25 MCG (1000 UT) tablet   No No   Sig: Take 2 tablets (2,000 Units total) by mouth daily   Diclofenac Sodium (VOLTAREN) 1 %   No No   Sig: Apply 2 g topically 4 (four) times a day   Dyclonine-Glycerin (Cepacol Sore Throat Spray) 0 1-33 % LIQD   No No   Sig: Apply 1 spray to the mouth or throat 3 (three) times a day as needed (sorethroat)   Elastic Bandages & Supports (Neoprene Knee Brace) [facility]   No No   Sig: Apply right knee brace in morning; remove at night   GNP Sore Throat Spray 1 4 % mucosal liquid   No No   Sig: APPLY 1 SPRAY TO MOUTH OR THROAT EVERY 2 HRS AS NEEDED FOR SORE THROAT   GNP Stomach Relief Max St 525 MG/15ML SUSP   Yes No   Incontinence Supply Disposable (Wings Choice Plus Adult Briefs) MISC   No No   Sig: Use as directed (R39 81)   Konsyl Daily Fiber 28 3 %   Yes No   LORazepam (ATIVAN) 0 5 mg tablet   Yes No   Sig: Take 0 25 mg by mouth 2 (two) times a day   Mouthwashes (Listerine Antiseptic) LIQD   No No   Sig: Swish and spit 5 mL 2 (two) times a day   Multiple Vitamins-Minerals (CertaVite/Antioxidants) TABS   No No   Sig: TAKE 1 TABLET BY MOUTH DAILY AT 8AM (SUPPLEMENT) *MOHINI DODD Sunscreen SPF50 LOTN   No No   Sig: Apply 15 minutes before sun exposure and every 2 hours   acetaminophen (TYLENOL) 500 mg tablet   No No   Sig: Take 1 tablet (500 mg total) by mouth every 6 (six) hours as needed for mild pain   aluminum-magnesium hydroxide-simethicone (GNP Antacid & Anti-Gas) 6999-8467-465 mg/30 mL suspension   No No   Sig: Take 15 mL by mouth every 4 (four) hours as needed for indigestion or heartburn   amitriptyline (ELAVIL) 10 mg tablet   No No   Sig: Take 1 tablet (10 mg total) by mouth daily at bedtime   bacitracin topical ointment 500 units/g topical ointment   No No   Sig: Cleanse site on nose with soap and water followed by bacitracin BID until healed then p r n    bismuth subsalicylate (PEPTO BISMOL) 524 mg/30 mL oral suspension   No No   Sig: Take 15 mL (262 mg total) by mouth every 6 (six) hours as needed for indigestion   calcium carbonate (TUMS) 500 mg chewable tablet   No No   Sig: Chew 1 tablet (500 mg total) 3 (three) times a day as needed for heartburn   carbamide peroxide (DEBROX) 6 5 % otic solution   No No   Sig: Administer 5 drops into the left ear 2 (two) times a day Use 1 week prior to ENT appointment  Also can use 4 gtts twice weekly (Tuesday and Friday for example) to each ear for prevention  Keep hearing aid out x 10 minutes after ear drops administered     dicyclomine (BENTYL) 20 mg tablet   No No   Sig: Take 1 tablet (20 mg total) by mouth every 6 (six) hours as needed (dysphagia)   escitalopram (LEXAPRO) 20 mg tablet   No No   Sig: Take 1 tablet (20 mg total) by mouth daily   fluticasone (FLONASE) 50 mcg/act nasal spray   No No   Si spray into each nostril daily as needed for rhinitis (nasal congestion) At 8:00 AM   gabapentin (NEURONTIN) 100 mg capsule   No No   Sig: Take 1 pill nightly for 1 week and then increase to 2 pills nightly   hydrOXYzine HCL (ATARAX) 10 mg tablet   No No   Sig: Take 1 tablet (10 mg total) by mouth 3 (three) times a day   ibuprofen (MOTRIN) 400 mg tablet   No No   Sig: TAKE 1 TABLET BY MOUTH EVERY 8 HOURS AS NEEDED FOR MILD/MOD PAIN OR HEADACHES   ketoconazole (NIZORAL) 2 % cream   No No   Sig: Apply topically daily   lamoTRIgine (LaMICtal) 100 mg tablet   Yes No   lamoTRIgine (LaMICtal) 25 mg tablet   Yes No   Sig: Take 25 mg by mouth 2 (two) times a day     lidocaine (LMX) 4 % cream   No No   Sig: Apply topically as needed for mild pain   lubiprostone (Amitiza) 24 mcg capsule   No No   Sig: Take 1 capsule (24 mcg total) by mouth daily with breakfast   magnesium hydroxide (GNP Milk of Magnesia) 400 mg/5 mL oral suspension   No No   Si TBSP (30ML) BY MOUTH DAILY AS NEEDED IF NO BM IN 3 DAYS (CONSTIPATION)   metoprolol tartrate (LOPRESSOR) 25 mg tablet   No No   Sig: Take 1 tablet (25 mg total) by mouth 2 (two) times a day   mineral oil-hydrophilic petrolatum (AQUAPHOR) ointment   No No   Sig: Apply topically as needed for dry skin   norgestimate-ethinyl estradiol (ORTHO-CYCLEN) 0 25-35 MG-MCG per tablet   No No   Sig: Take 1 tablet by mouth daily   nystatin (MYCOSTATIN) powder   No No   Sig: Apply 1 application topically 4 (four) times a day   nystatin-triamcinolone (MYCOLOG-II) cream   No No   Sig: Apply topically 2 (two) times a day   pantoprazole (PROTONIX) 20 mg tablet   No No   Sig: Take 1 tablet (20 mg total) by mouth daily   polyethylene glycol (MIRALAX) 17 g packet   No No   Sig: Take one packet daily as needed for no bowel movement in 24 hours   psyllium (METAMUCIL SMOOTH TEXTURE) 28 % packet   Yes No   psyllium (METAMUCIL) 58 6 % packet   No No   Sig: Take 1 packet by mouth daily   senna-docusate sodium (Senexon-S) 8 6-50 mg per tablet   No No   Sig: Take 1 tablet by mouth daily at 8am    sodium chloride (OCEAN) 0 65 % nasal spray   No No   Si spray into each nostril as needed (three times daily as needed for nasal congestion)   trospium chloride (SANCTURA) 20 mg tablet   No No   Sig: Take 1 tablet (20 mg total) by mouth 2 (two) times a day   zinc oxide (DESITIN) 13 % cream   No No   Sig: Apply 1 application topically as needed (for perianal irritation)      Facility-Administered Medications: None       Past Medical History:   Diagnosis Date   • ADD (attention deficit disorder)    • Anxiety    • Astigmatism    • Brain lesion    • Calcium deficiency    • Cellulitis of foot, right 2018   • Cerebral palsy (HCC)    • Chronic otitis media    • Constipation    • Depression    • Dysphagia    • Esophagitis    • Esotropia    • GERD (gastroesophageal reflux disease)    • Hiatal hernia    • Hydrocephalus (HCC)    • Impaired fasting glucose    • Left nephrolithiasis 2019   • Myopia    • Oppositional defiant disorder    • 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HCC)    • Seizures ([facility])    • Sensorineural hearing loss    • Sleep apnea    • Status post ventriculoatrial shunt placement    • Visual impairment        Past Surgical History:   Procedure Laterality Date   • BREAST BIOPSY Left     X 2 (not sure of years)   • CSF SHUNT      Creation of Ventriculo-Peritoneal CSF shunt ;  Last Assessed:2016   • EAR SURGERY Last Assessed:7/6/2016   • LEG SURGERY      due to CP    • NOSE SURGERY      Last Assessed:7/6/2016   • UT ESOPHAGOGASTRODUODENOSCOPY TRANSORAL DIAGNOSTIC N/A 5/9/2019    Procedure: ESOPHAGOGASTRODUODENOSCOPY (EGD) with biopsy;  Surgeon: Zenaida Pierre MD;  Location: [facility]; Service: Gastroenterology   • UPPER GASTROINTESTINAL ENDOSCOPY  05/2019       Family History   Problem Relation Age of Onset   • Diabetes Mother    • Colon cancer Father    • No Known Problems Maternal Grandmother    • No Known Problems Maternal Grandfather    • No Known Problems Paternal Grandmother    • No Known Problems Paternal Grandfather    • Hypertension Neg Hx    • Heart disease Neg Hx    • Stroke Neg Hx    • Thyroid disease Neg Hx      I have reviewed and agree with the history as documented  E-Cigarette/Vaping   • E-Cigarette Use Never User      E-Cigarette/Vaping Substances   • Nicotine No    • THC No    • CBD No    • Flavoring No    • Other No    • Unknown No      Social History     Tobacco Use   • Smoking status: Never   • Smokeless tobacco: Never   Vaping Use   • Vaping Use: Never used   Substance Use Topics   • Alcohol use: Never   • Drug use: Never       Review of Systems   Unable to perform ROS: Other (Intellectual disability)   Constitutional: Negative for diaphoresis and fever  Respiratory: Negative for cough and shortness of breath  Cardiovascular: Positive for chest pain  Negative for palpitations, claudication and near-syncope  Gastrointestinal: Negative for anorexia, nausea and vomiting  Musculoskeletal: Negative for back pain  Neurological: Negative for dizziness and weakness  Physical Exam  Physical Exam  Vitals and nursing note reviewed  Constitutional:       General: She is awake  She is not in acute distress  Appearance: Normal appearance  She is well-developed and overweight  She is not ill-appearing, toxic-appearing or diaphoretic  HENT:      Head: Normocephalic and atraumatic  Right Ear: External ear normal       Left Ear: External ear normal       Nose: Nose normal       Mouth/Throat:      Mouth: Mucous membranes are moist       Pharynx: No oropharyngeal exudate  Eyes:      General: No scleral icterus  Right eye: No discharge  Left eye: No discharge  Conjunctiva/sclera: Conjunctivae normal       Pupils: Pupils are equal, round, and reactive to light  Neck:      Thyroid: No thyromegaly  Vascular: No JVD  Trachea: No tracheal deviation  Cardiovascular:      Rate and Rhythm: Normal rate and regular rhythm  Heart sounds: Normal heart sounds  No murmur heard  No friction rub  No gallop  Pulmonary:      Effort: Pulmonary effort is normal  No respiratory distress  Breath sounds: Normal breath sounds  No stridor  No wheezing, rhonchi or rales  Chest:      Chest wall: No tenderness  Abdominal:      General: Bowel sounds are normal  There is no distension  Palpations: Abdomen is soft  There is no mass  Tenderness: There is no abdominal tenderness  Hernia: No hernia is present  Musculoskeletal:         General: No swelling  Right lower leg: No edema  Left lower leg: No edema  Skin:     General: Skin is warm and dry  Coloration: Skin is not jaundiced or pale  Findings: No bruising, erythema, lesion or rash  Neurological:      General: No focal deficit present  Mental Status: She is alert and oriented to person, place, and time  Motor: No weakness or abnormal muscle tone  Deep Tendon Reflexes: Reflexes are normal and symmetric  Psychiatric:         Mood and Affect: Mood normal          Behavior: Behavior is cooperative           Vital Signs  ED Triage Vitals   Temperature Pulse Respirations Blood Pressure SpO2   03/13/23 0930 03/13/23 0930 03/13/23 0930 03/13/23 0930 03/13/23 0930   98 7 °F (37 1 °C) 98 20 (!) 155/111 94 %      Temp Source Heart Rate Source Patient Position - Orthostatic VS BP Location FiO2 (%)   03/13/23 0930 03/13/23 1045 03/13/23 0930 03/13/23 0930 --   Oral Monitor;Right Lying Right arm       Pain Score       03/13/23 1007       2           Vitals:    03/13/23 1045 03/13/23 1115 03/13/23 1130 03/13/23 1200   BP: 126/70 119/64 110/60 133/63   Pulse: 90 90 88 92   Patient Position - Orthostatic VS: Sitting Sitting  Lying         Visual Acuity  Visual Acuity    Flowsheet Row Most Recent Value   L Pupil Size (mm) 3   R Pupil Size (mm) 3          ED Medications  Medications   ketorolac (TORADOL) injection 15 mg (15 mg Intravenous Given 3/13/23 1007)   sodium chloride 0 9 % bolus 1,000 mL (1,000 mL Intravenous New Bag 3/13/23 1107)   iohexol (OMNIPAQUE) 350 MG/ML injection (SINGLE-DOSE) 85 mL (73 mL Intravenous Given 3/13/23 1148)       Diagnostic Studies  Results Reviewed     Procedure Component Value Units Date/Time    HS Troponin I 2hr [[id number]]  (Normal) Collected: 03/13/23 1123    Lab Status: Final result Specimen: Blood from Arm, Left Updated: 03/13/23 1203     hs TnI 2hr 3 ng/L      Delta 2hr hsTnI 1 ng/L     HS Troponin I 4hr [[id number]]     Lab Status: No result Specimen: Blood     D-Dimer [[id number]]  (Abnormal) Collected: 03/13/23 1003    Lab Status: Final result Specimen: Blood from Arm, Left Updated: 03/13/23 1052     D-Dimer, Quant 1 60 ug/ml FEU     HS Troponin 0hr (reflex protocol) [[id number]]  (Normal) Collected: 03/13/23 0936    Lab Status: Final result Specimen: Blood from Arm, Left Updated: 03/13/23 1008     hs TnI 0hr 2 ng/L     Comprehensive metabolic panel [[id number]]  (Abnormal) Collected: 03/13/23 0936    Lab Status: Final result Specimen: Blood from Arm, Left Updated: 03/13/23 0959     Sodium 139 mmol/L      Potassium 4 1 mmol/L      Chloride 106 mmol/L      CO2 24 mmol/L      ANION GAP 9 mmol/L      BUN 11 mg/dL      Creatinine 0 78 mg/dL      Glucose 140 mg/dL      Calcium 8 5 mg/dL      AST 13 U/L      ALT 14 U/L      Alkaline Phosphatase 138 U/L Total Protein 7 4 g/dL      Albumin 3 9 g/dL      Total Bilirubin 0 22 mg/dL      eGFR 92 ml/min/1 73sq m     Narrative:      National Kidney Disease Foundation guidelines for Chronic Kidney Disease (CKD):   •  Stage 1 with normal or high GFR (GFR > 90 mL/min/1 73 square meters)  •  Stage 2 Mild CKD (GFR = 60-89 mL/min/1 73 square meters)  •  Stage 3A Moderate CKD (GFR = 45-59 mL/min/1 73 square meters)  •  Stage 3B Moderate CKD (GFR = 30-44 mL/min/1 73 square meters)  •  Stage 4 Severe CKD (GFR = 15-29 mL/min/1 73 square meters)  •  Stage 5 End Stage CKD (GFR <15 mL/min/1 73 square meters)  Note: GFR calculation is accurate only with a steady state creatinine    CBC and differential [[id number]]  (Abnormal) Collected: 03/13/23 0936    Lab Status: Final result Specimen: Blood from Arm, Left Updated: 03/13/23 0942     WBC 7 55 Thousand/uL      RBC 4 47 Million/uL      Hemoglobin 13 6 g/dL      Hematocrit 42 3 %      MCV 95 fL      MCH 30 4 pg      MCHC 32 2 g/dL      RDW 13 7 %      MPV 8 8 fL      Platelets 399 Thousands/uL      nRBC 0 /100 WBCs      Neutrophils Relative 69 %      Immat GRANS % 0 %      Lymphocytes Relative 23 %      Monocytes Relative 6 %      Eosinophils Relative 2 %      Basophils Relative 0 %      Neutrophils Absolute 5 15 Thousands/µL      Immature Grans Absolute 0 03 Thousand/uL      Lymphocytes Absolute 1 74 Thousands/µL      Monocytes Absolute 0 48 Thousand/µL      Eosinophils Absolute 0 12 Thousand/µL      Basophils Absolute 0 03 Thousands/µL                  CTA ED chest PE study   Final Result by Nicolasa Michael MD (03/13 1230)      No CT evidence of pulmonary embolism  Moderate sized hiatal hernia  Workstation performed: [id number]         XR chest 1 view portable   ED Interpretation by Adair Pat DO (03/13 1052)   nad      Final Result by Tarah Huertas MD (03/13 1132)      No acute cardiopulmonary disease                    Workstation performed: [id number]                    Procedures  ECG 12 Lead Documentation Only    Date/Time: 3/13/2023 10:06 AM  Performed by: Yonny Atwood DO  Authorized by: Yonny Atwood DO     Indications / Diagnosis:  Cp  ECG reviewed by me, the ED Provider: yes    Patient location:  ED  Interpretation:     Interpretation: abnormal    Rate:     ECG rate:  100    ECG rate assessment: tachycardic    Rhythm:     Rhythm: sinus tachycardia    Ectopy:     Ectopy: none    Conduction:     Conduction: normal    ST segments:     ST segments:  Normal  T waves:     T waves: normal               ED Course             HEART Risk Score    Flowsheet Row Most Recent Value   Heart Score Risk Calculator    History 0 Filed at: 03/13/2023 1127   ECG 0 Filed at: 03/13/2023 1127   Age 0 Filed at: 03/13/2023 1127   Risk Factors 0 Filed at: 03/13/2023 1127   Troponin 0 Filed at: 03/13/2023 1127   HEART Score 0 Filed at: 03/13/2023 1127                        SBIRT 22yo+    Flowsheet Row Most Recent Value   SBIRT (23 yo +)    In order to provide better care to our patients, we are screening all of our patients for alcohol and drug use  Would it be okay to ask you these screening questions? No Filed at: 03/13/2023 0940                    Medical Decision Making  60-year-old female from a group home presents to the ED with left upper chest pain that started about an hour ago at rest   No radiation of the pain, diaphoresis or vomiting  She states she does feel short of breath  On exam she is alert no acute distress  She is mildly tachycardic  Her lungs are clear  Differential includes but is not limited to chest wall pain, anxiety, less likely ACS given the sharp nonradiating chest pain  Pulmonary embolus would also be in the differential although she has no risk factors  Pneumothorax is well would be in the differential but again is less likely  Will obtain cardiac work-up, add D-dimer    Will give Toradol for pain and reassess  Amount and/or Complexity of Data Reviewed  Labs: ordered  Radiology: ordered and independent interpretation performed  Details: Rate 100  No ST depressions or T wave inversions  Otherwise normal EKG      Risk  Prescription drug management  Disposition  Final diagnoses:   Chest pain with low risk of acute coronary syndrome     Time reflects when diagnosis was documented in both MDM as applicable and the Disposition within this note     Time User Action Codes Description Comment    3/13/2023 12:54 PM Tonny Card Add [R07 89] Chest wall pain     3/13/2023 12:55 PM Jv Olives A Remove [R07 89] Chest wall pain     3/13/2023 12:56 PM Jv Olives A Add [R07 9] Chest pain with low risk of acute coronary syndrome       ED Disposition     ED Disposition   Discharge    Condition   Good    Date/Time   Mon Mar 13, 2023 12:54 PM    Comment   Jonny Barksdale discharge to home/self care  Follow-up Information     Follow up With Specialties Details Why Contact Info Additional [facility] Schedule an appointment as soon as possible for a visit in 2 days  [address], [address] [facility] [address]  [address] [place] [address], [address], [address] [place], [place], [address] [place] [address]          Patient's Medications   Discharge Prescriptions    No medications on file       No discharge procedures on file      PDMP Review       Value Time User    PDMP Reviewed  Yes 10/6/2021  1:11 PM Jacqueline Pina PA-C          ED Provider  Electronically Signed by           Yumiko Ivey DO  03/13/23 Arjun [address] [place] [address]  03/13/23 7828

## 2023-03-14 ENCOUNTER — PATIENT OUTREACH (OUTPATIENT)
Dept: FAMILY MEDICINE CLINIC | Facility: CLINIC | Age: 43
End: 2023-03-14

## 2023-03-14 NOTE — LETTER
Delvin Reillyon  [address]    Dear Ms Justyna Herrera ,    [facility] would like to invite you to participate in our Chronic Care Management program    Care Management is a team that includes your provider, nurse care manager,  care manager, and other members of your primary care office  This team is available to provide or set up resources that help to manage chronic conditions  This helps to reduce complications and improves quality of life  Some services that can be offered include disease management and cope with any stressors that may be affecting the ability to manage chronic conditions  Our goal is to ensure you have the tools and information needed to make healthcare decisions  We will pair you with a Care Manager, Maria Eugenia Brasher RN, who will reach out via telephone and help to create and achieve health goals for yourself  Please review the flyer on the Chronic Care Management program  If you wish to enroll in the program or have further questions, please call the office’s Care Manager at [phone]  The Care Managers are available M-F 8a to 4:30p  We are happy to assist you in becoming a healthier you  Sincerely,      Your Care Team              Chronic Care Management Program:  [facility] Chronic Care Management program is a way to ensure you are receiving the most comprehensive care possible  You will have a dedicated team that includes your Primary Care Provider, office staff, and care management staff who will work with you to develop a care plan that meets your health care goals  Chronic Care Management (CCM) is defined as the non-face-to-face services provided to Medicare recipients who have multiple (two or more), significant chronic conditions  Chronic conditions are ongoing medical problems that must be managed effectively in a partnership between you and your health care team to maintain the best possible health    Examples of chronic conditions include but are not limited to: Arthritis High Blood Pressure   Asthma Heart Disease   Chronic Obstructive Pulmonary Disease (COPD) Obesity   Depression Osteoporosis   Diabetes      What is Chronic Care Management? By participating in a call each month, your physician and primary wellness team will carefully monitor and provide comprehensive care for your chronic health conditions in a thoughtful and personalized way  This call will be in addition to the care received during your regular office visits  Federal regulations now enable Medicare to pay for chronic care management  What are the benefits of Chronic Care Management? • Enhanced access to your primary care team  • Close monitoring of your medication  • Personalized, comprehensive care plan for all of your health needs  • Coordinated care by your primary care wellness team and other health providers, including care you may receive at other locations, such as the hospital, other care facilities or your home  What do you need to know before signing up? Based on your current insurance plan, this type of personalized care may require you to pay $8 or $9 (your Medicare co-pay amount) to your primary care practice each month that you receive chronic care management  The service is also subject to your Medicare deductible  You must also sign an agreement to receive this type of chronic care management  You may withdraw from this program at any time  [facility] provides comprehensive care, excellent service, along with the compassion and personal attention you deserve  Managing your chronic conditions is an important part of your health care, at any age

## 2023-03-15 ENCOUNTER — TELEPHONE (OUTPATIENT)
Dept: FAMILY MEDICINE CLINIC | Facility: CLINIC | Age: 43
End: 2023-03-15

## 2023-03-15 ENCOUNTER — RA CDI HCC (OUTPATIENT)
Dept: OTHER | Facility: HOSPITAL | Age: 43
End: 2023-03-15

## 2023-03-15 NOTE — TELEPHONE ENCOUNTER
jT Suggs from [facility] calling, Requesting an addendum to office note [phone] for the purpose of Medicare coverage  According to caller the diagnosis that was used will not be covered by insurance   But it the diagnosis is changed to Cerebral Palsy,  more than likely Medicare will cover the cost

## 2023-03-16 NOTE — TELEPHONE ENCOUNTER
Spoke with Liliya Blake to inform her about the note that was redone  She said she should be able to see it in epic  If not will call back and we will fax it over

## 2023-03-20 ENCOUNTER — APPOINTMENT (EMERGENCY)
Dept: RADIOLOGY | Facility: HOSPITAL | Age: 43
End: 2023-03-20

## 2023-03-20 ENCOUNTER — HOSPITAL ENCOUNTER (EMERGENCY)
Facility: HOSPITAL | Age: 43
Discharge: HOME | End: 2023-03-20
Attending: EMERGENCY MEDICINE | Admitting: EMERGENCY MEDICINE

## 2023-03-20 VITALS
BODY MASS INDEX: 48.68 KG/M2 | OXYGEN SATURATION: 99 % | WEIGHT: 217.15 LBS | TEMPERATURE: 97.7 F | SYSTOLIC BLOOD PRESSURE: 122 MMHG | RESPIRATION RATE: 20 BRPM | HEART RATE: 105 BPM | DIASTOLIC BLOOD PRESSURE: 73 MMHG

## 2023-03-20 DIAGNOSIS — M54.50 LOW BACK PAIN: Primary | ICD-10-CM

## 2023-03-20 RX ORDER — IBUPROFEN 600 MG/1
600 TABLET ORAL ONCE
Status: COMPLETED | OUTPATIENT
Start: 2023-03-20 | End: 2023-03-20

## 2023-03-20 RX ORDER — ACETAMINOPHEN 325 MG/1
650 TABLET ORAL ONCE
Status: COMPLETED | OUTPATIENT
Start: 2023-03-20 | End: 2023-03-20

## 2023-03-20 RX ORDER — LIDOCAINE 50 MG/G
1 PATCH TOPICAL ONCE
Status: DISCONTINUED | OUTPATIENT
Start: 2023-03-20 | End: 2023-03-20 | Stop reason: HOSPADM

## 2023-03-20 RX ADMIN — LIDOCAINE 5% 1 PATCH: 700 PATCH TOPICAL at 14:43

## 2023-03-20 RX ADMIN — IBUPROFEN 600 MG: 600 TABLET, FILM COATED ORAL at 14:43

## 2023-03-20 RX ADMIN — ACETAMINOPHEN 325MG 650 MG: 325 TABLET ORAL at 14:43

## 2023-03-20 NOTE — DISCHARGE INSTRUCTIONS
-alternate with Motrin and Tylenol every 3 hours as needed  -use ice on the back  -can use over the counter topical pain reliever

## 2023-03-20 NOTE — ED PROVIDER NOTES
History  Chief Complaint   Patient presents with   • Back Pain     Patient reports fell backwards into door, now complaining of lower back pain  Denies head strike, denies LOC, denies thinners  50-year-old female from a group home with history of cerebral palsy, intellectual disability, seizures,  shunt presents to the ED complaining lower back pain  Patient reports that she lost her balance and fell backwards into a door hitting her lower back  Denies hitting her head or losing consciousness  Denies being on any blood thinners  Denies numbness or tingling in her legs  Denies taking any medication prior to arrival           Prior to Admission Medications   Prescriptions Last Dose Informant Patient Reported? Taking?    ARIPiprazole (ABILIFY) 20 MG tablet   No No   Sig: Take 1 tablet (20 mg total) by mouth daily at bedtime   D3-1000 25 MCG (1000 UT) tablet   No No   Sig: Take 2 tablets (2,000 Units total) by mouth daily   Diclofenac Sodium (VOLTAREN) 1 %   No No   Sig: Apply 2 g topically 4 (four) times a day   Dyclonine-Glycerin (Cepacol Sore Throat Spray) 0 1-33 % LIQD   No No   Sig: Apply 1 spray to the mouth or throat 3 (three) times a day as needed (sorethroat)   Elastic Bandages & Supports ([place] Knee Brace[place] [facility] [place]   No No   Sig: Apply right knee brace in morning; remove at night   GNP Sore Throat Spray 1 4 % mucosal liquid   No No   Sig: APPLY 1 SPRAY TO MOUTH OR THROAT EVERY 2 HRS AS NEEDED FOR SORE THROAT   GNP Stomach Relief Max St 525 MG/15ML SUSP   Yes No   Incontinence Supply Disposable (Wings Choice Plus Adult Briefs) MISC   No No   Sig: Use as directed (R39 81)   Konsyl Daily Fiber 28 3 %   Yes No   LORazepam (ATIVAN) 0 5 mg tablet   Yes No   Sig: Take 0 25 mg by mouth 2 (two) times a day   Mouthwashes (Listerine Antiseptic) LIQD   No No   Sig: Swish and spit 5 mL 2 (two) times a day   Multiple Vitamins-Minerals (CertaVite/Antioxidants) TABS   No No   Sig: TAKE 1 TABLET BY MOUTH DAILY AT 8AM (SUPPLEMENT) *MOHINI DODD Sunscreen SPF50 LOTN   No No   Sig: Apply 15 minutes before sun exposure and every 2 hours   acetaminophen (TYLENOL) 500 mg tablet   No No   Sig: Take 1 tablet (500 mg total) by mouth every 6 (six) hours as needed for mild pain   aluminum-magnesium hydroxide-simethicone (GNP Antacid & Anti-Gas) 0624-8532-958 mg/30 mL suspension   No No   Sig: Take 15 mL by mouth every 4 (four) hours as needed for indigestion or heartburn   amitriptyline (ELAVIL) 10 mg tablet   No No   Sig: Take 1 tablet (10 mg total) by mouth daily at bedtime   bacitracin topical ointment 500 units/g topical ointment   No No   Sig: Cleanse site on nose with soap and water followed by bacitracin BID until healed then p r n    bismuth subsalicylate (PEPTO BISMOL) 524 mg/30 mL oral suspension   No No   Sig: Take 15 mL (262 mg total) by mouth every 6 (six) hours as needed for indigestion   calcium carbonate (TUMS) 500 mg chewable tablet   No No   Sig: Chew 1 tablet (500 mg total) 3 (three) times a day as needed for heartburn   carbamide peroxide (DEBROX) 6 5 % otic solution   No No   Sig: Administer 5 drops into the left ear 2 (two) times a day Use 1 week prior to ENT appointment  Also can use 4 gtts twice weekly (Tuesday and Friday for example) to each ear for prevention  Keep hearing aid out x 10 minutes after ear drops administered     dicyclomine (BENTYL) 20 mg tablet   No No   Sig: Take 1 tablet (20 mg total) by mouth every 6 (six) hours as needed (dysphagia)   escitalopram (LEXAPRO) 20 mg tablet   No No   Sig: Take 1 tablet (20 mg total) by mouth daily   fluticasone (FLONASE) 50 mcg/act nasal spray   No No   Si spray into each nostril daily as needed for rhinitis (nasal congestion) At 8:00 AM   gabapentin (NEURONTIN) 100 mg capsule   No No   Sig: Take 1 pill nightly for 1 week and then increase to 2 pills nightly   hydrOXYzine HCL (ATARAX) 10 mg tablet   No No   Sig: Take 1 tablet (10 mg total) by mouth 3 (three) times a day   ibuprofen (MOTRIN) 400 mg tablet   No No   Sig: TAKE 1 TABLET BY MOUTH EVERY 8 HOURS AS NEEDED FOR MILD/MOD PAIN OR HEADACHES   ketoconazole (NIZORAL) 2 % cream   No No   Sig: Apply topically daily   lamoTRIgine (LaMICtal) 100 mg tablet   Yes No   lamoTRIgine (LaMICtal) 25 mg tablet   Yes No   Sig: Take 25 mg by mouth 2 (two) times a day     lidocaine (LMX) 4 % cream   No No   Sig: Apply topically as needed for mild pain   lubiprostone (Amitiza) 24 mcg capsule   No No   Sig: Take 1 capsule (24 mcg total) by mouth daily with breakfast   magnesium hydroxide (GNP Milk of Magnesia) 400 mg/5 mL oral suspension   No No   Si TBSP (30ML) BY MOUTH DAILY AS NEEDED IF NO BM IN 3 DAYS (CONSTIPATION)   metoprolol tartrate (LOPRESSOR) 25 mg tablet   No No   Sig: Take 1 tablet (25 mg total) by mouth 2 (two) times a day   mineral oil-hydrophilic petrolatum (AQUAPHOR) ointment   No No   Sig: Apply topically as needed for dry skin   norgestimate-ethinyl estradiol (ORTHO-CYCLEN) 0 25-35 MG-MCG per tablet   No No   Sig: Take 1 tablet by mouth daily   nystatin (MYCOSTATIN) powder   No No   Sig: Apply 1 application topically 4 (four) times a day   nystatin-triamcinolone (MYCOLOG-II) cream   No No   Sig: Apply topically 2 (two) times a day   pantoprazole (PROTONIX) 20 mg tablet   No No   Sig: Take 1 tablet (20 mg total) by mouth daily   polyethylene glycol (MIRALAX) 17 g packet   No No   Sig: Take one packet daily as needed for no bowel movement in 24 hours   psyllium (METAMUCIL SMOOTH TEXTURE) 28 % packet   Yes No   psyllium (METAMUCIL) 58 6 % packet   No No   Sig: Take 1 packet by mouth daily   senna-docusate sodium (Senexon-S) 8 6-50 mg per tablet   No No   Sig: Take 1 tablet by mouth daily at 8am    sodium chloride (OCEAN) 0 65 % nasal spray   No No   Si spray into each nostril as needed (three times daily as needed for nasal congestion)   trospium chloride (SANCTURA) 20 mg tablet   No No   Sig: Take 1 tablet (20 mg total) by mouth 2 (two) times a day   zinc oxide (DESITIN) 13 % cream   No No   Sig: Apply 1 application topically as needed (for perianal irritation)      Facility-Administered Medications: None       Past Medical History:   Diagnosis Date   • ADD (attention deficit disorder)    • Anxiety    • Astigmatism    • Brain lesion    • Calcium deficiency    • Cellulitis of foot, right 6/4/2018   • Cerebral palsy (HCC)    • Chronic otitis media    • Constipation    • Depression    • Dysphagia    • Esophagitis    • Esotropia    • GERD (gastroesophageal reflux disease)    • Hiatal hernia    • Hydrocephalus ([facility])    • Impaired fasting glucose    • Left nephrolithiasis 03/04/2019   • Myopia    • Oppositional defiant disorder    • 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[facility])    • Seizures ([facility])    • Sensorineural hearing loss    • Sleep apnea    • Status post ventriculoatrial shunt placement    • Visual impairment        Past Surgical History:   Procedure Laterality Date   • BREAST BIOPSY Left     X 2 (not sure of years)   • CSF SHUNT      Creation of Ventriculo-Peritoneal CSF shunt ; Last Assessed:7/6/2016   • EAR SURGERY      Last Assessed:7/6/2016   • LEG SURGERY      due to CP    • NOSE SURGERY      Last Assessed:7/6/2016   • NC ESOPHAGOGASTRODUODENOSCOPY TRANSORAL DIAGNOSTIC N/A 5/9/2019    Procedure: ESOPHAGOGASTRODUODENOSCOPY (EGD) with biopsy;  Surgeon: Aleyda Stovall MD;  Location: [facility]; Service: Gastroenterology   • UPPER GASTROINTESTINAL ENDOSCOPY  05/2019       Family History   Problem Relation Age of Onset   • Diabetes Mother    • Colon cancer Father    • No Known Problems Maternal Grandmother    • No Known Problems Maternal Grandfather    • No Known Problems Paternal Grandmother    • No Known Problems Paternal Grandfather    • Hypertension Neg Hx    • Heart disease Neg Hx    • Stroke Neg Hx    • Thyroid disease Neg Hx      I have reviewed and agree with the history as documented      E-Cigarette/Vaping   • E-Cigarette Use Never User      E-Cigarette/Vaping Substances   • Nicotine No    • THC No    • CBD No    • Flavoring No    • Other No    • Unknown No      Social History     Tobacco Use   • Smoking status: Never   • Smokeless tobacco: Never   Vaping Use   • Vaping Use: Never used   Substance Use Topics   • Alcohol use: Never   • Drug use: Never       Review of Systems   Musculoskeletal: Positive for back pain  All other systems reviewed and are negative  Physical Exam  Physical Exam  Vitals and nursing note reviewed  Constitutional:       General: She is not in acute distress  Appearance: Normal appearance  She is well-developed  She is obese  HENT:      Head: Normocephalic and atraumatic  Eyes:      Conjunctiva/sclera: Conjunctivae normal    Cardiovascular:      Rate and Rhythm: Normal rate and regular rhythm  Heart sounds: No murmur heard  Pulmonary:      Effort: Pulmonary effort is normal       Breath sounds: Normal breath sounds  Abdominal:      Palpations: Abdomen is soft  Musculoskeletal:         General: Tenderness present  Normal range of motion  Cervical back: Normal range of motion and neck supple  Back:    Skin:     General: Skin is warm and dry  Neurological:      Mental Status: She is alert     Psychiatric:         Mood and Affect: Mood normal          Vital Signs  ED Triage Vitals [03/20/23 1357]   Temperature Pulse Respirations Blood Pressure SpO2   97 7 °F (36 5 °C) 105 20 122/73 99 %      Temp Source Heart Rate Source Patient Position - Orthostatic VS BP Location FiO2 (%)   Oral Monitor Lying Right arm --      Pain Score       2           Vitals:    03/20/23 1357   BP: 122/73   Pulse: 105   Patient Position - Orthostatic VS: Lying         Visual Acuity      ED Medications  Medications   lidocaine (LIDODERM) 5 % patch 1 patch (1 patch Topical Medication Applied 3/20/23 1443)   ibuprofen (MOTRIN) tablet 600 mg (600 mg Oral Given 3/20/23 1443)   acetaminophen (TYLENOL) tablet 650 mg (650 mg Oral Given 3/20/23 1443)       Diagnostic Studies  Results Reviewed     None                 XR spine lumbar 2 or 3 views injury   ED Interpretation by Marky Hampton PA-C (03/20 1527)   No acute osseous abnormalities                 Procedures  Procedures         ED Course                               SBIRT 20yo+    Flowsheet Row Most Recent Value   SBIRT (23 yo +)    In order to provide better care to our patients, we are screening all of our patients for alcohol and drug use  Would it be okay to ask you these screening questions? No Filed at: 03/20/2023 8117                    Medical Decision Making  This is a 48-year-old female who presents today after falling and hitting her lower back  Patient reports that she lost her balance  Did not hit her head or lose consciousness  Admits to some midline tenderness  Does not radiate  No numbness or tingling in her legs  On physical exam patient does have some tenderness to the midline and paraspinal muscles of lumbar spine  X-ray was obtained to rule out any acute osseous abnormalities-this was reviewed by me which showed no abnormalities  Patient was given Motrin and Tylenol and lidocaine patch here in the ER  Recommend Motrin and Tylenol at home  I have discussed the plan to discharge pt from ED  The patient was discharged in stable condition   Patient ambulated off the department   Extensive return to emergency department precautions were discussed   Follow up with appropriate providers including primary care physician was discussed   Patient and/or their  primary decision maker expressed understanding  Julio César Esparza remained stable during entire emergency department stay  Portions of the record may have been created with voice recognition software  Occasional wrong word or "sound a like" substitutions may have occurred due to the inherent limitations of voice recognition software   Read the chart carefully and recognize, using context, where substitutions have occurred  Low back pain: acute illness or injury  Amount and/or Complexity of Data Reviewed  Radiology: ordered and independent interpretation performed  Decision-making details documented in ED Course  Risk  OTC drugs  Prescription drug management  Disposition  Final diagnoses:   Low back pain     Time reflects when diagnosis was documented in both MDM as applicable and the Disposition within this note     Time User Action Codes Description Comment    3/20/2023  3:06 PM Remi Corona [M54 49] Low back pain       ED Disposition     ED Disposition   Discharge    Condition   Stable    Date/Time   Mon Mar 20, 2023  3:27 PM    Comment   Qi Hopping discharge to home/self care  Follow-up Information    None         Patient's Medications   Discharge Prescriptions    No medications on file       No discharge procedures on file      PDMP Review       Value Time User    PDMP Reviewed  Yes 10/6/2021  1:11 PM Triston Justice PA-C          ED Provider  Electronically Signed by           Roxanne Stone PA-C  03/20/23 1537

## 2023-03-21 ENCOUNTER — OFFICE VISIT (OUTPATIENT)
Dept: FAMILY MEDICINE CLINIC | Facility: CLINIC | Age: 43
End: 2023-03-21

## 2023-03-21 VITALS
BODY MASS INDEX: 48.78 KG/M2 | TEMPERATURE: 97.4 F | RESPIRATION RATE: 20 BRPM | WEIGHT: 217.6 LBS | DIASTOLIC BLOOD PRESSURE: 70 MMHG | SYSTOLIC BLOOD PRESSURE: 124 MMHG | HEART RATE: 101 BPM

## 2023-03-21 DIAGNOSIS — M54.50 ACUTE BILATERAL LOW BACK PAIN WITHOUT SCIATICA: Primary | ICD-10-CM

## 2023-03-21 NOTE — PROGRESS NOTES
Name: Vega Barber      : 1980      MRN: [id number]  Encounter Provider: Litzy Linn MD  Encounter Date: 3/21/2023   Encounter department: [address] [place] [address]  Acute bilateral low back pain without sciatica  Assessment & Plan: Onset 3/18 following fall landing on her back  Resolved  Back to baseline  No other acute concerns  Continue Tylenol as needed for pain         Subjective      Larisa Munroe is a 70-year-old female who is following up ED visit from 3/18 after a fall in which she hit her lower back  No head strike but initially had some midline tenderness following fall that has now resolved  No weakness, numbness or tingling  No other acute concerns today  Review of Systems   Constitutional: Negative for activity change, appetite change, fatigue and fever  HENT: Negative for sore throat  Respiratory: Negative for cough, shortness of breath and wheezing  Cardiovascular: Negative for chest pain, palpitations and leg swelling  Gastrointestinal: Negative for abdominal pain, diarrhea, nausea and vomiting  Genitourinary: Negative for dysuria and pelvic pain  Musculoskeletal: Positive for gait problem  Negative for back pain  Skin: Negative for rash  Neurological: Negative for weakness and headaches         Current Outpatient Medications on File Prior to Visit   Medication Sig   • acetaminophen (TYLENOL) 500 mg tablet Take 1 tablet (500 mg total) by mouth every 6 (six) hours as needed for mild pain   • aluminum-magnesium hydroxide-simethicone (GNP Antacid & Anti-Gas) 4541-1143-939 mg/30 mL suspension Take 15 mL by mouth every 4 (four) hours as needed for indigestion or heartburn   • amitriptyline (ELAVIL) 10 mg tablet Take 1 tablet (10 mg total) by mouth daily at bedtime   • ARIPiprazole (ABILIFY) 20 MG tablet Take 1 tablet (20 mg total) by mouth daily at bedtime   • bacitracin topical ointment 500 units/g topical ointment Cleanse site on nose with soap and water followed by bacitracin BID until healed then p r n    • bismuth subsalicylate (PEPTO BISMOL) 524 mg/30 mL oral suspension Take 15 mL (262 mg total) by mouth every 6 (six) hours as needed for indigestion   • calcium carbonate (TUMS) 500 mg chewable tablet Chew 1 tablet (500 mg total) 3 (three) times a day as needed for heartburn   • carbamide peroxide (DEBROX) 6 5 % otic solution Administer 5 drops into the left ear 2 (two) times a day Use 1 week prior to ENT appointment  Also can use 4 gtts twice weekly (Tuesday and Friday for example) to each ear for prevention  Keep hearing aid out x 10 minutes after ear drops administered     • D3-1000 25 MCG (1000 UT) tablet Take 2 tablets (2,000 Units total) by mouth daily   • Diclofenac Sodium (VOLTAREN) 1 % Apply 2 g topically 4 (four) times a day   • dicyclomine (BENTYL) 20 mg tablet Take 1 tablet (20 mg total) by mouth every 6 (six) hours as needed (dysphagia)   • Dyclonine-Glycerin (Cepacol Sore Throat Spray) 0 1-33 % LIQD Apply 1 spray to the mouth or throat 3 (three) times a day as needed (sorethroat)   • Elastic Bandages & Supports (Neoprene Knee Brace) MISC Apply right knee brace in morning; remove at night   • escitalopram (LEXAPRO) 20 mg tablet Take 1 tablet (20 mg total) by mouth daily   • fluticasone (FLONASE) 50 mcg/act nasal spray 1 spray into each nostril daily as needed for rhinitis (nasal congestion) At 8:00 AM   • gabapentin (NEURONTIN) 100 mg capsule Take 1 pill nightly for 1 week and then increase to 2 pills nightly   • GNP Sore Throat Spray 1 4 % mucosal liquid APPLY 1 SPRAY TO MOUTH OR THROAT EVERY 2 HRS AS NEEDED FOR SORE THROAT   • GNP Stomach Relief Max St 525 MG/15ML SUSP    • hydrOXYzine HCL (ATARAX) 10 mg tablet Take 1 tablet (10 mg total) by mouth 3 (three) times a day   • ibuprofen (MOTRIN) 400 mg tablet TAKE 1 TABLET BY MOUTH EVERY 8 HOURS AS NEEDED FOR MILD/MOD PAIN OR HEADACHES   • Incontinence Supply Disposable (Wings Choice Plus Adult Briefs) MISC Use as directed (R38 13)   • ketoconazole (NIZORAL) 2 % cream Apply topically daily   • Konsyl Daily Fiber 28 3 %    • lamoTRIgine (LaMICtal) 100 mg tablet    • lamoTRIgine (LaMICtal) 25 mg tablet Take 25 mg by mouth 2 (two) times a day     • lidocaine (LMX) 4 % cream Apply topically as needed for mild pain   • LORazepam (ATIVAN) 0 5 mg tablet Take 0 25 mg by mouth 2 (two) times a day   • lubiprostone (Amitiza) 24 mcg capsule Take 1 capsule (24 mcg total) by mouth daily with breakfast   • magnesium hydroxide (GNP Milk of Magnesia) 400 mg/5 mL oral suspension 2 TBSP (30ML) BY MOUTH DAILY AS NEEDED IF NO BM IN 3 DAYS (CONSTIPATION)   • metoprolol tartrate (LOPRESSOR) 25 mg tablet Take 1 tablet (25 mg total) by mouth 2 (two) times a day   • mineral oil-hydrophilic petrolatum (AQUAPHOR) ointment Apply topically as needed for dry skin   • Multiple Vitamins-Minerals (CertaVite/Antioxidants) TABS TAKE 1 TABLET BY MOUTH DAILY AT 8AM (SUPPLEMENT) *IGABARI   • norgestimate-ethinyl estradiol (ORTHO-CYCLEN) 0 25-35 MG-MCG per tablet Take 1 tablet by mouth daily   • nystatin (MYCOSTATIN) powder Apply 1 application topically 4 (four) times a day   • nystatin-triamcinolone (MYCOLOG-II) cream Apply topically 2 (two) times a day   • pantoprazole (PROTONIX) 20 mg tablet Take 1 tablet (20 mg total) by mouth daily   • polyethylene glycol (MIRALAX) 17 g packet Take one packet daily as needed for no bowel movement in 24 hours   • psyllium (METAMUCIL SMOOTH TEXTURE) 28 % packet    • psyllium (METAMUCIL) 58 6 % packet Take 1 packet by mouth daily   • RA Sunscreen SPF50 LOTN Apply 15 minutes before sun exposure and every 2 hours   • senna-docusate sodium (Senexon-S) 8 6-50 mg per tablet Take 1 tablet by mouth daily at 8am    • sodium chloride (OCEAN) 0 65 % nasal spray 1 spray into each nostril as needed (three times daily as needed for nasal congestion)   • trospium chloride (SANCTURA) 20 mg tablet Take 1 tablet (20 mg total) by mouth 2 (two) times a day   • zinc oxide (DESITIN) 13 % cream Apply 1 application topically as needed (for perianal irritation)   • Mouthwashes (Listerine Antiseptic) LIQD Swish and spit 5 mL 2 (two) times a day       Objective     /70   Pulse 101   Temp (!) 97 4 °F (36 3 °C)   Resp 20   Wt 98 7 kg (217 lb 9 6 oz)   BMI 48 78 kg/m²     Physical Exam  Constitutional:       General: She is not in acute distress  Appearance: She is obese  She is not ill-appearing, toxic-appearing or diaphoretic  HENT:      Head: Normocephalic and atraumatic  Eyes:      Conjunctiva/sclera: Conjunctivae normal    Cardiovascular:      Rate and Rhythm: Normal rate  Pulmonary:      Effort: Pulmonary effort is normal  No respiratory distress  Musculoskeletal:         General: No swelling, tenderness, deformity or signs of injury  Normal range of motion  Right lower leg: No edema  Left lower leg: No edema  Skin:     General: Skin is warm  Findings: No rash  Neurological:      Mental Status: She is alert         Adwoa Fletcher MD

## 2023-03-21 NOTE — ASSESSMENT & PLAN NOTE
Onset 3/18 following fall landing on her back  Resolved  Back to baseline    No other acute concerns  Continue Tylenol as needed for pain

## 2023-03-22 ENCOUNTER — PATIENT OUTREACH (OUTPATIENT)
Dept: FAMILY MEDICINE CLINIC | Facility: CLINIC | Age: 43
End: 2023-03-22

## 2023-03-22 DIAGNOSIS — R26.2 AMBULATORY DYSFUNCTION: Primary | ICD-10-CM

## 2023-03-22 NOTE — PROGRESS NOTES
Outpatient Care Manager Note    TC to patient's  Leighton Seals  Patient is in agreement for Chronic Care Management outreach  A TC has been arranged for 3/24/23 at 2 pm to perform adult assessment with house staff  Chronic Care Management Program Consent:    Patient informed of availability of Chronic Care Management services  The services will billed monthly by their Primary Care Provider only  Patient is informed there may be a monthly cost sharing associated with the Chronic Care Management services  Patient is aware that financial counseling is available to assist with any co-pay questions or concerns  Chronic Care Management services include:    • 24/7 access to care  • Comprehensive plan of care created by the provider  • Individualized care planning by the care manager(s)  • Transitional care support  The patient is informed that they have the right to stop Chronic Care Management services at anytime  Patient consents to Chronic Care Management services? yes    Patient informed that consent is needed only once unless the patient switches qualifying providers   does not have any further questions, concerns, or other needs at this time  Case workerhas my contact # and PCP office # if needed   Pt is agreeable for further outreach

## 2023-03-23 ENCOUNTER — TELEPHONE (OUTPATIENT)
Dept: FAMILY MEDICINE CLINIC | Facility: CLINIC | Age: 43
End: 2023-03-23

## 2023-03-23 NOTE — TELEPHONE ENCOUNTER
Folder (To be completed by) - Dr Phyllis Flor     Name of Form - Medic al Exam [place]    Color folder [facility], [place], operated [facility]    Form to be Faxed [phone]      Patient was made aware of the 7-10 business day form policy

## 2023-03-24 ENCOUNTER — PATIENT OUTREACH (OUTPATIENT)
Dept: FAMILY MEDICINE CLINIC | Facility: CLINIC | Age: 43
End: 2023-03-24

## 2023-03-24 NOTE — PROGRESS NOTES
Outpatient Care Manager Note    Called patient's  at scheduled appointment date and time to perform patient assessment with no answer  Left message, this is Nery Dillon the Outpatient Nurse Care Manager at [address] [place] [address] calling to follow up with you  Requested return phone call at [phone]

## 2023-03-24 NOTE — TELEPHONE ENCOUNTER
Dr Lashonda Chávez would not be in office for the next two weeks  The preceptor at encounter date was Dr Salima Prajapati and form would be handed to her when she comes to clinic  From placed back on BLUE clinical folder

## 2023-03-31 ENCOUNTER — HOSPITAL ENCOUNTER (OUTPATIENT)
Dept: MAMMOGRAPHY | Facility: CLINIC | Age: 43
End: 2023-03-31

## 2023-03-31 VITALS — HEIGHT: 56 IN | BODY MASS INDEX: 48.81 KG/M2 | WEIGHT: 217 LBS

## 2023-03-31 DIAGNOSIS — Z12.31 ENCOUNTER FOR SCREENING MAMMOGRAM FOR MALIGNANT NEOPLASM OF BREAST: ICD-10-CM

## 2023-04-03 DIAGNOSIS — B37.31 VULVAL CANDIDIASIS: ICD-10-CM

## 2023-04-06 ENCOUNTER — TELEPHONE (OUTPATIENT)
Dept: FAMILY MEDICINE CLINIC | Facility: CLINIC | Age: 43
End: 2023-04-06

## 2023-04-06 NOTE — TELEPHONE ENCOUNTER
Folder (To be completed by) - Dr Eileen Garcia     Name of [address] [place] [address] Order # [id number]    Color folder - Blue    Form to be Faxed (Fax #), Mailed (Address), or Picked up (By whom) -    Fax [phone]

## 2023-04-13 PROBLEM — E61.1 IRON DEFICIENCY: Status: ACTIVE | Noted: 2023-04-13

## 2023-04-20 NOTE — PROGRESS NOTES
4/25/2023    Venessa Pritchard  1980  [id number]        Assessment  -Urinary incontinence  -Overactive bladder    Discussion/Plan  Jessa Landry is a 39 y o  female being managed by our office    1  Urinary incontinence, overactive bladder- urine dip in the office today identified trace blood  We will send for urinalysis with microscopic and culture  Plan to call facility with results  If patient is noted to have positive microscopic hematuria, will proceed with further imaging including renal ultrasound  She has no risk factors for bladder malignancy  Patient is otherwise doing well without any urinary complaints  PVR assessment is 56 mL  She will remain on trospium 20 mg twice daily  Call with results of urine testing  If unremarkable, she will turn to the office in 1 year for reevaluation with PVR assessment  Instructions provided to group home  Patient and caretaker were advised to call sooner with any questions or issues     -All questions answered, patient and caretaker agree with plan     History of Present Illness  39 y o  female with a history of OAB and urinary incontinence presents today for follow up  Patient last seen in the office in May 2022  She resides in a group home and is accompanied today by caretakers  Patient remains on trospium 20 mg twice daily  She has any urinary complaints and has had no recent episodes of incontinence  Patient denies any gross hematuria or dysuria  She feels she is emptying her bladder to completion  Patient has been trying to increase her water intake  She states she is a lifetime non-smoker  She denies any strong family history of bladder malignancy  Patient is not anticoagulated  Caretaker states she is on oral birth control but will occasionally get her menstrual cycle  Review of Systems  Review of Systems   Constitutional: Negative  HENT: Negative  Respiratory: Negative  Cardiovascular: Negative  Gastrointestinal: Negative  Genitourinary: Negative for decreased urine volume, difficulty urinating, dysuria, flank pain, frequency, hematuria, urgency, vaginal bleeding, vaginal discharge and vaginal pain  Musculoskeletal: Negative  Skin: Negative  Neurological: Negative  Psychiatric/Behavioral: Negative  Past Medical History  Past Medical History:   Diagnosis Date   • ADD (attention deficit disorder)    • Anxiety    • Astigmatism    • Brain lesion    • Calcium deficiency    • Cellulitis of foot, right 6/4/2018   • Cerebral palsy (HCC)    • Chronic otitis media    • Constipation    • Depression    • Dysphagia    • Esophagitis    • Esotropia    • GERD (gastroesophageal reflux disease)    • Hiatal hernia    • Hydrocephalus (HCC)    • Impaired fasting glucose    • Left nephrolithiasis 03/04/2019   • Myopia    • Oppositional defiant disorder    • 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HCC)    • Seizures (HCC)    • Sensorineural hearing loss    • Sleep apnea    • Status post ventriculoatrial shunt placement    • Visual impairment        Past Social History  Past Surgical History:   Procedure Laterality Date   • BREAST BIOPSY Left     X 2 (not sure of years)   • CSF SHUNT      Creation of Ventriculo-Peritoneal CSF shunt ; Last Assessed:7/6/2016   • EAR SURGERY      Last Assessed:7/6/2016   • LEG SURGERY      due to CP    • NOSE SURGERY      Last Assessed:7/6/2016   • OR ESOPHAGOGASTRODUODENOSCOPY TRANSORAL DIAGNOSTIC N/A 5/9/2019    Procedure: ESOPHAGOGASTRODUODENOSCOPY (EGD) with biopsy;  Surgeon: Glenna Kawasaki, MD;  Location: [facility];   Service: Gastroenterology   • UPPER GASTROINTESTINAL ENDOSCOPY  05/2019       Past Family History  Family History   Problem Relation Age of Onset   • Diabetes Mother    • Colon cancer Father    • No Known Problems Maternal Grandmother    • No Known Problems Maternal Grandfather    • No Known Problems Paternal Grandmother    • No Known Problems Paternal Grandfather    • Hypertension Neg Hx    • Heart disease Neg Hx    • Stroke Neg Hx    • Thyroid disease Neg Hx        Past Social history  Social History     Socioeconomic History   • Marital status: Single     Spouse name: Not on file   • Number of children: Not on file   • Years of education: Not on file   • Highest education level: Not on file   Occupational History   • Not on file   Tobacco Use   • Smoking status: Never   • Smokeless tobacco: Never   Vaping Use   • Vaping Use: Never used   Substance and Sexual Activity   • Alcohol use: Never   • Drug use: Never   • Sexual activity: Never     Birth control/protection: OCP   Other Topics Concern   • Not on file   Social History Narrative    Always uses seat belt    Lives in group home     Social Determinants of Health     Financial Resource Strain: Low Risk    • Difficulty of Paying Living Expenses: Not hard at all   Food Insecurity: No Food Insecurity   • Worried About Running Out of Food in the Last Year: Never true   • Ran Out of Food in the Last Year: Never true   Transportation Needs: No Transportation Needs   • Lack of Transportation (Medical): No   • Lack of Transportation (Non-Medical):  No   Physical Activity: Not on file   Stress: No Stress Concern Present   • Feeling of Stress : Not at all   Social Connections: Not on file   Intimate Partner Violence: Not At Risk   • Fear of Current or Ex-Partner: No   • Emotionally Abused: No   • Physically Abused: No   • Sexually Abused: No   Housing Stability: Low Risk    • Unable to Pay for Housing in the Last Year: No   • Number of Places Lived in the Last Year: 1   • Unstable Housing in the Last Year: No       Current Medications  Current Outpatient Medications   Medication Sig Dispense Refill   • acetaminophen (TYLENOL) 500 mg tablet Take 1 tablet (500 mg total) by mouth every 6 (six) hours as needed for mild pain 30 tablet 5   • aluminum-magnesium hydroxide-simethicone (GNP Antacid & Anti-Gas) 3030-1978-070 mg/30 mL suspension Take 15 mL by mouth every 4 (four) hours as needed for indigestion or heartburn 355 mL 0   • amitriptyline (ELAVIL) 10 mg tablet Take 1 tablet (10 mg total) by mouth daily at bedtime 90 tablet 0   • ARIPiprazole (ABILIFY) 20 MG tablet Take 1 tablet (20 mg total) by mouth daily at bedtime 90 tablet 2   • bacitracin topical ointment 500 units/g topical ointment Cleanse site on nose with soap and water followed by bacitracin BID until healed then p r n  15 g 2   • bismuth subsalicylate (PEPTO BISMOL) 524 mg/30 mL oral suspension Take 15 mL (262 mg total) by mouth every 6 (six) hours as needed for indigestion 360 mL 5   • calcium carbonate (TUMS) 500 mg chewable tablet Chew 1 tablet (500 mg total) 3 (three) times a day as needed for heartburn 30 tablet 5   • carbamide peroxide (DEBROX) 6 5 % otic solution Administer 5 drops into the left ear 2 (two) times a day Use 1 week prior to ENT appointment  Also can use 4 gtts twice weekly (Tuesday and Friday for example) to each ear for prevention  Keep hearing aid out x 10 minutes after ear drops administered   15 mL 1   • D3-1000 25 MCG (1000 UT) tablet Take 2 tablets (2,000 Units total) by mouth daily 62 tablet 5   • Diclofenac Sodium (VOLTAREN) 1 % Apply 2 g topically 4 (four) times a day 50 g 0   • dicyclomine (BENTYL) 20 mg tablet Take 1 tablet (20 mg total) by mouth every 6 (six) hours as needed (dysphagia) 120 tablet 0   • Dyclonine-Glycerin (Cepacol Sore Throat Spray) 0 1-33 % LIQD Apply 1 spray to the mouth or throat 3 (three) times a day as needed (sorethroat) 118 mL 5   • Elastic Bandages & Supports (Neoprene Knee Brace) MISC Apply right knee brace in morning; remove at night 1 each 0   • escitalopram (LEXAPRO) 20 mg tablet Take 1 tablet (20 mg total) by mouth daily 90 tablet 2   • fluticasone (FLONASE) 50 mcg/act nasal spray 1 spray into each nostril daily as needed for rhinitis (nasal congestion) At 8:00 AM 18 2 mL 5   • gabapentin (Neurontin) 300 mg capsule Take 1 capsule (300 mg total) by mouth daily at bedtime 30 capsule 5   • GNP Sore Throat Spray 1 4 % mucosal liquid APPLY 1 SPRAY TO MOUTH OR THROAT EVERY 2 HRS AS NEEDED FOR SORE THROAT 177 mL 0   • GNP Stomach Relief Max St 525 MG/15ML SUSP      • hydrOXYzine HCL (ATARAX) 10 mg tablet Take 1 tablet (10 mg total) by mouth 3 (three) times a day 30 tablet 3   • ibuprofen (MOTRIN) 400 mg tablet TAKE 1 TABLET BY MOUTH EVERY 8 HOURS AS NEEDED FOR MILD/MOD PAIN OR HEADACHES 30 tablet 0   • Incontinence Supply Disposable (Wings Choice Plus Adult Briefs) MISC Use as directed (R39 81) 60 each 0   • ketoconazole (NIZORAL) 2 % cream Apply topically daily 30 g 5   • Konsyl Daily Fiber 28 3 %      • lamoTRIgine (LaMICtal) 100 mg tablet      • lamoTRIgine (LaMICtal) 25 mg tablet Take 25 mg by mouth 2 (two) times a day       • lidocaine (LMX) 4 % cream Apply topically as needed for mild pain 30 g 0   • LORazepam (ATIVAN) 0 5 mg tablet Take 0 25 mg by mouth 2 (two) times a day     • lubiprostone (Amitiza) 24 mcg capsule Take 1 capsule (24 mcg total) by mouth daily with breakfast 60 capsule 5   • magnesium hydroxide (GNP Milk of Magnesia) 400 mg/5 mL oral suspension 2 TBSP (30ML) BY MOUTH DAILY AS NEEDED IF NO BM IN 3 DAYS (CONSTIPATION) 355 mL 0   • metoprolol tartrate (LOPRESSOR) 25 mg tablet Take 1 tablet (25 mg total) by mouth 2 (two) times a day 60 tablet 5   • mineral oil-hydrophilic petrolatum (AQUAPHOR) ointment Apply topically as needed for dry skin 420 g 5   • Mouthwashes (Listerine Antiseptic) LIQD Swish and spit 5 mL 2 (two) times a day 1000 mL 5   • Multiple Vitamins-Minerals (CertaVite/Antioxidants) TABS TAKE 1 TABLET BY MOUTH DAILY AT 8AM (SUPPLEMENT) *IGABARI 31 tablet 0   • norgestimate-ethinyl estradiol (ORTHO-CYCLEN) 0 25-35 MG-MCG per tablet Take 1 tablet by mouth daily 28 tablet 12   • nystatin (MYCOSTATIN) powder Apply 1 application   topically 4 (four) times a day 60 g 5   • nystatin-triamcinolone (MYCOLOG-II) cream Apply topically 2 (two) times a day 60 g 2   • pantoprazole (PROTONIX) 20 mg tablet Take 1 tablet (20 mg total) by mouth daily 62 tablet 5   • polyethylene glycol (MIRALAX) 17 g packet Take one packet daily as needed for no bowel movement in 24 hours 30 each 5   • psyllium (METAMUCIL SMOOTH TEXTURE) 28 % packet      • psyllium (METAMUCIL) 58 6 % packet Take 1 packet by mouth daily 30 packet 5   • RA Sunscreen SPF50 LOTN Apply 15 minutes before sun exposure and every 2 hours 237 mL 0   • senna-docusate sodium (Senexon-S) 8 6-50 mg per tablet Take 1 tablet by mouth daily at 8am  30 tablet 5   • sodium chloride (OCEAN) 0 65 % nasal spray 1 spray into each nostril as needed (three times daily as needed for nasal congestion) 60 mL 5   • trospium chloride (SANCTURA) 20 mg tablet Take 1 tablet (20 mg total) by mouth 2 (two) times a day 180 tablet 3   • zinc oxide (DESITIN) 13 % cream Apply 1 application topically as needed (for perianal irritation) 454 g 5     No current facility-administered medications for this visit  Allergies  No Known Allergies    Past medical history, social history, family history, medications and allergies were reviewed  Vitals  There were no vitals filed for this visit  Physical Exam  Physical Exam  Constitutional:       Appearance: Normal appearance  She is well-developed  HENT:      Head: Normocephalic  Eyes:      Pupils: Pupils are equal, round, and reactive to light  Pulmonary:      Effort: Pulmonary effort is normal    Abdominal:      Palpations: Abdomen is soft  Tenderness: There is no right CVA tenderness or left CVA tenderness  Musculoskeletal:         General: Normal range of motion  Cervical back: Normal range of motion  Comments: Ambulates with walker   Skin:     General: Skin is warm and dry  Neurological:      General: No focal deficit present  Mental Status: She is alert and oriented to person, place, and time        Comments: Developmental delay   Psychiatric:         Mood and Affect: Mood normal          Behavior: Behavior normal          Thought Content: Thought content normal          Judgment: Judgment normal          Results    I have personally reviewed all pertinent lab results and reviewed with patient  Lab Results   Component Value Date    CALCIUM 8 5 03/13/2023    K 4 1 03/13/2023    CO2 24 03/13/2023     03/13/2023    BUN 11 03/13/2023    CREATININE 0 78 03/13/2023     Lab Results   Component Value Date    WBC 7 55 03/13/2023    HGB 13 6 03/13/2023    HCT 42 3 03/13/2023    MCV 95 03/13/2023     03/13/2023     No results found for this or any previous visit (from the past 1 hour(s))

## 2023-04-24 ENCOUNTER — OFFICE VISIT (OUTPATIENT)
Dept: AUDIOLOGY | Age: 43
End: 2023-04-24

## 2023-04-24 DIAGNOSIS — H90.3 SENSORY HEARING LOSS, BILATERAL: Primary | ICD-10-CM

## 2023-04-24 NOTE — PROGRESS NOTES
Hearing Aid Visit:    Name:  Sarthak Helm  :  1980  Age:  39 y o  Date of Evaluation: 23     Sarthak Helm is being seen for a hearing aid visit  Patient is fit with Oticon Zircon 2 Mini [facility] hearing aid(s)  Right serial [id number]  Left serial [id number]  Warranty date: 25 (Loss/Damage and repair)    Dome/Earmold: Full shell     Patient reports the left hearing aid is weak  Action:  Cleaned and checked  Re-tubed earmolds  Left earmold was clogged  No adjustments made today  Recommendations: Follow up Indigo Harrison , CCC-A  Clinical Audiologist

## 2023-04-24 NOTE — PROGRESS NOTES
HEARING EVALUATION    Name:  Rob Cisneros  :  1980  Age:  39 y o  Date of Evaluation: 23     History: Annual Hearing Test  Reason for visit: Rob Cisneros is being seen today at the request of Dr Ele Callaway for an evaluation of hearing  Patient reports no issues or concerns regarding hearing ability  EVALUATION:    Otoscopic Evaluation:   Right Ear: Clear and healthy ear canal and tympanic membrane   Left Ear: Clear and healthy ear canal and tympanic membrane    Tympanometry:   Right: Type A - normal middle ear pressure and compliance   Left: Type B - middle ear disorder    Audiogram Results:  Pure tone testing revealed a moderate to mild hearing loss in the  right ear and a severe hearing loss in the  left  ear  SRT and PTA are in agreement indicating good test reliability  Word recognition scores were good bilaterally  *see attached audiogram      RECOMMENDATIONS:  Annual hearing eval, Return to [facility]  for F/U and Copy to Patient/Caregiver    PATIENT EDUCATION:   Discussed results and recommendations with patient and caregiver  Questions were addressed and the patient was encouraged to contact our department should concerns arise        Indigo Mena , CCC-A  Clinical Audiologist

## 2023-04-25 ENCOUNTER — OFFICE VISIT (OUTPATIENT)
Dept: UROLOGY | Facility: AMBULATORY SURGERY CENTER | Age: 43
End: 2023-04-25

## 2023-04-25 ENCOUNTER — TELEPHONE (OUTPATIENT)
Dept: UROLOGY | Facility: AMBULATORY SURGERY CENTER | Age: 43
End: 2023-04-25

## 2023-04-25 ENCOUNTER — APPOINTMENT (OUTPATIENT)
Dept: LAB | Facility: CLINIC | Age: 43
End: 2023-04-25

## 2023-04-25 VITALS
BODY MASS INDEX: 48.59 KG/M2 | SYSTOLIC BLOOD PRESSURE: 114 MMHG | HEART RATE: 101 BPM | HEIGHT: 56 IN | DIASTOLIC BLOOD PRESSURE: 88 MMHG | WEIGHT: 216 LBS | OXYGEN SATURATION: 99 %

## 2023-04-25 DIAGNOSIS — R32 URINARY INCONTINENCE, UNSPECIFIED TYPE: ICD-10-CM

## 2023-04-25 DIAGNOSIS — G47.61 PLMD (PERIODIC LIMB MOVEMENT DISORDER): ICD-10-CM

## 2023-04-25 DIAGNOSIS — R35.0 URINE FREQUENCY: Primary | ICD-10-CM

## 2023-04-25 DIAGNOSIS — E61.1 IRON DEFICIENCY: ICD-10-CM

## 2023-04-25 LAB
BACTERIA UR QL AUTO: ABNORMAL /HPF
BILIRUB UR QL STRIP: NEGATIVE
CLARITY UR: CLEAR
COLOR UR: ABNORMAL
FERRITIN SERPL-MCNC: 31 NG/ML (ref 8–388)
GLUCOSE UR STRIP-MCNC: NEGATIVE MG/DL
HGB UR QL STRIP.AUTO: NEGATIVE
IRON SATN MFR SERPL: 28 % (ref 15–50)
IRON SERPL-MCNC: 89 UG/DL (ref 50–170)
KETONES UR STRIP-MCNC: NEGATIVE MG/DL
LEUKOCYTE ESTERASE UR QL STRIP: ABNORMAL
MUCOUS THREADS UR QL AUTO: ABNORMAL
NITRITE UR QL STRIP: NEGATIVE
NON-SQ EPI CELLS URNS QL MICRO: ABNORMAL /HPF
PH UR STRIP.AUTO: 7 [PH]
POST-VOID RESIDUAL VOLUME, ML POC: 56 ML
PROT UR STRIP-MCNC: NEGATIVE MG/DL
RBC #/AREA URNS AUTO: ABNORMAL /HPF
SL AMB  POCT GLUCOSE, UA: ABNORMAL
SL AMB LEUKOCYTE ESTERASE,UA: POSITIVE
SL AMB POCT BILIRUBIN,UA: ABNORMAL
SL AMB POCT BLOOD,UA: + 2
SL AMB POCT CLARITY,UA: CLEAR
SL AMB POCT COLOR,UA: YELLOW
SL AMB POCT KETONES,UA: ABNORMAL
SL AMB POCT NITRITE,UA: ABNORMAL
SL AMB POCT PH,UA: 7
SL AMB POCT SPECIFIC GRAVITY,UA: 1.01
SL AMB POCT URINE PROTEIN: ABNORMAL
SL AMB POCT UROBILINOGEN: 1
SP GR UR STRIP.AUTO: 1.01 (ref 1–1.03)
TIBC SERPL-MCNC: 315 UG/DL (ref 250–450)
UROBILINOGEN UR STRIP-ACNC: <2 MG/DL
WBC #/AREA URNS AUTO: ABNORMAL /HPF

## 2023-04-25 NOTE — TELEPHONE ENCOUNTER
Left a voicemail to verify scheduled appointment today 4/25/2023 with Sreedhar Mar in our [facility] [place], [place] location  I instructed staff to call back and possibly rescheduled due to provider emergency  Office phone number given

## 2023-04-26 ENCOUNTER — TELEPHONE (OUTPATIENT)
Dept: UROLOGY | Facility: AMBULATORY SURGERY CENTER | Age: 43
End: 2023-04-26

## 2023-04-26 LAB — BACTERIA UR CULT: NORMAL

## 2023-04-26 NOTE — TELEPHONE ENCOUNTER
Left a voicemail with Bhumi Huntley (caregiver) to inform her per Sandra, that the urinalysis for [facility], [place] was negative for blood  Follow up in 1 year

## 2023-04-26 NOTE — TELEPHONE ENCOUNTER
----- Message from [id number] Tracey Prajapati sent at 4/26/2023  8:04 AM EDT -----  Please inform patient's facility results of UA showed no evidence of microscopic hematuria  She will follow up in 1 year

## 2023-04-27 ENCOUNTER — OFFICE VISIT (OUTPATIENT)
Dept: DENTISTRY | Facility: CLINIC | Age: 43
End: 2023-04-27

## 2023-04-27 VITALS — HEART RATE: 108 BPM | DIASTOLIC BLOOD PRESSURE: 84 MMHG | SYSTOLIC BLOOD PRESSURE: 116 MMHG

## 2023-04-27 DIAGNOSIS — K02.9 RECURRENT DENTAL CARIES EXTENDING INTO DENTIN: ICD-10-CM

## 2023-04-27 DIAGNOSIS — K08.50 DEFECTIVE DENTAL RESTORATION: Primary | ICD-10-CM

## 2023-04-27 NOTE — PROGRESS NOTES
"Composite Filling    Chip Alicja presents for composite filling  PMH reviewed, no changes  ASA II (mobility restrictions, developmental delay, lots of TSD required), no pain     Caretaker present and remained in room entire visit     Applied topical benzocaine, administered 1 ccarps 4% articaine 1:100k epi via infiltration 29    20 DO tx planned but caries limited enamel so changed to a watch  Catch with explorer felt on existing wiley 29-MO and staining  Offered to replace restoration    Prepped tooth #29 MO with 245 carbide on high speed  Caries removed with round carbide on slow speed  Placed garrsion system  Isolation with dry shield    Etch with 37% H2PO4, rinse, dry  Applied Adhese with 20 second scrub once, gentle air dry and light cured for 10s  Restored with Tetric bulk werner shade A2 and light cured  Refined with finishing burs, polished with enhance point  Verified occlusion and contacts  Pt left satisfied  Behaviour management: pt was resistant to tx but responds well to firmness and encouragement  PT would spontaneously yell \"ouch\" even when no tx was being done/ provider and assistant were not near her  Dry shield placed and at first pt did not tolerate it but with continual encouragement throughout she kept it in place in the entire appointment  She tried to remove throughout the appointment but when told to put hands on her belly she listened and complied   If future restorative tx is needed, recommend doing 1 wiley at a time to help pt tolerate tx          "

## 2023-05-01 ENCOUNTER — TELEPHONE (OUTPATIENT)
Dept: HEMATOLOGY ONCOLOGY | Facility: CLINIC | Age: 43
End: 2023-05-01

## 2023-05-01 ENCOUNTER — CONSULT (OUTPATIENT)
Dept: HEMATOLOGY ONCOLOGY | Facility: CLINIC | Age: 43
End: 2023-05-01

## 2023-05-01 VITALS
SYSTOLIC BLOOD PRESSURE: 128 MMHG | HEART RATE: 106 BPM | OXYGEN SATURATION: 96 % | HEIGHT: 56 IN | BODY MASS INDEX: 48.43 KG/M2 | RESPIRATION RATE: 17 BRPM | DIASTOLIC BLOOD PRESSURE: 80 MMHG

## 2023-05-01 DIAGNOSIS — D50.8 IRON DEFICIENCY ANEMIA SECONDARY TO INADEQUATE DIETARY IRON INTAKE: ICD-10-CM

## 2023-05-01 DIAGNOSIS — E61.1 IRON DEFICIENCY: ICD-10-CM

## 2023-05-01 DIAGNOSIS — G47.61 PLMD (PERIODIC LIMB MOVEMENT DISORDER): Primary | ICD-10-CM

## 2023-05-01 RX ORDER — SODIUM CHLORIDE 9 MG/ML
20 INJECTION, SOLUTION INTRAVENOUS ONCE
OUTPATIENT
Start: 2023-05-08

## 2023-05-01 NOTE — PROGRESS NOTES
Oncology Outpatient Consult Note  Sabrina Lainez 39 y o  female MRN: @ Encounter: [id number]        Date:  5/1/2023        CC:  Iron deficiency and periodic limb movement disorder      HPI:  Sabrina Lainez is seen for initial consultation 5/1/2023 regarding iron deficiency and periodic limb movement  Patient was referred by Sleep Medicine  She had a diagnostic sleep study 1/2022 which showed periodic limb movement disorder  Iron panel checked at the time resulted ferritin of 24  Patient is not able to tolerate oral iron due to chronic constipation  Annye Boast presents with her care giver  She resides in a group home  Patient able to answer questions appropriately, however, needed to be redirected a few times during the visit  Patient states she does not feel well rested when she wakes up in the morning  She has been experiencing HAs almost every day, RLS, fatigue, lightheadedness  Denies DENITA Johnson's menstrual cycles are irregular, she got her period this month that lasted two days  The caregiver reports some months the period is 5 days but not heavy  Patient denies any abnormal bleeding such as in her stool/urine  ROS: As stated in history of present illness otherwise her 14 point review of systems today was negative  ECOG PS: 3      Test Results:    Imaging: No results found      Labs:   Lab Results   Component Value Date    WBC 7 55 03/13/2023    HGB 13 6 03/13/2023    HCT 42 3 03/13/2023    MCV 95 03/13/2023     03/13/2023     Lab Results   Component Value Date    K 4 1 03/13/2023     03/13/2023    CO2 24 03/13/2023    BUN 11 03/13/2023    CREATININE 0 78 03/13/2023    GLUF 94 07/06/2022    CALCIUM 8 5 03/13/2023    CORRECTEDCA 9 1 12/12/2022    AST 13 03/13/2023    ALT 14 03/13/2023    ALKPHOS 138 (H) 03/13/2023    EGFR 92 03/13/2023       Lab Results   Component Value Date    IRON 89 04/25/2023    TIBC 315 04/25/2023    FERRITIN 31 04/25/2023       Active Problems: Patient Active Problem List   Diagnosis    Leg swelling    Generalized anxiety disorder    Cerebral palsy (HCC)    Acute pain of right knee    Constipation    Severe episode of recurrent major depressive disorder, with psychotic features ([facility] [address] )    Dizziness    Functional dysphagia    Hearing impairment    Acquired renal cyst of left kidney    Low back pain    Moderate intellectual disability    Seasonal allergies    Varicose veins with pain    Medicare annual wellness visit, subsequent    Hematuria    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[facility] [address] )    Ambulatory dysfunction    Bruise    Bilateral impacted cerumen    TMJ (temporomandibular joint disorder)    Gait disturbance    Bilateral thoracic back pain    Cerumen impaction    Skin picking habit    Hiatal hernia    SOB (shortness of breath)    Dysuria    Left nephrolithiasis    Viral URI    Self-injurious behavior    Gastroesophageal reflux disease without esophagitis    Intercostal pain    Intertrigo    Nausea and vomiting    Sore throat    Impetigo    Pain of right calf    Elevated blood pressure reading in office without diagnosis of hypertension    Fall    Sleep disturbance    Exposure to COVID-19 virus    Encounter for follow-up    Acute non intractable tension-type headache    Dry skin    Candidiasis of skin    Heat intolerance    Wound, open    Palpitations    Left foot pain    Frequent falls    Urine frequency    Urinary incontinence    Obesity, Class III, BMI 40-49 9 (morbid obesity) ([facility])    At risk for sleep apnea    Atherosclerosis of arteries of extremities (HCC)    Partial small bowel obstruction (HCC)    COVID-19    Hyperhidrosis    Grief reaction    PLMD (periodic limb movement disorder)    Excessive daytime sleepiness    History of anemia    Gluteal pain    Iron deficiency    Iron deficiency anemia secondary to inadequate dietary iron intake       Past Medical History:   Past Medical History:   Diagnosis Date    ADD (attention deficit disorder)     Anxiety     Astigmatism     Brain lesion     Calcium deficiency     Cellulitis of foot, right 6/4/2018    Cerebral palsy (HCC)     Chronic otitis media     Constipation     Depression     Dysphagia     Esophagitis     Esotropia     GERD (gastroesophageal reflux disease)     Hiatal hernia     Hydrocephalus (HCC)     Impaired fasting glucose     Left nephrolithiasis 03/04/2019    Myopia     Oppositional defiant disorder     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HCC)     Seizures (HCC)     Sensorineural hearing loss     Sleep apnea     Status post ventriculoatrial shunt placement     Visual impairment        Surgical History:   Past Surgical History:   Procedure Laterality Date    BREAST BIOPSY Left     X 2 (not sure of years)    CSF SHUNT      Creation of Ventriculo-Peritoneal CSF shunt ; Last Assessed:7/6/2016    EAR SURGERY      Last Assessed:7/6/2016    LEG SURGERY      due to CP     NOSE SURGERY      Last Assessed:7/6/2016    OR ESOPHAGOGASTRODUODENOSCOPY TRANSORAL DIAGNOSTIC N/A 5/9/2019    Procedure: ESOPHAGOGASTRODUODENOSCOPY (EGD) with biopsy;  Surgeon: Jeannie Hernández MD;  Location: [facility]; Service: Gastroenterology    UPPER GASTROINTESTINAL ENDOSCOPY  05/2019       Family History:    Family History   Problem Relation Age of Onset    Diabetes Mother     Colon cancer Father     No Known Problems Maternal Grandmother     No Known Problems Maternal Grandfather     No Known Problems Paternal Grandmother     No Known Problems Paternal Grandfather     Hypertension Neg Hx     Heart disease Neg Hx     Stroke Neg Hx     Thyroid disease Neg Hx        Cancer-related family history includes Colon cancer in her father      Social History:   Social History     Socioeconomic History    Marital status: Single     Spouse name: Not on file    Number of children: Not on file    Years of education: Not on file    Highest education level: Not on file Occupational History    Not on file   Tobacco Use    Smoking status: Never    Smokeless tobacco: Never   Vaping Use    Vaping Use: Never used   Substance and Sexual Activity    Alcohol use: Never    Drug use: Never    Sexual activity: Never     Birth control/protection: OCP   Other Topics Concern    Not on file   Social History Narrative    Always uses seat belt    Lives in group home     Social Determinants of Health     Financial Resource Strain: Low Risk     Difficulty of Paying Living Expenses: Not hard at all   Food Insecurity: No Food Insecurity    Worried About Running Out of Food in the Last Year: Never true    Naty of Food in the Last Year: Never true   Transportation Needs: No Transportation Needs    Lack of Transportation (Medical): No    Lack of Transportation (Non-Medical):  No   Physical Activity: Not on file   Stress: No Stress Concern Present    Feeling of Stress : Not at all   Social Connections: Not on file   Intimate Partner Violence: Not At Risk    Fear of Current or Ex-Partner: No    Emotionally Abused: No    Physically Abused: No    Sexually Abused: No   Housing Stability: Low Risk     Unable to Pay for Housing in the Last Year: No    Number of Places Lived in the Last Year: 1    Unstable Housing in the Last Year: No       Current Medications:   Current Outpatient Medications   Medication Sig Dispense Refill    acetaminophen (TYLENOL) 500 mg tablet Take 1 tablet (500 mg total) by mouth every 6 (six) hours as needed for mild pain 30 tablet 5    aluminum-magnesium hydroxide-simethicone (GNP Antacid & Anti-Gas) 9367-6165-298 mg/30 mL suspension Take 15 mL by mouth every 4 (four) hours as needed for indigestion or heartburn 355 mL 0    amitriptyline (ELAVIL) 10 mg tablet Take 1 tablet (10 mg total) by mouth daily at bedtime 90 tablet 0    ARIPiprazole (ABILIFY) 20 MG tablet Take 1 tablet (20 mg total) by mouth daily at bedtime 90 tablet 2    bacitracin topical ointment 500 units/g topical ointment Cleanse site on nose with soap and water followed by bacitracin BID until healed then p r n  15 g 2    bismuth subsalicylate (PEPTO BISMOL) 524 mg/30 mL oral suspension Take 15 mL (262 mg total) by mouth every 6 (six) hours as needed for indigestion 360 mL 5    calcium carbonate (TUMS) 500 mg chewable tablet Chew 1 tablet (500 mg total) 3 (three) times a day as needed for heartburn 30 tablet 5    carbamide peroxide (DEBROX) 6 5 % otic solution Administer 5 drops into the left ear 2 (two) times a day Use 1 week prior to ENT appointment  Also can use 4 gtts twice weekly (Tuesday and Friday for example) to each ear for prevention  Keep hearing aid out x 10 minutes after ear drops administered   15 mL 1    D3-1000 25 MCG (1000 UT) tablet Take 2 tablets (2,000 Units total) by mouth daily 62 tablet 5    Diclofenac Sodium (VOLTAREN) 1 % Apply 2 g topically 4 (four) times a day 50 g 0    dicyclomine (BENTYL) 20 mg tablet Take 1 tablet (20 mg total) by mouth every 6 (six) hours as needed (dysphagia) 120 tablet 0    Dyclonine-Glycerin (Cepacol Sore Throat Spray) 0 1-33 % LIQD Apply 1 spray to the mouth or throat 3 (three) times a day as needed (sorethroat) 118 mL 5    Elastic Bandages & Supports (Neoprene Knee Brace) MISC Apply right knee brace in morning; remove at night 1 each 0    escitalopram (LEXAPRO) 20 mg tablet Take 1 tablet (20 mg total) by mouth daily 90 tablet 2    fluticasone (FLONASE) 50 mcg/act nasal spray 1 spray into each nostril daily as needed for rhinitis (nasal congestion) At 8:00 AM 18 2 mL 5    gabapentin (Neurontin) 300 mg capsule Take 1 capsule (300 mg total) by mouth daily at bedtime 30 capsule 5    GNP Sore Throat Spray 1 4 % mucosal liquid APPLY 1 SPRAY TO MOUTH OR THROAT EVERY 2 HRS AS NEEDED FOR SORE THROAT 177 mL 0    GNP Stomach Relief Max St 525 MG/15ML SUSP       hydrOXYzine HCL (ATARAX) 10 mg tablet Take 1 tablet (10 mg total) by mouth 3 (three) times a day 30 tablet 3    ibuprofen (MOTRIN) 400 mg tablet TAKE 1 TABLET BY MOUTH EVERY 8 HOURS AS NEEDED FOR MILD/MOD PAIN OR HEADACHES 30 tablet 0    Incontinence Supply Disposable (Wings Choice Plus Adult Briefs) MISC Use as directed (R39 81) 60 each 0    ketoconazole (NIZORAL) 2 % cream Apply topically daily 30 g 5    Konsyl Daily Fiber 28 3 %       lamoTRIgine (LaMICtal) 100 mg tablet       lamoTRIgine (LaMICtal) 25 mg tablet Take 25 mg by mouth 2 (two) times a day        lidocaine (LMX) 4 % cream Apply topically as needed for mild pain 30 g 0    LORazepam (ATIVAN) 0 5 mg tablet Take 0 25 mg by mouth 2 (two) times a day      lubiprostone (Amitiza) 24 mcg capsule Take 1 capsule (24 mcg total) by mouth daily with breakfast 60 capsule 5    magnesium hydroxide (GNP Milk of Magnesia) 400 mg/5 mL oral suspension 2 TBSP (30ML) BY MOUTH DAILY AS NEEDED IF NO BM IN 3 DAYS (CONSTIPATION) 355 mL 0    metoprolol tartrate (LOPRESSOR) 25 mg tablet Take 1 tablet (25 mg total) by mouth 2 (two) times a day 60 tablet 5    mineral oil-hydrophilic petrolatum (AQUAPHOR) ointment Apply topically as needed for dry skin 420 g 5    Multiple Vitamins-Minerals (CertaVite/Antioxidants) TABS TAKE 1 TABLET BY MOUTH DAILY AT 8AM (SUPPLEMENT) *IGABARI 31 tablet 0    norgestimate-ethinyl estradiol (ORTHO-CYCLEN) 0 25-35 MG-MCG per tablet Take 1 tablet by mouth daily 28 tablet 12    nystatin (MYCOSTATIN) powder Apply 1 application   topically 4 (four) times a day 60 g 5    nystatin-triamcinolone (MYCOLOG-II) cream Apply topically 2 (two) times a day 60 g 2    pantoprazole (PROTONIX) 20 mg tablet Take 1 tablet (20 mg total) by mouth daily 62 tablet 5    polyethylene glycol (MIRALAX) 17 g packet Take one packet daily as needed for no bowel movement in 24 hours 30 each 5    psyllium (METAMUCIL SMOOTH TEXTURE) 28 % packet       psyllium (METAMUCIL) 58 6 % packet Take 1 packet by mouth daily 30 packet 5    RA Sunscreen SPF50 LOTN Apply 15 minutes before sun exposure and every 2 hours 237 mL 0    senna-docusate sodium (Senexon-S) 8 6-50 mg per tablet Take 1 tablet by mouth daily at 8am  30 tablet 5    sodium chloride (OCEAN) 0 65 % nasal spray 1 spray into each nostril as needed (three times daily as needed for nasal congestion) 60 mL 5    trospium chloride (SANCTURA) 20 mg tablet Take 1 tablet (20 mg total) by mouth 2 (two) times a day 180 tablet 3    zinc oxide (DESITIN) 13 % cream Apply 1 application topically as needed (for perianal irritation) 454 g 5    Mouthwashes (Listerine Antiseptic) LIQD Swish and spit 5 mL 2 (two) times a day 1000 mL 5     No current facility-administered medications for this visit  Allergies: No Known Allergies      Physical Exam:    Body surface area is 1 83 meters squared  Wt Readings from Last 3 Encounters:   04/25/23 98 kg (216 lb)   04/14/23 98 kg (216 lb)   04/13/23 98 4 kg (217 lb)        Temp Readings from Last 3 Encounters:   03/21/23 (!) 97 4 °F (36 3 °C)   03/20/23 97 7 °F (36 5 °C) (Oral)   03/13/23 98 7 °F (37 1 °C) (Oral)        BP Readings from Last 3 Encounters:   05/01/23 128/80   04/27/23 116/84   04/25/23 114/88         Pulse Readings from Last 3 Encounters:   05/01/23 (!) 106   04/27/23 (!) 108   04/25/23 101     @LASTSAO2(3)@    Physical Exam     Constitutional   General appearance: No acute distress, well appearing and well nourished  Eyes   Conjunctiva and lids: No swelling, erythema or discharge  Pupils and irises: Equal, round and reactive to light  Ears, Nose, Mouth, and Throat   External inspection of ears and nose: Normal     Nasal mucosa, septum, and turbinates: Normal without edema or erythema  Oropharynx: Normal with no erythema, edema, exudate or lesions  Pulmonary   Respiratory effort: No increased work of breathing or signs of respiratory distress  Auscultation of lungs: Clear to auscultation      Cardiovascular   Palpation of heart: Normal PMI, no thrills  Auscultation of heart: Normal rate and rhythm, normal S1 and S2, without murmurs  Examination of extremities for edema and/or varicosities: Normal     Carotid pulses: Normal     Abdomen   Abdomen: Non-tender, no masses  Liver and spleen: No hepatomegaly or splenomegaly  Lymphatic   Palpation of lymph nodes in neck: No lymphadenopathy  Musculoskeletal   Gait and station: Unsteady  Digits and nails: Normal without clubbing or cyanosis  Inspection/palpation of joints, bones, and muscles: Normal     Skin   Skin and subcutaneous tissue: Normal without rashes or lesions  Neurologic   Cranial nerves: Cranial nerves 2-12 intact  Sensation: No sensory loss  Psychiatric   Orientation to person, place, and time: AOx2    Mood and affect: erractic      Assessment/ Plan:  Patient is iron deficient and symptomatic, therefore, recommend IV iron treatment  We will schedule Venofer 300 mg weekly x 3  Patient educated about the iron product, administration by placing a peripheral IV and common adverse effects including but not limited to: Anaphylaxis/allergic reaction, arthralgias/myalgias, nausea; agrees to proceed with treatment  Patient agreeable, however, may need emotional support during IV placement and administration  Patient's caregiver aware to contact us should the group home have any additional questions/concerns  I spent 50 minutes in chart review, face to face counseling, coordination of care, and documentation

## 2023-05-01 NOTE — TELEPHONE ENCOUNTER
A voice message was left for Nkechi/care coordinator for [facility] to call the office to schedule a 3m f/u and infusion appts for irvin  [facility] had an appt on 5/1/23 and did not stop at the checkout desk

## 2023-05-02 ENCOUNTER — TELEPHONE (OUTPATIENT)
Dept: HEMATOLOGY ONCOLOGY | Facility: CLINIC | Age: 43
End: 2023-05-02

## 2023-05-03 NOTE — TELEPHONE ENCOUNTER
Per Brianna Gaffney I can leave a message on answering machine once schedule is completed and she will check mychart  Per Brianna Gaffney will call me directly if rescheduling is needed  Left detailed message on answering machine with time and dates of iron infusions

## 2023-05-03 NOTE — TELEPHONE ENCOUNTER
Please schedule infusions any day but fridays and will like to be scheduled after 10AM before 1pm        Thank you!

## 2023-05-08 ENCOUNTER — TELEPHONE (OUTPATIENT)
Dept: INFUSION CENTER | Facility: CLINIC | Age: 43
End: 2023-05-08

## 2023-05-08 ENCOUNTER — HOSPITAL ENCOUNTER (OUTPATIENT)
Dept: INFUSION CENTER | Facility: CLINIC | Age: 43
Discharge: HOME | End: 2023-05-08

## 2023-05-08 VITALS
SYSTOLIC BLOOD PRESSURE: 121 MMHG | TEMPERATURE: 98.2 F | HEART RATE: 92 BPM | RESPIRATION RATE: 16 BRPM | DIASTOLIC BLOOD PRESSURE: 87 MMHG

## 2023-05-08 DIAGNOSIS — D50.8 IRON DEFICIENCY ANEMIA SECONDARY TO INADEQUATE DIETARY IRON INTAKE: Primary | ICD-10-CM

## 2023-05-08 RX ORDER — SODIUM CHLORIDE 9 MG/ML
20 INJECTION, SOLUTION INTRAVENOUS ONCE
Status: CANCELLED | OUTPATIENT
Start: 2023-05-15

## 2023-05-08 RX ORDER — SODIUM CHLORIDE 9 MG/ML
20 INJECTION, SOLUTION INTRAVENOUS ONCE
Status: COMPLETED | OUTPATIENT
Start: 2023-05-08 | End: 2023-05-08

## 2023-05-08 RX ADMIN — IRON SUCROSE 300 MG: 20 INJECTION, SOLUTION INTRAVENOUS at 11:28

## 2023-05-08 RX ADMIN — SODIUM CHLORIDE 20 ML/HR: 0.9 INJECTION, SOLUTION INTRAVENOUS at 11:10

## 2023-05-08 NOTE — PROGRESS NOTES
Pt received Venofer IV today and during infusing, pt c/o that she was feeling dizzy  Venofer stopped and pt's vitals checked and were WNL  Restarted Venofer and pt said she was fine and no further complaints made  Pt left in stable condition with pt's PCG  AVS provided to PCG and she was made aware of next appointment

## 2023-05-08 NOTE — PLAN OF CARE
Problem: Potential for Falls  Goal: Patient will remain free of falls  Description: INTERVENTIONS:  - Educate patient/family on patient safety including physical limitations  - Instruct patient to call for assistance with activity   - Consult OT/PT to assist with strengthening/mobility   - Keep Call bell within reach  - Keep bed low and locked with side rails adjusted as appropriate  - Keep care items and personal belongings within reach  - Initiate and maintain comfort rounds  - Make Fall Risk Sign visible to staff  - Offer Toileting in advance of need  - Consider moving patient to room near nurses station  Outcome: Progressing

## 2023-05-16 ENCOUNTER — HOSPITAL ENCOUNTER (OUTPATIENT)
Dept: INFUSION CENTER | Facility: CLINIC | Age: 43
Discharge: HOME | End: 2023-05-16

## 2023-05-16 VITALS
DIASTOLIC BLOOD PRESSURE: 76 MMHG | TEMPERATURE: 98.2 F | HEART RATE: 99 BPM | SYSTOLIC BLOOD PRESSURE: 114 MMHG | RESPIRATION RATE: 18 BRPM

## 2023-05-16 DIAGNOSIS — E55.9 VITAMIN D DEFICIENCY: ICD-10-CM

## 2023-05-16 DIAGNOSIS — D50.8 IRON DEFICIENCY ANEMIA SECONDARY TO INADEQUATE DIETARY IRON INTAKE: Primary | ICD-10-CM

## 2023-05-16 RX ORDER — FOLIC ACID/MV,IRON,MIN/LUTEIN 0.4-18-25
1 TABLET ORAL DAILY
Qty: 31 TABLET | Refills: 5 | Status: SHIPPED | OUTPATIENT
Start: 2023-05-16

## 2023-05-16 RX ORDER — FOLIC ACID/MV,IRON,MIN/LUTEIN 0.4-18-25
1 TABLET ORAL DAILY
Qty: 31 TABLET | Refills: 5 | Status: SHIPPED | OUTPATIENT
Start: 2023-05-16 | End: 2023-05-16 | Stop reason: SDUPTHER

## 2023-05-16 RX ORDER — SODIUM CHLORIDE 9 MG/ML
20 INJECTION, SOLUTION INTRAVENOUS ONCE
Status: COMPLETED | OUTPATIENT
Start: 2023-05-16 | End: 2023-05-16

## 2023-05-16 RX ORDER — SODIUM CHLORIDE 9 MG/ML
20 INJECTION, SOLUTION INTRAVENOUS ONCE
Status: CANCELLED | OUTPATIENT
Start: 2023-05-22

## 2023-05-16 RX ADMIN — SODIUM CHLORIDE 20 ML/HR: 0.9 INJECTION, SOLUTION INTRAVENOUS at 11:39

## 2023-05-16 RX ADMIN — IRON SUCROSE 300 MG: 20 INJECTION, SOLUTION INTRAVENOUS at 11:41

## 2023-05-16 NOTE — PLAN OF CARE
Problem: HEMATOLOGIC - ADULT  Goal: Maintains hematologic stability  Description: INTERVENTIONS  - Assess for signs and symptoms of bleeding or hemorrhage  - Monitor labs  - Administer supportive blood products/factors as ordered and appropriate  Reactivated

## 2023-05-18 NOTE — DISCHARGE INSTRUCTIONS
Left 2nd message and will try at 8am tomorrow.  We already booked him in.  Will just need to cancel if he cannot...   Please refer to the attached information for strict return instructions  If symptoms worsen or new symptoms develop please return to the Er  Call the patient's primary care physician to arrange follow up

## 2023-05-22 ENCOUNTER — HOSPITAL ENCOUNTER (OUTPATIENT)
Dept: INFUSION CENTER | Facility: CLINIC | Age: 43
Discharge: HOME | End: 2023-05-22

## 2023-05-22 VITALS
HEART RATE: 97 BPM | RESPIRATION RATE: 18 BRPM | DIASTOLIC BLOOD PRESSURE: 81 MMHG | TEMPERATURE: 97.7 F | SYSTOLIC BLOOD PRESSURE: 134 MMHG

## 2023-05-22 DIAGNOSIS — D50.8 IRON DEFICIENCY ANEMIA SECONDARY TO INADEQUATE DIETARY IRON INTAKE: Primary | ICD-10-CM

## 2023-05-22 RX ORDER — SODIUM CHLORIDE 9 MG/ML
20 INJECTION, SOLUTION INTRAVENOUS ONCE
Status: COMPLETED | OUTPATIENT
Start: 2023-05-22 | End: 2023-05-22

## 2023-05-22 RX ORDER — SODIUM CHLORIDE 9 MG/ML
20 INJECTION, SOLUTION INTRAVENOUS ONCE
Status: CANCELLED | OUTPATIENT
Start: 2023-05-23

## 2023-05-22 RX ADMIN — SODIUM CHLORIDE 20 ML/HR: 0.9 INJECTION, SOLUTION INTRAVENOUS at 11:22

## 2023-05-22 RX ADMIN — IRON SUCROSE 300 MG: 20 INJECTION, SOLUTION INTRAVENOUS at 11:40

## 2023-05-22 NOTE — PROGRESS NOTES
Patient tolerated treatment without complication  AVS provided, patient left unit in stable condition with patient caregiver

## 2023-06-05 ENCOUNTER — APPOINTMENT (OUTPATIENT)
Dept: LAB | Facility: CLINIC | Age: 43
End: 2023-06-05
Payer: MEDICARE

## 2023-06-05 DIAGNOSIS — E55.9 AVITAMINOSIS D: ICD-10-CM

## 2023-06-05 DIAGNOSIS — Z79.899 ENCOUNTER FOR LONG-TERM (CURRENT) USE OF OTHER MEDICATIONS: ICD-10-CM

## 2023-06-05 LAB
25(OH)D3 SERPL-MCNC: 56.8 NG/ML (ref 30–100)
ALBUMIN SERPL BCP-MCNC: 3.2 G/DL (ref 3.5–5)
ALP SERPL-CCNC: 132 U/L (ref 46–116)
ALT SERPL W P-5'-P-CCNC: 22 U/L (ref 12–78)
ANION GAP SERPL CALCULATED.3IONS-SCNC: 7 MMOL/L (ref 4–13)
AST SERPL W P-5'-P-CCNC: 16 U/L (ref 5–45)
BASOPHILS # BLD AUTO: 0.03 THOUSANDS/ÂΜL (ref 0–0.1)
BASOPHILS NFR BLD AUTO: 0 % (ref 0–1)
BILIRUB SERPL-MCNC: 0.31 MG/DL (ref 0.2–1)
BUN SERPL-MCNC: 9 MG/DL (ref 5–25)
CALCIUM ALBUM COR SERPL-MCNC: 8.8 MG/DL (ref 8.3–10.1)
CALCIUM SERPL-MCNC: 8.2 MG/DL (ref 8.3–10.1)
CHLORIDE SERPL-SCNC: 110 MMOL/L (ref 96–108)
CHOLEST SERPL-MCNC: 161 MG/DL
CO2 SERPL-SCNC: 21 MMOL/L (ref 21–32)
CREAT SERPL-MCNC: 0.85 MG/DL (ref 0.6–1.3)
EOSINOPHIL # BLD AUTO: 0.07 THOUSAND/ÂΜL (ref 0–0.61)
EOSINOPHIL NFR BLD AUTO: 1 % (ref 0–6)
ERYTHROCYTE [DISTWIDTH] IN BLOOD BY AUTOMATED COUNT: 13.9 % (ref 11.6–15.1)
GFR SERPL CREATININE-BSD FRML MDRD: 83 ML/MIN/1.73SQ M
GLUCOSE P FAST SERPL-MCNC: 90 MG/DL (ref 65–99)
HCT VFR BLD AUTO: 41 % (ref 34.8–46.1)
HDLC SERPL-MCNC: 66 MG/DL
HGB BLD-MCNC: 13.6 G/DL (ref 11.5–15.4)
IMM GRANULOCYTES # BLD AUTO: 0.04 THOUSAND/UL (ref 0–0.2)
IMM GRANULOCYTES NFR BLD AUTO: 1 % (ref 0–2)
LDLC SERPL CALC-MCNC: 65 MG/DL (ref 0–100)
LYMPHOCYTES # BLD AUTO: 1.87 THOUSANDS/ÂΜL (ref 0.6–4.47)
LYMPHOCYTES NFR BLD AUTO: 22 % (ref 14–44)
MCH RBC QN AUTO: 30.9 PG (ref 26.8–34.3)
MCHC RBC AUTO-ENTMCNC: 33.2 G/DL (ref 31.4–37.4)
MCV RBC AUTO: 93 FL (ref 82–98)
MONOCYTES # BLD AUTO: 0.51 THOUSAND/ÂΜL (ref 0.17–1.22)
MONOCYTES NFR BLD AUTO: 6 % (ref 4–12)
NEUTROPHILS # BLD AUTO: 6.16 THOUSANDS/ÂΜL (ref 1.85–7.62)
NEUTS SEG NFR BLD AUTO: 70 % (ref 43–75)
NONHDLC SERPL-MCNC: 95 MG/DL
NRBC BLD AUTO-RTO: 0 /100 WBCS
PLATELET # BLD AUTO: 287 THOUSANDS/UL (ref 149–390)
PMV BLD AUTO: 9.3 FL (ref 8.9–12.7)
POTASSIUM SERPL-SCNC: 3.8 MMOL/L (ref 3.5–5.3)
PROT SERPL-MCNC: 7.5 G/DL (ref 6.4–8.4)
RBC # BLD AUTO: 4.4 MILLION/UL (ref 3.81–5.12)
SODIUM SERPL-SCNC: 138 MMOL/L (ref 135–147)
T4 SERPL-MCNC: 12.28 UG/DL (ref 6.09–12.23)
TRIGL SERPL-MCNC: 150 MG/DL
TSH SERPL DL<=0.05 MIU/L-ACNC: 4.79 UIU/ML (ref 0.45–4.5)
WBC # BLD AUTO: 8.68 THOUSAND/UL (ref 4.31–10.16)

## 2023-06-05 PROCEDURE — 36415 COLL VENOUS BLD VENIPUNCTURE: CPT

## 2023-06-05 PROCEDURE — 80053 COMPREHEN METABOLIC PANEL: CPT

## 2023-06-05 PROCEDURE — 84436 ASSAY OF TOTAL THYROXINE: CPT

## 2023-06-05 PROCEDURE — 82306 VITAMIN D 25 HYDROXY: CPT

## 2023-06-05 PROCEDURE — 80061 LIPID PANEL: CPT

## 2023-06-05 PROCEDURE — 85025 COMPLETE CBC W/AUTO DIFF WBC: CPT

## 2023-06-05 PROCEDURE — 84443 ASSAY THYROID STIM HORMONE: CPT

## 2023-06-15 ENCOUNTER — APPOINTMENT (EMERGENCY)
Dept: RADIOLOGY | Facility: HOSPITAL | Age: 43
End: 2023-06-15
Payer: MEDICARE

## 2023-06-15 ENCOUNTER — HOSPITAL ENCOUNTER (EMERGENCY)
Facility: HOSPITAL | Age: 43
Discharge: HOME | End: 2023-06-15
Attending: EMERGENCY MEDICINE
Payer: MEDICARE

## 2023-06-15 VITALS
WEIGHT: 225.97 LBS | HEART RATE: 105 BPM | BODY MASS INDEX: 50.66 KG/M2 | SYSTOLIC BLOOD PRESSURE: 114 MMHG | TEMPERATURE: 98.3 F | RESPIRATION RATE: 22 BRPM | OXYGEN SATURATION: 94 % | DIASTOLIC BLOOD PRESSURE: 56 MMHG

## 2023-06-15 DIAGNOSIS — R07.9 CHEST PAIN, UNSPECIFIED: ICD-10-CM

## 2023-06-15 DIAGNOSIS — R06.00 DYSPNEA: Primary | ICD-10-CM

## 2023-06-15 LAB
2HR DELTA HS TROPONIN: <0 NG/L
ALBUMIN SERPL BCP-MCNC: 4.1 G/DL (ref 3.5–5)
ALP SERPL-CCNC: 146 U/L (ref 34–104)
ALT SERPL W P-5'-P-CCNC: 17 U/L (ref 7–52)
ANION GAP SERPL CALCULATED.3IONS-SCNC: 9 MMOL/L (ref 4–13)
APTT PPP: 32 SECONDS (ref 23–37)
AST SERPL W P-5'-P-CCNC: 16 U/L (ref 13–39)
ATRIAL RATE: 105 BPM
BACTERIA UR QL AUTO: ABNORMAL /HPF
BASOPHILS # BLD AUTO: 0.04 THOUSANDS/ÂΜL (ref 0–0.1)
BASOPHILS NFR BLD AUTO: 0 % (ref 0–1)
BILIRUB SERPL-MCNC: 0.24 MG/DL (ref 0.2–1)
BILIRUB UR QL STRIP: NEGATIVE
BUN SERPL-MCNC: 9 MG/DL (ref 5–25)
CALCIUM SERPL-MCNC: 8.1 MG/DL (ref 8.4–10.2)
CARDIAC TROPONIN I PNL SERPL HS: 2 NG/L
CARDIAC TROPONIN I PNL SERPL HS: <2 NG/L
CHLORIDE SERPL-SCNC: 102 MMOL/L (ref 96–108)
CLARITY UR: CLEAR
CO2 SERPL-SCNC: 26 MMOL/L (ref 21–32)
COLOR UR: YELLOW
CREAT SERPL-MCNC: 0.78 MG/DL (ref 0.6–1.3)
D DIMER PPP FEU-MCNC: 0.49 UG/ML FEU
EOSINOPHIL # BLD AUTO: 0.14 THOUSAND/ÂΜL (ref 0–0.61)
EOSINOPHIL NFR BLD AUTO: 2 % (ref 0–6)
ERYTHROCYTE [DISTWIDTH] IN BLOOD BY AUTOMATED COUNT: 13.5 % (ref 11.6–15.1)
EXT PREGNANCY TEST URINE: NEGATIVE
EXT. CONTROL: NORMAL
GFR SERPL CREATININE-BSD FRML MDRD: 92 ML/MIN/1.73SQ M
GLUCOSE SERPL-MCNC: 113 MG/DL (ref 65–140)
GLUCOSE UR STRIP-MCNC: NEGATIVE MG/DL
HCT VFR BLD AUTO: 42 % (ref 34.8–46.1)
HGB BLD-MCNC: 13.6 G/DL (ref 11.5–15.4)
HGB UR QL STRIP.AUTO: ABNORMAL
IMM GRANULOCYTES # BLD AUTO: 0.03 THOUSAND/UL (ref 0–0.2)
IMM GRANULOCYTES NFR BLD AUTO: 0 % (ref 0–2)
INR PPP: 0.98 (ref 0.84–1.19)
KETONES UR STRIP-MCNC: NEGATIVE MG/DL
LEUKOCYTE ESTERASE UR QL STRIP: ABNORMAL
LYMPHOCYTES # BLD AUTO: 2.15 THOUSANDS/ÂΜL (ref 0.6–4.47)
LYMPHOCYTES NFR BLD AUTO: 24 % (ref 14–44)
MCH RBC QN AUTO: 30.5 PG (ref 26.8–34.3)
MCHC RBC AUTO-ENTMCNC: 32.4 G/DL (ref 31.4–37.4)
MCV RBC AUTO: 94 FL (ref 82–98)
MONOCYTES # BLD AUTO: 0.65 THOUSAND/ÂΜL (ref 0.17–1.22)
MONOCYTES NFR BLD AUTO: 7 % (ref 4–12)
NEUTROPHILS # BLD AUTO: 5.88 THOUSANDS/ÂΜL (ref 1.85–7.62)
NEUTS SEG NFR BLD AUTO: 67 % (ref 43–75)
NITRITE UR QL STRIP: NEGATIVE
NON-SQ EPI CELLS URNS QL MICRO: ABNORMAL /HPF
NRBC BLD AUTO-RTO: 0 /100 WBCS
P AXIS: 57 DEGREES
PH UR STRIP.AUTO: 6 [PH] (ref 4.5–8)
PLATELET # BLD AUTO: 289 THOUSANDS/UL (ref 149–390)
PMV BLD AUTO: 8.9 FL (ref 8.9–12.7)
POTASSIUM SERPL-SCNC: 3.8 MMOL/L (ref 3.5–5.3)
PR INTERVAL: 144 MS
PROT SERPL-MCNC: 7.1 G/DL (ref 6.4–8.4)
PROT UR STRIP-MCNC: NEGATIVE MG/DL
PROTHROMBIN TIME: 13 SECONDS (ref 11.6–14.5)
QRS AXIS: 27 DEGREES
QRSD INTERVAL: 70 MS
QT INTERVAL: 360 MS
QTC INTERVAL: 475 MS
RBC # BLD AUTO: 4.46 MILLION/UL (ref 3.81–5.12)
RBC #/AREA URNS AUTO: ABNORMAL /HPF
SODIUM SERPL-SCNC: 137 MMOL/L (ref 135–147)
SP GR UR STRIP.AUTO: <=1.005 (ref 1–1.03)
T WAVE AXIS: 73 DEGREES
UROBILINOGEN UR QL STRIP.AUTO: 0.2 E.U./DL
VENTRICULAR RATE: 105 BPM
WBC # BLD AUTO: 8.89 THOUSAND/UL (ref 4.31–10.16)
WBC #/AREA URNS AUTO: ABNORMAL /HPF

## 2023-06-15 PROCEDURE — 84484 ASSAY OF TROPONIN QUANT: CPT | Performed by: EMERGENCY MEDICINE

## 2023-06-15 PROCEDURE — 94640 AIRWAY INHALATION TREATMENT: CPT

## 2023-06-15 PROCEDURE — 80053 COMPREHEN METABOLIC PANEL: CPT | Performed by: EMERGENCY MEDICINE

## 2023-06-15 PROCEDURE — 36415 COLL VENOUS BLD VENIPUNCTURE: CPT | Performed by: EMERGENCY MEDICINE

## 2023-06-15 PROCEDURE — 85730 THROMBOPLASTIN TIME PARTIAL: CPT | Performed by: EMERGENCY MEDICINE

## 2023-06-15 PROCEDURE — 96374 THER/PROPH/DIAG INJ IV PUSH: CPT

## 2023-06-15 PROCEDURE — 81001 URINALYSIS AUTO W/SCOPE: CPT

## 2023-06-15 PROCEDURE — 93010 ELECTROCARDIOGRAM REPORT: CPT | Performed by: STUDENT IN AN ORGANIZED HEALTH CARE EDUCATION/TRAINING PROGRAM

## 2023-06-15 PROCEDURE — 85379 FIBRIN DEGRADATION QUANT: CPT | Performed by: EMERGENCY MEDICINE

## 2023-06-15 PROCEDURE — 85025 COMPLETE CBC W/AUTO DIFF WBC: CPT | Performed by: EMERGENCY MEDICINE

## 2023-06-15 PROCEDURE — 81025 URINE PREGNANCY TEST: CPT | Performed by: EMERGENCY MEDICINE

## 2023-06-15 PROCEDURE — 93005 ELECTROCARDIOGRAM TRACING: CPT

## 2023-06-15 PROCEDURE — 99285 EMERGENCY DEPT VISIT HI MDM: CPT

## 2023-06-15 PROCEDURE — 71045 X-RAY EXAM CHEST 1 VIEW: CPT

## 2023-06-15 PROCEDURE — 85610 PROTHROMBIN TIME: CPT | Performed by: EMERGENCY MEDICINE

## 2023-06-15 RX ORDER — IBUPROFEN 400 MG/1
600 TABLET ORAL EVERY 6 HOURS PRN
Qty: 20 TABLET | Refills: 0 | Status: SHIPPED | OUTPATIENT
Start: 2023-06-15

## 2023-06-15 RX ORDER — KETOROLAC TROMETHAMINE 30 MG/ML
30 INJECTION, SOLUTION INTRAMUSCULAR; INTRAVENOUS ONCE
Status: COMPLETED | OUTPATIENT
Start: 2023-06-15 | End: 2023-06-15

## 2023-06-15 RX ORDER — ALBUTEROL SULFATE 90 UG/1
2 AEROSOL, METERED RESPIRATORY (INHALATION) EVERY 6 HOURS PRN
Qty: 18 G | Refills: 0 | Status: SHIPPED | OUTPATIENT
Start: 2023-06-15

## 2023-06-15 RX ORDER — IPRATROPIUM BROMIDE AND ALBUTEROL SULFATE 2.5; .5 MG/3ML; MG/3ML
3 SOLUTION RESPIRATORY (INHALATION) ONCE
Status: COMPLETED | OUTPATIENT
Start: 2023-06-15 | End: 2023-06-15

## 2023-06-15 RX ADMIN — KETOROLAC TROMETHAMINE 30 MG: 30 INJECTION, SOLUTION INTRAMUSCULAR; INTRAVENOUS at 17:02

## 2023-06-15 RX ADMIN — IPRATROPIUM BROMIDE AND ALBUTEROL SULFATE 3 ML: 2.5; .5 SOLUTION RESPIRATORY (INHALATION) at 17:37

## 2023-06-15 NOTE — ED PROVIDER NOTES
History  Chief Complaint   Patient presents with   • Chest Pain     Patient came in via EMS from home  Pt reports having mid-sternal chest pain starting an hour ago  Pt denies SOB, N/V, dizziness  Pt has hx of acid reflux  40 y/o male with midsternal cp since about an hour ago at rest   No sob, no cough, no fevers  No h/o CAD          Prior to Admission Medications   Prescriptions Last Dose Informant Patient Reported? Taking?    ARIPiprazole (ABILIFY) 20 MG tablet 6/14/2023 at 2100 Outside Facility [facility]) No Yes   Sig: Take 1 tablet (20 mg total) by mouth daily at bedtime   D3-1000 25 MCG (1000 UT) tablet 6/15/2023 Outside Facility (Specify) No Yes   Sig: Take 2 tablets (2,000 Units total) by mouth daily   Diclofenac Sodium (VOLTAREN) 1 % Not Taking Outside Facility (Specify) No No   Sig: Apply 2 g topically 4 (four) times a day   Patient not taking: Reported on 6/15/2023   Dyclonine-Glycerin (Cepacol Sore Throat Spray) 0 1-33 % LIQD  Outside Facility (Specify) No Yes   Sig: Apply 1 spray to the mouth or throat 3 (three) times a day as needed (sorethroat)   Elastic Bandages & Supports (Neoprene Knee Brace) MISC Not Taking Outside Facility (Specify) No No   Sig: Apply right knee brace in morning; remove at night   Patient not taking: Reported on 6/15/2023   GNP Sore Throat Spray 1 4 % mucosal liquid  Outside Facility (Specify) No Yes   Sig: APPLY 1 SPRAY TO MOUTH OR THROAT EVERY 2 HRS AS NEEDED FOR SORE THROAT   GNP Stomach Relief Max St 525 MG/15ML SUSP Not Taking Outside Facility (Specify) Yes No   Patient not taking: Reported on 6/15/2023   Incontinence Supply Disposable (Wings Choice Plus Adult Briefs) MISC  Outside Facility (Specify) No Yes   Sig: Use as directed (R39 81)   Konsyl Daily Fiber 28 3 %  Outside Facility (Specify) Yes No   LORazepam (ATIVAN) 0 5 mg tablet 6/15/2023 at 1400 Outside Facility (Specify) Yes Yes   Sig: Take 0 25 mg by mouth every 6 (six) hours as needed for anxiety Take 0 25mg TID Mouthwashes (Listerine Antiseptic) LIQD  Care Giver No Yes   Sig: Swish and spit 5 mL 2 (two) times a day   Multiple Vitamins-Minerals (CertaVite/Antioxidants) TABS 6/15/2023 at 08  No Yes   Sig: Take 1 tablet by mouth in the morning   RA Sunscreen SPF50 LOTN  Outside Facility (Specify) No Yes   Sig: Apply 15 minutes before sun exposure and every 2 hours   acetaminophen (TYLENOL) 500 mg tablet  Outside Facility (Specify) No Yes   Sig: Take 1 tablet (500 mg total) by mouth every 6 (six) hours as needed for mild pain   aluminum-magnesium hydroxide-simethicone (GNP Antacid & Anti-Gas) 7458-6558-149 mg/30 mL suspension  Outside Facility (Specify) No Yes   Sig: Take 15 mL by mouth every 4 (four) hours as needed for indigestion or heartburn   amitriptyline (ELAVIL) 10 mg tablet  Outside Facility (Specify) No Yes   Sig: Take 1 tablet (10 mg total) by mouth daily at bedtime   bacitracin topical ointment 500 units/g topical ointment  Outside Facility (Specify) No Yes   Sig: Cleanse site on nose with soap and water followed by bacitracin BID until healed then p r n    bismuth subsalicylate (PEPTO BISMOL) 524 mg/30 mL oral suspension  Outside Facility (Specify) No Yes   Sig: Take 15 mL (262 mg total) by mouth every 6 (six) hours as needed for indigestion   calcium carbonate (TUMS) 500 mg chewable tablet  Outside Facility (Specify) No Yes   Sig: Chew 1 tablet (500 mg total) 3 (three) times a day as needed for heartburn   carbamide peroxide (DEBROX) 6 5 % otic solution  Outside Facility (Specify) No Yes   Sig: Administer 5 drops into the left ear 2 (two) times a day Use 1 week prior to ENT appointment  Also can use 4 gtts twice weekly (Tuesday and Friday for example) to each ear for prevention  Keep hearing aid out x 10 minutes after ear drops administered     dicyclomine (BENTYL) 20 mg tablet  Outside Facility (Specify) No Yes   Sig: Take 1 tablet (20 mg total) by mouth every 6 (six) hours as needed (dysphagia) escitalopram (LEXAPRO) 20 mg tablet 6/15/2023 Outside Facility (Specify) No Yes   Sig: Take 1 tablet (20 mg total) by mouth daily   fluticasone (FLONASE) 50 mcg/act nasal spray  Outside Facility (Specify) No Yes   Si spray into each nostril daily as needed for rhinitis (nasal congestion) At 8:00 AM   gabapentin (Neurontin) 300 mg capsule 2023 at [facility] (Specify) No Yes   Sig: Take 1 capsule (300 mg total) by mouth daily at bedtime   hydrOXYzine HCL (ATARAX) 10 mg tablet 6/15/2023 at 1400 Outside Facility (Specify) No Yes   Sig: Take 1 tablet (10 mg total) by mouth 3 (three) times a day   ibuprofen (MOTRIN) 400 mg tablet  Outside Facility (Specify) No Yes   Sig: TAKE 1 TABLET BY MOUTH EVERY 8 HOURS AS NEEDED FOR MILD/MOD PAIN OR HEADACHES   ketoconazole (NIZORAL) 2 % cream 6/15/2023 at 0800 Outside Facility (Specify) No Yes   Sig: Apply topically daily   lamoTRIgine (LaMICtal) 100 mg tablet 6/15/2023 at 0800 Outside Facility ([facility]) Yes Yes   Sig: Take 100 mg by mouth 2 (two) times a day Take 50mg BID   lamoTRIgine (LaMICtal) 25 mg tablet Not Taking Outside Facility (Specify) Yes No   Sig: Take 25 mg by mouth 2 (two) times a day     Patient not taking: Reported on 6/15/2023   lidocaine (LMX) 4 % cream  Outside Facility (Specify) No Yes   Sig: Apply topically as needed for mild pain   lubiprostone (Amitiza) 24 mcg capsule 6/15/2023 at 0800 Outside Facility (Specify) No Yes   Sig: Take 1 capsule (24 mcg total) by mouth daily with breakfast   magnesium hydroxide (GNP Milk of Magnesia) 400 mg/5 mL oral suspension  Outside Facility (Specify) No Yes   Si TBSP (30ML) BY MOUTH DAILY AS NEEDED IF NO BM IN 3 DAYS (CONSTIPATION)   metoprolol tartrate (LOPRESSOR) 25 mg tablet 6/15/2023 at 0800 Outside Facility (Specify) No Yes   Sig: Take 1 tablet (25 mg total) by mouth 2 (two) times a day   mineral oil-hydrophilic petrolatum (AQUAPHOR) ointment  Outside Facility (Specify) No Yes   Sig: Apply topically as needed for dry skin   norgestimate-ethinyl estradiol (ORTHO-CYCLEN) 0 25-35 MG-MCG per tablet 6/15/2023 Outside Facility (Specify) No Yes   Sig: Take 1 tablet by mouth daily   nystatin (MYCOSTATIN) powder 6/15/2023 at 1200 Outside Facility (Specify) No Yes   Sig: Apply 1 application   topically 4 (four) times a day   nystatin-triamcinolone (MYCOLOG-II) cream 6/15/2023 at 0800 Outside Facility (Specify) No Yes   Sig: Apply topically 2 (two) times a day   pantoprazole (PROTONIX) 20 mg tablet 6/15/2023 at [address] (Specify) No Yes   Sig: Take 1 tablet (20 mg total) by mouth daily   psyllium (METAMUCIL SMOOTH TEXTURE) 28 % packet 6/15/2023 Outside Facility (Specify) Yes Yes   psyllium (METAMUCIL) 58 6 % packet  Outside Facility (Specify) No No   Sig: Take 1 packet by mouth daily   senna-docusate sodium (Senexon-S) 8 6-50 mg per tablet 6/15/2023 at 0800 Outside Facility (Specify) No Yes   Sig: Take 1 tablet by mouth daily at 8am    sodium chloride (OCEAN) 0 65 % nasal spray  Outside Facility (Specify) No Yes   Si spray into each nostril as needed (three times daily as needed for nasal congestion)   trospium chloride (SANCTURA) 20 mg tablet 6/15/2023 Outside Facility (Specify) No Yes   Sig: Take 1 tablet (20 mg total) by mouth 2 (two) times a day   zinc oxide (DESITIN) 13 % cream  Outside Facility (Specify) No Yes   Sig: Apply 1 application topically as needed (for perianal irritation)      Facility-Administered Medications: None       Past Medical History:   Diagnosis Date   • ADD (attention deficit disorder)    • Anxiety    • Astigmatism    • Brain lesion    • Calcium deficiency    • Cellulitis of foot, right 2018   • Cerebral palsy (HCC)    • Chronic otitis media    • Constipation    • Depression    • Dysphagia    • Esophagitis    • Esotropia    • GERD (gastroesophageal reflux disease)    • Hiatal hernia    • Hydrocephalus (HCC)    • Impaired fasting glucose    • Left nephrolithiasis 03/04/2019   • Myopia    • Oppositional defiant disorder    • 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HCC)    • Seizures (HCC)    • Sensorineural hearing loss    • Sleep apnea    • Status post ventriculoatrial shunt placement    • Visual impairment        Past Surgical History:   Procedure Laterality Date   • BREAST BIOPSY Left     X 2 (not sure of years)   • CSF SHUNT      Creation of Ventriculo-Peritoneal CSF shunt ; Last Assessed:7/6/2016   • EAR SURGERY      Last Assessed:7/6/2016   • LEG SURGERY      due to CP    • NOSE SURGERY      Last Assessed:7/6/2016   • MN ESOPHAGOGASTRODUODENOSCOPY TRANSORAL DIAGNOSTIC N/A 5/9/2019    Procedure: ESOPHAGOGASTRODUODENOSCOPY (EGD) with biopsy;  Surgeon: Reece Tinajero MD;  Location: [facility]; Service: Gastroenterology   • UPPER GASTROINTESTINAL ENDOSCOPY  05/2019       Family History   Problem Relation Age of Onset   • Diabetes Mother    • Colon cancer Father    • No Known Problems Maternal Grandmother    • No Known Problems Maternal Grandfather    • No Known Problems Paternal Grandmother    • No Known Problems Paternal Grandfather    • Hypertension Neg Hx    • Heart disease Neg Hx    • Stroke Neg Hx    • Thyroid disease Neg Hx      I have reviewed and agree with the history as documented  E-Cigarette/Vaping   • E-Cigarette Use Never User      E-Cigarette/Vaping Substances   • Nicotine No    • THC No    • CBD No    • Flavoring No    • Other No    • Unknown No      Social History     Tobacco Use   • Smoking status: Never   • Smokeless tobacco: Never   Vaping Use   • Vaping Use: Never used   Substance Use Topics   • Alcohol use: Never   • Drug use: Never       Review of Systems   Constitutional: Negative for appetite change, fatigue and fever  HENT: Negative for rhinorrhea and sore throat  Eyes: Negative for pain  Respiratory: Negative for cough, shortness of breath and wheezing  Cardiovascular: Positive for chest pain  Negative for leg swelling     Gastrointestinal: Negative for abdominal pain, diarrhea and vomiting  Genitourinary: Negative for dysuria and flank pain  Musculoskeletal: Negative for back pain and neck pain  Skin: Negative for rash  Neurological: Negative for syncope and headaches  Psychiatric/Behavioral:        Mood normal       Physical Exam  Physical Exam  Vitals and nursing note reviewed  Constitutional:       Appearance: She is well-developed  HENT:      Head: Normocephalic and atraumatic  Right Ear: External ear normal       Left Ear: External ear normal    Eyes:      General: No scleral icterus  Extraocular Movements: Extraocular movements intact  Cardiovascular:      Rate and Rhythm: Normal rate and regular rhythm  Pulmonary:      Effort: Pulmonary effort is normal  No respiratory distress  Breath sounds: Normal breath sounds  Abdominal:      Palpations: Abdomen is soft  Tenderness: There is no abdominal tenderness  Musculoskeletal:         General: No deformity or signs of injury  Normal range of motion  Cervical back: Normal range of motion and neck supple  Skin:     General: Skin is warm and dry  Coloration: Skin is not jaundiced or pale  Neurological:      General: No focal deficit present  Mental Status: She is alert and oriented to person, place, and time     Psychiatric:         Mood and Affect: Mood normal          Behavior: Behavior normal          Vital Signs  ED Triage Vitals   Temperature Pulse Respirations Blood Pressure SpO2   06/15/23 1529 06/15/23 1529 06/15/23 1529 06/15/23 1529 06/15/23 1529   98 3 °F (36 8 °C) 102 22 140/67 96 %      Temp Source Heart Rate Source Patient Position - Orthostatic VS BP Location FiO2 (%)   06/15/23 1529 06/15/23 1529 06/15/23 1529 06/15/23 1529 --   Temporal Monitor Lying Left arm       Pain Score       06/15/23 1702       4           Vitals:    06/15/23 1529 06/15/23 1800   BP: 140/67 114/56   Pulse: 102 105   Patient Position - Orthostatic VS: Lying Lying Visual Acuity      ED Medications  Medications   ketorolac (TORADOL) injection 30 mg (30 mg Intravenous Given 6/15/23 1702)   ipratropium-albuterol (DUO-NEB) 0 5-2 5 mg/3 mL inhalation solution 3 mL (3 mL Nebulization Given 6/15/23 1737)       Diagnostic Studies  Results Reviewed     Procedure Component Value Units Date/Time    HS Troponin I 2hr [[id number]]  (Normal) Collected: 06/15/23 1739    Lab Status: Final result Specimen: Blood from Arm, Left Updated: 06/15/23 1836     hs TnI 2hr <2 ng/L      Delta 2hr hsTnI <0 ng/L     Urine Microscopic [[id number]]  (Abnormal) Collected: 06/15/23 1657    Lab Status: Final result Specimen: Urine, Other Updated: 06/15/23 1716     RBC, UA 2-4 /hpf      WBC, UA 1-2 /hpf      Epithelial Cells Occasional /hpf      Bacteria, UA None Seen /hpf     CBC and differential [[id number]] Collected: 06/15/23 1607    Lab Status: Final result Specimen: Blood from Arm, Left Updated: 06/15/23 1710     WBC 8 89 Thousand/uL      RBC 4 46 Million/uL      Hemoglobin 13 6 g/dL      Hematocrit 42 0 %      MCV 94 fL      MCH 30 5 pg      MCHC 32 4 g/dL      RDW 13 5 %      MPV 8 9 fL      Platelets 160 Thousands/uL      nRBC 0 /100 WBCs      Neutrophils Relative 67 %      Immat GRANS % 0 %      Lymphocytes Relative 24 %      Monocytes Relative 7 %      Eosinophils Relative 2 %      Basophils Relative 0 %      Neutrophils Absolute 5 88 Thousands/µL      Immature Grans Absolute 0 03 Thousand/uL      Lymphocytes Absolute 2 15 Thousands/µL      Monocytes Absolute 0 65 Thousand/µL      Eosinophils Absolute 0 14 Thousand/µL      Basophils Absolute 0 04 Thousands/µL     POCT pregnancy, urine [[id number]]  (Normal) Resulted: 06/15/23 1700    Lab Status: Final result Updated: 06/15/23 1700     EXT Preg Test, Ur Negative     Control Valid    Urine Macroscopic, POC [[id number]]  (Abnormal) Collected: 06/15/23 1657    Lab Status: Final result Specimen: Urine Updated: 06/15/23 1658     Color, UA Yellow Clarity, UA Clear     pH, UA 6 0     Leukocytes, UA Small     Nitrite, UA Negative     Protein, UA Negative mg/dl      Glucose, UA Negative mg/dl      Ketones, UA Negative mg/dl      Urobilinogen, UA 0 2 E U /dl      Bilirubin, UA Negative     Occult Blood, UA Small     Specific [place], UA <=1 005    Narrative:      CLINITEK RESULT    HS Troponin 0hr (reflex protocol) [[id number]]  (Normal) Collected: 06/15/23 1607    Lab Status: Final result Specimen: Blood from Arm, Left Updated: 06/15/23 1639     hs TnI 0hr 2 ng/L     Comprehensive metabolic panel [[id number]]  (Abnormal) Collected: 06/15/23 1607    Lab Status: Final result Specimen: Blood from Arm, Left Updated: 06/15/23 1631     Sodium 137 mmol/L      Potassium 3 8 mmol/L      Chloride 102 mmol/L      CO2 26 mmol/L      ANION GAP 9 mmol/L      BUN 9 mg/dL      Creatinine 0 78 mg/dL      Glucose 113 mg/dL      Calcium 8 1 mg/dL      AST 16 U/L      ALT 17 U/L      Alkaline Phosphatase 146 U/L      Total Protein 7 1 g/dL      Albumin 4 1 g/dL      Total Bilirubin 0 24 mg/dL      eGFR 92 ml/min/1 73sq m     Narrative:      [facility] guidelines for Chronic Kidney Disease (CKD):   •  Stage 1 with normal or high GFR (GFR > 90 mL/min/1 73 square meters)  •  Stage 2 Mild CKD (GFR = 60-89 mL/min/1 73 square meters)  •  Stage 3A Moderate CKD (GFR = 45-59 mL/min/1 73 square meters)  •  Stage 3B Moderate CKD (GFR = 30-44 mL/min/1 73 square meters)  •  Stage 4 Severe CKD (GFR = 15-29 mL/min/1 73 square meters)  •  Stage 5 End Stage CKD (GFR <15 mL/min/1 73 square meters)  Note: GFR calculation is accurate only with a steady state creatinine    D-Dimer [[id number]]  (Normal) Collected: 06/15/23 1607    Lab Status: Final result Specimen: Blood from Arm, Left Updated: 06/15/23 1629     D-Dimer, Quant 0 49 ug/ml FEU     Protime-INR [[id number]]  (Normal) Collected: 06/15/23 1607    Lab Status: Final result Specimen: Blood from Arm, Left Updated: 06/15/23 1627     Protime 13 0 seconds      INR 0 98    APTT [[id number]]  (Normal) Collected: 06/15/23 1607    Lab Status: Final result Specimen: Blood from Arm, Left Updated: 06/15/23 1627     PTT 32 seconds                  XR chest 1 view portable   Final Result by Huong Zambrano MD (06/15 1648)      No acute cardiopulmonary disease  Workstation performed: [id number]                    Procedures  Procedures         ED Course             HEART Risk Score    Flowsheet Row Most Recent Value   Heart Score Risk Calculator    History 0 Filed at: 06/17/2023 0103   ECG 0 Filed at: 06/17/2023 0103   Age 0 Filed at: 06/17/2023 0103   Risk Factors 0 Filed at: 06/17/2023 0103   Troponin 0 Filed at: 06/17/2023 0103   HEART Score 0 Filed at: 06/17/2023 0103                        SBIRT 20yo+    Flowsheet Row Most Recent Value   Initial Alcohol Screen: US AUDIT-C     1  How often do you have a drink containing alcohol? 0 Filed at: 06/15/2023 1652   2  How many drinks containing alcohol do you have on a typical day you are drinking? 0 Filed at: 06/15/2023 1652   3b  FEMALE Any Age, or MALE 65+: How often do you have 4 or more drinks on one occassion? 0 Filed at: 06/15/2023 1652   Audit-C Score 0 Filed at: 06/15/2023 1652   SUKHDEV: How many times in the past year have you    Used an illegal drug or used a prescription medication for non-medical reasons? Never Filed at: 06/15/2023 1652                    Medical Decision Making  ekg nsr, CXR nad, trop  Neg  X 2    Pt  Laurens better in ER, stable for outpt  Follow up    Amount and/or Complexity of Data Reviewed  Labs: ordered  Radiology: ordered  Risk  Prescription drug management            Disposition  Final diagnoses:   Dyspnea   Chest pain, unspecified     Time reflects when diagnosis was documented in both MDM as applicable and the Disposition within this note     Time User Action Codes Description Comment    6/15/2023  5:30 PM David Collins 6 [R06 00] Dyspnea     6/15/2023  5:30 PM Hever Peña Add [R07 9] Chest pain, unspecified       ED Disposition     ED Disposition   Discharge    Condition   Stable    Date/Time   Thu Hank 15, 2023  5:29 PM    Comment   Romario Noe discharge to home/self care  Follow-up Information     Follow up With Specialties Details Why Contact Info Additional [address]   [address], [address] [facility] [id number]  [address], [address], [address] [place], [place], [address]          Discharge Medication List as of 6/15/2023  5:31 PM      START taking these medications    Details   albuterol (Ventolin HFA) 90 mcg/act inhaler Inhale 2 puffs every 6 (six) hours as needed for wheezing, Starting Thu 6/15/2023, Normal      !! ibuprofen (MOTRIN) 400 mg tablet Take 1 5 tablets (600 mg total) by mouth every 6 (six) hours as needed for mild pain, Starting Thu 6/15/2023, Normal       !! - Potential duplicate medications found  Please discuss with provider        CONTINUE these medications which have NOT CHANGED    Details   acetaminophen (TYLENOL) 500 mg tablet Take 1 tablet (500 mg total) by mouth every 6 (six) hours as needed for mild pain, Starting Mon 3/14/2022, Normal      aluminum-magnesium hydroxide-simethicone (GNP Antacid & Anti-Gas) 8922-0764-774 mg/30 mL suspension Take 15 mL by mouth every 4 (four) hours as needed for indigestion or heartburn, Starting Wed 3/1/2023, Normal      amitriptyline (ELAVIL) 10 mg tablet Take 1 tablet (10 mg total) by mouth daily at bedtime, Starting Wed 3/1/2023, Until Thu 6/15/2023, Normal      ARIPiprazole (ABILIFY) 20 MG tablet Take 1 tablet (20 mg total) by mouth daily at bedtime, Starting Wed 3/1/2023, Normal      bacitracin topical ointment 500 units/g topical ointment Cleanse site on nose with soap and water followed by bacitracin BID until healed then p r n , Normal      bismuth subsalicylate (PEPTO BISMOL) 524 mg/30 mL oral suspension Take 15 mL (262 mg total) by mouth every 6 (six) hours as needed for indigestion, Starting Wed 3/1/2023, Normal      calcium carbonate (TUMS) 500 mg chewable tablet Chew 1 tablet (500 mg total) 3 (three) times a day as needed for heartburn, Starting Tue 10/26/2021, Normal      carbamide peroxide (DEBROX) 6 5 % otic solution Administer 5 drops into the left ear 2 (two) times a day Use 1 week prior to ENT appointment  Also can use 4 gtts twice weekly (Tuesday and Friday for example) to each ear for prevention  Keep hearing aid out x 10 minutes after ear drops administered  ,  Starting Wed 3/1/2023, Normal      D3-1000 25 MCG (1000 UT) tablet Take 2 tablets (2,000 Units total) by mouth daily, Starting Wed 3/1/2023, Normal      Diclofenac Sodium (VOLTAREN) 1 % Apply 2 g topically 4 (four) times a day, Starting Wed 3/1/2023, Normal      dicyclomine (BENTYL) 20 mg tablet Take 1 tablet (20 mg total) by mouth every 6 (six) hours as needed (dysphagia), Starting Wed 3/1/2023, Normal      Dyclonine-Glycerin (Cepacol Sore Throat Spray) 0 1-33 % LIQD Apply 1 spray to the mouth or throat 3 (three) times a day as needed (sorethroat), Starting Tue 10/26/2021, Normal      Elastic Bandages & Supports (Neoprene Knee Brace) MISC Apply right knee brace in morning; remove at night, Normal      escitalopram (LEXAPRO) 20 mg tablet Take 1 tablet (20 mg total) by mouth daily, Starting Wed 3/1/2023, Normal      fluticasone (FLONASE) 50 mcg/act nasal spray 1 spray into each nostril daily as needed for rhinitis (nasal congestion) At 8:00 AM, Starting Wed 4/19/2023, Normal      gabapentin (Neurontin) 300 mg capsule Take 1 capsule (300 mg total) by mouth daily at bedtime, Starting Fri 4/14/2023, Normal      GNP Sore Throat Spray 1 4 % mucosal liquid APPLY 1 SPRAY TO MOUTH OR THROAT EVERY 2 HRS AS NEEDED FOR SORE THROAT, Normal      GNP Stomach Relief Max St 525 MG/15ML SUSP Starting Tue 2/22/2022, Historical Med      hydrOXYzine HCL (ATARAX) 10 mg tablet Take 1 tablet (10 mg total) by mouth 3 (three) times a day, Starting Wed 3/1/2023, Normal      !! ibuprofen (MOTRIN) 400 mg tablet TAKE 1 TABLET BY MOUTH EVERY 8 HOURS AS NEEDED FOR MILD/MOD PAIN OR HEADACHES, Normal      Incontinence Supply Disposable (Wings Choice Plus Adult Briefs) MISC Use as directed (R39 81), Normal      ketoconazole (NIZORAL) 2 % cream Apply topically daily, Starting Wed 3/1/2023, Normal      Konsyl Daily Fiber 28 3 % Starting Sat 10/2/2021, Historical Med      !! lamoTRIgine (LaMICtal) 100 mg tablet Take 100 mg by mouth 2 (two) times a day Take 50mg BID, Starting Wed 12/8/2021, Historical Med      !! lamoTRIgine (LaMICtal) 25 mg tablet Take 25 mg by mouth 2 (two) times a day  , Starting Tue 8/11/2020, Historical Med      lidocaine (LMX) 4 % cream Apply topically as needed for mild pain, Starting Wed 3/1/2023, Normal      LORazepam (ATIVAN) 0 5 mg tablet Take 0 25 mg by mouth every 6 (six) hours as needed for anxiety Take 0 25mg TID, Historical Med      lubiprostone (Amitiza) 24 mcg capsule Take 1 capsule (24 mcg total) by mouth daily with breakfast, Starting Wed 3/1/2023, Normal      magnesium hydroxide (GNP Milk of Magnesia) 400 mg/5 mL oral suspension 2 TBSP (30ML) BY MOUTH DAILY AS NEEDED IF NO BM IN 3 DAYS (CONSTIPATION), Normal      metoprolol tartrate (LOPRESSOR) 25 mg tablet Take 1 tablet (25 mg total) by mouth 2 (two) times a day, Starting Wed 3/1/2023, Normal      mineral oil-hydrophilic petrolatum (AQUAPHOR) ointment Apply topically as needed for dry skin, Starting Wed 3/1/2023, Normal      Mouthwashes (Listerine Antiseptic) LIQD Swish and spit 5 mL 2 (two) times a day, Starting u 5/12/2022, Until Thu 6/15/2023, Normal      Multiple Vitamins-Minerals (CertaVite/Antioxidants) TABS Take 1 tablet by mouth in the morning, Starting Tue 5/16/2023, Normal      norgestimate-ethinyl estradiol (ORTHO-CYCLEN) 0 25-35 MG-MCG per tablet Take 1 tablet by mouth daily, Starting Wed 3/1/2023, Normal      nystatin (MYCOSTATIN) powder Apply 1 application  topically 4 (four) times a day, Starting Mon 4/10/2023, Normal      nystatin-triamcinolone (MYCOLOG-II) cream Apply topically 2 (two) times a day, Starting Thu 4/6/2023, Normal      pantoprazole (PROTONIX) 20 mg tablet Take 1 tablet (20 mg total) by mouth daily, Starting Wed 3/1/2023, Normal      psyllium (METAMUCIL SMOOTH TEXTURE) 28 % packet Starting Tue 1/3/2023, Historical Med      psyllium (METAMUCIL) 58 6 % packet Take 1 packet by mouth daily, Starting Wed 3/1/2023, Normal      RA Sunscreen SPF50 LOTN Apply 15 minutes before sun exposure and every 2 hours, Normal      senna-docusate sodium (Senexon-S) 8 6-50 mg per tablet Take 1 tablet by mouth daily at 8am , Starting Wed 3/1/2023, Normal      sodium chloride (OCEAN) 0 65 % nasal spray 1 spray into each nostril as needed (three times daily as needed for nasal congestion), Starting Wed 3/1/2023, Normal      trospium chloride (SANCTURA) 20 mg tablet Take 1 tablet (20 mg total) by mouth 2 (two) times a day, Starting Wed 3/1/2023, Normal      zinc oxide (DESITIN) 13 % cream Apply 1 application topically as needed (for perianal irritation), Starting Wed 3/1/2023, Normal       !! - Potential duplicate medications found  Please discuss with provider  No discharge procedures on file      PDMP Review       Value Time User    PDMP Reviewed  Yes 10/6/2021  1:11 PM Tavon Mancia PA-C          ED Provider  Electronically Signed by           Chris Corley MD  06/17/23 0414

## 2023-06-21 ENCOUNTER — TELEPHONE (OUTPATIENT)
Dept: FAMILY MEDICINE CLINIC | Facility: CLINIC | Age: 43
End: 2023-06-21

## 2023-06-21 NOTE — TELEPHONE ENCOUNTER
Regarding: PT   Contact: [phone]  ----- Message from Toño Johnson MD sent at 6/20/2023 12:47 PM EDT -----  Morris Sanchez, can you help with this?     ----- Message from Jorge Alberto Tello to [facility], DO sent at 6/20/2023 11:50 AM -----   Good Morning  Karlos Rizvi will be moving into a new home sometime next month that is more suitable for her physical health needs  We would like her to complete an in home PT evaluation to insure that all adaptive equipment that is needed, including hand rails, are installed prior to her move  If you are in agreement, can you please fax me a referral for an in home PT evaluation? We would prefer to use [facility] if they are available  My fax number is [phone]  Thank you! Please let me know if you have any questions in regards to this!      Thank you,  Chitra Dominguez (Medical Coordinator)

## 2023-06-21 NOTE — TELEPHONE ENCOUNTER
Patient will need to be seen, last visit 3/1/23, medicare will require updated visit for Pt  Please call to schedule

## 2023-06-22 ENCOUNTER — OFFICE VISIT (OUTPATIENT)
Dept: AUDIOLOGY | Age: 43
End: 2023-06-22

## 2023-06-22 DIAGNOSIS — H90.3 SENSORY HEARING LOSS, BILATERAL: Primary | ICD-10-CM

## 2023-06-22 NOTE — PROGRESS NOTES
Hearing Aid Visit:    Name:  Trenton Cantrell  :  1980  Age:  39 y o  Date of Evaluation: 23     Trenton Cantrell is being seen for a hearing aid visit  Patient is fit with Oticon Zircon 2 Mini [facility] hearing aid(s)  Right serial [id number]  Left serial NICKIAM [id number]  Warranty date: 25 (Loss/Damage and repair)    Dome/Earmold: Full shell     Patient reports not being able to hear out of the left hearing aid and ear pain in the left  Action:  The hearing aid was cleaned and checked  Wax was removed from tubing  Hearing aids were working properly today  Otoscopy revealed the ar canal and eardrum to be very red  A tymp revealed no eardrum movement  She was instructed to see her PCP or ENT regarding the ear pain and to not wear the hearing aid on the left side  Adjustments were not made today  Recommendations: Follow up Colin Chen    Clinical Audiologist

## 2023-06-23 ENCOUNTER — ANNUAL EXAM (OUTPATIENT)
Dept: OBGYN CLINIC | Facility: CLINIC | Age: 43
End: 2023-06-23

## 2023-06-23 ENCOUNTER — HOSPITAL ENCOUNTER (EMERGENCY)
Facility: HOSPITAL | Age: 43
Discharge: HOME | End: 2023-06-23
Attending: EMERGENCY MEDICINE
Payer: MEDICARE

## 2023-06-23 VITALS
HEART RATE: 91 BPM | RESPIRATION RATE: 16 BRPM | HEIGHT: 56 IN | DIASTOLIC BLOOD PRESSURE: 73 MMHG | SYSTOLIC BLOOD PRESSURE: 134 MMHG | OXYGEN SATURATION: 98 % | TEMPERATURE: 98.7 F | BODY MASS INDEX: 48.87 KG/M2

## 2023-06-23 VITALS
BODY MASS INDEX: 48.87 KG/M2 | HEART RATE: 99 BPM | DIASTOLIC BLOOD PRESSURE: 52 MMHG | WEIGHT: 218 LBS | SYSTOLIC BLOOD PRESSURE: 102 MMHG

## 2023-06-23 DIAGNOSIS — Z12.39 ENCOUNTER FOR BREAST CANCER SCREENING USING NON-MAMMOGRAM MODALITY: ICD-10-CM

## 2023-06-23 DIAGNOSIS — N39.0 ACUTE URINARY TRACT INFECTION: Primary | ICD-10-CM

## 2023-06-23 DIAGNOSIS — R45.851 SUICIDAL IDEATION: ICD-10-CM

## 2023-06-23 DIAGNOSIS — Z01.419 ENCOUNTER FOR GYNECOLOGICAL EXAMINATION WITHOUT ABNORMAL FINDING: Primary | ICD-10-CM

## 2023-06-23 DIAGNOSIS — Z12.31 ENCOUNTER FOR SCREENING MAMMOGRAM FOR MALIGNANT NEOPLASM OF BREAST: ICD-10-CM

## 2023-06-23 DIAGNOSIS — Z12.4 SCREENING FOR CERVICAL CANCER: ICD-10-CM

## 2023-06-23 DIAGNOSIS — R44.3 HALLUCINATIONS: ICD-10-CM

## 2023-06-23 LAB
AMPHETAMINES SERPL QL SCN: NEGATIVE
BACTERIA UR QL AUTO: ABNORMAL /HPF
BARBITURATES UR QL: NEGATIVE
BENZODIAZ UR QL: NEGATIVE
BILIRUB UR QL STRIP: NEGATIVE
CLARITY UR: CLEAR
COCAINE UR QL: NEGATIVE
COLOR UR: YELLOW
ETHANOL EXG-MCNC: 0 MG/DL
GLUCOSE UR STRIP-MCNC: NEGATIVE MG/DL
HGB UR QL STRIP.AUTO: ABNORMAL
KETONES UR STRIP-MCNC: NEGATIVE MG/DL
LEUKOCYTE ESTERASE UR QL STRIP: ABNORMAL
METHADONE UR QL: NEGATIVE
MUCOUS THREADS UR QL AUTO: ABNORMAL
NITRITE UR QL STRIP: NEGATIVE
NON-SQ EPI CELLS URNS QL MICRO: ABNORMAL /HPF
OPIATES UR QL SCN: NEGATIVE
OXYCODONE+OXYMORPHONE UR QL SCN: NEGATIVE
PCP UR QL: NEGATIVE
PH UR STRIP.AUTO: 6.5 [PH]
PROT UR STRIP-MCNC: NEGATIVE MG/DL
RBC #/AREA URNS AUTO: ABNORMAL /HPF
SP GR UR STRIP.AUTO: 1.02 (ref 1–1.03)
THC UR QL: NEGATIVE
UROBILINOGEN UR STRIP-ACNC: <2 MG/DL
WBC #/AREA URNS AUTO: ABNORMAL /HPF

## 2023-06-23 PROCEDURE — G0101 CA SCREEN;PELVIC/BREAST EXAM: HCPCS | Performed by: NURSE PRACTITIONER

## 2023-06-23 PROCEDURE — 82075 ASSAY OF BREATH ETHANOL: CPT | Performed by: EMERGENCY MEDICINE

## 2023-06-23 PROCEDURE — 81001 URINALYSIS AUTO W/SCOPE: CPT | Performed by: EMERGENCY MEDICINE

## 2023-06-23 PROCEDURE — 99285 EMERGENCY DEPT VISIT HI MDM: CPT

## 2023-06-23 PROCEDURE — 80307 DRUG TEST PRSMV CHEM ANLYZR: CPT | Performed by: EMERGENCY MEDICINE

## 2023-06-23 RX ORDER — CEPHALEXIN 250 MG/1
500 CAPSULE ORAL ONCE
Status: COMPLETED | OUTPATIENT
Start: 2023-06-23 | End: 2023-06-23

## 2023-06-23 RX ORDER — CEPHALEXIN 250 MG/1
500 CAPSULE ORAL 2 TIMES DAILY
Qty: 20 CAPSULE | Refills: 0 | Status: SHIPPED | OUTPATIENT
Start: 2023-06-23 | End: 2023-06-28

## 2023-06-23 RX ADMIN — CEPHALEXIN 500 MG: 250 CAPSULE ORAL at 17:55

## 2023-06-23 NOTE — ED NOTES
CIS met with Augustus Russell is from a group home and seems to be intellectually delayed  Pt appears to be a poor historian and has difficulty understanding questions asked and this writer needed to repeat herself several times  Pt took a long time to respond to questions and appeared to be thinking about what she wanted to do  Staff was with pt and staff member stated that she was only a fill in and was not extremely familiar with pt  Staff member stated that pt was in group home office and pt informed the nurse Kamilah Patel that she was hearing voices and having Suicidal thoughts so she was sent to the ER  Pt affect was even, speech mumbled  Pt stated that she wanted to go home and stated that she would be safe  Pt stated she is compliant with meds and states that she feels they work well  Pt denied issues with eating or sleeping  CIS contacted Kamilah Patel, group [place] nurse who stated that pt uses these statements for attention seeking  Kamilah Patel stated that she was checking pt for some bruising pt had on her breasts and once Kamilah Patel told her she did not have to go to the ER for that, pt then said she wanted to talk to the nurse privately and at that point told her that she was hearing voices to hurt herself  Kamilah Patel stated that pt is aware that certain statements will get her to the ER as it is group home policy to automatically call 911 if a pt states they are having suicidal thoughts  Kamilah Paetl states that pt has never attempted to hurt herself and the last time pt made these statements, pt had a UTI and once on antibiotics, pt was fine  Kamilah Patel requested that pt be checked for a UTI and is okay with pt returning to group home as pt appears to be at baseline  CIS informed   And  is in agreement  Dr  Also in agreement with discharge back to group home

## 2023-06-23 NOTE — PROGRESS NOTES
ANNUAL GYNECOLOGICAL EXAMINATION     Brittney Littlejohn is a 39 y o  female who presents today for annual GYN exam   Her last pap smear was performed 2022 and result was NILM with negative HPV  She reports no history of abnormal pap smears in her past   Her last mammogram was performed 3/31/2023 and result was WNL  She reports menses as regular  Her general medical history has been reviewed and she reports it as follows:    Past Medical History:   Diagnosis Date   • ADD (attention deficit disorder)    • Anxiety    • Astigmatism    • Brain lesion    • Calcium deficiency    • Cellulitis of foot, right 2018   • Cerebral palsy (HCC)    • Chronic otitis media    • Constipation    • Depression    • Dysphagia    • Esophagitis    • Esotropia    • GERD (gastroesophageal reflux disease)    • Hiatal hernia    • Hydrocephalus (HCC)    • Impaired fasting glucose    • Left nephrolithiasis 2019   • Myopia    • Oppositional defiant disorder    • 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HCC)    • Seizures (HCC)    • Sensorineural hearing loss    • Sleep apnea    • Status post ventriculoatrial shunt placement    • Visual impairment      Past Surgical History:   Procedure Laterality Date   • BREAST BIOPSY Left     X 2 (not sure of years)   • CSF SHUNT      Creation of Ventriculo-Peritoneal CSF shunt ; Last Assessed:2016   • EAR SURGERY      Last Assessed:2016   • LEG SURGERY      due to CP    • NOSE SURGERY      Last Assessed:2016   • SC ESOPHAGOGASTRODUODENOSCOPY TRANSORAL DIAGNOSTIC N/A 2019    Procedure: ESOPHAGOGASTRODUODENOSCOPY (EGD) with biopsy;  Surgeon: Mel Ng MD;  Location: [facility];   Service: Gastroenterology   • UPPER GASTROINTESTINAL ENDOSCOPY  2019     OB History        0    Para   0    Term   0       0    AB   0    Living   0       SAB   0    IAB   0    Ectopic   0    Multiple   0    Live Births   0               Social History     Tobacco Use   • Smoking status: Never   • Smokeless tobacco: Never   Vaping Use   • Vaping Use: Never used   Substance Use Topics   • Alcohol use: Never   • Drug use: Never     Social History     Substance and Sexual Activity   Sexual Activity Never   • Birth control/protection: OCP     Cancer-related family history includes Colon cancer in her father  Current Outpatient Medications   Medication Instructions   • acetaminophen (TYLENOL) 500 mg, Oral, Every 6 hours PRN   • albuterol (Ventolin HFA) 90 mcg/act inhaler 2 puffs, Inhalation, Every 6 hours PRN   • aluminum-magnesium hydroxide-simethicone (GNP Antacid & Anti-Gas) 2498-9451-639 mg/30 mL suspension 15 mL, Oral, Every 4 hours PRN   • amitriptyline (ELAVIL) 10 mg, Oral, Daily at bedtime   • ARIPiprazole (ABILIFY) 20 mg, Oral, Daily at bedtime   • bacitracin topical ointment 500 units/g topical ointment Cleanse site on nose with soap and water followed by bacitracin BID until healed then p r n    • bismuth subsalicylate (PEPTO BISMOL) 262 mg, Oral, Every 6 hours PRN   • calcium carbonate (TUMS) 500 mg, Oral, 3 times daily PRN   • carbamide peroxide (DEBROX) 6 5 % otic solution 5 drops, Left Ear, 2 times daily, Use 1 week prior to ENT appointment  Also can use 4 gtts twice weekly (Tuesday and Friday for example) to each ear for prevention  Keep hearing aid out x 10 minutes after ear drops administered     • D3-1000 2,000 Units, Oral, Daily   • Diclofenac Sodium (VOLTAREN) 2 g, Topical, 4 times daily   • dicyclomine (BENTYL) 20 mg, Oral, Every 6 hours PRN   • Dyclonine-Glycerin (Cepacol Sore Throat Spray) 0 1-33 % LIQD 1 spray, Mouth/Throat, 3 times daily PRN   • Elastic Bandages & Supports (Neoprene Knee Brace) MISC Apply right knee brace in morning; remove at night   • escitalopram (LEXAPRO) 20 mg, Oral, Daily   • fluticasone (FLONASE) 50 mcg/act nasal spray 1 spray, Nasal, Daily PRN, At 8:00 AM   • gabapentin (NEURONTIN) 300 mg, Oral, Daily at bedtime   • GNP Sore Throat Spray 1 4 % mucosal liquid APPLY 1 SPRAY TO MOUTH OR THROAT EVERY 2 HRS AS NEEDED FOR SORE THROAT   • GNP Stomach Relief Max St 525 MG/15ML SUSP No dose, route, or frequency recorded  • hydrOXYzine HCL (ATARAX) 10 mg, Oral, 3 times daily   • ibuprofen (MOTRIN) 400 mg tablet TAKE 1 TABLET BY MOUTH EVERY 8 HOURS AS NEEDED FOR MILD/MOD PAIN OR HEADACHES   • ibuprofen (MOTRIN) 600 mg, Oral, Every 6 hours PRN   • Incontinence Supply Disposable (Wings Choice Plus Adult Briefs) MISC Use as directed (R39 81)   • ketoconazole (NIZORAL) 2 % cream Topical, Daily   • Konsyl Daily Fiber 28 3 % No dose, route, or frequency recorded  • lamoTRIgine (LAMICTAL) 25 mg, 2 times daily   • lamoTRIgine (LAMICTAL) 100 mg, Oral, 2 times daily, Take 50mg BID   • lidocaine (LMX) 4 % cream Topical, As needed   • LORazepam (ATIVAN) 0 25 mg, Oral, Every 6 hours PRN, Take 0 25mg TID   • lubiprostone (AMITIZA) 24 mcg, Oral, Daily with breakfast   • magnesium hydroxide (GNP Milk of Magnesia) 400 mg/5 mL oral suspension 2 TBSP (30ML) BY MOUTH DAILY AS NEEDED IF NO BM IN 3 DAYS (CONSTIPATION)   • metoprolol tartrate (LOPRESSOR) 25 mg, Oral, 2 times daily   • mineral oil-hydrophilic petrolatum (AQUAPHOR) ointment Topical, As needed   • Mouthwashes (Listerine Antiseptic) LIQD 5 mL, Swish & Spit, 2 times daily   • Multiple Vitamins-Minerals (CertaVite/Antioxidants) TABS 1 tablet, Oral, Daily   • norgestimate-ethinyl estradiol (ORTHO-CYCLEN) 0 25-35 MG-MCG per tablet 1 tablet, Oral, Daily   • nystatin (MYCOSTATIN) powder 1 application  , Topical, 4 times daily   • nystatin-triamcinolone (MYCOLOG-II) cream Topical, 2 times daily   • pantoprazole (PROTONIX) 20 mg, Oral, Daily   • psyllium (METAMUCIL SMOOTH TEXTURE) 28 % packet No dose, route, or frequency recorded     • psyllium (METAMUCIL) 58 6 % packet 1 packet, Oral, Daily   • RA Sunscreen SPF50 LOTN Apply 15 minutes before sun exposure and every 2 hours   • senna-docusate sodium (Senexon-S) 8 6-50 mg per tablet 1 tablet, Oral, Daily, at 8am    • sodium chloride (OCEAN) 0 65 % nasal spray 1 spray, Nasal, As needed   • trospium chloride (SANCTURA) 20 mg, Oral, 2 times daily   • zinc oxide (DESITIN) 13 % cream 1 application  , Topical, As needed       Review of Systems:  Review of Systems   Constitutional: Negative  Gastrointestinal: Negative  Genitourinary: Negative for difficulty urinating, menstrual problem, pelvic pain and vaginal discharge  Skin: Negative  Physical Exam:  /52   Pulse 99   Wt 98 9 kg (218 lb)   BMI 48 87 kg/m²   Physical Exam  Constitutional:       General: She is not in acute distress  Appearance: She is well-developed  Genitourinary:      Vulva normal       No lesions in the vagina  Right Adnexa: not tender and no mass present  Left Adnexa: not tender and no mass present  Cervical exam comments: Declined speculum exam       Uterus is not tender  Breasts:     Right: No mass, nipple discharge, skin change or tenderness  Left: No mass, nipple discharge, skin change or tenderness  Neck:      Thyroid: No thyromegaly  Cardiovascular:      Rate and Rhythm: Normal rate and regular rhythm  Pulmonary:      Effort: Pulmonary effort is normal    Abdominal:      Palpations: Abdomen is soft  Tenderness: There is no abdominal tenderness  Musculoskeletal:      Cervical back: Neck supple  Neurological:      Mental Status: She is alert and oriented to person, place, and time  Skin:     General: Skin is warm and dry  Vitals reviewed  Assessment/Plan:   1  Normal well-woman GYN exam   2  Cervical cancer screening:  Declined speculum exam   Pap smear not indicated at this time  Has not received HPV vaccine in the past    3  STD screening:  Patient declines  4  Breast cancer screening:  Normal breast exam   Order placed for bilateral screening mammogram   Reviewed breast self-awareness  5  BMI Counseling: Body mass index is 48 87 kg/m²   Discussed the patient's BMI with her  The BMI is above normal  Nutrition recommendations include decreasing overall calorie intake  6  Contraception:  Continue OCP's for menses management     7  Return to office in 1 year for annual GYN exam

## 2023-06-23 NOTE — ED NOTES
This writer discussed the patients current presentation and recommended discharge plan with Dr Boston  They agree with the patient being discharged at this time with referrals and/or information about outpatient  The patient was Instructed to follow up with their psychiatrist  The patient was provided with referral information for:   Outpatient facilities to utilize if needed  Pt has services through the group home  This writer and the patient completed a safety plan  The patient was provided with a copy of their safety plan with encouragement to utilize the plan following discharge  In addition, the patient was instructed to call local [facility] crisis, other crisis services, [facility] or to go to the nearest ER immediately if their situation changes at any time  This writer discussed discharge plans with the patient and group home staff, who agrees with and understands the discharge plans  SAFETY PLAN  Warning Signs (thoughts, images, mood, behavior, situations) of a potential crisis: change in moods, increased crying, loss of control    Coping Skills (what can I do to take my mind off the problem, or to keep myself safe): talk to the group home nurse    Outside Support (who can I reach out to for support and help): return to ER          [place] Suicide Prevention Hotline:  [facility] [address] [place] [address] [phone] - LVF Crisis/Mobile Crisis   [phone] - SLPF Crisis   [facility]: [phone]  [facility]: [place] [address] [place] [address] [address]   [phone] - Peer Support Talk Line (1-9pm daily)  [phone] - Teen Support Talk Line (1-9pm daily)  [phone] [facility] 1 [address] [facility] [address] [facility] [phone] - [address] [facility]

## 2023-06-23 NOTE — ED PROVIDER NOTES
History  Chief Complaint   Patient presents with   • Psychiatric Evaluation     Pt from group home and reports auditory hallucinations  +SI, -HI  Also c/o headache  History provided by:  Patient  Psychiatric Evaluation  Presenting symptoms: hallucinations and suicidal thoughts    Presenting symptoms: no agitation    Patient accompanied by:  Caregiver  Degree of incapacity (severity): Moderate  Onset quality:  Gradual  Duration:  1 day  Timing:  Constant  Progression:  Unchanged  Chronicity:  New  Treatment compliance:  Untreated  Relieved by:  Nothing  Worsened by:  Nothing  Associated symptoms: no abdominal pain, no appetite change, no chest pain and no fatigue    Risk factors: hx of mental illness        Prior to Admission Medications   Prescriptions Last Dose Informant Patient Reported? Taking?    ARIPiprazole (ABILIFY) 20 MG tablet  Outside Facility (Specify) No No   Sig: Take 1 tablet (20 mg total) by mouth daily at bedtime   D3-1000 25 MCG (1000 UT) tablet  Outside Facility (Specify) No No   Sig: Take 2 tablets (2,000 Units total) by mouth daily   Diclofenac Sodium (VOLTAREN) 1 %  Outside Facility (Specify) No No   Sig: Apply 2 g topically 4 (four) times a day   Patient not taking: Reported on 6/15/2023   Dyclonine-Glycerin (Cepacol Sore Throat Spray) 0 1-33 % LIQD  Outside Facility (Specify) No No   Sig: Apply 1 spray to the mouth or throat 3 (three) times a day as needed (sorethroat)   Elastic Bandages & Supports (Neoprene Knee Brace) MISC  Outside Facility (Specify) No No   Sig: Apply right knee brace in morning; remove at night   Patient not taking: Reported on 6/15/2023   GNP Sore Throat Spray 1 4 % mucosal liquid  Outside Facility (Specify) No No   Sig: APPLY 1 SPRAY TO MOUTH OR THROAT EVERY 2 HRS AS NEEDED FOR SORE THROAT   GNP Stomach Relief Max St 525 MG/15ML SUSP  Outside Facility (Specify) Yes No   Incontinence Supply Disposable (Wings Choice Plus Adult Briefs) [address] (Specify) No No   Sig: Use as directed (R39 81)   Konsyl Daily Fiber 28 3 %  Outside Facility (Specify) Yes No   LORazepam (ATIVAN) 0 5 mg tablet  Outside Facility (Specify) Yes No   Sig: Take 0 25 mg by mouth every 6 (six) hours as needed for anxiety Take 0 25mg TID   Mouthwashes (Listerine Antiseptic) LIQD  Care Giver No No   Sig: Swish and spit 5 mL 2 (two) times a day   Multiple Vitamins-Minerals (CertaVite/Antioxidants) TABS   No No   Sig: Take 1 tablet by mouth in the morning   RA Sunscreen SPF50 LOTN  Outside Facility (Specify) No No   Sig: Apply 15 minutes before sun exposure and every 2 hours   acetaminophen (TYLENOL) 500 mg tablet  Outside Facility (Specify) No No   Sig: Take 1 tablet (500 mg total) by mouth every 6 (six) hours as needed for mild pain   albuterol (Ventolin HFA) 90 mcg/act inhaler   No No   Sig: Inhale 2 puffs every 6 (six) hours as needed for wheezing   aluminum-magnesium hydroxide-simethicone (GNP Antacid & Anti-Gas) 1263-2430-582 mg/30 mL suspension  Outside Facility (Specify) No No   Sig: Take 15 mL by mouth every 4 (four) hours as needed for indigestion or heartburn   amitriptyline (ELAVIL) 10 mg tablet  Outside Facility (Specify) No No   Sig: Take 1 tablet (10 mg total) by mouth daily at bedtime   bacitracin topical ointment 500 units/g topical ointment  Outside Facility (Specify) No No   Sig: Cleanse site on nose with soap and water followed by bacitracin BID until healed then p r n    bismuth subsalicylate (PEPTO BISMOL) 524 mg/30 mL oral suspension  Outside Facility (Specify) No No   Sig: Take 15 mL (262 mg total) by mouth every 6 (six) hours as needed for indigestion   calcium carbonate (TUMS) 500 mg chewable tablet  Outside Facility (Specify) No No   Sig: Chew 1 tablet (500 mg total) 3 (three) times a day as needed for heartburn   carbamide peroxide (DEBROX) 6 5 % otic solution  Outside Facility (Specify) No No   Sig: Administer 5 drops into the left ear 2 (two) times a day Use 1 week prior to ENT appointment  Also can use 4 gtts twice weekly (Tuesday and Friday for example) to each ear for prevention  Keep hearing aid out x 10 minutes after ear drops administered     dicyclomine (BENTYL) 20 mg tablet  Outside Facility (Specify) No No   Sig: Take 1 tablet (20 mg total) by mouth every 6 (six) hours as needed (dysphagia)   escitalopram (LEXAPRO) 20 mg tablet  Outside Facility (Specify) No No   Sig: Take 1 tablet (20 mg total) by mouth daily   fluticasone (FLONASE) 50 mcg/act nasal spray  Outside Facility (Specify) No No   Si spray into each nostril daily as needed for rhinitis (nasal congestion) At 8:00 AM   gabapentin (Neurontin) 300 mg capsule  Outside Facility (Specify) No No   Sig: Take 1 capsule (300 mg total) by mouth daily at bedtime   hydrOXYzine HCL (ATARAX) 10 mg tablet  Outside Facility (Specify) No No   Sig: Take 1 tablet (10 mg total) by mouth 3 (three) times a day   ibuprofen (MOTRIN) 400 mg tablet  Outside Facility (Specify) No No   Sig: TAKE 1 TABLET BY MOUTH EVERY 8 HOURS AS NEEDED FOR MILD/MOD PAIN OR HEADACHES   ibuprofen (MOTRIN) 400 mg tablet   No No   Sig: Take 1 5 tablets (600 mg total) by mouth every 6 (six) hours as needed for mild pain   ketoconazole (NIZORAL) 2 % cream  Outside Facility (Specify) No No   Sig: Apply topically daily   lamoTRIgine (LaMICtal) 100 mg tablet  Outside Facility (Specify) Yes No   Sig: Take 100 mg by mouth 2 (two) times a day Take 50mg BID   lamoTRIgine (LaMICtal) 25 mg tablet  Outside Facility (Specify) Yes No   Sig: Take 25 mg by mouth 2 (two) times a day     Patient not taking: Reported on 6/15/2023   lidocaine (LMX) 4 % cream  Outside Facility (Specify) No No   Sig: Apply topically as needed for mild pain   lubiprostone (Amitiza) 24 mcg capsule  Outside Facility (Specify) No No   Sig: Take 1 capsule (24 mcg total) by mouth daily with breakfast   magnesium hydroxide (GNP Milk of Magnesia) 400 mg/5 mL oral suspension  Outside Facility (Specify) No No   Si TBSP (30ML) BY MOUTH DAILY AS NEEDED IF NO BM IN 3 DAYS (CONSTIPATION)   metoprolol tartrate (LOPRESSOR) 25 mg tablet  Outside Facility (Specify) No No   Sig: Take 1 tablet (25 mg total) by mouth 2 (two) times a day   mineral oil-hydrophilic petrolatum (AQUAPHOR) ointment  Outside Facility (Specify) No No   Sig: Apply topically as needed for dry skin   norgestimate-ethinyl estradiol (ORTHO-CYCLEN) 0 25-35 MG-MCG per tablet  Outside Facility (Specify) No No   Sig: Take 1 tablet by mouth daily   nystatin (MYCOSTATIN) powder  Outside Facility (Specify) No No   Sig: Apply 1 application   topically 4 (four) times a day   nystatin-triamcinolone (MYCOLOG-II) cream  Outside Facility (Specify) No No   Sig: Apply topically 2 (two) times a day   pantoprazole (PROTONIX) 20 mg tablet  Outside Facility (Specify) No No   Sig: Take 1 tablet (20 mg total) by mouth daily   psyllium (METAMUCIL SMOOTH TEXTURE) 28 % packet  Outside Facility (Specify) Yes No   psyllium (METAMUCIL) 58 6 % packet  Outside Facility (Specify) No No   Sig: Take 1 packet by mouth daily   senna-docusate sodium (Senexon-S) 8 6-50 mg per tablet  Outside Facility (Specify) No No   Sig: Take 1 tablet by mouth daily at 8am    sodium chloride (OCEAN) 0 65 % nasal spray  Outside Facility (Specify) No No   Si spray into each nostril as needed (three times daily as needed for nasal congestion)   trospium chloride (SANCTURA) 20 mg tablet  Outside Facility (Specify) No No   Sig: Take 1 tablet (20 mg total) by mouth 2 (two) times a day   zinc oxide (DESITIN) 13 % cream  Outside Facility (Specify) No No   Sig: Apply 1 application topically as needed (for perianal irritation)      Facility-Administered Medications: None       Past Medical History:   Diagnosis Date   • ADD (attention deficit disorder)    • Anxiety    • Astigmatism    • Brain lesion    • Calcium deficiency    • Cellulitis of foot, right 2018   • Cerebral palsy (HCC)    • Chronic otitis media    • Constipation    • Depression    • Dysphagia    • Esophagitis    • Esotropia    • GERD (gastroesophageal reflux disease)    • Hiatal hernia    • Hydrocephalus (HCC)    • Impaired fasting glucose    • Left nephrolithiasis 03/04/2019   • Myopia    • Oppositional defiant disorder    • 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HCC)    • Seizures (HCC)    • Sensorineural hearing loss    • Sleep apnea    • Status post ventriculoatrial shunt placement    • Visual impairment        Past Surgical History:   Procedure Laterality Date   • BREAST BIOPSY Left     X 2 (not sure of years)   • CSF SHUNT      Creation of Ventriculo-Peritoneal CSF shunt ; Last Assessed:7/6/2016   • EAR SURGERY      Last Assessed:7/6/2016   • LEG SURGERY      due to CP    • NOSE SURGERY      Last Assessed:7/6/2016   • NJ ESOPHAGOGASTRODUODENOSCOPY TRANSORAL DIAGNOSTIC N/A 5/9/2019    Procedure: ESOPHAGOGASTRODUODENOSCOPY (EGD) with biopsy;  Surgeon: Homero Bray MD;  Location: [facility]; Service: Gastroenterology   • UPPER GASTROINTESTINAL ENDOSCOPY  05/2019       Family History   Problem Relation Age of Onset   • Diabetes Mother    • Colon cancer Father    • No Known Problems Maternal Grandmother    • No Known Problems Maternal Grandfather    • No Known Problems Paternal Grandmother    • No Known Problems Paternal Grandfather    • Hypertension Neg Hx    • Heart disease Neg Hx    • Stroke Neg Hx    • Thyroid disease Neg Hx      I have reviewed and agree with the history as documented      E-Cigarette/Vaping   • E-Cigarette Use Never User      E-Cigarette/Vaping Substances   • Nicotine No    • THC No    • CBD No    • Flavoring No    • Other No    • Unknown No      Social History     Tobacco Use   • Smoking status: Never   • Smokeless tobacco: Never   Vaping Use   • Vaping Use: Never used   Substance Use Topics   • Alcohol use: Never   • Drug use: Never       Review of Systems   Constitutional: Negative for activity change, appetite change, fatigue and fever    HENT: Negative for congestion, dental problem, ear pain, rhinorrhea and sore throat  Eyes: Negative for pain and redness  Respiratory: Negative for chest tightness, shortness of breath and wheezing  Cardiovascular: Negative for chest pain and palpitations  Gastrointestinal: Negative for abdominal pain, blood in stool, constipation, diarrhea, nausea and vomiting  Endocrine: Negative for cold intolerance and heat intolerance  Genitourinary: Negative for dysuria, frequency and hematuria  Musculoskeletal: Negative for arthralgias and myalgias  Skin: Negative for color change, pallor and rash  Neurological: Negative for weakness and numbness  Hematological: Does not bruise/bleed easily  Psychiatric/Behavioral: Positive for hallucinations and suicidal ideas  Negative for agitation  Physical Exam  Physical Exam  Constitutional:       Appearance: She is well-developed  HENT:      Head: Normocephalic and atraumatic  Eyes:      Pupils: Pupils are equal, round, and reactive to light  Neck:      Vascular: No JVD  Trachea: No tracheal deviation  Cardiovascular:      Rate and Rhythm: Normal rate and regular rhythm  Pulmonary:      Effort: No tachypnea, accessory muscle usage or respiratory distress  Abdominal:      General: There is no distension  Palpations: There is no mass  Tenderness: There is no abdominal tenderness  There is no right CVA tenderness or left CVA tenderness  Hernia: No hernia is present  Musculoskeletal:      Right lower leg: Normal       Left lower leg: Normal    Skin:     General: Skin is warm  Capillary Refill: Capillary refill takes less than 2 seconds  Coloration: Skin is not jaundiced or pale  Neurological:      Mental Status: She is alert and oriented to person, place, and time     Psychiatric:         Attention and Perception: Attention and perception normal          Mood and Affect: Mood and affect normal  Speech: Speech normal          Behavior: Behavior normal          Thought Content: Thought content is not paranoid or delusional  Thought content includes suicidal ideation  Thought content does not include homicidal ideation  Thought content does not include homicidal plan           Vital Signs  ED Triage Vitals [06/23/23 1402]   Temperature Pulse Respirations Blood Pressure SpO2   98 7 °F (37 1 °C) 100 16 134/73 97 %      Temp Source Heart Rate Source Patient Position - Orthostatic VS BP Location FiO2 (%)   Oral -- Lying -- --      Pain Score       8           Vitals:    06/23/23 1402   BP: 134/73   Pulse: 100   Patient Position - Orthostatic VS: Lying         Visual Acuity      ED Medications  Medications   cephalexin (KEFLEX) capsule 500 mg (has no administration in time range)       Diagnostic Studies  Results Reviewed     Procedure Component Value Units Date/Time    Urinalysis with microscopic [[id number]]  (Abnormal) Collected: 06/23/23 1621    Lab Status: Final result Specimen: Urine, Clean Catch Updated: 06/23/23 1733     Color, UA Yellow     Clarity, UA Clear     Specific Gravity, UA 1 018     pH, UA 6 5     Leukocytes, UA Large     Nitrite, UA Negative     Protein, UA Negative mg/dl      Glucose, UA Negative mg/dl      Ketones, UA Negative mg/dl      Urobilinogen, UA <2 0 mg/dl      Bilirubin, UA Negative     Occult Blood, UA Small     RBC, UA 4-10 /hpf      WBC, UA 20-30 /hpf      Epithelial Cells Occasional /hpf      Bacteria, UA Occasional /hpf      MUCUS THREADS Occasional    POCT alcohol breath test [[id number]]  (Normal) Resulted: 06/23/23 1606    Lab Status: Final result Updated: 06/23/23 1606     EXTBreath Alcohol 0 00    Rapid drug screen, urine [[id number]]  (Normal) Collected: 06/23/23 1510    Lab Status: Final result Specimen: Urine, Catheter Updated: 06/23/23 1535     Amph/Meth UR Negative     Barbiturate Ur Negative     Benzodiazepine Urine Negative     Cocaine Urine Negative     Methadone Urine Negative     Opiate Urine Negative     PCP Ur Negative     THC Urine Negative     Oxycodone Urine Negative    Narrative:      FOR MEDICAL PURPOSES ONLY  IF CONFIRMATION NEEDED PLEASE CONTACT THE LAB WITHIN 5 DAYS  Drug Screen Cutoff Levels:  AMPHETAMINE/METHAMPHETAMINES  1000 ng/mL  BARBITURATES     200 ng/mL  BENZODIAZEPINES     200 ng/mL  COCAINE      300 ng/mL  METHADONE      300 ng/mL  OPIATES      300 ng/mL  PHENCYCLIDINE     25 ng/mL  THC       50 ng/mL  OXYCODONE      100 ng/mL                 No orders to display              Procedures  Procedures         ED Course  ED Course as of 06/23/23 1739   Fri Jun 23, 2023   1737 Leukocytes, UA(!): Large  Will tx for uti                                             Medical Decision Making  Hallucinations and SI-will consult crisis    Amount and/or Complexity of Data Reviewed  Labs: ordered  Disposition  Final diagnoses:   Acute urinary tract infection   Suicidal ideation   Hallucinations     Time reflects when diagnosis was documented in both MDM as applicable and the Disposition within this note     Time User Action Codes Description Comment    6/23/2023  5:38 PM Brook Loyd Add [N39 0] Acute urinary tract infection     6/23/2023  5:38 PM Brook Loyd Add [Y35 846] Suicidal ideation     6/23/2023  5:38 PM Brook Loyd Add [R44 3] Hallucinations       ED Disposition     ED Disposition   Discharge    Condition   Stable    Date/Time   Fri Jun 23, 2023  5:38 PM    Comment   Abril Holland discharge to home/self care                 MD Casie Moraes   Sending MD Dr Dinorah Solis up With Specialties Details Why Contact Info    PCP  Schedule an appointment as soon as possible for a visit in 2 days            Patient's Medications   Discharge Prescriptions    CEPHALEXIN (KEFLEX) 250 MG CAPSULE    Take 2 capsules (500 mg total) by mouth 2 (two) times a day for 5 days       Start Date: 6/23/2023 End Date: 6/28/2023       Order Dose: 500 mg       Quantity: 20 capsule    Refills: 0       No discharge procedures on file      PDMP Review       Value Time User    PDMP Reviewed  Yes 10/6/2021  1:11 PM Aleksandra Cook PA-C          ED Provider  Electronically Signed by           Esther Evans MD  06/23/23 0672

## 2023-06-23 NOTE — DISCHARGE INSTRUCTIONS
The patient was Instructed to follow up with their psychiatrist  The patient was provided with referral information for:   Outpatient facilities to utilize if needed  Pt has services through the group home  This writer and the patient completed a safety plan  The patient was provided with a copy of their safety plan with encouragement to utilize the plan following discharge  In addition, the patient was instructed to call local [facility] crisis, other crisis services, 911 or to go to the nearest ER immediately if their situation changes at any time  This writer discussed discharge plans with the patient and group home staff, who agrees with and understands the discharge plans  SAFETY PLAN  Warning Signs (thoughts, images, mood, behavior, situations) of a potential crisis: change in moods, increased crying, loss of control     Coping Skills (what can I do to take my mind off the problem, or to keep myself safe): talk to the group home nurse     Outside Support (who can I reach out to for support and help): return to ER             National Suicide Prevention Hotline:  [address] [place] [address] [phone] - [place] Crisis/Mobile Crisis   [address]: [phone]  [facility]: [facility] [address] [facility] [address] [place] [address] [address] [phone] - Crisis   [phone] - Peer Support Talk Line (1-9pm daily)  [phone] - Teen Support Talk Line (1-9pm daily)  [address] [place] ([place]) [phone] [facility] Crisis                       Revision History

## 2023-06-23 NOTE — PATIENT INSTRUCTIONS
Thank you for your confidence in our team    We appreciate you and welcome your feedback  If you receive a survey from us, please take a few moments to let us know how we are doing  Sincerely,  TA Bernal       OBESITY     Obesity is defined as a body mass index (BMI) which is greater than 30  Your Body mass index is 48 87 kg/m²       The risks of obesity include  many health problems, such as injuries or physical disability  You may need tests to check for the following:  Diabetes     High blood pressure or high cholesterol     Heart disease     Gallbladder or liver disease     Cancer of the colon, breast, prostate, liver, or kidney     Sleep apnea     Arthritis or gout    Seek care immediately if:   You have a severe headache, confusion, or difficulty speaking  You have weakness on one side of your body  You have chest pain, sweating, or shortness of breath  Contact your healthcare provider if:   You have symptoms of gallbladder or liver disease, such as pain in your upper abdomen  You have knee or hip pain and discomfort while walking  You have symptoms of diabetes, such as intense hunger and thirst, and frequent urination  You have symptoms of sleep apnea, such as snoring or daytime sleepiness  You have questions or concerns about your condition or care  Treatment for obesity  focuses on helping you lose weight to improve your health  Even a small decrease in BMI can reduce the risk for many health problems  Your healthcare provider will help you set a weight-loss goal   Lifestyle changes  are the first step in treating obesity  These include making healthy food choices and getting regular physical activity  Your healthcare provider may suggest a weight-loss program that involves coaching, education, and therapy  Medicine  may help you lose weight when it is used with a healthy diet and physical activity       Surgery  can help you lose weight if you are very obese and have other health problems  There are several types of weight-loss surgery  Ask your healthcare provider for more information  Be successful losing weight:   Set small, realistic goals  An example of a small goal is to walk for 20 minutes 5 days a week  Juliana goal is to lose 5% of your body weight  Tell friends, family members, and coworkers about your goals  and ask for their support  Ask a friend to lose weight with you, or join a weight-loss support group  Identify foods or triggers that may cause you to overeat , and find ways to avoid them  Remove tempting high-calorie foods from your home and workplace  Place a bowl of fresh fruit on your kitchen counter  If stress causes you to eat, then find other ways to cope with stress  Keep a diary to track what you eat and drink  Also write down how many minutes of physical activity you do each day  Weigh yourself once a week and record it in your diary  Eating changes: You will need to eat 500 to 1,000 fewer calories each day than you currently eat to lose 1 to 2 pounds a week  The following changes will help you cut calories:  Eat smaller portions  Use small plates, no larger than 9 inches in diameter  Fill your plate half full of fruits and vegetables  Measure your food using measuring cups until you know what a serving size looks like  Eat 3 meals and 1 or 2 snacks each day  Plan your meals in advance  [place] and eat at home most of the time  Eat slowly  Eat fruits and vegetables at every meal   They are low in calories and high in fiber, which makes you feel full  Do not add butter, margarine, or cream sauce to vegetables  Use herbs to season steamed vegetables  Eat less fat and fewer fried foods  Eat more baked or grilled chicken and fish  These protein sources are lower in calories and fat than red meat  Limit fast food  Dress your salads with olive oil and vinegar instead of bottled dressing       Limit the amount of sugar you eat  Do not drink sugary beverages  Limit alcohol  Activity changes:  Physical activity is good for your body in many ways  It helps you burn calories and build strong muscles  It decreases stress and depression, and improves your mood  It can also help you sleep better  Talk to your healthcare provider before you begin an exercise program   Exercise for at least 30 minutes 5 days a week  Start slowly  Set aside time each day for physical activity that you enjoy and that is convenient for you  It is best to do both weight training and an activity that increases your heart rate, such as walking, bicycling, or swimming  Find ways to be more active  Do yard work and housecleaning  Walk up the stairs instead of using elevators  Spend your leisure time going to events that require walking, such as outdoor festivals or fairs  This extra physical activity can help you lose weight and keep it off  Follow up with your primary healthcare provider as directed  You may need to meet with a dietitian  Write down your questions so you remember to ask them during your visits

## 2023-06-28 ENCOUNTER — OFFICE VISIT (OUTPATIENT)
Dept: GASTROENTEROLOGY | Facility: MEDICAL CENTER | Age: 43
End: 2023-06-28
Payer: MEDICARE

## 2023-06-28 VITALS
BODY MASS INDEX: 49.15 KG/M2 | WEIGHT: 218.5 LBS | SYSTOLIC BLOOD PRESSURE: 126 MMHG | DIASTOLIC BLOOD PRESSURE: 78 MMHG | HEART RATE: 73 BPM | TEMPERATURE: 98.3 F | HEIGHT: 56 IN

## 2023-06-28 DIAGNOSIS — F45.8 FUNCTIONAL DYSPHAGIA: ICD-10-CM

## 2023-06-28 DIAGNOSIS — K59.04 CHRONIC IDIOPATHIC CONSTIPATION: ICD-10-CM

## 2023-06-28 DIAGNOSIS — K21.9 GASTROESOPHAGEAL REFLUX DISEASE WITHOUT ESOPHAGITIS: Primary | ICD-10-CM

## 2023-06-28 DIAGNOSIS — K31.A0 GASTRIC INTESTINAL METAPLASIA: ICD-10-CM

## 2023-06-28 PROCEDURE — 99214 OFFICE O/P EST MOD 30 MIN: CPT | Performed by: INTERNAL MEDICINE

## 2023-06-28 NOTE — PATIENT INSTRUCTIONS
High Fiber Diet   AMBULATORY CARE:   A high-fiber diet  includes foods that have a high amount of fiber  Fiber is the part of fruits, vegetables, and grains that is not broken down by your body  Fiber keeps your bowel movements regular  Fiber can also help lower your cholesterol level, control blood sugar in people with diabetes, and relieve constipation  Fiber can also help you control your weight because it helps you feel full faster  Most adults should eat 25 to 35 grams of fiber each day  Talk to your dietitian or healthcare provider about the amount of fiber you need  Good sources of fiber:       Foods with at least 4 grams of fiber per serving:      ? to ½ cup of high-fiber cereal (check the nutrition label on the box)    ½ cup of blackberries or raspberries    4 dried prunes    1 cooked artichoke    ½ cup of cooked legumes, such as lentils, or red, kidney, and saxena beans    Foods with 1 to 3 grams of fiber per servin slice of whole-wheat, pumpernickel, or rye bread    ½ cup of cooked brown rice    4 whole-wheat crackers    1 cup of oatmeal    ½ cup of cereal with 1 to 3 grams of fiber per serving (check the nutrition label on the box)    1 small piece of fruit, such as an apple, banana, pear, kiwi, or orange    3 dates    ½ cup of canned apricots, fruit cocktail, peaches, or pears    ½ cup of raw or cooked vegetables, such as carrots, cauliflower, cabbage, spinach, squash, or corn  Ways that you can increase fiber in your diet:   Choose brown or wild rice instead of white rice  Use whole wheat flour in recipes instead of white or all-purpose flour  Add beans and peas to casseroles or soups  Choose fresh fruit and vegetables with peels or skins on instead of juices  Other diet guidelines to follow: Add fiber to your diet slowly  You may have abdominal discomfort, bloating, and gas if you add fiber to your diet too quickly  Drink plenty of liquids as you add fiber to your diet  You may have nausea or develop constipation if you do not drink enough water  Ask how much liquid to drink each day and which liquids are best for you  © Copyright [employer] 2022 Information is for End User's use only and may not be sold, redistributed or otherwise used for commercial purposes  The above information is an  only  It is not intended as medical advice for individual conditions or treatments  Talk to your doctor, nurse or pharmacist before following any medical regimen to see if it is safe and effective for you

## 2023-06-28 NOTE — PROGRESS NOTES
[facility] - Outpatient Follow-up Note  Seda Marques 39 y o  female MRN: [id number]  Encounter: [id number]          ASSESSMENT AND PLAN:  41-year-old female with history of sleep apnea, hypertension, palsy presents for follow-up evaluation  1  Gastroesophageal reflux disease without esophagitis  2  Functional dysphagia  She had history of chronic GERD and dysphagia  She underwent EGD showing small hiatal hernia otherwise unremarkable  Manometry testing and speech and swallow evaluation were also unremarkable  Her dysphagia has mostly resolved other than intermittent episodes which she is able to eat well  She will continue pantoprazole 20 mg daily  3  Chronic idiopathic constipation  She has a history of chronic constipation which is controlled on Amitiza daily  She states that Metamucil causes loose stools  I recommend she use Metamucil as needed and her loose stools, and provided a handout on high-fiber diet and recommend that goal is 25 to 30 g/day  - psyllium (METAMUCIL) 58 6 % packet; Take 1 packet by mouth daily As needed for constipation  Dispense: 30 packet; Refill: 5        3  Gastric intestinal metaplasia  She was found to have gastric intestinal metaplasia on 2019 EGD biopsies  He is due for colonoscopy next year for colon cancer screening  Consider scheduling EGD at the time of her colonoscopy to obtain mapping biopsies given intestinal metaplasia seen on 2019 exam    Follow-up in 1 year      ______________________________________________________________________    SUBJECTIVE: 41-year-old female with history of sleep apnea, hypertension, palsy presents for follow-up evaluation  She was last seen in the GI office January 2023    She has a history of chronic dysphagia for which she has undergone testing in the past including EGD, manometry and barium swallow which were normal   At her last visit she was maintained on Protonix 20 mg twice daily for history of GERD she also has a history of chronic constipation and recurrent partial small bowel obstructions last in 2021  Interval history: She reports having a bowel movement usually every day  At times she has looser stools when she uses Metamucil  Her GERD symptoms are controlled on 20 mg of Protonix daily  Constipation symptoms are controlled on Amitiza  At times she can have intermittent dysphagia but this self resolves      Prior EGD/colonoscopy     2019 EGD was normal other than hiatal hernia  Gastric biopsy showed intestinal metaplasia, esophageal biopsies were unremarkable  2020 manometry showed normal motility with small hiatal hernia    7/2022 CT abdomen pelvis showed small hiatal hernia otherwise unremarkable abdominal exam  2021 speech and swallow evaluation showed normal oral and pharyngeal components of swallowing mild retropulsion in the mid esophagus    REVIEW OF SYSTEMS IS OTHERWISE NEGATIVE  Point review of systems is negative other than stated as per HPI  Historical Information   Past Medical History:   Diagnosis Date   • ADD (attention deficit disorder)    • Anxiety    • Astigmatism    • Brain lesion    • Calcium deficiency    • Cellulitis of foot, right 6/4/2018   • Cerebral palsy (HCC)    • Chronic otitis media    • Constipation    • Depression    • Dysphagia    • Esophagitis    • Esotropia    • GERD (gastroesophageal reflux disease)    • Hiatal hernia    • Hydrocephalus (HCC)    • Impaired fasting glucose    • Left nephrolithiasis 03/04/2019   • Myopia    • Oppositional defiant disorder    • 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HCC)    • Seizures ([facility])    • Sensorineural hearing loss    • Sleep apnea    • Status post ventriculoatrial shunt placement    • Visual impairment      Past Surgical History:   Procedure Laterality Date   • BREAST BIOPSY Left     X 2 (not sure of years)   • CSF SHUNT      Creation of Ventriculo-Peritoneal CSF shunt ;  Last Assessed:7/6/2016   • EAR SURGERY      Last Assessed:7/6/2016   • LEG SURGERY      due to CP    • NOSE SURGERY      Last Assessed:7/6/2016   • [place] ESOPHAGOGASTRODUODENOSCOPY TRANSORAL DIAGNOSTIC N/A 5/9/2019    Procedure: ESOPHAGOGASTRODUODENOSCOPY (EGD) with biopsy;  Surgeon: Demond Davalos MD;  Location: [facility];   Service: Gastroenterology   • UPPER GASTROINTESTINAL ENDOSCOPY  05/2019     Social History   Social History     Substance and Sexual Activity   Alcohol Use Never     Social History     Substance and Sexual Activity   Drug Use Never     Social History     Tobacco Use   Smoking Status Never   Smokeless Tobacco Never     Family History   Problem Relation Age of Onset   • Diabetes Mother    • Colon cancer Father    • No Known Problems Maternal Grandmother    • No Known Problems Maternal Grandfather    • No Known Problems Paternal Grandmother    • No Known Problems Paternal Grandfather    • Hypertension Neg Hx    • Heart disease Neg Hx    • Stroke Neg Hx    • Thyroid disease Neg Hx        Meds/Allergies       Current Outpatient Medications:   •  acetaminophen (TYLENOL) 500 mg tablet  •  albuterol (Ventolin HFA) 90 mcg/act inhaler  •  aluminum-magnesium hydroxide-simethicone (GNP Antacid & Anti-Gas) 5489-2910-272 mg/30 mL suspension  •  amitriptyline (ELAVIL) 10 mg tablet  •  ARIPiprazole (ABILIFY) 20 MG tablet  •  bacitracin topical ointment 500 units/g topical ointment  •  bismuth subsalicylate (PEPTO BISMOL) 524 mg/30 mL oral suspension  •  calcium carbonate (TUMS) 500 mg chewable tablet  •  carbamide peroxide (DEBROX) 6 5 % otic solution  •  cephalexin (KEFLEX) 250 mg capsule  •  D3-1000 25 MCG (1000 UT) tablet  •  Diclofenac Sodium (VOLTAREN) 1 %  •  dicyclomine (BENTYL) 20 mg tablet  •  Dyclonine-Glycerin (Cepacol Sore Throat Spray) 0 1-33 % LIQD  •  Elastic Bandages & Supports (Neoprene Knee Brace) MISC  •  escitalopram (LEXAPRO) 20 mg tablet  •  fluticasone (FLONASE) 50 mcg/act nasal spray  •  gabapentin (Neurontin) 300 mg capsule  •  GNP Sore "Throat Spray 1 4 % mucosal liquid  •  GNP Stomach Relief Max St 525 MG/15ML SUSP  •  hydrOXYzine HCL (ATARAX) 10 mg tablet  •  ibuprofen (MOTRIN) 400 mg tablet  •  ibuprofen (MOTRIN) 400 mg tablet  •  Incontinence Supply Disposable (Wings Choice Plus Adult Briefs) MISC  •  ketoconazole (NIZORAL) 2 % cream  •  Konsyl Daily Fiber 28 3 %  •  lamoTRIgine (LaMICtal) 100 mg tablet  •  lamoTRIgine (LaMICtal) 25 mg tablet  •  lidocaine (LMX) 4 % cream  •  LORazepam (ATIVAN) 0 5 mg tablet  •  lubiprostone (Amitiza) 24 mcg capsule  •  magnesium hydroxide (GNP Milk of Magnesia) 400 mg/5 mL oral suspension  •  metoprolol tartrate (LOPRESSOR) 25 mg tablet  •  mineral oil-hydrophilic petrolatum (AQUAPHOR) ointment  •  Mouthwashes (Listerine Antiseptic) LIQD  •  Multiple Vitamins-Minerals (CertaVite/Antioxidants) TABS  •  norgestimate-ethinyl estradiol (ORTHO-CYCLEN) 0 25-35 MG-MCG per tablet  •  nystatin (MYCOSTATIN) powder  •  nystatin-triamcinolone (MYCOLOG-II) cream  •  pantoprazole (PROTONIX) 20 mg tablet  •  psyllium (METAMUCIL SMOOTH TEXTURE) 28 % packet  •  psyllium (METAMUCIL) 58 6 % packet  •  RA Sunscreen SPF50 LOTN  •  senna-docusate sodium (Senexon-S) 8 6-50 mg per tablet  •  sodium chloride (OCEAN) 0 65 % nasal spray  •  trospium chloride (SANCTURA) 20 mg tablet  •  zinc oxide (DESITIN) 13 % cream    No Known Allergies        Objective     Blood pressure 126/78, pulse 73, temperature 98 3 °F (36 8 °C), temperature source Tympanic, height 4' 8\" (1 422 m), weight 99 1 kg (218 lb 8 oz), not currently breastfeeding  Body mass index is 48 99 kg/m²  PHYSICAL EXAM:      General Appearance:   Alert, cooperative, no distress   HEENT:   Normocephalic, atraumatic, anicteric  Neck:  Supple, symmetrical, trachea midline   Lungs:   Clear to auscultation bilaterally; no rales, rhonchi or wheezing; respirations unlabored    Heart[de-identified]   Regular rate and rhythm; no murmur, rub, or gallop     Abdomen:   Soft, non-tender, " non-distended; normal bowel sounds; no masses, no organomegaly    Genitalia:   Deferred    Rectal:   Deferred    Extremities:  No cyanosis, clubbing or edema    Pulses:  2+ and symmetric    Skin:  No jaundice, rashes, or lesions    Lymph nodes:  No palpable cervical lymphadenopathy        Lab Results:   No visits with results within 1 Day(s) from this visit  Latest known visit with results is:   Admission on 06/23/2023, Discharged on 06/23/2023   Component Date Value   • Amph/Meth UR 06/23/2023 Negative    • Barbiturate Ur 06/23/2023 Negative    • Benzodiazepine Urine 06/23/2023 Negative    • Cocaine Urine 06/23/2023 Negative    • Methadone Urine 06/23/2023 Negative    • Opiate Urine 06/23/2023 Negative    • PCP Ur 06/23/2023 Negative    • THC Urine 06/23/2023 Negative    • Oxycodone Urine 06/23/2023 Negative    • EXTBreath Alcohol 06/23/2023 0 00    • Color, UA 06/23/2023 Yellow    • Clarity, UA 06/23/2023 Clear    • Specific Gravity, UA 06/23/2023 1 018    • pH, UA 06/23/2023 6 5    • Leukocytes, UA 06/23/2023 Large (A)    • Nitrite, UA 06/23/2023 Negative    • Protein, UA 06/23/2023 Negative    • Glucose, UA 06/23/2023 Negative    • Ketones, UA 06/23/2023 Negative    • Urobilinogen, UA 06/23/2023 <2 0    • Bilirubin, UA 06/23/2023 Negative    • Occult Blood, UA 06/23/2023 Small (A)    • RBC, UA 06/23/2023 4-10 (A)    • WBC, UA 06/23/2023 20-30 (A)    • Epithelial Cells 06/23/2023 Occasional    • Bacteria, UA 06/23/2023 Occasional    • MUCUS THREADS 06/23/2023 Occasional (A)          Radiology Results:   XR chest 1 view portable    Result Date: 6/15/2023  Narrative: CHEST INDICATION:   cp  COMPARISON: CXR and chest CT 3/13/2023  EXAM PERFORMED/VIEWS:  XR CHEST PORTABLE  FINDINGS: Ventricular shunt at cavoatrial junction  Cardiomediastinal silhouette normal  Lungs clear  No effusion or pneumothorax  Upper abdomen normal  Bones normal for age  Impression: No acute cardiopulmonary disease   Workstation performed: [id number]

## 2023-07-03 ENCOUNTER — OFFICE VISIT (OUTPATIENT)
Dept: FAMILY MEDICINE CLINIC | Facility: CLINIC | Age: 43
End: 2023-07-03

## 2023-07-03 VITALS
TEMPERATURE: 98.6 F | HEART RATE: 96 BPM | OXYGEN SATURATION: 96 % | SYSTOLIC BLOOD PRESSURE: 110 MMHG | BODY MASS INDEX: 48.56 KG/M2 | WEIGHT: 216.6 LBS | DIASTOLIC BLOOD PRESSURE: 71 MMHG

## 2023-07-03 DIAGNOSIS — T14.8XXA WOUND, OPEN: ICD-10-CM

## 2023-07-03 DIAGNOSIS — R00.2 PALPITATIONS: ICD-10-CM

## 2023-07-03 DIAGNOSIS — B37.2 CANDIDIASIS OF SKIN: ICD-10-CM

## 2023-07-03 DIAGNOSIS — R26.2 AMBULATORY DYSFUNCTION: Primary | ICD-10-CM

## 2023-07-03 DIAGNOSIS — K21.9 GASTROESOPHAGEAL REFLUX DISEASE WITHOUT ESOPHAGITIS: ICD-10-CM

## 2023-07-03 DIAGNOSIS — K59.00 CONSTIPATION, UNSPECIFIED CONSTIPATION TYPE: ICD-10-CM

## 2023-07-03 DIAGNOSIS — R13.10 DYSPHAGIA, UNSPECIFIED TYPE: ICD-10-CM

## 2023-07-03 DIAGNOSIS — R26.9 GAIT DISTURBANCE: ICD-10-CM

## 2023-07-03 DIAGNOSIS — B37.31 VULVAL CANDIDIASIS: ICD-10-CM

## 2023-07-03 DIAGNOSIS — G80.1 SPASTIC DIPLEGIC CEREBRAL PALSY (HCC): ICD-10-CM

## 2023-07-03 DIAGNOSIS — H65.195 OTHER RECURRENT ACUTE NONSUPPURATIVE OTITIS MEDIA OF LEFT EAR: ICD-10-CM

## 2023-07-03 PROBLEM — H66.90 OTITIS MEDIA: Status: ACTIVE | Noted: 2023-07-03

## 2023-07-03 PROCEDURE — 99214 OFFICE O/P EST MOD 30 MIN: CPT | Performed by: FAMILY MEDICINE

## 2023-07-03 RX ORDER — CIPROFLOXACIN AND DEXAMETHASONE 3; 1 MG/ML; MG/ML
4 SUSPENSION/ DROPS AURICULAR (OTIC) 2 TIMES DAILY
Qty: 7.5 ML | Refills: 0 | Status: SHIPPED | OUTPATIENT
Start: 2023-07-03

## 2023-07-03 RX ORDER — AMOXICILLIN 250 MG
1 CAPSULE ORAL DAILY
Qty: 30 TABLET | Refills: 5 | Status: SHIPPED | OUTPATIENT
Start: 2023-07-03

## 2023-07-03 RX ORDER — AMOXICILLIN 500 MG/1
500 TABLET, FILM COATED ORAL 2 TIMES DAILY
Qty: 20 TABLET | Refills: 0 | Status: SHIPPED | OUTPATIENT
Start: 2023-07-03 | End: 2023-07-13

## 2023-07-03 RX ORDER — PANTOPRAZOLE SODIUM 20 MG/1
20 TABLET, DELAYED RELEASE ORAL DAILY
Qty: 62 TABLET | Refills: 5 | Status: SHIPPED | OUTPATIENT
Start: 2023-07-03

## 2023-07-03 RX ORDER — MULTIVIT-MIN/IRON/FOLIC ACID/K 18-600-40
2000 CAPSULE ORAL DAILY
Qty: 62 TABLET | Refills: 5 | Status: SHIPPED | OUTPATIENT
Start: 2023-07-03

## 2023-07-03 RX ORDER — DICYCLOMINE HCL 20 MG
20 TABLET ORAL EVERY 6 HOURS PRN
Qty: 120 TABLET | Refills: 0 | Status: SHIPPED | OUTPATIENT
Start: 2023-07-03

## 2023-07-03 RX ORDER — KETOCONAZOLE 20 MG/G
CREAM TOPICAL DAILY
Qty: 30 G | Refills: 5 | Status: SHIPPED | OUTPATIENT
Start: 2023-07-03

## 2023-07-03 RX ORDER — GINSENG 100 MG
CAPSULE ORAL
Qty: 15 G | Refills: 2 | Status: SHIPPED | OUTPATIENT
Start: 2023-07-03

## 2023-07-03 NOTE — ASSESSMENT & PLAN NOTE
Currently reports the patient's symptom has ear infection  -Injected and erythematous tympanic membrane on the left as well as tenderness with manipulation of the, no mastoid tenderness  -We will treat with amoxicillin 500 mg twice daily for 10 days for otitis media; Ciprodex prescribed as well given the tenderness of the ear canal  -Follow-up in 2 weeks for ear recheck; patient needs improvement in symptoms before she can use new hearing aids as per audiology recommendations

## 2023-07-03 NOTE — PROGRESS NOTES
Assessment/Plan:    Ambulatory dysfunction  Patient moved to the new [facility] and needs in-home PT assessment  -Referral placed      Otitis media  Currently reports the patient's symptom has ear infection  -Injected and erythematous tympanic membrane on the left as well as tenderness with manipulation of the, no mastoid tenderness  -We will treat with amoxicillin 500 mg twice daily for 10 days for otitis media; Ciprodex prescribed as well given the tenderness of the ear canal  -Follow-up in 2 weeks for ear recheck; patient needs improvement in symptoms before she can use new hearing aids as per audiology recommendations       Diagnoses and all orders for this visit:    Ambulatory dysfunction  -     Ambulatory Referral to Physical Therapy; Future    Gait disturbance  -     Ambulatory Referral to Physical Therapy; Future    Other recurrent acute nonsuppurative otitis media of left ear  -     amoxicillin (AMOXIL) 500 MG tablet; Take 1 tablet (500 mg total) by mouth 2 (two) times a day for 10 days  -     ciprofloxacin-dexamethasone (CIPRODEX) otic suspension; Administer 4 drops into the left ear 2 (two) times a day    Wound, open  -     bacitracin topical ointment 500 units/g topical ointment; Cleanse site on nose with soap and water followed by bacitracin BID until healed then p.r.n. Candidiasis of skin  -     ketoconazole (NIZORAL) 2 % cream; Apply topically daily    Gastroesophageal reflux disease without esophagitis  -     pantoprazole (PROTONIX) 20 mg tablet; Take 1 tablet (20 mg total) by mouth daily    Constipation, unspecified constipation type  -     senna-docusate sodium (Senexon-S) 8.6-50 mg per tablet; Take 1 tablet by mouth daily at 8am.    Spastic diplegic cerebral palsy (HCC)  -     D3-1000 25 MCG (1000 UT) tablet; Take 2 tablets (2,000 Units total) by mouth daily    Palpitations  -     metoprolol tartrate (LOPRESSOR) 25 mg tablet;  Take 1 tablet (25 mg total) by mouth 2 (two) times a day    Vulval candidiasis  -     nystatin-triamcinolone (MYCOLOG-II) cream; Apply topically 2 (two) times a day    Dysphagia, unspecified type  -     dicyclomine (BENTYL) 20 mg tablet; Take 1 tablet (20 mg total) by mouth every 6 (six) hours as needed (dysphagia)          Subjective:      Patient ID: Karan Eddy is a 39 y.o. female. Patient presented with a caregiver as she needs PT home assessment since she moved to the new housing. Caregiver is also concerned the patient has been complaining of the left ear pain for the last few weeks, seen by audiology and reported that ear looked " abnormal". They also request a refill on all her medications that are prescribed through our office. The following portions of the patient's history were reviewed and updated as appropriate: allergies, current medications, past family history, past medical history, past social history, past surgical history and problem list.    Review of Systems   Constitutional: Negative. HENT: Positive for ear pain. Negative for ear discharge, sinus pressure and tinnitus. Eyes: Negative. Respiratory: Negative. Cardiovascular: Negative. Musculoskeletal: Positive for gait problem. Objective:      /71 (BP Location: Left arm, Patient Position: Sitting, Cuff Size: Large)   Pulse 96   Temp 98.6 °F (37 °C) (Temporal)   Wt 98.2 kg (216 lb 9.6 oz)   SpO2 96%   BMI 48.56 kg/m²          Physical Exam  Constitutional:       General: She is not in acute distress. HENT:      Right Ear: Tympanic membrane, ear canal and external ear normal. Decreased hearing noted. Left Ear: External ear normal. Decreased hearing noted. Tenderness present. No swelling. Tympanic membrane is injected and erythematous. Cardiovascular:      Rate and Rhythm: Normal rate and regular rhythm. Pulmonary:      Effort: Pulmonary effort is normal.      Breath sounds: Normal breath sounds. Abdominal:      Tenderness:  There is no abdominal tenderness. Neurological:      General: No focal deficit present. Mental Status: She is alert.

## 2023-07-12 ENCOUNTER — TELEPHONE (OUTPATIENT)
Dept: FAMILY MEDICINE CLINIC | Facility: CLINIC | Age: 43
End: 2023-07-12

## 2023-07-18 ENCOUNTER — TELEPHONE (OUTPATIENT)
Dept: FAMILY MEDICINE CLINIC | Facility: CLINIC | Age: 43
End: 2023-07-18

## 2023-07-18 NOTE — TELEPHONE ENCOUNTER
Folder (To be completed by) -Dr. Loly Woods     Name of Form - [facility]    Color folder [id number]    Form to be Faxed     Patient was made aware of the 7-10 business day form policy.

## 2023-07-19 DIAGNOSIS — H65.195 OTHER RECURRENT ACUTE NONSUPPURATIVE OTITIS MEDIA OF LEFT EAR: ICD-10-CM

## 2023-07-19 NOTE — TELEPHONE ENCOUNTER
Requesting change on the script instruction. Need to be included how long the patient need to use the drops. Thank you.

## 2023-07-19 NOTE — TELEPHONE ENCOUNTER
Dr. Blue Gibson had prescribed   ciprofloxacin-dexamethasone (CIPRODEX) otic suspension    But will need to know for how does the patient use the drop    Josselyn can be reached at [phone]

## 2023-07-20 RX ORDER — CIPROFLOXACIN AND DEXAMETHASONE 3; 1 MG/ML; MG/ML
4 SUSPENSION/ DROPS AURICULAR (OTIC) 2 TIMES DAILY
Qty: 7.5 ML | Refills: 0 | OUTPATIENT
Start: 2023-07-20

## 2023-07-24 ENCOUNTER — TELEPHONE (OUTPATIENT)
Dept: HEMATOLOGY ONCOLOGY | Facility: CLINIC | Age: 43
End: 2023-07-24

## 2023-07-26 NOTE — PROGRESS NOTES
Assessment/Plan:    Chest pain  Multiple ED visits in the last month   -So far cardiac work up has been unremarkable  -GERD between differentials evaluated by GI  EGD and barium swallow normal  -Will give ambulatory referral to cardiology  Lipid panel and A1c ordered at this visit  -Return to office and ED precautions reviewed with caregiver and patient         Problem List Items Addressed This Visit        Other    Chest pain - Primary     Multiple ED visits in the last month   -So far cardiac work up has been unremarkable  -GERD between differentials evaluated by GI  EGD and barium swallow normal  -Will give ambulatory referral to cardiology  Lipid panel and A1c ordered at this visit  -Return to office and ED precautions reviewed with caregiver and patient         Relevant Orders    Ambulatory referral to Cardiology    Lipid panel      Other Visit Diagnoses     Screening for diabetes mellitus        Class 3 severe obesity with body mass index (BMI) of 40 0 to 44 9 in adult, unspecified obesity type, unspecified whether serious comorbidity present ([facility] )        Relevant Orders    HEMOGLOBIN A1C W/ EAG ESTIMATION    Abnormal finding of blood chemistry, unspecified         Relevant Orders    HEMOGLOBIN A1C W/ EAG ESTIMATION            Subjective:      Patient ID: Dheeraj Subramanian is a 35 y o  female  Patient is a 34 yo F with PMH of cerebral palsy, moderate intellectual disability, gait disturbance, TIFFANY, depression, pituitary adenoma, GERD who presents to the office to follow up on recent ED visits  Caregiver states that for the last month patient has visited ED around 3 times for chest pain  Lab work so far negative for PE, negative ACS (Negative troponins and EKG)  Given patient is in multiple treatments for GERD with Protonix, Tums and Peptobismol she was sent for GI evaluations  SO far Barium swallow and EGD wnl  GI physician recommended follow up with cardiology  Patient has no complains at this time  Caregiver somehow seems unaware of patient medical back ground  She wasn't sure what kind of doctor the patient saw last week or the doctor recommended  Will reassess during annual visit next month and if more concerns arise will consider contact supervisor  The following portions of the patient's history were reviewed and updated as appropriate: She  has a past medical history of Acid reflux, ADD (attention deficit disorder), Adjustment disorder, Anxiety, Astigmatism, Brain lesion, Brain lesion, Bronchitis, Calcium deficiency, Cellulitis of foot, right (6/4/2018), Cerebral palsy ([facility]), Cerebral palsy ([address] ), Chronic otitis media, Chronically dry eyes, left, Constipation, Depression, Depressive disorder, Dry eyes, Dysphagia, Esophagitis, Esotropia, Fall, GERD (gastroesophageal reflux disease), GERD (gastroesophageal reflux disease), Hiatal hernia, Hydrocephalus ([address] ), Hyperopia with astigmatism, Impaired fasting glucose, Left nephrolithiasis (3/4/2019), Mood disorder ([address] ), Myopia, Oppositional defiant disorder, 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[address] ), Psychiatric disorder, Seizures ([address] ), Sensorineural hearing loss, Status post ventriculoatrial shunt placement, and Visual impairment  She  has a past surgical history that includes CSF shunt; Leg Surgery; EAR SURGERY; Nose surgery; and pr esophagogastroduodenoscopy transoral diagnostic (N/A, 5/9/2019)    Current Outpatient Medications   Medication Sig Dispense Refill    acetaminophen (TYLENOL) 500 mg tablet Take 500 mg by mouth every 6 (six) hours as needed for mild pain      aluminum-magnesium hydroxide-simethicone (ANTACID) 200-200-20 mg/5 mL suspension Take 15 mL by mouth every 4 (four) hours as needed for indigestion or heartburn 355 mL 5    ARIPiprazole (ABILIFY) 20 MG tablet Take 1 tablet (20 mg total) by mouth daily at bedtime for 30 days at 9pm  30 tablet 0    bacitracin topical ointment 500 units/g topical ointment Apply 1 large application topically 3 (three) times a day for 7 days 15 g 0    bismuth subsalicylate (PEPTO BISMOL) 524 mg/30 mL oral suspension Take 15 mL (262 mg total) by mouth every 6 (six) hours as needed for indigestion 360 mL 3    calcium carbonate (TUMS) 500 mg chewable tablet Chew 1 tablet (500 mg total) 3 (three) times a day as needed for heartburn 30 tablet 1    Cholecalciferol (VITAMIN D-3) 1000 units CAPS Take 2 capsules (2,000 Units total) by mouth daily at 8am  180 capsule 1    clotrimazole (LOTRIMIN AF) 1 % cream Lotrimin AF      clotrimazole (LOTRIMIN) 1 % cream Apply 1 g (1 application total) topically 3 (three) times a day as needed (fungal irritation) 30 g 5    dextromethorphan-guaiFENesin (ROBITUSSIN DM)  mg/5 mL syrup Take 5 mL by mouth 3 (three) times a day as needed for cough 118 mL 1    escitalopram (LEXAPRO) 20 mg tablet Take 1 tablet (20 mg total) by mouth daily for 30 days at 8am  90 tablet 0    fluticasone (FLONASE) 50 mcg/act nasal spray 1 spray into each nostril daily as needed for rhinitis (nasal congestion) At 8:00 AM 1 Bottle 5    ibuprofen (MOTRIN) 400 mg tablet Take 1 tablet (400 mg total) by mouth every 8 (eight) hours as needed for mild pain 30 tablet 1    lubiprostone (AMITIZA) 24 mcg capsule Take 1 capsule (24 mcg total) by mouth 2 (two) times a day with meals 60 capsule 5    magnesium hydroxide (GNP MILK OF MAGNESIA) 400 mg/5 mL oral suspension Take 30 mL by mouth daily as needed for constipation If no BM in 3 days 355 mL 5    metoclopramide (REGLAN) 10 mg tablet Reglan      Mouthwashes (LISTERINE ANTISEPTIC) LIQD Apply 1 application to the mouth or throat 2 (two) times a day 500 mL 3    Multiple Vitamins-Minerals (CERTAVITE SENIOR/ANTIOXIDANT) TABS Take 1 tablet by mouth daily 90 tablet 2    norgestimate-ethinyl estradiol (ESTARYLLA) 0 25-35 MG-MCG per tablet Estarylla 0 25 mg-35 mcg tablet      pantoprazole (PROTONIX) 20 mg tablet Take 1 tablet 30 minutes before breakfast daily 31 tablet 5    phenol (CHLORASEPTIC) 1 4 % mucosal liquid Apply 1 spray to the mouth or throat every 2 (two) hours as needed (for sore throat as needed) 236 mL 1    polyethylene glycol (MIRALAX) 17 g packet Take one packet daily as needed for no bowel movement in 24 hours 14 each 3    psyllium (METAMUCIL) 58 6 % packet Take 1 packet by mouth daily 30 packet 5    RA SUNSCREEN SPF50 LOTN Apply 15 minutes before sun exposure and every 2 hours 1 Bottle 5    senna-docusate sodium (SENEXON-S) 8 6-50 mg per tablet Take 1 tablet by mouth daily at 8am  90 tablet 3    sodium chloride (OCEAN) 0 65 % nasal spray 1 spray into each nostril as needed (three times daily as needed for nasal congestion) 60 mL 1    sodium chloride (OCEAN) 0 65 % nasal spray 2 sprays into each nostril as needed for congestion or rhinitis 50 mL 1    traZODone (DESYREL) 50 mg tablet Take 1 tablet (50 mg total) by mouth daily at bedtime at 9pm  0    zinc oxide (DESITIN) 13 % cream Apply 1 application topically as needed (for perianal irritation) 1 Tube 5    mupirocin (BACTROBAN) 2 % ointment Apply topically 3 (three) times a day for 5 days 22 g 0     No current facility-administered medications for this visit  She has No Known Allergies       Review of Systems   Constitutional: Negative for appetite change, diaphoresis, fatigue and fever  HENT: Negative for congestion, rhinorrhea and sinus pain  Respiratory: Negative for cough, chest tightness and shortness of breath  Cardiovascular: Negative for chest pain, palpitations and leg swelling  Gastrointestinal: Negative for abdominal distention, abdominal pain, constipation, diarrhea, nausea and vomiting  Genitourinary: Negative for difficulty urinating, frequency and urgency  Musculoskeletal: Negative for back pain and neck pain  Neurological: Negative for weakness, light-headedness, numbness and headaches  Psychiatric/Behavioral: Negative for agitation and behavioral problems   The patient is not nervous/anxious  Objective:      /72 (BP Location: Left arm, Patient Position: Sitting, Cuff Size: Large)   Pulse 76   Temp (!) 97 2 °F (36 2 °C) (Tympanic)   Resp 16   Ht 4' 9" (1 448 m)   Wt 87 4 kg (192 lb 9 6 oz)   BMI 41 68 kg/m²          Physical Exam   Constitutional:   Use of walker   Eyes: Pupils are equal, round, and reactive to light  Neck: Normal range of motion  Cardiovascular: Normal rate, regular rhythm and normal heart sounds  Exam reveals no friction rub  No murmur heard  Pulmonary/Chest: Effort normal and breath sounds normal  No stridor  No respiratory distress  She has no wheezes  Abdominal: Soft  Bowel sounds are normal  She exhibits no distension  There is no tenderness  There is no guarding  Musculoskeletal: Normal range of motion  She exhibits no edema or tenderness  Neurological: She is alert  Skin: Skin is warm  Capillary refill takes less than 2 seconds  Detail Level: Zone

## 2023-07-29 ENCOUNTER — HOSPITAL ENCOUNTER (EMERGENCY)
Facility: HOSPITAL | Age: 43
Discharge: HOME | End: 2023-07-29
Attending: EMERGENCY MEDICINE
Payer: MEDICARE

## 2023-07-29 ENCOUNTER — APPOINTMENT (EMERGENCY)
Dept: RADIOLOGY | Facility: HOSPITAL | Age: 43
End: 2023-07-29
Payer: MEDICARE

## 2023-07-29 VITALS
DIASTOLIC BLOOD PRESSURE: 59 MMHG | BODY MASS INDEX: 50.86 KG/M2 | OXYGEN SATURATION: 95 % | HEART RATE: 87 BPM | SYSTOLIC BLOOD PRESSURE: 115 MMHG | WEIGHT: 226.85 LBS | TEMPERATURE: 97.9 F | RESPIRATION RATE: 24 BRPM

## 2023-07-29 DIAGNOSIS — R09.89 CHOKING EPISODE: Primary | ICD-10-CM

## 2023-07-29 PROCEDURE — 99284 EMERGENCY DEPT VISIT MOD MDM: CPT | Performed by: EMERGENCY MEDICINE

## 2023-07-29 PROCEDURE — 71046 X-RAY EXAM CHEST 2 VIEWS: CPT

## 2023-07-29 PROCEDURE — 99284 EMERGENCY DEPT VISIT MOD MDM: CPT

## 2023-07-29 NOTE — ED PROVIDER NOTES
History  Chief Complaint   Patient presents with   • Choking     Patient brought by EMS from group home. EMS reporting that patient choked on peaches just prior to arrival, staff at group [place] performed heimlich resulting in patient vomiting and clearing airway. Upon arrival to ED patient is awake and alert. Complains of throat pain. Denies SOB. Occasionally clearing throat. 38 y/o female brought in by EMS from her group home after she was choking on peaches. Her caregiver did the Heimlich maneuver and pt. Vomited all the peaches up. No sob, no wheezing or cough now. Prior to Admission Medications   Prescriptions Last Dose Informant Patient Reported? Taking?    ARIPiprazole (ABILIFY) 20 MG tablet  Outside Facility (Specify) No No   Sig: Take 1 tablet (20 mg total) by mouth daily at bedtime   D3-1000 25 MCG (1000 UT) tablet   No No   Sig: Take 2 tablets (2,000 Units total) by mouth daily   Diclofenac Sodium (VOLTAREN) 1 %  Outside Facility (Specify) No No   Sig: Apply 2 g topically 4 (four) times a day   Dyclonine-Glycerin (Cepacol Sore Throat Spray) 0.1-33 % LIQD  Outside Facility (Specify) No No   Sig: Apply 1 spray to the mouth or throat 3 (three) times a day as needed (sorethroat)   Elastic Bandages & Supports (Neoprene Knee Brace) [facility] [place] [facility]  Outside Facility (Specify) No No   Sig: Apply right knee brace in morning; remove at night   GNP Sore Throat Spray 1.4 % mucosal liquid  Outside Facility (Specify) No No   Sig: APPLY 1 SPRAY TO MOUTH OR THROAT EVERY 2 HRS AS NEEDED FOR SORE THROAT   GNP Stomach Relief Max St 525 MG/15ML SUSP  Outside Facility (Specify) Yes No   Incontinence Supply Disposable (Wings Choice Plus Adult Briefs) [facility]  Outside Facility (Specify) No No   Sig: Use as directed (R39.81)   Konsyl Daily Fiber 28.3 %  Outside Facility (Specify) Yes No   LORazepam (ATIVAN) 0.5 mg tablet  Outside Facility (Specify) Yes No   Sig: Take 0.25 mg by mouth every 6 (six) hours as needed for anxiety Take 0.25mg TID   Mouthwashes (Listerine Antiseptic) LIQD  Care Giver No No   Sig: Swish and spit 5 mL 2 (two) times a day   Multiple Vitamins-Minerals (CertaVite/Antioxidants) TABS   No No   Sig: Take 1 tablet by mouth in the morning   RA Sunscreen SPF50 LOTN  Outside Facility (Specify) No No   Sig: Apply 15 minutes before sun exposure and every 2 hours   acetaminophen (TYLENOL) 500 mg tablet  Outside Facility (Specify) No No   Sig: Take 1 tablet (500 mg total) by mouth every 6 (six) hours as needed for mild pain   albuterol (Ventolin HFA) 90 mcg/act inhaler   No No   Sig: Inhale 2 puffs every 6 (six) hours as needed for wheezing   aluminum-magnesium hydroxide-simethicone (GNP Antacid & Anti-Gas) 8679-4827-606 mg/30 mL suspension  Outside Facility (Specify) No No   Sig: Take 15 mL by mouth every 4 (four) hours as needed for indigestion or heartburn   amitriptyline (ELAVIL) 10 mg tablet  Outside Facility (Specify) No No   Sig: Take 1 tablet (10 mg total) by mouth daily at bedtime   bacitracin topical ointment 500 units/g topical ointment   No No   Sig: Cleanse site on nose with soap and water followed by bacitracin BID until healed then p.r.n.   bismuth subsalicylate (PEPTO BISMOL) 524 mg/30 mL oral suspension  Outside Facility (Specify) No No   Sig: Take 15 mL (262 mg total) by mouth every 6 (six) hours as needed for indigestion   calcium carbonate (TUMS) 500 mg chewable tablet  Outside Facility (Specify) No No   Sig: Chew 1 tablet (500 mg total) 3 (three) times a day as needed for heartburn   carbamide peroxide (DEBROX) 6.5 % otic solution  Outside Facility (Specify) No No   Sig: Administer 5 drops into the left ear 2 (two) times a day Use 1 week prior to ENT appointment. Also can use 4 gtts twice weekly (Tuesday and Friday for example) to each ear for prevention. Keep hearing aid out x 10 minutes after ear drops administered.    ciprofloxacin-dexamethasone (CIPRODEX) otic suspension   No No   Sig: Administer 4 drops into the left ear 2 (two) times a day   dicyclomine (BENTYL) 20 mg tablet   No No   Sig: Take 1 tablet (20 mg total) by mouth every 6 (six) hours as needed (dysphagia)   escitalopram (LEXAPRO) 20 mg tablet  Outside Facility (Specify) No No   Sig: Take 1 tablet (20 mg total) by mouth daily   fluticasone (FLONASE) 50 mcg/act nasal spray  Outside Facility (Specify) No No   Si spray into each nostril daily as needed for rhinitis (nasal congestion) At 8:00 AM   gabapentin (Neurontin) 300 mg capsule  Outside Facility (Specify) No No   Sig: Take 1 capsule (300 mg total) by mouth daily at bedtime   hydrOXYzine HCL (ATARAX) 10 mg tablet  Outside Facility (Specify) No No   Sig: Take 1 tablet (10 mg total) by mouth 3 (three) times a day   ibuprofen (MOTRIN) 400 mg tablet  Outside Facility (Specify) No No   Sig: TAKE 1 TABLET BY MOUTH EVERY 8 HOURS AS NEEDED FOR MILD/MOD PAIN OR HEADACHES   ibuprofen (MOTRIN) 400 mg tablet   No No   Sig: Take 1.5 tablets (600 mg total) by mouth every 6 (six) hours as needed for mild pain   ketoconazole (NIZORAL) 2 % cream   No No   Sig: Apply topically daily   lamoTRIgine (LaMICtal) 100 mg tablet  Outside Facility (Specify) Yes No   Sig: Take 100 mg by mouth 2 (two) times a day Take 50mg BID   lamoTRIgine (LaMICtal) 25 mg tablet  Outside Facility (Specify) Yes No   Sig: Take 25 mg by mouth 2 (two) times a day   lidocaine (LMX) 4 % cream  Outside Facility (Specify) No No   Sig: Apply topically as needed for mild pain   lubiprostone (Amitiza) 24 mcg capsule  Outside Facility (Specify) No No   Sig: Take 1 capsule (24 mcg total) by mouth daily with breakfast   magnesium hydroxide (GNP Milk of Magnesia) 400 mg/5 mL oral suspension  Outside Facility (Specify) No No   Si TBSP (30ML) BY MOUTH DAILY AS NEEDED IF NO BM IN 3 DAYS (CONSTIPATION)   metoprolol tartrate (LOPRESSOR) 25 mg tablet   No No   Sig: Take 1 tablet (25 mg total) by mouth 2 (two) times a day   mineral oil-hydrophilic petrolatum (AQUAPHOR) ointment  Outside Facility (Specify) No No   Sig: Apply topically as needed for dry skin   norgestimate-ethinyl estradiol (ORTHO-CYCLEN) 0.25-35 MG-MCG per tablet  Outside Facility (Specify) No No   Sig: Take 1 tablet by mouth daily   nystatin (MYCOSTATIN) powder  Outside Facility (Specify) No No   Sig: Apply 1 application.  topically 4 (four) times a day   nystatin-triamcinolone (MYCOLOG-II) cream   No No   Sig: Apply topically 2 (two) times a day   pantoprazole (PROTONIX) 20 mg tablet   No No   Sig: Take 1 tablet (20 mg total) by mouth daily   psyllium (METAMUCIL SMOOTH TEXTURE) 28 % packet  Outside Facility (Specify) Yes No   psyllium (METAMUCIL) 58.6 % packet   No No   Sig: Take 1 packet by mouth daily As needed for constipation   senna-docusate sodium (Senexon-S) 8.6-50 mg per tablet   No No   Sig: Take 1 tablet by mouth daily at 8am.   sodium chloride (OCEAN) 0.65 % nasal spray  Outside Facility (Specify) No No   Si spray into each nostril as needed (three times daily as needed for nasal congestion)   trospium chloride (SANCTURA) 20 mg tablet  Outside Facility (Specify) No No   Sig: Take 1 tablet (20 mg total) by mouth 2 (two) times a day   zinc oxide (DESITIN) 13 % cream  Outside Facility (Specify) No No   Sig: Apply 1 application topically as needed (for perianal irritation)      Facility-Administered Medications: None       Past Medical History:   Diagnosis Date   • ADD (attention deficit disorder)    • Anxiety    • Astigmatism    • Brain lesion    • Calcium deficiency    • Cellulitis of foot, right 2018   • Cerebral palsy (HCC)    • Chronic otitis media    • Constipation    • Depression    • Dysphagia    • Esophagitis    • Esotropia    • GERD (gastroesophageal reflux disease)    • Hiatal hernia    • Hydrocephalus ([facility])    • Impaired fasting glucose    • Left nephrolithiasis 2019   • Myopia    • Oppositional defiant disorder    • 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[facility])    • Seizures [facility])    • Sensorineural hearing loss    • Sleep apnea    • Status post ventriculoatrial shunt placement    • Visual impairment        Past Surgical History:   Procedure Laterality Date   • BREAST BIOPSY Left     X 2 (not sure of years)   • CSF SHUNT      Creation of Ventriculo-Peritoneal CSF shunt ; Last Assessed:7/6/2016   • EAR SURGERY      Last Assessed:7/6/2016   • LEG SURGERY      due to CP    • NOSE SURGERY      Last Assessed:7/6/2016   • MO ESOPHAGOGASTRODUODENOSCOPY TRANSORAL DIAGNOSTIC N/A 5/9/2019    Procedure: ESOPHAGOGASTRODUODENOSCOPY (EGD) with biopsy;  Surgeon: Ruth Severin, MD;  Location: [facility]; Service: Gastroenterology   • UPPER GASTROINTESTINAL ENDOSCOPY  05/2019       Family History   Problem Relation Age of Onset   • Diabetes Mother    • Colon cancer Father    • No Known Problems Maternal Grandmother    • No Known Problems Maternal Grandfather    • No Known Problems Paternal Grandmother    • No Known Problems Paternal Grandfather    • Hypertension Neg Hx    • Heart disease Neg Hx    • Stroke Neg Hx    • Thyroid disease Neg Hx      I have reviewed and agree with the history as documented. E-Cigarette/Vaping   • E-Cigarette Use Never User      E-Cigarette/Vaping Substances   • Nicotine No    • THC No    • CBD No    • Flavoring No    • Other No    • Unknown No      Social History     Tobacco Use   • Smoking status: Never   • Smokeless tobacco: Never   Vaping Use   • Vaping Use: Never used   Substance Use Topics   • Alcohol use: Never   • Drug use: Never       Review of Systems   Constitutional: Negative for appetite change, fatigue and fever. HENT: Negative for rhinorrhea and sore throat. Eyes: Negative for pain. Respiratory: Negative for cough, shortness of breath and wheezing. Cardiovascular: Negative for chest pain and leg swelling. Gastrointestinal: Negative for abdominal pain, diarrhea and vomiting. Genitourinary: Negative for dysuria and flank pain.    Musculoskeletal: Negative for back pain and neck pain. Skin: Negative for rash. Neurological: Negative for syncope and headaches. Psychiatric/Behavioral:        Mood normal       Physical Exam  Physical Exam  Vitals and nursing note reviewed. Constitutional:       Appearance: She is well-developed. HENT:      Head: Normocephalic and atraumatic. Right Ear: External ear normal.      Left Ear: External ear normal.   Eyes:      General: No scleral icterus. Extraocular Movements: Extraocular movements intact. Cardiovascular:      Rate and Rhythm: Normal rate and regular rhythm. Pulmonary:      Effort: Pulmonary effort is normal. No respiratory distress. Breath sounds: Normal breath sounds. No wheezing. Abdominal:      Palpations: Abdomen is soft. Tenderness: There is no abdominal tenderness. Musculoskeletal:         General: No deformity or signs of injury. Normal range of motion. Cervical back: Normal range of motion and neck supple. Skin:     General: Skin is warm and dry. Coloration: Skin is not jaundiced or pale. Neurological:      General: No focal deficit present. Mental Status: She is alert and oriented to person, place, and time.    Psychiatric:         Mood and Affect: Mood normal.         Behavior: Behavior normal.         Vital Signs  ED Triage Vitals [07/29/23 1539]   Temperature Pulse Respirations Blood Pressure SpO2   97.9 °F (36.6 °C) (!) 109 (!) 24 123/56 95 %      Temp Source Heart Rate Source Patient Position - Orthostatic VS BP Location FiO2 (%)   Oral Monitor Sitting Right arm --      Pain Score       3           Vitals:    07/29/23 1539 07/29/23 1630 07/29/23 1638   BP: 123/56  115/59   Pulse: (!) 109 98 87   Patient Position - Orthostatic VS: Sitting           Visual Acuity      ED Medications  Medications - No data to display    Diagnostic Studies  Results Reviewed     None                 XR chest 2 views    (Results Pending) Procedures  Procedures         ED Course                               SBIRT 22yo+    Flowsheet Row Most Recent Value   Initial Alcohol Screen: US AUDIT-C     1. How often do you have a drink containing alcohol? 0 Filed at: 07/29/2023 1600   2. How many drinks containing alcohol do you have on a typical day you are drinking? 0 Filed at: 07/29/2023 1600   3a. Male UNDER 65: How often do you have five or more drinks on one occasion? 0 Filed at: 07/29/2023 1600   3b. FEMALE Any Age, or MALE 65+: How often do you have 4 or more drinks on one occassion? 0 Filed at: 07/29/2023 1600   Audit-C Score 0 Filed at: 07/29/2023 1600   SUKHDEV: How many times in the past year have you. .. Used an illegal drug or used a prescription medication for non-medical reasons? Never Filed at: 07/29/2023 1600                    Medical Decision Making  CXR nad    Pt. Observed in ER, no sob, no wheezing, puse ox 96% RA, no coughing, RR 18    She is stable for outpt. Follow up- caregiver understands to return for sob, cough, fevers. Amount and/or Complexity of Data Reviewed  Radiology: ordered. Disposition  Final diagnoses:   Choking episode     Time reflects when diagnosis was documented in both MDM as applicable and the Disposition within this note     Time User Action Codes Description Comment    7/29/2023  4:33 PM Mandie Mahajan Add [R09.89] Choking episode       ED Disposition     ED Disposition   Discharge    Condition   Stable    Date/Time   Sat Jul 29, 2023  4:33 PM    Comment   Donny Mckeon discharge to home/self care.                Follow-up Information     Follow up With Specialties Details Why Contact Info Additional [facility]   [address], [address] [facility] [id number]  [address] [facility], [address] [facility], [address] [place] [address] Discharge Medication List as of 7/29/2023  4:33 PM      CONTINUE these medications which have NOT CHANGED    Details   acetaminophen (TYLENOL) 500 mg tablet Take 1 tablet (500 mg total) by mouth every 6 (six) hours as needed for mild pain, Starting Mon 3/14/2022, Normal      albuterol (Ventolin HFA) 90 mcg/act inhaler Inhale 2 puffs every 6 (six) hours as needed for wheezing, Starting Thu 6/15/2023, Normal      aluminum-magnesium hydroxide-simethicone (GNP Antacid & Anti-Gas) 8969-5837-966 mg/30 mL suspension Take 15 mL by mouth every 4 (four) hours as needed for indigestion or heartburn, Starting Wed 3/1/2023, Normal      amitriptyline (ELAVIL) 10 mg tablet Take 1 tablet (10 mg total) by mouth daily at bedtime, Starting Wed 3/1/2023, Until Thu 6/15/2023, Normal      ARIPiprazole (ABILIFY) 20 MG tablet Take 1 tablet (20 mg total) by mouth daily at bedtime, Starting Wed 3/1/2023, Normal      bacitracin topical ointment 500 units/g topical ointment Cleanse site on nose with soap and water followed by bacitracin BID until healed then p.r.n., Normal      bismuth subsalicylate (PEPTO BISMOL) 524 mg/30 mL oral suspension Take 15 mL (262 mg total) by mouth every 6 (six) hours as needed for indigestion, Starting Wed 3/1/2023, Normal      calcium carbonate (TUMS) 500 mg chewable tablet Chew 1 tablet (500 mg total) 3 (three) times a day as needed for heartburn, Starting Tue 10/26/2021, Normal      carbamide peroxide (DEBROX) 6.5 % otic solution Administer 5 drops into the left ear 2 (two) times a day Use 1 week prior to ENT appointment. Also can use 4 gtts twice weekly (Tuesday and Friday for example) to each ear for prevention. Keep hearing aid out x 10 minutes after ear drops administered. ,  Starting Wed 3/1/2023, Normal      ciprofloxacin-dexamethasone (CIPRODEX) otic suspension Administer 4 drops into the left ear 2 (two) times a day, Starting Mon 7/3/2023, Normal      D3-1000 25 MCG (1000 UT) tablet Take 2 tablets (2,000 Units total) by mouth daily, Starting Mon 7/3/2023, Normal      Diclofenac Sodium (VOLTAREN) 1 % Apply 2 g topically 4 (four) times a day, Starting Wed 3/1/2023, Normal      dicyclomine (BENTYL) 20 mg tablet Take 1 tablet (20 mg total) by mouth every 6 (six) hours as needed (dysphagia), Starting Mon 7/3/2023, Normal      Dyclonine-Glycerin (Cepacol Sore Throat Spray) 0.1-33 % LIQD Apply 1 spray to the mouth or throat 3 (three) times a day as needed (sorethroat), Starting Tue 10/26/2021, Normal      Elastic Bandages & Supports (Neoprene Knee Brace) MISC Apply right knee brace in morning; remove at night, Normal      escitalopram (LEXAPRO) 20 mg tablet Take 1 tablet (20 mg total) by mouth daily, Starting Wed 3/1/2023, Normal      fluticasone (FLONASE) 50 mcg/act nasal spray 1 spray into each nostril daily as needed for rhinitis (nasal congestion) At 8:00 AM, Starting Wed 4/19/2023, Normal      gabapentin (Neurontin) 300 mg capsule Take 1 capsule (300 mg total) by mouth daily at bedtime, Starting Fri 4/14/2023, Normal      GNP Sore Throat Spray 1.4 % mucosal liquid APPLY 1 SPRAY TO MOUTH OR THROAT EVERY 2 HRS AS NEEDED FOR SORE THROAT, Normal      GNP Stomach Relief Max St 525 MG/15ML SUSP Starting Tue 2/22/2022, Historical Med      hydrOXYzine HCL (ATARAX) 10 mg tablet Take 1 tablet (10 mg total) by mouth 3 (three) times a day, Starting Wed 3/1/2023, Normal      !! ibuprofen (MOTRIN) 400 mg tablet TAKE 1 TABLET BY MOUTH EVERY 8 HOURS AS NEEDED FOR MILD/MOD PAIN OR HEADACHES, Normal      !! ibuprofen (MOTRIN) 400 mg tablet Take 1.5 tablets (600 mg total) by mouth every 6 (six) hours as needed for mild pain, Starting u 6/15/2023, Normal      Incontinence Supply Disposable (Wings Choice Plus Adult Briefs) MISC Use as directed (R39.81), Normal      ketoconazole (NIZORAL) 2 % cream Apply topically daily, Starting Mon 7/3/2023, Normal      Konsyl Daily Fiber 28.3 % Starting Sat 10/2/2021, Historical Med !! lamoTRIgine (LaMICtal) 100 mg tablet Take 100 mg by mouth 2 (two) times a day Take 50mg BID, Starting Wed 12/8/2021, Historical Med      !! lamoTRIgine (LaMICtal) 25 mg tablet Take 25 mg by mouth 2 (two) times a day, Starting Tue 8/11/2020, Historical Med      lidocaine (LMX) 4 % cream Apply topically as needed for mild pain, Starting Wed 3/1/2023, Normal      LORazepam (ATIVAN) 0.5 mg tablet Take 0.25 mg by mouth every 6 (six) hours as needed for anxiety Take 0.25mg TID, Historical Med      lubiprostone (Amitiza) 24 mcg capsule Take 1 capsule (24 mcg total) by mouth daily with breakfast, Starting Wed 3/1/2023, Normal      magnesium hydroxide (GNP Milk of Magnesia) 400 mg/5 mL oral suspension 2 TBSP (30ML) BY MOUTH DAILY AS NEEDED IF NO BM IN 3 DAYS (CONSTIPATION), Normal      metoprolol tartrate (LOPRESSOR) 25 mg tablet Take 1 tablet (25 mg total) by mouth 2 (two) times a day, Starting Mon 7/3/2023, Normal      mineral oil-hydrophilic petrolatum (AQUAPHOR) ointment Apply topically as needed for dry skin, Starting Wed 3/1/2023, Normal      Mouthwashes (Listerine Antiseptic) LIQD Swish and spit 5 mL 2 (two) times a day, Starting Thu 5/12/2022, Until Thu 6/15/2023, Normal      Multiple Vitamins-Minerals (CertaVite/Antioxidants) TABS Take 1 tablet by mouth in the morning, Starting Tue 5/16/2023, Normal      norgestimate-ethinyl estradiol (ORTHO-CYCLEN) 0.25-35 MG-MCG per tablet Take 1 tablet by mouth daily, Starting Wed 3/1/2023, Normal      nystatin (MYCOSTATIN) powder Apply 1 application.  topically 4 (four) times a day, Starting Mon 4/10/2023, Normal      nystatin-triamcinolone (MYCOLOG-II) cream Apply topically 2 (two) times a day, Starting Mon 7/3/2023, Normal      pantoprazole (PROTONIX) 20 mg tablet Take 1 tablet (20 mg total) by mouth daily, Starting Mon 7/3/2023, Normal      psyllium (METAMUCIL SMOOTH TEXTURE) 28 % packet Starting Tue 1/3/2023, Historical Med      psyllium (METAMUCIL) 58.6 % packet Take 1 packet by mouth daily As needed for constipation, Starting Wed 6/28/2023, Normal      RA Sunscreen SPF50 LOTN Apply 15 minutes before sun exposure and every 2 hours, Normal      senna-docusate sodium (Senexon-S) 8.6-50 mg per tablet Take 1 tablet by mouth daily at 8am., Starting Mon 7/3/2023, Normal      sodium chloride (OCEAN) 0.65 % nasal spray 1 spray into each nostril as needed (three times daily as needed for nasal congestion), Starting Wed 3/1/2023, Normal      trospium chloride (SANCTURA) 20 mg tablet Take 1 tablet (20 mg total) by mouth 2 (two) times a day, Starting Wed 3/1/2023, Normal      zinc oxide (DESITIN) 13 % cream Apply 1 application topically as needed (for perianal irritation), Starting Wed 3/1/2023, Normal       !! - Potential duplicate medications found. Please discuss with provider. No discharge procedures on file.     PDMP Review       Value Time User    PDMP Reviewed  Yes 10/6/2021  1:11 PM Christine Ramires PA-C          ED Provider  Electronically Signed by           Mel Anaya MD  07/29/23 8184

## 2023-08-01 ENCOUNTER — APPOINTMENT (OUTPATIENT)
Dept: LAB | Age: 43
End: 2023-08-01
Payer: MEDICARE

## 2023-08-01 DIAGNOSIS — G47.61 PLMD (PERIODIC LIMB MOVEMENT DISORDER): ICD-10-CM

## 2023-08-01 DIAGNOSIS — E61.1 IRON DEFICIENCY: ICD-10-CM

## 2023-08-01 LAB
ERYTHROCYTE [DISTWIDTH] IN BLOOD BY AUTOMATED COUNT: 13.7 % (ref 11.6–15.1)
FERRITIN SERPL-MCNC: 191 NG/ML (ref 11–307)
HCT VFR BLD AUTO: 41.1 % (ref 34.8–46.1)
HGB BLD-MCNC: 13.2 G/DL (ref 11.5–15.4)
IRON SATN MFR SERPL: 35 % (ref 15–50)
IRON SERPL-MCNC: 129 UG/DL (ref 50–170)
MCH RBC QN AUTO: 31.2 PG (ref 26.8–34.3)
MCHC RBC AUTO-ENTMCNC: 32.1 G/DL (ref 31.4–37.4)
MCV RBC AUTO: 97 FL (ref 82–98)
PLATELET # BLD AUTO: 311 THOUSANDS/UL (ref 149–390)
PMV BLD AUTO: 9.1 FL (ref 8.9–12.7)
RBC # BLD AUTO: 4.23 MILLION/UL (ref 3.81–5.12)
TIBC SERPL-MCNC: 370 UG/DL (ref 250–450)
WBC # BLD AUTO: 8.36 THOUSAND/UL (ref 4.31–10.16)

## 2023-08-01 PROCEDURE — 85027 COMPLETE CBC AUTOMATED: CPT

## 2023-08-01 PROCEDURE — 82728 ASSAY OF FERRITIN: CPT

## 2023-08-01 PROCEDURE — 36415 COLL VENOUS BLD VENIPUNCTURE: CPT

## 2023-08-01 PROCEDURE — 83550 IRON BINDING TEST: CPT

## 2023-08-01 PROCEDURE — 83540 ASSAY OF IRON: CPT

## 2023-08-02 ENCOUNTER — OFFICE VISIT (OUTPATIENT)
Dept: DENTISTRY | Facility: CLINIC | Age: 43
End: 2023-08-02

## 2023-08-02 VITALS — HEART RATE: 116 BPM | DIASTOLIC BLOOD PRESSURE: 84 MMHG | SYSTOLIC BLOOD PRESSURE: 124 MMHG

## 2023-08-02 DIAGNOSIS — Z01.20 ENCOUNTER FOR DENTAL EXAMINATION: Primary | ICD-10-CM

## 2023-08-02 PROCEDURE — D0120 PERIODIC ORAL EVALUATION - ESTABLISHED PATIENT: HCPCS

## 2023-08-02 PROCEDURE — D1110 PROPHYLAXIS - ADULT: HCPCS

## 2023-08-02 NOTE — DENTAL PROCEDURE DETAILS
Iris Suárez presents for a Periodic exam. Verbal consent for treatment given in addition to the forms. Reviewed health history - Patient is ASA II  Consents signed: Yes     Perio: Generalized, Slight bleeding, and Gingivitis  Pain Scale: 0  Caries Assessment: Low  Radiographs: None     Oral Hygiene instruction reviewed and given. Recommended Hygiene recall visits with the [facility]. Patient presents for [facility] with caretaker ( 12 min late) moderate plaque throughout ( used cavitron) patient was somewhat cooperative. Recommend brush longer & around molar areas. Dr Prince Paul OCS neg difficult to see lateral border of tongue. Treatment Plan:  1. Infection control:   2. Adult Prophy   3. Caries control: as charted  4. Occlusal evaluation:   5. Case Difficulty Type 3  Prognosis is Good.   Referrals needed: No  Next Visit: [facility] w/BW   Exam by Dr. Prince Paul

## 2023-08-03 ENCOUNTER — OFFICE VISIT (OUTPATIENT)
Dept: GASTROENTEROLOGY | Facility: CLINIC | Age: 43
End: 2023-08-03
Payer: MEDICARE

## 2023-08-03 VITALS
BODY MASS INDEX: 42.25 KG/M2 | SYSTOLIC BLOOD PRESSURE: 130 MMHG | DIASTOLIC BLOOD PRESSURE: 74 MMHG | WEIGHT: 215.2 LBS | HEIGHT: 60 IN

## 2023-08-03 DIAGNOSIS — K21.9 GASTROESOPHAGEAL REFLUX DISEASE WITHOUT ESOPHAGITIS: Primary | ICD-10-CM

## 2023-08-03 DIAGNOSIS — F45.8 FUNCTIONAL DYSPHAGIA: ICD-10-CM

## 2023-08-03 DIAGNOSIS — K59.04 CHRONIC IDIOPATHIC CONSTIPATION: ICD-10-CM

## 2023-08-03 DIAGNOSIS — K31.A0 GASTRIC INTESTINAL METAPLASIA: ICD-10-CM

## 2023-08-03 DIAGNOSIS — Z12.11 COLON CANCER SCREENING: ICD-10-CM

## 2023-08-03 PROCEDURE — 99214 OFFICE O/P EST MOD 30 MIN: CPT | Performed by: PHYSICIAN ASSISTANT

## 2023-08-03 NOTE — PROGRESS NOTES
[facility] - Outpatient Follow-up Note  Jere Gottron 39 y.o. female MRN: [id number]  Encounter: [id number]          ASSESSMENT AND PLAN:      1. Gastroesophageal reflux disease without esophagitis  2. Functional dysphagia  She had history of chronic GERD and dysphagia. She was in the ED a few days ago after choking on some fruit. She underwent EGD showing small hiatal hernia otherwise unremarkable. Manometry testing and speech and swallow evaluation were also unremarkable. I asked her to eat slowly, chew carefully, and cut food into small pieces. She is no longer requiring pantoprazole. 3. Chronic idiopathic constipation  She has a history of chronic constipation which is controlled on Amitiza daily. She is no longer taking Metamucil. 4. Colon cancer screening  She is due for colonoscopy in January 2024 for colon cancer screening. I explained the procedure to the patient, but she is currently refusing. I explained this can be discussed at her follow-up visit and we can also discuss with her family/POA as well. We could pursue Cologuard testing instead. 5. Gastric intestinal metaplasia  She was found to have gastric intestinal metaplasia on 2019 EGD biopsies. She is due for colonoscopy next year for colon cancer screening. Consider scheduling EGD at the time of her colonoscopy to obtain mapping biopsies given intestinal metaplasia seen on 2019 exam.    Follow-up in 6 months  ______________________________________________________________________    SUBJECTIVE: 41-year-old female with intellectual disability and cerebral palsy presenting for ED follow-up. She was seen in the ED on 7/29 after she choked on some fruit. The Heimlich maneuver was used to expel the food. She had a chest x-ray that was negative. She was monitored and discharged.     She has a history of chronic dysphagia for which she has undergone testing in the past including EGD, manometry and barium swallow which were normal. Speech and swallow evaluation showed normal oral and pharyngeal components of swallowing mild retropulsion in the mid esophagus. Otherwise she is doing well. She denies abdominal pain, diarrhea, constipation, rectal bleeding. REVIEW OF SYSTEMS IS OTHERWISE NEGATIVE. Historical Information   Past Medical History:   Diagnosis Date   • ADD (attention deficit disorder)    • Anxiety    • Astigmatism    • Brain lesion    • Calcium deficiency    • Cellulitis of foot, right 6/4/2018   • Cerebral palsy (HCC)    • Chronic otitis media    • Constipation    • Depression    • Dysphagia    • Esophagitis    • Esotropia    • GERD (gastroesophageal reflux disease)    • Hiatal hernia    • Hydrocephalus (HCC)    • Impaired fasting glucose    • Left nephrolithiasis 03/04/2019   • Myopia    • Oppositional defiant disorder    • 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HCC)    • Seizures (HCC)    • Sensorineural hearing loss    • Sleep apnea    • Status post ventriculoatrial shunt placement    • Visual impairment      Past Surgical History:   Procedure Laterality Date   • BREAST BIOPSY Left     X 2 (not sure of years)   • CSF SHUNT      Creation of Ventriculo-Peritoneal CSF shunt ; Last Assessed:7/6/2016   • EAR SURGERY      Last Assessed:7/6/2016   • LEG SURGERY      due to CP    • NOSE SURGERY      Last Assessed:7/6/2016   • ND ESOPHAGOGASTRODUODENOSCOPY TRANSORAL DIAGNOSTIC N/A 5/9/2019    Procedure: ESOPHAGOGASTRODUODENOSCOPY (EGD) with biopsy;  Surgeon: Timothy Corley MD;  Location: [facility];   Service: Gastroenterology   • UPPER GASTROINTESTINAL ENDOSCOPY  05/2019     Social History   Social History     Substance and Sexual Activity   Alcohol Use Never     Social History     Substance and Sexual Activity   Drug Use Never     Social History     Tobacco Use   Smoking Status Never   Smokeless Tobacco Never     Family History   Problem Relation Age of Onset   • Diabetes Mother    • Colon cancer Father    • No Known Problems Maternal Grandmother    • No Known Problems Maternal Grandfather    • No Known Problems Paternal Grandmother    • No Known Problems Paternal Grandfather    • Hypertension Neg Hx    • Heart disease Neg Hx    • Stroke Neg Hx    • Thyroid disease Neg Hx        Meds/Allergies       Current Outpatient Medications:   •  acetaminophen (TYLENOL) 500 mg tablet  •  albuterol (Ventolin HFA) 90 mcg/act inhaler  •  aluminum-magnesium hydroxide-simethicone (GNP Antacid & Anti-Gas) 8889-8452-440 mg/30 mL suspension  •  ARIPiprazole (ABILIFY) 20 MG tablet  •  bacitracin topical ointment 500 units/g topical ointment  •  bismuth subsalicylate (PEPTO BISMOL) 524 mg/30 mL oral suspension  •  D3-1000 25 MCG (1000 UT) tablet  •  dicyclomine (BENTYL) 20 mg tablet  •  gabapentin (Neurontin) 300 mg capsule  •  GNP Sore Throat Spray 1.4 % mucosal liquid  •  GNP Stomach Relief Max St 525 MG/15ML SUSP  •  hydrOXYzine HCL (ATARAX) 10 mg tablet  •  ibuprofen (MOTRIN) 400 mg tablet  •  ibuprofen (MOTRIN) 400 mg tablet  •  Incontinence Supply Disposable (Wings Choice Plus Adult Briefs) MISC  •  ketoconazole (NIZORAL) 2 % cream  •  lamoTRIgine (LaMICtal) 100 mg tablet  •  lamoTRIgine (LaMICtal) 25 mg tablet  •  magnesium hydroxide (GNP Milk of Magnesia) 400 mg/5 mL oral suspension  •  nystatin (MYCOSTATIN) powder  •  nystatin-triamcinolone (MYCOLOG-II) cream  •  psyllium (METAMUCIL SMOOTH TEXTURE) 28 % packet  •  psyllium (METAMUCIL) 58.6 % packet  •  RA Sunscreen SPF50 LOTN  •  senna-docusate sodium (Senexon-S) 8.6-50 mg per tablet  •  trospium chloride (SANCTURA) 20 mg tablet  •  amitriptyline (ELAVIL) 10 mg tablet  •  calcium carbonate (TUMS) 500 mg chewable tablet  •  carbamide peroxide (DEBROX) 6.5 % otic solution  •  ciprofloxacin-dexamethasone (CIPRODEX) otic suspension  •  Diclofenac Sodium (VOLTAREN) 1 %  •  Dyclonine-Glycerin (Cepacol Sore Throat Spray) 0.1-33 % LIQD  •  Elastic Bandages & Supports (Neoprene Knee Brace) MISC  • escitalopram (LEXAPRO) 20 mg tablet  •  fluticasone (FLONASE) 50 mcg/act nasal spray  •  Konsyl Daily Fiber 28.3 %  •  lidocaine (LMX) 4 % cream  •  LORazepam (ATIVAN) 0.5 mg tablet  •  lubiprostone (Amitiza) 24 mcg capsule  •  metoprolol tartrate (LOPRESSOR) 25 mg tablet  •  mineral oil-hydrophilic petrolatum (AQUAPHOR) ointment  •  Mouthwashes (Listerine Antiseptic) LIQD  •  Multiple Vitamins-Minerals (CertaVite/Antioxidants) TABS  •  norgestimate-ethinyl estradiol (ORTHO-CYCLEN) 0.25-35 MG-MCG per tablet  •  pantoprazole (PROTONIX) 20 mg tablet  •  sodium chloride (OCEAN) 0.65 % nasal spray  •  vedolizumab (ENTYVIO) SOLR  •  zinc oxide (DESITIN) 13 % cream    No Known Allergies        Objective     Blood pressure 130/74, height 5' (1.524 m), weight 97.6 kg (215 lb 3.2 oz), not currently breastfeeding. Body mass index is 42.03 kg/m². PHYSICAL EXAM:      General Appearance:   Alert, cooperative, no distress   HEENT:   Normocephalic, atraumatic, anicteric.     Neck:  Supple, symmetrical, trachea midline   Lungs:   Clear to auscultation bilaterally   Heart[de-identified]   Regular rate and rhythm   Abdomen:   Soft, non-tender, non-distended; normal bowel sounds   Genitalia:   Deferred    Rectal:   Deferred    Extremities:  No cyanosis, clubbing or edema    Pulses:  2+ and symmetric    Skin:  No jaundice, rashes, or lesions    Lymph nodes:  No palpable cervical lymphadenopathy        Lab Results:   No visits with results within 1 Day(s) from this visit.    Latest known visit with results is:   Appointment on 08/01/2023   Component Date Value   • WBC 08/01/2023 8.36    • RBC 08/01/2023 4.23    • Hemoglobin 08/01/2023 13.2    • Hematocrit 08/01/2023 41.1    • MCV 08/01/2023 97    • MCH 08/01/2023 31.2    • MCHC 08/01/2023 32.1    • RDW 08/01/2023 13.7    • Platelets 89/55/9241 311    • MPV 08/01/2023 9.1    • Iron Saturation 08/01/2023 35    • TIBC 08/01/2023 370    • Iron 08/01/2023 129    • Ferritin 08/01/2023 191 Radiology Results:   XR chest 2 views    Result Date: 7/30/2023  Narrative: CHEST INDICATION:   s/p choking. COMPARISON: 6/15/2023 EXAM PERFORMED/VIEWS:  XR CHEST PA & LATERAL FINDINGS: Clear lungs. Partially visualized  shunt Cardiomediastinal silhouette appears unremarkable. The lungs are clear. No pneumothorax or pleural effusion. Osseous structures appear within normal limits for patient age. Impression: No acute cardiopulmonary disease. Findings are stable Workstation performed: [id number]     CT CHEST PULMONARY EMBOLISM W CONTRAST    Result Date: 7/28/2023  Narrative: History: Pulmonary embolism suspected, positive d-dimer   Exam: CT of the chest with IV contrast (PE protocol)   Technique: Axial CT of the chest performed with 100 cc of Omnipaque 350 intravenous contrast with particular attention to the pulmonary arteries. Coronal reformations were performed as well.   Comparison: Chest x-ray earlier today   Findings: Pulmonary Arteries: Normal.   Lungs/Pleura: Normal. Mediastinum/Lymph nodes/Heart: No acute findings. Moderate hiatal hernia Chest Wall: Normal.   Upper abdomen/Other: No acute findings. A left renal cyst has significantly decreased in size compared to CT from 3/26/2021. Bones: No acute osseous abnormality.      Impression: Impression:  No evidence of pulmonary embolism or other acute abnormality in the chest.   Workstation:[id number]    XR chest pa & lateral    Result Date: 7/28/2023  Narrative: Study: Two-view chest. COMPARISON: September 12, 2021. HISTORY: Chest pain. Shortness of breath. Inspiratory effort diminished. Heart and vessels normal. No interval infiltrates, edema, or effusions. Left-sided IJ line distal tip about the superior vena cava. No pneumothorax seen. No pneumothorax.     Impression: IMPRESSION: No interval infiltrates, edema, or effusions in this suboptimally aerated exam. Workstation:[id number]    XR SHUNT SERIES    Result Date: 7/22/2023  Narrative: History: Evaluate for possible kinked shunt Technique: Morning views of the skull and neck, frontal view of the chest and KUB were obtained. Comparison: 4:18 AM Findings: As on the prior exam, there is abnormal kinking with 2 greater than 90 degree angle changes within the left sided shunt catheter in the left neck. No visualized discontinuity. There is persistent extensive calcification along the shunt catheter tubing in the left neck. The catheter then extends into the left infraclavicular region along the expected course of the left subclavian vein into the mid to distal SVC. No discontinuity. The intracranial component of the left parietal approach ventriculostomy catheter appears intact and unchanged. Persistent cardiomegaly and pulmonary vascular prominence. Central airways are patent. The lungs are hypoinflated. No focal airspace disease. No pneumothorax or pleural effusion. Normal bowel gas pattern. Formed fecal material in the colonic lumen. Normal organ outlines. No pathologic calcifications. No acute osseous abnormality. Spondylosis and scoliotic curvature in the spine. Impression: Impression: Persistent abnormal kinking of the shunt catheter tubing in the left neck with 2 focal areas of abnormal angulation of the tube. Persistent cardiomegaly and pulmonary vascular prominence. [id number]    CT head wo contrast    Result Date: 7/22/2023  Narrative: CT head without contrast CLINICAL INFORMATION: Fall. TECHNIQUE: Unenhanced CT of the head was performed. Images reviewed in soft tissue and bone algorithms with 2.5 mm axial and sagittal and coronal reformations. COMPARISON: 07/22/2023. 02/01/2017 CT head report from [address] [place] [address] (no images available for comparison). FINDINGS: Brain parenchyma: Chronic changes including encephalomalacia and volume loss in the left cerebral hemisphere and corpus callosum. No acute major vascular territory infarct.  Ventricular system, basal cisterns, and extra-axial spaces: Left parietal ventricular shunt catheter again identified, terminating in the region of the right midbrain. No change in ventricular size. No clear evidence of hydrocephalus. Intracranial hemorrhage: No acute intracranial hemorrhage. Midline shift: No significant midline shift. Calvarium and skull base: No depressed calvarial fracture. Orbits: Normal. Paranasal sinuses: Clear. Mastoid air cells: Clear. Sella: 6 to 7 mm hyperdense sellar/suprasellar lesion again identified (described in 02/01/2017 CT head). Impression: IMPRESSION: 1.  No depressed calvarial fracture. No acute intracranial hemorrhage. 2.  Chronic changes as discussed above. 3.  Left parietal ventricular shunt catheter in place. No clear evidence of hydrocephalus. 4.  6 to 7 mm hyperdense sellar/suprasellar lesion again identified (described in 02/01/2017 CT head). Workstation:[id number] SHUNT SERIES    Result Date: 7/22/2023  Narrative: Indication:  shunt Technique: AP and lateral views of the skull. AP view of the chest. AP view of the abdomen. Comparison: None available. Findings: The cardiomediastinal silhouette is mildly enlarged.   The lungs are well aerated without focal mass or consolidation. Mild central vascular congestion. There is no pleural effusion. Beverlyn Jetty is no pneumothorax.   There is a nonspecific, nonobstructing bowel gas pattern. Moderate amount of stool in the colon. The osseous structures appear within normal limits. Left parietal approach ventriculostomy catheter is present. Shunt tubing appears continuous. Question of kinking at the level of the left mandibular angle. Tubing continues and appears to enter the left subclavian vein with tip by the cavoatrial junction. Impression: Impression: 1.  Left parietal approach ventriculostomy catheter with continuous appearance of shunt tubing. Question of kinking at the level of the left mandibular angle. 2.  Mild central vascular congestion. [id number]    CT head wo contrast    Result Date: 7/22/2023  Narrative: STUDY: CT HEAD WO CONTRAST Clinical information: Female, 39years year old, presenting with head injury. Technique: Unenhanced CT scan of the brain was performed by department technique. Images were reviewed in soft tissue and bone algorithms, sagittal and coronal reformations. CT examination performed with dose lowering protocol in accordance with ALARA. Comparison: None Findings: Brain parenchyma: There is diffuse volume loss involving frontal and parietal lobes on the left with mild prominence of sulci and minimal dilatation of left lateral ventricle as well as white matter encephalomalacia. Normal cerebellum. The cerebellar tonsils are normally positioned. Brainstem: Unremarkable. Ventricular system, basal cisterns and extra-axial spaces: There is a left parietal approach ventriculostomy catheter which penetrates into left lateral ventricle and the tip terminates about midbrain. There is no hydrocephalus. Within anterior suprasellar cistern, there is a 7.8 x 8.4 x 6 mm round well marginated hyperdensity. There is tiny foci of hypodensity just above the level of interpeduncular cistern, probably a lipoma/dermoid. Intracranial hemorrhage: See above. Midline shift: None. Skull base & Calvarium: No acute calvarial fracture. Paranasal sinuses and mastoid air cells: Unremarkable paranasal sinuses. Mastoid air cells are clear bilaterally. Visualized orbits: Unremarkable. Sella: Normal.    Impression: IMPRESSION: No prior study for comparison. No acute fracture. Left parietal approach ventriculostomy catheter as detail above. No hydrocephalus. Volume loss and white matter encephalomalacia involving frontal and parietal lobes on the left. Subcentimeter round hyperdensity within suprasellar cistern. This may represent a proteinaceous lesion, hemorrhage cannot be entirely excluded at this time.  Recommend follow-up CT in several hours to monitor.  Workstation:[id number]

## 2023-08-07 ENCOUNTER — OFFICE VISIT (OUTPATIENT)
Dept: HEMATOLOGY ONCOLOGY | Facility: CLINIC | Age: 43
End: 2023-08-07
Payer: MEDICARE

## 2023-08-07 VITALS
RESPIRATION RATE: 17 BRPM | BODY MASS INDEX: 42.56 KG/M2 | HEIGHT: 60 IN | OXYGEN SATURATION: 98 % | TEMPERATURE: 97.7 F | DIASTOLIC BLOOD PRESSURE: 78 MMHG | WEIGHT: 216.8 LBS | SYSTOLIC BLOOD PRESSURE: 126 MMHG | HEART RATE: 103 BPM

## 2023-08-07 DIAGNOSIS — G47.61 PLMD (PERIODIC LIMB MOVEMENT DISORDER): Primary | ICD-10-CM

## 2023-08-07 DIAGNOSIS — D50.8 IRON DEFICIENCY ANEMIA SECONDARY TO INADEQUATE DIETARY IRON INTAKE: ICD-10-CM

## 2023-08-07 PROCEDURE — 99214 OFFICE O/P EST MOD 30 MIN: CPT

## 2023-08-07 NOTE — PROGRESS NOTES
HEMATOLOGY / [address] [place] [address] FOLLOW UP NOTE    Primary Care Provider: Angie Juárez MD  Referring Provider:    MRN: [id number]  : 1980    Reason for Encounter: Iron deficiency and periodic limb movement disorder       Interval History: Patient presents for a follow up of her iron deficiency and periodic limb movement with her caregiver. She resides in a group home. She is status post Venofer 300 mg x 3 doses. Patient reports her fatigue has not improved, she is still experiencing frequent HAs. Reports her RLS has not improved. She reports her right leg is numb when she is on her feet too long. Patient unable to tolerate oral iron due to constipation. Patient is eating well, staying hydrated. Labs: Hgb 13.2, MCV 97, WBC 8.36, Platelets 145031, ferritin 191, serum iron 129, % saturation 35. TSH mildly elevated 7.95.  T4 0.81      HPI:  Jhony Clark is seen for initial consultation 2023 regarding iron deficiency and periodic limb movement. Patient was referred by Sleep Medicine. She had a diagnostic sleep study 2022 which showed periodic limb movement disorder. Iron panel checked at the time resulted ferritin of 24. Patient is not able to tolerate oral iron due to chronic constipation.     Lacy Frederick presents with her care giver. She resides in a group home. Patient able to answer questions appropriately, however, needed to be redirected a few times during the visit. Patient states she does not feel well rested when she wakes up in the morning. She has been experiencing HAs almost every day, RLS, fatigue, lightheadedness. Denies PICA. Elizabeth's menstrual cycles are irregular, she got her period this month that lasted two days. The caregiver reports some months the period is 5 days but not heavy. Patient denies any abnormal bleeding such as in her stool/urine.     REVIEW OF SYSTEMS:  Please note that a 14-point review of systems was performed to include Constitutional, HEENT, Respiratory, CVS, GI, , Musculoskeletal, Integumentary, Neurologic, Rheumatologic, Endocrinologic, Psychiatric, Lymphatic, and Hematologic/Oncologic systems were reviewed and are negative unless otherwise stated in HPI. Positive and negative findings pertinent to this evaluation are incorporated into the history of present illness.       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[address])   • Ambulatory dysfunction   • Bruise   • Bilateral impacted cerumen   • TMJ (temporomandibular joint disorder)   • Gait disturbance   • Bilateral thoracic back pain   • Cerumen impaction   • Skin picking habit   • Hiatal hernia   • SOB (shortness of breath)   • Dysuria   • Left nephrolithiasis   • Viral URI   • Self-injurious behavior   • Gastroesophageal reflux disease without esophagitis   • Intercostal pain   • Intertrigo   • Nausea and vomiting   • Sore throat   • Impetigo   • Pain of right calf   • Elevated blood pressure reading in office without diagnosis of hypertension   • Fall   • Sleep disturbance   • Exposure to COVID-19 virus   • Encounter for follow-up   • Acute non intractable tension-type headache   • Dry skin   • Candidiasis of skin   • Heat intolerance   • Wound, open   • Palpitations   • Left foot pain   • Frequent falls   • Urine frequency   • Urinary incontinence   • Obesity, Class III, BMI 40-49.9 (morbid obesity) ([address])   • At risk for sleep apnea   • Atherosclerosis of arteries of extremities [facility])   • Partial small bowel obstruction (HCC)   • COVID-19   • Hyperhidrosis   • Grief reaction   • PLMD (periodic limb movement disorder)   • Excessive daytime sleepiness   • History of anemia   • Gluteal pain   • Iron deficiency   • Iron deficiency anemia secondary to inadequate dietary iron intake   • Otitis media       Assessment / Plan: We reviewed Elizabeth's labs in detail. Her iron studies have improved significantly, however, her symptoms have not improved. Her symptoms are likely not related to iron deficiency. Advised patient to have her mildly elevated TSH assessed by her PCP. They may want to also assess cortisol level. As far as her RLS, she may want to follow up with Neurology to assess especially if her leg is numb after being on her feet for long periods of time. Recommend PCP monitor Iron panel every 6 months. She can see us on an as needed basis. I spent 30 minutes on chart review, face to face counseling time, coordination of care and documentation. Past Medical History:   has a past medical history of ADD (attention deficit disorder), Anxiety, Astigmatism, Brain lesion, Calcium deficiency, Cellulitis of foot, right (6/4/2018), Cerebral palsy ([address]), Chronic otitis media, Constipation, Depression, Dysphagia, Esophagitis, Esotropia, GERD (gastroesophageal reflux disease), Hiatal hernia, Hydrocephalus ([address]), Impaired fasting glucose, Left nephrolithiasis (03/04/2019), Myopia, Oppositional defiant disorder, 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[address]), Seizures ([address]), Sensorineural hearing loss, Sleep apnea, Status post ventriculoatrial shunt placement, and Visual impairment. PAST SURGICAL HISTORY:   has a past surgical history that includes CSF shunt; Leg Surgery; EAR SURGERY; Nose surgery; pr esophagogastroduodenoscopy transoral diagnostic (N/A, 5/9/2019); Upper gastrointestinal endoscopy (05/2019); and Breast biopsy (Left).     CURRENT MEDICATIONS  Current Outpatient Medications   Medication Sig Dispense Refill   • acetaminophen (TYLENOL) 500 mg tablet Take 1 tablet (500 mg total) by mouth every 6 (six) hours as needed for mild pain 30 tablet 5   • albuterol (Ventolin HFA) 90 mcg/act inhaler Inhale 2 puffs every 6 (six) hours as needed for wheezing 18 g 0   • aluminum-magnesium hydroxide-simethicone (GNP Antacid & Anti-Gas) 5903-6508-739 mg/30 mL suspension Take 15 mL by mouth every 4 (four) hours as needed for indigestion or heartburn 355 mL 0   • amitriptyline (ELAVIL) 10 mg tablet Take 1 tablet (10 mg total) by mouth daily at bedtime 90 tablet 0   • ARIPiprazole (ABILIFY) 20 MG tablet Take 1 tablet (20 mg total) by mouth daily at bedtime 90 tablet 2   • bacitracin topical ointment 500 units/g topical ointment Cleanse site on nose with soap and water followed by bacitracin BID until healed then p.r.n. 15 g 2   • bismuth subsalicylate (PEPTO BISMOL) 524 mg/30 mL oral suspension Take 15 mL (262 mg total) by mouth every 6 (six) hours as needed for indigestion 360 mL 5   • D3-1000 25 MCG (1000 UT) tablet Take 2 tablets (2,000 Units total) by mouth daily 62 tablet 5   • dicyclomine (BENTYL) 20 mg tablet Take 1 tablet (20 mg total) by mouth every 6 (six) hours as needed (dysphagia) 120 tablet 0   • gabapentin (Neurontin) 300 mg capsule Take 1 capsule (300 mg total) by mouth daily at bedtime 30 capsule 5   • GNP Sore Throat Spray 1.4 % mucosal liquid APPLY 1 SPRAY TO MOUTH OR THROAT EVERY 2 HRS AS NEEDED FOR SORE THROAT 177 mL 0   • GNP Stomach Relief Max St 525 MG/15ML SUSP      • hydrOXYzine HCL (ATARAX) 10 mg tablet Take 1 tablet (10 mg total) by mouth 3 (three) times a day 30 tablet 3   • ibuprofen (MOTRIN) 400 mg tablet TAKE 1 TABLET BY MOUTH EVERY 8 HOURS AS NEEDED FOR MILD/MOD PAIN OR HEADACHES 30 tablet 0   • ibuprofen (MOTRIN) 400 mg tablet Take 1.5 tablets (600 mg total) by mouth every 6 (six) hours as needed for mild pain 20 tablet 0   • Incontinence Supply Disposable (Wings Choice Plus Adult Briefs) MISC Use as directed (R39.81) 60 each 0   • ketoconazole (NIZORAL) 2 % cream Apply topically daily 30 g 5   • lamoTRIgine (LaMICtal) 100 mg tablet Take 100 mg by mouth 2 (two) times a day Take 50mg BID     • lamoTRIgine (LaMICtal) 25 mg tablet Take 25 mg by mouth 2 (two) times a day     • magnesium hydroxide (GNP Milk of Magnesia) 400 mg/5 mL oral suspension 2 TBSP (30ML) BY MOUTH DAILY AS NEEDED IF NO BM IN 3 DAYS (CONSTIPATION) 355 mL 0   • nystatin (MYCOSTATIN) powder Apply 1 application. topically 4 (four) times a day 60 g 5   • nystatin-triamcinolone (MYCOLOG-II) cream Apply topically 2 (two) times a day 60 g 2   • psyllium (METAMUCIL SMOOTH TEXTURE) 28 % packet      • psyllium (METAMUCIL) 58.6 % packet Take 1 packet by mouth daily As needed for constipation 30 packet 5   • RA Sunscreen SPF50 LOTN Apply 15 minutes before sun exposure and every 2 hours 237 mL 0   • senna-docusate sodium (Senexon-S) 8.6-50 mg per tablet Take 1 tablet by mouth daily at 8am. 30 tablet 5   • trospium chloride (SANCTURA) 20 mg tablet Take 1 tablet (20 mg total) by mouth 2 (two) times a day 180 tablet 3   • vedolizumab (ENTYVIO) SOLR      • calcium carbonate (TUMS) 500 mg chewable tablet Chew 1 tablet (500 mg total) 3 (three) times a day as needed for heartburn (Patient not taking: Reported on 8/2/2023) 30 tablet 5   • carbamide peroxide (DEBROX) 6.5 % otic solution Administer 5 drops into the left ear 2 (two) times a day Use 1 week prior to ENT appointment. Also can use 4 gtts twice weekly (Tuesday and Friday for example) to each ear for prevention. Keep hearing aid out x 10 minutes after ear drops administered.  (Patient not taking: Reported on 8/2/2023) 15 mL 1   • ciprofloxacin-dexamethasone (CIPRODEX) otic suspension Administer 4 drops into the left ear 2 (two) times a day (Patient not taking: Reported on 8/2/2023) 7.5 mL 0   • Diclofenac Sodium (VOLTAREN) 1 % Apply 2 g topically 4 (four) times a day (Patient not taking: Reported on 8/2/2023) 50 g 0   • Dyclonine-Glycerin (Cepacol Sore Throat Spray) 0.1-33 % LIQD Apply 1 spray to the mouth or throat 3 (three) times a day as needed (sorethroat) (Patient not taking: Reported on 8/2/2023) 118 mL 5   • Elastic Bandages & Supports (Neoprene Knee Brace) MISC Apply right knee brace in morning; remove at night (Patient not taking: Reported on 8/3/2023) 1 each 0   • escitalopram (LEXAPRO) 20 mg tablet Take 1 tablet (20 mg total) by mouth daily (Patient not taking: Reported on 8/2/2023) 90 tablet 2   • fluticasone (FLONASE) 50 mcg/act nasal spray 1 spray into each nostril daily as needed for rhinitis (nasal congestion) At 8:00 AM (Patient not taking: Reported on 8/2/2023) 18.2 mL 5   • Konsyl Daily Fiber 28.3 %  (Patient not taking: Reported on 8/2/2023)     • lidocaine (LMX) 4 % cream Apply topically as needed for mild pain (Patient not taking: Reported on 8/2/2023) 30 g 0   • LORazepam (ATIVAN) 0.5 mg tablet Take 0.25 mg by mouth every 6 (six) hours as needed for anxiety Take 0.25mg TID (Patient not taking: Reported on 8/3/2023)     • lubiprostone (Amitiza) 24 mcg capsule Take 1 capsule (24 mcg total) by mouth daily with breakfast (Patient not taking: Reported on 8/2/2023) 60 capsule 5   • metoprolol tartrate (LOPRESSOR) 25 mg tablet Take 1 tablet (25 mg total) by mouth 2 (two) times a day (Patient not taking: Reported on 8/2/2023) 60 tablet 5   • mineral oil-hydrophilic petrolatum (AQUAPHOR) ointment Apply topically as needed for dry skin (Patient not taking: Reported on 8/3/2023) 420 g 5   • Mouthwashes (Listerine Antiseptic) LIQD Swish and spit 5 mL 2 (two) times a day 1000 mL 5   • Multiple Vitamins-Minerals (CertaVite/Antioxidants) TABS Take 1 tablet by mouth in the morning (Patient not taking: Reported on 8/2/2023) 31 tablet 5   • norgestimate-ethinyl estradiol (ORTHO-CYCLEN) 0.25-35 MG-MCG per tablet Take 1 tablet by mouth daily (Patient not taking: Reported on 8/2/2023) 28 tablet 12 • pantoprazole (PROTONIX) 20 mg tablet Take 1 tablet (20 mg total) by mouth daily (Patient not taking: Reported on 8/2/2023) 62 tablet 5   • sodium chloride (OCEAN) 0.65 % nasal spray 1 spray into each nostril as needed (three times daily as needed for nasal congestion) (Patient not taking: Reported on 8/2/2023) 60 mL 5   • zinc oxide (DESITIN) 13 % cream Apply 1 application topically as needed (for perianal irritation) (Patient not taking: Reported on 8/2/2023) 454 g 5     No current facility-administered medications for this visit. [unfilled]    SOCIAL HISTORY:   reports that she has never smoked. She has never used smokeless tobacco. She reports that she does not drink alcohol and does not use drugs. FAMILY HISTORY:  family history includes Colon cancer in her father; Diabetes in her mother; No Known Problems in her maternal grandfather, maternal grandmother, paternal grandfather, and paternal grandmother. ALLERGIES:  has No Known Allergies. Physical Exam:  Vital Signs:   Visit Vitals  /78 (BP Location: Left arm, Patient Position: Sitting, Cuff Size: Adult)   Pulse 103   Temp 97.7 °F (36.5 °C)   Resp 17   Ht 5' (1.524 m)   Wt 98.3 kg (216 lb 12.8 oz)   SpO2 98%   BMI 42.34 kg/m²   OB Status Birth Control   Smoking Status Never   BSA 1.93 m²     Body mass index is 42.34 kg/m². Body surface area is 1.93 meters squared. GEN: Alert, awake oriented x3, in no acute distress  HEENT- No pallor, icterus, cyanosis, no oral mucosal lesions,   LAD - no palpable cervical, clavicle, axillary, inguinal LAD  Heart- normal S1 S2, regular rate and rhythm, No murmur, rubs.    Lungs- clear breathing sound bilateral.   Abdomen- soft, Non tender, bowel sounds present  Extremities- No cyanosis, clubbing, edema  Neuro- No focal neurological deficit    Labs:  Lab Results   Component Value Date    WBC 8.36 08/01/2023    HGB 13.2 08/01/2023    HCT 41.1 08/01/2023    MCV 97 08/01/2023     08/01/2023     Lab Results   Component Value Date    SODIUM 137 06/15/2023    K 3.8 06/15/2023     06/15/2023    CO2 26 06/15/2023    AGAP 9 06/15/2023    BUN 9 06/15/2023    CREATININE 0.78 06/15/2023    GLUC 113 06/15/2023    GLUF 90 06/05/2023    CALCIUM 8.1 (L) 06/15/2023    AST 16 06/15/2023    ALT 17 06/15/2023    ALKPHOS 146 (H) 06/15/2023    TP 7.1 06/15/2023    TBILI 0.24 06/15/2023    EGFR 92 06/15/2023

## 2023-08-10 ENCOUNTER — OFFICE VISIT (OUTPATIENT)
Dept: FAMILY MEDICINE CLINIC | Facility: CLINIC | Age: 43
End: 2023-08-10

## 2023-08-10 ENCOUNTER — TELEPHONE (OUTPATIENT)
Dept: FAMILY MEDICINE CLINIC | Facility: CLINIC | Age: 43
End: 2023-08-10

## 2023-08-10 VITALS
WEIGHT: 218.2 LBS | TEMPERATURE: 98.3 F | HEART RATE: 102 BPM | BODY MASS INDEX: 42.61 KG/M2 | SYSTOLIC BLOOD PRESSURE: 98 MMHG | DIASTOLIC BLOOD PRESSURE: 67 MMHG

## 2023-08-10 DIAGNOSIS — R09.89 CHOKING EPISODE: Primary | ICD-10-CM
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
## 2023-08-10 PROCEDURE — 99214 OFFICE O/P EST MOD 30 MIN: CPT | Performed by: FAMILY MEDICINE

## 2023-08-10 NOTE — TELEPHONE ENCOUNTER
Folder (To be completed by) -  Dr. Carla Lynn     Name of [facility] Certification and [facility] Order #[id number]    Color folder (    Form to be Faxed (Fax #), [phone]    Patient was made aware of the 7-10 business day form policy.

## 2023-08-10 NOTE — PROGRESS NOTES
Assessment/Plan:    Jem Rolon yesterday, 8/9/23 after losing balance reaching out something in the fridge, landed on her buttocks, seen in urgent care, declined imaging  -Denies any pain at present, no points of tenderness, no bruising noted  -No indication for imaging, continue using walker, okay for Tylenol as needed       Diagnoses and all orders for this visit:    Choking episode    Fall, subsequent encounter          Subjective:      Patient ID: Shine Victor is a 39 y.o. female. Patient presented with a caregiver as a follow-up after choking episodes on 7/29/2023 and fall yesterday. Patient or caregiver denies any complaints at present, reports no pain anywhere. Caregiver reports no similar episodes of choking after that single one. She has been on puréed diet since; she did have a swallowing evaluation done by speech and swallowing at her facility on 8/8/23 and was advised to step up to the regular diet. The following portions of the patient's history were reviewed and updated as appropriate: allergies, current medications, past family history, past medical history, past social history, past surgical history and problem list.    Review of Systems   Constitutional: Negative. HENT: Negative for trouble swallowing (episode of chocking, but denies episodes after chocking on 7/29/23). Respiratory: Negative. Cardiovascular: Negative. Gastrointestinal: Negative. Genitourinary: Negative. Musculoskeletal: Positive for gait problem (at base, uses walker). Negative for arthralgias, back pain, joint swelling and myalgias. Hematological: Does not bruise/bleed easily. Objective:      BP 98/67 (BP Location: Right arm, Patient Position: Sitting, Cuff Size: Large)   Pulse 102   Temp 98.3 °F (36.8 °C) (Temporal)   Wt 99 kg (218 lb 3.2 oz)   BMI 42.61 kg/m²          Physical Exam  HENT:      Head: Normocephalic and atraumatic.    Eyes:      Pupils: Pupils are equal, round, and reactive to light. Cardiovascular:      Rate and Rhythm: Normal rate. Pulmonary:      Effort: Pulmonary effort is normal. No respiratory distress. Musculoskeletal:         General: No swelling, tenderness or signs of injury. Cervical back: Normal range of motion. No rigidity. Neurological:      Mental Status: She is alert. Mental status is at baseline.

## 2023-08-10 NOTE — ASSESSMENT & PLAN NOTE
Tal Wall yesterday, 8/9/23 after losing balance reaching out something in the fridge, landed on her buttocks, seen in urgent care, declined imaging  -Denies any pain at present, no points of tenderness, no bruising noted  -No indication for imaging, continue using walker, okay for Tylenol as needed

## 2023-08-11 ENCOUNTER — TELEPHONE (OUTPATIENT)
Dept: FAMILY MEDICINE CLINIC | Facility: CLINIC | Age: 43
End: 2023-08-11

## 2023-08-11 ENCOUNTER — OFFICE VISIT (OUTPATIENT)
Dept: AUDIOLOGY | Age: 43
End: 2023-08-11

## 2023-08-11 DIAGNOSIS — H90.3 SENSORY HEARING LOSS, BILATERAL: Primary | ICD-10-CM

## 2023-08-11 NOTE — PROGRESS NOTES
Hearing Aid Visit:    Name:  Adri Pollack  :  1980  Age:  39 y.o. Date of Evaluation: 23     Adri Pollack is being seen for a hearing aid visit. Patient is fit with Oticon Zircon 2 Mini [facility] hearing aid(s). Right serial [id number]. Left serial [id number]. Warranty date: 25 (Loss/Damage and repair).   Dome/Earmold: Full shell     Patient reports earmolds are causing discomfort in both ears. When molds are placed in her ears, Thea Mayfield pinpointed area of discomfort at the tragus. Action:  Using Dremel tool, removed material from the mold at the tragus and helix bilaterally. Patient reported improved comfort in both ears. Molds were re-tubed and hearing aids were cleaned. Recommendations:   Should patient continue to experience discomfort, she should return to clinic for new earmold impressions for purchase of new molds.       Colin Mesa., [facility]  Clinical Audiologist

## 2023-08-11 NOTE — TELEPHONE ENCOUNTER
Signature required by Dr. Korina Wolf order no.  [id number]    [facility] folder     Fax # [phone]

## 2023-08-11 NOTE — TELEPHONE ENCOUNTER
Kelly from [facility] called to inform provider that patient's caregiver declined OT today due to patient overscheduling.

## 2023-08-14 ENCOUNTER — HOSPITAL ENCOUNTER (OUTPATIENT)
Dept: RADIOLOGY | Facility: HOSPITAL | Age: 43
Discharge: HOME | End: 2023-08-14
Attending: FAMILY MEDICINE
Payer: MEDICARE

## 2023-08-14 DIAGNOSIS — R13.10 DYSPHAGIA, UNSPECIFIED TYPE: ICD-10-CM

## 2023-08-14 DIAGNOSIS — T17.320D CHOKING DUE TO FOOD IN LARYNX, SUBSEQUENT ENCOUNTER: ICD-10-CM

## 2023-08-14 PROCEDURE — 74220 X-RAY XM ESOPHAGUS 1CNTRST: CPT

## 2023-08-15 ENCOUNTER — OFFICE VISIT (OUTPATIENT)
Dept: SLEEP CENTER | Facility: CLINIC | Age: 43
End: 2023-08-15
Payer: MEDICARE

## 2023-08-15 ENCOUNTER — TELEPHONE (OUTPATIENT)
Dept: FAMILY MEDICINE CLINIC | Facility: CLINIC | Age: 43
End: 2023-08-15

## 2023-08-15 VITALS
BODY MASS INDEX: 42.61 KG/M2 | SYSTOLIC BLOOD PRESSURE: 119 MMHG | HEIGHT: 60 IN | HEART RATE: 99 BPM | DIASTOLIC BLOOD PRESSURE: 60 MMHG

## 2023-08-15 DIAGNOSIS — G47.19 EXCESSIVE DAYTIME SLEEPINESS: ICD-10-CM

## 2023-08-15 DIAGNOSIS — G47.61 PLMD (PERIODIC LIMB MOVEMENT DISORDER): Primary | ICD-10-CM

## 2023-08-15 DIAGNOSIS — E61.1 IRON DEFICIENCY: ICD-10-CM

## 2023-08-15 PROBLEM — Z91.89 AT RISK FOR SLEEP APNEA: Status: RESOLVED | Noted: 2021-09-27 | Resolved: 2023-08-15

## 2023-08-15 PROCEDURE — 99214 OFFICE O/P EST MOD 30 MIN: CPT | Performed by: PHYSICIAN ASSISTANT

## 2023-08-15 NOTE — TELEPHONE ENCOUNTER
Folder (To be completed by) -  Dr. Peck Can     Name of Form - [facility] Physician Signature    Color folder [facility])    Form to be Faxed (Fax #), [phone]    Patient was made aware of the 7-10 business day form policy.

## 2023-08-15 NOTE — ASSESSMENT & PLAN NOTE
Patient feels she is sleeping much better since increasing the Neurontin to 300 mg nightly and having IV iron infusions. She has no concerns in regards to her limb movement disorder at this time. She will continue with gabapentin 300 mg nightly and follow-up with hematology as recommended.

## 2023-08-15 NOTE — ASSESSMENT & PLAN NOTE
Patient states she is still suffering from daytime sleepiness although she is sleeping better and iron levels are normal.  I did see that her TSH was elevated at 7.95 on 8/4/2023. I am not sure if this was addressed by her PCP. I advised the patient and her caregiver to make sure this is addressed. As hypothyroidism can lead to daytime sleepiness.

## 2023-08-15 NOTE — PROGRESS NOTES
Progress Note - [address] [place] [address] 39 y.o. female   :1980, MRN: [id number]  8/15/2023          Follow Up Evaluation / Problem:     PLMD   Excessive daytime sleepiness  Iron Deficiency     Thank you for the opportunity of participating in the evaluation and care of this patient in the Sleep Clinic at [address] [facility]. HPI: Tony Neff is a 39y.o. year old female who presents with a caregiver from her group home in follow-up of periodic limb movement disorder and iron deficiency. Patient had a hematology consult on 2023 and had 3 iron infusions. Her ferritin was now noted to be 191 on 2023. She states she does feel better in regards to her restless leg and limb movement disorder. She feels she is sleeping well. She also feels the Neurontin 300 mg nightly is helping her. Her only concern is that she still feels tired during the day.         Current Outpatient Medications:   •  acetaminophen (TYLENOL) 500 mg tablet, Take 1 tablet (500 mg total) by mouth every 6 (six) hours as needed for mild pain, Disp: 30 tablet, Rfl: 5  •  albuterol (Ventolin HFA) 90 mcg/act inhaler, Inhale 2 puffs every 6 (six) hours as needed for wheezing, Disp: 18 g, Rfl: 0  •  aluminum-magnesium hydroxide-simethicone (GNP Antacid & Anti-Gas) 7505-3714-477 mg/30 mL suspension, Take 15 mL by mouth every 4 (four) hours as needed for indigestion or heartburn, Disp: 355 mL, Rfl: 0  •  amitriptyline (ELAVIL) 10 mg tablet, Take 1 tablet (10 mg total) by mouth daily at bedtime, Disp: 90 tablet, Rfl: 0  •  ARIPiprazole (ABILIFY) 20 MG tablet, Take 1 tablet (20 mg total) by mouth daily at bedtime, Disp: 90 tablet, Rfl: 2  •  bacitracin topical ointment 500 units/g topical ointment, Cleanse site on nose with soap and water followed by bacitracin BID until healed then p.r.n., Disp: 15 g, Rfl: 2  •  bismuth subsalicylate (PEPTO BISMOL) 524 mg/30 mL oral suspension, Take 15 mL (262 mg total) by mouth every 6 (six) hours as needed for indigestion, Disp: 360 mL, Rfl: 5  •  D3-1000 25 MCG (1000 UT) tablet, Take 2 tablets (2,000 Units total) by mouth daily, Disp: 62 tablet, Rfl: 5  •  dicyclomine (BENTYL) 20 mg tablet, Take 1 tablet (20 mg total) by mouth every 6 (six) hours as needed (dysphagia), Disp: 120 tablet, Rfl: 0  •  escitalopram (LEXAPRO) 20 mg tablet, Take 1 tablet (20 mg total) by mouth daily, Disp: 90 tablet, Rfl: 2  •  gabapentin (Neurontin) 300 mg capsule, Take 1 capsule (300 mg total) by mouth daily at bedtime, Disp: 30 capsule, Rfl: 5  •  GNP Sore Throat Spray 1.4 % mucosal liquid, APPLY 1 SPRAY TO MOUTH OR THROAT EVERY 2 HRS AS NEEDED FOR SORE THROAT, Disp: 177 mL, Rfl: 0  •  GNP Stomach Relief Max St 525 MG/15ML SUSP, , Disp: , Rfl:   •  hydrOXYzine HCL (ATARAX) 10 mg tablet, Take 1 tablet (10 mg total) by mouth 3 (three) times a day, Disp: 30 tablet, Rfl: 3  •  ibuprofen (MOTRIN) 400 mg tablet, TAKE 1 TABLET BY MOUTH EVERY 8 HOURS AS NEEDED FOR MILD/MOD PAIN OR HEADACHES, Disp: 30 tablet, Rfl: 0  •  ibuprofen (MOTRIN) 400 mg tablet, Take 1.5 tablets (600 mg total) by mouth every 6 (six) hours as needed for mild pain, Disp: 20 tablet, Rfl: 0  •  Incontinence Supply Disposable (Wings Choice Plus Adult Briefs) MISC, Use as directed (R39.81), Disp: 60 each, Rfl: 0  •  ketoconazole (NIZORAL) 2 % cream, Apply topically daily, Disp: 30 g, Rfl: 5  •  lamoTRIgine (LaMICtal) 100 mg tablet, Take 100 mg by mouth 2 (two) times a day Take 50mg BID, Disp: , Rfl:   •  lamoTRIgine (LaMICtal) 25 mg tablet, Take 25 mg by mouth 2 (two) times a day, Disp: , Rfl:   •  magnesium hydroxide (GNP Milk of Magnesia) 400 mg/5 mL oral suspension, 2 TBSP (30ML) BY MOUTH DAILY AS NEEDED IF NO BM IN 3 DAYS (CONSTIPATION), Disp: 355 mL, Rfl: 0  •  metoprolol tartrate (LOPRESSOR) 25 mg tablet, Take 1 tablet (25 mg total) by mouth 2 (two) times a day, Disp: 60 tablet, Rfl: 5  •  nystatin (MYCOSTATIN) powder, Apply 1 application. topically 4 (four) times a day, Disp: 60 g, Rfl: 5  •  nystatin-triamcinolone (MYCOLOG-II) cream, Apply topically 2 (two) times a day, Disp: 60 g, Rfl: 2  •  pantoprazole (PROTONIX) 20 mg tablet, Take 1 tablet (20 mg total) by mouth daily, Disp: 62 tablet, Rfl: 5  •  psyllium (METAMUCIL SMOOTH TEXTURE) 28 % packet, , Disp: , Rfl:   •  psyllium (METAMUCIL) 58.6 % packet, Take 1 packet by mouth daily As needed for constipation, Disp: 30 packet, Rfl: 5  •  RA Sunscreen SPF50 LOTN, Apply 15 minutes before sun exposure and every 2 hours, Disp: 237 mL, Rfl: 0  •  senna-docusate sodium (Senexon-S) 8.6-50 mg per tablet, Take 1 tablet by mouth daily at 8am., Disp: 30 tablet, Rfl: 5  •  trospium chloride (SANCTURA) 20 mg tablet, Take 1 tablet (20 mg total) by mouth 2 (two) times a day, Disp: 180 tablet, Rfl: 3  •  vedolizumab (ENTYVIO) SOLR, , Disp: , Rfl:   •  calcium carbonate (TUMS) 500 mg chewable tablet, Chew 1 tablet (500 mg total) 3 (three) times a day as needed for heartburn (Patient not taking: Reported on 8/2/2023), Disp: 30 tablet, Rfl: 5  •  carbamide peroxide (DEBROX) 6.5 % otic solution, Administer 5 drops into the left ear 2 (two) times a day Use 1 week prior to ENT appointment. Also can use 4 gtts twice weekly (Tuesday and Friday for example) to each ear for prevention. Keep hearing aid out x 10 minutes after ear drops administered.  (Patient not taking: Reported on 8/2/2023), Disp: 15 mL, Rfl: 1  •  ciprofloxacin-dexamethasone (CIPRODEX) otic suspension, Administer 4 drops into the left ear 2 (two) times a day (Patient not taking: Reported on 8/2/2023), Disp: 7.5 mL, Rfl: 0  •  Diclofenac Sodium (VOLTAREN) 1 %, Apply 2 g topically 4 (four) times a day (Patient not taking: Reported on 8/2/2023), Disp: 50 g, Rfl: 0  •  Dyclonine-Glycerin (Cepacol Sore Throat Spray) 0.1-33 % LIQD, Apply 1 spray to the mouth or throat 3 (three) times a day as needed (sorethroat) (Patient not taking: Reported on 8/2/2023), Disp: 118 mL, Rfl: 5  •  Elastic Bandages & Supports (Neoprene Knee Brace) MISC, Apply right knee brace in morning; remove at night (Patient not taking: Reported on 8/3/2023), Disp: 1 each, Rfl: 0  •  fluticasone (FLONASE) 50 mcg/act nasal spray, 1 spray into each nostril daily as needed for rhinitis (nasal congestion) At 8:00 AM (Patient not taking: Reported on 8/2/2023), Disp: 18.2 mL, Rfl: 5  •  Konsyl Daily Fiber 28.3 %, , Disp: , Rfl:   •  lidocaine (LMX) 4 % cream, Apply topically as needed for mild pain (Patient not taking: Reported on 8/2/2023), Disp: 30 g, Rfl: 0  •  LORazepam (ATIVAN) 0.5 mg tablet, Take 0.25 mg by mouth every 6 (six) hours as needed for anxiety Take 0.25mg TID (Patient not taking: Reported on 8/3/2023), Disp: , Rfl:   •  lubiprostone (Amitiza) 24 mcg capsule, Take 1 capsule (24 mcg total) by mouth daily with breakfast (Patient not taking: Reported on 8/2/2023), Disp: 60 capsule, Rfl: 5  •  mineral oil-hydrophilic petrolatum (AQUAPHOR) ointment, Apply topically as needed for dry skin (Patient not taking: Reported on 8/3/2023), Disp: 420 g, Rfl: 5  •  Mouthwashes (Listerine Antiseptic) LIQD, Swish and spit 5 mL 2 (two) times a day, Disp: 1000 mL, Rfl: 5  •  Multiple Vitamins-Minerals (CertaVite/Antioxidants) TABS, Take 1 tablet by mouth in the morning (Patient not taking: Reported on 8/2/2023), Disp: 31 tablet, Rfl: 5  •  norgestimate-ethinyl estradiol (ORTHO-CYCLEN) 0.25-35 MG-MCG per tablet, Take 1 tablet by mouth daily (Patient not taking: Reported on 8/2/2023), Disp: 28 tablet, Rfl: 12  •  sodium chloride (OCEAN) 0.65 % nasal spray, 1 spray into each nostril as needed (three times daily as needed for nasal congestion) (Patient not taking: Reported on 8/2/2023), Disp: 60 mL, Rfl: 5  •  zinc oxide (DESITIN) 13 % cream, Apply 1 application topically as needed (for perianal irritation) (Patient not taking: Reported on 8/2/2023), Disp: 454 g, Rfl: 5    How likely are you to doze off or fall asleep in the following situations, in contrast to feeling just tired? Sitting and reading: Would never doze  Watching TV: Would never doze  Sitting, inactive in a public place (e.g. a theatre or a meeting): Slight chance of dozing  As a passenger in a car for an hour without a break: High chance of dozing  Lying down to rest in the afternoon when circumstances permit: High chance of dozing  Sitting and talking to someone: Would never doze  Sitting quietly after a lunch without alcohol: Slight chance of dozing  In a car, while stopped for a few minutes in traffic: Would never doze  Total score: 8              Vitals:    08/15/23 1258   BP: 119/60   BP Location: Left arm   Patient Position: Sitting   Cuff Size: Adult   Pulse: 99   Height: 5' (1.524 m)       Body mass index is 42.61 kg/m². EPWORTH SLEEPINESS SCORE  Total score: 8      Past History Since Last Sleep Center Visit:     Iron infusions completed   Increase gabapentin to 300 mg nightly  Improvement in symptoms of PLMD      The review of systems and following portions of the patient's history were reviewed and updated as appropriate: allergies, current medications, past family history, past medical history, past social history, past surgical history, and problem list.        OBJECTIVE          Physical Exam:     General Appearance:   Alert, cooperative, no distress, appears stated age, uses a walker for ambulation       ASSESSMENT / PLAN    1. PLMD (periodic limb movement disorder)  Assessment & Plan:  Patient feels she is sleeping much better since increasing the Neurontin to 300 mg nightly and having IV iron infusions. She has no concerns in regards to her limb movement disorder at this time. She will continue with gabapentin 300 mg nightly and follow-up with hematology as recommended.       2. Excessive daytime sleepiness  Assessment & Plan:  Patient states she is still suffering from daytime sleepiness although she is sleeping better and iron levels are normal.  I did see that her TSH was elevated at 7.95 on 8/4/2023. I am not sure if this was addressed by her PCP. I advised the patient and her caregiver to make sure this is addressed. As hypothyroidism can lead to daytime sleepiness. 3. Iron deficiency  Assessment & Plan:  Patient's iron deficiency is significantly improved after IV iron treatments. Patient's limb movement disorder and sleep have also improved. She will follow-up with hematology as recommended. Counseling / Coordination of Care    I have spent a total time of 35 minutes on 08/15/23 in caring for this patient including Instructions for management, Patient and family education, Impressions, Counseling / Coordination of care, Documenting in the medical record, Reviewing / ordering tests, medicine, procedures   and Obtaining or reviewing history  . The following instructions have been given to the patient today:    Patient Instructions   Your iron deficiency is significantly improved since doing the infusions. Follow-up with hematology as they recommend. This also has improved your symptoms of PLMD.  Continue to take your gabapentin 300 mg nightly. Since you are still feeling tired, I see your last blood work showed an elevated TSH which could mean your thyroid is underactive. Please follow-up with your primary care doctor in regards to this. Nursing Support:  When: Monday through Friday 7A-5PM except holidays  Where: Our direct line is [phone]. If you are having a true emergency please call 911. In the event that the line is busy or it is after hours please leave a voice message and we will return your call. Please speak clearly, leaving your full name, birth date, best number to reach you and the reason for your call. Medication refills:  We will need the name of the medication, the dosage, the ordering provider, whether you get a 30 or 90 day refill, and the pharmacy name and address. Medications will be ordered by the provider only. Nurses cannot call in prescriptions. Please allow 7 days for medication refills. Physician requested updates: If your provider requested that you call with an update after starting medication, please be ready to provide us the medication and dosage, what time you take your medication, the time you attempt to fall asleep, time you fall asleep, when you wake up, and what time you get out of bed. Sleep Study Results: We will contact you with sleep study results and/or next steps after the physician has reviewed your testing.            Flako Ramires PA-C  [facility] [place]

## 2023-08-15 NOTE — ASSESSMENT & PLAN NOTE
Patient's iron deficiency is significantly improved after IV iron treatments. Patient's limb movement disorder and sleep have also improved. She will follow-up with hematology as recommended.

## 2023-08-15 NOTE — PATIENT INSTRUCTIONS
Your iron deficiency is significantly improved since doing the infusions. Follow-up with hematology as they recommend. This also has improved your symptoms of PLMD.  Continue to take your gabapentin 300 mg nightly. Since you are still feeling tired, I see your last blood work showed an elevated TSH which could mean your thyroid is underactive. Please follow-up with your primary care doctor in regards to this. Nursing Support:  When: Monday through Friday 7A-5PM except holidays  Where: Our direct line is [phone]. If you are having a true emergency please call 911. In the event that the line is busy or it is after hours please leave a voice message and we will return your call. Please speak clearly, leaving your full name, birth date, best number to reach you and the reason for your call. Medication refills: We will need the name of the medication, the dosage, the ordering provider, whether you get a 30 or 90 day refill, and the pharmacy name and address. Medications will be ordered by the provider only. Nurses cannot call in prescriptions. Please allow 7 days for medication refills. Physician requested updates: If your provider requested that you call with an update after starting medication, please be ready to provide us the medication and dosage, what time you take your medication, the time you attempt to fall asleep, time you fall asleep, when you wake up, and what time you get out of bed. Sleep Study Results: We will contact you with sleep study results and/or next steps after the physician has reviewed your testing.

## 2023-08-28 ENCOUNTER — TELEPHONE (OUTPATIENT)
Dept: FAMILY MEDICINE CLINIC | Facility: CLINIC | Age: 43
End: 2023-08-28

## 2023-08-28 NOTE — TELEPHONE ENCOUNTER
Folder (To be completed by) -Dr. Nii Potter     Name of Form - Person Directed Support    Color folder [facility]    Form to be Faxed [phone]    Patient was made aware of the 7-10 business day form policy.

## 2023-08-31 ENCOUNTER — TELEPHONE (OUTPATIENT)
Dept: FAMILY MEDICINE CLINIC | Facility: CLINIC | Age: 43
End: 2023-08-31

## 2023-08-31 ENCOUNTER — OFFICE VISIT (OUTPATIENT)
Dept: FAMILY MEDICINE CLINIC | Facility: CLINIC | Age: 43
End: 2023-08-31

## 2023-08-31 VITALS
WEIGHT: 216 LBS | OXYGEN SATURATION: 96 % | HEART RATE: 90 BPM | DIASTOLIC BLOOD PRESSURE: 66 MMHG | TEMPERATURE: 98.3 F | HEIGHT: 60 IN | BODY MASS INDEX: 42.41 KG/M2 | SYSTOLIC BLOOD PRESSURE: 97 MMHG

## 2023-08-31 DIAGNOSIS — R09.89 CHOKING EPISODE: ICD-10-CM

## 2023-08-31 DIAGNOSIS — B37.2 CANDIDIASIS OF SKIN: Primary | ICD-10-CM

## 2023-08-31 DIAGNOSIS — R19.7 DIARRHEA, UNSPECIFIED TYPE: ICD-10-CM

## 2023-08-31 PROCEDURE — 99214 OFFICE O/P EST MOD 30 MIN: CPT | Performed by: FAMILY MEDICINE

## 2023-08-31 NOTE — ASSESSMENT & PLAN NOTE
Watery, 1 episode this morning  -No abdominal pain, nausea, vomiting  -Patient currently is on senna S, advised to hold until episodes resolve; advised proper hydration and BRAT diet  -Return to clinic if abdominal pain, nausea, vomiting or fever

## 2023-08-31 NOTE — ASSESSMENT & PLAN NOTE
No more choking episodes, normal barium studies  -Continue regular diet as advised by swallowing/ speech therapy

## 2023-08-31 NOTE — PROGRESS NOTES
Assessment/Plan:    Candidiasis of skin  Patient has been on nystatin powder daily for many months; no signs of rash at this time  -Discontinue nystatin powder, advised to use daily baby powder/talc instead  -Follow-up if rash reappear    Choking episode  No more choking episodes, normal barium studies  -Continue regular diet as advised by swallowing/ speech therapy    Diarrhea  Watery, 1 episode this morning  -No abdominal pain, nausea, vomiting  -Patient currently is on senna S, advised to hold until episodes resolve; advised proper hydration and BRAT diet  -Return to clinic if abdominal pain, nausea, vomiting or fever       Diagnoses and all orders for this visit:    Candidiasis of skin  -     talc; Apply topically 2 (two) times a day for 1 dose Use under the breast and abdomen folds    Choking episode    Diarrhea, unspecified type          Subjective:      Patient ID: Eileen Rosales is a 39 y.o. female. Patient presented to follow-up after swallow study she recently had; no more choking episodes reported. New complaint today is diarrhea that started this morning, watery, no other associated symptoms or complaints. The following portions of the patient's history were reviewed and updated as appropriate: allergies, current medications, past family history, past medical history, past social history, past surgical history and problem list.    Review of Systems   Constitutional: Negative. HENT: Negative for trouble swallowing (episode of chocking, but denies episodes after chocking on 7/29/23). Respiratory: Negative. Cardiovascular: Negative. Gastrointestinal: Positive for diarrhea (1 episode this AM). Genitourinary: Negative. Musculoskeletal: Positive for gait problem (at base, uses walker). Negative for arthralgias, back pain, joint swelling and myalgias. Hematological: Does not bruise/bleed easily.          Objective:      BP 97/66 (BP Location: Left arm, Patient Position: Sitting, Cuff Size: Large)   Pulse 90   Temp 98.3 °F (36.8 °C) (Temporal)   Ht 5' (1.524 m)   Wt 98 kg (216 lb)   SpO2 96%   BMI 42.18 kg/m²          Physical Exam  HENT:      Head: Normocephalic and atraumatic. Cardiovascular:      Rate and Rhythm: Normal rate. Pulmonary:      Effort: Pulmonary effort is normal. No respiratory distress. Musculoskeletal:         General: No swelling, tenderness or signs of injury. Cervical back: Normal range of motion. No rigidity. Neurological:      Mental Status: She is alert. Mental status is at baseline.

## 2023-08-31 NOTE — ASSESSMENT & PLAN NOTE
Patient has been on nystatin powder daily for many months; no signs of rash at this time  -Discontinue nystatin powder, advised to use daily baby powder/talc instead  -Follow-up if rash reappear

## 2023-08-31 NOTE — TELEPHONE ENCOUNTER
Folder (To be completed by) -  Dr. Lisa Pike     Name of Form - Person Direct Supports     Color folder [facility] [place])    Form to be Faxed (Fax #), [phone]    Patient was made aware of the 7-10 business day form policy.

## 2023-09-11 DIAGNOSIS — K21.9 GASTROESOPHAGEAL REFLUX DISEASE WITHOUT ESOPHAGITIS: ICD-10-CM

## 2023-09-11 RX ORDER — CALCIUM CARBONATE 500 MG/1
1 TABLET, CHEWABLE ORAL 3 TIMES DAILY PRN
Qty: 30 TABLET | Refills: 5 | Status: SHIPPED | OUTPATIENT
Start: 2023-09-11

## 2023-09-12 NOTE — TELEPHONE ENCOUNTER
Called pharmacy, patient's prescription insurance is [employer], Id: [id number], will have to initiate prior auth From: Ericka Gonzalez  To: Christine Park  Sent: 9/11/2023 9:25 PM CDT  Subject: MRI's    Hi Christine,  Do you have the results of both MRIs now? I had the femur mri today. Both were done at [facility].    Thanks,  Ericka Gonzalez

## 2023-09-19 ENCOUNTER — OFFICE VISIT (OUTPATIENT)
Dept: AUDIOLOGY | Age: 43
End: 2023-09-19

## 2023-09-19 DIAGNOSIS — H90.3 SENSORY HEARING LOSS, BILATERAL: Primary | ICD-10-CM

## 2023-09-19 NOTE — PROGRESS NOTES
Hearing Aid Visit:    Name:  Rayray Siani  :  1980  Age:  39 y.o. Date of Evaluation: 23     Rayray Saini is being seen for a hearing aid visit. Patient is fit with [employer] Zircon 2 Mini [employer]-R hearing aid(s). Right serial [id number]. Left serial [id number]. Warranty date: 25 (Loss/Damage and repair).   Dome/Earmold: Full shell     Patient reports that the hearing aids are not working. Action:  A listening check revealed the right hearing aid to be working properly. A visual inspection revealed the left earmold to be torn through the canal.    Norma Herrera and her caretaker were quoted $75.00 for a new earmold. The caretaker noted that she would special approval from the home. The home will call us back and tell us to order the earmold. Payment will be made at . Recommendations:   HAV when earmold is ordered and arrives. Colin Will.   Clinical Audiologist

## 2023-09-20 ENCOUNTER — TELEPHONE (OUTPATIENT)
Dept: FAMILY MEDICINE CLINIC | Facility: CLINIC | Age: 43
End: 2023-09-20

## 2023-10-03 ENCOUNTER — OFFICE VISIT (OUTPATIENT)
Dept: AUDIOLOGY | Age: 43
End: 2023-10-03
Payer: COMMERCIAL

## 2023-10-03 DIAGNOSIS — H90.3 SENSORY HEARING LOSS, BILATERAL: Primary | ICD-10-CM

## 2023-10-03 PROCEDURE — V5264 EAR MOLD/INSERT: HCPCS | Performed by: AUDIOLOGIST

## 2023-10-03 NOTE — PROGRESS NOTES
Hearing Aid Visit:    Name:  Rylan Alamo  :  1980  Age:  39 y.o. Date of Evaluation: 10/03/23     Rylan Alamo is being seen for a hearing aid visit. Patient is fit with [employer] Zircon 2 Mini RITE-R hearing aid(s). Right serial [id number]. Left serial [id number]. Warranty date: 25 (Loss/Damage and repair).   Dome/Earmold: Full shell      Patient reports the left earmold is ripped. Action:  Completed left earmold impression without incident. Paid $75.00. Re-tubed right earmold. Recommendations: Will call when earmold arrives. Hearing is above desk. Patient can  at .      Cedric Tim, CCC-A  Clinical Audiologist

## 2023-10-06 DIAGNOSIS — E03.9 HYPOTHYROIDISM, UNSPECIFIED TYPE: ICD-10-CM

## 2023-10-06 DIAGNOSIS — R79.89 ABNORMAL TSH: Primary | ICD-10-CM

## 2023-10-06 NOTE — PROGRESS NOTES
TSH from August 4, 2023 7.95 normal T4; will recheck TSH with reflex now; possible subclinical hypothyroidism

## 2023-10-11 ENCOUNTER — APPOINTMENT (OUTPATIENT)
Dept: LAB | Facility: CLINIC | Age: 43
End: 2023-10-11
Payer: MEDICARE

## 2023-10-11 DIAGNOSIS — R79.89 ABNORMAL TSH: ICD-10-CM

## 2023-10-11 DIAGNOSIS — E03.9 HYPOTHYROIDISM, UNSPECIFIED TYPE: ICD-10-CM

## 2023-10-11 LAB — TSH SERPL DL<=0.05 MIU/L-ACNC: 2.99 UIU/ML (ref 0.45–4.5)

## 2023-10-11 PROCEDURE — 84443 ASSAY THYROID STIM HORMONE: CPT

## 2023-10-11 PROCEDURE — 36415 COLL VENOUS BLD VENIPUNCTURE: CPT

## 2023-10-18 DIAGNOSIS — K08.9 TOOTH DISORDER: ICD-10-CM

## 2023-10-18 RX ORDER — EUCALYP/ME-SALICYLATE/MEN/THYM
5 MOUTHWASH MUCOUS MEMBRANE 2 TIMES DAILY
Qty: 1000 ML | Refills: 5 | Status: SHIPPED | OUTPATIENT
Start: 2023-10-18 | End: 2023-11-17

## 2023-11-10 ENCOUNTER — TELEPHONE (OUTPATIENT)
Age: 43
End: 2023-11-10

## 2023-11-10 ENCOUNTER — OFFICE VISIT (OUTPATIENT)
Dept: AUDIOLOGY | Age: 43
End: 2023-11-10

## 2023-11-10 DIAGNOSIS — H90.3 SENSORY HEARING LOSS, BILATERAL: Primary | ICD-10-CM

## 2023-11-10 DIAGNOSIS — E55.9 VITAMIN D DEFICIENCY: ICD-10-CM

## 2023-11-10 RX ORDER — FOLIC ACID/MV,IRON,MIN/LUTEIN 0.4-18-25
1 TABLET ORAL DAILY
Qty: 31 TABLET | Refills: 5 | Status: SHIPPED | OUTPATIENT
Start: 2023-11-10

## 2023-11-10 NOTE — PROGRESS NOTES
Progress Note    Name:  Sera Sorensen  :  1980  Age:  39 y.o. Date of Evaluation: 11/10/23     Patient is fit with [employer] Zircon 2 Mini RITE-R hearing aid(s). Right serial number [id number]. Left serial number [id number]. Warranty date: 25 (Loss/Damage and repair). Dome/Earmold: Full shell     Patient was see to  new left earmold. Patient was happy with fit of earmold.      Cedric Berman, [facility]-A  Clinical Audiologist

## 2023-11-10 NOTE — TELEPHONE ENCOUNTER
Patients GI provider:  Dr. Nolan Zarco / Vicente Singer    Number to return call: [phone]    Reason for call: Pt 's  calling, schedule FU however she thought we were scheduling colon/EGD patient is due for colon but note states "consider EGD"  Please call if she can be scheduled for procedures.     Scheduled procedure/appointment date if applicable: 9/71/83

## 2023-11-14 ENCOUNTER — TELEPHONE (OUTPATIENT)
Dept: GASTROENTEROLOGY | Facility: MEDICAL CENTER | Age: 43
End: 2023-11-14

## 2023-12-17 ENCOUNTER — HOSPITAL ENCOUNTER (EMERGENCY)
Facility: HOSPITAL | Age: 43
Discharge: HOME | End: 2023-12-17
Attending: EMERGENCY MEDICINE | Admitting: EMERGENCY MEDICINE
Payer: MEDICARE

## 2023-12-17 VITALS
HEIGHT: 60 IN | DIASTOLIC BLOOD PRESSURE: 80 MMHG | HEART RATE: 91 BPM | OXYGEN SATURATION: 91 % | BODY MASS INDEX: 42.18 KG/M2 | RESPIRATION RATE: 20 BRPM | SYSTOLIC BLOOD PRESSURE: 139 MMHG

## 2023-12-17 DIAGNOSIS — R07.89 CHEST WALL TENDERNESS: ICD-10-CM

## 2023-12-17 DIAGNOSIS — R07.9 CHEST PAIN, UNSPECIFIED: Primary | ICD-10-CM

## 2023-12-17 PROCEDURE — 99284 EMERGENCY DEPT VISIT MOD MDM: CPT | Performed by: EMERGENCY MEDICINE

## 2023-12-17 PROCEDURE — 99285 EMERGENCY DEPT VISIT HI MDM: CPT

## 2023-12-17 RX ORDER — ACETAMINOPHEN 325 MG/1
650 TABLET ORAL ONCE
Status: COMPLETED | OUTPATIENT
Start: 2023-12-17 | End: 2023-12-17

## 2023-12-17 RX ORDER — LIDOCAINE 50 MG/G
1 PATCH TOPICAL ONCE
Status: DISCONTINUED | OUTPATIENT
Start: 2023-12-17 | End: 2023-12-17 | Stop reason: HOSPADM

## 2023-12-17 RX ORDER — IBUPROFEN 400 MG/1
400 TABLET ORAL ONCE
Status: COMPLETED | OUTPATIENT
Start: 2023-12-17 | End: 2023-12-17

## 2023-12-17 RX ADMIN — IBUPROFEN 400 MG: 400 TABLET, FILM COATED ORAL at 16:44

## 2023-12-17 RX ADMIN — LIDOCAINE 5% 1 PATCH: 700 PATCH TOPICAL at 16:45

## 2023-12-17 RX ADMIN — ACETAMINOPHEN 325MG 650 MG: 325 TABLET ORAL at 16:44

## 2023-12-17 NOTE — ED PROVIDER NOTES
History  Chief Complaint   Patient presents with    Chest Pain     Patient states she was at her nursing home playing with playdoh and dropped it when she bent down to get it she felt a pop in her left chest/shoulder. Pain is reproducible. Patient has no other complaints at this time. Per nursing home staff, another resident needed to be sent out today and patient usually pushes to be sent out as well whenever someone else leaves the facility.      Patient is a 43-year-old female here with nursing home staff member who patient has a history of cerebral palsy, intellectual disability, general anxiety disorder, depression, GERD, hiatal hernia, coming in today with staff at bedside.  Being to staff another group home member was sent to the hospital.  After that patient bent over to  her Play[employer]ivier and had chest pain.  She states that she felt pain in the left upper chest.  She states it hurts when she pushes on it.  Does not hurt at rest or with movement.  He has no diaphoresis, shortness of breath, nausea, vomiting.  Does not radiate into the neck, jaw or arm or back.  Patient did not take anything and has no other complaints.      History provided by:  Patient, medical records and caregiver   used: No    Chest Pain  Chest pain location: left upper chest wall.  Pain quality comment:  Pain  Pain radiates to:  Does not radiate  Pain radiates to the back: no    Pain severity:  Mild  Onset quality:  Sudden  Timing:  Intermittent  Chronicity:  New  Context: movement    Relieved by:  Nothing  Worsened by:  Nothing tried  Ineffective treatments:  None tried  Associated symptoms: no abdominal pain, no AICD problem, no altered mental status, no anorexia, no anxiety, no back pain, no claudication, no cough, no diaphoresis, no dizziness, no dysphagia, no fatigue, no fever, no headache, no heartburn, no lower extremity edema, no nausea, no near-syncope, no numbness, no orthopnea, no palpitations, no PND,  no shortness of breath, no syncope, not vomiting and no weakness    Risk factors: no aortic disease, no birth control, no diabetes mellitus, no Jaja-Danlos syndrome, no high cholesterol, no hypertension, no immobilization, not male, no Marfan's syndrome, no prior DVT/PE and no smoking            Prior to Admission Medications   Prescriptions Last Dose Informant Patient Reported? Taking?   ARIPiprazole (ABILIFY) 20 MG tablet  Outside Facility (Specify) No No   Sig: Take 1 tablet (20 mg total) by mouth daily at bedtime   D3-1000 25 MCG (1000 UT) tablet  Outside Facility (Specify) No No   Sig: Take 2 tablets (2,000 Units total) by mouth daily   Diclofenac Sodium (VOLTAREN) 1 %  Outside Facility (Specify) No No   Sig: Apply 2 g topically 4 (four) times a day   Patient not taking: Reported on 8/2/2023   Dyclonine-Glycerin (Cepacol Sore Throat Spray) 0.1-33 % LIQD  Outside Facility (Specify) No No   Sig: Apply 1 spray to the mouth or throat 3 (three) times a day as needed (sorethroat)   Patient not taking: Reported on 8/2/2023   Elastic Bandages & Supports (Neoprene Knee Brace) [facility] [place]  Outside Facility (Specify) No No   Sig: Apply right knee brace in morning; remove at night   Patient not taking: Reported on 8/3/2023   GNP Sore Throat Spray 1.4 % mucosal liquid  Outside Facility (Specify) No No   Sig: APPLY 1 SPRAY TO MOUTH OR THROAT EVERY 2 HRS AS NEEDED FOR SORE THROAT   GNP Stomach Relief Max St 525 MG/15ML SUSP  Outside Facility (Specify) Yes No   Incontinence Supply Disposable (Wings Choice Plus Adult Briefs) [facility] [place]  Outside Facility (Specify) No No   Sig: Use as directed (R39.81)   Konsyl Daily Fiber 28.3 %  Outside Facility (Specify) Yes No   Patient not taking: Reported on 8/2/2023   LORazepam (ATIVAN) 0.5 mg tablet  Outside Facility (Specify) Yes No   Sig: Take 0.25 mg by mouth every 6 (six) hours as needed for anxiety Take 0.25mg TID   Patient not taking: Reported on 8/3/2023   Mouthwashes (Listerine Antiseptic)  LIQD   No No   Sig: Swish and spit 5 mL 2 (two) times a day   Multiple Vitamins-Minerals (CertaVite/Antioxidants) TABS   No No   Sig: Take 1 tablet by mouth in the morning   RA Sunscreen SPF50 LOTN  Outside Facility (Specify) No No   Sig: Apply 15 minutes before sun exposure and every 2 hours   acetaminophen (TYLENOL) 500 mg tablet  Outside Facility (Specify) No No   Sig: Take 1 tablet (500 mg total) by mouth every 6 (six) hours as needed for mild pain   albuterol (Ventolin HFA) 90 mcg/act inhaler  Outside Facility (Specify) No No   Sig: Inhale 2 puffs every 6 (six) hours as needed for wheezing   aluminum-magnesium hydroxide-simethicone (GNP Antacid & Anti-Gas) 8729-2099-551 mg/30 mL suspension  Outside Facility (Specify) No No   Sig: Take 15 mL by mouth every 4 (four) hours as needed for indigestion or heartburn   amitriptyline (ELAVIL) 10 mg tablet  Outside Facility (Specify) No No   Sig: Take 1 tablet (10 mg total) by mouth daily at bedtime   bacitracin topical ointment 500 units/g topical ointment  Outside Facility (Specify) No No   Sig: Cleanse site on nose with soap and water followed by bacitracin BID until healed then p.r.n.   bismuth subsalicylate (PEPTO BISMOL) 524 mg/30 mL oral suspension  Outside Facility (Specify) No No   Sig: Take 15 mL (262 mg total) by mouth every 6 (six) hours as needed for indigestion   calcium carbonate (TUMS) 500 mg chewable tablet   No No   Sig: Chew 1 tablet (500 mg total) 3 (three) times a day as needed for heartburn   carbamide peroxide (DEBROX) 6.5 % otic solution  Outside Facility (Specify) No No   Sig: Administer 5 drops into the left ear 2 (two) times a day Use 1 week prior to ENT appointment.  Also can use 4 gtts twice weekly (Tuesday and Friday for example) to each ear for prevention.  Keep hearing aid out x 10 minutes after ear drops administered.   Patient not taking: Reported on 8/2/2023   ciprofloxacin-dexamethasone (CIPRODEX) otic suspension  Outside Facility  (Specify) No No   Sig: Administer 4 drops into the left ear 2 (two) times a day   Patient not taking: Reported on 2023   dicyclomine (BENTYL) 20 mg tablet  Outside Facility (Specify) No No   Sig: Take 1 tablet (20 mg total) by mouth every 6 (six) hours as needed (dysphagia)   escitalopram (LEXAPRO) 20 mg tablet  Outside Facility (Specify) No No   Sig: Take 1 tablet (20 mg total) by mouth daily   fluticasone (FLONASE) 50 mcg/act nasal spray  Outside Facility (Specify) No No   Si spray into each nostril daily as needed for rhinitis (nasal congestion) At 8:00 AM   Patient not taking: Reported on 2023   gabapentin (Neurontin) 300 mg capsule   No No   Sig: Take 1 capsule (300 mg total) by mouth daily at bedtime   hydrOXYzine HCL (ATARAX) 10 mg tablet  Outside Facility (Specify) No No   Sig: Take 1 tablet (10 mg total) by mouth 3 (three) times a day   ibuprofen (MOTRIN) 400 mg tablet  Outside Facility (Specify) No No   Sig: TAKE 1 TABLET BY MOUTH EVERY 8 HOURS AS NEEDED FOR MILD/MOD PAIN OR HEADACHES   ibuprofen (MOTRIN) 400 mg tablet  Outside Facility (Specify) No No   Sig: Take 1.5 tablets (600 mg total) by mouth every 6 (six) hours as needed for mild pain   ketoconazole (NIZORAL) 2 % cream  Outside Facility (Specify) No No   Sig: Apply topically daily   lamoTRIgine (LaMICtal) 100 mg tablet  Outside Facility (Specify) Yes No   Sig: Take 100 mg by mouth 2 (two) times a day Take 50mg BID   lamoTRIgine (LaMICtal) 25 mg tablet  Outside Facility (Specify) Yes No   Sig: Take 25 mg by mouth 2 (two) times a day   lidocaine (LMX) 4 % cream  Outside Facility (Specify) No No   Sig: Apply topically as needed for mild pain   Patient not taking: Reported on 2023   lubiprostone (Amitiza) 24 mcg capsule  Outside Facility (Specify) No No   Sig: Take 1 capsule (24 mcg total) by mouth daily with breakfast   Patient not taking: Reported on 2023   magnesium hydroxide (GNP Milk of Magnesia) 400 mg/5 mL oral suspension   Outside Facility (Specify) No No   Si TBSP (30ML) BY MOUTH DAILY AS NEEDED IF NO BM IN 3 DAYS (CONSTIPATION)   metoprolol tartrate (LOPRESSOR) 25 mg tablet  Outside Facility (Specify) No No   Sig: Take 1 tablet (25 mg total) by mouth 2 (two) times a day   mineral oil-hydrophilic petrolatum (AQUAPHOR) ointment  Outside Facility (Specify) No No   Sig: Apply topically as needed for dry skin   Patient not taking: Reported on 8/3/2023   norgestimate-ethinyl estradiol (ORTHO-CYCLEN) 0.25-35 MG-MCG per tablet  Outside Facility (Specify) No No   Sig: Take 1 tablet by mouth daily   Patient not taking: Reported on 2023   nystatin-triamcinolone (MYCOLOG-II) cream  Outside Facility (Specify) No No   Sig: Apply topically 2 (two) times a day   pantoprazole (PROTONIX) 20 mg tablet  Outside Facility (Specify) No No   Sig: Take 1 tablet (20 mg total) by mouth daily   psyllium (METAMUCIL SMOOTH TEXTURE) 28 % packet  Outside Facility (Specify) Yes No   psyllium (METAMUCIL) 58.6 % packet  Outside Facility (Specify) No No   Sig: Take 1 packet by mouth daily As needed for constipation   senna-docusate sodium (Senexon-S) 8.6-50 mg per tablet  Outside Facility (Specify) No No   Sig: Take 1 tablet by mouth daily at 8am.   sodium chloride (OCEAN) 0.65 % nasal spray  Outside Facility (Specify) No No   Si spray into each nostril as needed (three times daily as needed for nasal congestion)   Patient not taking: Reported on 2023   talc   No No   Sig: Apply topically 2 (two) times a day for 1 dose Use under the breast and abdomen folds   trospium chloride (SANCTURA) 20 mg tablet  Outside Facility (Specify) No No   Sig: Take 1 tablet (20 mg total) by mouth 2 (two) times a day   vedolizumab (ENTYVIO) SOLR   Yes No   zinc oxide (DESITIN) 13 % cream  Outside Facility (Specify) No No   Sig: Apply 1 application topically as needed (for perianal irritation)   Patient not taking: Reported on 2023      Facility-Administered  Medications: None       Past Medical History:   Diagnosis Date    ADD (attention deficit disorder)     Anxiety     Astigmatism     Brain lesion     Calcium deficiency     Cellulitis of foot, right 6/4/2018    Cerebral palsy (HCC)     Chronic otitis media     Constipation     Depression     Dysphagia     Esophagitis     Esotropia     GERD (gastroesophageal reflux disease)     Hiatal hernia     Hydrocephalus (HCC)     Impaired fasting glucose     Left nephrolithiasis 03/04/2019    Myopia     Oppositional defiant disorder     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HCC)     Seizures (HCC)     Sensorineural hearing loss     Sleep apnea     Status post ventriculoatrial shunt placement     Visual impairment        Past Surgical History:   Procedure Laterality Date    BREAST BIOPSY Left     X 2 (not sure of years)    CSF SHUNT      Creation of Ventriculo-Peritoneal CSF shunt ; Last Assessed:7/6/2016    EAR SURGERY      Last Assessed:7/6/2016    LEG SURGERY      due to CP     NOSE SURGERY      Last Assessed:7/6/2016    LA ESOPHAGOGASTRODUODENOSCOPY TRANSORAL DIAGNOSTIC N/A 5/9/2019    Procedure: ESOPHAGOGASTRODUODENOSCOPY (EGD) with biopsy;  Surgeon: Kaya Pedersen MD;  Location: [facility];  Service: Gastroenterology    UPPER GASTROINTESTINAL ENDOSCOPY  05/2019       Family History   Problem Relation Age of Onset    Diabetes Mother     Colon cancer Father     No Known Problems Maternal Grandmother     No Known Problems Maternal Grandfather     No Known Problems Paternal Grandmother     No Known Problems Paternal Grandfather     Hypertension Neg Hx     Heart disease Neg Hx     Stroke Neg Hx     Thyroid disease Neg Hx      I have reviewed and agree with the history as documented.    E-Cigarette/Vaping    E-Cigarette Use Never User      E-Cigarette/Vaping Substances    Nicotine No     THC No     CBD No     Flavoring No     Other No     Unknown No      Social History     Tobacco Use    Smoking status: Never    Smokeless tobacco: Never   Vaping  Use    Vaping status: Never Used   Substance Use Topics    Alcohol use: Never    Drug use: Never       Review of Systems   Constitutional:  Negative for chills, diaphoresis, fatigue and fever.   HENT:  Negative for ear pain, sore throat and trouble swallowing.    Eyes:  Negative for pain and visual disturbance.   Respiratory:  Negative for cough and shortness of breath.    Cardiovascular:  Positive for chest pain. Negative for palpitations, orthopnea, claudication, syncope, PND and near-syncope.   Gastrointestinal:  Negative for abdominal pain, anorexia, heartburn, nausea and vomiting.   Genitourinary:  Negative for dysuria and hematuria.   Musculoskeletal:  Negative for arthralgias and back pain.   Skin:  Negative for color change and rash.   Neurological:  Negative for dizziness, seizures, syncope, weakness, numbness and headaches.   All other systems reviewed and are negative.      Physical Exam  Physical Exam  Vitals and nursing note reviewed.   Constitutional:       General: She is not in acute distress.     Appearance: She is well-developed.   HENT:      Head: Normocephalic and atraumatic.   Eyes:      Extraocular Movements: Extraocular movements intact.      Conjunctiva/sclera: Conjunctivae normal.      Pupils: Pupils are equal, round, and reactive to light.   Cardiovascular:      Rate and Rhythm: Normal rate and regular rhythm.      Pulses:           Radial pulses are 2+ on the right side and 2+ on the left side.        Dorsalis pedis pulses are 2+ on the right side and 2+ on the left side.      Heart sounds: Normal heart sounds, S1 normal and S2 normal. No murmur heard.  Pulmonary:      Effort: Pulmonary effort is normal. No respiratory distress.      Breath sounds: Normal breath sounds.   Chest:       Abdominal:      Palpations: Abdomen is soft.      Tenderness: There is no abdominal tenderness.   Musculoskeletal:         General: No swelling.      Cervical back: Normal range of motion and neck supple.       Right lower leg: No edema.      Left lower leg: No edema.   Skin:     General: Skin is warm and dry.      Capillary Refill: Capillary refill takes less than 2 seconds.   Neurological:      General: No focal deficit present.      Mental Status: She is alert and oriented to person, place, and time.      GCS: GCS eye subscore is 4. GCS verbal subscore is 5. GCS motor subscore is 6.      Cranial Nerves: Cranial nerves 2-12 are intact.      Sensory: Sensation is intact.      Motor: Motor function is intact.      Comments: Cranial nerves 2-12 grossly intact.  EOMI.  PERRLA.  No slurred speech.  No facial asymmetry.  No tongue deviation.  Negative pronator drift.  No nystagmus.    Patient can move bilateral upper extremities and lower extremities independently of each other pain-free with full active range of motion throughout the bilateral shoulders, elbows, wrists, hips, knees and ankles.       Psychiatric:         Mood and Affect: Mood normal.         Vital Signs  ED Triage Vitals [12/17/23 1536]   Temp Pulse Respirations Blood Pressure SpO2   -- 91 20 139/80 91 %      Temp src Heart Rate Source Patient Position - Orthostatic VS BP Location FiO2 (%)   -- Monitor Lying Right arm --      Pain Score       5           Vitals:    12/17/23 1536   BP: 139/80   Pulse: 91   Patient Position - Orthostatic VS: Lying         Visual Acuity      ED Medications  Medications   lidocaine (LIDODERM) 5 % patch 1 patch (1 patch Topical Medication Applied 12/17/23 1645)   acetaminophen (TYLENOL) tablet 650 mg (650 mg Oral Given 12/17/23 1644)   ibuprofen (MOTRIN) tablet 400 mg (400 mg Oral Given 12/17/23 1644)       Diagnostic Studies  Results Reviewed       None                   No orders to display              Procedures  Procedures         ED Course  ED Course as of 12/17/23 1812   Sun Dec 17, 2023   1620 Patient is a 43-year-old female coming in today with pain and points to the left upper chest wall.  It is reproducible on exam.   "Patient is resting in bed in no distress.  Offered chest x-ray and EKG but patient declined.  Patient is alert and her to the person place and time.  Declines any other intervention.  She understands the risk of not obtaining EKG and chest x-ray.  Caregiver was at bedside and agreed to this as well.  Will give Tylenol and Lidoderm patch and plan for DC home.    Disclosure: Voice to text software was used in the preparation of this document and could have resulted in translational errors.      Occasional wrong word or \"sound a like\" substitutions may have occurred due to the inherent limitations of voice recognition software.  Read the chart carefully and recognize, using context, where substitutions have occurred.       I have independently reviewed external records are available to me to the level of detail possible within the time constraints of my patient care responsibilities in the ED.                                   SBIRT 22yo+      Flowsheet Row Most Recent Value   Initial Alcohol Screen: US AUDIT-C     1. How often do you have a drink containing alcohol? 0 Filed at: 12/17/2023 1541   2. How many drinks containing alcohol do you have on a typical day you are drinking?  0 Filed at: 12/17/2023 1541   3a. Male UNDER 65: How often do you have five or more drinks on one occasion? 0 Filed at: 12/17/2023 1541   3b. FEMALE Any Age, or MALE 65+: How often do you have 4 or more drinks on one occassion? 0 Filed at: 12/17/2023 1541   Audit-C Score 0 Filed at: 12/17/2023 1541   SUKHDEV: How many times in the past year have you...    Used an illegal drug or used a prescription medication for non-medical reasons? Never Filed at: 12/17/2023 1541                      Medical Decision Making  Risk  OTC drugs.  Prescription drug management.             Disposition  Final diagnoses:   Chest pain, unspecified   Chest wall tenderness     Time reflects when diagnosis was documented in both MDM as applicable and the Disposition " within this note       Time User Action Codes Description Comment    12/17/2023  4:22 PM Carolyn Grimes L Add [R07.9] Chest pain, unspecified     12/17/2023  4:22 PM SavannahCarolyn estrella MARIANA Add [R07.89] Chest wall tenderness           ED Disposition       ED Disposition   Discharge    Condition   Stable    Date/Time   Sun Dec 17, 2023 1622    Comment   Elizabeth Doss discharge to home/self care.                   Follow-up Information       Follow up With Specialties Details Why Contact Info    Emerita Gagnon MD Family Medicine Schedule an appointment as soon as possible for a visit in 1 week  [address] [place] [address]  [phone]              Discharge Medication List as of 12/17/2023  4:22 PM        CONTINUE these medications which have NOT CHANGED    Details   acetaminophen (TYLENOL) 500 mg tablet Take 1 tablet (500 mg total) by mouth every 6 (six) hours as needed for mild pain, Starting Mon 3/14/2022, Normal      albuterol (Ventolin HFA) 90 mcg/act inhaler Inhale 2 puffs every 6 (six) hours as needed for wheezing, Starting Thu 6/15/2023, Normal      aluminum-magnesium hydroxide-simethicone (GNP Antacid & Anti-Gas) 2907-2408-453 mg/30 mL suspension Take 15 mL by mouth every 4 (four) hours as needed for indigestion or heartburn, Starting Wed 3/1/2023, Normal      amitriptyline (ELAVIL) 10 mg tablet Take 1 tablet (10 mg total) by mouth daily at bedtime, Starting Wed 3/1/2023, Until Tue 8/15/2023, Normal      ARIPiprazole (ABILIFY) 20 MG tablet Take 1 tablet (20 mg total) by mouth daily at bedtime, Starting Wed 3/1/2023, Normal      bacitracin topical ointment 500 units/g topical ointment Cleanse site on nose with soap and water followed by bacitracin BID until healed then p.r.n., Normal      bismuth subsalicylate (PEPTO BISMOL) 524 mg/30 mL oral suspension Take 15 mL (262 mg total) by mouth every 6 (six) hours as needed for indigestion, Starting Wed 3/1/2023, Normal      calcium carbonate (TUMS) 500 mg  chewable tablet Chew 1 tablet (500 mg total) 3 (three) times a day as needed for heartburn, Starting Mon 9/11/2023, Normal      carbamide peroxide (DEBROX) 6.5 % otic solution Administer 5 drops into the left ear 2 (two) times a day Use 1 week prior to ENT appointment.  Also can use 4 gtts twice weekly (Tuesday and Friday for example) to each ear for prevention.  Keep hearing aid out x 10 minutes after ear drops administered.,  Starting Wed 3/1/2023, Normal      ciprofloxacin-dexamethasone (CIPRODEX) otic suspension Administer 4 drops into the left ear 2 (two) times a day, Starting Mon 7/3/2023, Normal      D3-1000 25 MCG (1000 UT) tablet Take 2 tablets (2,000 Units total) by mouth daily, Starting Mon 7/3/2023, Normal      Diclofenac Sodium (VOLTAREN) 1 % Apply 2 g topically 4 (four) times a day, Starting Wed 3/1/2023, Normal      dicyclomine (BENTYL) 20 mg tablet Take 1 tablet (20 mg total) by mouth every 6 (six) hours as needed (dysphagia), Starting Mon 7/3/2023, Normal      Dyclonine-Glycerin (Cepacol Sore Throat Spray) 0.1-33 % LIQD Apply 1 spray to the mouth or throat 3 (three) times a day as needed (sorethroat), Starting Tue 10/26/2021, Normal      Elastic Bandages & Supports (Neoprene Knee Brace) MISC Apply right knee brace in morning; remove at night, Normal      escitalopram (LEXAPRO) 20 mg tablet Take 1 tablet (20 mg total) by mouth daily, Starting Wed 3/1/2023, Normal      fluticasone (FLONASE) 50 mcg/act nasal spray 1 spray into each nostril daily as needed for rhinitis (nasal congestion) At 8:00 AM, Starting Wed 4/19/2023, Normal      gabapentin (Neurontin) 300 mg capsule Take 1 capsule (300 mg total) by mouth daily at bedtime, Starting Tue 9/19/2023, Normal      GNP Sore Throat Spray 1.4 % mucosal liquid APPLY 1 SPRAY TO MOUTH OR THROAT EVERY 2 HRS AS NEEDED FOR SORE THROAT, Normal      GNP Stomach Relief Max St 525 MG/15ML SUSP Starting Tue 2/22/2022, Historical Med      hydrOXYzine HCL (ATARAX) 10 mg  tablet Take 1 tablet (10 mg total) by mouth 3 (three) times a day, Starting Wed 3/1/2023, Normal      !! ibuprofen (MOTRIN) 400 mg tablet TAKE 1 TABLET BY MOUTH EVERY 8 HOURS AS NEEDED FOR MILD/MOD PAIN OR HEADACHES, Normal      !! ibuprofen (MOTRIN) 400 mg tablet Take 1.5 tablets (600 mg total) by mouth every 6 (six) hours as needed for mild pain, Starting Thu 6/15/2023, Normal      Incontinence Supply Disposable (Wings Choice Plus Adult Briefs) MISC Use as directed (R39.81), Normal      ketoconazole (NIZORAL) 2 % cream Apply topically daily, Starting Mon 7/3/2023, Normal      Konsyl Daily Fiber 28.3 % Starting Sat 10/2/2021, Historical Med      !! lamoTRIgine (LaMICtal) 100 mg tablet Take 100 mg by mouth 2 (two) times a day Take 50mg BID, Starting Wed 12/8/2021, Historical Med      !! lamoTRIgine (LaMICtal) 25 mg tablet Take 25 mg by mouth 2 (two) times a day, Starting Tue 8/11/2020, Historical Med      lidocaine (LMX) 4 % cream Apply topically as needed for mild pain, Starting Wed 3/1/2023, Normal      LORazepam (ATIVAN) 0.5 mg tablet Take 0.25 mg by mouth every 6 (six) hours as needed for anxiety Take 0.25mg TID, Historical Med      lubiprostone (Amitiza) 24 mcg capsule Take 1 capsule (24 mcg total) by mouth daily with breakfast, Starting Wed 3/1/2023, Normal      magnesium hydroxide (GNP Milk of Magnesia) 400 mg/5 mL oral suspension 2 TBSP (30ML) BY MOUTH DAILY AS NEEDED IF NO BM IN 3 DAYS (CONSTIPATION), Normal      metoprolol tartrate (LOPRESSOR) 25 mg tablet Take 1 tablet (25 mg total) by mouth 2 (two) times a day, Starting Mon 7/3/2023, Normal      mineral oil-hydrophilic petrolatum (AQUAPHOR) ointment Apply topically as needed for dry skin, Starting Wed 3/1/2023, Normal      Mouthwashes (Listerine Antiseptic) LIQD Swish and spit 5 mL 2 (two) times a day, Starting Wed 10/18/2023, Until Fri 11/17/2023, Normal      Multiple Vitamins-Minerals (CertaVite/Antioxidants) TABS Take 1 tablet by mouth in the morning,  Starting Fri 11/10/2023, Normal      norgestimate-ethinyl estradiol (ORTHO-CYCLEN) 0.25-35 MG-MCG per tablet Take 1 tablet by mouth daily, Starting Wed 3/1/2023, Normal      nystatin-triamcinolone (MYCOLOG-II) cream Apply topically 2 (two) times a day, Starting Mon 7/3/2023, Normal      pantoprazole (PROTONIX) 20 mg tablet Take 1 tablet (20 mg total) by mouth daily, Starting Mon 7/3/2023, Normal      psyllium (METAMUCIL SMOOTH TEXTURE) 28 % packet Starting Tue 1/3/2023, Historical Med      psyllium (METAMUCIL) 58.6 % packet Take 1 packet by mouth daily As needed for constipation, Starting Wed 6/28/2023, Normal      RA Sunscreen SPF50 LOTN Apply 15 minutes before sun exposure and every 2 hours, Normal      senna-docusate sodium (Senexon-S) 8.6-50 mg per tablet Take 1 tablet by mouth daily at 8am., Starting Mon 7/3/2023, Normal      sodium chloride (OCEAN) 0.65 % nasal spray 1 spray into each nostril as needed (three times daily as needed for nasal congestion), Starting Wed 3/1/2023, Normal      talc Apply topically 2 (two) times a day for 1 dose Use under the breast and abdomen folds, Starting Thu 8/31/2023, Until Fri 9/1/2023, Normal      trospium chloride (SANCTURA) 20 mg tablet Take 1 tablet (20 mg total) by mouth 2 (two) times a day, Starting Wed 3/1/2023, Normal      vedolizumab (ENTYVIO) SOLR Historical Med      zinc oxide (DESITIN) 13 % cream Apply 1 application topically as needed (for perianal irritation), Starting Wed 3/1/2023, Normal       !! - Potential duplicate medications found. Please discuss with provider.          No discharge procedures on file.    PDMP Review         Value Time User    PDMP Reviewed  Yes 10/6/2021  1:11 PM Mary Williamson PA-C            ED Provider  Electronically Signed by             Carolyn Grimes DO  12/17/23 1818

## 2023-12-18 ENCOUNTER — OFFICE VISIT (OUTPATIENT)
Dept: FAMILY MEDICINE CLINIC | Facility: CLINIC | Age: 43
End: 2023-12-18

## 2023-12-18 ENCOUNTER — TELEPHONE (OUTPATIENT)
Dept: FAMILY MEDICINE CLINIC | Facility: CLINIC | Age: 43
End: 2023-12-18

## 2023-12-18 VITALS
DIASTOLIC BLOOD PRESSURE: 82 MMHG | OXYGEN SATURATION: 96 % | WEIGHT: 219 LBS | HEART RATE: 98 BPM | BODY MASS INDEX: 43 KG/M2 | HEIGHT: 60 IN | TEMPERATURE: 98.4 F | SYSTOLIC BLOOD PRESSURE: 116 MMHG

## 2023-12-18 DIAGNOSIS — R79.89 ABNORMAL TSH: ICD-10-CM

## 2023-12-18 DIAGNOSIS — R07.89 ANTERIOR CHEST WALL PAIN: Primary | ICD-10-CM

## 2023-12-18 PROCEDURE — 99214 OFFICE O/P EST MOD 30 MIN: CPT | Performed by: FAMILY MEDICINE

## 2023-12-18 NOTE — PROGRESS NOTES
Assessment/Plan:    Anterior chest wall pain  Seen in ED on 12/17/2023 due to pain in anterior left side of the chest; thought to be due to pulled muscle as she was reaching out for stuff on that date   -Pain is reproducible on the left side of the chest  -Continue Tylenol as needed    Abnormal TSH  Elevated TSH noted on the blood work and June at 4.786, but repeated in October,11 ,2023 was within normal limits at 2.991  Patient is asymptomatic, no need to repeat TSH now, will recheck with routine blood work when due       Diagnoses and all orders for this visit:    Anterior chest wall pain    Abnormal TSH          Subjective:      Patient ID: Elizabeth Doss is a 43 y.o. female.    Patient presented to ED visit due to pain in her left side of the chest, that was deemed to be muscular related.  No new complaints        The following portions of the patient's history were reviewed and updated as appropriate: allergies, current medications, past family history, past medical history, past social history, past surgical history, and problem list.    Review of Systems   Constitutional: Negative.    Respiratory: Negative.     Cardiovascular: Negative.    Gastrointestinal: Negative.    Genitourinary: Negative.    Musculoskeletal:  Positive for gait problem (at base, uses walker). Negative for arthralgias, back pain, joint swelling and myalgias.   Hematological:  Does not bruise/bleed easily.         Objective:      /82 (BP Location: Left arm, Patient Position: Sitting, Cuff Size: Large)   Pulse 98   Temp 98.4 °F (36.9 °C) (Temporal)   Ht 5' (1.524 m)   Wt 99.3 kg (219 lb)   SpO2 96%   BMI 42.77 kg/m²          Physical Exam  HENT:      Head: Normocephalic and atraumatic.   Eyes:      Pupils: Pupils are equal, round, and reactive to light.   Cardiovascular:      Rate and Rhythm: Normal rate.   Pulmonary:      Effort: Pulmonary effort is normal. No respiratory distress.   Musculoskeletal:         General: No  swelling, tenderness or signs of injury.      Cervical back: Normal range of motion. No rigidity.      Comments: Reproducible pain in the left anterior chest wall, no pain at rest   Neurological:      Mental Status: She is alert. Mental status is at baseline.

## 2023-12-18 NOTE — ASSESSMENT & PLAN NOTE
Elevated TSH noted on the blood work and June at 4.786, but repeated in October,11 ,2023 was within normal limits at 2.991  Patient is asymptomatic, no need to repeat TSH now, will recheck with routine blood work when due

## 2023-12-18 NOTE — ASSESSMENT & PLAN NOTE
Seen in ED on 12/17/2023 due to pain in anterior left side of the chest; thought to be due to pulled muscle as she was reaching out for stuff on that date   -Pain is reproducible on the left side of the chest  -Continue Tylenol as needed

## 2023-12-20 NOTE — ED NOTES
Notified the care worker at the bedside the pharmacy does not carry Amitiza   Pt will take their own supply      Sophie Jerome RN  07/06/18 [phone]

## 2023-12-21 ENCOUNTER — TELEPHONE (OUTPATIENT)
Dept: FAMILY MEDICINE CLINIC | Facility: CLINIC | Age: 43
End: 2023-12-21

## 2023-12-21 NOTE — TELEPHONE ENCOUNTER
Called [facility] pharmacy and spoke to the pharmacist.  Confirmed that the Amitiza 24 micrograms has been discontinued and can be taken off the medication list.

## 2023-12-21 NOTE — TELEPHONE ENCOUNTER
Pharmacist called and stated they received a cancel request for patient's Amitiza 24 micrograms. Wanted to confirm if this should be taken off patient's medication list or if it should remain. Please review and advise. Thank you

## 2024-01-01 ENCOUNTER — HOSPITAL ENCOUNTER (EMERGENCY)
Facility: HOSPITAL | Age: 44
Discharge: HOME | End: 2024-01-01
Attending: EMERGENCY MEDICINE
Payer: MEDICARE

## 2024-01-01 VITALS
TEMPERATURE: 98.9 F | SYSTOLIC BLOOD PRESSURE: 148 MMHG | OXYGEN SATURATION: 96 % | DIASTOLIC BLOOD PRESSURE: 65 MMHG | RESPIRATION RATE: 19 BRPM | HEART RATE: 97 BPM

## 2024-01-01 DIAGNOSIS — S00.81XA ABRASION OF FACE, INITIAL ENCOUNTER: ICD-10-CM

## 2024-01-01 DIAGNOSIS — Z72.89 SELF-INJURIOUS BEHAVIOR: ICD-10-CM

## 2024-01-01 DIAGNOSIS — Z76.0 MEDICATION REFILL: Primary | ICD-10-CM

## 2024-01-01 DIAGNOSIS — T14.8XXA WOUND, OPEN: ICD-10-CM

## 2024-01-01 PROCEDURE — 99284 EMERGENCY DEPT VISIT MOD MDM: CPT | Performed by: PHYSICIAN ASSISTANT

## 2024-01-01 PROCEDURE — 99283 EMERGENCY DEPT VISIT LOW MDM: CPT

## 2024-01-01 RX ORDER — ARIPIPRAZOLE 20 MG/1
20 TABLET ORAL
Qty: 5 TABLET | Refills: 0 | Status: SHIPPED | OUTPATIENT
Start: 2024-01-01 | End: 2024-01-01

## 2024-01-01 RX ORDER — GINSENG 100 MG
CAPSULE ORAL
Qty: 15 G | Refills: 0 | Status: SHIPPED | OUTPATIENT
Start: 2024-01-01

## 2024-01-01 RX ORDER — GINSENG 100 MG
CAPSULE ORAL
Qty: 15 G | Refills: 0 | Status: SHIPPED | OUTPATIENT
Start: 2024-01-01 | End: 2024-01-01

## 2024-01-01 RX ORDER — ARIPIPRAZOLE 20 MG/1
20 TABLET ORAL
Qty: 5 TABLET | Refills: 0 | Status: SHIPPED | OUTPATIENT
Start: 2024-01-01

## 2024-01-01 NOTE — ED PROVIDER NOTES
History  Chief Complaint   Patient presents with    Medication Refill     Pt ran out of aripiprazole needs a few till she can get to the doctir.     Rash     Pt has rash/scab. c/o itching x 3 days     43y.o female with PMH of ADD, anxiety, brain lesion, cerebral palsy, constipation, depression, dysphagia, esophagitis, GERD, hiatal hernia, hydrocephalus, nephrolithiasis, ODD, seizures, sleep apnea and  shunt placement presents to the ER for evaluation. Caregiver reports the patient needs a short refill of her Ariprazole. They just called for a refill but cannot get it for a few days. They are requesting a few days of medication to hold the patient over until they get her new prescription. She takes 20mg by mouth daily at bedtime.     She also reports a scabbing area to her right cheek. Caregiver states she has had the area for over a month because when she gets anxious, she picks the area. She has other areas on her face that are similar. She denies pain. She denies fever, chills, URI symptoms, chest pain, dyspnea, N/V/D, abdominal pain, weakness or paresthesias.      History provided by:  Patient   used: No        Prior to Admission Medications   Prescriptions Last Dose Informant Patient Reported? Taking?   ARIPiprazole (ABILIFY) 20 MG tablet  Outside Facility (Specify) No No   Sig: Take 1 tablet (20 mg total) by mouth daily at bedtime   ARIPiprazole (ABILIFY) 20 MG tablet   No Yes   Sig: Take 1 tablet (20 mg total) by mouth daily at bedtime   D3-1000 25 MCG (1000 UT) tablet  Outside Facility (Specify) No No   Sig: Take 2 tablets (2,000 Units total) by mouth daily   Diclofenac Sodium (VOLTAREN) 1 %  Outside Facility (Specify) No No   Sig: Apply 2 g topically 4 (four) times a day   Patient not taking: Reported on 8/2/2023   Dyclonine-Glycerin (Cepacol Sore Throat Spray) 0.1-33 % LIQD  Outside Facility (Specify) No No   Sig: Apply 1 spray to the mouth or throat 3 (three) times a day as needed  (sorethroat)   Patient not taking: Reported on 8/2/2023   Elastic Bandages & Supports (Neoprene Knee Brace) [facility]  Outside Facility (Specify) No No   Sig: Apply right knee brace in morning; remove at night   Patient not taking: Reported on 8/3/2023   GNP Sore Throat Spray 1.4 % mucosal liquid  Outside Facility (Specify) No No   Sig: APPLY 1 SPRAY TO MOUTH OR THROAT EVERY 2 HRS AS NEEDED FOR SORE THROAT   GNP Stomach Relief Max St 525 MG/15ML SUSP  Outside Facility (Specify) Yes No   Incontinence Supply Disposable (Wings Choice Plus Adult Briefs) [facility]  Outside Facility (Specify) No No   Sig: Use as directed (R39.81)   Konsyl Daily Fiber 28.3 %  Outside Facility (Specify) Yes No   Patient not taking: Reported on 8/2/2023   LORazepam (ATIVAN) 0.5 mg tablet  Outside Facility (Specify) Yes No   Sig: Take 0.25 mg by mouth every 6 (six) hours as needed for anxiety Take 0.25mg TID   Patient not taking: Reported on 8/3/2023   Mouthwashes (Listerine Antiseptic) LIQD   No No   Sig: Swish and spit 5 mL 2 (two) times a day   Multiple Vitamins-Minerals (CertaVite/Antioxidants) TABS   No No   Sig: Take 1 tablet by mouth in the morning   RA Sunscreen SPF50 LOTN  Outside Facility (Specify) No No   Sig: Apply 15 minutes before sun exposure and every 2 hours   acetaminophen (TYLENOL) 500 mg tablet  Outside Facility (Specify) No No   Sig: Take 1 tablet (500 mg total) by mouth every 6 (six) hours as needed for mild pain   albuterol (Ventolin HFA) 90 mcg/act inhaler  Outside Facility (Specify) No No   Sig: Inhale 2 puffs every 6 (six) hours as needed for wheezing   aluminum-magnesium hydroxide-simethicone (GNP Antacid & Anti-Gas) 1330-3770-350 mg/30 mL suspension  Outside Facility (Specify) No No   Sig: Take 15 mL by mouth every 4 (four) hours as needed for indigestion or heartburn   amitriptyline (ELAVIL) 10 mg tablet  Outside Facility (Specify) No No   Sig: Take 1 tablet (10 mg total) by mouth daily at bedtime   bacitracin topical  ointment 500 units/g topical ointment  Outside Facility (Specify) No No   Sig: Cleanse site on nose with soap and water followed by bacitracin BID until healed then p.r.n.   bacitracin topical ointment 500 units/g topical ointment   No Yes   Sig: Cleanse site on cheek with soap and water followed by bacitracin BID until healed then p.r.n.   bismuth subsalicylate (PEPTO BISMOL) 524 mg/30 mL oral suspension  Outside Facility (Specify) No No   Sig: Take 15 mL (262 mg total) by mouth every 6 (six) hours as needed for indigestion   calcium carbonate (TUMS) 500 mg chewable tablet   No No   Sig: Chew 1 tablet (500 mg total) 3 (three) times a day as needed for heartburn   carbamide peroxide (DEBROX) 6.5 % otic solution  Outside Facility (Specify) No No   Sig: Administer 5 drops into the left ear 2 (two) times a day Use 1 week prior to ENT appointment.  Also can use 4 gtts twice weekly (Tuesday and Friday for example) to each ear for prevention.  Keep hearing aid out x 10 minutes after ear drops administered.   Patient not taking: Reported on 2023   ciprofloxacin-dexamethasone (CIPRODEX) otic suspension  Outside Facility (Specify) No No   Sig: Administer 4 drops into the left ear 2 (two) times a day   Patient not taking: Reported on 2023   dicyclomine (BENTYL) 20 mg tablet  Outside Facility (Specify) No No   Sig: Take 1 tablet (20 mg total) by mouth every 6 (six) hours as needed (dysphagia)   escitalopram (LEXAPRO) 20 mg tablet  Outside Facility (Specify) No No   Sig: Take 1 tablet (20 mg total) by mouth daily   fluticasone (FLONASE) 50 mcg/act nasal spray  Outside Facility (Specify) No No   Si spray into each nostril daily as needed for rhinitis (nasal congestion) At 8:00 AM   Patient not taking: Reported on 2023   gabapentin (Neurontin) 300 mg capsule   No No   Sig: Take 1 capsule (300 mg total) by mouth daily at bedtime   hydrOXYzine HCL (ATARAX) 10 mg tablet  Outside Facility (Specify) No No   Sig: Take 1  tablet (10 mg total) by mouth 3 (three) times a day   ibuprofen (MOTRIN) 400 mg tablet  Outside Facility (Specify) No No   Sig: TAKE 1 TABLET BY MOUTH EVERY 8 HOURS AS NEEDED FOR MILD/MOD PAIN OR HEADACHES   ibuprofen (MOTRIN) 400 mg tablet  Outside Facility (Specify) No No   Sig: Take 1.5 tablets (600 mg total) by mouth every 6 (six) hours as needed for mild pain   ketoconazole (NIZORAL) 2 % cream  Outside Facility (Specify) No No   Sig: Apply topically daily   lamoTRIgine (LaMICtal) 100 mg tablet  Outside Facility (Specify) Yes No   Sig: Take 100 mg by mouth 2 (two) times a day Take 50mg BID   lamoTRIgine (LaMICtal) 25 mg tablet  Outside Facility (Specify) Yes No   Sig: Take 25 mg by mouth 2 (two) times a day   lidocaine (LMX) 4 % cream  Outside Facility (Specify) No No   Sig: Apply topically as needed for mild pain   Patient not taking: Reported on 2023   magnesium hydroxide (GNP Milk of Magnesia) 400 mg/5 mL oral suspension  Outside Facility (Specify) No No   Si TBSP (30ML) BY MOUTH DAILY AS NEEDED IF NO BM IN 3 DAYS (CONSTIPATION)   metoprolol tartrate (LOPRESSOR) 25 mg tablet  Outside Facility (Specify) No No   Sig: Take 1 tablet (25 mg total) by mouth 2 (two) times a day   mineral oil-hydrophilic petrolatum (AQUAPHOR) ointment  Outside Facility (Specify) No No   Sig: Apply topically as needed for dry skin   Patient not taking: Reported on 8/3/2023   norgestimate-ethinyl estradiol (ORTHO-CYCLEN) 0.25-35 MG-MCG per tablet  Outside Facility (Specify) No No   Sig: Take 1 tablet by mouth daily   Patient not taking: Reported on 2023   nystatin-triamcinolone (MYCOLOG-II) cream  Outside Facility (Specify) No No   Sig: Apply topically 2 (two) times a day   pantoprazole (PROTONIX) 20 mg tablet  Outside Facility (Specify) No No   Sig: Take 1 tablet (20 mg total) by mouth daily   psyllium (METAMUCIL SMOOTH TEXTURE) 28 % packet  Outside Facility (Specify) Yes No   psyllium (METAMUCIL) 58.6 % packet  Outside  Facility (Specify) No No   Sig: Take 1 packet by mouth daily As needed for constipation   senna-docusate sodium (Senexon-S) 8.6-50 mg per tablet  Outside Facility (Specify) No No   Sig: Take 1 tablet by mouth daily at 8am.   sodium chloride (OCEAN) 0.65 % nasal spray  Outside Facility (Specify) No No   Si spray into each nostril as needed (three times daily as needed for nasal congestion)   Patient not taking: Reported on 2023   talc   No No   Sig: Apply topically 2 (two) times a day for 1 dose Use under the breast and abdomen folds   trospium chloride (SANCTURA) 20 mg tablet  Outside Facility (Specify) No No   Sig: Take 1 tablet (20 mg total) by mouth 2 (two) times a day   vedolizumab (ENTYVIO) SOLR   Yes No   zinc oxide (DESITIN) 13 % cream  Outside Facility (Specify) No No   Sig: Apply 1 application topically as needed (for perianal irritation)   Patient not taking: Reported on 2023      Facility-Administered Medications: None       Past Medical History:   Diagnosis Date    ADD (attention deficit disorder)     Anxiety     Astigmatism     Brain lesion     Calcium deficiency     Cellulitis of foot, right 2018    Cerebral palsy (HCC)     Chronic otitis media     Constipation     Depression     Dysphagia     Esophagitis     Esotropia     GERD (gastroesophageal reflux disease)     Hiatal hernia     Hydrocephalus (HCC)     Impaired fasting glucose     Left nephrolithiasis 2019    Myopia     Oppositional defiant disorder     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HCC)     Seizures (HCC)     Sensorineural hearing loss     Sleep apnea     Status post ventriculoatrial shunt placement     Visual impairment        Past Surgical History:   Procedure Laterality Date    BREAST BIOPSY Left     X 2 (not sure of years)    CSF SHUNT      Creation of Ventriculo-Peritoneal CSF shunt ; Last Assessed:2016    EAR SURGERY      Last Assessed:2016    LEG SURGERY      due to CP     NOSE SURGERY      Last Assessed:2016     [place] ESOPHAGOGASTRODUODENOSCOPY TRANSORAL DIAGNOSTIC N/A 5/9/2019    Procedure: ESOPHAGOGASTRODUODENOSCOPY (EGD) with biopsy;  Surgeon: Kaya Pedersen MD;  Location: [facility];  Service: Gastroenterology    UPPER GASTROINTESTINAL ENDOSCOPY  05/2019       Family History   Problem Relation Age of Onset    Diabetes Mother     Colon cancer Father     No Known Problems Maternal Grandmother     No Known Problems Maternal Grandfather     No Known Problems Paternal Grandmother     No Known Problems Paternal Grandfather     Hypertension Neg Hx     Heart disease Neg Hx     Stroke Neg Hx     Thyroid disease Neg Hx      I have reviewed and agree with the history as documented.    E-Cigarette/Vaping    E-Cigarette Use Never User      E-Cigarette/Vaping Substances    Nicotine No     THC No     CBD No     Flavoring No     Other No     Unknown No      Social History     Tobacco Use    Smoking status: Never    Smokeless tobacco: Never   Vaping Use    Vaping status: Never Used   Substance Use Topics    Alcohol use: Never    Drug use: Never       Review of Systems   Constitutional:  Negative for activity change, appetite change, chills and fever.   HENT:  Negative for congestion, drooling, ear discharge, ear pain, facial swelling, rhinorrhea and sore throat.    Eyes:  Negative for redness.   Respiratory:  Negative for cough and shortness of breath.    Cardiovascular:  Negative for chest pain.   Gastrointestinal:  Negative for abdominal pain, diarrhea, nausea and vomiting.   Musculoskeletal:  Negative for neck stiffness.   Skin:  Positive for wound (cheek). Negative for rash.   Allergic/Immunologic: Negative for food allergies.   Neurological:  Negative for weakness and numbness.       Physical Exam  Physical Exam  Vitals and nursing note reviewed.   Constitutional:       General: She is not in acute distress.     Appearance: She is not toxic-appearing.   HENT:      Head: Normocephalic. Abrasion present. No right periorbital erythema or  left periorbital erythema.        Mouth/Throat:      Lips: Pink. No lesions.      Mouth: Mucous membranes are moist.   Eyes:      Conjunctiva/sclera: Conjunctivae normal.   Neck:      Trachea: No tracheal deviation.   Cardiovascular:      Rate and Rhythm: Normal rate.   Pulmonary:      Effort: Pulmonary effort is normal. No respiratory distress.   Abdominal:      General: There is no distension.   Musculoskeletal:      Cervical back: Normal range of motion and neck supple.   Skin:     General: Skin is warm and dry.      Findings: No rash.   Neurological:      Mental Status: She is alert.      GCS: GCS eye subscore is 4. GCS verbal subscore is 5. GCS motor subscore is 6.   Psychiatric:         Mood and Affect: Mood normal.         Vital Signs  ED Triage Vitals [01/01/24 1127]   Temperature Pulse Respirations Blood Pressure SpO2   98.9 °F (37.2 °C) 97 19 148/65 96 %      Temp src Heart Rate Source Patient Position - Orthostatic VS BP Location FiO2 (%)   -- Monitor Sitting Left arm --      Pain Score       --           Vitals:    01/01/24 1127   BP: 148/65   Pulse: 97   Patient Position - Orthostatic VS: Sitting         Visual Acuity      ED Medications  Medications - No data to display    Diagnostic Studies  Results Reviewed       None                   No orders to display              Procedures  Procedures         ED Course                                             Medical Decision Making  43y.o female presents to the ER for medication refill and scabbing to her right cheek. Vitals are stable. Patient is in no acute distress. On exam, scabbing seen with minimal surrounding erythema, which caregiver states is baseline for the patient. Looks red from patient rubbing the area rather then infection. No worsening erythema, swelling or drainage. Area is non-tender to palpation. No induration or fluctuance palpated. Breathing is non-labored. No tachypnea or accessory muscle use. Heart is regular rate. Abdomen is not  distended. Will discharge home with medication for complaints.    The management plan was discussed in detail with the patient at bedside and all questions were answered.  Prior to discharge, we provided both verbal and written instructions.  We discussed with the patient the signs and symptoms for which to return to the emergency department.  All questions were answered and patient was comfortable with the plan of care and discharged to home.  Instructed the patient to follow up with the primary care provider and/or specialist provided and their written instructions.  The patient verbalized understanding of our discussion and plan of care, and agrees to return to the Emergency Department for concerns and progression of illness.    At discharge, I instructed the patient to:  -follow up with pcp  -take Ariprazole as prescribed  -apply Bacitracin as prescribed  -keep area clean  -watch for signs of infection: redness, swelling or discharge  -return to the ER if symptoms worsened or new symptoms arose  Patient and caregiver agreed to this plan and patient was stable at time of discharge.     Problems Addressed:  Abrasion of face, initial encounter: acute illness or injury  Medication refill: acute illness or injury    Amount and/or Complexity of Data Reviewed  Independent Historian: caregiver     Details: Caregiver and patient are historians    Risk  OTC drugs.  Prescription drug management.             Disposition  Final diagnoses:   Medication refill   Abrasion of face, initial encounter     Time reflects when diagnosis was documented in both MDM as applicable and the Disposition within this note       Time User Action Codes Description Comment    1/1/2024 11:44 AM Ashley Samano Add [Z76.0] Medication refill     1/1/2024 11:44 AM Ashley Samano Add [S00.81XA] Abrasion of face, initial encounter     1/1/2024 11:45 AM Ashley Samano Add [Z72.89] Self-injurious behavior     1/1/2024 11:45 AM Ashley Samano  Add [T14.8XXA] Wound, open           ED Disposition       ED Disposition   Discharge    Condition   Stable    Date/Time   Mon Jan 1, 2024 11:44 AM    Comment   Elizabeth Doss discharge to home/self care.                   Follow-up Information       Follow up With Specialties Details Why Contact Info    Emerita Gagnon MD Family Medicine Schedule an appointment as soon as possible for a visit   [address] [place] [address]  [phone]              Discharge Medication List as of 1/1/2024 11:46 AM        CONTINUE these medications which have CHANGED    Details   ARIPiprazole (ABILIFY) 20 MG tablet Take 1 tablet (20 mg total) by mouth daily at bedtime, Starting Mon 1/1/2024, Normal      bacitracin topical ointment 500 units/g topical ointment Cleanse site on cheek with soap and water followed by bacitracin BID until healed then p.r.n., Normal           CONTINUE these medications which have NOT CHANGED    Details   acetaminophen (TYLENOL) 500 mg tablet Take 1 tablet (500 mg total) by mouth every 6 (six) hours as needed for mild pain, Starting Mon 3/14/2022, Normal      albuterol (Ventolin HFA) 90 mcg/act inhaler Inhale 2 puffs every 6 (six) hours as needed for wheezing, Starting u 6/15/2023, Normal      aluminum-magnesium hydroxide-simethicone (GNP Antacid & Anti-Gas) 5545-5715-600 mg/30 mL suspension Take 15 mL by mouth every 4 (four) hours as needed for indigestion or heartburn, Starting Wed 3/1/2023, Normal      amitriptyline (ELAVIL) 10 mg tablet Take 1 tablet (10 mg total) by mouth daily at bedtime, Starting Wed 3/1/2023, Until Tue 8/15/2023, Normal      bismuth subsalicylate (PEPTO BISMOL) 524 mg/30 mL oral suspension Take 15 mL (262 mg total) by mouth every 6 (six) hours as needed for indigestion, Starting Wed 3/1/2023, Normal      calcium carbonate (TUMS) 500 mg chewable tablet Chew 1 tablet (500 mg total) 3 (three) times a day as needed for heartburn, Starting Mon 9/11/2023, Normal       carbamide peroxide (DEBROX) 6.5 % otic solution Administer 5 drops into the left ear 2 (two) times a day Use 1 week prior to ENT appointment.  Also can use 4 gtts twice weekly (Tuesday and Friday for example) to each ear for prevention.  Keep hearing aid out x 10 minutes after ear drops administered.,  Starting Wed 3/1/2023, Normal      ciprofloxacin-dexamethasone (CIPRODEX) otic suspension Administer 4 drops into the left ear 2 (two) times a day, Starting Mon 7/3/2023, Normal      D3-1000 25 MCG (1000 UT) tablet Take 2 tablets (2,000 Units total) by mouth daily, Starting Mon 7/3/2023, Normal      Diclofenac Sodium (VOLTAREN) 1 % Apply 2 g topically 4 (four) times a day, Starting Wed 3/1/2023, Normal      dicyclomine (BENTYL) 20 mg tablet Take 1 tablet (20 mg total) by mouth every 6 (six) hours as needed (dysphagia), Starting Mon 7/3/2023, Normal      Dyclonine-Glycerin (Cepacol Sore Throat Spray) 0.1-33 % LIQD Apply 1 spray to the mouth or throat 3 (three) times a day as needed (sorethroat), Starting Tue 10/26/2021, Normal      Elastic Bandages & Supports (Neoprene Knee Brace) MISC Apply right knee brace in morning; remove at night, Normal      escitalopram (LEXAPRO) 20 mg tablet Take 1 tablet (20 mg total) by mouth daily, Starting Wed 3/1/2023, Normal      fluticasone (FLONASE) 50 mcg/act nasal spray 1 spray into each nostril daily as needed for rhinitis (nasal congestion) At 8:00 AM, Starting Wed 4/19/2023, Normal      gabapentin (Neurontin) 300 mg capsule Take 1 capsule (300 mg total) by mouth daily at bedtime, Starting Tue 9/19/2023, Normal      GNP Sore Throat Spray 1.4 % mucosal liquid APPLY 1 SPRAY TO MOUTH OR THROAT EVERY 2 HRS AS NEEDED FOR SORE THROAT, Normal      GNP Stomach Relief Max St 525 MG/15ML SUSP Starting Tue 2/22/2022, Historical Med      hydrOXYzine HCL (ATARAX) 10 mg tablet Take 1 tablet (10 mg total) by mouth 3 (three) times a day, Starting Wed 3/1/2023, Normal      !! ibuprofen (MOTRIN)  400 mg tablet TAKE 1 TABLET BY MOUTH EVERY 8 HOURS AS NEEDED FOR MILD/MOD PAIN OR HEADACHES, Normal      !! ibuprofen (MOTRIN) 400 mg tablet Take 1.5 tablets (600 mg total) by mouth every 6 (six) hours as needed for mild pain, Starting Thu 6/15/2023, Normal      Incontinence Supply Disposable (Wings Choice Plus Adult Briefs) MISC Use as directed (R39.81), Normal      ketoconazole (NIZORAL) 2 % cream Apply topically daily, Starting Mon 7/3/2023, Normal      Konsyl Daily Fiber 28.3 % Starting Sat 10/2/2021, Historical Med      !! lamoTRIgine (LaMICtal) 100 mg tablet Take 100 mg by mouth 2 (two) times a day Take 50mg BID, Starting Wed 12/8/2021, Historical Med      !! lamoTRIgine (LaMICtal) 25 mg tablet Take 25 mg by mouth 2 (two) times a day, Starting Tue 8/11/2020, Historical Med      lidocaine (LMX) 4 % cream Apply topically as needed for mild pain, Starting Wed 3/1/2023, Normal      LORazepam (ATIVAN) 0.5 mg tablet Take 0.25 mg by mouth every 6 (six) hours as needed for anxiety Take 0.25mg TID, Historical Med      magnesium hydroxide (GNP Milk of Magnesia) 400 mg/5 mL oral suspension 2 TBSP (30ML) BY MOUTH DAILY AS NEEDED IF NO BM IN 3 DAYS (CONSTIPATION), Normal      metoprolol tartrate (LOPRESSOR) 25 mg tablet Take 1 tablet (25 mg total) by mouth 2 (two) times a day, Starting Mon 7/3/2023, Normal      mineral oil-hydrophilic petrolatum (AQUAPHOR) ointment Apply topically as needed for dry skin, Starting Wed 3/1/2023, Normal      Mouthwashes (Listerine Antiseptic) LIQD Swish and spit 5 mL 2 (two) times a day, Starting Wed 10/18/2023, Until Fri 11/17/2023, Normal      Multiple Vitamins-Minerals (CertaVite/Antioxidants) TABS Take 1 tablet by mouth in the morning, Starting Fri 11/10/2023, Normal      norgestimate-ethinyl estradiol (ORTHO-CYCLEN) 0.25-35 MG-MCG per tablet Take 1 tablet by mouth daily, Starting Wed 3/1/2023, Normal      nystatin-triamcinolone (MYCOLOG-II) cream Apply topically 2 (two) times a day,  Starting Mon 7/3/2023, Normal      pantoprazole (PROTONIX) 20 mg tablet Take 1 tablet (20 mg total) by mouth daily, Starting Mon 7/3/2023, Normal      psyllium (METAMUCIL SMOOTH TEXTURE) 28 % packet Starting Tue 1/3/2023, Historical Med      psyllium (METAMUCIL) 58.6 % packet Take 1 packet by mouth daily As needed for constipation, Starting Wed 6/28/2023, Normal      RA Sunscreen SPF50 LOTN Apply 15 minutes before sun exposure and every 2 hours, Normal      senna-docusate sodium (Senexon-S) 8.6-50 mg per tablet Take 1 tablet by mouth daily at 8am., Starting Mon 7/3/2023, Normal      sodium chloride (OCEAN) 0.65 % nasal spray 1 spray into each nostril as needed (three times daily as needed for nasal congestion), Starting Wed 3/1/2023, Normal      talc Apply topically 2 (two) times a day for 1 dose Use under the breast and abdomen folds, Starting Thu 8/31/2023, Until Fri 9/1/2023, Normal      trospium chloride (SANCTURA) 20 mg tablet Take 1 tablet (20 mg total) by mouth 2 (two) times a day, Starting Wed 3/1/2023, Normal      vedolizumab (ENTYVIO) SOLR Historical Med      zinc oxide (DESITIN) 13 % cream Apply 1 application topically as needed (for perianal irritation), Starting Wed 3/1/2023, Normal       !! - Potential duplicate medications found. Please discuss with provider.          No discharge procedures on file.    PDMP Review         Value Time User    PDMP Reviewed  Yes 10/6/2021  1:11 PM Mary Williamson PA-C            ED Provider  Electronically Signed by             Ashley Samano PA-C  01/01/24 [id number]

## 2024-01-01 NOTE — DISCHARGE INSTRUCTIONS
DISCHARGE INSTRUCTIONS:    FOLLOW UP WITH YOUR PRIMARY CARE PROVIDER OR THE [facility]. MAKE AN APPOINTMENT TO BE SEEN.     TAKE MEDICATION AS PRESCRIBED. IF RASH, SHORTNESS OF BREATH OR TROUBLE SWALLOWING, STOP TAKING THE MEDICATION AND BE SEEN.     APPLY MEDICATION AS PRESCRIBED. IF RASH, SHORTNESS OF BREATH OR TROUBLE SWALLOWING, STOP TAKING THE MEDICATION AND BE SEEN.     KEEP AREA CLEAN.    WATCH FOR SIGNS OF INFECTION: REDNESS, SWELLING OR DISCHARGE.    IF SYMPTOMS WORSEN OR NEW SYMPTOMS ARISE, RETURN TO THE ER TO BE SEEN.

## 2024-01-05 DIAGNOSIS — G80.1 SPASTIC DIPLEGIC CEREBRAL PALSY (HCC): ICD-10-CM

## 2024-01-05 DIAGNOSIS — R00.2 PALPITATIONS: ICD-10-CM

## 2024-01-05 DIAGNOSIS — K59.00 CONSTIPATION, UNSPECIFIED CONSTIPATION TYPE: ICD-10-CM

## 2024-01-05 RX ORDER — DOCUSATE SODIUM AND SENNOSIDES 8.6; 5 MG/1; MG/1
TABLET ORAL
Qty: 31 TABLET | Refills: 5 | Status: SHIPPED | OUTPATIENT
Start: 2024-01-05

## 2024-01-05 RX ORDER — MULTIVIT-MIN/IRON/FOLIC ACID/K 18-600-40
CAPSULE ORAL
Qty: 62 TABLET | Refills: 5 | Status: SHIPPED | OUTPATIENT
Start: 2024-01-05

## 2024-01-11 ENCOUNTER — OFFICE VISIT (OUTPATIENT)
Dept: GASTROENTEROLOGY | Facility: CLINIC | Age: 44
End: 2024-01-11
Payer: MEDICARE

## 2024-01-11 VITALS
BODY MASS INDEX: 40.65 KG/M2 | TEMPERATURE: 99.9 F | WEIGHT: 215.3 LBS | DIASTOLIC BLOOD PRESSURE: 77 MMHG | HEIGHT: 61 IN | HEART RATE: 98 BPM | OXYGEN SATURATION: 95 % | SYSTOLIC BLOOD PRESSURE: 117 MMHG

## 2024-01-11 DIAGNOSIS — Z86.010 PERSONAL HISTORY OF COLONIC POLYPS: Primary | ICD-10-CM

## 2024-01-11 DIAGNOSIS — K21.9 GASTROESOPHAGEAL REFLUX DISEASE, UNSPECIFIED WHETHER ESOPHAGITIS PRESENT: ICD-10-CM

## 2024-01-11 DIAGNOSIS — K31.A0 GASTRIC INTESTINAL METAPLASIA: ICD-10-CM

## 2024-01-11 PROCEDURE — 99214 OFFICE O/P EST MOD 30 MIN: CPT | Performed by: PHYSICIAN ASSISTANT

## 2024-01-11 NOTE — PROGRESS NOTES
[facility] - Outpatient Follow-up Note  Elizabeth Doss 43 y.o. female MRN: [id number]  Encounter: [id number]      Assessment and Plan    1. Gastroesophageal reflux disease without esophagitis  2. Functional dysphagia  She had history of chronic GERD and dysphagia. She was in the ED over the summer choking on some fruit. She underwent EGD showing small hiatal hernia otherwise unremarkable. Manometry testing and speech and swallow evaluation were also unremarkable. She is no longer requiring pantoprazole.     3. Chronic idiopathic constipation  She has a history of chronic constipation which is controlled on Amitiza daily. She is no longer taking Metamucil.     4. Colon cancer screening  She has a scheduled colonoscopy 2/19/24 for screening, she is reluctant to proceed with this but will consider it further, if she refuses colonoscopy consider cologuard     5. Gastric intestinal metaplasia  She was found to have gastric intestinal metaplasia on 2019 EGD biopsies, has an upcoming follow up EGD 2/19/24    Follow up after EGD and colonoscopy    ______________________________________________________________________    History of Present Illness  Elizabeth Doss is a 43 y.o. female with intellectual disability and cerebral palsy here for follow up evaluation to discuss upcoming EGD and colonoscopy. She is doing well with no complaints.       Review of Systems   Constitutional:  Negative for activity change, appetite change, chills, fatigue, fever and unexpected weight change.   Gastrointestinal:  Negative for abdominal distention, abdominal pain, anal bleeding, blood in stool, constipation, diarrhea, nausea, rectal pain and vomiting.       Past Medical History  Past Medical History:   Diagnosis Date    ADD (attention deficit disorder)     Anxiety     Astigmatism     Brain lesion     Calcium deficiency     Cellulitis of foot, right 6/4/2018    Cerebral palsy (HCC)     Chronic otitis media      Constipation     Depression     Dysphagia     Esophagitis     Esotropia     GERD (gastroesophageal reflux disease)     Hiatal hernia     Hydrocephalus (HCC)     Impaired fasting glucose     Left nephrolithiasis 03/04/2019    Myopia     Oppositional defiant disorder     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HCC)     Seizures (HCC)     Sensorineural hearing loss     Sleep apnea     Status post ventriculoatrial shunt placement     Visual impairment        Past Social history  Past Surgical History:   Procedure Laterality Date    BREAST BIOPSY Left     X 2 (not sure of years)    CSF SHUNT      Creation of Ventriculo-Peritoneal CSF shunt ; Last Assessed:7/6/2016    EAR SURGERY      Last Assessed:7/6/2016    LEG SURGERY      due to CP     NOSE SURGERY      Last Assessed:7/6/2016    [place] ESOPHAGOGASTRODUODENOSCOPY TRANSORAL DIAGNOSTIC N/A 5/9/2019    Procedure: ESOPHAGOGASTRODUODENOSCOPY (EGD) with biopsy;  Surgeon: Kaya Pedersen MD;  Location: [facility];  Service: Gastroenterology    UPPER GASTROINTESTINAL ENDOSCOPY  05/2019     Social History     Socioeconomic History    Marital status: Single     Spouse name: Not on file    Number of children: Not on file    Years of education: Not on file    Highest education level: Not on file   Occupational History    Not on file   Tobacco Use    Smoking status: Never    Smokeless tobacco: Never   Vaping Use    Vaping status: Never Used   Substance and Sexual Activity    Alcohol use: Never    Drug use: Never    Sexual activity: Never     Birth control/protection: OCP   Other Topics Concern    Not on file   Social History Narrative    Always uses seat belt    Lives in group home     Social Determinants of Health     Financial Resource Strain: Low Risk  (1/17/2023)    Overall Financial Resource Strain (CARDIA)     Difficulty of Paying Living Expenses: Not hard at all   Food Insecurity: No Food Insecurity (1/17/2023)    Hunger Vital Sign     Worried About Running Out of Food in the Last Year: Never  true     Ran Out of Food in the Last Year: Never true   Transportation Needs: No Transportation Needs (10/27/2022)    PRAPARE - Transportation     Lack of Transportation (Medical): No     Lack of Transportation (Non-Medical): No   Physical Activity: Not on file   Stress: No Stress Concern Present (1/17/2023)    [employer] - Occupational Stress Questionnaire     Feeling of Stress : Not at all   Social Connections: Not on file   Intimate Partner Violence: Not At Risk (1/17/2023)    Humiliation, Afraid, Rape, and Kick questionnaire     Fear of Current or Ex-Partner: No     Emotionally Abused: No     Physically Abused: No     Sexually Abused: No   Housing Stability: Low Risk  (1/17/2023)    Housing Stability Vital Sign     Unable to Pay for Housing in the Last Year: No     Number of Places Lived in the Last Year: 1     Unstable Housing in the Last Year: No     Social History     Substance and Sexual Activity   Alcohol Use Never     Social History     Substance and Sexual Activity   Drug Use Never     Social History     Tobacco Use   Smoking Status Never   Smokeless Tobacco Never       Past Family History  Family History   Problem Relation Age of Onset    Diabetes Mother     Colon cancer Father     No Known Problems Maternal Grandmother     No Known Problems Maternal Grandfather     No Known Problems Paternal Grandmother     No Known Problems Paternal Grandfather     Hypertension Neg Hx     Heart disease Neg Hx     Stroke Neg Hx     Thyroid disease Neg Hx        Current Medications  Current Outpatient Medications   Medication Sig Dispense Refill    acetaminophen (TYLENOL) 500 mg tablet Take 1 tablet (500 mg total) by mouth every 6 (six) hours as needed for mild pain 30 tablet 5    albuterol (Ventolin HFA) 90 mcg/act inhaler Inhale 2 puffs every 6 (six) hours as needed for wheezing 18 g 0    aluminum-magnesium hydroxide-simethicone (GNP Antacid & Anti-Gas) 3070-9670-663 mg/30 mL suspension  Take 15 mL by mouth every 4 (four) hours as needed for indigestion or heartburn 355 mL 0    ARIPiprazole (ABILIFY) 20 MG tablet Take 1 tablet (20 mg total) by mouth daily at bedtime 5 tablet 0    bacitracin topical ointment 500 units/g topical ointment Cleanse site on cheek with soap and water followed by bacitracin BID until healed then p.r.n. 15 g 0    bismuth subsalicylate (PEPTO BISMOL) 524 mg/30 mL oral suspension Take 15 mL (262 mg total) by mouth every 6 (six) hours as needed for indigestion 360 mL 5    calcium carbonate (TUMS) 500 mg chewable tablet Chew 1 tablet (500 mg total) 3 (three) times a day as needed for heartburn 30 tablet 5    Cholecalciferol (D3-1000) 25 MCG (1000 UT) tablet TAKE 2 TABLETS (2000U) BY MOUTH DAILY AT 8AM (SUPPLEMENT) *LAPKO 62 tablet 5    dicyclomine (BENTYL) 20 mg tablet Take 1 tablet (20 mg total) by mouth every 6 (six) hours as needed (dysphagia) 120 tablet 0    gabapentin (Neurontin) 300 mg capsule Take 1 capsule (300 mg total) by mouth daily at bedtime 30 capsule 5    GNP Sore Throat Spray 1.4 % mucosal liquid APPLY 1 SPRAY TO MOUTH OR THROAT EVERY 2 HRS AS NEEDED FOR SORE THROAT 177 mL 0    GNP Stomach Relief Max St 525 MG/15ML SUSP       hydrOXYzine HCL (ATARAX) 10 mg tablet Take 1 tablet (10 mg total) by mouth 3 (three) times a day 30 tablet 3    ibuprofen (MOTRIN) 400 mg tablet TAKE 1 TABLET BY MOUTH EVERY 8 HOURS AS NEEDED FOR MILD/MOD PAIN OR HEADACHES 30 tablet 0    ibuprofen (MOTRIN) 400 mg tablet Take 1.5 tablets (600 mg total) by mouth every 6 (six) hours as needed for mild pain 20 tablet 0    Incontinence Supply Disposable ([employer] Choice Plus Adult Briefs) MISC Use as directed (R39.81) 60 each 0    ketoconazole (NIZORAL) 2 % cream Apply topically daily 30 g 5    lamoTRIgine (LaMICtal) 100 mg tablet Take 100 mg by mouth 2 (two) times a day Take 50mg BID      lamoTRIgine (LaMICtal) 25 mg tablet Take 25 mg by mouth 2 (two) times a day      magnesium hydroxide (GNP Milk  of Magnesia) 400 mg/5 mL oral suspension 2 TBSP (30ML) BY MOUTH DAILY AS NEEDED IF NO BM IN 3 DAYS (CONSTIPATION) 355 mL 0    metoprolol tartrate (LOPRESSOR) 25 mg tablet TAKE 1 TABLET BY MOUTH 2X DAILY @ 8AM-8PM(BLOOD PRESSURE)* LAPKO 60 tablet 5    Multiple Vitamins-Minerals (CertaVite/Antioxidants) TABS Take 1 tablet by mouth in the morning 31 tablet 5    nystatin-triamcinolone (MYCOLOG-II) cream Apply topically 2 (two) times a day 60 g 2    pantoprazole (PROTONIX) 20 mg tablet Take 1 tablet (20 mg total) by mouth daily 62 tablet 5    psyllium (METAMUCIL SMOOTH TEXTURE) 28 % packet       psyllium (METAMUCIL) 58.6 % packet Take 1 packet by mouth daily As needed for constipation 30 packet 5    RA Sunscreen SPF50 LOTN Apply 15 minutes before sun exposure and every 2 hours 237 mL 0    senna-docusate sodium (Senna S) 8.6-50 mg per tablet TAKE 1 TABLET BY MOUTH DAILY AT 8AM (CONSIPATION)* LAPKO 31 tablet 5    trospium chloride (SANCTURA) 20 mg tablet Take 1 tablet (20 mg total) by mouth 2 (two) times a day 180 tablet 3    amitriptyline (ELAVIL) 10 mg tablet Take 1 tablet (10 mg total) by mouth daily at bedtime 90 tablet 0    carbamide peroxide (DEBROX) 6.5 % otic solution Administer 5 drops into the left ear 2 (two) times a day Use 1 week prior to ENT appointment.  Also can use 4 gtts twice weekly (Tuesday and Friday for example) to each ear for prevention.  Keep hearing aid out x 10 minutes after ear drops administered. (Patient not taking: Reported on 8/2/2023) 15 mL 1    ciprofloxacin-dexamethasone (CIPRODEX) otic suspension Administer 4 drops into the left ear 2 (two) times a day (Patient not taking: Reported on 8/2/2023) 7.5 mL 0    Diclofenac Sodium (VOLTAREN) 1 % Apply 2 g topically 4 (four) times a day (Patient not taking: Reported on 8/2/2023) 50 g 0    Dyclonine-Glycerin (Cepacol Sore Throat Spray) 0.1-33 % LIQD Apply 1 spray to the mouth or throat 3 (three) times a day as needed (sorethroat) (Patient not  taking: Reported on 8/2/2023) 118 mL 5    Elastic Bandages & Supports (Neoprene Knee Brace) MISC Apply right knee brace in morning; remove at night (Patient not taking: Reported on 8/3/2023) 1 each 0    escitalopram (LEXAPRO) 20 mg tablet Take 1 tablet (20 mg total) by mouth daily 90 tablet 2    fluticasone (FLONASE) 50 mcg/act nasal spray 1 spray into each nostril daily as needed for rhinitis (nasal congestion) At 8:00 AM (Patient not taking: Reported on 8/2/2023) 18.2 mL 5    Konsyl Daily Fiber 28.3 %  (Patient not taking: Reported on 8/2/2023)      lidocaine (LMX) 4 % cream Apply topically as needed for mild pain (Patient not taking: Reported on 8/2/2023) 30 g 0    LORazepam (ATIVAN) 0.5 mg tablet Take 0.25 mg by mouth every 6 (six) hours as needed for anxiety Take 0.25mg TID (Patient not taking: Reported on 8/3/2023)      mineral oil-hydrophilic petrolatum (AQUAPHOR) ointment Apply topically as needed for dry skin (Patient not taking: Reported on 8/3/2023) 420 g 5    Mouthwashes (Listerine Antiseptic) LIQD Swish and spit 5 mL 2 (two) times a day 1000 mL 5    norgestimate-ethinyl estradiol (ORTHO-CYCLEN) 0.25-35 MG-MCG per tablet Take 1 tablet by mouth daily (Patient not taking: Reported on 8/2/2023) 28 tablet 12    sodium chloride (OCEAN) 0.65 % nasal spray 1 spray into each nostril as needed (three times daily as needed for nasal congestion) (Patient not taking: Reported on 8/2/2023) 60 mL 5    talc Apply topically 2 (two) times a day for 1 dose Use under the breast and abdomen folds 420 g 2    vedolizumab (ENTYVIO) SOLR  (Patient not taking: Reported on 1/11/2024)      zinc oxide (DESITIN) 13 % cream Apply 1 application topically as needed (for perianal irritation) (Patient not taking: Reported on 8/2/2023) 454 g 5     No current facility-administered medications for this visit.       Allergies  No Known Allergies      The following portions of the patient's history were reviewed and updated as appropriate:  allergies, current medications, past medical history, past social history, past surgical history and problem list.      Vitals  Vitals:    01/11/24 1101   Weight: 97.7 kg (215 lb 4.8 oz)         Physical Exam  Constitutional   General appearance: Patient is seated and in no acute distress, well appearing and well nourished.   Head and Face   Head and face: Normal.    Eyes   Conjunctiva and lids: No erythema, swelling or discharge.  Anicteric.  Ears, Nose, Mouth, and Throat   Hearing: Normal.    Neck: Supple, trachea midline.  Pulmonary   Respiratory effort: No increased work of breathing or signs of respiratory distress.    Cardiovascular   Examination of extremities for edema and/or varicosities: Normal.    Musculoskeletal   Gait and station: Normal   Skin   Skin and subcutaneous tissue: Warm, dry, and intact. No visible jaundice, lesions or rashes.  Psychiatric   Judgment and insight: Normal  Recent and remote memory:  Normal  Mood and affect: Normal      Results  No visits with results within 1 Day(s) from this visit.   Latest known visit with results is:   Appointment on 10/11/2023   Component Date Value    [facility] [place] [facility] 2.991        Radiology Results  No results found.    Orders  No orders of the defined types were placed in this encounter.

## 2024-01-15 ENCOUNTER — OFFICE VISIT (OUTPATIENT)
Dept: FAMILY MEDICINE CLINIC | Facility: CLINIC | Age: 44
End: 2024-01-15

## 2024-01-15 ENCOUNTER — TELEPHONE (OUTPATIENT)
Dept: FAMILY MEDICINE CLINIC | Facility: CLINIC | Age: 44
End: 2024-01-15

## 2024-01-15 VITALS
RESPIRATION RATE: 20 BRPM | OXYGEN SATURATION: 97 % | SYSTOLIC BLOOD PRESSURE: 130 MMHG | WEIGHT: 216.2 LBS | DIASTOLIC BLOOD PRESSURE: 78 MMHG | HEART RATE: 98 BPM | BODY MASS INDEX: 40.85 KG/M2 | TEMPERATURE: 98.2 F

## 2024-01-15 DIAGNOSIS — R21 FACIAL RASH: Primary | ICD-10-CM

## 2024-01-15 PROCEDURE — 99213 OFFICE O/P EST LOW 20 MIN: CPT | Performed by: FAMILY MEDICINE

## 2024-01-15 NOTE — ASSESSMENT & PLAN NOTE
Seen in ED on 1/1/24, advised to use bacitracin  -Rash almost completely resolved, advised to continue Aquaphor to keep skin hydrated  -May resume bacitracin if increased redness noted  -Advised to schedule Medicare wellness visit in March

## 2024-01-15 NOTE — PROGRESS NOTES
Assessment/Plan:    Facial rash  Seen in ED on 1/1/24, advised to use bacitracin  -Rash almost completely resolved, advised to continue Aquaphor to keep skin hydrated  -May resume bacitracin if increased redness noted  -Advised to schedule Medicare wellness visit in March Diagnoses and all orders for this visit:    Facial rash          Subjective:      Patient ID: Elizabeth Doss is a 43 y.o. female.    Patient presented as a follow-up after ED visit on 1/1/24 for facial rash.  She was advised to use bacitracin.  Today at appointment rash is almost completely resolved.  No new complaints.        The following portions of the patient's history were reviewed and updated as appropriate: allergies, current medications, past family history, past medical history, past social history, past surgical history, and problem list.    Review of Systems   Constitutional: Negative.    Respiratory: Negative.     Cardiovascular: Negative.    Gastrointestinal: Negative.    Genitourinary: Negative.    Musculoskeletal:  Positive for gait problem (at base, uses walker). Negative for arthralgias, back pain, joint swelling and myalgias.   Hematological:  Does not bruise/bleed easily.         Objective:      /78 (BP Location: Left arm, Patient Position: Sitting)   Pulse 98   Temp 98.2 °F (36.8 °C)   Resp 20   Wt 98.1 kg (216 lb 3.2 oz)   SpO2 97%   BMI 40.85 kg/m²          Physical Exam  HENT:      Head: Normocephalic and atraumatic.   Cardiovascular:      Rate and Rhythm: Normal rate.   Pulmonary:      Effort: Pulmonary effort is normal. No respiratory distress.   Musculoskeletal:         General: No swelling, tenderness or signs of injury.      Cervical back: Normal range of motion. No rigidity.   Skin:     Comments: Small erythematous patches on the right cheek. Small skin abrasions on the right arm due to skin picking.    Neurological:      Mental Status: She is alert. Mental status is at baseline.

## 2024-01-15 NOTE — TELEPHONE ENCOUNTER
Folder (to be completed by): Dr. Gagnon    Name of Form: [employer] Medical Examination [place]    Color Folder: Provided at appt    Form to be faxed to:  Caregiver will collect at appt

## 2024-01-16 DIAGNOSIS — H91.8X1 OTHER SPECIFIED HEARING LOSS OF RIGHT EAR, UNSPECIFIED HEARING STATUS ON CONTRALATERAL SIDE: Primary | Chronic | ICD-10-CM

## 2024-02-08 ENCOUNTER — TELEPHONE (OUTPATIENT)
Dept: GASTROENTEROLOGY | Facility: MEDICAL CENTER | Age: 44
End: 2024-02-08

## 2024-02-08 DIAGNOSIS — B37.31 VULVAL CANDIDIASIS: ICD-10-CM

## 2024-02-08 NOTE — TELEPHONE ENCOUNTER
Did you remind:    You will receive a call a day prior to let you know when to arrive.  Yes    Do you have a copy of your instructions? Yes/ sending via [employer]    Did you remind them of special diets if applicable? Yes    Did you remind patient to start clear liquid diet if applicable? Yes    Did you remind patient to hold:  NA      Do you have any other questions or concerns? No

## 2024-02-13 NOTE — PROGRESS NOTES
Outpatient Care Manager Note    RN BONNY received ADT of patient ED visit and discharge on 3/13/23 for a diagnosis of chest pain with low risk of acute coronary syndrome  Testing performed and negative for pulmonary embolism and acute cardiac disease  Discharge instructions [facility]  Called patient's  [employer] with no answer  Left message, this is Debbi Calle the Outpatient Nurse Care Manager at [address] [place] [address] office calling to follow up with you  Requested return phone call at [phone]  Chronic Care Management letter sent to patient  Detail Level: Detailed yes

## 2024-02-14 ENCOUNTER — CLINICAL SUPPORT (OUTPATIENT)
Dept: FAMILY MEDICINE CLINIC | Facility: CLINIC | Age: 44
End: 2024-02-14

## 2024-02-14 DIAGNOSIS — Z11.1 SCREENING-PULMONARY TB: Primary | ICD-10-CM

## 2024-02-14 PROCEDURE — 86580 TB INTRADERMAL TEST: CPT

## 2024-02-16 ENCOUNTER — CLINICAL SUPPORT (OUTPATIENT)
Dept: FAMILY MEDICINE CLINIC | Facility: CLINIC | Age: 44
End: 2024-02-16

## 2024-02-16 DIAGNOSIS — Z11.1 ENCOUNTER FOR PPD SKIN TEST READING: Primary | ICD-10-CM

## 2024-02-16 LAB
INDURATION: 0 MM
TB SKIN TEST: NEGATIVE

## 2024-02-19 ENCOUNTER — ANESTHESIA (OUTPATIENT)
Dept: GASTROENTEROLOGY | Facility: HOSPITAL | Age: 44
End: 2024-02-19

## 2024-02-19 ENCOUNTER — TELEPHONE (OUTPATIENT)
Dept: OTHER | Facility: HOSPITAL | Age: 44
End: 2024-02-19

## 2024-02-19 ENCOUNTER — ANESTHESIA EVENT (OUTPATIENT)
Dept: GASTROENTEROLOGY | Facility: HOSPITAL | Age: 44
End: 2024-02-19

## 2024-02-19 ENCOUNTER — HOSPITAL ENCOUNTER (OUTPATIENT)
Dept: GASTROENTEROLOGY | Facility: HOSPITAL | Age: 44
Setting detail: OUTPATIENT SURGERY
Discharge: HOME | End: 2024-02-19
Attending: INTERNAL MEDICINE
Payer: MEDICARE

## 2024-02-19 ENCOUNTER — PREP FOR PROCEDURE (OUTPATIENT)
Dept: GASTROENTEROLOGY | Facility: CLINIC | Age: 44
End: 2024-02-19

## 2024-02-19 VITALS
OXYGEN SATURATION: 100 % | TEMPERATURE: 97.4 F | WEIGHT: 216 LBS | BODY MASS INDEX: 40.78 KG/M2 | DIASTOLIC BLOOD PRESSURE: 78 MMHG | SYSTOLIC BLOOD PRESSURE: 131 MMHG | RESPIRATION RATE: 18 BRPM | HEART RATE: 79 BPM | HEIGHT: 61 IN

## 2024-02-19 DIAGNOSIS — K20.90 ESOPHAGITIS: Primary | ICD-10-CM

## 2024-02-19 DIAGNOSIS — Z30.09 UNWANTED FERTILITY: ICD-10-CM

## 2024-02-19 DIAGNOSIS — K21.9 GASTROESOPHAGEAL REFLUX DISEASE WITHOUT ESOPHAGITIS: ICD-10-CM

## 2024-02-19 DIAGNOSIS — N92.6 IRREGULAR MENSES: ICD-10-CM

## 2024-02-19 DIAGNOSIS — Z12.11 SCREENING FOR COLORECTAL CANCER: ICD-10-CM

## 2024-02-19 DIAGNOSIS — K31.A0 GASTRIC INTESTINAL METAPLASIA: ICD-10-CM

## 2024-02-19 DIAGNOSIS — Z12.12 SCREENING FOR COLORECTAL CANCER: ICD-10-CM

## 2024-02-19 DIAGNOSIS — G47.61 PLMD (PERIODIC LIMB MOVEMENT DISORDER): ICD-10-CM

## 2024-02-19 DIAGNOSIS — Z12.11 COLON CANCER SCREENING: ICD-10-CM

## 2024-02-19 PROCEDURE — 88305 TISSUE EXAM BY PATHOLOGIST: CPT | Performed by: STUDENT IN AN ORGANIZED HEALTH CARE EDUCATION/TRAINING PROGRAM

## 2024-02-19 PROCEDURE — G0105 COLORECTAL SCRN; HI RISK IND: HCPCS | Performed by: INTERNAL MEDICINE

## 2024-02-19 PROCEDURE — 88313 SPECIAL STAINS GROUP 2: CPT | Performed by: STUDENT IN AN ORGANIZED HEALTH CARE EDUCATION/TRAINING PROGRAM

## 2024-02-19 PROCEDURE — 43239 EGD BIOPSY SINGLE/MULTIPLE: CPT | Performed by: INTERNAL MEDICINE

## 2024-02-19 RX ORDER — NORGESTIMATE AND ETHINYL ESTRADIOL 0.25-0.035
1 KIT ORAL DAILY
Qty: 28 TABLET | Refills: 11 | Status: SHIPPED | OUTPATIENT
Start: 2024-02-19

## 2024-02-19 RX ORDER — LIDOCAINE HYDROCHLORIDE 20 MG/ML
INJECTION, SOLUTION EPIDURAL; INFILTRATION; INTRACAUDAL; PERINEURAL AS NEEDED
Status: DISCONTINUED | OUTPATIENT
Start: 2024-02-19 | End: 2024-02-19

## 2024-02-19 RX ORDER — PROPOFOL 10 MG/ML
INJECTION, EMULSION INTRAVENOUS AS NEEDED
Status: DISCONTINUED | OUTPATIENT
Start: 2024-02-19 | End: 2024-02-19

## 2024-02-19 RX ORDER — GLYCOPYRROLATE 0.2 MG/ML
INJECTION INTRAMUSCULAR; INTRAVENOUS AS NEEDED
Status: DISCONTINUED | OUTPATIENT
Start: 2024-02-19 | End: 2024-02-19

## 2024-02-19 RX ORDER — SODIUM CHLORIDE, SODIUM LACTATE, POTASSIUM CHLORIDE, CALCIUM CHLORIDE 600; 310; 30; 20 MG/100ML; MG/100ML; MG/100ML; MG/100ML
125 INJECTION, SOLUTION INTRAVENOUS CONTINUOUS
Status: DISCONTINUED | OUTPATIENT
Start: 2024-02-19 | End: 2024-02-23 | Stop reason: HOSPADM

## 2024-02-19 RX ORDER — POLYETHYLENE GLYCOL 3350, SODIUM SULFATE ANHYDROUS, SODIUM BICARBONATE, SODIUM CHLORIDE, POTASSIUM CHLORIDE 236; 22.74; 6.74; 5.86; 2.97 G/4L; G/4L; G/4L; G/4L; G/4L
4000 POWDER, FOR SOLUTION ORAL ONCE
Qty: 4000 ML | Refills: 0 | Status: SHIPPED | OUTPATIENT
Start: 2024-02-19 | End: 2024-02-19

## 2024-02-19 RX ORDER — KETAMINE HCL IN NACL, ISO-OSM 100MG/10ML
SYRINGE (ML) INJECTION AS NEEDED
Status: DISCONTINUED | OUTPATIENT
Start: 2024-02-19 | End: 2024-02-19

## 2024-02-19 RX ORDER — PANTOPRAZOLE SODIUM 40 MG/1
40 TABLET, DELAYED RELEASE ORAL
Qty: 120 TABLET | Refills: 2 | Status: SHIPPED | OUTPATIENT
Start: 2024-02-19

## 2024-02-19 RX ADMIN — PROPOFOL 50 MG: 10 INJECTION, EMULSION INTRAVENOUS at 09:33

## 2024-02-19 RX ADMIN — PROPOFOL 50 MG: 10 INJECTION, EMULSION INTRAVENOUS at 09:23

## 2024-02-19 RX ADMIN — PROPOFOL 50 MG: 10 INJECTION, EMULSION INTRAVENOUS at 09:21

## 2024-02-19 RX ADMIN — PROPOFOL 50 MG: 10 INJECTION, EMULSION INTRAVENOUS at 09:51

## 2024-02-19 RX ADMIN — PROPOFOL 50 MG: 10 INJECTION, EMULSION INTRAVENOUS at 09:27

## 2024-02-19 RX ADMIN — SODIUM CHLORIDE, SODIUM LACTATE, POTASSIUM CHLORIDE, AND CALCIUM CHLORIDE 125 ML/HR: .6; .31; .03; .02 INJECTION, SOLUTION INTRAVENOUS at 09:10

## 2024-02-19 RX ADMIN — Medication 30 MG: at 09:18

## 2024-02-19 RX ADMIN — PROPOFOL 50 MG: 10 INJECTION, EMULSION INTRAVENOUS at 09:43

## 2024-02-19 RX ADMIN — PROPOFOL 50 MG: 10 INJECTION, EMULSION INTRAVENOUS at 09:18

## 2024-02-19 RX ADMIN — PROPOFOL 50 MG: 10 INJECTION, EMULSION INTRAVENOUS at 09:37

## 2024-02-19 RX ADMIN — LIDOCAINE HYDROCHLORIDE 100 MG: 20 INJECTION, SOLUTION EPIDURAL; INFILTRATION; INTRACAUDAL at 09:18

## 2024-02-19 RX ADMIN — GLYCOPYRROLATE 0.2 MG: 0.2 INJECTION, SOLUTION INTRAMUSCULAR; INTRAVENOUS at 09:13

## 2024-02-19 NOTE — TELEPHONE ENCOUNTER
Pt's caregiver called to request refill for Linzess, she was advised medication not active on Pt's med list. Jorgey stated she was calling Pt's PCP office and then she was calling us back.

## 2024-02-19 NOTE — TELEPHONE ENCOUNTER
Christina from [employer] call our rx line requesting refill in LINZESS ([email or web address]). After review patient current and past medication list this medication can't be found.     Called Christina back. Christina state she is not sure who prescribe this medication either but would contact the nurse on charge of patient to request more information.

## 2024-02-19 NOTE — PROGRESS NOTES
2/19/2024        RE: Erick Shahid  [address]  [place] [address]        *8[facility] GERIATRICS    PRIMARY CARE PROVIDER AND CLINIC:  [facility], [address] [facility] [address]  Chief Complaint   Patient presents with     Hospital F/U      [facility] Medical Record Number:  [id number]  Place of Service where encounter took place:  [facility] (TCU) [[id number]]    Erick Shahid  is a 84 year old  (1939), admitted to the above facility from  [facility]. Hospital stay 2/13/24 through 2/16/24..         Patient's past medical history includes   acute impacted and displaced proximal right humerus fracture (2/2023)  Dementia   heart failure with preserved ejection fraction   aortic stenosis s/p TAVR 23 mm Allison ROBERTO valve on 8/2023  Complete heart block s/p permanent pacemaker  Carotid stenosis  Coronary artery disease   type 2 diabetes  Anemia   Depression  Macular degeneration  Constipation      On 2/13/2024, patient was admitted after having an unwitnessed fall 2 days prior.  At admission in the emergency room his CK was elevated to 1128, BNP was 3098, troponin 29 white blood cells and platelets.  An x-ray of the shoulder revealed an acute impacted and displaced proximal humerus fracture and orthopedics recommended nonsurgical intervention with a sling for comfort.   Noncontrast head CT negative for acute intracranial changes with mild diffuse parenchymal volume loss and white matter changes.  Echo done on 2/14 shows EF of 55 to 60%, grade 1 or early diastolic dysfunction, no wall motion changes, trace aortic regurgitation and bioprosthetic aortic valve is well-seated.  Pacemaker was interrogated on 2/14/2024 and did not show any evidence of arrhythmia due to the elevated CK his Crestor was held.      Today his lungs were coarse.  His right arm was swollen with ecchymosis.  He was not responding clearly to  Assessment/Plan:    Common cold virus  Nasal congestion which is bothersome to [employer] NSS and flonase  Also RX robitussin DM to use if needed for cough and mucous congestion      Non-seasonal allergic rhinitis  Denies allergies  RX Flonase to help with symptoms         Problem List Items Addressed This Visit        Digestive    Esophageal reflux    Relevant Medications    metoclopramide (REGLAN) 10 mg tablet    bismuth subsalicylate (PEPTO BISMOL) 524 mg/30 mL oral suspension    Hiatal hernia    Relevant Medications    metoclopramide (REGLAN) 10 mg tablet       Respiratory    Non-seasonal allergic rhinitis - Primary     Denies allergies  RX Flonase to help with symptoms         Relevant Medications    fluticasone (FLONASE) 50 mcg/act nasal spray    sodium chloride (OCEAN) 0 65 % nasal spray    Common cold virus     Nasal congestion which is bothersome to [employer] NSS and flonase  Also RX robitussin DM to use if needed for cough and mucous congestion           Relevant Medications    fluticasone (FLONASE) 50 mcg/act nasal spray    dextromethorphan-guaiFENesin (ROBITUSSIN DM)  mg/5 mL syrup       Other    Constipation    Relevant Medications    polyethylene glycol (MIRALAX) 17 g packet    metoclopramide (REGLAN) 10 mg tablet            Subjective:      Patient ID: Rosa Paz is a 35 y o  female  35year old presents with her care giver c/o cough and nasal congestion for the last week  Was in the ED on the 17th with c/o chest pain, ECG NSR, diagnosed with GERD and given a GI coctail in the ED  Denies chest pain today  Refill meds needed: Miralaz, Pepto and Reglan      The following portions of the patient's history were reviewed and updated as appropriate: allergies, current medications, past family history, past medical history, past social history, past surgical history and problem list     Review of Systems   Constitutional: Negative  HENT: Positive for congestion and rhinorrhea  Respiratory: Positive for cough  Cardiovascular: Negative  Gastrointestinal: Negative  Objective:      /74 (BP Location: Left arm, Patient Position: Sitting, Cuff Size: Large)   Pulse 72   Temp 98 7 °F (37 1 °C) (Tympanic)   Resp 14   Ht 4' 11" (1 499 m)   Wt 85 kg (187 lb 6 4 oz)   LMP 02/24/2019   BMI 37 85 kg/m²          Physical Exam   Constitutional: She appears well-developed and well-nourished  HENT:   Head: Normocephalic  Right Ear: External ear normal    Left Ear: External ear normal    Nose: Mucosal edema and rhinorrhea present  Mouth/Throat: Oropharynx is clear and moist    Neck: Normal range of motion  Neck supple  Cardiovascular: Normal rate, regular rhythm and normal heart sounds  Pulmonary/Chest: Effort normal and breath sounds normal    Skin: Skin is warm and dry  questions    CODE STATUS/ADVANCE DIRECTIVES DISCUSSION:  Prior  DNR / DNI  ALLERGIES: No Known Allergies   PAST MEDICAL HISTORY:   Past Medical History:   Diagnosis Date     Benign essential hypertension      BPH (benign prostatic hyperplasia)      Chronic heart failure with reduced ejection fraction and diastolic dysfunction (H)      Cognitive impairment      Critical aortic valve stenosis      Diabetes mellitus, type 2 (H)     on metformin     Dyslipidemia      First degree atrioventricular block      LBBB (left bundle branch block)      Macular degeneration (senile) of retina       PAST SURGICAL HISTORY:   has a past surgical history that includes Coronary Angiogram (N/A, 07/28/2023); Percutaneous Coronary Intervention (N/A, 07/28/2023); Transcatheter Aortic Valve Replacement (08/15/2023); Orif Hip Fracture (Right, 2018); Pacemaker Device & Lead Implant- Right Atrial & Left Ventricular (N/A, 8/15/2023); and Transcatheter Aortic Valve Replacement-Femoral Approach (N/A, 8/15/2023).  FAMILY HISTORY: family history is not on file.  SOCIAL HISTORY:   reports that he has never smoked. He has never used smokeless tobacco. He reports current alcohol use. He reports that he does not use drugs.  Patient's living condition: lives in an assisted living facility    Post Discharge Medication Reconciliation Status:   MED REC REQUIRED  Post Medication Reconciliation Status:  Discharge medications reconciled and changed, see notes/orders       Current Outpatient Medications   Medication Sig     acetaminophen (TYLENOL) 325 MG tablet Take 2 tablets (650 mg) by mouth 3 times daily (with meals)     aspirin (ASA) 81 MG chewable tablet Take 1 tablet (81 mg) by mouth daily     cycloSPORINE (RESTASIS) 0.05 % ophthalmic emulsion Apply 1 drop to eye 2 times daily as needed for dry eyes     omeprazole (PRILOSEC) 20 MG DR capsule Take 1 capsule (20 mg) by mouth daily     polyethylene glycol (MIRALAX) 17 g packet Take 1 packet by mouth daily  "    rosuvastatin (CRESTOR) 20 MG tablet Take 20 mg by mouth at bedtime     senna-docusate (SENOKOT-S/PERICOLACE) 8.6-50 MG tablet Take 2 tablets by mouth daily as needed for constipation     sertraline (ZOLOFT) 50 MG tablet Take 25 mg by mouth daily     traZODone (DESYREL) 25 mg TABS half-tab Take 25 mg by mouth at bedtime     vitamin B-12 (CYANOCOBALAMIN) 1000 MCG tablet Take 1,000 mcg by mouth daily     vitamin C (ASCORBIC ACID) 500 MG tablet Take 500 mg by mouth daily     Vitamin D3 (CHOLECALCIFEROL) 25 mcg (1000 units) tablet Take 25 mcg by mouth daily     No current facility-administered medications for this visit.       ROS:  Unobtainable secondary to cognitive impairment.     Vitals:  /60   Pulse 60   Temp 97.3  F (36.3  C)   Resp 16   Ht 1.702 m (5' 7\")   Wt 78.4 kg (172 lb 14.4 oz)   SpO2 98%   BMI 27.08 kg/m    Exam:  GENERAL APPEARANCE:  Alert, not talking coherently  RESP:  respiratory effort and palpation of chest normal, crackles bilaterally  CV:  Palpation and auscultation of heart done , \"2 pedal pulses in bilateral arms\" , peripheral edema 3+ in right arm  SKIN:  Ecchymosis on right arm with 3+ swelling  PSYCH:  insight and judgement impaired, memory impaired     Lab/Diagnostic data:  Most Recent 3 CBC's:  Recent Labs   Lab Test 02/16/24  1126 02/15/24  0826 02/14/24  0810   WBC 4.4 4.4 5.6   HGB 10.6* 10.1* 10.5*   MCV 93 94 94    134* 133*     Most Recent 3 BMP's:  Recent Labs   Lab Test 02/16/24  0835 02/16/24  0558 02/16/24  0218 02/15/24  1125 02/15/24  0826 02/14/24  1147 02/14/24  0810 02/13/24  1701 02/13/24  1322   NA  --   --   --   --  139  --  137  --  136   POTASSIUM  --   --   --   --  3.5  --  3.5  --  4.2   CHLORIDE  --   --   --   --  105  --  102  --  100   CO2  --   --   --   --  26  --  25  --  28   BUN  --   --   --   --  11.6  --  10.4  --  17.3   CR  --   --   --   --  0.57*  --  0.55*  --  0.74   ANIONGAP  --   --   --   --  8  --  10  --  8   FERMIN  --   -- "   --   --  8.5*  --  8.7*  --  9.4   * 120* 118*   < > 116*   < > 130*   < > 167*    < > = values in this interval not displayed.     Most Recent 2 LFT's:  Recent Labs   Lab Test 02/13/24  1322 10/13/23  0552   AST 52* 24   ALT 13 14   ALKPHOS 103 102   BILITOTAL 0.8 1.1     Most Recent 3 BNP's:  Recent Labs   Lab Test 02/13/24  1322 07/27/23  1827   NTBNPI 3,098*  --    NTBNP  --  2,019*     Most Recent Cholesterol Panel:  Recent Labs   Lab Test 10/13/23  0748   CHOL 137   LDL 52   HDL 69   TRIG 80     Most Recent TSH and T4:  Recent Labs   Lab Test 10/13/23  0748   TSH 1.47     Most Recent Hemoglobin A1c:  Recent Labs   Lab Test 02/05/24  0630   A1C 6.4*       ASSESSMENT/PLAN:  (S42.201A) Displaced fracture of proximal end of right humerus  (primary encounter diagnosis)  R53.1) Weakness  (R53.81) Physical deconditioning  (R58) Ecchymosis  (R22.31) Localized swelling of right upper extremity  Comment:   Fell  Displaced fracture of right humerus and non-operative recommendations  Plan:   Follow up with a shoulder specialist at [facility], such as Dr. Bernardo Sanchez, Dr. Tom Stevens, or Dr. Piter Lyons, in 2 weeks for repeat imaging and reevaluation of the right shoulder.   Sling for comfort. O shoulder immobilizer for better fit (no cushion) OK to allow arm to hang at side, gravity will pull humerus into alignment.   - NWB RUE, OK for platform walker if needed.   -Okay for elbow, wrist, and digital ROM. Can begin Codman's exercises to the right shoulder in the next 2 weeks as pain allows  Therapy to evaluate and treat      (I50.9) Congestive heart failure, unspecified HF chronicity, unspecified heart failure type (H)  (I44.2) Complete heart block (H)  (I25.10) Coronary artery disease involving native coronary artery of native heart without angina pectoris  (Z95.0) Cardiac pacemaker in situ  (Z95.3) Status post transcatheter aortic valve replacement (TAVR) using bioprosthesis  (I65.21)  Stenosis of right carotid artery  Comment: chronic.  Currently taking metoprolol, rosuvastatin, aspirin,  HAs LE edema   Bilateral crackles in Lungs  Plan:   Start lasix 40 mg daily  Stop metoprolol   BMP and CBC next lab day       (E11.59) Type 2 diabetes mellitus with other circulatory complication, without long-term current use of insulin (H)  Comment: chronic.   Currently taking metformin 1000 mg with supper.    Last hgb A1c=6.4 (2/2024)   Plan: stop metformin  Change blood sugar checks to alternating times.       (D64.9) Anemia, unspecified type  Comment: Chronic.  Taking Vitamin B12.   Plan: CBC next lab day   Consider iron studies when hgb stabilizes      (F32.1) Depression, major, single episode, moderate (H)  Comment: chronic   Taking sertraline  Plan: Continue with plan of care no changes at this time, adjustment as needed      (K59.01) Slow transit constipation  Comment: chronic.   Currently taking MiraLAX and prn senna   Plan: Continue with plan of care no changes at this time, adjustment as needed        [F03.B3] Moderate dementia with mood disturbance, unspecified dementia type (H)   Comment: Chronic, progressive.     prior to TCU admission, patient requiring 24-hour skilled nursing care.    Since TCU admission, No behavioral issues or emotional distress with changes in environment  Expect further functional and cognitive decline.    Plan: consider discontinuing trazodone.            Total time spent with patient visit at the skilled nursing facility was 45 min including patient visit, review of past records, and [facility] records.     Electronically signed by:        LESTER Otoole CNP on 2/19/2024 at 11:58 AM                     Sincerely,        LESTER Otoole CNP

## 2024-02-19 NOTE — H&P
H&P EXAM - Outpatient Endoscopy   Elizabeth Doss 44 y.o. female MRN: [id number]    [facility]   Encounter: [id number]        History and Physical -  [facility]  Elizabeth Doss 44 y.o. female MRN: [id number]                  HPI: Elizabeth Doss is a 44 y.o. year old female who presents for gastric intestinal metaplasia, colon cancer screening      REVIEW OF SYSTEMS: Per the HPI, and otherwise unremarkable.    Historical Information   Past Medical History:   Diagnosis Date    ADD (attention deficit disorder)     Anxiety     Astigmatism     Brain lesion     Calcium deficiency     Cellulitis of foot, right 6/4/2018    Cerebral palsy (HCC)     Chronic otitis media     Constipation     Depression     Dysphagia     Esophagitis     Esotropia     GERD (gastroesophageal reflux disease)     Hiatal hernia     Hydrocephalus (HCC)     Impaired fasting glucose     Left nephrolithiasis 03/04/2019    Myopia     Oppositional defiant disorder     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HCC)     Seizures (HCC)     Sensorineural hearing loss     Sleep apnea     Status post ventriculoatrial shunt placement     Visual impairment      Past Surgical History:   Procedure Laterality Date    BREAST BIOPSY Left     X 2 (not sure of years)    CSF SHUNT      Creation of Ventriculo-Peritoneal CSF shunt ; Last Assessed:7/6/2016    EAR SURGERY      Last Assessed:7/6/2016    LEG SURGERY      due to CP     NOSE SURGERY      Last Assessed:7/6/2016    MA ESOPHAGOGASTRODUODENOSCOPY TRANSORAL DIAGNOSTIC N/A 5/9/2019    Procedure: ESOPHAGOGASTRODUODENOSCOPY (EGD) with biopsy;  Surgeon: Kaya Pedersen MD;  Location: [facility];  Service: Gastroenterology    UPPER GASTROINTESTINAL ENDOSCOPY  05/2019     Social History   Social History     Substance and Sexual Activity   Alcohol Use Never     Social History     Substance and Sexual Activity   Drug Use Never     Social History     Tobacco Use   Smoking Status  "Never   Smokeless Tobacco Never     Family History   Problem Relation Age of Onset    Diabetes Mother     Colon cancer Father     No Known Problems Maternal Grandmother     No Known Problems Maternal Grandfather     No Known Problems Paternal Grandmother     No Known Problems Paternal Grandfather     Hypertension Neg Hx     Heart disease Neg Hx     Stroke Neg Hx     Thyroid disease Neg Hx        Meds/Allergies     (Not in a hospital admission)      No Known Allergies    Objective     /71   Pulse 93   Temp (!) 97.4 °F (36.3 °C) (Temporal)   Resp 14   Ht 5' 1\" (1.549 m)   Wt 98 kg (216 lb)   LMP 02/19/2024 (Exact Date) Comment: no pregnancy test needed, anesthesia aware, no chance of pregnancy  SpO2 95%   BMI 40.81 kg/m²       PHYSICAL EXAM    Gen: NAD  CV: RRR  CHEST: Clear  ABD: soft, NT/ND  EXT: no edema      ASSESSMENT/PLAN:  This is a 44 y.o. year old female here for egd/colonoscopy, and she is stable and optimized for her procedure.          "

## 2024-02-19 NOTE — ANESTHESIA POSTPROCEDURE EVALUATION
Post-Op Assessment Note    CV Status:  Stable    Pain management: satisfactory to patient       Mental Status:  Alert and awake   Hydration Status:  Euvolemic   PONV Controlled:  Controlled   Airway Patency:  Patent     Post Op Vitals Reviewed: Yes    No anethesia notable event occurred.    Staff: Anesthesiologist, CRNA               /67 (02/19/24 1006)    Temp      Pulse 83 (02/19/24 1006)   Resp 18 (02/19/24 1006)    SpO2 98 % (02/19/24 1006)

## 2024-02-19 NOTE — ANESTHESIA PREPROCEDURE EVALUATION
Procedure:  COLONOSCOPY  EGD    Relevant Problems   CARDIO   (+) Anterior chest wall pain   (+) Atherosclerosis of arteries of extremities (HCC)   (+) Bilateral thoracic back pain   (+) Intercostal pain   (+) Varicose veins with pain      GI/HEPATIC   (+) Functional dysphagia   (+) Gastroesophageal reflux disease without esophagitis   (+) Hiatal hernia   (+) Partial small bowel obstruction (HCC)      /RENAL   (+) Acquired renal cyst of left kidney   (+) Left nephrolithiasis      HEMATOLOGY   (+) Iron deficiency anemia secondary to inadequate dietary iron intake      MUSCULOSKELETAL   (+) Bilateral thoracic back pain   (+) Hiatal hernia   (+) Low back pain      NEURO/PSYCH   (+) Acute non intractable tension-type headache   (+) Generalized anxiety disorder   (+) Severe episode of recurrent major depressive disorder, with psychotic features (HCC)      PULMONARY   (+) SOB (shortness of breath)   (+) Viral URI      Digestive   (+) Nausea and vomiting      Endocrine   (+) 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HCC)      Nervous and Auditory   (+) Bilateral impacted cerumen   (+) Cerebral palsy (HCC)   (+) Cerumen impaction   (+) Hearing impairment   (+) Otitis media      Musculoskeletal and Integument   (+) Candidiasis of skin   (+) Dry skin   (+) Facial rash   (+) Hyperhidrosis   (+) Impetigo   (+) Intertrigo   (+) TMJ (temporomandibular joint disorder)      Other   (+) Abnormal TSH   (+) Acute pain of right knee   (+) Ambulatory dysfunction   (+) Bruise   (+) COVID-19   (+) Choking episode   (+) Constipation   (+) Diarrhea   (+) Dizziness   (+) Dysuria   (+) Elevated blood pressure reading in office without diagnosis of hypertension   (+) Encounter for follow-up   (+) Excessive daytime sleepiness   (+) Exposure to COVID-19 virus   (+) Fall   (+) Frequent falls   (+) Gait disturbance   (+) Gluteal pain   (+) Grief reaction   (+) Heat intolerance   (+) Hematuria   (+) History of anemia   (+) Iron deficiency   (+) Left foot pain   (+) Leg  swelling   (+) Medicare annual wellness visit, subsequent   (+) Moderate intellectual disability   (+) Obesity, Class III, BMI 40-49.9 (morbid obesity) (HCC)   (+) PLMD (periodic limb movement disorder)   (+) Pain of right calf   (+) Palpitations   (+) Seasonal allergies   (+) Self-injurious behavior   (+) Skin picking habit   (+) Sleep disturbance   (+) Sore throat   (+) Urinary incontinence   (+) Urine frequency   (+) Wound, open        Physical Exam    Airway    Mallampati score: III  TM Distance: <3 FB  Neck ROM: full     Dental   No notable dental hx     Cardiovascular  Cardiovascular exam normal    Pulmonary  Pulmonary exam normal     Other Findings  post-pubertal.      Anesthesia Plan  ASA Score- 3     Anesthesia Type- IV sedation with anesthesia with ASA Monitors.         Additional Monitors:     Airway Plan:            Plan Factors-Exercise tolerance (METS): <4 METS.    Chart reviewed. EKG reviewed.  Existing labs reviewed.     Patient is not a current smoker. Patient not instructed to abstain from smoking on day of procedure. Patient did not smoke on day of surgery.            Induction- intravenous.    Postoperative Plan-     Informed Consent- Anesthetic plan and risks discussed with patient.  I personally reviewed this patient with the CRNA. Discussed and agreed on the Anesthesia Plan with the CRNA..

## 2024-02-20 ENCOUNTER — TELEPHONE (OUTPATIENT)
Dept: GASTROENTEROLOGY | Facility: MEDICAL CENTER | Age: 44
End: 2024-02-20

## 2024-02-20 RX ORDER — GABAPENTIN 300 MG/1
300 CAPSULE ORAL
Qty: 30 CAPSULE | Refills: 2 | Status: SHIPPED | OUTPATIENT
Start: 2024-02-20

## 2024-02-20 NOTE — TELEPHONE ENCOUNTER
----- Message from Obey Melo MD sent at 2/19/2024 10:12 AM EST -----  Regarding: repeat procedures  Hey, please schedule her for repeat EGD in about 3 months and repeat colonoscopy in 6-12 months. She will need 2 day prep. Orders in.

## 2024-02-20 NOTE — TELEPHONE ENCOUNTER
Left message for Person directed support caregiver-Mya to schedule repeat EGD -3 months out and Colonoscopy in 5 months; 2 day prep for colon

## 2024-02-20 NOTE — TELEPHONE ENCOUNTER
Medical Care Coordinator (Germaine) called to schedule EGD asked if pt can be put in for morning appt.

## 2024-02-20 NOTE — TELEPHONE ENCOUNTER
Scheduled date of colonoscopy (as of today):   9/20/24    Physician performing colonoscopy: Dr. Jaiyeola    Location of colonoscopy:  [facility]

## 2024-02-20 NOTE — TELEPHONE ENCOUNTER
Scheduled date of EGD(as of today):  6/24/24    Physician performing EGD: Dr. Jaiyeola    Location of EGD: [facility]

## 2024-02-21 PROBLEM — H61.20 CERUMEN IMPACTION: Status: RESOLVED | Noted: 2018-08-09 | Resolved: 2024-02-21

## 2024-02-21 PROBLEM — J06.9 VIRAL URI: Status: RESOLVED | Noted: 2019-03-20 | Resolved: 2024-02-21

## 2024-02-21 PROBLEM — Z00.00 MEDICARE ANNUAL WELLNESS VISIT, SUBSEQUENT: Status: RESOLVED | Noted: 2018-02-12 | Resolved: 2024-02-21

## 2024-02-21 PROBLEM — H61.23 BILATERAL IMPACTED CERUMEN: Status: RESOLVED | Noted: 2018-05-21 | Resolved: 2024-02-21

## 2024-02-21 PROBLEM — L01.00 IMPETIGO: Status: RESOLVED | Noted: 2019-11-18 | Resolved: 2024-02-21

## 2024-02-21 PROCEDURE — 88305 TISSUE EXAM BY PATHOLOGIST: CPT | Performed by: STUDENT IN AN ORGANIZED HEALTH CARE EDUCATION/TRAINING PROGRAM

## 2024-02-21 PROCEDURE — 88313 SPECIAL STAINS GROUP 2: CPT | Performed by: STUDENT IN AN ORGANIZED HEALTH CARE EDUCATION/TRAINING PROGRAM

## 2024-02-21 NOTE — RESULT ENCOUNTER NOTE
Results relayed via Mychart  Unremarkable pathology.  Repeat EGD and colonoscopy for documentation of healing of esophagitis and given poor prep on colonoscopy

## 2024-02-22 ENCOUNTER — OFFICE VISIT (OUTPATIENT)
Dept: DENTISTRY | Facility: CLINIC | Age: 44
End: 2024-02-22

## 2024-02-22 VITALS — DIASTOLIC BLOOD PRESSURE: 76 MMHG | SYSTOLIC BLOOD PRESSURE: 111 MMHG | HEART RATE: 80 BPM

## 2024-02-22 DIAGNOSIS — K02.9 DENTAL CARIES: ICD-10-CM

## 2024-02-22 DIAGNOSIS — Z01.20 ENCOUNTER FOR DENTAL EXAMINATION: Primary | ICD-10-CM

## 2024-02-22 DIAGNOSIS — K03.6 ACCRETIONS ON TEETH: ICD-10-CM

## 2024-02-22 DIAGNOSIS — K03.6 DENTAL CALCULUS: ICD-10-CM

## 2024-02-22 PROCEDURE — D0120 PERIODIC ORAL EVALUATION - ESTABLISHED PATIENT: HCPCS | Performed by: DENTIST

## 2024-02-22 PROCEDURE — D0274 BITEWINGS - 4 RADIOGRAPHIC IMAGES: HCPCS

## 2024-02-22 PROCEDURE — D1110 PROPHYLAXIS - ADULT: HCPCS

## 2024-02-22 NOTE — DENTAL PROCEDURE DETAILS
Elizabeth Doss presents for a Periodic exam. Verbal consent for treatment given in addition to the forms.     Reviewed health history - Patient is ASA II    Patient arrived from group home with caregiver  arrived 12 min late  Consents signed: Yes     Perio: Normal  Pain Scale: 0  Caries Assessment: High  Radiographs: Bitewings x4     Oral Hygiene instruction reviewed and given.  Recommended Hygiene recall visits with   Elizabeth.    EXAM  DR IBRAHIM     Treatment Plan:  1.  Infection control:    2.  Periodontal therapy: adult prophy/   3.  Caries control: as charted   # 18 CL V and # 19 O  may be deeper  4.  Occlusal evaluation:   retained primary teeth UL anterior      Prognosis is Good.  Referrals needed: No  Next Visit: wiley # 19 O  if time # 18 B  STRESSED THEY ARRIVE ON TIME AND REQUESTED 60 MIN

## 2024-02-22 NOTE — DENTAL PROCEDURE DETAILS
Prophylaxis completed with ultrasonic  and hand instrumentation.  Soft plaque removed and supragingival calculus removed    stressed brushing 2 x day   not much calc or plaque present   strong tong thruster and tight lips!   Polished with prophy cup and paste.  Flossed and provided Oral Health Instructions.  Demonstrated proper brushing and flossing technique.  Patient left satisfied and ambulatory.  6 mos recall    wiley needed # 18, 19

## 2024-03-11 ENCOUNTER — OFFICE VISIT (OUTPATIENT)
Dept: FAMILY MEDICINE CLINIC | Facility: CLINIC | Age: 44
End: 2024-03-11

## 2024-03-11 ENCOUNTER — TELEPHONE (OUTPATIENT)
Dept: FAMILY MEDICINE CLINIC | Facility: CLINIC | Age: 44
End: 2024-03-11

## 2024-03-11 VITALS
SYSTOLIC BLOOD PRESSURE: 102 MMHG | HEIGHT: 61 IN | OXYGEN SATURATION: 97 % | BODY MASS INDEX: 39.61 KG/M2 | RESPIRATION RATE: 18 BRPM | HEART RATE: 80 BPM | TEMPERATURE: 98.6 F | WEIGHT: 209.8 LBS | DIASTOLIC BLOOD PRESSURE: 69 MMHG

## 2024-03-11 DIAGNOSIS — K21.9 GASTROESOPHAGEAL REFLUX DISEASE WITHOUT ESOPHAGITIS: ICD-10-CM

## 2024-03-11 DIAGNOSIS — K59.04 CHRONIC IDIOPATHIC CONSTIPATION: ICD-10-CM

## 2024-03-11 DIAGNOSIS — Z00.00 MEDICARE ANNUAL WELLNESS VISIT, SUBSEQUENT: Primary | ICD-10-CM

## 2024-03-11 DIAGNOSIS — F71 MODERATE INTELLECTUAL DISABILITY: ICD-10-CM

## 2024-03-11 DIAGNOSIS — R79.89 ABNORMAL TSH: ICD-10-CM

## 2024-03-11 DIAGNOSIS — R73.01 IMPAIRED FASTING GLUCOSE: ICD-10-CM

## 2024-03-11 DIAGNOSIS — E66.01 OBESITY, CLASS III, BMI 40-49.9 (MORBID OBESITY) (HCC): ICD-10-CM

## 2024-03-11 PROBLEM — U07.1 COVID-19: Status: RESOLVED | Noted: 2021-11-18 | Resolved: 2024-03-11

## 2024-03-11 PROCEDURE — G0439 PPPS, SUBSEQ VISIT: HCPCS | Performed by: FAMILY MEDICINE

## 2024-03-11 NOTE — TELEPHONE ENCOUNTER
Folder (To be completed by) - Dr. benitez     Name of Form - person directed support case note    Color folder (blue    Form to be Faxed (Fax #), Mailed (Address), or Picked up (By whom) -    Patient was made aware of the 7-10 business day form policy.

## 2024-03-11 NOTE — ASSESSMENT & PLAN NOTE
BMI Counseling: Body mass index is 39.64 kg/m². The BMI is above normal. Nutrition recommendations include reducing intake of saturated fat and trans fat and reducing intake of cholesterol. Exercise recommendations include strength training exercises.

## 2024-03-11 NOTE — TELEPHONE ENCOUNTER
Form completed at time of visit and handed back to  upon check out. Copy of form was put in the blue clerical folder to be scanned to the chart.

## 2024-03-11 NOTE — ASSESSMENT & PLAN NOTE
Elevated TSH noted on the blood work in June at 4.786, but repeated in October,11 ,2023 was within normal limits at 2.991  will recheck with routine blood work in 3 months

## 2024-03-11 NOTE — ASSESSMENT & PLAN NOTE
- Screening for cardiovascular disease: yearly BMP and Lipid panel UTD: ordered  - Screening for colorectal cancer: Follows with GI, plan for EGD and colonoscopy in September 2024  - Screening for cervical cancer: Follows with OB/GYN at [facility], advised to schedule appointment for annual gynecological exam  - Screening for breast cancer: Has order for the mammogram to be done in June 2024, the previous one from March 2023 was normal  -screening for depression positive with mild depression, follows with psychiatry  -screening for hepatitis C and HIV up-to-date  -Immunizations reviewed and up-to-date  - Counseled the patient on healthy lifestyle habits

## 2024-03-11 NOTE — PATIENT INSTRUCTIONS
Medicare Preventive Visit Patient Instructions  Thank you for completing your Welcome to Medicare Visit or Medicare Annual Wellness Visit today. Your next wellness visit will be due in one year (3/12/2025).  The screening/preventive services that you may require over the next 5-10 years are detailed below. Some tests may not apply to you based off risk factors and/or age. Screening tests ordered at today's visit but not completed yet may show as past due. Also, please note that scanned in results may not display below.  Preventive Screenings:  Service Recommendations Previous Testing/Comments   Colorectal Cancer Screening  * Colonoscopy    * Fecal Occult Blood Test (FOBT)/Fecal Immunochemical Test (FIT)  * Fecal DNA/Cologuard Test  * Flexible Sigmoidoscopy Age: 45-75 years old   Colonoscopy: every 10 years (may be performed more frequently if at higher risk)  OR  FOBT/FIT: every 1 year  OR  Cologuard: every 3 years  OR  Sigmoidoscopy: every 5 years  Screening may be recommended earlier than age 45 if at higher risk for colorectal cancer. Also, an individualized decision between you and your healthcare provider will decide whether screening between the ages of 76-85 would be appropriate. Colonoscopy: 02/19/2024  FOBT/FIT: Not on file  Cologuard: Not on file  Sigmoidoscopy: Not on file    Screening Current     Breast Cancer Screening Age: 40+ years old  Frequency: every 1-2 years  Not required if history of left and right mastectomy Mammogram: 03/31/2023    Screening Current   Cervical Cancer Screening Between the ages of 21-29, pap smear recommended once every 3 years.   Between the ages of 30-65, can perform pap smear with HPV co-testing every 5 years.   Recommendations may differ for women with a history of total hysterectomy, cervical cancer, or abnormal pap smears in past. Pap Smear: 06/23/2023    Screening Current   Hepatitis C Screening Once for adults born between 1945 and 1965  More frequently in patients at  high risk for Hepatitis C Hep C Antibody: 07/26/2021    Screening Current   Diabetes Screening 1-2 times per year if you're at risk for diabetes or have pre-diabetes Fasting glucose: 90 mg/dL (6/5/2023)  A1C: 5.4 % (9/30/2021)  Screening Current   Cholesterol Screening Once every 5 years if you don't have a lipid disorder. May order more often based on risk factors. Lipid panel: 06/05/2023    Screening Current     Other Preventive Screenings Covered by Medicare:  Abdominal Aortic Aneurysm (AAA) Screening: covered once if your at risk. You're considered to be at risk if you have a family history of AAA.  Lung Cancer Screening: covers low dose CT scan once per year if you meet all of the following conditions: (1) Age 55-77; (2) No signs or symptoms of lung cancer; (3) Current smoker or have quit smoking within the last 15 years; (4) You have a tobacco smoking history of at least 20 pack years (packs per day multiplied by number of years you smoked); (5) You get a written order from a healthcare provider.  Glaucoma Screening: covered annually if you're considered high risk: (1) You have diabetes OR (2) Family history of glaucoma OR (3)  aged 50 and older OR (4)  American aged 65 and older  Osteoporosis Screening: covered every 2 years if you meet one of the following conditions: (1) You're estrogen deficient and at risk for osteoporosis based off medical history and other findings; (2) Have a vertebral abnormality; (3) On glucocorticoid therapy for more than 3 months; (4) Have primary hyperparathyroidism; (5) On osteoporosis medications and need to assess response to drug therapy.   Last bone density test (DXA Scan): Not on file.  HIV Screening: covered annually if you're between the age of 15-65. Also covered annually if you are younger than 15 and older than 65 with risk factors for HIV infection. For pregnant patients, it is covered up to 3 times per pregnancy.    Immunizations:  Immunization  Recommendations   Influenza Vaccine Annual influenza vaccination during flu season is recommended for all persons aged >= 6 months who do not have contraindications   Pneumococcal Vaccine   * Pneumococcal conjugate vaccine = PCV13 (Prevnar 13), PCV15 (Vaxneuvance), PCV20 (Prevnar 20)  * Pneumococcal polysaccharide vaccine = PPSV23 (Pneumovax) Adults 19-63 yo with certain risk factors or if 65+ yo  If never received any pneumonia vaccine: recommend Prevnar 20 (PCV20)  Give PCV20 if previously received 1 dose of PCV13 or PPSV23   Hepatitis B Vaccine 3 dose series if at intermediate or high risk (ex: diabetes, end stage renal disease, liver disease)   Respiratory syncytial virus (RSV) Vaccine - COVERED BY MEDICARE PART D  * RSVPreF3 (Arexvy) CDC recommends that adults 60 years of age and older may receive a single dose of RSV vaccine using shared clinical decision-making (SCDM)   Tetanus (Td) Vaccine - COST NOT COVERED BY MEDICARE PART B Following completion of primary series, a booster dose should be given every 10 years to maintain immunity against tetanus. Td may also be given as tetanus wound prophylaxis.   Tdap Vaccine - COST NOT COVERED BY MEDICARE PART B Recommended at least once for all adults. For pregnant patients, recommended with each pregnancy.   Shingles Vaccine (Shingrix) - COST NOT COVERED BY MEDICARE PART B  2 shot series recommended in those 19 years and older who have or will have weakened immune systems or those 50 years and older     Health Maintenance Due:      Topic Date Due   • Breast Cancer Screening: Mammogram  03/31/2024   • Colorectal Cancer Screening  08/17/2024   • Cervical Cancer Screening  06/22/2025   • HIV Screening  Completed   • Hepatitis C Screening  Completed     Immunizations Due:      Topic Date Due   • Influenza Vaccine (1) 09/01/2023   • COVID-19 Vaccine (6 - 2023-24 season) 09/01/2023     Advance Directives   What are advance directives?  Advance directives are legal documents  that state your wishes and plans for medical care. These plans are made ahead of time in case you lose your ability to make decisions for yourself. Advance directives can apply to any medical decision, such as the treatments you want, and if you want to donate organs.   What are the types of advance directives?  There are many types of advance directives, and each state has rules about how to use them. You may choose a combination of any of the following:  Living will:  This is a written record of the treatment you want. You can also choose which treatments you do not want, which to limit, and which to stop at a certain time. This includes surgery, medicine, IV fluid, and tube feedings.   Durable power of  for healthcare (DPAHC):  This is a written record that states who you want to make healthcare choices for you when you are unable to make them for yourself. This person, called a proxy, is usually a family member or a friend. You may choose more than 1 proxy.  Do not resuscitate (DNR) order:  A DNR order is used in case your heart stops beating or you stop breathing. It is a request not to have certain forms of treatment, such as CPR. A DNR order may be included in other types of advance directives.  Medical directive:  This covers the care that you want if you are in a coma, near death, or unable to make decisions for yourself. You can list the treatments you want for each condition. Treatment may include pain medicine, surgery, blood transfusions, dialysis, IV or tube feedings, and a ventilator (breathing machine).  Values history:  This document has questions about your views, beliefs, and how you feel and think about life. This information can help others choose the care that you would choose.  Why are advance directives important?  An advance directive helps you control your care. Although spoken wishes may be used, it is better to have your wishes written down. Spoken wishes can be misunderstood, or  not followed. Treatments may be given even if you do not want them. An advance directive may make it easier for your family to make difficult choices about your care.   Urinary Incontinence   Urinary incontinence (UI)  is when you lose control of your bladder. UI develops because your bladder cannot store or empty urine properly. The 3 most common types of UI are stress incontinence, urge incontinence, or both.  Medicines:   May be given to help strengthen your bladder control. Report any side effects of medication to your healthcare provider.  Do pelvic muscle exercises often:  Your pelvic muscles help you stop urinating. Squeeze these muscles tight for 5 seconds, then relax for 5 seconds. Gradually work up to squeezing for 10 seconds. Do 3 sets of 15 repetitions a day, or as directed. This will help strengthen your pelvic muscles and improve bladder control.  Train your bladder:  Go to the bathroom at set times, such as every 2 hours, even if you do not feel the urge to go. You can also try to hold your urine when you feel the urge to go. For example, hold your urine for 5 minutes when you feel the urge to go. As that becomes easier, hold your urine for 10 minutes.   Self-care:   Keep a UI record.  Write down how often you leak urine and how much you leak. Make a note of what you were doing when you leaked urine.  Drink liquids as directed. You may need to limit the amount of liquid you drink to help control your urine leakage. Do not drink any liquid right before you go to bed. Limit or do not have drinks that contain caffeine or alcohol.   Prevent constipation.  Eat a variety of high-fiber foods. Good examples are high-fiber cereals, beans, vegetables, and whole-grain breads. Walking is the best way to trigger your intestines to have a bowel movement.  Exercise regularly and maintain a healthy weight.  Weight loss and exercise will decrease pressure on your bladder and help you control your leakage.   Use a  catheter as directed  to help empty your bladder. A catheter is a tiny, plastic tube that is put into your bladder to drain your urine.   Go to behavior therapy as directed.  Behavior therapy may be used to help you learn to control your urge to urinate.    Weight Management   Why it is important to manage your weight:  Being overweight increases your risk of health conditions such as heart disease, high blood pressure, type 2 diabetes, and certain types of cancer. It can also increase your risk for osteoarthritis, sleep apnea, and other respiratory problems. Aim for a slow, steady weight loss. Even a small amount of weight loss can lower your risk of health problems.  How to lose weight safely:  A safe and healthy way to lose weight is to eat fewer calories and get regular exercise. You can lose up about 1 pound a week by decreasing the number of calories you eat by 500 calories each day.   Healthy meal plan for weight management:  A healthy meal plan includes a variety of foods, contains fewer calories, and helps you stay healthy. A healthy meal plan includes the following:  Eat whole-grain foods more often.  A healthy meal plan should contain fiber. Fiber is the part of grains, fruits, and vegetables that is not broken down by your body. Whole-grain foods are healthy and provide extra fiber in your diet. Some examples of whole-grain foods are whole-wheat breads and pastas, oatmeal, brown rice, and bulgur.  Eat a variety of vegetables every day.  Include dark, leafy greens such as spinach, kale, lily greens, and mustard greens. Eat yellow and orange vegetables such as carrots, sweet potatoes, and winter squash.   Eat a variety of fruits every day.  Choose fresh or canned fruit (canned in its own juice or light syrup) instead of juice. Fruit juice has very little or no fiber.  Eat low-fat dairy foods.  Drink fat-free (skim) milk or 1% milk. Eat fat-free yogurt and low-fat cottage cheese. Try low-fat cheeses such  as mozzarella and other reduced-fat cheeses.  Choose meat and other protein foods that are low in fat.  Choose beans or other legumes such as split peas or lentils. Choose fish, skinless poultry (chicken or turkey), or lean cuts of red meat (beef or pork). Before you cook meat or poultry, cut off any visible fat.   Use less fat and oil.  Try baking foods instead of frying them. Add less fat, such as margarine, sour cream, regular salad dressing and mayonnaise to foods. Eat fewer high-fat foods. Some examples of high-fat foods include french fries, doughnuts, ice cream, and cakes.  Eat fewer sweets.  Limit foods and drinks that are high in sugar. This includes candy, cookies, regular soda, and sweetened drinks.  Exercise:  Exercise at least 30 minutes per day on most days of the week. Some examples of exercise include walking, biking, dancing, and swimming. You can also fit in more physical activity by taking the stairs instead of the elevator or parking farther away from stores. Ask your healthcare provider about the best exercise plan for you.      © Copyright [employer] 2018 Information is for End User's use only and may not be sold, redistributed or otherwise used for commercial purposes. All illustrations and images included in CareNotes® are the copyrighted property of [employer] or [employer]

## 2024-03-11 NOTE — PROGRESS NOTES
Assessment and Plan:     Problem List Items Addressed This Visit          Digestive    Gastroesophageal reflux disease without esophagitis     Follows with GI, plan for EGD and colonoscopy in September 2024         Relevant Orders    TSH, 3rd generation with Free T4 reflex       Endocrine    Impaired fasting glucose     Will check hemoglobin A1c         Relevant Orders    Basic metabolic panel    Hemoglobin A1C       Other    Constipation     Chronic  Current regimen includes senna S daily, Milk of Magnesia, and Metamucil  Was on Linzess in the past as well, but that was discontinued  Staff reports that patient has a daily bowel movements at this time  We will continue to monitor, may consider restarting Linzess if needed         Moderate intellectual disability    Medicare annual wellness visit, subsequent - Primary     - Screening for cardiovascular disease: yearly BMP and Lipid panel UTD: ordered  - Screening for colorectal cancer: Follows with GI, plan for EGD and colonoscopy in September 2024  - Screening for cervical cancer: Follows with OB/GYN at [facility], advised to schedule appointment for annual gynecological exam  - Screening for breast cancer: Has order for the mammogram to be done in June 2024, the previous one from March 2023 was normal  -screening for depression positive with mild depression, follows with psychiatry  -screening for hepatitis C and HIV up-to-date  -Immunizations reviewed and up-to-date  - Counseled the patient on healthy lifestyle habits             Obesity, Class III, BMI 40-49.9 (morbid obesity) ([facility])     BMI Counseling: Body mass index is 39.64 kg/m². The BMI is above normal. Nutrition recommendations include reducing intake of saturated fat and trans fat and reducing intake of cholesterol. Exercise recommendations include strength training exercises.           Relevant Orders    CBC and differential    Lipid Panel With Direct LDL    Hemoglobin A1C    Abnormal TSH     Elevated  TSH noted on the blood work in June at 4.786, but repeated in October,11 ,2023 was within normal limits at 2.991  will recheck with routine blood work in 3 months         Relevant Orders    TSH, 3rd generation with Free T4 reflex        Preventive health issues were discussed with patient, and age appropriate screening tests were ordered as noted in patient's After Visit Summary.  Personalized health advice and appropriate referrals for health education or preventive services given if needed, as noted in patient's After Visit Summary.     History of Present Illness:     Patient presents for a Medicare Wellness Visit    Patient presented today for the Medicare wellness visit.  No new complaints.       Patient Care Team:  Emerita Gagnon MD as PCP - General (Family Medicine)  MD Johnnie Knowles MD (Psychiatry)  Butch Foley DPM (Podiatry)     Review of Systems:     Review of Systems   Constitutional: Negative.    HENT: Negative.     Respiratory: Negative.     Cardiovascular: Negative.    Gastrointestinal: Negative.    Genitourinary: Negative.    Musculoskeletal:  Positive for gait problem ([place] base, uses walker). Negative for arthralgias, back pain, joint swelling and myalgias.   Hematological:  Does not bruise/bleed easily.        Problem List:     Patient Active Problem List   Diagnosis    Leg swelling    Generalized anxiety disorder    Cerebral palsy (HCC)    Acute pain of right knee    Constipation    Severe episode of recurrent major depressive disorder, with psychotic features (HCC)    Dizziness    Functional dysphagia    Hearing impairment    Acquired renal cyst of left kidney    Low back pain    Moderate intellectual disability    Seasonal allergies    Varicose veins with pain    Medicare annual wellness visit, subsequent    Hematuria    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HCC)    Ambulatory dysfunction    Bruise    TMJ (temporomandibular joint disorder)    Gait disturbance    Bilateral thoracic back pain     Skin picking habit    Hiatal hernia    SOB (shortness of breath)    Dysuria    Left nephrolithiasis    Self-injurious behavior    Gastroesophageal reflux disease without esophagitis    Intercostal pain    Intertrigo    Nausea and vomiting    Sore throat    Pain of right calf    Elevated blood pressure reading in office without diagnosis of hypertension    Fall    Sleep disturbance    Exposure to COVID-19 virus    Encounter for follow-up    Acute non intractable tension-type headache    Dry skin    Candidiasis of skin    Heat intolerance    Wound, open    Palpitations    Left foot pain    Frequent falls    Urine frequency    Urinary incontinence    Obesity, Class III, BMI 40-49.9 (morbid obesity) ([facility])    Atherosclerosis of arteries of extremities (HCC)    Partial small bowel obstruction (HCC)    Hyperhidrosis    Grief reaction    PLMD (periodic limb movement disorder)    Excessive daytime sleepiness    History of anemia    Gluteal pain    Iron deficiency    Iron deficiency anemia secondary to inadequate dietary iron intake    Otitis media    Choking episode    Diarrhea    Anterior chest wall pain    Abnormal TSH    Facial rash    Impaired fasting glucose      Past Medical and Surgical History:     Past Medical History:   Diagnosis Date    ADD (attention deficit disorder)     Anxiety     Astigmatism     Brain lesion     Calcium deficiency     Cellulitis of foot, right 6/4/2018    Cerebral palsy (HCC)     Chronic otitis media     Constipation     Depression     Dysphagia     Esophagitis     Esotropia     GERD (gastroesophageal reflux disease)     Hiatal hernia     Hydrocephalus (HCC)     Impaired fasting glucose     Left nephrolithiasis 03/04/2019    Myopia     Oppositional defiant disorder     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[facility])     Seizures ([facility])     Sensorineural hearing loss     Sleep apnea     Status post ventriculoatrial shunt placement     Visual impairment      Past Surgical History:   Procedure Laterality Date     BREAST BIOPSY Left     X 2 (not sure of years)    CSF SHUNT      Creation of Ventriculo-Peritoneal CSF shunt ; Last Assessed:7/6/2016    EAR SURGERY      Last Assessed:7/6/2016    LEG SURGERY      due to CP     NOSE SURGERY      Last Assessed:7/6/2016    NH ESOPHAGOGASTRODUODENOSCOPY TRANSORAL DIAGNOSTIC N/A 5/9/2019    Procedure: ESOPHAGOGASTRODUODENOSCOPY (EGD) with biopsy;  Surgeon: Kaya Pedersen MD;  Location: [facility];  Service: Gastroenterology    UPPER GASTROINTESTINAL ENDOSCOPY  05/2019      Family History:     Family History   Problem Relation Age of Onset    Diabetes Mother     Colon cancer Father     No Known Problems Maternal Grandmother     No Known Problems Maternal Grandfather     No Known Problems Paternal Grandmother     No Known Problems Paternal Grandfather     Hypertension Neg Hx     Heart disease Neg Hx     Stroke Neg Hx     Thyroid disease Neg Hx       Social History:     Social History     Socioeconomic History    Marital status: Single     Spouse name: None    Number of children: None    Years of education: None    Highest education level: None   Occupational History    None   Tobacco Use    Smoking status: Never    Smokeless tobacco: Never   Vaping Use    Vaping status: Never Used   Substance and Sexual Activity    Alcohol use: Never    Drug use: Never    Sexual activity: Never     Birth control/protection: OCP   Other Topics Concern    None   Social History Narrative    Always uses seat belt    Lives in group home     Social Determinants of Health     Financial Resource Strain: Low Risk  (3/11/2024)    Overall Financial Resource Strain (CARDIA)     Difficulty of Paying Living Expenses: Not hard at all   Food Insecurity: No Food Insecurity (3/11/2024)    Hunger Vital Sign     Worried About Running Out of Food in the Last Year: Never true     Ran Out of Food in the Last Year: Never true   Transportation Needs: No Transportation Needs (3/11/2024)    PRAPARE - Transportation     Lack of  Transportation (Medical): No     Lack of Transportation (Non-Medical): No   Physical Activity: Not on file   Stress: No Stress Concern Present (3/11/2024)    Citizen of [place] of Occupational Health - Occupational Stress Questionnaire     Feeling of Stress : Not at all   Social Connections: Not on file   Intimate Partner Violence: Not At Risk (3/11/2024)    Humiliation, Afraid, Rape, and Kick questionnaire     Fear of Current or Ex-Partner: No     Emotionally Abused: No     Physically Abused: No     Sexually Abused: No   Housing Stability: Low Risk  (3/11/2024)    Housing Stability Vital Sign     Unable to Pay for Housing in the Last Year: No     Number of Places Lived in the Last Year: 1     Unstable Housing in the Last Year: No      Medications and Allergies:     Current Outpatient Medications   Medication Sig Dispense Refill    acetaminophen (TYLENOL) 500 mg tablet Take 1 tablet (500 mg total) by mouth every 6 (six) hours as needed for mild pain 30 tablet 5    albuterol (Ventolin HFA) 90 mcg/act inhaler Inhale 2 puffs every 6 (six) hours as needed for wheezing 18 g 0    aluminum-magnesium hydroxide-simethicone (GNP Antacid & Anti-Gas) 2156-6994-176 mg/30 mL suspension Take 15 mL by mouth every 4 (four) hours as needed for indigestion or heartburn 355 mL 0    ARIPiprazole (ABILIFY) 20 MG tablet Take 1 tablet (20 mg total) by mouth daily at bedtime 5 tablet 0    bacitracin topical ointment 500 units/g topical ointment Cleanse site on cheek with soap and water followed by bacitracin BID until healed then p.r.n. 15 g 0    bismuth subsalicylate (PEPTO BISMOL) 524 mg/30 mL oral suspension Take 15 mL (262 mg total) by mouth every 6 (six) hours as needed for indigestion 360 mL 5    calcium carbonate (TUMS) 500 mg chewable tablet Chew 1 tablet (500 mg total) 3 (three) times a day as needed for heartburn 30 tablet 5    Cholecalciferol (D3-1000) 25 MCG (1000 UT) tablet TAKE 2 TABLETS (2000U) BY MOUTH DAILY AT 8AM  (SUPPLEMENT) *LONGKO 62 tablet 5    dicyclomine (BENTYL) 20 mg tablet Take 1 tablet (20 mg total) by mouth every 6 (six) hours as needed (dysphagia) 120 tablet 0    Dyclonine-Glycerin (Cepacol Sore Throat Spray) 0.1-33 % LIQD Apply 1 spray to the mouth or throat 3 (three) times a day as needed (sorethroat) 118 mL 5    escitalopram (LEXAPRO) 20 mg tablet Take 1 tablet (20 mg total) by mouth daily 90 tablet 2    fluticasone (FLONASE) 50 mcg/act nasal spray 1 spray into each nostril daily as needed for rhinitis (nasal congestion) At 8:00 AM 18.2 mL 5    gabapentin (Neurontin) 300 mg capsule Take 1 capsule (300 mg total) by mouth daily at bedtime 30 capsule 2    GNP Sore Throat Spray 1.4 % mucosal liquid APPLY 1 SPRAY TO MOUTH OR THROAT EVERY 2 HRS AS NEEDED FOR SORE THROAT 177 mL 0    GNP Stomach Relief Max St 525 MG/15ML SUSP       hydrOXYzine HCL (ATARAX) 10 mg tablet Take 1 tablet (10 mg total) by mouth 3 (three) times a day 30 tablet 3    ibuprofen (MOTRIN) 400 mg tablet TAKE 1 TABLET BY MOUTH EVERY 8 HOURS AS NEEDED FOR MILD/MOD PAIN OR HEADACHES 30 tablet 0    ibuprofen (MOTRIN) 400 mg tablet Take 1.5 tablets (600 mg total) by mouth every 6 (six) hours as needed for mild pain 20 tablet 0    Incontinence Supply Disposable (Wings Choice Plus Adult Briefs) MISC Use as directed (R39.81) 60 each 0    ketoconazole (NIZORAL) 2 % cream Apply topically daily 30 g 5    lamoTRIgine (LaMICtal) 100 mg tablet Take 100 mg by mouth 2 (two) times a day Take 50mg BID      lamoTRIgine (LaMICtal) 25 mg tablet Take 25 mg by mouth 2 (two) times a day      LORazepam (ATIVAN) 0.5 mg tablet Take 0.25 mg by mouth every 6 (six) hours as needed for anxiety Take 0.25mg TID      magnesium hydroxide (GNP Milk of Magnesia) 400 mg/5 mL oral suspension 2 TBSP (30ML) BY MOUTH DAILY AS NEEDED IF NO BM IN 3 DAYS (CONSTIPATION) 355 mL 0    metoprolol tartrate (LOPRESSOR) 25 mg tablet TAKE 1 TABLET BY MOUTH 2X DAILY @ 8AM-8PM(BLOOD PRESSURE)* JAYLENE Joya  tablet 5    Multiple Vitamins-Minerals (CertaVite/Antioxidants) TABS Take 1 tablet by mouth in the morning 31 tablet 5    norgestimate-ethinyl estradiol (ORTHO-CYCLEN) 0.25-35 MG-MCG per tablet Take 1 tablet by mouth daily 28 tablet 11    nystatin-triamcinolone (MYCOLOG-II) cream Apply topically 2 (two) times a day 60 g 0    pantoprazole (PROTONIX) 40 mg tablet Take 1 tablet (40 mg total) by mouth 2 (two) times a day before breakfast and lunch 120 tablet 2    psyllium (METAMUCIL SMOOTH TEXTURE) 28 % packet       psyllium (METAMUCIL) 58.6 % packet Take 1 packet by mouth daily As needed for constipation 30 packet 5    RA Sunscreen SPF50 LOTN Apply 15 minutes before sun exposure and every 2 hours 237 mL 0    senna-docusate sodium (Senna S) 8.6-50 mg per tablet TAKE 1 TABLET BY MOUTH DAILY AT 8AM (CONSIPATION)* LAPKO 31 tablet 5    trospium chloride (SANCTURA) 20 mg tablet Take 1 tablet (20 mg total) by mouth 2 (two) times a day 180 tablet 3    amitriptyline (ELAVIL) 10 mg tablet Take 1 tablet (10 mg total) by mouth daily at bedtime 90 tablet 0    carbamide peroxide (DEBROX) 6.5 % otic solution Administer 5 drops into the left ear 2 (two) times a day Use 1 week prior to ENT appointment.  Also can use 4 gtts twice weekly (Tuesday and Friday for example) to each ear for prevention.  Keep hearing aid out x 10 minutes after ear drops administered. (Patient not taking: Reported on 8/2/2023) 15 mL 1    ciprofloxacin-dexamethasone (CIPRODEX) otic suspension Administer 4 drops into the left ear 2 (two) times a day (Patient not taking: Reported on 8/2/2023) 7.5 mL 0    Diclofenac Sodium (VOLTAREN) 1 % Apply 2 g topically 4 (four) times a day (Patient not taking: Reported on 8/2/2023) 50 g 0    Elastic Bandages & Supports (Neoprene Knee Brace) MISC Apply right knee brace in morning; remove at night (Patient not taking: Reported on 8/3/2023) 1 each 0    Konsyl Daily Fiber 28.3 %  (Patient not taking: Reported on 8/2/2023)       lidocaine (LMX) 4 % cream Apply topically as needed for mild pain (Patient not taking: Reported on 8/2/2023) 30 g 0    mineral oil-hydrophilic petrolatum (AQUAPHOR) ointment Apply topically as needed for dry skin (Patient not taking: Reported on 8/3/2023) 420 g 5    Mouthwashes (Listerine Antiseptic) LIQD Swish and spit 5 mL 2 (two) times a day 1000 mL 5    polyethylene glycol (Golytely) 4000 mL solution Take 4,000 mL by mouth once for 1 dose Take 4000 mL by mouth once for 1 dose. Use as directed 4000 mL 0    sodium chloride (OCEAN) 0.65 % nasal spray 1 spray into each nostril as needed (three times daily as needed for nasal congestion) (Patient not taking: Reported on 8/2/2023) 60 mL 5    talc Apply topically 2 (two) times a day for 1 dose Use under the breast and abdomen folds 420 g 2    vedolizumab (ENTYVIO) SOLR  (Patient not taking: Reported on 1/11/2024)      zinc oxide (DESITIN) 13 % cream Apply 1 application topically as needed (for perianal irritation) (Patient not taking: Reported on 8/2/2023) 454 g 5     No current facility-administered medications for this visit.     No Known Allergies   Immunizations:     Immunization History   Administered Date(s) Administered    COVID-19 MODERNA VACC 0.5 ML IM 03/02/2021    COVID-19 PFIZER VACCINE 0.3 ML IM 02/02/2021, 03/02/2021, 10/16/2021    COVID-19 Pfizer Vac BIVALENT Teddy-sucrose 12 Yr+ IM 02/15/2023    INFLUENZA 12/01/2000, 12/12/2001, 10/09/2002, 10/14/2003, 10/20/2004, 10/11/2006, 10/17/2007, 10/14/2008, 09/29/2009, 09/14/2010, 09/09/2011, 09/05/2012, 09/03/2013, 10/22/2014, 10/14/2015, 10/04/2016, 10/26/2021, 10/19/2022    Influenza, injectable, quadrivalent, preservative free 0.5 mL 10/04/2018, 10/21/2019, 09/15/2020    Influenza, seasonal, injectable, preservative free 10/04/2016    Td (adult), adsorbed 09/09/2003    Tdap 09/14/2010, 09/14/2020    Tuberculin Skin Test-PPD Intradermal 04/05/2000, 10/16/2000, 10/09/2002, 10/20/2004, 11/03/2008, 04/10/2018,  02/19/2020, 02/15/2022, 02/14/2024      Health Maintenance:         Topic Date Due    Breast Cancer Screening: Mammogram  03/31/2024    Colorectal Cancer Screening  08/17/2024    Cervical Cancer Screening  06/22/2025    HIV Screening  Completed    Hepatitis C Screening  Completed         Topic Date Due    Influenza Vaccine (1) 09/01/2023    COVID-19 Vaccine (6 - 2023-24 season) 09/01/2023      Medicare Screening Tests and Risk Assessments:     Elizabeth is here for her Subsequent Wellness visit.     Health Risk Assessment:   Patient rates overall health as very good. Patient feels that their physical health rating is same. Patient is satisfied with their life. Eyesight was rated as same. Hearing was rated as same. Patient feels that their emotional and mental health rating is same. Patients states they are never, rarely angry. Patient states they are sometimes unusually tired/fatigued. Pain experienced in the last 7 days has been some. Patient's pain rating has been 2/10. Patient states that she has experienced no weight loss or gain in last 6 months.     Depression Screening:   PHQ-2 Score: 0  PHQ-9 Score: 8      Fall Risk Screening:   In the past year, patient has experienced: no history of falling in past year      Urinary Incontinence Screening:   Patient has leaked urine accidently in the last six months.     Home Safety:  Patient has trouble with stairs inside or outside of their home. Patient has working smoke alarms and has working carbon monoxide detector. Home safety hazards include: none.     Nutrition:   Current diet is Regular.     Medications:   Patient is not currently taking any over-the-counter supplements. Patient is not able to manage medications.     Activities of Daily Living (ADLs)/Instrumental Activities of Daily Living (IADLs):   Walk and transfer into and out of bed and chair?: No  Dress and groom yourself?: No    Bathe or shower yourself?: No    Feed yourself? No  Do your  "laundry/housekeeping?: No  Manage your money, pay your bills and track your expenses?: No  Make your own meals?: No    Do your own shopping?: No    Previous Hospitalizations:   Any hospitalizations or ED visits within the last 12 months?: No      Advance Care Planning:   Living will: No    Durable POA for healthcare: No    Advanced directive: No      Cognitive Screening:   Provider or family/friend/caregiver concerned regarding cognition?: No    PREVENTIVE SCREENINGS      Cardiovascular Screening:    General: Screening Current      Diabetes Screening:     General: Screening Current      Colorectal Cancer Screening:     General: Screening Current      Breast Cancer Screening:     General: Screening Current      Cervical Cancer Screening:    General: Screening Current      Osteoporosis Screening:    General: Screening Not Indicated      Abdominal Aortic Aneurysm (AAA) Screening:        General: Screening Not Indicated      Lung Cancer Screening:     General: Screening Not Indicated      Hepatitis C Screening:    General: Screening Current    Screening, Brief Intervention, and Referral to Treatment (SBIRT)    Screening      AUDIT-C Screenin) How often did you have a drink containing alcohol in the past year? never  2) How many drinks did you have on a typical day when you were drinking in the past year? 0  3) How often did you have 6 or more drinks on one occasion in the past year? never    AUDIT-C Score: 0  Interpretation: Score 0-2 (female): Negative screen for alcohol misuse    Other Counseling Topics:   Skin self-exam and regular weightbearing exercise.     No results found.     Physical Exam:     /69 (BP Location: Left arm, Patient Position: Sitting, Cuff Size: Large)   Pulse 80   Temp 98.6 °F (37 °C) (Temporal)   Resp 18   Ht 5' 1\" (1.549 m)   Wt 95.2 kg (209 lb 12.8 oz)   LMP 2024 (Exact Date) Comment: no pregnancy test needed, anesthesia aware, no chance of pregnancy  SpO2 97%   BMI " 39.64 kg/m²     Physical Exam  Vitals reviewed.   Constitutional:       General: She is not in acute distress.     Appearance: Normal appearance. She is obese. She is not ill-appearing, toxic-appearing or diaphoretic.   HENT:      Head: Normocephalic and atraumatic.   Cardiovascular:      Rate and Rhythm: Normal rate and regular rhythm.      Heart sounds: Normal heart sounds.   Pulmonary:      Effort: Pulmonary effort is normal. No respiratory distress.      Breath sounds: Normal breath sounds.   Chest:      Chest wall: No tenderness.   Abdominal:      General: Abdomen is flat. Bowel sounds are normal. There is no distension.      Palpations: Abdomen is soft. There is no mass.      Tenderness: There is no abdominal tenderness.   Musculoskeletal:         General: No swelling, tenderness, deformity or signs of injury.      Cervical back: Normal range of motion.      Right lower leg: No edema.      Left lower leg: No edema.   Skin:     General: Skin is warm.      Capillary Refill: Capillary refill takes less than 2 seconds.      Findings: No rash.   Neurological:      General: No focal deficit present.      Mental Status: She is alert and oriented to person, place, and time. Mental status is at baseline.          Emerita Gagnon MD

## 2024-03-11 NOTE — ASSESSMENT & PLAN NOTE
Chronic  Current regimen includes senna S daily, Milk of Magnesia, and Metamucil  Was on Linzess in the past as well, but that was discontinued  Staff reports that patient has a daily bowel movements at this time  We will continue to monitor, may consider restarting Linzess if needed

## 2024-03-12 DIAGNOSIS — J00 COMMON COLD VIRUS: ICD-10-CM

## 2024-03-12 DIAGNOSIS — S00.81XD EXCORIATION OF FACE, SUBSEQUENT ENCOUNTER: ICD-10-CM

## 2024-03-12 DIAGNOSIS — K21.00 GASTROESOPHAGEAL REFLUX DISEASE WITH ESOPHAGITIS: ICD-10-CM

## 2024-03-12 DIAGNOSIS — K08.9 TOOTH DISORDER: ICD-10-CM

## 2024-03-12 DIAGNOSIS — T14.8XXA WOUND, OPEN: ICD-10-CM

## 2024-03-12 DIAGNOSIS — R50.9 FEVER, UNSPECIFIED FEVER CAUSE: ICD-10-CM

## 2024-03-12 DIAGNOSIS — J30.89 NON-SEASONAL ALLERGIC RHINITIS, UNSPECIFIED TRIGGER: ICD-10-CM

## 2024-03-12 RX ORDER — MINERAL OIL/HYDROPHIL PETROLAT
OINTMENT (GRAM) TOPICAL AS NEEDED
Qty: 420 G | Refills: 5 | Status: SHIPPED | OUTPATIENT
Start: 2024-03-12

## 2024-03-12 RX ORDER — ACETAMINOPHEN 500 MG
500 TABLET ORAL EVERY 6 HOURS PRN
Qty: 30 TABLET | Refills: 5 | Status: SHIPPED | OUTPATIENT
Start: 2024-03-12

## 2024-03-12 RX ORDER — EUCALYP/ME-SALICYLATE/MEN/THYM
5 MOUTHWASH MUCOUS MEMBRANE 2 TIMES DAILY
Qty: 1000 ML | Refills: 0 | Status: SHIPPED | OUTPATIENT
Start: 2024-03-12 | End: 2024-04-11

## 2024-03-12 RX ORDER — FLUTICASONE PROPIONATE 50 MCG
1 SPRAY, SUSPENSION (ML) NASAL DAILY PRN
Qty: 18.2 ML | Refills: 5 | Status: SHIPPED | OUTPATIENT
Start: 2024-03-12

## 2024-03-12 NOTE — TELEPHONE ENCOUNTER
Patient needs refill on the following medications as they are about to : please review and send scripts to [facility]

## 2024-03-14 ENCOUNTER — OFFICE VISIT (OUTPATIENT)
Dept: GASTROENTEROLOGY | Facility: CLINIC | Age: 44
End: 2024-03-14
Payer: MEDICARE

## 2024-03-14 ENCOUNTER — TELEPHONE (OUTPATIENT)
Dept: GASTROENTEROLOGY | Facility: MEDICAL CENTER | Age: 44
End: 2024-03-14

## 2024-03-14 VITALS
SYSTOLIC BLOOD PRESSURE: 105 MMHG | WEIGHT: 205 LBS | OXYGEN SATURATION: 98 % | TEMPERATURE: 98.8 F | BODY MASS INDEX: 38.71 KG/M2 | HEART RATE: 81 BPM | DIASTOLIC BLOOD PRESSURE: 70 MMHG | HEIGHT: 61 IN

## 2024-03-14 DIAGNOSIS — R32 URINARY INCONTINENCE, UNSPECIFIED TYPE: ICD-10-CM

## 2024-03-14 DIAGNOSIS — F45.8 FUNCTIONAL DYSPHAGIA: Primary | ICD-10-CM

## 2024-03-14 DIAGNOSIS — K31.A0 GASTRIC INTESTINAL METAPLASIA: ICD-10-CM

## 2024-03-14 DIAGNOSIS — K21.00 GASTROESOPHAGEAL REFLUX DISEASE WITH ESOPHAGITIS WITHOUT HEMORRHAGE: ICD-10-CM

## 2024-03-14 DIAGNOSIS — Z12.11 COLON CANCER SCREENING: ICD-10-CM

## 2024-03-14 PROCEDURE — 99214 OFFICE O/P EST MOD 30 MIN: CPT | Performed by: PHYSICIAN ASSISTANT

## 2024-03-14 RX ORDER — TROSPIUM CHLORIDE 20 MG/1
20 TABLET, FILM COATED ORAL 2 TIMES DAILY
Qty: 180 TABLET | Refills: 3 | Status: SHIPPED | OUTPATIENT
Start: 2024-03-14

## 2024-03-14 NOTE — TELEPHONE ENCOUNTER
"Pharmacy called and lvm on line,    \"Hi, this is Beatriz from [facility] pharmacy. Patient is Elizabeth Doss. Birthday is [birth year]. She needs a refill on her Sanctura 20 milligrams 1 tablet twice daily. You can call that in here to the pharmacy [phone] or I'm sorry, [phone] or you can fax it [phone]. Thank you. \"    Order pended,  "

## 2024-03-14 NOTE — PROGRESS NOTES
[facility] - Outpatient Follow-up Note  Elizabeth Doss 44 y.o. female MRN: [id number]  Encounter: [id number]      Assessment and Plan    1. Gastroesophageal reflux disease with esophagitis  She had history of chronic GERD and functional dysphagia. She has no complaints but recent EGD done secondary to history of gastric intestinal metaplasia revealed 6 cm hiatal hernia and grade D esophagitis.  She was started on twice daily PPI and repeat EGD was requested 3 months later.    -Continue BID PPI  -Proceed with EGD as schedule in June 2. Chronic idiopathic constipation  She has a history of chronic constipation which is controlled on Amitiza daily. She is no longer taking Metamucil.  -Continue Amitiza      3. Colon cancer screening  Recent colonoscopy revealed a few small diverticula of moderate severity in the hepatic flexure and small internal hemorrhoids but was otherwise normal.  Unfortunately the bowel prep was inadequate and repeat was recommended 6 months later with a 2-day bowel prep  -Repeat colonoscopy as scheduled in September 4. Gastric intestinal metaplasia  She was found to have gastric intestinal metaplasia on 2019 EGD biopsies, most recent stomach biopsies from EGD 2/19/24 were normal     Follow up after procedures     ______________________________________________________________________    History of Present Illness  Elizabeth Doss is a 44 y.o. female with intellectual disability and cerebral palsy here for follow up evaluation of a recent EGD for gastric intestinal metaplasia and colonoscopy for routine screening. EGD revealed 6 cm hiatal hernia and grade D esophagitis.  She was started on twice daily PPI and repeat EGD was requested 3 months later.  Colonoscopy revealed few small diverticula of moderate severity in hepatic flexure and small internal hemorrhoids, unfortunately bowel prep was inadequate and larger lesions could have been missed so repeat was  recommended 6 months later with a 2-day bowel prep.  The patient presents with no complaints but her aide states that when she is eating she does appear to be in pain sometimes when she swallows.      Review of Systems   Constitutional:  Negative for activity change, appetite change, chills, fatigue, fever and unexpected weight change.       Past Medical History  Past Medical History:   Diagnosis Date    ADD (attention deficit disorder)     Anxiety     Astigmatism     Brain lesion     Calcium deficiency     Cellulitis of foot, right 6/4/2018    Cerebral palsy (HCC)     Chronic otitis media     Constipation     Depression     Dysphagia     Esophagitis     Esotropia     GERD (gastroesophageal reflux disease)     Hiatal hernia     Hydrocephalus (HCC)     Impaired fasting glucose     Left nephrolithiasis 03/04/2019    Myopia     Oppositional defiant disorder     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HCC)     Seizures (HCC)     Sensorineural hearing loss     Sleep apnea     Status post ventriculoatrial shunt placement     Visual impairment        Past Social history  Past Surgical History:   Procedure Laterality Date    BREAST BIOPSY Left     X 2 (not sure of years)    CSF SHUNT      Creation of Ventriculo-Peritoneal CSF shunt ; Last Assessed:7/6/2016    EAR SURGERY      Last Assessed:7/6/2016    LEG SURGERY      due to CP     NOSE SURGERY      Last Assessed:7/6/2016    MN ESOPHAGOGASTRODUODENOSCOPY TRANSORAL DIAGNOSTIC N/A 5/9/2019    Procedure: ESOPHAGOGASTRODUODENOSCOPY (EGD) with biopsy;  Surgeon: Kaya Pedersen MD;  Location: [facility];  Service: Gastroenterology    UPPER GASTROINTESTINAL ENDOSCOPY  05/2019     Social History     Socioeconomic History    Marital status: Single     Spouse name: Not on file    Number of children: Not on file    Years of education: Not on file    Highest education level: Not on file   Occupational History    Not on file   Tobacco Use    Smoking status: Never    Smokeless tobacco: Never   Vaping Use     Vaping status: Never Used   Substance and Sexual Activity    Alcohol use: Never    Drug use: Never    Sexual activity: Never     Birth control/protection: OCP   Other Topics Concern    Not on file   Social History Narrative    Always uses seat belt    Lives in group home     Social Determinants of Health     Financial Resource Strain: Low Risk  (3/11/2024)    Overall Financial Resource Strain (CARDIA)     Difficulty of Paying Living Expenses: Not hard at all   Food Insecurity: No Food Insecurity (3/11/2024)    Hunger Vital Sign     Worried About Running Out of Food in the Last Year: Never true     Ran Out of Food in the Last Year: Never true   Transportation Needs: No Transportation Needs (3/11/2024)    PRAPARE - Transportation     Lack of Transportation (Medical): No     Lack of Transportation (Non-Medical): No   Physical Activity: Not on file   Stress: No Stress Concern Present (3/11/2024)    [employer] [place] [employer] - Occupational Stress Questionnaire     Feeling of Stress : Not at all   Social Connections: Not on file   Intimate Partner Violence: Not At Risk (3/11/2024)    Humiliation, Afraid, Rape, and Kick questionnaire     Fear of Current or Ex-Partner: No     Emotionally Abused: No     Physically Abused: No     Sexually Abused: No   Housing Stability: Low Risk  (3/11/2024)    Housing Stability Vital Sign     Unable to Pay for Housing in the Last Year: No     Number of Places Lived in the Last Year: 1     Unstable Housing in the Last Year: No     Social History     Substance and Sexual Activity   Alcohol Use Never     Social History     Substance and Sexual Activity   Drug Use Never     Social History     Tobacco Use   Smoking Status Never   Smokeless Tobacco Never       Past Family History  Family History   Problem Relation Age of Onset    Diabetes Mother     Colon cancer Father     No Known Problems Maternal Grandmother     No Known Problems Maternal Grandfather     No Known Problems  Paternal Grandmother     No Known Problems Paternal Grandfather     Hypertension Neg Hx     Heart disease Neg Hx     Stroke Neg Hx     Thyroid disease Neg Hx        Current Medications  Current Outpatient Medications   Medication Sig Dispense Refill    acetaminophen (TYLENOL) 500 mg tablet Take 1 tablet (500 mg total) by mouth every 6 (six) hours as needed for mild pain 30 tablet 5    albuterol (Ventolin HFA) 90 mcg/act inhaler Inhale 2 puffs every 6 (six) hours as needed for wheezing 18 g 0    aluminum-magnesium hydroxide-simethicone (GNP Antacid & Anti-Gas) 2412-4404-207 mg/30 mL suspension Take 15 mL by mouth every 4 (four) hours as needed for indigestion or heartburn 355 mL 0    ARIPiprazole (ABILIFY) 20 MG tablet Take 1 tablet (20 mg total) by mouth daily at bedtime 5 tablet 0    bacitracin topical ointment 500 units/g topical ointment Cleanse site on cheek with soap and water followed by bacitracin BID until healed then p.r.n. 15 g 0    bismuth subsalicylate (PEPTO BISMOL) 524 mg/30 mL oral suspension Take 15 mL (262 mg total) by mouth every 6 (six) hours as needed for indigestion 360 mL 5    calcium carbonate (TUMS) 500 mg chewable tablet Chew 1 tablet (500 mg total) 3 (three) times a day as needed for heartburn 30 tablet 5    carbamide peroxide (DEBROX) 6.5 % otic solution Administer 5 drops into the left ear 2 (two) times a day Use 1 week prior to ENT appointment.  Also can use 4 gtts twice weekly (Tuesday and Friday for example) to each ear for prevention.  Keep hearing aid out x 10 minutes after ear drops administered. 15 mL 1    Cholecalciferol (D3-1000) 25 MCG (1000 UT) tablet TAKE 2 TABLETS (2000U) BY MOUTH DAILY AT 8AM (SUPPLEMENT) *LAPKO 62 tablet 5    Diclofenac Sodium (VOLTAREN) 1 % Apply 2 g topically 4 (four) times a day 50 g 0    dicyclomine (BENTYL) 20 mg tablet Take 1 tablet (20 mg total) by mouth every 6 (six) hours as needed (dysphagia) 120 tablet 0    Dyclonine-Glycerin (Cepacol Sore Throat  Spray) 0.1-33 % LIQD Apply 1 spray to the mouth or throat 3 (three) times a day as needed (sorethroat) 118 mL 5    escitalopram (LEXAPRO) 20 mg tablet Take 1 tablet (20 mg total) by mouth daily 90 tablet 2    fluticasone (FLONASE) 50 mcg/act nasal spray 1 spray into each nostril daily as needed for rhinitis (nasal congestion) At 8:00 AM 18.2 mL 5    gabapentin (Neurontin) 300 mg capsule Take 1 capsule (300 mg total) by mouth daily at bedtime 30 capsule 2    GNP Sore Throat Spray 1.4 % mucosal liquid APPLY 1 SPRAY TO MOUTH OR THROAT EVERY 2 HRS AS NEEDED FOR SORE THROAT 177 mL 0    GNP Stomach Relief Max St 525 MG/15ML SUSP       hydrOXYzine HCL (ATARAX) 10 mg tablet Take 1 tablet (10 mg total) by mouth 3 (three) times a day 30 tablet 3    ibuprofen (MOTRIN) 400 mg tablet TAKE 1 TABLET BY MOUTH EVERY 8 HOURS AS NEEDED FOR MILD/MOD PAIN OR HEADACHES 30 tablet 0    ibuprofen (MOTRIN) 400 mg tablet Take 1.5 tablets (600 mg total) by mouth every 6 (six) hours as needed for mild pain 20 tablet 0    Incontinence Supply Disposable (W[place] Choice Plus Adult Briefs) MISC Use as directed (R39.81) 60 each 0    ketoconazole (NIZORAL) 2 % cream Apply topically daily 30 g 5    Konsyl Daily Fiber 28.3 %       lamoTRIgine (LaMICtal) 100 mg tablet Take 100 mg by mouth 2 (two) times a day Take 50mg BID      lamoTRIgine (LaMICtal) 25 mg tablet Take 25 mg by mouth 2 (two) times a day      lidocaine (LMX) 4 % cream Apply topically as needed for mild pain 30 g 0    LORazepam (ATIVAN) 0.5 mg tablet Take 0.25 mg by mouth every 6 (six) hours as needed for anxiety Take 0.25mg TID      magnesium hydroxide (GNP Milk of Magnesia) 400 mg/5 mL oral suspension 2 TBSP (30ML) BY MOUTH DAILY AS NEEDED IF NO BM IN 3 DAYS (CONSTIPATION) 355 mL 0    metoprolol tartrate (LOPRESSOR) 25 mg tablet TAKE 1 TABLET BY MOUTH 2X DAILY @ 8AM-8PM(BLOOD PRESSURE)* LAPKO 60 tablet 5    mineral oil-hydrophilic petrolatum (AQUAPHOR) ointment Apply topically as needed for  dry skin 420 g 5    Mouthwashes (Listerine Antiseptic) LIQD Swish and spit 5 mL 2 (two) times a day 1000 mL 0    Multiple Vitamins-Minerals (CertaVite/Antioxidants) TABS Take 1 tablet by mouth in the morning 31 tablet 5    norgestimate-ethinyl estradiol (ORTHO-CYCLEN) 0.25-35 MG-MCG per tablet Take 1 tablet by mouth daily 28 tablet 11    nystatin-triamcinolone (MYCOLOG-II) cream Apply topically 2 (two) times a day 60 g 0    pantoprazole (PROTONIX) 40 mg tablet Take 1 tablet (40 mg total) by mouth 2 (two) times a day before breakfast and lunch 120 tablet 2    psyllium (METAMUCIL SMOOTH TEXTURE) 28 % packet       psyllium (METAMUCIL) 58.6 % packet Take 1 packet by mouth daily As needed for constipation 30 packet 5    RA Sunscreen SPF50 LOTN Apply 15 minutes before sun exposure and every 2 hours 237 mL 0    senna-docusate sodium (Senna S) 8.6-50 mg per tablet TAKE 1 TABLET BY MOUTH DAILY AT 8AM (CONSIPATION)* LAPKO 31 tablet 5    sodium chloride (OCEAN) 0.65 % nasal spray 1 spray into each nostril as needed (three times daily as needed for nasal congestion) 60 mL 5    trospium chloride (SANCTURA) 20 mg tablet Take 1 tablet (20 mg total) by mouth 2 (two) times a day 180 tablet 3    amitriptyline (ELAVIL) 10 mg tablet Take 1 tablet (10 mg total) by mouth daily at bedtime 90 tablet 0    ciprofloxacin-dexamethasone (CIPRODEX) otic suspension Administer 4 drops into the left ear 2 (two) times a day (Patient not taking: Reported on 8/2/2023) 7.5 mL 0    Elastic Bandages & Supports (Neoprene Knee Brace) MISC Apply right knee brace in morning; remove at night (Patient not taking: Reported on 8/3/2023) 1 each 0    polyethylene glycol (Golytely) 4000 mL solution Take 4,000 mL by mouth once for 1 dose Take 4000 mL by mouth once for 1 dose. Use as directed 4000 mL 0    talc Apply topically 2 (two) times a day for 1 dose Use under the breast and abdomen folds 420 g 2    vedolizumab (ENTYVIO) SOLR  (Patient not taking: Reported on  "1/11/2024)      zinc oxide (DESITIN) 13 % cream Apply 1 application topically as needed (for perianal irritation) (Patient not taking: Reported on 8/2/2023) 454 g 5     No current facility-administered medications for this visit.       Allergies  No Known Allergies      The following portions of the patient's history were reviewed and updated as appropriate: allergies, current medications, past medical history, past social history, past surgical history and problem list.      Vitals  Vitals:    03/14/24 1033   BP: 105/70   BP Location: Left arm   Patient Position: Sitting   Cuff Size: Large   Pulse: 81   Temp: 98.8 °F (37.1 °C)   TempSrc: Tympanic   SpO2: 98%   Weight: 93 kg (205 lb)   Height: 5' 1\" (1.549 m)         Physical Exam  Constitutional   General appearance: Patient is seated and in no acute distress, well appearing and well nourished.   Head and Face   Head and face: Normal.    Eyes   Conjunctiva and lids: No erythema, swelling or discharge.  Anicteric.  Ears, Nose, Mouth, and Throat   Hearing: Normal.    Neck: Supple, trachea midline.  Pulmonary   Respiratory effort: No increased work of breathing or signs of respiratory distress.    Cardiovascular   Examination of extremities for edema and/or varicosities: Normal.    Musculoskeletal   Gait and station: Normal   Skin   Skin and subcutaneous tissue: Warm, dry, and intact. No visible jaundice, lesions or rashes.  Psychiatric   Judgment and insight: Normal  Recent and remote memory:  Normal  Mood and affect: Normal       Results  No visits with results within 1 Day(s) from this visit.   Latest known visit with results is:   Hospital Outpatient Visit on 02/19/2024   Component Date Value    Case Report 02/19/2024                      Value:Surgical Pathology Report                         Case: [id number]                                  Authorizing Provider:  Diana M Jaiyeola, MD       Collected:           02/19/2024 0922              Ordering Location:     " [facility] Received:            02/19/2024 1649                                     Heart Endoscopy                                                              Pathologist:           Nic Rucker MD                                                          Specimens:   A) - Stomach, bx, incisura and antrum, hx of intestinal metaplasia                                  B) - Stomach, bx, gastric body, hx of intestinal metaplasia                                Final Diagnosis 02/19/2024                      Value:This result contains rich text formatting which cannot be displayed here.    Additional Information 02/19/2024                      Value:This result contains rich text formatting which cannot be displayed here.    Gross Description 02/19/2024                      Value:This result contains rich text formatting which cannot be displayed here.       Radiology Results  EGD    Result Date: 2/19/2024  Narrative: Table formatting from the original result was not included. [facility] [address] [facility] [address] [phone] DATE OF SERVICE: 2/19/24 PHYSICIAN(S): Attending: Diana M Jaiyeola, MD Fellow: Obey Melo MD INDICATION: Gastroesophageal reflux disease without esophagitis, Gastric intestinal metaplasia POST-OP DIAGNOSIS: See the impression below. PREPROCEDURE: Informed consent was obtained for the procedure, including sedation.  Risks of perforation, hemorrhage, adverse drug reaction and aspiration were discussed. The patient was placed in the left lateral decubitus position. Patient was explained about the risks and benefits of the procedure. Risks including but not limited to bleeding, infection, and perforation were explained in detail. Also explained about less than 100% sensitivity with the exam and other alternatives. PROCEDURE: EGD DETAILS OF PROCEDURE: Patient was taken to the procedure room where a time out was performed to  confirm correct patient and correct procedure. The patient underwent monitored anesthesia care, which was administered by an anesthesia professional. The patient's blood pressure, heart rate, level of consciousness, respirations, oxygen, ETCO2 and ECG were monitored throughout the procedure. The scope was introduced through the mouth and advanced to the second part of the duodenum. Retroflexion was performed in the cardia, fundus and incisura. Prior to the procedure, the patient's H. Pylori status was unknown. The patient experienced no blood loss. The procedure was not difficult. The patient tolerated the procedure well. There were no apparent adverse events. ANESTHESIA INFORMATION: ASA: III Anesthesia Type: IV Sedation with Anesthesia MEDICATIONS: No administrations occurring from 0913 to 0932 on 02/19/24 FINDINGS: 6 cm sliding hiatal hernia (type I hiatal hernia) - GE junction 34 cm from the incisors, diaphragmatic impression 40 cm from the incisors:  Hill classification: Grade II Severe grade D esophagitis with mucosal breaks measuring 5 mm or more, continuous between folds, covering 75% or more of the circumference, showing edematous, erythematous and hemorrhagic mucosa in the lower third of the esophagus; bleeding occurred before intervention The cardia, fundus of the stomach, body of the stomach, incisura and antrum appeared normal. Performed random biopsy using biopsy forceps. The duodenal bulb, 1st part of the duodenum and 2nd part of the duodenum appeared normal. SPECIMENS: ID Type Source Tests Collected by Time Destination 1 : bx, incisura and antrum, hx of intestinal metaplasia Tissue Stomach TISSUE EXAM Diana M Jaiyeola, MD 2/19/2024  9:22 AM  2 : bx, gastric body, hx of intestinal metaplasia Tissue Stomach TISSUE EXAM Diana M Jaiyeola, MD 2/19/2024  9:26 AM      Impression: 6 cm type I hiatal hernia Grade D esophagitis in the lower third of the esophagus The cardia, fundus of the stomach, body of the  stomach, incisura and antrum appeared normal. Performed random biopsy. The duodenal bulb, 1st part of the duodenum and 2nd part of the duodenum appeared normal. RECOMMENDATION:  Await pathology results  Schedule repeat EGD  Increase protonix to 40 bid Repeat EGD in 3 months to document healing of esophagitis and screen for Mcdonald's esophagus Proceed with colonoscopy    Diana M Jaiyeola, MD     Colonoscopy    Result Date: 2/19/2024  Narrative: Table formatting from the original result was not included. [facility] [address] [facility] [id number] [phone] DATE OF SERVICE: 2/19/24 PHYSICIAN(S): Attending: Diana M Jaiyeola, MD Fellow: Obey Melo MD INDICATION: Colon cancer screening POST-OP DIAGNOSIS: See the impression below. HISTORY: Prior colonoscopy: No prior colonoscopy. BOWEL PREPARATION: Miralax/Dulcolax PREPROCEDURE: Informed consent was obtained for the procedure, including sedation. Risks including but not limited to bleeding, infection, perforation, adverse drug reaction and aspiration were explained in detail. Also explained about less than 100% sensitivity with the exam and other alternatives. The patient was placed in the left lateral decubitus position. Procedure: Colonoscopy DETAILS OF PROCEDURE: Patient was taken to the procedure room where a time out was performed to confirm correct patient and correct procedure. The patient underwent monitored anesthesia care, which was administered by an anesthesia professional. The patient's blood pressure, heart rate, level of consciousness, oxygen, respirations and ECG were monitored throughout the procedure. A digital rectal exam was performed. The scope was introduced through the anus and advanced to the cecum. Retroflexion was performed in the rectum. The quality of bowel preparation was evaluated using the Matteson Bowel Preparation Scale with scores of: right colon = 1, transverse colon = 2, left colon = 2.  The total BBPS score was 5. Bowel prep was not adequate. The patient experienced no blood loss. The procedure was not difficult. The patient tolerated the procedure well. There were no apparent adverse events. ANESTHESIA INFORMATION: ASA: III Anesthesia Type: IV Sedation with Anesthesia MEDICATIONS: No administrations occurring from 0913 to 0959 on 02/19/24 FINDINGS: Few small diverticula of mild severity in the hepatic flexure Internal small hemorrhoids Otherwise normal colonic mucosa EVENTS: Procedure Events Event Event Time ENDO CECUM REACHED 2/19/2024  9:44 AM ENDO SCOPE OUT TIME 2/19/2024  9:58 AM SPECIMENS: ID Type Source Tests Collected by Time Destination 1 : bx, incisura and antrum, hx of intestinal metaplasia Tissue Stomach TISSUE EXAM Diana M Jaiyeola, MD 2/19/2024  9:22 AM  2 : bx, gastric body, hx of intestinal metaplasia Tissue Stomach TISSUE EXAM Diana M Jaiyeola, MD 2/19/2024  9:26 AM  EQUIPMENT: Colonoscope -[id number]     Impression: Diverticulosis of mild severity in the hepatic flexure Small hemorrhoids Otherwise normal colonic mucosa RECOMMENDATION:  Repeat colonoscopy in 6 months  Inadequate bowel preparation   Inadequate prep.  Large lesions were unlikely to be missed but smaller lesions may have been obscured Next colonoscopy should be with 2-day bowel preparation    Diana M Jaiyeola, MD       Orders  No orders of the defined types were placed in this encounter.

## 2024-03-15 ENCOUNTER — HOSPITAL ENCOUNTER (EMERGENCY)
Facility: HOSPITAL | Age: 44
Discharge: HOME | End: 2024-03-15
Attending: EMERGENCY MEDICINE
Payer: MEDICARE

## 2024-03-15 VITALS
HEART RATE: 89 BPM | DIASTOLIC BLOOD PRESSURE: 60 MMHG | OXYGEN SATURATION: 100 % | SYSTOLIC BLOOD PRESSURE: 137 MMHG | BODY MASS INDEX: 41.37 KG/M2 | RESPIRATION RATE: 18 BRPM | HEIGHT: 61 IN | WEIGHT: 219.14 LBS | TEMPERATURE: 98.5 F

## 2024-03-15 DIAGNOSIS — R45.4 OUTBURSTS OF ANGER: Primary | ICD-10-CM

## 2024-03-15 PROCEDURE — 99285 EMERGENCY DEPT VISIT HI MDM: CPT | Performed by: EMERGENCY MEDICINE

## 2024-03-15 PROCEDURE — 99282 EMERGENCY DEPT VISIT SF MDM: CPT

## 2024-03-15 NOTE — DISCHARGE INSTRUCTIONS
DISCHARGE INSTRUCTIONS:    FOLLOW UP WITH YOUR PRIMARY CARE PROVIDER OR THE RECOMMENDED HEALTH CLINIC. MAKE AN APPOINTMENT TO BE SEEN.     FOLLOW UP WITH THERAPIST.    IF SYMPTOMS WORSEN OR NEW SYMPTOMS ARISE, RETURN TO THE ER TO BE SEEN.
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
## 2024-03-25 ENCOUNTER — OFFICE VISIT (OUTPATIENT)
Dept: FAMILY MEDICINE CLINIC | Facility: CLINIC | Age: 44
End: 2024-03-25

## 2024-03-25 VITALS
HEART RATE: 96 BPM | RESPIRATION RATE: 20 BRPM | BODY MASS INDEX: 39.07 KG/M2 | SYSTOLIC BLOOD PRESSURE: 115 MMHG | DIASTOLIC BLOOD PRESSURE: 78 MMHG | WEIGHT: 206.8 LBS | TEMPERATURE: 98 F

## 2024-03-25 DIAGNOSIS — Z72.89 SELF-INJURIOUS BEHAVIOR: Primary | ICD-10-CM

## 2024-03-25 PROCEDURE — G2211 COMPLEX E/M VISIT ADD ON: HCPCS | Performed by: FAMILY MEDICINE

## 2024-03-25 PROCEDURE — 99213 OFFICE O/P EST LOW 20 MIN: CPT | Performed by: FAMILY MEDICINE

## 2024-03-25 NOTE — PROGRESS NOTES
Assessment/Plan:    Self-injurious behavior  Patient had an episode of anger outburst after argument with the manager, resulted into self injures behavior (hitting herself in the chest)  Seen in ED, no change medication made, patient calm down on her own  -Patient reports that her symptoms are resolved right now  -Continue regular follow-up with psychiatrist       Diagnoses and all orders for this visit:    Self-injurious behavior          Subjective:      Patient ID: Elizabeth Doss is a 44 y.o. female.    Patient presented as a ED follow-up on 3/15/24 after outburst of anger due to not being satisfied with her manager at that time.  Symptoms are resolved, back to her baseline, has regular appointment with psychiatrist.        The following portions of the patient's history were reviewed and updated as appropriate: allergies, current medications, past family history, past medical history, past social history, past surgical history, and problem list.    Review of Systems   Constitutional: Negative.    Respiratory: Negative.     Cardiovascular: Negative.    Gastrointestinal: Negative.    Genitourinary: Negative.    Musculoskeletal:  Positive for gait problem (at base, uses walker). Negative for arthralgias, back pain, joint swelling and myalgias.   Hematological:  Does not bruise/bleed easily.   Psychiatric/Behavioral:  Positive for self-injury (improved). The patient is nervous/anxious.          Objective:      /78   Pulse 96   Temp 98 °F (36.7 °C)   Resp 20   Wt 93.8 kg (206 lb 12.8 oz)   BMI 39.07 kg/m²          Physical Exam  HENT:      Head: Normocephalic and atraumatic.   Eyes:      Pupils: Pupils are equal, round, and reactive to light.   Cardiovascular:      Rate and Rhythm: Normal rate.   Pulmonary:      Effort: Pulmonary effort is normal. No respiratory distress.   Musculoskeletal:         General: No swelling, tenderness or signs of injury.      Cervical back: Normal range of motion. No  rigidity.   Neurological:      Mental Status: She is alert. Mental status is at baseline.
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
## 2024-03-29 ENCOUNTER — HOSPITAL ENCOUNTER (EMERGENCY)
Facility: HOSPITAL | Age: 44
Discharge: HOME | End: 2024-03-29
Attending: EMERGENCY MEDICINE
Payer: MEDICARE

## 2024-03-29 VITALS
OXYGEN SATURATION: 99 % | BODY MASS INDEX: 40.37 KG/M2 | TEMPERATURE: 97.7 F | RESPIRATION RATE: 18 BRPM | HEIGHT: 61 IN | SYSTOLIC BLOOD PRESSURE: 122 MMHG | WEIGHT: 213.85 LBS | HEART RATE: 95 BPM | DIASTOLIC BLOOD PRESSURE: 60 MMHG

## 2024-03-29 DIAGNOSIS — R07.9 CHEST PAIN: Primary | ICD-10-CM

## 2024-03-29 LAB
ALBUMIN SERPL BCP-MCNC: 3.9 G/DL (ref 3.5–5)
ALP SERPL-CCNC: 134 U/L (ref 34–104)
ALT SERPL W P-5'-P-CCNC: 13 U/L (ref 7–52)
ANION GAP SERPL CALCULATED.3IONS-SCNC: 8 MMOL/L (ref 4–13)
AST SERPL W P-5'-P-CCNC: 11 U/L (ref 13–39)
BASOPHILS # BLD AUTO: 0.04 THOUSANDS/ÂΜL (ref 0–0.1)
BASOPHILS NFR BLD AUTO: 0 % (ref 0–1)
BILIRUB SERPL-MCNC: 0.25 MG/DL (ref 0.2–1)
BUN SERPL-MCNC: 9 MG/DL (ref 5–25)
CALCIUM SERPL-MCNC: 7.4 MG/DL (ref 8.4–10.2)
CARDIAC TROPONIN I PNL SERPL HS: 2 NG/L
CHLORIDE SERPL-SCNC: 107 MMOL/L (ref 96–108)
CO2 SERPL-SCNC: 25 MMOL/L (ref 21–32)
CREAT SERPL-MCNC: 0.83 MG/DL (ref 0.6–1.3)
EOSINOPHIL # BLD AUTO: 0.14 THOUSAND/ÂΜL (ref 0–0.61)
EOSINOPHIL NFR BLD AUTO: 2 % (ref 0–6)
ERYTHROCYTE [DISTWIDTH] IN BLOOD BY AUTOMATED COUNT: 13.4 % (ref 11.6–15.1)
GFR SERPL CREATININE-BSD FRML MDRD: 85 ML/MIN/1.73SQ M
GLUCOSE SERPL-MCNC: 77 MG/DL (ref 65–140)
HCT VFR BLD AUTO: 39.9 % (ref 34.8–46.1)
HGB BLD-MCNC: 13.1 G/DL (ref 11.5–15.4)
IMM GRANULOCYTES # BLD AUTO: 0.03 THOUSAND/UL (ref 0–0.2)
IMM GRANULOCYTES NFR BLD AUTO: 0 % (ref 0–2)
LYMPHOCYTES # BLD AUTO: 2.92 THOUSANDS/ÂΜL (ref 0.6–4.47)
LYMPHOCYTES NFR BLD AUTO: 32 % (ref 14–44)
MCH RBC QN AUTO: 30.2 PG (ref 26.8–34.3)
MCHC RBC AUTO-ENTMCNC: 32.8 G/DL (ref 31.4–37.4)
MCV RBC AUTO: 92 FL (ref 82–98)
MONOCYTES # BLD AUTO: 0.7 THOUSAND/ÂΜL (ref 0.17–1.22)
MONOCYTES NFR BLD AUTO: 8 % (ref 4–12)
NEUTROPHILS # BLD AUTO: 5.36 THOUSANDS/ÂΜL (ref 1.85–7.62)
NEUTS SEG NFR BLD AUTO: 58 % (ref 43–75)
NRBC BLD AUTO-RTO: 0 /100 WBCS
PLATELET # BLD AUTO: 245 THOUSANDS/UL (ref 149–390)
PMV BLD AUTO: 9.2 FL (ref 8.9–12.7)
POTASSIUM SERPL-SCNC: 3.7 MMOL/L (ref 3.5–5.3)
PROT SERPL-MCNC: 6.9 G/DL (ref 6.4–8.4)
RBC # BLD AUTO: 4.34 MILLION/UL (ref 3.81–5.12)
SODIUM SERPL-SCNC: 140 MMOL/L (ref 135–147)
WBC # BLD AUTO: 9.19 THOUSAND/UL (ref 4.31–10.16)

## 2024-03-29 PROCEDURE — 93005 ELECTROCARDIOGRAM TRACING: CPT

## 2024-03-29 PROCEDURE — 85025 COMPLETE CBC W/AUTO DIFF WBC: CPT

## 2024-03-29 PROCEDURE — 36415 COLL VENOUS BLD VENIPUNCTURE: CPT

## 2024-03-29 PROCEDURE — 80053 COMPREHEN METABOLIC PANEL: CPT

## 2024-03-29 PROCEDURE — 99284 EMERGENCY DEPT VISIT MOD MDM: CPT

## 2024-03-29 PROCEDURE — 84484 ASSAY OF TROPONIN QUANT: CPT

## 2024-03-29 PROCEDURE — 99285 EMERGENCY DEPT VISIT HI MDM: CPT

## 2024-03-29 RX ORDER — MAGNESIUM HYDROXIDE/ALUMINUM HYDROXICE/SIMETHICONE 120; 1200; 1200 MG/30ML; MG/30ML; MG/30ML
30 SUSPENSION ORAL ONCE
Status: COMPLETED | OUTPATIENT
Start: 2024-03-29 | End: 2024-03-29

## 2024-03-29 RX ADMIN — ALUMINUM HYDROXIDE, MAGNESIUM HYDROXIDE, AND DIMETHICONE 30 ML: 200; 20; 200 SUSPENSION ORAL at 20:11

## 2024-03-29 NOTE — ED PROVIDER NOTES
History  Chief Complaint   Patient presents with    Chest Pain     Pt came in via EMS from group home [facility]. Per EMS pt got into an argument with staff after they were not able to take her to [facility]. Pt reports stabbing pain to chest denies other complaints at this time.      44-year-old female with history of GERD, hiatal hernia, cerebral palsy presents to ED for evaluation of chest pain.  Patient states that around 4 PM she began experiencing chest pain.  Patient began experiencing the chest pain after being she was told that she could not go to [facility] tonight.  Also states that she ate a piece of ham prior to the chest pain.  Has history of GERD.  Denies any radiation of the chest pain.  Denies shortness of breath, fever, chills, nausea, vomiting, abdominal pain, diaphoresis, dysuria, increased urinary frequency, seizure, speech difficulty, facial droop.  No other concerns or symptoms tonight.         Prior to Admission Medications   Prescriptions Last Dose Informant Patient Reported? Taking?   ARIPiprazole (ABILIFY) 20 MG tablet  Outside Facility (Specify), Care Giver No No   Sig: Take 1 tablet (20 mg total) by mouth daily at bedtime   Cholecalciferol (D3-1000) 25 MCG (1000 UT) tablet  Outside Facility (Specify), Care Giver No No   Sig: TAKE 2 TABLETS (2000U) BY MOUTH DAILY AT 8AM (SUPPLEMENT) *LONGKO   Diclofenac Sodium (VOLTAREN) 1 %  Outside Facility (Specify), Care Giver No No   Sig: Apply 2 g topically 4 (four) times a day   Dyclonine-Glycerin (Cepacol Sore Throat Spray) 0.1-33 % LIQD  Outside Facility (Specify), Care Giver No No   Sig: Apply 1 spray to the mouth or throat 3 (three) times a day as needed (sorethroat)   Elastic Bandages & Supports (Neoprene Knee Brace) [facility]  [place] Facility (Specify), Care Giver No No   Sig: Apply right knee brace in morning; remove at night   Patient not taking: Reported on 8/3/2023   GNP Sore Throat Spray 1.4 % mucosal liquid  Outside Facility (Specify),  Care Giver No No   Sig: APPLY 1 SPRAY TO MOUTH OR THROAT EVERY 2 HRS AS NEEDED FOR SORE THROAT   GNP Stomach Relief Max St 525 MG/15ML SUSP  Outside Facility (Specify), Care Giver Yes No   Incontinence Supply Disposable (Wings Choice Plus Adult Briefs) MISC  Outside Facility (Specify), Care Giver No No   Sig: Use as directed (R39.81)   Konsyl Daily Fiber 28.3 %  Outside Facility (Specify), Care Giver Yes No   LORazepam (ATIVAN) 0.5 mg tablet  Outside Facility (Specify), Care Giver Yes No   Sig: Take 0.25 mg by mouth every 6 (six) hours as needed for anxiety Take 0.25mg TID   Mouthwashes (Listerine Antiseptic) LIQD  Care Giver, Outside Facility (Specify) No No   Sig: Swish and spit 5 mL 2 (two) times a day   Multiple Vitamins-Minerals (CertaVite/Antioxidants) TABS  Outside Facility (Specify), Care Giver No No   Sig: Take 1 tablet by mouth in the morning   RA Sunscreen SPF50 LOTN  Outside Facility (Specify), Care Giver No No   Sig: Apply 15 minutes before sun exposure and every 2 hours   acetaminophen (TYLENOL) 500 mg tablet  Care Giver, Outside Facility (Specify) No No   Sig: Take 1 tablet (500 mg total) by mouth every 6 (six) hours as needed for mild pain   albuterol (Ventolin HFA) 90 mcg/act inhaler  Outside Facility (Specify), Care Giver No No   Sig: Inhale 2 puffs every 6 (six) hours as needed for wheezing   aluminum-magnesium hydroxide-simethicone (GNP Antacid & Anti-Gas) 0055-1509-469 mg/30 mL suspension  Outside Facility (Specify), Care Giver No No   Sig: Take 15 mL by mouth every 4 (four) hours as needed for indigestion or heartburn   amitriptyline (ELAVIL) 10 mg tablet  Outside Facility (Specify) No No   Sig: Take 1 tablet (10 mg total) by mouth daily at bedtime   bacitracin topical ointment 500 units/g topical ointment  Outside Facility (Specify), Care Giver No No   Sig: Cleanse site on cheek with soap and water followed by bacitracin BID until healed then p.r.n.   bismuth subsalicylate (PEPTO BISMOL) 524  mg/30 mL oral suspension  Care Giver, Outside Facility (Specify) No No   Sig: Take 15 mL (262 mg total) by mouth every 6 (six) hours as needed for indigestion   calcium carbonate (TUMS) 500 mg chewable tablet  Outside Facility (Specify), Care Giver No No   Sig: Chew 1 tablet (500 mg total) 3 (three) times a day as needed for heartburn   carbamide peroxide (DEBROX) 6.5 % otic solution  Outside Facility (Specify), Care Giver No No   Sig: Administer 5 drops into the left ear 2 (two) times a day Use 1 week prior to ENT appointment.  Also can use 4 gtts twice weekly (Tuesday and Friday for example) to each ear for prevention.  Keep hearing aid out x 10 minutes after ear drops administered.   ciprofloxacin-dexamethasone (CIPRODEX) otic suspension  Outside Facility (Specify), Care Giver No No   Sig: Administer 4 drops into the left ear 2 (two) times a day   Patient not taking: Reported on 2023   dicyclomine (BENTYL) 20 mg tablet  Outside Facility (Specify), Care Giver No No   Sig: Take 1 tablet (20 mg total) by mouth every 6 (six) hours as needed (dysphagia)   escitalopram (LEXAPRO) 20 mg tablet  Outside Facility (Specify), Care Giver No No   Sig: Take 1 tablet (20 mg total) by mouth daily   fluticasone (FLONASE) 50 mcg/act nasal spray  Care Giver, Outside Facility (Specify) No No   Si spray into each nostril daily as needed for rhinitis (nasal congestion) At 8:00 AM   gabapentin (Neurontin) 300 mg capsule  Care Giver, Outside Facility (Specify) No No   Sig: Take 1 capsule (300 mg total) by mouth daily at bedtime   hydrOXYzine HCL (ATARAX) 10 mg tablet  Outside Facility (Specify), Care Giver No No   Sig: Take 1 tablet (10 mg total) by mouth 3 (three) times a day   ibuprofen (MOTRIN) 400 mg tablet  Outside Facility (Specify), Care Giver No No   Sig: TAKE 1 TABLET BY MOUTH EVERY 8 HOURS AS NEEDED FOR MILD/MOD PAIN OR HEADACHES   ibuprofen (MOTRIN) 400 mg tablet  Outside Facility (Specify), Care Giver No No   Sig:  Take 1.5 tablets (600 mg total) by mouth every 6 (six) hours as needed for mild pain   ketoconazole (NIZORAL) 2 % cream  Outside Facility (Specify), Care Giver No No   Sig: Apply topically daily   lamoTRIgine (LaMICtal) 100 mg tablet  Outside Facility (Specify), Care Giver Yes No   Sig: Take 100 mg by mouth 2 (two) times a day Take 50mg BID   lamoTRIgine (LaMICtal) 25 mg tablet  Outside Facility (Specify), Care Giver Yes No   Sig: Take 25 mg by mouth 2 (two) times a day   lidocaine (LMX) 4 % cream  Outside Facility (Specify), Care Giver No No   Sig: Apply topically as needed for mild pain   magnesium hydroxide (GNP Milk of Magnesia) 400 mg/5 mL oral suspension  Outside Facility (Specify), Care Giver No No   Si TBSP (30ML) BY MOUTH DAILY AS NEEDED IF NO BM IN 3 DAYS (CONSTIPATION)   metoprolol tartrate (LOPRESSOR) 25 mg tablet  Outside Facility (Specify), Care Giver No No   Sig: TAKE 1 TABLET BY MOUTH 2X DAILY @ 8AM-8PM(BLOOD PRESSURE)* LAPKO   mineral oil-hydrophilic petrolatum (AQUAPHOR) ointment  Care Giver, Outside Facility (Specify) No No   Sig: Apply topically as needed for dry skin   norgestimate-ethinyl estradiol (ORTHO-CYCLEN) 0.25-35 MG-MCG per tablet  Care Giver, Outside Facility (Specify) No No   Sig: Take 1 tablet by mouth daily   nystatin-triamcinolone (MYCOLOG-II) cream  Care Giver, Outside Facility (Specify) No No   Sig: Apply topically 2 (two) times a day   pantoprazole (PROTONIX) 40 mg tablet  Care Giver, Outside Facility (Specify) No No   Sig: Take 1 tablet (40 mg total) by mouth 2 (two) times a day before breakfast and lunch   polyethylene glycol (Golytely) 4000 mL solution   No No   Sig: Take 4,000 mL by mouth once for 1 dose Take 4000 mL by mouth once for 1 dose. Use as directed   psyllium (METAMUCIL SMOOTH TEXTURE) 28 % packet  Outside Facility (Specify), Care Giver Yes No   psyllium (METAMUCIL) 58.6 % packet  Outside Facility (Specify), Care Giver No No   Sig: Take 1 packet by mouth daily  As needed for constipation   senna-docusate sodium (Senna S) 8.6-50 mg per tablet  Outside Facility (Specify), Care Giver No No   Sig: TAKE 1 TABLET BY MOUTH DAILY AT 8AM (CONSIPATION)* LAPKO   sodium chloride (OCEAN) 0.65 % nasal spray  Outside Facility (Specify), Care Giver No No   Si spray into each nostril as needed (three times daily as needed for nasal congestion)   talc   No No   Sig: Apply topically 2 (two) times a day for 1 dose Use under the breast and abdomen folds   trospium chloride (SANCTURA) 20 mg tablet   No No   Sig: Take 1 tablet (20 mg total) by mouth 2 (two) times a day   vedolizumab (ENTYVIO) SOLR  Outside Facility (Specify), Care Giver Yes No   Patient not taking: Reported on 2024   zinc oxide (DESITIN) 13 % cream  Outside Facility (Specify), Care Giver No No   Sig: Apply 1 application topically as needed (for perianal irritation)   Patient not taking: Reported on 2023      Facility-Administered Medications: None       Past Medical History:   Diagnosis Date    ADD (attention deficit disorder)     Anxiety     Astigmatism     Brain lesion     Calcium deficiency     Cellulitis of foot, right 2018    Cerebral palsy (HCC)     Chronic otitis media     Constipation     Depression     Dysphagia     Esophagitis     Esotropia     GERD (gastroesophageal reflux disease)     Hiatal hernia     Hydrocephalus (HCC)     Impaired fasting glucose     Left nephrolithiasis 2019    Myopia     Oppositional defiant disorder     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HCC)     Seizures (HCC)     Sensorineural hearing loss     Sleep apnea     Status post ventriculoatrial shunt placement     Visual impairment        Past Surgical History:   Procedure Laterality Date    BREAST BIOPSY Left     X 2 (not sure of years)    CSF SHUNT      Creation of Ventriculo-Peritoneal CSF shunt ; Last Assessed:2016    EAR SURGERY      Last Assessed:2016    LEG SURGERY      due to CP     NOSE SURGERY      Last  Assessed:7/6/2016    MT ESOPHAGOGASTRODUODENOSCOPY TRANSORAL DIAGNOSTIC N/A 5/9/2019    Procedure: ESOPHAGOGASTRODUODENOSCOPY (EGD) with biopsy;  Surgeon: Kaya Pedersen MD;  Location: [facility];  Service: Gastroenterology    UPPER GASTROINTESTINAL ENDOSCOPY  05/2019       Family History   Problem Relation Age of Onset    Diabetes Mother     Colon cancer Father     No Known Problems Maternal Grandmother     No Known Problems Maternal Grandfather     No Known Problems Paternal Grandmother     No Known Problems Paternal Grandfather     Hypertension Neg Hx     Heart disease Neg Hx     Stroke Neg Hx     Thyroid disease Neg Hx      I have reviewed and agree with the history as documented.    E-Cigarette/Vaping    E-Cigarette Use Never User      E-Cigarette/Vaping Substances    Nicotine No     THC No     CBD No     Flavoring No     Other No     Unknown No      Social History     Tobacco Use    Smoking status: Never    Smokeless tobacco: Never   Vaping Use    Vaping status: Never Used   Substance Use Topics    Alcohol use: Never    Drug use: Never       Review of Systems   Constitutional:  Negative for chills and fever.   HENT:  Negative for ear pain and sore throat.    Eyes:  Negative for pain and visual disturbance.   Respiratory:  Negative for cough, shortness of breath, wheezing and stridor.    Cardiovascular:  Positive for chest pain. Negative for palpitations.   Gastrointestinal:  Negative for abdominal pain and vomiting.   Genitourinary:  Negative for dysuria and hematuria.   Musculoskeletal:  Negative for arthralgias and back pain.   Skin:  Negative for color change and rash.   Neurological:  Negative for dizziness, tremors, seizures, syncope, facial asymmetry, speech difficulty, weakness, light-headedness, numbness and headaches.   All other systems reviewed and are negative.      Physical Exam  Physical Exam  Vitals and nursing note reviewed.   Constitutional:       General: She is not in acute distress.      Appearance: She is well-developed. She is not ill-appearing, toxic-appearing or diaphoretic.   HENT:      Head: Normocephalic and atraumatic.   Eyes:      Extraocular Movements: Extraocular movements intact.      Conjunctiva/sclera: Conjunctivae normal.      Pupils: Pupils are equal, round, and reactive to light.   Cardiovascular:      Rate and Rhythm: Normal rate and regular rhythm.      Heart sounds: Normal heart sounds. Heart sounds not distant. No murmur heard.  Pulmonary:      Effort: Pulmonary effort is normal. No tachypnea, accessory muscle usage or respiratory distress.      Breath sounds: Normal breath sounds. No stridor. No decreased breath sounds, wheezing, rhonchi or rales.   Abdominal:      Palpations: Abdomen is soft.      Tenderness: There is no abdominal tenderness.   Musculoskeletal:         General: No swelling.      Cervical back: Neck supple.   Skin:     General: Skin is warm.      Capillary Refill: Capillary refill takes less than 2 seconds.      Coloration: Skin is not cyanotic or pale.      Findings: No rash.   Neurological:      Mental Status: She is alert.   Psychiatric:         Mood and Affect: Mood normal.         Vital Signs  ED Triage Vitals [03/29/24 1924]   Temperature Pulse Respirations Blood Pressure SpO2   97.7 °F (36.5 °C) 95 18 122/60 99 %      Temp src Heart Rate Source Patient Position - Orthostatic VS BP Location FiO2 (%)   -- Monitor Lying Left arm --      Pain Score       2           Vitals:    03/29/24 1924   BP: 122/60   Pulse: 95   Patient Position - Orthostatic VS: Lying         Visual Acuity      ED Medications  Medications   aluminum-magnesium hydroxide-simethicone (MAALOX) oral suspension 30 mL (30 mL Oral Given 3/29/24 2011)       Diagnostic Studies  Results Reviewed       Procedure Component Value Units Date/Time    HS Troponin 0hr (reflex protocol) [[id number]]  (Normal) Collected: 03/29/24 2012    Lab Status: Final result Specimen: Blood from Arm, Left Updated:  03/29/24 2042     hs TnI 0hr 2 ng/L     Comprehensive metabolic panel [[id number]]  (Abnormal) Collected: 03/29/24 2012    Lab Status: Final result Specimen: Blood from Arm, Left Updated: 03/29/24 2035     Sodium 140 mmol/L      Potassium 3.7 mmol/L      Chloride 107 mmol/L      CO2 25 mmol/L      ANION GAP 8 mmol/L      BUN 9 mg/dL      Creatinine 0.83 mg/dL      Glucose 77 mg/dL      Calcium 7.4 mg/dL      AST 11 U/L      ALT 13 U/L      Alkaline Phosphatase 134 U/L      Total Protein 6.9 g/dL      Albumin 3.9 g/dL      Total Bilirubin 0.25 mg/dL      eGFR 85 ml/min/1.73sq m     Narrative:      National Kidney Disease Foundation guidelines for Chronic Kidney Disease (CKD):     Stage 1 with normal or high GFR (GFR > 90 mL/min/1.73 square meters)    Stage 2 Mild CKD (GFR = 60-89 mL/min/1.73 square meters)    Stage 3A Moderate CKD (GFR = 45-59 mL/min/1.73 square meters)    Stage 3B Moderate CKD (GFR = 30-44 mL/min/1.73 square meters)    Stage 4 Severe CKD (GFR = 15-29 mL/min/1.73 square meters)    Stage 5 End Stage CKD (GFR <15 mL/min/1.73 square meters)  Note: GFR calculation is accurate only with a steady state creatinine    CBC and differential [[id number]] Collected: 03/29/24 2012    Lab Status: Final result Specimen: Blood from Arm, Left Updated: 03/29/24 2017     WBC 9.19 Thousand/uL      RBC 4.34 Million/uL      Hemoglobin 13.1 g/dL      Hematocrit 39.9 %      MCV 92 fL      MCH 30.2 pg      MCHC 32.8 g/dL      RDW 13.4 %      MPV 9.2 fL      Platelets 245 Thousands/uL      nRBC 0 /100 WBCs      Neutrophils Relative 58 %      Immature Grans % 0 %      Lymphocytes Relative 32 %      Monocytes Relative 8 %      Eosinophils Relative 2 %      Basophils Relative 0 %      Neutrophils Absolute 5.36 Thousands/µL      Absolute Immature Grans 0.03 Thousand/uL      Absolute Lymphocytes 2.92 Thousands/µL      Absolute Monocytes 0.70 Thousand/µL      Eosinophils Absolute 0.14 Thousand/µL      Basophils Absolute 0.04  Thousands/µL                    No orders to display              Procedures  ECG 12 Lead Documentation Only    Date/Time: 3/29/2024 7:26 PM    Performed by: Luke Pillai PA-C  Authorized by: Luke Pillai PA-C    Indications / Diagnosis:  Chest pain  Patient location:  ED  Previous ECG:     Previous ECG:  Compared to current    Similarity:  No change  Interpretation:     Interpretation: normal    Rate:     ECG rate:  85    ECG rate assessment: normal    Rhythm:     Rhythm: sinus rhythm    Ectopy:     Ectopy: none    QRS:     QRS axis:  Normal    QRS intervals:  Normal  Conduction:     Conduction: normal    ST segments:     ST segments:  Normal  T waves:     T waves: normal             ED Course             HEART Risk Score      Flowsheet Row Most Recent Value   Heart Score Risk Calculator    History 0 Filed at: 03/29/2024 2055   ECG 0 Filed at: 03/29/2024 2055   Age 0 Filed at: 03/29/2024 2055   Risk Factors 1 Filed at: 03/29/2024 2055   Troponin 0 Filed at: 03/29/2024 2055   HEART Score 1 Filed at: 03/29/2024 2055                                        Medical Decision Making  Differential diagnosis includes not limited to GERD, MI, ACS     44-year-old female presents to ED for evaluation of chest pain as above.  On physical examination patient is normotensive, nontachycardic, afebrile, no respiratory distress.  No murmur.  Normal breath sounds.  Abdomen soft, nontender.  Noncyanotic.  Nontoxic-appearing.  No jaundice.  Alert and responding to questions appropriately.  Very reassuring physical examination.  Given patient's presentation, obtained workup consisting of EKG, CBC, CMP, serial troponin.  EKG without evidence of acute cardiac injury, arrhythmia.  Troponin nonelevated.  CMP without concerning values.  CBC WNL.  Provided patient with dose of Maalox.  Patient stating that chest pain improved after Maalox. Suspect GERD component of symptoms. Given reassuring examination and workup, feel  patient can be safely discharged with monitoring of symptoms.  Advised patient to follow-up with primary care provider as needed.  Patient in agreement with plan.  Patient discharged.     Prior to discharge, discharge instructions were discussed with patient at bedside. Patient was provided both verbal and written instructions. Patient is understanding of the discharge instructions and is agreeable to plan of care. Return precautions were discussed with patient bedside, patient verbalized understanding of signs and symptoms that would necessitate return to the ED. All questions were answered. Patient was comfortable with the plan of care and discharged to home.     Amount and/or Complexity of Data Reviewed  Labs: ordered.    Risk  OTC drugs.             Disposition  Final diagnoses:   Chest pain     Time reflects when diagnosis was documented in both MDM as applicable and the Disposition within this note       Time User Action Codes Description Comment    3/29/2024  8:56 PM Luke Pillai Add [R07.9] Chest pain           ED Disposition       ED Disposition   Discharge    Condition   Stable    Date/Time   Fri Mar 29, 2024 2055    Comment   Elizabeth Doss discharge to home/self care.                   Follow-up Information       Follow up With Specialties Details Why Contact Info    Emerita Gagnon MD Family Medicine   [address] [place] [address]  [phone]              Discharge Medication List as of 3/29/2024  8:56 PM        CONTINUE these medications which have NOT CHANGED    Details   acetaminophen (TYLENOL) 500 mg tablet Take 1 tablet (500 mg total) by mouth every 6 (six) hours as needed for mild pain, Starting Tue 3/12/2024, Normal      albuterol (Ventolin HFA) 90 mcg/act inhaler Inhale 2 puffs every 6 (six) hours as needed for wheezing, Starting Thu 6/15/2023, Normal      aluminum-magnesium hydroxide-simethicone (GNP Antacid & Anti-Gas) 5663-4204-601 mg/30 mL suspension Take 15 mL by mouth every  4 (four) hours as needed for indigestion or heartburn, Starting Wed 3/1/2023, Normal      amitriptyline (ELAVIL) 10 mg tablet Take 1 tablet (10 mg total) by mouth daily at bedtime, Starting Wed 3/1/2023, Until Mon 2/19/2024, Normal      ARIPiprazole (ABILIFY) 20 MG tablet Take 1 tablet (20 mg total) by mouth daily at bedtime, Starting Mon 1/1/2024, Print      bacitracin topical ointment 500 units/g topical ointment Cleanse site on cheek with soap and water followed by bacitracin BID until healed then p.r.n., Print      bismuth subsalicylate (PEPTO BISMOL) 524 mg/30 mL oral suspension Take 15 mL (262 mg total) by mouth every 6 (six) hours as needed for indigestion, Starting Tue 3/12/2024, Normal      calcium carbonate (TUMS) 500 mg chewable tablet Chew 1 tablet (500 mg total) 3 (three) times a day as needed for heartburn, Starting Mon 9/11/2023, Normal      carbamide peroxide (DEBROX) 6.5 % otic solution Administer 5 drops into the left ear 2 (two) times a day Use 1 week prior to ENT appointment.  Also can use 4 gtts twice weekly (Tuesday and Friday for example) to each ear for prevention.  Keep hearing aid out x 10 minutes after ear drops administered.,  Starting Wed 3/1/2023, Normal      Cholecalciferol (D3-1000) 25 MCG (1000 UT) tablet TAKE 2 TABLETS (2000U) BY MOUTH DAILY AT 8AM (SUPPLEMENT) *JAYLENE, Normal      ciprofloxacin-dexamethasone (CIPRODEX) otic suspension Administer 4 drops into the left ear 2 (two) times a day, Starting Mon 7/3/2023, Normal      Diclofenac Sodium (VOLTAREN) 1 % Apply 2 g topically 4 (four) times a day, Starting Wed 3/1/2023, Normal      dicyclomine (BENTYL) 20 mg tablet Take 1 tablet (20 mg total) by mouth every 6 (six) hours as needed (dysphagia), Starting Mon 7/3/2023, Normal      Dyclonine-Glycerin (Cepacol Sore Throat Spray) 0.1-33 % LIQD Apply 1 spray to the mouth or throat 3 (three) times a day as needed (sorethroat), Starting Tue 10/26/2021, Normal      Elastic Bandages &  Supports (Neoprene Knee Brace) MISC Apply right knee brace in morning; remove at night, Normal      escitalopram (LEXAPRO) 20 mg tablet Take 1 tablet (20 mg total) by mouth daily, Starting Wed 3/1/2023, Normal      fluticasone (FLONASE) 50 mcg/act nasal spray 1 spray into each nostril daily as needed for rhinitis (nasal congestion) At 8:00 AM, Starting Tue 3/12/2024, Normal      gabapentin (Neurontin) 300 mg capsule Take 1 capsule (300 mg total) by mouth daily at bedtime, Starting Tue 2/20/2024, Normal      GNP Sore Throat Spray 1.4 % mucosal liquid APPLY 1 SPRAY TO MOUTH OR THROAT EVERY 2 HRS AS NEEDED FOR SORE THROAT, Normal      GNP Stomach Relief Max St 525 MG/15ML SUSP Starting Tue 2/22/2022, Historical Med      hydrOXYzine HCL (ATARAX) 10 mg tablet Take 1 tablet (10 mg total) by mouth 3 (three) times a day, Starting Wed 3/1/2023, Normal      !! ibuprofen (MOTRIN) 400 mg tablet TAKE 1 TABLET BY MOUTH EVERY 8 HOURS AS NEEDED FOR MILD/MOD PAIN OR HEADACHES, Normal      !! ibuprofen (MOTRIN) 400 mg tablet Take 1.5 tablets (600 mg total) by mouth every 6 (six) hours as needed for mild pain, Starting u 6/15/2023, Normal      Incontinence Supply Disposable (Wings Choice Plus Adult Briefs) MISC Use as directed (R39.81), Normal      ketoconazole (NIZORAL) 2 % cream Apply topically daily, Starting Mon 7/3/2023, Normal      Konsyl Daily Fiber 28.3 % Starting Sat 10/2/2021, Historical Med      !! lamoTRIgine (LaMICtal) 100 mg tablet Take 100 mg by mouth 2 (two) times a day Take 50mg BID, Starting Wed 12/8/2021, Historical Med      !! lamoTRIgine (LaMICtal) 25 mg tablet Take 25 mg by mouth 2 (two) times a day, Starting Tue 8/11/2020, Historical Med      lidocaine (LMX) 4 % cream Apply topically as needed for mild pain, Starting Wed 3/1/2023, Normal      LORazepam (ATIVAN) 0.5 mg tablet Take 0.25 mg by mouth every 6 (six) hours as needed for anxiety Take 0.25mg TID, Historical Med      magnesium hydroxide (GNP Milk of  Magnesia) 400 mg/5 mL oral suspension 2 TBSP (30ML) BY MOUTH DAILY AS NEEDED IF NO BM IN 3 DAYS (CONSTIPATION), Normal      metoprolol tartrate (LOPRESSOR) 25 mg tablet TAKE 1 TABLET BY MOUTH 2X DAILY @ 8AM-8PM(BLOOD PRESSURE)* LAPKO, Normal      mineral oil-hydrophilic petrolatum (AQUAPHOR) ointment Apply topically as needed for dry skin, Starting Tue 3/12/2024, Normal      Mouthwashes (Listerine Antiseptic) LIQD Swish and spit 5 mL 2 (two) times a day, Starting Tue 3/12/2024, Until Thu 4/11/2024, Normal      Multiple Vitamins-Minerals (CertaVite/Antioxidants) TABS Take 1 tablet by mouth in the morning, Starting Fri 11/10/2023, Normal      norgestimate-ethinyl estradiol (ORTHO-CYCLEN) 0.25-35 MG-MCG per tablet Take 1 tablet by mouth daily, Starting Mon 2/19/2024, Normal      nystatin-triamcinolone (MYCOLOG-II) cream Apply topically 2 (two) times a day, Starting Thu 2/8/2024, Normal      pantoprazole (PROTONIX) 40 mg tablet Take 1 tablet (40 mg total) by mouth 2 (two) times a day before breakfast and lunch, Starting Mon 2/19/2024, Normal      polyethylene glycol (Golytely) 4000 mL solution Take 4,000 mL by mouth once for 1 dose Take 4000 mL by mouth once for 1 dose. Use as directed, Starting Mon 2/19/2024, Normal      psyllium (METAMUCIL SMOOTH TEXTURE) 28 % packet Starting Tue 1/3/2023, Historical Med      psyllium (METAMUCIL) 58.6 % packet Take 1 packet by mouth daily As needed for constipation, Starting Wed 6/28/2023, Normal      RA Sunscreen SPF50 LOTN Apply 15 minutes before sun exposure and every 2 hours, Normal      senna-docusate sodium (Senna S) 8.6-50 mg per tablet TAKE 1 TABLET BY MOUTH DAILY AT 8AM (CONSIPATION)* LAPKO, Normal      sodium chloride (OCEAN) 0.65 % nasal spray 1 spray into each nostril as needed (three times daily as needed for nasal congestion), Starting Wed 3/1/2023, Normal      talc Apply topically 2 (two) times a day for 1 dose Use under the breast and abdomen folds, Starting Thu  8/31/2023, Until Fri 9/1/2023, Normal      trospium chloride (SANCTURA) 20 mg tablet Take 1 tablet (20 mg total) by mouth 2 (two) times a day, Starting Thu 3/14/2024, Normal      vedolizumab (ENTYVIO) SOLR Historical Med      zinc oxide (DESITIN) 13 % cream Apply 1 application topically as needed (for perianal irritation), Starting Wed 3/1/2023, Normal       !! - Potential duplicate medications found. Please discuss with provider.          No discharge procedures on file.    PDMP Review         Value Time User    PDMP Reviewed  Yes 10/6/2021  1:11 PM Mary Williamson PA-C            ED Provider  Electronically Signed by             Luke Pillai PA-C  03/30/24 0254

## 2024-03-30 NOTE — DISCHARGE INSTRUCTIONS
Monitor symptoms and follow-up with primary care provider as needed.  Return to ED for new or worsening symptoms.

## 2024-03-31 LAB
ATRIAL RATE: 85 BPM
P AXIS: 70 DEGREES
PR INTERVAL: 140 MS
QRS AXIS: 46 DEGREES
QRSD INTERVAL: 70 MS
QT INTERVAL: 380 MS
QTC INTERVAL: 452 MS
T WAVE AXIS: 83 DEGREES
VENTRICULAR RATE: 85 BPM

## 2024-03-31 PROCEDURE — 93010 ELECTROCARDIOGRAM REPORT: CPT | Performed by: INTERNAL MEDICINE

## 2024-04-01 ENCOUNTER — HOSPITAL ENCOUNTER (OUTPATIENT)
Dept: MAMMOGRAPHY | Facility: CLINIC | Age: 44
Discharge: HOME | End: 2024-04-01
Payer: MEDICARE

## 2024-04-01 VITALS — BODY MASS INDEX: 40.22 KG/M2 | WEIGHT: 213 LBS | HEIGHT: 61 IN

## 2024-04-01 DIAGNOSIS — Z12.31 ENCOUNTER FOR SCREENING MAMMOGRAM FOR MALIGNANT NEOPLASM OF BREAST: ICD-10-CM

## 2024-04-01 PROCEDURE — 77067 SCR MAMMO BI INCL CAD: CPT

## 2024-04-01 PROCEDURE — 77063 BREAST TOMOSYNTHESIS BI: CPT

## 2024-04-02 ENCOUNTER — OFFICE VISIT (OUTPATIENT)
Dept: FAMILY MEDICINE CLINIC | Facility: CLINIC | Age: 44
End: 2024-04-02

## 2024-04-02 ENCOUNTER — TELEPHONE (OUTPATIENT)
Dept: FAMILY MEDICINE CLINIC | Facility: CLINIC | Age: 44
End: 2024-04-02

## 2024-04-02 VITALS
RESPIRATION RATE: 24 BRPM | DIASTOLIC BLOOD PRESSURE: 64 MMHG | HEART RATE: 91 BPM | BODY MASS INDEX: 40.4 KG/M2 | SYSTOLIC BLOOD PRESSURE: 103 MMHG | HEIGHT: 61 IN | WEIGHT: 214 LBS | TEMPERATURE: 98.4 F

## 2024-04-02 DIAGNOSIS — M94.0 COSTOCHONDRITIS: Primary | ICD-10-CM

## 2024-04-02 PROCEDURE — G2211 COMPLEX E/M VISIT ADD ON: HCPCS | Performed by: FAMILY MEDICINE

## 2024-04-02 PROCEDURE — 99213 OFFICE O/P EST LOW 20 MIN: CPT | Performed by: FAMILY MEDICINE

## 2024-04-02 RX ORDER — NAPROXEN 500 MG/1
500 TABLET ORAL 2 TIMES DAILY WITH MEALS
Qty: 20 TABLET | Refills: 0 | Status: SHIPPED | OUTPATIENT
Start: 2024-04-02 | End: 2024-04-12

## 2024-04-02 NOTE — PATIENT INSTRUCTIONS
Can take Ibuprofen tonight since there is difficulty picking up medication tonight.    Start naproxen 500 mg twice a day for 10 days on 4/3/2024 - sent to [facility]. Do NOT take ibuprofen and naproxen at the same time.

## 2024-04-02 NOTE — TELEPHONE ENCOUNTER
Folder (To be completed by) - Dr. Bedoya     Name of Form -  Medical examination case note    Color folder (Blue)    Form to be completed at visit    Patient was made aware of the 7-10 business day form policy.

## 2024-04-02 NOTE — ASSESSMENT & PLAN NOTE
Recent ED visit for chest pain, workup negative. Sxs improved with maalox. Complains of continued chest pain - worse at rest and with palpation, no radiation. Tenderness to palpation over sternum. Suspect costochondritis    Plan:  - rx naproxen 500 mg bid x 10 days, take with food  - f/u prn or if sxs worsen

## 2024-04-02 NOTE — PROGRESS NOTES
Name: Elizabeth Doss      : 1980      MRN: [id number]  Encounter Provider: Estella Holbrook DO  Encounter Date: 2024   Encounter department: [facility] [place]    Assessment & Plan     1. Costochondritis  Assessment & Plan:  Recent ED visit for chest pain, workup negative. Sxs improved with maalox. Complains of continued chest pain - worse at rest and with palpation, no radiation. Tenderness to palpation over sternum. Suspect costochondritis    Plan:  - rx naproxen 500 mg bid x 10 days, take with food  - f/u prn or if sxs worsen    Orders:  -     naproxen (Naprosyn) 500 mg tablet; Take 1 tablet (500 mg total) by mouth 2 (two) times a day with meals for 10 days           Subjective      HPI    45 yo F presents for ED follow up.  Went to [facility] 3/29 with complaints of chest pain -unremarkable EKG, lab work including trops. Symptoms improved with a dose of Maalox.  Today patient continues to report chest pain mostly with rest.  Denies any radiation of pain but describes it as stabbing pain. Pain is present with palpation of chest as well. No other complaints or concern. Pt is here with group home caregiver.    Review of Systems   Constitutional:  Negative for chills and fever.   Cardiovascular:  Positive for chest pain. Negative for palpitations.   Neurological:  Negative for headaches.       Current Outpatient Medications on File Prior to Visit   Medication Sig    acetaminophen (TYLENOL) 500 mg tablet Take 1 tablet (500 mg total) by mouth every 6 (six) hours as needed for mild pain    albuterol (Ventolin HFA) 90 mcg/act inhaler Inhale 2 puffs every 6 (six) hours as needed for wheezing    aluminum-magnesium hydroxide-simethicone (GNP Antacid & Anti-Gas) 4665-0470-840 mg/30 mL suspension Take 15 mL by mouth every 4 (four) hours as needed for indigestion or heartburn    amitriptyline (ELAVIL) 10 mg tablet Take 1 tablet (10 mg total) by mouth daily at bedtime     ARIPiprazole (ABILIFY) 20 MG tablet Take 1 tablet (20 mg total) by mouth daily at bedtime    bacitracin topical ointment 500 units/g topical ointment Cleanse site on cheek with soap and water followed by bacitracin BID until healed then p.r.n.    bismuth subsalicylate (PEPTO BISMOL) 524 mg/30 mL oral suspension Take 15 mL (262 mg total) by mouth every 6 (six) hours as needed for indigestion    calcium carbonate (TUMS) 500 mg chewable tablet Chew 1 tablet (500 mg total) 3 (three) times a day as needed for heartburn    carbamide peroxide (DEBROX) 6.5 % otic solution Administer 5 drops into the left ear 2 (two) times a day Use 1 week prior to ENT appointment.  Also can use 4 gtts twice weekly (Tuesday and Friday for example) to each ear for prevention.  Keep hearing aid out x 10 minutes after ear drops administered.    Cholecalciferol (D3-1000) 25 MCG (1000 UT) tablet TAKE 2 TABLETS (2000U) BY MOUTH DAILY AT 8AM (SUPPLEMENT) *JAYLENE    ciprofloxacin-dexamethasone (CIPRODEX) otic suspension Administer 4 drops into the left ear 2 (two) times a day (Patient not taking: Reported on 8/2/2023)    Diclofenac Sodium (VOLTAREN) 1 % Apply 2 g topically 4 (four) times a day    dicyclomine (BENTYL) 20 mg tablet Take 1 tablet (20 mg total) by mouth every 6 (six) hours as needed (dysphagia)    Dyclonine-Glycerin (Cepacol Sore Throat Spray) 0.1-33 % LIQD Apply 1 spray to the mouth or throat 3 (three) times a day as needed (sorethroat)    Elastic Bandages & Supports (Neoprene Knee Brace) MISC Apply right knee brace in morning; remove at night (Patient not taking: Reported on 8/3/2023)    escitalopram (LEXAPRO) 20 mg tablet Take 1 tablet (20 mg total) by mouth daily    fluticasone (FLONASE) 50 mcg/act nasal spray 1 spray into each nostril daily as needed for rhinitis (nasal congestion) At 8:00 AM    gabapentin (Neurontin) 300 mg capsule Take 1 capsule (300 mg total) by mouth daily at bedtime    GNP Sore Throat Spray 1.4 % mucosal liquid  APPLY 1 SPRAY TO MOUTH OR THROAT EVERY 2 HRS AS NEEDED FOR SORE THROAT    GNP Stomach Relief Max St 525 MG/15ML SUSP     hydrOXYzine HCL (ATARAX) 10 mg tablet Take 1 tablet (10 mg total) by mouth 3 (three) times a day    ibuprofen (MOTRIN) 400 mg tablet TAKE 1 TABLET BY MOUTH EVERY 8 HOURS AS NEEDED FOR MILD/MOD PAIN OR HEADACHES    ibuprofen (MOTRIN) 400 mg tablet Take 1.5 tablets (600 mg total) by mouth every 6 (six) hours as needed for mild pain    Incontinence Supply Disposable (Wings Choice Plus Adult Briefs) MISC Use as directed (R39.81)    ketoconazole (NIZORAL) 2 % cream Apply topically daily    Konsyl Daily Fiber 28.3 %     lamoTRIgine (LaMICtal) 100 mg tablet Take 100 mg by mouth 2 (two) times a day Take 50mg BID    lamoTRIgine (LaMICtal) 25 mg tablet Take 25 mg by mouth 2 (two) times a day    lidocaine (LMX) 4 % cream Apply topically as needed for mild pain    LORazepam (ATIVAN) 0.5 mg tablet Take 0.25 mg by mouth every 6 (six) hours as needed for anxiety Take 0.25mg TID    magnesium hydroxide (GNP Milk of Magnesia) 400 mg/5 mL oral suspension 2 TBSP (30ML) BY MOUTH DAILY AS NEEDED IF NO BM IN 3 DAYS (CONSTIPATION)    metoprolol tartrate (LOPRESSOR) 25 mg tablet TAKE 1 TABLET BY MOUTH 2X DAILY @ 8AM-8PM(BLOOD PRESSURE)* LAPKO    mineral oil-hydrophilic petrolatum (AQUAPHOR) ointment Apply topically as needed for dry skin    Mouthwashes (Listerine Antiseptic) LIQD Swish and spit 5 mL 2 (two) times a day    Multiple Vitamins-Minerals (CertaVite/Antioxidants) TABS Take 1 tablet by mouth in the morning    norgestimate-ethinyl estradiol (ORTHO-CYCLEN) 0.25-35 MG-MCG per tablet Take 1 tablet by mouth daily    nystatin-triamcinolone (MYCOLOG-II) cream Apply topically 2 (two) times a day    pantoprazole (PROTONIX) 40 mg tablet Take 1 tablet (40 mg total) by mouth 2 (two) times a day before breakfast and lunch    polyethylene glycol (Golytely) 4000 mL solution Take 4,000 mL by mouth once for 1 dose Take 4000 mL by  "mouth once for 1 dose. Use as directed    psyllium (METAMUCIL SMOOTH TEXTURE) 28 % packet     psyllium (METAMUCIL) 58.6 % packet Take 1 packet by mouth daily As needed for constipation    RA Sunscreen SPF50 LOTN Apply 15 minutes before sun exposure and every 2 hours    senna-docusate sodium (Senna S) 8.6-50 mg per tablet TAKE 1 TABLET BY MOUTH DAILY AT 8AM (CONSIPATION)* LAPKO    sodium chloride (OCEAN) 0.65 % nasal spray 1 spray into each nostril as needed (three times daily as needed for nasal congestion)    talc Apply topically 2 (two) times a day for 1 dose Use under the breast and abdomen folds    trospium chloride (SANCTURA) 20 mg tablet Take 1 tablet (20 mg total) by mouth 2 (two) times a day    vedolizumab (ENTYVIO) SOLR  (Patient not taking: Reported on 1/11/2024)    zinc oxide (DESITIN) 13 % cream Apply 1 application topically as needed (for perianal irritation) (Patient not taking: Reported on 8/2/2023)       Objective     /64   Pulse 91   Temp 98.4 °F (36.9 °C) (Temporal)   Resp (!) 24   Ht 5' 1\" (1.549 m)   Wt 97.1 kg (214 lb)   BMI 40.43 kg/m²     Physical Exam  Vitals reviewed.   HENT:      Head: Normocephalic and atraumatic.      Right Ear: External ear normal.      Left Ear: External ear normal.      Nose: Nose normal.      Mouth/Throat:      Pharynx: Oropharynx is clear.   Eyes:      Conjunctiva/sclera: Conjunctivae normal.   Cardiovascular:      Rate and Rhythm: Normal rate and regular rhythm.      Pulses: Normal pulses.   Pulmonary:      Effort: Pulmonary effort is normal.   Musculoskeletal:      Comments: Tenderness with palpation over sternum   Neurological:      Mental Status: She is alert.   Psychiatric:         Behavior: Behavior normal.       Estella Holbrook,     "

## 2024-04-03 ENCOUNTER — TELEPHONE (OUTPATIENT)
Dept: FAMILY MEDICINE CLINIC | Facility: CLINIC | Age: 44
End: 2024-04-03

## 2024-04-03 NOTE — TELEPHONE ENCOUNTER
Christina () called as The patient was prescribed naproxen yesterday unfortunately they did not receive the medication till 10 am,she stated that the medication was to be administered at 8 am initially.  Per state guidelines they do need a letter stating the medication is ok to be administered accordingly, If possible to date for today 4/3/24 when written,     Informed provider will be in tomorrow,    Phone [phone]  Fax [phone] (ATTN CHRISTINA)

## 2024-04-09 ENCOUNTER — OFFICE VISIT (OUTPATIENT)
Dept: AUDIOLOGY | Age: 44
End: 2024-04-09

## 2024-04-09 DIAGNOSIS — H90.3 SENSORY HEARING LOSS, BILATERAL: Primary | ICD-10-CM

## 2024-04-09 NOTE — PROGRESS NOTES
Hearing Aid Visit:    Name:  Elizabeth Doss  :  1980  Age:  44 y.o.  MRN:  [id number]  Date of Evaluation: 24     HISTORY:    Elizabeth Doss was seen today (2024) for a(n) in-warranty hearing aid check of her bilateral hearing aids. Today, Elizabeth requested a hearing aid cleaning.    DEVICE INFORMATION:    Patient is fit with Oticon Zircon 2 Mini [facility] hearing aid(s).   Right serial number [id number]. Left serial number [id number].   Warranty date: 25 (Loss/Damage and repair).   Dome/Earmold: Full shell      Left Device Right Device   Hearing Aid Make: Oticon  Oticon    Hearing Aid Model: Zircon 2 miniBTE-R Zircon 2 miniBTE-R   Serial Number: [id number]   Repair Warranty Date: 25   Loss/Damage Warranty Status:   Active  Active        Length/Output - -   Wax System: NA NA   Dome Size/Style: Full shell Full shell   Battery: Lithium-ion Rechargeable Lithium-ion Rechargeable      Earmold Serial Number: N/A N/A   Earmold Warranty Date:  N/A N/A    Serial Number:  [id number]    Warranty Date:  25     Accessories: N/A       ACTION/ADJUSTMENTS:    The hearing aids were cleaned and checked. Earmolds were retubed. A listening check revealed the hearing aids to be providing adequate amplification for the patients hearing loss. Patient voiced good sound quality. No adjustments were made today.    RECOMMENDATIONS:     Follow up Colin Posadas., [facility]  Clinical Audiologist  [facility]  [address] [place] [address] RD  [place] [address]

## 2024-04-10 ENCOUNTER — TELEPHONE (OUTPATIENT)
Dept: FAMILY MEDICINE CLINIC | Facility: CLINIC | Age: 44
End: 2024-04-10

## 2024-04-10 NOTE — TELEPHONE ENCOUNTER
Christina From patients facility called in regards to patients 3/11/24 apt she stated the staff had forgotten to bring in the physical form for the apt, and she later faxed it over, per chart review no forms were received nor in folders, she will fax the forms over and attempt to send through the patients portal,    Once received and filled,    Fax to [phone]  Attn: Christina

## 2024-04-23 NOTE — TELEPHONE ENCOUNTER
Patient's allergy and diagnosis list printed from patient message. Needs Dr. Gagnon's signature, fax back [phone]. Copy can be mailed to patient as well.

## 2024-04-24 NOTE — TELEPHONE ENCOUNTER
Diagnosis and allergy list was signed by Dr. Gagnon. Faxed to [phone]. Copy placed in the blue clerical folder to be scanned to the chart. Original was mailed to the patient. Thank you

## 2024-04-26 ENCOUNTER — OFFICE VISIT (OUTPATIENT)
Dept: AUDIOLOGY | Age: 44
End: 2024-04-26

## 2024-04-26 ENCOUNTER — OFFICE VISIT (OUTPATIENT)
Dept: URGENT CARE | Age: 44
End: 2024-04-26
Payer: MEDICARE

## 2024-04-26 ENCOUNTER — APPOINTMENT (OUTPATIENT)
Dept: RADIOLOGY | Age: 44
End: 2024-04-26
Payer: MEDICARE

## 2024-04-26 ENCOUNTER — OFFICE VISIT (OUTPATIENT)
Dept: AUDIOLOGY | Age: 44
End: 2024-04-26
Payer: MEDICARE

## 2024-04-26 VITALS
OXYGEN SATURATION: 100 % | TEMPERATURE: 98.4 F | DIASTOLIC BLOOD PRESSURE: 79 MMHG | SYSTOLIC BLOOD PRESSURE: 121 MMHG | RESPIRATION RATE: 18 BRPM | HEART RATE: 99 BPM

## 2024-04-26 DIAGNOSIS — T14.90XA INJURY: ICD-10-CM

## 2024-04-26 DIAGNOSIS — H90.3 SENSORY HEARING LOSS, BILATERAL: Primary | ICD-10-CM

## 2024-04-26 DIAGNOSIS — S80.02XA CONTUSION OF LEFT KNEE, INITIAL ENCOUNTER: Primary | ICD-10-CM

## 2024-04-26 DIAGNOSIS — H91.8X1 OTHER SPECIFIED HEARING LOSS OF RIGHT EAR, UNSPECIFIED HEARING STATUS ON CONTRALATERAL SIDE: Chronic | ICD-10-CM

## 2024-04-26 PROCEDURE — 92556 SPEECH AUDIOMETRY COMPLETE: CPT

## 2024-04-26 PROCEDURE — 92552 PURE TONE AUDIOMETRY AIR: CPT

## 2024-04-26 PROCEDURE — 73564 X-RAY EXAM KNEE 4 OR MORE: CPT

## 2024-04-26 PROCEDURE — 92567 TYMPANOMETRY: CPT

## 2024-04-26 PROCEDURE — 99214 OFFICE O/P EST MOD 30 MIN: CPT

## 2024-04-26 PROCEDURE — G0463 HOSPITAL OUTPT CLINIC VISIT: HCPCS

## 2024-04-26 NOTE — PROGRESS NOTES
Diagnostic Hearing Evaluation    Name:  Elizabeth Doss  :  1980  Age:  44 y.o.   MRN:  [id number]  Date of Evaluation: 24     HISTORY:     Reason for visit: Known Hearing Loss binaurally    Elizabeth Doss is being seen today at the request of Dr. Gagnon for an annual evaluation of hearing. Patient reports no changes to her hearing since her last visit.    EVALUATION:    Otoscopic Evaluation:   Right Ear: Unremarkable, canal clear   Left Ear: Unremarkable, canal clear    Tympanometry:   Right Ear: Type A; normal middle ear pressure and static compliance    Left Ear: Type B; no measurable middle ear pressure or static compliance, consistent with middle ear pathology.     Speech Audiometry:  Speech Reception (SRT)    Right Ear: 50 dB HL    Left Ear: 60 dB HL    Word Recognition Scores (WRS):  Right Ear: excellent (100 % correct)     Left Ear: excellent (100 % correct)    Stimuli: PBK    Pure Tone Audiometry:  Conventional pure tone audiometry from 250 - 8000 Hz  was obtained with fair reliability and revealed the following:     Right Ear: Moderately severe hearing loss    Left Ear: Moderately severe sloping to severe hearing loss     *see attached audiogram    RECOMMENDATIONS:  Annual hearing eval, Return to [facility]. for F/U, and Copy to Patient/Caregiver    PATIENT EDUCATION:   The results of today's results and recommendations were reviewed with the patient and her hearing thresholds were explained at length. Treatment options, including amplification and communication strategies, were discussed as appropriate. The patient voiced understanding of her test results. Questions were addressed and the patient was encouraged to contact our department should concerns arise.      Colin Hair., [facility]  Clinical Audiologist  [facility]  [address] [place] [address]  [place] [id number]

## 2024-04-26 NOTE — PATIENT INSTRUCTIONS
Xray left knee:  No acute fractures or dislocations identified by me, pending final read. If the radiologist has any positive findings, I will contact you.     Rest  Ice  Ace wrap for compression  Elevation of left knee.    Take tylenol as needed for pain.    May take home Medications as soon as you get back home today: Protonix 40mg PO and Hydroxyzine 10mg PO.
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
## 2024-05-01 PROBLEM — Z00.00 MEDICARE ANNUAL WELLNESS VISIT, SUBSEQUENT: Status: RESOLVED | Noted: 2018-02-12 | Resolved: 2024-05-01

## 2024-05-03 DIAGNOSIS — G47.61 PLMD (PERIODIC LIMB MOVEMENT DISORDER): ICD-10-CM

## 2024-05-03 DIAGNOSIS — E55.9 VITAMIN D DEFICIENCY: ICD-10-CM

## 2024-05-07 RX ORDER — GABAPENTIN 300 MG/1
CAPSULE ORAL
Qty: 30 CAPSULE | Refills: 5 | Status: SHIPPED | OUTPATIENT
Start: 2024-05-07

## 2024-05-09 ENCOUNTER — OFFICE VISIT (OUTPATIENT)
Dept: FAMILY MEDICINE CLINIC | Facility: CLINIC | Age: 44
End: 2024-05-09

## 2024-05-09 ENCOUNTER — TELEPHONE (OUTPATIENT)
Dept: FAMILY MEDICINE CLINIC | Facility: CLINIC | Age: 44
End: 2024-05-09

## 2024-05-09 VITALS
HEART RATE: 98 BPM | TEMPERATURE: 98.4 F | SYSTOLIC BLOOD PRESSURE: 119 MMHG | BODY MASS INDEX: 38.82 KG/M2 | WEIGHT: 205.6 LBS | DIASTOLIC BLOOD PRESSURE: 76 MMHG | OXYGEN SATURATION: 98 % | HEIGHT: 61 IN | RESPIRATION RATE: 18 BRPM

## 2024-05-09 DIAGNOSIS — F41.1 GENERALIZED ANXIETY DISORDER: Primary | ICD-10-CM

## 2024-05-09 DIAGNOSIS — R07.89 ANTERIOR CHEST WALL PAIN: ICD-10-CM

## 2024-05-09 PROCEDURE — 99213 OFFICE O/P EST LOW 20 MIN: CPT | Performed by: FAMILY MEDICINE

## 2024-05-09 PROCEDURE — G2211 COMPLEX E/M VISIT ADD ON: HCPCS | Performed by: FAMILY MEDICINE

## 2024-05-09 NOTE — ASSESSMENT & PLAN NOTE
-TIFFANY score is 6 today, indicating mild anxiety   -current regimen: ativan 0.5mg q6h prn anxiety, lexapro 20mg QD  -has not ried talk therapy, but is willing to try     Plan:  Referral to behavioral health given; will schedule next appointment with Dr. Kearney

## 2024-05-09 NOTE — ASSESSMENT & PLAN NOTE
-resolved, pt denies chest pain, SOB in office today  -negative ECG, troponins in ED on 3/29/24    Plan:  Continue to monitor, return to ED precautions reviewed
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
## 2024-05-14 ENCOUNTER — OFFICE VISIT (OUTPATIENT)
Dept: SLEEP CENTER | Facility: CLINIC | Age: 44
End: 2024-05-14
Payer: MEDICARE

## 2024-05-14 ENCOUNTER — HOSPITAL ENCOUNTER (OUTPATIENT)
Dept: MAMMOGRAPHY | Facility: CLINIC | Age: 44
Discharge: HOME | End: 2024-05-14
Payer: MEDICARE

## 2024-05-14 VITALS
DIASTOLIC BLOOD PRESSURE: 70 MMHG | BODY MASS INDEX: 38.71 KG/M2 | SYSTOLIC BLOOD PRESSURE: 117 MMHG | WEIGHT: 205 LBS | HEIGHT: 61 IN | HEART RATE: 93 BPM

## 2024-05-14 DIAGNOSIS — R92.8 ABNORMAL MAMMOGRAM: ICD-10-CM

## 2024-05-14 DIAGNOSIS — G47.61 PLMD (PERIODIC LIMB MOVEMENT DISORDER): ICD-10-CM

## 2024-05-14 DIAGNOSIS — E61.1 IRON DEFICIENCY: ICD-10-CM

## 2024-05-14 DIAGNOSIS — E55.9 VITAMIN D DEFICIENCY: ICD-10-CM

## 2024-05-14 DIAGNOSIS — G47.19 EXCESSIVE DAYTIME SLEEPINESS: Primary | ICD-10-CM

## 2024-05-14 PROCEDURE — 77065 DX MAMMO INCL CAD UNI: CPT

## 2024-05-14 PROCEDURE — 99214 OFFICE O/P EST MOD 30 MIN: CPT | Performed by: PHYSICIAN ASSISTANT

## 2024-05-14 PROCEDURE — 76642 ULTRASOUND BREAST LIMITED: CPT

## 2024-05-14 PROCEDURE — G0279 TOMOSYNTHESIS, MAMMO: HCPCS

## 2024-05-14 RX ORDER — FOLIC ACID/MV,IRON,MIN/LUTEIN 0.4-18-25
1 TABLET ORAL DAILY
Qty: 31 TABLET | Refills: 0 | Status: SHIPPED | OUTPATIENT
Start: 2024-05-14 | End: 2024-05-24

## 2024-05-14 NOTE — ASSESSMENT & PLAN NOTE
Patient's excessive daytime sleepiness persists.  She has been having difficulty in falling asleep recently.  She is getting in bed at 8 PM, when she is not sleepy, and then having difficulty in falling asleep.  I advised her to wait to get into bed until approximately 10 or 1030 PM when she is feeling sleepy, as she wishes to be sleeping by 11:00.  At that time, I advised her not to use any TV or electronics.  I also ordered a new sleep study today.  She completed a sleep study in 2022, which was not diagnostic for obstructive sleep apnea.  However, she slept very poorly on this test and had very little REM sleep.  It is possible the sleep study could have underestimated obstructive sleep apnea.  We will reassess with a new study.

## 2024-05-14 NOTE — PATIENT INSTRUCTIONS
I placed an order for an iron panel to make sure that your iron stores are still adequate since having your infusions completed approximately 1 year ago.  Please have this blood test to soon as possible.  If your blood levels are normal, no further treatment needed.  If your ferritin level is low again, I would recommend returning to hematology.    I placed an order today for a new sleep study since you are still experiencing excessive daytime sleepiness.  On your previous sleep study, you did not sleep well.  It is possible the study could have underestimated sleep apnea.    As you are having difficulty in falling asleep, I would recommend not getting in bed until you feel sleepy.  Once you do get into bed avoid using TV or electronics as this can stimulate your brain to stay awake.    Continue taking gabapentin 300 mg nightly.    Nursing Support:  When: Monday through Friday 7A-5PM except holidays  Where: Our direct line is [phone].    If you are having a true emergency please call 911.  In the event that the line is busy or it is after hours please leave a voice message and we will return your call.  Please speak clearly, leaving your full name, birth date, best number to reach you and the reason for your call.   Medication refills: We will need the name of the medication, the dosage, the ordering provider, whether you get a 30 or 90 day refill, and the pharmacy name and address.  Medications will be ordered by the provider only.  Nurses cannot call in prescriptions.  Please allow 7 days for medication refills.  Physician requested updates: If your provider requested that you call with an update after starting medication, please be ready to provide us the medication and dosage, what time you take your medication, the time you attempt to fall asleep, time you fall asleep, when you wake up, and what time you get out of bed.  Sleep Study Results: We will contact you with sleep study results and/or next steps  after the physician has reviewed your testing.

## 2024-05-14 NOTE — PROGRESS NOTES
Progress Note - [facility]   Elizabeth Doss 44 y.o. female   :1980, MRN: [id number]  2024          Follow Up Evaluation / Problem:  PLMD  Excessive daytime sleepiness   Obesity      Thank you for the opportunity of participating in the evaluation and care of this patient in the [facility]      HPI: Elizabeth Doss is a 44 y.o. year old female who presents for follow-up of periodic limb movement disorder and excessive daytime sleepiness. She had a diagnostic sleep study performed on 2022, which did not show sleep apnea. She demonstrated a few sleep related respiratory events (AHI - 0.6) which were predominantly in REM sleep (REM AHI - 12.6).  Therefore, minimal REM sleep may have underestimated the severity of her NADIYA.    She did have frequent limb movements (PLM index - 16.3) which is consistent with periodic limb movement in sleep. Iron panel was checked which showed a ferritin level of 24.  Iron supplementation was not started due to the patient's longstanding history of constipation.  TA Lewis recommended and ordered gabapentin 100 mg to be taken at bedtime for 1 week and then increase to 200 mg at bedtime. Dose was then increased, and she is currently taking 300 mg nightly of gabapentin. Patient had a hematology consult on 2023 and had 3 iron infusions. Her ferritin was now noted to be 191 on 2023.  She states her limb movement disorder has been well-controlled with gabapentin 300 mg nightly and iron infusions.  Her main concerns today are continued excessive daytime sleepiness and difficulty in falling asleep.        Current Outpatient Medications:     acetaminophen (TYLENOL) 500 mg tablet, Take 1 tablet (500 mg total) by mouth every 6 (six) hours as needed for mild pain, Disp: 30 tablet, Rfl: 5    albuterol (Ventolin HFA) 90 mcg/act inhaler, Inhale 2 puffs every 6 (six)  hours as needed for wheezing, Disp: 18 g, Rfl: 0    aluminum-magnesium hydroxide-simethicone (GNP Antacid & Anti-Gas) 2113-9939-835 mg/30 mL suspension, Take 15 mL by mouth every 4 (four) hours as needed for indigestion or heartburn, Disp: 355 mL, Rfl: 0    amitriptyline (ELAVIL) 10 mg tablet, Take 1 tablet (10 mg total) by mouth daily at bedtime, Disp: 90 tablet, Rfl: 0    ARIPiprazole (ABILIFY) 20 MG tablet, Take 1 tablet (20 mg total) by mouth daily at bedtime, Disp: 5 tablet, Rfl: 0    bacitracin topical ointment 500 units/g topical ointment, Cleanse site on cheek with soap and water followed by bacitracin BID until healed then p.r.n., Disp: 15 g, Rfl: 0    bismuth subsalicylate (PEPTO BISMOL) 524 mg/30 mL oral suspension, Take 15 mL (262 mg total) by mouth every 6 (six) hours as needed for indigestion, Disp: 360 mL, Rfl: 5    calcium carbonate (TUMS) 500 mg chewable tablet, Chew 1 tablet (500 mg total) 3 (three) times a day as needed for heartburn, Disp: 30 tablet, Rfl: 5    carbamide peroxide (DEBROX) 6.5 % otic solution, Administer 5 drops into the left ear 2 (two) times a day Use 1 week prior to ENT appointment.  Also can use 4 gtts twice weekly (Tuesday and Friday for example) to each ear for prevention.  Keep hearing aid out x 10 minutes after ear drops administered., Disp: 15 mL, Rfl: 1    Cholecalciferol (D3-1000) 25 MCG (1000 UT) tablet, TAKE 2 TABLETS (2000U) BY MOUTH DAILY AT 8AM (SUPPLEMENT) *LONGKO, Disp: 62 tablet, Rfl: 5    Diclofenac Sodium (VOLTAREN) 1 %, Apply 2 g topically 4 (four) times a day, Disp: 50 g, Rfl: 0    dicyclomine (BENTYL) 20 mg tablet, Take 1 tablet (20 mg total) by mouth every 6 (six) hours as needed (dysphagia), Disp: 120 tablet, Rfl: 0    Dyclonine-Glycerin (Cepacol Sore Throat Spray) 0.1-33 % LIQD, Apply 1 spray to the mouth or throat 3 (three) times a day as needed (sorethroat), Disp: 118 mL, Rfl: 5    escitalopram (LEXAPRO) 20 mg tablet, Take 1 tablet (20 mg total) by mouth  daily, Disp: 90 tablet, Rfl: 2    fluticasone (FLONASE) 50 mcg/act nasal spray, 1 spray into each nostril daily as needed for rhinitis (nasal congestion) At 8:00 AM, Disp: 18.2 mL, Rfl: 5    gabapentin (NEURONTIN) 300 mg capsule, TAKE 1 CAPSULE BY MOUTH DAILY @ 8PM (NERVE PAIN) *MANDI, Disp: 30 capsule, Rfl: 5    GNP Sore Throat Spray 1.4 % mucosal liquid, APPLY 1 SPRAY TO MOUTH OR THROAT EVERY 2 HRS AS NEEDED FOR SORE THROAT, Disp: 177 mL, Rfl: 0    hydrOXYzine HCL (ATARAX) 10 mg tablet, Take 1 tablet (10 mg total) by mouth 3 (three) times a day, Disp: 30 tablet, Rfl: 3    ibuprofen (MOTRIN) 400 mg tablet, TAKE 1 TABLET BY MOUTH EVERY 8 HOURS AS NEEDED FOR MILD/MOD PAIN OR HEADACHES, Disp: 30 tablet, Rfl: 0    ibuprofen (MOTRIN) 400 mg tablet, Take 1.5 tablets (600 mg total) by mouth every 6 (six) hours as needed for mild pain, Disp: 20 tablet, Rfl: 0    Incontinence Supply Disposable (Wings Choice Plus Adult Briefs) MISC, Use as directed (R39.81), Disp: 60 each, Rfl: 0    ketoconazole (NIZORAL) 2 % cream, Apply topically daily, Disp: 30 g, Rfl: 5    Konsyl Daily Fiber 28.3 %, , Disp: , Rfl:     lidocaine (LMX) 4 % cream, Apply topically as needed for mild pain, Disp: 30 g, Rfl: 0    LORazepam (ATIVAN) 0.5 mg tablet, Take 0.25 mg by mouth every 6 (six) hours as needed for anxiety Take 0.25mg TID, Disp: , Rfl:     magnesium hydroxide (GNP Milk of Magnesia) 400 mg/5 mL oral suspension, 2 TBSP (30ML) BY MOUTH DAILY AS NEEDED IF NO BM IN 3 DAYS (CONSTIPATION), Disp: 355 mL, Rfl: 0    metoprolol tartrate (LOPRESSOR) 25 mg tablet, TAKE 1 TABLET BY MOUTH 2X DAILY @ 8AM-8PM(BLOOD PRESSURE)* JAYLENE, Disp: 60 tablet, Rfl: 5    mineral oil-hydrophilic petrolatum (AQUAPHOR) ointment, Apply topically as needed for dry skin, Disp: 420 g, Rfl: 5    Multiple Vitamins-Minerals (CertaVite/Antioxidants) TABS, Take 1 tablet by mouth in the morning, Disp: 31 tablet, Rfl: 0    naproxen (Naprosyn) 500 mg tablet, Take 1 tablet (500 mg  total) by mouth 2 (two) times a day with meals for 10 days, Disp: 20 tablet, Rfl: 0    norgestimate-ethinyl estradiol (ORTHO-CYCLEN) 0.25-35 MG-MCG per tablet, Take 1 tablet by mouth daily, Disp: 28 tablet, Rfl: 11    nystatin-triamcinolone (MYCOLOG-II) cream, Apply topically 2 (two) times a day, Disp: 60 g, Rfl: 0    pantoprazole (PROTONIX) 40 mg tablet, Take 1 tablet (40 mg total) by mouth 2 (two) times a day before breakfast and lunch, Disp: 120 tablet, Rfl: 2    polyethylene glycol (Golytely) 4000 mL solution, Take 4,000 mL by mouth once for 1 dose Take 4000 mL by mouth once for 1 dose. Use as directed, Disp: 4000 mL, Rfl: 0    psyllium (METAMUCIL SMOOTH TEXTURE) 28 % packet, , Disp: , Rfl:     psyllium (METAMUCIL) 58.6 % packet, Take 1 packet by mouth daily As needed for constipation, Disp: 30 packet, Rfl: 5    senna-docusate sodium (Senna S) 8.6-50 mg per tablet, TAKE 1 TABLET BY MOUTH DAILY AT 8AM (CONSIPATION)* LAPKO, Disp: 31 tablet, Rfl: 5    sodium chloride (OCEAN) 0.65 % nasal spray, 1 spray into each nostril as needed (three times daily as needed for nasal congestion), Disp: 60 mL, Rfl: 5    Sunscreens (Aveeno Protect+Hydrate SPF50) LOTN, Apply topically if needed (sun exposure) Apply 15 minutes before sun exposure and every 2 hours, Disp: 85 g, Rfl: 5    talc, Apply topically 2 (two) times a day for 1 dose Use under the breast and abdomen folds, Disp: 420 g, Rfl: 2    trospium chloride (SANCTURA) 20 mg tablet, Take 1 tablet (20 mg total) by mouth 2 (two) times a day, Disp: 180 tablet, Rfl: 3    ciprofloxacin-dexamethasone (CIPRODEX) otic suspension, Administer 4 drops into the left ear 2 (two) times a day (Patient not taking: Reported on 8/2/2023), Disp: 7.5 mL, Rfl: 0    Elastic Bandages & Supports (Neoprene Knee Brace) MISC, Apply right knee brace in morning; remove at night (Patient not taking: Reported on 8/3/2023), Disp: 1 each, Rfl: 0    GNP Stomach Relief Max St 525 MG/15ML SUSP, , Disp: , Rfl:  "    lamoTRIgine (LaMICtal) 100 mg tablet, Take 100 mg by mouth 2 (two) times a day Take 50mg BID, Disp: , Rfl:     lamoTRIgine (LaMICtal) 25 mg tablet, Take 25 mg by mouth 2 (two) times a day, Disp: , Rfl:     Mouthwashes (Listerine Antiseptic) LIQD, Swish and spit 5 mL 2 (two) times a day, Disp: 1000 mL, Rfl: 0    vedolizumab (ENTYVIO) SOLR, , Disp: , Rfl:     zinc oxide (DESITIN) 13 % cream, Apply 1 application topically as needed (for perianal irritation) (Patient not taking: Reported on 8/2/2023), Disp: 454 g, Rfl: 5    How likely are you to doze off or fall asleep in the following situations, in contrast to feeling just tired?  Sitting and reading: High chance of dozing  Watching TV: High chance of dozing  Sitting, inactive in a public place (e.g. a theatre or a meeting): Slight chance of dozing  As a passenger in a car for an hour without a break: High chance of dozing  Lying down to rest in the afternoon when circumstances permit: High chance of dozing  Sitting and talking to someone: Would never doze  Sitting quietly after a lunch without alcohol: Would never doze  In a car, while stopped for a few minutes in traffic: Slight chance of dozing  Total score: 14              Vitals:    05/14/24 1300   BP: 117/70   BP Location: Left arm   Patient Position: Sitting   Cuff Size: Large   Pulse: 93   Weight: 93 kg (205 lb)   Height: 5' 1\" (1.549 m)       Body mass index is 38.73 kg/m².            EPWORTH SLEEPINESS SCORE  Total score: 14      Past History Since Last Sleep Center Visit:     She came with a caregiver from her group home but would not allow her to comes back to the appointment. She recently moved to [place], [place] with the same company but new home.     She states she gets in bed at approximately 8 PM but does not fall asleep until between 11 PM and 12 AM.  She states she has significant difficulty in falling asleep.    She is still experiencing excessive daytime sleepiness.    She takes her gabapentin " 300 mg nightly and feels this helps with her leg movements.      The review of systems and following portions of the patient's history were reviewed and updated as appropriate: allergies, current medications, past family history, past medical history, past social history, past surgical history, and problem list.        OBJECTIVE          Physical Exam:     General Appearance:   Alert, cooperative, no distress, appears stated age, uses a walker for ambulation      Head:   Normocephalic, without obvious abnormality, atraumatic     Eyes:  conjunctiva/corneas clear          Nose:  Nares normal, septum midline, no drainage or sinus tenderness         Lungs:   Clear to auscultation bilaterally, respirations unlabored     Heart:   Regular rate and rhythm, S1 and S2 normal, no murmur, rub or gallop         ASSESSMENT / PLAN    1. Excessive daytime sleepiness  Assessment & Plan:  Patient's excessive daytime sleepiness persists.  She has been having difficulty in falling asleep recently.  She is getting in bed at 8 PM, when she is not sleepy, and then having difficulty in falling asleep.  I advised her to wait to get into bed until approximately 10 or 1030 PM when she is feeling sleepy, as she wishes to be sleeping by 11:00.  At that time, I advised her not to use any TV or electronics.  I also ordered a new sleep study today.  She completed a sleep study in 2022, which was not diagnostic for obstructive sleep apnea.  However, she slept very poorly on this test and had very little REM sleep.  It is possible the sleep study could have underestimated obstructive sleep apnea.  We will reassess with a new study.    Orders:  -     Diagnostic Sleep Study; Future; Expected date: 05/14/2024    2. PLMD (periodic limb movement disorder)  Assessment & Plan:  Patient feels her limb movement disorder is very well-controlled with gabapentin 300 mg nightly and her previous iron infusions.  Her last iron study was completed on 8/1/2023 which  showed a ferritin level of 191.  She has not had any further infusions and is not taking oral iron due to a history of constipation.  Repeat iron panel was ordered today to ensure her iron stores are being maintained above 75.    Orders:  -     Diagnostic Sleep Study; Future; Expected date: 05/14/2024    3. Iron deficiency  -     Iron Panel (Includes Ferritin, Iron Sat%, Iron, and TIBC); Future           Counseling / Coordination of Care    I have spent a total time of 40 minutes on 05/14/24 in caring for this patient including Diagnostic results, Prognosis, Risks and benefits of tx options, Instructions for management, Patient and family education, Importance of tx compliance, Risk factor reductions, Impressions, Counseling / Coordination of care, Documenting in the medical record, Reviewing / ordering tests, medicine, procedures  , and Obtaining or reviewing history  .    The following instructions have been given to the patient today:    Patient Instructions   I placed an order for an iron panel to make sure that your iron stores are still adequate since having your infusions completed approximately 1 year ago.  Please have this blood test to soon as possible.  If your blood levels are normal, no further treatment needed.  If your ferritin level is low again, I would recommend returning to hematology.    I placed an order today for a new sleep study since you are still experiencing excessive daytime sleepiness.  On your previous sleep study, you did not sleep well.  It is possible the study could have underestimated sleep apnea.    As you are having difficulty in falling asleep, I would recommend not getting in bed until you feel sleepy.  Once you do get into bed avoid using TV or electronics as this can stimulate your brain to stay awake.    Continue taking gabapentin 300 mg nightly.    Nursing Support:  When: Monday through Friday 7A-5PM except holidays  Where: Our direct line is [phone].    If you are  having a true emergency please call 911.  In the event that the line is busy or it is after hours please leave a voice message and we will return your call.  Please speak clearly, leaving your full name, birth date, best number to reach you and the reason for your call.   Medication refills: We will need the name of the medication, the dosage, the ordering provider, whether you get a 30 or 90 day refill, and the pharmacy name and address.  Medications will be ordered by the provider only.  Nurses cannot call in prescriptions.  Please allow 7 days for medication refills.  Physician requested updates: If your provider requested that you call with an update after starting medication, please be ready to provide us the medication and dosage, what time you take your medication, the time you attempt to fall asleep, time you fall asleep, when you wake up, and what time you get out of bed.  Sleep Study Results: We will contact you with sleep study results and/or next steps after the physician has reviewed your testing.         Priscila Corona PA-C  [facility]

## 2024-05-14 NOTE — ASSESSMENT & PLAN NOTE
Patient feels her limb movement disorder is very well-controlled with gabapentin 300 mg nightly and her previous iron infusions.  Her last iron study was completed on 8/1/2023 which showed a ferritin level of 191.  She has not had any further infusions and is not taking oral iron due to a history of constipation.  Repeat iron panel was ordered today to ensure her iron stores are being maintained above 75.

## 2024-05-24 DIAGNOSIS — E55.9 VITAMIN D DEFICIENCY: ICD-10-CM

## 2024-05-24 RX ORDER — FOLIC ACID/MV,IRON,MIN/LUTEIN 0.4-18-25
1 TABLET ORAL EVERY MORNING
Qty: 30 TABLET | Refills: 0 | Status: SHIPPED | OUTPATIENT
Start: 2024-05-24

## 2024-05-28 ENCOUNTER — TELEPHONE (OUTPATIENT)
Dept: PSYCHIATRY | Facility: CLINIC | Age: 44
End: 2024-05-28

## 2024-06-03 DIAGNOSIS — E55.9 VITAMIN D DEFICIENCY: ICD-10-CM

## 2024-06-03 RX ORDER — FOLIC ACID/MV,IRON,MIN/LUTEIN 0.4-18-25
1 TABLET ORAL EVERY MORNING
Qty: 31 TABLET | Refills: 5 | Status: SHIPPED | OUTPATIENT
Start: 2024-06-03

## 2024-06-11 ENCOUNTER — TELEPHONE (OUTPATIENT)
Dept: GASTROENTEROLOGY | Facility: MEDICAL CENTER | Age: 44
End: 2024-06-11

## 2024-06-11 NOTE — TELEPHONE ENCOUNTER
Called patient [facility] to confirm procedure, procedure has been confirmed will fax prep instructions to [facility].

## 2024-06-12 ENCOUNTER — OFFICE VISIT (OUTPATIENT)
Dept: UROLOGY | Facility: AMBULATORY SURGERY CENTER | Age: 44
End: 2024-06-12
Payer: MEDICARE

## 2024-06-12 VITALS
WEIGHT: 205 LBS | DIASTOLIC BLOOD PRESSURE: 80 MMHG | SYSTOLIC BLOOD PRESSURE: 122 MMHG | HEART RATE: 92 BPM | HEIGHT: 61 IN | OXYGEN SATURATION: 96 % | BODY MASS INDEX: 38.71 KG/M2

## 2024-06-12 DIAGNOSIS — R32 URINARY INCONTINENCE, UNSPECIFIED TYPE: Primary | ICD-10-CM

## 2024-06-12 DIAGNOSIS — R31.29 MICROSCOPIC HEMATURIA: ICD-10-CM

## 2024-06-12 LAB — POST-VOID RESIDUAL VOLUME, ML POC: 55 ML

## 2024-06-12 PROCEDURE — 99213 OFFICE O/P EST LOW 20 MIN: CPT

## 2024-06-12 PROCEDURE — 51798 US URINE CAPACITY MEASURE: CPT

## 2024-06-12 RX ORDER — NYSTATIN 100000 [USP'U]/G
POWDER TOPICAL
COMMUNITY
Start: 2024-05-20

## 2024-06-12 RX ORDER — LORAZEPAM 1 MG/1
TABLET ORAL
COMMUNITY
Start: 2024-06-05

## 2024-06-12 RX ORDER — BISACODYL 5 MG/1
TABLET, DELAYED RELEASE ORAL
COMMUNITY

## 2024-06-12 NOTE — PROGRESS NOTES
Office Visit- Urology  Elizabeth Doss 1980 MRN: [id number]      Assessment/Discussion/Plan    44 y.o. female managed by     Urinary incontinence  -Patient is maintained on trospium 20 mg BID   -PVR maintained at 55 mL  -patient continues to have symptom improvement with current regiment. Salazar continue to maintain     2.  Microscopic hematuria   -Over the past year patient did have microscopies  with demonstration of microscopic hematuria with 4-10 RBC noted in June 2023.  Patient had 4-10 RBC in February 2023 not in the context of clinically significant microscopic hematuria  -Last abdominal imaging was in July 2022 which demonstrated a atrophic right kidney and a left renal cyst but no other urologic findings  -Discussed with patient that microscopic hematuria workup would include repeat imaging and cystoscopy.  Patient defers at this point in time.  Due to concern for patient medical decision making capacity due to moderate intellectual disability contacted patient's contact Ivelisse Kasper RN and reviewed recommendations for workup giving microscopic hematuria that has been recurrent.  Contact states that patient okay to order ultrasound and plan for cystoscopy    Chief Complaint:   Elizabeth is a 44 y.o. female presenting to the office for a follow up visit regarding urinary incontinence        Subjective    Patient is a 44-year-old female with a history of overactive bladder and urinary incontinence who presents for follow-up.  She was last in the office in May 2024 she resides in long-term has had adequate symptom control on this regimen group home and is accompanied by caretakers she has been maintained on trospium 20 mg twice daily. -Over the past year patient did have microscopies  with demonstration of microscopic hematuria with 4-10 RBC noted in June 2023.  Patient had 4-10 RBC in February 2023.  She denied gross hematuria          ROS:   Review of Systems   Constitutional: Negative.  Negative for  chills, fatigue and fever.   HENT: Negative.     Respiratory:  Negative for shortness of breath.    Cardiovascular:  Negative for chest pain.   Gastrointestinal: Negative.  Negative for abdominal pain.   Endocrine: Negative.    Musculoskeletal: Negative.    Skin: Negative.    Neurological: Negative.  Negative for dizziness and light-headedness.   Hematological: Negative.    Psychiatric/Behavioral: Negative.           Past Medical History  Past Medical History:   Diagnosis Date    ADD (attention deficit disorder)     Anxiety     Astigmatism     Brain lesion     Calcium deficiency     Cellulitis of foot, right 6/4/2018    Cerebral palsy (HCC)     Chronic otitis media     Constipation     Depression     Dysphagia     Esophagitis     Esotropia     GERD (gastroesophageal reflux disease)     Hiatal hernia     Hydrocephalus (HCC)     Impaired fasting glucose     Left nephrolithiasis 03/04/2019    Myopia     Oppositional defiant disorder     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HCC)     Seizures (HCC)     Sensorineural hearing loss     Sleep apnea     Status post ventriculoatrial shunt placement     Visual impairment        Past Surgical History  Past Surgical History:   Procedure Laterality Date    BREAST BIOPSY Left     X 2 (not sure of years)    CSF SHUNT      Creation of Ventriculo-Peritoneal CSF shunt ; Last Assessed:7/6/2016    EAR SURGERY      Last Assessed:7/6/2016    LEG SURGERY      due to CP     NOSE SURGERY      Last Assessed:7/6/2016    VA ESOPHAGOGASTRODUODENOSCOPY TRANSORAL DIAGNOSTIC N/A 05/09/2019    Procedure: ESOPHAGOGASTRODUODENOSCOPY (EGD) with biopsy;  Surgeon: Kaya Pedersen MD;  Location: [facility];  Service: Gastroenterology    UPPER GASTROINTESTINAL ENDOSCOPY  05/2019       Past Family History  Family History   Problem Relation Age of Onset    Diabetes Mother     Colon cancer Father     No Known Problems Maternal Grandmother     No Known Problems Maternal Grandfather     No Known Problems Paternal Grandmother      No Known Problems Paternal Grandfather     Hypertension Neg Hx     Heart disease Neg Hx     Stroke Neg Hx     Thyroid disease Neg Hx     Breast cancer Neg Hx        Past Social history  Social History     Socioeconomic History    Marital status: Single     Spouse name: Not on file    Number of children: Not on file    Years of education: Not on file    Highest education level: Not on file   Occupational History    Not on file   Tobacco Use    Smoking status: Never    Smokeless tobacco: Never   Vaping Use    Vaping status: Never Used   Substance and Sexual Activity    Alcohol use: Never    Drug use: Never    Sexual activity: Never     Birth control/protection: OCP   Other Topics Concern    Not on file   Social History Narrative    Always uses seat belt    Lives in group home     Social Determinants of Health     Financial Resource Strain: Low Risk  (3/11/2024)    Overall Financial Resource Strain (CARDIA)     Difficulty of Paying Living Expenses: Not hard at all   Food Insecurity: No Food Insecurity (3/11/2024)    Hunger Vital Sign     Worried About Running Out of Food in the Last Year: Never true     Ran Out of Food in the Last Year: Never true   Transportation Needs: No Transportation Needs (3/11/2024)    PRAPARE - Transportation     Lack of Transportation (Medical): No     Lack of Transportation (Non-Medical): No   Physical Activity: Not on file   Stress: No Stress Concern Present (3/11/2024)    [employer] - Occupational Stress Questionnaire     Feeling of Stress : Not at all   Social Connections: Not on file   Intimate Partner Violence: Unknown (5/11/2024)    Received from [facility]    Intimate Partner Violence     Within the last year, have you been afraid of your partner, ex-partner or family member?: Not on file     Within the last year, have you been humiliated or emotionally abused in other ways by your partner, ex-partner or family member?: Not on file      Within the last year, have you been kicked, hit, slapped, or otherwise physically hurt by your partner, ex-partner or family member?: Not on file     Within the last year, have you been raped or forced to have any kind of sexual activity by your partner, ex-partner or family member?: Not on file   Housing Stability: Low Risk  (3/11/2024)    Housing Stability Vital Sign     Unable to Pay for Housing in the Last Year: No     Number of Times Moved in the Last Year: 1     Homeless in the Last Year: No       Current Medications  Current Outpatient Medications   Medication Sig Dispense Refill    acetaminophen (TYLENOL) 500 mg tablet Take 1 tablet (500 mg total) by mouth every 6 (six) hours as needed for mild pain 30 tablet 5    albuterol (Ventolin HFA) 90 mcg/act inhaler Inhale 2 puffs every 6 (six) hours as needed for wheezing 18 g 0    aluminum-magnesium hydroxide-simethicone (GNP Antacid & Anti-Gas) 1857-4487-851 mg/30 mL suspension Take 15 mL by mouth every 4 (four) hours as needed for indigestion or heartburn 355 mL 0    ARIPiprazole (ABILIFY) 20 MG tablet Take 1 tablet (20 mg total) by mouth daily at bedtime 5 tablet 0    bacitracin topical ointment 500 units/g topical ointment Cleanse site on cheek with soap and water followed by bacitracin BID until healed then p.r.n. 15 g 0    bisacodyl (DULCOLAX) 5 mg EC tablet       bismuth subsalicylate (PEPTO BISMOL) 524 mg/30 mL oral suspension Take 15 mL (262 mg total) by mouth every 6 (six) hours as needed for indigestion 360 mL 5    calcium carbonate (TUMS) 500 mg chewable tablet Chew 1 tablet (500 mg total) 3 (three) times a day as needed for heartburn 30 tablet 5    carbamide peroxide (DEBROX) 6.5 % otic solution Administer 5 drops into the left ear 2 (two) times a day Use 1 week prior to ENT appointment.  Also can use 4 gtts twice weekly (Tuesday and Friday for example) to each ear for prevention.  Keep hearing aid out x 10 minutes after ear drops administered. 15 mL 1     Cholecalciferol (D3-1000) 25 MCG (1000 UT) tablet TAKE 2 TABLETS (2000U) BY MOUTH DAILY AT 8AM (SUPPLEMENT) *LONGKO 62 tablet 5    Diclofenac Sodium (VOLTAREN) 1 % Apply 2 g topically 4 (four) times a day 50 g 0    dicyclomine (BENTYL) 20 mg tablet Take 1 tablet (20 mg total) by mouth every 6 (six) hours as needed (dysphagia) 120 tablet 0    Dyclonine-Glycerin (Cepacol Sore Throat Spray) 0.1-33 % LIQD Apply 1 spray to the mouth or throat 3 (three) times a day as needed (sorethroat) 118 mL 5    escitalopram (LEXAPRO) 20 mg tablet Take 1 tablet (20 mg total) by mouth daily 90 tablet 2    fluticasone (FLONASE) 50 mcg/act nasal spray 1 spray into each nostril daily as needed for rhinitis (nasal congestion) At 8:00 AM 18.2 mL 5    gabapentin (NEURONTIN) 300 mg capsule TAKE 1 CAPSULE BY MOUTH DAILY @ 8PM (NERVE PAIN) *MANDI 30 capsule 5    GNP Sore Throat Spray 1.4 % mucosal liquid APPLY 1 SPRAY TO MOUTH OR THROAT EVERY 2 HRS AS NEEDED FOR SORE THROAT 177 mL 0    hydrOXYzine HCL (ATARAX) 10 mg tablet Take 1 tablet (10 mg total) by mouth 3 (three) times a day 30 tablet 3    ibuprofen (MOTRIN) 400 mg tablet TAKE 1 TABLET BY MOUTH EVERY 8 HOURS AS NEEDED FOR MILD/MOD PAIN OR HEADACHES 30 tablet 0    ibuprofen (MOTRIN) 400 mg tablet Take 1.5 tablets (600 mg total) by mouth every 6 (six) hours as needed for mild pain 20 tablet 0    Incontinence Supply Disposable ([place] [employer] Plus Adult Briefs) MISC Use as directed (R39.81) 60 each 0    ketoconazole (NIZORAL) 2 % cream Apply topically daily 30 g 5    Konsyl Daily Fiber 28.3 %       lamoTRIgine (LaMICtal) 100 mg tablet Take 100 mg by mouth 2 (two) times a day Take 50mg BID      lamoTRIgine (LaMICtal) 25 mg tablet Take 25 mg by mouth 2 (two) times a day      lidocaine (LMX) 4 % cream Apply topically as needed for mild pain 30 g 0    LORazepam (ATIVAN) 0.5 mg tablet Take 0.25 mg by mouth every 6 (six) hours as needed for anxiety Take 0.25mg TID      LORazepam (ATIVAN) 1 mg  tablet       magnesium hydroxide (GNP Milk of Magnesia) 400 mg/5 mL oral suspension 2 TBSP (30ML) BY MOUTH DAILY AS NEEDED IF NO BM IN 3 DAYS (CONSTIPATION) 355 mL 0    metoprolol tartrate (LOPRESSOR) 25 mg tablet TAKE 1 TABLET BY MOUTH 2X DAILY @ 8AM-8PM(BLOOD PRESSURE)* LAPKO 60 tablet 5    mineral oil-hydrophilic petrolatum (AQUAPHOR) ointment Apply topically as needed for dry skin 420 g 5    Multiple Vitamins-Minerals (CertaVite/Antioxidants) TABS TAKE 1 TABLET BY MOUTH ONCE DAILY IN THE MORNING. 31 tablet 5    norgestimate-ethinyl estradiol (ORTHO-CYCLEN) 0.25-35 MG-MCG per tablet Take 1 tablet by mouth daily 28 tablet 11    nystatin (MYCOSTATIN) powder       nystatin-triamcinolone (MYCOLOG-II) cream Apply topically 2 (two) times a day 60 g 0    pantoprazole (PROTONIX) 40 mg tablet Take 1 tablet (40 mg total) by mouth 2 (two) times a day before breakfast and lunch 120 tablet 2    psyllium (METAMUCIL SMOOTH TEXTURE) 28 % packet       psyllium (METAMUCIL) 58.6 % packet Take 1 packet by mouth daily As needed for constipation 30 packet 5    senna-docusate sodium (Senna S) 8.6-50 mg per tablet TAKE 1 TABLET BY MOUTH DAILY AT 8AM (CONSIPATION)* LAPKO 31 tablet 5    sodium chloride (OCEAN) 0.65 % nasal spray 1 spray into each nostril as needed (three times daily as needed for nasal congestion) 60 mL 5    Sunscreens (Aveeno Protect+Hydrate SPF50) LOTN Apply topically if needed (sun exposure) Apply 15 minutes before sun exposure and every 2 hours 85 g 5    trospium chloride (SANCTURA) 20 mg tablet Take 1 tablet (20 mg total) by mouth 2 (two) times a day 180 tablet 3    amitriptyline (ELAVIL) 10 mg tablet Take 1 tablet (10 mg total) by mouth daily at bedtime 90 tablet 0    ciprofloxacin-dexamethasone (CIPRODEX) otic suspension Administer 4 drops into the left ear 2 (two) times a day (Patient not taking: Reported on 8/2/2023) 7.5 mL 0    Elastic Bandages & Supports (Neoprene Knee Brace) MISC Apply right knee brace in  "morning; remove at night (Patient not taking: Reported on 8/3/2023) 1 each 0    Mouthwashes (Listerine Antiseptic) LIQD Swish and spit 5 mL 2 (two) times a day 1000 mL 0    naproxen (Naprosyn) 500 mg tablet Take 1 tablet (500 mg total) by mouth 2 (two) times a day with meals for 10 days 20 tablet 0    polyethylene glycol (Golytely) 4000 mL solution Take 4,000 mL by mouth once for 1 dose Take 4000 mL by mouth once for 1 dose. Use as directed 4000 mL 0    talc Apply topically 2 (two) times a day for 1 dose Use under the breast and abdomen folds 420 g 2    vedolizumab (ENTYVIO) SOLR  (Patient not taking: Reported on 1/11/2024)      zinc oxide (DESITIN) 13 % cream Apply 1 application topically as needed (for perianal irritation) (Patient not taking: Reported on 8/2/2023) 454 g 5     No current facility-administered medications for this visit.       Allergies  No Known Allergies    OBJECTIVE    Vitals   Vitals:    06/12/24 1345   Weight: 93 kg (205 lb)   Height: 5' 1\" (1.549 m)       PVR:    Physical Exam  Constitutional:       General: She is not in acute distress.     Appearance: Normal appearance. She is normal weight. She is not ill-appearing or toxic-appearing.   HENT:      Head: Normocephalic and atraumatic.   Eyes:      Conjunctiva/sclera: Conjunctivae normal.   Cardiovascular:      Rate and Rhythm: Normal rate.   Pulmonary:      Effort: Pulmonary effort is normal. No respiratory distress.   Musculoskeletal:         General: Normal range of motion.   Skin:     General: Skin is warm and dry.   Neurological:      General: No focal deficit present.      Mental Status: She is alert and oriented to person, place, and time.      Cranial Nerves: No cranial nerve deficit.          Labs:     Lab Results   Component Value Date    CREATININE 0.83 03/29/2024      Lab Results   Component Value Date    HGBA1C 5.4 09/30/2021     Lab Results   Component Value Date    CALCIUM 7.4 (L) 03/29/2024    K 3.7 03/29/2024    CO2 25 " 03/29/2024     03/29/2024    BUN 9 03/29/2024    CREATININE 0.83 03/29/2024       I have personally reviewed all pertinent lab results and reviewed with patient    Imaging       Carlos Lind PA-C  Date: 6/12/2024 Time: 1:55 PM  [facility]    This note was written using fluency dictation software. Please excuse any resulting minor grammatical errors.

## 2024-06-14 ENCOUNTER — HOSPITAL ENCOUNTER (OUTPATIENT)
Dept: ULTRASOUND IMAGING | Facility: CLINIC | Age: 44
Discharge: HOME | End: 2024-06-14
Payer: MEDICARE

## 2024-06-14 DIAGNOSIS — R92.30 DENSE BREASTS: ICD-10-CM

## 2024-06-14 PROCEDURE — 76641 ULTRASOUND BREAST COMPLETE: CPT

## 2024-06-17 ENCOUNTER — TELEPHONE (OUTPATIENT)
Dept: FAMILY MEDICINE CLINIC | Facility: CLINIC | Age: 44
End: 2024-06-17

## 2024-06-17 NOTE — TELEPHONE ENCOUNTER
Mamadou left on clinical line    Hi, this is Green calling from [facility] here in [place]. We have a mutual patient and she's just continuing her care with us here. Patients name is lEizabeth Doss, date of birth January 18th 1980. I'm just looking for a fax number so I can fax over a request for records. The best number to reach our office is [phone] and ask for Corinne. Thank you.

## 2024-06-17 NOTE — TELEPHONE ENCOUNTER
Returned call and was informed the patient had been seen by there practice and all patient information available via epic, nothing further needed at this time,

## 2024-06-23 ENCOUNTER — ANESTHESIA (OUTPATIENT)
Dept: ANESTHESIOLOGY | Facility: HOSPITAL | Age: 44
End: 2024-06-23

## 2024-06-23 ENCOUNTER — ANESTHESIA EVENT (OUTPATIENT)
Dept: ANESTHESIOLOGY | Facility: HOSPITAL | Age: 44
End: 2024-06-23

## 2024-06-23 RX ORDER — SODIUM CHLORIDE, SODIUM LACTATE, POTASSIUM CHLORIDE, CALCIUM CHLORIDE 600; 310; 30; 20 MG/100ML; MG/100ML; MG/100ML; MG/100ML
50 INJECTION, SOLUTION INTRAVENOUS CONTINUOUS
Status: CANCELLED | OUTPATIENT
Start: 2024-06-23

## 2024-06-24 ENCOUNTER — ANESTHESIA (OUTPATIENT)
Dept: GASTROENTEROLOGY | Facility: HOSPITAL | Age: 44
End: 2024-06-24

## 2024-06-24 ENCOUNTER — HOSPITAL ENCOUNTER (OUTPATIENT)
Dept: GASTROENTEROLOGY | Facility: HOSPITAL | Age: 44
Setting detail: OUTPATIENT SURGERY
Discharge: HOME | End: 2024-06-24
Payer: MEDICARE

## 2024-06-24 ENCOUNTER — ANESTHESIA EVENT (OUTPATIENT)
Dept: GASTROENTEROLOGY | Facility: HOSPITAL | Age: 44
End: 2024-06-24

## 2024-06-24 VITALS
OXYGEN SATURATION: 97 % | HEART RATE: 83 BPM | TEMPERATURE: 97.3 F | SYSTOLIC BLOOD PRESSURE: 117 MMHG | RESPIRATION RATE: 16 BRPM | DIASTOLIC BLOOD PRESSURE: 60 MMHG

## 2024-06-24 DIAGNOSIS — K20.90 ESOPHAGITIS: ICD-10-CM

## 2024-06-24 DIAGNOSIS — K21.9 GASTROESOPHAGEAL REFLUX DISEASE WITHOUT ESOPHAGITIS: ICD-10-CM

## 2024-06-24 LAB
EXT PREGNANCY TEST URINE: NEGATIVE
EXT. CONTROL: NORMAL

## 2024-06-24 PROCEDURE — 81025 URINE PREGNANCY TEST: CPT | Performed by: INTERNAL MEDICINE

## 2024-06-24 PROCEDURE — 43235 EGD DIAGNOSTIC BRUSH WASH: CPT | Performed by: INTERNAL MEDICINE

## 2024-06-24 RX ORDER — PROPOFOL 10 MG/ML
INJECTION, EMULSION INTRAVENOUS AS NEEDED
Status: DISCONTINUED | OUTPATIENT
Start: 2024-06-24 | End: 2024-06-24

## 2024-06-24 RX ORDER — PANTOPRAZOLE SODIUM 40 MG/1
40 TABLET, DELAYED RELEASE ORAL DAILY
Qty: 120 TABLET | Refills: 2 | Status: SHIPPED | OUTPATIENT
Start: 2024-06-24

## 2024-06-24 RX ORDER — SODIUM CHLORIDE, SODIUM LACTATE, POTASSIUM CHLORIDE, CALCIUM CHLORIDE 600; 310; 30; 20 MG/100ML; MG/100ML; MG/100ML; MG/100ML
50 INJECTION, SOLUTION INTRAVENOUS CONTINUOUS
Status: DISCONTINUED | OUTPATIENT
Start: 2024-06-24 | End: 2024-06-28 | Stop reason: HOSPADM

## 2024-06-24 RX ADMIN — PROPOFOL 100 MG: 10 INJECTION, EMULSION INTRAVENOUS at 08:56

## 2024-06-24 RX ADMIN — SODIUM CHLORIDE, SODIUM LACTATE, POTASSIUM CHLORIDE, AND CALCIUM CHLORIDE: .6; .31; .03; .02 INJECTION, SOLUTION INTRAVENOUS at 09:02

## 2024-06-24 RX ADMIN — PROPOFOL 100 MG: 10 INJECTION, EMULSION INTRAVENOUS at 08:52

## 2024-06-24 RX ADMIN — SODIUM CHLORIDE, SODIUM LACTATE, POTASSIUM CHLORIDE, AND CALCIUM CHLORIDE: .6; .31; .03; .02 INJECTION, SOLUTION INTRAVENOUS at 08:49

## 2024-06-24 NOTE — ANESTHESIA PREPROCEDURE EVALUATION
Procedure:  EGD    Relevant Problems   CARDIO   (+) Anterior chest wall pain   (+) Bilateral thoracic back pain   (+) Intercostal pain      GI/HEPATIC   (+) Functional dysphagia   (+) Gastroesophageal reflux disease with esophagitis without hemorrhage   (+) Hiatal hernia   (+) Partial small bowel obstruction (HCC)      /RENAL   (+) Acquired renal cyst of left kidney   (+) Left nephrolithiasis      HEMATOLOGY   (+) Iron deficiency anemia secondary to inadequate dietary iron intake      MUSCULOSKELETAL   (+) Bilateral thoracic back pain   (+) Hiatal hernia   (+) Low back pain      NEURO/PSYCH   (+) Acute non intractable tension-type headache   (+) Generalized anxiety disorder   (+) Severe episode of recurrent major depressive disorder, with psychotic features (HCC)      PULMONARY   (+) SOB (shortness of breath)      Neurology/Sleep   (+) Cerebral palsy (HCC)   (+) Moderate intellectual disability      Rheumatology   (+) TMJ (temporomandibular joint disorder)      Other   (+) Obesity, Class III, BMI 40-49.9 (morbid obesity) (HCC)        Physical Exam    Airway    Mallampati score: IV  TM Distance: >3 FB  Neck ROM: full     Dental   Comment: Poor dental hygiene      Cardiovascular  Cardiovascular exam normal    Pulmonary  Pulmonary exam normal     Other Findings  post-pubertal.      Anesthesia Plan  ASA Score- 3     Anesthesia Type- IV sedation with anesthesia with ASA Monitors.         Additional Monitors:     Airway Plan:            Plan Factors-Exercise tolerance (METS): <4 METS.    Chart reviewed. EKG reviewed.  Existing labs reviewed. Patient summary reviewed.    Patient is not a current smoker. Patient not instructed to abstain from smoking on day of procedure. Patient did not smoke on day of surgery.            Induction-     Postoperative Plan-     Perioperative Resuscitation Plan - Level 1 - Full Code.       Informed Consent- Anesthetic plan and risks discussed with patient.  I personally reviewed this  patient with the CRNA. Discussed and agreed on the Anesthesia Plan with the CRNA..      Lab Results   Component Value Date    HGBA1C 5.4 09/30/2021       Lab Results   Component Value Date    K 3.7 03/29/2024     03/29/2024    CO2 25 03/29/2024    BUN 9 03/29/2024    CREATININE 0.83 03/29/2024    GLUF 90 06/05/2023    CALCIUM 7.4 (L) 03/29/2024    CORRECTEDCA 8.8 06/05/2023    AST 11 (L) 03/29/2024    ALT 13 03/29/2024    ALKPHOS 134 (H) 03/29/2024    EGFR 85 03/29/2024       Lab Results   Component Value Date    WBC 9.19 03/29/2024    HGB 13.1 03/29/2024    HCT 39.9 03/29/2024    MCV 92 03/29/2024     03/29/2024     ormal sinus rhythm  Normal ECG  When compared with ECG of 15-ALVARADO-2023 15:32,  No significant change was found  Confirmed by Abner Jones ([id number]) on 3/31/2024 1:30:26 PM      Specimen Collected: 03/29/24 19:26      Echo 2020   LEFT VENTRICLE:  Systolic function was normal by visual assessment. Ejection fraction was estimated to be 55 %.  There were no regional wall motion abnormalities.     MITRAL VALVE:  There was trace regurgitation.     TRICUSPID VALVE:  There was trace regurgitation.       Had colonoscopy in feb 2024

## 2024-06-24 NOTE — ANESTHESIA PREPROCEDURE EVALUATION
Procedure:  PRE-OP ONLY    Relevant Problems   CARDIO   (+) Anterior chest wall pain   (+) Bilateral thoracic back pain   (+) Intercostal pain      GI/HEPATIC   (+) Functional dysphagia   (+) Gastroesophageal reflux disease with esophagitis without hemorrhage   (+) Hiatal hernia   (+) Partial small bowel obstruction (HCC)      /RENAL   (+) Acquired renal cyst of left kidney   (+) Left nephrolithiasis      HEMATOLOGY   (+) Iron deficiency anemia secondary to inadequate dietary iron intake      MUSCULOSKELETAL   (+) Bilateral thoracic back pain   (+) Hiatal hernia   (+) Low back pain      NEURO/PSYCH   (+) Acute non intractable tension-type headache   (+) Generalized anxiety disorder   (+) Severe episode of recurrent major depressive disorder, with psychotic features (HCC)      PULMONARY   (+) SOB (shortness of breath)      Nervous and Auditory   (+) Hearing impairment      Behavioral Health   (+) Self-injurious behavior      Neurology/Sleep   (+) Cerebral palsy (HCC)   (+) Choking episode   (+) Moderate intellectual disability      Care Coordination   (+) Ambulatory dysfunction   (+) Gait disturbance      Rheumatology   (+) TMJ (temporomandibular joint disorder)      Other   (+) Obesity, Class III, BMI 40-49.9 (morbid obesity) (HCC)             Anesthesia Plan  ASA Score- 3     Anesthesia Type- IV sedation with anesthesia with ASA Monitors.         Additional Monitors:     Airway Plan:            Plan Factors-    Induction-     Postoperative Plan-         Informed Consent- Anesthetic plan and risks discussed with patient.  I personally reviewed this patient with the CRNA. Discussed and agreed on the Anesthesia Plan with the CRNA..      Lab Results   Component Value Date    HGBA1C 5.4 09/30/2021       Lab Results   Component Value Date    K 3.7 03/29/2024     03/29/2024    CO2 25 03/29/2024    BUN 9 03/29/2024    CREATININE 0.83 03/29/2024    GLUF 90 06/05/2023    CALCIUM 7.4 (L) 03/29/2024    CORRECTEDCA 8.8  06/05/2023    AST 11 (L) 03/29/2024    ALT 13 03/29/2024    ALKPHOS 134 (H) 03/29/2024    EGFR 85 03/29/2024       Lab Results   Component Value Date    WBC 9.19 03/29/2024    HGB 13.1 03/29/2024    HCT 39.9 03/29/2024    MCV 92 03/29/2024     03/29/2024     ormal sinus rhythm  Normal ECG  When compared with ECG of 15-ALVARADO-2023 15:32,  No significant change was found  Confirmed by Abner Jones ([id number]) on 3/31/2024 1:30:26 PM      Specimen Collected: 03/29/24 19:26      Echo 2020   LEFT VENTRICLE:  Systolic function was normal by visual assessment. Ejection fraction was estimated to be 55 %.  There were no regional wall motion abnormalities.     MITRAL VALVE:  There was trace regurgitation.     TRICUSPID VALVE:  There was trace regurgitation.       Had colonoscopy in feb 2024

## 2024-06-24 NOTE — H&P
[facility]  History & Physical    Patient Info:  Name: Elizabeth Doss   Age: 44 y.o.   YOB: 1980   MRN: [id number]    HPI: Elizabeth Doss is a 44 y.o. year old female who presents for EGD to evaluate for healing of esophagitis and screening for Mcdonald's esophagus    REVIEW OF SYSTEMS: Per the HPI, and otherwise unremarkable.    Historical Information   Past Medical History:   Diagnosis Date    ADD (attention deficit disorder)     Anxiety     Astigmatism     Brain lesion     Calcium deficiency     Cellulitis of foot, right 6/4/2018    Cerebral palsy (HCC)     Chronic otitis media     Constipation     Depression     Dysphagia     Esophagitis     Esotropia     GERD (gastroesophageal reflux disease)     Hiatal hernia     Hydrocephalus (HCC)     Impaired fasting glucose     Left nephrolithiasis 03/04/2019    Myopia     Oppositional defiant disorder     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HCC)     Seizures (HCC)     Sensorineural hearing loss     Sleep apnea     Status post ventriculoatrial shunt placement     Visual impairment      Past Surgical History:   Procedure Laterality Date    BREAST BIOPSY Left     X 2 (not sure of years)    CSF SHUNT      Creation of Ventriculo-Peritoneal CSF shunt ; Last Assessed:7/6/2016    EAR SURGERY      Last Assessed:7/6/2016    LEG SURGERY      due to CP     NOSE SURGERY      Last Assessed:7/6/2016    SC ESOPHAGOGASTRODUODENOSCOPY TRANSORAL DIAGNOSTIC N/A 05/09/2019    Procedure: ESOPHAGOGASTRODUODENOSCOPY (EGD) with biopsy;  Surgeon: Kaya Pedersen MD;  Location: [facility];  Service: Gastroenterology    UPPER GASTROINTESTINAL ENDOSCOPY  05/2019     Social History   Social History     Substance and Sexual Activity   Alcohol Use Never     Social History     Substance and Sexual Activity   Drug Use Never     Social History     Tobacco Use   Smoking Status Never   Smokeless Tobacco Never     Family History   Problem Relation Age of Onset    Diabetes  Mother     Colon cancer Father     No Known Problems Maternal Grandmother     No Known Problems Maternal Grandfather     No Known Problems Paternal Grandmother     No Known Problems Paternal Grandfather     Hypertension Neg Hx     Heart disease Neg Hx     Stroke Neg Hx     Thyroid disease Neg Hx     Breast cancer Neg Hx        MEDICATIONS & ALLERGIES:    Current Outpatient Medications   Medication Instructions    acetaminophen (TYLENOL) 500 mg, Oral, Every 6 hours PRN    albuterol (Ventolin HFA) 90 mcg/act inhaler 2 puffs, Inhalation, Every 6 hours PRN    aluminum-magnesium hydroxide-simethicone (GNP Antacid & Anti-Gas) 6371-5539-229 mg/30 mL suspension 15 mL, Oral, Every 4 hours PRN    amitriptyline (ELAVIL) 10 mg, Oral, Daily at bedtime    ARIPiprazole (ABILIFY) 20 mg, Oral, Daily at bedtime    bacitracin topical ointment 500 units/g topical ointment Cleanse site on cheek with soap and water followed by bacitracin BID until healed then p.r.n.    bisacodyl (DULCOLAX) 5 mg EC tablet     bismuth subsalicylate (PEPTO BISMOL) 262 mg, Oral, Every 6 hours PRN    calcium carbonate (TUMS) 500 mg, Oral, 3 times daily PRN    carbamide peroxide (DEBROX) 6.5 % otic solution 5 drops, Left Ear, 2 times daily, Use 1 week prior to ENT appointment.  Also can use 4 gtts twice weekly (Tuesday and Friday for example) to each ear for prevention.  Keep hearing aid out x 10 minutes after ear drops administered.    Cholecalciferol (D3-1000) 25 MCG (1000 UT) tablet TAKE 2 TABLETS (2000U) BY MOUTH DAILY AT 8AM (SUPPLEMENT) *JAYLENE    ciprofloxacin-dexamethasone (CIPRODEX) otic suspension 4 drops, Left Ear, 2 times daily    Diclofenac Sodium (VOLTAREN) 2 g, Topical, 4 times daily    dicyclomine (BENTYL) 20 mg, Oral, Every 6 hours PRN    Dyclonine-Glycerin (Cepacol Sore Throat Spray) 0.1-33 % LIQD 1 spray, Mouth/Throat, 3 times daily PRN    escitalopram (LEXAPRO) 20 mg, Oral, Daily    fluticasone (FLONASE) 50 mcg/act nasal spray 1 spray,  Nasal, Daily PRN, At 8:00 AM    gabapentin (NEURONTIN) 300 mg capsule TAKE 1 CAPSULE BY MOUTH DAILY @ 8PM (NERVE PAIN) *MANDI    GNP Sore Throat Spray 1.4 % mucosal liquid APPLY 1 SPRAY TO MOUTH OR THROAT EVERY 2 HRS AS NEEDED FOR SORE THROAT    hydrOXYzine HCL (ATARAX) 10 mg, Oral, 3 times daily    ibuprofen (MOTRIN) 600 mg, Oral, Every 6 hours PRN    Incontinence Supply Disposable (Wings Choice Plus Adult Briefs) MISC Use as directed (R39.81)    ketoconazole (NIZORAL) 2 % cream Topical, Daily    Konsyl Daily Fiber 28.3 % No dose, route, or frequency recorded.    lamoTRIgine (LAMICTAL) 25 mg, Oral, 2 times daily    lamoTRIgine (LAMICTAL) 100 mg, Oral, 2 times daily, Take 50mg BID    lidocaine (LMX) 4 % cream Topical, As needed    LORazepam (ATIVAN) 1 mg tablet     LORazepam (ATIVAN) 0.25 mg, Oral, Every 6 hours PRN, Take 0.25mg TID    magnesium hydroxide (GNP Milk of Magnesia) 400 mg/5 mL oral suspension 2 TBSP (30ML) BY MOUTH DAILY AS NEEDED IF NO BM IN 3 DAYS (CONSTIPATION)    metoprolol tartrate (LOPRESSOR) 25 mg tablet TAKE 1 TABLET BY MOUTH 2X DAILY @ 8AM-8PM(BLOOD PRESSURE)* LAPKO    mineral oil-hydrophilic petrolatum (AQUAPHOR) ointment Topical, As needed    Mouthwashes (Listerine Antiseptic) LIQD 5 mL, Swish & Spit, 2 times daily    Multiple Vitamins-Minerals (CertaVite/Antioxidants) TABS 1 tablet, Oral, Every morning    naproxen (NAPROSYN) 500 mg, Oral, 2 times daily with meals    norgestimate-ethinyl estradiol (ORTHO-CYCLEN) 0.25-35 MG-MCG per tablet 1 tablet, Oral, Daily    nystatin (MYCOSTATIN) powder     nystatin-triamcinolone (MYCOLOG-II) cream Topical, 2 times daily    pantoprazole (PROTONIX) 40 mg, Oral, 2 times daily (before breakfast and lunch)    polyethylene glycol (Golytely) 4000 mL solution 4,000 mL, Oral, Once, Take 4000 mL by mouth once for 1 dose. Use as directed    psyllium (METAMUCIL SMOOTH TEXTURE) 28 % packet No dose, route, or frequency recorded.    psyllium (METAMUCIL) 58.6 % packet 1  packet, Oral, Daily, As needed for constipation    senna-docusate sodium (Senna S) 8.6-50 mg per tablet TAKE 1 TABLET BY MOUTH DAILY AT 8AM (CONSIPATION)* LAPKO    sodium chloride (OCEAN) 0.65 % nasal spray 1 spray, Nasal, As needed    Sunscreens (Aveeno Protect+Hydrate SPF50) LOTN Topical, As needed, Apply 15 minutes before sun exposure and every 2 hours    talc Topical, 2 times daily, Use under the breast and abdomen folds    trospium chloride (SANCTURA) 20 mg, Oral, 2 times daily    vedolizumab (ENTYVIO) SOLR No dose, route, or frequency recorded.    zinc oxide (DESITIN) 13 % cream 1 application., Topical, As needed     No Known Allergies    PHYSICAL EXAM:    Objective   Blood pressure 127/77, pulse 86, temperature 98.6 °F (37 °C), temperature source Temporal, resp. rate 18, SpO2 96%, not currently breastfeeding. There is no height or weight on file to calculate BMI.    Gen: NAD  CV: RRR  CHEST: Clear  ABD: Soft, NT/ND  EXT: No edema    ASSESSMENT AND PLAN:  This is a 44 y.o. year old female here for EGD, and she is stable and optimized for her procedure.      Jonelle Hay D.O.  Gastroenterology Fellow  [facility]  Division of Gastroenterology & Hepatology  Available on [employer]    ** Please Note: This note is constructed using a voice recognition dictation system. **

## 2024-06-24 NOTE — ANESTHESIA POSTPROCEDURE EVALUATION
Post-Op Assessment Note    CV Status:  Stable  Pain Score: 0    Pain management: adequate       Mental Status:  Alert and awake   Hydration Status:  Euvolemic   PONV Controlled:  Controlled   Airway Patency:  Patent     Post Op Vitals Reviewed: Yes    No anethesia notable event occurred.    Staff: CRNA               BP   137/67   Temp   98   Pulse  84   Resp   16   SpO2   99

## 2024-06-25 ENCOUNTER — ANNUAL EXAM (OUTPATIENT)
Dept: OBGYN CLINIC | Facility: CLINIC | Age: 44
End: 2024-06-25

## 2024-06-25 VITALS
BODY MASS INDEX: 38.06 KG/M2 | DIASTOLIC BLOOD PRESSURE: 73 MMHG | WEIGHT: 201.6 LBS | HEIGHT: 61 IN | SYSTOLIC BLOOD PRESSURE: 109 MMHG | HEART RATE: 84 BPM

## 2024-06-25 DIAGNOSIS — Z12.39 ENCOUNTER FOR BREAST CANCER SCREENING USING NON-MAMMOGRAM MODALITY: ICD-10-CM

## 2024-06-25 DIAGNOSIS — Z01.411 ENCOUNTER FOR GYNECOLOGICAL EXAMINATION WITH ABNORMAL FINDING: ICD-10-CM

## 2024-06-25 DIAGNOSIS — N92.6 IRREGULAR MENSES: ICD-10-CM

## 2024-06-25 DIAGNOSIS — B37.31 VAGINAL CANDIDIASIS: Primary | ICD-10-CM

## 2024-06-25 DIAGNOSIS — Z12.31 ENCOUNTER FOR SCREENING MAMMOGRAM FOR MALIGNANT NEOPLASM OF BREAST: ICD-10-CM

## 2024-06-25 DIAGNOSIS — Z12.4 SCREENING FOR CERVICAL CANCER: ICD-10-CM

## 2024-06-25 PROCEDURE — G0101 CA SCREEN;PELVIC/BREAST EXAM: HCPCS | Performed by: NURSE PRACTITIONER

## 2024-06-25 RX ORDER — FLUCONAZOLE 150 MG/1
150 TABLET ORAL
Qty: 2 TABLET | Refills: 0 | Status: SHIPPED | OUTPATIENT
Start: 2024-06-25 | End: 2024-06-29

## 2024-06-25 RX ORDER — NORGESTIMATE AND ETHINYL ESTRADIOL 0.25-0.035
1 KIT ORAL DAILY
Qty: 28 TABLET | Refills: 12 | Status: SHIPPED | OUTPATIENT
Start: 2024-06-25

## 2024-06-25 NOTE — PROGRESS NOTES
ANNUAL GYNECOLOGICAL EXAMINATION    Elizabeth Doss is a 44 y.o. female who presents today for annual GYN exam.  Her last pap smear was performed 2022 and result was NILM with negative HPV.  She reports no history of abnormal pap smears in her past.  Her last mammogram was performed 2024 and result was WNL.  She had colon cancer screening performed 2024 via colonoscopy but prep was inadequate and she is scheduled for repeat procedure on 2024.  She had HIV screening performed 2021 and it was negative.  She reports menses as regular.  Her general medical history has been reviewed and she reports it as follows:    Past Medical History:   Diagnosis Date    ADD (attention deficit disorder)     Anxiety     Astigmatism     Brain lesion     Calcium deficiency     Cellulitis of foot, right 2018    Cerebral palsy (HCC)     Chronic otitis media     Constipation     Depression     Dysphagia     Esophagitis     Esotropia     GERD (gastroesophageal reflux disease)     Hiatal hernia     Hydrocephalus (HCC)     Impaired fasting glucose     Left nephrolithiasis 2019    Myopia     Oppositional defiant disorder     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HCC)     Seizures (HCC)     Sensorineural hearing loss     Sleep apnea     Status post ventriculoatrial shunt placement     Visual impairment      Past Surgical History:   Procedure Laterality Date    BREAST BIOPSY Left     X 2 (not sure of years)    CSF SHUNT      Creation of Ventriculo-Peritoneal CSF shunt ; Last Assessed:2016    EAR SURGERY      Last Assessed:2016    LEG SURGERY      due to CP     NOSE SURGERY      Last Assessed:2016    RI ESOPHAGOGASTRODUODENOSCOPY TRANSORAL DIAGNOSTIC N/A 2019    Procedure: ESOPHAGOGASTRODUODENOSCOPY (EGD) with biopsy;  Surgeon: Kaya Pedersen MD;  Location: [facility];  Service: Gastroenterology    UPPER GASTROINTESTINAL ENDOSCOPY  2019     OB History          0    Para   0    Term   0       0     AB   0    Living   0         SAB   0    IAB   0    Ectopic   0    Multiple   0    Live Births   0               Social History     Tobacco Use    Smoking status: Never    Smokeless tobacco: Never   Vaping Use    Vaping status: Never Used   Substance Use Topics    Alcohol use: Never    Drug use: Never     Social History     Substance and Sexual Activity   Sexual Activity Never    Birth control/protection: OCP     Cancer-related family history includes Colon cancer in her father. There is no history of Breast cancer.    Current Outpatient Medications   Medication Instructions    acetaminophen (TYLENOL) 500 mg, Oral, Every 6 hours PRN    albuterol (Ventolin HFA) 90 mcg/act inhaler 2 puffs, Inhalation, Every 6 hours PRN    aluminum-magnesium hydroxide-simethicone (GNP Antacid & Anti-Gas) 9827-0463-976 mg/30 mL suspension 15 mL, Oral, Every 4 hours PRN    amitriptyline (ELAVIL) 10 mg, Oral, Daily at bedtime    ARIPiprazole (ABILIFY) 20 mg, Oral, Daily at bedtime    bacitracin topical ointment 500 units/g topical ointment Cleanse site on cheek with soap and water followed by bacitracin BID until healed then p.r.n.    bisacodyl (DULCOLAX) 5 mg EC tablet     bismuth subsalicylate (PEPTO BISMOL) 262 mg, Oral, Every 6 hours PRN    calcium carbonate (TUMS) 500 mg, Oral, 3 times daily PRN    carbamide peroxide (DEBROX) 6.5 % otic solution 5 drops, Left Ear, 2 times daily, Use 1 week prior to ENT appointment.  Also can use 4 gtts twice weekly (Tuesday and Friday for example) to each ear for prevention.  Keep hearing aid out x 10 minutes after ear drops administered.    Cholecalciferol (D3-1000) 25 MCG (1000 UT) tablet TAKE 2 TABLETS (2000U) BY MOUTH DAILY AT 8AM (SUPPLEMENT) *LAPKO    ciprofloxacin-dexamethasone (CIPRODEX) otic suspension 4 drops, Left Ear, 2 times daily    Diclofenac Sodium (VOLTAREN) 2 g, Topical, 4 times daily    dicyclomine (BENTYL) 20 mg, Oral, Every 6 hours PRN    Dyclonine-Glycerin (Cepacol Sore Throat  Spray) 0.1-33 % LIQD 1 spray, Mouth/Throat, 3 times daily PRN    escitalopram (LEXAPRO) 20 mg, Oral, Daily    fluticasone (FLONASE) 50 mcg/act nasal spray 1 spray, Nasal, Daily PRN, At 8:00 AM    gabapentin (NEURONTIN) 300 mg capsule TAKE 1 CAPSULE BY MOUTH DAILY @ 8PM (NERVE PAIN) *MANDI BARRIOS Sore Throat Spray 1.4 % mucosal liquid APPLY 1 SPRAY TO MOUTH OR THROAT EVERY 2 HRS AS NEEDED FOR SORE THROAT    hydrOXYzine HCL (ATARAX) 10 mg, Oral, 3 times daily    ibuprofen (MOTRIN) 600 mg, Oral, Every 6 hours PRN    Incontinence Supply Disposable (Wings Choice Plus Adult Briefs) MISC Use as directed (R39.81)    ketoconazole (NIZORAL) 2 % cream Topical, Daily    Konsyl Daily Fiber 28.3 % No dose, route, or frequency recorded.    lamoTRIgine (LAMICTAL) 25 mg, Oral, 2 times daily    lamoTRIgine (LAMICTAL) 100 mg, Oral, 2 times daily, Take 50mg BID    lidocaine (LMX) 4 % cream Topical, As needed    LORazepam (ATIVAN) 1 mg tablet     LORazepam (ATIVAN) 0.25 mg, Oral, Every 6 hours PRN, Take 0.25mg TID    magnesium hydroxide (GNP Milk of Magnesia) 400 mg/5 mL oral suspension 2 TBSP (30ML) BY MOUTH DAILY AS NEEDED IF NO BM IN 3 DAYS (CONSTIPATION)    metoprolol tartrate (LOPRESSOR) 25 mg tablet TAKE 1 TABLET BY MOUTH 2X DAILY @ 8AM-8PM(BLOOD PRESSURE)* LAPKO    mineral oil-hydrophilic petrolatum (AQUAPHOR) ointment Topical, As needed    Mouthwashes (Listerine Antiseptic) LIQD 5 mL, Swish & Spit, 2 times daily    Multiple Vitamins-Minerals (CertaVite/Antioxidants) TABS 1 tablet, Oral, Every morning    naproxen (NAPROSYN) 500 mg, Oral, 2 times daily with meals    norgestimate-ethinyl estradiol (ORTHO-CYCLEN) 0.25-35 MG-MCG per tablet 1 tablet, Oral, Daily    nystatin (MYCOSTATIN) powder     nystatin-triamcinolone (MYCOLOG-II) cream Topical, 2 times daily    pantoprazole (PROTONIX) 40 mg, Oral, Daily    polyethylene glycol (Golytely) 4000 mL solution 4,000 mL, Oral, Once, Take 4000 mL by mouth once for 1 dose. Use as directed     "psyllium (METAMUCIL SMOOTH TEXTURE) 28 % packet No dose, route, or frequency recorded.    psyllium (METAMUCIL) 58.6 % packet 1 packet, Oral, Daily, As needed for constipation    senna-docusate sodium (Senna S) 8.6-50 mg per tablet TAKE 1 TABLET BY MOUTH DAILY AT 8AM (CONSIPATION)* LAPKO    sodium chloride (OCEAN) 0.65 % nasal spray 1 spray, Nasal, As needed    Sunscreens (Aveeno Protect+Hydrate SPF50) LOTN Topical, As needed, Apply 15 minutes before sun exposure and every 2 hours    talc Topical, 2 times daily, Use under the breast and abdomen folds    trospium chloride (SANCTURA) 20 mg, Oral, 2 times daily    vedolizumab (ENTYVIO) SOLR No dose, route, or frequency recorded.    zinc oxide (DESITIN) 13 % cream 1 application., Topical, As needed       Review of Systems:  Review of Systems   Constitutional: Negative.    Gastrointestinal: Negative.    Genitourinary:  Negative for difficulty urinating, menstrual problem, pelvic pain and vaginal discharge.   Skin: Negative.        Physical Exam:  /73 (BP Location: Left arm, Patient Position: Sitting, Cuff Size: Large)   Pulse 84   Ht 5' 1\" (1.549 m)   Wt 91.4 kg (201 lb 9.6 oz)   BMI 38.09 kg/m²   Physical Exam  Constitutional:       General: She is not in acute distress.     Appearance: She is well-developed.   Genitourinary:      Vulva normal.      Right Labia: rash (fungal).      Left Labia: rash (fungal).        Vaginal discharge (white, adherent) and erythema present.      No vaginal tenderness.        Right Adnexa: not tender and no mass present.     Left Adnexa: not tender and no mass present.     No cervical motion tenderness.      Cervical exam comments: Speculum exam declined.      Uterus is not enlarged or tender.   Breasts:     Right: No mass, nipple discharge, skin change or tenderness.      Left: No mass, nipple discharge, skin change or tenderness.   Neck:      Thyroid: No thyromegaly.   Cardiovascular:      Rate and Rhythm: Normal rate and " regular rhythm.   Pulmonary:      Effort: Pulmonary effort is normal.   Abdominal:      Palpations: Abdomen is soft.      Tenderness: There is no abdominal tenderness.   Musculoskeletal:      Cervical back: Neck supple.   Neurological:      Mental Status: She is alert and oriented to person, place, and time.   Skin:     General: Skin is warm and dry.   Vitals reviewed.       Assessment/Plan:   1. Abnormal well-woman GYN exam.  2. Cervical cancer screening:  Speculum exam declined by patient.  Pap smear not indicated at this time.   3. STD screening:  Patient declines.   4. Breast cancer screening:  Normal breast exam.  Order placed for bilateral screening mammogram.  Reviewed breast self-awareness.   5. Colon cancer screening:  Continue with colonoscopy as scheduled for 9/20/2024,   6. Depression Screening: Patient's depression screening was assessed with a PHQ-2 score of 5. Their PHQ-9 score was 16. Continue regular follow-up with their psychologist/therapist/psychiatrist who is managing their mental health condition(s).   7. BMI Counseling: Body mass index is 38.09 kg/m². Discussed the patient's BMI with her. The BMI is above normal. Nutrition recommendations include reducing portion sizes and decreasing overall calorie intake.   8. Contraception:  OCP's.  Given Rx refills for another year.   9. Vaginal Candidiasis:  Given Rx Diflucan.   10. Return to office in 1 year for annual GYN exam.

## 2024-06-25 NOTE — PATIENT INSTRUCTIONS
Thank you for your confidence in our team.   We appreciate you and welcome your feedback.   If you receive a survey from us, please take a few moments to let us know how we are doing.   Sincerely,  TA Wright       OBESITY     Obesity is defined as a body mass index (BMI) which is greater than 30. Your Body mass index is 38.09 kg/m²..    The risks of obesity include  many health problems, such as injuries or physical disability. You may need tests to check for the following:  Diabetes     High blood pressure or high cholesterol     Heart disease     Gallbladder or liver disease     Cancer of the colon, breast, prostate, liver, or kidney     Sleep apnea     Arthritis or gout    Seek care immediately if:   You have a severe headache, confusion, or difficulty speaking.     You have weakness on one side of your body.     You have chest pain, sweating, or shortness of breath.    Contact your healthcare provider if:   You have symptoms of gallbladder or liver disease, such as pain in your upper abdomen.    You have knee or hip pain and discomfort while walking.     You have symptoms of diabetes, such as intense hunger and thirst, and frequent urination.     You have symptoms of sleep apnea, such as snoring or daytime sleepiness.     You have questions or concerns about your condition or care.    Treatment for obesity  focuses on helping you lose weight to improve your health. Even a small decrease in BMI can reduce the risk for many health problems. Your healthcare provider will help you set a weight-loss goal.  Lifestyle changes  are the first step in treating obesity. These include making healthy food choices and getting regular physical activity. Your healthcare provider may suggest a weight-loss program that involves coaching, education, and therapy.     Medicine  may help you lose weight when it is used with a healthy diet and physical activity.     Surgery  can help you lose weight if you are very  obese and have other health problems. There are several types of weight-loss surgery. Ask your healthcare provider for more information.    Be successful losing weight:   Set small, realistic goals.  An example of a small goal is to walk for 20 minutes 5 days a week. Anther goal is to lose 5% of your body weight.    Tell friends, family members, and coworkers about your goals  and ask for their support. Ask a friend to lose weight with you, or join a weight-loss support group.    Identify foods or triggers that may cause you to overeat , and find ways to avoid them. Remove tempting high-calorie foods from your home and workplace. Place a bowl of fresh fruit on your kitchen counter. If stress causes you to eat, then find other ways to cope with stress.     Keep a diary to track what you eat and drink.  Also write down how many minutes of physical activity you do each day. Weigh yourself once a week and record it in your diary.    Eating changes:  You will need to eat 500 to 1,000 fewer calories each day than you currently eat to lose 1 to 2 pounds a week. The following changes will help you cut calories:  Eat smaller portions.  Use small plates, no larger than 9 inches in diameter. Fill your plate half full of fruits and vegetables. Measure your food using measuring cups until you know what a serving size looks like.     Eat 3 meals and 1 or 2 snacks each day.  Plan your meals in advance. Cook and eat at home most of the time. Eat slowly.     Eat fruits and vegetables at every meal.  They are low in calories and high in fiber, which makes you feel full. Do not add butter, margarine, or cream sauce to vegetables. Use herbs to season steamed vegetables.     Eat less fat and fewer fried foods.  Eat more baked or grilled chicken and fish. These protein sources are lower in calories and fat than red meat. Limit fast food. Dress your salads with olive oil and vinegar instead of bottled dressing.     Limit the amount of  sugar you eat.  Do not drink sugary beverages. Limit alcohol.    Activity changes:  Physical activity is good for your body in many ways. It helps you burn calories and build strong muscles. It decreases stress and depression, and improves your mood. It can also help you sleep better. Talk to your healthcare provider before you begin an exercise program.  Exercise for at least 30 minutes 5 days a week.  Start slowly. Set aside time each day for physical activity that you enjoy and that is convenient for you. It is best to do both weight training and an activity that increases your heart rate, such as walking, bicycling, or swimming.     Find ways to be more active.  Do yard work and housecleaning. Walk up the stairs instead of using elevators. Spend your leisure time going to events that require walking, such as outdoor festivals or fairs. This extra physical activity can help you lose weight and keep it off.    Follow up with your primary healthcare provider as directed.  You may need to meet with a dietitian. Write down your questions so you remember to ask them during your visits.

## 2024-06-27 ENCOUNTER — TELEPHONE (OUTPATIENT)
Dept: UROLOGY | Facility: AMBULATORY SURGERY CENTER | Age: 44
End: 2024-06-27

## 2024-06-27 NOTE — TELEPHONE ENCOUNTER
----- Message from Carlos Lind PA-C sent at 6/27/2024  9:52 AM EDT -----   please call to schedule cystoscopy and renal ultrasound

## 2024-07-01 NOTE — TELEPHONE ENCOUNTER
Spoke with Ivelisse Forrester at her new facility in [place].  She will be seeking to establish care closer to where PT lives.  She requested last office notes so she has something to give the new Urologist.  Fax# [phone] sent office note from apt on 6/12/24.

## 2024-07-11 ENCOUNTER — TELEPHONE (OUTPATIENT)
Dept: FAMILY MEDICINE CLINIC | Facility: CLINIC | Age: 44
End: 2024-07-11

## 2024-07-30 DIAGNOSIS — R00.2 PALPITATIONS: ICD-10-CM

## 2024-07-30 DIAGNOSIS — K59.00 CONSTIPATION, UNSPECIFIED CONSTIPATION TYPE: ICD-10-CM

## 2024-07-30 DIAGNOSIS — G80.1 SPASTIC DIPLEGIC CEREBRAL PALSY (HCC): ICD-10-CM

## 2024-07-31 RX ORDER — DOCUSATE SODIUM 50 MG AND SENNOSIDES 8.6 MG 8.6; 5 MG/1; MG/1
TABLET, FILM COATED ORAL
Qty: 30 TABLET | Refills: 0 | Status: SHIPPED | OUTPATIENT
Start: 2024-07-31

## 2024-07-31 RX ORDER — CHOLECALCIFEROL (VITAMIN D3) 25 MCG
TABLET ORAL
Qty: 60 TABLET | Refills: 0 | Status: SHIPPED | OUTPATIENT
Start: 2024-07-31

## 2024-08-20 ENCOUNTER — TELEPHONE (OUTPATIENT)
Dept: GASTROENTEROLOGY | Facility: CLINIC | Age: 44
End: 2024-08-20

## 2024-08-20 NOTE — TELEPHONE ENCOUNTER
Left voicemail and requested call back     Called patient to confirm procedure for 09/20/2024 with Dr. Jaiyeola

## 2024-09-18 ENCOUNTER — TELEPHONE (OUTPATIENT)
Age: 44
End: 2024-09-18

## 2024-09-18 DIAGNOSIS — Z12.11 COLON CANCER SCREENING: Primary | ICD-10-CM

## 2024-09-18 RX ORDER — BISACODYL 5 MG/1
TABLET, DELAYED RELEASE ORAL
Qty: 4 TABLET | Refills: 0 | Status: SHIPPED | OUTPATIENT
Start: 2024-09-18

## 2024-09-18 RX ORDER — POLYETHYLENE GLYCOL 3350 17 G/17G
POWDER, FOR SOLUTION ORAL
Qty: 238 G | Refills: 0 | Status: SHIPPED | OUTPATIENT
Start: 2024-09-18

## 2024-09-18 NOTE — TELEPHONE ENCOUNTER
Patients GI provider:  LYDIA Andrew    Number to return call: [phone]    Reason for call: Pt's caretaker nurse Ivelisse calling requesting that a script for the rajeev/dul be sent to pt's confirmed pharmacy on her chart: [facility] [place] [address], [phone]. Per Ivelisse a script is needed. Ivelisse may be reached at the above ph # with any questions. If this can be sent today, pt's procedure on 9/20/24. Thank you    Scheduled procedure/appointment date if applicable: Procedure 9/20/24

## 2024-09-18 NOTE — TELEPHONE ENCOUNTER
Me   to Gastroenterology Amado Prajapati Provider         9/18/24  2:31 PM  Patients GI provider:  LYDIA Andrew     Number to return call: [phone]     Reason for call: Pt's caretaker nurse Ivelisse calling requesting that a script for the rajeev/dul be sent to pt's confirmed pharmacy on her chart: [facility] [address], [phone]. Per Ivelisse a script is needed. Ivelisse may be reached at the above ph # with any questions. If this can be sent today, pt's procedure on 9/20/24. Thank you     Scheduled procedure/appointment date if applicable: Procedure 9/20/24    Patricia Andrew PA-C routed this conversation to Me  Mychart, Generic   to Elizabeth Doss         9/18/24  2:35 PM  Dear Elizabeth Doss,     The following medication renewals have been approved and sent to the pharmacy:     The following prescriptions will be filled at [facility] [address] [[phone]]:   - polyethylene glycol (GLYCOLAX) 17 GM/SCOOP powder   - bisacodyl (DULCOLAX) 5 mg EC tablet     The following medication renewals were approved but were not sent to the pharmacy:           If you have any questions about your prescription, please send a MyChart Non-Urgent  Medical Advice Request message to your doctor. Or call the office to follow up.       This [facility]'s MyChart message has not been read.  Me   to Patricia Andrew PA-C       9/18/24  2:50 PM  Thank you lauren!

## 2024-09-20 ENCOUNTER — ANESTHESIA EVENT (OUTPATIENT)
Dept: GASTROENTEROLOGY | Facility: HOSPITAL | Age: 44
End: 2024-09-20
Payer: MEDICARE

## 2024-09-20 ENCOUNTER — HOSPITAL ENCOUNTER (OUTPATIENT)
Dept: GASTROENTEROLOGY | Facility: HOSPITAL | Age: 44
Setting detail: OUTPATIENT SURGERY
End: 2024-09-20
Payer: MEDICARE

## 2024-09-20 ENCOUNTER — ANESTHESIA (OUTPATIENT)
Dept: GASTROENTEROLOGY | Facility: HOSPITAL | Age: 44
End: 2024-09-20
Payer: MEDICARE

## 2024-09-20 VITALS
OXYGEN SATURATION: 95 % | TEMPERATURE: 97.4 F | DIASTOLIC BLOOD PRESSURE: 54 MMHG | BODY MASS INDEX: 37.95 KG/M2 | RESPIRATION RATE: 15 BRPM | HEIGHT: 61 IN | SYSTOLIC BLOOD PRESSURE: 111 MMHG | HEART RATE: 83 BPM | WEIGHT: 201 LBS

## 2024-09-20 DIAGNOSIS — Z12.11 SCREENING FOR COLORECTAL CANCER: ICD-10-CM

## 2024-09-20 DIAGNOSIS — Z12.12 SCREENING FOR COLORECTAL CANCER: ICD-10-CM

## 2024-09-20 PROCEDURE — G0121 COLON CA SCRN NOT HI RSK IND: HCPCS | Performed by: INTERNAL MEDICINE

## 2024-09-20 RX ORDER — SODIUM CHLORIDE, SODIUM LACTATE, POTASSIUM CHLORIDE, CALCIUM CHLORIDE 600; 310; 30; 20 MG/100ML; MG/100ML; MG/100ML; MG/100ML
INJECTION, SOLUTION INTRAVENOUS CONTINUOUS PRN
Status: DISCONTINUED | OUTPATIENT
Start: 2024-09-20 | End: 2024-09-20

## 2024-09-20 RX ORDER — PROPOFOL 10 MG/ML
INJECTION, EMULSION INTRAVENOUS AS NEEDED
Status: DISCONTINUED | OUTPATIENT
Start: 2024-09-20 | End: 2024-09-20

## 2024-09-20 RX ORDER — LIDOCAINE HYDROCHLORIDE 10 MG/ML
INJECTION, SOLUTION EPIDURAL; INFILTRATION; INTRACAUDAL; PERINEURAL AS NEEDED
Status: DISCONTINUED | OUTPATIENT
Start: 2024-09-20 | End: 2024-09-20

## 2024-09-20 RX ORDER — SODIUM CHLORIDE, SODIUM LACTATE, POTASSIUM CHLORIDE, CALCIUM CHLORIDE 600; 310; 30; 20 MG/100ML; MG/100ML; MG/100ML; MG/100ML
125 INJECTION, SOLUTION INTRAVENOUS CONTINUOUS
Status: DISCONTINUED | OUTPATIENT
Start: 2024-09-20 | End: 2024-09-24 | Stop reason: HOSPADM

## 2024-09-20 RX ADMIN — LIDOCAINE HYDROCHLORIDE 50 MG: 10 INJECTION, SOLUTION EPIDURAL; INFILTRATION; INTRACAUDAL; PERINEURAL at 13:31

## 2024-09-20 RX ADMIN — PROPOFOL 40 MG: 10 INJECTION, EMULSION INTRAVENOUS at 13:42

## 2024-09-20 RX ADMIN — PROPOFOL 30 MG: 10 INJECTION, EMULSION INTRAVENOUS at 13:47

## 2024-09-20 RX ADMIN — PROPOFOL 50 MG: 10 INJECTION, EMULSION INTRAVENOUS at 13:35

## 2024-09-20 RX ADMIN — SODIUM CHLORIDE, SODIUM LACTATE, POTASSIUM CHLORIDE, AND CALCIUM CHLORIDE: .6; .31; .03; .02 INJECTION, SOLUTION INTRAVENOUS at 13:26

## 2024-09-20 RX ADMIN — PROPOFOL 50 MG: 10 INJECTION, EMULSION INTRAVENOUS at 13:38

## 2024-09-20 RX ADMIN — SODIUM CHLORIDE, SODIUM LACTATE, POTASSIUM CHLORIDE, AND CALCIUM CHLORIDE 125 ML/HR: .6; .31; .03; .02 INJECTION, SOLUTION INTRAVENOUS at 13:15

## 2024-09-20 RX ADMIN — PROPOFOL 100 MG: 10 INJECTION, EMULSION INTRAVENOUS at 13:31

## 2024-09-20 RX ADMIN — Medication 40 MG: at 13:42

## 2024-09-20 NOTE — ANESTHESIA PREPROCEDURE EVALUATION
Procedure:  COLONOSCOPY    Relevant Problems   CARDIO   (+) Anterior chest wall pain   (+) Bilateral thoracic back pain   (+) Intercostal pain      GI/HEPATIC   (+) Functional dysphagia   (+) Gastroesophageal reflux disease with esophagitis without hemorrhage   (+) Hiatal hernia   (+) Partial small bowel obstruction (HCC)      /RENAL   (+) Acquired renal cyst of left kidney   (+) Left nephrolithiasis      HEMATOLOGY   (+) Iron deficiency anemia secondary to inadequate dietary iron intake      MUSCULOSKELETAL   (+) Bilateral thoracic back pain   (+) Hiatal hernia   (+) Low back pain      NEURO/PSYCH   (+) Acute non intractable tension-type headache   (+) Generalized anxiety disorder   (+) Severe episode of recurrent major depressive disorder, with psychotic features (HCC)      PULMONARY   (+) SOB (shortness of breath)      Urine pregnancy offered to patient patient and group home RN, Ivelisse, waived urine pregnancy at this time saying no possibility of pregnancy. Informed that if patient is pregnant that anesthesia can cause birth defects and loss of pregnancy. Patient acknowledged and refused urine pregnancy at this time.     ECHO 7/12/24:    FINDINGS:     LEFT VENTRICLE:   Could not accurately measure LV size and wall thickness due to technical limitations. The LV size and   wall thickness appears to be qualitatively normal. Global systolic LV function is normal. Est. LV Ejection Fraction is   55-60%. Normal diastolic function. MV E/e': 8.5 . MV E' (septal) 9.4 cm/sec. MV E' (lateral) 10.3 cm/sec. LA volume   index = 12.3 ml/m^2 (Norm 16 - 34). MV E/A: 1.1 . MV E 83.8 cm/sec.     LEFT ATRIUM:   Left atrial size is normal. LA volume index = 12.3 ml/m^2 (Norm 16 - 34). The interatrial septum (IAS)   appears to be intact.     MITRAL VALVE:   Mild mitral valve thickening. No mitral stenosis. Trace mitral regurgitation.     AORTIC VALVE:   AV not well visualized. No aortic stenosis. No aortic insufficiency.     RIGHT  VENTRICLE:   Right ventricle not well visualized. Unable to measure RV size. RV function is qualitatively normal.   Unable to measure TAPSE.     RIGHT ATRIUM:   The right atrium is not well visualized. Unable to measure RA.     TRICUSPID VALVE:   Tricuspid valve not well visualized. There is no evidence for tricuspid valve stenosis. Trace   tricuspid regurgitation. TV Doppler was technically limited. The estimated RVSP could not be accurately assessed.     PULMONIC VALVE:   The pulmonic valve is not well visualized. There is no evidence for pulmonic stenosis. There is no   evidence for pulmonic insufficiency.     GREAT VESSELS:   The aortic root dimension is normal. The ascending aorta dimension is normal. The ascending aorta index   is 1.8 . (Norm < 2.3). The aortic root index is 2.1 . (Norm < 2.3). IVC size is normal. IVC Dimension: 1.8 cm (Norm </=   2.1). IVC collapses > 50%. Estimated RAP = 3 mmHg.     PERICARDIUM:   No evidence of pericardial effusion.     SUMMARY:   There is no evidence for significant valvular pathology.   Physical Exam    Airway    Mallampati score: II  TM Distance: >3 FB  Neck ROM: full     Dental       Cardiovascular      Pulmonary      Other Findings  post-pubertal.      Anesthesia Plan  ASA Score- 3     Anesthesia Type- IV sedation with anesthesia with ASA Monitors.         Additional Monitors:     Airway Plan:            Plan Factors-Exercise tolerance (METS): <4 METS.    Chart reviewed.    Patient summary reviewed.    Patient is not a current smoker.  Patient did not smoke on day of surgery.            Induction- intravenous.    Postoperative Plan-     Perioperative Resuscitation Plan - Level 1 - Full Code.       Informed Consent- Anesthetic plan and risks discussed with patient.  I personally reviewed this patient with the CRNA. Discussed and agreed on the Anesthesia Plan with the CRNA..

## 2024-09-20 NOTE — H&P
H&P - Gastroenterology   Name: Elizabeth Doss 44 y.o. female I MRN: [id number]  Unit/Bed#:  I Date of Admission: 9/20/2024   Date of Service: 9/20/2024 I Hospital Day: 0     Assessment & Plan   This is a 44 y.o. year old female here for colonoscopy, and she is stable and optimized for her procedure.    History of Present Illness    Elizabeth Doss is a 44 y.o. year old female who presents for colon cancer screening    REVIEW OF SYSTEMS: Per the HPI, and otherwise unremarkable.    Historical Information   Past Medical History:   Diagnosis Date    ADD (attention deficit disorder)     Anxiety     Astigmatism     Brain lesion     Calcium deficiency     Cellulitis of foot, right 6/4/2018    Cerebral palsy (HCC)     Chronic otitis media     Constipation     Depression     Dysphagia     Esophagitis     Esotropia     GERD (gastroesophageal reflux disease)     Hiatal hernia     Hydrocephalus (HCC)     Impaired fasting glucose     Left nephrolithiasis 03/04/2019    Myopia     Oppositional defiant disorder     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HCC)     Seizures (HCC)     Sensorineural hearing loss     Sleep apnea     Status post ventriculoatrial shunt placement     Visual impairment      Past Surgical History:   Procedure Laterality Date    BREAST BIOPSY Left     X 2 (not sure of years)    CSF SHUNT      Creation of Ventriculo-Peritoneal CSF shunt ; Last Assessed:7/6/2016    EAR SURGERY      Last Assessed:7/6/2016    LEG SURGERY      due to CP     NOSE SURGERY      Last Assessed:7/6/2016    [place] ESOPHAGOGASTRODUODENOSCOPY TRANSORAL DIAGNOSTIC N/A 05/09/2019    Procedure: ESOPHAGOGASTRODUODENOSCOPY (EGD) with biopsy;  Surgeon: Kaya Pedersen MD;  Location: [facility];  Service: Gastroenterology    UPPER GASTROINTESTINAL ENDOSCOPY  05/2019     Social History     Tobacco Use    Smoking status: Never    Smokeless tobacco: Never   Vaping Use    Vaping status: Never Used   Substance and Sexual Activity    Alcohol use: Never    Drug use: Never     Sexual activity: Never     Birth control/protection: OCP     E-Cigarette/Vaping    E-Cigarette Use Never User      E-Cigarette/Vaping Substances    Nicotine No     THC No     CBD No     Flavoring No     Other No     Unknown No      Family history non-contributory    Meds/Allergies     Current Outpatient Medications:     acetaminophen (TYLENOL) 500 mg tablet    amitriptyline (ELAVIL) 10 mg tablet    ARIPiprazole (ABILIFY) 20 MG tablet    bisacodyl (DULCOLAX) 5 mg EC tablet    cholecalciferol (VITAMIN D3) 1,000 units tablet    escitalopram (LEXAPRO) 20 mg tablet    gabapentin (NEURONTIN) 300 mg capsule    hydrOXYzine HCL (ATARAX) 10 mg tablet    ketoconazole (NIZORAL) 2 % cream    lamoTRIgine (LaMICtal) 100 mg tablet    lamoTRIgine (LaMICtal) 25 mg tablet    LORazepam (ATIVAN) 1 mg tablet    metoprolol tartrate (LOPRESSOR) 25 mg tablet    Multiple Vitamins-Minerals (CertaVite/Antioxidants) TABS    norgestimate-ethinyl estradiol (ORTHO-CYCLEN) 0.25-35 MG-MCG per tablet    nystatin (MYCOSTATIN) powder    pantoprazole (PROTONIX) 40 mg tablet    psyllium (METAMUCIL SMOOTH TEXTURE) 28 % packet    Stimulant Laxative 8.6-50 MG per tablet    talc    trospium chloride (SANCTURA) 20 mg tablet    albuterol (Ventolin HFA) 90 mcg/act inhaler    aluminum-magnesium hydroxide-simethicone (GNP Antacid & Anti-Gas) 2095-6739-404 mg/30 mL suspension    bacitracin topical ointment 500 units/g topical ointment    bisacodyl (DULCOLAX) 5 mg EC tablet    bismuth subsalicylate (PEPTO BISMOL) 524 mg/30 mL oral suspension    calcium carbonate (TUMS) 500 mg chewable tablet    carbamide peroxide (DEBROX) 6.5 % otic solution    ciprofloxacin-dexamethasone (CIPRODEX) otic suspension    Diclofenac Sodium (VOLTAREN) 1 %    dicyclomine (BENTYL) 20 mg tablet    Dyclonine-Glycerin (Cepacol Sore Throat Spray) 0.1-33 % LIQD    fluticasone (FLONASE) 50 mcg/act nasal spray    GNP Sore Throat Spray 1.4 % mucosal liquid    ibuprofen (MOTRIN) 400 mg tablet    " Incontinence Supply Disposable (Wings Choice Plus Adult Briefs) MISC    Konsyl Daily Fiber 28.3 %    lidocaine (LMX) 4 % cream    LORazepam (ATIVAN) 0.5 mg tablet    magnesium hydroxide (GNP Milk of Magnesia) 400 mg/5 mL oral suspension    mineral oil-hydrophilic petrolatum (AQUAPHOR) ointment    Mouthwashes (Listerine Antiseptic) LIQD    naproxen (Naprosyn) 500 mg tablet    nystatin-triamcinolone (MYCOLOG-II) cream    polyethylene glycol (GLYCOLAX) 17 GM/SCOOP powder    polyethylene glycol (Golytely) 4000 mL solution    psyllium (METAMUCIL) 58.6 % packet    sodium chloride (OCEAN) 0.65 % nasal spray    Sunscreens (Aveeno Protect+Hydrate SPF50) LOTN    vedolizumab (ENTYVIO) SOLR    zinc oxide (DESITIN) 13 % cream    Current Facility-Administered Medications:     lactated ringers infusion, 125 mL/hr, Intravenous, Continuous, 125 mL/hr at 09/20/24 1315  No Known Allergies    Objective   /58   Pulse 92   Temp (!) 97.4 °F (36.3 °C) (Temporal)   Resp 14   Ht 5' 1\" (1.549 m)   Wt 91.2 kg (201 lb)   SpO2 98%   BMI 37.98 kg/m²     Physical Exam  Gen: NAD  Head: NCAT  CV: RRR  CHEST: Clear  ABD: soft, NT/ND  EXT: no edema  "

## 2024-09-20 NOTE — ANESTHESIA POSTPROCEDURE EVALUATION
Post-Op Assessment Note    CV Status:  Stable    Pain management: adequate       Mental Status:  Alert and awake   Hydration Status:  Euvolemic   PONV Controlled:  Controlled   Airway Patency:  Patent     Post Op Vitals Reviewed: Yes    No anethesia notable event occurred.    Staff: CRNA               BP   88/52   Temp      Pulse  85   Resp   13   SpO2   96

## 2024-10-01 ENCOUNTER — OFFICE VISIT (OUTPATIENT)
Dept: GASTROENTEROLOGY | Facility: MEDICAL CENTER | Age: 44
End: 2024-10-01
Payer: MEDICARE

## 2024-10-01 VITALS
SYSTOLIC BLOOD PRESSURE: 112 MMHG | DIASTOLIC BLOOD PRESSURE: 78 MMHG | HEIGHT: 61 IN | WEIGHT: 186 LBS | HEART RATE: 85 BPM | BODY MASS INDEX: 35.12 KG/M2 | TEMPERATURE: 99.9 F | OXYGEN SATURATION: 97 %

## 2024-10-01 DIAGNOSIS — K59.04 CHRONIC IDIOPATHIC CONSTIPATION: ICD-10-CM

## 2024-10-01 DIAGNOSIS — K44.9 HIATAL HERNIA: Primary | ICD-10-CM

## 2024-10-01 DIAGNOSIS — K21.9 GASTROESOPHAGEAL REFLUX DISEASE WITHOUT ESOPHAGITIS: ICD-10-CM

## 2024-10-01 PROCEDURE — 99214 OFFICE O/P EST MOD 30 MIN: CPT | Performed by: INTERNAL MEDICINE

## 2024-10-01 RX ORDER — SENNOSIDES 8.6 MG
8.6 TABLET ORAL DAILY
COMMUNITY
Start: 2024-09-22

## 2024-10-01 NOTE — PROGRESS NOTES
[facility] - Outpatient Follow-up Note  Elizabeth Doss 44 y.o. female MRN: [id number]  Encounter: [id number]          ASSESSMENT AND PLAN:  44-year-old female with history of sleep apnea, hypertension, cerebral palsy presents for follow-up evaluation.    1. Hiatal hernia  2. Gastroesophageal reflux disease without esophagitis  Has a history of chronic reflux and prior EGD showing severe esophagitis and large hiatal hernia.  With high-dose PPI and her subsequent EGD she had resolution of her esophagitis and no evidence of Mcdonald's esophagus.  She should continue her daily PPI in addition to dietary/lifestyle antireflux measure    3. Chronic idiopathic constipation  She has history of chronic constipation and inadequate bowel preparation on 2/2024 colonoscopy.  Repeat colonoscopy was performed in September 2024 showing melanosis and diverticulosis but was otherwise normal and she is due for repeat exam in 10 years.  She will continue high-fiber diet, fiber supplementation and bowel regimen in order to have a soft, formed bowel movement per day    Follow-up in 1      There are no diagnoses linked to this encounter.  ______________________________________________________________________    SUBJECTIVE: 44-year-old female with history of sleep apnea, hypertension, cerebral palsy presents for follow-up evaluation.    She was previously seen in the GI office in March 2024.  She is a history of GERD, functional dysphagia, chronic constipation and gastric intestinal metaplasia.    Interval history: 2/2024 EGD showed large hiatal hernia grade D esophagitis otherwise normal exam.  Gastric biopsy showed gastritis negative for H. pylori and intestinal metaplasia.  Colonoscopy showed small diverticular disease and internal hemorrhoids with an adequate prep and she was recommended repeat in 6 months.  Repeat EGD was repeated on 6/2024 showing large hiatal hernia and healed esophagitis without  Mcdonald's esophagus.  9/2024 colonoscopy showed melanosis and diverticulosis otherwise was normal to recommend repeat in 10 years.    She continues to take pantoprazole daily with good control of her heartburn.  Her bowel regimen includes senna, Metamucil and MiraLAX.  She reports straining with some bowel movements but does have a bowel movement every day.    Prior EGD/colonoscopy     2019 EGD was normal other than hiatal hernia.  Gastric biopsy showed intestinal metaplasia, esophageal biopsies were unremarkable  2020 manometry showed normal motility with small hiatal hernia    REVIEW OF SYSTEMS IS OTHERWISE NEGATIVE.  10 point review of systems is negative other than stated as per HPI    Historical Information   Past Medical History:   Diagnosis Date    ADD (attention deficit disorder)     Anxiety     Astigmatism     Brain lesion     Calcium deficiency     Cellulitis of foot, right 6/4/2018    Cerebral palsy (HCC)     Chronic otitis media     Constipation     Depression     Dysphagia     Esophagitis     Esotropia     GERD (gastroesophageal reflux disease)     Hiatal hernia     Hydrocephalus (HCC)     Impaired fasting glucose     Left nephrolithiasis 03/04/2019    Myopia     Oppositional defiant disorder     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HCC)     Seizures (HCC)     Sensorineural hearing loss     Sleep apnea     Status post ventriculoatrial shunt placement     Visual impairment      Past Surgical History:   Procedure Laterality Date    BREAST BIOPSY Left     X 2 (not sure of years)    CSF SHUNT      Creation of Ventriculo-Peritoneal CSF shunt ; Last Assessed:7/6/2016    EAR SURGERY      Last Assessed:7/6/2016    LEG SURGERY      due to CP     NOSE SURGERY      Last Assessed:7/6/2016    [place] ESOPHAGOGASTRODUODENOSCOPY TRANSORAL DIAGNOSTIC N/A 05/09/2019    Procedure: ESOPHAGOGASTRODUODENOSCOPY (EGD) with biopsy;  Surgeon: Kaya Pedersen MD;  Location: [facility];  Service: Gastroenterology    UPPER GASTROINTESTINAL ENDOSCOPY   05/2019     Social History   Social History     Substance and Sexual Activity   Alcohol Use Never     Social History     Substance and Sexual Activity   Drug Use Never     Social History     Tobacco Use   Smoking Status Never   Smokeless Tobacco Never     Family History   Problem Relation Age of Onset    Diabetes Mother     Colon cancer Father     No Known Problems Maternal Grandmother     No Known Problems Maternal Grandfather     No Known Problems Paternal Grandmother     No Known Problems Paternal Grandfather     Hypertension Neg Hx     Heart disease Neg Hx     Stroke Neg Hx     Thyroid disease Neg Hx     Breast cancer Neg Hx        Meds/Allergies       Current Outpatient Medications:     acetaminophen (TYLENOL) 500 mg tablet    amitriptyline (ELAVIL) 10 mg tablet    ARIPiprazole (ABILIFY) 20 MG tablet    bismuth subsalicylate (PEPTO BISMOL) 524 mg/30 mL oral suspension    calcium carbonate (TUMS) 500 mg chewable tablet    cholecalciferol (VITAMIN D3) 1,000 units tablet    dicyclomine (BENTYL) 20 mg tablet    escitalopram (LEXAPRO) 20 mg tablet    fluticasone (FLONASE) 50 mcg/act nasal spray    gabapentin (NEURONTIN) 300 mg capsule    GNP Sore Throat Spray 1.4 % mucosal liquid    hydrOXYzine HCL (ATARAX) 10 mg tablet    ketoconazole (NIZORAL) 2 % cream    Konsyl Daily Fiber 28.3 %    lamoTRIgine (LaMICtal) 100 mg tablet    LORazepam (ATIVAN) 1 mg tablet    metoprolol tartrate (LOPRESSOR) 25 mg tablet    mineral oil-hydrophilic petrolatum (AQUAPHOR) ointment    norgestimate-ethinyl estradiol (ORTHO-CYCLEN) 0.25-35 MG-MCG per tablet    nystatin (MYCOSTATIN) powder    nystatin-triamcinolone (MYCOLOG-II) cream    pantoprazole (PROTONIX) 40 mg tablet    psyllium (METAMUCIL SMOOTH TEXTURE) 28 % packet    psyllium (METAMUCIL) 58.6 % packet    senna (SENOKOT) 8.6 mg    sodium chloride (OCEAN) 0.65 % nasal spray    Sunscreens (Aveeno Protect+Hydrate SPF50) LOTN    talc    albuterol (Ventolin HFA) 90 mcg/act inhaler     "aluminum-magnesium hydroxide-simethicone (GNP Antacid & Anti-Gas) 5829-5709-136 mg/30 mL suspension    bacitracin topical ointment 500 units/g topical ointment    bisacodyl (DULCOLAX) 5 mg EC tablet    bisacodyl (DULCOLAX) 5 mg EC tablet    carbamide peroxide (DEBROX) 6.5 % otic solution    ciprofloxacin-dexamethasone (CIPRODEX) otic suspension    Diclofenac Sodium (VOLTAREN) 1 %    Dyclonine-Glycerin (Cepacol Sore Throat Spray) 0.1-33 % LIQD    ibuprofen (MOTRIN) 400 mg tablet    Incontinence Supply Disposable (Wings Choice Plus Adult Briefs) MISC    lamoTRIgine (LaMICtal) 25 mg tablet    lidocaine (LMX) 4 % cream    LORazepam (ATIVAN) 0.5 mg tablet    magnesium hydroxide (GNP Milk of Magnesia) 400 mg/5 mL oral suspension    Mouthwashes (Listerine Antiseptic) LIQD    Multiple Vitamins-Minerals (CertaVite/Antioxidants) TABS    naproxen (Naprosyn) 500 mg tablet    polyethylene glycol (GLYCOLAX) 17 GM/SCOOP powder    Stimulant Laxative 8.6-50 MG per tablet    trospium chloride (SANCTURA) 20 mg tablet    vedolizumab (ENTYVIO) SOLR    zinc oxide (DESITIN) 13 % cream    No Known Allergies        Objective     Blood pressure 112/78, pulse 85, temperature 99.9 °F (37.7 °C), temperature source Tympanic, height 5' 1\" (1.549 m), weight 84.4 kg (186 lb), SpO2 97%, not currently breastfeeding. Body mass index is 35.14 kg/m².      PHYSICAL EXAM:      General Appearance:   Alert, cooperative, no distress   HEENT:   Normocephalic, atraumatic, anicteric.     Neck:  Supple, symmetrical, trachea midline   Lungs:   respirations unlabored    Heart::   Regular rate and rhythm; .   Abdomen:   Soft, non-tender, non-distended; normal bowel sounds; no masses, no organomegaly    Genitalia:   Deferred    Rectal:   Deferred    Extremities:  No cyanosis, clubbing or edema    Pulses:  2+ and symmetric    Skin:  No jaundice, rashes, or lesions    Lymph nodes:  No palpable cervical lymphadenopathy        Lab Results:   No visits with results " within 1 Day(s) from this visit.   Latest known visit with results is:   Hospital Outpatient Visit on 06/24/2024   Component Date Value    EXT Preg Test, Ur 06/24/2024 Negative     Control 06/24/2024 Valid          Radiology Results:   Colonoscopy    Result Date: 9/20/2024  Narrative: Table formatting from the original result was not included. [facility] [address] [place] [address] [phone] DATE OF SERVICE: 9/20/24 PHYSICIAN(S): Attending: Diana M Jaiyeola, MD Fellow: No Staff Documented INDICATION: Screening for colorectal cancer POST-OP DIAGNOSIS: See the impression below. HISTORY: Prior colonoscopy: Less than 3 years ago. It is being repeated at an interval of less than 3 years because: Last colonoscopy had inadequate bowel prep BOWEL PREPARATION: Miralax/Dulcolax PREPROCEDURE: Informed consent was obtained for the procedure, including sedation. Risks including but not limited to bleeding, infection, perforation, adverse drug reaction and aspiration were explained in detail. Also explained about less than 100% sensitivity with the exam and other alternatives. The patient was placed in the left lateral decubitus position. Procedure: Colonoscopy DETAILS OF PROCEDURE: Patient was taken to the procedure room where a time out was performed to confirm correct patient and correct procedure. The patient underwent monitored anesthesia care, which was administered by an anesthesia professional. The patient's blood pressure, ECG, ETCO2, heart rate, level of consciousness, oxygen and respirations were monitored throughout the procedure. A digital rectal exam was performed. The scope was introduced through the anus and advanced to the cecum. Retroflexion was performed in the rectum. The quality of bowel preparation was evaluated using the [place] Bowel Preparation Scale with scores of: right colon = 2, transverse colon = 3, left colon = 3. The total BBPS score was 8.  Bowel prep was adequate. The patient experienced no blood loss. The procedure was not difficult. The patient tolerated the procedure well. There were no apparent adverse events. ANESTHESIA INFORMATION: ASA: III Anesthesia Type: Anesthesia type not filed in the log. MEDICATIONS: simethicone (MYLICON) 40 mg in sterile water 50 mL 40 mg (Totals for administrations occurring from 1327 to 1352 on 09/20/24) FINDINGS: Melanosis in the cecum and ascending colon Few small, scattered diverticula of mild severity in the sigmoid colon Internal small hemorrhoids Otherwise normal colonic mucosa EVENTS: Procedure Events Event Event Time ENDO CECUM REACHED 9/20/2024  1:43 PM ENDO SCOPE OUT TIME 9/20/2024  1:50 PM SPECIMENS: * No specimens in log * EQUIPMENT: Colonoscope -[id number]     Impression: Melanosis in the cecum and ascending colon Scattered diverticulosis of mild severity in the sigmoid colon Small hemorrhoids Otherwise normal colonic mucosa RECOMMENDATION: Repeat screening colonoscopy in 10 years, due: 9/18/2034   Diana M Jaiyeola, MD       This note was completed in part utilizing Dragon Software. Grammatical errors, random word insertions, spelling mistakes, and incomplete sentences may be an occasional consequence of this system secondary to software limitations, ambient noise, and hardware issues. If you have any questions or concerns about the content, text, or information contained within the body of this dictation, please contact the provider for clarification.

## 2024-12-03 ENCOUNTER — HOSPITAL ENCOUNTER (OUTPATIENT)
Dept: SLEEP CENTER | Facility: CLINIC | Age: 44
Discharge: HOME | End: 2024-12-03
Payer: MEDICARE

## 2024-12-03 DIAGNOSIS — G47.61 PLMD (PERIODIC LIMB MOVEMENT DISORDER): ICD-10-CM

## 2024-12-03 DIAGNOSIS — G47.19 EXCESSIVE DAYTIME SLEEPINESS: ICD-10-CM

## 2024-12-03 PROCEDURE — 95810 POLYSOM 6/> YRS 4/> PARAM: CPT

## 2024-12-03 PROCEDURE — 95810 POLYSOM 6/> YRS 4/> PARAM: CPT | Performed by: PSYCHIATRY & NEUROLOGY

## 2024-12-04 PROBLEM — G47.33 OSA (OBSTRUCTIVE SLEEP APNEA): Status: ACTIVE | Noted: 2024-12-04

## 2024-12-04 NOTE — PROGRESS NOTES
Sleep Study Documentation    Pre-Sleep Study       Sleep testing procedure explained to patient:YES    Patient napped prior to study:NO    Caffeine:Dayshift worker after 12PM.  Caffeine use:NO    Alcohol:Dayshift workers after 5PM: Alcohol use:NO    Typical day for patient:       Study Documentation    Sleep Study Indications: Snore, Excessive Daytime Sleepiness, Unrefreshing sleep, Chronic or AM headache, Mood Disturbance    Sleep Study: Diagnostic   Snore: Loud  Supplemental O2: no    O2 flow rate (L/min) range   O2 flow rate (L/min) final   Minimum SaO2 83%  Baseline SaO2 93%    EKG abnormalities: no     EEG abnormalities: no    Were abnormal behaviors in sleep observed:NO    Is Total Sleep Study Recording Time < 2 hours: N/A    Is Total Sleep Study Recording Time > 2 hours but study is incomplete: N/A    Is Total Sleep Study Recording Time 6 hours or more but sleep was not obtained: NO    Patient classification:        Post-Sleep Study    Medication used at bedtime or during sleep study:YES other prescription medications    Patient reports time it took to fall asleep:less than 20 minutes    Patient reports waking up during study:1 to 2 times.  Patient reports returning to sleep without difficulty.    Patient reports sleeping 2 to 4 hours without dreaming.    Does the Patient feel this is a typical night of sleep:worse than usual    Patient rated sleepiness: Somewhat sleepy or tired    PAP treatment:no.

## 2024-12-10 ENCOUNTER — RESULTS FOLLOW-UP (OUTPATIENT)
Dept: SLEEP CENTER | Facility: CLINIC | Age: 44
End: 2024-12-10

## 2024-12-19 NOTE — TELEPHONE ENCOUNTER
----- Message from Priscila Corona PA-C sent at 12/10/2024  9:34 AM EST -----  Sleep study did not demonstrate obstructive sleep apnea.  No treatment required at this time.  If symptoms persist, schedule follow-up appointment.

## 2024-12-19 NOTE — TELEPHONE ENCOUNTER
Diagnostic study resulted, does not confirms NADIYA.    Patient to follow-up with Priscila Corona PA-C as needed.    Call placed to patient, spoke with [facility] RN Ivelisse.  Advised of above.    Per request, study report faxed to nursing staff ATTN: Ivelisse [phone].

## 2025-01-13 NOTE — TELEPHONE ENCOUNTER
Please review! Quality 47: Advance Care Plan: Advance care planning not documented, reason not otherwise specified. Quality 226: Preventive Care And Screening: Tobacco Use: Screening And Cessation Intervention: Patient screened for tobacco use and is an ex/non-smoker Quality 130: Documentation Of Current Medications In The Medical Record: Current Medications Documented Detail Level: Detailed

## 2025-02-04 NOTE — TELEPHONE ENCOUNTER
Spoke with the pharmacist, the Maalox would be the antacid needing refill  Thank you  Is This A New Presentation, Or A Follow-Up?: Skin Lesions How Severe Is Your Skin Lesion?: moderate Has Your Skin Lesion Been Treated?: not been treated Is This A New Presentation, Or A Follow-Up?: Skin Lesion

## 2025-06-03 ENCOUNTER — CONSULT (OUTPATIENT)
Age: 45
End: 2025-06-03
Payer: MEDICARE

## 2025-06-03 VITALS
TEMPERATURE: 98.3 F | HEIGHT: 61 IN | SYSTOLIC BLOOD PRESSURE: 114 MMHG | RESPIRATION RATE: 17 BRPM | DIASTOLIC BLOOD PRESSURE: 68 MMHG | HEART RATE: 69 BPM | WEIGHT: 186 LBS | BODY MASS INDEX: 35.12 KG/M2 | OXYGEN SATURATION: 98 %

## 2025-06-03 DIAGNOSIS — E23.6 PITUITARY CYST (HCC): Primary | ICD-10-CM

## 2025-06-03 PROCEDURE — 99203 OFFICE O/P NEW LOW 30 MIN: CPT | Performed by: PHYSICIAN ASSISTANT

## 2025-06-03 RX ORDER — BENZONATATE 100 MG/1
100 CAPSULE ORAL 3 TIMES DAILY PRN
COMMUNITY

## 2025-06-03 RX ORDER — DOCUSATE SODIUM 100 MG/1
CAPSULE, LIQUID FILLED ORAL
COMMUNITY
Start: 2025-05-09

## 2025-06-03 NOTE — PROGRESS NOTES
Name: Elizabeth Doss      : 1980      MRN: [id number]  Encounter Provider: Ivelisse Rodriguez PA-C  Encounter Date: 6/3/2025   [facility]  :  Assessment & Plan  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HCC)  Pt presents to the  nsgy office today for clinical evaluation in regard to a known pituitary lesion.   Pt was following with Dr. Garcia in the  nsgy office for this known 6mm pituitary cyst   Incidentally noted on CT imaging completed in 2017 after a head injury   Noted stability on imaging x 5 years as of 2022   Given persistent stability, pt was made to follow-up on an as needed basis   Pt returns to the office today at the recommendation of her new psychiatrist to reassess sx and confirm that there is no need for further imaging follow-up in this regard   Pt is accompanied by her caregiver and her primary nurse on the phone during the appt   The pt, her nurse and her caregiver all offer no new concerns. Pt denies any CASEY or vision changes.   Only concern is of some dizziness since one of her medications was increased about 4 weeks ago.   She only reports the dizziness when taking a dose of this medications.   Pt's nurse is working with her PCP to reduce the dose of this medication again     Imaging:   No updated imaging to review   2023 CTH: No depressed calvarial fracture. No acute intracranial hemorrhage. Chronic changes as discussed above. Left parietal ventricular shunt catheter in place. No clear evidence of   hydrocephalus. 6 to 7 mm hyperdense sellar/suprasellar lesion again identified (described   in 2017 CT head)   2023 CTH: No acute intracranial abnormality. 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 Unchanged small hyperdense suprasellar nodule, which  "could represent Rathke's cleft cyst over a pituitary microadenoma  3/16/2022 MRI brain pituitary w/wo: Stable approximately 6 mm round poorly enhancing nodule inseparable from the superior aspect of the pituitary gland and pituitary stalk, presumably microadenoma. Stable dysmorphic brain parenchyma and ventricular system.    Plan:   Pt encouraged to continue to closely monitor her exam and sx   Pt educated on \"red flag\" s/sx to monitor for including vision changes/loss of peripheral vision, HA, AMS, speech difficulty, weakness, N/T, seizure like activity, LOC, etc.   Briefly discussed the diagnosis of a natural history of pituitary cyst/adenoma  I discussed with the patient, her caregiver, and her nurse that patient has a known 6 mm pituitary microadenoma that had been stable on subsequent imaging for at least 5 years when she was previously monitored in our office.  Given this previously noted stability, patient was recommended follow-up on an as-needed basis after 5 years at that time.  I explained that I have no way of definitively knowing if this lesion is stable or not without additional imaging with a repeat MRI brain, dedicated pituitary studies and I have offered to order one at this time. The patient and her nurse expressed a lot of hesitancy in undergoing repeat MRI imaging as it is very difficult for the patient to undergo the studies and she requires general anesthesia.  They are hopeful to avoid any additional imaging if possible.  While I cannot be sure that this lesion is stable without imaging, the patient has no new complaints or symptoms and does not express any \"red flag\" signs/symptoms that would make me believe she has had change or increase in size of noted pituitary lesion.  Given her significant hesitancy and hopefulness to avoid additional imaging for the patient, I think it is reasonable to continue to closely monitor her symptoms without additional workup with MRI brain at this time.  Pt " will therefore then follow-up on an as needed basis moving forward.   Pt and or her nurse/caregiver will call with any changes in symptoms or concerns and we can consider updated imaging at that time.   Pt agreeable to the above noted plan   All questions answered at this time   Pt encouraged to call the office with any further questions or concerns or should they experience any worsening sx    History of Present Illness     Pt is a 46yo F with a PMH significant for mild to moderate intellectual development disability, cerebral palsy, and hx of prior [facility] shunt placement. Pt presents to the  nsgy office today for clinical evaluation in regard to a known pituitary microadenoma/cyst.     Pt is known to the  nsgy office as a pt of Dr. Garcia as she has been following regularly in regard to the above mentioned 6mm pituitary microadenoma.  This was initially noted in February 2017, CT head completed 4 evaluation after head injury.  Patient is been following in our office in this regard and was ultimately noted to have stable findings on imaging x 5 years.  Patient was last seen in April 2022 and ultimately recommended to follow-up on an as-needed basis moving forward.    Patient returns to the office today for clinical evaluation in this regard.  She reportedly got a new psychiatrist who wanted to have her evaluated and ensure/confirm that she did not need ongoing follow-up in this regard.  Patient is accompanied by her caregiver and her nurse.  Per the patient as well as her support team, they offer no new complaints.  She denies any headaches or vision changes.  As mentioned above, they are here to confirm that they do not need any further follow-up in this regard and are very hopeful to avoid any additional imaging as it is very hard for the patient to tolerate an MRI and she requires general anesthesia.         Review of Systems   HENT:  Positive for hearing loss. Negative for tinnitus and trouble swallowing.     Eyes:  Negative for pain and visual disturbance.   Genitourinary:  Negative for enuresis.   Musculoskeletal:  Positive for gait problem (uses walker).   Neurological:  Positive for dizziness (intermittent). Negative for speech difficulty, weakness, light-headedness, numbness and headaches.   Hematological:  Does not bruise/bleed easily.   All other systems reviewed and are negative.    I have personally reviewed the MA's review of systems and made changes as necessary.    Past Medical History   Past Medical History[1]  Past Surgical History[2]  Family History[3]  she reports that she has never smoked. She has never used smokeless tobacco. She reports that she does not drink alcohol and does not use drugs.  Current Outpatient Medications   Medication Instructions    acetaminophen (TYLENOL) 500 mg, Oral, Every 6 hours PRN    albuterol (Ventolin HFA) 90 mcg/act inhaler 2 puffs, Inhalation, Every 6 hours PRN    aluminum-magnesium hydroxide-simethicone (GNP Antacid & Anti-Gas) 0458-7380-756 mg/30 mL suspension 15 mL, Oral, Every 4 hours PRN    amitriptyline (ELAVIL) 10 mg, Oral, Daily at bedtime    ARIPiprazole (ABILIFY) 20 mg, Oral, Daily at bedtime    bacitracin topical ointment 500 units/g topical ointment Cleanse site on cheek with soap and water followed by bacitracin BID until healed then p.r.n.    benzonatate (TESSALON PERLES) 100 mg, 3 times daily PRN    bisacodyl (DULCOLAX) 5 mg EC tablet     bisacodyl (DULCOLAX) 5 mg EC tablet Use as directed by GI office for colonoscopy.    bismuth subsalicylate (PEPTO BISMOL) 262 mg, Oral, Every 6 hours PRN    calcium carbonate (TUMS) 500 mg, Oral, 3 times daily PRN    carbamide peroxide (DEBROX) 6.5 % otic solution 5 drops, Left Ear, 2 times daily, Use 1 week prior to ENT appointment.  Also can use 4 gtts twice weekly (Tuesday and Friday for example) to each ear for prevention.  Keep hearing aid out x 10 minutes after ear drops administered.    cholecalciferol (VITAMIN D3)  1,000 units tablet TAKE 2 TABLETS BY MOUTH ONCE DAILY AT 8AM (SUPPLEMENT)    ciprofloxacin-dexamethasone (CIPRODEX) otic suspension 4 drops, Left Ear, 2 times daily    Diclofenac Sodium (VOLTAREN) 2 g, Topical, 4 times daily    dicyclomine (BENTYL) 20 mg, Oral, Every 6 hours PRN    docusate sodium (COLACE) 100 mg capsule TAKE 1 CAPSULE BY MOUTH DAILY (DX: CHRONIC IDIOPATHIC CONSTIPATION)    Dyclonine-Glycerin (Cepacol Sore Throat Spray) 0.1-33 % LIQD 1 spray, Mouth/Throat, 3 times daily PRN    escitalopram (LEXAPRO) 20 mg, Oral, Daily    fluticasone (FLONASE) 50 mcg/act nasal spray 1 spray, Nasal, Daily PRN, At 8:00 AM    gabapentin (NEURONTIN) 300 mg capsule TAKE 1 CAPSULE BY MOUTH DAILY @ 8PM (NERVE PAIN) *MANDI    GNP Sore Throat Spray 1.4 % mucosal liquid APPLY 1 SPRAY TO MOUTH OR THROAT EVERY 2 HRS AS NEEDED FOR SORE THROAT    hydrOXYzine HCL (ATARAX) 10 mg, Oral, 3 times daily    ibuprofen (MOTRIN) 600 mg, Oral, Every 6 hours PRN    Incontinence Supply Disposable (Wings Choice Plus Adult Briefs) MISC Use as directed (R39.81)    ketoconazole (NIZORAL) 2 % cream Topical, Daily    Konsyl Daily Fiber 28.3 % No dose, route, or frequency recorded.    lamoTRIgine (LAMICTAL) 25 mg, 2 times daily    lamoTRIgine (LAMICTAL) 100 mg, 2 times daily    lidocaine (LMX) 4 % cream Topical, As needed    LORazepam (ATIVAN) 1 mg tablet     LORazepam (ATIVAN) 0.25 mg, Every 6 hours PRN    magnesium hydroxide (GNP Milk of Magnesia) 400 mg/5 mL oral suspension 2 TBSP (30ML) BY MOUTH DAILY AS NEEDED IF NO BM IN 3 DAYS (CONSTIPATION)    metoprolol tartrate (LOPRESSOR) 25 mg tablet TAKE 1 TABLET BY MOUTH TWICE DAILY (8:00 AM & 8:00 PM) FOR BLOOD PRESSURE.    mineral oil-hydrophilic petrolatum (AQUAPHOR) ointment Topical, As needed    Mouthwashes (Listerine Antiseptic) LIQD 5 mL, Swish & Spit, 2 times daily    Multiple Vitamins-Minerals (CertaVite/Antioxidants) TABS 1 tablet, Oral, Every morning    naproxen (NAPROSYN) 500 mg, Oral, 2  "times daily with meals    norgestimate-ethinyl estradiol (ORTHO-CYCLEN) 0.25-35 MG-MCG per tablet 1 tablet, Oral, Daily    nystatin (MYCOSTATIN) powder     nystatin-triamcinolone (MYCOLOG-II) cream Topical, 2 times daily    pantoprazole (PROTONIX) 40 mg, Oral, Daily    polyethylene glycol (GLYCOLAX) 17 GM/SCOOP powder Use as directed by GI office for colonoscopy.    psyllium (METAMUCIL SMOOTH TEXTURE) 28 % packet No dose, route, or frequency recorded.    psyllium (METAMUCIL) 58.6 % packet 1 packet, Oral, Daily, As needed for constipation    senna (SENOKOT) 8.6 mg, Daily    sodium chloride (OCEAN) 0.65 % nasal spray 1 spray, Nasal, As needed    Stimulant Laxative 8.6-50 MG per tablet TAKE 1 TABLET BY MOUTH ONCE DAILY AT 8:00 AM FOR CONSTIPATION    Sunscreens (Aveeno Protect+Hydrate SPF50) LOTN Topical, As needed, Apply 15 minutes before sun exposure and every 2 hours    talc Topical, 2 times daily, Use under the breast and abdomen folds    trospium chloride (SANCTURA) 20 mg, Oral, 2 times daily    vedolizumab (ENTYVIO) SOLR No dose, route, or frequency recorded.    zinc oxide (DESITIN) 13 % cream 1 application., Topical, As needed   Allergies[4]   Objective   /68 (BP Location: Left arm, Patient Position: Sitting, Cuff Size: Standard)   Pulse 69   Temp 98.3 °F (36.8 °C) (Temporal)   Resp 17   Ht 5' 1\" (1.549 m)   Wt 84.4 kg (186 lb)   SpO2 98%   BMI 35.14 kg/m²     Physical Exam  Neurological Exam  General appearance: alert, appears stated age, cooperative and no distress  Head: Normocephalic, without obvious abnormality, atraumatic  Neck: supple, symmetrical, trachea midline and NT  Back: no kyphosis present, no tenderness to percussion or palpation  Lungs: non labored breathing, no resp distress on room air   Heart: regular heart rate  Neurologic:   Mental status: Alert, oriented x3, thought content appropriate  - baseline intellectual disability   - speech is slow   - oriented x 3   Cranial nerves:   - " baseline dysconjugate gaze w/ fixed L gaze with the R eye. Limited lateral gaze of the L eye.   - PERRLA   Sensory: normal to LT jessa throughout  Motor: moving all extremities  - baseline chronic weakness to the R side, RUE is baseline contracted   - ambulates w/ a rolling walker at baseline     Administrative Statements   I have spent a total time of 40 minutes in caring for this patient on the day of the visit/encounter including Diagnostic results, Prognosis, Risks and benefits of tx options, Instructions for management, Patient and family education, Importance of tx compliance, Risk factor reductions, Impressions, Counseling / Coordination of care, Documenting in the medical record, and Obtaining or reviewing history  .           [1]   Past Medical History:  Diagnosis Date    ADD (attention deficit disorder)     Anxiety     Astigmatism     Brain lesion     Calcium deficiency     Cellulitis of foot, right 6/4/2018    Cerebral palsy (HCC)     Chronic otitis media     Constipation     Depression     Dysphagia     Esophagitis     Esotropia     GERD (gastroesophageal reflux disease)     Hiatal hernia     Hydrocephalus (HCC)     Impaired fasting glucose     Left nephrolithiasis 03/04/2019    Myopia     Oppositional defiant disorder     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HCC)     Seizures (HCC)     Sensorineural hearing loss     Sleep apnea     Status post ventriculoatrial shunt placement     Visual impairment    [2]   Past Surgical History:  Procedure Laterality Date    BREAST BIOPSY Left     X 2 (not sure of years)    CSF SHUNT      Creation of Ventriculo-Peritoneal CSF shunt ; Last Assessed:7/6/2016    EAR SURGERY      Last Assessed:7/6/2016    LEG SURGERY      due to CP     NOSE SURGERY      Last Assessed:7/6/2016    NC ESOPHAGOGASTRODUODENOSCOPY TRANSORAL DIAGNOSTIC N/A 05/09/2019    Procedure: ESOPHAGOGASTRODUODENOSCOPY (EGD) with biopsy;  Surgeon: Kaya Pedersen MD;  Location: [facility];  Service: Gastroenterology    UPPER  GASTROINTESTINAL ENDOSCOPY  05/2019   [3]   Family History  Problem Relation Name Age of Onset    Diabetes Mother      Colon cancer Father      No Known Problems Maternal Grandmother      No Known Problems Maternal Grandfather      No Known Problems Paternal Grandmother      No Known Problems Paternal Grandfather      Hypertension Neg Hx      Heart disease Neg Hx      Stroke Neg Hx      Thyroid disease Neg Hx      Breast cancer Neg Hx     [4] No Known Allergies

## 2025-06-03 NOTE — ASSESSMENT & PLAN NOTE
Pt presents to the  nsgy office today for clinical evaluation in regard to a known pituitary lesion.   Pt was following with Dr. Garcia in the  nsgy office for this known 6mm pituitary cyst   Incidentally noted on CT imaging completed in Feb 2017 after a head injury   Noted stability on imaging x 5 years as of April 2022   Given persistent stability, pt was made to follow-up on an as needed basis   Pt returns to the office today at the recommendation of her new psychiatrist to reassess sx and confirm that there is no need for further imaging follow-up in this regard   Pt is accompanied by her caregiver and her primary nurse on the phone during the appt   The pt, her nurse and her caregiver all offer no new concerns. Pt denies any CASEY or vision changes.   Only concern is of some dizziness since one of her medications was increased about 4 weeks ago.   She only reports the dizziness when taking a dose of this medications.   Pt's nurse is working with her PCP to reduce the dose of this medication again     Imaging:   No updated imaging to review   7/22/2023 CTH: No depressed calvarial fracture. No acute intracranial hemorrhage. Chronic changes as discussed above. Left parietal ventricular shunt catheter in place. No clear evidence of   hydrocephalus. 6 to 7 mm hyperdense sellar/suprasellar lesion again identified (described   in 02/01/2017 CT head)   2/17/2023 CTH: No acute intracranial abnormality. 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 Unchanged small hyperdense suprasellar nodule, which could represent Rathke's cleft cyst over a pituitary microadenoma  3/16/2022 MRI brain pituitary w/wo: Stable approximately 6 mm round poorly enhancing nodule inseparable from the superior aspect of the pituitary gland and pituitary stalk, presumably  "microadenoma. Stable dysmorphic brain parenchyma and ventricular system.    Plan:   Pt encouraged to continue to closely monitor her exam and sx   Pt educated on \"red flag\" s/sx to monitor for including vision changes/loss of peripheral vision, HA, AMS, speech difficulty, weakness, N/T, seizure like activity, LOC, etc.   Briefly discussed the diagnosis of a natural history of pituitary cyst/adenoma  I discussed with the patient, her caregiver, and her nurse that patient has a known 6 mm pituitary microadenoma that had been stable on subsequent imaging for at least 5 years when she was previously monitored in our office.  Given this previously noted stability, patient was recommended follow-up on an as-needed basis after 5 years at that time.  I explained that I have no way of definitively knowing if this lesion is stable or not without additional imaging with a repeat MRI brain, dedicated pituitary studies and I have offered to order one at this time. The patient and her nurse expressed a lot of hesitancy in undergoing repeat MRI imaging as it is very difficult for the patient to undergo the studies and she requires general anesthesia.  They are hopeful to avoid any additional imaging if possible.  While I cannot be sure that this lesion is stable without imaging, the patient has no new complaints or symptoms and does not express any \"red flag\" signs/symptoms that would make me believe she has had change or increase in size of noted pituitary lesion.  Given her significant hesitancy and hopefulness to avoid additional imaging for the patient, I think it is reasonable to continue to closely monitor her symptoms without additional workup with MRI brain at this time.  Pt will therefore then follow-up on an as needed basis moving forward.   Pt and or her nurse/caregiver will call with any changes in symptoms or concerns and we can consider updated imaging at that time.   Pt agreeable to the above noted plan   All " questions answered at this time   Pt encouraged to call the office with any further questions or concerns or should they experience any worsening sx

## (undated) DEVICE — SINGLE-USE BIOPSY FORCEPS: Brand: RADIAL JAW 4